# Patient Record
Sex: MALE | Race: WHITE | NOT HISPANIC OR LATINO | Employment: OTHER | URBAN - NONMETROPOLITAN AREA
[De-identification: names, ages, dates, MRNs, and addresses within clinical notes are randomized per-mention and may not be internally consistent; named-entity substitution may affect disease eponyms.]

---

## 2017-03-22 ENCOUNTER — OFFICE VISIT (OUTPATIENT)
Dept: FAMILY MEDICINE CLINIC | Facility: CLINIC | Age: 82
End: 2017-03-22

## 2017-03-22 ENCOUNTER — HOSPITAL ENCOUNTER (EMERGENCY)
Facility: HOSPITAL | Age: 82
Discharge: HOME | End: 2017-03-22
Attending: EMERGENCY MEDICINE | Admitting: EMERGENCY MEDICINE

## 2017-03-22 ENCOUNTER — APPOINTMENT (OUTPATIENT)
Dept: GENERAL RADIOLOGY | Facility: HOSPITAL | Age: 82
End: 2017-03-22

## 2017-03-22 VITALS
OXYGEN SATURATION: 98 % | SYSTOLIC BLOOD PRESSURE: 144 MMHG | WEIGHT: 187 LBS | HEART RATE: 135 BPM | HEIGHT: 73 IN | BODY MASS INDEX: 24.78 KG/M2 | DIASTOLIC BLOOD PRESSURE: 70 MMHG | TEMPERATURE: 97.5 F

## 2017-03-22 VITALS
HEART RATE: 81 BPM | HEIGHT: 73 IN | DIASTOLIC BLOOD PRESSURE: 74 MMHG | SYSTOLIC BLOOD PRESSURE: 145 MMHG | TEMPERATURE: 97 F | BODY MASS INDEX: 23.86 KG/M2 | OXYGEN SATURATION: 97 % | WEIGHT: 180 LBS | RESPIRATION RATE: 18 BRPM

## 2017-03-22 DIAGNOSIS — I47.1 ATRIAL TACHYCARDIA (HCC): Primary | ICD-10-CM

## 2017-03-22 DIAGNOSIS — R06.02 SHORTNESS OF BREATH: ICD-10-CM

## 2017-03-22 DIAGNOSIS — I47.1 PAROXYSMAL SVT (SUPRAVENTRICULAR TACHYCARDIA) (HCC): Primary | ICD-10-CM

## 2017-03-22 LAB
ALBUMIN SERPL-MCNC: 4.3 G/DL (ref 3.5–5.2)
ALBUMIN/GLOB SERPL: 1.6 G/DL
ALP SERPL-CCNC: 45 U/L (ref 39–117)
ALT SERPL W P-5'-P-CCNC: 21 U/L (ref 1–41)
ANION GAP SERPL CALCULATED.3IONS-SCNC: 12.3 MMOL/L
AST SERPL-CCNC: 18 U/L (ref 1–40)
BASOPHILS # BLD AUTO: 0.01 10*3/MM3 (ref 0–0.2)
BASOPHILS NFR BLD AUTO: 0.2 % (ref 0–1.5)
BILIRUB SERPL-MCNC: 1.4 MG/DL (ref 0.1–1.2)
BUN BLD-MCNC: 17 MG/DL (ref 8–23)
BUN/CREAT SERPL: 20 (ref 7–25)
CALCIUM SPEC-SCNC: 9.3 MG/DL (ref 8.6–10.5)
CHLORIDE SERPL-SCNC: 107 MMOL/L (ref 98–107)
CK MB SERPL-CCNC: 3.21 NG/ML
CK SERPL-CCNC: 63 U/L (ref 20–200)
CO2 SERPL-SCNC: 25.7 MMOL/L (ref 22–29)
CREAT BLD-MCNC: 0.85 MG/DL (ref 0.76–1.27)
DEPRECATED RDW RBC AUTO: 60.1 FL (ref 37–54)
EOSINOPHIL # BLD AUTO: 0.06 10*3/MM3 (ref 0–0.7)
EOSINOPHIL NFR BLD AUTO: 1.3 % (ref 0.3–6.2)
ERYTHROCYTE [DISTWIDTH] IN BLOOD BY AUTOMATED COUNT: 16.5 % (ref 11.5–14.5)
GFR SERPL CREATININE-BSD FRML MDRD: 85 ML/MIN/1.73
GLOBULIN UR ELPH-MCNC: 2.7 GM/DL
GLUCOSE BLD-MCNC: 120 MG/DL (ref 65–99)
HCT VFR BLD AUTO: 48.3 % (ref 40.4–52.2)
HGB BLD-MCNC: 15.4 G/DL (ref 13.7–17.6)
IMM GRANULOCYTES # BLD: 0 10*3/MM3 (ref 0–0.03)
IMM GRANULOCYTES NFR BLD: 0 % (ref 0–0.5)
INR PPP: 1.03 (ref 0.9–1.1)
LYMPHOCYTES # BLD AUTO: 1.17 10*3/MM3 (ref 0.9–4.8)
LYMPHOCYTES NFR BLD AUTO: 25.5 % (ref 19.6–45.3)
MAGNESIUM SERPL-MCNC: 2 MG/DL (ref 1.6–2.4)
MCH RBC QN AUTO: 31.8 PG (ref 27–32.7)
MCHC RBC AUTO-ENTMCNC: 31.9 G/DL (ref 32.6–36.4)
MCV RBC AUTO: 99.8 FL (ref 79.8–96.2)
MONOCYTES # BLD AUTO: 0.37 10*3/MM3 (ref 0.2–1.2)
MONOCYTES NFR BLD AUTO: 8.1 % (ref 5–12)
NEUTROPHILS # BLD AUTO: 2.98 10*3/MM3 (ref 1.9–8.1)
NEUTROPHILS NFR BLD AUTO: 64.9 % (ref 42.7–76)
NRBC BLD MANUAL-RTO: 0 /100 WBC (ref 0–0)
PLATELET # BLD AUTO: 128 10*3/MM3 (ref 140–500)
PMV BLD AUTO: 10.9 FL (ref 6–12)
POTASSIUM BLD-SCNC: 4.5 MMOL/L (ref 3.5–5.2)
PROT SERPL-MCNC: 7 G/DL (ref 6–8.5)
PROTHROMBIN TIME: 13.1 SECONDS (ref 11.7–14.2)
RBC # BLD AUTO: 4.84 10*6/MM3 (ref 4.6–6)
SODIUM BLD-SCNC: 145 MMOL/L (ref 136–145)
T4 FREE SERPL-MCNC: 1.31 NG/DL (ref 0.93–1.7)
TROPONIN T SERPL-MCNC: <0.01 NG/ML (ref 0–0.03)
TSH SERPL DL<=0.05 MIU/L-ACNC: 2.67 MIU/ML (ref 0.27–4.2)
WBC NRBC COR # BLD: 4.59 10*3/MM3 (ref 4.5–10.7)

## 2017-03-22 PROCEDURE — 96375 TX/PRO/DX INJ NEW DRUG ADDON: CPT

## 2017-03-22 PROCEDURE — 82550 ASSAY OF CK (CPK): CPT | Performed by: EMERGENCY MEDICINE

## 2017-03-22 PROCEDURE — 36415 COLL VENOUS BLD VENIPUNCTURE: CPT

## 2017-03-22 PROCEDURE — 93000 ELECTROCARDIOGRAM COMPLETE: CPT | Performed by: PHYSICIAN ASSISTANT

## 2017-03-22 PROCEDURE — 99284 EMERGENCY DEPT VISIT MOD MDM: CPT

## 2017-03-22 PROCEDURE — 84439 ASSAY OF FREE THYROXINE: CPT | Performed by: EMERGENCY MEDICINE

## 2017-03-22 PROCEDURE — 83735 ASSAY OF MAGNESIUM: CPT | Performed by: EMERGENCY MEDICINE

## 2017-03-22 PROCEDURE — 99214 OFFICE O/P EST MOD 30 MIN: CPT | Performed by: PHYSICIAN ASSISTANT

## 2017-03-22 PROCEDURE — 96374 THER/PROPH/DIAG INJ IV PUSH: CPT

## 2017-03-22 PROCEDURE — 82553 CREATINE MB FRACTION: CPT | Performed by: EMERGENCY MEDICINE

## 2017-03-22 PROCEDURE — 85025 COMPLETE CBC W/AUTO DIFF WBC: CPT | Performed by: EMERGENCY MEDICINE

## 2017-03-22 PROCEDURE — 84443 ASSAY THYROID STIM HORMONE: CPT | Performed by: EMERGENCY MEDICINE

## 2017-03-22 PROCEDURE — 93010 ELECTROCARDIOGRAM REPORT: CPT | Performed by: INTERNAL MEDICINE

## 2017-03-22 PROCEDURE — 84484 ASSAY OF TROPONIN QUANT: CPT | Performed by: EMERGENCY MEDICINE

## 2017-03-22 PROCEDURE — 93005 ELECTROCARDIOGRAM TRACING: CPT | Performed by: EMERGENCY MEDICINE

## 2017-03-22 PROCEDURE — 85610 PROTHROMBIN TIME: CPT | Performed by: EMERGENCY MEDICINE

## 2017-03-22 PROCEDURE — 80053 COMPREHEN METABOLIC PANEL: CPT | Performed by: EMERGENCY MEDICINE

## 2017-03-22 PROCEDURE — 96361 HYDRATE IV INFUSION ADD-ON: CPT

## 2017-03-22 PROCEDURE — 71010 HC CHEST PA OR AP: CPT

## 2017-03-22 RX ORDER — METOPROLOL SUCCINATE 25 MG/1
25 TABLET, EXTENDED RELEASE ORAL DAILY
Qty: 30 TABLET | Refills: 0 | Status: SHIPPED | OUTPATIENT
Start: 2017-03-22 | End: 2017-04-05 | Stop reason: SDUPTHER

## 2017-03-22 RX ADMIN — METOROPROLOL TARTRATE 5 MG: 5 INJECTION, SOLUTION INTRAVENOUS at 12:58

## 2017-03-22 RX ADMIN — SODIUM CHLORIDE 500 ML: 9 INJECTION, SOLUTION INTRAVENOUS at 13:28

## 2017-03-22 NOTE — ED PROVIDER NOTES
EMERGENCY DEPARTMENT ENCOUNTER    CHIEF COMPLAINT  Chief Complaint: Palpitations  History given by: Patient  History limited by:   Room Number: 31/31  PMD: RAFA Mcfarland      HPI:  Pt is a 88 y.o. male who presents complaining of palpitations. Pt was seen at his PCP office and noted to have tachycardia with rate in the 140s. He was sent to the ED for further evaluation. Pt denies having any CP, SOA, or feeling the palpitations. Pt denies any recent illness.    Duration:  PTA  Onset: sudden  Timing: constant  Quality: tachycardiac  Intensity/Severity: moderate  Progression: unchanged  Associated Symptoms: none  Aggravating Factors: none  Alleviating Factors: none  Previous Episodes: none  Treatment before arrival: pt seen at PCP and found to be tachycardiac on monitor. Referred to ED.     PAST MEDICAL HISTORY  Active Ambulatory Problems     Diagnosis Date Noted   • Abnormal magnetic resonance imaging study 03/18/2016   • Arthritis 03/18/2016   • Osteoarthritis of cervical spine 03/18/2016   • Diplopia 03/18/2016   • Hearing loss 03/18/2016   • Maxillary sinusitis 03/18/2016   • Neoplasm of uncertain behavior of skin 03/18/2016   • Prediabetes 03/18/2016   • Vitamin D deficiency 03/18/2016   • Chronic fatigue 10/27/2016   • Dizziness 10/27/2016   • Acute frontal sinusitis 10/27/2016     Resolved Ambulatory Problems     Diagnosis Date Noted   • No Resolved Ambulatory Problems     Past Medical History:   Diagnosis Date   • Abnormal MRI    • Acute frontal sinusitis    • Arthritis    • Chronic fatigue    • Dizziness    • Hearing loss    • Neoplasm of skin    • Osteoporosis    • Prediabetes    • Vitamin D deficiency        PAST SURGICAL HISTORY  Past Surgical History:   Procedure Laterality Date   • HERNIA REPAIR      x 2       FAMILY HISTORY  History reviewed. No pertinent family history.    SOCIAL HISTORY  Social History     Social History   • Marital status:      Spouse name: N/A   • Number of children:  N/A   • Years of education: N/A     Occupational History   • Not on file.     Social History Main Topics   • Smoking status: Never Smoker   • Smokeless tobacco: Never Used   • Alcohol use No   • Drug use: No   • Sexual activity: Not on file     Other Topics Concern   • Not on file     Social History Narrative   • No narrative on file       ALLERGIES  Review of patient's allergies indicates no known allergies.    REVIEW OF SYSTEMS  Review of Systems   Constitutional: Negative for activity change, appetite change and fever.   HENT: Negative for congestion and sore throat.    Eyes: Negative.    Respiratory: Negative for cough and shortness of breath.    Cardiovascular: Positive for palpitations. Negative for chest pain and leg swelling.   Gastrointestinal: Negative for abdominal pain, diarrhea and vomiting.   Endocrine: Negative.    Genitourinary: Negative for decreased urine volume and dysuria.   Musculoskeletal: Negative for neck pain.   Skin: Negative for rash and wound.   Allergic/Immunologic: Negative.    Neurological: Negative for weakness, numbness and headaches.   Hematological: Negative.    Psychiatric/Behavioral: Negative.    All other systems reviewed and are negative.      PHYSICAL EXAM  ED Triage Vitals   Temp Heart Rate Resp BP SpO2   03/22/17 1239 03/22/17 1239 03/22/17 1239 03/22/17 1239 03/22/17 1239   97.9 °F (36.6 °C) 140 18 144/90 97 %      Temp src Heart Rate Source Patient Position BP Location FiO2 (%)   03/22/17 1239 03/22/17 1239 03/22/17 1239 03/22/17 1239 --   Oral Monitor Lying Right arm        Physical Exam   Constitutional: He is oriented to person, place, and time and well-developed, well-nourished, and in no distress.   HENT:   Head: Normocephalic and atraumatic.   Mouth/Throat: Mucous membranes are dry.   Eyes: EOM are normal. Pupils are equal, round, and reactive to light.   Neck: Normal range of motion. Neck supple.   Cardiovascular: Regular rhythm and normal heart sounds.  Tachycardia  present.       Pulmonary/Chest: Effort normal and breath sounds normal. No respiratory distress.   Abdominal: Soft. There is no tenderness. There is no rebound and no guarding.   Musculoskeletal: Normal range of motion. He exhibits no edema.   Neurological: He is alert and oriented to person, place, and time. He has normal sensation and normal strength.   Skin: Skin is warm and dry.   Psychiatric: Mood and affect normal.   Nursing note and vitals reviewed.      LAB RESULTS  Lab Results (last 24 hours)     Procedure Component Value Units Date/Time    Comprehensive Metabolic Panel [[id number]]  (Abnormal) Collected:  03/22/17 1302    Specimen:  Blood Updated:  03/22/17 1343     Glucose 120 (H) mg/dL      BUN 17 mg/dL      Creatinine 0.85 mg/dL      Sodium 145 mmol/L      Potassium 4.5 mmol/L      Chloride 107 mmol/L      CO2 25.7 mmol/L      Calcium 9.3 mg/dL      Total Protein 7.0 g/dL      Albumin 4.30 g/dL      ALT (SGPT) 21 U/L      AST (SGOT) 18 U/L      Alkaline Phosphatase 45 U/L      Total Bilirubin 1.4 (H) mg/dL      eGFR Non African Amer 85 mL/min/1.73      Globulin 2.7 gm/dL      A/G Ratio 1.6 g/dL      BUN/Creatinine Ratio 20.0     Anion Gap 12.3 mmol/L     Narrative:       The MDRD GFR formula is only valid for adults with stable renal function between ages 18 and 70.    Protime-INR [[id number]]  (Normal) Collected:  03/22/17 1302    Specimen:  Blood Updated:  03/22/17 1335     Protime 13.1 Seconds      INR 1.03    CK [[id number]]  (Normal) Collected:  03/22/17 1302    Specimen:  Blood Updated:  03/22/17 1343     Creatine Kinase 63 U/L     CK-MB [[id number]]  (Normal) Collected:  03/22/17 1302    Specimen:  Blood Updated:  03/22/17 1347     CKMB 3.21 ng/mL     Troponin [[id number]]  (Normal) Collected:  03/22/17 1302    Specimen:  Blood Updated:  03/22/17 1347     Troponin T <0.010 ng/mL     Narrative:       Troponin T Reference Ranges:  Less than 0.03 ng/mL:    Negative for AMI  0.03 to 0.09 ng/mL:       Indeterminant for AMI  Greater than 0.09 ng/mL: Positive for AMI    Magnesium [[id number]]  (Normal) Collected:  03/22/17 1302    Specimen:  Blood Updated:  03/22/17 1343     Magnesium 2.0 mg/dL     T4, Free [[id number]]  (Normal) Collected:  03/22/17 1302    Specimen:  Blood Updated:  03/22/17 1347     Free T4 1.31 ng/dL     TSH [[id number]]  (Normal) Collected:  03/22/17 1302    Specimen:  Blood Updated:  03/22/17 1347     TSH 2.670 mIU/mL     CBC & Differential [[id number]] Collected:  03/22/17 1304    Specimen:  Blood Updated:  03/22/17 1318    Narrative:       The following orders were created for panel order CBC & Differential.  Procedure                               Abnormality         Status                     ---------                               -----------         ------                     CBC Auto Differential[[id number]]         Abnormal            Final result                 Please view results for these tests on the individual orders.    CBC Auto Differential [[id number]]  (Abnormal) Collected:  03/22/17 1304    Specimen:  Blood Updated:  03/22/17 1318     WBC 4.59 10*3/mm3      RBC 4.84 10*6/mm3      Hemoglobin 15.4 g/dL      Hematocrit 48.3 %      MCV 99.8 (H) fL      MCH 31.8 pg      MCHC 31.9 (L) g/dL      RDW 16.5 (H) %      RDW-SD 60.1 (H) fl      MPV 10.9 fL      Platelets 128 (L) 10*3/mm3      Neutrophil % 64.9 %      Lymphocyte % 25.5 %      Monocyte % 8.1 %      Eosinophil % 1.3 %      Basophil % 0.2 %      Immature Grans % 0.0 %      Neutrophils, Absolute 2.98 10*3/mm3      Lymphocytes, Absolute 1.17 10*3/mm3      Monocytes, Absolute 0.37 10*3/mm3      Eosinophils, Absolute 0.06 10*3/mm3      Basophils, Absolute 0.01 10*3/mm3      Immature Grans, Absolute 0.00 10*3/mm3      nRBC 0.0 /100 WBC           I ordered the above labs and reviewed the results    RADIOLOGY  XR Chest 1 View - scarring and atelectasis R base        I ordered the above noted radiological studies. Interpreted by radiologist.  Reviewed by me in PACS.        PROCEDURES  Procedures  EKG    EKG time: 1236  Rhythm/Rate: SVT, 136BPM  No Acute Ischemia  Non-Specific ST-T changes  LVH    changed compared to prior on - SVT is new    Interpreted Contemporaneously by me.  Independently viewed by me    EKG    EKG time: 1430  Rhythm/Rate: NSR, 76BPM  No Acute Ischemia  Non-Specific ST-T changes  1st degree AV block, LVH with early repolarization    changed compared to prior - SVT now resolved    Interpreted Contemporaneously by me.  Independently viewed by me        PROGRESS AND CONSULTS  ED Course   12:53 PM  After carotid massage pt converted from SVT to NSR. Lopressor ordered to prevent recurrent SVT. Labs, imaging ordered.   2:23 PM  Call placed to cardiology.  2:24 PM  Rechecked with pt. Pt remains in NSR with rate in 70s. Pt remains asymptomatic. Informed pt of plan to discuss with cardiologist.   2:36 PM  Discussed pt with Dr. Quezada (Cardiologist). Will start on 20mg Toporol XL and have follow up in office. Rechecked with pt. Informed pt of discussion with Dr. Quezada and plan to discharge. RTER warning given for any new or worsening sxs.     MEDICAL DECISION MAKING      MDM  Number of Diagnoses or Management Options  Paroxysmal SVT (supraventricular tachycardia):      Amount and/or Complexity of Data Reviewed  Clinical lab tests: ordered and reviewed  Tests in the radiology section of CPT®: ordered and reviewed (CXR: Scaring and atelectasis R base)  Tests in the medicine section of CPT®: reviewed and ordered (EKG - See EKG procedure note  )  Discuss the patient with other providers: yes (Dr. Quezada )  Independent visualization of images, tracings, or specimens: yes           DIAGNOSIS  Final diagnoses:   Paroxysmal SVT (supraventricular tachycardia)       DISPOSITION  DISCHARGE    Patient discharged in stable condition.    Reviewed implications of results, diagnosis, meds, responsibility to follow up, warning signs and  symptoms of possible worsening, potential complications and reasons to return to ER.    Patient/Family voiced understanding of above instructions.    Discussed plan for discharge, as there is no emergent indication for admission.  Pt/family is agreeable and understands need for follow up and repeat testing.  Pt is aware that discharge does not mean that nothing is wrong but it indicates no emergency is present that requires admission and they must continue care with follow-up as given below or physician of their choice.     FOLLOW-UP  Maninder Quezada MD  [address] [id number] [phone]    Call in 1 day  For cardiologist follow-up         Medication List      New Prescriptions          metoprolol succinate XL 25 MG 24 hr tablet   Commonly known as:  TOPROL-XL   Take 1 tablet by mouth Daily.               Latest Documented Vital Signs:  As of 2:43 PM  BP- 138/75 HR- 79 Temp- 97.9 °F (36.6 °C) (Oral) O2 sat- 95%    --  Documentation assistance provided by miladis Iraheta for Dr. Rodriguez.  Information recorded by the scribe was done at my direction and has been verified and validated by me.     Shakir Iraheta  03/22/17 1349       Gm Rodriguez MD  03/22/17 5947

## 2017-03-22 NOTE — PATIENT INSTRUCTIONS
88 YEAR OLD MALE WHO PRESENTS TODAY IN FOLLOW UP OF PREDIABETES, IRON, B12 AND VITAMIN D DEFICIENCY AND HISTORY OF COLONOSCOPY. PATIENT NOTES INTERMITTENT SOA X 1 WEEK AND SINUS CONGESTION. HE DENIES CP/PALP/DIZZINESS/WEAKNESS/SYNCOPE. UPON TRIAGE, PULSE WAS NOTED TO . HE THEN SAT FOR A FEW MINUTES AND PULSE CAME DOWN . EKG WAS PERFORMED WITH RATE 140 ATRIAL TACH/SUPRAVENTRICULAR TACH VS A FLUTTER. PATIENT DENIES ANY COUGH/COLD MEDICATIONS. HE IS EATING AND DRINKING NORMALLY. PATIENT ONLY TAKING ASA 81 MG ONCE DAILY. OXYGEN APPLIED AND 9-1-1 CALLED. PATIENT TRANSPORTED VIA AMBULANCE TO ER.     HE WILL NEED TO FOLLOW UP AFTER HOSPITAL FOR LABS AND ROUTINE FOLLOW UP.

## 2017-03-22 NOTE — PROGRESS NOTES
Subjective   Dinesh Churchill is a 88 y.o. male here today for follow up of prediabetes, iron, b12, vitamin D. Complaining of sinus congestion and soa x 1 week    History of Present Illness     SOA INTERMITTENT X 1 WEEK. NO CP/PALP/DIZZINESS/WEAKNESS. NO INCREASE IN SOA COMPARED TO THE PAST WEEK.   HAS HAD SOME CONGESTION BUT NOT TAKING ANY SINUS MEDICATION FOR THIS.   The following portions of the patient's history were reviewed and updated as appropriate: allergies, current medications, past family history, past medical history, past social history, past surgical history and problem list.    Review of Systems   HENT: Positive for congestion (sinus).        Objective   Physical Exam   Constitutional: He is oriented to person, place, and time. He appears well-developed.   HENT:   Head: Normocephalic and atraumatic.   Right Ear: External ear normal.   Left Ear: External ear normal.   Eyes: Conjunctivae are normal.   Neck: Carotid bruit is not present. No tracheal deviation present. No thyroid mass and no thyromegaly present.   Cardiovascular: Regular rhythm and intact distal pulses.  Tachycardia present.  Exam reveals no gallop and no friction rub.    Murmur heard.  Pulmonary/Chest: Effort normal and breath sounds normal.   Neurological: He is alert and oriented to person, place, and time. Gait normal.   Skin: Skin is warm and dry.   Psychiatric: He has a normal mood and affect. His behavior is normal. Judgment and thought content normal.   Nursing note and vitals reviewed.    ECG 12 Lead  Date/Time: 3/22/2017 5:37 PM  Performed by: MAYO CHÁVEZ  Authorized by: MAYO CHÁVEZ   Comparison: not compared with previous ECG   Previous ECG: no previous ECG available  Rhythm comments: SUPRAVENTRICULAR/ATRIAL TACHYCARDIA VS A FLUTTER.   Rate: tachycardic  ST segment elevation noted on lead: NS ST/T CHANGES-   QRS axis: left  Other findings: LVH  Clinical impression: abnormal ECG  Comments: INDICATION: SOA AND  TACHYCARDIA          Assessment/Plan   Dinesh was seen today for medication check and refills.    Diagnoses and all orders for this visit:    Atrial tachycardia    Shortness of breath      Patient Instructions   88 YEAR OLD MALE WHO PRESENTS TODAY IN FOLLOW UP OF PREDIABETES, IRON, B12 AND VITAMIN D DEFICIENCY AND HISTORY OF COLONOSCOPY. PATIENT NOTES INTERMITTENT SOA X 1 WEEK AND SINUS CONGESTION. HE DENIES CP/PALP/DIZZINESS/WEAKNESS/SYNCOPE. UPON TRIAGE, PULSE WAS NOTED TO . HE THEN SAT FOR A FEW MINUTES AND PULSE CAME DOWN . EKG WAS PERFORMED WITH RATE 140 ATRIAL TACH/SUPRAVENTRICULAR TACH VS A FLUTTER. PATIENT DENIES ANY COUGH/COLD MEDICATIONS. HE IS EATING AND DRINKING NORMALLY. PATIENT ONLY TAKING ASA 81 MG ONCE DAILY. OXYGEN APPLIED AND 9-1-1 CALLED. PATIENT TRANSPORTED VIA AMBULANCE TO ER.     HE WILL NEED TO FOLLOW UP AFTER HOSPITAL FOR LABS AND ROUTINE FOLLOW UP.

## 2017-03-27 ENCOUNTER — TELEPHONE (OUTPATIENT)
Dept: SOCIAL WORK | Facility: HOSPITAL | Age: 82
End: 2017-03-27

## 2017-04-05 ENCOUNTER — OFFICE VISIT (OUTPATIENT)
Dept: CARDIOLOGY | Facility: CLINIC | Age: 82
End: 2017-04-05

## 2017-04-05 VITALS
BODY MASS INDEX: 25.71 KG/M2 | DIASTOLIC BLOOD PRESSURE: 77 MMHG | WEIGHT: 194 LBS | SYSTOLIC BLOOD PRESSURE: 161 MMHG | HEART RATE: 81 BPM | HEIGHT: 73 IN

## 2017-04-05 DIAGNOSIS — I10 ESSENTIAL HYPERTENSION: ICD-10-CM

## 2017-04-05 DIAGNOSIS — I47.1 SVT (SUPRAVENTRICULAR TACHYCARDIA) (HCC): Primary | ICD-10-CM

## 2017-04-05 DIAGNOSIS — R06.02 SOB (SHORTNESS OF BREATH): ICD-10-CM

## 2017-04-05 DIAGNOSIS — R07.2 PRECORDIAL PAIN: ICD-10-CM

## 2017-04-05 PROCEDURE — 99203 OFFICE O/P NEW LOW 30 MIN: CPT | Performed by: INTERNAL MEDICINE

## 2017-04-05 PROCEDURE — 93000 ELECTROCARDIOGRAM COMPLETE: CPT | Performed by: INTERNAL MEDICINE

## 2017-04-05 RX ORDER — METOPROLOL SUCCINATE 50 MG/1
50 TABLET, EXTENDED RELEASE ORAL DAILY
Qty: 90 TABLET | Refills: 6 | Status: SHIPPED | OUTPATIENT
Start: 2017-04-05 | End: 2017-05-19

## 2017-04-05 NOTE — PROGRESS NOTES
Subjective:       Dinesh Churchill is a 88 y.o. male who here for follow up    CC  Svt in ER    CP  HPI  88 yr old w/m was recently seen ER with episode of rapid heart beat along with restrosternal cp  With sob, with neg w/p, also was diagnosed HTN and pt was started on Toprol XL     Problem List Items Addressed This Visit        Cardiovascular and Mediastinum    SVT (supraventricular tachycardia) - Primary    Relevant Medications    metoprolol succinate XL (TOPROL-XL) 50 MG 24 hr tablet    Other Relevant Orders    ECG 12 Lead    Stress Test With Myocardial Perfusion One Day    Adult Transthoracic Echo Complete    Essential hypertension    Relevant Medications    metoprolol succinate XL (TOPROL-XL) 50 MG 24 hr tablet       Respiratory    SOB (shortness of breath)    Relevant Orders    Stress Test With Myocardial Perfusion One Day    Adult Transthoracic Echo Complete       Nervous and Auditory    Precordial pain    Relevant Orders    Stress Test With Myocardial Perfusion One Day    Adult Transthoracic Echo Complete        Previous treatments/evaluations include: ASA. Cardiac risk factors: advanced age (older than 55 for men, 65 for women) and hypertension.    The following portions of the patient's history were reviewed and updated as appropriate: allergies, current medications, past family history, past medical history, past social history, past surgical history and problem list.    Past Medical History:   Diagnosis Date   • Abnormal MRI    • Acute frontal sinusitis    • Arthritis    • Chronic fatigue    • Dizziness    • Hearing loss    • Neoplasm of skin    • Osteoporosis    • Prediabetes    • Vitamin D deficiency     reports that he has never smoked. He has never used smokeless tobacco. He reports that he does not drink alcohol or use illicit drugs.History reviewed. No pertinent family history.    Review of Systems  Constitutional: No wt loss, fever, fatigue  Gastrointestinal: No nausea, abdominal  "pain  Behavioral/Psych: No insomnia or anxiety   Cardiovascular cp, palpitation and sob  Objective:       Physical Exam           Physical Exam  /77  Pulse 81  Ht 73\" (185.4 cm)  Wt 194 lb (88 kg)  BMI 25.6 kg/m2    General appearance: NAD, conversant   Eyes: anicteric sclerae, moist conjunctivae; no lid-lag; PERRLA   HENT: Atraumatic; oropharynx clear with moist mucous membranes and no mucosal ulcerations;  normal hard and soft palate   Neck: Trachea midline; FROM, supple, no thyromegaly or lymphadenopathy   Lungs: CTA, with normal respiratory effort and no intercostal retractions   CV: S1-S2 regular, sys murmurs, no rub, no gallop   Abdomen: Soft, non-tender; no masses or HSM   Extremities: No peripheral edema or extremity lymphadenopathy  Skin: Normal temperature, turgor and texture; no rash, ulcers or subcutaneous nodules   Psych: Appropriate affect, alert and oriented to person, place and time           Cardiographics  @  ECG 12 Lead  Date/Time: 4/5/2017 10:18 AM  Performed by: ASHISH COPELAND  Authorized by: ASHISH COPELAND   Comparison: compared with previous ECG   Similar to previous ECG  Rhythm: sinus rhythm  Other findings: LVH with strain  Clinical impression: abnormal ECG            Echocardiogram:        Current Outpatient Prescriptions:   •  aspirin 81 MG tablet, Take 81 mg by mouth daily., Disp: , Rfl:   •  Cholecalciferol (VITAMIN D-3) 1000 UNITS capsule, Take 1,000 Units by mouth daily., Disp: , Rfl:   •  metoprolol succinate XL (TOPROL-XL) 25 MG 24 hr tablet, Take 1 tablet by mouth Daily., Disp: 30 tablet, Rfl: 0    Current Facility-Administered Medications:   •  cyanocobalamin injection 1,000 mcg, 1,000 mcg, Intramuscular, Q28 Days, RAFA Mcfarland, 1,000 mcg at 08/11/16 0817  •  cyanocobalamin injection 1,000 mcg, 1,000 mcg, Intramuscular, Q28 Days, RAFA Mcfarland, 1,000 mcg at 08/16/16 1428  •  cyanocobalamin injection 1,000 mcg, 1,000 mcg, Intramuscular, Q28 Days, " RAFA Mcfarland, 1,000 mcg at 08/23/16 0815  •  cyanocobalamin injection 1,000 mcg, 1,000 mcg, Intramuscular, Q28 Days, RAFA Mcfarland, 1,000 mcg at 08/30/16 0736   Assessment:        Patient Active Problem List   Diagnosis   • Abnormal magnetic resonance imaging study   • Arthritis   • Osteoarthritis of cervical spine   • Diplopia   • Hearing loss   • Maxillary sinusitis   • Neoplasm of uncertain behavior of skin   • Prediabetes   • Vitamin D deficiency   • Chronic fatigue   • Dizziness   • Acute frontal sinusitis               Plan:            ICD-10-CM ICD-9-CM   1. SVT (supraventricular tachycardia) I47.1 427.89   2. Precordial pain R07.2 786.51   3. SOB (shortness of breath) R06.02 786.05   4. Essential hypertension I10 401.9       1. SVT (supraventricular tachycardia)    - ECG 12 Lead  - Stress Test With Myocardial Perfusion One Day; Future  - Adult Transthoracic Echo Complete; Future    2. Precordial pain    - Stress Test With Myocardial Perfusion One Day; Future  - Adult Transthoracic Echo Complete; Future    3. SOB (shortness of breath)    - Stress Test With Myocardial Perfusion One Day; Future  - Adult Transthoracic Echo Complete; Future    4. Essential hypertension  Increase Toprol XL      INCREASE TOPROL XL 50MG    STRESS CARDIOLYTE/ ECHO  SEE US 1 MONTH  COUNSELING:    Dinesh Forrest was given to patient for the following topics: diagnostic results, risk factor reductions, impressions, risks and benefits of treatment options and importance of treatment compliance .       SMOKING COUNSELING:    Counseling given: Not Answered      EMR Dragon/Transcription disclaimer:   Much of this encounter note is an electronic transcription/translation of spoken language to printed text. The electronic translation of spoken language may permit erroneous, or at times, nonsensical words or phrases to be inadvertently transcribed; Although I have reviewed the note for such errors, some may still exist.

## 2017-04-07 PROBLEM — R07.2 PRECORDIAL PAIN: Status: ACTIVE | Noted: 2017-04-07

## 2017-04-07 PROBLEM — I47.1 SVT (SUPRAVENTRICULAR TACHYCARDIA): Status: ACTIVE | Noted: 2017-04-07

## 2017-04-07 PROBLEM — I47.10 SVT (SUPRAVENTRICULAR TACHYCARDIA): Status: ACTIVE | Noted: 2017-04-07

## 2017-04-07 PROBLEM — R06.02 SOB (SHORTNESS OF BREATH): Status: ACTIVE | Noted: 2017-04-07

## 2017-04-07 PROBLEM — I10 ESSENTIAL HYPERTENSION: Status: ACTIVE | Noted: 2017-04-07

## 2017-04-10 ENCOUNTER — CLINICAL SUPPORT (OUTPATIENT)
Dept: FAMILY MEDICINE CLINIC | Facility: CLINIC | Age: 82
End: 2017-04-10

## 2017-04-10 VITALS — SYSTOLIC BLOOD PRESSURE: 122 MMHG | DIASTOLIC BLOOD PRESSURE: 64 MMHG | OXYGEN SATURATION: 97 % | HEART RATE: 78 BPM

## 2017-04-10 DIAGNOSIS — R53.82 CHRONIC FATIGUE: Primary | ICD-10-CM

## 2017-04-11 LAB — VIT B12 SERPL-MCNC: 408 PG/ML (ref 211–946)

## 2017-04-14 ENCOUNTER — OFFICE VISIT (OUTPATIENT)
Dept: FAMILY MEDICINE CLINIC | Facility: CLINIC | Age: 82
End: 2017-04-14

## 2017-04-14 VITALS
RESPIRATION RATE: 16 BRPM | OXYGEN SATURATION: 95 % | DIASTOLIC BLOOD PRESSURE: 54 MMHG | HEIGHT: 73 IN | SYSTOLIC BLOOD PRESSURE: 144 MMHG | HEART RATE: 77 BPM

## 2017-04-14 DIAGNOSIS — I49.3 PVC (PREMATURE VENTRICULAR CONTRACTION): ICD-10-CM

## 2017-04-14 DIAGNOSIS — I47.1 SVT (SUPRAVENTRICULAR TACHYCARDIA) (HCC): ICD-10-CM

## 2017-04-14 DIAGNOSIS — I10 ESSENTIAL HYPERTENSION: Primary | ICD-10-CM

## 2017-04-14 PROCEDURE — 93000 ELECTROCARDIOGRAM COMPLETE: CPT | Performed by: PHYSICIAN ASSISTANT

## 2017-04-14 PROCEDURE — 99214 OFFICE O/P EST MOD 30 MIN: CPT | Performed by: PHYSICIAN ASSISTANT

## 2017-04-14 NOTE — PROGRESS NOTES
Subjective   Dinesh Churchill is a 88 y.o. male HERE TODAY FOR FOLLOW UP OF BP.    History of Present Illness      FEELING SOA AND WEAK/TIRED BUT NO CP/PALP.   NO OTHER COMPLAINTS.     The following portions of the patient's history were reviewed and updated as appropriate: allergies, current medications, past family history, past medical history, past social history, past surgical history and problem list.    Review of Systems   Respiratory: Positive for shortness of breath.    Neurological: Positive for weakness.       Objective   Physical Exam   Constitutional: He is oriented to person, place, and time. He appears well-developed.   HENT:   Head: Normocephalic and atraumatic.   Right Ear: External ear normal.   Left Ear: External ear normal.   Eyes: Conjunctivae are normal.   Neck: Carotid bruit is not present. No tracheal deviation present. No thyroid mass and no thyromegaly present.   Cardiovascular: Normal rate, normal heart sounds and intact distal pulses.    IRREGULAR RHYTHM WITH 3 BEATS THEN PASUE FOR SEVERAL SECONDS THEN IRREGULARLY IRREGULAR THEN REGULAR RHYTHM.    Pulmonary/Chest: Effort normal and breath sounds normal.   Neurological: He is alert and oriented to person, place, and time. Gait normal.   Skin: Skin is warm and dry.   Psychiatric: He has a normal mood and affect. His behavior is normal. Judgment and thought content normal.   Nursing note and vitals reviewed.    ECG 12 Lead  Date/Time: 4/14/2017 5:39 PM  Performed by: MAYO CHÁVEZ  Authorized by: MAYO CHÁVEZ   Comparison: compared with previous ECG from 4/5/2017  Comparison to previous ECG: NEW 1ST DEGREE AV BLOCK AND NEW FREQUENT PVC  Rhythm: sinus rhythm  Ectopy: frequent PVCs  Rate: normal  Conduction: 1st degree  QRS axis: left  Other findings: LVH with strain  Clinical impression: abnormal ECG  Comments: INDICATION: IRREGULAR HEART RHYTHM ON EXAM. I WILL CALL DR COPELAND FOR RECOMMENDATIONS.             Assessment/Plan    Diagnoses and all orders for this visit:    Essential hypertension  -     ECG 12 Lead    SVT (supraventricular tachycardia)  -     ECG 12 Lead    PVC (premature ventricular contraction)  -     ECG 12 Lead      Patient Instructions   88 YEAR OLD MALE WHO PRESENTS TODAY IN FOLLOW UP OF HTN, TACHYCARDIA, AND INITIATION OF BETA BLOCKER. PATIENT REPORTS HE CONTINUES WITH SOA AND FATIGUE BUT THAT THIS IS STABLE- NO WORSENING AND NO NEW SYMPTOMS. NO CP/PALPITATIONS. BLOOD PRESSURE WAS ABNORMAL AND HEART RATE SEEMED TO BE IN TRIGEMINY (WITH PAUSE AFTER 3 BEATS) THEN IRREGULARLY IRREGULAR THEN BACK TO SINUS RHYTHM. EKG AND RHYTHM STRIP REVEALS NEW 1ST DEGREE AV BLOCK AND FREQUENT PVC EVERY 2-4 BEATS THEN NSR THEN RECURRENCE. I CONSULTED DR COPELAND REGARDING CHANGES SINCE ER VISIT. HE WOULD LIKE TO CONTINUE MEDICATION AT CURRENT DOSE. TO ER ASAP IF DEVELOPS SYMPTOMS AT ANY POINT. IF ANY CONCERN BUT NO SIGNIFICANT SYMPTOMS, TO CALL HIS OFFICE AND HE WILL SEE SOONER. KEEP FOLLOW UP WITH ME NEXT WEEK. PATIENT'S SON WAS INFORMED.

## 2017-04-19 ENCOUNTER — OFFICE VISIT (OUTPATIENT)
Dept: FAMILY MEDICINE CLINIC | Facility: CLINIC | Age: 82
End: 2017-04-19

## 2017-04-19 VITALS
BODY MASS INDEX: 24.8 KG/M2 | DIASTOLIC BLOOD PRESSURE: 72 MMHG | OXYGEN SATURATION: 96 % | SYSTOLIC BLOOD PRESSURE: 130 MMHG | TEMPERATURE: 98.2 F | HEART RATE: 64 BPM | WEIGHT: 188 LBS

## 2017-04-19 DIAGNOSIS — E55.9 VITAMIN D DEFICIENCY: ICD-10-CM

## 2017-04-19 DIAGNOSIS — R53.82 CHRONIC FATIGUE: ICD-10-CM

## 2017-04-19 DIAGNOSIS — R79.9 ABNORMAL FINDING OF BLOOD CHEMISTRY: ICD-10-CM

## 2017-04-19 DIAGNOSIS — Z86.79 HISTORY OF SUPRAVENTRICULAR TACHYCARDIA: Primary | ICD-10-CM

## 2017-04-19 DIAGNOSIS — R19.5 HEME POSITIVE STOOL: ICD-10-CM

## 2017-04-19 DIAGNOSIS — E53.8 B12 DEFICIENCY: ICD-10-CM

## 2017-04-19 DIAGNOSIS — I10 ESSENTIAL HYPERTENSION: ICD-10-CM

## 2017-04-19 DIAGNOSIS — R73.03 PREDIABETES: ICD-10-CM

## 2017-04-19 PROBLEM — R06.02 SOB (SHORTNESS OF BREATH): Status: RESOLVED | Noted: 2017-04-07 | Resolved: 2017-04-19

## 2017-04-19 PROBLEM — R07.2 PRECORDIAL PAIN: Status: RESOLVED | Noted: 2017-04-07 | Resolved: 2017-04-19

## 2017-04-19 LAB
25(OH)D3+25(OH)D2 SERPL-MCNC: 21.5 NG/ML (ref 30–100)
ALBUMIN SERPL-MCNC: 4.4 G/DL (ref 3.5–5.2)
ALBUMIN/GLOB SERPL: 2 G/DL
ALP SERPL-CCNC: 45 U/L (ref 39–117)
ALT SERPL-CCNC: 17 U/L (ref 1–41)
AST SERPL-CCNC: 19 U/L (ref 1–40)
BASOPHILS # BLD AUTO: 0.01 10*3/MM3 (ref 0–0.2)
BASOPHILS NFR BLD AUTO: 0.2 % (ref 0–1.5)
BILIRUB SERPL-MCNC: 1.7 MG/DL (ref 0.1–1.2)
BUN SERPL-MCNC: 17 MG/DL (ref 8–23)
BUN/CREAT SERPL: 18.9 (ref 7–25)
CALCIUM SERPL-MCNC: 9.3 MG/DL (ref 8.6–10.5)
CHLORIDE SERPL-SCNC: 105 MMOL/L (ref 98–107)
CHOLEST SERPL-MCNC: 195 MG/DL (ref 0–200)
CO2 SERPL-SCNC: 26.9 MMOL/L (ref 22–29)
CREAT SERPL-MCNC: 0.9 MG/DL (ref 0.76–1.27)
EOSINOPHIL # BLD AUTO: 0.09 10*3/MM3 (ref 0–0.7)
EOSINOPHIL NFR BLD AUTO: 1.7 % (ref 0.3–6.2)
ERYTHROCYTE [DISTWIDTH] IN BLOOD BY AUTOMATED COUNT: 16 % (ref 11.5–14.5)
FERRITIN SERPL-MCNC: 481.3 NG/ML (ref 30–400)
GLOBULIN SER CALC-MCNC: 2.2 GM/DL
GLUCOSE SERPL-MCNC: 105 MG/DL (ref 65–99)
HBA1C MFR BLD: 5.48 % (ref 4.8–5.6)
HCT VFR BLD AUTO: 44.8 % (ref 40.4–52.2)
HDLC SERPL-MCNC: 42 MG/DL (ref 40–60)
HGB BLD-MCNC: 14.4 G/DL (ref 13.7–17.6)
IMM GRANULOCYTES # BLD: 0.02 10*3/MM3 (ref 0–0.03)
IMM GRANULOCYTES NFR BLD: 0.4 % (ref 0–0.5)
IRON SATN MFR SERPL: 30 % (ref 20–50)
IRON SERPL-MCNC: 100 MCG/DL (ref 59–158)
LDLC SERPL CALC-MCNC: 132 MG/DL (ref 0–100)
LDLC/HDLC SERPL: 3.14 {RATIO}
LYMPHOCYTES # BLD AUTO: 1.36 10*3/MM3 (ref 0.9–4.8)
LYMPHOCYTES NFR BLD AUTO: 26.4 % (ref 19.6–45.3)
MCH RBC QN AUTO: 32.4 PG (ref 27–32.7)
MCHC RBC AUTO-ENTMCNC: 32.1 G/DL (ref 32.6–36.4)
MCV RBC AUTO: 100.7 FL (ref 79.8–96.2)
MONOCYTES # BLD AUTO: 0.35 10*3/MM3 (ref 0.2–1.2)
MONOCYTES NFR BLD AUTO: 6.8 % (ref 5–12)
NEUTROPHILS # BLD AUTO: 3.32 10*3/MM3 (ref 1.9–8.1)
NEUTROPHILS NFR BLD AUTO: 64.5 % (ref 42.7–76)
PLATELET # BLD AUTO: 143 10*3/MM3 (ref 140–500)
POTASSIUM SERPL-SCNC: 4.4 MMOL/L (ref 3.5–5.2)
PROT SERPL-MCNC: 6.6 G/DL (ref 6–8.5)
RBC # BLD AUTO: 4.45 10*6/MM3 (ref 4.6–6)
SODIUM SERPL-SCNC: 144 MMOL/L (ref 136–145)
TIBC SERPL-MCNC: 333 MCG/DL
TRIGL SERPL-MCNC: 106 MG/DL (ref 0–150)
UIBC SERPL-MCNC: 233 MCG/DL
VLDLC SERPL CALC-MCNC: 21.2 MG/DL (ref 5–40)
WBC # BLD AUTO: 5.15 10*3/MM3 (ref 4.5–10.7)

## 2017-04-19 PROCEDURE — 99214 OFFICE O/P EST MOD 30 MIN: CPT | Performed by: PHYSICIAN ASSISTANT

## 2017-04-19 NOTE — PROGRESS NOTES
Subjective   Dinesh Churchill is a 88 y.o. male here today for follow-up ER for heart racing     History of Present Illness     REPORTS NO SOA/PALP/WEAKNESS.   LAST COLONOSCOPY 4 1/2 YEARS AGO- NORMAL.     TAKING B12 SHOTS- PATIENT CANNOT REMEMBER.     WAS NOT ADMIT TO HOSPITAL - CONSULTED CARDIOLOGY FROM ER AND PUT ON METOPROLOL - A TACH VS A FLUTTER. HAS BEEN DOING WELL SINCE. NO COMPLAINTS. FOLLOW UP WITH CARDIOLOGY 5/1/17. HAD CONVERTED IN ER.     The following portions of the patient's history were reviewed and updated as appropriate: allergies, current medications, past family history, past medical history, past social history, past surgical history and problem list.    Review of Systems   All other systems reviewed and are negative.      Objective   Physical Exam   Constitutional: He is oriented to person, place, and time. He appears well-developed.   HENT:   Head: Normocephalic and atraumatic.   Right Ear: External ear normal.   Left Ear: External ear normal.   Eyes: Conjunctivae are normal.   Neck: Carotid bruit is not present. No tracheal deviation present. No thyroid mass and no thyromegaly present.   Cardiovascular: Normal rate, regular rhythm, normal heart sounds and intact distal pulses.    Pulmonary/Chest: Effort normal and breath sounds normal.   Neurological: He is alert and oriented to person, place, and time. Gait normal.   Skin: Skin is warm and dry.   Psychiatric: He has a normal mood and affect. His behavior is normal. Judgment and thought content normal.   Nursing note and vitals reviewed.      Assessment/Plan   Dinesh was seen today for follow-up er for heart racing.    Diagnoses and all orders for this visit:    History of supraventricular tachycardia  -     CBC & Differential  -     Comprehensive Metabolic Panel  -     Lipid Panel With LDL / HDL Ratio  -     Iron and TIBC  -     Ferritin  -     Vitamin D 25 Hydroxy  -     Hemoglobin A1c    Essential hypertension  -     CBC & Differential  -      Comprehensive Metabolic Panel  -     Lipid Panel With LDL / HDL Ratio  -     Iron and TIBC  -     Ferritin  -     Vitamin D 25 Hydroxy  -     Hemoglobin A1c    Vitamin D deficiency  -     CBC & Differential  -     Comprehensive Metabolic Panel  -     Lipid Panel With LDL / HDL Ratio  -     Iron and TIBC  -     Ferritin  -     Vitamin D 25 Hydroxy  -     Hemoglobin A1c    B12 deficiency  -     CBC & Differential  -     Comprehensive Metabolic Panel  -     Lipid Panel With LDL / HDL Ratio  -     Iron and TIBC  -     Ferritin  -     Vitamin D 25 Hydroxy  -     Hemoglobin A1c    Prediabetes  -     CBC & Differential  -     Comprehensive Metabolic Panel  -     Lipid Panel With LDL / HDL Ratio  -     Iron and TIBC  -     Ferritin  -     Vitamin D 25 Hydroxy  -     Hemoglobin A1c    Chronic fatigue  -     CBC & Differential  -     Comprehensive Metabolic Panel  -     Lipid Panel With LDL / HDL Ratio  -     Iron and TIBC  -     Ferritin  -     Vitamin D 25 Hydroxy  -     Hemoglobin A1c    Heme positive stool  -     CBC & Differential  -     Comprehensive Metabolic Panel  -     Lipid Panel With LDL / HDL Ratio  -     Iron and TIBC  -     Ferritin  -     Vitamin D 25 Hydroxy  -     Hemoglobin A1c    Abnormal finding of blood chemistry   -     Iron and TIBC  -     Ferritin  -     Hemoglobin A1c      Patient Instructions   88 YEAR OLD MALE WHO PRESENTS TODAY IN FOLLOW UP OF ER FOR SVT AND FROM LAST VISIT HERE WITH FREQUENT PVC. HE IS IN SINUS RHYTHM TODAY WITH CONTROLLED RATE. WE WILL CONTINUE MEDICATION TODAY AND HAVE HIM FOLLOW UP WITH CARDIOLOGY IN MAY. TO ER ASAP IF DEVELOPS SHORTNESS OF BREATH, WEAKNESS, PALPITATIONS, CHEST PAIN, DIZZINESS OR OTHER SYMPTOMS. PATIENT HAS NOT HAD FASTING LABS FOR SOME TIME. I WILL CHECK LABS TODAY- MG, THYROID AND B12 ALREADY CHECKED. CALL IF NO RESULTS IN 1 WEEK. PATIENT WILL NEED COLOGARD, AS HE HAD BLOOD IN STOOL LAST YEAR AND HAS NOT HAD COLONOSCOPY IN 4 1/2 YEARS. WE WILL ORDER  ISABEL AFTER HE COMPLETES CARDIOLOGY WORKUP AND FOLLOW UP.

## 2017-04-20 DIAGNOSIS — D75.89 MACROCYTOSIS: Primary | ICD-10-CM

## 2017-04-20 LAB
Lab: NORMAL
Lab: NORMAL

## 2017-04-21 ENCOUNTER — TELEPHONE (OUTPATIENT)
Dept: FAMILY MEDICINE CLINIC | Facility: CLINIC | Age: 82
End: 2017-04-21

## 2017-04-21 LAB
FOLATE SERPL-MCNC: >20 NG/ML (ref 4.78–24.2)
WRITTEN AUTHORIZATION: NORMAL

## 2017-05-01 ENCOUNTER — HOSPITAL ENCOUNTER (OUTPATIENT)
Dept: CARDIOLOGY | Facility: HOSPITAL | Age: 82
Discharge: HOME | End: 2017-05-01
Attending: INTERNAL MEDICINE

## 2017-05-01 ENCOUNTER — HOSPITAL ENCOUNTER (OUTPATIENT)
Dept: CARDIOLOGY | Facility: HOSPITAL | Age: 82
Discharge: HOME | End: 2017-05-01
Attending: INTERNAL MEDICINE | Admitting: INTERNAL MEDICINE

## 2017-05-01 VITALS — HEART RATE: 77 BPM | SYSTOLIC BLOOD PRESSURE: 140 MMHG | DIASTOLIC BLOOD PRESSURE: 65 MMHG

## 2017-05-01 VITALS
SYSTOLIC BLOOD PRESSURE: 130 MMHG | WEIGHT: 188 LBS | BODY MASS INDEX: 24.92 KG/M2 | HEIGHT: 73 IN | DIASTOLIC BLOOD PRESSURE: 72 MMHG

## 2017-05-01 DIAGNOSIS — I47.1 SVT (SUPRAVENTRICULAR TACHYCARDIA) (HCC): ICD-10-CM

## 2017-05-01 DIAGNOSIS — R07.2 PRECORDIAL PAIN: ICD-10-CM

## 2017-05-01 DIAGNOSIS — R06.02 SOB (SHORTNESS OF BREATH): ICD-10-CM

## 2017-05-01 LAB
BH CV ECHO MEAS - ACS: 2.2 CM
BH CV ECHO MEAS - AI DEC SLOPE: 259 CM/SEC^2
BH CV ECHO MEAS - AI MAX PG: 63 MMHG
BH CV ECHO MEAS - AI MAX VEL: 397 CM/SEC
BH CV ECHO MEAS - AI P1/2T: 449 MSEC
BH CV ECHO MEAS - AO MAX PG (FULL): 4.3 MMHG
BH CV ECHO MEAS - AO MAX PG: 8.3 MMHG
BH CV ECHO MEAS - AO MEAN PG (FULL): 3 MMHG
BH CV ECHO MEAS - AO MEAN PG: 5 MMHG
BH CV ECHO MEAS - AO ROOT AREA (BSA CORRECTED): 1.7
BH CV ECHO MEAS - AO ROOT AREA: 9.6 CM^2
BH CV ECHO MEAS - AO ROOT DIAM: 3.5 CM
BH CV ECHO MEAS - AO V2 MAX: 144 CM/SEC
BH CV ECHO MEAS - AO V2 MEAN: 104 CM/SEC
BH CV ECHO MEAS - AO V2 VTI: 31.7 CM
BH CV ECHO MEAS - AVA(I,A): 3.5 CM^2
BH CV ECHO MEAS - AVA(I,D): 3.5 CM^2
BH CV ECHO MEAS - AVA(V,A): 3.7 CM^2
BH CV ECHO MEAS - AVA(V,D): 3.7 CM^2
BH CV ECHO MEAS - BSA(HAYCOCK): 2.1 M^2
BH CV ECHO MEAS - BSA: 2.1 M^2
BH CV ECHO MEAS - BZI_BMI: 24.8 KILOGRAMS/M^2
BH CV ECHO MEAS - BZI_METRIC_HEIGHT: 185.4 CM
BH CV ECHO MEAS - BZI_METRIC_WEIGHT: 85.3 KG
BH CV ECHO MEAS - CONTRAST EF 4CH: 36.3 ML/M^2
BH CV ECHO MEAS - EDV(CUBED): 238.3 ML
BH CV ECHO MEAS - EDV(MOD-SP4): 190 ML
BH CV ECHO MEAS - EDV(TEICH): 194 ML
BH CV ECHO MEAS - EF(CUBED): 47.6 %
BH CV ECHO MEAS - EF(MOD-SP4): 36.3 %
BH CV ECHO MEAS - EF(TEICH): 39 %
BH CV ECHO MEAS - ESV(CUBED): 125 ML
BH CV ECHO MEAS - ESV(MOD-SP4): 121 ML
BH CV ECHO MEAS - ESV(TEICH): 118.2 ML
BH CV ECHO MEAS - FS: 19.4 %
BH CV ECHO MEAS - IVS/LVPW: 1
BH CV ECHO MEAS - IVSD: 1.2 CM
BH CV ECHO MEAS - LA DIMENSION: 5.1 CM
BH CV ECHO MEAS - LA/AO: 1.5
BH CV ECHO MEAS - LAT PEAK E' VEL: 4.5 CM/SEC
BH CV ECHO MEAS - LV DIASTOLIC VOL/BSA (35-75): 90.7 ML/M^2
BH CV ECHO MEAS - LV MASS(C)D: 331.5 GRAMS
BH CV ECHO MEAS - LV MASS(C)DI: 158.2 GRAMS/M^2
BH CV ECHO MEAS - LV MAX PG: 4 MMHG
BH CV ECHO MEAS - LV MEAN PG: 2 MMHG
BH CV ECHO MEAS - LV SYSTOLIC VOL/BSA (12-30): 57.7 ML/M^2
BH CV ECHO MEAS - LV V1 MAX: 99.8 CM/SEC
BH CV ECHO MEAS - LV V1 MEAN: 71 CM/SEC
BH CV ECHO MEAS - LV V1 VTI: 21 CM
BH CV ECHO MEAS - LVIDD: 6.2 CM
BH CV ECHO MEAS - LVIDS: 5 CM
BH CV ECHO MEAS - LVLD AP4: 8 CM
BH CV ECHO MEAS - LVLS AP4: 7.9 CM
BH CV ECHO MEAS - LVOT AREA (M): 5.3 CM^2
BH CV ECHO MEAS - LVOT AREA: 5.3 CM^2
BH CV ECHO MEAS - LVOT DIAM: 2.6 CM
BH CV ECHO MEAS - LVPWD: 1.2 CM
BH CV ECHO MEAS - MED PEAK E' VEL: 2.8 CM/SEC
BH CV ECHO MEAS - MR MAX PG: 95.6 MMHG
BH CV ECHO MEAS - MR MAX VEL: 489 CM/SEC
BH CV ECHO MEAS - MV A DUR: 0.15 SEC
BH CV ECHO MEAS - MV A MAX VEL: 63.5 CM/SEC
BH CV ECHO MEAS - MV DEC SLOPE: 98 CM/SEC^2
BH CV ECHO MEAS - MV DEC TIME: 0.18 SEC
BH CV ECHO MEAS - MV E MAX VEL: 40.3 CM/SEC
BH CV ECHO MEAS - MV E/A: 0.63
BH CV ECHO MEAS - MV MAX PG: 1.8 MMHG
BH CV ECHO MEAS - MV MEAN PG: 1 MMHG
BH CV ECHO MEAS - MV P1/2T MAX VEL: 55.1 CM/SEC
BH CV ECHO MEAS - MV P1/2T: 164.7 MSEC
BH CV ECHO MEAS - MV V2 MAX: 66.9 CM/SEC
BH CV ECHO MEAS - MV V2 MEAN: 51.1 CM/SEC
BH CV ECHO MEAS - MV V2 VTI: 20.5 CM
BH CV ECHO MEAS - MVA P1/2T LCG: 4 CM^2
BH CV ECHO MEAS - MVA(P1/2T): 1.3 CM^2
BH CV ECHO MEAS - MVA(VTI): 5.4 CM^2
BH CV ECHO MEAS - PA MAX PG (FULL): 1.4 MMHG
BH CV ECHO MEAS - PA MAX PG: 2.7 MMHG
BH CV ECHO MEAS - PA V2 MAX: 81.4 CM/SEC
BH CV ECHO MEAS - PI END-D VEL: 159 CM/SEC
BH CV ECHO MEAS - PULM A REVS DUR: 0.11 SEC
BH CV ECHO MEAS - PULM A REVS VEL: 19.1 CM/SEC
BH CV ECHO MEAS - PULM DIAS VEL: 38.1 CM/SEC
BH CV ECHO MEAS - PULM S/D: 1.5
BH CV ECHO MEAS - PULM SYS VEL: 55.4 CM/SEC
BH CV ECHO MEAS - PVA(V,A): 4 CM^2
BH CV ECHO MEAS - PVA(V,D): 4 CM^2
BH CV ECHO MEAS - QP/QS: 0.7
BH CV ECHO MEAS - RV MAX PG: 1.3 MMHG
BH CV ECHO MEAS - RV MEAN PG: 1 MMHG
BH CV ECHO MEAS - RV V1 MAX: 56.9 CM/SEC
BH CV ECHO MEAS - RV V1 MEAN: 42.6 CM/SEC
BH CV ECHO MEAS - RV V1 VTI: 13.7 CM
BH CV ECHO MEAS - RVDD: 2.1 CM
BH CV ECHO MEAS - RVOT AREA: 5.7 CM^2
BH CV ECHO MEAS - RVOT DIAM: 2.7 CM
BH CV ECHO MEAS - SI(AO): 145.5 ML/M^2
BH CV ECHO MEAS - SI(CUBED): 54.1 ML/M^2
BH CV ECHO MEAS - SI(LVOT): 53.2 ML/M^2
BH CV ECHO MEAS - SI(MOD-SP4): 32.9 ML/M^2
BH CV ECHO MEAS - SI(TEICH): 36.1 ML/M^2
BH CV ECHO MEAS - SV(AO): 305 ML
BH CV ECHO MEAS - SV(CUBED): 113.3 ML
BH CV ECHO MEAS - SV(LVOT): 111.5 ML
BH CV ECHO MEAS - SV(MOD-SP4): 69 ML
BH CV ECHO MEAS - SV(RVOT): 78.4 ML
BH CV ECHO MEAS - SV(TEICH): 75.7 ML
BH CV ECHO MEAS - TAPSE (>1.6): 1.9 CM2
BH CV XLRA - RV BASE: 3.5 CM
BH CV XLRA - RV LENGTH: 6.1 CM
BH CV XLRA - RV MID: 2.4 CM
BH CV XLRA - TDI S': 13.1 CM/SEC
LEFT ATRIUM VOLUME INDEX: 54.3 ML/M2

## 2017-05-01 PROCEDURE — 25010000002 REGADENOSON 0.4 MG/5ML SOLUTION: Performed by: INTERNAL MEDICINE

## 2017-05-01 PROCEDURE — 93016 CV STRESS TEST SUPVJ ONLY: CPT | Performed by: INTERNAL MEDICINE

## 2017-05-01 PROCEDURE — 0 TECHNETIUM SESTAMIBI: Performed by: INTERNAL MEDICINE

## 2017-05-01 PROCEDURE — 78452 HT MUSCLE IMAGE SPECT MULT: CPT

## 2017-05-01 PROCEDURE — 78452 HT MUSCLE IMAGE SPECT MULT: CPT | Performed by: INTERNAL MEDICINE

## 2017-05-01 PROCEDURE — 25010000002 PERFLUTREN (DEFINITY) 8.476 MG IN SODIUM CHLORIDE 10 ML INJECTION: Performed by: INTERNAL MEDICINE

## 2017-05-01 PROCEDURE — C8929 TTE W OR WO FOL WCON,DOPPLER: HCPCS

## 2017-05-01 PROCEDURE — 93017 CV STRESS TEST TRACING ONLY: CPT

## 2017-05-01 PROCEDURE — A9500 TC99M SESTAMIBI: HCPCS | Performed by: INTERNAL MEDICINE

## 2017-05-01 PROCEDURE — 93018 CV STRESS TEST I&R ONLY: CPT | Performed by: INTERNAL MEDICINE

## 2017-05-01 PROCEDURE — 93306 TTE W/DOPPLER COMPLETE: CPT | Performed by: INTERNAL MEDICINE

## 2017-05-01 RX ADMIN — Medication 1 DOSE: at 07:25

## 2017-05-01 RX ADMIN — REGADENOSON 0.4 MG: 0.08 INJECTION, SOLUTION INTRAVENOUS at 09:36

## 2017-05-01 RX ADMIN — SODIUM CHLORIDE 3 ML: 9 INJECTION INTRAMUSCULAR; INTRAVENOUS; SUBCUTANEOUS at 08:29

## 2017-05-01 RX ADMIN — Medication 1 DOSE: at 09:36

## 2017-05-03 LAB
BH CV STRESS BP STAGE 1: NORMAL
BH CV STRESS COMMENTS STAGE 1: NORMAL
BH CV STRESS DOSE REGADENOSON STAGE 1: 0.4
BH CV STRESS DURATION MIN STAGE 1: 0
BH CV STRESS DURATION SEC STAGE 1: 15
BH CV STRESS HR STAGE 1: 110
BH CV STRESS METS STAGE 1: 1
BH CV STRESS PROTOCOL 1: NORMAL
BH CV STRESS RECOVERY BP: NORMAL MMHG
BH CV STRESS RECOVERY HR: 97 BPM
BH CV STRESS STAGE 1: 1
LV EF NUC BP: 30 %
MAXIMAL PREDICTED HEART RATE: 132 BPM
PERCENT MAX PREDICTED HR: 83.33 %
STRESS BASELINE BP: NORMAL MMHG
STRESS BASELINE HR: 73 BPM
STRESS PERCENT HR: 98 %
STRESS POST ESTIMATED WORKLOAD: 1 METS
STRESS POST EXERCISE DUR MIN: 0 MIN
STRESS POST EXERCISE DUR SEC: 0 SEC
STRESS POST PEAK BP: NORMAL MMHG
STRESS POST PEAK HR: 110 BPM
STRESS TARGET HR: 112 BPM

## 2017-05-08 ENCOUNTER — OFFICE VISIT (OUTPATIENT)
Dept: CARDIOLOGY | Facility: CLINIC | Age: 82
End: 2017-05-08

## 2017-05-08 VITALS
HEIGHT: 73 IN | HEART RATE: 77 BPM | BODY MASS INDEX: 24.92 KG/M2 | DIASTOLIC BLOOD PRESSURE: 78 MMHG | SYSTOLIC BLOOD PRESSURE: 158 MMHG | WEIGHT: 188 LBS

## 2017-05-08 DIAGNOSIS — I10 ESSENTIAL HYPERTENSION: ICD-10-CM

## 2017-05-08 DIAGNOSIS — I42.9 CARDIOMYOPATHY (HCC): Primary | ICD-10-CM

## 2017-05-08 DIAGNOSIS — R07.89 OTHER CHEST PAIN: ICD-10-CM

## 2017-05-08 DIAGNOSIS — R94.30 ABNORMAL CARDIAC FUNCTION TEST: ICD-10-CM

## 2017-05-08 PROCEDURE — 99214 OFFICE O/P EST MOD 30 MIN: CPT | Performed by: INTERNAL MEDICINE

## 2017-05-08 RX ORDER — LOSARTAN POTASSIUM 50 MG/1
50 TABLET ORAL DAILY
Qty: 30 TABLET | Refills: 6 | Status: SHIPPED | OUTPATIENT
Start: 2017-05-08 | End: 2017-05-19

## 2017-05-15 ENCOUNTER — OFFICE VISIT (OUTPATIENT)
Dept: FAMILY MEDICINE CLINIC | Facility: CLINIC | Age: 82
End: 2017-05-15

## 2017-05-15 VITALS
BODY MASS INDEX: 24.57 KG/M2 | HEIGHT: 73 IN | HEART RATE: 80 BPM | SYSTOLIC BLOOD PRESSURE: 130 MMHG | OXYGEN SATURATION: 95 % | WEIGHT: 185.4 LBS | TEMPERATURE: 97.8 F | DIASTOLIC BLOOD PRESSURE: 60 MMHG

## 2017-05-15 DIAGNOSIS — R42 DIZZINESS: ICD-10-CM

## 2017-05-15 DIAGNOSIS — R53.1 WEAKNESS: ICD-10-CM

## 2017-05-15 DIAGNOSIS — R29.6 FREQUENT FALLS: ICD-10-CM

## 2017-05-15 DIAGNOSIS — M54.12 RADICULOPATHY, CERVICAL: ICD-10-CM

## 2017-05-15 DIAGNOSIS — M54.2 NECK PAIN: ICD-10-CM

## 2017-05-15 DIAGNOSIS — R22.0 OCCIPITAL MASS: Primary | ICD-10-CM

## 2017-05-15 PROCEDURE — 99214 OFFICE O/P EST MOD 30 MIN: CPT | Performed by: PHYSICIAN ASSISTANT

## 2017-05-17 ENCOUNTER — TELEPHONE (OUTPATIENT)
Dept: CARDIOLOGY | Facility: CLINIC | Age: 82
End: 2017-05-17

## 2017-05-19 RX ORDER — METOPROLOL SUCCINATE 50 MG/1
50 TABLET, EXTENDED RELEASE ORAL DAILY
Status: ON HOLD | COMMUNITY
End: 2017-11-30

## 2017-05-19 RX ORDER — LOSARTAN POTASSIUM 50 MG/1
50 TABLET ORAL DAILY
COMMUNITY
End: 2017-11-09 | Stop reason: SDUPTHER

## 2017-05-22 ENCOUNTER — HOSPITAL ENCOUNTER (OUTPATIENT)
Facility: HOSPITAL | Age: 82
Discharge: HOME | End: 2017-05-24
Attending: INTERNAL MEDICINE | Admitting: INTERNAL MEDICINE

## 2017-05-22 DIAGNOSIS — R94.30 ABNORMAL CARDIAC FUNCTION TEST: ICD-10-CM

## 2017-05-22 DIAGNOSIS — I10 ESSENTIAL HYPERTENSION: ICD-10-CM

## 2017-05-22 DIAGNOSIS — I42.9 CARDIOMYOPATHY (HCC): ICD-10-CM

## 2017-05-22 DIAGNOSIS — R53.82 CHRONIC FATIGUE: Primary | ICD-10-CM

## 2017-05-22 LAB
ANION GAP SERPL CALCULATED.3IONS-SCNC: 13.2 MMOL/L
APTT PPP: 26.6 SECONDS (ref 22.7–35.4)
BACTERIA UR QL AUTO: ABNORMAL /HPF
BASOPHILS # BLD AUTO: 0.01 10*3/MM3 (ref 0–0.2)
BASOPHILS NFR BLD AUTO: 0.2 % (ref 0–1.5)
BILIRUB UR QL STRIP: NEGATIVE
BUN BLD-MCNC: 21 MG/DL (ref 8–23)
BUN/CREAT SERPL: 24.4 (ref 7–25)
CALCIUM SPEC-SCNC: 9 MG/DL (ref 8.6–10.5)
CHLORIDE SERPL-SCNC: 104 MMOL/L (ref 98–107)
CLARITY UR: CLEAR
CO2 SERPL-SCNC: 25.8 MMOL/L (ref 22–29)
COLOR UR: YELLOW
CREAT BLD-MCNC: 0.86 MG/DL (ref 0.76–1.27)
DEPRECATED RDW RBC AUTO: 57.3 FL (ref 37–54)
EOSINOPHIL # BLD AUTO: 0.11 10*3/MM3 (ref 0–0.7)
EOSINOPHIL NFR BLD AUTO: 2.4 % (ref 0.3–6.2)
ERYTHROCYTE [DISTWIDTH] IN BLOOD BY AUTOMATED COUNT: 15.9 % (ref 11.5–14.5)
GFR SERPL CREATININE-BSD FRML MDRD: 84 ML/MIN/1.73
GLUCOSE BLD-MCNC: 121 MG/DL (ref 65–99)
GLUCOSE UR STRIP-MCNC: NEGATIVE MG/DL
HCT VFR BLD AUTO: 42.3 % (ref 40.4–52.2)
HGB BLD-MCNC: 14.1 G/DL (ref 13.7–17.6)
HGB UR QL STRIP.AUTO: ABNORMAL
HYALINE CASTS UR QL AUTO: ABNORMAL /LPF
IMM GRANULOCYTES # BLD: 0 10*3/MM3 (ref 0–0.03)
IMM GRANULOCYTES NFR BLD: 0 % (ref 0–0.5)
INR PPP: 1.02 (ref 0.9–1.1)
KETONES UR QL STRIP: NEGATIVE
LEUKOCYTE ESTERASE UR QL STRIP.AUTO: NEGATIVE
LYMPHOCYTES # BLD AUTO: 1.34 10*3/MM3 (ref 0.9–4.8)
LYMPHOCYTES NFR BLD AUTO: 29.8 % (ref 19.6–45.3)
MCH RBC QN AUTO: 32.5 PG (ref 27–32.7)
MCHC RBC AUTO-ENTMCNC: 33.3 G/DL (ref 32.6–36.4)
MCV RBC AUTO: 97.5 FL (ref 79.8–96.2)
MONOCYTES # BLD AUTO: 0.29 10*3/MM3 (ref 0.2–1.2)
MONOCYTES NFR BLD AUTO: 6.5 % (ref 5–12)
NEUTROPHILS # BLD AUTO: 2.74 10*3/MM3 (ref 1.9–8.1)
NEUTROPHILS NFR BLD AUTO: 61.1 % (ref 42.7–76)
NITRITE UR QL STRIP: NEGATIVE
PH UR STRIP.AUTO: 7 [PH] (ref 5–8)
PLATELET # BLD AUTO: 131 10*3/MM3 (ref 140–500)
PMV BLD AUTO: 10.4 FL (ref 6–12)
POTASSIUM BLD-SCNC: 4 MMOL/L (ref 3.5–5.2)
PROT UR QL STRIP: NEGATIVE
PROTHROMBIN TIME: 13 SECONDS (ref 11.7–14.2)
RBC # BLD AUTO: 4.34 10*6/MM3 (ref 4.6–6)
RBC # UR: ABNORMAL /HPF
REF LAB TEST METHOD: ABNORMAL
SODIUM BLD-SCNC: 143 MMOL/L (ref 136–145)
SP GR UR STRIP: >=1.03 (ref 1–1.03)
SQUAMOUS #/AREA URNS HPF: ABNORMAL /HPF
UROBILINOGEN UR QL STRIP: ABNORMAL
WBC NRBC COR # BLD: 4.49 10*3/MM3 (ref 4.5–10.7)
WBC UR QL AUTO: ABNORMAL /HPF

## 2017-05-22 PROCEDURE — 25010000002 MIDAZOLAM PER 1 MG: Performed by: INTERNAL MEDICINE

## 2017-05-22 PROCEDURE — 99153 MOD SED SAME PHYS/QHP EA: CPT | Performed by: INTERNAL MEDICINE

## 2017-05-22 PROCEDURE — 85610 PROTHROMBIN TIME: CPT | Performed by: INTERNAL MEDICINE

## 2017-05-22 PROCEDURE — 93458 L HRT ARTERY/VENTRICLE ANGIO: CPT | Performed by: INTERNAL MEDICINE

## 2017-05-22 PROCEDURE — 81001 URINALYSIS AUTO W/SCOPE: CPT | Performed by: INTERNAL MEDICINE

## 2017-05-22 PROCEDURE — 25010000002 FENTANYL CITRATE (PF) 100 MCG/2ML SOLUTION: Performed by: INTERNAL MEDICINE

## 2017-05-22 PROCEDURE — 85025 COMPLETE CBC W/AUTO DIFF WBC: CPT | Performed by: INTERNAL MEDICINE

## 2017-05-22 PROCEDURE — C1894 INTRO/SHEATH, NON-LASER: HCPCS | Performed by: INTERNAL MEDICINE

## 2017-05-22 PROCEDURE — G0378 HOSPITAL OBSERVATION PER HR: HCPCS

## 2017-05-22 PROCEDURE — 0 IOPAMIDOL PER 1 ML: Performed by: INTERNAL MEDICINE

## 2017-05-22 PROCEDURE — 99152 MOD SED SAME PHYS/QHP 5/>YRS: CPT | Performed by: INTERNAL MEDICINE

## 2017-05-22 PROCEDURE — 80048 BASIC METABOLIC PNL TOTAL CA: CPT | Performed by: INTERNAL MEDICINE

## 2017-05-22 PROCEDURE — 25010000002 HEPARIN (PORCINE) PER 1000 UNITS: Performed by: INTERNAL MEDICINE

## 2017-05-22 PROCEDURE — 85730 THROMBOPLASTIN TIME PARTIAL: CPT | Performed by: INTERNAL MEDICINE

## 2017-05-22 PROCEDURE — C1769 GUIDE WIRE: HCPCS | Performed by: INTERNAL MEDICINE

## 2017-05-22 RX ORDER — NITROGLYCERIN 5 MG/ML
INJECTION, SOLUTION INTRAVENOUS AS NEEDED
Status: DISCONTINUED | OUTPATIENT
Start: 2017-05-22 | End: 2017-05-22 | Stop reason: HOSPADM

## 2017-05-22 RX ORDER — VERAPAMIL HYDROCHLORIDE 2.5 MG/ML
INJECTION, SOLUTION INTRAVENOUS AS NEEDED
Status: DISCONTINUED | OUTPATIENT
Start: 2017-05-22 | End: 2017-05-22 | Stop reason: HOSPADM

## 2017-05-22 RX ORDER — SODIUM CHLORIDE 9 MG/ML
100 INJECTION, SOLUTION INTRAVENOUS CONTINUOUS
Status: DISCONTINUED | OUTPATIENT
Start: 2017-05-22 | End: 2017-05-23

## 2017-05-22 RX ORDER — FENTANYL CITRATE 50 UG/ML
INJECTION, SOLUTION INTRAMUSCULAR; INTRAVENOUS AS NEEDED
Status: DISCONTINUED | OUTPATIENT
Start: 2017-05-22 | End: 2017-05-22 | Stop reason: HOSPADM

## 2017-05-22 RX ORDER — CLOPIDOGREL BISULFATE 75 MG/1
75 TABLET ORAL DAILY
Status: DISCONTINUED | OUTPATIENT
Start: 2017-05-23 | End: 2017-05-24 | Stop reason: HOSPADM

## 2017-05-22 RX ORDER — LIDOCAINE HYDROCHLORIDE 10 MG/ML
0.1 INJECTION, SOLUTION EPIDURAL; INFILTRATION; INTRACAUDAL; PERINEURAL ONCE AS NEEDED
Status: DISCONTINUED | OUTPATIENT
Start: 2017-05-22 | End: 2017-05-22 | Stop reason: HOSPADM

## 2017-05-22 RX ORDER — METOPROLOL SUCCINATE 50 MG/1
50 TABLET, EXTENDED RELEASE ORAL DAILY
Status: DISCONTINUED | OUTPATIENT
Start: 2017-05-22 | End: 2017-05-24 | Stop reason: HOSPADM

## 2017-05-22 RX ORDER — ASPIRIN 325 MG
325 TABLET ORAL DAILY
Status: DISCONTINUED | OUTPATIENT
Start: 2017-05-22 | End: 2017-05-24 | Stop reason: HOSPADM

## 2017-05-22 RX ORDER — CLOPIDOGREL BISULFATE 75 MG/1
300 TABLET ORAL ONCE
Status: COMPLETED | OUTPATIENT
Start: 2017-05-22 | End: 2017-05-22

## 2017-05-22 RX ORDER — MIDAZOLAM HYDROCHLORIDE 1 MG/ML
INJECTION INTRAMUSCULAR; INTRAVENOUS AS NEEDED
Status: DISCONTINUED | OUTPATIENT
Start: 2017-05-22 | End: 2017-05-22 | Stop reason: HOSPADM

## 2017-05-22 RX ORDER — LIDOCAINE HYDROCHLORIDE 20 MG/ML
INJECTION, SOLUTION INFILTRATION; PERINEURAL AS NEEDED
Status: DISCONTINUED | OUTPATIENT
Start: 2017-05-22 | End: 2017-05-22 | Stop reason: HOSPADM

## 2017-05-22 RX ORDER — SODIUM CHLORIDE 0.9 % (FLUSH) 0.9 %
1-10 SYRINGE (ML) INJECTION AS NEEDED
Status: DISCONTINUED | OUTPATIENT
Start: 2017-05-22 | End: 2017-05-22 | Stop reason: HOSPADM

## 2017-05-22 RX ORDER — LOSARTAN POTASSIUM 50 MG/1
50 TABLET ORAL DAILY
Status: DISCONTINUED | OUTPATIENT
Start: 2017-05-22 | End: 2017-05-24 | Stop reason: HOSPADM

## 2017-05-22 RX ORDER — SODIUM CHLORIDE 9 MG/ML
75 INJECTION, SOLUTION INTRAVENOUS CONTINUOUS
Status: DISCONTINUED | OUTPATIENT
Start: 2017-05-22 | End: 2017-05-23

## 2017-05-22 RX ADMIN — CLOPIDOGREL 300 MG: 75 TABLET, FILM COATED ORAL at 13:00

## 2017-05-22 RX ADMIN — ASPIRIN 325 MG: 325 TABLET ORAL at 13:00

## 2017-05-22 RX ADMIN — SODIUM CHLORIDE 75 ML/HR: 9 INJECTION, SOLUTION INTRAVENOUS at 07:27

## 2017-05-22 RX ADMIN — SODIUM CHLORIDE 100 ML/HR: 9 INJECTION, SOLUTION INTRAVENOUS at 13:06

## 2017-05-22 RX ADMIN — SODIUM CHLORIDE 100 ML/HR: 9 INJECTION, SOLUTION INTRAVENOUS at 11:37

## 2017-05-23 LAB
ACT BLD: 162 SECONDS (ref 82–152)
ANION GAP SERPL CALCULATED.3IONS-SCNC: 14.2 MMOL/L
BUN BLD-MCNC: 17 MG/DL (ref 8–23)
BUN/CREAT SERPL: 25.8 (ref 7–25)
CALCIUM SPEC-SCNC: 8.3 MG/DL (ref 8.6–10.5)
CHLORIDE SERPL-SCNC: 103 MMOL/L (ref 98–107)
CHOLEST SERPL-MCNC: 175 MG/DL (ref 0–200)
CO2 SERPL-SCNC: 21.8 MMOL/L (ref 22–29)
CREAT BLD-MCNC: 0.66 MG/DL (ref 0.76–1.27)
GFR SERPL CREATININE-BSD FRML MDRD: 114 ML/MIN/1.73
GLUCOSE BLD-MCNC: 105 MG/DL (ref 65–99)
HDLC SERPL-MCNC: 37 MG/DL (ref 40–60)
LDLC SERPL CALC-MCNC: 113 MG/DL (ref 0–100)
LDLC/HDLC SERPL: 3.05 {RATIO}
MAGNESIUM SERPL-MCNC: 2.1 MG/DL (ref 1.6–2.4)
POTASSIUM BLD-SCNC: 3.6 MMOL/L (ref 3.5–5.2)
SODIUM BLD-SCNC: 139 MMOL/L (ref 136–145)
TRIGL SERPL-MCNC: 126 MG/DL (ref 0–150)
VLDLC SERPL-MCNC: 25.2 MG/DL (ref 5–40)

## 2017-05-23 PROCEDURE — 92928 PRQ TCAT PLMT NTRAC ST 1 LES: CPT | Performed by: INTERNAL MEDICINE

## 2017-05-23 PROCEDURE — 93005 ELECTROCARDIOGRAM TRACING: CPT | Performed by: INTERNAL MEDICINE

## 2017-05-23 PROCEDURE — 85347 COAGULATION TIME ACTIVATED: CPT

## 2017-05-23 PROCEDURE — 94799 UNLISTED PULMONARY SVC/PX: CPT

## 2017-05-23 PROCEDURE — 25010000002 HEPARIN (PORCINE) PER 1000 UNITS: Performed by: INTERNAL MEDICINE

## 2017-05-23 PROCEDURE — 0 IOPAMIDOL PER 1 ML: Performed by: INTERNAL MEDICINE

## 2017-05-23 PROCEDURE — C1887 CATHETER, GUIDING: HCPCS | Performed by: INTERNAL MEDICINE

## 2017-05-23 PROCEDURE — C1894 INTRO/SHEATH, NON-LASER: HCPCS | Performed by: INTERNAL MEDICINE

## 2017-05-23 PROCEDURE — 99152 MOD SED SAME PHYS/QHP 5/>YRS: CPT | Performed by: INTERNAL MEDICINE

## 2017-05-23 PROCEDURE — 83735 ASSAY OF MAGNESIUM: CPT | Performed by: INTERNAL MEDICINE

## 2017-05-23 PROCEDURE — C1769 GUIDE WIRE: HCPCS | Performed by: INTERNAL MEDICINE

## 2017-05-23 PROCEDURE — C1876 STENT, NON-COA/NON-COV W/DEL: HCPCS | Performed by: INTERNAL MEDICINE

## 2017-05-23 PROCEDURE — 80061 LIPID PANEL: CPT | Performed by: INTERNAL MEDICINE

## 2017-05-23 PROCEDURE — 25010000002 FENTANYL CITRATE (PF) 100 MCG/2ML SOLUTION: Performed by: INTERNAL MEDICINE

## 2017-05-23 PROCEDURE — 80048 BASIC METABOLIC PNL TOTAL CA: CPT | Performed by: INTERNAL MEDICINE

## 2017-05-23 PROCEDURE — 25010000002 MIDAZOLAM PER 1 MG: Performed by: INTERNAL MEDICINE

## 2017-05-23 PROCEDURE — 93010 ELECTROCARDIOGRAM REPORT: CPT | Performed by: INTERNAL MEDICINE

## 2017-05-23 PROCEDURE — G0378 HOSPITAL OBSERVATION PER HR: HCPCS

## 2017-05-23 PROCEDURE — C1725 CATH, TRANSLUMIN NON-LASER: HCPCS | Performed by: INTERNAL MEDICINE

## 2017-05-23 DEVICE — MULTI-LINK RX VISION CORONARY STENT SYSTEM 4.00 MM X 12 MM
Type: IMPLANTABLE DEVICE | Status: FUNCTIONAL
Brand: MULTI-LINK VISION

## 2017-05-23 RX ORDER — SODIUM CHLORIDE 9 MG/ML
INJECTION, SOLUTION INTRAVENOUS CONTINUOUS PRN
Status: DISCONTINUED | OUTPATIENT
Start: 2017-05-23 | End: 2017-05-23 | Stop reason: HOSPADM

## 2017-05-23 RX ORDER — SODIUM CHLORIDE 9 MG/ML
100 INJECTION, SOLUTION INTRAVENOUS CONTINUOUS
Status: DISCONTINUED | OUTPATIENT
Start: 2017-05-23 | End: 2017-05-24 | Stop reason: HOSPADM

## 2017-05-23 RX ORDER — LIDOCAINE HYDROCHLORIDE 20 MG/ML
INJECTION, SOLUTION INFILTRATION; PERINEURAL AS NEEDED
Status: DISCONTINUED | OUTPATIENT
Start: 2017-05-23 | End: 2017-05-23 | Stop reason: HOSPADM

## 2017-05-23 RX ORDER — FENTANYL CITRATE 50 UG/ML
INJECTION, SOLUTION INTRAMUSCULAR; INTRAVENOUS AS NEEDED
Status: DISCONTINUED | OUTPATIENT
Start: 2017-05-23 | End: 2017-05-23 | Stop reason: HOSPADM

## 2017-05-23 RX ORDER — HEPARIN SODIUM 1000 [USP'U]/ML
INJECTION, SOLUTION INTRAVENOUS; SUBCUTANEOUS AS NEEDED
Status: DISCONTINUED | OUTPATIENT
Start: 2017-05-23 | End: 2017-05-23 | Stop reason: HOSPADM

## 2017-05-23 RX ORDER — MIDAZOLAM HYDROCHLORIDE 1 MG/ML
INJECTION INTRAMUSCULAR; INTRAVENOUS AS NEEDED
Status: DISCONTINUED | OUTPATIENT
Start: 2017-05-23 | End: 2017-05-23 | Stop reason: HOSPADM

## 2017-05-23 RX ADMIN — ASPIRIN 325 MG: 325 TABLET ORAL at 08:13

## 2017-05-23 RX ADMIN — LOSARTAN POTASSIUM 50 MG: 50 TABLET, FILM COATED ORAL at 08:13

## 2017-05-23 RX ADMIN — SODIUM CHLORIDE 100 ML/HR: 9 INJECTION, SOLUTION INTRAVENOUS at 11:34

## 2017-05-23 RX ADMIN — CLOPIDOGREL 75 MG: 75 TABLET, FILM COATED ORAL at 08:13

## 2017-05-23 RX ADMIN — METOPROLOL SUCCINATE 50 MG: 50 TABLET, FILM COATED, EXTENDED RELEASE ORAL at 08:13

## 2017-05-24 VITALS
WEIGHT: 190.7 LBS | DIASTOLIC BLOOD PRESSURE: 71 MMHG | RESPIRATION RATE: 18 BRPM | TEMPERATURE: 97.5 F | SYSTOLIC BLOOD PRESSURE: 156 MMHG | OXYGEN SATURATION: 95 % | HEART RATE: 76 BPM | HEIGHT: 73 IN | BODY MASS INDEX: 25.27 KG/M2

## 2017-05-24 LAB
ACT BLD: 276 SECONDS (ref 82–152)
ANION GAP SERPL CALCULATED.3IONS-SCNC: 15.4 MMOL/L
BUN BLD-MCNC: 20 MG/DL (ref 8–23)
BUN/CREAT SERPL: 24.7 (ref 7–25)
CALCIUM SPEC-SCNC: 8.7 MG/DL (ref 8.6–10.5)
CHLORIDE SERPL-SCNC: 104 MMOL/L (ref 98–107)
CHOLEST SERPL-MCNC: 195 MG/DL (ref 0–200)
CO2 SERPL-SCNC: 19.6 MMOL/L (ref 22–29)
CREAT BLD-MCNC: 0.81 MG/DL (ref 0.76–1.27)
DEPRECATED RDW RBC AUTO: 58.4 FL (ref 37–54)
ERYTHROCYTE [DISTWIDTH] IN BLOOD BY AUTOMATED COUNT: 15.7 % (ref 11.5–14.5)
GFR SERPL CREATININE-BSD FRML MDRD: 90 ML/MIN/1.73
GLUCOSE BLD-MCNC: 99 MG/DL (ref 65–99)
HBA1C MFR BLD: 5.35 % (ref 4.8–5.6)
HCT VFR BLD AUTO: 43.4 % (ref 40.4–52.2)
HDLC SERPL-MCNC: 37 MG/DL (ref 40–60)
HGB BLD-MCNC: 14.4 G/DL (ref 13.7–17.6)
LDLC SERPL CALC-MCNC: 132 MG/DL (ref 0–100)
LDLC/HDLC SERPL: 3.56 {RATIO}
MCH RBC QN AUTO: 33.4 PG (ref 27–32.7)
MCHC RBC AUTO-ENTMCNC: 33.2 G/DL (ref 32.6–36.4)
MCV RBC AUTO: 100.7 FL (ref 79.8–96.2)
PLATELET # BLD AUTO: 123 10*3/MM3 (ref 140–500)
PMV BLD AUTO: 11 FL (ref 6–12)
POTASSIUM BLD-SCNC: 4 MMOL/L (ref 3.5–5.2)
RBC # BLD AUTO: 4.31 10*6/MM3 (ref 4.6–6)
SODIUM BLD-SCNC: 139 MMOL/L (ref 136–145)
TRIGL SERPL-MCNC: 131 MG/DL (ref 0–150)
VLDLC SERPL-MCNC: 26.2 MG/DL (ref 5–40)
WBC NRBC COR # BLD: 5.08 10*3/MM3 (ref 4.5–10.7)

## 2017-05-24 PROCEDURE — 93005 ELECTROCARDIOGRAM TRACING: CPT | Performed by: INTERNAL MEDICINE

## 2017-05-24 PROCEDURE — 85027 COMPLETE CBC AUTOMATED: CPT | Performed by: INTERNAL MEDICINE

## 2017-05-24 PROCEDURE — 93010 ELECTROCARDIOGRAM REPORT: CPT | Performed by: INTERNAL MEDICINE

## 2017-05-24 PROCEDURE — 80048 BASIC METABOLIC PNL TOTAL CA: CPT | Performed by: INTERNAL MEDICINE

## 2017-05-24 PROCEDURE — 80061 LIPID PANEL: CPT | Performed by: INTERNAL MEDICINE

## 2017-05-24 PROCEDURE — G0378 HOSPITAL OBSERVATION PER HR: HCPCS

## 2017-05-24 PROCEDURE — 99217 PR OBSERVATION CARE DISCHARGE MANAGEMENT: CPT | Performed by: INTERNAL MEDICINE

## 2017-05-24 PROCEDURE — 83036 HEMOGLOBIN GLYCOSYLATED A1C: CPT | Performed by: INTERNAL MEDICINE

## 2017-05-24 RX ORDER — ASPIRIN 325 MG
325 TABLET, DELAYED RELEASE (ENTERIC COATED) ORAL DAILY
Refills: 0
Start: 2017-05-24 | End: 2019-08-21 | Stop reason: HOSPADM

## 2017-05-24 RX ORDER — CLOPIDOGREL BISULFATE 75 MG/1
75 TABLET ORAL DAILY
Qty: 30 TABLET | Refills: 11 | Status: SHIPPED | OUTPATIENT
Start: 2017-05-24 | End: 2018-05-07 | Stop reason: SDUPTHER

## 2017-05-24 RX ORDER — FAMOTIDINE 20 MG/1
20 TABLET, FILM COATED ORAL DAILY
Qty: 30 TABLET | Refills: 0
Start: 2017-05-24 | End: 2017-06-23

## 2017-05-24 RX ORDER — ATORVASTATIN CALCIUM 10 MG/1
10 TABLET, FILM COATED ORAL NIGHTLY
Qty: 30 TABLET | Refills: 0 | Status: ON HOLD | OUTPATIENT
Start: 2017-05-24 | End: 2017-11-30

## 2017-05-24 RX ADMIN — CLOPIDOGREL 75 MG: 75 TABLET, FILM COATED ORAL at 08:17

## 2017-05-24 RX ADMIN — LOSARTAN POTASSIUM 50 MG: 50 TABLET, FILM COATED ORAL at 08:17

## 2017-05-24 RX ADMIN — METOPROLOL SUCCINATE 50 MG: 50 TABLET, FILM COATED, EXTENDED RELEASE ORAL at 08:17

## 2017-05-24 RX ADMIN — ASPIRIN 325 MG: 325 TABLET ORAL at 08:17

## 2017-05-31 ENCOUNTER — OFFICE VISIT (OUTPATIENT)
Dept: FAMILY MEDICINE CLINIC | Facility: CLINIC | Age: 82
End: 2017-05-31

## 2017-05-31 VITALS
WEIGHT: 182.6 LBS | OXYGEN SATURATION: 96 % | TEMPERATURE: 97.5 F | HEART RATE: 83 BPM | SYSTOLIC BLOOD PRESSURE: 128 MMHG | DIASTOLIC BLOOD PRESSURE: 58 MMHG | HEIGHT: 73 IN | BODY MASS INDEX: 24.2 KG/M2

## 2017-05-31 DIAGNOSIS — Z95.5 HISTORY OF CORONARY ARTERY STENT PLACEMENT: Primary | ICD-10-CM

## 2017-05-31 DIAGNOSIS — I42.9 CARDIOMYOPATHY (HCC): ICD-10-CM

## 2017-05-31 DIAGNOSIS — I25.10 CORONARY ARTERY DISEASE INVOLVING NATIVE CORONARY ARTERY OF NATIVE HEART WITHOUT ANGINA PECTORIS: ICD-10-CM

## 2017-05-31 DIAGNOSIS — R31.9 HEMATURIA: ICD-10-CM

## 2017-05-31 DIAGNOSIS — E78.5 DYSLIPIDEMIA: ICD-10-CM

## 2017-05-31 DIAGNOSIS — Z87.442 HISTORY OF RENAL CALCULI: ICD-10-CM

## 2017-05-31 PROCEDURE — 99213 OFFICE O/P EST LOW 20 MIN: CPT | Performed by: PHYSICIAN ASSISTANT

## 2017-06-07 ENCOUNTER — TELEPHONE (OUTPATIENT)
Dept: FAMILY MEDICINE CLINIC | Facility: CLINIC | Age: 82
End: 2017-06-07

## 2017-06-07 NOTE — TELEPHONE ENCOUNTER
----- Message from Phyllis Loco sent at 6/7/2017  2:27 PM EDT -----  We had put the patient MRI Cervical and Brain studies on hold - Patient is now out of the hospital.  Do you want to proceed?

## 2017-06-08 ENCOUNTER — TELEPHONE (OUTPATIENT)
Dept: CARDIOLOGY | Facility: CLINIC | Age: 82
End: 2017-06-08

## 2017-06-08 NOTE — TELEPHONE ENCOUNTER
Spoke with Alejandro, Mr Zhao needs to be seen by PCP for the knee pain and loss of balance.  He has an appt with us on 6/12 for follow up to cath. ar

## 2017-06-08 NOTE — TELEPHONE ENCOUNTER
MADHURI DURHAM ([phone]) CALLED STATING THAT MR MUELLER HAD HEART CATH ON MAY 22ND.  HIS KNEES HAVE STARTED HURTING, BALANCE IS OFF..HE FELL 3 TIMES YESTERDAY.  HE HAS AN APPT June 12TH.  DOES HE NEED TO BE SEEN SOONER? OR SEE PCP?

## 2017-06-09 ENCOUNTER — HOSPITAL ENCOUNTER (OUTPATIENT)
Dept: MRI IMAGING | Facility: HOSPITAL | Age: 82
Discharge: HOME | End: 2017-06-09
Admitting: PHYSICIAN ASSISTANT

## 2017-06-09 ENCOUNTER — OFFICE VISIT (OUTPATIENT)
Dept: FAMILY MEDICINE CLINIC | Facility: CLINIC | Age: 82
End: 2017-06-09

## 2017-06-09 ENCOUNTER — HOSPITAL ENCOUNTER (OUTPATIENT)
Dept: MRI IMAGING | Facility: HOSPITAL | Age: 82
Discharge: HOME | End: 2017-06-09

## 2017-06-09 VITALS
TEMPERATURE: 97.8 F | WEIGHT: 185.2 LBS | HEART RATE: 78 BPM | HEIGHT: 73 IN | DIASTOLIC BLOOD PRESSURE: 60 MMHG | OXYGEN SATURATION: 97 % | SYSTOLIC BLOOD PRESSURE: 132 MMHG | BODY MASS INDEX: 24.55 KG/M2

## 2017-06-09 DIAGNOSIS — R53.1 WEAKNESS: ICD-10-CM

## 2017-06-09 DIAGNOSIS — R29.6 MULTIPLE FALLS: ICD-10-CM

## 2017-06-09 DIAGNOSIS — R42 DIZZINESS: Primary | ICD-10-CM

## 2017-06-09 PROCEDURE — 72141 MRI NECK SPINE W/O DYE: CPT

## 2017-06-09 PROCEDURE — 93000 ELECTROCARDIOGRAM COMPLETE: CPT | Performed by: PHYSICIAN ASSISTANT

## 2017-06-09 PROCEDURE — 99214 OFFICE O/P EST MOD 30 MIN: CPT | Performed by: PHYSICIAN ASSISTANT

## 2017-06-09 PROCEDURE — 70551 MRI BRAIN STEM W/O DYE: CPT

## 2017-06-09 NOTE — PROGRESS NOTES
Subjective   Dinesh Churchill is a 89 y.o. male. PATIENT IS BEING SEEN TODAY FOR A FALL THAT HAPPENED ON 06/08/2017 AND FOR LIGHTHEADEDNESS. HE STATES THAT HE STUMBLED SEVERAL TIMES YESTERDAY. HE SAYS THAT HE KEEPS LOOSING HIS BALANCE.     History of Present Illness     X 2 DAYS, PATIENT HAS HAD MORE STUMBLING AND FALLING - HAVING TO CATCH SELF MORE OFTEN. FEELS DIZZINESS/UNSTEADINESS AND LEGS GET WEAK WHEN WALKING ANY DISTANCE AND 'GIVE OUT'. THIS IN THE PAST AND MRI / CAROTID WAS ORDERED BUT THEY WANTED TO WAIT WITH CARDIAC ISSUES. HE HAS HAD DENIES CP/SOA/PALP/OTHER NEUROLOGICAL SYMPTOMS.   The following portions of the patient's history were reviewed and updated as appropriate: allergies, current medications, past family history, past medical history, past social history, past surgical history and problem list.    Review of Systems   Neurological: Positive for light-headedness.   All other systems reviewed and are negative.      Objective   Physical Exam   Constitutional: He is oriented to person, place, and time. He appears well-developed.   HENT:   Head: Normocephalic and atraumatic.   Right Ear: External ear normal.   Left Ear: External ear normal.   Eyes: Conjunctivae and EOM are normal. Pupils are equal, round, and reactive to light.   Neck: Carotid bruit is not present. No tracheal deviation present. No thyroid mass and no thyromegaly present.   Cardiovascular: Normal rate, regular rhythm and intact distal pulses.  Exam reveals distant heart sounds.    Murmur heard.  Pulmonary/Chest: Effort normal and breath sounds normal.   Neurological: He is alert and oriented to person, place, and time. He has normal reflexes. No cranial nerve deficit or sensory deficit. Gait (ABNORMAL, UNSTEADY GAIT WITH WEAKNESS RISING FROM CHAIR AND WALKING) abnormal.   Skin: Skin is warm and dry.   Psychiatric: He has a normal mood and affect. His behavior is normal. Judgment and thought content normal.   Nursing note and vitals  reviewed.    ECG 12 Lead  Date/Time: 6/9/2017 1:34 PM  Performed by: MAYO CHÁVEZ  Authorized by: MAYO CHÁVEZ   Comparison: compared with previous ECG from 5/24/2017  Similar to previous ECG  Rhythm: sinus rhythm  Rate: normal  Conduction: 1st degree  ST Elevation: III, V1, V2, V3 and aVR  ST Depression: I, II, aVL, V4, V5 and V6  QRS axis: left  Other findings: LVH  Clinical impression: abnormal ECG  Comments: INDICATION: UNSTEADINESS, DIZZINESS, FALLS          Assessment/Plan   Dinesh was seen today for fall and lightheaded.    Diagnoses and all orders for this visit:    Dizziness  -     ECG 12 Lead  -     Duplex Carotid Ultrasound CAR  -     MRI Brain Without Contrast  -     MRI Cervical Spine Without Contrast    Weakness  -     Duplex Carotid Ultrasound CAR  -     MRI Brain Without Contrast  -     MRI Cervical Spine Without Contrast    Multiple falls  -     Duplex Carotid Ultrasound CAR  -     MRI Brain Without Contrast  -     MRI Cervical Spine Without Contrast      Patient Instructions   89 YEAR OLD MALE WHO PRESENTS TODAY WITH WORSENING DIZZINESS AND LEG WEAKNESS FOR 2 DAYS. HE HAS HAD A COUPLE STUMBLING EPISODES AND HAD TO CATCH HIMSELF. PATIENT CALLED CARDIOLOGY YESTERDAY AND WAS ADVISED TO FOLLOW UP HERE. I DISCUSSED TO ER ASAP TODAY- PATIENT DECLINES ER TODAY. HE DECLINES AMBULANCE TODAY. EKG TODAY IS ABNORMAL- I WILL HAVE CARDIOLOGY REVIEW IT TO DETERMINE RECOMMENDATIONS FROM CARDIAC PERSPECTIVE WITH CARDIOMYOPATHY, CHF, FREQUENT PVC, EPISODES OF SVT, AND RECENT CARDIAC STENT PLACEMENT. I WILL REORDER MRI BRAIN AND CERVICAL SPINE AS WELL AS CAROTID DUPLEX TO BE DONE ASAP. 9-1-1 TO ER ASAP IF WORSENING OR NEW SYMPTOMS OR PERSISTENT SYMPTOMS. PATIENT AND NEIGHBOR VERBALIZED UNDERSTANDING.

## 2017-06-10 NOTE — NURSING NOTE
Phone call rec'd back from RAFA Mcfarland. Results of MRI Brain & Cervical Spine relayed. Instructed to inform pt that he may go home and if S/S persist or worsen--go to nearest ER. Also, that he will be called once final report received. Pt informed and home with son.

## 2017-06-10 NOTE — NURSING NOTE
Phone call from Dr. Emery with results negative acute changes of MRI Brain. This RN to contact ordering provider (RAFA Mcfarland)  VMM msg left on # provided; awaiting phone call back.

## 2017-06-12 ENCOUNTER — OFFICE VISIT (OUTPATIENT)
Dept: CARDIOLOGY | Facility: CLINIC | Age: 82
End: 2017-06-12

## 2017-06-12 VITALS
HEART RATE: 69 BPM | HEIGHT: 73 IN | WEIGHT: 182 LBS | BODY MASS INDEX: 24.12 KG/M2 | DIASTOLIC BLOOD PRESSURE: 66 MMHG | SYSTOLIC BLOOD PRESSURE: 150 MMHG

## 2017-06-12 DIAGNOSIS — I42.9 CARDIOMYOPATHY (HCC): ICD-10-CM

## 2017-06-12 DIAGNOSIS — R42 DIZZINESS: ICD-10-CM

## 2017-06-12 DIAGNOSIS — Z79.02 ANTIPLATELET OR ANTITHROMBOTIC LONG-TERM USE: ICD-10-CM

## 2017-06-12 DIAGNOSIS — I25.10 CORONARY ARTERY DISEASE INVOLVING NATIVE CORONARY ARTERY OF NATIVE HEART WITHOUT ANGINA PECTORIS: Primary | ICD-10-CM

## 2017-06-12 DIAGNOSIS — I25.10 CORONARY ARTERY DISEASE INVOLVING NATIVE CORONARY ARTERY OF NATIVE HEART WITHOUT ANGINA PECTORIS: ICD-10-CM

## 2017-06-12 DIAGNOSIS — I63.9 CEREBROVASCULAR ACCIDENT (CVA), UNSPECIFIED MECHANISM (HCC): ICD-10-CM

## 2017-06-12 DIAGNOSIS — I10 ESSENTIAL HYPERTENSION: ICD-10-CM

## 2017-06-12 DIAGNOSIS — Z86.79 HISTORY OF SUPRAVENTRICULAR TACHYCARDIA: ICD-10-CM

## 2017-06-12 DIAGNOSIS — R06.02 SOB (SHORTNESS OF BREATH): ICD-10-CM

## 2017-06-12 DIAGNOSIS — R53.1 WEAKNESS: ICD-10-CM

## 2017-06-12 DIAGNOSIS — R29.6 MULTIPLE FALLS: ICD-10-CM

## 2017-06-12 DIAGNOSIS — R26.0 STAGGERING GAIT: Primary | ICD-10-CM

## 2017-06-12 PROCEDURE — 99213 OFFICE O/P EST LOW 20 MIN: CPT | Performed by: INTERNAL MEDICINE

## 2017-06-12 NOTE — PROGRESS NOTES
" Subjective:       Dinesh Churchill is a 89 y.o. male who here for follow up    CC  pci follow up  HPI  89-year-old white male recently was admitted to the hospital with the patient underwent angioplasty and stent without infarction complications     Problem List Items Addressed This Visit        Cardiovascular and Mediastinum    Cardiomyopathy    Relevant Orders    Adult Transthoracic Echo Complete    Coronary artery disease involving native coronary artery of native heart without angina pectoris - Primary    Relevant Orders    Adult Transthoracic Echo Complete      Other Visit Diagnoses     Antiplatelet or antithrombotic long-term use        Relevant Orders    Adult Transthoracic Echo Complete    SOB (shortness of breath)        Relevant Orders    Adult Transthoracic Echo Complete        .    The following portions of the patient's history were reviewed and updated as appropriate: allergies, current medications, past family history, past medical history, past social history, past surgical history and problem list.    Past Medical History:   Diagnosis Date   • Abnormal ECG    • Abnormal MRI    • Acute frontal sinusitis    • Arthritis    • Chronic fatigue    • Dizziness    • Hearing loss    • Hyperlipidemia    • Hypertension    • Kidney stones    • Neoplasm of skin    • Osteoporosis    • Prediabetes    • Vitamin D deficiency     reports that he has never smoked. He has never used smokeless tobacco. He reports that he does not drink alcohol or use illicit drugs.  Family History   Problem Relation Age of Onset   • Family history unknown: Yes       Review of Systems  Constitutional: No wt loss, fever, fatigue  Gastrointestinal: No nausea, abdominal pain  Behavioral/Psych: No insomnia or anxiety   Cardiovascular No chest pains or tightness in chest  Objective:       Physical Exam             Physical Exam  /66  Pulse 69  Ht 73\" (185.4 cm)  Wt 182 lb (82.6 kg)  BMI 24.01 kg/m2    General appearance: NAD, " conversant   Eyes: anicteric sclerae, moist conjunctivae; no lid-lag; PERRLA   HENT: Atraumatic; oropharynx clear with moist mucous membranes and no mucosal ulcerations;  normal hard and soft palate   Neck: Trachea midline; FROM, supple, no thyromegaly or lymphadenopathy   Lungs: CTA, with normal respiratory effort and no intercostal retractions   CV: S1-S2 regular, no murmurs, no rub, no gallop   Abdomen: Soft, non-tender; no masses or HSM   Extremities: No peripheral edema or extremity lymphadenopathy  Skin: Normal temperature, turgor and texture; no rash, ulcers or subcutaneous nodules   Psych: Appropriate affect, alert and oriented to person, place and time           Cardiographics  @Procedures    Echocardiogram:        Current Outpatient Prescriptions:   •  aspirin  MG tablet, Take 1 tablet by mouth Daily., Disp: , Rfl: 0  •  atorvastatin (LIPITOR) 10 MG tablet, Take 1 tablet by mouth Every Night., Disp: 30 tablet, Rfl: 0  •  Cholecalciferol (VITAMIN D-3) 1000 UNITS capsule, Take 1,000 Units by mouth daily., Disp: , Rfl:   •  clopidogrel (PLAVIX) 75 MG tablet, Take 1 tablet by mouth Daily., Disp: 30 tablet, Rfl: 11  •  famotidine (PEPCID) 20 MG tablet, Take 1 tablet by mouth Daily for 30 days., Disp: 30 tablet, Rfl: 0  •  losartan (COZAAR) 50 MG tablet, Take 50 mg by mouth Daily., Disp: , Rfl:   •  metoprolol succinate XL (TOPROL-XL) 50 MG 24 hr tablet, Take 50 mg by mouth Daily., Disp: , Rfl:    Assessment:        Patient Active Problem List   Diagnosis   • Abnormal magnetic resonance imaging study   • Arthritis   • Osteoarthritis of cervical spine   • Diplopia   • Hearing loss   • Neoplasm of uncertain behavior of skin   • Prediabetes   • Vitamin D deficiency   • Chronic fatigue   • History of supraventricular tachycardia   • Essential hypertension   • B12 deficiency   • Abnormal cardiac function test   • Cardiomyopathy   • Coronary artery disease involving native coronary artery of native heart without  angina pectoris   • History of coronary artery stent placement   • History of renal calculi   • Dyslipidemia     RESULTS:  1. Initial aortic pressure was approximately 1:30/80, and the patient remained hemodynamically stable through the case.  2. Initial coronary arteriography showed 80% proximal RCA reduced to 0% with 4.0/12 vision dilated to 4.2.     IVONNE FLOW PRE 3 POST 3     TYPE OF LESION B2          Plan:            ICD-10-CM ICD-9-CM   1. Coronary artery disease involving native coronary artery of native heart without angina pectoris I25.10 414.01   2. Cardiomyopathy I42.9 425.4   3. Antiplatelet or antithrombotic long-term use Z79.02 V58.63   4. SOB (shortness of breath) R06.02 786.05     1. Coronary artery disease involving native coronary artery of native heart without angina pectoris  Dinesh Churchill with coronary artery disease has no angina pectoris    Risk reduction for the coronary artery disease, controlling the blood pressure, blood sugar management, cholesterol management, exercise, stress management, and proper compliance with medications and follow-up has been discussed    - Adult Transthoracic Echo Complete; Future    2. Cardiomyopathy    - Adult Transthoracic Echo Complete; Future    3. Antiplatelet or antithrombotic long-term use  Pros and cons as well as indication of the antiplatelets  has been explained to the patient in detail    There are no obvious complications at this stage    Risk of  the bleedings has been explained    Need for proper follow-up on antiplatelets also has been explained    - Adult Transthoracic Echo Complete; Future    4. SOB (shortness of breath)    - Adult Transthoracic Echo Complete; Future     Post pci stable    See 6 months with echo  COUNSELING:    Dinesh Forrest was given to patient for the following topics: diagnostic results, risk factor reductions, impressions, risks and benefits of treatment options and importance of treatment compliance .        SMOKING COUNSELING:    Counseling given: Not Answered      EMR Dragon/Transcription disclaimer:   Much of this encounter note is an electronic transcription/translation of spoken language to printed text. The electronic translation of spoken language may permit erroneous, or at times, nonsensical words or phrases to be inadvertently transcribed; Although I have reviewed the note for such errors, some may still exist.

## 2017-06-13 ENCOUNTER — HOSPITAL ENCOUNTER (OUTPATIENT)
Dept: CARDIOLOGY | Facility: HOSPITAL | Age: 82
Discharge: HOME | End: 2017-06-13
Admitting: PHYSICIAN ASSISTANT

## 2017-06-13 LAB
BH CV XLRA MEAS LEFT CCA RATIO VEL: -60.5 CM/SEC
BH CV XLRA MEAS LEFT DIST CCA EDV: -7.1 CM/SEC
BH CV XLRA MEAS LEFT DIST CCA PSV: -60.5 CM/SEC
BH CV XLRA MEAS LEFT DIST ICA EDV: -8.6 CM/SEC
BH CV XLRA MEAS LEFT DIST ICA PSV: -53.8 CM/SEC
BH CV XLRA MEAS LEFT ICA RATIO VEL: -55.8 CM/SEC
BH CV XLRA MEAS LEFT ICA/CCA RATIO: 0.92
BH CV XLRA MEAS LEFT MID ICA EDV: -11 CM/SEC
BH CV XLRA MEAS LEFT MID ICA PSV: -55.8 CM/SEC
BH CV XLRA MEAS LEFT PROX CCA EDV: -7.1 CM/SEC
BH CV XLRA MEAS LEFT PROX CCA PSV: -56.6 CM/SEC
BH CV XLRA MEAS LEFT PROX ECA PSV: -79.2 CM/SEC
BH CV XLRA MEAS LEFT PROX ICA EDV: -9.4 CM/SEC
BH CV XLRA MEAS LEFT PROX ICA PSV: -48.7 CM/SEC
BH CV XLRA MEAS LEFT PROX SCLA PSV: 106 CM/SEC
BH CV XLRA MEAS LEFT VERTEBRAL A EDV: -6.3 CM/SEC
BH CV XLRA MEAS LEFT VERTEBRAL A PSV: -57.4 CM/SEC
BH CV XLRA MEAS RIGHT DIST CCA EDV: 5.9 CM/SEC
BH CV XLRA MEAS RIGHT DIST CCA PSV: 53.4 CM/SEC
BH CV XLRA MEAS RIGHT DIST ICA EDV: -9.8 CM/SEC
BH CV XLRA MEAS RIGHT DIST ICA PSV: -63.2 CM/SEC
BH CV XLRA MEAS RIGHT MID ICA EDV: -11.8 CM/SEC
BH CV XLRA MEAS RIGHT MID ICA PSV: -75.4 CM/SEC
BH CV XLRA MEAS RIGHT PROX CCA EDV: -8.2 CM/SEC
BH CV XLRA MEAS RIGHT PROX CCA PSV: -90.9 CM/SEC
BH CV XLRA MEAS RIGHT PROX ECA PSV: -58.9 CM/SEC
BH CV XLRA MEAS RIGHT PROX ICA EDV: -5.9 CM/SEC
BH CV XLRA MEAS RIGHT PROX ICA PSV: -51.5 CM/SEC
BH CV XLRA MEAS RIGHT PROX SCLA PSV: 101 CM/SEC
BH CV XLRA MEAS RIGHT VERTEBRAL A EDV: 6.3 CM/SEC
BH CV XLRA MEAS RIGHT VERTEBRAL A PSV: 70.7 CM/SEC
LEFT ARM BP: NORMAL MMHG
RIGHT ARM BP: NORMAL MMHG

## 2017-06-13 PROCEDURE — 93880 EXTRACRANIAL BILAT STUDY: CPT

## 2017-06-16 LAB
ALBUMIN SERPL-MCNC: 4.4 G/DL (ref 3.5–5.2)
ALBUMIN/GLOB SERPL: 1.6 G/DL
ALP SERPL-CCNC: 66 U/L (ref 39–117)
ALT SERPL-CCNC: 13 U/L (ref 1–41)
APPEARANCE UR: CLEAR
AST SERPL-CCNC: 13 U/L (ref 1–40)
BACTERIA #/AREA URNS HPF: NORMAL /HPF
BACTERIA UR CULT: NO GROWTH
BACTERIA UR CULT: NORMAL
BASOPHILS # BLD AUTO: 0.01 10*3/MM3 (ref 0–0.2)
BASOPHILS NFR BLD AUTO: 0.2 % (ref 0–1.5)
BILIRUB SERPL-MCNC: 1.5 MG/DL (ref 0.1–1.2)
BILIRUB UR QL STRIP: NEGATIVE
BUN SERPL-MCNC: 24 MG/DL (ref 8–23)
BUN/CREAT SERPL: 26.4 (ref 7–25)
CALCIUM SERPL-MCNC: 9.5 MG/DL (ref 8.6–10.5)
CASTS URNS MICRO: NORMAL
CHLORIDE SERPL-SCNC: 102 MMOL/L (ref 98–107)
CK SERPL-CCNC: 52 U/L (ref 20–200)
CO2 SERPL-SCNC: 26.7 MMOL/L (ref 22–29)
COLOR UR: YELLOW
CREAT SERPL-MCNC: 0.91 MG/DL (ref 0.76–1.27)
EOSINOPHIL # BLD AUTO: 0.13 10*3/MM3 (ref 0–0.7)
EOSINOPHIL NFR BLD AUTO: 2.6 % (ref 0.3–6.2)
EPI CELLS #/AREA URNS HPF: NORMAL /HPF
ERYTHROCYTE [DISTWIDTH] IN BLOOD BY AUTOMATED COUNT: 16.3 % (ref 11.5–14.5)
FOLATE SERPL-MCNC: 13.65 NG/ML (ref 4.78–24.2)
GLOBULIN SER CALC-MCNC: 2.8 GM/DL
GLUCOSE SERPL-MCNC: 103 MG/DL (ref 65–99)
GLUCOSE UR QL: NEGATIVE
HCT VFR BLD AUTO: 43.3 % (ref 40.4–52.2)
HGB BLD-MCNC: 13.6 G/DL (ref 13.7–17.6)
HGB UR QL STRIP: NEGATIVE
IMM GRANULOCYTES # BLD: 0 10*3/MM3 (ref 0–0.03)
IMM GRANULOCYTES NFR BLD: 0 % (ref 0–0.5)
KETONES UR QL STRIP: NEGATIVE
LEUKOCYTE ESTERASE UR QL STRIP: NEGATIVE
LYMPHOCYTES # BLD AUTO: 1.27 10*3/MM3 (ref 0.9–4.8)
LYMPHOCYTES NFR BLD AUTO: 25.2 % (ref 19.6–45.3)
MCH RBC QN AUTO: 32.7 PG (ref 27–32.7)
MCHC RBC AUTO-ENTMCNC: 31.4 G/DL (ref 32.6–36.4)
MCV RBC AUTO: 104.1 FL (ref 79.8–96.2)
MONOCYTES # BLD AUTO: 0.46 10*3/MM3 (ref 0.2–1.2)
MONOCYTES NFR BLD AUTO: 9.1 % (ref 5–12)
NEUTROPHILS # BLD AUTO: 3.16 10*3/MM3 (ref 1.9–8.1)
NEUTROPHILS NFR BLD AUTO: 62.9 % (ref 42.7–76)
NITRITE UR QL STRIP: NEGATIVE
PH UR STRIP: 6.5 [PH] (ref 5–8)
PLATELET # BLD AUTO: 145 10*3/MM3 (ref 140–500)
POTASSIUM SERPL-SCNC: 4.2 MMOL/L (ref 3.5–5.2)
PROT SERPL-MCNC: 7.2 G/DL (ref 6–8.5)
PROT UR QL STRIP: NEGATIVE
RBC # BLD AUTO: 4.16 10*6/MM3 (ref 4.6–6)
RBC #/AREA URNS HPF: NORMAL /HPF
SODIUM SERPL-SCNC: 142 MMOL/L (ref 136–145)
SP GR UR: 1.02 (ref 1–1.03)
UROBILINOGEN UR STRIP-MCNC: (no result) MG/DL
VIT B12 SERPL-MCNC: 330 PG/ML (ref 211–946)
WBC # BLD AUTO: 5.03 10*3/MM3 (ref 4.5–10.7)
WBC #/AREA URNS HPF: NORMAL /HPF

## 2017-06-21 NOTE — PATIENT INSTRUCTIONS
89 YEAR OLD MALE WHO PRESENTS TODAY WITH WORSENING DIZZINESS AND LEG WEAKNESS FOR 2 DAYS. HE HAS HAD A COUPLE STUMBLING EPISODES AND HAD TO CATCH HIMSELF. PATIENT CALLED CARDIOLOGY YESTERDAY AND WAS ADVISED TO FOLLOW UP HERE. I DISCUSSED TO ER ASAP TODAY- PATIENT DECLINES ER TODAY. HE DECLINES AMBULANCE TODAY. EKG TODAY IS ABNORMAL- I WILL HAVE CARDIOLOGY REVIEW IT TO DETERMINE RECOMMENDATIONS FROM CARDIAC PERSPECTIVE WITH CARDIOMYOPATHY, CHF, FREQUENT PVC, EPISODES OF SVT, AND RECENT CARDIAC STENT PLACEMENT. I WILL REORDER MRI BRAIN AND CERVICAL SPINE AS WELL AS CAROTID DUPLEX TO BE DONE ASAP. 9-1-1 TO ER ASAP IF WORSENING OR NEW SYMPTOMS OR PERSISTENT SYMPTOMS. PATIENT AND NEIGHBOR VERBALIZED UNDERSTANDING.

## 2017-06-26 ENCOUNTER — CLINICAL SUPPORT (OUTPATIENT)
Dept: FAMILY MEDICINE CLINIC | Facility: CLINIC | Age: 82
End: 2017-06-26

## 2017-06-26 DIAGNOSIS — E53.8 VITAMIN B 12 DEFICIENCY: Primary | ICD-10-CM

## 2017-06-26 PROCEDURE — 96372 THER/PROPH/DIAG INJ SC/IM: CPT | Performed by: PHYSICIAN ASSISTANT

## 2017-06-26 RX ORDER — CYANOCOBALAMIN 1000 UG/ML
1000 INJECTION, SOLUTION INTRAMUSCULAR; SUBCUTANEOUS
Status: DISCONTINUED | OUTPATIENT
Start: 2017-06-26 | End: 2017-11-22

## 2017-06-26 RX ADMIN — CYANOCOBALAMIN 1000 MCG: 1000 INJECTION, SOLUTION INTRAMUSCULAR; SUBCUTANEOUS at 09:37

## 2017-07-25 ENCOUNTER — OFFICE VISIT (OUTPATIENT)
Dept: FAMILY MEDICINE CLINIC | Facility: CLINIC | Age: 82
End: 2017-07-25

## 2017-07-25 VITALS
DIASTOLIC BLOOD PRESSURE: 58 MMHG | WEIGHT: 189 LBS | OXYGEN SATURATION: 97 % | HEIGHT: 73 IN | TEMPERATURE: 97.8 F | SYSTOLIC BLOOD PRESSURE: 126 MMHG | HEART RATE: 63 BPM | BODY MASS INDEX: 25.05 KG/M2

## 2017-07-25 DIAGNOSIS — E78.5 DYSLIPIDEMIA: ICD-10-CM

## 2017-07-25 DIAGNOSIS — E53.8 B12 DEFICIENCY: Primary | ICD-10-CM

## 2017-07-25 DIAGNOSIS — R73.03 PREDIABETES: ICD-10-CM

## 2017-07-25 DIAGNOSIS — R79.9 ABNORMAL FINDING OF BLOOD CHEMISTRY: ICD-10-CM

## 2017-07-25 DIAGNOSIS — M47.812 OSTEOARTHRITIS OF CERVICAL SPINE, UNSPECIFIED SPINAL OSTEOARTHRITIS COMPLICATION STATUS: ICD-10-CM

## 2017-07-25 DIAGNOSIS — I10 ESSENTIAL HYPERTENSION: ICD-10-CM

## 2017-07-25 DIAGNOSIS — M19.90 ARTHRITIS: ICD-10-CM

## 2017-07-25 PROCEDURE — 96372 THER/PROPH/DIAG INJ SC/IM: CPT | Performed by: PHYSICIAN ASSISTANT

## 2017-07-25 PROCEDURE — 99214 OFFICE O/P EST MOD 30 MIN: CPT | Performed by: PHYSICIAN ASSISTANT

## 2017-07-25 RX ORDER — ACETAMINOPHEN 500 MG
TABLET ORAL
Qty: 60 TABLET | Refills: 0 | Status: SHIPPED | OUTPATIENT
Start: 2017-07-25 | End: 2022-02-16

## 2017-07-25 RX ADMIN — CYANOCOBALAMIN 1000 MCG: 1000 INJECTION, SOLUTION INTRAMUSCULAR; SUBCUTANEOUS at 08:24

## 2017-07-25 NOTE — PROGRESS NOTES
I have reviewed the notes, assessments, and/or procedures performed by Phyllis Loving, I concur with her documentation of Dinesh Churchill.

## 2017-07-25 NOTE — PATIENT INSTRUCTIONS
89 YEAR OLD MALE WHO PRESENTS TODAY IN FOLLOW UP OF HTN, B12 DEFICIENCY, WEAKNESS, DIZZINESS, FALLS, AND SPECIALISTS. BP TODAY IS CONTROLLED. CONTINUE REGIMEN. I WILL RECHECK CBC AND B12 TODAY AND HE WILL GET B12 INJECTION TODAY. ENSURE FOLLOW UP IN 1 MONTH FOR B12 INJECTION UNLESS CHANGES MADE TO RECOMMENDATIONS PENDING LABS. PATIENT REPORTS FEELING BETTER FROM WEAKNESS, DIZZINESS, FALLS. 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 ASAP REFERRAL WAS MADE TO NEUROLOGY- HE HAS APPT 10/2017 WITH DR MESA. HE WAS ALSO SEEN BY CARDIOLOGY IN June AND HAS 6 MONTH FOLLOW UP. PATIENT TO BE SEEN ASAP IF HE IS HAVING ANY PROBLEMS OR IF SYMPTOMS RECUR. OTHERWISE, FOLLOW UP WITH ME IN 3 MONTHS, FASTING, AND I WILL RECHECK ALL LABS.     HE DOES CONTINUE WITH BILATERAL KNEE PAIN FROM ARTHRITIS. I WILL HAVE HIM TRY TYLENOL 500 MG 1-2 TABLETS 1-2 X DAILY AS NEEDED FOR PAIN. IF NO IMPROVEMENT OR WORSENING, CONSIDER FURTHER WORKUP.

## 2017-07-25 NOTE — PROGRESS NOTES
Subjective   Dinesh Churchill is a 89 y.o. male seen today for follow-up for hypertension, Vitamin D and B 12 deficiency     History of Present Illness     NO FURTHER DIZZINESS OR WEAKNESS. STATES BILATERAL KNEE PAIN WITH OA. STATES HAS NOT TRIED TYLENOL. REPORTS MOVING WELL AND HAS BEEN FEELING BETTER.     NO OTHER PROBLEMS.     NO CP/SOA/PALP.     NEUROLOGY- HAD MRI BRAIN WITH OLD AND SUBACUTE CVA. URGENT REFERRAL TO NEUROLOGY PLACED 6/12/17- REVIEWED BY NEUROLOGIST- APPT 10/12/17  LAST SEEN BY CARDIOLOGY 6/2017- FOLLOW UP IN 6 MONTHS.     The following portions of the patient's history were reviewed and updated as appropriate: allergies, current medications, past family history, past medical history, past social history, past surgical history and problem list.    Review of Systems   Constitutional: Negative for activity change, chills, fatigue, fever and unexpected weight change.   HENT: Negative for congestion, sinus pressure, sore throat, tinnitus and trouble swallowing.    Eyes: Negative for discharge and visual disturbance.   Respiratory: Negative for cough, chest tightness, shortness of breath and wheezing.    Cardiovascular: Negative for chest pain, palpitations and leg swelling.   Gastrointestinal: Negative for abdominal distention, abdominal pain, constipation, diarrhea, nausea and vomiting.   Endocrine: Negative for cold intolerance, heat intolerance, polydipsia and polyuria.   Genitourinary: Negative for difficulty urinating, dysuria, frequency and urgency.   Musculoskeletal: Negative for gait problem and myalgias.        BILATERAL KNEE PAIN   Skin: Negative for color change, rash and wound.   Neurological: Negative for dizziness, seizures, syncope, speech difficulty, weakness, light-headedness, numbness and headaches.   Hematological: Does not bruise/bleed easily.   Psychiatric/Behavioral: Negative for agitation, behavioral problems, confusion, hallucinations, self-injury, sleep disturbance and suicidal  ideas. The patient is not nervous/anxious.    All other systems reviewed and are negative.      Objective   Physical Exam   Constitutional: He is oriented to person, place, and time. He appears well-developed.   HENT:   Head: Normocephalic and atraumatic.   Right Ear: External ear normal.   Left Ear: External ear normal.   Eyes: Conjunctivae are normal.   Neck: Carotid bruit is not present. No tracheal deviation present. No thyroid mass and no thyromegaly present.   Cardiovascular: Normal rate, regular rhythm, normal heart sounds and intact distal pulses.    Pulmonary/Chest: Effort normal and breath sounds normal.   Neurological: He is alert and oriented to person, place, and time. Gait normal.   Skin: Skin is warm and dry.   Psychiatric: He has a normal mood and affect. His behavior is normal. Judgment and thought content normal.   Nursing note and vitals reviewed.      Assessment/Plan   Dinesh was seen today for follow-up on hypertension, vitamin d and b 12 deficiency.    Diagnoses and all orders for this visit:    B12 deficiency  -     CBC & Differential  -     Vitamin B12 & Folate  -     Iron and TIBC  -     Ferritin    Dyslipidemia  -     CBC & Differential  -     Vitamin B12 & Folate  -     Iron and TIBC  -     Ferritin    Prediabetes  -     CBC & Differential  -     Vitamin B12 & Folate  -     Iron and TIBC  -     Ferritin    Essential hypertension  -     CBC & Differential  -     Vitamin B12 & Folate  -     Iron and TIBC  -     Ferritin    Abnormal finding of blood chemistry   -     Iron and TIBC  -     Ferritin    Arthritis  -     acetaminophen (TYLENOL) 500 MG tablet; 1-2 TABLETS BY ONCE TO TWICE DAILY AS NEEDED FOR PAIN    Osteoarthritis of cervical spine, unspecified spinal osteoarthritis complication status  -     acetaminophen (TYLENOL) 500 MG tablet; 1-2 TABLETS BY ONCE TO TWICE DAILY AS NEEDED FOR PAIN      Patient Instructions   89 YEAR OLD MALE WHO PRESENTS TODAY IN FOLLOW UP OF HTN, B12  DEFICIENCY, WEAKNESS, DIZZINESS, FALLS, AND SPECIALISTS. BP TODAY IS CONTROLLED. CONTINUE REGIMEN. I WILL RECHECK CBC AND B12 TODAY AND HE WILL GET B12 INJECTION TODAY. ENSURE FOLLOW UP IN 1 MONTH FOR B12 INJECTION UNLESS CHANGES MADE TO RECOMMENDATIONS PENDING LABS. PATIENT REPORTS FEELING BETTER FROM WEAKNESS, DIZZINESS, FALLS. 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 ASAP REFERRAL WAS MADE TO NEUROLOGY- HE HAS APPT 10/2017 WITH DR MESA. HE WAS ALSO SEEN BY CARDIOLOGY IN June AND HAS 6 MONTH FOLLOW UP. PATIENT TO BE SEEN ASAP IF HE IS HAVING ANY PROBLEMS OR IF SYMPTOMS RECUR. OTHERWISE, FOLLOW UP WITH ME IN 3 MONTHS, FASTING, AND I WILL RECHECK ALL LABS.     HE DOES CONTINUE WITH BILATERAL KNEE PAIN FROM ARTHRITIS. I WILL HAVE HIM TRY TYLENOL 500 MG 1-2 TABLETS 1-2 X DAILY AS NEEDED FOR PAIN. IF NO IMPROVEMENT OR WORSENING, CONSIDER FURTHER WORKUP.

## 2017-07-26 DIAGNOSIS — D75.89 MACROCYTOSIS: Primary | ICD-10-CM

## 2017-07-26 LAB
BASOPHILS # BLD AUTO: 0.01 10*3/MM3 (ref 0–0.2)
BASOPHILS NFR BLD AUTO: 0.2 % (ref 0–1.5)
DIFFERENTIAL COMMENT: NORMAL
EOSINOPHIL # BLD AUTO: 0.16 10*3/MM3 (ref 0–0.7)
EOSINOPHIL NFR BLD AUTO: 3.6 % (ref 0.3–6.2)
ERYTHROCYTE [DISTWIDTH] IN BLOOD BY AUTOMATED COUNT: 15.7 % (ref 11.5–14.5)
FERRITIN SERPL-MCNC: 335.9 NG/ML (ref 30–400)
FOLATE SERPL-MCNC: 18.52 NG/ML (ref 4.78–24.2)
HCT VFR BLD AUTO: 44.4 % (ref 40.4–52.2)
HGB BLD-MCNC: 13.8 G/DL (ref 13.7–17.6)
IMM GRANULOCYTES # BLD: 0 10*3/MM3 (ref 0–0.03)
IMM GRANULOCYTES NFR BLD: 0 % (ref 0–0.5)
IRON SATN MFR SERPL: 26 % (ref 20–50)
IRON SERPL-MCNC: 90 MCG/DL (ref 59–158)
LYMPHOCYTES # BLD AUTO: 1.13 10*3/MM3 (ref 0.9–4.8)
LYMPHOCYTES NFR BLD AUTO: 25.3 % (ref 19.6–45.3)
Lab: NORMAL
Lab: NORMAL
MCH RBC QN AUTO: 33.1 PG (ref 27–32.7)
MCHC RBC AUTO-ENTMCNC: 31.1 G/DL (ref 32.6–36.4)
MCV RBC AUTO: 106.5 FL (ref 79.8–96.2)
MONOCYTES # BLD AUTO: 0.45 10*3/MM3 (ref 0.2–1.2)
MONOCYTES NFR BLD AUTO: 10.1 % (ref 5–12)
NEUTROPHILS # BLD AUTO: 2.71 10*3/MM3 (ref 1.9–8.1)
NEUTROPHILS NFR BLD AUTO: 60.8 % (ref 42.7–76)
PLATELET # BLD AUTO: 147 10*3/MM3 (ref 140–500)
PLATELET BLD QL SMEAR: NORMAL
RBC # BLD AUTO: 4.17 10*6/MM3 (ref 4.6–6)
RBC MORPH BLD: NORMAL
TIBC SERPL-MCNC: 349 MCG/DL
UIBC SERPL-MCNC: 259 MCG/DL
VIT B12 SERPL-MCNC: 350 PG/ML (ref 211–946)
WBC # BLD AUTO: 4.46 10*3/MM3 (ref 4.5–10.7)

## 2017-07-28 LAB
ALBUMIN SERPL ELPH-MCNC: 3.6 G/DL (ref 2.9–4.4)
ALBUMIN/GLOB SERPL: 1.3 {RATIO} (ref 0.7–1.7)
ALPHA1 GLOB SERPL ELPH-MCNC: 0.2 G/DL (ref 0–0.4)
ALPHA2 GLOB SERPL ELPH-MCNC: 0.8 G/DL (ref 0.4–1)
B-GLOBULIN SERPL ELPH-MCNC: 1.1 G/DL (ref 0.7–1.3)
GAMMA GLOB SERPL ELPH-MCNC: 0.8 G/DL (ref 0.4–1.8)
GLOBULIN SER-MCNC: 2.9 G/DL (ref 2.2–3.9)
IGA SERPL-MCNC: 226 MG/DL (ref 61–437)
IGG SERPL-MCNC: 742 MG/DL (ref 700–1600)
IGM SERPL-MCNC: 87 MG/DL (ref 15–143)
INTERPRETATION SERPL IEP-IMP: NORMAL
Lab: NORMAL
M PROTEIN SERPL ELPH-MCNC: NORMAL G/DL
PROT SERPL-MCNC: 6.5 G/DL (ref 6–8.5)
WRITTEN AUTHORIZATION: NORMAL

## 2017-08-09 ENCOUNTER — CONSULT (OUTPATIENT)
Dept: ONCOLOGY | Facility: CLINIC | Age: 82
End: 2017-08-09

## 2017-08-09 ENCOUNTER — LAB (OUTPATIENT)
Dept: OTHER | Facility: HOSPITAL | Age: 82
End: 2017-08-09

## 2017-08-09 VITALS
BODY MASS INDEX: 25.6 KG/M2 | WEIGHT: 189 LBS | SYSTOLIC BLOOD PRESSURE: 162 MMHG | HEIGHT: 72 IN | DIASTOLIC BLOOD PRESSURE: 72 MMHG | TEMPERATURE: 98 F | OXYGEN SATURATION: 97 % | HEART RATE: 70 BPM | RESPIRATION RATE: 18 BRPM

## 2017-08-09 DIAGNOSIS — D75.89 MACROCYTOSIS WITHOUT ANEMIA: Primary | ICD-10-CM

## 2017-08-09 DIAGNOSIS — D48.5 NEOPLASM OF UNCERTAIN BEHAVIOR OF SKIN: Primary | ICD-10-CM

## 2017-08-09 LAB
BASOPHILS # BLD AUTO: 0.02 10*3/MM3 (ref 0–0.2)
BASOPHILS NFR BLD AUTO: 0.3 % (ref 0–1.5)
DEPRECATED RDW RBC AUTO: 54.4 FL (ref 37–54)
EOSINOPHIL # BLD AUTO: 0.1 10*3/MM3 (ref 0–0.7)
EOSINOPHIL NFR BLD AUTO: 1.7 % (ref 0.3–6.2)
ERYTHROCYTE [DISTWIDTH] IN BLOOD BY AUTOMATED COUNT: 15.2 % (ref 11.5–14.5)
HCT VFR BLD AUTO: 40.7 % (ref 40.4–52.2)
HGB BLD-MCNC: 13.4 G/DL (ref 13.7–17.6)
IMM GRANULOCYTES # BLD: 0.04 10*3/MM3 (ref 0–0.03)
IMM GRANULOCYTES NFR BLD: 0.7 % (ref 0–0.5)
LYMPHOCYTES # BLD AUTO: 1.45 10*3/MM3 (ref 0.9–4.8)
LYMPHOCYTES NFR BLD AUTO: 24.9 % (ref 19.6–45.3)
MCH RBC QN AUTO: 32.4 PG (ref 27–32.7)
MCHC RBC AUTO-ENTMCNC: 32.9 G/DL (ref 32.6–36.4)
MCV RBC AUTO: 98.3 FL (ref 79.8–96.2)
MONOCYTES # BLD AUTO: 0.43 10*3/MM3 (ref 0.2–1.2)
MONOCYTES NFR BLD AUTO: 7.4 % (ref 5–12)
NEUTROPHILS # BLD AUTO: 3.78 10*3/MM3 (ref 1.9–8.1)
NEUTROPHILS NFR BLD AUTO: 65 % (ref 42.7–76)
NRBC BLD MANUAL-RTO: 0 /100 WBC (ref 0–0)
PLATELET # BLD AUTO: 130 10*3/MM3 (ref 140–500)
PMV BLD AUTO: 11 FL (ref 6–12)
RBC # BLD AUTO: 4.14 10*6/MM3 (ref 4.6–6)
WBC NRBC COR # BLD: 5.82 10*3/MM3 (ref 4.5–10.7)

## 2017-08-09 PROCEDURE — 36415 COLL VENOUS BLD VENIPUNCTURE: CPT

## 2017-08-09 PROCEDURE — 85025 COMPLETE CBC W/AUTO DIFF WBC: CPT | Performed by: INTERNAL MEDICINE

## 2017-08-09 PROCEDURE — 99204 OFFICE O/P NEW MOD 45 MIN: CPT | Performed by: INTERNAL MEDICINE

## 2017-08-09 NOTE — PROGRESS NOTES
REFERRING PROVIDER:    RAFA Mcfarland  [address] [place] [address]    REASON FOR CONSULTATION:    Macrocytosis    HISTORY OF PRESENT ILLNESS:  Dinesh Churchill is a 89 y.o. male who is referred today for further evaluation of macrocytosis.        On 7/25/2017 his hemoglobin is 13.8 with hematocrit 44.4%.  The MCV was quite elevated at 106.5.  His white blood cell count was 4.46 with platelets 147,000.  His vitamin B12 level was normal at 350 with normal folic acid 18.5.  Iron studies and ferritin were normal.  The serum protein electrophoresis with immunofixation showed no evidence for monoclonal protein.    He has not been anemic.  His MCV has been consistently elevated since March 2017.  His platelet count has been mildly depressed between 128,000 and 150,000.    He is on aspirin and Plavix for coronary artery disease.  He has easy bruising but denies any bleeding.  He was recently started on vitamin B12 injections and has received one thus far.  He denies any other problems.      Past Medical History:   Diagnosis Date   • Abnormal ECG    • Abnormal MRI    • Acute frontal sinusitis    • Arthritis    • Chronic fatigue    • Dizziness    • Hearing loss    • Hyperlipidemia    • Hypertension    • Kidney stones    • Macrocytosis    • Neoplasm of skin    • Osteoporosis    • Prediabetes    • Vitamin D deficiency        Past Surgical History:   Procedure Laterality Date   • CARDIAC CATHETERIZATION N/A 5/22/2017    Procedure: Left Heart Cath;  Surgeon: Maninder Quezada MD;  Location: [facility] CATH INVASIVE LOCATION;  Service:    • CARDIAC CATHETERIZATION N/A 5/23/2017    Procedure: Stent BMS coronary;  Surgeon: Maninder Quezada MD;  Location: [facility] CATH INVASIVE LOCATION;  Service:    • HERNIA REPAIR      x 2   • DC RT/LT HEART CATHETERS N/A 5/23/2017    Procedure: Percutaneous Coronary Intervention;  Surgeon: Maninder Quezada MD;  Location: [facility] CATH INVASIVE LOCATION;  Service:  Cardiovascular       SOCIAL HISTORY:   reports that he has never smoked. He has never used smokeless tobacco. He reports that he does not drink alcohol or use illicit drugs.    FAMILY HISTORY:  Family history is unknown by patient.  No known blood disorders    ALLERGIES:  No Known Allergies    MEDICATIONS:  The medication list has been reviewed with the patient by the medical assistant, and the list has been updated in the electronic medical record, which I reviewed.  Medication dosages and frequencies were confirmed to be accurate.    Review of Systems   Constitutional: Negative for appetite change, chills, fatigue, fever and unexpected weight change.   HENT: Negative for congestion, dental problem, ear pain, hearing loss, mouth sores, nosebleeds, postnasal drip, rhinorrhea, tinnitus and trouble swallowing.    Eyes: Negative.    Respiratory: Negative for cough, chest tightness, shortness of breath, wheezing and stridor.    Cardiovascular: Negative for chest pain, palpitations and leg swelling.   Gastrointestinal: Negative for abdominal distention, abdominal pain, blood in stool, constipation, diarrhea, nausea and vomiting.   Endocrine: Negative.    Genitourinary: Negative for decreased urine volume, difficulty urinating, dysuria, flank pain, frequency, hematuria, scrotal swelling, testicular pain and urgency.   Musculoskeletal: Negative for arthralgias, back pain and joint swelling.   Allergic/Immunologic: Negative.    Neurological: Negative for dizziness, seizures, syncope, weakness, light-headedness, numbness and headaches.   Hematological: Negative for adenopathy. Bruises/bleeds easily.   Psychiatric/Behavioral: Negative for confusion and sleep disturbance. The patient is not nervous/anxious.    All other systems reviewed and are negative.      There were no vitals filed for this visit.    Physical Exam   Constitutional: He is oriented to person, place, and time. He appears well-developed and well-nourished. No  distress.   HENT:   Head: Normocephalic and atraumatic.   Mouth/Throat: Oropharynx is clear and moist.   Eyes: Conjunctivae and EOM are normal. Pupils are equal, round, and reactive to light.   Neck: Normal range of motion. Neck supple. No thyromegaly present.   Cardiovascular: Normal rate, regular rhythm and normal heart sounds.  Exam reveals no gallop and no friction rub.    No murmur heard.  Pulmonary/Chest: Effort normal and breath sounds normal. No respiratory distress. He has no wheezes. He has no rales.   Abdominal: Soft. Bowel sounds are normal. He exhibits no distension and no mass. There is no tenderness. There is no rebound and no guarding.   Musculoskeletal: Normal range of motion. He exhibits no edema or tenderness.   Lymphadenopathy:     He has no cervical adenopathy.   Neurological: He is alert and oriented to person, place, and time. He has normal reflexes.   Skin: Skin is warm and dry. No rash noted. He is not diaphoretic.   Scattered bruises, largest on the left arm.     Psychiatric: He has a normal mood and affect. His behavior is normal.   Nursing note and vitals reviewed.      DIAGNOSTIC DATA:  Lab Results   Component Value Date    WBC 5.82 08/09/2017    HGB 13.4 (L) 08/09/2017    HCT 40.7 08/09/2017    MCV 98.3 (H) 08/09/2017     (L) 08/09/2017       IMAGING:    None reviewed    ASSESSMENT:  This is a 89 y.o. male with:  1.  1.  Macrocytosis: He is not anemic.  His vitamin B12 level was low normal and he is now receiving vitamin B12 injections.  I certainly agree with this.  Thyroid studies were normal.  Serum protein electrophoresis with immunofixation were unremarkable so there is no evidence for plasma cell dyscrasia.  No alcohol use or known liver disease.  I doubt that medication is contributing.  He could potentially have myelodysplasia but there would not be anything to do about this at this time and a bone marrow aspiration and biopsy are not indicated currently.  His MCV has  significantly improved.  I think observation is warranted.   2.  Thrombocytopenia: This is mild.  He is on aspirin and Plavix.  Again, I think observation is appropriate.  Should his platelet count drop significantly then the use of aspirin and Plavix will need to be reevaluated.    PLAN:   1.  I agree with monthly vitamin B12 injections.  2.  At this point I would observe his counts every few months.  I don't think any further evaluation is necessary at this point otherwise.  He states that his primary care provider is monitoring his blood counts closely.  We can certainly see him back as needed in the future for worsening cytopenias.    Thank you very much for allowing me to see this very nice gentleman.

## 2017-08-15 ENCOUNTER — APPOINTMENT (OUTPATIENT)
Dept: GENERAL RADIOLOGY | Facility: HOSPITAL | Age: 82
End: 2017-08-15

## 2017-08-15 LAB
ALBUMIN SERPL-MCNC: 4.1 G/DL (ref 3.5–5.2)
ALBUMIN/GLOB SERPL: 1.5 G/DL
ALP SERPL-CCNC: 51 U/L (ref 39–117)
ALT SERPL W P-5'-P-CCNC: 19 U/L (ref 1–41)
ANION GAP SERPL CALCULATED.3IONS-SCNC: 12.3 MMOL/L
AST SERPL-CCNC: 18 U/L (ref 1–40)
BASOPHILS # BLD AUTO: 0.01 10*3/MM3 (ref 0–0.2)
BASOPHILS NFR BLD AUTO: 0.2 % (ref 0–1.5)
BILIRUB SERPL-MCNC: 0.7 MG/DL (ref 0.1–1.2)
BUN BLD-MCNC: 31 MG/DL (ref 8–23)
BUN/CREAT SERPL: 40.3 (ref 7–25)
CALCIUM SPEC-SCNC: 9.5 MG/DL (ref 8.6–10.5)
CHLORIDE SERPL-SCNC: 108 MMOL/L (ref 98–107)
CO2 SERPL-SCNC: 23.7 MMOL/L (ref 22–29)
CREAT BLD-MCNC: 0.77 MG/DL (ref 0.76–1.27)
DEPRECATED RDW RBC AUTO: 58.1 FL (ref 37–54)
EOSINOPHIL # BLD AUTO: 0.17 10*3/MM3 (ref 0–0.7)
EOSINOPHIL NFR BLD AUTO: 3.8 % (ref 0.3–6.2)
ERYTHROCYTE [DISTWIDTH] IN BLOOD BY AUTOMATED COUNT: 15.5 % (ref 11.5–14.5)
GFR SERPL CREATININE-BSD FRML MDRD: 95 ML/MIN/1.73
GLOBULIN UR ELPH-MCNC: 2.8 GM/DL
GLUCOSE BLD-MCNC: 128 MG/DL (ref 65–99)
HCT VFR BLD AUTO: 43.5 % (ref 40.4–52.2)
HGB BLD-MCNC: 13.8 G/DL (ref 13.7–17.6)
IMM GRANULOCYTES # BLD: 0 10*3/MM3 (ref 0–0.03)
IMM GRANULOCYTES NFR BLD: 0 % (ref 0–0.5)
LYMPHOCYTES # BLD AUTO: 1.43 10*3/MM3 (ref 0.9–4.8)
LYMPHOCYTES NFR BLD AUTO: 32.1 % (ref 19.6–45.3)
MCH RBC QN AUTO: 32.7 PG (ref 27–32.7)
MCHC RBC AUTO-ENTMCNC: 31.7 G/DL (ref 32.6–36.4)
MCV RBC AUTO: 103.1 FL (ref 79.8–96.2)
MONOCYTES # BLD AUTO: 0.42 10*3/MM3 (ref 0.2–1.2)
MONOCYTES NFR BLD AUTO: 9.4 % (ref 5–12)
NEUTROPHILS # BLD AUTO: 2.43 10*3/MM3 (ref 1.9–8.1)
NEUTROPHILS NFR BLD AUTO: 54.5 % (ref 42.7–76)
PLATELET # BLD AUTO: 131 10*3/MM3 (ref 140–500)
PMV BLD AUTO: 11 FL (ref 6–12)
POTASSIUM BLD-SCNC: 4.4 MMOL/L (ref 3.5–5.2)
PROT SERPL-MCNC: 6.9 G/DL (ref 6–8.5)
RBC # BLD AUTO: 4.22 10*6/MM3 (ref 4.6–6)
SODIUM BLD-SCNC: 144 MMOL/L (ref 136–145)
TROPONIN T SERPL-MCNC: <0.01 NG/ML (ref 0–0.03)
WBC NRBC COR # BLD: 4.46 10*3/MM3 (ref 4.5–10.7)

## 2017-08-15 PROCEDURE — 85025 COMPLETE CBC W/AUTO DIFF WBC: CPT

## 2017-08-15 PROCEDURE — 99284 EMERGENCY DEPT VISIT MOD MDM: CPT

## 2017-08-15 PROCEDURE — 93005 ELECTROCARDIOGRAM TRACING: CPT

## 2017-08-15 PROCEDURE — 84484 ASSAY OF TROPONIN QUANT: CPT

## 2017-08-15 PROCEDURE — 71020 HC CHEST PA AND LATERAL: CPT

## 2017-08-15 PROCEDURE — 80053 COMPREHEN METABOLIC PANEL: CPT

## 2017-08-15 RX ORDER — SODIUM CHLORIDE 0.9 % (FLUSH) 0.9 %
10 SYRINGE (ML) INJECTION AS NEEDED
Status: DISCONTINUED | OUTPATIENT
Start: 2017-08-15 | End: 2017-08-16 | Stop reason: HOSPADM

## 2017-08-15 RX ORDER — ASPIRIN 325 MG
325 TABLET ORAL ONCE
Status: COMPLETED | OUTPATIENT
Start: 2017-08-15 | End: 2017-08-16

## 2017-08-16 ENCOUNTER — APPOINTMENT (OUTPATIENT)
Dept: NUCLEAR MEDICINE | Facility: HOSPITAL | Age: 82
End: 2017-08-16

## 2017-08-16 ENCOUNTER — HOSPITAL ENCOUNTER (OUTPATIENT)
Facility: HOSPITAL | Age: 82
Setting detail: OBSERVATION
Discharge: HOME | End: 2017-08-16
Attending: EMERGENCY MEDICINE | Admitting: INTERNAL MEDICINE

## 2017-08-16 VITALS
HEART RATE: 75 BPM | RESPIRATION RATE: 16 BRPM | OXYGEN SATURATION: 97 % | BODY MASS INDEX: 26.55 KG/M2 | DIASTOLIC BLOOD PRESSURE: 67 MMHG | SYSTOLIC BLOOD PRESSURE: 152 MMHG | WEIGHT: 196 LBS | TEMPERATURE: 97.5 F | HEIGHT: 72 IN

## 2017-08-16 DIAGNOSIS — R07.2 PRECORDIAL PAIN: Primary | ICD-10-CM

## 2017-08-16 LAB
ALBUMIN SERPL-MCNC: 4 G/DL (ref 3.5–5.2)
ALBUMIN/GLOB SERPL: 1.6 G/DL
ALP SERPL-CCNC: 45 U/L (ref 39–117)
ALT SERPL W P-5'-P-CCNC: 16 U/L (ref 1–41)
ANION GAP SERPL CALCULATED.3IONS-SCNC: 10.6 MMOL/L
AST SERPL-CCNC: 15 U/L (ref 1–40)
BASOPHILS # BLD AUTO: 0.01 10*3/MM3 (ref 0–0.2)
BASOPHILS NFR BLD AUTO: 0.2 % (ref 0–1.5)
BH CV STRESS COMMENTS STAGE 1: NORMAL
BH CV STRESS DOSE REGADENOSON STAGE 1: 0.4
BH CV STRESS DURATION MIN STAGE 1: 0
BH CV STRESS DURATION SEC STAGE 1: 15
BH CV STRESS PROTOCOL 1: NORMAL
BH CV STRESS RECOVERY BP: NORMAL MMHG
BH CV STRESS RECOVERY HR: 86 BPM
BH CV STRESS STAGE 1: 1
BILIRUB SERPL-MCNC: 0.9 MG/DL (ref 0.1–1.2)
BUN BLD-MCNC: 26 MG/DL (ref 8–23)
BUN/CREAT SERPL: 41.3 (ref 7–25)
CALCIUM SPEC-SCNC: 8.6 MG/DL (ref 8.6–10.5)
CHLORIDE SERPL-SCNC: 105 MMOL/L (ref 98–107)
CHOLEST SERPL-MCNC: 192 MG/DL (ref 0–200)
CO2 SERPL-SCNC: 27.4 MMOL/L (ref 22–29)
CREAT BLD-MCNC: 0.63 MG/DL (ref 0.76–1.27)
DEPRECATED RDW RBC AUTO: 56.9 FL (ref 37–54)
EOSINOPHIL # BLD AUTO: 0.12 10*3/MM3 (ref 0–0.7)
EOSINOPHIL NFR BLD AUTO: 2.8 % (ref 0.3–6.2)
ERYTHROCYTE [DISTWIDTH] IN BLOOD BY AUTOMATED COUNT: 15.5 % (ref 11.5–14.5)
GFR SERPL CREATININE-BSD FRML MDRD: 120 ML/MIN/1.73
GLOBULIN UR ELPH-MCNC: 2.5 GM/DL
GLUCOSE BLD-MCNC: 98 MG/DL (ref 65–99)
HCT VFR BLD AUTO: 43.1 % (ref 40.4–52.2)
HDLC SERPL-MCNC: 35 MG/DL (ref 40–60)
HGB BLD-MCNC: 13.7 G/DL (ref 13.7–17.6)
HOLD SPECIMEN: NORMAL
HOLD SPECIMEN: NORMAL
IMM GRANULOCYTES # BLD: 0 10*3/MM3 (ref 0–0.03)
IMM GRANULOCYTES NFR BLD: 0 % (ref 0–0.5)
INR PPP: 1.07 (ref 0.9–1.1)
LDLC SERPL CALC-MCNC: 130 MG/DL (ref 0–100)
LDLC/HDLC SERPL: 3.73 {RATIO}
LV EF NUC BP: 34 %
LYMPHOCYTES # BLD AUTO: 1.46 10*3/MM3 (ref 0.9–4.8)
LYMPHOCYTES NFR BLD AUTO: 34.3 % (ref 19.6–45.3)
MAGNESIUM SERPL-MCNC: 2.2 MG/DL (ref 1.6–2.4)
MAXIMAL PREDICTED HEART RATE: 131 BPM
MCH RBC QN AUTO: 32.5 PG (ref 27–32.7)
MCHC RBC AUTO-ENTMCNC: 31.8 G/DL (ref 32.6–36.4)
MCV RBC AUTO: 102.1 FL (ref 79.8–96.2)
MONOCYTES # BLD AUTO: 0.46 10*3/MM3 (ref 0.2–1.2)
MONOCYTES NFR BLD AUTO: 10.8 % (ref 5–12)
NEUTROPHILS # BLD AUTO: 2.21 10*3/MM3 (ref 1.9–8.1)
NEUTROPHILS NFR BLD AUTO: 51.9 % (ref 42.7–76)
NT-PROBNP SERPL-MCNC: 2502 PG/ML (ref 0–1800)
PERCENT MAX PREDICTED HR: 72.52 %
PLATELET # BLD AUTO: 118 10*3/MM3 (ref 140–500)
PMV BLD AUTO: 10.8 FL (ref 6–12)
POTASSIUM BLD-SCNC: 4.1 MMOL/L (ref 3.5–5.2)
PROT SERPL-MCNC: 6.5 G/DL (ref 6–8.5)
PROTHROMBIN TIME: 13.5 SECONDS (ref 11.7–14.2)
RBC # BLD AUTO: 4.22 10*6/MM3 (ref 4.6–6)
SODIUM BLD-SCNC: 143 MMOL/L (ref 136–145)
STRESS BASELINE BP: NORMAL MMHG
STRESS BASELINE HR: 64 BPM
STRESS PERCENT HR: 85 %
STRESS POST PEAK BP: NORMAL MMHG
STRESS POST PEAK HR: 95 BPM
STRESS TARGET HR: 111 BPM
TRIGL SERPL-MCNC: 133 MG/DL (ref 0–150)
TROPONIN T SERPL-MCNC: <0.01 NG/ML (ref 0–0.03)
VLDLC SERPL-MCNC: 26.6 MG/DL (ref 5–40)
WBC NRBC COR # BLD: 4.26 10*3/MM3 (ref 4.5–10.7)
WHOLE BLOOD HOLD SPECIMEN: NORMAL
WHOLE BLOOD HOLD SPECIMEN: NORMAL

## 2017-08-16 PROCEDURE — 83735 ASSAY OF MAGNESIUM: CPT | Performed by: NURSE PRACTITIONER

## 2017-08-16 PROCEDURE — G0378 HOSPITAL OBSERVATION PER HR: HCPCS

## 2017-08-16 PROCEDURE — 80053 COMPREHEN METABOLIC PANEL: CPT | Performed by: NURSE PRACTITIONER

## 2017-08-16 PROCEDURE — 85025 COMPLETE CBC W/AUTO DIFF WBC: CPT | Performed by: NURSE PRACTITIONER

## 2017-08-16 PROCEDURE — 25010000002 REGADENOSON 0.4 MG/5ML SOLUTION: Performed by: INTERNAL MEDICINE

## 2017-08-16 PROCEDURE — 93018 CV STRESS TEST I&R ONLY: CPT | Performed by: INTERNAL MEDICINE

## 2017-08-16 PROCEDURE — 78452 HT MUSCLE IMAGE SPECT MULT: CPT

## 2017-08-16 PROCEDURE — A9500 TC99M SESTAMIBI: HCPCS | Performed by: INTERNAL MEDICINE

## 2017-08-16 PROCEDURE — 99235 HOSP IP/OBS SAME DATE MOD 70: CPT | Performed by: INTERNAL MEDICINE

## 2017-08-16 PROCEDURE — 93017 CV STRESS TEST TRACING ONLY: CPT

## 2017-08-16 PROCEDURE — 85610 PROTHROMBIN TIME: CPT | Performed by: NURSE PRACTITIONER

## 2017-08-16 PROCEDURE — 93016 CV STRESS TEST SUPVJ ONLY: CPT | Performed by: INTERNAL MEDICINE

## 2017-08-16 PROCEDURE — 84484 ASSAY OF TROPONIN QUANT: CPT | Performed by: EMERGENCY MEDICINE

## 2017-08-16 PROCEDURE — 78452 HT MUSCLE IMAGE SPECT MULT: CPT | Performed by: INTERNAL MEDICINE

## 2017-08-16 PROCEDURE — 93010 ELECTROCARDIOGRAM REPORT: CPT | Performed by: INTERNAL MEDICINE

## 2017-08-16 PROCEDURE — 0 TECHNETIUM SESTAMIBI: Performed by: INTERNAL MEDICINE

## 2017-08-16 PROCEDURE — 83880 ASSAY OF NATRIURETIC PEPTIDE: CPT | Performed by: NURSE PRACTITIONER

## 2017-08-16 PROCEDURE — 93005 ELECTROCARDIOGRAM TRACING: CPT | Performed by: NURSE PRACTITIONER

## 2017-08-16 PROCEDURE — 80061 LIPID PANEL: CPT | Performed by: NURSE PRACTITIONER

## 2017-08-16 RX ORDER — ASPIRIN 325 MG
325 TABLET, DELAYED RELEASE (ENTERIC COATED) ORAL DAILY
Status: DISCONTINUED | OUTPATIENT
Start: 2017-08-16 | End: 2017-08-16 | Stop reason: HOSPADM

## 2017-08-16 RX ORDER — CLOPIDOGREL BISULFATE 75 MG/1
75 TABLET ORAL DAILY
Status: DISCONTINUED | OUTPATIENT
Start: 2017-08-16 | End: 2017-08-16 | Stop reason: HOSPADM

## 2017-08-16 RX ORDER — NITROGLYCERIN 0.4 MG/1
0.4 TABLET SUBLINGUAL
Status: DISCONTINUED | OUTPATIENT
Start: 2017-08-16 | End: 2017-08-16 | Stop reason: HOSPADM

## 2017-08-16 RX ORDER — NITROGLYCERIN 0.4 MG/1
0.4 TABLET SUBLINGUAL
Qty: 25 TABLET | Refills: 0 | Status: SHIPPED | OUTPATIENT
Start: 2017-08-16 | End: 2019-09-08 | Stop reason: HOSPADM

## 2017-08-16 RX ORDER — SODIUM CHLORIDE 0.9 % (FLUSH) 0.9 %
1-10 SYRINGE (ML) INJECTION AS NEEDED
Status: DISCONTINUED | OUTPATIENT
Start: 2017-08-16 | End: 2017-08-16 | Stop reason: HOSPADM

## 2017-08-16 RX ORDER — LOSARTAN POTASSIUM 50 MG/1
50 TABLET ORAL DAILY
Status: DISCONTINUED | OUTPATIENT
Start: 2017-08-16 | End: 2017-08-16 | Stop reason: HOSPADM

## 2017-08-16 RX ORDER — NITROGLYCERIN 0.4 MG/1
0.4 TABLET SUBLINGUAL
Status: DISCONTINUED | OUTPATIENT
Start: 2017-08-16 | End: 2017-08-16 | Stop reason: SDUPTHER

## 2017-08-16 RX ORDER — ACETAMINOPHEN 325 MG/1
650 TABLET ORAL EVERY 4 HOURS PRN
Status: DISCONTINUED | OUTPATIENT
Start: 2017-08-16 | End: 2017-08-16 | Stop reason: HOSPADM

## 2017-08-16 RX ORDER — ATORVASTATIN CALCIUM 10 MG/1
10 TABLET, FILM COATED ORAL NIGHTLY
Status: DISCONTINUED | OUTPATIENT
Start: 2017-08-16 | End: 2017-08-16 | Stop reason: HOSPADM

## 2017-08-16 RX ORDER — METOPROLOL SUCCINATE 50 MG/1
50 TABLET, EXTENDED RELEASE ORAL DAILY
Status: DISCONTINUED | OUTPATIENT
Start: 2017-08-16 | End: 2017-08-16 | Stop reason: HOSPADM

## 2017-08-16 RX ADMIN — LOSARTAN POTASSIUM 50 MG: 50 TABLET, FILM COATED ORAL at 13:39

## 2017-08-16 RX ADMIN — TECHNETIUM TC-99M SESTAMIBI 1 DOSE: 1 INJECTION INTRAVENOUS at 09:45

## 2017-08-16 RX ADMIN — ASPIRIN 325 MG: 325 TABLET ORAL at 01:12

## 2017-08-16 RX ADMIN — CLOPIDOGREL 75 MG: 75 TABLET, FILM COATED ORAL at 13:38

## 2017-08-16 RX ADMIN — TECHNETIUM TC-99M SESTAMIBI 1 DOSE: 1 INJECTION INTRAVENOUS at 11:00

## 2017-08-16 RX ADMIN — ASPIRIN 325 MG: 325 TABLET, DELAYED RELEASE ORAL at 13:39

## 2017-08-16 RX ADMIN — REGADENOSON 0.4 MG: 0.08 INJECTION, SOLUTION INTRAVENOUS at 11:00

## 2017-08-16 RX ADMIN — METOPROLOL SUCCINATE 50 MG: 50 TABLET, FILM COATED, EXTENDED RELEASE ORAL at 13:39

## 2017-08-16 NOTE — ED TRIAGE NOTES
Pt complains of left sided episodic chest pain. Denies pain radiation, N/V/D. Pain rated 2/10 at this time.
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
## 2017-08-16 NOTE — ED PROVIDER NOTES
" EMERGENCY DEPARTMENT ENCOUNTER    CHIEF COMPLAINT  Chief Complaint: chest pain  History given by: pt  History limited by: none  Room Number: 402/1  PMD: RAFA Mcfarland      HPI:  Pt is a 89 y.o. male who presents complaining of intermittent \"sharp\" L-sided chest pain since earlier today at 1630. The pain at that time lasted for a few minutes to an hour and was accompanied by SOA. It is unaffected by sitting, lying down or ambulation. He denies secondary sx of nausea, vomiting, diaphoresis, palpitations, calf pain or leg edema. He denies that the pain feels similar to the pain he had when he had heart complications.   Pt has a family h/o cardiac problems (grandfather).     Duration:  9 hours  Onset: gradual  Timing: intermittent  Location: L-sided  Radiation: none stated  Quality: \"sharp\"  Intensity/Severity: moderate  Progression: unchanged  Associated Symptoms: SOA  Aggravating Factors: none stated  Alleviating Factors: none stated  Previous Episodes: Pt has a h/o heart complications  Treatment before arrival: none stated    PAST MEDICAL HISTORY  Active Ambulatory Problems     Diagnosis Date Noted   • Abnormal magnetic resonance imaging study 03/18/2016   • Arthritis 03/18/2016   • Osteoarthritis of cervical spine 03/18/2016   • Diplopia 03/18/2016   • Hearing loss 03/18/2016   • Neoplasm of uncertain behavior of skin 03/18/2016   • Prediabetes 03/18/2016   • Vitamin D deficiency 03/18/2016   • Chronic fatigue 10/27/2016   • History of supraventricular tachycardia 04/07/2017   • Essential hypertension 04/07/2017   • B12 deficiency 04/19/2017   • Abnormal cardiac function test 05/08/2017   • Cardiomyopathy 05/08/2017   • Coronary artery disease involving native coronary artery of native heart without angina pectoris 05/31/2017   • History of coronary artery stent placement 05/31/2017   • History of renal calculi 05/31/2017   • Dyslipidemia 05/31/2017   • Macrocytosis without anemia 08/09/2017     Resolved " Ambulatory Problems     Diagnosis Date Noted   • Maxillary sinusitis 03/18/2016   • Dizziness 10/27/2016   • Acute frontal sinusitis 10/27/2016   • Precordial pain 04/07/2017   • SOB (shortness of breath) 04/07/2017     Past Medical History:   Diagnosis Date   • Abnormal ECG    • Abnormal MRI    • Acute frontal sinusitis    • Arthritis    • Chronic fatigue    • Dizziness    • Hearing aid worn    • Hearing loss    • Hyperlipidemia    • Hypertension    • Kidney stones    • Macrocytosis    • Neoplasm of skin    • Osteoporosis    • Prediabetes    • Vitamin D deficiency        PAST SURGICAL HISTORY  Past Surgical History:   Procedure Laterality Date   • CARDIAC CATHETERIZATION N/A 5/22/2017    Procedure: Left Heart Cath;  Surgeon: Maninder Quezada MD;  Location: [facility] CATH INVASIVE LOCATION;  Service:    • CARDIAC CATHETERIZATION N/A 5/23/2017    Procedure: Stent BMS coronary;  Surgeon: Maninder Quezada MD;  Location:  [facility] CATH INVASIVE LOCATION;  Service:    • HERNIA REPAIR      x 2   • CO RT/LT HEART CATHETERS N/A 5/23/2017    Procedure: Percutaneous Coronary Intervention;  Surgeon: Maninder Quezada MD;  Location: [facility] CATH INVASIVE LOCATION;  Service: Cardiovascular       FAMILY HISTORY  Family History   Problem Relation Age of Onset   • Family history unknown: Yes       SOCIAL HISTORY  Social History     Social History   • Marital status:      Spouse name: N/A   • Number of children: N/A   • Years of education: N/A     Occupational History   •  Retired     Social History Main Topics   • Smoking status: Never Smoker   • Smokeless tobacco: Never Used   • Alcohol use No   • Drug use: No   • Sexual activity: Defer     Other Topics Concern   • Not on file     Social History Narrative       ALLERGIES  Review of patient's allergies indicates no known allergies.    REVIEW OF SYSTEMS  Review of Systems   Constitutional: Negative for activity change, appetite change, diaphoresis and fever.   HENT:  "Negative for congestion and sore throat.    Eyes: Negative.    Respiratory: Positive for chest tightness (left sided sharp pain) and shortness of breath. Negative for cough.    Cardiovascular: Positive for chest pain (\"sharp\"). Negative for palpitations and leg swelling.   Gastrointestinal: Negative for abdominal pain, diarrhea, nausea and vomiting.   Endocrine: Negative.    Genitourinary: Negative for decreased urine volume and dysuria.   Musculoskeletal: Negative for neck pain.   Skin: Negative for rash and wound.   Allergic/Immunologic: Negative.    Neurological: Negative for weakness, numbness and headaches.   Hematological: Negative.    Psychiatric/Behavioral: Negative.    All other systems reviewed and are negative.      PHYSICAL EXAM  ED Triage Vitals   Temp Heart Rate Resp BP SpO2   08/15/17 2202 08/15/17 2202 08/15/17 2202 08/15/17 2202 08/15/17 2216   98.6 °F (37 °C) 102 16 162/73 93 %      Temp src Heart Rate Source Patient Position BP Location FiO2 (%)   08/15/17 2202 08/15/17 2202 08/15/17 2202 08/15/17 2202 --   Oral Monitor Sitting Left arm        Physical Exam   Constitutional: He is oriented to person, place, and time. He appears distressed (mild).   HENT:   Head: Normocephalic and atraumatic.   Eyes: EOM are normal. Pupils are equal, round, and reactive to light.   Neck: Normal range of motion. Neck supple.   Cardiovascular: Normal rate, regular rhythm and normal heart sounds.    No murmur heard.  Pulmonary/Chest: Effort normal and breath sounds normal. No respiratory distress. He exhibits no tenderness.   Lungs CTAB   Abdominal: Soft. There is no tenderness. There is no rebound and no guarding.   Musculoskeletal: Normal range of motion. He exhibits no edema.   Neurological: He is alert and oriented to person, place, and time. He has normal sensation and normal strength.   Skin: Skin is warm and dry.   Psychiatric: Mood and affect normal.   Nursing note and vitals reviewed.      LAB RESULTS  Lab " Results (last 24 hours)     Procedure Component Value Units Date/Time    CBC & Differential [[id number]] Collected:  08/15/17 2257    Specimen:  Blood Updated:  08/15/17 2308    Narrative:       The following orders were created for panel order CBC & Differential.  Procedure                               Abnormality         Status                     ---------                               -----------         ------                     CBC Auto Differential[[id number]]        Abnormal            Final result                 Please view results for these tests on the individual orders.    Comprehensive Metabolic Panel [[id number]]  (Abnormal) Collected:  08/15/17 2257    Specimen:  Blood Updated:  08/15/17 2327     Glucose 128 (H) mg/dL      BUN 31 (H) mg/dL      Creatinine 0.77 mg/dL      Sodium 144 mmol/L      Potassium 4.4 mmol/L      Chloride 108 (H) mmol/L      CO2 23.7 mmol/L      Calcium 9.5 mg/dL      Total Protein 6.9 g/dL      Albumin 4.10 g/dL      ALT (SGPT) 19 U/L      AST (SGOT) 18 U/L      Alkaline Phosphatase 51 U/L      Total Bilirubin 0.7 mg/dL      eGFR Non African Amer 95 mL/min/1.73      Globulin 2.8 gm/dL      A/G Ratio 1.5 g/dL      BUN/Creatinine Ratio 40.3 (H)     Anion Gap 12.3 mmol/L     Narrative:       The MDRD GFR formula is only valid for adults with stable renal function between ages 18 and 70.    Troponin [[id number]]  (Normal) Collected:  08/15/17 2257    Specimen:  Blood Updated:  08/15/17 2334     Troponin T <0.010 ng/mL     Narrative:       Troponin T Reference Ranges:  Less than 0.03 ng/mL:    Negative for AMI  0.03 to 0.09 ng/mL:      Indeterminant for AMI  Greater than 0.09 ng/mL: Positive for AMI    CBC Auto Differential [[id number]]  (Abnormal) Collected:  08/15/17 2257    Specimen:  Blood Updated:  08/15/17 2308     WBC 4.46 (L) 10*3/mm3      RBC 4.22 (L) 10*6/mm3      Hemoglobin 13.8 g/dL      Hematocrit 43.5 %      .1 (H) fL      MCH 32.7 pg      MCHC 31.7 (L) g/dL       RDW 15.5 (H) %      RDW-SD 58.1 (H) fl      MPV 11.0 fL      Platelets 131 (L) 10*3/mm3      Neutrophil % 54.5 %      Lymphocyte % 32.1 %      Monocyte % 9.4 %      Eosinophil % 3.8 %      Basophil % 0.2 %      Immature Grans % 0.0 %      Neutrophils, Absolute 2.43 10*3/mm3      Lymphocytes, Absolute 1.43 10*3/mm3      Monocytes, Absolute 0.42 10*3/mm3      Eosinophils, Absolute 0.17 10*3/mm3      Basophils, Absolute 0.01 10*3/mm3      Immature Grans, Absolute 0.00 10*3/mm3     Troponin [[id number]]  (Normal) Collected:  08/16/17 0125    Specimen:  Blood Updated:  08/16/17 0203     Troponin T <0.010 ng/mL     Narrative:       Troponin T Reference Ranges:  Less than 0.03 ng/mL:    Negative for AMI  0.03 to 0.09 ng/mL:      Indeterminant for AMI  Greater than 0.09 ng/mL: Positive for AMI          I ordered the above labs and reviewed the results    RADIOLOGY  XR Chest 2 View   Preliminary Result   No acute findings.                   I ordered the above noted radiological studies. Interpreted by radiologist. Reviewed by me in PACS.       PROCEDURES  Procedures  EKG           EKG time: 2155  Rhythm/Rate: NSR R74  P waves and DC: 1st degree AV block  QRS, axis: LVH   ST and T waves: strain pattern     Interpreted Contemporaneously by me, independently viewed  No old for comparison    PROGRESS AND CONSULTS  ED Course     0107: Discussed plan to consult with Dr. Quezada, cardiologist, to develop further tx plan.     0120: Discussed patient's case with Dr. Harris, cardiology, including concerns regarding pt's sx of chest pain, pertinent ECG/workup results and h/o stents. He wants pt to be admitted to observation under Dr. Quezada.     0153 Rechecked pt who is in NAD. Discussed plan to admit.  MEDICAL DECISION MAKING  Results were reviewed/discussed with the patient and they were also made aware of online access. Pt also made aware that some labs, such as cultures, will not be resulted during ER visit and follow up  with PMD is necessary.     MDM  Number of Diagnoses or Management Options     Amount and/or Complexity of Data Reviewed  Clinical lab tests: ordered and reviewed (Lab results are unremarkable)  Tests in the radiology section of CPT®: ordered and reviewed (XR Chest 2 View  Preliminary Result  No acute findings.    )  Tests in the medicine section of CPT®: ordered and reviewed (EKG - See EKG procedure note)  Decide to obtain previous medical records or to obtain history from someone other than the patient: yes  Review and summarize past medical records: yes (Pt had a stent of RCA placed on 05/22  Proximal LAD 50% occluded and nondominant circumflex with 50 % stenosis)  Independent visualization of images, tracings, or specimens: yes    Patient Progress  Patient progress: stable         DIAGNOSIS  Final diagnoses:   Precordial pain       DISPOSITION  ADMISSION    Discussed treatment plan and reason for admission with pt/family and admitting physician.  Pt/family voiced understanding of the plan for admission for further testing/treatment as needed.         Latest Documented Vital Signs:  As of 7:21 AM  BP- 154/79 HR- 76 Temp- 97.5 °F (36.4 °C) (Oral) O2 sat- 99%    --  Documentation assistance provided by miladis Willams for Dr. Adames.  Information recorded by the miladis was done at my direction and has been verified and validated by me.         Unique Willams  08/16/17 0258       Michael Adames MD  08/16/17 4922

## 2017-08-16 NOTE — PLAN OF CARE
Problem: Patient Care Overview (Adult)  Goal: Plan of Care Review  Outcome: Ongoing (interventions implemented as appropriate)    08/16/17 1449   Outcome Evaluation   Outcome Summary/Follow up Plan patient went to stress test this am, all cardiac test (-), plan to d/c home today       Goal: Adult Individualization and Mutuality  Outcome: Ongoing (interventions implemented as appropriate)  Goal: Discharge Needs Assessment  Outcome: Ongoing (interventions implemented as appropriate)    Problem: Pain, Acute (Adult)  Goal: Acceptable Pain Control/Comfort Level  Outcome: Ongoing (interventions implemented as appropriate)

## 2017-08-16 NOTE — H&P
"[facility]  History & Physical Note                                                                                    Patient Identification:  Dinesh Churchill:   89 y.o.  male  5/29/1928  [id number]            Chief Complaint   Patient presents with   • Chest Pain       Date of Admission: 8-16-17    Admitting Physician:Dr Quezada    Reason for Admission:Precordial pain [R07.2], Chief Complaint   Patient presents with   • Chest Pain       History of Present Illness:   This patient is an 89-year-old white male followed at our office for CAD, ischemic cardiomyopathy and hypertension.  He had a bare metal stent placed to an 80% proximal LAD in May of this year for which he is on dual antiplatelet therapy with aspirin and Plavix.  EF 35-40% with mild-moderate valvular heart disease (see below) He was evaluated by hematology 2 months ago for macrocytosis and thrombocytopenia (no iron deficiency, ferritin deficiency or anemia).  Serial B12 replacement and monitoring of platelets every few months were recommended. His PCP follows him for dyslipidemia    Patient presented to the emergency room reporting a one hour episode of nonexertional, nonradiating left-sided chest discomfort described as \"tingling\" with accompanying shortness of breath that occurred approximately 6 hours prior to presentation.  He states it was \"kind of similar\" to discomfort he experienced before his stent was placed.  He denies any accompanying diaphoresis, nausea, vomiting, palpitations dizziness or lightheadedness.  It resolved without treatment.    Admission EKG has not been scanned into epic, however was interrelated as normal sinus rhythm with first-degree AV block, LV H and ST-T wave strain pattern by ER physician.  Cardiac enzymes negative ×2. Chest x-ray showed no acute findings.  Platelet count 131 (147 7/25/2017)    Cardiac Risk Factors: Hypertension, dyslipidemia    Past Medical History:  Past Medical History: "   Diagnosis Date   • Abnormal ECG    • Abnormal MRI    • Acute frontal sinusitis    • Arthritis    • Chronic fatigue    • Dizziness    • Hearing aid worn     left   • Hearing loss    • Hyperlipidemia    • Hypertension    • Kidney stones    • Macrocytosis    • Neoplasm of skin    • Osteoporosis    • Prediabetes    • Vitamin D deficiency     at least 3 stable    Past Surgical History:  Past Surgical History:   Procedure Laterality Date   • CARDIAC CATHETERIZATION N/A 5/22/2017    Procedure: Left Heart Cath;  Surgeon: Maninder Quezada MD;  Location: [facility] CATH INVASIVE LOCATION;  Service:    • CARDIAC CATHETERIZATION N/A 5/23/2017    Procedure: Stent BMS coronary;  Surgeon: Maninder Quezada MD;  Location: [facility] CATH INVASIVE LOCATION;  Service:    • HERNIA REPAIR      x 2   • OK RT/LT HEART CATHETERS N/A 5/23/2017    Procedure: Percutaneous Coronary Intervention;  Surgeon: Maninder Quezada MD;  Location: [facility] CATH INVASIVE LOCATION;  Service: Cardiovascular        Social History:   Social History   Substance Use Topics   • Smoking status: Never Smoker   • Smokeless tobacco: Never Used   • Alcohol use No        Family History:  Family History   Problem Relation Age of Onset   • Family history unknown: Yes          Allergies:  No Known Allergies    Home Meds:  No current facility-administered medications on file prior to encounter.      Current Outpatient Prescriptions on File Prior to Encounter   Medication Sig Dispense Refill   • acetaminophen (TYLENOL) 500 MG tablet 1-2 TABLETS BY ONCE TO TWICE DAILY AS NEEDED FOR PAIN 60 tablet 0   • aspirin  MG tablet Take 1 tablet by mouth Daily.  0   • atorvastatin (LIPITOR) 10 MG tablet Take 1 tablet by mouth Every Night. 30 tablet 0   • Cholecalciferol (VITAMIN D-3) 1000 UNITS capsule Take 1,000 Units by mouth daily.     • clopidogrel (PLAVIX) 75 MG tablet Take 1 tablet by mouth Daily. 30 tablet 11   • losartan (COZAAR) 50 MG tablet Take 50 mg by mouth  Daily.     • metoprolol succinate XL (TOPROL-XL) 50 MG 24 hr tablet Take 50 mg by mouth Daily.           Scheduled Meds:           INTAKE AND OUTPUT:  No intake or output data in the 24 hours ending 08/16/17 0731        Review of Systems:  Constitutional: No weakness,fatigue, fever, chills   Eyes: No eye pain, vision changes   ENT/oropharynx: No difficulty swallowing, no epistaxis, no changes in hearing   Cardiovascular: No  palpitations, paroxysmal nocturnal dyspnea, orthopnea, diaphoresis, dizziness / syncopal episode   Respiratory: mild shortness of breath. No dyspnea on exertion, cough, wheezing or hemoptysis   Gastrointestinal: No abdominal pain, nausea, vomiting, diarrhea, bloody stools   Genitourinary: No hematuria, dysuria   Neurological: No headache, one-sided weakness    Musculoskeletal: No cramps, No neck pain    Integument: No rash, edema       Constitutional:  Temp:  [97.5 °F (36.4 °C)-98.6 °F (37 °C)] 97.5 °F (36.4 °C)  Heart Rate:  [] 76  Resp:  [16] 16  BP: (154-181)/() 154/79     Physical Exam   General:  Well-developed, well-nourished elderly white male resting quietly.  Appears in no acute distress  Eyes: PERTL,  HEENT:  No JVD lying supine Thyroid not visibly enlarged. No mucosal pallor or cyanosis  Respiratory: Respirations regular and unlabored at rest. BBS with good air entry in all fields. No crackles, rubs or wheezes auscultated  Cardiovascular: S1S2 Regular rate and rhythm. No murmur, rubs or gallop. No carotid bruits. PT pulses 1-2. No lower extremity pitting edema  Gastrointestinal: Abdomen soft, flat, non tender. Bowel sounds present. No ascites  Musculoskeletal: NEWSOME x4. No abnormal movements  Extremities: No digital clubbing or cyanosis  Skin: Color pink. Skin warm and dry to touch.   Neuro: AAO x3. [place] otherwise, CN II-XII grossly intact      Cardiographics    ECG: pending    Telemetry:        ECHO 5-2017:  · The left ventricular cavity is mild-to-moderately  dilated.  · Left atrial cavity size is borderline dilated.  · Mild aortic valve regurgitation is present.  · Mild-to-moderate mitral valve regurgitation is present  · Mild to moderate tricuspid valve regurgitation is present.  · Mild pulmonic valve regurgitation is present.  · Left Ventricle: Calculated EF = 36.3%.  · There is no evidence of pericardial effusion      Lab Review:    Results from last 7 days  Lab Units 08/16/17  0125 08/15/17  2257   TROPONIN T ng/mL <0.010 <0.010           Results from last 7 days  Lab Units 08/15/17  2257   SODIUM mmol/L 144   POTASSIUM mmol/L 4.4   BUN mg/dL 31*   CREATININE mg/dL 0.77   CALCIUM mg/dL 9.5       Results from last 7 days  Lab Units 08/15/17  2257 08/09/17  1312   WBC 10*3/mm3 4.46* 5.82   HEMOGLOBIN g/dL 13.8 13.4*   HEMATOCRIT % 43.5 40.7   PLATELETS 10*3/mm3 131* 130*         CXR FINDINGS:  Cardiomediastinal silhouette is within normal limits.      There is no consolidation or effusion. Chronic lung changes are stable.  MPRESSION: No acute findings.         Assessment / Plan:  - Chest pain-no MI  - CAD- h/o 4.0/12 vision BMS-> 80% proximal RCA  (Normal left main, 50% proximal LAD, subtotally occluded D1, 40-50% nondominant LCx)  - EF 35-40%, mild-moderate MR/TR, mild AR  - HTN  - Dyslipidemia        Recommendations:  In summary, this is an 89-year-old male with history of ischemic cardiomyopathy, CAD and bare metal stent to the proximal LAD 3 months ago who presented with an episode of chest discomfort similar to - but not as intense - as he experienced before PCI.  Repeat EKG is pending.  MI has been ruled out with serial cardiac enzymes.  He shows no clinical or radiographic evidence of fluid overload lash heart failure.  Will schedule him for stress test this morning.  Further recommendations to treatment plan pending      Labs/tests ordered: EKG, stress test    I reviewed the patient's new clinical results and treatment plan with Dr Quezada.  I personally  "viewed and interpreted the patient's EKG/Telemetry data    Maninder Quezada MD  8/16/2017  7:31 AM    EMR Dragon/Transcription:   \"Dictated utilizing Dragon dictation\".     "

## 2017-08-16 NOTE — DISCHARGE SUMMARY
"[facility]  Physician Discharge Summary    Patient Identification:  Name: Dinesh Churchill  Age: 89 y.o.  Sex: male  :  1928  MRN: [id number]    Admit date: 2017    Discharge date and time:  17    Admitting Physician: Maninder Quezada MD     Discharge Physician: Dr Quezada    Discharge Diagnoses:   - Chest pain-no MI. Stress test (-) for ischemia  - CAD- h/o 4.0/12 vision BMS-> 80% proximal RCA  (Normal left main, 50% proximal LAD, subtotally occluded D1, 40-50% nondominant LCx)  - EF 35-40%, mild-moderate MR/TR, mild AR  - HTN  - Dyslipidemia    Discharged Condition: Stable    Hospital Course:   This patient was admitted for further evaluation after presenting to the emergency room reporting a one hour episode of nonexertional, nonradiating left-sided chest discomfort described as \"tingling\" with accompanying shortness of breath that occurred approximately 6 hours prior to presentation.  He states it was \"kind of similar\" to discomfort he experienced before his stent was placed.  He denies any accompanying diaphoresis, nausea, vomiting, palpitations dizziness or lightheadedness.  It resolved without treatment.    He was placed on aspirin, topical nitrate, PPI, DVT prophylaxis with continuation of home dual antiplatelet therapies and beta blocker.  Serial cardiac enzymes and EKGs were negative for acute cardiac ischemic event.  Patient underwent stress testing which was negative for ischemia.  He had no recurrent symptoms.    Stress test results with I reviewed stress test results with it's sensitivity/specificity of approximately 85% with the patient/family/POA. Patient has been advised that no further ischemic workup is planned at this time, however may need future testing if symptoms recur. Patient was advised to return to ER  for recurrent symptoms at which time cardiac catheterization will be recommended.  He has been scheduled to see us in office on  and " with primary care provider as ordered. Rationale for/use of sublingual NTG reviewed  All questions were answered. Patient/family voiced understanding and agreement with treatment plan.      Consults:   IP CONSULT TO CARDIOLOGY    Discharge Exam:  General:  Well-developed, well-nourished elderly white male resting quietly.  Appears in no acute distress  Eyes: PERTL,  HEENT:  No JVD lying supine Thyroid not visibly enlarged. No mucosal pallor or cyanosis  Respiratory: Respirations regular and unlabored at rest. BBS with good air entry in all fields. No crackles, rubs or wheezes auscultated  Cardiovascular: S1S2 Regular rate and rhythm. No murmur, rubs or gallop. No carotid bruits. PT pulses 1-2. No lower extremity pitting edema  Gastrointestinal: Abdomen soft, flat, non tender. Bowel sounds present. No ascites  Musculoskeletal: NEWSOME x4. No abnormal movements  Extremities: No digital clubbing or cyanosis  Skin: Color pink. Skin warm and dry to touch.   Neuro: AAO x3. [place] otherwise, CN II-XII grossly intact           LABS:    Results from last 7 days  Lab Units 08/16/17  0125 08/15/17  2257   TROPONIN T ng/mL <0.010 <0.010       Results from last 7 days  Lab Units 08/16/17  0900   MAGNESIUM mg/dL 2.2       Results from last 7 days  Lab Units 08/16/17  0900   SODIUM mmol/L 143   POTASSIUM mmol/L 4.1   BUN mg/dL 26*   CREATININE mg/dL 0.63*   CALCIUM mg/dL 8.6         Results from last 7 days  Lab Units 08/16/17  0900 08/15/17  2257   WBC 10*3/mm3 4.26* 4.46*   HEMOGLOBIN g/dL 13.7 13.8   HEMATOCRIT % 43.1 43.5   PLATELETS 10*3/mm3 118* 131*       Results from last 7 days  Lab Units 08/16/17  0900   INR  1.07       Results from last 7 days  Lab Units 08/16/17  0900   CHOLESTEROL mg/dL 192   TRIGLYCERIDES mg/dL 133   HDL CHOL mg/dL 35*     Disposition:  Home    Discharge Medications:    Dinesh Churchill   Home Medication Instructions LIZ:[id number]    Printed on:08/16/17 0839   Medication Information                     "  acetaminophen (TYLENOL) 500 MG tablet  1-2 TABLETS BY ONCE TO TWICE DAILY AS NEEDED FOR PAIN             aspirin  MG tablet  Take 1 tablet by mouth Daily.             atorvastatin (LIPITOR) 10 MG tablet  Take 1 tablet by mouth Every Night.             Cholecalciferol (VITAMIN D-3) 1000 UNITS capsule  Take 1,000 Units by mouth daily.             clopidogrel (PLAVIX) 75 MG tablet  Take 1 tablet by mouth Daily.             losartan (COZAAR) 50 MG tablet  Take 50 mg by mouth Daily.             metoprolol succinate XL (TOPROL-XL) 50 MG 24 hr tablet  Take 50 mg by mouth Daily.             nitroglycerin (NITROSTAT) 0.4 MG SL tablet  Place 1 tablet under the tongue Every 5 (Five) Minutes As Needed for Chest Pain. Take no more than 3 doses in 15 minutes.                   Discharge Home Instructions:  1. Return to ER for any recurrent symptoms at which time cardiac catheterization will be recommended  2. Follow up with Dr Quezada at the [facility] office on September 20 at 9:30 AM  3.  Follow-up with PCP in one week      I reviewed the patient's new clinical results, discharge treatments, medications and follow up with Dr Quezada. I personally viewed and interpreted the patient's EKG/Telemetry data  Signed:  Maninder Quezada MD  8/16/2017  2:34 PM      EMR Dragon/Transcription:   \"Dictated utilizing Dragon dictation\".     "

## 2017-08-16 NOTE — PROGRESS NOTES
Continued Stay Note  [facility]     Patient Name: Dinesh Churchill  MRN: [id number]  Today's Date: 8/16/2017    Admit Date: 8/16/2017          Discharge Plan       08/16/17 0943    Case Management/Social Work Plan    Plan Attempted to screen but patient is off unit for testing.     8/16/17 1140 - attempted again to screen but patient is still off the unit for testing.     Patient/Family In Agreement With Plan other (see comments)              Discharge Codes     None            Drake Zarate RN

## 2017-08-16 NOTE — PLAN OF CARE
Problem: Patient Care Overview (Adult)  Goal: Plan of Care Review  Outcome: Ongoing (interventions implemented as appropriate)    08/16/17 0334   Coping/Psychosocial Response Interventions   Plan Of Care Reviewed With patient   Patient Care Overview   Progress improving   Outcome Evaluation   Outcome Summary/Follow up Plan Pt admitted to floor, admission completed. Awaiting orders. Denies CP at this time. Will continue to monitor.       Goal: Adult Individualization and Mutuality  Outcome: Ongoing (interventions implemented as appropriate)  Goal: Discharge Needs Assessment  Outcome: Ongoing (interventions implemented as appropriate)    Problem: Pain, Acute (Adult)  Goal: Identify Related Risk Factors and Signs and Symptoms  Outcome: Outcome(s) achieved Date Met:  08/16/17  Goal: Acceptable Pain Control/Comfort Level  Outcome: Ongoing (interventions implemented as appropriate)

## 2017-08-25 ENCOUNTER — CLINICAL SUPPORT (OUTPATIENT)
Dept: FAMILY MEDICINE CLINIC | Facility: CLINIC | Age: 82
End: 2017-08-25

## 2017-08-25 DIAGNOSIS — E53.8 B12 DEFICIENCY: ICD-10-CM

## 2017-08-25 PROCEDURE — 96372 THER/PROPH/DIAG INJ SC/IM: CPT | Performed by: PHYSICIAN ASSISTANT

## 2017-08-25 RX ADMIN — CYANOCOBALAMIN 1000 MCG: 1000 INJECTION, SOLUTION INTRAMUSCULAR; SUBCUTANEOUS at 11:06

## 2017-09-20 ENCOUNTER — OFFICE VISIT (OUTPATIENT)
Dept: CARDIOLOGY | Facility: CLINIC | Age: 82
End: 2017-09-20

## 2017-09-20 VITALS
BODY MASS INDEX: 25.73 KG/M2 | HEART RATE: 69 BPM | SYSTOLIC BLOOD PRESSURE: 161 MMHG | HEIGHT: 72 IN | WEIGHT: 190 LBS | DIASTOLIC BLOOD PRESSURE: 67 MMHG

## 2017-09-20 DIAGNOSIS — I10 ESSENTIAL HYPERTENSION: Primary | ICD-10-CM

## 2017-09-20 DIAGNOSIS — I42.9 CARDIOMYOPATHY (HCC): ICD-10-CM

## 2017-09-20 DIAGNOSIS — I25.10 CORONARY ARTERY DISEASE INVOLVING NATIVE CORONARY ARTERY OF NATIVE HEART WITHOUT ANGINA PECTORIS: ICD-10-CM

## 2017-09-20 PROCEDURE — 99213 OFFICE O/P EST LOW 20 MIN: CPT | Performed by: INTERNAL MEDICINE

## 2017-09-20 NOTE — PROGRESS NOTES
" Subjective:       Dinesh Churchill is a 89 y.o. male who here for follow up    CC  FOLLOW UP FROM ER FOR CP  HPI  89 yr old w/m here for follow up after seen in ER for CP    Pt kn benign essential htn, cad had an episode of , last month     Problem List Items Addressed This Visit        Cardiovascular and Mediastinum    Essential hypertension - Primary    Cardiomyopathy    Coronary artery disease involving native coronary artery of native heart without angina pectoris        .    The following portions of the patient's history were reviewed and updated as appropriate: allergies, current medications, past family history, past medical history, past social history, past surgical history and problem list.    Past Medical History:   Diagnosis Date   • Abnormal ECG    • Abnormal MRI    • Acute frontal sinusitis    • Arthritis    • Chronic fatigue    • Dizziness    • Hearing aid worn     left   • Hearing loss    • Hyperlipidemia    • Hypertension    • Kidney stones    • Macrocytosis    • Neoplasm of skin    • Osteoporosis    • Prediabetes    • Vitamin D deficiency     reports that he has never smoked. He has never used smokeless tobacco. He reports that he does not drink alcohol or use illicit drugs.  Family History   Problem Relation Age of Onset   • Family history unknown: Yes       Review of Systems  Constitutional: No wt loss, fever, fatigue  Gastrointestinal: No nausea, abdominal pain  Behavioral/Psych: No insomnia or anxiety   Cardiovascular cp  Objective:       Physical Exam             Physical Exam  /67  Pulse 69  Ht 72\" (182.9 cm)  Wt 190 lb (86.2 kg)  BMI 25.77 kg/m2    General appearance: NAD, conversant   Eyes: anicteric sclerae, moist conjunctivae; no lid-lag; PERRLA   HENT: Atraumatic; oropharynx clear with moist mucous membranes and no mucosal ulcerations;  normal hard and soft palate   Neck: Trachea midline; FROM, supple, no thyromegaly or lymphadenopathy   Lungs: CTA, with normal respiratory " effort and no intercostal retractions   CV: S1-S2 regular, no murmurs, no rub, no gallop   Abdomen: Soft, non-tender; no masses or HSM   Extremities: No peripheral edema or extremity lymphadenopathy  Skin: Normal temperature, turgor and texture; no rash, ulcers or subcutaneous nodules   Psych: Appropriate affect, alert and oriented to person, place and time           Cardiographics  @Procedures    Echocardiogram:        Current Outpatient Prescriptions:   •  acetaminophen (TYLENOL) 500 MG tablet, 1-2 TABLETS BY ONCE TO TWICE DAILY AS NEEDED FOR PAIN, Disp: 60 tablet, Rfl: 0  •  aspirin  MG tablet, Take 1 tablet by mouth Daily., Disp: , Rfl: 0  •  atorvastatin (LIPITOR) 10 MG tablet, Take 1 tablet by mouth Every Night., Disp: 30 tablet, Rfl: 0  •  Cholecalciferol (VITAMIN D-3) 1000 UNITS capsule, Take 1,000 Units by mouth daily., Disp: , Rfl:   •  clopidogrel (PLAVIX) 75 MG tablet, Take 1 tablet by mouth Daily., Disp: 30 tablet, Rfl: 11  •  losartan (COZAAR) 50 MG tablet, Take 50 mg by mouth Daily., Disp: , Rfl:   •  metoprolol succinate XL (TOPROL-XL) 50 MG 24 hr tablet, Take 50 mg by mouth Daily., Disp: , Rfl:   •  nitroglycerin (NITROSTAT) 0.4 MG SL tablet, Place 1 tablet under the tongue Every 5 (Five) Minutes As Needed for Chest Pain. Take no more than 3 doses in 15 minutes., Disp: 25 tablet, Rfl: 0    Current Facility-Administered Medications:   •  cyanocobalamin injection 1,000 mcg, 1,000 mcg, Intramuscular, Q28 Days, RAFA [place], 1,000 mcg at 08/25/17 1106   Assessment:        Patient Active Problem List   Diagnosis   • Abnormal magnetic resonance imaging study   • Arthritis   • Osteoarthritis of cervical spine   • Diplopia   • Hearing loss   • Neoplasm of uncertain behavior of skin   • Prediabetes   • Vitamin D deficiency   • Chronic fatigue   • History of supraventricular tachycardia   • Essential hypertension   • B12 deficiency   • Abnormal cardiac function test   • Cardiomyopathy   • Coronary  artery disease involving native coronary artery of native heart without angina pectoris   • History of coronary artery stent placement   • History of renal calculi   • Dyslipidemia   • Macrocytosis without anemia   • Precordial pain     Interpretation Summary   · EKG Negative clinical evidence of myocardial ischemia.  · Left ventricular ejection fraction is moderately reduced (Calculated EF = 34%).  · Myocardial perfusion imaging indicates a normal myocardial perfusion study with no evidence of ischemia.  · Impressions are consistent with an intermediate risk study.  · Probably non ischemic Cardiomyopathy               Plan:            ICD-10-CM ICD-9-CM   1. Essential hypertension I10 401.9   2. Cardiomyopathy I42.9 425.4   3. Coronary artery disease involving native coronary artery of native heart without angina pectoris I25.10 414.01     1. Essential hypertension  Importance of controlling hypertension and blood pressure  checkup on the regular basis has been explained  Hypertension as a silent killer has been discussed  Risk reduction of the weight and regular exercises to control the hypertension has been explained      2. Cardiomyopathy  compensated    3. Coronary artery disease involving native coronary artery of native heart without angina pectoris  Dinesh Churchill with coronary artery disease has no angina pectoris    Risk reduction for the coronary artery disease, controlling the blood pressure, blood sugar management, cholesterol management, exercise, stress management, and proper compliance with medications and follow-up has been discussed       SEE US 6 MONTH  COUNSELING:    Dinesh Forrest was given to patient for the following topics: diagnostic results, risk factor reductions, impressions, risks and benefits of treatment options and importance of treatment compliance .       SMOKING COUNSELING:    Counseling given: Not Answered      EMR Dragon/Transcription disclaimer:   Much of this  encounter note is an electronic transcription/translation of spoken language to printed text. The electronic translation of spoken language may permit erroneous, or at times, nonsensical words or phrases to be inadvertently transcribed; Although I have reviewed the note for such errors, some may still exist.

## 2017-09-25 ENCOUNTER — CLINICAL SUPPORT (OUTPATIENT)
Dept: FAMILY MEDICINE CLINIC | Facility: CLINIC | Age: 82
End: 2017-09-25

## 2017-09-25 DIAGNOSIS — E53.8 B12 DEFICIENCY: ICD-10-CM

## 2017-09-25 DIAGNOSIS — Z00.00 HEALTHCARE MAINTENANCE: Primary | ICD-10-CM

## 2017-09-25 DIAGNOSIS — Z23 ENCOUNTER FOR IMMUNIZATION: ICD-10-CM

## 2017-09-25 PROCEDURE — 96372 THER/PROPH/DIAG INJ SC/IM: CPT | Performed by: PHYSICIAN ASSISTANT

## 2017-09-25 PROCEDURE — G0008 ADMIN INFLUENZA VIRUS VAC: HCPCS | Performed by: PHYSICIAN ASSISTANT

## 2017-09-25 RX ADMIN — CYANOCOBALAMIN 1000 MCG: 1000 INJECTION, SOLUTION INTRAMUSCULAR; SUBCUTANEOUS at 11:06

## 2017-10-02 ENCOUNTER — TELEPHONE (OUTPATIENT)
Dept: CARDIOLOGY | Facility: CLINIC | Age: 82
End: 2017-10-02

## 2017-10-02 NOTE — TELEPHONE ENCOUNTER
----- Message from Idalia Willams sent at 9/27/2017  9:24 AM EDT -----  Regarding: RE ECHO APPT  PT IS SCHEDULED FOR AN ECHO IN December HE WAS HERE ON 9/20 AND TOLD TO COME BACK IN MARCH DOES HE NEED AN ECHO STILL?  PLEASE ADVISE  THANKS        Will have echo with ov in march

## 2017-10-04 ENCOUNTER — TELEPHONE (OUTPATIENT)
Dept: FAMILY MEDICINE CLINIC | Facility: CLINIC | Age: 82
End: 2017-10-04

## 2017-10-04 NOTE — TELEPHONE ENCOUNTER
Patient's son Dinesh called [phone], wants to know if it is necessary to take him to see the Neurologist because he wasn't had any dizziness or falling since he had the cardiac stent placed

## 2017-10-12 ENCOUNTER — OFFICE VISIT (OUTPATIENT)
Dept: NEUROLOGY | Facility: CLINIC | Age: 82
End: 2017-10-12

## 2017-10-12 VITALS
DIASTOLIC BLOOD PRESSURE: 66 MMHG | OXYGEN SATURATION: 97 % | SYSTOLIC BLOOD PRESSURE: 142 MMHG | WEIGHT: 185 LBS | HEIGHT: 72 IN | BODY MASS INDEX: 25.06 KG/M2 | HEART RATE: 68 BPM

## 2017-10-12 DIAGNOSIS — I95.1 ORTHOSTATIC HYPOTENSION: ICD-10-CM

## 2017-10-12 DIAGNOSIS — G20 PARKINSON'S DISEASE (HCC): Primary | ICD-10-CM

## 2017-10-12 DIAGNOSIS — I69.30 SEQUELAE, POST-STROKE: ICD-10-CM

## 2017-10-12 PROCEDURE — 99204 OFFICE O/P NEW MOD 45 MIN: CPT | Performed by: PSYCHIATRY & NEUROLOGY
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
## 2017-10-25 ENCOUNTER — OFFICE VISIT (OUTPATIENT)
Dept: FAMILY MEDICINE CLINIC | Facility: CLINIC | Age: 82
End: 2017-10-25

## 2017-10-25 VITALS
DIASTOLIC BLOOD PRESSURE: 64 MMHG | SYSTOLIC BLOOD PRESSURE: 130 MMHG | HEIGHT: 72 IN | OXYGEN SATURATION: 99 % | HEART RATE: 67 BPM | BODY MASS INDEX: 25.6 KG/M2 | WEIGHT: 189 LBS | TEMPERATURE: 97.8 F

## 2017-10-25 DIAGNOSIS — E55.9 VITAMIN D DEFICIENCY: ICD-10-CM

## 2017-10-25 DIAGNOSIS — E53.8 B12 DEFICIENCY: ICD-10-CM

## 2017-10-25 DIAGNOSIS — R73.03 PREDIABETES: ICD-10-CM

## 2017-10-25 DIAGNOSIS — Z86.73 HISTORY OF CVA (CEREBROVASCULAR ACCIDENT): ICD-10-CM

## 2017-10-25 DIAGNOSIS — G20 PARKINSON'S DISEASE (HCC): ICD-10-CM

## 2017-10-25 DIAGNOSIS — D75.89 MACROCYTOSIS WITHOUT ANEMIA: ICD-10-CM

## 2017-10-25 DIAGNOSIS — E78.5 DYSLIPIDEMIA: ICD-10-CM

## 2017-10-25 DIAGNOSIS — I10 ESSENTIAL HYPERTENSION: Primary | ICD-10-CM

## 2017-10-25 PROBLEM — G20.A1 PARKINSON'S DISEASE: Status: ACTIVE | Noted: 2017-10-25

## 2017-10-25 LAB
25(OH)D3+25(OH)D2 SERPL-MCNC: 32.9 NG/ML (ref 30–100)
ALBUMIN SERPL-MCNC: 4.4 G/DL (ref 3.5–5.2)
ALBUMIN/GLOB SERPL: 1.9 G/DL
ALP SERPL-CCNC: 57 U/L (ref 39–117)
ALT SERPL-CCNC: 14 U/L (ref 1–41)
AST SERPL-CCNC: 13 U/L (ref 1–40)
BASOPHILS # BLD AUTO: 0.01 10*3/MM3 (ref 0–0.2)
BASOPHILS NFR BLD AUTO: 0.2 % (ref 0–1.5)
BILIRUB SERPL-MCNC: 1.4 MG/DL (ref 0.1–1.2)
BUN SERPL-MCNC: 20 MG/DL (ref 8–23)
BUN/CREAT SERPL: 24.4 (ref 7–25)
CALCIUM SERPL-MCNC: 9.3 MG/DL (ref 8.6–10.5)
CHLORIDE SERPL-SCNC: 102 MMOL/L (ref 98–107)
CHOLEST SERPL-MCNC: 194 MG/DL (ref 0–200)
CK SERPL-CCNC: 63 U/L (ref 20–200)
CO2 SERPL-SCNC: 26.9 MMOL/L (ref 22–29)
CREAT SERPL-MCNC: 0.82 MG/DL (ref 0.76–1.27)
EOSINOPHIL # BLD AUTO: 0.13 10*3/MM3 (ref 0–0.7)
EOSINOPHIL NFR BLD AUTO: 2.3 % (ref 0.3–6.2)
ERYTHROCYTE [DISTWIDTH] IN BLOOD BY AUTOMATED COUNT: 16.4 % (ref 11.5–14.5)
EXPIRATION DATE: NORMAL
FOLATE SERPL-MCNC: 15.19 NG/ML (ref 4.78–24.2)
GFR SERPLBLD CREATININE-BSD FMLA CKD-EPI: 107 ML/MIN/1.73
GFR SERPLBLD CREATININE-BSD FMLA CKD-EPI: 88 ML/MIN/1.73
GLOBULIN SER CALC-MCNC: 2.3 GM/DL
GLUCOSE SERPL-MCNC: 103 MG/DL (ref 65–99)
HBA1C MFR BLD: 5.4 %
HCT VFR BLD AUTO: 45.2 % (ref 40.4–52.2)
HDLC SERPL-MCNC: 41 MG/DL (ref 40–60)
HGB BLD-MCNC: 14.2 G/DL (ref 13.7–17.6)
IMM GRANULOCYTES # BLD: 0.02 10*3/MM3 (ref 0–0.03)
IMM GRANULOCYTES NFR BLD: 0.4 % (ref 0–0.5)
LDLC SERPL CALC-MCNC: 123 MG/DL (ref 0–100)
LDLC/HDLC SERPL: 3 {RATIO}
LYMPHOCYTES # BLD AUTO: 1.36 10*3/MM3 (ref 0.9–4.8)
LYMPHOCYTES NFR BLD AUTO: 23.8 % (ref 19.6–45.3)
Lab: 769
MCH RBC QN AUTO: 32.3 PG (ref 27–32.7)
MCHC RBC AUTO-ENTMCNC: 31.4 G/DL (ref 32.6–36.4)
MCV RBC AUTO: 103 FL (ref 79.8–96.2)
MONOCYTES # BLD AUTO: 0.43 10*3/MM3 (ref 0.2–1.2)
MONOCYTES NFR BLD AUTO: 7.5 % (ref 5–12)
NEUTROPHILS # BLD AUTO: 3.76 10*3/MM3 (ref 1.9–8.1)
NEUTROPHILS NFR BLD AUTO: 65.8 % (ref 42.7–76)
PLATELET # BLD AUTO: 137 10*3/MM3 (ref 140–500)
POTASSIUM SERPL-SCNC: 4.5 MMOL/L (ref 3.5–5.2)
PROT SERPL-MCNC: 6.7 G/DL (ref 6–8.5)
RBC # BLD AUTO: 4.39 10*6/MM3 (ref 4.6–6)
SODIUM SERPL-SCNC: 142 MMOL/L (ref 136–145)
TRIGL SERPL-MCNC: 150 MG/DL (ref 0–150)
TSH SERPL DL<=0.005 MIU/L-ACNC: 3.54 MIU/ML (ref 0.27–4.2)
VIT B12 SERPL-MCNC: 433 PG/ML (ref 211–946)
VLDLC SERPL CALC-MCNC: 30 MG/DL (ref 5–40)
WBC # BLD AUTO: 5.71 10*3/MM3 (ref 4.5–10.7)

## 2017-10-25 PROCEDURE — 83036 HEMOGLOBIN GLYCOSYLATED A1C: CPT | Performed by: PHYSICIAN ASSISTANT

## 2017-10-25 PROCEDURE — 96372 THER/PROPH/DIAG INJ SC/IM: CPT | Performed by: PHYSICIAN ASSISTANT

## 2017-10-25 PROCEDURE — 99214 OFFICE O/P EST MOD 30 MIN: CPT | Performed by: PHYSICIAN ASSISTANT

## 2017-10-25 RX ADMIN — CYANOCOBALAMIN 1000 MCG: 1000 INJECTION, SOLUTION INTRAMUSCULAR; SUBCUTANEOUS at 09:13

## 2017-11-03 NOTE — PATIENT INSTRUCTIONS
89 YEAR OLD MALE WHO PRESENTS TODAY IN FOLLOW UP OF HTN, DYSLIPIDEMIA, PREDIABETES, B12 DEFICIENCY WITH MACROCYTOSIS AND VITAMIN D DEFICIENCY. BP TODAY IS OK. CONTINUE REGIMEN. I WILL CHECK LABS TODAY- CALL IF NO RESULTS IN 1 WEEK. TO BE SEEN ASAP IF ANY PROBLEMS OR CONCERNS. OTHERWISE, FOLLOW UP PENDING LAB RESULTS. HE IS UP TO DATE WITH CARDIOLOGY, LAST APPT 9/20/17 WITH FOLLOW UP IN 6 MONTHS. HE WAS SEEN BY NEUROLOGY WITH GAIT ABNORMALITY, HISTORY OF DIZZINESS AND MULTIPLE FALLS, AND CVA, WHO DIAGNOSED HIM WITH PARKINSON'S DIEASE, HOWEVER, NO TREATMENT AT THIS TIME. HE SHOULD FOLLOW UP IF DECLINE OR CHANGES IN GAIT OR FREQUENT FALLS AND THEY WILL CONSIDER MEDICATIONS.

## 2017-11-09 RX ORDER — LOSARTAN POTASSIUM 50 MG/1
TABLET ORAL
Qty: 90 TABLET | Refills: 1 | Status: SHIPPED | OUTPATIENT
Start: 2017-11-09 | End: 2018-12-04 | Stop reason: SDUPTHER

## 2017-11-22 ENCOUNTER — CLINICAL SUPPORT (OUTPATIENT)
Dept: FAMILY MEDICINE CLINIC | Facility: CLINIC | Age: 82
End: 2017-11-22

## 2017-11-22 DIAGNOSIS — E53.8 VITAMIN B 12 DEFICIENCY: ICD-10-CM

## 2017-11-22 PROCEDURE — 96372 THER/PROPH/DIAG INJ SC/IM: CPT | Performed by: PHYSICIAN ASSISTANT

## 2017-11-22 RX ORDER — CYANOCOBALAMIN 1000 UG/ML
1000 INJECTION, SOLUTION INTRAMUSCULAR; SUBCUTANEOUS
Status: DISCONTINUED | OUTPATIENT
Start: 2017-12-11 | End: 2018-09-27

## 2017-11-23 ENCOUNTER — APPOINTMENT (OUTPATIENT)
Dept: GENERAL RADIOLOGY | Facility: HOSPITAL | Age: 82
End: 2017-11-23

## 2017-11-23 ENCOUNTER — HOSPITAL ENCOUNTER (EMERGENCY)
Facility: HOSPITAL | Age: 82
Discharge: HOME | End: 2017-11-23
Attending: EMERGENCY MEDICINE | Admitting: EMERGENCY MEDICINE

## 2017-11-23 VITALS
HEIGHT: 72 IN | BODY MASS INDEX: 26.41 KG/M2 | SYSTOLIC BLOOD PRESSURE: 155 MMHG | HEART RATE: 64 BPM | OXYGEN SATURATION: 96 % | RESPIRATION RATE: 16 BRPM | WEIGHT: 195 LBS | TEMPERATURE: 98 F | DIASTOLIC BLOOD PRESSURE: 72 MMHG

## 2017-11-23 DIAGNOSIS — R07.9 LEFT SIDED CHEST PAIN: Primary | ICD-10-CM

## 2017-11-23 LAB
ALBUMIN SERPL-MCNC: 3.7 G/DL (ref 3.5–5.2)
ALBUMIN/GLOB SERPL: 1.3 G/DL
ALP SERPL-CCNC: 48 U/L (ref 39–117)
ALT SERPL W P-5'-P-CCNC: 15 U/L (ref 1–41)
ANION GAP SERPL CALCULATED.3IONS-SCNC: 10.7 MMOL/L
AST SERPL-CCNC: 23 U/L (ref 1–40)
BASOPHILS # BLD AUTO: 0 10*3/MM3 (ref 0–0.2)
BASOPHILS NFR BLD AUTO: 0 % (ref 0–1.5)
BILIRUB SERPL-MCNC: 0.7 MG/DL (ref 0.1–1.2)
BUN BLD-MCNC: 29 MG/DL (ref 8–23)
BUN/CREAT SERPL: 32.6 (ref 7–25)
CALCIUM SPEC-SCNC: 8.9 MG/DL (ref 8.6–10.5)
CHLORIDE SERPL-SCNC: 106 MMOL/L (ref 98–107)
CO2 SERPL-SCNC: 25.3 MMOL/L (ref 22–29)
CREAT BLD-MCNC: 0.89 MG/DL (ref 0.76–1.27)
DEPRECATED RDW RBC AUTO: 62.7 FL (ref 37–54)
EOSINOPHIL # BLD AUTO: 0.11 10*3/MM3 (ref 0–0.7)
EOSINOPHIL NFR BLD AUTO: 2.5 % (ref 0.3–6.2)
ERYTHROCYTE [DISTWIDTH] IN BLOOD BY AUTOMATED COUNT: 16.5 % (ref 11.5–14.5)
GFR SERPL CREATININE-BSD FRML MDRD: 80 ML/MIN/1.73
GLOBULIN UR ELPH-MCNC: 2.9 GM/DL
GLUCOSE BLD-MCNC: 103 MG/DL (ref 65–99)
HCT VFR BLD AUTO: 43.1 % (ref 40.4–52.2)
HGB BLD-MCNC: 13.4 G/DL (ref 13.7–17.6)
HOLD SPECIMEN: NORMAL
HOLD SPECIMEN: NORMAL
IMM GRANULOCYTES # BLD: 0 10*3/MM3 (ref 0–0.03)
IMM GRANULOCYTES NFR BLD: 0 % (ref 0–0.5)
LYMPHOCYTES # BLD AUTO: 1.35 10*3/MM3 (ref 0.9–4.8)
LYMPHOCYTES NFR BLD AUTO: 30.7 % (ref 19.6–45.3)
MCH RBC QN AUTO: 32.3 PG (ref 27–32.7)
MCHC RBC AUTO-ENTMCNC: 31.1 G/DL (ref 32.6–36.4)
MCV RBC AUTO: 103.9 FL (ref 79.8–96.2)
MONOCYTES # BLD AUTO: 0.42 10*3/MM3 (ref 0.2–1.2)
MONOCYTES NFR BLD AUTO: 9.5 % (ref 5–12)
NEUTROPHILS # BLD AUTO: 2.52 10*3/MM3 (ref 1.9–8.1)
NEUTROPHILS NFR BLD AUTO: 57.3 % (ref 42.7–76)
PLATELET # BLD AUTO: 118 10*3/MM3 (ref 140–500)
PMV BLD AUTO: 10.5 FL (ref 6–12)
POTASSIUM BLD-SCNC: 4.9 MMOL/L (ref 3.5–5.2)
PROT SERPL-MCNC: 6.6 G/DL (ref 6–8.5)
RBC # BLD AUTO: 4.15 10*6/MM3 (ref 4.6–6)
SODIUM BLD-SCNC: 142 MMOL/L (ref 136–145)
TROPONIN T SERPL-MCNC: 0.01 NG/ML (ref 0–0.03)
TROPONIN T SERPL-MCNC: <0.01 NG/ML (ref 0–0.03)
WBC NRBC COR # BLD: 4.4 10*3/MM3 (ref 4.5–10.7)
WHOLE BLOOD HOLD SPECIMEN: NORMAL
WHOLE BLOOD HOLD SPECIMEN: NORMAL

## 2017-11-23 PROCEDURE — 84484 ASSAY OF TROPONIN QUANT: CPT | Performed by: EMERGENCY MEDICINE

## 2017-11-23 PROCEDURE — 80053 COMPREHEN METABOLIC PANEL: CPT | Performed by: EMERGENCY MEDICINE

## 2017-11-23 PROCEDURE — 85025 COMPLETE CBC W/AUTO DIFF WBC: CPT | Performed by: EMERGENCY MEDICINE

## 2017-11-23 PROCEDURE — 36415 COLL VENOUS BLD VENIPUNCTURE: CPT

## 2017-11-23 PROCEDURE — 84484 ASSAY OF TROPONIN QUANT: CPT | Performed by: PHYSICIAN ASSISTANT

## 2017-11-23 PROCEDURE — 93005 ELECTROCARDIOGRAM TRACING: CPT

## 2017-11-23 PROCEDURE — 99285 EMERGENCY DEPT VISIT HI MDM: CPT

## 2017-11-23 PROCEDURE — 93010 ELECTROCARDIOGRAM REPORT: CPT | Performed by: INTERNAL MEDICINE

## 2017-11-23 PROCEDURE — 71020 HC CHEST PA AND LATERAL: CPT

## 2017-11-23 RX ORDER — SODIUM CHLORIDE 0.9 % (FLUSH) 0.9 %
10 SYRINGE (ML) INJECTION AS NEEDED
Status: DISCONTINUED | OUTPATIENT
Start: 2017-11-23 | End: 2017-11-24 | Stop reason: HOSPADM

## 2017-11-24 NOTE — DISCHARGE INSTRUCTIONS
Home, rest, home medicine as prescribed, follow up with PCP for recheck.  Return to care if pain worsens, or with further concerns.

## 2017-11-24 NOTE — ED PROVIDER NOTES
Pt presents complaining of chest pain onset around 1730 that lasted 2.5 hours. Pt received aspirin and nitroglycerin from EMS. Pt had a stent placed in May. His last stress test 3 months ago was negative. His cardiologist is Dr. Quezada. On exam, pt is alert and oriented x3. NAD. PERRL, most mucous membranes. Heart is at regular rate and rhythm. Lungs are clear to ascultation. Abdomen is soft and non-tender. No pedal edema nor calf tenderness. Normal neurological exam. Initial troponin was negative. Deepak Feliz ([facility]) has spoken with Dr. Quezada, who will see pt in f/u.    EKG  EKG time: 1927  Rhythm/Rate: NSR at 68  1st degree AV block  LVH  No Acute Ischemia  Non-Specific ST-T changes    Unchanged compared to prior on August 2017    Interpreted Contemporaneously by me.  Independently viewed by me    I supervised care provided by the midlevel provider.    We have discussed this patient's history, physical exam, and treatment plan.   I have reviewed the note and personally saw and examined the patient and agree with the plan of care.    Documentation assistance provided by miladis Curtis for Gm Rodriguez.  Information recorded by the scribe was done at my direction and has been verified and validated by me.            Ele Curtis  11/23/17 0846       Gm Rodriguez MD  11/23/17 4803

## 2017-11-24 NOTE — ED PROVIDER NOTES
"EMERGENCY DEPARTMENT ENCOUNTER    CHIEF COMPLAINT  Chief Complaint: Chest pain  History given by: pt/ family present at bedside   History limited by: N/A  Room Number: 36/36  PMD: RAFA Mcfarland      HPI:  Pt is a 89 y.o. male who presents via EMS complaining of constant L sided CP that began approximately 3 hours ago after pt used the restoom. Pt describes the pain as a dull ache and denies any radiation of pain. Pt denies diaphoresis, SO, dizziness, N/V, cough, or any other pertinent symptoms. Pt states that EMS gave pt 3 nitro tablets en route that provided significant relief. Pt denies any current CP on evaluation. Family who is present states that pt has had a previous similar episode; pt was reported to be evaluated in the ER a couple of months ago. Pt then had an extensive cardiac work-up [in August 2017] that showed no abnormal findings.     Duration:  3 hours   Onset: gradual   Timing: constant   Location: L side of chest   Radiation: None reported   Quality: \"dull ache\"  Intensity/Severity: moderate   Progression: resolved   Associated Symptoms: None reported   Aggravating Factors: None reported   Alleviating Factors: None reported   Previous Episodes: Family states that pt had a previous similar episode with an extensive cardiac work-up that showed no abnormal findings.   Treatment before arrival: EMS gave pt 3 tablets of nitroglycerin with significant relief .    PAST MEDICAL HISTORY  Active Ambulatory Problems     Diagnosis Date Noted   • Abnormal magnetic resonance imaging study 03/18/2016   • Arthritis 03/18/2016   • Osteoarthritis of cervical spine 03/18/2016   • Diplopia 03/18/2016   • Hearing loss 03/18/2016   • Neoplasm of uncertain behavior of skin 03/18/2016   • Prediabetes 03/18/2016   • Vitamin D deficiency 03/18/2016   • Chronic fatigue 10/27/2016   • History of supraventricular tachycardia 04/07/2017   • Essential hypertension 04/07/2017   • B12 deficiency 04/19/2017   • Abnormal cardiac " function test 05/08/2017   • Cardiomyopathy 05/08/2017   • Coronary artery disease involving native coronary artery of native heart without angina pectoris 05/31/2017   • History of coronary artery stent placement 05/31/2017   • History of renal calculi 05/31/2017   • Dyslipidemia 05/31/2017   • Macrocytosis without anemia 08/09/2017   • Precordial pain 08/16/2017   • Parkinson's disease 10/25/2017   • History of CVA (cerebrovascular accident) 10/25/2017     Resolved Ambulatory Problems     Diagnosis Date Noted   • Maxillary sinusitis 03/18/2016   • Dizziness 10/27/2016   • Acute frontal sinusitis 10/27/2016   • Precordial pain 04/07/2017   • SOB (shortness of breath) 04/07/2017     Past Medical History:   Diagnosis Date   • Abnormal ECG    • Abnormal MRI    • Acute frontal sinusitis    • Arthritis    • Chronic fatigue    • Coronary artery disease    • Dizziness    • Hearing aid worn    • Hearing loss    • Hyperlipidemia    • Hypertension    • Kidney stones    • Macrocytosis    • Neoplasm of skin    • Osteoporosis    • Prediabetes    • Vitamin D deficiency        PAST SURGICAL HISTORY  Past Surgical History:   Procedure Laterality Date   • CARDIAC CATHETERIZATION N/A 5/22/2017    Procedure: Left Heart Cath;  Surgeon: Maninder Quezada MD;  Location: [facility] CATH INVASIVE LOCATION;  Service:    • CARDIAC CATHETERIZATION N/A 5/23/2017    Procedure: Stent BMS coronary;  Surgeon: Maninder Quezada MD;  Location[facility];  Service:    • HERNIA REPAIR      x 2   • SD RT/LT HEART CATHETERS N/A 5/23/2017    Procedure: Percutaneous Coronary Intervention;  Surgeon: Maninder Quezada MD;  [facility];  Service: Cardiovascular       FAMILY HISTORY  Family History   Problem Relation Age of Onset   • Family history unknown: Yes       SOCIAL HISTORY  Social History     Social History   • Marital status:      Spouse name: N/A   • Number of children: N/A   • Years  of education: N/A     Occupational History   •  Retired     Social History Main Topics   • Smoking status: Never Smoker   • Smokeless tobacco: Never Used   • Alcohol use No   • Drug use: No   • Sexual activity: Defer     Other Topics Concern   • Not on file     Social History Narrative   • No narrative on file       ALLERGIES  Review of patient's allergies indicates no known allergies.    REVIEW OF SYSTEMS  Review of Systems   Constitutional: Negative.  Negative for chills, diaphoresis and fever.   HENT: Negative.  Negative for sore throat.    Eyes: Negative.    Respiratory: Negative.  Negative for cough and shortness of breath.    Cardiovascular: Positive for chest pain.   Gastrointestinal: Negative.  Negative for nausea and vomiting.   Genitourinary: Negative.  Negative for dysuria.   Musculoskeletal: Negative.  Negative for back pain.   Skin: Negative.  Negative for rash.   Neurological: Negative.  Negative for dizziness and headaches.       PHYSICAL EXAM  ED Triage Vitals   Temp Heart Rate Resp BP SpO2   11/23/17 1911 11/23/17 1911 11/23/17 1911 11/23/17 1911 11/23/17 1911   98 °F (36.7 °C) 89 16 158/78 96 %      Temp src Heart Rate Source Patient Position BP Location FiO2 (%)   11/23/17 1911 11/23/17 1958 -- -- --   Tympanic Monitor          Physical Exam   Constitutional: He is oriented to person, place, and time and well-developed, well-nourished, and in no distress.   HENT:   Head: Normocephalic and atraumatic.   Eyes: EOM are normal. Pupils are equal, round, and reactive to light.   Neck: Normal range of motion. Neck supple.   Cardiovascular: Normal rate, regular rhythm, normal heart sounds and intact distal pulses.    No murmur heard.  Pulmonary/Chest: Effort normal and breath sounds normal. No respiratory distress.   Abdominal: Soft. There is no tenderness. There is no rebound and no guarding.   Musculoskeletal: Normal range of motion. He exhibits no edema or tenderness (no calf tenderness ).    Neurological: He is alert and oriented to person, place, and time. He has normal sensation and normal strength.   Skin: Skin is warm and dry.   Psychiatric: Mood and affect normal.   Nursing note and vitals reviewed.      LAB RESULTS  Lab Results (last 24 hours)     Procedure Component Value Units Date/Time    CBC & Differential [[id number]] Collected:  11/23/17 1931    Specimen:  Blood Updated:  11/23/17 1944    Narrative:       The following orders were created for panel order CBC & Differential.  Procedure                               Abnormality         Status                     ---------                               -----------         ------                     CBC Auto Differential[[id number]]        Abnormal            Final result                 Please view results for these tests on the individual orders.    Comprehensive Metabolic Panel [[id number]]  (Abnormal) Collected:  11/23/17 1931    Specimen:  Blood Updated:  11/23/17 2006     Glucose 103 (H) mg/dL      BUN 29 (H) mg/dL      Creatinine 0.89 mg/dL      Sodium 142 mmol/L      Potassium 4.9 mmol/L      Chloride 106 mmol/L      CO2 25.3 mmol/L      Calcium 8.9 mg/dL      Total Protein 6.6 g/dL      Albumin 3.70 g/dL      ALT (SGPT) 15 U/L       Specimen hemolyzed.  Results may be affected.        AST (SGOT) 23 U/L       Specimen hemolyzed.  Results may be affected.        Alkaline Phosphatase 48 U/L      Total Bilirubin 0.7 mg/dL      eGFR Non African Amer 80 mL/min/1.73      Globulin 2.9 gm/dL      A/G Ratio 1.3 g/dL      BUN/Creatinine Ratio 32.6 (H)     Anion Gap 10.7 mmol/L     Narrative:       The MDRD GFR formula is only valid for adults with stable renal function between ages 18 and 70.    Troponin [[id number]]  (Normal) Collected:  11/23/17 1931    Specimen:  Blood Updated:  11/23/17 2006     Troponin T <0.010 ng/mL       Specimen hemolyzed.  Results may be affected.       Narrative:       Troponin T Reference Ranges:  Less than 0.03 ng/mL:     Negative for AMI  0.03 to 0.09 ng/mL:      Indeterminant for AMI  Greater than 0.09 ng/mL: Positive for AMI    CBC Auto Differential [[id number]]  (Abnormal) Collected:  11/23/17 1931    Specimen:  Blood Updated:  11/23/17 1944     WBC 4.40 (L) 10*3/mm3      RBC 4.15 (L) 10*6/mm3      Hemoglobin 13.4 (L) g/dL      Hematocrit 43.1 %      .9 (H) fL      MCH 32.3 pg      MCHC 31.1 (L) g/dL      RDW 16.5 (H) %      RDW-SD 62.7 (H) fl      MPV 10.5 fL      Platelets 118 (L) 10*3/mm3      Neutrophil % 57.3 %      Lymphocyte % 30.7 %      Monocyte % 9.5 %      Eosinophil % 2.5 %      Basophil % 0.0 %      Immature Grans % 0.0 %      Neutrophils, Absolute 2.52 10*3/mm3      Lymphocytes, Absolute 1.35 10*3/mm3      Monocytes, Absolute 0.42 10*3/mm3      Eosinophils, Absolute 0.11 10*3/mm3      Basophils, Absolute 0.00 10*3/mm3      Immature Grans, Absolute 0.00 10*3/mm3     Troponin [[id number]]  (Normal) Collected:  11/23/17 2228    Specimen:  Blood Updated:  11/23/17 2257     Troponin T 0.010 ng/mL     Narrative:       Troponin T Reference Ranges:  Less than 0.03 ng/mL:    Negative for AMI  0.03 to 0.09 ng/mL:      Indeterminant for AMI  Greater than 0.09 ng/mL: Positive for AMI          I ordered the above labs and reviewed the results    RADIOLOGY  XR Chest 2 View   Final Result   Cardiomegaly with pulmonary vascular congestion and patchy   bibasilar atelectasis       This report was finalized on 11/23/2017 8:29 PM by Flip Malcolm MD.               I ordered the above noted radiological studies. Interpreted by radiologist. Reviewed by me in PACS.       PROCEDURES  Procedures    PROGRESS AND CONSULTS  ED Course     1924  Triage ordered IVF, labs, and Chest XR for further evaluation.     2100  Discussed pt's case with Dr. Rodriguez who recommends we consult cardiology.     2108  Dr. Rodriguez evaluated pt and agrees with tx/ disposition plan.     2118  Discussed pt's case with Dr. Quezada [cardiology] who states  that if repeat troponin is negative, plan to discharge pt and have him f/u with cardiology next week in office.     2121  Ordered repeat troponin.     2125  Rechecked pt and discussed consult with cardiology. Plan to repeat troponin and if negative discharge pt. Recommend pt f/u with cardiology in office next week. Pt understands and agrees to plan. All questions addressed at this time.     2300  Repeat troponin is normal at <0.010. Plan to discharge pt.     2305  Rechecked pt and discussed repeat troponin was negative. PT states he feels good and is comfortable with being discharged. PT states he has not had any CP since in ER. Recommend pt f/u with cardiology next week. Pt understands and agrees to plan, all questions addressed at this time.     MEDICAL DECISION MAKING  Results were reviewed/discussed with the patient and they were also made aware of online access. Pt also made aware that some labs, such as cultures, will not be resulted during ER visit and follow up with PMD is necessary.     MDM  Number of Diagnoses or Management Options     Amount and/or Complexity of Data Reviewed  Clinical lab tests: reviewed and ordered (Troponin is normal )  Tests in the radiology section of CPT®: reviewed and ordered (Chest XR: Impression   Cardiomegaly with pulmonary vascular congestion and patchy bibasilar atelectasis    )  Discussion of test results with the performing providers: yes (Dr. Quezada (cardiology))  Decide to obtain previous medical records or to obtain history from someone other than the patient: yes  Review and summarize past medical records: yes (Extensive cardiac work-up on 8/16/17 showed no indication of a previous myocardial infarct and an EF of 34%. Pt had a cardiac stent placement in May 2017 as well. )  Independent visualization of images, tracings, or specimens: yes           DIAGNOSIS  Final diagnoses:   Left sided chest pain       DISPOSITION  DISCHARGE    Patient discharged in stable  condition.    Reviewed implications of results, diagnosis, meds, responsibility to follow up, warning signs and symptoms of possible worsening, potential complications and reasons to return to ER.    Patient/Family voiced understanding of above instructions.    Discussed plan for discharge, as there is no emergent indication for admission.  Pt/family is agreeable and understands need for follow up and repeat testing.  Pt is aware that discharge does not mean that nothing is wrong but it indicates no emergency is present that requires admission and they must continue care with follow-up as given below or physician of their choice.     FOLLOW-UP  Maninder Quezada MD  [address] [address]  [phone]    Schedule an appointment as soon as possible for a visit in 5 days           Medication List      Notice     No changes were made to your prescriptions during this visit.            Latest Documented Vital Signs:  As of 11:21 PM  BP- 155/72 HR- 64 Temp- 98 °F (36.7 °C) (Tympanic) O2 sat- 96%    --  Documentation assistance provided by miladis Woodall for Tray WELCH.  Information recorded by the scribe was done at my direction and has been verified and validated by me.           Mina Woodall  11/23/17 [id number]       RAFA Molina  11/23/17 [id number]

## 2017-11-29 ENCOUNTER — HOSPITAL ENCOUNTER (OUTPATIENT)
Facility: HOSPITAL | Age: 82
Setting detail: OBSERVATION
Discharge: HOME | End: 2017-11-30
Attending: INTERNAL MEDICINE | Admitting: INTERNAL MEDICINE

## 2017-11-29 ENCOUNTER — OFFICE VISIT (OUTPATIENT)
Dept: CARDIOLOGY | Facility: CLINIC | Age: 82
End: 2017-11-29

## 2017-11-29 ENCOUNTER — APPOINTMENT (OUTPATIENT)
Dept: CARDIOLOGY | Facility: HOSPITAL | Age: 82
End: 2017-11-29

## 2017-11-29 ENCOUNTER — APPOINTMENT (OUTPATIENT)
Dept: GENERAL RADIOLOGY | Facility: HOSPITAL | Age: 82
End: 2017-11-29

## 2017-11-29 VITALS — HEART RATE: 137 BPM | DIASTOLIC BLOOD PRESSURE: 80 MMHG | SYSTOLIC BLOOD PRESSURE: 126 MMHG

## 2017-11-29 DIAGNOSIS — I48.91 ATRIAL FIBRILLATION, UNSPECIFIED TYPE (HCC): ICD-10-CM

## 2017-11-29 DIAGNOSIS — I48.0 PAROXYSMAL ATRIAL FIBRILLATION (HCC): ICD-10-CM

## 2017-11-29 DIAGNOSIS — I48.91 ATRIAL FIBRILLATION, UNSPECIFIED TYPE (HCC): Primary | ICD-10-CM

## 2017-11-29 DIAGNOSIS — I10 ESSENTIAL HYPERTENSION: ICD-10-CM

## 2017-11-29 DIAGNOSIS — I48.3 TYPICAL ATRIAL FLUTTER (HCC): Primary | ICD-10-CM

## 2017-11-29 DIAGNOSIS — I42.0 DILATED CARDIOMYOPATHY (HCC): ICD-10-CM

## 2017-11-29 LAB
ALBUMIN SERPL-MCNC: 3.9 G/DL (ref 3.5–5.2)
ALBUMIN/GLOB SERPL: 1.4 G/DL
ALP SERPL-CCNC: 50 U/L (ref 39–117)
ALT SERPL W P-5'-P-CCNC: 18 U/L (ref 1–41)
ANION GAP SERPL CALCULATED.3IONS-SCNC: 11.6 MMOL/L
AST SERPL-CCNC: 19 U/L (ref 1–40)
BASOPHILS # BLD AUTO: 0.01 10*3/MM3 (ref 0–0.2)
BASOPHILS NFR BLD AUTO: 0.2 % (ref 0–1.5)
BH CV ECHO MEAS - AI DEC SLOPE: 327 CM/SEC^2
BH CV ECHO MEAS - AI MAX PG: 66.9 MMHG
BH CV ECHO MEAS - AI MAX VEL: 409 CM/SEC
BH CV ECHO MEAS - AI P1/2T: 366.3 MSEC
BH CV ECHO MEAS - AO MAX PG: 5 MMHG
BH CV ECHO MEAS - AO MEAN PG (FULL): 1 MMHG
BH CV ECHO MEAS - AO MEAN PG: 3 MMHG
BH CV ECHO MEAS - AO ROOT AREA (BSA CORRECTED): 1.9
BH CV ECHO MEAS - AO ROOT AREA: 11.9 CM^2
BH CV ECHO MEAS - AO ROOT DIAM: 3.9 CM
BH CV ECHO MEAS - AO V2 MAX: 113 CM/SEC
BH CV ECHO MEAS - AO V2 MEAN: 86.2 CM/SEC
BH CV ECHO MEAS - AO V2 VTI: 26.3 CM
BH CV ECHO MEAS - AVA(I,A): 2.9 CM^2
BH CV ECHO MEAS - AVA(I,D): 2.9 CM^2
BH CV ECHO MEAS - BSA(HAYCOCK): 2.1 M^2
BH CV ECHO MEAS - BSA: 2.1 M^2
BH CV ECHO MEAS - BZI_BMI: 25.2 KILOGRAMS/M^2
BH CV ECHO MEAS - BZI_METRIC_HEIGHT: 182.9 CM
BH CV ECHO MEAS - BZI_METRIC_WEIGHT: 84.4 KG
BH CV ECHO MEAS - CONTRAST EF (2CH): 34.1 ML/M^2
BH CV ECHO MEAS - CONTRAST EF 4CH: 31.8 ML/M^2
BH CV ECHO MEAS - EDV(CUBED): 300.8 ML
BH CV ECHO MEAS - EDV(MOD-SP2): 176 ML
BH CV ECHO MEAS - EDV(MOD-SP4): 192 ML
BH CV ECHO MEAS - EDV(TEICH): 231.4 ML
BH CV ECHO MEAS - EF(CUBED): 50.5 %
BH CV ECHO MEAS - EF(MOD-SP2): 34.1 %
BH CV ECHO MEAS - EF(MOD-SP4): 31.8 %
BH CV ECHO MEAS - EF(TEICH): 41.5 %
BH CV ECHO MEAS - ESV(CUBED): 148.9 ML
BH CV ECHO MEAS - ESV(MOD-SP2): 116 ML
BH CV ECHO MEAS - ESV(MOD-SP4): 131 ML
BH CV ECHO MEAS - ESV(TEICH): 135.3 ML
BH CV ECHO MEAS - FS: 20.9 %
BH CV ECHO MEAS - IVS/LVPW: 1
BH CV ECHO MEAS - IVSD: 1.2 CM
BH CV ECHO MEAS - LAT PEAK E' VEL: 5 CM/SEC
BH CV ECHO MEAS - LV DIASTOLIC VOL/BSA (35-75): 92.9 ML/M^2
BH CV ECHO MEAS - LV MASS(C)D: 377.3 GRAMS
BH CV ECHO MEAS - LV MASS(C)DI: 182.7 GRAMS/M^2
BH CV ECHO MEAS - LV MEAN PG: 2 MMHG
BH CV ECHO MEAS - LV SYSTOLIC VOL/BSA (12-30): 63.4 ML/M^2
BH CV ECHO MEAS - LV V1 MEAN: 65.2 CM/SEC
BH CV ECHO MEAS - LV V1 VTI: 19.9 CM
BH CV ECHO MEAS - LVIDD: 6.7 CM
BH CV ECHO MEAS - LVIDS: 5.3 CM
BH CV ECHO MEAS - LVLD AP2: 9.3 CM
BH CV ECHO MEAS - LVLD AP4: 9.7 CM
BH CV ECHO MEAS - LVLS AP2: 7.9 CM
BH CV ECHO MEAS - LVLS AP4: 8 CM
BH CV ECHO MEAS - LVOT AREA (M): 3.8 CM^2
BH CV ECHO MEAS - LVOT AREA: 3.8 CM^2
BH CV ECHO MEAS - LVOT DIAM: 2.2 CM
BH CV ECHO MEAS - LVPWD: 1.2 CM
BH CV ECHO MEAS - MED PEAK E' VEL: 4 CM/SEC
BH CV ECHO MEAS - MR MAX PG: 92.9 MMHG
BH CV ECHO MEAS - MR MAX VEL: 482 CM/SEC
BH CV ECHO MEAS - MV A DUR: 165 SEC
BH CV ECHO MEAS - MV A MAX VEL: 78 CM/SEC
BH CV ECHO MEAS - MV DEC SLOPE: 404 CM/SEC^2
BH CV ECHO MEAS - MV DEC TIME: 81 SEC
BH CV ECHO MEAS - MV E MAX VEL: 66.1 CM/SEC
BH CV ECHO MEAS - MV E/A: 0.85
BH CV ECHO MEAS - MV MEAN PG: 1 MMHG
BH CV ECHO MEAS - MV P1/2T MAX VEL: 70 CM/SEC
BH CV ECHO MEAS - MV P1/2T: 50.7 MSEC
BH CV ECHO MEAS - MV V2 MEAN: 57.1 CM/SEC
BH CV ECHO MEAS - MV V2 VTI: 20.1 CM
BH CV ECHO MEAS - MVA P1/2T LCG: 3.1 CM^2
BH CV ECHO MEAS - MVA(P1/2T): 4.3 CM^2
BH CV ECHO MEAS - MVA(VTI): 3.8 CM^2
BH CV ECHO MEAS - PA ACC SLOPE: 704 CM/SEC^2
BH CV ECHO MEAS - PA ACC TIME: 0.1 SEC
BH CV ECHO MEAS - PA MAX PG (FULL): -0.04 MMHG
BH CV ECHO MEAS - PA MAX PG: 1.8 MMHG
BH CV ECHO MEAS - PA PR(ACCEL): 36.3 MMHG
BH CV ECHO MEAS - PA V2 MAX: 66.9 CM/SEC
BH CV ECHO MEAS - PI END-D VEL: 132 CM/SEC
BH CV ECHO MEAS - PULM A REVS DUR: 143 SEC
BH CV ECHO MEAS - PULM A REVS VEL: 39.8 CM/SEC
BH CV ECHO MEAS - PULM DIAS VEL: 21.2 CM/SEC
BH CV ECHO MEAS - PULM S/D: 1.5
BH CV ECHO MEAS - PULM SYS VEL: 31.6 CM/SEC
BH CV ECHO MEAS - PVA(V,A): 3.5 CM^2
BH CV ECHO MEAS - PVA(V,D): 3.5 CM^2
BH CV ECHO MEAS - QP/QS: 0.57
BH CV ECHO MEAS - RAP SYSTOLE: 3 MMHG
BH CV ECHO MEAS - RV MAX PG: 1.8 MMHG
BH CV ECHO MEAS - RV MEAN PG: 1 MMHG
BH CV ECHO MEAS - RV V1 MAX: 67.7 CM/SEC
BH CV ECHO MEAS - RV V1 MEAN: 46.2 CM/SEC
BH CV ECHO MEAS - RV V1 VTI: 12.5 CM
BH CV ECHO MEAS - RVOT AREA: 3.5 CM^2
BH CV ECHO MEAS - RVOT DIAM: 2.1 CM
BH CV ECHO MEAS - RVSP: 6.3 MMHG
BH CV ECHO MEAS - SI(AO): 152.1 ML/M^2
BH CV ECHO MEAS - SI(CUBED): 73.5 ML/M^2
BH CV ECHO MEAS - SI(LVOT): 36.6 ML/M^2
BH CV ECHO MEAS - SI(MOD-SP2): 29 ML/M^2
BH CV ECHO MEAS - SI(MOD-SP4): 29.5 ML/M^2
BH CV ECHO MEAS - SI(TEICH): 46.5 ML/M^2
BH CV ECHO MEAS - SV(AO): 314.2 ML
BH CV ECHO MEAS - SV(CUBED): 151.9 ML
BH CV ECHO MEAS - SV(LVOT): 75.6 ML
BH CV ECHO MEAS - SV(MOD-SP2): 60 ML
BH CV ECHO MEAS - SV(MOD-SP4): 61 ML
BH CV ECHO MEAS - SV(RVOT): 43.3 ML
BH CV ECHO MEAS - SV(TEICH): 96 ML
BH CV ECHO MEAS - TAPSE (>1.6): 1.6 CM2
BH CV ECHO MEAS - TR MAX VEL: 91.1 CM/SEC
BH CV VAS BP RIGHT ARM: NORMAL MMHG
BH CV XLRA - RV BASE: 3.4 CM
BH CV XLRA - TDI S': 16 CM/SEC
BILIRUB SERPL-MCNC: 1.5 MG/DL (ref 0.1–1.2)
BUN BLD-MCNC: 25 MG/DL (ref 8–23)
BUN/CREAT SERPL: 28.1 (ref 7–25)
CALCIUM SPEC-SCNC: 8.8 MG/DL (ref 8.6–10.5)
CHLORIDE SERPL-SCNC: 106 MMOL/L (ref 98–107)
CO2 SERPL-SCNC: 24.4 MMOL/L (ref 22–29)
CREAT BLD-MCNC: 0.89 MG/DL (ref 0.76–1.27)
DEPRECATED RDW RBC AUTO: 60.5 FL (ref 37–54)
E/E' RATIO: 16
EOSINOPHIL # BLD AUTO: 0.09 10*3/MM3 (ref 0–0.7)
EOSINOPHIL NFR BLD AUTO: 2 % (ref 0.3–6.2)
ERYTHROCYTE [DISTWIDTH] IN BLOOD BY AUTOMATED COUNT: 16.3 % (ref 11.5–14.5)
GFR SERPL CREATININE-BSD FRML MDRD: 80 ML/MIN/1.73
GLOBULIN UR ELPH-MCNC: 2.8 GM/DL
GLUCOSE BLD-MCNC: 129 MG/DL (ref 65–99)
HCT VFR BLD AUTO: 43.6 % (ref 40.4–52.2)
HGB BLD-MCNC: 13.8 G/DL (ref 13.7–17.6)
IMM GRANULOCYTES # BLD: 0 10*3/MM3 (ref 0–0.03)
IMM GRANULOCYTES NFR BLD: 0 % (ref 0–0.5)
INR PPP: 1.09 (ref 0.9–1.1)
LEFT ATRIUM VOLUME INDEX: 67 ML/M2
LYMPHOCYTES # BLD AUTO: 1.01 10*3/MM3 (ref 0.9–4.8)
LYMPHOCYTES NFR BLD AUTO: 22.7 % (ref 19.6–45.3)
MAGNESIUM SERPL-MCNC: 2.2 MG/DL (ref 1.6–2.4)
MCH RBC QN AUTO: 32 PG (ref 27–32.7)
MCHC RBC AUTO-ENTMCNC: 31.7 G/DL (ref 32.6–36.4)
MCV RBC AUTO: 101.2 FL (ref 79.8–96.2)
MONOCYTES # BLD AUTO: 0.44 10*3/MM3 (ref 0.2–1.2)
MONOCYTES NFR BLD AUTO: 9.9 % (ref 5–12)
NEUTROPHILS # BLD AUTO: 2.9 10*3/MM3 (ref 1.9–8.1)
NEUTROPHILS NFR BLD AUTO: 65.2 % (ref 42.7–76)
NT-PROBNP SERPL-MCNC: 4781 PG/ML (ref 0–1800)
PLATELET # BLD AUTO: 131 10*3/MM3 (ref 140–500)
PMV BLD AUTO: 11 FL (ref 6–12)
POTASSIUM BLD-SCNC: 4.3 MMOL/L (ref 3.5–5.2)
PROT SERPL-MCNC: 6.7 G/DL (ref 6–8.5)
PROTHROMBIN TIME: 13.7 SECONDS (ref 11.7–14.2)
RBC # BLD AUTO: 4.31 10*6/MM3 (ref 4.6–6)
SODIUM BLD-SCNC: 142 MMOL/L (ref 136–145)
TROPONIN T SERPL-MCNC: <0.01 NG/ML (ref 0–0.03)
WBC NRBC COR # BLD: 4.45 10*3/MM3 (ref 4.5–10.7)

## 2017-11-29 PROCEDURE — 96374 THER/PROPH/DIAG INJ IV PUSH: CPT

## 2017-11-29 PROCEDURE — G0378 HOSPITAL OBSERVATION PER HR: HCPCS

## 2017-11-29 PROCEDURE — 93010 ELECTROCARDIOGRAM REPORT: CPT | Performed by: INTERNAL MEDICINE

## 2017-11-29 PROCEDURE — 85610 PROTHROMBIN TIME: CPT | Performed by: NURSE PRACTITIONER

## 2017-11-29 PROCEDURE — 96372 THER/PROPH/DIAG INJ SC/IM: CPT

## 2017-11-29 PROCEDURE — 93005 ELECTROCARDIOGRAM TRACING: CPT | Performed by: NURSE PRACTITIONER

## 2017-11-29 PROCEDURE — 96375 TX/PRO/DX INJ NEW DRUG ADDON: CPT

## 2017-11-29 PROCEDURE — 80053 COMPREHEN METABOLIC PANEL: CPT | Performed by: NURSE PRACTITIONER

## 2017-11-29 PROCEDURE — 83880 ASSAY OF NATRIURETIC PEPTIDE: CPT | Performed by: NURSE PRACTITIONER

## 2017-11-29 PROCEDURE — 71010 HC CHEST PA OR AP: CPT

## 2017-11-29 PROCEDURE — 99214 OFFICE O/P EST MOD 30 MIN: CPT | Performed by: INTERNAL MEDICINE

## 2017-11-29 PROCEDURE — 83735 ASSAY OF MAGNESIUM: CPT | Performed by: NURSE PRACTITIONER

## 2017-11-29 PROCEDURE — 93306 TTE W/DOPPLER COMPLETE: CPT | Performed by: INTERNAL MEDICINE

## 2017-11-29 PROCEDURE — 96376 TX/PRO/DX INJ SAME DRUG ADON: CPT

## 2017-11-29 PROCEDURE — 25010000002 ENOXAPARIN PER 10 MG: Performed by: NURSE PRACTITIONER

## 2017-11-29 PROCEDURE — 85025 COMPLETE CBC W/AUTO DIFF WBC: CPT | Performed by: NURSE PRACTITIONER

## 2017-11-29 PROCEDURE — 93005 ELECTROCARDIOGRAM TRACING: CPT | Performed by: INTERNAL MEDICINE

## 2017-11-29 PROCEDURE — G0379 DIRECT REFER HOSPITAL OBSERV: HCPCS

## 2017-11-29 PROCEDURE — 93000 ELECTROCARDIOGRAM COMPLETE: CPT | Performed by: INTERNAL MEDICINE

## 2017-11-29 PROCEDURE — 99213 OFFICE O/P EST LOW 20 MIN: CPT | Performed by: NURSE PRACTITIONER

## 2017-11-29 PROCEDURE — 84484 ASSAY OF TROPONIN QUANT: CPT | Performed by: NURSE PRACTITIONER

## 2017-11-29 PROCEDURE — 25010000002 FUROSEMIDE PER 20 MG: Performed by: HOSPITALIST

## 2017-11-29 PROCEDURE — 25010000002 PERFLUTREN (DEFINITY) 8.476 MG IN SODIUM CHLORIDE 0.9 % 10 ML INJECTION: Performed by: INTERNAL MEDICINE

## 2017-11-29 PROCEDURE — 93306 TTE W/DOPPLER COMPLETE: CPT

## 2017-11-29 RX ORDER — METOPROLOL SUCCINATE 50 MG/1
50 TABLET, EXTENDED RELEASE ORAL EVERY 12 HOURS SCHEDULED
Status: DISCONTINUED | OUTPATIENT
Start: 2017-11-29 | End: 2017-11-30 | Stop reason: HOSPADM

## 2017-11-29 RX ORDER — METOPROLOL SUCCINATE 50 MG/1
50 TABLET, EXTENDED RELEASE ORAL DAILY
Status: DISCONTINUED | OUTPATIENT
Start: 2017-11-29 | End: 2017-11-29

## 2017-11-29 RX ORDER — ATORVASTATIN CALCIUM 10 MG/1
10 TABLET, FILM COATED ORAL NIGHTLY
Status: DISCONTINUED | OUTPATIENT
Start: 2017-11-29 | End: 2017-11-30

## 2017-11-29 RX ORDER — CLOPIDOGREL BISULFATE 75 MG/1
75 TABLET ORAL DAILY
Status: DISCONTINUED | OUTPATIENT
Start: 2017-11-29 | End: 2017-11-30 | Stop reason: HOSPADM

## 2017-11-29 RX ORDER — ASPIRIN 325 MG
325 TABLET, DELAYED RELEASE (ENTERIC COATED) ORAL DAILY
Status: DISCONTINUED | OUTPATIENT
Start: 2017-11-29 | End: 2017-11-30 | Stop reason: HOSPADM

## 2017-11-29 RX ORDER — NITROGLYCERIN 0.4 MG/1
0.4 TABLET SUBLINGUAL
Status: DISCONTINUED | OUTPATIENT
Start: 2017-11-29 | End: 2017-11-30 | Stop reason: HOSPADM

## 2017-11-29 RX ORDER — MELATONIN
1000 DAILY
Status: DISCONTINUED | OUTPATIENT
Start: 2017-11-29 | End: 2017-11-30 | Stop reason: HOSPADM

## 2017-11-29 RX ORDER — ACETAMINOPHEN 325 MG/1
650 TABLET ORAL EVERY 4 HOURS PRN
Status: DISCONTINUED | OUTPATIENT
Start: 2017-11-29 | End: 2017-11-30 | Stop reason: HOSPADM

## 2017-11-29 RX ORDER — LOSARTAN POTASSIUM 50 MG/1
50 TABLET ORAL
Status: DISCONTINUED | OUTPATIENT
Start: 2017-11-29 | End: 2017-11-30 | Stop reason: HOSPADM

## 2017-11-29 RX ORDER — FUROSEMIDE 10 MG/ML
40 INJECTION INTRAMUSCULAR; INTRAVENOUS ONCE
Status: COMPLETED | OUTPATIENT
Start: 2017-11-29 | End: 2017-11-29

## 2017-11-29 RX ORDER — ACETAMINOPHEN 325 MG/1
650 TABLET ORAL EVERY 4 HOURS PRN
Status: CANCELLED | OUTPATIENT
Start: 2017-11-29

## 2017-11-29 RX ORDER — PANTOPRAZOLE SODIUM 40 MG/1
40 TABLET, DELAYED RELEASE ORAL
Status: DISCONTINUED | OUTPATIENT
Start: 2017-11-30 | End: 2017-11-30 | Stop reason: HOSPADM

## 2017-11-29 RX ADMIN — METOROPROLOL TARTRATE 5 MG: 5 INJECTION, SOLUTION INTRAVENOUS at 16:03

## 2017-11-29 RX ADMIN — METOROPROLOL TARTRATE 5 MG: 5 INJECTION, SOLUTION INTRAVENOUS at 15:56

## 2017-11-29 RX ADMIN — FUROSEMIDE 40 MG: 10 INJECTION, SOLUTION INTRAMUSCULAR; INTRAVENOUS at 17:14

## 2017-11-29 RX ADMIN — PERFLUTREN 4 ML: 6.52 INJECTION, SUSPENSION INTRAVENOUS at 15:02

## 2017-11-29 RX ADMIN — ENOXAPARIN SODIUM 40 MG: 40 INJECTION SUBCUTANEOUS at 13:10

## 2017-11-29 RX ADMIN — ATORVASTATIN CALCIUM 10 MG: 10 TABLET, FILM COATED ORAL at 21:42

## 2017-11-29 RX ADMIN — METOPROLOL SUCCINATE 50 MG: 50 TABLET, FILM COATED, EXTENDED RELEASE ORAL at 21:42

## 2017-11-29 RX ADMIN — METOROPROLOL TARTRATE 5 MG: 5 INJECTION, SOLUTION INTRAVENOUS at 15:51

## 2017-11-29 NOTE — PROGRESS NOTES
Subjective:       Dinesh Churchill is a 89 y.o. male who here for follow up    CC  Follow-up after the recent emergency room visit for the chest pains and shortness of breath  HPI  89-year-old male with known history of benign essential arterial hypertension, cardiomyopathy as well as atrial fibrillation, had an angioplasty and stent approximately a few months ago, was recently seen in the emergency room because of the chest pains and workup was negative here for the follow-up has been complaining of palpitations intermittent mild to moderate in intensity with shortness of breath     Problem List Items Addressed This Visit        Cardiovascular and Mediastinum    Essential hypertension    Cardiomyopathy    AF (atrial fibrillation)      Other Visit Diagnoses     Typical atrial flutter    -  Primary    Relevant Orders    ECG 12 Lead        .    The following portions of the patient's history were reviewed and updated as appropriate: allergies, current medications, past family history, past medical history, past social history, past surgical history and problem list.    Past Medical History:   Diagnosis Date   • Abnormal ECG    • Abnormal MRI    • Acute frontal sinusitis    • Arthritis    • Chronic fatigue    • Coronary artery disease    • Dizziness    • Hearing aid worn     left   • Hearing loss    • Hyperlipidemia    • Hypertension    • Kidney stones    • Macrocytosis    • Neoplasm of skin    • Osteoporosis    • Prediabetes    • Vitamin D deficiency     reports that he has never smoked. He has never used smokeless tobacco. He reports that he does not drink alcohol or use illicit drugs.  Family History   Problem Relation Age of Onset   • Family history unknown: Yes       Review of Systems  Constitutional: No wt loss, fever, fatigue  Gastrointestinal: No nausea, abdominal pain  Behavioral/Psych: No insomnia or anxiety   Cardiovascular Palpitations and shortness of breath along with chest pain  Objective:       Physical  Exam             Physical Exam  /80  Pulse (!) 137    General appearance: NAD, conversant   Eyes: anicteric sclerae, moist conjunctivae; no lid-lag; PERRLA   HENT: Atraumatic; oropharynx clear with moist mucous membranes and no mucosal ulcerations;  normal hard and soft palate   Neck: Trachea midline; FROM, supple, no thyromegaly or lymphadenopathy   Lungs: CTA, with normal respiratory effort and no intercostal retractions   CV: S1-S2 regular, no murmurs, no rub, no gallop   Abdomen: Soft, non-tender; no masses or HSM   Extremities: No peripheral edema or extremity lymphadenopathy  Skin: Normal temperature, turgor and texture; no rash, ulcers or subcutaneous nodules   Psych: Appropriate affect, alert and oriented to person, place and time           Cardiographics  @  ECG 12 Lead  Date/Time: 11/29/2017 10:54 AM  Performed by: ASHISH COPELAND  Authorized by: ASHISH COPELAND   Comparison: compared with previous ECG   Comparison to previous ECG: JN TACH NEW COMPARE TO OLD EKG  Clinical impression: abnormal ECG            Echocardiogram:        Current Outpatient Prescriptions:   •  acetaminophen (TYLENOL) 500 MG tablet, 1-2 TABLETS BY ONCE TO TWICE DAILY AS NEEDED FOR PAIN, Disp: 60 tablet, Rfl: 0  •  aspirin  MG tablet, Take 1 tablet by mouth Daily., Disp: , Rfl: 0  •  atorvastatin (LIPITOR) 10 MG tablet, Take 1 tablet by mouth Every Night., Disp: 30 tablet, Rfl: 0  •  Cholecalciferol (VITAMIN D-3) 1000 UNITS capsule, Take 1,000 Units by mouth daily., Disp: , Rfl:   •  clopidogrel (PLAVIX) 75 MG tablet, Take 1 tablet by mouth Daily., Disp: 30 tablet, Rfl: 11  •  losartan (COZAAR) 50 MG tablet, TAKE 1 TABLET BY MOUTH DAILY, Disp: 90 tablet, Rfl: 1  •  metoprolol succinate XL (TOPROL-XL) 50 MG 24 hr tablet, Take 50 mg by mouth Daily., Disp: , Rfl:   •  nitroglycerin (NITROSTAT) 0.4 MG SL tablet, Place 1 tablet under the tongue Every 5 (Five) Minutes As Needed for Chest Pain. Take no more than 3  doses in 15 minutes., Disp: 25 tablet, Rfl: 0    Current Facility-Administered Medications:   •  [START ON 12/11/2017] cyanocobalamin injection 1,000 mcg, 1,000 mcg, Intramuscular, Q28 Days, RAFA Mcfarland   Assessment:        Patient Active Problem List   Diagnosis   • Abnormal magnetic resonance imaging study   • Arthritis   • Osteoarthritis of cervical spine   • Diplopia   • Hearing loss   • Neoplasm of uncertain behavior of skin   • Prediabetes   • Vitamin D deficiency   • Chronic fatigue   • History of supraventricular tachycardia   • Essential hypertension   • B12 deficiency   • Abnormal cardiac function test   • Cardiomyopathy   • Coronary artery disease involving native coronary artery of native heart without angina pectoris   • History of coronary artery stent placement   • History of renal calculi   • Dyslipidemia   • Macrocytosis without anemia   • Precordial pain   • Parkinson's disease   • History of CVA (cerebrovascular accident)               Plan:            ICD-10-CM ICD-9-CM   1. Typical atrial flutter I48.3 427.32   2. Paroxysmal atrial fibrillation I48.0 427.31   3. Essential hypertension I10 401.9   4. Dilated cardiomyopathy I42.0 425.4     1. Typical atrial flutter  As the patient's rate is uncontrolled patient will be admitted to the hospital  - ECG 12 Lead    2. Paroxysmal atrial fibrillation      3. Essential hypertension  Blood pressure controlled    4. Dilated cardiomyopathy  Compensated    5.  Chest pain     Recently seen in emergency room with a negative workup    6.  Coronary artery disease    No angina pectoralis    Possible flutter with rate of 142    Admit to hosp    Will have the repeat EKGs as well as cardiac enzymes, check the echocardiogram of the EP consultation with further workup medical conditions as needed  COUNSELING:    Dinesh Forrest was given to patient for the following topics: diagnostic results, risk factor reductions, impressions, risks and benefits of  treatment options and importance of treatment compliance .       SMOKING COUNSELING:    Counseling given: Not Answered      EMR Dragon/Transcription disclaimer:   Much of this encounter note is an electronic transcription/translation of spoken language to printed text. The electronic translation of spoken language may permit erroneous, or at times, nonsensical words or phrases to be inadvertently transcribed; Although I have reviewed the note for such errors, some may still exist.

## 2017-11-30 ENCOUNTER — APPOINTMENT (OUTPATIENT)
Dept: NUCLEAR MEDICINE | Facility: HOSPITAL | Age: 82
End: 2017-11-30

## 2017-11-30 VITALS
HEIGHT: 72 IN | SYSTOLIC BLOOD PRESSURE: 175 MMHG | OXYGEN SATURATION: 97 % | TEMPERATURE: 98.1 F | RESPIRATION RATE: 18 BRPM | DIASTOLIC BLOOD PRESSURE: 90 MMHG | WEIGHT: 184.1 LBS | HEART RATE: 73 BPM | BODY MASS INDEX: 24.94 KG/M2

## 2017-11-30 LAB
ALBUMIN SERPL-MCNC: 3.7 G/DL (ref 3.5–5.2)
ALBUMIN/GLOB SERPL: 1.4 G/DL
ALP SERPL-CCNC: 45 U/L (ref 39–117)
ALT SERPL W P-5'-P-CCNC: 13 U/L (ref 1–41)
ANION GAP SERPL CALCULATED.3IONS-SCNC: 8.4 MMOL/L
AST SERPL-CCNC: 13 U/L (ref 1–40)
BASOPHILS # BLD AUTO: 0.01 10*3/MM3 (ref 0–0.2)
BASOPHILS NFR BLD AUTO: 0.3 % (ref 0–1.5)
BH CV STRESS COMMENTS STAGE 1: NORMAL
BH CV STRESS DOSE REGADENOSON STAGE 1: 0.4
BH CV STRESS DURATION MIN STAGE 1: 0
BH CV STRESS DURATION SEC STAGE 1: 15
BH CV STRESS PROTOCOL 1: NORMAL
BH CV STRESS RECOVERY BP: NORMAL MMHG
BH CV STRESS RECOVERY HR: 88 BPM
BH CV STRESS STAGE 1: 1
BILIRUB SERPL-MCNC: 1.6 MG/DL (ref 0.1–1.2)
BUN BLD-MCNC: 29 MG/DL (ref 8–23)
BUN/CREAT SERPL: 32.2 (ref 7–25)
CALCIUM SPEC-SCNC: 8.8 MG/DL (ref 8.6–10.5)
CHLORIDE SERPL-SCNC: 106 MMOL/L (ref 98–107)
CHOLEST SERPL-MCNC: 187 MG/DL (ref 0–200)
CO2 SERPL-SCNC: 29.6 MMOL/L (ref 22–29)
CREAT BLD-MCNC: 0.9 MG/DL (ref 0.76–1.27)
DEPRECATED RDW RBC AUTO: 60.5 FL (ref 37–54)
EOSINOPHIL # BLD AUTO: 0.15 10*3/MM3 (ref 0–0.7)
EOSINOPHIL NFR BLD AUTO: 4.1 % (ref 0.3–6.2)
ERYTHROCYTE [DISTWIDTH] IN BLOOD BY AUTOMATED COUNT: 16.1 % (ref 11.5–14.5)
GFR SERPL CREATININE-BSD FRML MDRD: 79 ML/MIN/1.73
GLOBULIN UR ELPH-MCNC: 2.6 GM/DL
GLUCOSE BLD-MCNC: 99 MG/DL (ref 65–99)
HBA1C MFR BLD: 5.13 % (ref 4.8–5.6)
HCT VFR BLD AUTO: 41.2 % (ref 40.4–52.2)
HDLC SERPL-MCNC: 32 MG/DL (ref 40–60)
HGB BLD-MCNC: 12.8 G/DL (ref 13.7–17.6)
IMM GRANULOCYTES # BLD: 0.02 10*3/MM3 (ref 0–0.03)
IMM GRANULOCYTES NFR BLD: 0.5 % (ref 0–0.5)
LDLC SERPL CALC-MCNC: 131 MG/DL (ref 0–100)
LDLC/HDLC SERPL: 4.09 {RATIO}
LV EF NUC BP: 46 %
LYMPHOCYTES # BLD AUTO: 1.08 10*3/MM3 (ref 0.9–4.8)
LYMPHOCYTES NFR BLD AUTO: 29.2 % (ref 19.6–45.3)
MAXIMAL PREDICTED HEART RATE: 131 BPM
MCH RBC QN AUTO: 32 PG (ref 27–32.7)
MCHC RBC AUTO-ENTMCNC: 31.1 G/DL (ref 32.6–36.4)
MCV RBC AUTO: 103 FL (ref 79.8–96.2)
MONOCYTES # BLD AUTO: 0.37 10*3/MM3 (ref 0.2–1.2)
MONOCYTES NFR BLD AUTO: 10 % (ref 5–12)
NEUTROPHILS # BLD AUTO: 2.07 10*3/MM3 (ref 1.9–8.1)
NEUTROPHILS NFR BLD AUTO: 55.9 % (ref 42.7–76)
NT-PROBNP SERPL-MCNC: 4114 PG/ML (ref 0–1800)
PERCENT MAX PREDICTED HR: 72.52 %
PLATELET # BLD AUTO: 115 10*3/MM3 (ref 140–500)
PMV BLD AUTO: 10.8 FL (ref 6–12)
POTASSIUM BLD-SCNC: 3.7 MMOL/L (ref 3.5–5.2)
PROT SERPL-MCNC: 6.3 G/DL (ref 6–8.5)
RBC # BLD AUTO: 4 10*6/MM3 (ref 4.6–6)
SODIUM BLD-SCNC: 144 MMOL/L (ref 136–145)
STRESS BASELINE BP: NORMAL MMHG
STRESS BASELINE HR: 65 BPM
STRESS PERCENT HR: 85 %
STRESS POST PEAK BP: NORMAL MMHG
STRESS POST PEAK HR: 95 BPM
STRESS TARGET HR: 111 BPM
TRIGL SERPL-MCNC: 121 MG/DL (ref 0–150)
TROPONIN T SERPL-MCNC: 0.01 NG/ML (ref 0–0.03)
TROPONIN T SERPL-MCNC: 0.01 NG/ML (ref 0–0.03)
TSH SERPL DL<=0.05 MIU/L-ACNC: 3.04 MIU/ML (ref 0.27–4.2)
VLDLC SERPL-MCNC: 24.2 MG/DL (ref 5–40)
WBC NRBC COR # BLD: 3.7 10*3/MM3 (ref 4.5–10.7)

## 2017-11-30 PROCEDURE — 93010 ELECTROCARDIOGRAM REPORT: CPT | Performed by: INTERNAL MEDICINE

## 2017-11-30 PROCEDURE — G0378 HOSPITAL OBSERVATION PER HR: HCPCS

## 2017-11-30 PROCEDURE — 80053 COMPREHEN METABOLIC PANEL: CPT | Performed by: HOSPITALIST

## 2017-11-30 PROCEDURE — A9500 TC99M SESTAMIBI: HCPCS | Performed by: INTERNAL MEDICINE

## 2017-11-30 PROCEDURE — 93017 CV STRESS TEST TRACING ONLY: CPT

## 2017-11-30 PROCEDURE — 80061 LIPID PANEL: CPT | Performed by: NURSE PRACTITIONER

## 2017-11-30 PROCEDURE — 84484 ASSAY OF TROPONIN QUANT: CPT | Performed by: NURSE PRACTITIONER

## 2017-11-30 PROCEDURE — 25010000002 ENOXAPARIN PER 10 MG: Performed by: NURSE PRACTITIONER

## 2017-11-30 PROCEDURE — 84443 ASSAY THYROID STIM HORMONE: CPT | Performed by: HOSPITALIST

## 2017-11-30 PROCEDURE — 93018 CV STRESS TEST I&R ONLY: CPT | Performed by: INTERNAL MEDICINE

## 2017-11-30 PROCEDURE — 99225 PR SBSQ OBSERVATION CARE/DAY 25 MINUTES: CPT | Performed by: NURSE PRACTITIONER

## 2017-11-30 PROCEDURE — 0 TECHNETIUM SESTAMIBI: Performed by: INTERNAL MEDICINE

## 2017-11-30 PROCEDURE — 96372 THER/PROPH/DIAG INJ SC/IM: CPT

## 2017-11-30 PROCEDURE — 85025 COMPLETE CBC W/AUTO DIFF WBC: CPT | Performed by: HOSPITALIST

## 2017-11-30 PROCEDURE — 99217 PR OBSERVATION CARE DISCHARGE MANAGEMENT: CPT | Performed by: INTERNAL MEDICINE

## 2017-11-30 PROCEDURE — 83036 HEMOGLOBIN GLYCOSYLATED A1C: CPT | Performed by: HOSPITALIST

## 2017-11-30 PROCEDURE — 25010000002 REGADENOSON 0.4 MG/5ML SOLUTION: Performed by: INTERNAL MEDICINE

## 2017-11-30 PROCEDURE — 78452 HT MUSCLE IMAGE SPECT MULT: CPT

## 2017-11-30 PROCEDURE — 78452 HT MUSCLE IMAGE SPECT MULT: CPT | Performed by: INTERNAL MEDICINE

## 2017-11-30 PROCEDURE — 93005 ELECTROCARDIOGRAM TRACING: CPT | Performed by: NURSE PRACTITIONER

## 2017-11-30 PROCEDURE — 83880 ASSAY OF NATRIURETIC PEPTIDE: CPT | Performed by: HOSPITALIST

## 2017-11-30 RX ORDER — ATORVASTATIN CALCIUM 20 MG/1
40 TABLET, FILM COATED ORAL NIGHTLY
Status: DISCONTINUED | OUTPATIENT
Start: 2017-11-30 | End: 2017-11-30 | Stop reason: HOSPADM

## 2017-11-30 RX ORDER — ATORVASTATIN CALCIUM 20 MG/1
20 TABLET, FILM COATED ORAL NIGHTLY
Qty: 30 TABLET | Refills: 1 | Status: SHIPPED | OUTPATIENT
Start: 2017-11-30 | End: 2018-01-30 | Stop reason: SDUPTHER

## 2017-11-30 RX ORDER — METOPROLOL SUCCINATE 50 MG/1
50 TABLET, EXTENDED RELEASE ORAL 2 TIMES DAILY
Qty: 60 TABLET | Refills: 2 | Status: SHIPPED | OUTPATIENT
Start: 2017-11-30 | End: 2018-04-04 | Stop reason: SDUPTHER

## 2017-11-30 RX ORDER — ATORVASTATIN CALCIUM 20 MG/1
20 TABLET, FILM COATED ORAL NIGHTLY
Status: DISCONTINUED | OUTPATIENT
Start: 2017-11-30 | End: 2017-11-30

## 2017-11-30 RX ORDER — ISOSORBIDE MONONITRATE 30 MG/1
30 TABLET, EXTENDED RELEASE ORAL DAILY
Qty: 30 TABLET | Refills: 1 | Status: SHIPPED | OUTPATIENT
Start: 2017-11-30 | End: 2018-01-24 | Stop reason: SDUPTHER

## 2017-11-30 RX ADMIN — ENOXAPARIN SODIUM 40 MG: 40 INJECTION SUBCUTANEOUS at 14:07

## 2017-11-30 RX ADMIN — TECHNETIUM TC-99M SESTAMIBI 1 DOSE: 1 INJECTION INTRAVENOUS at 09:45

## 2017-11-30 RX ADMIN — METOPROLOL SUCCINATE 50 MG: 50 TABLET, FILM COATED, EXTENDED RELEASE ORAL at 14:07

## 2017-11-30 RX ADMIN — TECHNETIUM TC-99M SESTAMIBI 1 DOSE: 1 INJECTION INTRAVENOUS at 11:25

## 2017-11-30 RX ADMIN — LOSARTAN POTASSIUM 50 MG: 50 TABLET, FILM COATED ORAL at 09:20

## 2017-11-30 RX ADMIN — PANTOPRAZOLE SODIUM 40 MG: 40 TABLET, DELAYED RELEASE ORAL at 06:05

## 2017-11-30 RX ADMIN — CLOPIDOGREL 75 MG: 75 TABLET, FILM COATED ORAL at 09:20

## 2017-11-30 RX ADMIN — VITAMIN D, TAB 1000IU (100/BT) 1000 UNITS: 25 TAB at 09:20

## 2017-11-30 RX ADMIN — ASPIRIN 325 MG: 325 TABLET, DELAYED RELEASE ORAL at 09:20

## 2017-11-30 RX ADMIN — REGADENOSON 0.4 MG: 0.08 INJECTION, SOLUTION INTRAVENOUS at 11:25

## 2017-12-01 NOTE — PATIENT INSTRUCTIONS
Patient: Tino Frias Date of Service: 2017     : 1974 Attending: Mary Ritter MD   43 year old male      HYPERBARIC OXYGEN THERAPY PROCEDURE NOTE    Hyperbaric Treatment Number: 6 - This is the 1st of 2 planned HBO treatments for today.   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 Omega Estrada   Consult/Update Date: 17        PROCEDURE:  Pre-Hyperbaric Oxygen Therapy Treatment timeout was completed.    Treatment Profile: 45 fsw X 90 min  Treatment Start Time: 806  Treatment End Time: 1018   Descent Time (min): 22 min  Air break total time: 10 min  Ascent time (min): 10    Hyperbaric oxygen therapy was monitored during entire course of treatment by the supervising physician.    HISTORY:  This is a 43 year old obese diabetic male s/p 5th ray amputation left foot by Dr. Lin on 2016 for osteomyelitis.  He completed 16 HBO2 treatments on 16 and 6 weeks of IV antibiotics per Dr. Wright. He was seen in the [facility] clinic on 17.  The plan was to optimize his offloading and glycemic control; no surgical intervention planned. He was fitted for new shoes and inserts on 17 at [facility].     Noted on 17 in the wound care clinic to have significant deterioration of his left medial 1st MTP wound, with exposed joint capsule and concern for osteomyelitis.    He underwent I&D and TMA of left foot 17 by Dr. Bowers.    Risk Assessment at Consult:    Risk assessment was performed at time of consult/comprehensive evaluation on 17. Specifically noted risks include:    Diabetic on glucose lowering medications     Interval History:     The patient reports no complaints.    Pertinent Reviewed:    Allergies, Medication, Surgical History,  88 YEAR OLD MALE WHO PRESENTS TODAY IN FOLLOW UP OF ER FOR SVT AND FROM LAST VISIT HERE WITH FREQUENT PVC. HE IS IN SINUS RHYTHM TODAY WITH CONTROLLED RATE. WE WILL CONTINUE MEDICATION TODAY AND HAVE HIM FOLLOW UP WITH CARDIOLOGY IN MAY. TO ER ASAP IF DEVELOPS SHORTNESS OF BREATH, WEAKNESS, PALPITATIONS, CHEST PAIN, DIZZINESS OR OTHER SYMPTOMS. PATIENT HAS NOT HAD FASTING LABS FOR SOME TIME. I WILL CHECK LABS TODAY- MG, THYROID AND B12 ALREADY CHECKED. CALL IF NO RESULTS IN 1 WEEK. PATIENT WILL NEED COLOGARD, AS HE HAD BLOOD IN STOOL LAST YEAR AND HAS NOT HAD COLONOSCOPY IN 4 1/2 YEARS. WE WILL ORDER COLOGARD AFTER HE COMPLETES CARDIOLOGY WORKUP AND FOLLOW UP.    Medical History and Social History    PHYSICAL EXAM:  Daily Risk Assessment:  Finger Stick Glucose:        Recent Labs  Lab 12/01/17  0730   GLUCOSE BEDSIDE 202*        Vital signs:    Temp: 97.8 °F (36.6 °C)  Temp src: Temporal Artery  Pulse: 104  Heart Rate Source: Monitor  BP: 122/80  MAP (mmHg): 101  Resp: 18    General appearance:  alert, cooperative, no distress  Head:   normocephalic without obvious abnormality  Eye:  conjunctivae/sclerae normal. No erythema, edema or exudate.  Ears:   left ear TEED  scale 0, right ear TEED  scale 0 and able to autoinflate ears without difficulty.  Mouth:   mucous membranes moist  Lungs:  clear to auscultation bilaterally  Heart:  regular rate and rhythm and no murmur    POST TREATMENT:    Patient denies complaints    Patient tolerated the hyperbaric treatment without problems or side effects     Treatment extras: None    Post-treatment Exam:  No change in appearance or examination from pre-treatment exam.      MEDICAL DECISION MAKING:  Based on recent clinical evaluation, the patient is stabilizing.    Indications of this are:  No evidence of advancing ischemia or tissue necrosis.     Therefore, we will continue treatment as the patient will likely benefit from further hyperbaric oxygen therapy.     PLAN:  This is hyperbaric Treatment Number: 6 of anticipated 20-30 treatments.     The patient underwent I&D and TMA of left foot 11/25 by Dr. Bowers. Noted to have rubor and poor capillary refill post-op. HBO for DFU and compromised surgical flaps.  Receiving HBO b.i.d. x two days, will assess for frequency of HBO on Friday 12/1.     Arterial duplex on 11/13/17 showed:  1. Minimal plaque throughout the infrainguinal vasculature bilaterally  2. No duplex evidence for stenosis, occlusion or collateralized flow    See related/concomitant wound care progress note for further information on wound appearance and wound treatment plan.       Plan discussed.     Patient stable at time  of discharge.  All questions were answered prior to discharge.     Omega Estrada MD [facility]:  [phone]   Pager [phone] [facility]:  [phone]

## 2017-12-08 ENCOUNTER — OFFICE VISIT (OUTPATIENT)
Dept: FAMILY MEDICINE CLINIC | Facility: CLINIC | Age: 82
End: 2017-12-08

## 2017-12-08 VITALS
TEMPERATURE: 97.7 F | BODY MASS INDEX: 25.73 KG/M2 | WEIGHT: 190 LBS | DIASTOLIC BLOOD PRESSURE: 70 MMHG | HEIGHT: 72 IN | SYSTOLIC BLOOD PRESSURE: 130 MMHG | HEART RATE: 77 BPM

## 2017-12-08 DIAGNOSIS — I42.0 DILATED CARDIOMYOPATHY (HCC): ICD-10-CM

## 2017-12-08 DIAGNOSIS — R06.02 SHORTNESS OF BREATH: ICD-10-CM

## 2017-12-08 DIAGNOSIS — R00.2 PALPITATIONS: ICD-10-CM

## 2017-12-08 DIAGNOSIS — R71.0 DECREASED HEMOGLOBIN: ICD-10-CM

## 2017-12-08 DIAGNOSIS — I47.1 NARROW COMPLEX TACHYCARDIA (HCC): Primary | ICD-10-CM

## 2017-12-08 LAB
BASOPHILS # BLD AUTO: 0.01 10*3/MM3 (ref 0–0.2)
BASOPHILS NFR BLD AUTO: 0.2 % (ref 0–1.5)
EOSINOPHIL # BLD AUTO: 0.09 10*3/MM3 (ref 0–0.7)
EOSINOPHIL NFR BLD AUTO: 1.5 % (ref 0.3–6.2)
ERYTHROCYTE [DISTWIDTH] IN BLOOD BY AUTOMATED COUNT: 16.5 % (ref 11.5–14.5)
FERRITIN SERPL-MCNC: 335 NG/ML (ref 30–400)
HCT VFR BLD AUTO: 42 % (ref 40.4–52.2)
HGB BLD-MCNC: 13.1 G/DL (ref 13.7–17.6)
IMM GRANULOCYTES # BLD: 0 10*3/MM3 (ref 0–0.03)
IMM GRANULOCYTES NFR BLD: 0 % (ref 0–0.5)
IRON SATN MFR SERPL: 24 % (ref 20–50)
IRON SERPL-MCNC: 85 MCG/DL (ref 59–158)
LYMPHOCYTES # BLD AUTO: 1.21 10*3/MM3 (ref 0.9–4.8)
LYMPHOCYTES NFR BLD AUTO: 20.8 % (ref 19.6–45.3)
MCH RBC QN AUTO: 32.7 PG (ref 27–32.7)
MCHC RBC AUTO-ENTMCNC: 31.2 G/DL (ref 32.6–36.4)
MCV RBC AUTO: 104.7 FL (ref 79.8–96.2)
MONOCYTES # BLD AUTO: 0.43 10*3/MM3 (ref 0.2–1.2)
MONOCYTES NFR BLD AUTO: 7.4 % (ref 5–12)
NEUTROPHILS # BLD AUTO: 4.08 10*3/MM3 (ref 1.9–8.1)
NEUTROPHILS NFR BLD AUTO: 70.1 % (ref 42.7–76)
PLATELET # BLD AUTO: 137 10*3/MM3 (ref 140–500)
RBC # BLD AUTO: 4.01 10*6/MM3 (ref 4.6–6)
RETICS/RBC NFR AUTO: 1.35 % (ref 0.5–1.5)
TIBC SERPL-MCNC: 350 MCG/DL
UIBC SERPL-MCNC: 265 MCG/DL
WBC # BLD AUTO: 5.82 10*3/MM3 (ref 4.5–10.7)

## 2017-12-08 PROCEDURE — 99495 TRANSJ CARE MGMT MOD F2F 14D: CPT | Performed by: PHYSICIAN ASSISTANT

## 2017-12-08 NOTE — PROGRESS NOTES
Transitional Care Follow Up Visit  Subjective     Dinesh Churchill is a 89 y.o. male who presents for a transitional care management visit.    Within 48 business hours after discharge our office contacted him via telephone to coordinate his care and needs.      I reviewed and discussed the details of that call along with the discharge summary, hospital problems, inpatient lab results, inpatient diagnostic studies, and consultation reports with Dinesh.     Current outpatient and discharge medications have been reconciled for the patient.    No flowsheet data found.  Risk for Readmission (LACE) Score: 5    History of Present Illness   Course During Hospital Stay:  ADMIT FROM 11/29-11/30- FROM D/C SUMMARY- Patient was admit from the cardiology office for further evaluation after he came in reporting episodes of intermittent palpitations with accompanying shortness of breath.  He was also noted to have a narrow complex tachycardia with heart rate in the 140-150 range.  Of note, he had also presented to the emergency room recently for complaints of chest pain.     Admission EKG showed atrial tachycardia.  He was seen in consultation by electrophysiology.  He was given IV Lopressor and his home beta blocker was increased to twice a day dosing.  He converted to sinus rhythm and had no recurrence.  No further workup with electrophysiology was recommended at this time.     Serial cardiac enzymes and EKGs were negative for acute cardiac ischemic event.  Patient underwent pharmacologic stress testing which was negative for ischemia.  He had no chest pain during hospitalization (at rest or with activity).     He was seen in consultation by internal medicine.  Lipid panel was not at goal and statin was increased.  Repeat LFTs and lipid panel recommended in 8-12 weeks.     PATIENT REPORTS HE HAS BEEN FEELING GOOD SINCE D/C FROM HOSPITAL. NO FURTHER CP/SOA/PALP/WEAKNESS/DIZZINESS/ FALLS. PATIENT REPORTS FEELING OK. NO CONCERNS.  PATIENT HAS APPT WITH CARDIOLOGY 1/3/18.     The following portions of the patient's history were reviewed and updated as appropriate: allergies, current medications, past family history, past medical history, past social history, past surgical history and problem list.    Review of Systems   All other systems reviewed and are negative.      Objective   Physical Exam   Constitutional: He is oriented to person, place, and time. He appears well-developed.   HENT:   Head: Normocephalic and atraumatic.   Right Ear: External ear normal.   Left Ear: External ear normal.   Eyes: Conjunctivae are normal.   Neck: Carotid bruit is not present. No tracheal deviation present. No thyroid mass and no thyromegaly present.   Cardiovascular: Normal rate, regular rhythm and intact distal pulses.    Murmur heard.  Pulmonary/Chest: Effort normal and breath sounds normal.   Neurological: He is alert and oriented to person, place, and time. Gait normal.   Skin: Skin is warm and dry.   Psychiatric: He has a normal mood and affect. His behavior is normal. Judgment and thought content normal.   Nursing note and vitals reviewed.      Assessment/Plan   Dinesh was seen today for [facility], [place], operated by [facility] follow-up.    Diagnoses and all orders for this visit:    Narrow complex tachycardia    Decreased hemoglobin  -     CBC & Differential  -     Reticulocytes  -     Iron and TIBC  -     Ferritin    Dilated cardiomyopathy    Palpitations    Shortness of breath      Patient Instructions   89 YEAR OLD MALE WHO PRESENTS TODAY IN FOLLOW UP OF HOSPITALIZATION FOR NARROW COMPLEX TACHYCARDIA, PALPITATIONS, AND SOA. HE WAS SEEN BY CARDIOLOGY WITH PALPITATIONS, SOA, AND WAS FOUND TO HAVE NARROW COMPLEX TACHYCARDIA. HE WAS DIRECTLY ADMIT TO HOSPITAL AND WAS SEEN BY CARDIOLOGY AND EP. GIVEN IV LOPRESSOR AND HOME BETA BLOCKER INCREASED TO BID. HE HAD CONVERSION TO SINUS RHYTHM WITHOUT OTHER INTERVENTION. SYMPTOMS RESOLVED WITH THIS. PER PATIENT, NO RECURRENCE OF  SYMPTOMS SINCE D/C. NO CP, SOA, PALP, DIZZINESS, WEAKNESS, NAUSEA, DIAPHORESIS. HE HAS FOLLOW UP BEGINNING OF January WITH CARDIOLOGY. TO KEEP APPT. TO BE SEEN HERE, CARDIOLOGY, OR ER IF SYMPTOMS RECUR OR NEW OR CHANGING SYMPTOMS. PATIENT WAS SEEN BY INTENEST IN HOSPITAL AND STATIN WAS INCREASED. ADVISED NEEDS RECHECK CMP AND LIPID IN 8-12 WEEKS. ALSO RECOMMEND CK. HE WILL NEED THIS WHEN DUE. PATIENT WAS NOTED TO HAVE DECREASED HGB FROM PREVIOUS READING AT ER. TO RULE OUT BLEEDING AS EXACERBATING FACTOR WITH ARRHYTHMIA, I WILL RECHECK CBC AND IRON STUDIES AND RETICULOCYTE COUNT. FURTHER RECOMMENDATIONS PENDING LABS.

## 2017-12-13 RX ORDER — LOSARTAN POTASSIUM 50 MG/1
TABLET ORAL
Qty: 30 TABLET | Refills: 11 | Status: SHIPPED | OUTPATIENT
Start: 2017-12-13 | End: 2018-01-03 | Stop reason: SDUPTHER

## 2017-12-19 NOTE — PATIENT INSTRUCTIONS
89 YEAR OLD MALE WHO PRESENTS TODAY IN FOLLOW UP OF HOSPITALIZATION FOR NARROW COMPLEX TACHYCARDIA, PALPITATIONS, AND SOA. HE WAS SEEN BY CARDIOLOGY WITH PALPITATIONS, SOA, AND WAS FOUND TO HAVE NARROW COMPLEX TACHYCARDIA. HE WAS DIRECTLY ADMIT TO HOSPITAL AND WAS SEEN BY CARDIOLOGY AND EP. GIVEN IV LOPRESSOR AND HOME BETA BLOCKER INCREASED TO BID. HE HAD CONVERSION TO SINUS RHYTHM WITHOUT OTHER INTERVENTION. SYMPTOMS RESOLVED WITH THIS. PER PATIENT, NO RECURRENCE OF SYMPTOMS SINCE D/C. NO CP, SOA, PALP, DIZZINESS, WEAKNESS, NAUSEA, DIAPHORESIS. HE HAS FOLLOW UP BEGINNING OF January WITH CARDIOLOGY. TO KEEP APPT. TO BE SEEN HERE, CARDIOLOGY, OR ER IF SYMPTOMS RECUR OR NEW OR CHANGING SYMPTOMS. PATIENT WAS SEEN BY INTENEST IN HOSPITAL AND STATIN WAS INCREASED. ADVISED NEEDS RECHECK CMP AND LIPID IN 8-12 WEEKS. ALSO RECOMMEND CK. HE WILL NEED THIS WHEN DUE. PATIENT WAS NOTED TO HAVE DECREASED HGB FROM PREVIOUS READING AT ER. TO RULE OUT BLEEDING AS EXACERBATING FACTOR WITH ARRHYTHMIA, I WILL RECHECK CBC AND IRON STUDIES AND RETICULOCYTE COUNT. FURTHER RECOMMENDATIONS PENDING LABS.

## 2017-12-20 ENCOUNTER — TELEPHONE (OUTPATIENT)
Dept: FAMILY MEDICINE CLINIC | Facility: CLINIC | Age: 82
End: 2017-12-20

## 2017-12-20 NOTE — TELEPHONE ENCOUNTER
PATIENT DOES NOT HAVE A NASAL SPRAY ON HIS MEDICATION LIST. PLEASE SEE WHAT HE IS NEEDING AND FOR WHAT SYMPTOMS

## 2017-12-22 ENCOUNTER — CLINICAL SUPPORT (OUTPATIENT)
Dept: FAMILY MEDICINE CLINIC | Facility: CLINIC | Age: 82
End: 2017-12-22

## 2017-12-22 DIAGNOSIS — E53.8 B12 DEFICIENCY: ICD-10-CM

## 2017-12-22 PROCEDURE — 96372 THER/PROPH/DIAG INJ SC/IM: CPT | Performed by: PHYSICIAN ASSISTANT

## 2017-12-22 RX ADMIN — CYANOCOBALAMIN 1000 MCG: 1000 INJECTION, SOLUTION INTRAMUSCULAR; SUBCUTANEOUS at 09:57

## 2017-12-22 NOTE — TELEPHONE ENCOUNTER
Pt said yesterday that he is having head congestion and hard of hearing. Can you send something in for him?

## 2017-12-22 NOTE — TELEPHONE ENCOUNTER
He really needs to come in and have an office visit so that we can tell if his ears are clogged from earwax or if he has an ear infection, or/and upper respiratory infection. Please ask and make an appointment after December 26 and he can go to the over-the-counter section of his pharmacy and buy head cold medicine that should help unclog his head.

## 2017-12-29 ENCOUNTER — OFFICE VISIT (OUTPATIENT)
Dept: FAMILY MEDICINE CLINIC | Facility: CLINIC | Age: 82
End: 2017-12-29

## 2017-12-29 VITALS
HEIGHT: 72 IN | SYSTOLIC BLOOD PRESSURE: 150 MMHG | OXYGEN SATURATION: 94 % | HEART RATE: 65 BPM | DIASTOLIC BLOOD PRESSURE: 60 MMHG | BODY MASS INDEX: 26.38 KG/M2 | TEMPERATURE: 97.6 F | WEIGHT: 194.8 LBS

## 2017-12-29 DIAGNOSIS — J00 ACUTE RHINITIS, UNSPECIFIED TYPE: ICD-10-CM

## 2017-12-29 DIAGNOSIS — H92.02 OTALGIA, LEFT: Primary | ICD-10-CM

## 2017-12-29 PROCEDURE — 69210 REMOVE IMPACTED EAR WAX UNI: CPT | Performed by: NURSE PRACTITIONER

## 2017-12-29 NOTE — PROGRESS NOTES
Subjective   Dinesh Churchill is a 89 y.o. male. Patient is being seen today for an earache and sinus drainage. This has been going on for a few days. He states that the earache is located in left ear.     History of Present Illness 89-year-old  male presents to the office with sudden onset of continuous left earache and sinus drainage that is clear for 3-4 days. Has treated it with nothing. Nothing makes it better or worse. On a 1-10 scale rates his discomfort as 7, he can't hear. Wears herring aide in lt ear.    The following portions of the patient's history were reviewed and updated as appropriate: allergies, current medications, past family history, past medical history, past social history, past surgical history and problem list.    Review of Systems   HENT: Positive for ear pain.         Sinus drainage.    All other systems reviewed and are negative.      Objective   Physical Exam   Constitutional: He is oriented to person, place, and time. He appears well-developed and well-nourished.   HENT:   Head: Normocephalic and atraumatic.   Mouth/Throat: Oropharynx is clear and moist.   Isiah ear canals clogged with honey colored cerumen. Otoscope unable to see tympanic membrane. Elephant system used with warm water alcohol and hydrogen peroxide to lavage ears used lighted curette to remove cerumen. Otoscope shows scarred tympanic membranes bilaterally. Patient replaced hearing aid in left ear. Well-tolerated.  Nasal membranes red and swollen from rhinitis patient using over-the-counter nasal spray that helps some   Eyes: Conjunctivae and EOM are normal. Pupils are equal, round, and reactive to light.   Neck: Neck supple.   Difficult to range neck due to arthritis   Cardiovascular:   Irregularly irregular heart rate. Patient does have a history of A. fib   Pulmonary/Chest: Effort normal and breath sounds normal.   Abdominal: Soft. Bowel sounds are normal.   Musculoskeletal:   Patient's gait is slow due to his  osteoarthritis advanced age of 89 years old.   Neurological: He is alert and oriented to person, place, and time.   Skin: Skin is warm and dry.   Psychiatric: He has a normal mood and affect. His behavior is normal. Judgment and thought content normal.   Nursing note and vitals reviewed.      Assessment/Plan   Dinesh was seen today for earache and sinus drainage.    Diagnoses and all orders for this visit:    Otalgia, left    Acute rhinitis, unspecified type

## 2018-01-03 ENCOUNTER — OFFICE VISIT (OUTPATIENT)
Dept: CARDIOLOGY | Facility: CLINIC | Age: 83
End: 2018-01-03

## 2018-01-03 VITALS
WEIGHT: 192 LBS | BODY MASS INDEX: 25.45 KG/M2 | DIASTOLIC BLOOD PRESSURE: 72 MMHG | SYSTOLIC BLOOD PRESSURE: 155 MMHG | HEART RATE: 67 BPM | HEIGHT: 73 IN

## 2018-01-03 DIAGNOSIS — I42.0 DILATED CARDIOMYOPATHY (HCC): ICD-10-CM

## 2018-01-03 DIAGNOSIS — I48.0 PAROXYSMAL ATRIAL FIBRILLATION (HCC): Primary | ICD-10-CM

## 2018-01-03 DIAGNOSIS — I25.10 CORONARY ARTERY DISEASE INVOLVING NATIVE CORONARY ARTERY OF NATIVE HEART WITHOUT ANGINA PECTORIS: ICD-10-CM

## 2018-01-03 PROCEDURE — 99213 OFFICE O/P EST LOW 20 MIN: CPT | Performed by: INTERNAL MEDICINE

## 2018-01-03 NOTE — PROGRESS NOTES
" Subjective:       Dinesh Churchill is a 89 y.o. male who here for follow up    CC  The follow-up after the recent hospitalization  HPI    Patient was recently admitted to the hospital because of the possible atrial flutter converted to the normal sinus rhythm here for the follow-up denies any chest pains or tightness in chest no heaviness with a pressure sensation  Discharge Diagnoses:   - symptomatic narrow complex tachycardia  - (old) 1st degree HB  - chest pain  - CAD - h/o BMS-> 80% proximal RCA 8-2017  (Normal left main, 50% proximal LAD, subtotally occluded D1, 40-50% nondominant LCx)  - EF 35-40%, gr 1 mcleod dysfx, mod MR, mild-mod AR/TR, mild ID  - HTN  - Dyslipidemia     Problem List Items Addressed This Visit     None        .    The following portions of the patient's history were reviewed and updated as appropriate: allergies, current medications, past family history, past medical history, past social history, past surgical history and problem list.    Past Medical History:   Diagnosis Date   • Abnormal ECG    • Abnormal MRI    • Acute frontal sinusitis    • Arthritis    • Chronic fatigue    • Coronary artery disease    • Dizziness    • Hearing aid worn     left   • Hearing loss    • Hyperlipidemia    • Hypertension    • Kidney stones    • Macrocytosis    • Neoplasm of skin    • Osteoporosis    • Prediabetes    • Vitamin D deficiency     reports that he has never smoked. He has never used smokeless tobacco. He reports that he does not drink alcohol or use illicit drugs.  Family History   Problem Relation Age of Onset   • Family history unknown: Yes       Review of Systems  Constitutional: No wt loss, fever, fatigue  Gastrointestinal: No nausea, abdominal pain  Behavioral/Psych: No insomnia or anxiety   Cardiovascular No chest pains or tightness in chest  Objective:       Physical Exam             Physical Exam  /72  Pulse 67  Ht 185.4 cm (73\")  Wt 87.1 kg (192 lb)  BMI 25.33 kg/m2    General " appearance: NAD, conversant   Eyes: anicteric sclerae, moist conjunctivae; no lid-lag; PERRLA   HENT: Atraumatic; oropharynx clear with moist mucous membranes and no mucosal ulcerations;  normal hard and soft palate   Neck: Trachea midline; FROM, supple, no thyromegaly or lymphadenopathy   Lungs: CTA, with normal respiratory effort and no intercostal retractions   CV: S1-S2 regular, no murmurs, no rub, no gallop   Abdomen: Soft, non-tender; no masses or HSM   Extremities: No peripheral edema or extremity lymphadenopathy  Skin: Normal temperature, turgor and texture; no rash, ulcers or subcutaneous nodules   Psych: Appropriate affect, alert and oriented to person, place and time           Cardiographics  @Procedures    Echocardiogram:        Current Outpatient Prescriptions:   •  acetaminophen (TYLENOL) 500 MG tablet, 1-2 TABLETS BY ONCE TO TWICE DAILY AS NEEDED FOR PAIN, Disp: 60 tablet, Rfl: 0  •  aspirin  MG tablet, Take 1 tablet by mouth Daily., Disp: , Rfl: 0  •  atorvastatin (LIPITOR) 20 MG tablet, Take 1 tablet by mouth Every Night., Disp: 30 tablet, Rfl: 1  •  Chlorcyclizine-Pseudoephed 25-60 MG tablet, Take 1 tablet by mouth 2 (Two) Times a Day As Needed (Rhinitis)., Disp: 42 tablet, Rfl: 0  •  Cholecalciferol (VITAMIN D-3) 1000 UNITS capsule, Take 1,000 Units by mouth daily., Disp: , Rfl:   •  clopidogrel (PLAVIX) 75 MG tablet, Take 1 tablet by mouth Daily., Disp: 30 tablet, Rfl: 11  •  isosorbide mononitrate (IMDUR) 30 MG 24 hr tablet, Take 1 tablet by mouth Daily., Disp: 30 tablet, Rfl: 1  •  losartan (COZAAR) 50 MG tablet, TAKE 1 TABLET BY MOUTH DAILY, Disp: 90 tablet, Rfl: 1  •  metoprolol succinate XL (TOPROL-XL) 50 MG 24 hr tablet, Take 1 tablet by mouth 2 (Two) Times a Day., Disp: 60 tablet, Rfl: 2  •  nitroglycerin (NITROSTAT) 0.4 MG SL tablet, Place 1 tablet under the tongue Every 5 (Five) Minutes As Needed for Chest Pain. Take no more than 3 doses in 15 minutes., Disp: 25 tablet, Rfl:  0    Current Facility-Administered Medications:   •  cyanocobalamin injection 1,000 mcg, 1,000 mcg, Intramuscular, Q28 Days, RAFA [place]far[place], 1,000 mcg at 12/22/17 0957   Assessment:        Patient Active Problem List   Diagnosis   • Abnormal magnetic resonance imaging study   • Arthritis   • Osteoarthritis of cervical spine   • Diplopia   • Hearing loss   • Neoplasm of uncertain behavior of skin   • Prediabetes   • Vitamin D deficiency   • Chronic fatigue   • History of supraventricular tachycardia   • Essential hypertension   • B12 deficiency   • Abnormal cardiac function test   • Cardiomyopathy   • Coronary artery disease involving native coronary artery of native heart without angina pectoris   • History of coronary artery stent placement   • History of renal calculi   • Dyslipidemia   • Macrocytosis without anemia   • Precordial pain   • Parkinson's disease   • History of CVA (cerebrovascular accident)   • AF (atrial fibrillation)   • Otalgia, left   • Acute rhinitis               Plan:            ICD-10-CM ICD-9-CM   1. Paroxysmal atrial fibrillation I48.0 427.31   2. Dilated cardiomyopathy I42.0 425.4   3. Coronary artery disease involving native coronary artery of native heart without angina pectoris I25.10 414.01     1. Paroxysmal atrial fibrillation  Now normal sinus rhythm    2. Dilated cardiomyopathy  Compensated    3. Coronary artery disease involving native coronary artery of native heart without angina pectoris  No angina pectoralis    Post hosp nsr    See us 6 months  COUNSELING:    Dinesh Forrest was given to patient for the following topics: diagnostic results, risk factor reductions, impressions, risks and benefits of treatment options and importance of treatment compliance .       SMOKING COUNSELING:    Counseling given: Not Answered      EMR Dragon/Transcription disclaimer:   Much of this encounter note is an electronic transcription/translation of spoken language to printed text.  The electronic translation of spoken language may permit erroneous, or at times, nonsensical words or phrases to be inadvertently transcribed; Although I have reviewed the note for such errors, some may still exist.

## 2018-01-24 RX ORDER — ISOSORBIDE MONONITRATE 30 MG/1
30 TABLET, EXTENDED RELEASE ORAL DAILY
Qty: 30 TABLET | Refills: 5 | Status: SHIPPED | OUTPATIENT
Start: 2018-01-24 | End: 2018-07-16 | Stop reason: SDUPTHER

## 2018-01-26 ENCOUNTER — CLINICAL SUPPORT (OUTPATIENT)
Dept: FAMILY MEDICINE CLINIC | Facility: CLINIC | Age: 83
End: 2018-01-26

## 2018-01-26 DIAGNOSIS — E53.8 B12 DEFICIENCY: Primary | ICD-10-CM

## 2018-01-26 PROCEDURE — 96372 THER/PROPH/DIAG INJ SC/IM: CPT | Performed by: PHYSICIAN ASSISTANT

## 2018-01-26 RX ORDER — CYANOCOBALAMIN 1000 UG/ML
1000 INJECTION, SOLUTION INTRAMUSCULAR; SUBCUTANEOUS
Status: DISCONTINUED | OUTPATIENT
Start: 2018-01-26 | End: 2018-09-27

## 2018-01-26 RX ADMIN — CYANOCOBALAMIN 1000 MCG: 1000 INJECTION, SOLUTION INTRAMUSCULAR; SUBCUTANEOUS at 09:56

## 2018-01-30 RX ORDER — ATORVASTATIN CALCIUM 20 MG/1
TABLET, FILM COATED ORAL
Qty: 30 TABLET | Refills: 6 | Status: SHIPPED | OUTPATIENT
Start: 2018-01-30 | End: 2018-09-04 | Stop reason: SDUPTHER

## 2018-02-23 ENCOUNTER — CLINICAL SUPPORT (OUTPATIENT)
Dept: FAMILY MEDICINE CLINIC | Facility: CLINIC | Age: 83
End: 2018-02-23

## 2018-02-23 DIAGNOSIS — E53.8 B12 DEFICIENCY: ICD-10-CM

## 2018-02-23 PROCEDURE — 96372 THER/PROPH/DIAG INJ SC/IM: CPT | Performed by: PHYSICIAN ASSISTANT

## 2018-02-23 RX ADMIN — CYANOCOBALAMIN 1000 MCG: 1000 INJECTION, SOLUTION INTRAMUSCULAR; SUBCUTANEOUS at 09:49

## 2018-03-23 ENCOUNTER — CLINICAL SUPPORT (OUTPATIENT)
Dept: FAMILY MEDICINE CLINIC | Facility: CLINIC | Age: 83
End: 2018-03-23

## 2018-03-23 ENCOUNTER — OFFICE VISIT (OUTPATIENT)
Dept: FAMILY MEDICINE CLINIC | Facility: CLINIC | Age: 83
End: 2018-03-23

## 2018-03-23 VITALS
WEIGHT: 190.6 LBS | HEART RATE: 66 BPM | DIASTOLIC BLOOD PRESSURE: 60 MMHG | HEIGHT: 73 IN | TEMPERATURE: 97.4 F | SYSTOLIC BLOOD PRESSURE: 120 MMHG | OXYGEN SATURATION: 96 % | BODY MASS INDEX: 25.26 KG/M2

## 2018-03-23 VITALS — HEIGHT: 73 IN

## 2018-03-23 DIAGNOSIS — Z86.73 HISTORY OF CVA (CEREBROVASCULAR ACCIDENT): ICD-10-CM

## 2018-03-23 DIAGNOSIS — H92.02 OTALGIA, LEFT: ICD-10-CM

## 2018-03-23 DIAGNOSIS — E55.9 VITAMIN D DEFICIENCY: ICD-10-CM

## 2018-03-23 DIAGNOSIS — I42.0 DILATED CARDIOMYOPATHY (HCC): ICD-10-CM

## 2018-03-23 DIAGNOSIS — E78.5 DYSLIPIDEMIA: ICD-10-CM

## 2018-03-23 DIAGNOSIS — G20 PARKINSON'S DISEASE (HCC): ICD-10-CM

## 2018-03-23 DIAGNOSIS — I10 ESSENTIAL HYPERTENSION: Primary | ICD-10-CM

## 2018-03-23 DIAGNOSIS — R73.03 PREDIABETES: ICD-10-CM

## 2018-03-23 DIAGNOSIS — D75.89 MACROCYTOSIS WITHOUT ANEMIA: ICD-10-CM

## 2018-03-23 DIAGNOSIS — E53.8 B12 DEFICIENCY: ICD-10-CM

## 2018-03-23 DIAGNOSIS — Z86.79 HISTORY OF SUPRAVENTRICULAR TACHYCARDIA: ICD-10-CM

## 2018-03-23 LAB
BILIRUB BLD-MCNC: NEGATIVE MG/DL
CLARITY, POC: CLEAR
COLOR UR: YELLOW
EXPIRATION DATE: NORMAL
GLUCOSE UR STRIP-MCNC: NEGATIVE MG/DL
HBA1C MFR BLD: 5.7 %
KETONES UR QL: NEGATIVE
LEUKOCYTE EST, POC: NEGATIVE
Lab: 812
NITRITE UR-MCNC: NEGATIVE MG/ML
PH UR: 6.5 [PH] (ref 5–8)
PROT UR STRIP-MCNC: NEGATIVE MG/DL
RBC # UR STRIP: NEGATIVE /UL
SP GR UR: 1.02 (ref 1–1.03)
UROBILINOGEN UR QL: NORMAL

## 2018-03-23 PROCEDURE — 81003 URINALYSIS AUTO W/O SCOPE: CPT | Performed by: PHYSICIAN ASSISTANT

## 2018-03-23 PROCEDURE — 96372 THER/PROPH/DIAG INJ SC/IM: CPT | Performed by: PHYSICIAN ASSISTANT

## 2018-03-23 PROCEDURE — 83036 HEMOGLOBIN GLYCOSYLATED A1C: CPT | Performed by: PHYSICIAN ASSISTANT

## 2018-03-23 PROCEDURE — 99214 OFFICE O/P EST MOD 30 MIN: CPT | Performed by: PHYSICIAN ASSISTANT

## 2018-03-23 RX ADMIN — CYANOCOBALAMIN 1000 MCG: 1000 INJECTION, SOLUTION INTRAMUSCULAR; SUBCUTANEOUS at 10:22

## 2018-03-23 NOTE — PROGRESS NOTES
Subjective   Dinesh Churchill is a 89 y.o. male. Patient is being seen today for medication check/refill for Vitamin B 12 Deficiency.     History of Present Illness     PATIENT IS DOING WELL WITHOUT COMPLAINTS.     NO CP/SOA/PALP/WEAKNESS/DIZZINESS/FALLS.   TOLERATING MEDICATIONS WITHOUT AE.     The following portions of the patient's history were reviewed and updated as appropriate: allergies, current medications, past family history, past medical history, past social history, past surgical history and problem list.    Review of Systems   Hematological:        Vitamin B 12 Deficiency.    All other systems reviewed and are negative.      Objective   Physical Exam   Constitutional: He is oriented to person, place, and time. He appears well-developed.   HENT:   Head: Normocephalic and atraumatic.   Right Ear: External ear normal.   Left Ear: External ear normal.   Eyes: Conjunctivae are normal.   Neck: Carotid bruit is not present. No tracheal deviation present. No thyroid mass and no thyromegaly present.   Cardiovascular: Normal rate, regular rhythm, normal heart sounds and intact distal pulses.    Pulmonary/Chest: Effort normal and breath sounds normal.   Neurological: He is alert and oriented to person, place, and time. Gait normal.   Skin: Skin is warm and dry.   Psychiatric: He has a normal mood and affect. His behavior is normal. Judgment and thought content normal.   Nursing note and vitals reviewed.      Assessment/Plan   Dinesh was seen today for med refill and labs only.    Diagnoses and all orders for this visit:    Essential hypertension  -     Comprehensive Metabolic Panel  -     Thyroid Panel With TSH  -     POC Urinalysis Dipstick, Automated    Dilated cardiomyopathy  -     Thyroid Panel With TSH    B12 deficiency  -     CBC & Differential  -     Vitamin B12 & Folate    Vitamin D deficiency  -     Comprehensive Metabolic Panel  -     Vitamin D 25 Hydroxy    Otalgia, left    Parkinson's  disease    Macrocytosis without anemia  -     CBC & Differential    History of CVA (cerebrovascular accident)    History of supraventricular tachycardia  -     Comprehensive Metabolic Panel    Prediabetes  -     Comprehensive Metabolic Panel  -     Thyroid Panel With TSH  -     POC Glycated Hemoglobin, Total  -     POC Urinalysis Dipstick, Automated    Dyslipidemia  -     Comprehensive Metabolic Panel  -     CK  -     Lipid Panel With LDL / HDL Ratio  -     Thyroid Panel With TSH      Patient Instructions   89 YEAR OLD MALE WHO PRESENTS TODAY IN FOLLOW UP OF HTN, DYSLIPIDEMIA, PREDIABETES, B12 DEFICIENCY WITH MACROCYTOSIS AND VITAMIN D DEFICIENCY. BP TODAY IS OK. CONTINUE LOSARTAN 50 MG ONCE DAILY, TOPROL XL 50 MG AS DIRECTED BY CARDIOLOGY. I WILL CHECK LABS TODAY- CALL IF NO RESULTS IN 1 WEEK. TO BE SEEN ASAP IF ANY PROBLEMS OR CONCERNS. OTHERWISE, FOLLOW UP PENDING LAB RESULTS. HE IS UP TO DATE WITH CARDIOLOGY, LAST APPT 1/2018 WITH FOLLOW UP IN 6 MONTHS. HE WAS SEEN BY NEUROLOGY WITH GAIT ABNORMALITY, HISTORY OF DIZZINESS AND MULTIPLE FALLS, AND CVA, WHO DIAGNOSED HIM WITH PARKINSON'S DISEASE, HOWEVER, NO TREATMENT AT THIS TIME. HE SHOULD FOLLOW UP IF DECLINE OR CHANGES IN GAIT OR FREQUENT FALLS AND THEY WILL CONSIDER MEDICATIONS.

## 2018-03-23 NOTE — PROGRESS NOTES
Subjective   Dinesh Churchill is a 89 y.o. male. Patient is being seen today for medication check/refill for Vitamin B 12 deficiency. He is fasting.   History of Present Illness     {Common H&P Review Areas:44388}    Review of Systems   Hematological:        Vitamin B 12 Deficiency.    All other systems reviewed and are negative.      Objective   Physical Exam    Assessment/Plan   {Assess/PlanSmartLinks:36604}

## 2018-03-23 NOTE — PATIENT INSTRUCTIONS
89 YEAR OLD MALE WHO PRESENTS TODAY IN FOLLOW UP OF HTN, DYSLIPIDEMIA, PREDIABETES, B12 DEFICIENCY WITH MACROCYTOSIS AND VITAMIN D DEFICIENCY. BP TODAY IS OK. CONTINUE LOSARTAN 50 MG ONCE DAILY, TOPROL XL 50 MG AS DIRECTED BY CARDIOLOGY. I WILL CHECK LABS TODAY- CALL IF NO RESULTS IN 1 WEEK. TO BE SEEN ASAP IF ANY PROBLEMS OR CONCERNS. OTHERWISE, FOLLOW UP PENDING LAB RESULTS. HE IS UP TO DATE WITH CARDIOLOGY, LAST APPT 1/2018 WITH FOLLOW UP IN 6 MONTHS. HE WAS SEEN BY NEUROLOGY WITH GAIT ABNORMALITY, HISTORY OF DIZZINESS AND MULTIPLE FALLS, AND CVA, WHO DIAGNOSED HIM WITH PARKINSON'S DISEASE, HOWEVER, NO TREATMENT AT THIS TIME. HE SHOULD FOLLOW UP IF DECLINE OR CHANGES IN GAIT OR FREQUENT FALLS AND THEY WILL CONSIDER MEDICATIONS.

## 2018-03-24 LAB
25(OH)D3+25(OH)D2 SERPL-MCNC: 39.5 NG/ML (ref 30–100)
ALBUMIN SERPL-MCNC: 4.5 G/DL (ref 3.5–5.2)
ALBUMIN/GLOB SERPL: 2 G/DL
ALP SERPL-CCNC: 55 U/L (ref 39–117)
ALT SERPL-CCNC: 24 U/L (ref 1–41)
AST SERPL-CCNC: 21 U/L (ref 1–40)
BASOPHILS # BLD AUTO: 0.01 10*3/MM3 (ref 0–0.2)
BASOPHILS NFR BLD AUTO: 0.2 % (ref 0–1.5)
BILIRUB SERPL-MCNC: 1.6 MG/DL (ref 0.1–1.2)
BUN SERPL-MCNC: 22 MG/DL (ref 8–23)
BUN/CREAT SERPL: 22.2 (ref 7–25)
CALCIUM SERPL-MCNC: 9.9 MG/DL (ref 8.6–10.5)
CHLORIDE SERPL-SCNC: 105 MMOL/L (ref 98–107)
CHOLEST SERPL-MCNC: 131 MG/DL (ref 0–200)
CK SERPL-CCNC: 101 U/L (ref 20–200)
CO2 SERPL-SCNC: 26.3 MMOL/L (ref 22–29)
CREAT SERPL-MCNC: 0.99 MG/DL (ref 0.76–1.27)
EOSINOPHIL # BLD AUTO: 0.11 10*3/MM3 (ref 0–0.7)
EOSINOPHIL NFR BLD AUTO: 1.9 % (ref 0.3–6.2)
ERYTHROCYTE [DISTWIDTH] IN BLOOD BY AUTOMATED COUNT: 16.5 % (ref 11.5–14.5)
FOLATE SERPL-MCNC: 14.65 NG/ML (ref 4.78–24.2)
FT4I SERPL CALC-MCNC: 2 (ref 1.2–4.9)
GFR SERPLBLD CREATININE-BSD FMLA CKD-EPI: 71 ML/MIN/1.73
GFR SERPLBLD CREATININE-BSD FMLA CKD-EPI: 86 ML/MIN/1.73
GLOBULIN SER CALC-MCNC: 2.3 GM/DL
GLUCOSE SERPL-MCNC: 114 MG/DL (ref 65–99)
HCT VFR BLD AUTO: 44.3 % (ref 40.4–52.2)
HDLC SERPL-MCNC: 49 MG/DL (ref 40–60)
HGB BLD-MCNC: 13.9 G/DL (ref 13.7–17.6)
IMM GRANULOCYTES # BLD: 0.02 10*3/MM3 (ref 0–0.03)
IMM GRANULOCYTES NFR BLD: 0.3 % (ref 0–0.5)
LDLC SERPL CALC-MCNC: 71 MG/DL (ref 0–100)
LDLC/HDLC SERPL: 1.44 {RATIO}
LYMPHOCYTES # BLD AUTO: 1.13 10*3/MM3 (ref 0.9–4.8)
LYMPHOCYTES NFR BLD AUTO: 19.2 % (ref 19.6–45.3)
MCH RBC QN AUTO: 32.9 PG (ref 27–32.7)
MCHC RBC AUTO-ENTMCNC: 31.4 G/DL (ref 32.6–36.4)
MCV RBC AUTO: 104.7 FL (ref 79.8–96.2)
MONOCYTES # BLD AUTO: 0.47 10*3/MM3 (ref 0.2–1.2)
MONOCYTES NFR BLD AUTO: 8 % (ref 5–12)
NEUTROPHILS # BLD AUTO: 4.15 10*3/MM3 (ref 1.9–8.1)
NEUTROPHILS NFR BLD AUTO: 70.4 % (ref 42.7–76)
PLATELET # BLD AUTO: 140 10*3/MM3 (ref 140–500)
POTASSIUM SERPL-SCNC: 4.5 MMOL/L (ref 3.5–5.2)
PROT SERPL-MCNC: 6.8 G/DL (ref 6–8.5)
RBC # BLD AUTO: 4.23 10*6/MM3 (ref 4.6–6)
SODIUM SERPL-SCNC: 145 MMOL/L (ref 136–145)
T3RU NFR SERPL: 24 % (ref 24–39)
T4 SERPL-MCNC: 8.3 UG/DL (ref 4.5–12)
TRIGL SERPL-MCNC: 57 MG/DL (ref 0–150)
TSH SERPL DL<=0.005 MIU/L-ACNC: 2.71 UIU/ML (ref 0.45–4.5)
VIT B12 SERPL-MCNC: 568 PG/ML (ref 211–946)
VLDLC SERPL CALC-MCNC: 11.4 MG/DL (ref 5–40)
WBC # BLD AUTO: 5.89 10*3/MM3 (ref 4.5–10.7)

## 2018-03-26 NOTE — PROGRESS NOTES
Might be an early sign of MDS.  Not anemic so agree with intermittent monitoring.  We can see him again if blood counts change significantly.  THOM

## 2018-03-31 ENCOUNTER — APPOINTMENT (OUTPATIENT)
Dept: GENERAL RADIOLOGY | Facility: HOSPITAL | Age: 83
End: 2018-03-31

## 2018-03-31 ENCOUNTER — HOSPITAL ENCOUNTER (EMERGENCY)
Facility: HOSPITAL | Age: 83
Discharge: HOME | End: 2018-03-31
Attending: EMERGENCY MEDICINE | Admitting: EMERGENCY MEDICINE

## 2018-03-31 ENCOUNTER — APPOINTMENT (OUTPATIENT)
Dept: CARDIOLOGY | Facility: HOSPITAL | Age: 83
End: 2018-03-31

## 2018-03-31 VITALS
WEIGHT: 190 LBS | DIASTOLIC BLOOD PRESSURE: 74 MMHG | HEART RATE: 68 BPM | OXYGEN SATURATION: 98 % | BODY MASS INDEX: 25.18 KG/M2 | TEMPERATURE: 98.1 F | HEIGHT: 73 IN | RESPIRATION RATE: 18 BRPM | SYSTOLIC BLOOD PRESSURE: 144 MMHG

## 2018-03-31 DIAGNOSIS — M79.604 RIGHT LEG PAIN: Primary | ICD-10-CM

## 2018-03-31 LAB
BH CV LOWER VASCULAR LEFT COMMON FEMORAL AUGMENT: NORMAL
BH CV LOWER VASCULAR LEFT COMMON FEMORAL COMPETENT: NORMAL
BH CV LOWER VASCULAR LEFT COMMON FEMORAL COMPRESS: NORMAL
BH CV LOWER VASCULAR LEFT COMMON FEMORAL PHASIC: NORMAL
BH CV LOWER VASCULAR LEFT COMMON FEMORAL SPONT: NORMAL
BH CV LOWER VASCULAR RIGHT COMMON FEMORAL AUGMENT: NORMAL
BH CV LOWER VASCULAR RIGHT COMMON FEMORAL COMPETENT: NORMAL
BH CV LOWER VASCULAR RIGHT COMMON FEMORAL COMPRESS: NORMAL
BH CV LOWER VASCULAR RIGHT COMMON FEMORAL PHASIC: NORMAL
BH CV LOWER VASCULAR RIGHT COMMON FEMORAL SPONT: NORMAL
BH CV LOWER VASCULAR RIGHT DISTAL FEMORAL COMPRESS: NORMAL
BH CV LOWER VASCULAR RIGHT GASTRONEMIUS COMPRESS: NORMAL
BH CV LOWER VASCULAR RIGHT GREATER SAPH AK COMPRESS: NORMAL
BH CV LOWER VASCULAR RIGHT GREATER SAPH BK COMPRESS: NORMAL
BH CV LOWER VASCULAR RIGHT MID FEMORAL AUGMENT: NORMAL
BH CV LOWER VASCULAR RIGHT MID FEMORAL COMPETENT: NORMAL
BH CV LOWER VASCULAR RIGHT MID FEMORAL COMPRESS: NORMAL
BH CV LOWER VASCULAR RIGHT MID FEMORAL PHASIC: NORMAL
BH CV LOWER VASCULAR RIGHT MID FEMORAL SPONT: NORMAL
BH CV LOWER VASCULAR RIGHT PERONEAL COMPRESS: NORMAL
BH CV LOWER VASCULAR RIGHT POPLITEAL AUGMENT: NORMAL
BH CV LOWER VASCULAR RIGHT POPLITEAL COMPETENT: NORMAL
BH CV LOWER VASCULAR RIGHT POPLITEAL COMPRESS: NORMAL
BH CV LOWER VASCULAR RIGHT POPLITEAL PHASIC: NORMAL
BH CV LOWER VASCULAR RIGHT POPLITEAL SPONT: NORMAL
BH CV LOWER VASCULAR RIGHT POSTERIOR TIBIAL COMPRESS: NORMAL
BH CV LOWER VASCULAR RIGHT PROXIMAL FEMORAL COMPRESS: NORMAL
BH CV LOWER VASCULAR RIGHT SAPHENOFEMORAL JUNCTION AUGMENT: NORMAL
BH CV LOWER VASCULAR RIGHT SAPHENOFEMORAL JUNCTION COMPETENT: NORMAL
BH CV LOWER VASCULAR RIGHT SAPHENOFEMORAL JUNCTION COMPRESS: NORMAL
BH CV LOWER VASCULAR RIGHT SAPHENOFEMORAL JUNCTION PHASIC: NORMAL
BH CV LOWER VASCULAR RIGHT SAPHENOFEMORAL JUNCTION SPONT: NORMAL

## 2018-03-31 PROCEDURE — 93971 EXTREMITY STUDY: CPT

## 2018-03-31 PROCEDURE — 99283 EMERGENCY DEPT VISIT LOW MDM: CPT

## 2018-03-31 PROCEDURE — 73560 X-RAY EXAM OF KNEE 1 OR 2: CPT

## 2018-04-02 ENCOUNTER — OFFICE VISIT (OUTPATIENT)
Dept: FAMILY MEDICINE CLINIC | Facility: CLINIC | Age: 83
End: 2018-04-02

## 2018-04-02 VITALS
RESPIRATION RATE: 20 BRPM | BODY MASS INDEX: 25.71 KG/M2 | WEIGHT: 194 LBS | OXYGEN SATURATION: 92 % | TEMPERATURE: 97.5 F | DIASTOLIC BLOOD PRESSURE: 70 MMHG | SYSTOLIC BLOOD PRESSURE: 122 MMHG | HEIGHT: 73 IN | HEART RATE: 72 BPM

## 2018-04-02 DIAGNOSIS — S86.111A STRAIN OF RIGHT GASTROCNEMIUS MUSCLE, INITIAL ENCOUNTER: ICD-10-CM

## 2018-04-02 DIAGNOSIS — M25.561 ACUTE PAIN OF RIGHT KNEE: Primary | ICD-10-CM

## 2018-04-02 PROCEDURE — 99214 OFFICE O/P EST MOD 30 MIN: CPT | Performed by: FAMILY MEDICINE

## 2018-04-02 NOTE — PATIENT INSTRUCTIONS
Knee Pain, Adult  Knee pain in adults is common. It can be caused by many things, including:  · Arthritis.  · A fluid-filled sac (cyst) or growth in your knee.  · An infection in your knee.  · An injury that will not heal.  · Damage, swelling, or irritation of the tissues that support your knee.  Knee pain is usually not a sign of a serious problem. The pain may go away on its own with time and rest. If it does not, a health care provider may order tests to find the cause of the pain. These may include:  · Imaging tests, such as an X-ray, MRI, or ultrasound.  · Joint aspiration. In this test, fluid is removed from the knee.  · Arthroscopy. In this test, a lighted tube is inserted into knee and an image is projected onto a TV screen.  · A biopsy. In this test, a sample of tissue is removed from the body and studied under a microscope.  Follow these instructions at home:  Pay attention to any changes in your symptoms. Take these actions to relieve your pain.  Activity   · Rest your knee.  · Do not do things that cause pain or make pain worse.  · Avoid high-impact activities or exercises, such as running, jumping rope, or doing jumping jacks.  General instructions   · Take over-the-counter and prescription medicines only as told by your health care provider.  · Raise (elevate) your knee above the level of your heart when you are sitting or lying down.  · Sleep with a pillow under your knee.  · If directed, apply ice to the knee:  ¨ Put ice in a plastic bag.  ¨ Place a towel between your skin and the bag.  ¨ Leave the ice on for 20 minutes, 2-3 times a day.  · Ask your health care provider if you should wear an elastic knee support.  · Lose weight if you are overweight. Extra weight can put pressure on your knee.  · Do not use any products that contain nicotine or tobacco, such as cigarettes and e-cigarettes. Smoking may slow the healing of any bone and joint problems that you may have. If you need help quitting, ask  your health care provider.  Contact a health care provider if:  · Your knee pain continues, changes, or gets worse.  · You have a fever along with knee pain.  · Your knee azalia or locks up.  · Your knee swells, and the swelling becomes worse.  Get help right away if:  · Your knee feels warm to the touch.  · You cannot move your knee.  · You have severe pain in your knee.  · You have chest pain.  · You have trouble breathing.  Summary  · Knee pain in adults is common. It can be caused by many things, including, arthritis, infection, cysts, or injury.  · Knee pain is usually not a sign of a serious problem, but if it does not go away, a health care provider may perform tests to know the cause of the pain.  · Pay attention to any changes in your symptoms. Relieve your pain with rest, medicines, light activity, and use of ice.  · Get help if your pain continues or becomes very severe, or if your knee azalia or locks up, or if you have chest pain or trouble breathing.  This information is not intended to replace advice given to you by your health care provider. Make sure you discuss any questions you have with your health care provider.  Document Released: 10/14/2008 Document Revised: 12/08/2017 Document Reviewed: 12/08/2017  Elsevier Interactive Patient Education © 2017 [employer]

## 2018-04-02 NOTE — PROGRESS NOTES
Subjective   Dinesh Churchill is a 89 y.o. male. Presents today with right knee/calf pain x 2 days.    History of Present Illness     New patient to me    Patient has been having some pain in his right knee as well as his right calf for approximately 2 days.  He was in the ER for a short period time and was evaluated.  They found was he does not have a DVT however he does have quite a bit of arthritis in his right knee.  They recommended him follow-up with his primary care provider.  Since that is ER visit he had a fall outside of his home as he was trying to get into his home.  Since then he's been using a 2 pronged walker for ambulation whereas before he was using just a cane.  They've been treating it yet.  Nothing makes it better or worse.  The pain is achy and stays in the knee right now with a little bit of pain if you squeeze his calf muscle.    The following portions of the patient's history were reviewed and updated as appropriate: allergies, current medications, past family history, past medical history, past social history, past surgical history and problem list.    Review of Systems   Constitutional: Negative for activity change, appetite change, fatigue and fever.   HENT: Negative for ear pain, facial swelling and sore throat.    Eyes: Negative for discharge and itching.   Respiratory: Negative for cough, chest tightness and shortness of breath.    Cardiovascular: Negative for chest pain and palpitations.   Gastrointestinal: Negative for abdominal distention and constipation.   Musculoskeletal: Positive for arthralgias, gait problem and myalgias. Negative for back pain, joint swelling, neck pain and neck stiffness.        Knee pain     Neurological: Negative for weakness and numbness.   Psychiatric/Behavioral: Negative for decreased concentration and dysphoric mood. The patient is not nervous/anxious.        Objective   Physical Exam   Constitutional: He is oriented to person, place, and time. He appears  well-developed and well-nourished. No distress.   HENT:   Mouth/Throat: Oropharynx is clear and moist.   Eyes: Conjunctivae are normal. Right eye exhibits no discharge. Left eye exhibits no discharge.   Neck: Normal range of motion. Neck supple.   Cardiovascular: Normal rate, regular rhythm, normal heart sounds and intact distal pulses.  Exam reveals no gallop and no friction rub.    No murmur heard.  Pulmonary/Chest: Effort normal and breath sounds normal. He has no wheezes. He has no rales.   Abdominal: Soft. Bowel sounds are normal. He exhibits no distension. There is no tenderness.   Musculoskeletal:        Right hip: Normal.        Left hip: Normal.        Right knee: He exhibits decreased range of motion, swelling and effusion. He exhibits no ecchymosis, no deformity, no laceration, no erythema, normal alignment, no LCL laxity, normal patellar mobility, no bony tenderness, normal meniscus and no MCL laxity. No tenderness found. No medial joint line, no lateral joint line, no MCL, no LCL and no patellar tendon tenderness noted.        Left knee: Normal.        Right ankle: Normal.        Left ankle: Normal.   Lymphadenopathy:     He has no cervical adenopathy.   Neurological: He is alert and oriented to person, place, and time.   Skin: Skin is warm and dry. No rash noted.   Psychiatric: He has a normal mood and affect. His behavior is normal.   Nursing note and vitals reviewed.      Assessment/Plan   Dinesh was seen today for knee pain.    Diagnoses and all orders for this visit:    Acute pain of right knee    Strain of right gastrocnemius muscle, initial encounter    Recommended RICE therapy for the right leg x 7-10 days.  If no better or worse, make an appointment with me to have the knee injected if still possible

## 2018-04-03 ENCOUNTER — TELEPHONE (OUTPATIENT)
Dept: SOCIAL WORK | Facility: HOSPITAL | Age: 83
End: 2018-04-03

## 2018-04-03 NOTE — TELEPHONE ENCOUNTER
"ER F/U phone call:   Pt states doing \"just fine\". Has seen his PCP on 4-2-18. No other questions or concerns voiced at this time. Roseanna Austin RN    "

## 2018-04-04 RX ORDER — METOPROLOL SUCCINATE 50 MG/1
50 TABLET, EXTENDED RELEASE ORAL DAILY
Qty: 60 TABLET | Refills: 5 | Status: SHIPPED | OUTPATIENT
Start: 2018-04-04 | End: 2018-08-06 | Stop reason: SDUPTHER

## 2018-04-23 ENCOUNTER — CLINICAL SUPPORT (OUTPATIENT)
Dept: FAMILY MEDICINE CLINIC | Facility: CLINIC | Age: 83
End: 2018-04-23

## 2018-04-23 DIAGNOSIS — E53.8 B12 DEFICIENCY: Primary | ICD-10-CM

## 2018-04-23 PROCEDURE — 96372 THER/PROPH/DIAG INJ SC/IM: CPT | Performed by: PHYSICIAN ASSISTANT

## 2018-04-23 RX ORDER — CYANOCOBALAMIN 1000 UG/ML
1000 INJECTION, SOLUTION INTRAMUSCULAR; SUBCUTANEOUS
Status: DISCONTINUED | OUTPATIENT
Start: 2018-04-23 | End: 2018-09-27

## 2018-04-23 RX ADMIN — CYANOCOBALAMIN 1000 MCG: 1000 INJECTION, SOLUTION INTRAMUSCULAR; SUBCUTANEOUS at 09:51

## 2018-05-10 RX ORDER — METOPROLOL SUCCINATE 50 MG/1
TABLET, EXTENDED RELEASE ORAL
Qty: 60 TABLET | Refills: 0 | Status: SHIPPED | OUTPATIENT
Start: 2018-05-10 | End: 2018-07-09 | Stop reason: SDUPTHER

## 2018-05-10 RX ORDER — CLOPIDOGREL BISULFATE 75 MG/1
75 TABLET ORAL DAILY
Qty: 30 TABLET | Refills: 0 | Status: SHIPPED | OUTPATIENT
Start: 2018-05-10 | End: 2018-06-04 | Stop reason: SDUPTHER

## 2018-05-29 ENCOUNTER — CLINICAL SUPPORT (OUTPATIENT)
Dept: FAMILY MEDICINE CLINIC | Facility: CLINIC | Age: 83
End: 2018-05-29

## 2018-05-29 DIAGNOSIS — E53.8 B12 DEFICIENCY: ICD-10-CM

## 2018-05-29 PROCEDURE — 96372 THER/PROPH/DIAG INJ SC/IM: CPT | Performed by: PHYSICIAN ASSISTANT

## 2018-05-29 RX ADMIN — CYANOCOBALAMIN 1000 MCG: 1000 INJECTION, SOLUTION INTRAMUSCULAR; SUBCUTANEOUS at 09:47

## 2018-06-04 RX ORDER — CLOPIDOGREL BISULFATE 75 MG/1
75 TABLET ORAL DAILY
Qty: 30 TABLET | Refills: 0 | Status: SHIPPED | OUTPATIENT
Start: 2018-06-04 | End: 2018-07-02 | Stop reason: SDUPTHER

## 2018-06-12 ENCOUNTER — TELEPHONE (OUTPATIENT)
Dept: FAMILY MEDICINE CLINIC | Facility: CLINIC | Age: 83
End: 2018-06-12

## 2018-06-12 DIAGNOSIS — G89.29 CHRONIC LOW BACK PAIN, UNSPECIFIED BACK PAIN LATERALITY, WITH SCIATICA PRESENCE UNSPECIFIED: Primary | ICD-10-CM

## 2018-06-12 DIAGNOSIS — M54.5 CHRONIC LOW BACK PAIN, UNSPECIFIED BACK PAIN LATERALITY, WITH SCIATICA PRESENCE UNSPECIFIED: Primary | ICD-10-CM

## 2018-06-12 NOTE — TELEPHONE ENCOUNTER
HE EITHER NEEDS TO TELL ME THE EXACT BRACE HE NEEDS ORDERED OR I WILL HAVE HIM SEE PHYSICAL THERAPY FOR EVALUATION FOR THE MOST APPROPRIATE BACK BRACE.

## 2018-06-12 NOTE — TELEPHONE ENCOUNTER
Patient son states he doesn't know exactly the back brace is called, he said it is approximately 8 inches wide, just a band  and is fastened with velcro, the same brace used for lifting

## 2018-06-12 NOTE — TELEPHONE ENCOUNTER
Pt's son called for him today. They were at a medical supply office in [place] yesterday, he is need of a new back brace. They told him that if he got a prescription for this, it would be covered. If no prescription, it would cost around $50.00. They want to know what they need to do in order to get this prescription for the back brace.  Please advise, thank you.

## 2018-06-25 ENCOUNTER — CLINICAL SUPPORT (OUTPATIENT)
Dept: FAMILY MEDICINE CLINIC | Facility: CLINIC | Age: 83
End: 2018-06-25

## 2018-06-25 DIAGNOSIS — E53.8 B12 DEFICIENCY: Primary | ICD-10-CM

## 2018-06-25 PROCEDURE — 96372 THER/PROPH/DIAG INJ SC/IM: CPT | Performed by: PHYSICIAN ASSISTANT

## 2018-06-25 RX ORDER — CYANOCOBALAMIN 1000 UG/ML
1000 INJECTION, SOLUTION INTRAMUSCULAR; SUBCUTANEOUS
Status: DISCONTINUED | OUTPATIENT
Start: 2018-06-25 | End: 2018-09-27

## 2018-06-25 RX ADMIN — CYANOCOBALAMIN 1000 MCG: 1000 INJECTION, SOLUTION INTRAMUSCULAR; SUBCUTANEOUS at 09:54

## 2018-07-02 RX ORDER — CLOPIDOGREL BISULFATE 75 MG/1
75 TABLET ORAL DAILY
Qty: 30 TABLET | Refills: 3 | Status: SHIPPED | OUTPATIENT
Start: 2018-07-02 | End: 2018-10-22 | Stop reason: SDUPTHER

## 2018-07-03 ENCOUNTER — OFFICE VISIT (OUTPATIENT)
Dept: CARDIOLOGY | Facility: CLINIC | Age: 83
End: 2018-07-03

## 2018-07-03 VITALS
WEIGHT: 195 LBS | HEART RATE: 54 BPM | BODY MASS INDEX: 25.84 KG/M2 | HEIGHT: 73 IN | SYSTOLIC BLOOD PRESSURE: 166 MMHG | DIASTOLIC BLOOD PRESSURE: 68 MMHG

## 2018-07-03 DIAGNOSIS — I42.0 DILATED CARDIOMYOPATHY (HCC): ICD-10-CM

## 2018-07-03 DIAGNOSIS — I25.10 CORONARY ARTERY DISEASE INVOLVING NATIVE CORONARY ARTERY OF NATIVE HEART WITHOUT ANGINA PECTORIS: Primary | ICD-10-CM

## 2018-07-03 DIAGNOSIS — I48.0 PAROXYSMAL ATRIAL FIBRILLATION (HCC): ICD-10-CM

## 2018-07-03 PROCEDURE — 99213 OFFICE O/P EST LOW 20 MIN: CPT | Performed by: INTERNAL MEDICINE

## 2018-07-03 PROCEDURE — 93000 ELECTROCARDIOGRAM COMPLETE: CPT | Performed by: INTERNAL MEDICINE

## 2018-07-03 NOTE — PROGRESS NOTES
" Subjective:       Dinesh Churchill is a 89 y.o. male who here for follow up    CC  The follow-up for the coronary artery disease cardiomyopathy atrial fibrillation  HPI  89 years old male with known history of coronary artery disease cardiomyopathy and atrial fibrillation here for the follow-up has been doing well with no complaints of chest pains or tightness in chest no heaviness with a pressure sensation     Problem List Items Addressed This Visit        Cardiovascular and Mediastinum    Cardiomyopathy (CMS/HCC)    Coronary artery disease involving native coronary artery of native heart without angina pectoris - Primary    Relevant Orders    ECG 12 Lead    AF (atrial fibrillation) (CMS/HCC)        .    The following portions of the patient's history were reviewed and updated as appropriate: allergies, current medications, past family history, past medical history, past social history, past surgical history and problem list.    Past Medical History:   Diagnosis Date   • Abnormal ECG    • Abnormal MRI    • Acute frontal sinusitis    • Arthritis    • Chronic fatigue    • Coronary artery disease    • Dizziness    • Hearing aid worn     left   • Hearing loss    • Hyperlipidemia    • Hypertension    • Kidney stones    • Macrocytosis    • Neoplasm of skin    • Osteoporosis    • Prediabetes    • Vitamin D deficiency     reports that he has never smoked. He has never used smokeless tobacco. He reports that he does not drink alcohol or use drugs.  Family History   Problem Relation Age of Onset   • Family history unknown: Yes       Review of Systems  Constitutional: No wt loss, fever, fatigue  Gastrointestinal: No nausea, abdominal pain  Behavioral/Psych: No insomnia or anxiety   Cardiovascular No chest pains or tightness in chest  Objective:       Physical Exam           Physical Exam  /68   Pulse 54   Ht 185.4 cm (73\")   Wt 88.5 kg (195 lb)   BMI 25.73 kg/m²     General appearance: NAD, conversant   Eyes: " anicteric sclerae, moist conjunctivae; no lid-lag; PERRLA   HENT: Atraumatic; oropharynx clear with moist mucous membranes and no mucosal ulcerations;  normal hard and soft palate   Neck: Trachea midline; FROM, supple, no thyromegaly or lymphadenopathy   Lungs: CTA, with normal respiratory effort and no intercostal retractions   CV: S1-S2 regular, no murmurs, no rub, no gallop   Abdomen: Soft, non-tender; no masses or HSM   Extremities: No peripheral edema or extremity lymphadenopathy  Skin: Normal temperature, turgor and texture; no rash, ulcers or subcutaneous nodules   Psych: Appropriate affect, alert and oriented to person, place and time           Cardiographics  @  ECG 12 Lead  Date/Time: 7/3/2018 11:00 AM  Performed by: ASHISH COPELAND  Authorized by: ASHISH COPELAND   Comparison: compared with previous ECG   Similar to previous ECG  Rhythm: sinus rhythm  Other findings: LVH and LVH with strain  Clinical impression: abnormal ECG            Echocardiogram:        Current Outpatient Prescriptions:   •  acetaminophen (TYLENOL) 500 MG tablet, 1-2 TABLETS BY ONCE TO TWICE DAILY AS NEEDED FOR PAIN, Disp: 60 tablet, Rfl: 0  •  aspirin  MG tablet, Take 1 tablet by mouth Daily., Disp: , Rfl: 0  •  atorvastatin (LIPITOR) 20 MG tablet, TAKE 1 TABLET BY MOUTH EVERY NIGHT, Disp: 30 tablet, Rfl: 6  •  Chlorcyclizine-Pseudoephed 25-60 MG tablet, Take 1 tablet by mouth 2 (Two) Times a Day As Needed (Rhinitis)., Disp: 42 tablet, Rfl: 0  •  Cholecalciferol (VITAMIN D-3) 1000 UNITS capsule, Take 1,000 Units by mouth daily., Disp: , Rfl:   •  clopidogrel (PLAVIX) 75 MG tablet, TAKE 1 TABLET BY MOUTH DAILY, Disp: 30 tablet, Rfl: 3  •  Elastic Bandages & Supports (LUMBAR BACK BRACE/SUPPORT PAD) misc, Use for lifting/ strenuous exercise., Disp: 1 each, Rfl: 0  •  isosorbide mononitrate (IMDUR) 30 MG 24 hr tablet, Take 1 tablet by mouth Daily., Disp: 30 tablet, Rfl: 5  •  losartan (COZAAR) 50 MG tablet, TAKE 1  TABLET BY MOUTH DAILY, Disp: 90 tablet, Rfl: 1  •  metoprolol succinate XL (TOPROL-XL) 50 MG 24 hr tablet, Take 1 tablet by mouth Daily., Disp: 60 tablet, Rfl: 5  •  metoprolol succinate XL (TOPROL-XL) 50 MG 24 hr tablet, TAKE 1 TABLET BY MOUTH TWICE DAILY, Disp: 60 tablet, Rfl: 0  •  nitroglycerin (NITROSTAT) 0.4 MG SL tablet, Place 1 tablet under the tongue Every 5 (Five) Minutes As Needed for Chest Pain. Take no more than 3 doses in 15 minutes., Disp: 25 tablet, Rfl: 0    Current Facility-Administered Medications:   •  cyanocobalamin injection 1,000 mcg, 1,000 mcg, Intramuscular, Q28 Days, RAFA Mcfarland, 1,000 mcg at 05/29/18 0947  •  cyanocobalamin injection 1,000 mcg, 1,000 mcg, Intramuscular, Q28 Days, RAFA Mcfarland, 1,000 mcg at 03/23/18 1022  •  cyanocobalamin injection 1,000 mcg, 1,000 mcg, Intramuscular, Q28 Days, RAFA Mcfarland, 1,000 mcg at 04/23/18 0951  •  cyanocobalamin injection 1,000 mcg, 1,000 mcg, Intramuscular, Q28 Days, RAFA Mcfarland, 1,000 mcg at 06/25/18 0954   Assessment:        Patient Active Problem List   Diagnosis   • Abnormal magnetic resonance imaging study   • Arthritis   • Osteoarthritis of cervical spine   • Diplopia   • Hearing loss   • Neoplasm of uncertain behavior of skin   • Prediabetes   • Vitamin D deficiency   • Chronic fatigue   • History of supraventricular tachycardia   • Essential hypertension   • B12 deficiency   • Abnormal cardiac function test   • Cardiomyopathy (CMS/HCC)   • Coronary artery disease involving native coronary artery of native heart without angina pectoris   • History of coronary artery stent placement   • History of renal calculi   • Dyslipidemia   • Macrocytosis without anemia   • Precordial pain   • Parkinson's disease (CMS/HCC)   • History of CVA (cerebrovascular accident)   • AF (atrial fibrillation) (CMS/HCC)   • Otalgia, left   • Acute rhinitis               Plan:            ICD-10-CM ICD-9-CM   1. Coronary artery disease  involving native coronary artery of native heart without angina pectoris I25.10 414.01   2. Dilated cardiomyopathy (CMS/HCC) I42.0 425.4   3. Paroxysmal atrial fibrillation (CMS/HCC) I48.0 427.31     1. Coronary artery disease involving native coronary artery of native heart without angina pectoris  Dinesh Churchill Sr. with coronary artery disease has no angina pectoris    Risk reduction for the coronary artery disease, controlling the blood pressure, blood sugar management, cholesterol management, exercise, stress management, and proper compliance with medications and follow-up has been discussed    - ECG 12 Lead    2. Dilated cardiomyopathy (CMS/HCC)  Compensated    3. Paroxysmal atrial fibrillation (CMS/HCC)  Under control     cv stable    See us 6 months  COUNSELING:    Dinesh Forrest was given to patient for the following topics: diagnostic results, risk factor reductions, impressions, risks and benefits of treatment options and importance of treatment compliance .       SMOKING COUNSELING:    Counseling given: Not Answered      EMR Dragon/Transcription disclaimer:   Much of this encounter note is an electronic transcription/translation of spoken language to printed text. The electronic translation of spoken language may permit erroneous, or at times, nonsensical words or phrases to be inadvertently transcribed; Although I have reviewed the note for such errors, some may still exist.

## 2018-07-10 RX ORDER — METOPROLOL SUCCINATE 50 MG/1
TABLET, EXTENDED RELEASE ORAL
Qty: 60 TABLET | Refills: 0 | Status: SHIPPED | OUTPATIENT
Start: 2018-07-10 | End: 2018-09-13 | Stop reason: SDUPTHER

## 2018-07-12 ENCOUNTER — APPOINTMENT (OUTPATIENT)
Dept: GENERAL RADIOLOGY | Facility: HOSPITAL | Age: 83
End: 2018-07-12

## 2018-07-12 ENCOUNTER — HOSPITAL ENCOUNTER (EMERGENCY)
Facility: HOSPITAL | Age: 83
Discharge: HOME | End: 2018-07-12
Attending: EMERGENCY MEDICINE | Admitting: EMERGENCY MEDICINE

## 2018-07-12 VITALS
BODY MASS INDEX: 25.98 KG/M2 | SYSTOLIC BLOOD PRESSURE: 167 MMHG | HEART RATE: 61 BPM | HEIGHT: 73 IN | DIASTOLIC BLOOD PRESSURE: 75 MMHG | WEIGHT: 196 LBS | TEMPERATURE: 97.4 F | RESPIRATION RATE: 16 BRPM | OXYGEN SATURATION: 97 %

## 2018-07-12 DIAGNOSIS — S39.012A LUMBAR STRAIN, INITIAL ENCOUNTER: Primary | ICD-10-CM

## 2018-07-12 DIAGNOSIS — S09.90XA MINOR HEAD INJURY, INITIAL ENCOUNTER: ICD-10-CM

## 2018-07-12 DIAGNOSIS — S76.012A HIP STRAIN, LEFT, INITIAL ENCOUNTER: ICD-10-CM

## 2018-07-12 PROCEDURE — 73502 X-RAY EXAM HIP UNI 2-3 VIEWS: CPT

## 2018-07-12 PROCEDURE — 72110 X-RAY EXAM L-2 SPINE 4/>VWS: CPT

## 2018-07-12 PROCEDURE — 99283 EMERGENCY DEPT VISIT LOW MDM: CPT

## 2018-07-12 NOTE — DISCHARGE INSTRUCTIONS
Ice to the areas of pain as needed and 15 minute increments.  Gentle activities as tolerated.  Take Tylenol as instructed on product packaging when needed for pain.  Return to the emergency department for changes in mental status, persistent vomiting, sudden vision changes, severe headache, any concerns.

## 2018-07-12 NOTE — ED PROVIDER NOTES
EMERGENCY DEPARTMENT ENCOUNTER    Room Number:  33/33  Date seen:  7/12/2018  Time seen: 12:51 PM  PCP: RAFA Mcfarland    HPI:  Chief complaint: Low back pain  Context:Dinesh Churchill Sr. is a 89 y.o. male who presents to the ED with low back pain and left hip/groin pain after falling around 8am today. He states he was bent over picking cucumbers in the garden when he lost his balance, fell backward landing on his buttocks, then striking his head. He denies LOC, headache, vision changes, vomiting. Son is present and denies alteration in mentation. He is not on anticoagulation. He reports some pain in the left groin area with weight-bearing, always uses a walking stick and has been ambulating with it since the fall per usual.       ALLERGIES  Patient has no known allergies.    PAST MEDICAL HISTORY  Active Ambulatory Problems     Diagnosis Date Noted   • Abnormal magnetic resonance imaging study 03/18/2016   • Arthritis 03/18/2016   • Osteoarthritis of cervical spine 03/18/2016   • Diplopia 03/18/2016   • Hearing loss 03/18/2016   • Neoplasm of uncertain behavior of skin 03/18/2016   • Prediabetes 03/18/2016   • Vitamin D deficiency 03/18/2016   • Chronic fatigue 10/27/2016   • History of supraventricular tachycardia 04/07/2017   • Essential hypertension 04/07/2017   • B12 deficiency 04/19/2017   • Abnormal cardiac function test 05/08/2017   • Cardiomyopathy (CMS/formerly [facility]) 05/08/2017   • Coronary artery disease involving native coronary artery of native heart without angina pectoris 05/31/2017   • History of coronary artery stent placement 05/31/2017   • History of renal calculi 05/31/2017   • Dyslipidemia 05/31/2017   • Macrocytosis without anemia 08/09/2017   • Precordial pain 08/16/2017   • Parkinson's disease (CMS/formerly [facility]) 10/25/2017   • History of CVA (cerebrovascular accident) 10/25/2017   • AF (atrial fibrillation) (CMS/formerly [facility]) 11/29/2017   • Otalgia, left 12/29/2017   • Acute rhinitis 12/29/2017     Resolved Ambulatory  Problems     Diagnosis Date Noted   • Maxillary sinusitis 03/18/2016   • Dizziness 10/27/2016   • Acute frontal sinusitis 10/27/2016   • Precordial pain 04/07/2017   • SOB (shortness of breath) 04/07/2017     Past Medical History:   Diagnosis Date   • Abnormal ECG    • Abnormal MRI    • Acute frontal sinusitis    • Arthritis    • Chronic fatigue    • Coronary artery disease    • Dizziness    • Hearing aid worn    • Hearing loss    • Hyperlipidemia    • Hypertension    • Kidney stones    • Macrocytosis    • Neoplasm of skin    • Osteoporosis    • Prediabetes    • Vitamin D deficiency        PAST SURGICAL HISTORY  Past Surgical History:   Procedure Laterality Date   • CARDIAC CATHETERIZATION N/A 5/22/2017    Procedure: Left Heart Cath;  Surgeon: Maninder Quezada MD;  [facility];  Service:    • CARDIAC CATHETERIZATION N/A 5/23/2017    Procedure: Stent BMS coronary;  Surgeon: Maninder Quezada MD;  Location:  [facility] CATH INVASIVE LOCATION;  Service:    • HERNIA REPAIR      x 2   • HI RT/LT HEART CATHETERS N/A 5/23/2017    Procedure: Percutaneous Coronary Intervention;  Surgeon: Maninder Quezada MD;  Location: [facility] CATH INVASIVE LOCATION;  Service: Cardiovascular       FAMILY HISTORY  Family History   Problem Relation Age of Onset   • Family history unknown: Yes       SOCIAL HISTORY  Social History     Social History   • Marital status:      Spouse name: N/A   • Number of children: N/A   • Years of education: N/A     Occupational History   •  Retired     Social History Main Topics   • Smoking status: Never Smoker   • Smokeless tobacco: Never Used   • Alcohol use No   • Drug use: No   • Sexual activity: Defer     Other Topics Concern   • Not on file     Social History Narrative   • No narrative on file       REVIEW OF SYSTEMS  Review of Systems   Constitutional: Negative.    HENT: Negative.    Eyes: Negative for visual disturbance.   Respiratory: Negative for shortness  of breath.    Cardiovascular: Negative for chest pain.   Gastrointestinal: Negative for nausea and vomiting.   Musculoskeletal: Positive for arthralgias (left hip/groin) and back pain.   Skin: Negative for wound.   Neurological: Negative for dizziness, syncope, weakness and headaches.   Hematological: Does not bruise/bleed easily.       PHYSICAL EXAM  ED Triage Vitals   Temp Heart Rate Resp BP SpO2   07/12/18 1205 07/12/18 1205 07/12/18 1205 07/12/18 1212 07/12/18 1205   97.4 °F (36.3 °C) 76 16 166/76 97 %      Temp src Heart Rate Source Patient Position BP Location FiO2 (%)   -- -- -- -- --            Physical Exam   Constitutional: He is oriented to person, place, and time and well-developed, well-nourished, and in no distress.   HENT:   Head: Normocephalic and atraumatic. Head is without raccoon's eyes and without Joaquin's sign.   Right Ear: External ear normal.   Left Ear: External ear normal.   Nose: Nose normal.   Right TM occluded by cerumen  Left TM is dull, no hemotympanum. Hearing aid removed for exam. No signs of head or face trauma   Eyes: Conjunctivae and EOM are normal. Pupils are equal, round, and reactive to light.   Neck: Normal range of motion.   Cardiovascular: Normal rate and regular rhythm.    Pulmonary/Chest: Effort normal and breath sounds normal.   Musculoskeletal: Normal range of motion.   No midline C, T, or L spine tenderness. No paraspinal tenderness on exam. Mild pain in low back with range of motion of area. No tenderness to bilateral hips. Left groin pain with extension and flexion of left leg, but exhibits FROM to BLE. Extremities are otherwise atraumatic, nontender with FROM   Neurological: He is alert and oriented to person, place, and time. No cranial nerve deficit. GCS score is 15.   Skin: Skin is warm and dry.   No wounds   Psychiatric: Affect normal.   Nursing note and vitals reviewed.        RADIOLOGY  XR Spine Lumbar 4+ View   Final Result      XR Hip With or Without Pelvis 2  "- 3 View Left   Final Result        Voice clips confirm no acute findings.    I ordered the above noted radiological studies and reviewed the images on the PACS system.        PROCEDURES  Procedures      PROGRESS AND CONSULTS    Progress Notes:           1240 Reviewed pt's history and workup with Dr. Kidd.  After a bedside evaluation; Dr Kidd agrees with the plan of care    1430 I rechecked the patient.  Discussed all results with he and his son.  Also discussed head injury precautions.  At this time the patient has no evidence of head trauma and no traumatic symptoms.  Activity as tolerated, he can take Tylenol as needed for pain and follow-up with his PCP.  He and family are agreeable with this plan.    1455 patient ambulated out of the ER using his walking stick without assistance with a steady gait.        Disposition vitals:  /75   Pulse 61   Temp 97.4 °F (36.3 °C)   Resp 16   Ht 185.4 cm (73\")   Wt 88.9 kg (196 lb)   SpO2 97%   BMI 25.86 kg/m²       DIAGNOSIS  Final diagnoses:   Lumbar strain, initial encounter   Hip strain, left, initial encounter   Minor head injury, initial encounter       FOLLOW UP   RAFA Mcfarland  [address] [facility] [address] [phone]    In 3 days  As needed      RX     Medication List      No changes were made to your prescriptions during this visit.              RAFA Kraus  07/12/18 1650    "

## 2018-07-12 NOTE — ED PROVIDER NOTES
MD ATTESTATION NOTE    The CHEYANNE and I have discussed this patient's history, physical exam, and treatment plan.  I have reviewed the documentation and personally had a face to face interaction with the patient. I affirm the documentation and agree with the treatment and plan.  The attached note describes my personal findings.      Pt presents to the ED c/o lower back pain onset earlier today s/p mechanical fall sustained this AM. Pt reports that while he was in the garden, pt lost his balance and fell backwards. Pt denies headache, LOC, abdominal pain, chest pain, dyspnea, nausea, vomiting, neck pain, visual difficulties, and dizziness/lightheadedness. Pt reports that he takes ASA, but is not on any other antiplatelets or anticoagulants. On physical exam, pt is alert and oriented x3. Chest is nontender. Abdomen is soft and nontender. C-Spine and T-Spine are nontender. There is mild tenderness of the lumbar region.   Pt's L-Spine X-Ray and left hip X-Ray show chronic degenerative changes, but nothing acute otherwise. Pt was advised to take tylenol for pain control and to f/u with PCP.          Documentation assistance provided by Sarahi Workman. Information recorded by the scribe was done at my direction and has been verified and validated by me.     Entered by Sarahi Workman, acting as scribe for Dr. Marti MD.           Sarahi Workman  07/12/18 2221       Ruben Kidd MD  07/12/18 6902

## 2018-07-13 ENCOUNTER — TELEPHONE (OUTPATIENT)
Dept: SOCIAL WORK | Facility: HOSPITAL | Age: 83
End: 2018-07-13

## 2018-07-16 RX ORDER — ISOSORBIDE MONONITRATE 30 MG/1
TABLET, EXTENDED RELEASE ORAL
Qty: 30 TABLET | Refills: 3 | Status: SHIPPED | OUTPATIENT
Start: 2018-07-16 | End: 2018-11-01 | Stop reason: SDUPTHER

## 2018-07-23 ENCOUNTER — CLINICAL SUPPORT (OUTPATIENT)
Dept: FAMILY MEDICINE CLINIC | Facility: CLINIC | Age: 83
End: 2018-07-23

## 2018-07-23 ENCOUNTER — TELEPHONE (OUTPATIENT)
Dept: CARDIOLOGY | Facility: CLINIC | Age: 83
End: 2018-07-23

## 2018-07-23 DIAGNOSIS — E53.8 B12 DEFICIENCY: ICD-10-CM

## 2018-07-23 PROCEDURE — 96372 THER/PROPH/DIAG INJ SC/IM: CPT | Performed by: PHYSICIAN ASSISTANT

## 2018-07-23 RX ADMIN — CYANOCOBALAMIN 1000 MCG: 1000 INJECTION, SOLUTION INTRAMUSCULAR; SUBCUTANEOUS at 10:14

## 2018-07-23 NOTE — TELEPHONE ENCOUNTER
Pt son called pt has some Allergies would like to know what is safe to take with stent?      Per dr avinash Cervantes       S/w pt son gave instructions

## 2018-08-07 RX ORDER — METOPROLOL SUCCINATE 50 MG/1
50 TABLET, EXTENDED RELEASE ORAL DAILY
Qty: 60 TABLET | Refills: 0 | Status: SHIPPED | OUTPATIENT
Start: 2018-08-07 | End: 2018-10-08 | Stop reason: SDUPTHER

## 2018-08-23 ENCOUNTER — CLINICAL SUPPORT (OUTPATIENT)
Dept: FAMILY MEDICINE CLINIC | Facility: CLINIC | Age: 83
End: 2018-08-23

## 2018-08-23 DIAGNOSIS — E53.8 B12 DEFICIENCY: ICD-10-CM

## 2018-08-23 PROCEDURE — 96372 THER/PROPH/DIAG INJ SC/IM: CPT | Performed by: PHYSICIAN ASSISTANT

## 2018-08-23 RX ADMIN — CYANOCOBALAMIN 1000 MCG: 1000 INJECTION, SOLUTION INTRAMUSCULAR; SUBCUTANEOUS at 09:46

## 2018-09-04 RX ORDER — ATORVASTATIN CALCIUM 20 MG/1
TABLET, FILM COATED ORAL
Qty: 30 TABLET | Refills: 0 | Status: SHIPPED | OUTPATIENT
Start: 2018-09-04 | End: 2018-10-01 | Stop reason: SDUPTHER

## 2018-09-13 RX ORDER — METOPROLOL SUCCINATE 50 MG/1
TABLET, EXTENDED RELEASE ORAL
Qty: 60 TABLET | Refills: 0 | Status: SHIPPED | OUTPATIENT
Start: 2018-09-13 | End: 2018-11-12 | Stop reason: SDUPTHER

## 2018-09-27 ENCOUNTER — CLINICAL SUPPORT (OUTPATIENT)
Dept: FAMILY MEDICINE CLINIC | Facility: CLINIC | Age: 83
End: 2018-09-27

## 2018-09-27 DIAGNOSIS — E53.8 VITAMIN B12 DEFICIENCY: ICD-10-CM

## 2018-09-27 DIAGNOSIS — Z23 ENCOUNTER FOR IMMUNIZATION: Primary | ICD-10-CM

## 2018-09-27 DIAGNOSIS — E53.8 B12 DEFICIENCY: ICD-10-CM

## 2018-09-27 PROCEDURE — 96372 THER/PROPH/DIAG INJ SC/IM: CPT | Performed by: FAMILY MEDICINE

## 2018-09-27 PROCEDURE — G0008 ADMIN INFLUENZA VIRUS VAC: HCPCS | Performed by: FAMILY MEDICINE

## 2018-09-27 PROCEDURE — 90662 IIV NO PRSV INCREASED AG IM: CPT | Performed by: FAMILY MEDICINE

## 2018-09-27 RX ORDER — CYANOCOBALAMIN 1000 UG/ML
1000 INJECTION, SOLUTION INTRAMUSCULAR; SUBCUTANEOUS
Status: DISCONTINUED | OUTPATIENT
Start: 2018-09-27 | End: 2019-08-28

## 2018-09-27 RX ADMIN — CYANOCOBALAMIN 1000 MCG: 1000 INJECTION, SOLUTION INTRAMUSCULAR; SUBCUTANEOUS at 10:01

## 2018-10-01 RX ORDER — ATORVASTATIN CALCIUM 20 MG/1
TABLET, FILM COATED ORAL
Qty: 30 TABLET | Refills: 1 | Status: SHIPPED | OUTPATIENT
Start: 2018-10-01 | End: 2018-12-04 | Stop reason: SDUPTHER

## 2018-10-09 RX ORDER — METOPROLOL SUCCINATE 50 MG/1
50 TABLET, EXTENDED RELEASE ORAL DAILY
Qty: 60 TABLET | Refills: 3 | Status: SHIPPED | OUTPATIENT
Start: 2018-10-09 | End: 2019-09-08 | Stop reason: HOSPADM

## 2018-10-22 RX ORDER — CLOPIDOGREL BISULFATE 75 MG/1
75 TABLET ORAL DAILY
Qty: 30 TABLET | Refills: 0 | Status: SHIPPED | OUTPATIENT
Start: 2018-10-22 | End: 2018-11-19 | Stop reason: SDUPTHER

## 2018-11-02 RX ORDER — ISOSORBIDE MONONITRATE 30 MG/1
TABLET, EXTENDED RELEASE ORAL
Qty: 30 TABLET | Refills: 5 | OUTPATIENT
Start: 2018-11-02 | End: 2018-12-06

## 2018-11-12 RX ORDER — METOPROLOL SUCCINATE 50 MG/1
TABLET, EXTENDED RELEASE ORAL
Qty: 60 TABLET | Refills: 0 | Status: SHIPPED | OUTPATIENT
Start: 2018-11-12 | End: 2019-01-14 | Stop reason: SDUPTHER

## 2018-11-19 RX ORDER — CLOPIDOGREL BISULFATE 75 MG/1
75 TABLET ORAL DAILY
Qty: 30 TABLET | Refills: 0 | Status: SHIPPED | OUTPATIENT
Start: 2018-11-19 | End: 2019-04-27 | Stop reason: SDUPTHER

## 2018-11-20 ENCOUNTER — CLINICAL SUPPORT (OUTPATIENT)
Dept: FAMILY MEDICINE CLINIC | Facility: CLINIC | Age: 83
End: 2018-11-20

## 2018-11-20 DIAGNOSIS — E53.8 B12 DEFICIENCY: Primary | ICD-10-CM

## 2018-11-20 PROCEDURE — 96372 THER/PROPH/DIAG INJ SC/IM: CPT | Performed by: FAMILY MEDICINE

## 2018-11-20 RX ORDER — CYANOCOBALAMIN 1000 UG/ML
1000 INJECTION, SOLUTION INTRAMUSCULAR; SUBCUTANEOUS
Status: DISCONTINUED | OUTPATIENT
Start: 2018-11-20 | End: 2019-02-28

## 2018-11-20 RX ADMIN — CYANOCOBALAMIN 1000 MCG: 1000 INJECTION, SOLUTION INTRAMUSCULAR; SUBCUTANEOUS at 10:03

## 2018-12-04 RX ORDER — LOSARTAN POTASSIUM 50 MG/1
TABLET ORAL
Qty: 30 TABLET | Refills: 0 | OUTPATIENT
Start: 2018-12-04

## 2018-12-04 RX ORDER — ATORVASTATIN CALCIUM 20 MG/1
TABLET, FILM COATED ORAL
Qty: 30 TABLET | Refills: 0 | OUTPATIENT
Start: 2018-12-04

## 2018-12-04 RX ORDER — LOSARTAN POTASSIUM 50 MG/1
50 TABLET ORAL DAILY
Qty: 30 TABLET | Refills: 1 | Status: SHIPPED | OUTPATIENT
Start: 2018-12-04 | End: 2019-01-27 | Stop reason: SDUPTHER

## 2018-12-04 RX ORDER — ATORVASTATIN CALCIUM 20 MG/1
20 TABLET, FILM COATED ORAL NIGHTLY
Qty: 30 TABLET | Refills: 1 | Status: SHIPPED | OUTPATIENT
Start: 2018-12-04 | End: 2019-01-27 | Stop reason: SDUPTHER

## 2018-12-06 ENCOUNTER — APPOINTMENT (OUTPATIENT)
Dept: GENERAL RADIOLOGY | Facility: HOSPITAL | Age: 83
End: 2018-12-06

## 2018-12-06 ENCOUNTER — HOSPITAL ENCOUNTER (EMERGENCY)
Facility: HOSPITAL | Age: 83
Discharge: HOME | End: 2018-12-06
Attending: EMERGENCY MEDICINE | Admitting: EMERGENCY MEDICINE

## 2018-12-06 VITALS
WEIGHT: 192.8 LBS | TEMPERATURE: 97.1 F | OXYGEN SATURATION: 97 % | RESPIRATION RATE: 18 BRPM | HEIGHT: 73 IN | HEART RATE: 58 BPM | DIASTOLIC BLOOD PRESSURE: 68 MMHG | SYSTOLIC BLOOD PRESSURE: 180 MMHG | BODY MASS INDEX: 25.55 KG/M2

## 2018-12-06 DIAGNOSIS — R07.9 CHEST PAIN, UNSPECIFIED TYPE: Primary | ICD-10-CM

## 2018-12-06 LAB
ANION GAP SERPL CALCULATED.3IONS-SCNC: 9.5 MMOL/L
BASOPHILS # BLD AUTO: 0.01 10*3/MM3 (ref 0–0.2)
BASOPHILS NFR BLD AUTO: 0.2 % (ref 0–1.5)
BUN BLD-MCNC: 18 MG/DL (ref 8–23)
BUN/CREAT SERPL: 20 (ref 7–25)
CALCIUM SPEC-SCNC: 8.8 MG/DL (ref 8.2–9.6)
CHLORIDE SERPL-SCNC: 105 MMOL/L (ref 98–107)
CO2 SERPL-SCNC: 25.5 MMOL/L (ref 22–29)
CREAT BLD-MCNC: 0.9 MG/DL (ref 0.76–1.27)
DEPRECATED RDW RBC AUTO: 62.1 FL (ref 37–54)
EOSINOPHIL # BLD AUTO: 0.09 10*3/MM3 (ref 0–0.7)
EOSINOPHIL NFR BLD AUTO: 1.8 % (ref 0.3–6.2)
ERYTHROCYTE [DISTWIDTH] IN BLOOD BY AUTOMATED COUNT: 16.2 % (ref 11.5–14.5)
GFR SERPL CREATININE-BSD FRML MDRD: 79 ML/MIN/1.73
GLUCOSE BLD-MCNC: 117 MG/DL (ref 65–99)
HCT VFR BLD AUTO: 42.9 % (ref 40.4–52.2)
HGB BLD-MCNC: 13.2 G/DL (ref 13.7–17.6)
IMM GRANULOCYTES # BLD: 0 10*3/MM3 (ref 0–0.03)
IMM GRANULOCYTES NFR BLD: 0 % (ref 0–0.5)
LYMPHOCYTES # BLD AUTO: 1.38 10*3/MM3 (ref 0.9–4.8)
LYMPHOCYTES NFR BLD AUTO: 26.9 % (ref 19.6–45.3)
MCH RBC QN AUTO: 32.3 PG (ref 27–32.7)
MCHC RBC AUTO-ENTMCNC: 30.8 G/DL (ref 32.6–36.4)
MCV RBC AUTO: 104.9 FL (ref 79.8–96.2)
MONOCYTES # BLD AUTO: 0.28 10*3/MM3 (ref 0.2–1.2)
MONOCYTES NFR BLD AUTO: 5.5 % (ref 5–12)
NEUTROPHILS # BLD AUTO: 3.37 10*3/MM3 (ref 1.9–8.1)
NEUTROPHILS NFR BLD AUTO: 65.6 % (ref 42.7–76)
PLATELET # BLD AUTO: 129 10*3/MM3 (ref 140–500)
PMV BLD AUTO: 11.3 FL (ref 6–12)
POTASSIUM BLD-SCNC: 5.1 MMOL/L (ref 3.5–5.2)
RBC # BLD AUTO: 4.09 10*6/MM3 (ref 4.6–6)
SODIUM BLD-SCNC: 140 MMOL/L (ref 136–145)
TROPONIN T SERPL-MCNC: <0.01 NG/ML (ref 0–0.03)
TROPONIN T SERPL-MCNC: <0.01 NG/ML (ref 0–0.03)
WBC NRBC COR # BLD: 5.13 10*3/MM3 (ref 4.5–10.7)

## 2018-12-06 PROCEDURE — 99284 EMERGENCY DEPT VISIT MOD MDM: CPT

## 2018-12-06 PROCEDURE — 93005 ELECTROCARDIOGRAM TRACING: CPT | Performed by: EMERGENCY MEDICINE

## 2018-12-06 PROCEDURE — 80048 BASIC METABOLIC PNL TOTAL CA: CPT | Performed by: EMERGENCY MEDICINE

## 2018-12-06 PROCEDURE — 93005 ELECTROCARDIOGRAM TRACING: CPT

## 2018-12-06 PROCEDURE — 71046 X-RAY EXAM CHEST 2 VIEWS: CPT

## 2018-12-06 PROCEDURE — 85025 COMPLETE CBC W/AUTO DIFF WBC: CPT | Performed by: EMERGENCY MEDICINE

## 2018-12-06 PROCEDURE — 93010 ELECTROCARDIOGRAM REPORT: CPT | Performed by: INTERNAL MEDICINE

## 2018-12-06 PROCEDURE — 84484 ASSAY OF TROPONIN QUANT: CPT | Performed by: EMERGENCY MEDICINE

## 2018-12-06 RX ORDER — SODIUM CHLORIDE 0.9 % (FLUSH) 0.9 %
10 SYRINGE (ML) INJECTION AS NEEDED
Status: DISCONTINUED | OUTPATIENT
Start: 2018-12-06 | End: 2018-12-06 | Stop reason: HOSPADM

## 2018-12-06 RX ORDER — ISOSORBIDE MONONITRATE 60 MG/1
60 TABLET, EXTENDED RELEASE ORAL DAILY
Qty: 30 TABLET | Refills: 0 | Status: SHIPPED | OUTPATIENT
Start: 2018-12-06 | End: 2018-12-26 | Stop reason: SDUPTHER

## 2018-12-06 RX ORDER — NITROGLYCERIN 0.4 MG/1
0.4 TABLET SUBLINGUAL
Status: DISCONTINUED | OUTPATIENT
Start: 2018-12-06 | End: 2018-12-06 | Stop reason: HOSPADM

## 2018-12-06 RX ADMIN — NITROGLYCERIN 0.4 MG: 0.4 TABLET, ORALLY DISINTEGRATING SUBLINGUAL at 11:45

## 2018-12-06 NOTE — DISCHARGE INSTRUCTIONS
Your imdur has been changed from 30 mg daily to 60mg daily.  The prescription has been called in to [facility]. Follow up with your cardiologist in 1-2 days.

## 2018-12-06 NOTE — ED PROVIDER NOTES
" EMERGENCY DEPARTMENT ENCOUNTER    Room Number:  23/23  Date seen:  12/6/2018  Time seen: 11:29 AM  PCP: Phyllis Loving PA  Historian: Pt, son      HPI:  Chief Complaint: Chest pain  Context: Dinesh Churchill Sr. is a 89 y.o. male with hx of CAD that is s/p cardiac catheterization with stent placement in 05/2017 with Dr. Quezada who presents to the ED c/o intermittent L upper chest pain describes as a \"dull ache\" starting yesterday afternoon. Pt denies radiation of his pain. He rates his pain as a 3-4/10. Pt reports he is chronically SOA after cardiac stent placement in 05/2017. Pt denies N/V. Pt states he is on ASA and plavix.     Pain Location: L upper chest  Radiation: none  Quality: \"dull ache\"  Intensity/Severity: 3-4/10  Duration: since yesterday  Onset quality: gradual  Timing: intermittetn  Progression: unchanged  Aggravating Factors: none stated  Alleviating Factors: none stated  Previous Episodes: Pt has a hx of CAD  Treatment before arrival: Pt is s/p cardiac catheterization in 05/2017 with Dr. Quezada  Associated Symptoms: chronic SOA    PAST MEDICAL HISTORY  Active Ambulatory Problems     Diagnosis Date Noted   • Abnormal magnetic resonance imaging study 03/18/2016   • Arthritis 03/18/2016   • Osteoarthritis of cervical spine 03/18/2016   • Diplopia 03/18/2016   • Hearing loss 03/18/2016   • Neoplasm of uncertain behavior of skin 03/18/2016   • Prediabetes 03/18/2016   • Vitamin D deficiency 03/18/2016   • Chronic fatigue 10/27/2016   • History of supraventricular tachycardia 04/07/2017   • Essential hypertension 04/07/2017   • B12 deficiency 04/19/2017   • Abnormal cardiac function test 05/08/2017   • Cardiomyopathy (CMS/HCC) 05/08/2017   • Coronary artery disease involving native coronary artery of native heart without angina pectoris 05/31/2017   • History of coronary artery stent placement 05/31/2017   • History of renal calculi 05/31/2017   • Dyslipidemia 05/31/2017   • Macrocytosis " without anemia 08/09/2017   • Precordial pain 08/16/2017   • Parkinson's disease (CMS/[facility]) 10/25/2017   • History of CVA (cerebrovascular accident) 10/25/2017   • AF (atrial fibrillation) (CMS/[facility]) 11/29/2017   • Otalgia, left 12/29/2017   • Acute rhinitis 12/29/2017     Resolved Ambulatory Problems     Diagnosis Date Noted   • Maxillary sinusitis 03/18/2016   • Dizziness 10/27/2016   • Acute frontal sinusitis 10/27/2016   • Precordial pain 04/07/2017   • SOB (shortness of breath) 04/07/2017     Past Medical History:   Diagnosis Date   • Abnormal ECG    • Abnormal MRI    • Acute frontal sinusitis    • Arthritis    • Chronic fatigue    • Coronary artery disease    • Dizziness    • Hearing aid worn    • Hearing loss    • Hyperlipidemia    • Hypertension    • Kidney stones    • Macrocytosis    • Neoplasm of skin    • Osteoporosis    • Prediabetes    • Vitamin D deficiency          PAST SURGICAL HISTORY  Past Surgical History:   Procedure Laterality Date   • HERNIA REPAIR      x 2         FAMILY HISTORY  Family History   Family history unknown: Yes         SOCIAL HISTORY  Social History     Socioeconomic History   • Marital status:      Spouse name: Not on file   • Number of children: Not on file   • Years of education: Not on file   • Highest education level: Not on file   Social Needs   • Financial resource strain: Not on file   • Food insecurity - worry: Not on file   • Food insecurity - inability: Not on file   • Transportation needs - medical: Not on file   • Transportation needs - non-medical: Not on file   Occupational History     Employer: RETIRED   Tobacco Use   • Smoking status: Never Smoker   • Smokeless tobacco: Never Used   Substance and Sexual Activity   • Alcohol use: No   • Drug use: No   • Sexual activity: Defer   Other Topics Concern   • Not on file   Social History Narrative   • Not on file         ALLERGIES  Patient has no known allergies.        REVIEW OF SYSTEMS  Review of Systems    Constitutional: Negative for diaphoresis and fever.   HENT: Negative for congestion.    Eyes: Negative for visual disturbance.   Respiratory: Positive for shortness of breath (chronic).    Cardiovascular: Positive for chest pain. Negative for palpitations.   Gastrointestinal: Negative for blood in stool and vomiting.   Endocrine: Negative for polyuria.   Genitourinary: Negative for flank pain.   Musculoskeletal: Negative for joint swelling.   Skin: Negative for wound.   Neurological: Negative for seizures.   Hematological: Negative for adenopathy.   Psychiatric/Behavioral: Negative for sleep disturbance.            PHYSICAL EXAM  ED Triage Vitals   Temp Heart Rate Resp BP SpO2   12/06/18 1051 12/06/18 1051 12/06/18 1051 12/06/18 1100 12/06/18 1051   97.1 °F (36.2 °C) 79 18 164/63 96 %      Temp src Heart Rate Source Patient Position BP Location FiO2 (%)   12/06/18 1051 -- -- -- --   Tympanic             GENERAL: no acute distress  HENT: nares patent  EYES: no scleral icterus  CV: regular rhythm, normal rate, heart sounds normal, no murmur  RESPIRATORY: normal effort, CTAB  ABDOMEN: soft, non-tender  MUSCULOSKELETAL: no deformity, no chest wall tenderness, no lower extremity edema, no calf tenderness  NEURO: alert, moves all extremities, follows commands  SKIN: warm, dry    Vital signs and nursing notes reviewed.          LAB RESULTS  Recent Results (from the past 24 hour(s))   Basic Metabolic Panel    Collection Time: 12/06/18 11:40 AM   Result Value Ref Range    Glucose 117 (H) 65 - 99 mg/dL    BUN 18 8 - 23 mg/dL    Creatinine 0.90 0.76 - 1.27 mg/dL    Sodium 140 136 - 145 mmol/L    Potassium 5.1 3.5 - 5.2 mmol/L    Chloride 105 98 - 107 mmol/L    CO2 25.5 22.0 - 29.0 mmol/L    Calcium 8.8 8.2 - 9.6 mg/dL    eGFR Non African Amer 79 >60 mL/min/1.73    BUN/Creatinine Ratio 20.0 7.0 - 25.0    Anion Gap 9.5 mmol/L   Troponin    Collection Time: 12/06/18 11:40 AM   Result Value Ref Range    Troponin T <0.010 0.000 -  0.030 ng/mL   CBC Auto Differential    Collection Time: 12/06/18 11:40 AM   Result Value Ref Range    WBC 5.13 4.50 - 10.70 10*3/mm3    RBC 4.09 (L) 4.60 - 6.00 10*6/mm3    Hemoglobin 13.2 (L) 13.7 - 17.6 g/dL    Hematocrit 42.9 40.4 - 52.2 %    .9 (H) 79.8 - 96.2 fL    MCH 32.3 27.0 - 32.7 pg    MCHC 30.8 (L) 32.6 - 36.4 g/dL    RDW 16.2 (H) 11.5 - 14.5 %    RDW-SD 62.1 (H) 37.0 - 54.0 fl    MPV 11.3 6.0 - 12.0 fL    Platelets 129 (L) 140 - 500 10*3/mm3    Neutrophil % 65.6 42.7 - 76.0 %    Lymphocyte % 26.9 19.6 - 45.3 %    Monocyte % 5.5 5.0 - 12.0 %    Eosinophil % 1.8 0.3 - 6.2 %    Basophil % 0.2 0.0 - 1.5 %    Immature Grans % 0.0 0.0 - 0.5 %    Neutrophils, Absolute 3.37 1.90 - 8.10 10*3/mm3    Lymphocytes, Absolute 1.38 0.90 - 4.80 10*3/mm3    Monocytes, Absolute 0.28 0.20 - 1.20 10*3/mm3    Eosinophils, Absolute 0.09 0.00 - 0.70 10*3/mm3    Basophils, Absolute 0.01 0.00 - 0.20 10*3/mm3    Immature Grans, Absolute 0.00 0.00 - 0.03 10*3/mm3   Troponin    Collection Time: 12/06/18  2:12 PM   Result Value Ref Range    Troponin T <0.010 0.000 - 0.030 ng/mL       Ordered the above labs and reviewed the results.        RADIOLOGY  Xr Chest 2 View    Result Date: 12/6/2018  Narrative: XR CHEST 2 VW-  HISTORY: Male who is 89 years-old,  chest pain  TECHNIQUE: Frontal and lateral views of the chest  COMPARISON: 11/29/2017  FINDINGS: Heart is enlarged. Aorta is tortuous. Pulmonary vasculature is unremarkable. Small left basilar atelectasis/infiltrate/effusion represents adverse interval change, follow-up recommended to characterize resolution and to exclude any possibility of underlying lesion. Minimal right pleural effusion is also suggested. No pneumothorax. No acute osseous process.      Impression: Small left basilar atelectasis/infiltrate/effusion, follow-up recommended. Cardiomegaly. Tortuous aorta.  This report was finalized on 12/6/2018 12:23 PM by Dr. Oscar Emery M.D.        Ordered the above  noted radiological studies. Reviewed by me in PACS.        PROCEDURES  Procedures        EKG:           EKG time: 1129  Rhythm/Rate: nsr, 95  P waves and [place]: 1st degree AV block  QRS, axis: LAD, IVCD   ST and T waves: slight ST depression in leads aVL and V6     Interpreted Contemporaneously by me, independently viewed  similar compared to prior 11/30/17      MEDICATIONS GIVEN IN ER  Medications   sodium chloride 0.9 % flush 10 mL (not administered)   nitroglycerin (NITROSTAT) SL tablet 0.4 mg (0.4 mg Sublingual Given 12/6/18 1145)     PROGRESS AND CONSULTS       1140 - NTG ordered. Lab work and CXR ordered for further evaluation.     1245 - Call placed with [facility].     1309 - Rechecked pt. Pt is resting comfortably in NAD, and states his CP has resolved after one NTG. Stated the results of his lab work including his negative troponin and CXR results. D/w pt the plan to discuss his case with cardiology.     1353 - Discussed pt care with Dr. Quezada (Cardiology) who recommended increasing imdur dosing from 30 to 60 daily and take NTG PRN at home, and that he can follow up in office in the next couple of days.     1407 - Repeat troponin and EKG ordered for further evaluation.     1410 - Rechecked pt. Pt is resting comfortably in NAD. Informed pt of the discussion with Dr. Quezada with plans to acquire serial troponin, and if that is negative then discharge home with an increase dosing in his imdur and a follow up with Dr. Quezada in the next few days. Pt understands and agrees with plan. All questions answered.     1454 - Rechecked pt. Pt is resting comfortably in NAD. Informed pt of the results of his second troponin. Stated the plan to discharge home on an increased dosing (60 mg) of imdur and to take NTG as needed for pain, and instructed them to stop the pt's previous 30 mg of imdur. Discussed the plan to follow up with Dr. Quezada. They understand and agree with plan. All questions answered.        MEDICAL DECISION MAKING    Chest pain alleviated by nitro here.  HEART score 5.  Initial and 3 hour EKG, Tn reassuring.  Discussed with his cardiologist who recommended increasing his Imdur from 30 to 60mg daily and having patient follow up with him in office urgently.  Return precautions discussed. Labs reassuring.  CXR with small left pleural effusion.      MDM  Number of Diagnoses or Management Options  Chest pain, unspecified type:      Amount and/or Complexity of Data Reviewed  Clinical lab tests: ordered and reviewed (First and second troponin - negative)  Tests in the radiology section of CPT®: ordered and reviewed (CXR - small left basilar atelectasis )  Tests in the medicine section of CPT®: reviewed and ordered (See EKG procedure note.)  Decide to obtain previous medical records or to obtain history from someone other than the patient: yes  Review and summarize past medical records: yes (Pt had a stent placed in the RCA in 05/2017 with Dr. Quezada. He had a stress test in 08/2017 that was negative for ischemia. )  Discuss the patient with other providers: yes (Dr. Quezada ([facility]               DIAGNOSIS  Final diagnoses:   Chest pain, unspecified type         DISPOSITION  DISCHARGE    Patient discharged in stable condition.    Reviewed implications of results, diagnosis, meds, responsibility to follow up, warning signs and symptoms of possible worsening, potential complications and reasons to return to ER.    Patient/Family voiced understanding of above instructions.    Discussed plan for discharge, as there is no emergent indication for admission. Patient referred to primary care provider for BP management due to today's BP. Pt/family is agreeable and understands need for follow up and repeat testing.  Pt is aware that discharge does not mean that nothing is wrong but it indicates no emergency is present that requires admission and they must continue care with follow-up as given below or  physician of their choice.     FOLLOW-UP  Maninder Quezada MD  [address] [address]  [phone]    Call in 1 day           Medication List      Changed    isosorbide mononitrate 60 MG 24 hr tablet  Commonly known as:  IMDUR  Take 1 tablet by mouth Daily.  What changed:    medication strength  how much to take                Latest Documented Vital Signs:  As of 3:02 PM  BP- 151/64 HR- 55 Temp- 97.1 °F (36.2 °C) (Tympanic) O2 sat- 99%        --  Documentation assistance provided by miladis Gee for Dr. WILD Herman MD.  Information recorded by the miladis was done at my direction and has been verified and validated by me.       Oli Gee  12/06/18 1220       Oli Gee  12/06/18 1333       Oli Gee  12/06/18 1414       Oli Gee  12/06/18 1415       Oli Gee  12/06/18 4943       Ishaan Herman MD  12/06/18 2219

## 2018-12-07 ENCOUNTER — PATIENT OUTREACH (OUTPATIENT)
Dept: CASE MANAGEMENT | Facility: OTHER | Age: 83
End: 2018-12-07

## 2018-12-07 ENCOUNTER — EPISODE CHANGES (OUTPATIENT)
Dept: CASE MANAGEMENT | Facility: OTHER | Age: 83
End: 2018-12-07

## 2018-12-07 NOTE — OUTREACH NOTE
"RN-CA spoke with patient's son, Dinesh Lopez.  He reports that he lives about 25 miles away from his father but had checked on him this morning and pt was doing well with no pain or concerns.  He indicates patient was \"set\" with all medications, food and personal needs covered.  He stated that neighbors check on his father daily and contact him as needed.  Dinesh Jones stated patient was too \"hard of hearing\" for phone calls and he was the point of contact.  He stated a follow up appointment had been scheduled with cardiology for 12/12/18 and denied questions or concerns related to this father's care.   He does not wish to have additional CA calls at this time.    "

## 2018-12-12 ENCOUNTER — OFFICE VISIT (OUTPATIENT)
Dept: CARDIOLOGY | Facility: CLINIC | Age: 83
End: 2018-12-12

## 2018-12-12 VITALS
BODY MASS INDEX: 25.31 KG/M2 | WEIGHT: 191 LBS | SYSTOLIC BLOOD PRESSURE: 144 MMHG | HEIGHT: 73 IN | HEART RATE: 70 BPM | DIASTOLIC BLOOD PRESSURE: 79 MMHG

## 2018-12-12 DIAGNOSIS — I48.91 ATRIAL FIBRILLATION, UNSPECIFIED TYPE (HCC): ICD-10-CM

## 2018-12-12 DIAGNOSIS — I42.0 DILATED CARDIOMYOPATHY (HCC): ICD-10-CM

## 2018-12-12 DIAGNOSIS — I25.10 CORONARY ARTERY DISEASE INVOLVING NATIVE CORONARY ARTERY OF NATIVE HEART WITHOUT ANGINA PECTORIS: Primary | ICD-10-CM

## 2018-12-12 PROCEDURE — 93000 ELECTROCARDIOGRAM COMPLETE: CPT | Performed by: NURSE PRACTITIONER

## 2018-12-12 PROCEDURE — 99213 OFFICE O/P EST LOW 20 MIN: CPT | Performed by: NURSE PRACTITIONER

## 2018-12-12 NOTE — PROGRESS NOTES
Subjective:        Dinesh Churchill Sr. is a 89 y.o. male who here for follow up    Chief Complaint   Patient presents with   • Follow-up     ER FOLLOW UP, CP   chest pain     HPI  Dinesh Churchill is a 89 years old male who is new to me.  He has a  known history of coronary artery disease,cardiomyopathy, and history of atrial fibrillation here for the follow-up for an ER visit on 12/06/18.  He went to the ER for chest pain.  The ER physician increased Imdur to 60 mg dailyfollow up. He has  doing well with no complaints of chest pains or tightness in chest no heaviness with a pressure sensation, or shortness of breath at this time.    The following portions of the patient's history were reviewed and updated as appropriate: allergies, current medications, past family history, past medical history, past social history, past surgical history and problem list.    Past Medical History:   Diagnosis Date   • Abnormal ECG    • Abnormal MRI    • Acute frontal sinusitis    • Arthritis    • Chronic fatigue    • Coronary artery disease    • Dizziness    • Hearing aid worn     left   • Hearing loss    • Hyperlipidemia    • Hypertension    • Kidney stones    • Macrocytosis    • Neoplasm of skin    • Osteoporosis    • Prediabetes    • Vitamin D deficiency          reports that  has never smoked. he has never used smokeless tobacco. He reports that he does not drink alcohol or use drugs.     Family History   Family history unknown: Yes       ROS     Review of Systems  Constitutional: No wt loss, fever, fatigue  Gastrointestinal: No nausea, abdominal pain  Behavioral/Psych: No insomnia or anxiety  Cardiovascular Denies chest pain/pressure      Objective:           Physical Exam   Constitutional: He is oriented to person, place, and time. He appears well-developed and well-nourished.   HENT:   Head: Normocephalic.   Right Ear: External ear normal.   Left Ear: External ear normal.   Eyes: EOM are normal.   Neck: Normal range of motion.  No JVD present.   Cardiovascular: Normal rate, regular rhythm, normal heart sounds and intact distal pulses. Exam reveals no gallop and no friction rub.   No murmur heard.  Pulmonary/Chest: Effort normal and breath sounds normal. No stridor. No respiratory distress. He has no rales.   Abdominal: Soft. Bowel sounds are normal. He exhibits no distension. There is no tenderness. There is no guarding.   Musculoskeletal: Normal range of motion. He exhibits no edema or tenderness.   Neurological: He is alert and oriented to person, place, and time. He has normal reflexes.   Skin: Skin is warm.   Psychiatric: He has a normal mood and affect. Judgment normal.   Nursing note and vitals reviewed.        ECG 12 Lead  Date/Time: 12/12/2018 10:47 AM  Performed by: Juliana Jesus APRN  Authorized by: Juliana Jesus APRN   Comparison: compared with previous ECG   Rhythm: sinus rhythm  Conduction: 1st degree  Clinical impression: abnormal ECG                Current Outpatient Medications:   •  acetaminophen (TYLENOL) 500 MG tablet, 1-2 TABLETS BY ONCE TO TWICE DAILY AS NEEDED FOR PAIN, Disp: 60 tablet, Rfl: 0  •  aspirin  MG tablet, Take 1 tablet by mouth Daily., Disp: , Rfl: 0  •  atorvastatin (LIPITOR) 20 MG tablet, Take 1 tablet by mouth Every Night., Disp: 30 tablet, Rfl: 1  •  Chlorcyclizine-Pseudoephed 25-60 MG tablet, Take 1 tablet by mouth 2 (Two) Times a Day As Needed (Rhinitis)., Disp: 42 tablet, Rfl: 0  •  Cholecalciferol (VITAMIN D-3) 1000 UNITS capsule, Take 1,000 Units by mouth daily., Disp: , Rfl:   •  clopidogrel (PLAVIX) 75 MG tablet, TAKE 1 TABLET BY MOUTH DAILY, Disp: 30 tablet, Rfl: 0  •  Elastic Bandages & Supports (LUMBAR BACK BRACE/SUPPORT PAD) misc, Use for lifting/ strenuous exercise., Disp: 1 each, Rfl: 0  •  isosorbide mononitrate (IMDUR) 60 MG 24 hr tablet, Take 1 tablet by mouth Daily., Disp: 30 tablet, Rfl: 0  •  losartan (COZAAR) 50 MG tablet, Take 1 tablet by mouth Daily., Disp: 30  tablet, Rfl: 1  •  metoprolol succinate XL (TOPROL-XL) 50 MG 24 hr tablet, TAKE 1 TABLET BY MOUTH DAILY, Disp: 60 tablet, Rfl: 3  •  metoprolol succinate XL (TOPROL-XL) 50 MG 24 hr tablet, TAKE 1 TABLET BY MOUTH TWICE DAILY, Disp: 60 tablet, Rfl: 0  •  nitroglycerin (NITROSTAT) 0.4 MG SL tablet, Place 1 tablet under the tongue Every 5 (Five) Minutes As Needed for Chest Pain. Take no more than 3 doses in 15 minutes., Disp: 25 tablet, Rfl: 0    Current Facility-Administered Medications:   •  cyanocobalamin injection 1,000 mcg, 1,000 mcg, Intramuscular, Q30 Days, Carter Falcon MD, 1,000 mcg at 11/20/18 1003  •  cyanocobalamin injection 1,000 mcg, 1,000 mcg, Intramuscular, Q28 Days, Phyllis Loving PA     Assessment:        Patient Active Problem List   Diagnosis   • Abnormal magnetic resonance imaging study   • Arthritis   • Osteoarthritis of cervical spine   • Diplopia   • Hearing loss   • Neoplasm of uncertain behavior of skin   • Prediabetes   • Vitamin D deficiency   • Chronic fatigue   • History of supraventricular tachycardia   • Essential hypertension   • B12 deficiency   • Abnormal cardiac function test   • Cardiomyopathy (CMS/HCC)   • Coronary artery disease involving native coronary artery of native heart without angina pectoris   • History of coronary artery stent placement   • History of renal calculi   • Dyslipidemia   • Macrocytosis without anemia   • Precordial pain   • Parkinson's disease (CMS/HCC)   • History of CVA (cerebrovascular accident)   • AF (atrial fibrillation) (CMS/HCC)   • Otalgia, left   • Acute rhinitis               Plan:   1. Coronary artery disease involving native coronary artery of native heart without angina pectoris  2. Dilated cardiomyopathy (CMS/HCC)  3. Paroxysmal atrial fibrillation (CMS/HCC)        1. Coronary artery disease involving native coronary artery of native heart without angina pectoris: on plavix and ASA for one year after stent placement on 05/23/17. Will stop  Plavix.    Dinesh Zhao Zacarias. with coronary artery disease has no angina pectoris     Risk reduction for the coronary artery disease, controlling the blood pressure, blood sugar management, cholesterol management, exercise, stress management, and proper compliance with medications and follow-up has been discussed     - ECG 12 Lead     2. Dilated cardiomyopathy (CMS/HCC)  Compensated     3. Paroxysmal atrial fibrillation (CMS/HCC)  Under control          See us 6 months  COUNSELING:     Dinesh Travisunseling was given to patient for the following topics: diagnostic results, risk factor reductions, impressions, risks and benefits of treatment options and importance of treatment compliance .         cv stable                  No diagnosis found.    There are no diagnoses linked to this encounter.    COUNSELING:    Dinesh Churchill Sr.Counseling was given to patient for the following topics: diagnostic results, risk factor reductions, impressions, risks and benefits of treatment options and importance of treatment compliance .       SMOKING COUNSELING:    Counseling given: Not Answered  Return to ER per EMS for any recurrent symptoms including, but not limited to: chest pain/pressure/tightness, weakness, Shortness of breath, dizziness, palpitations, near syncope or syncope    Has been on Plavix since 05/23/17 for stent. Will stop Plavix.  ER increased imdur to 60 mg daily.    Will see in 6 months.      Sincerely,   CANDELARIA Macias  [facility]  12/12/18  10:26 AM

## 2018-12-26 RX ORDER — ISOSORBIDE MONONITRATE 60 MG/1
TABLET, EXTENDED RELEASE ORAL
Qty: 30 TABLET | Refills: 4 | Status: SHIPPED | OUTPATIENT
Start: 2018-12-26 | End: 2019-05-23 | Stop reason: SDUPTHER

## 2018-12-27 ENCOUNTER — CLINICAL SUPPORT (OUTPATIENT)
Dept: FAMILY MEDICINE CLINIC | Facility: CLINIC | Age: 83
End: 2018-12-27

## 2018-12-27 PROCEDURE — 96372 THER/PROPH/DIAG INJ SC/IM: CPT | Performed by: NURSE PRACTITIONER

## 2018-12-27 RX ADMIN — CYANOCOBALAMIN 1000 MCG: 1000 INJECTION, SOLUTION INTRAMUSCULAR; SUBCUTANEOUS at 09:51

## 2019-01-14 RX ORDER — METOPROLOL SUCCINATE 50 MG/1
TABLET, EXTENDED RELEASE ORAL
Qty: 60 TABLET | Refills: 5 | Status: SHIPPED | OUTPATIENT
Start: 2019-01-14 | End: 2019-04-08 | Stop reason: SDUPTHER

## 2019-01-24 ENCOUNTER — CLINICAL SUPPORT (OUTPATIENT)
Dept: FAMILY MEDICINE CLINIC | Facility: CLINIC | Age: 84
End: 2019-01-24

## 2019-01-24 DIAGNOSIS — E53.8 VITAMIN B 12 DEFICIENCY: Primary | ICD-10-CM

## 2019-01-24 PROCEDURE — 96372 THER/PROPH/DIAG INJ SC/IM: CPT | Performed by: PHYSICIAN ASSISTANT

## 2019-01-24 RX ORDER — CYANOCOBALAMIN 1000 UG/ML
1000 INJECTION, SOLUTION INTRAMUSCULAR; SUBCUTANEOUS
Status: DISCONTINUED | OUTPATIENT
Start: 2019-01-24 | End: 2019-02-28

## 2019-01-24 RX ADMIN — CYANOCOBALAMIN 1000 MCG: 1000 INJECTION, SOLUTION INTRAMUSCULAR; SUBCUTANEOUS at 09:23

## 2019-01-28 RX ORDER — ATORVASTATIN CALCIUM 20 MG/1
20 TABLET, FILM COATED ORAL NIGHTLY
Qty: 30 TABLET | Refills: 0 | Status: SHIPPED | OUTPATIENT
Start: 2019-01-28 | End: 2019-03-01 | Stop reason: SDUPTHER

## 2019-01-28 RX ORDER — LOSARTAN POTASSIUM 50 MG/1
50 TABLET ORAL DAILY
Qty: 30 TABLET | Refills: 0 | Status: SHIPPED | OUTPATIENT
Start: 2019-01-28 | End: 2019-02-28 | Stop reason: SDUPTHER

## 2019-02-28 ENCOUNTER — CLINICAL SUPPORT (OUTPATIENT)
Dept: FAMILY MEDICINE CLINIC | Facility: CLINIC | Age: 84
End: 2019-02-28

## 2019-02-28 DIAGNOSIS — E53.8 B12 DEFICIENCY: ICD-10-CM

## 2019-02-28 PROCEDURE — 96372 THER/PROPH/DIAG INJ SC/IM: CPT | Performed by: FAMILY MEDICINE

## 2019-02-28 RX ADMIN — CYANOCOBALAMIN 1000 MCG: 1000 INJECTION, SOLUTION INTRAMUSCULAR; SUBCUTANEOUS at 09:24

## 2019-03-01 RX ORDER — ATORVASTATIN CALCIUM 20 MG/1
20 TABLET, FILM COATED ORAL NIGHTLY
Qty: 30 TABLET | Refills: 5 | Status: ON HOLD | OUTPATIENT
Start: 2019-03-01 | End: 2019-08-20 | Stop reason: SDUPTHER

## 2019-03-01 RX ORDER — LOSARTAN POTASSIUM 50 MG/1
50 TABLET ORAL DAILY
Qty: 30 TABLET | Refills: 0 | OUTPATIENT
Start: 2019-03-01

## 2019-03-01 RX ORDER — LOSARTAN POTASSIUM 50 MG/1
50 TABLET ORAL DAILY
Qty: 30 TABLET | Refills: 0 | Status: SHIPPED | OUTPATIENT
Start: 2019-03-01 | End: 2019-04-01 | Stop reason: SDUPTHER

## 2019-03-28 ENCOUNTER — CLINICAL SUPPORT (OUTPATIENT)
Dept: FAMILY MEDICINE CLINIC | Facility: CLINIC | Age: 84
End: 2019-03-28

## 2019-03-28 ENCOUNTER — OFFICE VISIT (OUTPATIENT)
Dept: FAMILY MEDICINE CLINIC | Facility: CLINIC | Age: 84
End: 2019-03-28

## 2019-03-28 VITALS
RESPIRATION RATE: 16 BRPM | DIASTOLIC BLOOD PRESSURE: 60 MMHG | TEMPERATURE: 97.5 F | SYSTOLIC BLOOD PRESSURE: 120 MMHG | OXYGEN SATURATION: 99 % | HEIGHT: 73 IN | BODY MASS INDEX: 24.63 KG/M2 | WEIGHT: 185.8 LBS | HEART RATE: 58 BPM

## 2019-03-28 DIAGNOSIS — E53.8 B12 DEFICIENCY: ICD-10-CM

## 2019-03-28 DIAGNOSIS — J01.90 ACUTE BACTERIAL SINUSITIS: Primary | ICD-10-CM

## 2019-03-28 DIAGNOSIS — B96.89 ACUTE BACTERIAL SINUSITIS: Primary | ICD-10-CM

## 2019-03-28 PROCEDURE — 96372 THER/PROPH/DIAG INJ SC/IM: CPT | Performed by: FAMILY MEDICINE

## 2019-03-28 PROCEDURE — 99213 OFFICE O/P EST LOW 20 MIN: CPT | Performed by: FAMILY MEDICINE

## 2019-03-28 RX ORDER — AMOXICILLIN 500 MG/1
1000 CAPSULE ORAL 3 TIMES DAILY
Qty: 60 CAPSULE | Refills: 0 | Status: SHIPPED | OUTPATIENT
Start: 2019-03-28 | End: 2019-04-07

## 2019-03-28 RX ADMIN — CYANOCOBALAMIN 1000 MCG: 1000 INJECTION, SOLUTION INTRAMUSCULAR; SUBCUTANEOUS at 09:43

## 2019-03-28 NOTE — PROGRESS NOTES
Subjective   Dinesh Churchill Sr. is a 89 y.o. male. Patient is being evaluated today for sinus issues. Symptoms= sinus pressure, hearing loss.   History of Present Illness     Sinus Pain  Patient complains of clear rhinorrhea, congestion, facial pain, foul breath, headaches, nasal congestion, post nasal drip, sinus pressure and tooth pain. Onset of symptoms was 7 days ago. Symptoms have been unchanged since that time. He is drinking plenty of fluids.  Past history is significant for nothing.  Patient is not currently taking anything.  He used to take Benadryl for sinus infections before and that seemed to help.    The following portions of the patient's history were reviewed and updated as appropriate: allergies, current medications, past family history, past medical history, past social history, past surgical history and problem list.    Review of Systems   HENT: Positive for hearing loss, postnasal drip, rhinorrhea, sinus pressure and sinus pain.        Objective   Physical Exam   Constitutional: No distress.   HENT:   Right Ear: Hearing, tympanic membrane, external ear and ear canal normal.   Left Ear: Hearing, tympanic membrane, external ear and ear canal normal.   Nose: Right sinus exhibits maxillary sinus tenderness. Right sinus exhibits no frontal sinus tenderness. Left sinus exhibits maxillary sinus tenderness. Left sinus exhibits no frontal sinus tenderness.   Mouth/Throat: Uvula is midline, oropharynx is clear and moist and mucous membranes are normal.   Eyes: Conjunctivae are normal. Right eye exhibits no discharge. Left eye exhibits no discharge.   Neck: Neck supple.   Cardiovascular: Normal rate, regular rhythm and normal heart sounds. Exam reveals no gallop and no friction rub.   No murmur heard.  Pulmonary/Chest: Effort normal and breath sounds normal. He has no wheezes. He has no rales.   Lymphadenopathy:     He has no cervical adenopathy.   Skin: Skin is warm. Capillary refill takes less than 2  seconds. He is not diaphoretic.   Nursing note and vitals reviewed.      Assessment/Plan   Dinesh was seen today for sinus problem.    Diagnoses and all orders for this visit:    Acute bacterial sinusitis  -     amoxicillin (AMOXIL) 500 MG capsule; Take 2 capsules by mouth 3 (Three) Times a Day for 10 days.      Place the patient on some amoxicillin.  Also gave him some samples of Zyrtec and Mucinex.

## 2019-03-28 NOTE — PATIENT INSTRUCTIONS

## 2019-04-02 RX ORDER — LOSARTAN POTASSIUM 50 MG/1
50 TABLET ORAL DAILY
Qty: 30 TABLET | Refills: 1 | Status: SHIPPED | OUTPATIENT
Start: 2019-04-02 | End: 2019-05-23 | Stop reason: SDUPTHER

## 2019-04-08 ENCOUNTER — OFFICE VISIT (OUTPATIENT)
Dept: FAMILY MEDICINE CLINIC | Facility: CLINIC | Age: 84
End: 2019-04-08

## 2019-04-08 VITALS
BODY MASS INDEX: 24.92 KG/M2 | OXYGEN SATURATION: 98 % | DIASTOLIC BLOOD PRESSURE: 64 MMHG | TEMPERATURE: 97.7 F | SYSTOLIC BLOOD PRESSURE: 126 MMHG | WEIGHT: 188 LBS | HEIGHT: 73 IN | HEART RATE: 76 BPM

## 2019-04-08 DIAGNOSIS — D75.89 MACROCYTOSIS WITHOUT ANEMIA: ICD-10-CM

## 2019-04-08 DIAGNOSIS — I10 ESSENTIAL HYPERTENSION: Primary | ICD-10-CM

## 2019-04-08 DIAGNOSIS — Z95.5 HISTORY OF CORONARY ARTERY STENT PLACEMENT: ICD-10-CM

## 2019-04-08 DIAGNOSIS — G20 PARKINSON'S DISEASE (HCC): ICD-10-CM

## 2019-04-08 DIAGNOSIS — E78.5 DYSLIPIDEMIA: ICD-10-CM

## 2019-04-08 DIAGNOSIS — E55.9 VITAMIN D DEFICIENCY: ICD-10-CM

## 2019-04-08 DIAGNOSIS — Z86.73 HISTORY OF CVA (CEREBROVASCULAR ACCIDENT): ICD-10-CM

## 2019-04-08 DIAGNOSIS — E53.8 B12 DEFICIENCY: ICD-10-CM

## 2019-04-08 DIAGNOSIS — R73.03 PREDIABETES: ICD-10-CM

## 2019-04-08 DIAGNOSIS — I42.0 DILATED CARDIOMYOPATHY (HCC): ICD-10-CM

## 2019-04-08 PROCEDURE — 99214 OFFICE O/P EST MOD 30 MIN: CPT | Performed by: PHYSICIAN ASSISTANT

## 2019-04-08 NOTE — PATIENT INSTRUCTIONS
89-year-old male who presents today in follow-up of hypertension, prediabetes, hyperlipidemia, B12 and vitamin D deficiency, macrocytosis, history of CVA and Parkinson's, atrial fibrillation, coronary artery disease, and cardiomyopathy.  He has been doing well without complaints.  He avoids any overhead movement, as he loses his balance.  He denies any other recent balance issues or falls.  He is getting around well.  He was seen by neurology and diagnosed with Parkinson's, however, they did not feel he needed treatment.  He continues follow-up with cardiology, and he has current with appointments there.  He denies any chest pain, shortness of breath, palpitations, dizziness, or weakness.  I will check fasting labs today.  Call if no results in 1 week.  Follow-up pending results.  Follow-up in no more than 6 months.  I have not seen him in about a year.

## 2019-04-08 NOTE — PROGRESS NOTES
Subjective   Dinesh Churchill Sr. is a 89 y.o. male. Patient here today for medication management for hypertension, Vitamin D  And B 12 deficiency,     History of Present Illness     Cannot stand up and reach over his head because falls backward. He avoids that movement. No other balance issues or flls.     No CP, SOA, palp. Has been doing ok.     Seen by cardiology 12/2018- stable. Follow up 6/2019.     The following portions of the patient's history were reviewed and updated as appropriate: allergies, current medications, past family history, past medical history, past social history, past surgical history and problem list.    Review of Systems   All other systems reviewed and are negative.      Objective   Physical Exam   Constitutional: He is oriented to person, place, and time. He appears well-developed.   HENT:   Head: Normocephalic and atraumatic.   Right Ear: External ear normal.   Left Ear: External ear normal.   Eyes: Conjunctivae are normal.   Neck: Carotid bruit is not present. No tracheal deviation present. No thyroid mass and no thyromegaly present.   Cardiovascular: Normal rate, regular rhythm, normal heart sounds and intact distal pulses.   Pulmonary/Chest: Effort normal and breath sounds normal.   Neurological: He is alert and oriented to person, place, and time. Gait normal.   Skin: Skin is warm and dry.   Psychiatric: He has a normal mood and affect. His behavior is normal. Judgment and thought content normal.   Nursing note and vitals reviewed.      Assessment/Plan   Dinesh was seen today for med management.    Diagnoses and all orders for this visit:    Essential hypertension  -     Comprehensive Metabolic Panel  -     Thyroid Panel With TSH  -     POC Urinalysis Dipstick, Automated    Dilated cardiomyopathy (CMS/HCC)  -     Thyroid Panel With TSH  -     POC Urinalysis Dipstick, Automated    B12 deficiency  -     CBC & Differential  -     Thyroid Panel With TSH  -     Vitamin B12 & Folate  -      POC Urinalysis Dipstick, Automated    Vitamin D deficiency  -     Comprehensive Metabolic Panel  -     Thyroid Panel With TSH  -     Vitamin D 25 Hydroxy  -     POC Urinalysis Dipstick, Automated    Parkinson's disease (CMS/HCC)  -     Thyroid Panel With TSH  -     POC Urinalysis Dipstick, Automated    History of CVA (cerebrovascular accident)  -     Thyroid Panel With TSH  -     POC Urinalysis Dipstick, Automated    History of coronary artery stent placement  -     Thyroid Panel With TSH  -     POC Urinalysis Dipstick, Automated    Macrocytosis without anemia  -     CBC & Differential  -     Thyroid Panel With TSH  -     POC Urinalysis Dipstick, Automated    Prediabetes  -     Comprehensive Metabolic Panel  -     Thyroid Panel With TSH  -     POC Urinalysis Dipstick, Automated    Dyslipidemia  -     Comprehensive Metabolic Panel  -     CK  -     Lipid Panel With LDL / HDL Ratio  -     Thyroid Panel With TSH  -     POC Urinalysis Dipstick, Automated      Patient Instructions   89-year-old male who presents today in follow-up of hypertension, prediabetes, hyperlipidemia, B12 and vitamin D deficiency, macrocytosis, history of CVA and Parkinson's, atrial fibrillation, coronary artery disease, and cardiomyopathy.  He has been doing well without complaints.  He avoids any overhead movement, as he loses his balance.  He denies any other recent balance issues or falls.  He is getting around well.  He was seen by neurology and diagnosed with Parkinson's, however, they did not feel he needed treatment.  He continues follow-up with cardiology, and he has current with appointments there.  He denies any chest pain, shortness of breath, palpitations, dizziness, or weakness.  I will check fasting labs today.  Call if no results in 1 week.  Follow-up pending results.  Follow-up in no more than 6 months.  I have not seen him in about a year.

## 2019-04-09 LAB
25(OH)D3+25(OH)D2 SERPL-MCNC: 44.1 NG/ML (ref 30–100)
ALBUMIN SERPL-MCNC: 4.2 G/DL (ref 3.5–5.2)
ALBUMIN/GLOB SERPL: 2 G/DL
ALP SERPL-CCNC: 53 U/L (ref 39–117)
ALT SERPL-CCNC: 15 U/L (ref 1–41)
AST SERPL-CCNC: 18 U/L (ref 1–40)
BASOPHILS # BLD AUTO: 0.01 10*3/MM3 (ref 0–0.2)
BASOPHILS NFR BLD AUTO: 0.2 % (ref 0–1.5)
BILIRUB SERPL-MCNC: 1.6 MG/DL (ref 0.2–1.2)
BUN SERPL-MCNC: 21 MG/DL (ref 8–23)
BUN/CREAT SERPL: 21.4 (ref 7–25)
CALCIUM SERPL-MCNC: 9.3 MG/DL (ref 8.2–9.6)
CHLORIDE SERPL-SCNC: 106 MMOL/L (ref 98–107)
CHOLEST SERPL-MCNC: 137 MG/DL (ref 0–200)
CK SERPL-CCNC: 92 U/L (ref 20–200)
CO2 SERPL-SCNC: 24.9 MMOL/L (ref 22–29)
CREAT SERPL-MCNC: 0.98 MG/DL (ref 0.76–1.27)
EOSINOPHIL # BLD AUTO: 0.1 10*3/MM3 (ref 0–0.4)
EOSINOPHIL NFR BLD AUTO: 2.4 % (ref 0.3–6.2)
ERYTHROCYTE [DISTWIDTH] IN BLOOD BY AUTOMATED COUNT: 15.9 % (ref 12.3–15.4)
FOLATE SERPL-MCNC: 12 NG/ML (ref 4.78–24.2)
FT4I SERPL CALC-MCNC: 1.8 (ref 1.2–4.9)
GLOBULIN SER CALC-MCNC: 2.1 GM/DL
GLUCOSE SERPL-MCNC: 105 MG/DL (ref 65–99)
HCT VFR BLD AUTO: 43.4 % (ref 37.5–51)
HDLC SERPL-MCNC: 45 MG/DL (ref 40–60)
HGB BLD-MCNC: 13.4 G/DL (ref 13–17.7)
IMM GRANULOCYTES # BLD AUTO: 0.01 10*3/MM3 (ref 0–0.05)
IMM GRANULOCYTES NFR BLD AUTO: 0.2 % (ref 0–0.5)
LDLC SERPL CALC-MCNC: 79 MG/DL (ref 0–100)
LDLC/HDLC SERPL: 1.76 {RATIO}
LYMPHOCYTES # BLD AUTO: 1.15 10*3/MM3 (ref 0.7–3.1)
LYMPHOCYTES NFR BLD AUTO: 27.6 % (ref 19.6–45.3)
MCH RBC QN AUTO: 32 PG (ref 26.6–33)
MCHC RBC AUTO-ENTMCNC: 30.9 G/DL (ref 31.5–35.7)
MCV RBC AUTO: 103.6 FL (ref 79–97)
MONOCYTES # BLD AUTO: 0.36 10*3/MM3 (ref 0.1–0.9)
MONOCYTES NFR BLD AUTO: 8.7 % (ref 5–12)
NEUTROPHILS # BLD AUTO: 2.53 10*3/MM3 (ref 1.4–7)
NEUTROPHILS NFR BLD AUTO: 60.9 % (ref 42.7–76)
NRBC BLD AUTO-RTO: 0.2 /100 WBC (ref 0–0)
PLATELET # BLD AUTO: 136 10*3/MM3 (ref 140–450)
POTASSIUM SERPL-SCNC: 4.4 MMOL/L (ref 3.5–5.2)
PROT SERPL-MCNC: 6.3 G/DL (ref 6–8.5)
RBC # BLD AUTO: 4.19 10*6/MM3 (ref 4.14–5.8)
SODIUM SERPL-SCNC: 143 MMOL/L (ref 136–145)
T3RU NFR SERPL: 26 % (ref 24–39)
T4 SERPL-MCNC: 6.8 UG/DL (ref 4.5–12)
TRIGL SERPL-MCNC: 63 MG/DL (ref 0–150)
TSH SERPL DL<=0.005 MIU/L-ACNC: 2.35 UIU/ML (ref 0.45–4.5)
VIT B12 SERPL-MCNC: 812 PG/ML (ref 211–946)
VLDLC SERPL CALC-MCNC: 12.6 MG/DL
WBC # BLD AUTO: 4.16 10*3/MM3 (ref 3.4–10.8)

## 2019-04-25 ENCOUNTER — CLINICAL SUPPORT (OUTPATIENT)
Dept: FAMILY MEDICINE CLINIC | Facility: CLINIC | Age: 84
End: 2019-04-25

## 2019-04-25 DIAGNOSIS — E53.8 B12 DEFICIENCY: ICD-10-CM

## 2019-04-25 PROCEDURE — 96372 THER/PROPH/DIAG INJ SC/IM: CPT | Performed by: FAMILY MEDICINE

## 2019-04-25 RX ADMIN — CYANOCOBALAMIN 1000 MCG: 1000 INJECTION, SOLUTION INTRAMUSCULAR; SUBCUTANEOUS at 09:26

## 2019-05-01 RX ORDER — CLOPIDOGREL BISULFATE 75 MG/1
75 TABLET ORAL DAILY
Qty: 30 TABLET | Refills: 0 | Status: SHIPPED | OUTPATIENT
Start: 2019-05-01 | End: 2019-07-25

## 2019-05-23 ENCOUNTER — CLINICAL SUPPORT (OUTPATIENT)
Dept: FAMILY MEDICINE CLINIC | Facility: CLINIC | Age: 84
End: 2019-05-23

## 2019-05-23 DIAGNOSIS — E53.8 B12 DEFICIENCY: ICD-10-CM

## 2019-05-23 PROCEDURE — 96372 THER/PROPH/DIAG INJ SC/IM: CPT | Performed by: PHYSICIAN ASSISTANT

## 2019-05-23 RX ORDER — LOSARTAN POTASSIUM 50 MG/1
50 TABLET ORAL DAILY
Qty: 30 TABLET | Refills: 0 | Status: SHIPPED | OUTPATIENT
Start: 2019-05-23 | End: 2019-06-21 | Stop reason: SDUPTHER

## 2019-05-23 RX ORDER — ISOSORBIDE MONONITRATE 60 MG/1
TABLET, EXTENDED RELEASE ORAL
Qty: 30 TABLET | Refills: 0 | Status: SHIPPED | OUTPATIENT
Start: 2019-05-23 | End: 2019-06-21 | Stop reason: SDUPTHER

## 2019-05-23 RX ADMIN — CYANOCOBALAMIN 1000 MCG: 1000 INJECTION, SOLUTION INTRAMUSCULAR; SUBCUTANEOUS at 09:41

## 2019-06-12 ENCOUNTER — OFFICE VISIT (OUTPATIENT)
Dept: CARDIOLOGY | Facility: CLINIC | Age: 84
End: 2019-06-12

## 2019-06-12 VITALS
WEIGHT: 188 LBS | BODY MASS INDEX: 24.92 KG/M2 | SYSTOLIC BLOOD PRESSURE: 158 MMHG | DIASTOLIC BLOOD PRESSURE: 63 MMHG | HEIGHT: 73 IN | HEART RATE: 61 BPM

## 2019-06-12 DIAGNOSIS — I10 ESSENTIAL HYPERTENSION: ICD-10-CM

## 2019-06-12 DIAGNOSIS — I42.9 CARDIOMYOPATHY, UNSPECIFIED TYPE (HCC): ICD-10-CM

## 2019-06-12 DIAGNOSIS — I25.10 CORONARY ARTERY DISEASE INVOLVING NATIVE CORONARY ARTERY OF NATIVE HEART WITHOUT ANGINA PECTORIS: ICD-10-CM

## 2019-06-12 DIAGNOSIS — R06.02 SOB (SHORTNESS OF BREATH): Primary | ICD-10-CM

## 2019-06-12 PROCEDURE — 99213 OFFICE O/P EST LOW 20 MIN: CPT | Performed by: NURSE PRACTITIONER

## 2019-06-12 PROCEDURE — 93000 ELECTROCARDIOGRAM COMPLETE: CPT | Performed by: NURSE PRACTITIONER

## 2019-06-12 NOTE — PROGRESS NOTES
Subjective:        Dinesh Churchill Sr. is a 89 y.o. male who here for follow up    Chief Complaint   Patient presents with   • Atrial Fibrillation     6 MO FOLLOW UP   • Coronary Artery Disease       HPI  Dinesh Churchill is a 89-year-old male, who is his current with this provider.  She has a history of arthritis, CAD, hyperlipidemia, hypertension, kidney stones, and vitamin D deficiency.    Stress test on 11/30/2017 showed EF 46% and a normal myocardial for study with no evidence of ischemia.    Echo on 11/29/2017 showed EF 31.8% with moderate MV regurgitation, LAC is moderately dilated, LV cavity is moderately to severely dilated, mild to moderate aortic valve regurgitation is present, mild pulmonic valve regurgitation is present, mild to moderate tricuspid valve regurgitation is present, LV diastolic dysfunction grade 1, and no evidence of pericardial effusion.      The following portions of the patient's history were reviewed and updated as appropriate: allergies, current medications, past family history, past medical history, past social history, past surgical history and problem list.    Past Medical History:   Diagnosis Date   • Abnormal ECG    • Abnormal MRI    • Acute frontal sinusitis    • Arthritis    • Chronic fatigue    • Coronary artery disease    • Dizziness    • Hearing aid worn     left   • Hearing loss    • Hyperlipidemia    • Hypertension    • Kidney stones    • Macrocytosis    • Neoplasm of skin    • Osteoporosis    • Prediabetes    • Vitamin D deficiency          reports that he has never smoked. He has never used smokeless tobacco. He reports that he does not drink alcohol or use drugs.     Family History   Family history unknown: Yes       ROS     Review of Systems  Constitutional: No weight loss, fever, and fatigue   gastrointestinal: No nausea, abdominal pain  Behavioral/Psych: No insomnia or anxiety  Cardiovascular:+  Shortness of breath but he states is his norm.  Denies chest pain and  palpitations.      Objective:           Physical Exam   Constitutional: He is oriented to person, place, and time. He appears well-developed and well-nourished.   HENT:   Head: Normocephalic.   Right Ear: External ear normal.   Left Ear: External ear normal.   Eyes: EOM are normal.   Neck: Normal range of motion. No JVD present.   Cardiovascular: Normal rate, regular rhythm, normal heart sounds and intact distal pulses. Exam reveals no gallop and no friction rub.   No murmur heard.  Pulmonary/Chest: Effort normal and breath sounds normal. No stridor. No respiratory distress. He has no rales.   Abdominal: Soft. Bowel sounds are normal. He exhibits no distension. There is no tenderness. There is no guarding.   Musculoskeletal: Normal range of motion. He exhibits no edema or tenderness.   Neurological: He is alert and oriented to person, place, and time. He has normal reflexes.   Skin: Skin is warm.   Psychiatric: He has a normal mood and affect. Judgment normal.   Nursing note and vitals reviewed.        ECG 12 Lead  Date/Time: 6/12/2019 10:10 AM  Performed by: Juliana Jesus APRN  Authorized by: Juliana Jesus APRN   Comparison: compared with previous ECG from 12/12/2018  Comparison to previous ECG: IVCD  Rhythm: sinus rhythm  Ectopy: multifocal PVCs  Conduction: 1st degree AV block               Current Outpatient Medications:   •  acetaminophen (TYLENOL) 500 MG tablet, 1-2 TABLETS BY ONCE TO TWICE DAILY AS NEEDED FOR PAIN, Disp: 60 tablet, Rfl: 0  •  aspirin  MG tablet, Take 1 tablet by mouth Daily., Disp: , Rfl: 0  •  atorvastatin (LIPITOR) 20 MG tablet, TAKE 1 TABLET BY MOUTH EVERY NIGHT, Disp: 30 tablet, Rfl: 5  •  Cholecalciferol (VITAMIN D-3) 1000 UNITS capsule, Take 1,000 Units by mouth daily., Disp: , Rfl:   •  clopidogrel (PLAVIX) 75 MG tablet, TAKE 1 TABLET BY MOUTH DAILY, Disp: 30 tablet, Rfl: 0  •  isosorbide mononitrate (IMDUR) 60 MG 24 hr tablet, TAKE 1 TABLET BY MOUTH EVERY DAY,  Disp: 30 tablet, Rfl: 0  •  losartan (COZAAR) 50 MG tablet, TAKE 1 TABLET BY MOUTH DAILY, Disp: 30 tablet, Rfl: 0  •  metoprolol succinate XL (TOPROL-XL) 50 MG 24 hr tablet, TAKE 1 TABLET BY MOUTH DAILY, Disp: 60 tablet, Rfl: 3  •  Elastic Bandages & Supports (LUMBAR BACK BRACE/SUPPORT PAD) misc, Use for lifting/ strenuous exercise., Disp: 1 each, Rfl: 0  •  nitroglycerin (NITROSTAT) 0.4 MG SL tablet, Place 1 tablet under the tongue Every 5 (Five) Minutes As Needed for Chest Pain. Take no more than 3 doses in 15 minutes., Disp: 25 tablet, Rfl: 0    Current Facility-Administered Medications:   •  cyanocobalamin injection 1,000 mcg, 1,000 mcg, Intramuscular, Q30 Days, Carter Falcon MD, 1,000 mcg at 05/23/19 0941     Assessment:        Patient Active Problem List   Diagnosis   • Abnormal magnetic resonance imaging study   • Arthritis   • Osteoarthritis of cervical spine   • Diplopia   • Hearing loss   • Neoplasm of uncertain behavior of skin   • Prediabetes   • Vitamin D deficiency   • Chronic fatigue   • History of supraventricular tachycardia   • Essential hypertension   • B12 deficiency   • Abnormal cardiac function test   • Cardiomyopathy (CMS/HCC)   • Coronary artery disease involving native coronary artery of native heart without angina pectoris   • History of coronary artery stent placement   • History of renal calculi   • Dyslipidemia   • Macrocytosis without anemia   • Precordial pain   • Parkinson's disease (CMS/HCC)   • History of CVA (cerebrovascular accident)   • AF (atrial fibrillation) (CMS/HCC)   • Otalgia, left   • Acute rhinitis               Plan:   1.  CAD: No angina pectoris.  On Plavix, BB, and aspirin. Risk reduction for the coronary artery disease, controlling the blood pressure, blood sugar management, cholesterol management, exercise, stress management, and proper compliance with medications and follow-up has been discussed    2.  Dilated cardiomyopathy compensated    3.  Paroxysmal atrial  fibrillation: Under control    4.  Shortness of breath: + Shortness of breath, he states he is at his normal.  We will do an echo and have him follow-up with Dr. Thiago Torres for results.    5.  Hypertension: Elevated a little in the office today he and his son both agreed that at home his systolic is usually 130s to 140s.Importance of controlling hypertension and blood pressure  checkup on the regular basis has been explained. Hypertension as a silent killer has been discussed. Risk reduction of the weight and regular exercises to control the hypertension has been explained.          No diagnosis found.    There are no diagnoses linked to this encounter.    COUNSELING:    Dinesh Churchill Sr.Counseling was given to patient for the following topics: diagnostic results, risk factor reductions, impressions, risks and benefits of treatment options and importance of treatment compliance .       SMOKING COUNSELING:    Counseling given: Not Answered    We will do an echo and follow-up with Dr. Quezada for results.    Sincerely,   CANDELARIA Macias  [facility]  06/12/19  9:54 AM

## 2019-06-21 RX ORDER — ISOSORBIDE MONONITRATE 60 MG/1
TABLET, EXTENDED RELEASE ORAL
Qty: 30 TABLET | Refills: 0 | Status: SHIPPED | OUTPATIENT
Start: 2019-06-21 | End: 2019-07-21 | Stop reason: SDUPTHER

## 2019-06-21 RX ORDER — LOSARTAN POTASSIUM 50 MG/1
50 TABLET ORAL DAILY
Qty: 30 TABLET | Refills: 0 | Status: SHIPPED | OUTPATIENT
Start: 2019-06-21 | End: 2019-07-21 | Stop reason: SDUPTHER

## 2019-06-26 ENCOUNTER — CLINICAL SUPPORT (OUTPATIENT)
Dept: FAMILY MEDICINE CLINIC | Facility: CLINIC | Age: 84
End: 2019-06-26

## 2019-06-26 DIAGNOSIS — E53.8 B12 DEFICIENCY: Primary | ICD-10-CM

## 2019-06-26 PROCEDURE — 96372 THER/PROPH/DIAG INJ SC/IM: CPT | Performed by: PHYSICIAN ASSISTANT

## 2019-06-26 RX ORDER — CYANOCOBALAMIN 1000 UG/ML
1000 INJECTION, SOLUTION INTRAMUSCULAR; SUBCUTANEOUS
Status: DISCONTINUED | OUTPATIENT
Start: 2019-06-26 | End: 2019-08-28

## 2019-06-26 RX ADMIN — CYANOCOBALAMIN 1000 MCG: 1000 INJECTION, SOLUTION INTRAMUSCULAR; SUBCUTANEOUS at 10:35

## 2019-07-04 ENCOUNTER — APPOINTMENT (OUTPATIENT)
Dept: CT IMAGING | Facility: HOSPITAL | Age: 84
End: 2019-07-04

## 2019-07-04 ENCOUNTER — HOSPITAL ENCOUNTER (EMERGENCY)
Facility: HOSPITAL | Age: 84
Discharge: HOME | End: 2019-07-04
Attending: EMERGENCY MEDICINE | Admitting: EMERGENCY MEDICINE

## 2019-07-04 VITALS
HEART RATE: 108 BPM | TEMPERATURE: 99 F | SYSTOLIC BLOOD PRESSURE: 149 MMHG | RESPIRATION RATE: 16 BRPM | BODY MASS INDEX: 24.85 KG/M2 | OXYGEN SATURATION: 94 % | DIASTOLIC BLOOD PRESSURE: 87 MMHG | WEIGHT: 187.5 LBS | HEIGHT: 73 IN

## 2019-07-04 DIAGNOSIS — R31.29 HEMATURIA, MICROSCOPIC: Primary | ICD-10-CM

## 2019-07-04 DIAGNOSIS — N21.0 URINARY BLADDER STONE: ICD-10-CM

## 2019-07-04 DIAGNOSIS — R00.0 SINUS TACHYCARDIA: ICD-10-CM

## 2019-07-04 LAB
ALBUMIN SERPL-MCNC: 3.7 G/DL (ref 3.5–5.2)
ALBUMIN/GLOB SERPL: 1.4 G/DL
ALP SERPL-CCNC: 48 U/L (ref 39–117)
ALT SERPL W P-5'-P-CCNC: 15 U/L (ref 1–41)
ANION GAP SERPL CALCULATED.3IONS-SCNC: 11.7 MMOL/L (ref 5–15)
APTT PPP: 26.8 SECONDS (ref 22.7–35.4)
AST SERPL-CCNC: 17 U/L (ref 1–40)
BACTERIA UR QL AUTO: ABNORMAL /HPF
BASOPHILS # BLD AUTO: 0.02 10*3/MM3 (ref 0–0.2)
BASOPHILS NFR BLD AUTO: 0.4 % (ref 0–1.5)
BILIRUB SERPL-MCNC: 1 MG/DL (ref 0.2–1.2)
BILIRUB UR QL STRIP: NEGATIVE
BUN BLD-MCNC: 26 MG/DL (ref 8–23)
BUN/CREAT SERPL: 26 (ref 7–25)
CALCIUM SPEC-SCNC: 9.3 MG/DL (ref 8.2–9.6)
CHLORIDE SERPL-SCNC: 111 MMOL/L (ref 98–107)
CLARITY UR: CLEAR
CO2 SERPL-SCNC: 23.3 MMOL/L (ref 22–29)
COLOR UR: YELLOW
CREAT BLD-MCNC: 1 MG/DL (ref 0.76–1.27)
DEPRECATED RDW RBC AUTO: 61.1 FL (ref 37–54)
EOSINOPHIL # BLD AUTO: 0.1 10*3/MM3 (ref 0–0.4)
EOSINOPHIL NFR BLD AUTO: 2 % (ref 0.3–6.2)
ERYTHROCYTE [DISTWIDTH] IN BLOOD BY AUTOMATED COUNT: 15.8 % (ref 12.3–15.4)
GFR SERPL CREATININE-BSD FRML MDRD: 70 ML/MIN/1.73
GLOBULIN UR ELPH-MCNC: 2.7 GM/DL
GLUCOSE BLD-MCNC: 165 MG/DL (ref 65–99)
GLUCOSE UR STRIP-MCNC: NEGATIVE MG/DL
HCT VFR BLD AUTO: 41.6 % (ref 37.5–51)
HGB BLD-MCNC: 13 G/DL (ref 13–17.7)
HGB UR QL STRIP.AUTO: ABNORMAL
HYALINE CASTS UR QL AUTO: ABNORMAL /LPF
IMM GRANULOCYTES # BLD AUTO: 0.02 10*3/MM3 (ref 0–0.05)
IMM GRANULOCYTES NFR BLD AUTO: 0.4 % (ref 0–0.5)
INR PPP: 1.07 (ref 0.9–1.1)
KETONES UR QL STRIP: NEGATIVE
LEUKOCYTE ESTERASE UR QL STRIP.AUTO: ABNORMAL
LYMPHOCYTES # BLD AUTO: 1.13 10*3/MM3 (ref 0.7–3.1)
LYMPHOCYTES NFR BLD AUTO: 22.8 % (ref 19.6–45.3)
MCH RBC QN AUTO: 32.5 PG (ref 26.6–33)
MCHC RBC AUTO-ENTMCNC: 31.3 G/DL (ref 31.5–35.7)
MCV RBC AUTO: 104 FL (ref 79–97)
MONOCYTES # BLD AUTO: 0.54 10*3/MM3 (ref 0.1–0.9)
MONOCYTES NFR BLD AUTO: 10.9 % (ref 5–12)
NEUTROPHILS # BLD AUTO: 3.14 10*3/MM3 (ref 1.7–7)
NEUTROPHILS NFR BLD AUTO: 63.5 % (ref 42.7–76)
NITRITE UR QL STRIP: NEGATIVE
NRBC BLD AUTO-RTO: 0.2 /100 WBC (ref 0–0.2)
PH UR STRIP.AUTO: 5.5 [PH] (ref 5–8)
PLATELET # BLD AUTO: 117 10*3/MM3 (ref 140–450)
PMV BLD AUTO: 11.1 FL (ref 6–12)
POTASSIUM BLD-SCNC: 4.3 MMOL/L (ref 3.5–5.2)
PROT SERPL-MCNC: 6.4 G/DL (ref 6–8.5)
PROT UR QL STRIP: NEGATIVE
PROTHROMBIN TIME: 13.6 SECONDS (ref 11.7–14.2)
RBC # BLD AUTO: 4 10*6/MM3 (ref 4.14–5.8)
RBC # UR: ABNORMAL /HPF
REF LAB TEST METHOD: ABNORMAL
SODIUM BLD-SCNC: 146 MMOL/L (ref 136–145)
SP GR UR STRIP: 1.03 (ref 1–1.03)
SQUAMOUS #/AREA URNS HPF: ABNORMAL /HPF
UROBILINOGEN UR QL STRIP: ABNORMAL
WBC NRBC COR # BLD: 4.95 10*3/MM3 (ref 3.4–10.8)
WBC UR QL AUTO: ABNORMAL /HPF

## 2019-07-04 PROCEDURE — 85025 COMPLETE CBC W/AUTO DIFF WBC: CPT | Performed by: EMERGENCY MEDICINE

## 2019-07-04 PROCEDURE — 80053 COMPREHEN METABOLIC PANEL: CPT | Performed by: EMERGENCY MEDICINE

## 2019-07-04 PROCEDURE — 85730 THROMBOPLASTIN TIME PARTIAL: CPT | Performed by: EMERGENCY MEDICINE

## 2019-07-04 PROCEDURE — 81001 URINALYSIS AUTO W/SCOPE: CPT | Performed by: EMERGENCY MEDICINE

## 2019-07-04 PROCEDURE — 25010000002 IOPAMIDOL 61 % SOLUTION: Performed by: EMERGENCY MEDICINE

## 2019-07-04 PROCEDURE — 74177 CT ABD & PELVIS W/CONTRAST: CPT

## 2019-07-04 PROCEDURE — 96360 HYDRATION IV INFUSION INIT: CPT

## 2019-07-04 PROCEDURE — 85610 PROTHROMBIN TIME: CPT | Performed by: EMERGENCY MEDICINE

## 2019-07-04 PROCEDURE — 99284 EMERGENCY DEPT VISIT MOD MDM: CPT

## 2019-07-04 RX ORDER — SODIUM CHLORIDE 0.9 % (FLUSH) 0.9 %
10 SYRINGE (ML) INJECTION AS NEEDED
Status: DISCONTINUED | OUTPATIENT
Start: 2019-07-04 | End: 2019-07-05 | Stop reason: HOSPADM

## 2019-07-04 RX ORDER — CEPHALEXIN 500 MG/1
500 CAPSULE ORAL ONCE
Status: COMPLETED | OUTPATIENT
Start: 2019-07-04 | End: 2019-07-04

## 2019-07-04 RX ORDER — CEPHALEXIN 500 MG/1
500 CAPSULE ORAL 3 TIMES DAILY
Qty: 21 CAPSULE | Refills: 0 | Status: SHIPPED | OUTPATIENT
Start: 2019-07-04 | End: 2019-07-25

## 2019-07-04 RX ADMIN — SODIUM CHLORIDE, POTASSIUM CHLORIDE, SODIUM LACTATE AND CALCIUM CHLORIDE 1000 ML: 600; 310; 30; 20 INJECTION, SOLUTION INTRAVENOUS at 20:29

## 2019-07-04 RX ADMIN — CEPHALEXIN 500 MG: 500 CAPSULE ORAL at 22:32

## 2019-07-04 RX ADMIN — IOPAMIDOL 85 ML: 612 INJECTION, SOLUTION INTRAVENOUS at 21:35

## 2019-07-05 ENCOUNTER — HOSPITAL ENCOUNTER (EMERGENCY)
Facility: HOSPITAL | Age: 84
Discharge: HOME | End: 2019-07-05
Attending: EMERGENCY MEDICINE | Admitting: EMERGENCY MEDICINE

## 2019-07-05 ENCOUNTER — EPISODE CHANGES (OUTPATIENT)
Dept: CASE MANAGEMENT | Facility: OTHER | Age: 84
End: 2019-07-05

## 2019-07-05 VITALS
DIASTOLIC BLOOD PRESSURE: 78 MMHG | SYSTOLIC BLOOD PRESSURE: 168 MMHG | HEIGHT: 70 IN | RESPIRATION RATE: 16 BRPM | OXYGEN SATURATION: 95 % | BODY MASS INDEX: 26.77 KG/M2 | WEIGHT: 187 LBS | HEART RATE: 61 BPM | TEMPERATURE: 98.5 F

## 2019-07-05 DIAGNOSIS — R31.0 GROSS HEMATURIA: Primary | ICD-10-CM

## 2019-07-05 LAB
ANION GAP SERPL CALCULATED.3IONS-SCNC: 10.4 MMOL/L (ref 5–15)
BASOPHILS # BLD AUTO: 0.02 10*3/MM3 (ref 0–0.2)
BASOPHILS NFR BLD AUTO: 0.5 % (ref 0–1.5)
BUN BLD-MCNC: 20 MG/DL (ref 8–23)
BUN/CREAT SERPL: 22.7 (ref 7–25)
CALCIUM SPEC-SCNC: 9 MG/DL (ref 8.2–9.6)
CHLORIDE SERPL-SCNC: 107 MMOL/L (ref 98–107)
CO2 SERPL-SCNC: 25.6 MMOL/L (ref 22–29)
CREAT BLD-MCNC: 0.88 MG/DL (ref 0.76–1.27)
DEPRECATED RDW RBC AUTO: 61.8 FL (ref 37–54)
EOSINOPHIL # BLD AUTO: 0.07 10*3/MM3 (ref 0–0.4)
EOSINOPHIL NFR BLD AUTO: 1.7 % (ref 0.3–6.2)
ERYTHROCYTE [DISTWIDTH] IN BLOOD BY AUTOMATED COUNT: 15.9 % (ref 12.3–15.4)
GFR SERPL CREATININE-BSD FRML MDRD: 81 ML/MIN/1.73
GLUCOSE BLD-MCNC: 112 MG/DL (ref 65–99)
HCT VFR BLD AUTO: 40 % (ref 37.5–51)
HGB BLD-MCNC: 12.4 G/DL (ref 13–17.7)
IMM GRANULOCYTES # BLD AUTO: 0.01 10*3/MM3 (ref 0–0.05)
IMM GRANULOCYTES NFR BLD AUTO: 0.2 % (ref 0–0.5)
LYMPHOCYTES # BLD AUTO: 1.13 10*3/MM3 (ref 0.7–3.1)
LYMPHOCYTES NFR BLD AUTO: 27.8 % (ref 19.6–45.3)
MCH RBC QN AUTO: 32.5 PG (ref 26.6–33)
MCHC RBC AUTO-ENTMCNC: 31 G/DL (ref 31.5–35.7)
MCV RBC AUTO: 104.7 FL (ref 79–97)
MONOCYTES # BLD AUTO: 0.4 10*3/MM3 (ref 0.1–0.9)
MONOCYTES NFR BLD AUTO: 9.9 % (ref 5–12)
NEUTROPHILS # BLD AUTO: 2.43 10*3/MM3 (ref 1.7–7)
NEUTROPHILS NFR BLD AUTO: 59.9 % (ref 42.7–76)
NRBC BLD AUTO-RTO: 0.2 /100 WBC (ref 0–0.2)
PLATELET # BLD AUTO: 113 10*3/MM3 (ref 140–450)
PMV BLD AUTO: 11 FL (ref 6–12)
POTASSIUM BLD-SCNC: 4.2 MMOL/L (ref 3.5–5.2)
RBC # BLD AUTO: 3.82 10*6/MM3 (ref 4.14–5.8)
SODIUM BLD-SCNC: 143 MMOL/L (ref 136–145)
WBC NRBC COR # BLD: 4.06 10*3/MM3 (ref 3.4–10.8)

## 2019-07-05 PROCEDURE — 51702 INSERT TEMP BLADDER CATH: CPT

## 2019-07-05 PROCEDURE — 80048 BASIC METABOLIC PNL TOTAL CA: CPT | Performed by: EMERGENCY MEDICINE

## 2019-07-05 PROCEDURE — 99283 EMERGENCY DEPT VISIT LOW MDM: CPT

## 2019-07-05 PROCEDURE — 85025 COMPLETE CBC W/AUTO DIFF WBC: CPT | Performed by: EMERGENCY MEDICINE

## 2019-07-05 RX ORDER — SODIUM CHLORIDE 0.9 % (FLUSH) 0.9 %
10 SYRINGE (ML) INJECTION AS NEEDED
Status: DISCONTINUED | OUTPATIENT
Start: 2019-07-05 | End: 2019-07-05 | Stop reason: HOSPADM

## 2019-07-05 NOTE — ED PROVIDER NOTES
" EMERGENCY DEPARTMENT ENCOUNTER    CHIEF COMPLAINT  Chief Complaint: Hematuria  History given by: self, pt's family  History limited by: nothing   Room Number: 07/07  PMD: Phyllis Loving PA      HPI:  Pt is a 89 y.o. male who presents complaining of hematuria that has not stopped since being d/c yesterday. Pt denies dysuria, nausea, vomiting and fever. Pt was seen in the ED yesterday for hematuria, where he had a CT scan which showed small stones in the bladder. After being d/c, he thought he had \"passed a stone\" in his urine. Since then, his bleeding has continued so pt's family decided to come to the ED for further evaluation. No other complaints were made at this time. Hx limited by dementia.    Duration:  Yesterday  Onset: gradual  Timing: constant  Location: bladder  Radiation: none stated  Quality: hematuria  Intensity/Severity: moderate  Progression: unchanged  Associated Symptoms: none stated  Aggravating Factors: none stated  Alleviating Factors: none stated  Previous Episodes: Pt was d/c from the ED yesterday.   Treatment before arrival: none stated    PAST MEDICAL HISTORY  Active Ambulatory Problems     Diagnosis Date Noted   • Abnormal magnetic resonance imaging study 03/18/2016   • Arthritis 03/18/2016   • Osteoarthritis of cervical spine 03/18/2016   • Diplopia 03/18/2016   • Hearing loss 03/18/2016   • Neoplasm of uncertain behavior of skin 03/18/2016   • Prediabetes 03/18/2016   • Vitamin D deficiency 03/18/2016   • Chronic fatigue 10/27/2016   • History of supraventricular tachycardia 04/07/2017   • Essential hypertension 04/07/2017   • B12 deficiency 04/19/2017   • Abnormal cardiac function test 05/08/2017   • Cardiomyopathy (CMS/HCC) 05/08/2017   • Coronary artery disease involving native coronary artery of native heart without angina pectoris 05/31/2017   • History of coronary artery stent placement 05/31/2017   • History of renal calculi 05/31/2017   • Dyslipidemia 05/31/2017   • Macrocytosis " without anemia 08/09/2017   • Precordial pain 08/16/2017   • Parkinson's disease (CMS/[facility]) 10/25/2017   • History of CVA (cerebrovascular accident) 10/25/2017   • AF (atrial fibrillation) (CMS/[facility]) 11/29/2017   • Otalgia, left 12/29/2017   • Acute rhinitis 12/29/2017     Resolved Ambulatory Problems     Diagnosis Date Noted   • Maxillary sinusitis 03/18/2016   • Dizziness 10/27/2016   • Acute frontal sinusitis 10/27/2016   • Precordial pain 04/07/2017   • SOB (shortness of breath) 04/07/2017     Past Medical History:   Diagnosis Date   • Abnormal ECG    • Abnormal MRI    • Acute frontal sinusitis    • Arthritis    • Chronic fatigue    • Coronary artery disease    • Dizziness    • Hearing aid worn    • Hearing loss    • Hyperlipidemia    • Hypertension    • Kidney stones    • Macrocytosis    • Neoplasm of skin    • Osteoporosis    • Prediabetes    • Vitamin D deficiency        PAST SURGICAL HISTORY  Past Surgical History:   Procedure Laterality Date   • CARDIAC CATHETERIZATION N/A 5/22/2017    Procedure: Left Heart Cath;  Surgeon: Maninder Quezada MD;  [facility];  Service:    • CARDIAC CATHETERIZATION N/A 5/23/2017    Procedure: Stent BMS coronary;  Surgeon: Maninder Quezada MD;  Location:  [facility] CATH INVASIVE LOCATION;  Service:    • HERNIA REPAIR      x 2   • WI RT/LT HEART CATHETERS N/A 5/23/2017    Procedure: Percutaneous Coronary Intervention;  Surgeon: Maninder Quezada MD;  Location:  [facility] CATH INVASIVE LOCATION;  Service: Cardiovascular       FAMILY HISTORY  Family History   Family history unknown: Yes       SOCIAL HISTORY  Social History     Socioeconomic History   • Marital status:      Spouse name: Not on file   • Number of children: Not on file   • Years of education: Not on file   • Highest education level: Not on file   Occupational History     Employer: RETIRED   Tobacco Use   • Smoking status: Never Smoker   • Smokeless tobacco: Never Used    Substance and Sexual Activity   • Alcohol use: No   • Drug use: No   • Sexual activity: Defer       ALLERGIES  Patient has no known allergies.    REVIEW OF SYSTEMS  Review of Systems   Unable to perform ROS: Dementia   Constitutional: Negative for fever.   Gastrointestinal: Negative for nausea and vomiting.   Genitourinary: Positive for hematuria. Negative for dysuria.       PHYSICAL EXAM  ED Triage Vitals [07/05/19 1236]   Temp Heart Rate Resp BP SpO2   98.5 °F (36.9 °C) 67 14 -- 98 %      Temp src Heart Rate Source Patient Position BP Location FiO2 (%)   Tympanic Monitor -- -- --       Physical Exam   Constitutional: No distress.   Elderly    HENT:   Head: Normocephalic and atraumatic.   Eyes: EOM are normal. Pupils are equal, round, and reactive to light.   Neck: Normal range of motion. Neck supple.   Cardiovascular: Normal rate, regular rhythm and normal heart sounds.   Pulmonary/Chest: Effort normal and breath sounds normal. No respiratory distress.   Abdominal: Soft. There is no tenderness. There is no rebound and no guarding.   Musculoskeletal: Normal range of motion. He exhibits no edema.   Neurological: He is alert. He has normal sensation and normal strength.   Pleasantly confused   Skin: Skin is warm and dry.   Psychiatric: Mood and affect normal.   Nursing note and vitals reviewed.      LAB RESULTS  Lab Results (last 24 hours)     Procedure Component Value Units Date/Time    CBC & Differential [[id number]] Collected:  07/04/19 2028    Specimen:  Blood Updated:  07/04/19 2037    Narrative:       The following orders were created for panel order CBC & Differential.  Procedure                               Abnormality         Status                     ---------                               -----------         ------                     CBC Auto Differential[[id number]]        Abnormal            Final result                 Please view results for these tests on the individual orders.    Comprehensive  Metabolic Panel [[id number]]  (Abnormal) Collected:  07/04/19 2028    Specimen:  Blood Updated:  07/04/19 2055     Glucose 165 mg/dL      BUN 26 mg/dL      Creatinine 1.00 mg/dL      Sodium 146 mmol/L      Potassium 4.3 mmol/L      Chloride 111 mmol/L      CO2 23.3 mmol/L      Calcium 9.3 mg/dL      Total Protein 6.4 g/dL      Albumin 3.70 g/dL      ALT (SGPT) 15 U/L      AST (SGOT) 17 U/L      Alkaline Phosphatase 48 U/L      Total Bilirubin 1.0 mg/dL      eGFR Non African Amer 70 mL/min/1.73      Globulin 2.7 gm/dL      A/G Ratio 1.4 g/dL      BUN/Creatinine Ratio 26.0     Anion Gap 11.7 mmol/L     Narrative:       GFR Normal >60  Chronic Kidney Disease <60  Kidney Failure <15    Protime-INR [[id number]]  (Normal) Collected:  07/04/19 2028    Specimen:  Blood Updated:  07/04/19 2052     Protime 13.6 Seconds      INR 1.07    aPTT [[id number]]  (Normal) Collected:  07/04/19 2028    Specimen:  Blood Updated:  07/04/19 2052     PTT 26.8 seconds     CBC Auto Differential [[id number]]  (Abnormal) Collected:  07/04/19 2028    Specimen:  Blood Updated:  07/04/19 2037     WBC 4.95 10*3/mm3      RBC 4.00 10*6/mm3      Hemoglobin 13.0 g/dL      Hematocrit 41.6 %      .0 fL      MCH 32.5 pg      MCHC 31.3 g/dL      RDW 15.8 %      RDW-SD 61.1 fl      MPV 11.1 fL      Platelets 117 10*3/mm3      Neutrophil % 63.5 %      Lymphocyte % 22.8 %      Monocyte % 10.9 %      Eosinophil % 2.0 %      Basophil % 0.4 %      Immature Grans % 0.4 %      Neutrophils, Absolute 3.14 10*3/mm3      Lymphocytes, Absolute 1.13 10*3/mm3      Monocytes, Absolute 0.54 10*3/mm3      Eosinophils, Absolute 0.10 10*3/mm3      Basophils, Absolute 0.02 10*3/mm3      Immature Grans, Absolute 0.02 10*3/mm3      nRBC 0.2 /100 WBC     Urinalysis With Microscopic If Indicated (No Culture) - Urine, Clean Catch [[id number]]  (Abnormal) Collected:  07/04/19 2034    Specimen:  Urine, Clean Catch Updated:  07/04/19 2054     Color, UA Yellow     Appearance, UA  Clear     pH, UA 5.5     Specific Gravity, UA 1.026     Glucose, UA Negative     Ketones, UA Negative     Bilirubin, UA Negative     Blood, UA Large (3+)     Protein, UA Negative     Leuk Esterase, UA Trace     Nitrite, UA Negative     Urobilinogen, UA 1.0 E.U./dL    Urinalysis, Microscopic Only - Urine, Clean Catch [[id number]]  (Abnormal) Collected:  07/04/19 2034    Specimen:  Urine, Clean Catch Updated:  07/04/19 2054     RBC, UA Too Numerous to Count /HPF      WBC, UA 0-2 /HPF      Bacteria, UA None Seen /HPF      Squamous Epithelial Cells, UA 0-2 /HPF      Hyaline Casts, UA None Seen /LPF      Methodology Automated Microscopy    CBC & Differential [[id number]] Collected:  07/05/19 1341    Specimen:  Blood Updated:  07/05/19 1357    Narrative:       The following orders were created for panel order CBC & Differential.  Procedure                               Abnormality         Status                     ---------                               -----------         ------                     CBC Auto Differential[[id number]]        Abnormal            Final result                 Please view results for these tests on the individual orders.    Basic Metabolic Panel [[id number]]  (Abnormal) Collected:  07/05/19 1341    Specimen:  Blood Updated:  07/05/19 1427     Glucose 112 mg/dL      BUN 20 mg/dL      Creatinine 0.88 mg/dL      Sodium 143 mmol/L      Potassium 4.2 mmol/L      Chloride 107 mmol/L      CO2 25.6 mmol/L      Calcium 9.0 mg/dL      eGFR Non African Amer 81 mL/min/1.73      BUN/Creatinine Ratio 22.7     Anion Gap 10.4 mmol/L     Narrative:       GFR Normal >60  Chronic Kidney Disease <60  Kidney Failure <15    CBC Auto Differential [[id number]]  (Abnormal) Collected:  07/05/19 1341    Specimen:  Blood Updated:  07/05/19 1357     WBC 4.06 10*3/mm3      RBC 3.82 10*6/mm3      Hemoglobin 12.4 g/dL      Hematocrit 40.0 %      .7 fL      MCH 32.5 pg      MCHC 31.0 g/dL      RDW 15.9 %      RDW-SD 61.8 fl       MPV 11.0 fL      Platelets 113 10*3/mm3      Neutrophil % 59.9 %      Lymphocyte % 27.8 %      Monocyte % 9.9 %      Eosinophil % 1.7 %      Basophil % 0.5 %      Immature Grans % 0.2 %      Neutrophils, Absolute 2.43 10*3/mm3      Lymphocytes, Absolute 1.13 10*3/mm3      Monocytes, Absolute 0.40 10*3/mm3      Eosinophils, Absolute 0.07 10*3/mm3      Basophils, Absolute 0.02 10*3/mm3      Immature Grans, Absolute 0.01 10*3/mm3      nRBC 0.2 /100 WBC           I ordered the above labs and reviewed the results    PROGRESS AND CONSULTS     1316 Catheter care ordered. Insert 3-way catheter ordered.      1317 Continuous bladder irrigation ordered. Labs ordered.     1515 Rechecked pt. Pt is resting comfortably with BP of 169/78. Plan is to d/c pt with f/u to urology. Upon examination of urine output, it is clear with out hematuria. Pt understands and agrees with the plan. All questions were answered.     MEDICAL DECISION MAKING  Results were reviewed/discussed with the patient and they were also made aware of online access. Pt also made aware that some labs, such as cultures, will not be resulted during ER visit and follow up with PMD is necessary.     MDM  Number of Diagnoses or Management Options  Gross hematuria:      Amount and/or Complexity of Data Reviewed  Clinical lab tests: reviewed and ordered  Decide to obtain previous medical records or to obtain history from someone other than the patient: yes  Review and summarize past medical records: yes (Pt was seen in the ED for hematuria where he was found to have no renal stones but 2 small stones in the bladder. Labs were unremarkable)  Independent visualization of images, tracings, or specimens: yes           DIAGNOSIS  Final diagnoses:   Gross hematuria       DISPOSITION  DISCHARGE    Patient discharged in stable condition.    Reviewed implications of results, diagnosis, meds, responsibility to follow up, warning signs and symptoms of possible worsening,  potential complications and reasons to return to ER.    Patient/Family voiced understanding of above instructions.    Discussed plan for discharge, as there is no emergent indication for admission. Patient referred to primary care provider for BP management due to today's BP. Pt/family is agreeable and understands need for follow up and repeat testing.  Pt is aware that discharge does not mean that nothing is wrong but it indicates no emergency is present that requires admission and they must continue care with follow-up as given below or physician of their choice.     FOLLOW-UP  No follow-up provider specified.       Medication List      No changes were made to your prescriptions during this visit.           Latest Documented Vital Signs:  As of 3:34 PM  BP- 154/78 HR- 67 Temp- 98.5 °F (36.9 °C) (Tympanic) O2 sat- 93%    --  Documentation assistance provided by miladis Manzano for Dr. Yemi Hayes.  Information recorded by the scribe was done at my direction and has been verified and validated by me.       Jose Juan Carvalho  07/05/19 1537       Yemi Hayes MD  07/05/19 1534

## 2019-07-05 NOTE — ED PROVIDER NOTES
EMERGENCY DEPARTMENT ENCOUNTER    CHIEF COMPLAINT  Chief Complaint: Penile bleeding  History given by: Pt  History limited by: Poor historian  Room Number: 11/11  PMD: Phyllis Loving PA      HPI:  Pt is a 89 y.o. male who presents complaining of bright red penile bleeding that began this morning. Pt also states he has had darkened urine and reports intermittent left flank pain that began a few days ago, currently resolved. Pt denies difficulty urinating or dysuria. Pt reports Hx of kidney stones.     Duration:  This am  Onset: gradual  Timing: episodic  Location: penis  Radiation: none  Quality: bleeding  Intensity/Severity: moderate  Progression: unchanged  Associated Symptoms: intermittent left flank pain for the past few days  Aggravating Factors: none  Alleviating Factors: none  Treatment before arrival: none    PAST MEDICAL HISTORY  Active Ambulatory Problems     Diagnosis Date Noted   • Abnormal magnetic resonance imaging study 03/18/2016   • Arthritis 03/18/2016   • Osteoarthritis of cervical spine 03/18/2016   • Diplopia 03/18/2016   • Hearing loss 03/18/2016   • Neoplasm of uncertain behavior of skin 03/18/2016   • Prediabetes 03/18/2016   • Vitamin D deficiency 03/18/2016   • Chronic fatigue 10/27/2016   • History of supraventricular tachycardia 04/07/2017   • Essential hypertension 04/07/2017   • B12 deficiency 04/19/2017   • Abnormal cardiac function test 05/08/2017   • Cardiomyopathy (CMS/[facility]) 05/08/2017   • Coronary artery disease involving native coronary artery of native heart without angina pectoris 05/31/2017   • History of coronary artery stent placement 05/31/2017   • History of renal calculi 05/31/2017   • Dyslipidemia 05/31/2017   • Macrocytosis without anemia 08/09/2017   • Precordial pain 08/16/2017   • Parkinson's disease (CMS/[facility]) 10/25/2017   • History of CVA (cerebrovascular accident) 10/25/2017   • AF (atrial fibrillation) (CMS/[facility]) 11/29/2017   • Otalgia, left 12/29/2017   • Acute  rhinitis 12/29/2017     Resolved Ambulatory Problems     Diagnosis Date Noted   • Maxillary sinusitis 03/18/2016   • Dizziness 10/27/2016   • Acute frontal sinusitis 10/27/2016   • Precordial pain 04/07/2017   • SOB (shortness of breath) 04/07/2017     Past Medical History:   Diagnosis Date   • Abnormal ECG    • Abnormal MRI    • Acute frontal sinusitis    • Arthritis    • Chronic fatigue    • Coronary artery disease    • Dizziness    • Hearing aid worn    • Hearing loss    • Hyperlipidemia    • Hypertension    • Kidney stones    • Macrocytosis    • Neoplasm of skin    • Osteoporosis    • Prediabetes    • Vitamin D deficiency        PAST SURGICAL HISTORY  Past Surgical History:   Procedure Laterality Date   • CARDIAC CATHETERIZATION N/A 5/22/2017    Procedure: Left Heart Cath;  Surgeon: Maninder Quezada MD;  [facility];  Service:    • CARDIAC CATHETERIZATION N/A 5/23/2017    Procedure: Stent BMS coronary;  Surgeon: Maninder Quezada MD;  [facility];  Service:    • HERNIA REPAIR      x 2   • MD RT/LT HEART CATHETERS N/A 5/23/2017    Procedure: Percutaneous Coronary Intervention;  Surgeon: Maninder Quezada MD;  [facility];  Service: Cardiovascular       FAMILY HISTORY  Family History   Family history unknown: Yes       SOCIAL HISTORY  Social History     Socioeconomic History   • Marital status:      Spouse name: Not on file   • Number of children: Not on file   • Years of education: Not on file   • Highest education level: Not on file   Occupational History     Employer: RETIRED   Tobacco Use   • Smoking status: Never Smoker   • Smokeless tobacco: Never Used   Substance and Sexual Activity   • Alcohol use: No   • Drug use: No   • Sexual activity: Defer       ALLERGIES  Patient has no known allergies.    REVIEW OF SYSTEMS  Review of Systems   Unable to perform ROS: Other (Poor historian)   Constitutional:  Negative for activity change, appetite change and fever.   HENT: Negative for congestion and sore throat.    Respiratory: Negative for cough and shortness of breath.    Cardiovascular: Negative for chest pain and leg swelling.   Gastrointestinal: Negative for abdominal pain, diarrhea and vomiting.   Endocrine: Negative.    Genitourinary: Positive for flank pain (intermittent left for a few days). Negative for decreased urine volume and dysuria.        (+) penile bleeding     Musculoskeletal: Negative for neck pain.   Skin: Negative for rash and wound.   Neurological: Negative for weakness, numbness and headaches.   All other systems reviewed and are negative.      PHYSICAL EXAM  ED Triage Vitals   Temp Heart Rate Resp BP SpO2   07/04/19 1952 07/04/19 1952 07/04/19 1952 07/04/19 2002 07/04/19 1952   99 °F (37.2 °C) (!) 129 16 163/95 95 %      Temp src Heart Rate Source Patient Position BP Location FiO2 (%)   -- -- 07/04/19 2002 -- --     Lying         Physical Exam   Constitutional: He is oriented to person, place, and time. No distress.   HENT:   Head: Normocephalic and atraumatic.   Mouth/Throat: Mucous membranes are not dry.   Eyes: EOM are normal. Pupils are equal, round, and reactive to light.   Neck: Normal range of motion. Neck supple.   Cardiovascular: Regular rhythm and normal heart sounds. Tachycardia present.   Pulmonary/Chest: Effort normal and breath sounds normal. No respiratory distress. He has no wheezes. He has no rales.   Abdominal: Soft. Bowel sounds are normal. He exhibits no distension. There is no tenderness. There is no rebound, no guarding and no CVA tenderness.   Genitourinary:   Genitourinary Comments: Dried blood around meatus. Testes non-tender   Musculoskeletal: Normal range of motion. He exhibits no edema.   Neurological: He is alert and oriented to person, place, and time. He has normal sensation and normal strength.   Skin: Skin is warm and dry.   Psychiatric: Mood and affect normal.    Nursing note and vitals reviewed.      LAB RESULTS  Lab Results (last 24 hours)     Procedure Component Value Units Date/Time    CBC & Differential [[id number]] Collected:  07/04/19 2028    Specimen:  Blood Updated:  07/04/19 2037    Narrative:       The following orders were created for panel order CBC & Differential.  Procedure                               Abnormality         Status                     ---------                               -----------         ------                     CBC Auto Differential[[id number]]        Abnormal            Final result                 Please view results for these tests on the individual orders.    Comprehensive Metabolic Panel [[id number]]  (Abnormal) Collected:  07/04/19 2028    Specimen:  Blood Updated:  07/04/19 2055     Glucose 165 mg/dL      BUN 26 mg/dL      Creatinine 1.00 mg/dL      Sodium 146 mmol/L      Potassium 4.3 mmol/L      Chloride 111 mmol/L      CO2 23.3 mmol/L      Calcium 9.3 mg/dL      Total Protein 6.4 g/dL      Albumin 3.70 g/dL      ALT (SGPT) 15 U/L      AST (SGOT) 17 U/L      Alkaline Phosphatase 48 U/L      Total Bilirubin 1.0 mg/dL      eGFR Non African Amer 70 mL/min/1.73      Globulin 2.7 gm/dL      A/G Ratio 1.4 g/dL      BUN/Creatinine Ratio 26.0     Anion Gap 11.7 mmol/L     Narrative:       GFR Normal >60  Chronic Kidney Disease <60  Kidney Failure <15    Protime-INR [[id number]]  (Normal) Collected:  07/04/19 2028    Specimen:  Blood Updated:  07/04/19 2052     Protime 13.6 Seconds      INR 1.07    aPTT [[id number]]  (Normal) Collected:  07/04/19 2028    Specimen:  Blood Updated:  07/04/19 2052     PTT 26.8 seconds     CBC Auto Differential [[id number]]  (Abnormal) Collected:  07/04/19 2028    Specimen:  Blood Updated:  07/04/19 2037     WBC 4.95 10*3/mm3      RBC 4.00 10*6/mm3      Hemoglobin 13.0 g/dL      Hematocrit 41.6 %      .0 fL      MCH 32.5 pg      MCHC 31.3 g/dL      RDW 15.8 %      RDW-SD 61.1 fl      MPV 11.1 fL       Platelets 117 10*3/mm3      Neutrophil % 63.5 %      Lymphocyte % 22.8 %      Monocyte % 10.9 %      Eosinophil % 2.0 %      Basophil % 0.4 %      Immature Grans % 0.4 %      Neutrophils, Absolute 3.14 10*3/mm3      Lymphocytes, Absolute 1.13 10*3/mm3      Monocytes, Absolute 0.54 10*3/mm3      Eosinophils, Absolute 0.10 10*3/mm3      Basophils, Absolute 0.02 10*3/mm3      Immature Grans, Absolute 0.02 10*3/mm3      nRBC 0.2 /100 WBC     Urinalysis With Microscopic If Indicated (No Culture) - Urine, Clean Catch [[id number]]  (Abnormal) Collected:  07/04/19 2034    Specimen:  Urine, Clean Catch Updated:  07/04/19 2054     Color, UA Yellow     Appearance, UA Clear     pH, UA 5.5     Specific Gravity, UA 1.026     Glucose, UA Negative     Ketones, UA Negative     Bilirubin, UA Negative     Blood, UA Large (3+)     Protein, UA Negative     Leuk Esterase, UA Trace     Nitrite, UA Negative     Urobilinogen, UA 1.0 E.U./dL    Urinalysis, Microscopic Only - Urine, Clean Catch [[id number]]  (Abnormal) Collected:  07/04/19 2034    Specimen:  Urine, Clean Catch Updated:  07/04/19 2054     RBC, UA Too Numerous to Count /HPF      WBC, UA 0-2 /HPF      Bacteria, UA None Seen /HPF      Squamous Epithelial Cells, UA 0-2 /HPF      Hyaline Casts, UA None Seen /LPF      Methodology Automated Microscopy          I ordered the above labs and reviewed the results    RADIOLOGY  CT Abdomen Pelvis With Contrast   Final Result       1. No hydronephrosis identified. I do not see any definite stones in the   kidneys themselves, although the patient does appear to have at least 2   tiny stones within the urinary bladder.   2. Cardiomegaly and interlobular septal thickening, favored to represent   vascular congestion. There are also small bilateral pleural effusions.   3. Left basilar consolidation. Some of this is favored to reflect   chronic scarring, but superimposed infiltrate is not completely   excluded.       Radiation dose reduction  techniques were utilized, including automated   exposure control and exposure modulation based on body size.       This report was finalized on 7/4/2019 10:08 PM by Dr. Bree Xavier M.D.               I ordered the above noted radiological studies. Interpreted by radiologist. Discussed with radiologist (). Reviewed by me in PACS.       PROCEDURES  Procedures      PROGRESS AND CONSULTS       2215  Rechecked pt. Pt is resting comfortably. Notified pt of lab work results, including UA that shows blood but no WBCs; and CT abd/pelvis that shows two kidney stones in his bladder. Informed pt concern that he has recently passed kidney stones. Discussed the plan to discharge the pt home with Rx for Keflex. Pt's HR is 114. Will give nightly dose of beta-blocker before discharging home. Pt understands and agrees with the plan, all questions answered.    2222  Ordered Lopressor and Keflex.     2227  HR has went down to 105. Will hold PO Lopressor.       MEDICAL DECISION MAKING  Results were reviewed/discussed with the patient and they were also made aware of online access. Pt also made aware that some labs, such as cultures, will not be resulted during ER visit and follow up with PMD is necessary.     MDM  Number of Diagnoses or Management Options  Hematuria, microscopic:   Sinus tachycardia:   Urinary bladder stone:   Diagnosis management comments: Patient reported having gross hematuria at home earlier today.  He also reported flank pain which is since resolved.  In the emergency department, the patient was able to urinate without difficulty.  Urine was yellow but did show microscopic hematuria.  CT scan showed 2 small stones within the urinary bladder but no evidence of hydro-nephrosis.  Patient was given IV fluids and 1 dose of oral Keflex.  He will be discharged with a prescription for Keflex and referred to urology for follow-up.       Amount and/or Complexity of Data Reviewed  Clinical lab tests: reviewed and  ordered (CBC: WBC = 4.95; UA: TNTC RBCs; CMP: BUN = 26, creatinine = 1.00)  Tests in the radiology section of CPT®: reviewed and ordered  Decide to obtain previous medical records or to obtain history from someone other than the patient: yes  Review and summarize past medical records: (Last seen ER in December 2018 for chest pain. )           DIAGNOSIS  Final diagnoses:   Hematuria, microscopic   Urinary bladder stone   Sinus tachycardia       DISPOSITION  DISCHARGE    Patient discharged in stable condition.    Reviewed implications of results, diagnosis, meds, responsibility to follow up, warning signs and symptoms of possible worsening, potential complications and reasons to return to ER.    Patient/Family voiced understanding of above instructions.    Discussed plan for discharge, as there is no emergent indication for admission. Patient referred to primary care provider for BP management due to today's BP. Pt/family is agreeable and understands need for follow up and repeat testing.  Pt is aware that discharge does not mean that nothing is wrong but it indicates no emergency is present that requires admission and they must continue care with follow-up as given below or physician of their choice.     FOLLOW-UP  Lalit Messina MD  [address] [address]  [phone]    Call in 4 days           Medication List      New Prescriptions    cephalexin 500 MG capsule  Commonly known as:  KEFLEX  Take 1 capsule by mouth 3 (Three) Times a Day.              Latest Documented Vital Signs:  As of 10:50 PM  BP- 149/87 HR- 108 Temp- 99 °F (37.2 °C) O2 sat- 94%    --  Documentation assistance provided by miladis Colby for Dr. Dixon.  Information recorded by the scribe was done at my direction and has been verified and validated by me.          John Colby  07/04/19 3317       Yemi Dixon MD  07/04/19 5430

## 2019-07-05 NOTE — ED TRIAGE NOTES
Patient was seen here yesterday and reports he passed a kidney stone. He is here today because the bleeding won't stop.

## 2019-07-05 NOTE — DISCHARGE INSTRUCTIONS
Take medication as prescribed.  Drink plenty of fluids.  Follow-up with Dr. Messina next week.  Return to the emergency department for fever, chills, difficulty urinating, abdominal/flank pain, or other concern.

## 2019-07-05 NOTE — DISCHARGE INSTRUCTIONS
Be sure to keep your appointment with the urologist on Monday.  Continue previously prescribed antibiotics.

## 2019-07-08 ENCOUNTER — PATIENT OUTREACH (OUTPATIENT)
Dept: CASE MANAGEMENT | Facility: OTHER | Age: 84
End: 2019-07-08

## 2019-07-08 NOTE — OUTREACH NOTE
Patient Outreach Note    Spoke with pt's son for HRCM call following ED visits 7/3 & 7/5/19 for hematuria.  Informed of care advisor role, contact number. Pt is feeling much better after bladder irrigation might have flushed out the stone and no longer having blood in the urine. Had urology visit today. He is back to baseline, lives alone, independent with ADL's, uses a cane, family and neighbors assist, son transports to appointments. He is compliant with medications. Addressed AWV and gaps in care, pt declines.  He doesn't have advance directives, but family has knowledge of his wishes and declines information or papers at this time. Declines MyChart.  Son voiced no further needs at present, appreciated the call.    Chino Hernandez RN    7/8/2019, 4:12 PM

## 2019-07-18 ENCOUNTER — EPISODE CHANGES (OUTPATIENT)
Dept: CASE MANAGEMENT | Facility: OTHER | Age: 84
End: 2019-07-18

## 2019-07-22 RX ORDER — LOSARTAN POTASSIUM 50 MG/1
50 TABLET ORAL DAILY
Qty: 30 TABLET | Refills: 0 | Status: ON HOLD | OUTPATIENT
Start: 2019-07-22 | End: 2019-08-20 | Stop reason: SDUPTHER

## 2019-07-22 RX ORDER — ISOSORBIDE MONONITRATE 60 MG/1
TABLET, EXTENDED RELEASE ORAL
Qty: 30 TABLET | Refills: 0 | Status: ON HOLD | OUTPATIENT
Start: 2019-07-22 | End: 2019-08-20 | Stop reason: SDUPTHER

## 2019-07-24 ENCOUNTER — CLINICAL SUPPORT (OUTPATIENT)
Dept: FAMILY MEDICINE CLINIC | Facility: CLINIC | Age: 84
End: 2019-07-24

## 2019-07-24 DIAGNOSIS — E53.8 B12 DEFICIENCY: Primary | ICD-10-CM

## 2019-07-24 PROCEDURE — 96372 THER/PROPH/DIAG INJ SC/IM: CPT | Performed by: PHYSICIAN ASSISTANT

## 2019-07-24 RX ORDER — CYANOCOBALAMIN 1000 UG/ML
1000 INJECTION, SOLUTION INTRAMUSCULAR; SUBCUTANEOUS
Status: DISCONTINUED | OUTPATIENT
Start: 2019-07-24 | End: 2019-08-28

## 2019-07-24 RX ADMIN — CYANOCOBALAMIN 1000 MCG: 1000 INJECTION, SOLUTION INTRAMUSCULAR; SUBCUTANEOUS at 09:50

## 2019-07-25 ENCOUNTER — HOSPITAL ENCOUNTER (OUTPATIENT)
Dept: CARDIOLOGY | Facility: HOSPITAL | Age: 84
Discharge: HOME | End: 2019-07-25
Admitting: INTERNAL MEDICINE

## 2019-07-25 ENCOUNTER — OFFICE VISIT (OUTPATIENT)
Dept: CARDIOLOGY | Facility: CLINIC | Age: 84
End: 2019-07-25

## 2019-07-25 VITALS
HEART RATE: 65 BPM | HEIGHT: 70 IN | WEIGHT: 183 LBS | DIASTOLIC BLOOD PRESSURE: 67 MMHG | BODY MASS INDEX: 26.2 KG/M2 | SYSTOLIC BLOOD PRESSURE: 161 MMHG

## 2019-07-25 DIAGNOSIS — I42.9 CARDIOMYOPATHY, UNSPECIFIED TYPE (HCC): ICD-10-CM

## 2019-07-25 DIAGNOSIS — I48.0 PAROXYSMAL ATRIAL FIBRILLATION (HCC): ICD-10-CM

## 2019-07-25 DIAGNOSIS — I10 ESSENTIAL HYPERTENSION: Primary | ICD-10-CM

## 2019-07-25 DIAGNOSIS — I25.10 CORONARY ARTERY DISEASE INVOLVING NATIVE CORONARY ARTERY OF NATIVE HEART WITHOUT ANGINA PECTORIS: ICD-10-CM

## 2019-07-25 LAB
AORTIC DIMENSIONLESS INDEX: 0.5 (DI)
BH CV ECHO MEAS - AI DEC SLOPE: 163 CM/SEC^2
BH CV ECHO MEAS - AI MAX PG: 29.4 MMHG
BH CV ECHO MEAS - AI MAX VEL: 271 CM/SEC
BH CV ECHO MEAS - AI P1/2T: 487 MSEC
BH CV ECHO MEAS - AO MAX PG (FULL): 3.9 MMHG
BH CV ECHO MEAS - AO MAX PG: 5.6 MMHG
BH CV ECHO MEAS - AO MEAN PG (FULL): 2 MMHG
BH CV ECHO MEAS - AO MEAN PG: 3 MMHG
BH CV ECHO MEAS - AO ROOT AREA (BSA CORRECTED): 1.8
BH CV ECHO MEAS - AO ROOT AREA: 10.8 CM^2
BH CV ECHO MEAS - AO ROOT DIAM: 3.7 CM
BH CV ECHO MEAS - AO V2 MAX: 118 CM/SEC
BH CV ECHO MEAS - AO V2 MEAN: 83.2 CM/SEC
BH CV ECHO MEAS - AO V2 VTI: 28.3 CM
BH CV ECHO MEAS - AVA(I,A): 2.1 CM^2
BH CV ECHO MEAS - AVA(I,D): 2.1 CM^2
BH CV ECHO MEAS - AVA(V,A): 2.3 CM^2
BH CV ECHO MEAS - AVA(V,D): 2.3 CM^2
BH CV ECHO MEAS - BSA(HAYCOCK): 2.1 M^2
BH CV ECHO MEAS - BSA: 2 M^2
BH CV ECHO MEAS - BZI_BMI: 26.8 KILOGRAMS/M^2
BH CV ECHO MEAS - BZI_METRIC_HEIGHT: 177.8 CM
BH CV ECHO MEAS - BZI_METRIC_WEIGHT: 84.8 KG
BH CV ECHO MEAS - EDV(CUBED): 227 ML
BH CV ECHO MEAS - EDV(MOD-SP2): 229 ML
BH CV ECHO MEAS - EDV(MOD-SP4): 266 ML
BH CV ECHO MEAS - EDV(TEICH): 186.9 ML
BH CV ECHO MEAS - EF(CUBED): 38.1 %
BH CV ECHO MEAS - EF(MOD-BP): 31 %
BH CV ECHO MEAS - EF(MOD-SP2): 28.4 %
BH CV ECHO MEAS - EF(MOD-SP4): 37.6 %
BH CV ECHO MEAS - EF(TEICH): 30.7 %
BH CV ECHO MEAS - ESV(CUBED): 140.6 ML
BH CV ECHO MEAS - ESV(MOD-SP2): 164 ML
BH CV ECHO MEAS - ESV(MOD-SP4): 166 ML
BH CV ECHO MEAS - ESV(TEICH): 129.5 ML
BH CV ECHO MEAS - FS: 14.8 %
BH CV ECHO MEAS - IVS/LVPW: 1
BH CV ECHO MEAS - IVSD: 1.3 CM
BH CV ECHO MEAS - LAT PEAK E' VEL: 4.2 CM/SEC
BH CV ECHO MEAS - LV DIASTOLIC VOL/BSA (35-75): 131.1 ML/M^2
BH CV ECHO MEAS - LV MASS(C)D: 359.6 GRAMS
BH CV ECHO MEAS - LV MASS(C)DI: 177.3 GRAMS/M^2
BH CV ECHO MEAS - LV MAX PG: 1.7 MMHG
BH CV ECHO MEAS - LV MEAN PG: 1 MMHG
BH CV ECHO MEAS - LV SYSTOLIC VOL/BSA (12-30): 81.8 ML/M^2
BH CV ECHO MEAS - LV V1 MAX: 64.3 CM/SEC
BH CV ECHO MEAS - LV V1 MEAN: 42.1 CM/SEC
BH CV ECHO MEAS - LV V1 VTI: 14.2 CM
BH CV ECHO MEAS - LVIDD: 6.1 CM
BH CV ECHO MEAS - LVIDS: 5.2 CM
BH CV ECHO MEAS - LVLD AP2: 9 CM
BH CV ECHO MEAS - LVLD AP4: 9.2 CM
BH CV ECHO MEAS - LVLS AP2: 8.7 CM
BH CV ECHO MEAS - LVLS AP4: 7.9 CM
BH CV ECHO MEAS - LVOT AREA (M): 4.2 CM^2
BH CV ECHO MEAS - LVOT AREA: 4.2 CM^2
BH CV ECHO MEAS - LVOT DIAM: 2.3 CM
BH CV ECHO MEAS - LVPWD: 1.3 CM
BH CV ECHO MEAS - MED PEAK E' VEL: 4 CM/SEC
BH CV ECHO MEAS - MR MAX PG: 93.3 MMHG
BH CV ECHO MEAS - MR MAX VEL: 483 CM/SEC
BH CV ECHO MEAS - MV A DUR: 0.14 SEC
BH CV ECHO MEAS - MV A MAX VEL: 54.3 CM/SEC
BH CV ECHO MEAS - MV DEC SLOPE: 231 CM/SEC^2
BH CV ECHO MEAS - MV DEC TIME: 227 SEC
BH CV ECHO MEAS - MV E MAX VEL: 54.3 CM/SEC
BH CV ECHO MEAS - MV E/A: 1
BH CV ECHO MEAS - MV MAX PG: 1.8 MMHG
BH CV ECHO MEAS - MV MEAN PG: 1 MMHG
BH CV ECHO MEAS - MV P1/2T MAX VEL: 66.4 CM/SEC
BH CV ECHO MEAS - MV P1/2T: 84.2 MSEC
BH CV ECHO MEAS - MV V2 MAX: 66.5 CM/SEC
BH CV ECHO MEAS - MV V2 MEAN: 42.1 CM/SEC
BH CV ECHO MEAS - MV V2 VTI: 19.6 CM
BH CV ECHO MEAS - MVA P1/2T LCG: 3.3 CM^2
BH CV ECHO MEAS - MVA(P1/2T): 2.6 CM^2
BH CV ECHO MEAS - MVA(VTI): 3 CM^2
BH CV ECHO MEAS - PA ACC TIME: 0.05 SEC
BH CV ECHO MEAS - PA MAX PG (FULL): 1.7 MMHG
BH CV ECHO MEAS - PA MAX PG: 2.7 MMHG
BH CV ECHO MEAS - PA PR(ACCEL): 57.4 MMHG
BH CV ECHO MEAS - PA V2 MAX: 81.4 CM/SEC
BH CV ECHO MEAS - RAP SYSTOLE: 3 MMHG
BH CV ECHO MEAS - RV MAX PG: 0.98 MMHG
BH CV ECHO MEAS - RV MEAN PG: 1 MMHG
BH CV ECHO MEAS - RV V1 MAX: 49.5 CM/SEC
BH CV ECHO MEAS - RV V1 MEAN: 35 CM/SEC
BH CV ECHO MEAS - RV V1 VTI: 12.3 CM
BH CV ECHO MEAS - SI(AO): 150 ML/M^2
BH CV ECHO MEAS - SI(CUBED): 42.6 ML/M^2
BH CV ECHO MEAS - SI(LVOT): 29.1 ML/M^2
BH CV ECHO MEAS - SI(MOD-SP2): 32 ML/M^2
BH CV ECHO MEAS - SI(MOD-SP4): 49.3 ML/M^2
BH CV ECHO MEAS - SI(TEICH): 28.3 ML/M^2
BH CV ECHO MEAS - SV(AO): 304.3 ML
BH CV ECHO MEAS - SV(CUBED): 86.4 ML
BH CV ECHO MEAS - SV(LVOT): 59 ML
BH CV ECHO MEAS - SV(MOD-SP2): 65 ML
BH CV ECHO MEAS - SV(MOD-SP4): 100 ML
BH CV ECHO MEAS - SV(TEICH): 57.4 ML
BH CV ECHO MEAS - TAPSE (>1.6): 2 CM2
BH CV ECHO MEASUREMENTS AVERAGE E/E' RATIO: 13.24
BH CV XLRA - RV BASE: 3.3 CM
BH CV XLRA - TDI S': 11 CM/SEC
LEFT ATRIUM VOLUME INDEX: 39 ML/M2

## 2019-07-25 PROCEDURE — 93306 TTE W/DOPPLER COMPLETE: CPT

## 2019-07-25 PROCEDURE — 93306 TTE W/DOPPLER COMPLETE: CPT | Performed by: INTERNAL MEDICINE

## 2019-07-25 PROCEDURE — 25010000002 PERFLUTREN (DEFINITY) 8.476 MG IN SODIUM CHLORIDE 0.9 % 10 ML INJECTION: Performed by: INTERNAL MEDICINE

## 2019-07-25 PROCEDURE — 99213 OFFICE O/P EST LOW 20 MIN: CPT | Performed by: INTERNAL MEDICINE

## 2019-07-25 RX ADMIN — SODIUM CHLORIDE 2 ML: 9 INJECTION INTRAMUSCULAR; INTRAVENOUS; SUBCUTANEOUS at 10:43

## 2019-08-06 ENCOUNTER — PATIENT OUTREACH (OUTPATIENT)
Dept: CASE MANAGEMENT | Facility: OTHER | Age: 84
End: 2019-08-06

## 2019-08-06 ENCOUNTER — HOSPITAL ENCOUNTER (OUTPATIENT)
Facility: HOSPITAL | Age: 84
Discharge: HOME | End: 2019-08-09
Attending: EMERGENCY MEDICINE | Admitting: INTERNAL MEDICINE

## 2019-08-06 ENCOUNTER — APPOINTMENT (OUTPATIENT)
Dept: GENERAL RADIOLOGY | Facility: HOSPITAL | Age: 84
End: 2019-08-06

## 2019-08-06 ENCOUNTER — EPISODE CHANGES (OUTPATIENT)
Dept: CASE MANAGEMENT | Facility: OTHER | Age: 84
End: 2019-08-06

## 2019-08-06 DIAGNOSIS — R07.9 CHEST PAIN, UNSPECIFIED TYPE: Primary | ICD-10-CM

## 2019-08-06 DIAGNOSIS — R94.39 ABNORMAL STRESS TEST: ICD-10-CM

## 2019-08-06 DIAGNOSIS — R07.2 PRECORDIAL PAIN: ICD-10-CM

## 2019-08-06 LAB
ALBUMIN SERPL-MCNC: 3.8 G/DL (ref 3.5–5.2)
ALBUMIN/GLOB SERPL: 1.7 G/DL
ALP SERPL-CCNC: 45 U/L (ref 39–117)
ALT SERPL W P-5'-P-CCNC: 11 U/L (ref 1–41)
ANION GAP SERPL CALCULATED.3IONS-SCNC: 11.5 MMOL/L (ref 5–15)
AST SERPL-CCNC: 12 U/L (ref 1–40)
BASOPHILS # BLD AUTO: 0.02 10*3/MM3 (ref 0–0.2)
BASOPHILS NFR BLD AUTO: 0.5 % (ref 0–1.5)
BILIRUB SERPL-MCNC: 1.2 MG/DL (ref 0.2–1.2)
BUN BLD-MCNC: 21 MG/DL (ref 8–23)
BUN/CREAT SERPL: 23.9 (ref 7–25)
CALCIUM SPEC-SCNC: 8.7 MG/DL (ref 8.2–9.6)
CHLORIDE SERPL-SCNC: 112 MMOL/L (ref 98–107)
CO2 SERPL-SCNC: 24.5 MMOL/L (ref 22–29)
CREAT BLD-MCNC: 0.88 MG/DL (ref 0.76–1.27)
DEPRECATED RDW RBC AUTO: 60.7 FL (ref 37–54)
EOSINOPHIL # BLD AUTO: 0.1 10*3/MM3 (ref 0–0.4)
EOSINOPHIL NFR BLD AUTO: 2.3 % (ref 0.3–6.2)
ERYTHROCYTE [DISTWIDTH] IN BLOOD BY AUTOMATED COUNT: 15.8 % (ref 12.3–15.4)
GFR SERPL CREATININE-BSD FRML MDRD: 81 ML/MIN/1.73
GLOBULIN UR ELPH-MCNC: 2.3 GM/DL
GLUCOSE BLD-MCNC: 116 MG/DL (ref 65–99)
HCT VFR BLD AUTO: 40.9 % (ref 37.5–51)
HGB BLD-MCNC: 12.7 G/DL (ref 13–17.7)
HOLD SPECIMEN: NORMAL
HOLD SPECIMEN: NORMAL
IMM GRANULOCYTES # BLD AUTO: 0.01 10*3/MM3 (ref 0–0.05)
IMM GRANULOCYTES NFR BLD AUTO: 0.2 % (ref 0–0.5)
LYMPHOCYTES # BLD AUTO: 1.02 10*3/MM3 (ref 0.7–3.1)
LYMPHOCYTES NFR BLD AUTO: 23.9 % (ref 19.6–45.3)
MAGNESIUM SERPL-MCNC: 2 MG/DL (ref 1.7–2.3)
MCH RBC QN AUTO: 32.7 PG (ref 26.6–33)
MCHC RBC AUTO-ENTMCNC: 31.1 G/DL (ref 31.5–35.7)
MCV RBC AUTO: 105.4 FL (ref 79–97)
MONOCYTES # BLD AUTO: 0.46 10*3/MM3 (ref 0.1–0.9)
MONOCYTES NFR BLD AUTO: 10.8 % (ref 5–12)
NEUTROPHILS # BLD AUTO: 2.66 10*3/MM3 (ref 1.7–7)
NEUTROPHILS NFR BLD AUTO: 62.3 % (ref 42.7–76)
NRBC BLD AUTO-RTO: 0.2 /100 WBC (ref 0–0.2)
NT-PROBNP SERPL-MCNC: 7336 PG/ML (ref 5–1800)
PLATELET # BLD AUTO: 125 10*3/MM3 (ref 140–450)
PMV BLD AUTO: 11.4 FL (ref 6–12)
POTASSIUM BLD-SCNC: 4.2 MMOL/L (ref 3.5–5.2)
PROT SERPL-MCNC: 6.1 G/DL (ref 6–8.5)
RBC # BLD AUTO: 3.88 10*6/MM3 (ref 4.14–5.8)
SODIUM BLD-SCNC: 148 MMOL/L (ref 136–145)
TROPONIN T SERPL-MCNC: <0.01 NG/ML (ref 0–0.03)
WBC NRBC COR # BLD: 4.27 10*3/MM3 (ref 3.4–10.8)
WHOLE BLOOD HOLD SPECIMEN: NORMAL
WHOLE BLOOD HOLD SPECIMEN: NORMAL

## 2019-08-06 PROCEDURE — 85025 COMPLETE CBC W/AUTO DIFF WBC: CPT | Performed by: NURSE PRACTITIONER

## 2019-08-06 PROCEDURE — 93005 ELECTROCARDIOGRAM TRACING: CPT

## 2019-08-06 PROCEDURE — 80053 COMPREHEN METABOLIC PANEL: CPT | Performed by: NURSE PRACTITIONER

## 2019-08-06 PROCEDURE — 93010 ELECTROCARDIOGRAM REPORT: CPT | Performed by: INTERNAL MEDICINE

## 2019-08-06 PROCEDURE — 83735 ASSAY OF MAGNESIUM: CPT | Performed by: NURSE PRACTITIONER

## 2019-08-06 PROCEDURE — 99219 PR INITIAL OBSERVATION CARE/DAY 50 MINUTES: CPT | Performed by: INTERNAL MEDICINE

## 2019-08-06 PROCEDURE — 96372 THER/PROPH/DIAG INJ SC/IM: CPT

## 2019-08-06 PROCEDURE — 93005 ELECTROCARDIOGRAM TRACING: CPT | Performed by: EMERGENCY MEDICINE

## 2019-08-06 PROCEDURE — 25010000002 ENOXAPARIN PER 10 MG: Performed by: NURSE PRACTITIONER

## 2019-08-06 PROCEDURE — G0378 HOSPITAL OBSERVATION PER HR: HCPCS

## 2019-08-06 PROCEDURE — 83880 ASSAY OF NATRIURETIC PEPTIDE: CPT | Performed by: NURSE PRACTITIONER

## 2019-08-06 PROCEDURE — 71045 X-RAY EXAM CHEST 1 VIEW: CPT

## 2019-08-06 PROCEDURE — 84484 ASSAY OF TROPONIN QUANT: CPT | Performed by: NURSE PRACTITIONER

## 2019-08-06 PROCEDURE — 99285 EMERGENCY DEPT VISIT HI MDM: CPT

## 2019-08-06 RX ORDER — LOSARTAN POTASSIUM 50 MG/1
50 TABLET ORAL DAILY
Status: DISCONTINUED | OUTPATIENT
Start: 2019-08-06 | End: 2019-08-09 | Stop reason: HOSPADM

## 2019-08-06 RX ORDER — ATORVASTATIN CALCIUM 20 MG/1
20 TABLET, FILM COATED ORAL NIGHTLY
Status: DISCONTINUED | OUTPATIENT
Start: 2019-08-06 | End: 2019-08-09 | Stop reason: HOSPADM

## 2019-08-06 RX ORDER — ASPIRIN 325 MG
325 TABLET, DELAYED RELEASE (ENTERIC COATED) ORAL DAILY
Status: DISCONTINUED | OUTPATIENT
Start: 2019-08-06 | End: 2019-08-09 | Stop reason: HOSPADM

## 2019-08-06 RX ORDER — SODIUM CHLORIDE 0.9 % (FLUSH) 0.9 %
3 SYRINGE (ML) INJECTION EVERY 12 HOURS SCHEDULED
Status: DISCONTINUED | OUTPATIENT
Start: 2019-08-06 | End: 2019-08-09 | Stop reason: HOSPADM

## 2019-08-06 RX ORDER — ISOSORBIDE MONONITRATE 60 MG/1
60 TABLET, EXTENDED RELEASE ORAL DAILY
Status: DISCONTINUED | OUTPATIENT
Start: 2019-08-06 | End: 2019-08-09 | Stop reason: HOSPADM

## 2019-08-06 RX ORDER — NITROGLYCERIN 0.4 MG/1
0.4 TABLET SUBLINGUAL
Status: DISCONTINUED | OUTPATIENT
Start: 2019-08-06 | End: 2019-08-09 | Stop reason: HOSPADM

## 2019-08-06 RX ORDER — METOPROLOL SUCCINATE 50 MG/1
50 TABLET, EXTENDED RELEASE ORAL 2 TIMES DAILY
Status: CANCELLED | OUTPATIENT
Start: 2019-08-06

## 2019-08-06 RX ORDER — METOPROLOL SUCCINATE 50 MG/1
50 TABLET, EXTENDED RELEASE ORAL
Status: DISCONTINUED | OUTPATIENT
Start: 2019-08-06 | End: 2019-08-08

## 2019-08-06 RX ORDER — SODIUM CHLORIDE 0.9 % (FLUSH) 0.9 %
3-10 SYRINGE (ML) INJECTION AS NEEDED
Status: DISCONTINUED | OUTPATIENT
Start: 2019-08-06 | End: 2019-08-09 | Stop reason: HOSPADM

## 2019-08-06 RX ADMIN — LOSARTAN POTASSIUM 50 MG: 50 TABLET, FILM COATED ORAL at 16:44

## 2019-08-06 RX ADMIN — ATORVASTATIN CALCIUM 20 MG: 20 TABLET, FILM COATED ORAL at 21:08

## 2019-08-06 RX ADMIN — ISOSORBIDE MONONITRATE 60 MG: 60 TABLET ORAL at 16:44

## 2019-08-06 RX ADMIN — SODIUM CHLORIDE, PRESERVATIVE FREE 3 ML: 5 INJECTION INTRAVENOUS at 21:08

## 2019-08-06 RX ADMIN — METOPROLOL SUCCINATE 50 MG: 50 TABLET, FILM COATED, EXTENDED RELEASE ORAL at 21:08

## 2019-08-06 RX ADMIN — ASPIRIN 325 MG: 325 TABLET, COATED ORAL at 16:44

## 2019-08-06 RX ADMIN — ENOXAPARIN SODIUM 40 MG: 40 INJECTION SUBCUTANEOUS at 16:44

## 2019-08-06 NOTE — ED TRIAGE NOTES
Patient to ED via EMS for sharp chest pain with radiating pain down left arm onset 0600. Patient took 324mg ASA and sublingual nitro x4, denies relief.  
no

## 2019-08-06 NOTE — ED PROVIDER NOTES
EMERGENCY DEPARTMENT ENCOUNTER    CHIEF COMPLAINT  Chief Complaint: Chest pain  History given by: Patient  History limited by: n/a  Time Seen: 0825  Room Number: 28/28  PMD: Phyllis Loving PA      HPI:  Pt is a 89 y.o. male who presents with mid-sternal chest pain that began around 0630 this morning. Pain radiates into left side of chest and into left arm. C/o associated SOA. Denies diaphoresis, n/v. Patient given nitro and aspirin by EMS en route. At time of my evaluation, patient is chest pain free.     Duration: since 0630 this morning  Timing: constant  Location: mid sternal   Radiation: into left side of chest and left arm  Quality: 'pain'  Intensity/Severity: pain free at time of my evaluation  Associated Symptoms: SOA  Aggravating Factors: none specified  Alleviating Factors: nitro and aspirin  Previous Episodes: none  Treatment before arrival: given nitro and aspirin by EMS     PAST MEDICAL HISTORY  Active Ambulatory Problems     Diagnosis Date Noted   • Abnormal magnetic resonance imaging study 03/18/2016   • Arthritis 03/18/2016   • Osteoarthritis of cervical spine 03/18/2016   • Diplopia 03/18/2016   • Hearing loss 03/18/2016   • Neoplasm of uncertain behavior of skin 03/18/2016   • Prediabetes 03/18/2016   • Vitamin D deficiency 03/18/2016   • Chronic fatigue 10/27/2016   • History of supraventricular tachycardia 04/07/2017   • Essential hypertension 04/07/2017   • B12 deficiency 04/19/2017   • Abnormal cardiac function test 05/08/2017   • Cardiomyopathy (CMS/HCC) 05/08/2017   • Coronary artery disease involving native coronary artery of native heart without angina pectoris 05/31/2017   • History of coronary artery stent placement 05/31/2017   • History of renal calculi 05/31/2017   • Dyslipidemia 05/31/2017   • Macrocytosis without anemia 08/09/2017   • Precordial pain 08/16/2017   • Parkinson's disease (CMS/HCC) 10/25/2017   • History of CVA (cerebrovascular accident) 10/25/2017   • AF (atrial  fibrillation) (CMS/[facility]) 11/29/2017   • Otalgia, left 12/29/2017   • Acute rhinitis 12/29/2017     Resolved Ambulatory Problems     Diagnosis Date Noted   • Maxillary sinusitis 03/18/2016   • Dizziness 10/27/2016   • Acute frontal sinusitis 10/27/2016   • Precordial pain 04/07/2017   • SOB (shortness of breath) 04/07/2017     Past Medical History:   Diagnosis Date   • Abnormal ECG    • Abnormal MRI    • Acute frontal sinusitis    • Arthritis    • Chronic fatigue    • Coronary artery disease    • Dizziness    • Hearing aid worn    • Hearing loss    • Hyperlipidemia    • Hypertension    • Kidney stones    • Macrocytosis    • Neoplasm of skin    • Osteoporosis    • Prediabetes    • Vitamin D deficiency        PAST SURGICAL HISTORY  Past Surgical History:   Procedure Laterality Date   • CARDIAC CATHETERIZATION N/A 5/22/2017    Procedure: Left Heart Cath;  Surgeon: Maninder Quezada MD;  Location:  [facility] CATH INVASIVE LOCATION;  Service:    • CARDIAC CATHETERIZATION N/A 5/23/2017    Procedure: Stent BMS coronary;  Surgeon: Maninder Quezada MD;  [facility];  Service:    • HERNIA REPAIR      x 2   • NC RT/LT HEART CATHETERS N/A 5/23/2017    Procedure: Percutaneous Coronary Intervention;  Surgeon: Maninder Quezada MD;  Location:  [facility] CATH INVASIVE LOCATION;  Service: Cardiovascular       FAMILY HISTORY  Family History   Family history unknown: Yes       SOCIAL HISTORY  Social History     Socioeconomic History   • Marital status:      Spouse name: Not on file   • Number of children: Not on file   • Years of education: Not on file   • Highest education level: Not on file   Occupational History     Employer: RETIRED   Tobacco Use   • Smoking status: Never Smoker   • Smokeless tobacco: Never Used   Substance and Sexual Activity   • Alcohol use: No   • Drug use: No   • Sexual activity: Defer         ALLERGIES  Patient has no known allergies.    REVIEW OF SYSTEMS  Review of  Systems   Constitutional: Negative.  Negative for activity change, appetite change ( decreased), chills and fever.   HENT: Negative for congestion, ear pain, rhinorrhea, sinus pressure and sore throat.    Eyes: Negative.    Respiratory: Positive for shortness of breath. Negative for cough.    Cardiovascular: Positive for chest pain. Negative for palpitations and leg swelling ( pedal).   Gastrointestinal: Negative for abdominal pain, diarrhea, nausea and vomiting.   Endocrine: Negative.    Genitourinary: Negative.  Negative for decreased urine volume, difficulty urinating, dysuria, frequency and urgency.   Musculoskeletal: Negative.  Negative for back pain.   Skin: Negative.  Negative for rash.   Allergic/Immunologic: Negative.    Neurological: Negative.  Negative for dizziness, weakness, light-headedness, numbness and headaches.   Hematological: Negative.    Psychiatric/Behavioral: Negative.  The patient is not nervous/anxious.    All other systems reviewed and are negative.      PHYSICAL EXAM  ED Triage Vitals   Temp Heart Rate Resp BP SpO2   08/06/19 0812 08/06/19 0811 08/06/19 0814 08/06/19 0811 08/06/19 0811   98.2 °F (36.8 °C) 120 20 142/80 98 %      Temp src Heart Rate Source Patient Position BP Location FiO2 (%)   08/06/19 0812 08/06/19 0811 -- -- --   Tympanic Monitor          Physical Exam   Constitutional: He is well-developed, well-nourished, and in no distress. No distress.   HENT:   Head: Normocephalic.   Mouth/Throat: Oropharynx is clear and moist and mucous membranes are normal.   Eyes: Pupils are equal, round, and reactive to light.   Neck: Normal range of motion.   Cardiovascular: Regular rhythm and normal heart sounds. Tachycardia present.   Pulmonary/Chest: Effort normal and breath sounds normal. He has no wheezes.   Abdominal: Soft. Bowel sounds are normal. There is no tenderness.   Musculoskeletal: Normal range of motion. He exhibits no edema.   Neurological: He is alert.   Skin: Skin is warm  and dry. No rash noted.   Psychiatric: Mood, memory, affect and judgment normal.   Nursing note and vitals reviewed.      LAB RESULTS  Recent Results (from the past 24 hour(s))   Light Blue Top    Collection Time: 08/06/19  8:25 AM   Result Value Ref Range    Extra Tube hold for add-on    Green Top (Gel)    Collection Time: 08/06/19  8:25 AM   Result Value Ref Range    Extra Tube Hold for add-ons.    Lavender Top    Collection Time: 08/06/19  8:25 AM   Result Value Ref Range    Extra Tube hold for add-on    Gold Top - SST    Collection Time: 08/06/19  8:25 AM   Result Value Ref Range    Extra Tube Hold for add-ons.    Comprehensive Metabolic Panel    Collection Time: 08/06/19  8:25 AM   Result Value Ref Range    Glucose 116 (H) 65 - 99 mg/dL    BUN 21 8 - 23 mg/dL    Creatinine 0.88 0.76 - 1.27 mg/dL    Sodium 148 (H) 136 - 145 mmol/L    Potassium 4.2 3.5 - 5.2 mmol/L    Chloride 112 (H) 98 - 107 mmol/L    CO2 24.5 22.0 - 29.0 mmol/L    Calcium 8.7 8.2 - 9.6 mg/dL    Total Protein 6.1 6.0 - 8.5 g/dL    Albumin 3.80 3.50 - 5.20 g/dL    ALT (SGPT) 11 1 - 41 U/L    AST (SGOT) 12 1 - 40 U/L    Alkaline Phosphatase 45 39 - 117 U/L    Total Bilirubin 1.2 0.2 - 1.2 mg/dL    eGFR Non African Amer 81 >60 mL/min/1.73    Globulin 2.3 gm/dL    A/G Ratio 1.7 g/dL    BUN/Creatinine Ratio 23.9 7.0 - 25.0    Anion Gap 11.5 5.0 - 15.0 mmol/L   Troponin    Collection Time: 08/06/19  8:25 AM   Result Value Ref Range    Troponin T <0.010 0.000 - 0.030 ng/mL   BNP    Collection Time: 08/06/19  8:25 AM   Result Value Ref Range    proBNP 7,336.0 (H) 5.0-1,800.0 pg/mL   Magnesium    Collection Time: 08/06/19  8:25 AM   Result Value Ref Range    Magnesium 2.0 1.7 - 2.3 mg/dL   CBC Auto Differential    Collection Time: 08/06/19  8:25 AM   Result Value Ref Range    WBC 4.27 3.40 - 10.80 10*3/mm3    RBC 3.88 (L) 4.14 - 5.80 10*6/mm3    Hemoglobin 12.7 (L) 13.0 - 17.7 g/dL    Hematocrit 40.9 37.5 - 51.0 %    .4 (H) 79.0 - 97.0 fL    MCH  32.7 26.6 - 33.0 pg    MCHC 31.1 (L) 31.5 - 35.7 g/dL    RDW 15.8 (H) 12.3 - 15.4 %    RDW-SD 60.7 (H) 37.0 - 54.0 fl    MPV 11.4 6.0 - 12.0 fL    Platelets 125 (L) 140 - 450 10*3/mm3    Neutrophil % 62.3 42.7 - 76.0 %    Lymphocyte % 23.9 19.6 - 45.3 %    Monocyte % 10.8 5.0 - 12.0 %    Eosinophil % 2.3 0.3 - 6.2 %    Basophil % 0.5 0.0 - 1.5 %    Immature Grans % 0.2 0.0 - 0.5 %    Neutrophils, Absolute 2.66 1.70 - 7.00 10*3/mm3    Lymphocytes, Absolute 1.02 0.70 - 3.10 10*3/mm3    Monocytes, Absolute 0.46 0.10 - 0.90 10*3/mm3    Eosinophils, Absolute 0.10 0.00 - 0.40 10*3/mm3    Basophils, Absolute 0.02 0.00 - 0.20 10*3/mm3    Immature Grans, Absolute 0.01 0.00 - 0.05 10*3/mm3    nRBC 0.2 0.0 - 0.2 /100 WBC       I ordered the above labs and reviewed the results    RADIOLOGY  XR Chest 1 View   FINDINGS: There is cardiomegaly and there is a blunted left costophrenic  angle which appear unchanged since a previous radiograph from  12/06/2018. There is prominence of the central pulmonary vascularity,  but there is no definite interstitial edema.             I ordered the above noted radiological studies and reviewed the images on the PACS system.         EKG    ekg was interpreted by Dr. Covington      MEDICAL RECORD REVIEW  2D echo 7/25/19  Stress test 11/30/17- normal      PROGRESS AND CONSULTS    0828: Blood work, CXR ordered.     0947: Labs/imaging reviewed. Call out to Dr. Quezada for consult.     1006: Spoke with Dr. Quezada. He will admit patient overnight for observation.     1015: Reviewed pt's history and workup with Dr. Covington.   After a bedside evaluation; Dr. Covington agrees with the plan of care    1025: Patient rechecked. Reviewed results of ED workup and plan for admission after consulting with Dr Smith. He voiced understanding and is agreeable to plan.     COURSE & MEDICAL DECISION MAKING  Pertinent Labs and Imaging studies that were ordered and reviewed are noted above.  Results were  "reviewed/discussed with the patient and they were also made aware of online assess.   Pt also made aware that some labs, such as cultures, will not be resulted during ER visit and follow up with PMD is necessary.       /81   Pulse 99   Temp 98.2 °F (36.8 °C) (Tympanic)   Resp 15   Ht 185.4 cm (73\")   Wt 87.3 kg (192 lb 8 oz)   SpO2 94%   BMI 25.40 kg/m²       Final diagnoses:   Chest pain, unspecified type       ADMISSION    Discussed treatment plan and reason for admission with pt/family and admitting physician.  Pt/family voiced understanding of the plan for admission for further testing/treatment as needed.       Documentation assistance provided by miladis Lawler for CANDELARIA Caceres.  Information recorded by the miladis was done at my direction and has been verified and validated by me.             Maya Lawler  08/06/19 1024       Claudette Caicedo APRN  08/06/19 1042       Claudette Caicedo APRN  08/06/19 1102    "

## 2019-08-06 NOTE — OUTREACH NOTE
Care Coordination Note    Communication with ED care manager update of the following:   Patient recently in ED on 7/4 and 7/5 with hematuria. Lives alone. Independent. Uses cane. Family and friends transport. Hard of hearing. Son lives 25 miles away. Neighbors check on patient daily.     Ashely Domínguez RN    8/6/2019, 10:51 AM

## 2019-08-06 NOTE — PLAN OF CARE
Problem: Patient Care Overview  Goal: Plan of Care Review  Outcome: Ongoing (interventions implemented as appropriate)   08/06/19 1739   Coping/Psychosocial   Plan of Care Reviewed With patient   Plan of Care Review   Progress improving   OTHER   Outcome Summary From ER with CP. No c/o CP today. VSS. NPO after MN for stress test in AM. Will continue to monitor

## 2019-08-06 NOTE — ED PROVIDER NOTES
Pt is a 89 y.o. male who presents to the ED complaining of cp that started at 0630 this AM which was relived by NTG and ASA by EMS. Pt states that the pain radiated to his left chest and LUE with SOA. Pt denies any other sx including nausea, vomiting, and diaphoresis.  Pt states that he lives alone and that his neighbor called EMS.      On exam,  General: Awake, alert, no acute distress  HEENT: EOMI  Pulm: Symmetric chest rise, nonlabored breathing, CTAB  CV: Regular rate and rhythm  GI: Soft, nontender  MSK: No deformity  Skin: Warm, dry  Neuro: Alert and oriented x 3, moving all extremities, no focal deficits  Psych: Calm, cooperative      EKG          EKG time: 0822  Rhythm/Rate: unspecified  tachycardia 115 BPM  P waves and AR: no obvious P waves  QRS, axis: borderline IVCD with QRS of 117, prolonged QTC of 512  ST and T waves: repolarization abnormalities in 1 and AVL, no STEMI     Interpreted Contemporaneously by me, independently viewed  New changes compared to prior 6/12/19      Labs (troponin <0.010, creatinine-0.88, WBC-WNL, BNP-7336) and imaging (CXR-negative acute) reviewed.     Plan: Admit pt through cardiology after discussing case with Dr. Quezada.      MD ATTESTATION NOTE    The CHEYANNE and I have discussed this patient's history, physical exam, and treatment plan.  I have reviewed the documentation and personally had a face to face interaction with the patient. I affirm the documentation and agree with the treatment and plan.  The attached note describes my personal findings.      Documentation assistance provided by miladis Hager for MD Danielle. Information recorded by the scribe was done at my direction and has been verified and validated by me.             Catherine Hager  08/06/19 1041       Eduardo Covington MD  08/06/19 1221

## 2019-08-06 NOTE — ED NOTES
Nursing report ED to floor  Dinesh Churchill Sr.  89 y.o.  male    HPI (triage note):   Chief Complaint   Patient presents with   • Chest Pain       Admitting doctor:   Maninder Quezada MD    Admitting diagnosis:   The encounter diagnosis was Chest pain, unspecified type.    Code status:   Current Code Status     Date Active Code Status Order ID Comments User Context       8/6/2019 11:17 CPR [id number]  Juliana Jesus APRN ED       Questions for Current Code Status     Question Answer Comment    Code Status CPR     Medical Interventions (Level of Support Prior to Arrest) Full           Allergies:   Patient has no known allergies.    Weight:       08/06/19  0814   Weight: 87.3 kg (192 lb 8 oz)       Most recent vitals:   Vitals:    08/06/19 0957 08/06/19 1027 08/06/19 1158 08/06/19 1227   BP: 153/80 145/81 142/81 141/76   Pulse: 93 99 93 93   Resp: 16 15  16   Temp:       TempSrc:       SpO2: 95% 94% 95% 96%   Weight:       Height:           Active LDAs/IV Access:   Lines, Drains & Airways    Active LDAs     Name:   Placement date:   Placement time:   Site:   Days:    Peripheral IV 08/06/19 0812 Left Antecubital   08/06/19 0812    Antecubital   less than 1                Labs (abnormal labs have a star):   Labs Reviewed   COMPREHENSIVE METABOLIC PANEL - Abnormal; Notable for the following components:       Result Value    Glucose 116 (*)     Sodium 148 (*)     Chloride 112 (*)     All other components within normal limits    Narrative:     GFR Normal >60  Chronic Kidney Disease <60  Kidney Failure <15   BNP (IN-HOUSE) - Abnormal; Notable for the following components:    proBNP 7,336.0 (*)     All other components within normal limits    Narrative:     Among patients with dyspnea, NT-proBNP is highly sensitive for the detection of acute congestive heart failure. In addition NT-proBNP of <300 pg/ml effectively rules out acute congestive heart failure with 99% negative predictive value.   CBC WITH AUTO  DIFFERENTIAL - Abnormal; Notable for the following components:    RBC 3.88 (*)     Hemoglobin 12.7 (*)     .4 (*)     MCHC 31.1 (*)     RDW 15.8 (*)     RDW-SD 60.7 (*)     Platelets 125 (*)     All other components within normal limits   TROPONIN (IN-HOUSE) - Normal    Narrative:     Troponin T Reference Range:  <= 0.03 ng/mL-   Negative for AMI  >0.03 ng/mL-     Abnormal for myocardial necrosis.  Clinicians would have to utilize clinical acumen, EKG, Troponin and serial changes to determine if it is an Acute Myocardial Infarction or myocardial injury due to an underlying chronic condition.    MAGNESIUM - Normal   RAINBOW DRAW    Narrative:     The following orders were created for panel order [facility] Draw.  Procedure                               Abnormality         Status                     ---------                               -----------         ------                     Light Blue Top[[id number]]                                   Final result               Green Top (Gel)[[id number]]                                  Final result               Lavender Top[[id number]]                                     Final result               Gold Top - SST[[id number]]                                   Final result                 Please view results for these tests on the individual orders.   LIGHT BLUE TOP   GREEN TOP   LAVENDER TOP   GOLD TOP - SST   CBC AND DIFFERENTIAL    Narrative:     The following orders were created for panel order CBC & Differential.  Procedure                               Abnormality         Status                     ---------                               -----------         ------                     CBC Auto Differential[[id number]]        Abnormal            Final result                 Please view results for these tests on the individual orders.       EKG:   ECG 12 Lead   Preliminary Result   HEART RATE= 115  bpm   RR Interval= 520  ms   DC Interval= 464  ms   P Horizontal Axis= 188   deg   P Front Axis= 0  deg   QRSD Interval= 117  ms   QT Interval= 369  ms   QRS Axis= -39  deg   T Wave Axis= 107  deg   - ABNORMAL ECG -   Sinus tachycardia   Prolonged WI interval   Prominent P waves, nondiagnostic   LVH with IVCD, LAD and secondary repol abnrm   Prolonged QT interval   Electronically Signed By:    Date and Time of Study: 2019-08-06 08:22:17          Meds given in ED:   Medications - No data to display    Imaging results:  No radiology results for the last day    Ambulatory status:   - up with assist    Social issues:   Social History     Socioeconomic History   • Marital status:      Spouse name: Not on file   • Number of children: Not on file   • Years of education: Not on file   • Highest education level: Not on file   Occupational History     Employer: RETIRED   Tobacco Use   • Smoking status: Never Smoker   • Smokeless tobacco: Never Used   Substance and Sexual Activity   • Alcohol use: No   • Drug use: No   • Sexual activity: Defer          Dilcia Lovelace, MOSES  08/06/19 8100

## 2019-08-06 NOTE — H&P
[facility]  History & Physical Note                                                                                    Patient Identification:  Dinesh Churchill Sr.:   89 y.o.  male  5/29/1928  [id number]            Chief Complaint   Patient presents with   • Chest Pain       Date of Admission:8/6/19 at 0814    Admitting Physician: Dr. Maninder Quezada    Reason for Admission:Chest pain, unspecified type [R07.9], Chief Complaint   Patient presents with   • Chest Pain       History of Present Illness:   Dinesh Churchill is a 89-year-old male who is current with our service.  He has a history of CAD, hyperlipidemia, hypertension,vit. D def., and prediabetes.  He had came to the office in June with shortness of breath and an echo was done at that point.  Echo showed EF 31%, mild to moderate mitral valve regurgitation, mild AV regurgitation, mild TV regurgitation, mild to moderate TV regurgitation, right ventricular cavity is mildly dilated, LV C is moderately dilated, L AC size is mildly dilated, and no evidence of pericardial effusion at that time.  He had told Dr. Quezada that he was feeling much better and he was going to not need to be seen for 1 year.    This a.m. he started having chest pain that radiated to his left upper extremity accompanied with shortness of breath and called a neighbor for help.  EMS was called and they gave him nitroglycerin and aspirin which relieved his symptoms.  On admit his EKG showed tachycardia heart rate 115, troponin negative x1, creatinine 0.88, WBC WNL, BNP 7336, and chest x-ray negative.  Currently he denies any chest pain, shortness of breath, and chest tightness.      Cardiac Risk Factors: HTN, HLD    Past Medical History:  Past Medical History:   Diagnosis Date   • Abnormal ECG    • Abnormal MRI    • Acute frontal sinusitis    • Arthritis    • Chronic fatigue    • Coronary artery disease    • Dizziness    • Hearing aid worn     left   • Hearing loss    •  Hyperlipidemia    • Hypertension    • Kidney stones    • Macrocytosis    • Neoplasm of skin    • Osteoporosis    • Prediabetes    • Vitamin D deficiency     at least 3 stable    Past Surgical History:  Past Surgical History:   Procedure Laterality Date   • CARDIAC CATHETERIZATION N/A 5/22/2017    Procedure: Left Heart Cath;  Surgeon: Maninder Quezada MD;  Location:  [facility];  Service:    • CARDIAC CATHETERIZATION N/A 5/23/2017    Procedure: Stent BMS coronary;  Surgeon: Maninder Quezada MD;  Location:  [facility];  Service:    • HERNIA REPAIR      x 2   • WI RT/LT HEART CATHETERS N/A 5/23/2017    Procedure: Percutaneous Coronary Intervention;  Surgeon: Maninder Quezada MD;  Location:  [facility];  Service: Cardiovascular        Social History:   Social History     Tobacco Use   • Smoking status: Never Smoker   • Smokeless tobacco: Never Used   Substance Use Topics   • Alcohol use: No        Family History:  Family History   Family history unknown: Yes          Allergies:  No Known Allergies    Home Meds:  Current Facility-Administered Medications on File Prior to Encounter   Medication Dose Route Frequency Provider Last Rate Last Dose   • cyanocobalamin injection 1,000 mcg  1,000 mcg Intramuscular Q30 Days Carter Falcon MD   1,000 mcg at 05/23/19 0941   • cyanocobalamin injection 1,000 mcg  1,000 mcg Intramuscular Q28 Days Phyllis Loving PA   1,000 mcg at 06/26/19 1035   • cyanocobalamin injection 1,000 mcg  1,000 mcg Intramuscular Q28 Days Phyllis Loving PA   1,000 mcg at 07/24/19 0950     Current Outpatient Medications on File Prior to Encounter   Medication Sig Dispense Refill   • acetaminophen (TYLENOL) 500 MG tablet 1-2 TABLETS BY ONCE TO TWICE DAILY AS NEEDED FOR PAIN 60 tablet 0   • aspirin  MG tablet Take 1 tablet by mouth Daily.  0   • atorvastatin (LIPITOR) 20 MG tablet TAKE 1 TABLET BY MOUTH EVERY NIGHT 30 tablet 5   •  "Cholecalciferol (VITAMIN D-3) 1000 UNITS capsule Take 1,000 Units by mouth daily.     • Elastic Bandages & Supports (LUMBAR BACK BRACE/SUPPORT PAD) misc Use for lifting/ strenuous exercise. 1 each 0   • isosorbide mononitrate (IMDUR) 60 MG 24 hr tablet TAKE 1 TABLET BY MOUTH EVERY DAY 30 tablet 0   • losartan (COZAAR) 50 MG tablet TAKE 1 TABLET BY MOUTH DAILY 30 tablet 0   • metoprolol succinate XL (TOPROL-XL) 50 MG 24 hr tablet TAKE 1 TABLET BY MOUTH DAILY (Patient taking differently: Take 50 mg by mouth 2 (Two) Times a Day.) 60 tablet 3   • nitroglycerin (NITROSTAT) 0.4 MG SL tablet Place 1 tablet under the tongue Every 5 (Five) Minutes As Needed for Chest Pain. Take no more than 3 doses in 15 minutes. 25 tablet 0         Scheduled Meds:    cyanocobalamin 1,000 mcg Q30 Days   cyanocobalamin 1,000 mcg Q28 Days   cyanocobalamin 1,000 mcg Q28 Days           INTAKE AND OUTPUT:  No intake or output data in the 24 hours ending 08/06/19 1117        Review of Systems  Constitutional: No wt loss, fever   Gastrointestinal: No nausea , abdominal pain  Behavioral/Psych: No insomnia or anxiety   Cardiovascular ----positive for SOB. All other systems reviewed and are negative                 Physical Exam  /92 (BP Location: Right arm, Patient Position: Lying)   Pulse 105   Temp 97.7 °F (36.5 °C) (Oral)   Resp 16   Ht 185.4 cm (73\")   Wt 81.5 kg (179 lb 9.6 oz)   SpO2 97%   BMI 23.70 kg/m²     General appearance: No acute changes   Eyes: Sclera conjunctiva normal, pupils reactive   HENT: Atraumatic; oropharynx clear with moist mucous membranes and no mucosal ulcerations;  Neck: Trachea midline; NECK, supple, no thyromegaly or lymphadenopathy   Lungs: Normal size and shape, normal breath sounds, equal distribution of air, no rales and rhonchi   CV: S1-S2 regular, no murmurs, no rub, no gallop   Abdomen: Soft, non-tender; no masses , no abnormal abdominal sounds   Extremities: No deformity , normal color , no " peripheral edema   Skin: Normal temperature, turgor and texture; no rash, ulcers  Psych: Appropriate affect, alert and oriented to person, place and time         Cardiographics    EC/6/19 SR with 1st degree block, LVH with IVCD        Telemetry:  Not available    ECHO:  2019    Interpretation Summary     · Mild-to-moderate mitral valve regurgitation is present  · Mild aortic valve regurgitation is present.  · Mild pulmonic valve regurgitation is present.  · Mild to moderate tricuspid valve regurgitation is present.  · Right ventricular cavity is mildly dilated.  · The left ventricular cavity is moderately dilated.  · Left atrial cavity size is mildly dilated.  · Calculated EF = 31.0%.  · There is no evidence of pericardial effusion.     Stress test 2017    Interpretation Summary     · Equivocal ECG evidence of myocardial ischemia.Negative clinical evidence of myocardial ischemia.  · Left ventricular ejection fraction is mildly reduced (Calculated EF = 46%).  · Myocardial perfusion imaging indicates a normal myocardial perfusion study with no evidence of ischemia.  · Impressions are consistent with an intermediate risk study.  · Cardiomyopathy with no ischemia     Cardiac catheterization on 2017    Conclusion        · successful angioplasty and stent to the proximal RCA reduced from 80% to 0% with 4.0/12 vision dilated to 4.2     INDICATION:   The patient is a 88-year-old white male with unstable angina and significant proximal 80% stenosis with positive stress test     PROCEDURE:  Coronary intervention of the proximal RCA.     DESCRIPTION OF PROCEDURE:  Following informed consent, the patient was taken to the Cath Lab and prepped and draped in the usual sterile fashion.  The right groin area was anesthetized using Xylocaine.  The right femoral artery was entered using a single wall puncture technique.  A 6 Irish sheath was placed in the vessel.  Intravenous AngioMax was given. FR 4 guide with BMW wire,  balloon used 3.0/8 trek, followed by the stent 4.0/12 vision dilated to 4.2.      The balloon and wire were removed and final angiograms were performed.  The catheter was removed through the femoral sheath.  .  Hemostasis was obtained.  There were no immediate complications.     RESULTS:  1. Initial aortic pressure was approximately 1:30/80, and the patient remained hemodynamically stable through the case.  2. Initial coronary arteriography showed 80% proximal RCA reduced to 0% with 4.0/12 vision dilated to 4.2.     IVONNE  FLOW   PRE     3     POST  3     TYPE OF LESION    B2     RECOMMENDATIONS:  The patient has had an excellent result from intervention.  The patient will be maintained on antiplatelet therapy and follow up with me and his primary care physician, bare metal stent as the patient has kidney stones and hematuria     Importance of taking dual antiplatelets for one year  has been explained, risk of stent thrombosis leading to the acute MI, which carries high morbidity and mortality has been explained     Discontinuation or interruptions of these medications should be under the strict guidance of appropriate health professional.          Lab Review:  Results from last 7 days   Lab Units 08/06/19  0825   TROPONIN T ng/mL <0.010     Results from last 7 days   Lab Units 08/06/19  0825   MAGNESIUM mg/dL 2.0     Results from last 7 days   Lab Units 08/06/19  0825   SODIUM mmol/L 148*   POTASSIUM mmol/L 4.2   BUN mg/dL 21   CREATININE mg/dL 0.88   CALCIUM mg/dL 8.7      pro bnp 7336  Results from last 7 days   Lab Units 08/06/19  0825   WBC 10*3/mm3 4.27   HEMOGLOBIN g/dL 12.7*   HEMATOCRIT % 40.9   PLATELETS 10*3/mm3 125*             CXR:    8/6/19  Study Result     XR CHEST 1 VIEW-     HISTORY: 89-year-old male with chest pain.     FINDINGS: There is cardiomegaly and there is a blunted left costophrenic  angle which appear unchanged since a previous radiograph from  12/06/2018. There is prominence of the  "central pulmonary vascularity,  but there is no definite interstitial edema.               The following medical decision was discussed in detail with Dr. Quezada    Assessment / Plan:  1.  Chest pain  2.  CAD  3.  HTN  4. HLD  5.  Systolic CHF   6. vitamin D deficiency  7.   Prediabetes  8. Cardiomyopath         Recommendations:  Zhao is a 89-year-old male, who is current with our service, he presented with  complaints of chest pain associated with shortness of breath.  Currently he denies any chest pain, tightness, and shortness of breath.  On admit his troponin was negative, chest x-ray was negative, echo in July showed EF 31%, see full report as above. In 2017 he had a stent placed to the RCA.  Troponins were negative x1. At this time will  Do stress test in am, morning labs, ekg, continue home medications.         I have reviewed the risks of stress testing with the patient, to include shortness of breath, dizziness, induction of myocardial infarction.    Juliana Jesus, CANDELARIA  8/6/2019  11:17 AM      Patient personally interviewed and above subjective findings personally confirmed during a face to face contact with patient today  All findings of physical examination confirmed  All pertinent and performed labs, cardiac procedures ,  radiographs of the last 24 hours personally reviewed  Impression and plans discussed/elaborated and implemented jointly as described above     Maninder Quezada MD          EMR Dragon/Transcription:   \"Dictated utilizing Dragon dictation\".     "

## 2019-08-07 ENCOUNTER — APPOINTMENT (OUTPATIENT)
Dept: NUCLEAR MEDICINE | Facility: HOSPITAL | Age: 84
End: 2019-08-07

## 2019-08-07 LAB
ANION GAP SERPL CALCULATED.3IONS-SCNC: 9.6 MMOL/L (ref 5–15)
BASOPHILS # BLD AUTO: 0.01 10*3/MM3 (ref 0–0.2)
BASOPHILS NFR BLD AUTO: 0.3 % (ref 0–1.5)
BH CV STRESS COMMENTS STAGE 1: NORMAL
BH CV STRESS DOSE REGADENOSON STAGE 1: 0.4
BH CV STRESS DURATION MIN STAGE 1: 0
BH CV STRESS DURATION SEC STAGE 1: 10
BH CV STRESS PROTOCOL 1: NORMAL
BH CV STRESS RECOVERY BP: NORMAL MMHG
BH CV STRESS RECOVERY HR: 87 BPM
BH CV STRESS STAGE 1: 1
BUN BLD-MCNC: 26 MG/DL (ref 8–23)
BUN/CREAT SERPL: 35.6 (ref 7–25)
CALCIUM SPEC-SCNC: 8.6 MG/DL (ref 8.2–9.6)
CHLORIDE SERPL-SCNC: 110 MMOL/L (ref 98–107)
CO2 SERPL-SCNC: 24.4 MMOL/L (ref 22–29)
CREAT BLD-MCNC: 0.73 MG/DL (ref 0.76–1.27)
DEPRECATED RDW RBC AUTO: 59 FL (ref 37–54)
DIFFERENTIAL METHOD BLD: ABNORMAL
EOSINOPHIL # BLD AUTO: 0.15 10*3/MM3 (ref 0–0.4)
EOSINOPHIL NFR BLD AUTO: 4.2 % (ref 0.3–6.2)
ERYTHROCYTE [DISTWIDTH] IN BLOOD BY AUTOMATED COUNT: 15.6 % (ref 12.3–15.4)
GFR SERPL CREATININE-BSD FRML MDRD: 101 ML/MIN/1.73
GLUCOSE BLD-MCNC: 89 MG/DL (ref 65–99)
HCT VFR BLD AUTO: 37.4 % (ref 37.5–51)
HGB BLD-MCNC: 12 G/DL (ref 13–17.7)
IMM GRANULOCYTES # BLD AUTO: 0.01 10*3/MM3 (ref 0–0.05)
IMM GRANULOCYTES NFR BLD AUTO: 0.3 % (ref 0–0.5)
LV EF NUC BP: 46 %
LYMPHOCYTES # BLD AUTO: 1.23 10*3/MM3 (ref 0.7–3.1)
LYMPHOCYTES NFR BLD AUTO: 34.6 % (ref 19.6–45.3)
MAGNESIUM SERPL-MCNC: 2.3 MG/DL (ref 1.7–2.3)
MAXIMAL PREDICTED HEART RATE: 129 BPM
MCH RBC QN AUTO: 32.8 PG (ref 26.6–33)
MCHC RBC AUTO-ENTMCNC: 32.1 G/DL (ref 31.5–35.7)
MCV RBC AUTO: 102.2 FL (ref 79–97)
MONOCYTES # BLD AUTO: 0.37 10*3/MM3 (ref 0.1–0.9)
MONOCYTES NFR BLD AUTO: 10.4 % (ref 5–12)
NEUTROPHILS # BLD AUTO: 1.78 10*3/MM3 (ref 1.7–7)
NEUTROPHILS NFR BLD AUTO: 50.2 % (ref 42.7–76)
NRBC BLD AUTO-RTO: 0.3 /100 WBC (ref 0–0.2)
PERCENT MAX PREDICTED HR: 68.99 %
PLATELET # BLD AUTO: 101 10*3/MM3 (ref 140–450)
PMV BLD AUTO: 11.8 FL (ref 6–12)
POTASSIUM BLD-SCNC: 4.2 MMOL/L (ref 3.5–5.2)
RBC # BLD AUTO: 3.66 10*6/MM3 (ref 4.14–5.8)
SODIUM BLD-SCNC: 144 MMOL/L (ref 136–145)
STRESS BASELINE BP: NORMAL MMHG
STRESS BASELINE HR: 64 BPM
STRESS PERCENT HR: 81 %
STRESS POST PEAK BP: NORMAL MMHG
STRESS POST PEAK HR: 89 BPM
STRESS TARGET HR: 110 BPM
TROPONIN T SERPL-MCNC: <0.01 NG/ML (ref 0–0.03)
WBC # BLD AUTO: 3.55 10*3/MM3 (ref 3.4–10.8)
WBC NRBC COR # BLD: 3.55 10*3/MM3 (ref 3.4–10.8)

## 2019-08-07 PROCEDURE — 85025 COMPLETE CBC W/AUTO DIFF WBC: CPT | Performed by: NURSE PRACTITIONER

## 2019-08-07 PROCEDURE — 83735 ASSAY OF MAGNESIUM: CPT | Performed by: NURSE PRACTITIONER

## 2019-08-07 PROCEDURE — 93010 ELECTROCARDIOGRAM REPORT: CPT | Performed by: INTERNAL MEDICINE

## 2019-08-07 PROCEDURE — 84484 ASSAY OF TROPONIN QUANT: CPT | Performed by: NURSE PRACTITIONER

## 2019-08-07 PROCEDURE — 0 TECHNETIUM SESTAMIBI: Performed by: INTERNAL MEDICINE

## 2019-08-07 PROCEDURE — G0378 HOSPITAL OBSERVATION PER HR: HCPCS

## 2019-08-07 PROCEDURE — 93017 CV STRESS TEST TRACING ONLY: CPT

## 2019-08-07 PROCEDURE — 93005 ELECTROCARDIOGRAM TRACING: CPT | Performed by: NURSE PRACTITIONER

## 2019-08-07 PROCEDURE — 78452 HT MUSCLE IMAGE SPECT MULT: CPT

## 2019-08-07 PROCEDURE — 80048 BASIC METABOLIC PNL TOTAL CA: CPT | Performed by: NURSE PRACTITIONER

## 2019-08-07 PROCEDURE — 25010000002 REGADENOSON 0.4 MG/5ML SOLUTION: Performed by: INTERNAL MEDICINE

## 2019-08-07 PROCEDURE — 93018 CV STRESS TEST I&R ONLY: CPT | Performed by: INTERNAL MEDICINE

## 2019-08-07 PROCEDURE — 96372 THER/PROPH/DIAG INJ SC/IM: CPT

## 2019-08-07 PROCEDURE — 78452 HT MUSCLE IMAGE SPECT MULT: CPT | Performed by: INTERNAL MEDICINE

## 2019-08-07 PROCEDURE — 25010000002 ENOXAPARIN PER 10 MG: Performed by: NURSE PRACTITIONER

## 2019-08-07 PROCEDURE — 93016 CV STRESS TEST SUPVJ ONLY: CPT | Performed by: INTERNAL MEDICINE

## 2019-08-07 PROCEDURE — 99225 PR SBSQ OBSERVATION CARE/DAY 25 MINUTES: CPT | Performed by: INTERNAL MEDICINE

## 2019-08-07 PROCEDURE — A9500 TC99M SESTAMIBI: HCPCS | Performed by: INTERNAL MEDICINE

## 2019-08-07 RX ADMIN — ATORVASTATIN CALCIUM 20 MG: 20 TABLET, FILM COATED ORAL at 20:41

## 2019-08-07 RX ADMIN — LOSARTAN POTASSIUM 50 MG: 50 TABLET, FILM COATED ORAL at 09:57

## 2019-08-07 RX ADMIN — METOPROLOL SUCCINATE 50 MG: 50 TABLET, FILM COATED, EXTENDED RELEASE ORAL at 09:57

## 2019-08-07 RX ADMIN — ASPIRIN 325 MG: 325 TABLET, COATED ORAL at 09:57

## 2019-08-07 RX ADMIN — REGADENOSON 0.4 MG: 0.08 INJECTION, SOLUTION INTRAVENOUS at 08:16

## 2019-08-07 RX ADMIN — ISOSORBIDE MONONITRATE 60 MG: 60 TABLET ORAL at 09:57

## 2019-08-07 RX ADMIN — ENOXAPARIN SODIUM 40 MG: 40 INJECTION SUBCUTANEOUS at 14:49

## 2019-08-07 RX ADMIN — SODIUM CHLORIDE, PRESERVATIVE FREE 3 ML: 5 INJECTION INTRAVENOUS at 10:01

## 2019-08-07 RX ADMIN — SODIUM CHLORIDE, PRESERVATIVE FREE 3 ML: 5 INJECTION INTRAVENOUS at 20:41

## 2019-08-07 RX ADMIN — TECHNETIUM TC 99M SESTAMIBI 1 DOSE: 1 INJECTION INTRAVENOUS at 08:15

## 2019-08-07 RX ADMIN — TECHNETIUM TC 99M SESTAMIBI 1 DOSE: 1 INJECTION INTRAVENOUS at 06:15

## 2019-08-07 NOTE — PLAN OF CARE
Problem: Patient Care Overview  Goal: Plan of Care Review  Outcome: Ongoing (interventions implemented as appropriate)   08/07/19 0452   Coping/Psychosocial   Plan of Care Reviewed With patient   Plan of Care Review   Progress improving   OTHER   Outcome Summary VSS, NO ACUTE DISTRESS NOTED, SAFETY MAINTAINED, STRESS TEST SCHEDULED FOR TODAY, CONTINUE PLAN OF CARE     Goal: Individualization and Mutuality  Outcome: Ongoing (interventions implemented as appropriate)    Goal: Discharge Needs Assessment  Outcome: Ongoing (interventions implemented as appropriate)      Problem: Cardiac: ACS (Acute Coronary Syndrome) (Adult)  Goal: Signs and Symptoms of Listed Potential Problems Will be Absent, Minimized or Managed (Cardiac: ACS)  Outcome: Ongoing (interventions implemented as appropriate)

## 2019-08-07 NOTE — PLAN OF CARE
Problem: Patient Care Overview  Goal: Plan of Care Review  Outcome: Ongoing (interventions implemented as appropriate)   08/07/19 0396   Coping/Psychosocial   Plan of Care Reviewed With patient   Plan of Care Review   Progress no change   OTHER   Outcome Summary Stress test this AM. NPO at midnight for heart cath tomorrow. No complaints. VSS, will continue to monitor.

## 2019-08-07 NOTE — PROGRESS NOTES
[facility]  Cardiology Progress Note    Patient Identification:  Name: Dinesh Churchill Sr.  Age: 89 y.o.  Sex: male  :  1928  MRN: [id number]                 Follow Up / Chief Complaint: chest pain    Interval History: Stress test abnormal.  Cath in am       Subjective: Patient now without chest pain or shortness of breath.       Objective:    Past Medical History:  Past Medical History:   Diagnosis Date   • Abnormal ECG    • Abnormal MRI    • Acute frontal sinusitis    • Arthritis    • Chronic fatigue    • Coronary artery disease    • Dizziness    • Hearing aid worn     left   • Hearing loss    • Hyperlipidemia    • Hypertension    • Kidney stones    • Macrocytosis    • Neoplasm of skin    • Osteoporosis    • Prediabetes    • Vitamin D deficiency      Past Surgical History:  Past Surgical History:   Procedure Laterality Date   • CARDIAC CATHETERIZATION N/A 2017    Procedure: Left Heart Cath;  Surgeon: Maninder Quezada MD;  Location:  [facility];  Service:    • CARDIAC CATHETERIZATION N/A 2017    Procedure: Stent BMS coronary;  Surgeon: Maninder Quezada MD;  Location: [facility] CATH INVASIVE LOCATION;  Service:    • HERNIA REPAIR      x 2   • SD RT/LT HEART CATHETERS N/A 2017    Procedure: Percutaneous Coronary Intervention;  Surgeon: Maninder Quezada MD;  Location:  [facility];  Service: Cardiovascular        Social History:   Social History     Tobacco Use   • Smoking status: Never Smoker   • Smokeless tobacco: Never Used   Substance Use Topics   • Alcohol use: No     Comment: quit 30 years ago       Family History:  Family History   Family history unknown: Yes          Allergies:  No Known Allergies  Scheduled Meds:    aspirin  mg Daily   atorvastatin 20 mg Nightly   enoxaparin 40 mg Q24H   isosorbide mononitrate 60 mg Daily   losartan 50 mg Daily   metoprolol succinate XL 50 mg Q24H   sodium chloride 3 mL Q12H       I  "have reviewed the patient's recent medical history and current medications, as well as personally reviewed/interpreted the ECG/telemetry data      INTAKE AND OUTPUT:    Intake/Output Summary (Last 24 hours) at 2019 1513  Last data filed at 2019 1309  Gross per 24 hour   Intake 540 ml   Output 200 ml   Net 340 ml     ROS  Constitutional: Awake and alert, no fever. No nosebleeds  Abdomen           no abdominal pain   Cardiac              no chest pain  Pulmonary          no shortness of breath      /68 (BP Location: Left arm, Patient Position: Sitting)   Pulse 120   Temp 98.2 °F (36.8 °C) (Oral)   Resp 18   Ht 185.4 cm (73\")   Wt 81.5 kg (179 lb 9.6 oz)   SpO2 (!) 88%   BMI 23.70 kg/m²   General appearance: No acute changes   Neck: Trachea midline; NECK, supple, no thyromegaly or lymphadenopathy   Lungs: Normal size and shape, normal breath sounds, equal distribution of air, no rales and rhonchi   CV: S1-S2 regular, no murmurs, no rub, no gallop   Abdomen: Soft, non-tender; no masses , no abnormal abdominal sounds   Extremities: No deformity , normal color , no peripheral edema   Skin: Normal temperature, turgor and texture; no rash, ulcers          Cardiographics  Telemetry:    SR rate 60s      EC/7/19 SR with first degree AV block rate 60s-similar to tracing 18 SR with 1st degree AV block rate 50s mary        Echocardiogram:   19  Interpretation Summary     · Mild-to-moderate mitral valve regurgitation is present  · Mild aortic valve regurgitation is present.  · Mild pulmonic valve regurgitation is present.  · Mild to moderate tricuspid valve regurgitation is present.  · Right ventricular cavity is mildly dilated.  · The left ventricular cavity is moderately dilated.  · Left atrial cavity size is mildly dilated.  · Calculated EF = 31.0%.  · There is no evidence of pericardial effusion.       Stress test  19  Interpretation Summary     · Findings consistent " with an abnormal ECG stress test.  · Left ventricular ejection fraction is mildly reduced (Calculated EF = 46%).  · Myocardial perfusion imaging indicates a medium-sized, moderately severe area of ischemia located in the inferior wall and lateral wall.  · Impressions are consistent with a high risk study.       Cardiac catheterization   5/23/17  Conclusion        · successful angioplasty and stent to the proximal RCA reduced from 80% to 0% with 4.0/12 vision dilated to 4.2     INDICATION:   The patient is a 88-year-old white male with unstable angina and significant proximal 80% stenosis with positive stress test           Lab Review   Results from last 7 days   Lab Units 08/07/19  0421 08/06/19  0825   TROPONIN T ng/mL <0.010 <0.010     Results from last 7 days   Lab Units 08/07/19  0421   MAGNESIUM mg/dL 2.3     Results from last 7 days   Lab Units 08/07/19  0421   SODIUM mmol/L 144   POTASSIUM mmol/L 4.2   BUN mg/dL 26*   CREATININE mg/dL 0.73*   CALCIUM mg/dL 8.6     Results from last 7 days   Lab Units 08/07/19  0421 08/06/19  0825   WBC 10*3/mm3 3.55  3.55 4.27   HEMOGLOBIN g/dL 12.0* 12.7*   HEMATOCRIT % 37.4* 40.9   PLATELETS 10*3/mm3 101* 125*         Estimated Creatinine Clearance: 69.3 mL/min (A) (by C-G formula based on SCr of 0.73 mg/dL (L)).     I have reviewed the medical decision making with Dr. Quezada in detail.       Assessment:    1.  Chest pain  2.  Abnormal stress test  3.  Anemia  4.  CAD  5.  Htn  6.  HLD  7.  CHF  8.  Cardiomyopathy  9.  Vitamin D Deficiency  10.  Prediabetes    Plan:    Discussed abnormal stress testing with patient.  Recommended cardiac catheterization after discussing with MD.  Patient is amenable to proceeding.  Anemia currently stable.  BP has been elevated this admission, however, has now normalized.  Mild cardiomyopathy, presumably ischemic given history, noted on echo as above.  On statin therapy, good control per last lipid panel in April.  Will repeat in am.   "No ST elevation or depression noted on ECG or telemetry at this time.  No s/s of fluid overload on exam.  Chest pain currently resolved. Will hold lovenox tomorrow.  NPO after midnight.  Will have internal medicine see patient regarding comorbidities.     Diagnostic heart catheterization/PCI/stent procedure risks (including but not limited to: allergy,arrhythmia,bleeding,CVA,MI,renal dysfunction,emergent CABG and death) and options have been explained to patient/spouse/family/POA. All questions were answered. Patient/He/She/They voices understanding and agree to proceed with treatment plan.      Labs/tests ordered: Cardiac cath in am, npo after midnight, hold lovenox tomorrow, consult internal medicine, cbc, bmp, lipid      )8/7/2019  CANDELARIA Fong      Patient personally interviewed and above subjective findings personally confirmed during a face to face contact with patient today  All findings of physical examination confirmed  All pertinent and performed labs, cardiac procedures ,  radiographs of the last 24 hours personally reviewed  Impression and plans discussed/elaborated and implemented jointly as described above     Maninder Quezada MD            EMR Dioron/Transcription:   \"Dictated utilizing Dragon dictation\".   "

## 2019-08-08 PROBLEM — R94.39 ABNORMAL STRESS TEST: Status: ACTIVE | Noted: 2019-08-06

## 2019-08-08 LAB
ANION GAP SERPL CALCULATED.3IONS-SCNC: 11.5 MMOL/L (ref 5–15)
BUN BLD-MCNC: 25 MG/DL (ref 8–23)
BUN/CREAT SERPL: 32.9 (ref 7–25)
CALCIUM SPEC-SCNC: 8.5 MG/DL (ref 8.2–9.6)
CHLORIDE SERPL-SCNC: 109 MMOL/L (ref 98–107)
CHOLEST SERPL-MCNC: 92 MG/DL (ref 0–200)
CO2 SERPL-SCNC: 22.5 MMOL/L (ref 22–29)
CREAT BLD-MCNC: 0.76 MG/DL (ref 0.76–1.27)
GFR SERPL CREATININE-BSD FRML MDRD: 96 ML/MIN/1.73
GLUCOSE BLD-MCNC: 102 MG/DL (ref 65–99)
HDLC SERPL-MCNC: 33 MG/DL (ref 40–60)
LDLC SERPL CALC-MCNC: 42 MG/DL (ref 0–100)
LDLC/HDLC SERPL: 1.27 {RATIO}
MAGNESIUM SERPL-MCNC: 2.1 MG/DL (ref 1.7–2.3)
POTASSIUM BLD-SCNC: 3.6 MMOL/L (ref 3.5–5.2)
SODIUM BLD-SCNC: 143 MMOL/L (ref 136–145)
TRIGL SERPL-MCNC: 85 MG/DL (ref 0–150)
VLDLC SERPL-MCNC: 17 MG/DL (ref 5–40)

## 2019-08-08 PROCEDURE — G0378 HOSPITAL OBSERVATION PER HR: HCPCS

## 2019-08-08 PROCEDURE — 83735 ASSAY OF MAGNESIUM: CPT | Performed by: NURSE PRACTITIONER

## 2019-08-08 PROCEDURE — 80048 BASIC METABOLIC PNL TOTAL CA: CPT | Performed by: NURSE PRACTITIONER

## 2019-08-08 PROCEDURE — 63710000001 METOPROLOL SUCCINATE XL 50 MG TABLET SUSTAINED-RELEASE 24 HOUR: Performed by: NURSE PRACTITIONER

## 2019-08-08 PROCEDURE — 63710000001 ATORVASTATIN 20 MG TABLET: Performed by: NURSE PRACTITIONER

## 2019-08-08 PROCEDURE — A9270 NON-COVERED ITEM OR SERVICE: HCPCS | Performed by: NURSE PRACTITIONER

## 2019-08-08 PROCEDURE — 63710000001 LOSARTAN 50 MG TABLET: Performed by: NURSE PRACTITIONER

## 2019-08-08 PROCEDURE — 25010000002 FENTANYL CITRATE (PF) 100 MCG/2ML SOLUTION: Performed by: INTERNAL MEDICINE

## 2019-08-08 PROCEDURE — 93005 ELECTROCARDIOGRAM TRACING: CPT | Performed by: INTERNAL MEDICINE

## 2019-08-08 PROCEDURE — 25010000002 MIDAZOLAM PER 1 MG: Performed by: INTERNAL MEDICINE

## 2019-08-08 PROCEDURE — 93010 ELECTROCARDIOGRAM REPORT: CPT | Performed by: INTERNAL MEDICINE

## 2019-08-08 PROCEDURE — 25010000002 HEPARIN (PORCINE) PER 1000 UNITS: Performed by: INTERNAL MEDICINE

## 2019-08-08 PROCEDURE — 80061 LIPID PANEL: CPT | Performed by: NURSE PRACTITIONER

## 2019-08-08 PROCEDURE — 0 IOPAMIDOL PER 1 ML: Performed by: INTERNAL MEDICINE

## 2019-08-08 PROCEDURE — C1894 INTRO/SHEATH, NON-LASER: HCPCS | Performed by: INTERNAL MEDICINE

## 2019-08-08 PROCEDURE — 93458 L HRT ARTERY/VENTRICLE ANGIO: CPT | Performed by: INTERNAL MEDICINE

## 2019-08-08 PROCEDURE — C1769 GUIDE WIRE: HCPCS | Performed by: INTERNAL MEDICINE

## 2019-08-08 RX ORDER — SODIUM CHLORIDE 9 MG/ML
100 INJECTION, SOLUTION INTRAVENOUS CONTINUOUS
Status: ACTIVE | OUTPATIENT
Start: 2019-08-08 | End: 2019-08-08

## 2019-08-08 RX ORDER — MIDAZOLAM HYDROCHLORIDE 1 MG/ML
INJECTION INTRAMUSCULAR; INTRAVENOUS AS NEEDED
Status: DISCONTINUED | OUTPATIENT
Start: 2019-08-08 | End: 2019-08-08 | Stop reason: HOSPADM

## 2019-08-08 RX ORDER — FENTANYL CITRATE 50 UG/ML
INJECTION, SOLUTION INTRAMUSCULAR; INTRAVENOUS AS NEEDED
Status: DISCONTINUED | OUTPATIENT
Start: 2019-08-08 | End: 2019-08-08 | Stop reason: HOSPADM

## 2019-08-08 RX ORDER — METOPROLOL TARTRATE 5 MG/5ML
2.5 INJECTION INTRAVENOUS ONCE
Status: COMPLETED | OUTPATIENT
Start: 2019-08-08 | End: 2019-08-08

## 2019-08-08 RX ORDER — LIDOCAINE HYDROCHLORIDE 20 MG/ML
INJECTION, SOLUTION INFILTRATION; PERINEURAL AS NEEDED
Status: DISCONTINUED | OUTPATIENT
Start: 2019-08-08 | End: 2019-08-08 | Stop reason: HOSPADM

## 2019-08-08 RX ORDER — SODIUM CHLORIDE 9 MG/ML
INJECTION, SOLUTION INTRAVENOUS CONTINUOUS PRN
Status: COMPLETED | OUTPATIENT
Start: 2019-08-08 | End: 2019-08-08

## 2019-08-08 RX ADMIN — LOSARTAN POTASSIUM 50 MG: 50 TABLET, FILM COATED ORAL at 10:38

## 2019-08-08 RX ADMIN — METOPROLOL TARTRATE 2.5 MG: 5 INJECTION, SOLUTION INTRAVENOUS at 09:20

## 2019-08-08 RX ADMIN — SODIUM CHLORIDE, PRESERVATIVE FREE 3 ML: 5 INJECTION INTRAVENOUS at 20:59

## 2019-08-08 RX ADMIN — SODIUM CHLORIDE 100 ML/HR: 9 INJECTION, SOLUTION INTRAVENOUS at 10:38

## 2019-08-08 RX ADMIN — SODIUM CHLORIDE 100 ML/HR: 9 INJECTION, SOLUTION INTRAVENOUS at 14:59

## 2019-08-08 RX ADMIN — METOPROLOL SUCCINATE 50 MG: 50 TABLET, FILM COATED, EXTENDED RELEASE ORAL at 09:21

## 2019-08-08 RX ADMIN — ATORVASTATIN CALCIUM 20 MG: 20 TABLET, FILM COATED ORAL at 20:59

## 2019-08-08 NOTE — PLAN OF CARE
Problem: Patient Care Overview  Goal: Plan of Care Review  Outcome: Ongoing (interventions implemented as appropriate)   08/08/19 0431   Coping/Psychosocial   Plan of Care Reviewed With patient   Plan of Care Review   Progress no change   OTHER   Outcome Summary vss. no c/o chest pain, no pther distress noted, safety maintained, cardiac cath scheduled for today, permits signed, continue plan of care     Goal: Individualization and Mutuality  Outcome: Ongoing (interventions implemented as appropriate)    Goal: Discharge Needs Assessment  Outcome: Ongoing (interventions implemented as appropriate)      Problem: Cardiac: ACS (Acute Coronary Syndrome) (Adult)  Goal: Signs and Symptoms of Listed Potential Problems Will be Absent, Minimized or Managed (Cardiac: ACS)  Outcome: Ongoing (interventions implemented as appropriate)

## 2019-08-08 NOTE — PERIOPERATIVE NURSING NOTE
Phone call to Dr. Quezada with 'EKG preiliminary result of Junctional Tach.  Ordered to still give Lopressor 2.5mg IV.

## 2019-08-08 NOTE — DISCHARGE INSTRUCTIONS
[facility]  [address] [place] [address]    Coronary Angiogram (Radial/Ulnar Approach) After Care    Refer to this sheet in the next few weeks. These instructions provide you with information on caring for yourself after your procedure. Your caregiver may also give you more specific instructions. Your treatment has been planned according to current medical practices, but problems sometimes occur. Call your caregiver if you have any problems or questions after your procedure.    Home Care Instructions:  · You may shower the day after the procedure. Remove the bandage (dressing) and gently wash the site with plain soap and water. Gently pat the site dry. You may apply a band aid daily for 2 days if desired.    · Do not apply powder or lotion to the site.  · Do not submerge the affected site in water for 3 to 5 days or until the site is completely healed.   · Do not lift, push or pull anything over 10 pounds for 2 days after your procedure.  · Inspect the site at least twice daily. You may notice some bruising at the site and it may be tender for 1 to 2 weeks.     · Increase your fluid intake for the next 2 days.    · Keep arm elevated for 24 hours. For the remainder of the day, keep your arm in “Pledge of Allegiance” position when up and about.     · You may drive 24 hours after the procedure unless otherwise instructed by your caregiver.  · Do not operate machinery or power tools for 24 hours.  · A responsible adult should be with you for the first 24 hours after you arrive home. Do not make any important legal decisions or sign legal papers for 24 hours.  Do not drink alcohol for 24 hours.    · Metformin or any medications containing Metformin should not be taken for 48 hours after your procedure.      Call Your Doctor if:   · You have unusual pain at the radial/ulnar (wrist) site.  · You have redness, warmth, swelling, or pain at the radial/ulnar (wrist) site.  · You have drainage (other  than a small amount of blood on the dressing).  · You have chills or a fever > 101.  · Your arm becomes pale or dark, cool, tingly, or numb.  · You have heavy bleeding from the site, hold pressure on the site for 20 minutes.  If the bleeding stops, apply a fresh bandage and call your cardiologist.  However, if you continue to have bleeding, call 911.

## 2019-08-08 NOTE — PLAN OF CARE
Problem: Patient Care Overview  Goal: Plan of Care Review   08/08/19 0431 08/08/19 1420 08/08/19 1859   Coping/Psychosocial   Plan of Care Reviewed With --  patient;son --    Plan of Care Review   Progress no change --  --    OTHER   Outcome Summary --  --  VSS; cath lab this morning (R radial), no stent placed; EKG shows junctional tach w/ BBB; regular diet started, IV NS d/c; will continue to monitor      08/08/19 0431 08/08/19 1420 08/08/19 1859   Coping/Psychosocial   Plan of Care Reviewed With --  patient;son --    Plan of Care Review   Progress no change --  --    OTHER   Outcome Summary --  --  VSS; cath lab this morning (R radial), no stent placed; EKG shows junctional tach (~115bpm) w/ BBB; regular diet started, IV NS d/c; will continue to monitor

## 2019-08-09 VITALS
OXYGEN SATURATION: 94 % | HEART RATE: 65 BPM | WEIGHT: 179.6 LBS | DIASTOLIC BLOOD PRESSURE: 81 MMHG | TEMPERATURE: 97.3 F | HEIGHT: 73 IN | RESPIRATION RATE: 20 BRPM | SYSTOLIC BLOOD PRESSURE: 164 MMHG | BODY MASS INDEX: 23.8 KG/M2

## 2019-08-09 PROCEDURE — 63710000001 LOSARTAN 50 MG TABLET: Performed by: NURSE PRACTITIONER

## 2019-08-09 PROCEDURE — 99217 PR OBSERVATION CARE DISCHARGE MANAGEMENT: CPT | Performed by: INTERNAL MEDICINE

## 2019-08-09 PROCEDURE — 63710000001 METOPROLOL SUCCINATE XL 50 MG TABLET SUSTAINED-RELEASE 24 HOUR: Performed by: NURSE PRACTITIONER

## 2019-08-09 PROCEDURE — 63710000001 ASPIRIN 325 MG TABLET DELAYED-RELEASE: Performed by: NURSE PRACTITIONER

## 2019-08-09 PROCEDURE — A9270 NON-COVERED ITEM OR SERVICE: HCPCS | Performed by: NURSE PRACTITIONER

## 2019-08-09 PROCEDURE — 63710000001 METOPROLOL SUCCINATE XL 25 MG TABLET SUSTAINED-RELEASE 24 HOUR: Performed by: NURSE PRACTITIONER

## 2019-08-09 PROCEDURE — G0378 HOSPITAL OBSERVATION PER HR: HCPCS

## 2019-08-09 PROCEDURE — 63710000001 ISOSORBIDE MONONITRATE 60 MG TABLET SUSTAINED-RELEASE 24 HOUR: Performed by: NURSE PRACTITIONER

## 2019-08-09 RX ADMIN — METOPROLOL SUCCINATE 75 MG: 50 TABLET, FILM COATED, EXTENDED RELEASE ORAL at 08:50

## 2019-08-09 RX ADMIN — SODIUM CHLORIDE, PRESERVATIVE FREE 3 ML: 5 INJECTION INTRAVENOUS at 08:47

## 2019-08-09 RX ADMIN — ASPIRIN 325 MG: 325 TABLET, COATED ORAL at 08:50

## 2019-08-09 RX ADMIN — LOSARTAN POTASSIUM 50 MG: 50 TABLET, FILM COATED ORAL at 08:50

## 2019-08-09 RX ADMIN — ISOSORBIDE MONONITRATE 60 MG: 60 TABLET ORAL at 08:50

## 2019-08-09 NOTE — PROGRESS NOTES
Discharge Planning Assessment  [facility]     Patient Name: Dinesh Churchill Sr.  MRN: [id number]  Today's Date: 8/9/2019    Admit Date: 8/6/2019    Discharge Needs Assessment     Row Name 08/09/19 1112       Living Environment    Lives With  alone    Current Living Arrangements  home/apartment/condo    Primary Care Provided by  self    Provides Primary Care For  no one    Family Caregiver if Needed  child(michael), adult    Family Caregiver Names  son, Dinesh Churchill Jr., [phone]    Quality of Family Relationships  helpful;involved;supportive    Able to Return to Prior Arrangements  yes       Resource/Environmental Concerns    Resource/Environmental Concerns  none       Transition Planning    Patient/Family Anticipates Transition to  home    Patient/Family Anticipated Services at Transition  none    Transportation Anticipated  family or friend will provide       Discharge Needs Assessment    Concerns to be Addressed  no discharge needs identified;denies needs/concerns at this time    Equipment Currently Used at Home  cane, quad    Discharge Coordination/Progress  Home        Discharge Plan     Row Name 08/09/19 1118       Plan    Plan  Home    Patient/Family in Agreement with Plan  yes    Plan Comments  [facility] notice checked. CCP met with pt and son (Dinesh Churchill Jr., [phone]) to discuss d/c planning. Facesheet verified and CCP role explained. Pt resides alone in a mobile home, uses quad cane for mobility, and has not had past home health or past sub-acute rehab. Per son, pt is independent, does his own laundry and cooking, and only recently stopped mowing his own grass. Pt uses [facility], and anticipates no d/c needs. CCP to follow to assist should d/c needs arise. Maddy Galvin LCSW        Destination      No service coordination in this encounter.      Durable Medical Equipment      No service coordination in this encounter.      Dialysis/Infusion      No service coordination in this  encounter.      Home Medical Care      No service coordination in this encounter.      Therapy      No service coordination in this encounter.      Community Resources      No service coordination in this encounter.        Expected Discharge Date and Time     Expected Discharge Date Expected Discharge Time    Aug 9, 2019         Demographic Summary     Row Name 08/09/19 1112       General Information    Admission Type  observation    Arrived From  home    Required Notices Provided  Observation Status Notice    Referral Source  admission list    Reason for Consult  discharge planning    Preferred Language  English        Functional Status     Row Name 08/09/19 1112       Functional Status    Usual Activity Tolerance  good    Current Activity Tolerance  good       Functional Status, IADL    Medications  independent    Meal Preparation  independent    Housekeeping  independent    Laundry  independent    Shopping  independent       Mental Status Summary    Recent Changes in Mental Status/Cognitive Functioning  no changes        Psychosocial    No documentation.       Abuse/Neglect    No documentation.       Legal    No documentation.       Substance Abuse    No documentation.       Patient Forms    No documentation.           Rita Galvin LCSW

## 2019-08-09 NOTE — DISCHARGE SUMMARY
[facility]  Physician Discharge Summary    Patient Identification:  Name: Dinesh Churchill Sr.  Age: 89 y.o.  Sex: male  :  1928  MRN: [id number]    Admit date: 2019    Discharge date and time: 19 at 1049      Admitting Physician: Maninder Quezada MD     Discharge Physician: Dr. Quezada    Discharge Diagnoses:   Patient Active Problem List   Diagnosis   • Abnormal magnetic resonance imaging study   • Arthritis   • Osteoarthritis of cervical spine   • Diplopia   • Hearing loss   • Neoplasm of uncertain behavior of skin   • Prediabetes   • Vitamin D deficiency   • Chronic fatigue   • History of supraventricular tachycardia   • Essential hypertension   • B12 deficiency   • Abnormal cardiac function test   • Cardiomyopathy (CMS/HCC)   • Coronary artery disease involving native coronary artery of native heart without angina pectoris   • History of coronary artery stent placement   • History of renal calculi   • Dyslipidemia   • Macrocytosis without anemia   • Precordial pain   • Parkinson's disease (CMS/HCC)   • History of CVA (cerebrovascular accident)   • AF (atrial fibrillation) (CMS/HCC)   • Otalgia, left   • Acute rhinitis   • Chest pain   • Abnormal stress test       Discharged Condition: stable    Hospital Course:     Mr. Churchill is a 89 year old male, who is current with our service. He has a history of arthritis, chest pain, diplopia, hearing loss, prediabetes, vitamin D deficiency, chronic fatigue, history of supraventricular tachycardia, essential hypertension, B12 deficiency, cardiomyopathy, CAD, dyslipidemia, macroytosis without anemia, parkinson's disease, history of CVA, afib, otalgia, left, acute rhinitis.   He had recently came into Dr. Larkin that he was feeling better and at that time it was decided he would not need to be seen for a year. On admit he presented with chest pain in the am that radiated to his L upper extremity, accompanied with shortness of  breath. EMS gave him nitroglycerin and an asa which relieved the symptoms. His EKG showed tachycardia heart rate 115, troponin was negative, and cxr was negative. On 8/7 he had a stress test which showed EF 46%, and medium-sized, moderately sever area of ischemia in the inferior wall and lateral wall. A high risk study. A cardiac catheterization was then planned for 8/8, which showed normal left main, LAD midportion 50% stenosis, first larg diagonal branch was approximately diffuse 90% stenosis, Circ artery was a nondominant midporion wa 0% stenosis, RCA was dominant with the proximal had a stent widely patent, Normal LV gram.  At that time recommendation stated the most likely cause a positive stress test with a high diagonal will be will be treated medically PCI would be considered if continues to have chest pain. He was found to have afib during admission, he is not an a/c candidate due to age and frality.  CV stable at discharge, currently in sinus rhythm.        Cardiac cath 8/8/2019  Conclusion        · Successful right and left coronary angiogram and LV gram  · Normal left main  · Left anterior descending midportion 50% stenosis, first large diagonal branch was approximately diffuse 90% stenosis  · Circumflex artery was a nondominant midportion was 50% stenosis  · Right coronary artery was dominant with the proximal had a stent widely patent  · Normal LV gram     August 8, 2019     Dinesh Churchill .  5/29/1928  89 y.o.  male  [id number]     Procedures Performed       1. Left heart catheterization  2. Selective coronary angiography  3. Left ventriculography           :                                       Maninder Quezada MD     Vascular Access Site:       Right radial     Indication for procedure:             Positive stress            Echo 7/25/2019  Interpretation Summary     · Mild-to-moderate mitral valve regurgitation is present  · Mild aortic valve regurgitation is present.  · Mild  "pulmonic valve regurgitation is present.  · Mild to moderate tricuspid valve regurgitation is present.  · Right ventricular cavity is mildly dilated.  · The left ventricular cavity is moderately dilated.  · Left atrial cavity size is mildly dilated.  · Calculated EF = 31.0%.  · There is no evidence of pericardial effusion.         Consults:   IP CONSULT TO CARDIOLOGY  CARDIAC REHAB EVALUATION AND ENROLLMENT             Physical Exam  /81 (BP Location: Left arm, Patient Position: Lying)   Pulse 65   Temp 97.3 °F (36.3 °C) (Oral)   Resp 20   Ht 185.4 cm (73\")   Wt 81.5 kg (179 lb 9.6 oz)   SpO2 94%   BMI 23.70 kg/m²     General appearance: No acute changes   Eyes: Sclera conjunctiva normal, pupils reactive   HENT: Atraumatic; oropharynx clear with moist mucous membranes and no mucosal ulcerations;  Neck: Trachea midline; NECK, supple, no thyromegaly or lymphadenopathy   Lungs: Normal size and shape, normal breath sounds, equal distribution of air, no rales and rhonchi   CV: S1-S2 regular, no murmurs, no rub, no gallop   Abdomen: Soft, non-tender; no masses , no abnormal abdominal sounds   Extremities: No deformity , normal color , no peripheral edema   Skin: Normal temperature, turgor and texture; no rash, ulcers  Psych: Appropriate affect, alert and oriented to person, place and time       LABS:  Results from last 7 days   Lab Units 08/07/19  0421 08/06/19  0825   TROPONIN T ng/mL <0.010 <0.010     Results from last 7 days   Lab Units 08/08/19  0458   MAGNESIUM mg/dL 2.1     Results from last 7 days   Lab Units 08/08/19  0458   SODIUM mmol/L 143   POTASSIUM mmol/L 3.6   BUN mg/dL 25*   CREATININE mg/dL 0.76   CALCIUM mg/dL 8.5       Results from last 7 days   Lab Units 08/07/19  0421 08/06/19  0825   WBC 10*3/mm3 3.55  3.55 4.27   HEMOGLOBIN g/dL 12.0* 12.7*   HEMATOCRIT % 37.4* 40.9   PLATELETS 10*3/mm3 101* 125*         Results from last 7 days   Lab Units 08/08/19  0458   CHOLESTEROL mg/dL 92 "   TRIGLYCERIDES mg/dL 85   HDL CHOL mg/dL 33*   LDL CHOL mg/dL 42     Disposition:  Home    Discharge Medications:      Discharge Medications      Changes to Medications      Instructions Start Date   metoprolol succinate XL 50 MG 24 hr tablet  Commonly known as:  TOPROL-XL  What changed:  when to take this   50 mg, Oral, Daily         Continue These Medications      Instructions Start Date   acetaminophen 500 MG tablet  Commonly known as:  TYLENOL   1-2 TABLETS BY ONCE TO TWICE DAILY AS NEEDED FOR PAIN      aspirin  MG tablet   325 mg, Oral, Daily      atorvastatin 20 MG tablet  Commonly known as:  LIPITOR   20 mg, Oral, Nightly      isosorbide mononitrate 60 MG 24 hr tablet  Commonly known as:  IMDUR   TAKE 1 TABLET BY MOUTH EVERY DAY      losartan 50 MG tablet  Commonly known as:  COZAAR   50 mg, Oral, Daily      Lumbar Back Brace/Support Pad misc   Use for lifting/ strenuous exercise.      nitroglycerin 0.4 MG SL tablet  Commonly known as:  NITROSTAT   0.4 mg, Sublingual, Every 5 Minutes PRN, Take no more than 3 doses in 15 minutes.      Vitamin D-3 1000 units capsule   1,000 Units, Oral, Daily               Discharge Home Instructions:  1.  Follow-up with primary care physician in 1 week.  Call for an appointment  2. Follow up with CANDELARIA Macias on September 4 at 11:30 AM.  3. Return to ER per EMS for any recurrent symptoms including, but not limited to: chest pain/pressure/tightness, weakness, Shortness of breath, dizziness, palpitations, near syncope or syncope  4. Please take all medications as prescribed.       Routine post cardiac catheterization/PCI discharge home care instructions:    1. No submerging procedure site below water for 7-10 days.  2. No lifting objects greater than 1 lbs for 3 days.  3. If groin site used, avoid climbing several flights of stairs or sitting for longer than 2 hours at a time for the next 24 hours.   4. Monitor puncture site for bleeding and/or knots;. If  "bleeding should occur at the groin site: lie flat, apply pressure and return to the ER. If bleeding should occur at the wrist site, apply pressure and return to the ER.  5.  You may apply a DRY Band-Aid over the puncture site if needed. Do not apply any lotions, salves or ointments to site.  6. No driving for 3 days.  7. Return to ER for recurrent symptoms.  8. No smoking.  9. Take all medications as prescribed.        Signed:  CANDELARIA Macias  8/9/2019  10:49 AM    Patient personally interviewed and above subjective findings personally confirmed during a face to face contact with patient today  All findings of physical examination confirmed  All pertinent and performed labs, cardiac procedures ,  radiographs of the last 24 hours personally reviewed  Impression and plans discussed/elaborated and implemented jointly as described above     Maninder Quezada MD            [facility] [place]/Transcription:   \"Dictated utilizing Dragon dictation\".     "

## 2019-08-10 ENCOUNTER — READMISSION MANAGEMENT (OUTPATIENT)
Dept: CALL CENTER | Facility: HOSPITAL | Age: 84
End: 2019-08-10

## 2019-08-10 NOTE — OUTREACH NOTE
Prep Survey      Responses   Facility patient discharged from?  [facility]   Is patient eligible?  Yes   Discharge diagnosis  Chest pain,  heart cath   Does the patient have one of the following disease processes/diagnoses(primary or secondary)?  Other   Does the patient have Home health ordered?  No   Is there a DME ordered?  No   Prep survey completed?  Yes          Erika Shannon RN

## 2019-08-12 ENCOUNTER — READMISSION MANAGEMENT (OUTPATIENT)
Dept: CALL CENTER | Facility: HOSPITAL | Age: 84
End: 2019-08-12

## 2019-08-12 NOTE — OUTREACH NOTE
Medical Week 1 Survey      Responses   Facility patient discharged from?  [facility]   Does the patient have one of the following disease processes/diagnoses(primary or secondary)?  Other   Is there a successful TCM telephone encounter documented?  No   Week 1 attempt successful?  Yes   Call start time  0934   Call end time  0939   Discharge diagnosis  Chest pain,  heart cath   Is patient permission given to speak with other caregiver?  Yes   List who call center can speak with  Dinesh - son    Person spoke with today (if not patient) and relationship  Dinesh - son    Meds reviewed with patient/caregiver?  Yes   Is the patient having any side effects they believe may be caused by any medication additions or changes?  No   Does the patient have all medications ordered at discharge?  Yes   Is the patient taking all medications as directed (includes completed medication regime)?  Yes   Comments regarding appointments  Ann Marie BREWER - 09/04/19- at 1130pm ,Inder CRAIG on 08/16/19 ( PCP)    Does the patient have a primary care provider?   Yes   Does the patient have an appointment with their PCP within 7 days of discharge?  Greater than 7 days   What is preventing the patient from scheduling follow up appointments within 7 days of discharge?  Earlier appointment not available   Nursing Interventions  Verified appointment date/time/provider   Has home health visited the patient within 72 hours of discharge?  N/A   Psychosocial issues?  No   Did the patient receive a copy of their discharge instructions?  Yes   Nursing interventions  Reviewed instructions with patient   What is the patient's perception of their health status since discharge?  Improving   Is the patient/caregiver able to teach back signs and symptoms related to disease process for when to call PCP?  Yes   Is the patient/caregiver able to teach back signs and symptoms related to disease process for when to call 911?  Yes   Is the patient/caregiver able to teach  back the hierarchy of who to call/visit for symptoms/problems? PCP, Specialist, Home health nurse, Urgent Care, ED, 911  Yes   Week 1 call completed?  Yes   Graduated  Yes [His son is checking on him daily. ]   Did the patient feel the follow up calls were helpful during their recovery period?  Yes   Was the number of calls appropriate?  Yes   Does the patient have an Advance Directive or Living Will?  Yes          Maddie Woo RN

## 2019-08-12 NOTE — PROGRESS NOTES
Case Management Discharge Note    Final Note: Home    Destination      No service has been selected for the patient.      Durable Medical Equipment      No service has been selected for the patient.      Dialysis/Infusion      No service has been selected for the patient.      Home Medical Care      No service has been selected for the patient.      Therapy      No service has been selected for the patient.      Community Resources      No service has been selected for the patient.        Transportation Services  Other: Other    Final Discharge Disposition Code: 01 - home or self-care

## 2019-08-16 ENCOUNTER — OFFICE VISIT (OUTPATIENT)
Dept: FAMILY MEDICINE CLINIC | Facility: CLINIC | Age: 84
End: 2019-08-16

## 2019-08-16 VITALS
DIASTOLIC BLOOD PRESSURE: 60 MMHG | TEMPERATURE: 98.5 F | SYSTOLIC BLOOD PRESSURE: 134 MMHG | WEIGHT: 185.4 LBS | BODY MASS INDEX: 24.57 KG/M2 | OXYGEN SATURATION: 98 % | HEIGHT: 73 IN | HEART RATE: 77 BPM

## 2019-08-16 DIAGNOSIS — G20 PARKINSON'S DISEASE (HCC): ICD-10-CM

## 2019-08-16 DIAGNOSIS — D53.9 MACROCYTIC ANEMIA: ICD-10-CM

## 2019-08-16 DIAGNOSIS — I10 ESSENTIAL HYPERTENSION: ICD-10-CM

## 2019-08-16 DIAGNOSIS — I42.9 CARDIOMYOPATHY, UNSPECIFIED TYPE (HCC): ICD-10-CM

## 2019-08-16 DIAGNOSIS — E55.9 VITAMIN D DEFICIENCY: ICD-10-CM

## 2019-08-16 DIAGNOSIS — E53.8 B12 DEFICIENCY: ICD-10-CM

## 2019-08-16 DIAGNOSIS — R73.03 PREDIABETES: ICD-10-CM

## 2019-08-16 DIAGNOSIS — R53.82 CHRONIC FATIGUE: ICD-10-CM

## 2019-08-16 DIAGNOSIS — D48.5 NEOPLASM OF UNCERTAIN BEHAVIOR OF SKIN: ICD-10-CM

## 2019-08-16 DIAGNOSIS — R31.9 HEMATURIA, UNSPECIFIED TYPE: ICD-10-CM

## 2019-08-16 DIAGNOSIS — E78.5 DYSLIPIDEMIA: ICD-10-CM

## 2019-08-16 DIAGNOSIS — Z86.73 HISTORY OF CVA (CEREBROVASCULAR ACCIDENT): ICD-10-CM

## 2019-08-16 DIAGNOSIS — I25.10 CORONARY ARTERY DISEASE INVOLVING NATIVE CORONARY ARTERY OF NATIVE HEART WITHOUT ANGINA PECTORIS: Primary | ICD-10-CM

## 2019-08-16 PROBLEM — J00 ACUTE RHINITIS: Status: RESOLVED | Noted: 2017-12-29 | Resolved: 2019-08-16

## 2019-08-16 LAB
BILIRUB BLD-MCNC: NEGATIVE MG/DL
CLARITY, POC: CLEAR
COLOR UR: YELLOW
EXPIRATION DATE: NORMAL
GLUCOSE UR STRIP-MCNC: NEGATIVE MG/DL
HBA1C MFR BLD: 5.4 %
KETONES UR QL: NEGATIVE
LEUKOCYTE EST, POC: ABNORMAL
Lab: NORMAL
NITRITE UR-MCNC: NEGATIVE MG/ML
PH UR: 6 [PH] (ref 5–8)
PROT UR STRIP-MCNC: NEGATIVE MG/DL
RBC # UR STRIP: ABNORMAL /UL
SP GR UR: 1.02 (ref 1–1.03)
UROBILINOGEN UR QL: NORMAL

## 2019-08-16 PROCEDURE — 81003 URINALYSIS AUTO W/O SCOPE: CPT | Performed by: PHYSICIAN ASSISTANT

## 2019-08-16 PROCEDURE — 99215 OFFICE O/P EST HI 40 MIN: CPT | Performed by: PHYSICIAN ASSISTANT

## 2019-08-16 PROCEDURE — 83036 HEMOGLOBIN GLYCOSYLATED A1C: CPT | Performed by: PHYSICIAN ASSISTANT

## 2019-08-16 NOTE — PROGRESS NOTES
"Subjective   Dinesh Churchill Sr. is a 89 y.o. male here today for follow-up [facility] for chest pain     History of Present Illness     Patient was admit 8/6-8/9/19. Per discharge summary:    \"Discharge Diagnoses:       Patient Active Problem List   Diagnosis   • Abnormal magnetic resonance imaging study   • Arthritis   • Osteoarthritis of cervical spine   • Diplopia   • Hearing loss   • Neoplasm of uncertain behavior of skin   • Prediabetes   • Vitamin D deficiency   • Chronic fatigue   • History of supraventricular tachycardia   • Essential hypertension   • B12 deficiency   • Abnormal cardiac function test   • Cardiomyopathy (CMS/HCC)   • Coronary artery disease involving native coronary artery of native heart without angina pectoris   • History of coronary artery stent placement   • History of renal calculi   • Dyslipidemia   • Macrocytosis without anemia   • Precordial pain   • Parkinson's disease (CMS/HCC)   • History of CVA (cerebrovascular accident)   • AF (atrial fibrillation) (CMS/HCC)   • Otalgia, left   • Acute rhinitis   • Chest pain   • Abnormal stress test   Hospital Course:     Mr. Churchill is a 89 year old male, who is current with our service. He has a history of arthritis, chest pain, diplopia, hearing loss, prediabetes, vitamin D deficiency, chronic fatigue, history of supraventricular tachycardia, essential hypertension, B12 deficiency, cardiomyopathy, CAD, dyslipidemia, macroytosis without anemia, parkinson's disease, history of CVA, afib, otalgia, left, acute rhinitis. He had recently came into Dr. Larkin that he was feeling better and at that time it was decided he would not need to be seen for a year. On admit he presented with chest pain in the am that radiated to his L upper extremity, accompanied with shortness of breath. EMS gave him nitroglycerin and an asa which relieved the symptoms. His EKG showed tachycardia heart rate 115, troponin was negative, and cxr was negative. On " "8/7 he had a stress test which showed EF 46%, and medium-sized, moderately severe area of ischemia in the inferior wall and lateral wall. A high risk study. A cardiac catheterization was then planned for 8/8, which showed normal left main, LAD midportion 50% stenosis, first larg diagonal branch was approximately diffuse 90% stenosis, Circ artery was a nondominant midporion wa 0% stenosis, RCA was dominant with the proximal had a stent widely patent, Normal LV gram.  At that time recommendation stated the most likely cause a positive stress test with a high diagonal will be will be treated medically PCI would be considered if continues to have chest pain. He was found to have afib during admission, he is not an a/c candidate due to age and frality.  CV stable at discharge, currently in sinus rhythm.  Cardiac cath 8/8/19  Successful right and left coronary angiogram and LV gram, Normal left main, Left anterior descending midportion 50% stenosis, first large diagonal branch was approximately diffuse 90% stenosis, Circumflex artery was a nondominant midportion was 50% stenosis, Right coronary artery was dominant with the proximal had a stent widely patent, Normal LV gram    Discharge Home Instructions:  1.  Follow-up with primary care physician in 1 week.  Call for an appointment  2. Follow up with CANDELARIA Macias on September 4 at 11:30 AM.  3. Return to ER per EMS for any recurrent symptoms including, but not limited to: chest pain/pressure/tightness, weakness, Shortness of breath, dizziness, palpitations, near syncope or syncope  4. Please take all medications as prescribed\"    Patient reports all pain resolved and he is feeling ok. No increased SOA, CP, palpitations, dizziness, near syncope, neurological symptoms, or other concerns. He continues with joint pain but reports this does not prohibit him from daily activities. He has appt for follow up with cardiology 9/4/19.     The following portions of the " patient's history were reviewed and updated as appropriate: allergies, current medications, past family history, past medical history, past social history, past surgical history and problem list.    Review of Systems   All other systems reviewed and are negative.      Objective   Physical Exam   Constitutional: He is oriented to person, place, and time. He appears well-developed.   HENT:   Head: Normocephalic and atraumatic.   Right Ear: External ear normal.   Left Ear: External ear normal.   Eyes: Conjunctivae are normal.   Neck: Carotid bruit is not present. No tracheal deviation present. No thyroid mass and no thyromegaly present.   Cardiovascular: Normal rate, regular rhythm, normal heart sounds and intact distal pulses.   Pulmonary/Chest: Effort normal and breath sounds normal.   Neurological: He is alert and oriented to person, place, and time. Gait normal.   Skin: Skin is warm and dry.   Multiple raised, flaking, and crusting lesions over face, UE.    Psychiatric: He has a normal mood and affect. His behavior is normal. Judgment and thought content normal.   Nursing note and vitals reviewed.      Assessment/Plan   Dinesh was seen today for follow-up.    Diagnoses and all orders for this visit:    Coronary artery disease involving native coronary artery of native heart without angina pectoris  -     Comprehensive Metabolic Panel  -     CK  -     POC Glycated Hemoglobin, Total    Cardiomyopathy, unspecified type (CMS/HCC)  -     POC Glycated Hemoglobin, Total    Essential hypertension  -     Comprehensive Metabolic Panel  -     POC Glycated Hemoglobin, Total  -     POC Urinalysis Dipstick, Automated  -     Thyroid Panel With TSH    Dyslipidemia  -     Comprehensive Metabolic Panel  -     POC Glycated Hemoglobin, Total    Prediabetes  -     POC Glycated Hemoglobin, Total  -     POC Urinalysis Dipstick, Automated  -     Thyroid Panel With TSH    Vitamin D deficiency  -     Comprehensive Metabolic Panel  -      Vitamin D 25 Hydroxy  -     POC Glycated Hemoglobin, Total    Macrocytic anemia  -     CBC & Differential  -     Vitamin B12 & Folate  -     Iron and TIBC  -     Ferritin  -     Thyroid Panel With TSH    B12 deficiency  -     CBC & Differential  -     Vitamin B12 & Folate    History of CVA (cerebrovascular accident)    Parkinson's disease (CMS/HCC)    Chronic fatigue    Hematuria, unspecified type  -     Urine Culture - Urine, Urine, Clean Catch  -     Urinalysis With Microscopic - Urine, Clean Catch    Neoplasm of uncertain behavior of skin  -     Ambulatory Referral to Dermatology    Other orders  -     Manual Differential  -     Microscopic Examination -      Patient Instructions   89 year old male who presents today in follow up of hospitalization 8/6/19-8/9/19. He presented to the ER with chest pain in the am that radiated to his L upper extremity, accompanied with shortness of breath. EMS gave him nitroglycerin and an asa which relieved the symptoms. His EKG showed tachycardia heart rate 115, troponin was negative, and cxr was negative. On 8/7 he underwent a stress test which showed EF 46%, and medium-sized, moderately severe area of ischemia in the inferior wall and lateral wall. A high risk study. A cardiac catheterization 8/8 showed normal left main, LAD midportion 50% stenosis, first larg diagonal branch was approximately diffuse 90% stenosis, Circ artery was a nondominant midporion wa 0% stenosis, RCA was dominant with the proximal had a stent widely patent, Normal LV gram. It was determined that the 90% stenosis was the likely etiology of symptoms and he would undergo PCI if recurrence of symptoms and failure of conservative therapy. He was advised to follow up here and with cardiology 9/4/19. Patient reports all pain resolved and he is feeling ok. No increased SOA, CP, palpitations, dizziness, near syncope, neurological symptoms, or other concerns. He continues with joint pain but reports this does not  prohibit him from daily activities.     He is also due for follow up of hypertension, hyperlipidemia, prediabetes, macrocytic anemia with B12 deficiency, vitamin D deficiency, and parkinson's disorder and history of CVA. Blood pressure today has improved from hospitalization. He will continue regimen and follow up with cardiology. We will defer medication management to cardiology, as they will also have to optimally manage CAD and pulse. He will have fasting labs today- call if no results in 1 week. Stability of conditions, plan, follow up, and further recommendations pending labs. He has had no weakness, recent falls, gait change, or worsening mental status or moods.     Of note, patient has significant skin changes that are likely malignant. I will refer to dermatology for evaluation and treatment.

## 2019-08-17 LAB
25(OH)D3+25(OH)D2 SERPL-MCNC: 39.1 NG/ML (ref 30–100)
ALBUMIN SERPL-MCNC: 3.9 G/DL (ref 3.5–5.2)
ALBUMIN/GLOB SERPL: 1.6 G/DL
ALP SERPL-CCNC: 50 U/L (ref 39–117)
ALT SERPL-CCNC: 14 U/L (ref 1–41)
AST SERPL-CCNC: 15 U/L (ref 1–40)
BASOPHILS # BLD AUTO: (no result) 10*3/UL
BILIRUB SERPL-MCNC: 1.1 MG/DL (ref 0.2–1.2)
BUN SERPL-MCNC: 20 MG/DL (ref 8–23)
BUN/CREAT SERPL: 22.5 (ref 7–25)
CALCIUM SERPL-MCNC: 9 MG/DL (ref 8.2–9.6)
CHLORIDE SERPL-SCNC: 104 MMOL/L (ref 98–107)
CK SERPL-CCNC: 49 U/L (ref 20–200)
CO2 SERPL-SCNC: 28.2 MMOL/L (ref 22–29)
CREAT SERPL-MCNC: 0.89 MG/DL (ref 0.76–1.27)
DIFFERENTIAL COMMENT: NORMAL
EOSINOPHIL # BLD AUTO: (no result) 10*3/UL
EOSINOPHIL # BLD MANUAL: 0.05 10*3/MM3 (ref 0–0.4)
EOSINOPHIL NFR BLD AUTO: (no result) %
EOSINOPHIL NFR BLD MANUAL: 1 % (ref 0.3–6.2)
ERYTHROCYTE [DISTWIDTH] IN BLOOD BY AUTOMATED COUNT: 16.1 % (ref 12.3–15.4)
FERRITIN SERPL-MCNC: 406 NG/ML (ref 30–400)
FOLATE SERPL-MCNC: 9.23 NG/ML (ref 4.78–24.2)
FT4I SERPL CALC-MCNC: 2.1 (ref 1.2–4.9)
GLOBULIN SER CALC-MCNC: 2.4 GM/DL
GLUCOSE SERPL-MCNC: 104 MG/DL (ref 65–99)
HCT VFR BLD AUTO: 41.8 % (ref 37.5–51)
HGB BLD-MCNC: 12.7 G/DL (ref 13–17.7)
IRON SATN MFR SERPL: 20 % (ref 20–50)
IRON SERPL-MCNC: 68 MCG/DL (ref 59–158)
LYMPHOCYTES # BLD AUTO: (no result) 10*3/UL
LYMPHOCYTES # BLD MANUAL: 1.2 10*3/MM3 (ref 0.7–3.1)
LYMPHOCYTES NFR BLD AUTO: (no result) %
LYMPHOCYTES NFR BLD MANUAL: 24 % (ref 19.6–45.3)
MCH RBC QN AUTO: 32.5 PG (ref 26.6–33)
MCHC RBC AUTO-ENTMCNC: 30.4 G/DL (ref 31.5–35.7)
MCV RBC AUTO: 106.9 FL (ref 79–97)
MONOCYTES # BLD MANUAL: 0.45 10*3/MM3 (ref 0.1–0.9)
MONOCYTES NFR BLD AUTO: (no result) %
MONOCYTES NFR BLD MANUAL: 9 % (ref 5–12)
NEUTROPHILS # BLD MANUAL: 3.3 10*3/MM3 (ref 1.7–7)
NEUTROPHILS NFR BLD AUTO: (no result) %
NEUTROPHILS NFR BLD MANUAL: 66 % (ref 42.7–76)
PLATELET # BLD AUTO: 140 10*3/MM3 (ref 140–450)
PLATELET BLD QL SMEAR: NORMAL
POTASSIUM SERPL-SCNC: 4.4 MMOL/L (ref 3.5–5.2)
PROT SERPL-MCNC: 6.3 G/DL (ref 6–8.5)
RBC # BLD AUTO: 3.91 10*6/MM3 (ref 4.14–5.8)
RBC MORPH BLD: NORMAL
SODIUM SERPL-SCNC: 143 MMOL/L (ref 136–145)
T3RU NFR SERPL: 27 % (ref 24–39)
T4 SERPL-MCNC: 7.6 UG/DL (ref 4.5–12)
TIBC SERPL-MCNC: 336 MCG/DL
TSH SERPL DL<=0.005 MIU/L-ACNC: 3.45 UIU/ML (ref 0.45–4.5)
UIBC SERPL-MCNC: 268 MCG/DL (ref 112–346)
VIT B12 SERPL-MCNC: 742 PG/ML (ref 211–946)
WBC # BLD AUTO: 5 10*3/MM3 (ref 3.4–10.8)

## 2019-08-18 ENCOUNTER — HOSPITAL ENCOUNTER (INPATIENT)
Facility: HOSPITAL | Age: 84
LOS: 2 days | Discharge: HOME | End: 2019-08-21
Attending: EMERGENCY MEDICINE | Admitting: INTERNAL MEDICINE

## 2019-08-18 ENCOUNTER — APPOINTMENT (OUTPATIENT)
Dept: CT IMAGING | Facility: HOSPITAL | Age: 84
End: 2019-08-18

## 2019-08-18 ENCOUNTER — APPOINTMENT (OUTPATIENT)
Dept: GENERAL RADIOLOGY | Facility: HOSPITAL | Age: 84
End: 2019-08-18

## 2019-08-18 DIAGNOSIS — I25.10 CORONARY ARTERY DISEASE INVOLVING NATIVE CORONARY ARTERY OF NATIVE HEART WITHOUT ANGINA PECTORIS: ICD-10-CM

## 2019-08-18 DIAGNOSIS — R07.9 CHEST PAIN, UNSPECIFIED TYPE: ICD-10-CM

## 2019-08-18 DIAGNOSIS — J93.83 SPONTANEOUS PNEUMOTHORAX: Primary | ICD-10-CM

## 2019-08-18 LAB
ALBUMIN SERPL-MCNC: 3.9 G/DL (ref 3.5–5.2)
ALBUMIN/GLOB SERPL: 1.8 G/DL
ALP SERPL-CCNC: 51 U/L (ref 39–117)
ALT SERPL W P-5'-P-CCNC: 13 U/L (ref 1–41)
ANION GAP SERPL CALCULATED.3IONS-SCNC: 12.9 MMOL/L (ref 5–15)
AST SERPL-CCNC: 17 U/L (ref 1–40)
BASOPHILS # BLD AUTO: 0.02 10*3/MM3 (ref 0–0.2)
BASOPHILS NFR BLD AUTO: 0.3 % (ref 0–1.5)
BILIRUB SERPL-MCNC: 1 MG/DL (ref 0.2–1.2)
BUN BLD-MCNC: 21 MG/DL (ref 8–23)
BUN/CREAT SERPL: 22.6 (ref 7–25)
CALCIUM SPEC-SCNC: 8.4 MG/DL (ref 8.2–9.6)
CHLORIDE SERPL-SCNC: 109 MMOL/L (ref 98–107)
CO2 SERPL-SCNC: 22.1 MMOL/L (ref 22–29)
CREAT BLD-MCNC: 0.93 MG/DL (ref 0.76–1.27)
DEPRECATED RDW RBC AUTO: 62.4 FL (ref 37–54)
EOSINOPHIL # BLD AUTO: 0.08 10*3/MM3 (ref 0–0.4)
EOSINOPHIL NFR BLD AUTO: 1.1 % (ref 0.3–6.2)
ERYTHROCYTE [DISTWIDTH] IN BLOOD BY AUTOMATED COUNT: 15.9 % (ref 12.3–15.4)
GFR SERPL CREATININE-BSD FRML MDRD: 76 ML/MIN/1.73
GLOBULIN UR ELPH-MCNC: 2.2 GM/DL
GLUCOSE BLD-MCNC: 131 MG/DL (ref 65–99)
GLUCOSE BLDC GLUCOMTR-MCNC: 100 MG/DL (ref 70–130)
HCT VFR BLD AUTO: 42.1 % (ref 37.5–51)
HGB BLD-MCNC: 13 G/DL (ref 13–17.7)
HOLD SPECIMEN: NORMAL
HOLD SPECIMEN: NORMAL
IMM GRANULOCYTES # BLD AUTO: 0.03 10*3/MM3 (ref 0–0.05)
IMM GRANULOCYTES NFR BLD AUTO: 0.4 % (ref 0–0.5)
LYMPHOCYTES # BLD AUTO: 1.15 10*3/MM3 (ref 0.7–3.1)
LYMPHOCYTES NFR BLD AUTO: 16.4 % (ref 19.6–45.3)
MCH RBC QN AUTO: 32.3 PG (ref 26.6–33)
MCHC RBC AUTO-ENTMCNC: 30.9 G/DL (ref 31.5–35.7)
MCV RBC AUTO: 104.7 FL (ref 79–97)
MONOCYTES # BLD AUTO: 0.55 10*3/MM3 (ref 0.1–0.9)
MONOCYTES NFR BLD AUTO: 7.8 % (ref 5–12)
NEUTROPHILS # BLD AUTO: 5.2 10*3/MM3 (ref 1.7–7)
NEUTROPHILS NFR BLD AUTO: 74 % (ref 42.7–76)
NRBC BLD AUTO-RTO: 0.1 /100 WBC (ref 0–0.2)
PLATELET # BLD AUTO: 145 10*3/MM3 (ref 140–450)
PMV BLD AUTO: 11.5 FL (ref 6–12)
POTASSIUM BLD-SCNC: 4.5 MMOL/L (ref 3.5–5.2)
PROT SERPL-MCNC: 6.1 G/DL (ref 6–8.5)
RBC # BLD AUTO: 4.02 10*6/MM3 (ref 4.14–5.8)
SODIUM BLD-SCNC: 144 MMOL/L (ref 136–145)
TROPONIN T SERPL-MCNC: <0.01 NG/ML (ref 0–0.03)
TROPONIN T SERPL-MCNC: <0.01 NG/ML (ref 0–0.03)
WBC NRBC COR # BLD: 7.03 10*3/MM3 (ref 3.4–10.8)
WHOLE BLOOD HOLD SPECIMEN: NORMAL
WHOLE BLOOD HOLD SPECIMEN: NORMAL

## 2019-08-18 PROCEDURE — 93005 ELECTROCARDIOGRAM TRACING: CPT

## 2019-08-18 PROCEDURE — 71250 CT THORAX DX C-: CPT

## 2019-08-18 PROCEDURE — 84484 ASSAY OF TROPONIN QUANT: CPT | Performed by: EMERGENCY MEDICINE

## 2019-08-18 PROCEDURE — 93005 ELECTROCARDIOGRAM TRACING: CPT | Performed by: EMERGENCY MEDICINE

## 2019-08-18 PROCEDURE — G0378 HOSPITAL OBSERVATION PER HR: HCPCS

## 2019-08-18 PROCEDURE — 82962 GLUCOSE BLOOD TEST: CPT

## 2019-08-18 PROCEDURE — 71046 X-RAY EXAM CHEST 2 VIEWS: CPT

## 2019-08-18 PROCEDURE — 25010000002 ONDANSETRON PER 1 MG: Performed by: EMERGENCY MEDICINE

## 2019-08-18 PROCEDURE — 25010000002 MORPHINE PER 10 MG: Performed by: EMERGENCY MEDICINE

## 2019-08-18 PROCEDURE — 99285 EMERGENCY DEPT VISIT HI MDM: CPT

## 2019-08-18 PROCEDURE — 93010 ELECTROCARDIOGRAM REPORT: CPT | Performed by: INTERNAL MEDICINE

## 2019-08-18 PROCEDURE — 80053 COMPREHEN METABOLIC PANEL: CPT | Performed by: EMERGENCY MEDICINE

## 2019-08-18 PROCEDURE — 85025 COMPLETE CBC W/AUTO DIFF WBC: CPT | Performed by: EMERGENCY MEDICINE

## 2019-08-18 RX ORDER — ACETAMINOPHEN 160 MG/5ML
650 SOLUTION ORAL EVERY 4 HOURS PRN
Status: DISCONTINUED | OUTPATIENT
Start: 2019-08-18 | End: 2019-08-21 | Stop reason: HOSPADM

## 2019-08-18 RX ORDER — ASPIRIN 81 MG/1
324 TABLET, CHEWABLE ORAL ONCE
Status: COMPLETED | OUTPATIENT
Start: 2019-08-18 | End: 2019-08-18

## 2019-08-18 RX ORDER — ONDANSETRON 2 MG/ML
4 INJECTION INTRAMUSCULAR; INTRAVENOUS ONCE
Status: COMPLETED | OUTPATIENT
Start: 2019-08-18 | End: 2019-08-18

## 2019-08-18 RX ORDER — SODIUM CHLORIDE 0.9 % (FLUSH) 0.9 %
1-10 SYRINGE (ML) INJECTION AS NEEDED
Status: DISCONTINUED | OUTPATIENT
Start: 2019-08-18 | End: 2019-08-21 | Stop reason: HOSPADM

## 2019-08-18 RX ORDER — ACETAMINOPHEN 650 MG/1
650 SUPPOSITORY RECTAL EVERY 4 HOURS PRN
Status: DISCONTINUED | OUTPATIENT
Start: 2019-08-18 | End: 2019-08-21 | Stop reason: HOSPADM

## 2019-08-18 RX ORDER — NITROGLYCERIN 0.4 MG/1
0.4 TABLET SUBLINGUAL
Status: DISCONTINUED | OUTPATIENT
Start: 2019-08-18 | End: 2019-08-21 | Stop reason: HOSPADM

## 2019-08-18 RX ORDER — MORPHINE SULFATE 2 MG/ML
2 INJECTION, SOLUTION INTRAMUSCULAR; INTRAVENOUS ONCE
Status: COMPLETED | OUTPATIENT
Start: 2019-08-18 | End: 2019-08-18

## 2019-08-18 RX ORDER — SODIUM CHLORIDE 0.9 % (FLUSH) 0.9 %
10 SYRINGE (ML) INJECTION AS NEEDED
Status: DISCONTINUED | OUTPATIENT
Start: 2019-08-18 | End: 2019-08-21 | Stop reason: HOSPADM

## 2019-08-18 RX ORDER — ACETAMINOPHEN 325 MG/1
650 TABLET ORAL EVERY 4 HOURS PRN
Status: DISCONTINUED | OUTPATIENT
Start: 2019-08-18 | End: 2019-08-21 | Stop reason: HOSPADM

## 2019-08-18 RX ORDER — SODIUM CHLORIDE 0.9 % (FLUSH) 0.9 %
3 SYRINGE (ML) INJECTION EVERY 12 HOURS SCHEDULED
Status: DISCONTINUED | OUTPATIENT
Start: 2019-08-18 | End: 2019-08-21 | Stop reason: HOSPADM

## 2019-08-18 RX ORDER — ASPIRIN 325 MG
325 TABLET ORAL ONCE
Status: DISCONTINUED | OUTPATIENT
Start: 2019-08-18 | End: 2019-08-19

## 2019-08-18 RX ADMIN — METOPROLOL TARTRATE 25 MG: 25 TABLET ORAL at 21:07

## 2019-08-18 RX ADMIN — MORPHINE SULFATE 2 MG: 2 INJECTION, SOLUTION INTRAMUSCULAR; INTRAVENOUS at 19:36

## 2019-08-18 RX ADMIN — ASPIRIN 324 MG: 81 TABLET, CHEWABLE ORAL at 19:40

## 2019-08-18 RX ADMIN — METOPROLOL TARTRATE 5 MG: 5 INJECTION, SOLUTION INTRAVENOUS at 19:30

## 2019-08-18 RX ADMIN — ONDANSETRON 4 MG: 2 INJECTION INTRAMUSCULAR; INTRAVENOUS at 19:34

## 2019-08-18 RX ADMIN — SODIUM CHLORIDE, PRESERVATIVE FREE 3 ML: 5 INJECTION INTRAVENOUS at 23:15

## 2019-08-18 NOTE — ED PROVIDER NOTES
" EMERGENCY DEPARTMENT ENCOUNTER    Room Number:  N332/1  Date seen:  8/18/2019  Time seen: 6:32 PM  PCP: Phyllis Loving PA  Historian: pt  Cardiology: Dr Quezada    HPI:  Chief Complaint: Chest Pain  A complete HPI/ROS/PMH/PSH/SH/FH are unobtainable due to: N/A  Context: Dinesh Churchill Sr. is a 89 y.o. male who presents to the ED c/o \"dull\" 5/10 left sided chest pain that radiates into the left shoulder, arm, and left side of abdomen that started PTA when the pt was sitting watching TV.  Pt admits to SOA but states he isn't feeling as badly as when he was admitted for similar symptoms two weeks ago. Pt denies nausea or diaphoresis    Pain Location: left chest  Radiation: left shoulder, arm, and left side of abdomen  Quality: \"dull\"  Intensity/Severity: moderate  Duration: PTA  Timing: constant  Progression: unchanged  Aggravating Factors: none  Alleviating Factors: none  Previous Episodes: Pt admitted for similar symptoms two weeks ago.  Treatment before arrival: none  Associated Symptoms: SOA          PAST MEDICAL HISTORY  Active Ambulatory Problems     Diagnosis Date Noted   • Abnormal magnetic resonance imaging study 03/18/2016   • Arthritis 03/18/2016   • Osteoarthritis of cervical spine 03/18/2016   • Diplopia 03/18/2016   • Hearing loss 03/18/2016   • Neoplasm of uncertain behavior of skin 03/18/2016   • Prediabetes 03/18/2016   • Vitamin D deficiency 03/18/2016   • Chronic fatigue 10/27/2016   • History of supraventricular tachycardia 04/07/2017   • Essential hypertension 04/07/2017   • B12 deficiency 04/19/2017   • Abnormal cardiac function test 05/08/2017   • Cardiomyopathy (CMS/HCC) 05/08/2017   • Coronary artery disease involving native coronary artery of native heart without angina pectoris 05/31/2017   • History of coronary artery stent placement 05/31/2017   • History of renal calculi 05/31/2017   • Dyslipidemia 05/31/2017   • Macrocytic anemia 08/09/2017   • Precordial pain 08/16/2017   • " Parkinson's disease (CMS/[facility]) 10/25/2017   • History of CVA (cerebrovascular accident) 10/25/2017   • AF (atrial fibrillation) (CMS/[facility]) 11/29/2017   • Otalgia, left 12/29/2017   • Chest pain 08/06/2019   • Abnormal stress test 08/06/2019     Resolved Ambulatory Problems     Diagnosis Date Noted   • Maxillary sinusitis 03/18/2016   • Dizziness 10/27/2016   • Acute frontal sinusitis 10/27/2016   • Precordial pain 04/07/2017   • SOB (shortness of breath) 04/07/2017   • Acute rhinitis 12/29/2017     Past Medical History:   Diagnosis Date   • Abnormal ECG    • Abnormal MRI    • Acute frontal sinusitis    • Arthritis    • Chronic fatigue    • Coronary artery disease    • Dizziness    • Hearing aid worn    • Hearing loss    • Hyperlipidemia    • Hypertension    • Kidney stones    • Macrocytosis    • Neoplasm of skin    • Osteoporosis    • Prediabetes    • Vitamin D deficiency          PAST SURGICAL HISTORY  Past Surgical History:   Procedure Laterality Date   • CARDIAC CATHETERIZATION N/A 5/22/2017    Procedure: Left Heart Cath;  Surgeon: Maninder Quezada MD;  Location: [facility] CATH INVASIVE LOCATION;  Service:    • CARDIAC CATHETERIZATION N/A 5/23/2017    Procedure: Stent BMS coronary;  Surgeon: Maninder Quezada MD;  Location[facility]AS[facility] LOC[facility];  Service:    • CARDIAC CATHETERIZATION N/A 8/8/2019    Procedure: Left Heart Cath;  Surgeon: Maninder Quezada MD;  [facility];  Service: Cardiology   • CARDIAC CATHETERIZATION N/A 8/8/2019    Procedure: Left ventriculography;  Surgeon: Maninder Quezada MD;  Location: [facility] CATH INVASIVE LOCATION;  Service: Cardiology   • CARDIAC CATHETERIZATION N/A 8/8/2019    Procedure: Coronary angiography;  Surgeon: Maninder Quezada MD;  Location: [facility] CATH INVASIVE LOCATION;  Service: Cardiology   • HERNIA REPAIR      x 2   • OK RT/LT HEART CATHETERS N/A 5/23/2017    Procedure: Percutaneous Coronary Intervention;  Surgeon:  "Maninder Quezada MD;  Location: [facility] LOCATION;  Service: Cardiovascular         FAMILY HISTORY  Family History   Family history unknown: Yes         SOCIAL HISTORY  Social History     Socioeconomic History   • Marital status:      Spouse name: Not on file   • Number of children: Not on file   • Years of education: Not on file   • Highest education level: Not on file   Occupational History     Employer: RETIRED   Tobacco Use   • Smoking status: Never Smoker   • Smokeless tobacco: Never Used   Substance and Sexual Activity   • Alcohol use: No     Comment: quit 30 years ago    • Drug use: No   • Sexual activity: Defer         ALLERGIES  Patient has no known allergies.        REVIEW OF SYSTEMS  Review of Systems   Constitutional: Negative for diaphoresis and fever.   HENT: Negative for congestion.    Eyes: Negative for visual disturbance.   Respiratory: Positive for shortness of breath.    Cardiovascular: Positive for chest pain (\"dull\"; radiates into the left shoulder, arm, and left side of abdomen). Negative for palpitations.   Gastrointestinal: Negative for blood in stool, nausea and vomiting.   Endocrine: Negative for polyuria.   Genitourinary: Negative for flank pain.   Musculoskeletal: Negative for joint swelling.   Skin: Negative for wound.   Neurological: Negative for seizures.   Hematological: Negative for adenopathy.   Psychiatric/Behavioral: Negative for sleep disturbance.            PHYSICAL EXAM  ED Triage Vitals   Temp Heart Rate Resp BP SpO2   08/18/19 1734 08/18/19 1732 08/18/19 1732 08/18/19 1732 08/18/19 1732   99.5 °F (37.5 °C) (!) 125 18 140/80 93 %      Temp src Heart Rate Source Patient Position BP Location FiO2 (%)   08/18/19 1734 -- -- -- --   Tympanic             GENERAL: awake and alert, no acute distress  HENT: nares patent  EYES: no scleral icterus  CV: regular rhythm, tachycardic, no murmur  RESPIRATORY: normal effort, CTAB, normal work of breathing  ABDOMEN: " soft and nontender throughout  MUSCULOSKELETAL: no deformity, No LE edema bilaterally  NEURO: alert, moves all extremities, follows commands  SKIN: warm, dry    Vital signs and nursing notes reviewed.          LAB RESULTS  Recent Results (from the past 24 hour(s))   Comprehensive Metabolic Panel    Collection Time: 08/18/19  5:46 PM   Result Value Ref Range    Glucose 131 (H) 65 - 99 mg/dL    BUN 21 8 - 23 mg/dL    Creatinine 0.93 0.76 - 1.27 mg/dL    Sodium 144 136 - 145 mmol/L    Potassium 4.5 3.5 - 5.2 mmol/L    Chloride 109 (H) 98 - 107 mmol/L    CO2 22.1 22.0 - 29.0 mmol/L    Calcium 8.4 8.2 - 9.6 mg/dL    Total Protein 6.1 6.0 - 8.5 g/dL    Albumin 3.90 3.50 - 5.20 g/dL    ALT (SGPT) 13 1 - 41 U/L    AST (SGOT) 17 1 - 40 U/L    Alkaline Phosphatase 51 39 - 117 U/L    Total Bilirubin 1.0 0.2 - 1.2 mg/dL    eGFR Non African Amer 76 >60 mL/min/1.73    Globulin 2.2 gm/dL    A/G Ratio 1.8 g/dL    BUN/Creatinine Ratio 22.6 7.0 - 25.0    Anion Gap 12.9 5.0 - 15.0 mmol/L   Troponin    Collection Time: 08/18/19  5:46 PM   Result Value Ref Range    Troponin T <0.010 0.000 - 0.030 ng/mL   Light Blue Top    Collection Time: 08/18/19  5:46 PM   Result Value Ref Range    Extra Tube hold for add-on    Green Top (Gel)    Collection Time: 08/18/19  5:46 PM   Result Value Ref Range    Extra Tube Hold for add-ons.    Lavender Top    Collection Time: 08/18/19  5:46 PM   Result Value Ref Range    Extra Tube hold for add-on    Gold Top - SST    Collection Time: 08/18/19  5:46 PM   Result Value Ref Range    Extra Tube Hold for add-ons.    CBC Auto Differential    Collection Time: 08/18/19  5:46 PM   Result Value Ref Range    WBC 7.03 3.40 - 10.80 10*3/mm3    RBC 4.02 (L) 4.14 - 5.80 10*6/mm3    Hemoglobin 13.0 13.0 - 17.7 g/dL    Hematocrit 42.1 37.5 - 51.0 %    .7 (H) 79.0 - 97.0 fL    MCH 32.3 26.6 - 33.0 pg    MCHC 30.9 (L) 31.5 - 35.7 g/dL    RDW 15.9 (H) 12.3 - 15.4 %    RDW-SD 62.4 (H) 37.0 - 54.0 fl    MPV 11.5 6.0 -  12.0 fL    Platelets 145 140 - 450 10*3/mm3    Neutrophil % 74.0 42.7 - 76.0 %    Lymphocyte % 16.4 (L) 19.6 - 45.3 %    Monocyte % 7.8 5.0 - 12.0 %    Eosinophil % 1.1 0.3 - 6.2 %    Basophil % 0.3 0.0 - 1.5 %    Immature Grans % 0.4 0.0 - 0.5 %    Neutrophils, Absolute 5.20 1.70 - 7.00 10*3/mm3    Lymphocytes, Absolute 1.15 0.70 - 3.10 10*3/mm3    Monocytes, Absolute 0.55 0.10 - 0.90 10*3/mm3    Eosinophils, Absolute 0.08 0.00 - 0.40 10*3/mm3    Basophils, Absolute 0.02 0.00 - 0.20 10*3/mm3    Immature Grans, Absolute 0.03 0.00 - 0.05 10*3/mm3    nRBC 0.1 0.0 - 0.2 /100 WBC   Troponin    Collection Time: 08/18/19  7:52 PM   Result Value Ref Range    Troponin T <0.010 0.000 - 0.030 ng/mL   POC Glucose Once    Collection Time: 08/18/19 11:05 PM   Result Value Ref Range    Glucose 100 70 - 130 mg/dL       Ordered the above labs and reviewed the results.        RADIOLOGY  Xr Chest 2 View    Result Date: 8/18/2019  AP AND LATERAL CHEST  HISTORY: Chest pain, coronary artery disease, hypertension.  COMPARISON: AP chest 08/06/2019.  FINDINGS: Heart size is enlarged. Left greater than right pleural effusions are present and there is associated basilar opacity that appears similar to the exam 12 days ago. No new focal airspace disease has developed. Within the left lung apex there is potential small left pneumothorax that was not demonstrated on the prior exam 12 days ago.      Potential small left apical pneumothorax. There is a curvilinear band at the left apex not demonstrated on any previous studies. This could be due to differences in projection though a left apical pneumothorax could have this appearance and this could be further evaluated with CT.  There is otherwise cardiomegaly, left greater than right pleural effusions and unchanged left basilar opacity.  Discussed with Dr. Herman in the emergency department on 08/18/2019 at 6:28 PM.  This report was finalized on 8/18/2019 7:52 PM by Dr. Gm Brown M.D.       Ct Chest Without Contrast    Result Date: 8/18/2019  THORACIC CT SCAN WITHOUT CONTRAST  HISTORY:  left apical ptx  COMPARISON: None.  TECHNIQUE:  Radiation dose reduction techniques were utilized, including automated exposure control and exposure modulation based on body size. Axial images of the thorax obtained without IV contrast, per request.  FINDINGS: There is a miniscule left pneumothorax which largely collects anteriorly. There are small pleural effusions, left greater than right with some adjacent secondary atelectasis. Neither effusion appears loculated. There are calcified residua of granulomatous disease present.  Mild fibrotic changes present in the apical segments posteriorly. Some chronic appearing fibrosis and bronchiectasis is present in the right middle lobe.  There is marked cardiomegaly with small pericardial effusion. No obvious adenopathy by noncontrast technique. 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 The descending thoracic aorta is also mildly aneurysmal at 3.1 x 2.9 cm. The lumen of the aorta cannot be assessed without contrast.  Unenhanced images of the included upper abdomen partially includes the adrenal glands which appear enlarged, likely hyperplasia  Bone window images demonstrate multiple old appearing left rib deformities. No acute osseous finding       1. Miniscule left pneumothorax with mild fibrotic changes. 2. Small pericardial and bilateral pleural effusions, left greater than right with some adjacent atelectasis. 3. There is suggestion of adrenal hyperplasia       .  This report was finalized on 8/18/2019 8:48 PM by Flip Malcolm M.D.        Ordered the above noted radiological studies. Reviewed by me in PACS.  Spoke with Dr. Du (radiologist) regarding Chest XR results.          PROCEDURES  Procedures        EKG:           EKG time: 1734  Rhythm/Rate: sinus tach 122  P waves and CT: normal  QRS, axis: LVH with IVCD, LAD   ST and T waves: slight lateral ST  depression with TWIs     Interpreted Contemporaneously by me, independently viewed  Similar compared to prior 8/8/19        REPEAT EKG          EKG time: 2048  Rhythm/Rate: junctional tachycardia rate 112  P waves and SC: absent p waves  QRS, axis: LVH, IVCD, LAD   ST and T waves: slight lateral ST depression with TWIs     Interpreted Contemporaneously by me, independently viewed  Similar compared to prior today        MEDICATIONS GIVEN IN ER  Medications   sodium chloride 0.9 % flush 10 mL (not administered)   aspirin tablet 325 mg (325 mg Oral Not Given 8/18/19 1829)   sodium chloride 0.9 % flush 3 mL (not administered)   sodium chloride 0.9 % flush 1-10 mL (not administered)   nitroglycerin (NITROSTAT) SL tablet 0.4 mg (not administered)   acetaminophen (TYLENOL) tablet 650 mg (not administered)     Or   acetaminophen (TYLENOL) 160 MG/5ML solution 650 mg (not administered)     Or   acetaminophen (TYLENOL) suppository 650 mg (not administered)   metoprolol tartrate (LOPRESSOR) injection 5 mg (5 mg Intravenous Given 8/18/19 1930)   aspirin chewable tablet 324 mg (324 mg Oral Given 8/18/19 1940)   morphine injection 2 mg (2 mg Intravenous Given 8/18/19 1936)   ondansetron (ZOFRAN) injection 4 mg (4 mg Intravenous Given 8/1934)   metoprolol tartrate (LOPRESSOR) tablet 25 mg (25 mg Oral Given 8/18/19 2107)                   PROGRESS AND CONSULTS       1848  Discussed chest XR results and plan to do a chest CT. Pt understands and agrees with the plan. All questions have been answered.  Ordered lopressor for for tachycardia.      2114  Discussed case with Dr Cee, Thoracic Surgery  Reviewed history, exam, results and treatments.  Discussed concerns and plan of care. Dr Cee will consult    2131  Rechecked patient who is resting comfortably. BP - 137/81, HR - 106, O2 - 94%.  Discussed all lab and test results. Discussed plan to admit the pt. Pt understands and agrees with the plan. All questions have been  answered.  When asked about code status, pt states he is a full code.      2137  Discussed case with Dr Mcqueen, [facility]  Reviewed history, exam, results and treatments.  Discussed concerns and plan of care. Dr Mcqueen accepts pt to be admitted to telemetry.  Dr Mcqueen requests that I consult cardiology as well.      MEDICAL DECISION MAKING    89-year-old male presents with left-sided chest pain that occurred while sitting at rest today.  His EKG shows IVCD with tachycardia.  There is slight lateral ST depressions that are similar to previous tracing.  His initial and repeat cardiac troponin are negative.  His chest x-ray was concerning for left apical pneumothorax which was confirmed with CT chest without contrast.  I discussed these findings with thoracic surgery who recommended oxygen therapy and admission for observation, repeat chest x-ray tomorrow.  The admitting hospitalist also requested cardiology consultation.  The patient is followed closely by Dr. Quezada, therefore consult was placed for him however I did not get to speak with him this evening.  Patient had a recent cath on 8/8/2019 that showed a patent stent with moderate to severe coronary disease that was not intervened upon at that time.  I suspect his current symptoms may be more secondary to a pneumothorax.    MDM  Number of Diagnoses or Management Options  Chest pain, unspecified type:   Spontaneous pneumothorax:      Amount and/or Complexity of Data Reviewed  Clinical lab tests: ordered and reviewed (Negative troponin)  Tests in the radiology section of CPT®: ordered and reviewed (Chest XR - Possible Pneumothorax  Chest CT - Miniscule left pneumothorax with mild fibrotic changes. 2. Small pericardial and bilateral pleural effusions, left greater than right with some adjacent atelectasis. 3. There is suggestion of adrenal hyperplasia)  Tests in the medicine section of CPT®: ordered and reviewed (Refer to the procedure section of the note for  EKG results)  Discussion of test results with the performing providers: yes (Dr Du)  Decide to obtain previous medical records or to obtain history from someone other than the patient: yes (Epic)  Discuss the patient with other providers: yes (Dr Jaye Mcqueen)               DIAGNOSIS  Final diagnoses:   Spontaneous pneumothorax   Chest pain, unspecified type         DISPOSITION  ADMISSION    Discussed treatment plan and reason for admission with pt/family and admitting physician.  Pt/family voiced understanding of the plan for admission for further testing/treatment as needed.                 Latest Documented Vital Signs:  As of 11:54 PM  BP- 122/80 HR- 96 Temp- 98 °F (36.7 °C) (Oral) O2 sat- 98%        --  Documentation assistance provided by miladis Gutierrez for Dr. WILD Herman MD.  Information recorded by the scribe was done at my direction and has been verified and validated by me.      Please note that portions of this were completed with a voice recognition program.                   Luana Gutierrez  08/18/19 2397       Ishaan Herman MD  08/18/19 [id number]

## 2019-08-18 NOTE — ED NOTES
EMS reports the pt complains of CP since 4pm today. Pt received 324mg ASA and 1 SL nitro, pt's pain went from 8/10 to 3/10. Pt seen here last week for same.          Rita Rios, RN  08/18/19 [id number]

## 2019-08-19 ENCOUNTER — APPOINTMENT (OUTPATIENT)
Dept: GENERAL RADIOLOGY | Facility: HOSPITAL | Age: 84
End: 2019-08-19

## 2019-08-19 PROBLEM — I48.0 PAROXYSMAL ATRIAL FIBRILLATION (HCC): Status: ACTIVE | Noted: 2017-11-29

## 2019-08-19 PROBLEM — I50.21 ACUTE SYSTOLIC (CONGESTIVE) HEART FAILURE (HCC): Status: ACTIVE | Noted: 2019-08-19

## 2019-08-19 LAB
ANION GAP SERPL CALCULATED.3IONS-SCNC: 10.8 MMOL/L (ref 5–15)
BUN BLD-MCNC: 22 MG/DL (ref 8–23)
BUN/CREAT SERPL: 25.6 (ref 7–25)
CALCIUM SPEC-SCNC: 8.3 MG/DL (ref 8.2–9.6)
CHLORIDE SERPL-SCNC: 111 MMOL/L (ref 98–107)
CO2 SERPL-SCNC: 24.2 MMOL/L (ref 22–29)
CREAT BLD-MCNC: 0.86 MG/DL (ref 0.76–1.27)
DEPRECATED RDW RBC AUTO: 62.6 FL (ref 37–54)
ERYTHROCYTE [DISTWIDTH] IN BLOOD BY AUTOMATED COUNT: 16 % (ref 12.3–15.4)
GFR SERPL CREATININE-BSD FRML MDRD: 83 ML/MIN/1.73
GLUCOSE BLD-MCNC: 96 MG/DL (ref 65–99)
HCT VFR BLD AUTO: 39.3 % (ref 37.5–51)
HGB BLD-MCNC: 12.2 G/DL (ref 13–17.7)
MCH RBC QN AUTO: 32.7 PG (ref 26.6–33)
MCHC RBC AUTO-ENTMCNC: 31 G/DL (ref 31.5–35.7)
MCV RBC AUTO: 105.4 FL (ref 79–97)
NT-PROBNP SERPL-MCNC: 7396 PG/ML (ref 5–1800)
PLATELET # BLD AUTO: 116 10*3/MM3 (ref 140–450)
PMV BLD AUTO: 11.5 FL (ref 6–12)
POTASSIUM BLD-SCNC: 4.3 MMOL/L (ref 3.5–5.2)
RBC # BLD AUTO: 3.73 10*6/MM3 (ref 4.14–5.8)
SODIUM BLD-SCNC: 146 MMOL/L (ref 136–145)
TROPONIN T SERPL-MCNC: <0.01 NG/ML (ref 0–0.03)
WBC NRBC COR # BLD: 4.15 10*3/MM3 (ref 3.4–10.8)

## 2019-08-19 PROCEDURE — 80048 BASIC METABOLIC PNL TOTAL CA: CPT | Performed by: NURSE PRACTITIONER

## 2019-08-19 PROCEDURE — 71046 X-RAY EXAM CHEST 2 VIEWS: CPT

## 2019-08-19 PROCEDURE — 4A023N7 MEASUREMENT OF CARDIAC SAMPLING AND PRESSURE, LEFT HEART, PERCUTANEOUS APPROACH: ICD-10-PCS | Performed by: INTERNAL MEDICINE

## 2019-08-19 PROCEDURE — 99223 1ST HOSP IP/OBS HIGH 75: CPT | Performed by: NURSE PRACTITIONER

## 2019-08-19 PROCEDURE — B2111ZZ FLUOROSCOPY OF MULTIPLE CORONARY ARTERIES USING LOW OSMOLAR CONTRAST: ICD-10-PCS | Performed by: INTERNAL MEDICINE

## 2019-08-19 PROCEDURE — 85027 COMPLETE CBC AUTOMATED: CPT | Performed by: NURSE PRACTITIONER

## 2019-08-19 PROCEDURE — 25010000002 FUROSEMIDE PER 20 MG: Performed by: INTERNAL MEDICINE

## 2019-08-19 PROCEDURE — 93005 ELECTROCARDIOGRAM TRACING: CPT | Performed by: NURSE PRACTITIONER

## 2019-08-19 PROCEDURE — 83880 ASSAY OF NATRIURETIC PEPTIDE: CPT | Performed by: INTERNAL MEDICINE

## 2019-08-19 PROCEDURE — 99221 1ST HOSP IP/OBS SF/LOW 40: CPT | Performed by: INTERNAL MEDICINE

## 2019-08-19 PROCEDURE — 027034Z DILATION OF CORONARY ARTERY, ONE ARTERY WITH DRUG-ELUTING INTRALUMINAL DEVICE, PERCUTANEOUS APPROACH: ICD-10-PCS | Performed by: INTERNAL MEDICINE

## 2019-08-19 PROCEDURE — 84484 ASSAY OF TROPONIN QUANT: CPT | Performed by: NURSE PRACTITIONER

## 2019-08-19 PROCEDURE — 93010 ELECTROCARDIOGRAM REPORT: CPT | Performed by: INTERNAL MEDICINE

## 2019-08-19 RX ORDER — FUROSEMIDE 10 MG/ML
20 INJECTION INTRAMUSCULAR; INTRAVENOUS ONCE
Status: COMPLETED | OUTPATIENT
Start: 2019-08-19 | End: 2019-08-19

## 2019-08-19 RX ORDER — ASPIRIN 325 MG
325 TABLET, DELAYED RELEASE (ENTERIC COATED) ORAL DAILY
Status: DISCONTINUED | OUTPATIENT
Start: 2019-08-19 | End: 2019-08-20

## 2019-08-19 RX ORDER — LOSARTAN POTASSIUM 50 MG/1
50 TABLET ORAL DAILY
Status: DISCONTINUED | OUTPATIENT
Start: 2019-08-19 | End: 2019-08-21 | Stop reason: HOSPADM

## 2019-08-19 RX ORDER — ISOSORBIDE MONONITRATE 60 MG/1
60 TABLET, EXTENDED RELEASE ORAL DAILY
Status: DISCONTINUED | OUTPATIENT
Start: 2019-08-19 | End: 2019-08-21 | Stop reason: HOSPADM

## 2019-08-19 RX ORDER — ATORVASTATIN CALCIUM 20 MG/1
20 TABLET, FILM COATED ORAL NIGHTLY
Status: DISCONTINUED | OUTPATIENT
Start: 2019-08-19 | End: 2019-08-21 | Stop reason: HOSPADM

## 2019-08-19 RX ORDER — METOPROLOL SUCCINATE 50 MG/1
50 TABLET, EXTENDED RELEASE ORAL DAILY
Status: DISCONTINUED | OUTPATIENT
Start: 2019-08-19 | End: 2019-08-21 | Stop reason: HOSPADM

## 2019-08-19 RX ADMIN — NITROGLYCERIN 0.4 MG: 0.4 TABLET SUBLINGUAL at 05:18

## 2019-08-19 RX ADMIN — NITROGLYCERIN 0.4 MG: 0.4 TABLET SUBLINGUAL at 05:24

## 2019-08-19 RX ADMIN — LOSARTAN POTASSIUM 50 MG: 50 TABLET, FILM COATED ORAL at 09:14

## 2019-08-19 RX ADMIN — ATORVASTATIN CALCIUM 20 MG: 20 TABLET, FILM COATED ORAL at 20:20

## 2019-08-19 RX ADMIN — ACETAMINOPHEN 650 MG: 325 TABLET, FILM COATED ORAL at 07:35

## 2019-08-19 RX ADMIN — ACETAMINOPHEN 650 MG: 325 TABLET, FILM COATED ORAL at 23:43

## 2019-08-19 RX ADMIN — ASPIRIN 325 MG: 325 TABLET, COATED ORAL at 09:16

## 2019-08-19 RX ADMIN — SODIUM CHLORIDE, PRESERVATIVE FREE 10 ML: 5 INJECTION INTRAVENOUS at 10:00

## 2019-08-19 RX ADMIN — METOPROLOL SUCCINATE 50 MG: 50 TABLET, FILM COATED, EXTENDED RELEASE ORAL at 09:14

## 2019-08-19 RX ADMIN — SODIUM CHLORIDE, PRESERVATIVE FREE 3 ML: 5 INJECTION INTRAVENOUS at 20:20

## 2019-08-19 RX ADMIN — FUROSEMIDE 20 MG: 10 INJECTION, SOLUTION INTRAMUSCULAR; INTRAVENOUS at 13:56

## 2019-08-19 RX ADMIN — ISOSORBIDE MONONITRATE 60 MG: 60 TABLET ORAL at 09:14

## 2019-08-19 NOTE — CONSULTS
[facility]  Cardiology Consult Note    Patient Identification:  Name: Dinesh Churchill Sr.  Age: 89 y.o.  Sex: male  :  1928  MRN: [id number]             Requesting Physician: CANDELARIA Shaver    Reason for Consultation / Chief Complaint: chest pain    History of Present Illness: This 89-year-old male, known to our service, presented yesterday evening through the emergency department complaining of dull left-sided chest pain that was radiating down his left shoulder, arm, and abdomen.  Past medical history to include skin cancer, prediabetes, SVT, hypertension, vitamin B12 deficiency, cardiomyopathy, CAD, macrocytic anemia, Parkinson's disease, CVA, atrial fibrillation.  Blood pressure is currently elevated, but has overall been fairly well controlled throughout admission.  He is currently sinus rhythm on telemetry.  He is not anticoagulated for his atrial fibrillation given his age and frailty.  Troponin has been negative x3.  Hemoglobin stable per baseline at 12.2.  Electrolytes stable and within normal limits.  Cholesterol is currently controlled on current statin therapy.  AST/ALT within normal limits this admission.  Chest x-ray shows a potential small left apical pneumothorax.  Noted, cardiomegaly with left greater than right pleural effusions, as well as a curvilinear band of the left apex not demonstrated on any previous studies-recommendation for further investigation with CT scan noted.  ET scan was performed and showed minuscule left pneumothorax with mild fibrotic changes, small pericardial and bilateral pleural effusions with left greater than right as well as atelectasis, and suggestion of adrenal hyperplasia.    Echo performed on  shows mild to moderate MR, mild aortic valve regurgitation, mild pulmonic valve regurgitation, mild to moderate TR, mild dilation of RV C, moderate dilation of LV C, mild dilation of LAC with EF of 31% and no evidence of pericardial effusion.   Stress testing performed on August 7 showed a medium sized moderately severe area of ischemia in the inferior and lateral walls.  Cardiac cath was recommended, the patient underwent this on August 8 and this revealed normal left main, LAD with 50% stenosis midportion, first large diagonal branch with approximately 90% diffuse stenosis, circumflex nondominant with midportion 50% stenosis, RCA dominant with widely patent previously placed stent.      Comorbid cardiac risk factors: Age, CAD, hypertension, anemia, atrial fibrillation, history of CVA, prediabetes, hyperlipidemia    Past Medical History:  Past Medical History:   Diagnosis Date   • Abnormal ECG    • Abnormal MRI    • Acute frontal sinusitis    • Arthritis    • Chronic fatigue    • Coronary artery disease    • Dizziness    • Hearing aid worn     left   • Hearing loss    • Hyperlipidemia    • Hypertension    • Kidney stones    • Macrocytosis    • Neoplasm of skin    • Osteoporosis    • Prediabetes    • Vitamin D deficiency      Past Surgical History:  Past Surgical History:   Procedure Laterality Date   • CARDIAC CATHETERIZATION N/A 5/22/2017    Procedure: Left Heart Cath;  Surgeon: Maninder Quezada MD;  Location: [facility] INVASIVE LOCATION;  Service:    • CARDIAC CATHETERIZATION N/A 5/23/2017    Procedure: Stent BMS coronary;  Surgeon: Maninder Quezada MD;  Location: [facility] CATH INVASIVE LOCATION;  Service:    • CARDIAC CATHETERIZATION N/A 8/8/2019    Procedure: Left Heart Cath;  Surgeon: Maninder Quezada MD;  Location: [facility] INVASIVE LOCATION;  Service: Cardiology   • CARDIAC CATHETERIZATION N/A 8/8/2019    Procedure: Left ventriculography;  Surgeon: Maninder Quezada MD;  Location: [facility] INVASIVE LOCATION;  Service: Cardiology   • CARDIAC CATHETERIZATION N/A 8/8/2019    Procedure: Coronary angiography;  Surgeon: Maninder Quezada MD;  Location: [facility] INVASIVE LOCATION;  Service: Cardiology   • HERNIA REPAIR       x 2   • NC RT/LT HEART CATHETERS N/A 5/23/2017    Procedure: Percutaneous Coronary Intervention;  Surgeon: Maninder Quezada MD;  Location: [facility] CATH INVASIVE LOCATION;  Service: Cardiovascular      Allergies:  No Known Allergies  Home Meds:  Facility-Administered Medications Prior to Admission   Medication Dose Route Frequency Provider Last Rate Last Dose   • cyanocobalamin injection 1,000 mcg  1,000 mcg Intramuscular Q30 Days Carter Falcon MD   1,000 mcg at 05/23/19 0941   • cyanocobalamin injection 1,000 mcg  1,000 mcg Intramuscular Q28 Days Phyllis Loving PA   1,000 mcg at 06/26/19 1035   • cyanocobalamin injection 1,000 mcg  1,000 mcg Intramuscular Q28 Days Phyllis Loving PA   1,000 mcg at 07/24/19 0950     Medications Prior to Admission   Medication Sig Dispense Refill Last Dose   • acetaminophen (TYLENOL) 500 MG tablet 1-2 TABLETS BY ONCE TO TWICE DAILY AS NEEDED FOR PAIN 60 tablet 0 Taking   • atorvastatin (LIPITOR) 20 MG tablet TAKE 1 TABLET BY MOUTH EVERY NIGHT 30 tablet 5 Taking   • Cholecalciferol (VITAMIN D-3) 1000 UNITS capsule Take 1,000 Units by mouth daily.   Taking   • Elastic Bandages & Supports (LUMBAR BACK BRACE/SUPPORT PAD) misc Use for lifting/ strenuous exercise. 1 each 0 Taking   • isosorbide mononitrate (IMDUR) 60 MG 24 hr tablet TAKE 1 TABLET BY MOUTH EVERY DAY 30 tablet 0 Taking   • losartan (COZAAR) 50 MG tablet TAKE 1 TABLET BY MOUTH DAILY 30 tablet 0 Taking   • metoprolol succinate XL (TOPROL-XL) 50 MG 24 hr tablet TAKE 1 TABLET BY MOUTH DAILY 60 tablet 3    • nitroglycerin (NITROSTAT) 0.4 MG SL tablet Place 1 tablet under the tongue Every 5 (Five) Minutes As Needed for Chest Pain. Take no more than 3 doses in 15 minutes. 25 tablet 0 Taking   • aspirin  MG tablet Take 1 tablet by mouth Daily.  0 Taking     Current Meds:     Current Facility-Administered Medications:   •  acetaminophen (TYLENOL) tablet 650 mg, 650 mg, Oral, Q4H PRN, 650 mg at 08/19/19 0735 **OR**  "acetaminophen (TYLENOL) 160 MG/5ML solution 650 mg, 650 mg, Oral, Q4H PRN **OR** acetaminophen (TYLENOL) suppository 650 mg, 650 mg, Rectal, Q4H PRN, Ludivina Garcia APRN  •  aspirin EC tablet 325 mg, 325 mg, Oral, Daily, Ludivina Garcia APRN  •  aspirin tablet 325 mg, 325 mg, Oral, Once, Ishaan Herman MD  •  atorvastatin (LIPITOR) tablet 20 mg, 20 mg, Oral, Nightly, Ludivina Garcia APRN  •  isosorbide mononitrate (IMDUR) 24 hr tablet 60 mg, 60 mg, Oral, Daily, Ludivina Garcia APRN  •  losartan (COZAAR) tablet 50 mg, 50 mg, Oral, Daily, Ludivina Garcia APRN  •  metoprolol succinate XL (TOPROL-XL) 24 hr tablet 50 mg, 50 mg, Oral, Daily, Ludivina Garcia APRN  •  nitroglycerin (NITROSTAT) SL tablet 0.4 mg, 0.4 mg, Sublingual, Q5 Min PRN, Ludivina Garcia APRN, 0.4 mg at 08/19/19 0524  •  sodium chloride 0.9 % flush 1-10 mL, 1-10 mL, Intravenous, PRN, Ludivina Garcia APRN  •  sodium chloride 0.9 % flush 10 mL, 10 mL, Intravenous, PRN, Ishaan Herman MD  •  sodium chloride 0.9 % flush 3 mL, 3 mL, Intravenous, Q12H, Ludivina Garcia APRN, 3 mL at 08/18/19 2315    Social History:   Social History     Tobacco Use   • Smoking status: Never Smoker   • Smokeless tobacco: Never Used   Substance Use Topics   • Alcohol use: No     Comment: quit 30 years ago       Family History:  Family History   Family history unknown: Yes        Review of Systems  Constitutional: No wt loss, fever   Gastrointestinal: No nausea , abdominal pain  Behavioral/Psych: No insomnia or anxiety   Cardiovascular ----positive for cp. All other systems reviewed and are negative            Constitutional:           Physical Exam  /58   Pulse 63   Temp 97.9 °F (36.6 °C) (Oral)   Resp 18   Ht 185.4 cm (73\")   Wt 81.5 kg (179 lb 10.8 oz)   SpO2 93%   BMI 23.71 kg/m²     General appearance: No acute changes   Eyes: Sclera conjunctiva normal, pupils reactive   HENT: Atraumatic; oropharynx clear with moist mucous " membranes and no mucosal ulcerations;  Neck: Trachea midline; NECK, supple, no thyromegaly or lymphadenopathy   Lungs: Normal size and shape, normal breath sounds, equal distribution of air, no rales and rhonchi   CV: S1-S2 regular, no murmurs, no rub, no gallop   Abdomen: Soft, non-tender; no masses , no abnormal abdominal sounds   Extremities: No deformity , normal color , no peripheral edema   Skin: Normal temperature, turgor and texture; no rash, ulcers  Psych: Appropriate affect, alert and oriented to person, place and time                   Cardiographics  EC/19 SR with PVCs rate 60s first degree AV block        19 S tach with 1st degree AV block rate 110s        Telemetry:   SR rate 60s      Echocardiogram:   19  Interpretation Summary     · Mild-to-moderate mitral valve regurgitation is present  · Mild aortic valve regurgitation is present.  · Mild pulmonic valve regurgitation is present.  · Mild to moderate tricuspid valve regurgitation is present.  · Right ventricular cavity is mildly dilated.  · The left ventricular cavity is moderately dilated.  · Left atrial cavity size is mildly dilated.  · Calculated EF = 31.0%.  · There is no evidence of pericardial effusion.       Stress test   19  Interpretation Summary     · Findings consistent with an abnormal ECG stress test.  · Left ventricular ejection fraction is mildly reduced (Calculated EF = 46%).  · Myocardial perfusion imaging indicates a medium-sized, moderately severe area of ischemia located in the inferior wall and lateral wall.  · Impressions are consistent with a high risk study.         Cardiac catheterization  19  Coronary Angiography      Left Main:  The left main originates in the left coronary cusp.  It bifurcates and gives rise to the LAD and circumflex system.  Normal left main     Left Anterior Descending:  Left anterior descending midportion 50% stenosis, first large diagonal branch was approximately diffuse 90%  stenosis     Left Circumflex:  Circumflex artery was a nondominant midportion was 50% stenosis     Right Coronary Artery:  The right coronary artery originates in the right coronary cusp.  Right coronary artery was dominant with the proximal had a stent widely patent     Left Ventriculography:       Estimated Ejection Fraction:         60 %   Wall motion abnormalities:  None   Mitral Regurgitation:  None      Impression:      1. Normal left main  2. Left anterior descending midportion 50% stenosis, first large diagonal branch was approximately diffuse 90% stenosis  3. Circumflex artery was a nondominant midportion was 50% stenosis  4. Right coronary artery was dominant with the proximal had a stent widely patent  5. Normal LV     Recommendations:      1. Most likely cause a positive stress test with a high diagonal will be treated medically PCI would be considered if continues to her chest pain        Imaging  Chest X-ray:   8/18/19  FINDINGS: Heart size is enlarged. Left greater than right pleural  effusions are present and there is associated basilar opacity that  appears similar to the exam 12 days ago. No new focal airspace disease  has developed. Within the left lung apex there is potential small left  pneumothorax that was not demonstrated on the prior exam 12 days ago.     IMPRESSION:  Potential small left apical pneumothorax. There is a  curvilinear band at the left apex not demonstrated on any previous  studies. This could be due to differences in projection though a left  apical pneumothorax could have this appearance and this could be further  evaluated with CT.  There is otherwise cardiomegaly, left greater than  right pleural effusions and unchanged left basilar opacity.     Discussed with Dr. Herman in the emergency department on 08/18/2019 at  6:28 PM.      This report was finalized on 8/18/2019 7:52 PM by Dr. Gm Brown M.D.        Lab Review   Results from last 7 days   Lab Units  08/19/19  0520 08/18/19 1952 08/18/19 1746 08/16/19  1207   CK TOTAL U/L  --   --   --  49   TROPONIN T ng/mL <0.010 <0.010 <0.010  --          Results from last 7 days   Lab Units 08/19/19  0520   SODIUM mmol/L 146*   POTASSIUM mmol/L 4.3   BUN mg/dL 22   CREATININE mg/dL 0.86   CALCIUM mg/dL 8.3       Results from last 7 days   Lab Units 08/19/19 0520 08/18/19 1746 08/16/19  1207   WBC 10*3/mm3 4.15 7.03 5.00   HEMOGLOBIN g/dL 12.2* 13.0 12.7*   HEMATOCRIT % 39.3 42.1 41.8   PLATELETS 10*3/mm3 116* 145 140     Estimated Creatinine Clearance: 64.5 mL/min (by C-G formula based on SCr of 0.86 mg/dL).    I have reviewed the medical decision making with Dr. Quezada in detail.       Assessment:  1.  Chest pain  2.  CAD  3.  Hypertension  4.  Hyperlipidemia  5.  Anemia  6.  Atrial fibrillation  7.  Prediabetes    Recommendations / Plan:   Recent echo, stress test, and cardiac catheterization as above.  Patient currently on maximum medical therapy to include aspirin, statin, isosorbide, and beta-blockade.  On ARB given his cardiomyopathy.  Chest x-ray noted to be abnormal as above, follow-up CT scan recommended, CT scan as above.  Mild pericardial as well as pleural effusions noted.  Patient is currently asymptomatic-no chest pain or shortness of breath.  Lipid panel shows good control, continue current statin therapy.  Blood pressure is elevated currently, prior to administration of morning medications.  Anemia appears stable per patient baseline with hemoglobin of 12.2.  Currently patient is sinus rhythm, not anticoagulated given his age and frailty.  Continue beta-blockade.  Defer prediabetes to attending/PCP outpatient.  Patient to remain n.p.o. for now, MD will review previous cardiac catheterization films.  Further plan of care pending this review.    Labs/tests ordered: BNP, ECG, BMP, CBC, mag      Addendum: Discussed with MD, angioplasty recommended.  Will plan for angioplasty and possible stent in am.       Diagnostic heart catheterization/PCI/stent procedure risks (including but not limited to: allergy,arrhythmia,bleeding,CVA,MI,renal dysfunction,emergent CABG and death) and options have been explained to patient/spouse/family/POA. All questions were answered. Patient/He/She/They voices understanding and agree to proceed with treatment plan.    OK to transfer to telemetry    CANDELARIA Fong  8/19/2019, 8:58 AM      Patient personally interviewed and above subjective findings personally confirmed during a face to face contact with patient today  All findings of physical examination confirmed  All pertinent and performed labs, cardiac procedures ,  radiographs of the last 24 hours personally reviewed  Impression and plans discussed/elaborated and implemented jointly as described above     Maninder Quezada MD          EMR Dragon/Transcription:   Dictated utilizing Dragon dictation

## 2019-08-19 NOTE — CONSULTS
Consults    Patient Care Team:  Phyllis Loving PA as PCP - General (Family Medicine)  Phyllis Loving PA as PCP - Claims Attributed  Brandon Ricci MD Thomas, Melissa, PA as Referring Physician (Family Medicine)  John Callejas II, MD as Consulting Physician (Hematology and Oncology)  Maninder Quezada MD as Consulting Physician (Cardiology)    Chief Complaint   Patient presents with   • Chest Pain       Subjective     History of Present Illness     Dinesh Churchill is a pleasant 89-year-old male who presented to the hospital yesterday complaining of left-sided chest pain which is dull in nature and radiating down his left shoulder, arm and abdomen.  Patient has a complex medical history which includes coronary artery disease, cardiomyopathy, atrial fibrillation, Parkinson's disease, SVT, hypertension, macrocytic anemia secondary to vitamin B12 deficiency, prediabetes, and skin cancer.  Patient recently underwent a cardiac catheterization on 8/7/2019 due to a positive stress test.  This revealed a normal left main, LAD with 50% stenosis midportion, first large diagonal branch with approximately 90% diffuse stenosis, circumflex nondominant with midportion 50% stenosis, RCA dominant with widely patent previously placed stent.  The patient was treated medically and discharged home.  The patient was evaluated in the emergency department he underwent chest imaging which identified a very small left apical pneumothorax.  There were left greater than right sided pleural effusions, and a CT of the chest without contrast was then ordered for further evaluation.  We have been asked to see this gentleman in consult for further evaluation of his pneumothorax.    Review of Systems   Constitutional: Positive for fatigue.   HENT: Negative.    Eyes: Negative.    Respiratory: Positive for chest tightness. Negative for cough and shortness of breath.    Cardiovascular: Positive for chest pain.   Gastrointestinal:  Negative.    Endocrine: Negative.    Genitourinary: Negative.    Musculoskeletal: Positive for arthralgias and myalgias.   Skin: Negative.    Allergic/Immunologic: Negative.    Neurological: Negative.    Hematological: Negative.    Psychiatric/Behavioral: Negative.         Patient Active Problem List   Diagnosis   • Abnormal magnetic resonance imaging study   • Arthritis   • Osteoarthritis of cervical spine   • Diplopia   • Hearing loss   • Neoplasm of uncertain behavior of skin   • Prediabetes   • Vitamin D deficiency   • Chronic fatigue   • History of supraventricular tachycardia   • Essential hypertension   • B12 deficiency   • Abnormal cardiac function test   • Cardiomyopathy (CMS/HCC)   • Coronary artery disease involving native coronary artery of native heart without angina pectoris   • History of coronary artery stent placement   • History of renal calculi   • Dyslipidemia   • Macrocytic anemia   • Precordial pain   • Parkinson's disease (CMS/HCC)   • History of CVA (cerebrovascular accident)   • Paroxysmal atrial fibrillation (CMS/HCC)   • Otalgia, left   • Chest pain   • Abnormal stress test   • Spontaneous pneumothorax   • Chest pain   • Acute systolic (congestive) heart failure (CMS/HCC)     Past Medical History:   Diagnosis Date   • Abnormal ECG    • Abnormal MRI    • Acute frontal sinusitis    • Arthritis    • Chronic fatigue    • Coronary artery disease    • Dizziness    • Hearing aid worn     left   • Hearing loss    • Hyperlipidemia    • Hypertension    • Kidney stones    • Macrocytosis    • Neoplasm of skin    • Osteoporosis    • Prediabetes    • Vitamin D deficiency      Past Surgical History:   Procedure Laterality Date   • CARDIAC CATHETERIZATION N/A 5/22/2017    Procedure: Left Heart Cath;  Surgeon: Maninder Quezada MD;  Location: [facility] INVASIVE LOCATION;  Service:    • CARDIAC CATHETERIZATION N/A 5/23/2017    Procedure: Stent BMS coronary;  Surgeon: Maninder Quezada MD;   Location:  [facility] CATH INVASIVE LOCATION;  Service:    • CARDIAC CATHETERIZATION N/A 8/8/2019    Procedure: Left Heart Cath;  Surgeon: Maninder Quezada MD;  Location: [facility] CATH INVASIVE LOCATION;  Service: Cardiology   • CARDIAC CATHETERIZATION N/A 8/8/2019    Procedure: Left ventriculography;  Surgeon: Maninder Quezada MD;  Location: [facility] CATH INVASIVE LOCATION;  Service: Cardiology   • CARDIAC CATHETERIZATION N/A 8/8/2019    Procedure: Coronary angiography;  Surgeon: Maninder Quezada MD;  Location: [facility] CATH INVASIVE LOCATION;  Service: Cardiology   • HERNIA REPAIR      x 2   • OK RT/LT HEART CATHETERS N/A 5/23/2017    Procedure: Percutaneous Coronary Intervention;  Surgeon: Maninder Quezada MD;  Location: [facility] CATH INVASIVE LOCATION;  Service: Cardiovascular     Family History   Family history unknown: Yes     Social History     Socioeconomic History   • Marital status:      Spouse name: Not on file   • Number of children: Not on file   • Years of education: Not on file   • Highest education level: Not on file   Occupational History     Employer: RETIRED   Tobacco Use   • Smoking status: Never Smoker   • Smokeless tobacco: Never Used   Substance and Sexual Activity   • Alcohol use: No     Comment: quit 30 years ago    • Drug use: No   • Sexual activity: Defer     Facility-Administered Medications Prior to Admission   Medication Dose Route Frequency Provider Last Rate Last Dose   • cyanocobalamin injection 1,000 mcg  1,000 mcg Intramuscular Q30 Days Carter Falcon MD   1,000 mcg at 05/23/19 0941   • cyanocobalamin injection 1,000 mcg  1,000 mcg Intramuscular Q28 Days Phyllis Loving PA   1,000 mcg at 06/26/19 1035   • cyanocobalamin injection 1,000 mcg  1,000 mcg Intramuscular Q28 Days Phyllis Loving PA   1,000 mcg at 07/24/19 0950     Medications Prior to Admission   Medication Sig Dispense Refill Last Dose   • acetaminophen (TYLENOL) 500 MG tablet 1-2 TABLETS BY ONCE  TO TWICE DAILY AS NEEDED FOR PAIN 60 tablet 0 Taking   • atorvastatin (LIPITOR) 20 MG tablet TAKE 1 TABLET BY MOUTH EVERY NIGHT 30 tablet 5 Taking   • Cholecalciferol (VITAMIN D-3) 1000 UNITS capsule Take 1,000 Units by mouth daily.   Taking   • Elastic Bandages & Supports (LUMBAR BACK BRACE/SUPPORT PAD) misc Use for lifting/ strenuous exercise. 1 each 0 Taking   • isosorbide mononitrate (IMDUR) 60 MG 24 hr tablet TAKE 1 TABLET BY MOUTH EVERY DAY 30 tablet 0 Taking   • losartan (COZAAR) 50 MG tablet TAKE 1 TABLET BY MOUTH DAILY 30 tablet 0 Taking   • metoprolol succinate XL (TOPROL-XL) 50 MG 24 hr tablet TAKE 1 TABLET BY MOUTH DAILY 60 tablet 3    • nitroglycerin (NITROSTAT) 0.4 MG SL tablet Place 1 tablet under the tongue Every 5 (Five) Minutes As Needed for Chest Pain. Take no more than 3 doses in 15 minutes. 25 tablet 0 Taking   • aspirin  MG tablet Take 1 tablet by mouth Daily.  0 Taking     No Known Allergies    Objective      Vital Signs  Temp:  [97.5 °F (36.4 °C)-99.5 °F (37.5 °C)] 97.9 °F (36.6 °C)  Heart Rate:  [] 57  Resp:  [16-18] 18  BP: (122-165)/(63-98) 159/63    Intake & Output (last day)       08/18 0701 - 08/19 0700 08/19 0701 - 08/20 0700    P.O.  120    Total Intake(mL/kg)  120 (1.5)    Urine (mL/kg/hr) 200     Total Output 200     Net -200 +120          Urine Unmeasured Occurrence  1 x          Physical Exam   Constitutional: He is oriented to person, place, and time. He appears well-developed and well-nourished. No distress.   HENT:   Head: Normocephalic and atraumatic.   Eyes: Conjunctivae and EOM are normal. Pupils are equal, round, and reactive to light. No scleral icterus.   Neck: Normal range of motion. Neck supple. No JVD present. No tracheal deviation present.   Cardiovascular: Normal rate, regular rhythm, normal heart sounds and intact distal pulses.   Pulmonary/Chest: Effort normal and breath sounds normal. No stridor. No respiratory distress. He has no wheezes. He has no  rales. He exhibits tenderness.   Abdominal: Soft. Bowel sounds are normal. He exhibits no mass.   Musculoskeletal: Normal range of motion.   Lymphadenopathy:     He has no cervical adenopathy.   Neurological: He is alert and oriented to person, place, and time.   Skin: Skin is warm and dry. Capillary refill takes less than 2 seconds. He is not diaphoretic.   Psychiatric: He has a normal mood and affect. His behavior is normal. Judgment and thought content normal.   Nursing note and vitals reviewed.      Results Review:    I reviewed the patient's new clinical results.  I reviewed the patient's new imaging results and agree with the interpretation.  I reviewed the patient's other test results and agree with the interpretation  Discussed with patient, his son at bedside, RN and Dr. Ruelas.    Imaging Results (last 24 hours)     Procedure Component Value Units Date/Time    XR Chest 2 View [[id number]] Collected:  08/19/19 1037     Updated:  08/19/19 1043    Narrative:       Portable chest radiograph     HISTORY:Pneumothorax     TECHNIQUE: AP and lateral portable radiographs of the chest     COMPARISON:Chest radiographs and back 12/06/2018 an chest CT 08/18/2019       Impression:       FINDINGS AND IMPRESSION:  There is interval development of new pulmonary opacification throughout  the right lung, most notably within the right lung base. Additionally,  the degree of pulmonary opacification seen in the left base has  increased. Small-to-moderate bilateral pleural effusions present, left  greater than right. Findings represent worsening pulmonary edema versus  multifocal pneumonia and correlation with patient history is  recommended.     A small left pneumothorax is present approximately 1 cm pleural  parenchymal separation, as before. Heart size can't be determined due to  adjacent opacification.     This report was finalized on 8/19/2019 10:40 AM by Dr. Christopher Fernández M.D.       CT Chest Without Contrast [[id number]]  Collected:  08/18/19 2042     Updated:  08/18/19 2051    Narrative:       THORACIC CT SCAN WITHOUT CONTRAST     HISTORY:  left apical ptx     COMPARISON: None.     TECHNIQUE:  Radiation dose reduction techniques were utilized, including  automated exposure control and exposure modulation based on body size.  Axial images of the thorax obtained without IV contrast, per request.     FINDINGS: There is a miniscule left pneumothorax which largely collects  anteriorly. There are small pleural effusions, left greater than right  with some adjacent secondary atelectasis. Neither effusion appears  loculated. There are calcified residua of granulomatous disease present.   Mild fibrotic changes present in the apical segments posteriorly. Some  chronic appearing fibrosis and bronchiectasis is present in the right  middle lobe.      There is marked cardiomegaly with small pericardial effusion. No obvious  adenopathy by noncontrast technique. 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 The descending thoracic aorta is also mildly aneurysmal  at 3.1 x 2.9 cm. The lumen of the aorta cannot be assessed without  contrast.     Unenhanced images of the included upper abdomen partially includes the  adrenal glands which appear enlarged, likely hyperplasia     Bone window images demonstrate multiple old appearing left rib  deformities. No acute osseous finding          Impression:       1. Miniscule left pneumothorax with mild fibrotic changes.  2. Small pericardial and bilateral pleural effusions, left greater than  right with some adjacent atelectasis.  3. There is suggestion of adrenal hyperplasia                    .      This report was finalized on 8/18/2019 8:48 PM by Flip Malcolm M.D.       XR Chest 2 View [[id number]] Collected:  08/18/19 1855     Updated:  08/18/19 1955    Narrative:       AP AND LATERAL CHEST     HISTORY: Chest pain, coronary artery disease, hypertension.     COMPARISON: AP chest 08/06/2019.      FINDINGS: Heart size is enlarged. Left greater than right pleural  effusions are present and there is associated basilar opacity that  appears similar to the exam 12 days ago. No new focal airspace disease  has developed. Within the left lung apex there is potential small left  pneumothorax that was not demonstrated on the prior exam 12 days ago.       Impression:       Potential small left apical pneumothorax. There is a  curvilinear band at the left apex not demonstrated on any previous  studies. This could be due to differences in projection though a left  apical pneumothorax could have this appearance and this could be further  evaluated with CT.  There is otherwise cardiomegaly, left greater than  right pleural effusions and unchanged left basilar opacity.     Discussed with Dr. Herman in the emergency department on 08/18/2019 at  6:28 PM.      This report was finalized on 8/18/2019 7:52 PM by Dr. Gm Brown M.D.             Lab Results:  Lab Results (last 24 hours)     Procedure Component Value Units Date/Time    BNP [[id number]]  (Abnormal) Collected:  08/19/19 0520    Specimen:  Blood Updated:  08/19/19 1340     proBNP 7,396.0 pg/mL     Narrative:       Among patients with dyspnea, NT-proBNP is highly sensitive for the detection of acute congestive heart failure. In addition NT-proBNP of <300 pg/ml effectively rules out acute congestive heart failure with 99% negative predictive value.    Troponin [[id number]]  (Normal) Collected:  08/19/19 0520    Specimen:  Blood Updated:  08/19/19 0613     Troponin T <0.010 ng/mL     Narrative:       Troponin T Reference Range:  <= 0.03 ng/mL-   Negative for AMI  >0.03 ng/mL-     Abnormal for myocardial necrosis.  Clinicians would have to utilize clinical acumen, EKG, Troponin and serial changes to determine if it is an Acute Myocardial Infarction or myocardial injury due to an underlying chronic condition.     Basic Metabolic Panel [[id number]]  (Abnormal)  Collected:  08/19/19 0520    Specimen:  Blood Updated:  08/19/19 0612     Glucose 96 mg/dL      BUN 22 mg/dL      Creatinine 0.86 mg/dL      Sodium 146 mmol/L      Potassium 4.3 mmol/L      Chloride 111 mmol/L      CO2 24.2 mmol/L      Calcium 8.3 mg/dL      eGFR Non African Amer 83 mL/min/1.73      BUN/Creatinine Ratio 25.6     Anion Gap 10.8 mmol/L     Narrative:       GFR Normal >60  Chronic Kidney Disease <60  Kidney Failure <15    CBC (No Diff) [[id number]]  (Abnormal) Collected:  08/19/19 0520    Specimen:  Blood Updated:  08/19/19 0558     WBC 4.15 10*3/mm3      RBC 3.73 10*6/mm3      Hemoglobin 12.2 g/dL      Hematocrit 39.3 %      .4 fL      MCH 32.7 pg      MCHC 31.0 g/dL      RDW 16.0 %      RDW-SD 62.6 fl      MPV 11.5 fL      Platelets 116 10*3/mm3     POC Glucose Once [[id number]]  (Normal) Collected:  08/18/19 2305    Specimen:  Blood Updated:  08/18/19 2306     Glucose 100 mg/dL     Troponin [[id number]]  (Normal) Collected:  08/18/19 1952    Specimen:  Blood Updated:  08/18/19 2025     Troponin T <0.010 ng/mL     Narrative:       Troponin T Reference Range:  <= 0.03 ng/mL-   Negative for AMI  >0.03 ng/mL-     Abnormal for myocardial necrosis.  Clinicians would have to utilize clinical acumen, EKG, Troponin and serial changes to determine if it is an Acute Myocardial Infarction or myocardial injury due to an underlying chronic condition.     [place] Draw [[id number]] Collected:  08/18/19 1746    Specimen:  Blood Updated:  08/18/19 1900    Narrative:       The following orders were created for panel order [place] Draw.  Procedure                               Abnormality         Status                     ---------                               -----------         ------                     Light Blue Top[[id number]]                                   Final result               Green Top (Gel)[[id number]]                                  Final result               Lavender Top[[id number]]                                      Final result               Gold Top - SST[[id number]]                                   Final result                 Please view results for these tests on the individual orders.    Light Blue Top [[id number]] Collected:  08/18/19 1746    Specimen:  Blood Updated:  08/18/19 1900     Extra Tube hold for add-on     Comment: Auto resulted       Green Top (Gel) [[id number]] Collected:  08/18/19 1746    Specimen:  Blood Updated:  08/18/19 1900     Extra Tube Hold for add-ons.     Comment: Auto resulted.       Lavender Top [[id number]] Collected:  08/18/19 1746    Specimen:  Blood Updated:  08/18/19 1900     Extra Tube hold for add-on     Comment: Auto resulted       Gold Top - SST [[id number]] Collected:  08/18/19 1746    Specimen:  Blood Updated:  08/18/19 1900     Extra Tube Hold for add-ons.     Comment: Auto resulted.       Comprehensive Metabolic Panel [[id number]]  (Abnormal) Collected:  08/18/19 1746    Specimen:  Blood Updated:  08/18/19 1823     Glucose 131 mg/dL      BUN 21 mg/dL      Creatinine 0.93 mg/dL      Sodium 144 mmol/L      Potassium 4.5 mmol/L      Chloride 109 mmol/L      CO2 22.1 mmol/L      Calcium 8.4 mg/dL      Total Protein 6.1 g/dL      Albumin 3.90 g/dL      ALT (SGPT) 13 U/L      AST (SGOT) 17 U/L      Comment: Specimen hemolyzed.  Results may be affected.        Alkaline Phosphatase 51 U/L      Total Bilirubin 1.0 mg/dL      eGFR Non African Amer 76 mL/min/1.73      Globulin 2.2 gm/dL      A/G Ratio 1.8 g/dL      BUN/Creatinine Ratio 22.6     Anion Gap 12.9 mmol/L     Narrative:       GFR Normal >60  Chronic Kidney Disease <60  Kidney Failure <15    Troponin [[id number]]  (Normal) Collected:  08/18/19 1746    Specimen:  Blood Updated:  08/18/19 1820     Troponin T <0.010 ng/mL     Narrative:       Troponin T Reference Range:  <= 0.03 ng/mL-   Negative for AMI  >0.03 ng/mL-     Abnormal for myocardial necrosis.  Clinicians would have to utilize clinical acumen, EKG, Troponin  and serial changes to determine if it is an Acute Myocardial Infarction or myocardial injury due to an underlying chronic condition.     CBC & Differential [[id number]] Collected:  08/18/19 1746    Specimen:  Blood Updated:  08/18/19 1806    Narrative:       The following orders were created for panel order CBC & Differential.  Procedure                               Abnormality         Status                     ---------                               -----------         ------                     CBC Auto Differential[[id number]]        Abnormal            Final result                 Please view results for these tests on the individual orders.    CBC Auto Differential [[id number]]  (Abnormal) Collected:  08/18/19 1746    Specimen:  Blood Updated:  08/18/19 1806     WBC 7.03 10*3/mm3      RBC 4.02 10*6/mm3      Hemoglobin 13.0 g/dL      Hematocrit 42.1 %      .7 fL      MCH 32.3 pg      MCHC 30.9 g/dL      RDW 15.9 %      RDW-SD 62.4 fl      MPV 11.5 fL      Platelets 145 10*3/mm3      Neutrophil % 74.0 %      Lymphocyte % 16.4 %      Monocyte % 7.8 %      Eosinophil % 1.1 %      Basophil % 0.3 %      Immature Grans % 0.4 %      Neutrophils, Absolute 5.20 10*3/mm3      Lymphocytes, Absolute 1.15 10*3/mm3      Monocytes, Absolute 0.55 10*3/mm3      Eosinophils, Absolute 0.08 10*3/mm3      Basophils, Absolute 0.02 10*3/mm3      Immature Grans, Absolute 0.03 10*3/mm3      nRBC 0.1 /100 WBC               Assessment/Plan       Spontaneous pneumothorax    Essential hypertension    Cardiomyopathy (CMS/HCC)    Coronary artery disease involving native coronary artery of native heart without angina pectoris    Dyslipidemia    Paroxysmal atrial fibrillation (CMS/HCC)    Chest pain    Acute systolic (congestive) heart failure (CMS/HCC)      Assessment:    Condition: In stable condition.       Plan:   Start/continue incentive spirometry.  Chest x-ray.      I personally reviewed the images from both yesterday and today's  chest x-rays as well as the CT of the chest was conducted without contrast yesterday evening.  The left-sided pneumothorax is minuscule and in fact may be a bleb.  Very small pleural effusions are demonstrated with left greater than right.    There is no thoracic surgical intervention needed at this time.  We recommend serial chest x-rays to reassess.    I discussed the patients findings and our recommendations with patient, family, nursing staff and Dr. Ruelas.    Thank you for this consult and allowing us to participate in the care of your patient.  We will follow along with you during this hospitalization.       CANDELARIA Grover  [facility]  08/19/19  2:41 PM

## 2019-08-19 NOTE — PROGRESS NOTES
Name: Dinesh Churchill Sr. ADMIT: 2019   : 1928  PCP: Phyllis Loving PA    MRN: [id number] LOS: 0 days   AGE/SEX: 89 y.o. male  ROOM: [facility]     Subjective   Subjective   CC: chest pain  No acute events. Patient denies chest pain currently.  He has no new complaints. No cough or f/c.    Objective   Objective   Vital Signs  Temp:  [97.5 °F (36.4 °C)-99.5 °F (37.5 °C)] 97.9 °F (36.6 °C)  Heart Rate:  [] 57  Resp:  [16-18] 18  BP: (122-165)/(63-98) 159/63  SpO2:  [88 %-100 %] 92 %  on  Flow (L/min):  [3-3.5] 3;   Device (Oxygen Therapy): room air  Body mass index is 23.71 kg/m².  Physical Exam   Constitutional: He is oriented to person, place, and time. No distress.   HENT:   Head: Normocephalic and atraumatic.   Mouth/Throat: Oropharynx is clear and moist.   Eyes: Conjunctivae and EOM are normal. Pupils are equal, round, and reactive to light.   Neck: Normal range of motion. Neck supple.   Cardiovascular: Normal rate, regular rhythm and intact distal pulses.   Pulmonary/Chest: Effort normal. He has rales (bibasilar).   Abdominal: Soft. Bowel sounds are normal. There is no tenderness.   Musculoskeletal: He exhibits edema (trace BLE). He exhibits no tenderness.   Neurological: He is alert and oriented to person, place, and time.   Hard of hearing   Skin: Skin is warm and dry. He is not diaphoretic.   Psychiatric: He has a normal mood and affect. His behavior is normal.   Nursing note and vitals reviewed.      Results Review:       I reviewed the patient's new clinical results.  I have personally reviewed and interpreted his chest xrays and chest CT scan.  Results from last 7 days   Lab Units 19  0520 19  1746 19  1207   WBC 10*3/mm3 4.15 7.03 5.00   HEMOGLOBIN g/dL 12.2* 13.0 12.7*   PLATELETS 10*3/mm3 116* 145 140     Results from last 7 days   Lab Units 19  0520 19  1746 19  1207   SODIUM mmol/L 146* 144 143   POTASSIUM mmol/L 4.3 4.5 4.4   CHLORIDE mmol/L  111* 109* 104   TOTAL CO2 mmol/L  --   --  28.2   CO2 mmol/L 24.2 22.1  --    BUN mg/dL 22 21 20   CREATININE mg/dL 0.86 0.93 0.89   GLUCOSE mg/dL 96 131*  --    Estimated Creatinine Clearance: 64.5 mL/min (by C-G formula based on SCr of 0.86 mg/dL).  Results from last 7 days   Lab Units 08/18/19  1746 08/16/19  1207   ALBUMIN g/dL 3.90 3.90   BILIRUBIN mg/dL 1.0 1.1   ALK PHOS U/L 51 50   AST (SGOT) U/L 17 15   ALT (SGPT) U/L 13 14     Results from last 7 days   Lab Units 08/19/19  0520 08/18/19  1746 08/16/19  1207   CALCIUM mg/dL 8.3 8.4 9.0   ALBUMIN g/dL  --  3.90 3.90       Glucose   Date/Time Value Ref Range Status   08/18/2019 2305 100 70 - 130 mg/dL Final         aspirin  mg Oral Daily   atorvastatin 20 mg Oral Nightly   furosemide 20 mg Intravenous Once   isosorbide mononitrate 60 mg Oral Daily   losartan 50 mg Oral Daily   metoprolol succinate XL 50 mg Oral Daily   sodium chloride 3 mL Intravenous Q12H      Diet Regular; Cardiac  NPO Diet       Assessment/Plan     Active Hospital Problems    Diagnosis  POA   • **Spontaneous pneumothorax [J93.83]  Yes   • Acute systolic (congestive) heart failure (CMS/HCC) [I50.21]  No   • Chest pain [R07.9]  Yes   • Paroxysmal atrial fibrillation (CMS/HCC) [I48.0]  Yes   • Dyslipidemia [E78.5]  Yes   • Coronary artery disease involving native coronary artery of native heart without angina pectoris [I25.10]  Yes   • Cardiomyopathy (CMS/HCC) [I42.9]  Yes   • Essential hypertension [I10]  Yes      Resolved Hospital Problems   No resolved problems to display.   Spontaneous pneumothorax  - appears stable, will monitor-xray from this AM reviewed  - thoracic surgery consulted    Chest Pain  - concern that the source is cardiac-he has known coronary artery disease  - troponins are negative  - cardiology consulted, planning LHC in AM with angioplasty/possible stent  - appreciate cardiology recs    Cardiomyopathy  - LVEF 31% with moderate mitral valve regurgitation  - on CXR  and exam appears to be getting some volume overload-will check BNP and give a dose of IV lasix  - monitor I&Os and labs    Paroxysmal Atrial Fibrillation  - he is in sinus rhythm currently  - on metoprolol    VTE Prophylaxis - SCDs  Code Status - Full code  Disposition - TBD      Flip Lynn MD  [facility]  08/19/19  1:15 PM

## 2019-08-19 NOTE — H&P
Patient Name:  Dinesh Churchill Sr.  YOB: 1928  MRN:  [id number]  Admit Date:  8/18/2019  Patient Care Team:  Phyllis Loving PA as PCP - General (Family Medicine)  Phyllis Loving PA as PCP - Claims Attributed  Brandon Ricci MD Thomas, Melissa, PA as Referring Physician (Family Medicine)  John Callejas II, MD as Consulting Physician (Hematology and Oncology)  Maninder Quezada MD as Consulting Physician (Cardiology)      Chief Complaint   Patient presents with   • Chest Pain       Subjective     Mr. Churchill is a 89 y.o. male with a history of CAD, HLD, HTN, CVA, a fib on no AC that presents to [facility] complaining of chest pain. Patient reports sharp, left sided chest pain that began at about 4 pm today while he was sitting in a chair. He states the pain radiated up into his left shoulder and down his left arm, no aggravating factors though reports relief with nitro and states he's been pain free since that time. He denies fever, chills, shortness of breath, abdominal pain, changes in bowel or bladder habits, worsening edema. Patient states he was admitted here a couple weeks ago and had cardiac cath done at that time. On arrival to ED, he was found to have small left apical pneumothorax on CXR, troponin negative and EKG showed junctional tachycardia with prolonged QT interval and PVCs. He reports no pain since taking the nitro earlier tonight and denies further complaints at this time. Thoracic surgery was consulted while in ED and will see in consult and cardiology consult was placed as well.     History of Present Illness    Past Medical History:   Diagnosis Date   • Abnormal ECG    • Abnormal MRI    • Acute frontal sinusitis    • Arthritis    • Chronic fatigue    • Coronary artery disease    • Dizziness    • Hearing aid worn     left   • Hearing loss    • Hyperlipidemia    • Hypertension    • Kidney stones    • Macrocytosis    • Neoplasm of skin    • Osteoporosis     • Prediabetes    • Vitamin D deficiency      Past Surgical History:   Procedure Laterality Date   • CARDIAC CATHETERIZATION N/A 5/22/2017    Procedure: Left Heart Cath;  Surgeon: Maninder Quezada MD;  [facility];  Service:    • CARDIAC CATHETERIZATION N/A 5/23/2017    Procedure: Stent BMS coronary;  Surgeon: Maninder Quezada MD;  Location:  [facility] CATH INVASIVE LOCATION;  Service:    • CARDIAC CATHETERIZATION N/A 8/8/2019    Procedure: Left Heart Cath;  Surgeon: Maninder Quezada MD;  [facility];  Service: Cardiology   • CARDIAC CATHETERIZATION N/A 8/8/2019    Procedure: Left ventriculography;  Surgeon: Maninder Quezada MD;  [facility];  Service: Cardiology   • CARDIAC CATHETERIZATION N/A 8/8/2019    Procedure: Coronary angiography;  Surgeon: Maninder Quezada MD;  [facility];  Service: Cardiology   • HERNIA REPAIR      x 2   • PA RT/LT HEART CATHETERS N/A 5/23/2017    Procedure: Percutaneous Coronary Intervention;  Surgeon: Maninder Quezada MD;  Location: [facility] CATH INVASIVE LOCATION;  Service: Cardiovascular     Family History   Family history unknown: Yes     Social History     Tobacco Use   • Smoking status: Never Smoker   • Smokeless tobacco: Never Used   Substance Use Topics   • Alcohol use: No     Comment: quit 30 years ago    • Drug use: No       (Not in a hospital admission)  Allergies:  No Known Allergies    Review of Systems   Constitutional: Negative.  Negative for chills and fever.   HENT: Negative.  Negative for congestion and sore throat.    Eyes: Negative.  Negative for visual disturbance.   Respiratory: Negative.  Negative for cough and shortness of breath.    Cardiovascular: Negative.  Negative for chest pain, palpitations and leg swelling.   Gastrointestinal: Negative.  Negative for abdominal pain, constipation, nausea and vomiting.   Endocrine:  Negative.    Genitourinary: Negative.  Negative for dysuria, frequency and urgency.   Musculoskeletal: Negative.  Negative for arthralgias and myalgias.   Skin: Negative.  Negative for color change, pallor, rash and wound.   Allergic/Immunologic: Negative.    Neurological: Negative.  Negative for dizziness, weakness and light-headedness.   Hematological: Negative.    Psychiatric/Behavioral: Negative.  Negative for agitation, behavioral problems, confusion and decreased concentration.        Objective    Vital Signs  Temp:  [99.5 °F (37.5 °C)] 99.5 °F (37.5 °C)  Heart Rate:  [106-125] 106  Resp:  [18] 18  BP: (128-151)/(77-98) 134/90  SpO2:  [88 %-98 %] 98 %  on  Flow (L/min):  [3.5] 3.5;   Device (Oxygen Therapy): nasal cannula  Body mass index is 24.41 kg/m².    Physical Exam   Constitutional: He is oriented to person, place, and time. He appears well-developed and well-nourished. No distress.   HENT:   Head: Normocephalic and atraumatic.   Eyes: EOM are normal. Pupils are equal, round, and reactive to light.   Neck: Normal range of motion. Neck supple.   Cardiovascular: Regular rhythm and intact distal pulses.  Extrasystoles are present. Tachycardia present.   Murmur heard.  Pulmonary/Chest: Effort normal. No respiratory distress. He has rales in the left lower field.   Abdominal: Soft. Bowel sounds are normal.   Musculoskeletal: Normal range of motion. He exhibits edema (trace edema BLE). He exhibits no tenderness.   Neurological: He is alert and oriented to person, place, and time.   Skin: Skin is warm and dry. He is not diaphoretic.   Psychiatric: He has a normal mood and affect. His behavior is normal. Judgment and thought content normal.   Nursing note and vitals reviewed.      Results Review:   I reviewed the patient's new clinical results including all labs and xrays.    Lab Results (last 24 hours)     Procedure Component Value Units Date/Time    CBC & Differential [[id number]] Collected:  08/18/19 5617     Specimen:  Blood Updated:  08/18/19 1806    Narrative:       The following orders were created for panel order CBC & Differential.  Procedure                               Abnormality         Status                     ---------                               -----------         ------                     CBC Auto Differential[[id number]]        Abnormal            Final result                 Please view results for these tests on the individual orders.    Comprehensive Metabolic Panel [[id number]]  (Abnormal) Collected:  08/18/19 1746    Specimen:  Blood Updated:  08/18/19 1823     Glucose 131 mg/dL      BUN 21 mg/dL      Creatinine 0.93 mg/dL      Sodium 144 mmol/L      Potassium 4.5 mmol/L      Chloride 109 mmol/L      CO2 22.1 mmol/L      Calcium 8.4 mg/dL      Total Protein 6.1 g/dL      Albumin 3.90 g/dL      ALT (SGPT) 13 U/L      AST (SGOT) 17 U/L      Comment: Specimen hemolyzed.  Results may be affected.        Alkaline Phosphatase 51 U/L      Total Bilirubin 1.0 mg/dL      eGFR Non African Amer 76 mL/min/1.73      Globulin 2.2 gm/dL      A/G Ratio 1.8 g/dL      BUN/Creatinine Ratio 22.6     Anion Gap 12.9 mmol/L     Narrative:       GFR Normal >60  Chronic Kidney Disease <60  Kidney Failure <15    Troponin [[id number]]  (Normal) Collected:  08/18/19 1746    Specimen:  Blood Updated:  08/18/19 1820     Troponin T <0.010 ng/mL     Narrative:       Troponin T Reference Range:  <= 0.03 ng/mL-   Negative for AMI  >0.03 ng/mL-     Abnormal for myocardial necrosis.  Clinicians would have to utilize clinical acumen, EKG, Troponin and serial changes to determine if it is an Acute Myocardial Infarction or myocardial injury due to an underlying chronic condition.     CBC Auto Differential [[id number]]  (Abnormal) Collected:  08/18/19 1746    Specimen:  Blood Updated:  08/18/19 1806     WBC 7.03 10*3/mm3      RBC 4.02 10*6/mm3      Hemoglobin 13.0 g/dL      Hematocrit 42.1 %      .7 fL      MCH 32.3 pg      MCHC  30.9 g/dL      RDW 15.9 %      RDW-SD 62.4 fl      MPV 11.5 fL      Platelets 145 10*3/mm3      Neutrophil % 74.0 %      Lymphocyte % 16.4 %      Monocyte % 7.8 %      Eosinophil % 1.1 %      Basophil % 0.3 %      Immature Grans % 0.4 %      Neutrophils, Absolute 5.20 10*3/mm3      Lymphocytes, Absolute 1.15 10*3/mm3      Monocytes, Absolute 0.55 10*3/mm3      Eosinophils, Absolute 0.08 10*3/mm3      Basophils, Absolute 0.02 10*3/mm3      Immature Grans, Absolute 0.03 10*3/mm3      nRBC 0.1 /100 WBC     Troponin [[id number]]  (Normal) Collected:  08/18/19 1952    Specimen:  Blood Updated:  08/18/19 2025     Troponin T <0.010 ng/mL     Narrative:       Troponin T Reference Range:  <= 0.03 ng/mL-   Negative for AMI  >0.03 ng/mL-     Abnormal for myocardial necrosis.  Clinicians would have to utilize clinical acumen, EKG, Troponin and serial changes to determine if it is an Acute Myocardial Infarction or myocardial injury due to an underlying chronic condition.           CT Chest Without Contrast   Final Result   1. Miniscule left pneumothorax with mild fibrotic changes.   2. Small pericardial and bilateral pleural effusions, left greater than   right with some adjacent atelectasis.   3. There is suggestion of adrenal hyperplasia                           .        This report was finalized on 8/18/2019 8:48 PM by Flip Malcolm M.D.          XR Chest 2 View   Final Result   Potential small left apical pneumothorax. There is a   curvilinear band at the left apex not demonstrated on any previous   studies. This could be due to differences in projection though a left   apical pneumothorax could have this appearance and this could be further   evaluated with CT.  There is otherwise cardiomegaly, left greater than   right pleural effusions and unchanged left basilar opacity.       Discussed with Dr. Herman in the emergency department on 08/18/2019 at   6:28 PM.        This report was finalized on 8/18/2019 7:52 PM by   Gm Brown M.D.            Assessment/Plan      Active Hospital Problems    Diagnosis  POA   • **Spontaneous pneumothorax [J93.83]  Yes   • Chest pain [R07.9]  Yes   • AF (atrial fibrillation) (CMS/HCC) [I48.91]  Yes   • Dyslipidemia [E78.5]  Yes   • Cardiomyopathy (CMS/HCC) [I42.9]  Yes   • Essential hypertension [I10]  Yes      Resolved Hospital Problems   No resolved problems to display.     Spontaneous pneumothorax/chest pain  -small left apical pneumothorax noted on CXR tonight  -no shortness of breath but reported chest pain radiating to left shoulder and arm, relieved with nitro  -consult thoracic surgery  -cardiology consult given recent cardiac cath and chest pain  -continue o2 therapy  -troponin negative but EKG slightly changed from previous, will repeat in AM  -recheck labs in AM    HLD/HTN  -stable, continue current home medications    VTE Ppx  -SCDs    CODE status  -full    I discussed the patients findings and my recommendations with patient and family.    CANDELARIA Kent  [facility]  08/18/19  10:14 PM

## 2019-08-19 NOTE — PLAN OF CARE
Problem: Patient Care Overview  Goal: Plan of Care Review  Outcome: Ongoing (interventions implemented as appropriate)   08/18/19 6336 08/19/19 2658   Plan of Care Review   Progress --  improving   OTHER   Outcome Summary --  Pt admitted to CCU as telemetry overflow for small pneumothorax.  O2 sats upper 90s-100% on 3L O2 N/C. No c/o SOA. Cardiology consulted as pt presented c/o chest pain, resolved after one NTG tablet. Recent cardiac cath with CAD and positive stress test, medical management chosen at the time. Trop neg x2. Pt did c/o left sided anterior chest pain once overnight, rated pain 5/10, described as a dull ache, relieved with NTG tabs x2. Repeat CXR to be done this morning. Thoracic surgery consulted to see today. VSS.     Coping/Psychosocial   Plan of Care Reviewed With patient --        Problem: Pain, Chronic (Adult)  Goal: Identify Related Risk Factors and Signs and Symptoms  Outcome: Outcome(s) achieved Date Met: 08/19/19    Goal: Acceptable Pain/Comfort Level and Functional Ability  Outcome: Ongoing (interventions implemented as appropriate)      Problem: Fall Risk (Adult)  Goal: Identify Related Risk Factors and Signs and Symptoms  Outcome: Outcome(s) achieved Date Met: 08/19/19    Goal: Absence of Fall  Outcome: Ongoing (interventions implemented as appropriate)      Problem: Pain, Acute (Adult)  Goal: Identify Related Risk Factors and Signs and Symptoms  Outcome: Outcome(s) achieved Date Met: 08/19/19    Goal: Acceptable Pain Control/Comfort Level  Outcome: Ongoing (interventions implemented as appropriate)

## 2019-08-19 NOTE — PROGRESS NOTES
Discharge Planning Assessment  [facility]     Patient Name: Dinesh Churchill Sr.  MRN: [id number]  Today's Date: 8/19/2019    Admit Date: 8/18/2019    Discharge Needs Assessment     Row Name 08/19/19 1330       Living Environment    Lives With  alone    Current Living Arrangements  home/apartment/condo    Primary Care Provided by  self;other (see comments);child(michael)    Provides Primary Care For  no one    Family Caregiver if Needed  child(michael), adult    Quality of Family Relationships  unable to assess       Resource/Environmental Concerns    Resource/Environmental Concerns  none    Transportation Concerns  car, none       Transition Planning    Patient/Family Anticipated Services at Transition  none    Transportation Anticipated  family or friend will provide       Discharge Needs Assessment    Readmission Within the Last 30 Days  previous discharge plan unsuccessful    Concerns to be Addressed  no discharge needs identified    Equipment Currently Used at Home  cane, quad    Anticipated Changes Related to Illness  none    Equipment Needed After Discharge  none        Discharge Plan     Row Name 08/19/19 1336       Plan    Plan  Home    Plan Comments  IMM noted CCP spoke to patient and son Dinesh [phone], at bedside to discuss discharge planning.  CCP role explained.  Face sheet verified.  His son is his emergency contact.  His PCP is RAFA Mcfarland.  Pt lives in [place].  He gets assistance from his neighbors or his son.  He uses a cane to ambulate.  He has no history of Home Health.  He has no physical rehab history.  He is independent with ADL's.  He obtains his medications from [facility] pharmacy in [place]. Son voiced frustration that the pt has has two hospitalizations in two weeks.  Declines HH or rehab referrals.  .  Plan is home with no needs   CCP following        Destination      No service coordination in this encounter.      Durable Medical Equipment      No service  coordination in this encounter.      Dialysis/Infusion      No service coordination in this encounter.      Home Medical Care      No service coordination in this encounter.      Therapy      No service coordination in this encounter.      Community Resources      No service coordination in this encounter.          Demographic Summary    No documentation.       Functional Status    No documentation.       Psychosocial    No documentation.       Abuse/Neglect    No documentation.       Legal    No documentation.       Substance Abuse    No documentation.       Patient Forms    No documentation.           Manju Zamora RN

## 2019-08-19 NOTE — NURSING NOTE
Attempted to call [facility] to confirm pt's home medications for admission med rec as pt is unsure of all the medicines he takes. I reached the pharmacy staff but was waiting on hold for >10min to speak with a pharmacist. Will attempt to call them again in the morning to confirm med list.

## 2019-08-20 ENCOUNTER — APPOINTMENT (OUTPATIENT)
Dept: GENERAL RADIOLOGY | Facility: HOSPITAL | Age: 84
End: 2019-08-20

## 2019-08-20 PROBLEM — D69.6 THROMBOCYTOPENIA (HCC): Status: ACTIVE | Noted: 2019-08-20

## 2019-08-20 LAB
ANION GAP SERPL CALCULATED.3IONS-SCNC: 9.2 MMOL/L (ref 5–15)
APPEARANCE UR: CLEAR
BACTERIA #/AREA URNS HPF: NORMAL /[HPF]
BACTERIA UR CULT: ABNORMAL
BACTERIA UR CULT: ABNORMAL
BASOPHILS # BLD AUTO: 0.02 10*3/MM3 (ref 0–0.2)
BASOPHILS NFR BLD AUTO: 0.4 % (ref 0–1.5)
BILIRUB UR QL STRIP: NEGATIVE
BUN BLD-MCNC: 25 MG/DL (ref 8–23)
BUN/CREAT SERPL: 31.6 (ref 7–25)
CALCIUM SPEC-SCNC: 9 MG/DL (ref 8.2–9.6)
CHLORIDE SERPL-SCNC: 102 MMOL/L (ref 98–107)
CO2 SERPL-SCNC: 28.8 MMOL/L (ref 22–29)
COLOR UR: YELLOW
CREAT BLD-MCNC: 0.79 MG/DL (ref 0.76–1.27)
DEPRECATED RDW RBC AUTO: 61.3 FL (ref 37–54)
EOSINOPHIL # BLD AUTO: 0.2 10*3/MM3 (ref 0–0.4)
EOSINOPHIL NFR BLD AUTO: 4.2 % (ref 0.3–6.2)
EPI CELLS #/AREA URNS HPF: NORMAL /HPF
ERYTHROCYTE [DISTWIDTH] IN BLOOD BY AUTOMATED COUNT: 15.7 % (ref 12.3–15.4)
GFR SERPL CREATININE-BSD FRML MDRD: 92 ML/MIN/1.73
GLUCOSE BLD-MCNC: 87 MG/DL (ref 65–99)
GLUCOSE UR QL: NEGATIVE
HCT VFR BLD AUTO: 42.8 % (ref 37.5–51)
HGB BLD-MCNC: 13.3 G/DL (ref 13–17.7)
HGB UR QL STRIP: NEGATIVE
KETONES UR QL STRIP: NEGATIVE
LEUKOCYTE ESTERASE UR QL STRIP: NEGATIVE
LYMPHOCYTES # BLD AUTO: 1.29 10*3/MM3 (ref 0.7–3.1)
LYMPHOCYTES NFR BLD AUTO: 27.2 % (ref 19.6–45.3)
MAGNESIUM SERPL-MCNC: 2.3 MG/DL (ref 1.7–2.3)
MCH RBC QN AUTO: 32.8 PG (ref 26.6–33)
MCHC RBC AUTO-ENTMCNC: 31.1 G/DL (ref 31.5–35.7)
MCV RBC AUTO: 105.7 FL (ref 79–97)
MICRO URNS: (no result)
MICRO URNS: (no result)
MONOCYTES # BLD AUTO: 0.45 10*3/MM3 (ref 0.1–0.9)
MONOCYTES NFR BLD AUTO: 9.5 % (ref 5–12)
MUCOUS THREADS URNS QL MICRO: PRESENT
NEUTROPHILS # BLD AUTO: 2.77 10*3/MM3 (ref 1.7–7)
NEUTROPHILS NFR BLD AUTO: 58.3 % (ref 42.7–76)
NITRITE UR QL STRIP: NEGATIVE
NT-PROBNP SERPL-MCNC: 4825 PG/ML (ref 5–1800)
OTHER ANTIBIOTIC SUSC ISLT: ABNORMAL
PH UR STRIP: 6 [PH] (ref 5–7.5)
PLATELET # BLD AUTO: 123 10*3/MM3 (ref 140–450)
PMV BLD AUTO: 11.4 FL (ref 6–12)
POTASSIUM BLD-SCNC: 4.1 MMOL/L (ref 3.5–5.2)
PROT UR QL STRIP: NEGATIVE
RBC # BLD AUTO: 4.05 10*6/MM3 (ref 4.14–5.8)
RBC #/AREA URNS HPF: NORMAL /HPF
SODIUM BLD-SCNC: 140 MMOL/L (ref 136–145)
SP GR UR: 1.02 (ref 1–1.03)
UROBILINOGEN UR STRIP-MCNC: 1 MG/DL (ref 0.2–1)
WBC #/AREA URNS HPF: NORMAL /HPF
WBC NRBC COR # BLD: 4.75 10*3/MM3 (ref 3.4–10.8)

## 2019-08-20 PROCEDURE — C1887 CATHETER, GUIDING: HCPCS | Performed by: INTERNAL MEDICINE

## 2019-08-20 PROCEDURE — C1769 GUIDE WIRE: HCPCS | Performed by: INTERNAL MEDICINE

## 2019-08-20 PROCEDURE — 0 IOPAMIDOL PER 1 ML: Performed by: INTERNAL MEDICINE

## 2019-08-20 PROCEDURE — 85025 COMPLETE CBC W/AUTO DIFF WBC: CPT | Performed by: NURSE PRACTITIONER

## 2019-08-20 PROCEDURE — 93010 ELECTROCARDIOGRAM REPORT: CPT | Performed by: INTERNAL MEDICINE

## 2019-08-20 PROCEDURE — C1876 STENT, NON-COA/NON-COV W/DEL: HCPCS | Performed by: INTERNAL MEDICINE

## 2019-08-20 PROCEDURE — 25010000002 FENTANYL CITRATE (PF) 100 MCG/2ML SOLUTION: Performed by: INTERNAL MEDICINE

## 2019-08-20 PROCEDURE — 71045 X-RAY EXAM CHEST 1 VIEW: CPT

## 2019-08-20 PROCEDURE — 83880 ASSAY OF NATRIURETIC PEPTIDE: CPT | Performed by: NURSE PRACTITIONER

## 2019-08-20 PROCEDURE — 25010000002 HEPARIN (PORCINE) PER 1000 UNITS: Performed by: INTERNAL MEDICINE

## 2019-08-20 PROCEDURE — 83735 ASSAY OF MAGNESIUM: CPT | Performed by: NURSE PRACTITIONER

## 2019-08-20 PROCEDURE — 92928 PRQ TCAT PLMT NTRAC ST 1 LES: CPT | Performed by: INTERNAL MEDICINE

## 2019-08-20 PROCEDURE — 93454 CORONARY ARTERY ANGIO S&I: CPT | Performed by: INTERNAL MEDICINE

## 2019-08-20 PROCEDURE — 85347 COAGULATION TIME ACTIVATED: CPT

## 2019-08-20 PROCEDURE — 99232 SBSQ HOSP IP/OBS MODERATE 35: CPT | Performed by: NURSE PRACTITIONER

## 2019-08-20 PROCEDURE — C1894 INTRO/SHEATH, NON-LASER: HCPCS | Performed by: INTERNAL MEDICINE

## 2019-08-20 PROCEDURE — 93005 ELECTROCARDIOGRAM TRACING: CPT | Performed by: NURSE PRACTITIONER

## 2019-08-20 PROCEDURE — C1725 CATH, TRANSLUMIN NON-LASER: HCPCS | Performed by: INTERNAL MEDICINE

## 2019-08-20 PROCEDURE — 80048 BASIC METABOLIC PNL TOTAL CA: CPT | Performed by: INTERNAL MEDICINE

## 2019-08-20 DEVICE — MULTI-LINK MINI VISIONCORONARY STENT AND STENT DELIVERY SYSTEM 2.00 MM X 18 MM / RAPID-EXCHANGE
Type: IMPLANTABLE DEVICE | Status: FUNCTIONAL
Brand: MULTI-LINK MINI VISION

## 2019-08-20 RX ORDER — ASPIRIN 325 MG
TABLET ORAL AS NEEDED
Status: DISCONTINUED | OUTPATIENT
Start: 2019-08-20 | End: 2019-08-20 | Stop reason: HOSPADM

## 2019-08-20 RX ORDER — HEPARIN SODIUM 1000 [USP'U]/ML
INJECTION, SOLUTION INTRAVENOUS; SUBCUTANEOUS AS NEEDED
Status: DISCONTINUED | OUTPATIENT
Start: 2019-08-20 | End: 2019-08-20 | Stop reason: HOSPADM

## 2019-08-20 RX ORDER — SODIUM CHLORIDE 9 MG/ML
INJECTION, SOLUTION INTRAVENOUS CONTINUOUS PRN
Status: COMPLETED | OUTPATIENT
Start: 2019-08-20 | End: 2019-08-20

## 2019-08-20 RX ORDER — LIDOCAINE HYDROCHLORIDE 20 MG/ML
INJECTION, SOLUTION INFILTRATION; PERINEURAL AS NEEDED
Status: DISCONTINUED | OUTPATIENT
Start: 2019-08-20 | End: 2019-08-20 | Stop reason: HOSPADM

## 2019-08-20 RX ORDER — SODIUM CHLORIDE 9 MG/ML
100 INJECTION, SOLUTION INTRAVENOUS CONTINUOUS
Status: DISCONTINUED | OUTPATIENT
Start: 2019-08-20 | End: 2019-08-20

## 2019-08-20 RX ORDER — ASPIRIN 81 MG/1
81 TABLET, CHEWABLE ORAL DAILY
Status: DISCONTINUED | OUTPATIENT
Start: 2019-08-20 | End: 2019-08-21 | Stop reason: HOSPADM

## 2019-08-20 RX ORDER — FENTANYL CITRATE 50 UG/ML
INJECTION, SOLUTION INTRAMUSCULAR; INTRAVENOUS AS NEEDED
Status: DISCONTINUED | OUTPATIENT
Start: 2019-08-20 | End: 2019-08-20 | Stop reason: HOSPADM

## 2019-08-20 RX ADMIN — LOSARTAN POTASSIUM 50 MG: 50 TABLET, FILM COATED ORAL at 10:19

## 2019-08-20 RX ADMIN — METOPROLOL SUCCINATE 50 MG: 50 TABLET, FILM COATED, EXTENDED RELEASE ORAL at 10:19

## 2019-08-20 RX ADMIN — SODIUM CHLORIDE, PRESERVATIVE FREE 3 ML: 5 INJECTION INTRAVENOUS at 20:00

## 2019-08-20 RX ADMIN — TICAGRELOR 90 MG: 90 TABLET ORAL at 19:56

## 2019-08-20 RX ADMIN — ATORVASTATIN CALCIUM 20 MG: 20 TABLET, FILM COATED ORAL at 19:56

## 2019-08-20 RX ADMIN — ACETAMINOPHEN 650 MG: 325 TABLET, FILM COATED ORAL at 10:18

## 2019-08-20 NOTE — CONSULTS
"Met with patient and his son,discussed benefits of cardiac rehab. Provided phase II information packet, which includes; general information about cardiac rehab, hospitals Cardiac Rehab Programs handout and [facility] letter article entitled “Cardiac Rehab is often the Best Medicine for Recovery\", stresses the importance of cardiac rehab after a heart event.   I provided the contact information for cardiac rehab here at [facility] and encouraged him to call when discharged.     "

## 2019-08-20 NOTE — PROGRESS NOTES
"    Chief Complaint: spontaneous pneumothorax follow-up  S/P: cardiac catheterization  POD # 0    Subjective:  Symptoms:  Stable.  He reports shortness of breath.  No chest pain.    Diet:  Poor intake.  No nausea or vomiting.    Activity level: Returning to normal.    Pain:  He complains of pain that is mild.  Pain is well controlled.        Vital Signs:  Temp:  [97.4 °F (36.3 °C)-98.5 °F (36.9 °C)] 97.8 °F (36.6 °C)  Heart Rate:  [49-74] 71  Resp:  [15-18] 16  BP: (139-184)/() 174/87    Intake & Output (last day)       08/19 0701 - 08/20 0700 08/20 0701 - 08/21 0700    P.O. 320     I.V. (mL/kg)  100 (1.2)    Total Intake(mL/kg) 320 (4) 100 (1.2)    Urine (mL/kg/hr) 1600 (0.8)     Stool 0     Total Output 1600     Net -1280 +100          Urine Unmeasured Occurrence 2 x     Stool Unmeasured Occurrence 1 x           Objective:  General Appearance:  Comfortable and in no acute distress.    Vital signs: (most recent): Blood pressure 174/87, pulse 71, temperature 97.8 °F (36.6 °C), temperature source Oral, resp. rate 16, height 185.4 cm (73\"), weight 80.4 kg (177 lb 4 oz), SpO2 94 %.  Vital signs are normal.    Output: Producing urine.    HEENT: Normal HEENT exam.    Lungs:  Normal effort and normal respiratory rate.  Breath sounds clear to auscultation.  He is not in respiratory distress.  No rales, wheezes or rhonchi.    Heart: Normal rate.  Regular rhythm.  S1 normal and S2 normal.  No murmur.   Chest: No chest wall tenderness.    Abdomen: Abdomen is soft.  Bowel sounds are normal.   There is no abdominal tenderness.   There is no mass.   Extremities: Normal range of motion.  There is no dependent edema.    Pulses: Distal pulses are intact.    Neurological: Patient is alert and oriented to person, place and time.    Skin:  Warm and dry.              Results Review:     I reviewed the patient's new clinical results.  I reviewed the patient's new imaging results and agree with the interpretation.  I reviewed the " patient's other test results and agree with the interpretation  Discussed with patient, son at bedside, and Dr. Ruelas.    Imaging Results (last 24 hours)     Procedure Component Value Units Date/Time    XR Chest 1 View [[id number]] Collected:  08/20/19 0500     Updated:  08/20/19 0505    Narrative:       CHEST RADIOGRAPH     HISTORY: Pneumothorax     COMPARISON: 08/19/2019     FINDINGS:  Left-sided pneumothorax is again seen. I'm not convinced it is  significantly changed in size compared to prior exam. Cardiomegaly is  identified. There is vascular congestion, although it appears slightly  improved. There is bibasilar atelectasis. There are bilateral pleural  effusions.       Impression:       Stable left-sided pneumothorax.     This report was finalized on 8/20/2019 5:02 AM by Dr. Bree Xavier M.D.       XR Chest 2 View [[id number]] Collected:  08/19/19 1037     Updated:  08/19/19 1043    Narrative:       Portable chest radiograph     HISTORY:Pneumothorax     TECHNIQUE: AP and lateral portable radiographs of the chest     COMPARISON:Chest radiographs and back 12/06/2018 an chest CT 08/18/2019       Impression:       FINDINGS AND IMPRESSION:  There is interval development of new pulmonary opacification throughout  the right lung, most notably within the right lung base. Additionally,  the degree of pulmonary opacification seen in the left base has  increased. Small-to-moderate bilateral pleural effusions present, left  greater than right. Findings represent worsening pulmonary edema versus  multifocal pneumonia and correlation with patient history is  recommended.     A small left pneumothorax is present approximately 1 cm pleural  parenchymal separation, as before. Heart size can't be determined due to  adjacent opacification.     This report was finalized on 8/19/2019 10:40 AM by Dr. Christopher Fernández M.D.             Lab Results:     Lab Results (last 24 hours)     Procedure Component Value Units Date/Time     CBC & Differential [[id number]] Collected:  08/20/19 0547    Specimen:  Blood Updated:  08/20/19 0719    Narrative:       The following orders were created for panel order CBC & Differential.  Procedure                               Abnormality         Status                     ---------                               -----------         ------                     CBC Auto Differential[[id number]]        Abnormal            Final result                 Please view results for these tests on the individual orders.    CBC Auto Differential [[id number]]  (Abnormal) Collected:  08/20/19 0547    Specimen:  Blood Updated:  08/20/19 0719     WBC 4.75 10*3/mm3      RBC 4.05 10*6/mm3      Hemoglobin 13.3 g/dL      Hematocrit 42.8 %      .7 fL      MCH 32.8 pg      MCHC 31.1 g/dL      RDW 15.7 %      RDW-SD 61.3 fl      MPV 11.4 fL      Platelets 123 10*3/mm3      Neutrophil % 58.3 %      Lymphocyte % 27.2 %      Monocyte % 9.5 %      Eosinophil % 4.2 %      Basophil % 0.4 %      Neutrophils, Absolute 2.77 10*3/mm3      Lymphocytes, Absolute 1.29 10*3/mm3      Monocytes, Absolute 0.45 10*3/mm3      Eosinophils, Absolute 0.20 10*3/mm3      Basophils, Absolute 0.02 10*3/mm3     Magnesium [[id number]]  (Normal) Collected:  08/20/19 0547    Specimen:  Blood Updated:  08/20/19 0652     Magnesium 2.3 mg/dL     Basic Metabolic Panel [[id number]]  (Abnormal) Collected:  08/20/19 0547    Specimen:  Blood Updated:  08/20/19 0652     Glucose 87 mg/dL      BUN 25 mg/dL      Creatinine 0.79 mg/dL      Sodium 140 mmol/L      Potassium 4.1 mmol/L      Chloride 102 mmol/L      CO2 28.8 mmol/L      Calcium 9.0 mg/dL      eGFR Non African Amer 92 mL/min/1.73      BUN/Creatinine Ratio 31.6     Anion Gap 9.2 mmol/L     Narrative:       GFR Normal >60  Chronic Kidney Disease <60  Kidney Failure <15    BNP [[id number]]  (Abnormal) Collected:  08/20/19 0547    Specimen:  Blood Updated:  08/20/19 0649     proBNP 4,825.0 pg/mL     Narrative:        Among patients with dyspnea, NT-proBNP is highly sensitive for the detection of acute congestive heart failure. In addition NT-proBNP of <300 pg/ml effectively rules out acute congestive heart failure with 99% negative predictive value.    BNP [[id number]]  (Abnormal) Collected:  08/19/19 0520    Specimen:  Blood Updated:  08/19/19 1340     proBNP 7,396.0 pg/mL     Narrative:       Among patients with dyspnea, NT-proBNP is highly sensitive for the detection of acute congestive heart failure. In addition NT-proBNP of <300 pg/ml effectively rules out acute congestive heart failure with 99% negative predictive value.           Assessment/Plan       Spontaneous pneumothorax    Coronary artery disease involving native coronary artery of native heart without angina pectoris     Acute systolic (congestive) heart failure (CMS/HCC)       Assessment & Plan     Mr. Churchill is stable and doing well s/p cardiac cath with Dr. Quezada earlier this morning.   I personally reviewed this morning's chest x-ray which demonstrates a stable, minuscule pleural separation of the left apex.  No intervention needed at this time. We will continue surveillance and follow with another chest x-ray in the morning.  Encourage good pulmonary hygiene with IS hourly while awake.    CANDELARIA Grover  [facility]  08/20/19  9:58 AM

## 2019-08-20 NOTE — PROGRESS NOTES
Name: Dinesh Churchill Sr. ADMIT: 2019   : 1928  PCP: Phyllis Loving PA    MRN: [id number] LOS: 1 days   AGE/SEX: 89 y.o. male  ROOM: 2200/1     Subjective   Subjective   CC: chest pain  Patient went for left heart cath today and had JOHN, tolerated well.  Patient denies chest pain currently.  He has no new complaints. No cough or f/c.    Objective   Objective   Vital Signs  Temp:  [97.4 °F (36.3 °C)-98.5 °F (36.9 °C)] 98 °F (36.7 °C)  Heart Rate:  [49-74] 65  Resp:  [15-18] 16  BP: (135-184)/() 163/78  SpO2:  [91 %-98 %] 94 %  on  Flow (L/min):  [3] 3;   Device (Oxygen Therapy): room air  Body mass index is 23.39 kg/m².  Physical Exam   Constitutional: He is oriented to person, place, and time. No distress.   HENT:   Head: Normocephalic and atraumatic.   Mouth/Throat: Oropharynx is clear and moist.   Eyes: Conjunctivae and EOM are normal. Pupils are equal, round, and reactive to light.   Neck: Normal range of motion. Neck supple.   Cardiovascular: Normal rate, regular rhythm and intact distal pulses.   Pulmonary/Chest: Effort normal. He has rales (bibasilar).   Abdominal: Soft. Bowel sounds are normal. There is no tenderness.   Musculoskeletal: He exhibits edema (trace BLE). He exhibits no tenderness.   Neurological: He is alert and oriented to person, place, and time.   Hard of hearing   Skin: Skin is warm and dry. He is not diaphoretic.   Psychiatric: He has a normal mood and affect. His behavior is normal.   Nursing note and vitals reviewed.      Results Review:       I reviewed the patient's new clinical results.  I have personally reviewed and interpreted his follow up chest xray.  I have personally reviewed his telemetry.  Results from last 7 days   Lab Units 19  0547 19  0520 19  1746 19  1207   WBC 10*3/mm3 4.75 4.15 7.03 5.00   HEMOGLOBIN g/dL 13.3 12.2* 13.0 12.7*   PLATELETS 10*3/mm3 123* 116* 145 140     Results from last 7 days   Lab Units 19  0547  08/19/19  0520 08/18/19  1746 08/16/19  1207   SODIUM mmol/L 140 146* 144 143   POTASSIUM mmol/L 4.1 4.3 4.5 4.4   CHLORIDE mmol/L 102 111* 109* 104   TOTAL CO2 mmol/L  --   --   --  28.2   CO2 mmol/L 28.8 24.2 22.1  --    BUN mg/dL 25* 22 21 20   CREATININE mg/dL 0.79 0.86 0.93 0.89   GLUCOSE mg/dL 87 96 131*  --    Estimated Creatinine Clearance: 68.4 mL/min (by C-G formula based on SCr of 0.79 mg/dL).  Results from last 7 days   Lab Units 08/18/19 1746 08/16/19  1207   ALBUMIN g/dL 3.90 3.90   BILIRUBIN mg/dL 1.0 1.1   ALK PHOS U/L 51 50   AST (SGOT) U/L 17 15   ALT (SGPT) U/L 13 14     Results from last 7 days   Lab Units 08/20/19  0547 08/19/19 0520 08/18/19 1746 08/16/19  1207   CALCIUM mg/dL 9.0 8.3 8.4 9.0   ALBUMIN g/dL  --   --  3.90 3.90   MAGNESIUM mg/dL 2.3  --   --   --        Glucose   Date/Time Value Ref Range Status   08/18/2019 2305 100 70 - 130 mg/dL Final         aspirin 81 mg Oral Daily   atorvastatin 20 mg Oral Nightly   isosorbide mononitrate 60 mg Oral Daily   losartan 50 mg Oral Daily   metoprolol succinate XL 50 mg Oral Daily   sodium chloride 3 mL Intravenous Q12H   ticagrelor 90 mg Oral BID      Diet Regular; Consistent Carbohydrate, Cardiac       Assessment/Plan     Active Hospital Problems    Diagnosis  POA   • **Spontaneous pneumothorax [J93.83]  Yes   • Thrombocytopenia (CMS/HCC) [D69.6]  No   • Acute systolic (congestive) heart failure (CMS/HCC) [I50.21]  No   • Chest pain [R07.9]  Yes   • Paroxysmal atrial fibrillation (CMS/HCC) [I48.0]  Yes   • Dyslipidemia [E78.5]  Yes   • Coronary artery disease involving native coronary artery of native heart without angina pectoris [I25.10]  Yes   • Cardiomyopathy (CMS/HCC) [I42.9]  Yes   • Essential hypertension [I10]  Yes      Resolved Hospital Problems   No resolved problems to display.   Spontaneous pneumothorax  - appears stable, will monitor-I personally reviewed the chest Xray from this AM-PTX appears stable  - check CXR in AM  -  appreciate thoracic surgery recs    Chest Pain  - concern that the source is cardiac-he has known coronary artery disease  - troponins are negative  - s/p [facility] 8/20/19 with JOHN to diagonal branch of left anterior descending artery  - on DAPT with ASA and Brilinta   - appreciate cardiology recs    Cardiomyopathy/Acute on Chronic systolic CHF  - LVEF 31% with moderate mitral valve regurgitation  - BNP and CXR improved after lasix-he does not look particularly volume overloaded on exam  - follow up CXR in AM  - he may need oral lasix at discharge but will defer to cardiology    Paroxysmal Atrial Fibrillation  - he is in sinus rhythm currently  - on metoprolol    Thrombocytopenia  - this is mild  - will check CBC in the AM    VTE Prophylaxis - SCDs  Code Status - Full code  Disposition - Home in the next 1-2d if okay with all      Flip Lynn MD  [facility]  08/20/19  7:11 PM

## 2019-08-20 NOTE — PLAN OF CARE
Problem: Patient Care Overview  Goal: Plan of Care Review  Outcome: Ongoing (interventions implemented as appropriate)   08/19/19 2015 08/20/19 0556   Plan of Care Review   Progress --  improving   OTHER   Outcome Summary --  Pt remains in CCU as tele overflow, going for a cardiac cath this AM. No c/o chest pain or SOA overnight. O2 sats stable on RA. Encourage I.S. use. CTM   Coping/Psychosocial   Plan of Care Reviewed With patient --        Problem: Pain, Chronic (Adult)  Goal: Acceptable Pain/Comfort Level and Functional Ability  Outcome: Ongoing (interventions implemented as appropriate)      Problem: Fall Risk (Adult)  Goal: Absence of Fall  Outcome: Ongoing (interventions implemented as appropriate)      Problem: Pain, Acute (Adult)  Goal: Acceptable Pain Control/Comfort Level  Outcome: Ongoing (interventions implemented as appropriate)

## 2019-08-21 ENCOUNTER — APPOINTMENT (OUTPATIENT)
Dept: GENERAL RADIOLOGY | Facility: HOSPITAL | Age: 84
End: 2019-08-21

## 2019-08-21 VITALS
BODY MASS INDEX: 36.82 KG/M2 | HEART RATE: 80 BPM | WEIGHT: 277.8 LBS | HEIGHT: 73 IN | SYSTOLIC BLOOD PRESSURE: 140 MMHG | DIASTOLIC BLOOD PRESSURE: 70 MMHG | OXYGEN SATURATION: 96 % | TEMPERATURE: 97.8 F | RESPIRATION RATE: 16 BRPM

## 2019-08-21 LAB
ACT BLD: 362 SECONDS (ref 82–152)
ANION GAP SERPL CALCULATED.3IONS-SCNC: 12.1 MMOL/L (ref 5–15)
BUN BLD-MCNC: 24 MG/DL (ref 8–23)
BUN/CREAT SERPL: 29.6 (ref 7–25)
CALCIUM SPEC-SCNC: 8.7 MG/DL (ref 8.2–9.6)
CHLORIDE SERPL-SCNC: 104 MMOL/L (ref 98–107)
CO2 SERPL-SCNC: 24.9 MMOL/L (ref 22–29)
CREAT BLD-MCNC: 0.81 MG/DL (ref 0.76–1.27)
DEPRECATED RDW RBC AUTO: 61.4 FL (ref 37–54)
ERYTHROCYTE [DISTWIDTH] IN BLOOD BY AUTOMATED COUNT: 15.5 % (ref 12.3–15.4)
GFR SERPL CREATININE-BSD FRML MDRD: 89 ML/MIN/1.73
GLUCOSE BLD-MCNC: 81 MG/DL (ref 65–99)
HCT VFR BLD AUTO: 42.4 % (ref 37.5–51)
HGB BLD-MCNC: 13.1 G/DL (ref 13–17.7)
MCH RBC QN AUTO: 33 PG (ref 26.6–33)
MCHC RBC AUTO-ENTMCNC: 30.9 G/DL (ref 31.5–35.7)
MCV RBC AUTO: 106.8 FL (ref 79–97)
PLATELET # BLD AUTO: 109 10*3/MM3 (ref 140–450)
PMV BLD AUTO: 11.4 FL (ref 6–12)
POTASSIUM BLD-SCNC: 3.8 MMOL/L (ref 3.5–5.2)
RBC # BLD AUTO: 3.97 10*6/MM3 (ref 4.14–5.8)
SODIUM BLD-SCNC: 141 MMOL/L (ref 136–145)
WBC NRBC COR # BLD: 3.92 10*3/MM3 (ref 3.4–10.8)

## 2019-08-21 PROCEDURE — 85027 COMPLETE CBC AUTOMATED: CPT | Performed by: INTERNAL MEDICINE

## 2019-08-21 PROCEDURE — 71045 X-RAY EXAM CHEST 1 VIEW: CPT

## 2019-08-21 PROCEDURE — 99232 SBSQ HOSP IP/OBS MODERATE 35: CPT | Performed by: INTERNAL MEDICINE

## 2019-08-21 PROCEDURE — 99231 SBSQ HOSP IP/OBS SF/LOW 25: CPT | Performed by: NURSE PRACTITIONER

## 2019-08-21 PROCEDURE — 80048 BASIC METABOLIC PNL TOTAL CA: CPT | Performed by: INTERNAL MEDICINE

## 2019-08-21 RX ORDER — ASPIRIN 81 MG/1
81 TABLET, CHEWABLE ORAL DAILY
Start: 2019-08-22

## 2019-08-21 RX ORDER — ISOSORBIDE MONONITRATE 60 MG/1
TABLET, EXTENDED RELEASE ORAL
Qty: 30 TABLET | Refills: 1 | Status: SHIPPED | OUTPATIENT
Start: 2019-08-21 | End: 2019-10-29 | Stop reason: SDUPTHER

## 2019-08-21 RX ORDER — ATORVASTATIN CALCIUM 20 MG/1
20 TABLET, FILM COATED ORAL NIGHTLY
Qty: 30 TABLET | Refills: 1 | Status: SHIPPED | OUTPATIENT
Start: 2019-08-21 | End: 2019-10-29 | Stop reason: SDUPTHER

## 2019-08-21 RX ORDER — LOSARTAN POTASSIUM 50 MG/1
50 TABLET ORAL DAILY
Qty: 30 TABLET | Refills: 1 | Status: SHIPPED | OUTPATIENT
Start: 2019-08-21 | End: 2019-10-29 | Stop reason: SDUPTHER

## 2019-08-21 RX ADMIN — ISOSORBIDE MONONITRATE 60 MG: 60 TABLET ORAL at 08:50

## 2019-08-21 RX ADMIN — SODIUM CHLORIDE, PRESERVATIVE FREE 3 ML: 5 INJECTION INTRAVENOUS at 08:51

## 2019-08-21 RX ADMIN — ASPIRIN 81 MG: 81 TABLET, CHEWABLE ORAL at 08:50

## 2019-08-21 RX ADMIN — TICAGRELOR 90 MG: 90 TABLET ORAL at 08:50

## 2019-08-21 RX ADMIN — METOPROLOL SUCCINATE 50 MG: 50 TABLET, FILM COATED, EXTENDED RELEASE ORAL at 08:51

## 2019-08-21 RX ADMIN — LOSARTAN POTASSIUM 50 MG: 50 TABLET, FILM COATED ORAL at 08:50

## 2019-08-21 NOTE — PROGRESS NOTES
[facility]  Cardiology Progress Note    Patient Identification:  Name: Dinesh Churchill Sr.  Age: 89 y.o.  Sex: male  :  1928  MRN: [id number]                 Follow Up / Chief Complaint: chest pain    Interval History: Successful angioplasty and stent .        Subjective: Patient states he feels better than he has in his life      Objective:    Past Medical History:  Past Medical History:   Diagnosis Date   • Abnormal ECG    • Abnormal MRI    • Acute frontal sinusitis    • Arthritis    • Chronic fatigue    • Coronary artery disease    • Dizziness    • Hearing aid worn     left   • Hearing loss    • Hyperlipidemia    • Hypertension    • Kidney stones    • Macrocytosis    • Neoplasm of skin    • Osteoporosis    • Prediabetes    • Vitamin D deficiency      Past Surgical History:  Past Surgical History:   Procedure Laterality Date   • CARDIAC CATHETERIZATION N/A 2017    Procedure: Left Heart Cath;  Surgeon: Maninder Quezada MD;  Location:  [facility] CATH INVASIVE LOCATION;  Service:    • CARDIAC CATHETERIZATION N/A 2017    Procedure: Stent BMS coronary;  Surgeon: Maninder Quezada MD;  Location: [facility] CATH INVASIVE LOCATION;  Service:    • CARDIAC CATHETERIZATION N/A 2019    Procedure: Left Heart Cath;  Surgeon: Maninder Quezada MD;  Location:  [facility] CATH INVASIVE LOCATION;  Service: Cardiology   • CARDIAC CATHETERIZATION N/A 2019    Procedure: Left ventriculography;  Surgeon: Maninder Quezada MD;  Location:  [facility] CATH INVASIVE LOCATION;  Service: Cardiology   • CARDIAC CATHETERIZATION N/A 2019    Procedure: Coronary angiography;  Surgeon: Maninder Quezada MD;  Location: [facility] CATH INVASIVE LOCATION;  Service: Cardiology   • CARDIAC CATHETERIZATION N/A 2019    Procedure: CORONARY ANGIOGRAPHY;  Surgeon: Maninder Quezada MD;  Location: [facility] CATH INVASIVE LOCATION;  Service: Cardiovascular   • CARDIAC CATHETERIZATION N/A 2019     "Procedure: Stent BMS coronary;  Surgeon: Maninder Quezada MD;  Location:  [facility] CATH INVASIVE LOCATION;  Service: Cardiovascular   • HERNIA REPAIR      x 2   • AZ RT/LT HEART CATHETERS N/A 5/23/2017    Procedure: Percutaneous Coronary Intervention;  Surgeon: Maninder Quezada MD;  Location:  [facility] CATH INVASIVE LOCATION;  Service: Cardiovascular        Social History:   Social History     Tobacco Use   • Smoking status: Never Smoker   • Smokeless tobacco: Never Used   Substance Use Topics   • Alcohol use: No     Comment: quit 30 years ago       Family History:  Family History   Family history unknown: Yes          Allergies:  No Known Allergies  Scheduled Meds:    aspirin 81 mg Daily   atorvastatin 20 mg Nightly   isosorbide mononitrate 60 mg Daily   losartan 50 mg Daily   metoprolol succinate XL 50 mg Daily   sodium chloride 3 mL Q12H   ticagrelor 90 mg BID     I have reviewed the patient's recent medical history and current medications, as well as personally reviewed/interpreted the ECG/telemetry data    INTAKE AND OUTPUT:    Intake/Output Summary (Last 24 hours) at 8/21/2019 1440  Last data filed at 8/21/2019 0855  Gross per 24 hour   Intake 480 ml   Output --   Net 480 ml       ROS  Constitutional: Awake and alert, no fever. No nosebleeds  Abdomen           no abdominal pain   Cardiac              no chest pain  Pulmonary          no shortness of breath      /70 (BP Location: Left arm, Patient Position: Lying)   Pulse 80   Temp 97.8 °F (36.6 °C) (Oral)   Resp 16   Ht 185.4 cm (73\")   Wt 126 kg (277 lb 12.8 oz)   SpO2 96%   BMI 36.65 kg/m²   General appearance: No acute changes   Neck: Trachea midline; NECK, supple, no thyromegaly or lymphadenopathy   Lungs: Normal size and shape, normal breath sounds, equal distribution of air, no rales and rhonchi   CV: S1-S2 regular, no murmurs, no rub, no gallop   Abdomen: Soft, non-tender; no masses , no abnormal abdominal sounds   Extremities: No " deformity , normal color , no peripheral edema   Skin: Normal temperature, turgor and texture; no rash, ulcers            Cardiographics  Telemetry:    SR rate 70s      EC/20 SR with PVCs and prolonged QT rate 60s      18 sinus tach rate 110s with prolonged QT interval           Echocardiogram:     Interpretation Summary     · Mild-to-moderate mitral valve regurgitation is present  · Mild aortic valve regurgitation is present.  · Mild pulmonic valve regurgitation is present.  · Mild to moderate tricuspid valve regurgitation is present.  · Right ventricular cavity is mildly dilated.  · The left ventricular cavity is moderately dilated.  · Left atrial cavity size is mildly dilated.  · Calculated EF = 31.0%.  · There is no evidence of pericardial effusion.             Lab Review   Results from last 7 days   Lab Units 19  0520 19  1952 19  1746 19  1207   CK TOTAL U/L  --   --   --  49   TROPONIN T ng/mL <0.010 <0.010 <0.010  --      Results from last 7 days   Lab Units 19  0547   MAGNESIUM mg/dL 2.3     Results from last 7 days   Lab Units 19  0425   SODIUM mmol/L 141   POTASSIUM mmol/L 3.8   BUN mg/dL 24*   CREATININE mg/dL 0.81   CALCIUM mg/dL 8.7       Results from last 7 days   Lab Units 19  0425 19  0547 19  0520   WBC 10*3/mm3 3.92 4.75 4.15   HEMOGLOBIN g/dL 13.1 13.3 12.2*   HEMATOCRIT % 42.4 42.8 39.3   PLATELETS 10*3/mm3 109* 123* 116*     Estimated Creatinine Clearance: 82.6 mL/min (by C-G formula based on SCr of 0.81 mg/dL).    I have reviewed the medical decision making with Dr. Quezada in detail.       Assessment:  1.  CAD  2.  Pneumothorax (spontaneous)  3.  Thrombocytopenia  4.  CHF (systolic)  5.  Paroxysmal atrial fibrillation  6.  Cardiomyopathy  7.  Hypertension     Plan:  Platelet count remains low at 109.  Will monitor outpatient given need for DAPT for one year with JOHN placement.  Currently on maximal medical therapy  "to include BB, ticagrelor, asa, and statin.  Continue these medications at discharge.  No s/s of CHF exacerbation noted at this time, no adventitious lung sounds or BLE edema.  BP controlled and WNL.  Continue ARB and BB.  SR on telemetry, not on anticoagulation given age and frailty.  EF noted to be low at this time, on maximum medical therapy to include ARB.  ICD not recommended at this time given patient age.  OK to discharge, stable from CV standpoint.       Keep previously scheduled appt with Dr. Quezada.  Continue current cardiac medications.     )8/21/2019  CANDELARIA Fong    Patient personally interviewed and above subjective findings personally confirmed during a face to face contact with patient today  All findings of physical examination confirmed  All pertinent and performed labs, cardiac procedures ,  radiographs of the last 24 hours personally reviewed  Impression and plans discussed/elaborated and implemented jointly as described above     Maninder Quezada MD            EMR Beata/Transcription:   \"Dictated utilizing Dragon dictation\".   "

## 2019-08-21 NOTE — PLAN OF CARE
Problem: Patient Care Overview  Goal: Plan of Care Review  Outcome: Ongoing (interventions implemented as appropriate)   08/21/19 4864   Plan of Care Review   Progress improving   OTHER   Outcome Summary Rradial site c/d/i and soft. VSS. RA. No complaints of pain. CXR this am to revaluate pneumo.No falls Poss d/c today. Will continue to monitor    Coping/Psychosocial   Plan of Care Reviewed With patient

## 2019-08-21 NOTE — DISCHARGE SUMMARY
PHYSICIAN DISCHARGE SUMMARY                                                                        [facility]    Patient Identification:  Name: Dinesh hCurchill Sr.  Age: 89 y.o.  Sex: male  :  1928  MRN: [id number]  Primary Care Physician: Phyllis Loving PA    Admit date: 2019  Discharge date and time:2019  Discharged Condition: good    Discharge Diagnoses:  Active Hospital Problems    Diagnosis  POA   • **Spontaneous pneumothorax [J93.83]  Yes   • Thrombocytopenia (CMS/HCC) [D69.6]  No   • Acute systolic (congestive) heart failure (CMS/HCC) [I50.21]  No   • Chest pain [R07.9]  Yes   • Paroxysmal atrial fibrillation (CMS/HCC) [I48.0]  Yes   • Dyslipidemia [E78.5]  Yes   • Coronary artery disease involving native coronary artery of native heart without angina pectoris [I25.10]  Yes   • Cardiomyopathy (CMS/HCC) [I42.9]  Yes   • Essential hypertension [I10]  Yes      Resolved Hospital Problems   No resolved problems to display.          PMHX:   Past Medical History:   Diagnosis Date   • Abnormal ECG    • Abnormal MRI    • Acute frontal sinusitis    • Arthritis    • Chronic fatigue    • Coronary artery disease    • Dizziness    • Hearing aid worn     left   • Hearing loss    • Hyperlipidemia    • Hypertension    • Kidney stones    • Macrocytosis    • Neoplasm of skin    • Osteoporosis    • Prediabetes    • Vitamin D deficiency      PSHX:   Past Surgical History:   Procedure Laterality Date   • CARDIAC CATHETERIZATION N/A 2017    Procedure: Left Heart Cath;  Surgeon: Maninder Quezada MD;  Location: [facility] INVASIVE LOCATION;  Service:    • CARDIAC CATHETERIZATION N/A 2017    Procedure: Stent BMS coronary;  Surgeon: Maninder Quezada MD;  Location: [facility] CATH INVASIVE LOCATION;  Service:    • CARDIAC CATHETERIZATION N/A 2019    Procedure: Left Heart Cath;  Surgeon: Maninder Quezada MD;   Location:  [facility] CATH INVASIVE LOCATION;  Service: Cardiology   • CARDIAC CATHETERIZATION N/A 8/8/2019    Procedure: Left ventriculography;  Surgeon: Maninder Quezada MD;  Location:  [facility] CATH INVASIVE LOCATION;  Service: Cardiology   • CARDIAC CATHETERIZATION N/A 8/8/2019    Procedure: Coronary angiography;  Surgeon: Maninder Quezada MD;  Location:  [facility] CATH INVASIVE LOCATION;  Service: Cardiology   • CARDIAC CATHETERIZATION N/A 8/20/2019    Procedure: CORONARY ANGIOGRAPHY;  Surgeon: Maninder Quezada MD;  Location:  [facility] CATH INVASIVE LOCATION;  Service: Cardiovascular   • CARDIAC CATHETERIZATION N/A 8/20/2019    Procedure: Stent BMS coronary;  Surgeon: Maninder Quezada MD;  Location:  [facility] CATH INVASIVE LOCATION;  Service: Cardiovascular   • HERNIA REPAIR      x 2   • TX RT/LT HEART CATHETERS N/A 5/23/2017    Procedure: Percutaneous Coronary Intervention;  Surgeon: Maninder Quezada MD;  [facility];  Service: Cardiovascular       Hospital Course: Dinesh Churchill Sr.  a 89-year-old gentleman with a history of coronary artery disease with recent cath and noted obstructive disease. The hope is been that he would improve with medical management. He presents again with very similar left-sided chest pain however his chest x-ray on this evaluation shows a small apical pneumothorax which was redemonstrated on CAT scan. The plan at this point is to admitted to the hospital repeat his chest x-ray in the morning and from a pneumothorax standpoint if it stable would likely be okay to go home. However given his symptoms are very similar to his prior to presentations with his symptomatic coronary artery disease I have asked cardiology to evaluate as well. In their discharge summary they said that if he continued to have pain they would consider stent placement.          The patient was admitted to hospital and seen by thoracic surgery and cardiology.  He was felt to  be having some symptomatic chest pain and underwent angioplasty with stent placement.  Thoracic surgery felt that his pneumothorax was resolving and did not require any intervention.  He felt much better after being in the hospital for couple days and having stent placement and looked well enough to go home.  He will follow-up with cardiology and also follow-up with his primary care doctor.    Consults:     Consults     Date and Time Order Name Status Description    8/18/2019 2232 Inpatient Thoracic Surgery Consult Completed     8/18/2019 2232 Inpatient Cardiology Consult Completed     8/18/2019 2139 Cardiology (on-call MD unless specified) Completed     8/18/2019 2127 [facility] (on-call MD unless specified) Details Completed     8/18/2019 2058 Inpatient Thoracic Surgery Consult Completed     8/6/2019 0947 Cardiology (on-call MD unless specified) Completed         Results from last 7 days   Lab Units 08/21/19  0425   WBC 10*3/mm3 3.92   HEMOGLOBIN g/dL 13.1   HEMATOCRIT % 42.4   PLATELETS 10*3/mm3 109*     Results from last 7 days   Lab Units 08/21/19  0425   SODIUM mmol/L 141   POTASSIUM mmol/L 3.8   CHLORIDE mmol/L 104   CO2 mmol/L 24.9   BUN mg/dL 24*   CREATININE mg/dL 0.81   GLUCOSE mg/dL 81   CALCIUM mg/dL 8.7     Significant Diagnostic Studies:   Lab Results   Component Value Date    WBC 3.92 08/21/2019    HGB 13.1 08/21/2019    HCT 42.4 08/21/2019     (L) 08/21/2019     Lab Results   Component Value Date     08/21/2019    K 3.8 08/21/2019     08/21/2019    CO2 24.9 08/21/2019    BUN 24 (H) 08/21/2019    CREATININE 0.81 08/21/2019    GLUCOSE 81 08/21/2019     Lab Results   Component Value Date    CALCIUM 8.7 08/21/2019    MG 2.3 08/20/2019     Lab Results   Component Value Date    AST 17 08/18/2019    ALT 13 08/18/2019    ALKPHOS 51 08/18/2019     No results found for: APTT, INR  No results found for: COLORU, CLARITYU, SPECGRAV, PHUR, PROTEINUR, GLUCOSEU, KETONESU, BLOODU, NITRITE, LEUKOCYTESUR,  BILIRUBINUR, UROBILINOGEN, RBCUA, WBCUA, BACTERIA, UACOMMENT  Lab Results   Component Value Date    TROPONINT <0.010 08/19/2019     No components found for: HGBA1C;2  No components found for: TSH;2  Imaging Results (all)     Procedure Component Value Units Date/Time    XR Chest 1 View [[id number]] Collected:  08/21/19 0800     Updated:  08/21/19 0809    Narrative:       Portable chest radiograph     HISTORY:Follow-up pneumothorax     TECHNIQUE: Single AP portable radiograph of the chest     COMPARISON:Multiple chest radiographs back to 8/6/2019       Impression:       FINDINGS AND IMPRESSION:  Increased opacification over the right lung worsening of primarily  linear configuration and bordered by lucent lines are favored to to be  related to overlying skin folds. Overall the appearance of the right  lung has significantly improved since 8/19/2019. Mild left basilar  pulmonary opacification with a smaller pleural effusion is present, as  before, favored represents mild pulmonary edema. Atypical pneumonia is  also possible that it is likely. Correlation with patient history is  recommended.     Small left apical pneumothorax persists and is grossly unchanged since  8/20/2019 but improved since 8/19/2019. The heart is moderately  enlarged.     This report was finalized on 8/21/2019 8:06 AM by Dr. Christopher Fernández M.D.       XR Chest 1 View [[id number]] Collected:  08/20/19 0500     Updated:  08/20/19 0505    Narrative:       CHEST RADIOGRAPH     HISTORY: Pneumothorax     COMPARISON: 08/19/2019     FINDINGS:  Left-sided pneumothorax is again seen. I'm not convinced it is  significantly changed in size compared to prior exam. Cardiomegaly is  identified. There is vascular congestion, although it appears slightly  improved. There is bibasilar atelectasis. There are bilateral pleural  effusions.       Impression:       Stable left-sided pneumothorax.     This report was finalized on 8/20/2019 5:02 AM by Dr. Giron  CB Xavier       XR Chest 2 View [[id number]] Collected:  08/19/19 1037     Updated:  08/19/19 1043    Narrative:       Portable chest radiograph     HISTORY:Pneumothorax     TECHNIQUE: AP and lateral portable radiographs of the chest     COMPARISON:Chest radiographs and back 12/06/2018 an chest CT 08/18/2019       Impression:       FINDINGS AND IMPRESSION:  There is interval development of new pulmonary opacification throughout  the right lung, most notably within the right lung base. Additionally,  the degree of pulmonary opacification seen in the left base has  increased. Small-to-moderate bilateral pleural effusions present, left  greater than right. Findings represent worsening pulmonary edema versus  multifocal pneumonia and correlation with patient history is  recommended.     A small left pneumothorax is present approximately 1 cm pleural  parenchymal separation, as before. Heart size can't be determined due to  adjacent opacification.     This report was finalized on 8/19/2019 10:40 AM by Dr. Christopher Fernández M.D.       CT Chest Without Contrast [[id number]] Collected:  08/18/19 2042     Updated:  08/18/19 2051    Narrative:       THORACIC CT SCAN WITHOUT CONTRAST     HISTORY:  left apical ptx     COMPARISON: None.     TECHNIQUE:  Radiation dose reduction techniques were utilized, including  automated exposure control and exposure modulation based on body size.  Axial images of the thorax obtained without IV contrast, per request.     FINDINGS: There is a miniscule left pneumothorax which largely collects  anteriorly. There are small pleural effusions, left greater than right  with some adjacent secondary atelectasis. Neither effusion appears  loculated. There are calcified residua of granulomatous disease present.   Mild fibrotic changes present in the apical segments posteriorly. Some  chronic appearing fibrosis and bronchiectasis is present in the right  middle lobe.      There is marked  cardiomegaly with small pericardial effusion. No obvious  adenopathy by noncontrast technique. 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 The descending thoracic aorta is also mildly aneurysmal  at 3.1 x 2.9 cm. The lumen of the aorta cannot be assessed without  contrast.     Unenhanced images of the included upper abdomen partially includes the  adrenal glands which appear enlarged, likely hyperplasia     Bone window images demonstrate multiple old appearing left rib  deformities. No acute osseous finding          Impression:       1. Miniscule left pneumothorax with mild fibrotic changes.  2. Small pericardial and bilateral pleural effusions, left greater than  right with some adjacent atelectasis.  3. There is suggestion of adrenal hyperplasia                    .      This report was finalized on 8/18/2019 8:48 PM by Flip Malcolm M.D.       XR Chest 2 View [[id number]] Collected:  08/18/19 1855     Updated:  08/18/19 1955    Narrative:       AP AND LATERAL CHEST     HISTORY: Chest pain, coronary artery disease, hypertension.     COMPARISON: AP chest 08/06/2019.     FINDINGS: Heart size is enlarged. Left greater than right pleural  effusions are present and there is associated basilar opacity that  appears similar to the exam 12 days ago. No new focal airspace disease  has developed. Within the left lung apex there is potential small left  pneumothorax that was not demonstrated on the prior exam 12 days ago.       Impression:       Potential small left apical pneumothorax. There is a  curvilinear band at the left apex not demonstrated on any previous  studies. This could be due to differences in projection though a left  apical pneumothorax could have this appearance and this could be further  evaluated with CT.  There is otherwise cardiomegaly, left greater than  right pleural effusions and unchanged left basilar opacity.     Discussed with Dr. Herman in the emergency department on 08/18/2019 at  6:28  PM.      This report was finalized on 8/18/2019 7:52 PM by Dr. Gm Brown M.D.           Lab Results (last 7 days)     Procedure Component Value Units Date/Time    Basic Metabolic Panel [[id number]]  (Abnormal) Collected:  08/21/19 0425    Specimen:  Blood Updated:  08/21/19 0642     Glucose 81 mg/dL      BUN 24 mg/dL      Creatinine 0.81 mg/dL      Sodium 141 mmol/L      Potassium 3.8 mmol/L      Chloride 104 mmol/L      CO2 24.9 mmol/L      Calcium 8.7 mg/dL      eGFR Non African Amer 89 mL/min/1.73      BUN/Creatinine Ratio 29.6     Anion Gap 12.1 mmol/L     Narrative:       GFR Normal >60  Chronic Kidney Disease <60  Kidney Failure <15    CBC (No Diff) [[id number]]  (Abnormal) Collected:  08/21/19 0425    Specimen:  Blood Updated:  08/21/19 0616     WBC 3.92 10*3/mm3      RBC 3.97 10*6/mm3      Hemoglobin 13.1 g/dL      Hematocrit 42.4 %      .8 fL      MCH 33.0 pg      MCHC 30.9 g/dL      RDW 15.5 %      RDW-SD 61.4 fl      MPV 11.4 fL      Platelets 109 10*3/mm3     POC Activated Clotting Time [[id number]]  (Abnormal) Collected:  08/20/19 0836    Specimen:  Blood Updated:  08/21/19 0609     Activated Clotting Time  362 Seconds      Comment: Serial Number: [id number]:  [id number]       CBC & Differential [[id number]] Collected:  08/20/19 0547    Specimen:  Blood Updated:  08/20/19 0719    Narrative:       The following orders were created for panel order CBC & Differential.  Procedure                               Abnormality         Status                     ---------                               -----------         ------                     CBC Auto Differential[[id number]]        Abnormal            Final result                 Please view results for these tests on the individual orders.    CBC Auto Differential [[id number]]  (Abnormal) Collected:  08/20/19 0547    Specimen:  Blood Updated:  08/20/19 0719     WBC 4.75 10*3/mm3      RBC 4.05 10*6/mm3      Hemoglobin 13.3 g/dL      Hematocrit 42.8  %      .7 fL      MCH 32.8 pg      MCHC 31.1 g/dL      RDW 15.7 %      RDW-SD 61.3 fl      MPV 11.4 fL      Platelets 123 10*3/mm3      Neutrophil % 58.3 %      Lymphocyte % 27.2 %      Monocyte % 9.5 %      Eosinophil % 4.2 %      Basophil % 0.4 %      Neutrophils, Absolute 2.77 10*3/mm3      Lymphocytes, Absolute 1.29 10*3/mm3      Monocytes, Absolute 0.45 10*3/mm3      Eosinophils, Absolute 0.20 10*3/mm3      Basophils, Absolute 0.02 10*3/mm3     Magnesium [[id number]]  (Normal) Collected:  08/20/19 0547    Specimen:  Blood Updated:  08/20/19 0652     Magnesium 2.3 mg/dL     Basic Metabolic Panel [[id number]]  (Abnormal) Collected:  08/20/19 0547    Specimen:  Blood Updated:  08/20/19 0652     Glucose 87 mg/dL      BUN 25 mg/dL      Creatinine 0.79 mg/dL      Sodium 140 mmol/L      Potassium 4.1 mmol/L      Chloride 102 mmol/L      CO2 28.8 mmol/L      Calcium 9.0 mg/dL      eGFR Non African Amer 92 mL/min/1.73      BUN/Creatinine Ratio 31.6     Anion Gap 9.2 mmol/L     Narrative:       GFR Normal >60  Chronic Kidney Disease <60  Kidney Failure <15    BNP [[id number]]  (Abnormal) Collected:  08/20/19 0547    Specimen:  Blood Updated:  08/20/19 0649     proBNP 4,825.0 pg/mL     Narrative:       Among patients with dyspnea, NT-proBNP is highly sensitive for the detection of acute congestive heart failure. In addition NT-proBNP of <300 pg/ml effectively rules out acute congestive heart failure with 99% negative predictive value.    BNP [[id number]]  (Abnormal) Collected:  08/19/19 0520    Specimen:  Blood Updated:  08/19/19 1340     proBNP 7,396.0 pg/mL     Narrative:       Among patients with dyspnea, NT-proBNP is highly sensitive for the detection of acute congestive heart failure. In addition NT-proBNP of <300 pg/ml effectively rules out acute congestive heart failure with 99% negative predictive value.    Troponin [[id number]]  (Normal) Collected:  08/19/19 0520    Specimen:  Blood Updated:  08/19/19 0613      Troponin T <0.010 ng/mL     Narrative:       Troponin T Reference Range:  <= 0.03 ng/mL-   Negative for AMI  >0.03 ng/mL-     Abnormal for myocardial necrosis.  Clinicians would have to utilize clinical acumen, EKG, Troponin and serial changes to determine if it is an Acute Myocardial Infarction or myocardial injury due to an underlying chronic condition.     Basic Metabolic Panel [[id number]]  (Abnormal) Collected:  08/19/19 0520    Specimen:  Blood Updated:  08/19/19 0612     Glucose 96 mg/dL      BUN 22 mg/dL      Creatinine 0.86 mg/dL      Sodium 146 mmol/L      Potassium 4.3 mmol/L      Chloride 111 mmol/L      CO2 24.2 mmol/L      Calcium 8.3 mg/dL      eGFR Non African Amer 83 mL/min/1.73      BUN/Creatinine Ratio 25.6     Anion Gap 10.8 mmol/L     Narrative:       GFR Normal >60  Chronic Kidney Disease <60  Kidney Failure <15    CBC (No Diff) [[id number]]  (Abnormal) Collected:  08/19/19 0520    Specimen:  Blood Updated:  08/19/19 0558     WBC 4.15 10*3/mm3      RBC 3.73 10*6/mm3      Hemoglobin 12.2 g/dL      Hematocrit 39.3 %      .4 fL      MCH 32.7 pg      MCHC 31.0 g/dL      RDW 16.0 %      RDW-SD 62.6 fl      MPV 11.5 fL      Platelets 116 10*3/mm3     POC Glucose Once [[id number]]  (Normal) Collected:  08/18/19 2305    Specimen:  Blood Updated:  08/18/19 2306     Glucose 100 mg/dL     Troponin [[id number]]  (Normal) Collected:  08/18/19 1952    Specimen:  Blood Updated:  08/18/19 2025     Troponin T <0.010 ng/mL     Narrative:       Troponin T Reference Range:  <= 0.03 ng/mL-   Negative for AMI  >0.03 ng/mL-     Abnormal for myocardial necrosis.  Clinicians would have to utilize clinical acumen, EKG, Troponin and serial changes to determine if it is an Acute Myocardial Infarction or myocardial injury due to an underlying chronic condition.     [place] Draw [[id number]] Collected:  08/18/19 1746    Specimen:  Blood Updated:  08/18/19 1900    Narrative:       The following orders were created for  panel order [place] Draw.  Procedure                               Abnormality         Status                     ---------                               -----------         ------                     Light Blue Top[[id number]]                                   Final result               Green Top (Gel)[[id number]]                                  Final result               Lavender Top[[id number]]                                     Final result               Gold Top - SST[[id number]]                                   Final result                 Please view results for these tests on the individual orders.    Light Blue Top [[id number]] Collected:  08/18/19 1746    Specimen:  Blood Updated:  08/18/19 1900     Extra Tube hold for add-on     Comment: Auto resulted       Green Top (Gel) [[id number]] Collected:  08/18/19 1746    Specimen:  Blood Updated:  08/18/19 1900     Extra Tube Hold for add-ons.     Comment: Auto resulted.       Lavender Top [[id number]] Collected:  08/18/19 1746    Specimen:  Blood Updated:  08/18/19 1900     Extra Tube hold for add-on     Comment: Auto resulted       Gold Top - SST [[id number]] Collected:  08/18/19 1746    Specimen:  Blood Updated:  08/18/19 1900     Extra Tube Hold for add-ons.     Comment: Auto resulted.       Comprehensive Metabolic Panel [[id number]]  (Abnormal) Collected:  08/18/19 1746    Specimen:  Blood Updated:  08/18/19 1823     Glucose 131 mg/dL      BUN 21 mg/dL      Creatinine 0.93 mg/dL      Sodium 144 mmol/L      Potassium 4.5 mmol/L      Chloride 109 mmol/L      CO2 22.1 mmol/L      Calcium 8.4 mg/dL      Total Protein 6.1 g/dL      Albumin 3.90 g/dL      ALT (SGPT) 13 U/L      AST (SGOT) 17 U/L      Comment: Specimen hemolyzed.  Results may be affected.        Alkaline Phosphatase 51 U/L      Total Bilirubin 1.0 mg/dL      eGFR Non African Amer 76 mL/min/1.73      Globulin 2.2 gm/dL      A/G Ratio 1.8 g/dL      BUN/Creatinine Ratio 22.6     Anion Gap 12.9 mmol/L      Narrative:       GFR Normal >60  Chronic Kidney Disease <60  Kidney Failure <15    Troponin [[id number]]  (Normal) Collected:  08/18/19 1746    Specimen:  Blood Updated:  08/18/19 1820     Troponin T <0.010 ng/mL     Narrative:       Troponin T Reference Range:  <= 0.03 ng/mL-   Negative for AMI  >0.03 ng/mL-     Abnormal for myocardial necrosis.  Clinicians would have to utilize clinical acumen, EKG, Troponin and serial changes to determine if it is an Acute Myocardial Infarction or myocardial injury due to an underlying chronic condition.     CBC & Differential [[id number]] Collected:  08/18/19 1746    Specimen:  Blood Updated:  08/18/19 1806    Narrative:       The following orders were created for panel order CBC & Differential.  Procedure                               Abnormality         Status                     ---------                               -----------         ------                     CBC Auto Differential[[id number]]        Abnormal            Final result                 Please view results for these tests on the individual orders.    CBC Auto Differential [[id number]]  (Abnormal) Collected:  08/18/19 1746    Specimen:  Blood Updated:  08/18/19 1806     WBC 7.03 10*3/mm3      RBC 4.02 10*6/mm3      Hemoglobin 13.0 g/dL      Hematocrit 42.1 %      .7 fL      MCH 32.3 pg      MCHC 30.9 g/dL      RDW 15.9 %      RDW-SD 62.4 fl      MPV 11.5 fL      Platelets 145 10*3/mm3      Neutrophil % 74.0 %      Lymphocyte % 16.4 %      Monocyte % 7.8 %      Eosinophil % 1.1 %      Basophil % 0.3 %      Immature Grans % 0.4 %      Neutrophils, Absolute 5.20 10*3/mm3      Lymphocytes, Absolute 1.15 10*3/mm3      Monocytes, Absolute 0.55 10*3/mm3      Eosinophils, Absolute 0.08 10*3/mm3      Basophils, Absolute 0.02 10*3/mm3      Immature Grans, Absolute 0.03 10*3/mm3      nRBC 0.1 /100 WBC         /70 (BP Location: Left arm, Patient Position: Lying)   Pulse 80   Temp 97.8 °F (36.6 °C) (Oral)   Resp  "16   Ht 185.4 cm (73\")   Wt 126 kg (277 lb 12.8 oz)   SpO2 96%   BMI 36.65 kg/m²     Discharge Exam:  General Appearance:    Alert, cooperative, no distress                          Head:    Normocephalic, without obvious abnormality, atraumatic                          Eyes:                            Throat:   Lips, tongue, gums normal                          Neck:   Supple, symmetrical, trachea midline, no JVD                        Lungs:     Clear to auscultation bilaterally, respirations unlabored                Chest Wall:    No tenderness or deformity                        Heart:    Regular rate and rhythm, S1 and S2 normal, no murmur,no  Rub or gallop                  Abdomen:     Soft, non-tender, bowel sounds active, no masses, no organomegaly                  Extremities:   Extremities normal, atraumatic, no cyanosis or edema                             Skin:   Skin is warm and dry,  no rashes or palpable lesions                  Neurologic:   no focal deficits noted     Disposition:  Home    Patient Instructions:      Discharge Medications      New Medications      Instructions Start Date   aspirin 81 MG chewable tablet  Replaces:  aspirin  MG tablet   81 mg, Oral, Daily   Start Date:  8/22/2019     ticagrelor 90 MG tablet tablet  Commonly known as:  BRILINTA   90 mg, Oral, 2 Times Daily         Continue These Medications      Instructions Start Date   acetaminophen 500 MG tablet  Commonly known as:  TYLENOL   1-2 TABLETS BY ONCE TO TWICE DAILY AS NEEDED FOR PAIN      atorvastatin 20 MG tablet  Commonly known as:  LIPITOR   20 mg, Oral, Nightly      isosorbide mononitrate 60 MG 24 hr tablet  Commonly known as:  IMDUR   TAKE 1 TABLET BY MOUTH EVERY DAY      losartan 50 MG tablet  Commonly known as:  COZAAR   50 mg, Oral, Daily      Lumbar Back Brace/Support Pad misc   Use for lifting/ strenuous exercise.      metoprolol succinate XL 50 MG 24 hr tablet  Commonly known as:  TOPROL-XL   50 mg, " Oral, Daily      nitroglycerin 0.4 MG SL tablet  Commonly known as:  NITROSTAT   0.4 mg, Sublingual, Every 5 Minutes PRN, Take no more than 3 doses in 15 minutes.      Vitamin D-3 1000 units capsule   1,000 Units, Oral, Daily         Stop These Medications    aspirin  MG tablet  Replaced by:  aspirin 81 MG chewable tablet          Future Appointments   Date Time Provider Department Center   8/28/2019 10:00 AM NURSE/MA PC [facility] None   9/4/2019 11:30 AM Juliana Jesus APRN [facility] None   9/25/2019 10:00 AM NURSE/MA [facility] [place] [facility] None   10/23/2019 10:00 AM NURSE/[facility] [place] [facility] None   11/27/2019 10:00 AM NURSE[facility] [place] [facility] [facility] None   12/27/2019 10:00 AM NURSE/MA [facility] None     Follow-up Information     Maninder Quezada MD Follow up.    Specialty:  Cardiology  Contact information:  [address] [facility] [address] [phone]             Phyllis Loving PA Follow up in 1 week(s).    Specialty:  Family Medicine  Contact information:  [address] [facility] [address] [phone]                 Discharge Order (From admission, onward)    Start     Ordered    08/21/19 1459  Discharge patient  Once     Expected Discharge Date:  08/21/19    Discharge Disposition:  Home or Self Care    Physician of Record for Attribution - Please select from Treatment Team:  ANGELICA SAMANIEGO [[id number]]    Review needed by CMO to determine Physician of Record:  No       Question Answer Comment   Physician of Record for Attribution - Please select from Treatment Team ANGELICA SAMANIEGO    Review needed by CMO to determine Physician of Record No        08/21/19 1501          Total time spent discharging patient including evaluation,post hospitalization follow up,  medication and post hospitalization instructions and education total time exceeds 30 minutes.    Signed:  Angelica Samaniego MD  8/21/2019  3:02 PM

## 2019-08-21 NOTE — PROGRESS NOTES
"    Chief Complaint: spontaneous pneumothorax follow-up  S/P: cardiac catheterization  POD # 1    Subjective:  Symptoms:  Improved.  No shortness of breath or chest pain.    Diet:  Adequate intake.  No nausea or vomiting.    Activity level: Returning to normal.    Pain:  He reports no pain.        Vital Signs:  Temp:  [97.5 °F (36.4 °C)-98.6 °F (37 °C)] 98.3 °F (36.8 °C)  Heart Rate:  [65-80] 67  Resp:  [16-18] 16  BP: (135-169)/(68-81) 141/81    Intake & Output (last day)       08/20 0701 - 08/21 0700 08/21 0701 - 08/22 0700    P.O. 360 120    I.V. (mL/kg) 100 (0.8)     Total Intake(mL/kg) 460 (3.7) 120 (1)    Urine (mL/kg/hr) 200 (0.1)     Stool      Total Output 200     Net +260 +120          Urine Unmeasured Occurrence 5 x 1 x    Stool Unmeasured Occurrence 1 x           Objective:  General Appearance:  Comfortable, in no acute distress, well-appearing and not in pain.    Vital signs: (most recent): Blood pressure 141/81, pulse 67, temperature 98.3 °F (36.8 °C), temperature source Oral, resp. rate 16, height 185.4 cm (73\"), weight 126 kg (277 lb 12.8 oz), SpO2 96 %.  Vital signs are normal.    Output: Producing urine.    HEENT: Normal HEENT exam.    Lungs:  Normal effort and normal respiratory rate.  Breath sounds clear to auscultation.  He is not in respiratory distress.  No rales, wheezes or rhonchi.    Heart: Normal rate.  Regular rhythm.  S1 normal and S2 normal.  No murmur.   Chest: No chest wall tenderness.    Abdomen: Abdomen is soft.  Bowel sounds are normal.   There is no abdominal tenderness.   There is no mass.   Extremities: Normal range of motion.  There is no dependent edema.    Pulses: Distal pulses are intact.    Neurological: Patient is alert and oriented to person, place and time.    Skin:  Warm and dry.              Results Review:     I reviewed the patient's new clinical results.  I reviewed the patient's new imaging results and agree with the interpretation.  I reviewed the patient's other " test results and agree with the interpretation  Discussed with patient, son at bedside, and Dr. Ruelas.    Imaging Results (last 24 hours)     Procedure Component Value Units Date/Time    XR Chest 1 View [[id number]] Collected:  08/21/19 0800     Updated:  08/21/19 0809    Narrative:       Portable chest radiograph     HISTORY:Follow-up pneumothorax     TECHNIQUE: Single AP portable radiograph of the chest     COMPARISON:Multiple chest radiographs back to 8/6/2019       Impression:       FINDINGS AND IMPRESSION:  Increased opacification over the right lung worsening of primarily  linear configuration and bordered by lucent lines are favored to to be  related to overlying skin folds. Overall the appearance of the right  lung has significantly improved since 8/19/2019. Mild left basilar  pulmonary opacification with a smaller pleural effusion is present, as  before, favored represents mild pulmonary edema. Atypical pneumonia is  also possible that it is likely. Correlation with patient history is  recommended.     Small left apical pneumothorax persists and is grossly unchanged since  8/20/2019 but improved since 8/19/2019. The heart is moderately  enlarged.     This report was finalized on 8/21/2019 8:06 AM by Dr. Christopher Fernández M.D.             Lab Results:     Lab Results (last 24 hours)     Procedure Component Value Units Date/Time    Basic Metabolic Panel [[id number]]  (Abnormal) Collected:  08/21/19 0425    Specimen:  Blood Updated:  08/21/19 0642     Glucose 81 mg/dL      BUN 24 mg/dL      Creatinine 0.81 mg/dL      Sodium 141 mmol/L      Potassium 3.8 mmol/L      Chloride 104 mmol/L      CO2 24.9 mmol/L      Calcium 8.7 mg/dL      eGFR Non African Amer 89 mL/min/1.73      BUN/Creatinine Ratio 29.6     Anion Gap 12.1 mmol/L     Narrative:       GFR Normal >60  Chronic Kidney Disease <60  Kidney Failure <15    CBC (No Diff) [[id number]]  (Abnormal) Collected:  08/21/19 0425    Specimen:  Blood Updated:  08/21/19  0616     WBC 3.92 10*3/mm3      RBC 3.97 10*6/mm3      Hemoglobin 13.1 g/dL      Hematocrit 42.4 %      .8 fL      MCH 33.0 pg      MCHC 30.9 g/dL      RDW 15.5 %      RDW-SD 61.4 fl      MPV 11.4 fL      Platelets 109 10*3/mm3     POC Activated Clotting Time [[id number]]  (Abnormal) Collected:  08/20/19 0836    Specimen:  Blood Updated:  08/21/19 0609     Activated Clotting Time  362 Seconds      Comment: Serial Number: [id number]:  [id number]              Assessment/Plan       Spontaneous pneumothorax    Coronary artery disease involving native coronary artery of native heart without angina pectoris     Acute systolic (congestive) heart failure (CMS/[facility])       Assessment:    Condition: In stable condition.  Improving.        Mr. Churchill is doing well today.  He claims his chest pain has resolved with his intervention.  I reviewed this morning's chest x-ray.  The very small left apical pneumothorax persist, but is stable in comparison to yesterday's imaging.  This is overall improved since his earlier chest x-ray on admission.     Patient is stable for discharge from our standpoint.  No thoracic surgical intervention or follow-up needed.      Encourage good pulmonary hygiene with IS hourly while awake. Recommend patient continues to perform incentive spirometry at discharge.    CANDELARIA Grover  [facility]  08/21/19  10:47 AM

## 2019-08-22 ENCOUNTER — READMISSION MANAGEMENT (OUTPATIENT)
Dept: CALL CENTER | Facility: HOSPITAL | Age: 84
End: 2019-08-22

## 2019-08-22 NOTE — OUTREACH NOTE
Prep Survey      Responses   Facility patient discharged from?  [facility]   Is patient eligible?  Yes   Discharge diagnosis  Spontaneous pneumothorax  angioplasty with stent placement. Thrombocytopenia Acute systolic (congestive) heart failure    Does the patient have one of the following disease processes/diagnoses(primary or secondary)?  CHF   Does the patient have Home health ordered?  No   Is there a DME ordered?  No   Prep survey completed?  Yes          Idalia Callaway RN

## 2019-08-23 ENCOUNTER — READMISSION MANAGEMENT (OUTPATIENT)
Dept: CALL CENTER | Facility: HOSPITAL | Age: 84
End: 2019-08-23

## 2019-08-23 NOTE — OUTREACH NOTE
CHF Week 1 Survey      Responses   Facility patient discharged from?  [facility]   Does the patient have one of the following disease processes/diagnoses(primary or secondary)?  CHF   Is there a successful TCM telephone encounter documented?  No   CHF Week 1 attempt successful?  Yes   Call start time  1209   Call end time  1212   Discharge diagnosis  Spontaneous pneumothorax  angioplasty with stent placement. Thrombocytopenia Acute systolic (congestive) heart failure    Is patient permission given to speak with other caregiver?  Yes   List who call center can speak with  Dinesh - son    Person spoke with today (if not patient) and relationship  Dinesh - abimbola    Meds reviewed with patient/caregiver?  Yes   Is the patient having any side effects they believe may be caused by any medication additions or changes?  No   Does the patient have all medications ordered at discharge?  Yes   Is the patient taking all medications as directed (includes completed medication regime)?  Yes   Does the patient have a primary care provider?   Yes   Does the patient have an appointment with their PCP within 7 days of discharge?  Yes   Has the patient kept scheduled appointments due by today?  N/A   Psychosocial issues?  No   Did the patient receive a copy of their discharge instructions?  Yes   Nursing interventions  Reviewed instructions with patient   What is the patient's perception of their health status since discharge?  Improving   Nursing interventions  Nurse provided patient education   Is the patient able to teach back Heart Failure diet management?  Yes   Is the patient able to teach back Heart Failure Zones?  Yes   Is the patient able to teach back signs and symptoms of worsening condition? (i.e. weight gain, shortness of air, etc.)  Yes   Is the patient/caregiver able to teach back the hierarchy of who to call/visit for symptoms/problems? PCP, Specialist, Home health nurse, Urgent Care, ED, 911  Yes    CHF Week 1 call  completed?  Yes   Wrap up additional comments  Son does not live with patient. He does talk to him regularly. He has not spoke to him today. Only able to get minimal information.           Larisa Shannon RN

## 2019-08-25 NOTE — PATIENT INSTRUCTIONS
89 year old male who presents today in follow up of hospitalization 8/6/19-8/9/19. He presented to the ER with chest pain in the am that radiated to his L upper extremity, accompanied with shortness of breath. EMS gave him nitroglycerin and an asa which relieved the symptoms. His EKG showed tachycardia heart rate 115, troponin was negative, and cxr was negative. On 8/7 he underwent a stress test which showed EF 46%, and medium-sized, moderately severe area of ischemia in the inferior wall and lateral wall. A high risk study. A cardiac catheterization 8/8 showed normal left main, LAD midportion 50% stenosis, first larg diagonal branch was approximately diffuse 90% stenosis, Circ artery was a nondominant midporion wa 0% stenosis, RCA was dominant with the proximal had a stent widely patent, Normal LV gram. It was determined that the 90% stenosis was the likely etiology of symptoms and he would undergo PCI if recurrence of symptoms and failure of conservative therapy. He was advised to follow up here and with cardiology 9/4/19. Patient reports all pain resolved and he is feeling ok. No increased SOA, CP, palpitations, dizziness, near syncope, neurological symptoms, or other concerns. He continues with joint pain but reports this does not prohibit him from daily activities.     He is also due for follow up of hypertension, hyperlipidemia, prediabetes, macrocytic anemia with B12 deficiency, vitamin D deficiency, and parkinson's disorder and history of CVA. Blood pressure today has improved from hospitalization. He will continue regimen and follow up with cardiology. We will defer medication management to cardiology, as they will also have to optimally manage CAD and pulse. He will have fasting labs today- call if no results in 1 week. Stability of conditions, plan, follow up, and further recommendations pending labs. He has had no weakness, recent falls, gait change, or worsening mental status or moods.     Of note,  patient has significant skin changes that are likely malignant. I will refer to dermatology for evaluation and treatment.

## 2019-08-28 ENCOUNTER — OFFICE VISIT (OUTPATIENT)
Dept: FAMILY MEDICINE CLINIC | Facility: CLINIC | Age: 84
End: 2019-08-28

## 2019-08-28 VITALS
SYSTOLIC BLOOD PRESSURE: 136 MMHG | OXYGEN SATURATION: 94 % | HEIGHT: 73 IN | BODY MASS INDEX: 24.39 KG/M2 | RESPIRATION RATE: 16 BRPM | TEMPERATURE: 97.8 F | HEART RATE: 72 BPM | WEIGHT: 184 LBS | DIASTOLIC BLOOD PRESSURE: 64 MMHG

## 2019-08-28 DIAGNOSIS — D53.9 MACROCYTIC ANEMIA: ICD-10-CM

## 2019-08-28 DIAGNOSIS — E53.8 B12 DEFICIENCY: ICD-10-CM

## 2019-08-28 DIAGNOSIS — I25.10 CORONARY ARTERY DISEASE INVOLVING NATIVE CORONARY ARTERY OF NATIVE HEART WITHOUT ANGINA PECTORIS: Primary | ICD-10-CM

## 2019-08-28 DIAGNOSIS — G20 PARKINSON'S DISEASE (HCC): ICD-10-CM

## 2019-08-28 DIAGNOSIS — D69.6 THROMBOCYTOPENIA (HCC): ICD-10-CM

## 2019-08-28 DIAGNOSIS — J93.83 OTHER PNEUMOTHORAX: ICD-10-CM

## 2019-08-28 PROCEDURE — 96372 THER/PROPH/DIAG INJ SC/IM: CPT | Performed by: PHYSICIAN ASSISTANT

## 2019-08-28 PROCEDURE — 99214 OFFICE O/P EST MOD 30 MIN: CPT | Performed by: PHYSICIAN ASSISTANT

## 2019-08-28 RX ORDER — CYANOCOBALAMIN 1000 UG/ML
1000 INJECTION, SOLUTION INTRAMUSCULAR; SUBCUTANEOUS
Status: DISCONTINUED | OUTPATIENT
Start: 2019-08-28 | End: 2019-09-23

## 2019-08-28 RX ADMIN — CYANOCOBALAMIN 1000 MCG: 1000 INJECTION, SOLUTION INTRAMUSCULAR; SUBCUTANEOUS at 11:57

## 2019-08-28 NOTE — PROGRESS NOTES
Transitional Care Follow Up Visit  Subjective     Dinesh Churchill Sr. is a 89 y.o. male who presents for a transitional care management visit.    Within 48 business hours after discharge our office contacted him via telephone to coordinate his care and needs.      I reviewed and discussed the details of that call along with the discharge summary, hospital problems, inpatient lab results, inpatient diagnostic studies, and consultation reports with Dinesh.     Current outpatient and discharge medications have been reconciled for the patient.  Reviewed by: Adrienne Edwards MA      No flowsheet data found.  Risk for Readmission (LACE) Score: 11 (8/21/2019  6:00 AM)      History of Present Illness   Course During Hospital Stay:  ***     {Common H&P Review Areas:34558}    Review of Systems   All other systems reviewed and are negative.      Objective   Physical Exam    Assessment/Plan   {Assess/PlanSmartLinks:43496}

## 2019-08-28 NOTE — PROGRESS NOTES
"Jose Churchill Sr. is a 89 y.o. male.     History of Present Illness     Patient was admitted to the hospital 8/18/2019 to 8/21/2019.  Per discharge summary: \"89-year-old gentleman with a history of coronary artery disease with recent cath and noted obstructive disease. The hope is been that he would improve with medical management. He presents again with very similar left-sided chest pain however his chest x-ray on this evaluation shows a small apical pneumothorax which was redemonstrated on CAT scan. The plan at this point is to admitted to the hospital repeat his chest x-ray in the morning and from a pneumothorax standpoint if it stable would likely be okay to go home. However given his symptoms are very similar to his prior to presentations with his symptomatic coronary artery disease I have asked cardiology to evaluate as well. In their discharge summary they said that if he continued to have pain they would consider stent placement.          The patient was admitted to hospital and seen by thoracic surgery and cardiology.  He was felt to be having some symptomatic chest pain and underwent angioplasty with stent placement.  Thoracic surgery felt that his pneumothorax was resolving and did not require any intervention.  He felt much better after being in the hospital for couple days and having stent placement and looked well enough to go home.  He will follow-up with cardiology and also follow-up with his primary care doctor.\"    Patient with pneumothorax and was seen by thoracic surgery with no intervention needed.  It was advised that she continue pulmonary exercises/ respiratory therapy/incentive spirometry and respiratory therapy determined he was doing okay.  He did bring a equipment home and continues with incentive spirometry.  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  No change in symptoms.  He has been feeling good " since his discharge from the hospital    The following portions of the patient's history were reviewed and updated as appropriate: allergies, current medications, past family history, past medical history, past social history, past surgical history and problem list.    Review of Systems   Musculoskeletal: Positive for arthralgias.   All other systems reviewed and are negative.      Objective   Physical Exam   Constitutional: He is oriented to person, place, and time. He appears well-developed.   HENT:   Head: Normocephalic and atraumatic.   Right Ear: External ear normal.   Left Ear: External ear normal.   Eyes: Conjunctivae are normal.   Neck: Carotid bruit is not present. No tracheal deviation present. No thyroid mass and no thyromegaly present.   Cardiovascular: Normal rate, regular rhythm and intact distal pulses.   Murmur heard.  Pulmonary/Chest: Effort normal. He has decreased breath sounds in the left lower field. He has no wheezes. He has no rhonchi. He has no rales.   Neurological: He is alert and oriented to person, place, and time. Gait normal.   Skin: Skin is warm and dry.   Psychiatric: He has a normal mood and affect. His behavior is normal. Judgment and thought content normal.   Nursing note and vitals reviewed.      Assessment/Plan   Dinesh was seen today for follow-up.    Diagnoses and all orders for this visit:    Coronary artery disease involving native coronary artery of native heart without angina pectoris  -     CBC & Differential    Other pneumothorax  -     XR Chest PA & Lateral    Macrocytic anemia  -     CBC & Differential    Parkinson's disease (CMS/HCC)    Thrombocytopenia (CMS/HCC)  -     CBC & Differential    B12 deficiency  -     cyanocobalamin injection 1,000 mcg      Patient Instructions   89-year-old male who presents today in follow-up of hospitalization 8/18/2019 through 8/21/2019 for chest pain.  He was previously admit 8/6 through 8/9/2019 with chest pain, elevated proBNP,  underwent heart cath with 90% blockage in one vessel.  They considered stenting but due to his age and other comorbid conditions, they wanted to try maximal medication therapy.  He had another episode of chest pain and was taken by ambulance back to the hospital 8/18/2019.  Patient then underwent angioplasty and stent at first large diagonal branch from LAD with approximately 90% diffuse stenosis.  proBNP did come down from 7,300-4,800.  He was also noted to have a small pneumothorax and was seen by thoracic surgery.  No intervention was necessary and he continues with pulmonary exercises/incentive spirometry.  He denies chest pain, shortness of breath, orthopnea, weakness, dizziness or any recurrence of symptoms.  He has follow-up appointment with cardiology 9/4/2019.  With review of procedures, labs, imaging, and hospitalization records, it is noted that his platelets were normal on admission but gradually decreased to 109,000 by discharge.  He was started on Brilinta after his stent.  Patient was seen by Dr. Richardson, hematology, for macrocytosis with mild anemia as well as thrombocytopenia.  His platelets have been doing okay but were decreased before discharge.  I will recheck CBC today.  If continued significant macrocytosis or thrombocytopenia, I will consult Dr. Richardson to see if he will need follow-up with them.  She to be seen ASAP if recurrence of symptoms, worsening, new or changing symptoms.  Otherwise keep follow-up with cardiology as directed by them.

## 2019-08-28 NOTE — PATIENT INSTRUCTIONS
89-year-old male who presents today in follow-up of hospitalization 8/18/2019 through 8/21/2019 for chest pain.  He was previously admit 8/6 through 8/9/2019 with chest pain, elevated proBNP, underwent heart cath with 90% blockage in one vessel.  They considered stenting but due to his age and other comorbid conditions, they wanted to try maximal medication therapy.  He had another episode of chest pain and was taken by ambulance back to the hospital 8/18/2019.  Patient then underwent angioplasty and stent at first large diagonal branch from LAD with approximately 90% diffuse stenosis.  proBNP did come down from 7,300-4,800.  He was also noted to have a small pneumothorax and was seen by thoracic surgery.  No intervention was necessary and he continues with pulmonary exercises/incentive spirometry.  He denies chest pain, shortness of breath, orthopnea, weakness, dizziness or any recurrence of symptoms.  He has follow-up appointment with cardiology 9/4/2019.  With review of procedures, labs, imaging, and hospitalization records, it is noted that his platelets were normal on admission but gradually decreased to 109,000 by discharge.  He was started on Brilinta after his stent.  Patient was seen by Dr. Richardson, hematology, for macrocytosis with mild anemia as well as thrombocytopenia.  His platelets have been doing okay but were decreased before discharge.  I will recheck CBC today.  If continued significant macrocytosis or thrombocytopenia, I will consult Dr. Richardson to see if he will need follow-up with them.  She to be seen ASAP if recurrence of symptoms, worsening, new or changing symptoms.  Otherwise keep follow-up with cardiology as directed by them.

## 2019-08-29 LAB
BASOPHILS # BLD AUTO: (no result) 10*3/UL
DIFFERENTIAL COMMENT: ABNORMAL
EOSINOPHIL # BLD AUTO: (no result) 10*3/UL
EOSINOPHIL NFR BLD AUTO: (no result) %
ERYTHROCYTE [DISTWIDTH] IN BLOOD BY AUTOMATED COUNT: 15.9 % (ref 12.3–15.4)
HCT VFR BLD AUTO: 40.9 % (ref 37.5–51)
HGB BLD-MCNC: 12.5 G/DL (ref 13–17.7)
LYMPHOCYTES # BLD AUTO: (no result) 10*3/UL
LYMPHOCYTES # BLD MANUAL: 0.79 10*3/MM3 (ref 0.7–3.1)
LYMPHOCYTES NFR BLD AUTO: (no result) %
LYMPHOCYTES NFR BLD MANUAL: 16 % (ref 19.6–45.3)
MCH RBC QN AUTO: 32.2 PG (ref 26.6–33)
MCHC RBC AUTO-ENTMCNC: 30.6 G/DL (ref 31.5–35.7)
MCV RBC AUTO: 105.4 FL (ref 79–97)
MONOCYTES # BLD MANUAL: 0.45 10*3/MM3 (ref 0.1–0.9)
MONOCYTES NFR BLD AUTO: (no result) %
MONOCYTES NFR BLD MANUAL: 9 % (ref 5–12)
NEUTROPHILS # BLD MANUAL: 3.72 10*3/MM3 (ref 1.7–7)
NEUTROPHILS NFR BLD AUTO: (no result) %
NEUTROPHILS NFR BLD MANUAL: 75 % (ref 42.7–76)
PLATELET # BLD AUTO: 144 10*3/MM3 (ref 140–450)
PLATELET BLD QL SMEAR: ABNORMAL
RBC # BLD AUTO: 3.88 10*6/MM3 (ref 4.14–5.8)
RBC MORPH BLD: ABNORMAL
WBC # BLD AUTO: 4.96 10*3/MM3 (ref 3.4–10.8)

## 2019-08-30 ENCOUNTER — READMISSION MANAGEMENT (OUTPATIENT)
Dept: CALL CENTER | Facility: HOSPITAL | Age: 84
End: 2019-08-30

## 2019-08-30 DIAGNOSIS — J90 PLEURAL EFFUSION: ICD-10-CM

## 2019-08-30 DIAGNOSIS — J93.83 OTHER PNEUMOTHORAX: Primary | ICD-10-CM

## 2019-08-30 RX ORDER — NITROFURANTOIN 25; 75 MG/1; MG/1
100 CAPSULE ORAL 2 TIMES DAILY
Qty: 14 CAPSULE | Refills: 0 | Status: SHIPPED | OUTPATIENT
Start: 2019-08-30 | End: 2019-09-08 | Stop reason: HOSPADM

## 2019-08-30 NOTE — OUTREACH NOTE
CHF Week 2 Survey      Responses   Facility patient discharged from?  [facility]   Does the patient have one of the following disease processes/diagnoses(primary or secondary)?  CHF   Week 2 attempt successful?  Yes   Call start time  1406   Call end time  1411   Discharge diagnosis  Spontaneous pneumothorax  angioplasty with stent placement. Thrombocytopenia Acute systolic (congestive) heart failure    Is patient permission given to speak with other caregiver?  Yes   List who call center can speak with  Dinesh - son    Person spoke with today (if not patient) and relationship  Dinesh - son    Meds reviewed with patient/caregiver?  Yes   Is the patient taking all medications as directed (includes completed medication regime)?  Yes   Medication comments  Son states that he is on the way to  antibiotic for patient to treat a UTI.    Does the patient have a primary care provider?   Yes   Has the patient kept scheduled appointments due by today?  Yes   Comments  Hospital Follow Up with CANDELARIA Macias , Wednesday Sep 4, 2019 11:30 AM    Has home health visited the patient within 72 hours of discharge?  N/A   Psychosocial issues?  No   Did the patient receive a copy of their discharge instructions?  Yes   Nursing interventions  Reviewed instructions with patient   What is the patient's perception of their health status since discharge?  New symptoms unrelated to diagnosis [Son states that PCP called that patient has a UTI. Starting antibiotic treatment today. ]   Nursing interventions  Nurse provided patient education   Is the patient weighing daily?  No   Daily weight interventions  Education provided on importance of daily weight   Is the patient able to teach back Heart Failure diet management?  Yes   Is the patient able to teach back Heart Failure Zones?  Yes   Is the patient able to teach back signs and symptoms of worsening condition? (i.e. weight gain, shortness of air, etc.)  Yes   Is the  patient/caregiver able to teach back the hierarchy of who to call/visit for symptoms/problems? PCP, Specialist, Home health nurse, Urgent Care, ED, 911  Yes   CHF Week 2 call completed?  Yes          Idalia Friedman RN

## 2019-09-04 ENCOUNTER — OFFICE VISIT (OUTPATIENT)
Dept: CARDIOLOGY | Facility: CLINIC | Age: 84
End: 2019-09-04

## 2019-09-04 VITALS
DIASTOLIC BLOOD PRESSURE: 57 MMHG | HEIGHT: 73 IN | BODY MASS INDEX: 23.72 KG/M2 | WEIGHT: 179 LBS | HEART RATE: 77 BPM | SYSTOLIC BLOOD PRESSURE: 149 MMHG

## 2019-09-04 DIAGNOSIS — E78.5 HYPERLIPIDEMIA, UNSPECIFIED HYPERLIPIDEMIA TYPE: ICD-10-CM

## 2019-09-04 DIAGNOSIS — I42.9 CARDIOMYOPATHY, UNSPECIFIED TYPE (HCC): Primary | ICD-10-CM

## 2019-09-04 DIAGNOSIS — I25.10 CORONARY ARTERY DISEASE INVOLVING NATIVE CORONARY ARTERY OF NATIVE HEART WITHOUT ANGINA PECTORIS: ICD-10-CM

## 2019-09-04 DIAGNOSIS — I10 ESSENTIAL HYPERTENSION: ICD-10-CM

## 2019-09-04 DIAGNOSIS — Z98.890 STATUS POST CARDIAC CATHETERIZATION: ICD-10-CM

## 2019-09-04 PROCEDURE — 99213 OFFICE O/P EST LOW 20 MIN: CPT | Performed by: NURSE PRACTITIONER

## 2019-09-04 NOTE — PROGRESS NOTES
Subjective:        Dinesh Churchill Sr. is a 89 y.o. male who here for follow up    Chief Complaint   Patient presents with   • Follow-up     CATH FOLLOW UP       HPI  Dinesh Churchill is a 89-year-old male, who is current with this provider.  Has a history of CAD, cardiomyopathy hyperlipidemia, and hypertension.  He is here for a recent cardiac cath visit.  He had a successful angioplasty and stent to subtotal diagonal reduced to 0% with 2.0/15 vision dilated to 2.2.  He will need to stay on antiplatelets for 1 year.  Echo on 7/25/2019 showed EF 31%, mild to moderate MV regurgitation is present, mild AV regurgitation present, mild TV regurgitation is present, mild to moderate TV regurgitation is present, RBC is mildly dilated, LV C is moderately dilated, L AC size is mildly dilated, and no evidence of pericardial effusion.     The following portions of the patient's history were reviewed and updated as appropriate: allergies, current medications, past family history, past medical history, past social history, past surgical history and problem list.    Past Medical History:   Diagnosis Date   • Abnormal ECG    • Abnormal MRI    • Acute frontal sinusitis    • Arthritis    • Chronic fatigue    • Coronary artery disease    • Dizziness    • Hearing aid worn     left   • Hearing loss    • Hyperlipidemia    • Hypertension    • Kidney stones    • Macrocytosis    • Neoplasm of skin    • Osteoporosis    • Prediabetes    • Vitamin D deficiency          reports that he has never smoked. He has never used smokeless tobacco. He reports that he does not drink alcohol or use drugs.     Family History   Family history unknown: Yes       ROS     Review of Systems  Constitutional: No wt loss, fever, fatigue  Gastrointestinal: No nausea, abdominal pain  Behavioral/Psych: No insomnia or anxiety  Cardiovascular: Denies chest pain and palpitations      Objective:           Physical Exam   Constitutional: He is oriented to person, place, and  time. He appears well-developed and well-nourished.   HENT:   Head: Normocephalic.   Right Ear: External ear normal.   Left Ear: External ear normal.   Eyes: EOM are normal.   Neck: Normal range of motion. No JVD present.   Cardiovascular: Normal rate, regular rhythm, normal heart sounds and intact distal pulses. Exam reveals no gallop and no friction rub.   No murmur heard.  Pulmonary/Chest: Effort normal and breath sounds normal. No stridor. No respiratory distress. He has no rales.   Abdominal: Soft. Bowel sounds are normal. He exhibits no distension. There is no tenderness. There is no guarding.   Musculoskeletal: Normal range of motion. He exhibits no edema or tenderness.   Neurological: He is alert and oriented to person, place, and time. He has normal reflexes.   Skin: Skin is warm.   Right radial site no signs or symptoms of infection and no hematoma noted   Psychiatric: He has a normal mood and affect. Judgment normal.   Nursing note and vitals reviewed.    Cardiac cath 8/2019   Conclusion        · Successful angioplasty and stent to subtotal diagonal reduced to 0% with 2.0/15 vision dilated to 2.2     INDICATION:   The patient is 89-year-old with unstable angina     PROCEDURE:  Coronary intervention of the diagonal branch of the left anterior descending artery.     DESCRIPTION OF PROCEDURE:  Following informed consent, the patient was taken to the Cath Lab and prepped and draped in the usual sterile fashion.  The right radial area was anesthetized using Xylocaine.  The right radial artery was entered using a single wall puncture technique.  A 6 Kosovan sheath was placed in the vessel.  Intravenous AngioMax was given.  VL 3.5, guide with BMW wire, 2.0/15 tract balloon followed by 2.0/15 vision stent dilated to 2.2.      The balloon and wire were removed and final angiograms were performed.  The catheter was removed through the femoral sheath.  The sheath was removed and TR band device was deployed.   Hemostasis was obtained.  There were no immediate complications.     RESULTS:  1. Initial aortic pressure was approximately 130/80, and the patient remained hemodynamically stable through the case.  2. Initial coronary arteriography showed 99% subtotally occluded diagonal branch reduced to 0% with 2.0/15 vision stent dilated to 2.2.     IVONNE  FLOW   PRE      3    POST  3     TYPE OF LESION    C     RECOMMENDATIONS:  The patient has had an excellent result from intervention.  The patient will be maintained on antiplatelet therapy and follow up with me and his primary care physician,   Importance of taking dual antiplatelets for one year  has been explained, risk of stent thrombosis leading to the acute MI, which carries high morbidity and mortality has been explained     Discontinuation or interruptions of these medications should be under the strict guidance of appropriate health professional  .                   Current Outpatient Medications:   •  acetaminophen (TYLENOL) 500 MG tablet, 1-2 TABLETS BY ONCE TO TWICE DAILY AS NEEDED FOR PAIN, Disp: 60 tablet, Rfl: 0  •  aspirin 81 MG chewable tablet, Chew 1 tablet Daily., Disp: , Rfl:   •  atorvastatin (LIPITOR) 20 MG tablet, TAKE 1 TABLET BY MOUTH EVERY NIGHT, Disp: 30 tablet, Rfl: 1  •  Cholecalciferol (VITAMIN D-3) 1000 UNITS capsule, Take 1,000 Units by mouth daily., Disp: , Rfl:   •  Elastic Bandages & Supports (LUMBAR BACK BRACE/SUPPORT PAD) misc, Use for lifting/ strenuous exercise., Disp: 1 each, Rfl: 0  •  isosorbide mononitrate (IMDUR) 60 MG 24 hr tablet, TAKE 1 TABLET BY MOUTH EVERY DAY, Disp: 30 tablet, Rfl: 1  •  losartan (COZAAR) 50 MG tablet, TAKE 1 TABLET BY MOUTH DAILY, Disp: 30 tablet, Rfl: 1  •  metoprolol succinate XL (TOPROL-XL) 50 MG 24 hr tablet, TAKE 1 TABLET BY MOUTH DAILY, Disp: 60 tablet, Rfl: 3  •  nitrofurantoin, macrocrystal-monohydrate, (MACROBID) 100 MG capsule, Take 1 capsule by mouth 2 (Two) Times a Day., Disp: 14 capsule, Rfl: 0  •   nitroglycerin (NITROSTAT) 0.4 MG SL tablet, Place 1 tablet under the tongue Every 5 (Five) Minutes As Needed for Chest Pain. Take no more than 3 doses in 15 minutes., Disp: 25 tablet, Rfl: 0  •  ticagrelor (BRILINTA) 90 MG tablet tablet, Take 1 tablet by mouth 2 (Two) Times a Day., Disp: 60 tablet, Rfl: 5    Current Facility-Administered Medications:   •  cyanocobalamin injection 1,000 mcg, 1,000 mcg, Intramuscular, Q30 Days, Phyllis Loving PA, 1,000 mcg at 08/28/19 1157     Assessment:        Patient Active Problem List   Diagnosis   • Abnormal magnetic resonance imaging study   • Arthritis   • Osteoarthritis of cervical spine   • Diplopia   • Hearing loss   • Neoplasm of uncertain behavior of skin   • Prediabetes   • Vitamin D deficiency   • Chronic fatigue   • History of supraventricular tachycardia   • Essential hypertension   • B12 deficiency   • Abnormal cardiac function test   • Cardiomyopathy (CMS/HCC)   • Coronary artery disease involving native coronary artery of native heart without angina pectoris   • History of coronary artery stent placement   • History of renal calculi   • Dyslipidemia   • Macrocytic anemia   • Precordial pain   • Parkinson's disease (CMS/HCC)   • History of CVA (cerebrovascular accident)   • Paroxysmal atrial fibrillation (CMS/HCC)   • Otalgia, left   • Chest pain   • Abnormal stress test   • Spontaneous pneumothorax   • Chest pain   • Acute systolic (congestive) heart failure (CMS/HCC)   • Thrombocytopenia (CMS/HCC)               Plan:   1.  CAD s/p cardiac cath: Right radial cardiac cath site shows no signs and symptoms of infection or hematoma noted.  See full cath report  report as above.  On Brilinta, statin, BB, aspirin, ARB.    2 cardiomyopathy: Last echo EF 31%, see full report as above, on BB    3.  Hypertension: Today in the office blood pressure 149/57, heart rate 77.  He states he does not have any issues taking his medications    Educated patient on exercising for at  least 30 minutes a day for 2 to 3 days a week. Importance of controlling hypertension and blood pressure checkup on the regular basis has been explained. Hypertension as a silent killer has been discussed. Risk reduction of the weight and regular exercises to control the hypertension has been explained.    4.  HLD: He states he does his lab work with his primary care physician.  Continue current statin.    Risk of the hyperlipidemia, importance of the treatment has been explained. Pros and cons of the statins has been explained. Regular blood workup as well as side effects including the liver failure, myelopathy death has been explained.    There are no diagnoses linked to this encounter.    COUNSELING:    Dinesh Churchill Sr.Counseling was given to patient for the following topics: diagnostic results, risk factor reductions, impressions, risks and benefits of treatment options and importance of treatment compliance .       SMOKING COUNSELING:    Is going to follow-up in 6 months with Dr. Pratt.    Sincerely,   CANDELARIA Macias  [facility]  09/04/19  11:28 AM

## 2019-09-06 ENCOUNTER — APPOINTMENT (OUTPATIENT)
Dept: GENERAL RADIOLOGY | Facility: HOSPITAL | Age: 84
End: 2019-09-06

## 2019-09-06 ENCOUNTER — PATIENT OUTREACH (OUTPATIENT)
Dept: CASE MANAGEMENT | Facility: OTHER | Age: 84
End: 2019-09-06

## 2019-09-06 ENCOUNTER — HOSPITAL ENCOUNTER (INPATIENT)
Facility: HOSPITAL | Age: 84
LOS: 2 days | Discharge: HOME | End: 2019-09-08
Attending: EMERGENCY MEDICINE | Admitting: HOSPITALIST

## 2019-09-06 ENCOUNTER — READMISSION MANAGEMENT (OUTPATIENT)
Dept: CALL CENTER | Facility: HOSPITAL | Age: 84
End: 2019-09-06

## 2019-09-06 ENCOUNTER — APPOINTMENT (OUTPATIENT)
Dept: CT IMAGING | Facility: HOSPITAL | Age: 84
End: 2019-09-06

## 2019-09-06 DIAGNOSIS — J18.9 PNEUMONIA OF LEFT LOWER LOBE DUE TO INFECTIOUS ORGANISM: Primary | ICD-10-CM

## 2019-09-06 LAB
ALBUMIN SERPL-MCNC: 4.3 G/DL (ref 3.5–5.2)
ALBUMIN/GLOB SERPL: 1.7 G/DL
ALP SERPL-CCNC: 61 U/L (ref 39–117)
ALT SERPL W P-5'-P-CCNC: 15 U/L (ref 1–41)
ANION GAP SERPL CALCULATED.3IONS-SCNC: 11.5 MMOL/L (ref 5–15)
AST SERPL-CCNC: 16 U/L (ref 1–40)
B PARAPERT DNA SPEC QL NAA+PROBE: NOT DETECTED
B PERT DNA SPEC QL NAA+PROBE: NOT DETECTED
BASOPHILS # BLD AUTO: 0.02 10*3/MM3 (ref 0–0.2)
BASOPHILS NFR BLD AUTO: 0.2 % (ref 0–1.5)
BILIRUB SERPL-MCNC: 1.6 MG/DL (ref 0.2–1.2)
BUN BLD-MCNC: 17 MG/DL (ref 8–23)
BUN/CREAT SERPL: 21 (ref 7–25)
C PNEUM DNA NPH QL NAA+NON-PROBE: NOT DETECTED
CALCIUM SPEC-SCNC: 9.3 MG/DL (ref 8.2–9.6)
CHLORIDE SERPL-SCNC: 103 MMOL/L (ref 98–107)
CO2 SERPL-SCNC: 25.5 MMOL/L (ref 22–29)
CREAT BLD-MCNC: 0.81 MG/DL (ref 0.76–1.27)
D DIMER PPP FEU-MCNC: 0.78 MCGFEU/ML (ref 0–0.49)
D-LACTATE SERPL-SCNC: 1 MMOL/L (ref 0.5–2)
DEPRECATED RDW RBC AUTO: 61.6 FL (ref 37–54)
EOSINOPHIL # BLD AUTO: 0.16 10*3/MM3 (ref 0–0.4)
EOSINOPHIL NFR BLD AUTO: 1.6 % (ref 0.3–6.2)
ERYTHROCYTE [DISTWIDTH] IN BLOOD BY AUTOMATED COUNT: 16 % (ref 12.3–15.4)
FLUAV H1 2009 PAND RNA NPH QL NAA+PROBE: NOT DETECTED
FLUAV H1 HA GENE NPH QL NAA+PROBE: NOT DETECTED
FLUAV H3 RNA NPH QL NAA+PROBE: NOT DETECTED
FLUAV SUBTYP SPEC NAA+PROBE: NOT DETECTED
FLUBV RNA ISLT QL NAA+PROBE: NOT DETECTED
GFR SERPL CREATININE-BSD FRML MDRD: 89 ML/MIN/1.73
GLOBULIN UR ELPH-MCNC: 2.6 GM/DL
GLUCOSE BLD-MCNC: 128 MG/DL (ref 65–99)
HADV DNA SPEC NAA+PROBE: NOT DETECTED
HCOV 229E RNA SPEC QL NAA+PROBE: NOT DETECTED
HCOV HKU1 RNA SPEC QL NAA+PROBE: NOT DETECTED
HCOV NL63 RNA SPEC QL NAA+PROBE: NOT DETECTED
HCOV OC43 RNA SPEC QL NAA+PROBE: NOT DETECTED
HCT VFR BLD AUTO: 43 % (ref 37.5–51)
HGB BLD-MCNC: 13.3 G/DL (ref 13–17.7)
HMPV RNA NPH QL NAA+NON-PROBE: NOT DETECTED
HOLD SPECIMEN: NORMAL
HOLD SPECIMEN: NORMAL
HPIV1 RNA SPEC QL NAA+PROBE: NOT DETECTED
HPIV2 RNA SPEC QL NAA+PROBE: NOT DETECTED
HPIV3 RNA NPH QL NAA+PROBE: NOT DETECTED
HPIV4 P GENE NPH QL NAA+PROBE: NOT DETECTED
IMM GRANULOCYTES # BLD AUTO: 0.04 10*3/MM3 (ref 0–0.05)
IMM GRANULOCYTES NFR BLD AUTO: 0.4 % (ref 0–0.5)
LYMPHOCYTES # BLD AUTO: 0.53 10*3/MM3 (ref 0.7–3.1)
LYMPHOCYTES NFR BLD AUTO: 5.2 % (ref 19.6–45.3)
M PNEUMO IGG SER IA-ACNC: NOT DETECTED
MAGNESIUM SERPL-MCNC: 1.9 MG/DL (ref 1.7–2.3)
MCH RBC QN AUTO: 32.4 PG (ref 26.6–33)
MCHC RBC AUTO-ENTMCNC: 30.9 G/DL (ref 31.5–35.7)
MCV RBC AUTO: 104.6 FL (ref 79–97)
MONOCYTES # BLD AUTO: 0.58 10*3/MM3 (ref 0.1–0.9)
MONOCYTES NFR BLD AUTO: 5.7 % (ref 5–12)
MRSA DNA SPEC QL NAA+PROBE: NORMAL
NEUTROPHILS # BLD AUTO: 8.91 10*3/MM3 (ref 1.7–7)
NEUTROPHILS NFR BLD AUTO: 86.9 % (ref 42.7–76)
NRBC BLD AUTO-RTO: 0 /100 WBC (ref 0–0.2)
NT-PROBNP SERPL-MCNC: 8792 PG/ML (ref 5–1800)
PLATELET # BLD AUTO: 134 10*3/MM3 (ref 140–450)
PMV BLD AUTO: 11.8 FL (ref 6–12)
POTASSIUM BLD-SCNC: 4.2 MMOL/L (ref 3.5–5.2)
PROCALCITONIN SERPL-MCNC: 0.06 NG/ML (ref 0.1–0.25)
PROT SERPL-MCNC: 6.9 G/DL (ref 6–8.5)
RBC # BLD AUTO: 4.11 10*6/MM3 (ref 4.14–5.8)
RHINOVIRUS RNA SPEC NAA+PROBE: NOT DETECTED
RSV RNA NPH QL NAA+NON-PROBE: NOT DETECTED
SODIUM BLD-SCNC: 140 MMOL/L (ref 136–145)
TROPONIN T SERPL-MCNC: <0.01 NG/ML (ref 0–0.03)
TROPONIN T SERPL-MCNC: <0.01 NG/ML (ref 0–0.03)
WBC NRBC COR # BLD: 10.24 10*3/MM3 (ref 3.4–10.8)
WHOLE BLOOD HOLD SPECIMEN: NORMAL
WHOLE BLOOD HOLD SPECIMEN: NORMAL

## 2019-09-06 PROCEDURE — 93010 ELECTROCARDIOGRAM REPORT: CPT | Performed by: INTERNAL MEDICINE

## 2019-09-06 PROCEDURE — 83605 ASSAY OF LACTIC ACID: CPT | Performed by: EMERGENCY MEDICINE

## 2019-09-06 PROCEDURE — 25010000002 CEFEPIME PER 500 MG: Performed by: NURSE PRACTITIONER

## 2019-09-06 PROCEDURE — 71275 CT ANGIOGRAPHY CHEST: CPT

## 2019-09-06 PROCEDURE — 93005 ELECTROCARDIOGRAM TRACING: CPT | Performed by: EMERGENCY MEDICINE

## 2019-09-06 PROCEDURE — 0 IOPAMIDOL PER 1 ML: Performed by: EMERGENCY MEDICINE

## 2019-09-06 PROCEDURE — 87641 MR-STAPH DNA AMP PROBE: CPT | Performed by: NURSE PRACTITIONER

## 2019-09-06 PROCEDURE — 25010000002 ONDANSETRON PER 1 MG: Performed by: EMERGENCY MEDICINE

## 2019-09-06 PROCEDURE — 93005 ELECTROCARDIOGRAM TRACING: CPT | Performed by: HOSPITALIST

## 2019-09-06 PROCEDURE — 83880 ASSAY OF NATRIURETIC PEPTIDE: CPT | Performed by: EMERGENCY MEDICINE

## 2019-09-06 PROCEDURE — 87040 BLOOD CULTURE FOR BACTERIA: CPT | Performed by: EMERGENCY MEDICINE

## 2019-09-06 PROCEDURE — 25010000002 VANCOMYCIN PER 500 MG: Performed by: NURSE PRACTITIONER

## 2019-09-06 PROCEDURE — 25010000002 CEFEPIME PER 500 MG: Performed by: EMERGENCY MEDICINE

## 2019-09-06 PROCEDURE — 83735 ASSAY OF MAGNESIUM: CPT | Performed by: NURSE PRACTITIONER

## 2019-09-06 PROCEDURE — 84484 ASSAY OF TROPONIN QUANT: CPT | Performed by: EMERGENCY MEDICINE

## 2019-09-06 PROCEDURE — 84145 PROCALCITONIN (PCT): CPT | Performed by: EMERGENCY MEDICINE

## 2019-09-06 PROCEDURE — 80053 COMPREHEN METABOLIC PANEL: CPT | Performed by: EMERGENCY MEDICINE

## 2019-09-06 PROCEDURE — 71046 X-RAY EXAM CHEST 2 VIEWS: CPT

## 2019-09-06 PROCEDURE — 85379 FIBRIN DEGRADATION QUANT: CPT | Performed by: EMERGENCY MEDICINE

## 2019-09-06 PROCEDURE — 25010000002 MORPHINE PER 10 MG: Performed by: EMERGENCY MEDICINE

## 2019-09-06 PROCEDURE — 99285 EMERGENCY DEPT VISIT HI MDM: CPT

## 2019-09-06 PROCEDURE — 0100U HC BIOFIRE FILMARRAY RESP PANEL 2: CPT | Performed by: NURSE PRACTITIONER

## 2019-09-06 PROCEDURE — 25010000002 VANCOMYCIN 10 G RECONSTITUTED SOLUTION: Performed by: EMERGENCY MEDICINE

## 2019-09-06 PROCEDURE — 25010000002 MAGNESIUM SULFATE IN D5W 1G/100ML (PREMIX) 1-5 GM/100ML-% SOLUTION: Performed by: NURSE PRACTITIONER

## 2019-09-06 PROCEDURE — 25010000002 FUROSEMIDE PER 20 MG: Performed by: NURSE PRACTITIONER

## 2019-09-06 PROCEDURE — 93005 ELECTROCARDIOGRAM TRACING: CPT

## 2019-09-06 PROCEDURE — 25010000002 ENOXAPARIN PER 10 MG: Performed by: NURSE PRACTITIONER

## 2019-09-06 PROCEDURE — 85025 COMPLETE CBC W/AUTO DIFF WBC: CPT | Performed by: EMERGENCY MEDICINE

## 2019-09-06 RX ORDER — FUROSEMIDE 10 MG/ML
40 INJECTION INTRAMUSCULAR; INTRAVENOUS EVERY 12 HOURS
Status: COMPLETED | OUTPATIENT
Start: 2019-09-06 | End: 2019-09-07

## 2019-09-06 RX ORDER — VANCOMYCIN HYDROCHLORIDE 1 G/200ML
1000 INJECTION, SOLUTION INTRAVENOUS EVERY 12 HOURS
Status: DISCONTINUED | OUTPATIENT
Start: 2019-09-06 | End: 2019-09-08

## 2019-09-06 RX ORDER — MAGNESIUM SULFATE 1 G/100ML
1 INJECTION INTRAVENOUS ONCE
Status: COMPLETED | OUTPATIENT
Start: 2019-09-06 | End: 2019-09-06

## 2019-09-06 RX ORDER — ATORVASTATIN CALCIUM 20 MG/1
20 TABLET, FILM COATED ORAL NIGHTLY
Status: DISCONTINUED | OUTPATIENT
Start: 2019-09-06 | End: 2019-09-08 | Stop reason: HOSPADM

## 2019-09-06 RX ORDER — ACETAMINOPHEN 650 MG/1
650 SUPPOSITORY RECTAL EVERY 4 HOURS PRN
Status: DISCONTINUED | OUTPATIENT
Start: 2019-09-06 | End: 2019-09-08 | Stop reason: HOSPADM

## 2019-09-06 RX ORDER — ONDANSETRON 2 MG/ML
4 INJECTION INTRAMUSCULAR; INTRAVENOUS EVERY 6 HOURS PRN
Status: DISCONTINUED | OUTPATIENT
Start: 2019-09-06 | End: 2019-09-08 | Stop reason: HOSPADM

## 2019-09-06 RX ORDER — ACETAMINOPHEN 325 MG/1
650 TABLET ORAL EVERY 4 HOURS PRN
Status: DISCONTINUED | OUTPATIENT
Start: 2019-09-06 | End: 2019-09-08 | Stop reason: HOSPADM

## 2019-09-06 RX ORDER — MORPHINE SULFATE 2 MG/ML
2 INJECTION, SOLUTION INTRAMUSCULAR; INTRAVENOUS ONCE
Status: COMPLETED | OUTPATIENT
Start: 2019-09-06 | End: 2019-09-06

## 2019-09-06 RX ORDER — LOSARTAN POTASSIUM 50 MG/1
50 TABLET ORAL DAILY
Status: DISCONTINUED | OUTPATIENT
Start: 2019-09-07 | End: 2019-09-08 | Stop reason: HOSPADM

## 2019-09-06 RX ORDER — ISOSORBIDE MONONITRATE 60 MG/1
60 TABLET, EXTENDED RELEASE ORAL DAILY
Status: DISCONTINUED | OUTPATIENT
Start: 2019-09-06 | End: 2019-09-08 | Stop reason: HOSPADM

## 2019-09-06 RX ORDER — IPRATROPIUM BROMIDE AND ALBUTEROL SULFATE 2.5; .5 MG/3ML; MG/3ML
3 SOLUTION RESPIRATORY (INHALATION)
Status: DISCONTINUED | OUTPATIENT
Start: 2019-09-06 | End: 2019-09-06

## 2019-09-06 RX ORDER — ONDANSETRON 2 MG/ML
4 INJECTION INTRAMUSCULAR; INTRAVENOUS ONCE
Status: COMPLETED | OUTPATIENT
Start: 2019-09-06 | End: 2019-09-06

## 2019-09-06 RX ORDER — MELATONIN
1000 DAILY
Status: DISCONTINUED | OUTPATIENT
Start: 2019-09-06 | End: 2019-09-08 | Stop reason: HOSPADM

## 2019-09-06 RX ORDER — IPRATROPIUM BROMIDE AND ALBUTEROL SULFATE 2.5; .5 MG/3ML; MG/3ML
3 SOLUTION RESPIRATORY (INHALATION)
Status: DISCONTINUED | OUTPATIENT
Start: 2019-09-06 | End: 2019-09-08 | Stop reason: HOSPADM

## 2019-09-06 RX ORDER — METOPROLOL SUCCINATE 50 MG/1
50 TABLET, EXTENDED RELEASE ORAL DAILY
Status: DISCONTINUED | OUTPATIENT
Start: 2019-09-06 | End: 2019-09-07

## 2019-09-06 RX ORDER — SODIUM CHLORIDE 0.9 % (FLUSH) 0.9 %
10 SYRINGE (ML) INJECTION AS NEEDED
Status: DISCONTINUED | OUTPATIENT
Start: 2019-09-06 | End: 2019-09-08 | Stop reason: HOSPADM

## 2019-09-06 RX ORDER — IPRATROPIUM BROMIDE AND ALBUTEROL SULFATE 2.5; .5 MG/3ML; MG/3ML
3 SOLUTION RESPIRATORY (INHALATION) EVERY 6 HOURS PRN
Status: DISCONTINUED | OUTPATIENT
Start: 2019-09-06 | End: 2019-09-08 | Stop reason: HOSPADM

## 2019-09-06 RX ORDER — ASPIRIN 81 MG/1
81 TABLET, CHEWABLE ORAL DAILY
Status: DISCONTINUED | OUTPATIENT
Start: 2019-09-06 | End: 2019-09-08 | Stop reason: HOSPADM

## 2019-09-06 RX ORDER — ASPIRIN 325 MG
325 TABLET ORAL ONCE
Status: DISCONTINUED | OUTPATIENT
Start: 2019-09-06 | End: 2019-09-06

## 2019-09-06 RX ORDER — SODIUM CHLORIDE 0.9 % (FLUSH) 0.9 %
10 SYRINGE (ML) INJECTION EVERY 12 HOURS SCHEDULED
Status: DISCONTINUED | OUTPATIENT
Start: 2019-09-06 | End: 2019-09-08 | Stop reason: HOSPADM

## 2019-09-06 RX ORDER — ACETAMINOPHEN 160 MG/5ML
650 SOLUTION ORAL EVERY 4 HOURS PRN
Status: DISCONTINUED | OUTPATIENT
Start: 2019-09-06 | End: 2019-09-08 | Stop reason: HOSPADM

## 2019-09-06 RX ADMIN — CEFEPIME HYDROCHLORIDE 2 G: 2 INJECTION, POWDER, FOR SOLUTION INTRAVENOUS at 20:16

## 2019-09-06 RX ADMIN — CEFEPIME HYDROCHLORIDE 2 G: 2 INJECTION, POWDER, FOR SOLUTION INTRAVENOUS at 10:38

## 2019-09-06 RX ADMIN — VANCOMYCIN HYDROCHLORIDE 1500 MG: 10 INJECTION, POWDER, LYOPHILIZED, FOR SOLUTION INTRAVENOUS at 11:23

## 2019-09-06 RX ADMIN — FUROSEMIDE 40 MG: 10 INJECTION, SOLUTION INTRAMUSCULAR; INTRAVENOUS at 18:44

## 2019-09-06 RX ADMIN — TICAGRELOR 90 MG: 90 TABLET ORAL at 20:56

## 2019-09-06 RX ADMIN — ATORVASTATIN CALCIUM 20 MG: 20 TABLET, FILM COATED ORAL at 20:57

## 2019-09-06 RX ADMIN — METOPROLOL TARTRATE 5 MG: 5 INJECTION, SOLUTION INTRAVENOUS at 17:08

## 2019-09-06 RX ADMIN — IOPAMIDOL 95 ML: 755 INJECTION, SOLUTION INTRAVENOUS at 09:07

## 2019-09-06 RX ADMIN — ASPIRIN 81 MG: 81 TABLET, CHEWABLE ORAL at 16:30

## 2019-09-06 RX ADMIN — ISOSORBIDE MONONITRATE 60 MG: 60 TABLET ORAL at 16:30

## 2019-09-06 RX ADMIN — VANCOMYCIN HYDROCHLORIDE 1000 MG: 1 INJECTION, SOLUTION INTRAVENOUS at 22:42

## 2019-09-06 RX ADMIN — MAGNESIUM SULFATE 1 G: 1 INJECTION INTRAVENOUS at 18:41

## 2019-09-06 RX ADMIN — ENOXAPARIN SODIUM 80 MG: 80 INJECTION SUBCUTANEOUS at 18:44

## 2019-09-06 RX ADMIN — METOPROLOL TARTRATE 5 MG: 5 INJECTION, SOLUTION INTRAVENOUS at 23:32

## 2019-09-06 RX ADMIN — VITAMIN D, TAB 1000IU (100/BT) 1000 UNITS: 25 TAB at 16:30

## 2019-09-06 RX ADMIN — SODIUM CHLORIDE, PRESERVATIVE FREE 10 ML: 5 INJECTION INTRAVENOUS at 16:31

## 2019-09-06 RX ADMIN — ONDANSETRON 4 MG: 2 INJECTION INTRAMUSCULAR; INTRAVENOUS at 08:06

## 2019-09-06 RX ADMIN — SODIUM CHLORIDE, PRESERVATIVE FREE 10 ML: 5 INJECTION INTRAVENOUS at 20:56

## 2019-09-06 RX ADMIN — MORPHINE SULFATE 2 MG: 2 INJECTION, SOLUTION INTRAMUSCULAR; INTRAVENOUS at 08:06

## 2019-09-06 NOTE — CONSULTS
Patient Identification:  Dinesh Churchill Sr.  89 y.o.  male  5/29/1928  [id number]          LOS 0    Requesting physician: Dr Truong    Reason for Consultation:  PNA    History of Present Illness:   89-year-old white male, non-smoker presents with complaints of shortness of breath and chest discomfort.  Evidence of infiltrate on left lung on imaging.  Has had moderate cough.  Shortness of breath has been getting worse in the last week.  Started on vancomycin and cefepime for pneumonia.  Cough is described as mild in nature.  Shortness of breath moderate with exertion.    Past Medical History:  Past Medical History:   Diagnosis Date   • Abnormal ECG    • Abnormal MRI    • Acute frontal sinusitis    • Arthritis    • Chronic fatigue    • Coronary artery disease    • Dizziness    • Hearing aid worn     left   • Hearing loss    • Hyperlipidemia    • Hypertension    • Kidney stones    • Macrocytosis    • Neoplasm of skin    • Osteoporosis    • Prediabetes    • Vitamin D deficiency        Past Surgical History:  Past Surgical History:   Procedure Laterality Date   • CARDIAC CATHETERIZATION N/A 5/22/2017    Procedure: Left Heart Cath;  Surgeon: Maninder Quezada MD;  Location: [facility] CATH INVASIVE LOCATION;  Service:    • CARDIAC CATHETERIZATION N/A 5/23/2017    Procedure: Stent BMS coronary;  Surgeon: Maninder Quezada MD;  [facility];  Service:    • CARDIAC CATHETERIZATION N/A 8/8/2019    Procedure: Left Heart Cath;  Surgeon: Maninder Quezada MD;  [facility];  Service: Cardiology   • CARDIAC CATHETERIZATION N/A 8/8/2019    Procedure: Left ventriculography;  Surgeon: Maninder Quezada MD;  [facility];  Service: Cardiology   • CARDIAC CATHETERIZATION N/A 8/8/2019    Procedure: Coronary angiography;  Surgeon: Maninder Quezada MD;  Location: [facility] CATH INVASIVE LOCATION;  Service: Cardiology   • CARDIAC  CATHETERIZATION N/A 8/20/2019    Procedure: CORONARY ANGIOGRAPHY;  Surgeon: Maninder Quezada MD;  Location: [facility] CATH INVASIVE LOCATION;  Service: Cardiovascular   • CARDIAC CATHETERIZATION N/A 8/20/2019    Procedure: Stent BMS coronary;  Surgeon: Maninder Quezada MD;  [facility];  Service: Cardiovascular   • HERNIA REPAIR      x 2   • SC RT/LT HEART CATHETERS N/A 5/23/2017    Procedure: Percutaneous Coronary Intervention;  Surgeon: Maninder Quezada MD;  Location: [facility] CATH INVASIVE LOCATION;  Service: Cardiovascular        Home Meds:  Facility-Administered Medications Prior to Admission   Medication Dose Route Frequency Provider Last Rate Last Dose   • cyanocobalamin injection 1,000 mcg  1,000 mcg Intramuscular Q30 Days Phyllis Loving PA   1,000 mcg at 08/28/19 1157     Medications Prior to Admission   Medication Sig Dispense Refill Last Dose   • acetaminophen (TYLENOL) 500 MG tablet 1-2 TABLETS BY ONCE TO TWICE DAILY AS NEEDED FOR PAIN 60 tablet 0 Past Week at Unknown time   • aspirin 81 MG chewable tablet Chew 1 tablet Daily.   9/5/2019 at Unknown time   • atorvastatin (LIPITOR) 20 MG tablet TAKE 1 TABLET BY MOUTH EVERY NIGHT 30 tablet 1 9/5/2019 at Unknown time   • Cholecalciferol (VITAMIN D-3) 1000 UNITS capsule Take 1,000 Units by mouth daily.   9/5/2019 at Unknown time   • Elastic Bandages & Supports (LUMBAR BACK BRACE/SUPPORT PAD) misc Use for lifting/ strenuous exercise. 1 each 0 Taking   • isosorbide mononitrate (IMDUR) 60 MG 24 hr tablet TAKE 1 TABLET BY MOUTH EVERY DAY 30 tablet 1 9/5/2019 at Unknown time   • losartan (COZAAR) 50 MG tablet TAKE 1 TABLET BY MOUTH DAILY 30 tablet 1 9/5/2019 at Unknown time   • metoprolol succinate XL (TOPROL-XL) 50 MG 24 hr tablet TAKE 1 TABLET BY MOUTH DAILY 60 tablet 3 9/5/2019 at Unknown time   • nitrofurantoin, macrocrystal-monohydrate, (MACROBID) 100 MG capsule Take 1 capsule by mouth 2 (Two) Times a Day. 14 capsule  "0 9/5/2019 at Unknown time   • nitroglycerin (NITROSTAT) 0.4 MG SL tablet Place 1 tablet under the tongue Every 5 (Five) Minutes As Needed for Chest Pain. Take no more than 3 doses in 15 minutes. 25 tablet 0 Taking   • ticagrelor (BRILINTA) 90 MG tablet tablet Take 1 tablet by mouth 2 (Two) Times a Day. 60 tablet 5 9/5/2019 at Unknown time         Allergies:  No Known Allergies    Social History:   Social History     Socioeconomic History   • Marital status:      Spouse name: Not on file   • Number of children: Not on file   • Years of education: Not on file   • Highest education level: Not on file   Occupational History     Employer: RETIRED   Tobacco Use   • Smoking status: Never Smoker   • Smokeless tobacco: Never Used   Substance and Sexual Activity   • Alcohol use: No     Comment: quit 30 years ago    • Drug use: No   • Sexual activity: Defer       Family History:  Family History   Family history unknown: Yes       Review of Systems:  Denies fevers or chills  Denies nausea or vomiting  No new vision or hearing changes  No chest pain  Positive cough and shortness of breath  No diarrhea, hematemesis or hematochezia, no dysuria or frequency  No musculoskeletal complaints  No heat or cold intolerance  No skin rashes  No dizziness or confusion.  No seizure activity  No new anxiety or depression  12 system review of systems performed and all else negative    Objective:    PHYSICAL EXAM:    /67 (BP Location: Right arm, Patient Position: Lying)   Pulse 78   Temp 97.7 °F (36.5 °C) (Oral)   Resp 18   Ht 185.4 cm (73\")   Wt 81.2 kg (179 lb 0.2 oz)   SpO2 91%   BMI 23.62 kg/m²  Body mass index is 23.62 kg/m². 91% 81.2 kg (179 lb 0.2 oz)    GENERAL APPEARANCE:   · Well developed  · Well nourished  · No acute distress   EYES:    · PERRL                                                                           · Conjunctiva normal  · Sclera non-icteric.  HENT:   · Atraumatic, normocephalic  · External " ears and nose normal  · Moist mucous membranes and no ulcers  NECK:  · Thyroid not enlarged  · Trachea midline   RESPIRATORY:    · Nonlabored breathing   · Scattered coarse basilar breath sounds left greater than right posteriorly  · No rales. No wheezing  · No dullness  CARDIOVASCULAR:    · RRR  · Normal S1, S2  · No murmur  · Lower extremity edema: none    GI:   · Bowel sounds normal  · Abdomen soft , non-distended, non-tender  · No abdominal masses.    MUSCULOSKELETAL:  · Normal movement of extremities  · No tenderness, no deformities  · No clubbing or cyanosis   Skin:    · No visible rashes  · No palpable nodules  PSYCHIATRIC:  · Speech and behavior appropriate  · Normal mood and affect  · Oriented to person, place and time  NEUROLOGIC:      Lab Review:      Lab Results   Component Value Date    WBC 10.24 09/06/2019    HGB 13.3 09/06/2019    HCT 43.0 09/06/2019    .6 (H) 09/06/2019     (L) 09/06/2019            Lab Results   Component Value Date    CREATININE 0.81 09/06/2019    BUN 17 09/06/2019     09/06/2019    K 4.2 09/06/2019     09/06/2019    CO2 25.5 09/06/2019        Lab Results   Component Value Date    CKTOTAL 49 08/16/2019    CKMB 3.21 03/22/2017    TROPONINT <0.010 09/06/2019                Imaging reviewed  chest X-ray and CT chest viewed: Left basilar infiltrate consistent with pneumonia.       Assessment:  Left lower lobe pneumonia  Paroxysmal atrial fibrillation  Recent pneumothorax: Resolved  Coronary artery disease  Cardiomyopathy  Hypertension    Recommendations:  Agree with broad-spectrum antibiotics.  Send sputum for culture if able to produce.  Narrow antibiotics based on culture results.  Add bronchodilators to help with clearance.  I think that this likely could be a shorter course of antibiotics.      Thank you for allowing me to participate in the care of this patient.  I will continue to follow along with you.      Jose Manuel De Jesus MD  [facility],  [facility]  Pulmonary and Critical Care Medicine    9/6/2019  7:24 PM

## 2019-09-06 NOTE — PLAN OF CARE
Problem: Patient Care Overview  Goal: Plan of Care Review  Outcome: Ongoing (interventions implemented as appropriate)   09/06/19 4436   Coping/Psychosocial   Plan of Care Reviewed With patient   Plan of Care Review   Progress no change   OTHER   Outcome Summary Vitals as charted, admitted for pneumonia, on RA, no c/o pain, aflutter on monitor, cardiology aware and orders placed, pt remains asymptomatic, up w/ assist. X1, IV abx as ordered, will continue to monitor.

## 2019-09-06 NOTE — ED PROVIDER NOTES
EMERGENCY DEPARTMENT ENCOUNTER    CHIEF COMPLAINT  Chief Complaint: chest pain  History given by: Son  History limited by: SHIRLEY wright  Room Number: S413/1  PMD: Phyllis Loving PA      HPI:  Pt is a 89 y.o. male who presents to the ED via EMS c/o pain that started from L posterior jaw to left shoulder and L chest that began at 0330. Sx are exacerbated with deep breathing and movement. Son denies known fever, chills, cough, leg pain/swelling, and other complaints. H/o cardiac stent placement.    PAST MEDICAL HISTORY  Active Ambulatory Problems     Diagnosis Date Noted   • Abnormal magnetic resonance imaging study 03/18/2016   • Arthritis 03/18/2016   • Osteoarthritis of cervical spine 03/18/2016   • Diplopia 03/18/2016   • Hearing loss 03/18/2016   • Neoplasm of uncertain behavior of skin 03/18/2016   • Prediabetes 03/18/2016   • Vitamin D deficiency 03/18/2016   • Chronic fatigue 10/27/2016   • History of supraventricular tachycardia 04/07/2017   • Essential hypertension 04/07/2017   • B12 deficiency 04/19/2017   • Abnormal cardiac function test 05/08/2017   • Cardiomyopathy (CMS/HCC) 05/08/2017   • Coronary artery disease involving native coronary artery of native heart without angina pectoris 05/31/2017   • History of coronary artery stent placement 05/31/2017   • History of renal calculi 05/31/2017   • Dyslipidemia 05/31/2017   • Macrocytic anemia 08/09/2017   • Precordial pain 08/16/2017   • Parkinson's disease (CMS/HCC) 10/25/2017   • History of CVA (cerebrovascular accident) 10/25/2017   • Paroxysmal atrial fibrillation (CMS/HCC) 11/29/2017   • Otalgia, left 12/29/2017   • Chest pain 08/06/2019   • Abnormal stress test 08/06/2019   • Spontaneous pneumothorax 08/18/2019   • Chest pain 08/18/2019   • Acute systolic (congestive) heart failure (CMS/HCC) 08/19/2019   • Thrombocytopenia (CMS/HCC) 08/20/2019     Resolved Ambulatory Problems     Diagnosis Date Noted   • Maxillary sinusitis 03/18/2016   •  Dizziness 10/27/2016   • Acute frontal sinusitis 10/27/2016   • Precordial pain 04/07/2017   • SOB (shortness of breath) 04/07/2017   • Acute rhinitis 12/29/2017     Past Medical History:   Diagnosis Date   • Abnormal ECG    • Abnormal MRI    • Acute frontal sinusitis    • Arthritis    • Chronic fatigue    • Coronary artery disease    • Dizziness    • Hearing aid worn    • Hearing loss    • Hyperlipidemia    • Hypertension    • Kidney stones    • Macrocytosis    • Neoplasm of skin    • Osteoporosis    • Prediabetes    • Vitamin D deficiency        PAST SURGICAL HISTORY  Past Surgical History:   Procedure Laterality Date   • CARDIAC CATHETERIZATION N/A 5/22/2017    Procedure: Left Heart Cath;  Surgeon: Maninder Quezada MD;  Location: [facility] CATH INVASIVE LOCATION;  Service:    • CARDIAC CATHETERIZATION N/A 5/23/2017    Procedure: Stent BMS coronary;  Surgeon: Maninder Quezada MD;  Location: [facility] CATH INVASIVE LOCATION;  Service:    • CARDIAC CATHETERIZATION N/A 8/8/2019    Procedure: Left Heart Cath;  Surgeon: Maninder Quezada MD;  Location: [facility] CATH INVASIVE LOCATION;  Service: Cardiology   • CARDIAC CATHETERIZATION N/A 8/8/2019    Procedure: Left ventriculography;  Surgeon: Maninder Quezada MD;  Location: [facility] CATH INVASIVE LOCATION;  Service: Cardiology   • CARDIAC CATHETERIZATION N/A 8/8/2019    Procedure: Coronary angiography;  Surgeon: Maninder Quezada MD;  Location: [facility] CATH INVASIVE LOCATION;  Service: Cardiology   • CARDIAC CATHETERIZATION N/A 8/20/2019    Procedure: CORONARY ANGIOGRAPHY;  Surgeon: Maninder Quezada MD;  Location: [facility] CATH INVASIVE LOCATION;  Service: Cardiovascular   • CARDIAC CATHETERIZATION N/A 8/20/2019    Procedure: Stent BMS coronary;  Surgeon: Maninder Quezada MD;  Location: [facility] CATH INVASIVE LOCATION;  Service: Cardiovascular   • HERNIA REPAIR      x 2   • [place] RT/LT HEART CATHETERS N/A 5/23/2017    Procedure: Percutaneous Coronary  Intervention;  Surgeon: Maninder Quezada MD;  Location: [facility] INVASIVE LOCATION;  Service: Cardiovascular       FAMILY HISTORY  Family History   Family history unknown: Yes       SOCIAL HISTORY  Social History     Socioeconomic History   • Marital status:      Spouse name: Not on file   • Number of children: Not on file   • Years of education: Not on file   • Highest education level: Not on file   Occupational History     Employer: RETIRED   Tobacco Use   • Smoking status: Never Smoker   • Smokeless tobacco: Never Used   Substance and Sexual Activity   • Alcohol use: No     Comment: quit 30 years ago    • Drug use: No   • Sexual activity: Defer       ALLERGIES  Patient has no known allergies.    REVIEW OF SYSTEMS  Review of Systems   Unable to perform ROS: Other (poor historian)       PHYSICAL EXAM  ED Triage Vitals [09/06/19 0553]   Temp Heart Rate Resp BP SpO2   100.1 °F (37.8 °C) 89 18 166/63 97 %      Temp src Heart Rate Source Patient Position BP Location FiO2 (%)   Tympanic Monitor -- -- --       Physical Exam   Constitutional: No distress.   HENT:   Head: Normocephalic and atraumatic.   Eyes: EOM are normal. Pupils are equal, round, and reactive to light.   Neck: Normal range of motion. Neck supple.   L paracervical tenderness.   Cardiovascular: Normal rate, regular rhythm and normal heart sounds.   Pulmonary/Chest: Effort normal and breath sounds normal. No respiratory distress.   Abdominal: Soft. There is no tenderness. There is no rebound and no guarding.   Musculoskeletal: Normal range of motion. He exhibits no edema.   L trapezius is tender   Neurological: He is alert. He has normal sensation and normal strength.   Skin: Skin is warm and dry.   Psychiatric: Mood and affect normal.   Nursing note and vitals reviewed.      LAB RESULTS  Lab Results (last 24 hours)     Procedure Component Value Units Date/Time    CBC & Differential [[id number]] Collected:  09/06/19 0713    Specimen:  Blood  Updated:  09/06/19 0825    Narrative:       The following orders were created for panel order CBC & Differential.  Procedure                               Abnormality         Status                     ---------                               -----------         ------                     CBC Auto Differential[[id number]]        Abnormal            Final result                 Please view results for these tests on the individual orders.    Comprehensive Metabolic Panel [[id number]]  (Abnormal) Collected:  09/06/19 0713    Specimen:  Blood from Arm, Right Updated:  09/06/19 0802     Glucose 128 mg/dL      BUN 17 mg/dL      Creatinine 0.81 mg/dL      Sodium 140 mmol/L      Potassium 4.2 mmol/L      Chloride 103 mmol/L      CO2 25.5 mmol/L      Calcium 9.3 mg/dL      Total Protein 6.9 g/dL      Albumin 4.30 g/dL      ALT (SGPT) 15 U/L      AST (SGOT) 16 U/L      Alkaline Phosphatase 61 U/L      Total Bilirubin 1.6 mg/dL      eGFR Non African Amer 89 mL/min/1.73      Globulin 2.6 gm/dL      A/G Ratio 1.7 g/dL      BUN/Creatinine Ratio 21.0     Anion Gap 11.5 mmol/L     Narrative:       GFR Normal >60  Chronic Kidney Disease <60  Kidney Failure <15    Troponin [[id number]]  (Normal) Collected:  09/06/19 0713    Specimen:  Blood from Arm, Right Updated:  09/06/19 0800     Troponin T <0.010 ng/mL     Narrative:       Troponin T Reference Range:  <= 0.03 ng/mL-   Negative for AMI  >0.03 ng/mL-     Abnormal for myocardial necrosis.  Clinicians would have to utilize clinical acumen, EKG, Troponin and serial changes to determine if it is an Acute Myocardial Infarction or myocardial injury due to an underlying chronic condition.     CBC Auto Differential [[id number]]  (Abnormal) Collected:  09/06/19 0713    Specimen:  Blood from Arm, Right Updated:  09/06/19 0825     WBC 10.24 10*3/mm3      RBC 4.11 10*6/mm3      Hemoglobin 13.3 g/dL      Hematocrit 43.0 %      .6 fL      MCH 32.4 pg      MCHC 30.9 g/dL      RDW 16.0 %       "RDW-SD 61.6 fl      MPV 11.8 fL      Platelets 134 10*3/mm3      Neutrophil % 86.9 %      Lymphocyte % 5.2 %      Monocyte % 5.7 %      Eosinophil % 1.6 %      Basophil % 0.2 %      Immature Grans % 0.4 %      Neutrophils, Absolute 8.91 10*3/mm3      Lymphocytes, Absolute 0.53 10*3/mm3      Monocytes, Absolute 0.58 10*3/mm3      Eosinophils, Absolute 0.16 10*3/mm3      Basophils, Absolute 0.02 10*3/mm3      Immature Grans, Absolute 0.04 10*3/mm3      nRBC 0.0 /100 WBC     D-dimer, Quantitative [[id number]]  (Abnormal) Collected:  09/06/19 0713    Specimen:  Blood from Arm, Right Updated:  09/06/19 0834     D-Dimer, Quantitative 0.78 MCGFEU/mL     Narrative:       The Stago D-Dimer test used in conjunction with a clinical pretest probability (PTP) assessment model, has been approved by the FDA to rule out the presence of venous thromboembolism (VTE) in outpatients suspected of deep venous thrombosis (DVT) or pulmonary embolism (PE). The cut-off for negative predictive value is <0.50 MCGFEU/mL.    Procalcitonin [[id number]]  (Abnormal) Collected:  09/06/19 0713    Specimen:  Blood from Arm, Right Updated:  09/06/19 0823     Procalcitonin 0.06 ng/mL     Narrative:       As a Marker for Sepsis (Non-Neonates):   1. <0.5 ng/mL represents a low risk of severe sepsis and/or septic shock.  1. >2 ng/mL represents a high risk of severe sepsis and/or septic shock.    As a Marker for Lower Respiratory Tract Infections that require antibiotic therapy:  PCT on Admission     Antibiotic Therapy             6-12 Hrs later  > 0.5                Strongly Recommended            >0.25 - <0.5         Recommended  0.1 - 0.25           Discouraged                   Remeasure/reassess PCT  <0.1                 Strongly Discouraged          Remeasure/reassess PCT      As 28 day mortality risk marker: \"Change in Procalcitonin Result\" (> 80 % or <=80 %) if Day 0 (or Day 1) and Day 4 values are available. Refer to " http://www.[facility]-pct-calculator.com/   Change in PCT <=80 %   A decrease of PCT levels below or equal to 80 % defines a positive change in PCT test result representing a higher risk for 28-day all-cause mortality of patients diagnosed with severe sepsis or septic shock.  Change in PCT > 80 %   A decrease of PCT levels of more than 80 % defines a negative change in PCT result representing a lower risk for 28-day all-cause mortality of patients diagnosed with severe sepsis or septic shock.                BNP [[id number]]  (Abnormal) Collected:  09/06/19 0713    Specimen:  Blood from Arm, Right Updated:  09/06/19 0821     proBNP 8,792.0 pg/mL     Narrative:       Among patients with dyspnea, NT-proBNP is highly sensitive for the detection of acute congestive heart failure. In addition NT-proBNP of <300 pg/ml effectively rules out acute congestive heart failure with 99% negative predictive value.    Troponin [[id number]]  (Normal) Collected:  09/06/19 0831    Specimen:  Blood Updated:  09/06/19 0859     Troponin T <0.010 ng/mL     Narrative:       Troponin T Reference Range:  <= 0.03 ng/mL-   Negative for AMI  >0.03 ng/mL-     Abnormal for myocardial necrosis.  Clinicians would have to utilize clinical acumen, EKG, Troponin and serial changes to determine if it is an Acute Myocardial Infarction or myocardial injury due to an underlying chronic condition.     Magnesium [[id number]] Collected:  09/06/19 0831    Specimen:  Blood Updated:  09/06/19 1255    Blood Culture - Blood, Wrist, Right [[id number]] Collected:  09/06/19 1031    Specimen:  Blood from Wrist, Right Updated:  09/06/19 1037    Blood Culture - Blood, Arm, Right [[id number]] Collected:  09/06/19 1034    Specimen:  Blood from Arm, Right Updated:  09/06/19 1037    Lactic Acid, Plasma [[id number]]  (Normal) Collected:  09/06/19 1120    Specimen:  Blood Updated:  09/06/19 1143     Lactate 1.0 mmol/L           I ordered the above labs and reviewed the  results    RADIOLOGY  XR Chest 2 View   Final Result      CT Angiogram Chest With Contrast   Final Result   There is no CT evidence of pulmonary embolism. There is   focal dense consolidation at the left lung base within the lower lobe   consistent with pneumonia. Small bilateral pleural effusions that have   increased in size since the preceding CT dated 08/18/2019. There is mild   cardiomegaly and moderate coronary artery calcification.       This report was finalized on 9/6/2019 10:00 AM by Dr. Sam Chavez M.D.               I ordered the above noted radiological studies. Interpreted by radiologist. Reviewed by me in PACS.       PROCEDURES  Procedures    EKG          EKG time: 0615  Rhythm/Rate: NSR   P waves and MN: nml  QRS, axis: LBBB, frequent PVCs   ST and T waves: nml     Interpreted Contemporaneously by me, independently viewed  changed compared to prior 08/20/19; PVCs are new.    PROGRESS AND CONSULTS  ED Course as of Sep 06 1257   Fri Sep 06, 2019   1058 10:58 AM  Patient here for chest pain.  Appears to have new pneumonia on CT that was not present on last CT.  Will treat for healthcare acquired.  Given cefepime and vancomycin.  Discussed with Dr. Truong and will admit.  [SL]      ED Course User Index  [SL] Ruben Kidd MD     1010  BP- 127/78 HR- 118 Temp- 100.1 °F (37.8 °C) (Tympanic) O2 sat- 94%  Rechecked the patient who is in NAD and is resting comfortably. Discussed labs showing elevated in his BNP and that CTA chest shows new PNA. Pt's family told the plan to admit. Pt and family understands and agrees with the plan, all questions answered.    1053  Discussed the pt w/ Dr. Truong TERRENCE. He agreed to admit.    MEDICAL DECISION MAKING  Results were reviewed/discussed with the patient and they were also made aware of online access. Pt also made aware that some labs, such as cultures, will not be resulted during ER visit and follow up with PMD is necessary.     MDM  Number of Diagnoses or  Management Options     Amount and/or Complexity of Data Reviewed  Clinical lab tests: reviewed (BNP 8792)  Tests in the radiology section of CPT®: reviewed (CTA chest-There is no CT evidence of pulmonary embolism. There is  focal dense consolidation at the left lung base within the lower lobe  consistent with pneumonia. Small bilateral pleural effusions that have  increased in size since the preceding CT dated 08/18/2019. There is mild  cardiomegaly and moderate coronary artery calcification.)  Tests in the medicine section of CPT®: reviewed (See EKG procedure note.)  Decide to obtain previous medical records or to obtain history from someone other than the patient: yes (Sees Dr. Quezada. Pt admitted 08/18/19-08/21/19 for spontaneous pneumothorax. August 20, 2019 had cardiac cath. Stent placed in the diagonal branch.)  Discuss the patient with other providers: yes (Dr. Truong, [facility])  Independent visualization of images, tracings, or specimens: yes    Patient Progress  Patient progress: stable         DIAGNOSIS  Final diagnoses:   Pneumonia of left lower lobe due to infectious organism (CMS/HCC)       DISPOSITION  ADMISSION    Discussed treatment plan and reason for admission with pt/family and admitting physician.  Pt/family voiced understanding of the plan for admission for further testing/treatment as needed.     Latest Documented Vital Signs:  As of 12:57 PM  BP- 130/67 HR- 78 Temp- 97.7 °F (36.5 °C) (Oral) O2 sat- 91%    --  Documentation assistance provided by miladis Nunez for Dr. Kidd.  Information recorded by the scribe was done at my direction and has been verified and validated by me.     Summer Nunez  09/06/19 0337       Ruben Kidd MD  09/06/19 3697

## 2019-09-06 NOTE — CONSULTS
[facility]  Cardiology Consult Note    Patient Identification:  Name: Dinesh Churchill Sr.  Age: 89 y.o.  Sex: male  :  1928  MRN: [id number]             Requesting Physician: Eric Truong MD    Reason for Consultation / Chief Complaint: tachycardia    History of Present Illness: This 89-year-old male, known to the service, presented through the emergency department today early this morning complaining of pain that started from his left posterior jaw that radiated to his left shoulder and chest he was recently seen in the office 2 days ago and at that time was doing relatively well.  History to include recent cardiac catheterization, CAD, cardiomyopathy, hyperlipidemia, systolic congestive heart failure, spontaneous pneumothorax, paroxysmal atrial fibrillation, CVA, hearing loss.  Blood pressure was slightly elevated on admission, has now normalized and is 130/67.  Heart rate within normal limits.  Patient is currently on dual antiplatelet therapy given his recent stent placement on  2 subtotal diagonal.  Troponin negative x2, BNP elevated 8792.  Electrolytes currently stable and within normal limits.  D-dimer elevated at 0.78.  CT of the chest shows no evidence of pulmonary embolism, but did show left lower lobe pneumonia and small bilateral pleural effusions that appear larger in comparison to CT on .  Chest x-ray shows vascular congestion and left pleural effusion.  ECG shows atrial flutter with 2 1 AV block, heart rate in the 120s.    Echo on  revealed mild to moderate MR, mild aortic valve regurgitation, mild pulmonic valve regurgitation, mild to moderate TR, mild dilation of RV C, moderate dilation of LV C, EF 31% with no evidence of pericardial effusion.  Historically the patient has not been anticoagulated for his paroxysmal atrial fibrillation due to his age and frailty.      Comorbid cardiac risk factors: CAD, hyperlipidemia, hypertension, CVA, paroxysmal  atrial fibrillation    Past Medical History:  Past Medical History:   Diagnosis Date   • Abnormal ECG    • Abnormal MRI    • Acute frontal sinusitis    • Arthritis    • Chronic fatigue    • Coronary artery disease    • Dizziness    • Hearing aid worn     left   • Hearing loss    • Hyperlipidemia    • Hypertension    • Kidney stones    • Macrocytosis    • Neoplasm of skin    • Osteoporosis    • Prediabetes    • Vitamin D deficiency      Past Surgical History:  Past Surgical History:   Procedure Laterality Date   • CARDIAC CATHETERIZATION N/A 5/22/2017    Procedure: Left Heart Cath;  Surgeon: Maninder Quezada MD;  Location: [facility] CATH INVASIVE LOCATION;  Service:    • CARDIAC CATHETERIZATION N/A 5/23/2017    Procedure: Stent BMS coronary;  Surgeon: Maninder Quezada MD;  Location:  [facility] CATH INVASIVE LOCATION;  Service:    • CARDIAC CATHETERIZATION N/A 8/8/2019    Procedure: Left Heart Cath;  Surgeon: Maninder Quezada MD;  Location: [facility] CATH INVASIVE LOCATION;  Service: Cardiology   • CARDIAC CATHETERIZATION N/A 8/8/2019    Procedure: Left ventriculography;  Surgeon: Maninder Quezada MD;  Location: [facility] CATH INVASIVE LOCATION;  Service: Cardiology   • CARDIAC CATHETERIZATION N/A 8/8/2019    Procedure: Coronary angiography;  Surgeon: Maninder Quezada MD;  Location: [facility] CATH INVASIVE LOCATION;  Service: Cardiology   • CARDIAC CATHETERIZATION N/A 8/20/2019    Procedure: CORONARY ANGIOGRAPHY;  Surgeon: Maninder Quezada MD;  Location: [facility] CATH INVASIVE LOCATION;  Service: Cardiovascular   • CARDIAC CATHETERIZATION N/A 8/20/2019    Procedure: Stent BMS coronary;  Surgeon: Maninder Quezada MD;  Location: [facility] CATH INVASIVE LOCATION;  Service: Cardiovascular   • HERNIA REPAIR      x 2   • ID RT/LT HEART CATHETERS N/A 5/23/2017    Procedure: Percutaneous Coronary Intervention;  Surgeon: Maninder Quezada MD;  Location: [facility] CATH INVASIVE LOCATION;  Service:  Cardiovascular      Allergies:  No Known Allergies  Home Meds:  Facility-Administered Medications Prior to Admission   Medication Dose Route Frequency Provider Last Rate Last Dose   • cyanocobalamin injection 1,000 mcg  1,000 mcg Intramuscular Q30 Days Phyllis Loving PA   1,000 mcg at 08/28/19 1157     Medications Prior to Admission   Medication Sig Dispense Refill Last Dose   • acetaminophen (TYLENOL) 500 MG tablet 1-2 TABLETS BY ONCE TO TWICE DAILY AS NEEDED FOR PAIN 60 tablet 0 Past Week at Unknown time   • aspirin 81 MG chewable tablet Chew 1 tablet Daily.   9/5/2019 at Unknown time   • atorvastatin (LIPITOR) 20 MG tablet TAKE 1 TABLET BY MOUTH EVERY NIGHT 30 tablet 1 9/5/2019 at Unknown time   • Cholecalciferol (VITAMIN D-3) 1000 UNITS capsule Take 1,000 Units by mouth daily.   9/5/2019 at Unknown time   • Elastic Bandages & Supports (LUMBAR BACK BRACE/SUPPORT PAD) misc Use for lifting/ strenuous exercise. 1 each 0 Taking   • isosorbide mononitrate (IMDUR) 60 MG 24 hr tablet TAKE 1 TABLET BY MOUTH EVERY DAY 30 tablet 1 9/5/2019 at Unknown time   • losartan (COZAAR) 50 MG tablet TAKE 1 TABLET BY MOUTH DAILY 30 tablet 1 9/5/2019 at Unknown time   • metoprolol succinate XL (TOPROL-XL) 50 MG 24 hr tablet TAKE 1 TABLET BY MOUTH DAILY 60 tablet 3 9/5/2019 at Unknown time   • nitrofurantoin, macrocrystal-monohydrate, (MACROBID) 100 MG capsule Take 1 capsule by mouth 2 (Two) Times a Day. 14 capsule 0 9/5/2019 at Unknown time   • nitroglycerin (NITROSTAT) 0.4 MG SL tablet Place 1 tablet under the tongue Every 5 (Five) Minutes As Needed for Chest Pain. Take no more than 3 doses in 15 minutes. 25 tablet 0 Taking   • ticagrelor (BRILINTA) 90 MG tablet tablet Take 1 tablet by mouth 2 (Two) Times a Day. 60 tablet 5 9/5/2019 at Unknown time     Current Meds:     Current Facility-Administered Medications:   •  acetaminophen (TYLENOL) tablet 650 mg, 650 mg, Oral, Q4H PRN **OR** acetaminophen (TYLENOL) 160 MG/5ML solution 650  mg, 650 mg, Oral, Q4H PRN **OR** acetaminophen (TYLENOL) suppository 650 mg, 650 mg, Rectal, Q4H PRN, Pilar Garcia APRN  •  aspirin chewable tablet 81 mg, 81 mg, Oral, Daily, Pilar Garcia APRN, 81 mg at 09/06/19 1630  •  atorvastatin (LIPITOR) tablet 20 mg, 20 mg, Oral, Nightly, Pilar Garcia APRN  •  cefepime (MAXIPIME) 2 g/100 mL 0.9% NS (mbp), 2 g, Intravenous, Q8H, Pilar Garcia APRN  •  cholecalciferol (VITAMIN D3) tablet 1,000 Units, 1,000 Units, Oral, Daily, Pilar Garcia APRN, 1,000 Units at 09/06/19 1630  •  ipratropium-albuterol (DUO-NEB) nebulizer solution 3 mL, 3 mL, Nebulization, Q6H PRN, Pilar Garcia APRN  •  isosorbide mononitrate (IMDUR) 24 hr tablet 60 mg, 60 mg, Oral, Daily, Pilar Garcia APRN, 60 mg at 09/06/19 1630  •  [START ON 9/7/2019] losartan (COZAAR) tablet 50 mg, 50 mg, Oral, Daily, Pilar Garcia APRN  •  metoprolol succinate XL (TOPROL-XL) 24 hr tablet 50 mg, 50 mg, Oral, Daily, Pilar Garcia APRN, 50 mg at 09/06/19 1630  •  ondansetron (ZOFRAN) injection 4 mg, 4 mg, Intravenous, Q6H PRN, Pilar Garcia APRN  •  Pharmacy to dose vancomycin, , Does not apply, Continuous PRN, Pilar Garcia APRN  •  sodium chloride 0.9 % flush 10 mL, 10 mL, Intravenous, PRN, Ruben Kidd MD  •  sodium chloride 0.9 % flush 10 mL, 10 mL, Intravenous, Q12H, Pilar Garcia APRN, 10 mL at 09/06/19 1631  •  sodium chloride 0.9 % flush 10 mL, 10 mL, Intravenous, PRN, Garcia, Pilar M, APRN  •  ticagrelor (BRILINTA) tablet 90 mg, 90 mg, Oral, BID, Pilar Garcia, APRN  •  vancomycin (VANCOCIN) in iso-osmotic dextrose IVPB 1 g (premix) 200 mL, 1,000 mg, Intravenous, Q12H, Pilar Garcia, APRN    Social History:   Social History     Tobacco Use   • Smoking status: Never Smoker   • Smokeless tobacco: Never Used   Substance Use Topics   • Alcohol use: No     Comment: quit 30 years ago       Family History:  Family History   Family history unknown: Yes        Review of  "Systems  Constitutional: No wt loss, fever   Gastrointestinal: No nausea , abdominal pain  Behavioral/Psych: No insomnia or anxiety   Cardiovascular ----positive for sob. All other systems reviewed and are negative                     Physical Exam  /56   Pulse 97   Temp 97.3 °F (36.3 °C) (Oral)   Resp 22   Ht 185.4 cm (73\")   Wt 81.1 kg (178 lb 12.8 oz)   SpO2 90%   BMI 23.59 kg/m²     General appearance: No acute changes   Eyes: Sclera conjunctiva normal, pupils reactive   HENT: Atraumatic; oropharynx clear with moist mucous membranes and no mucosal ulcerations;  Neck: Trachea midline; NECK, supple, no thyromegaly or lymphadenopathy   Lungs: Normal size and shape, normal breath sounds, equal distribution of air, no rales and rhonchi   CV: S1-S2 irregular, no murmurs, no rub, no gallop   Abdomen: Soft, non-tender; no masses , no abnormal abdominal sounds   Extremities: No deformity , normal color , no peripheral edema   Skin: Normal temperature, turgor and texture; no rash, ulcers  Psych: Appropriate affect, alert and oriented to person, place and time                   Cardiographics  EC/6/19 atrial flutter with 2:1 AV block      19 SR with PVCs and first degree AV block rate 60s        Telemetry:   atrial fib/flutter with RVR rate 120s      Echocardiogram:   19  Interpretation Summary     · Mild-to-moderate mitral valve regurgitation is present  · Mild aortic valve regurgitation is present.  · Mild pulmonic valve regurgitation is present.  · Mild to moderate tricuspid valve regurgitation is present.  · Right ventricular cavity is mildly dilated.  · The left ventricular cavity is moderately dilated.  · Left atrial cavity size is mildly dilated.  · Calculated EF = 31.0%.  · There is no evidence of pericardial effusion.       Stress test  19  Interpretation Summary     · Findings consistent with an abnormal ECG stress test.  · Left ventricular ejection fraction is mildly reduced " (Calculated EF = 46%).  · Myocardial perfusion imaging indicates a medium-sized, moderately severe area of ischemia located in the inferior wall and lateral wall.  · Impressions are consistent with a high risk study.       Cardiac catheterization  8/20  RECOMMENDATIONS:  The patient has had an excellent result from intervention.  The patient will be maintained on antiplatelet therapy and follow up with me and his primary care physician,   Importance of taking dual antiplatelets for one year  has been explained, risk of stent thrombosis leading to the acute MI, which carries high morbidity and mortality has been explained     Discontinuation or interruptions of these medications should be under the strict guidance of appropriate health professional      Imaging  Chest X-ray:   9/6  2 VIEWS CHEST     HISTORY: Chest pain.     FINDINGS: 2 views of the chest demonstrate moderate cardiomegaly. There  is prominence of pulmonary interstitium and vasculature suggesting mild  congestive changes. There is mild bibasilar atelectasis/infiltrate. The  diaphragm is flattened and AP diameter increased consistent with COPD.  Pleural fluid is noted at the left lung base.     This report was finalized on 9/6/2019 10:06 AM by Dr. Eric Guevara,    CTA chest  9/6  FINDINGS: The main pulmonary arteries and their lobar and segmental  branches are well opacified, and demonstrate no filling defects. There  is no CT evidence of pulmonary embolism. There is a mildly enlarged  subcarinal lymph nodes that appear unchanged. There is no axillary or  hilar lymphadenopathy. The heart is mildly enlarged. There is moderate  coronary artery calcification. The ascending thoracic aorta is mildly  dilated and unchanged. It measures 4.4 cm in diameter. There is a small  posteriorly layering left pleural effusion that has increased in size in  the interval. A tiny posteriorly layering right pleural effusion also  appears larger. There is fairly dense  consolidation of the left lung  base containing air bronchograms consistent with pneumonia that is more  prominent on the previous CTA. Ill-defined increased density at the  right lung base posteriorly is most likely due to atelectasis. Images  through the upper abdomen show no significant abnormality.     IMPRESSION:  There is no CT evidence of pulmonary embolism. There is  focal dense consolidation at the left lung base within the lower lobe  consistent with pneumonia. Small bilateral pleural effusions that have  increased in size since the preceding CT dated 08/18/2019. There is mild  cardiomegaly and moderate coronary artery calcification.      Lab Review   Results from last 7 days   Lab Units 09/06/19  0831 09/06/19  0713   TROPONIN T ng/mL <0.010 <0.010     Results from last 7 days   Lab Units 09/06/19  0831   MAGNESIUM mg/dL 1.9     Results from last 7 days   Lab Units 09/06/19  0713   SODIUM mmol/L 140   POTASSIUM mmol/L 4.2   BUN mg/dL 17   CREATININE mg/dL 0.81   CALCIUM mg/dL 9.3     ProBNP 9/6 8,792  Results from last 7 days   Lab Units 09/06/19  0713   WBC 10*3/mm3 10.24   HEMOGLOBIN g/dL 13.3   HEMATOCRIT % 43.0   PLATELETS 10*3/mm3 134*     Estimated Creatinine Clearance: 68.2 mL/min (by C-G formula based on SCr of 0.81 mg/dL).    I have reviewed the medical decision making with Dr. Quezada in detail.     Assessment:  1.  Atrial flutter with RVR  2.  Pneumonia  3.  CAD  4.  CHF  5.  Shortness of breath  6.  Chest pain  7.  Thrombocytopenia  8.  Hypomagnesemia  9.  Bilateral pleural effusions    Recommendations / Plan:   Currently in atrial flutter with RVR, heart rate in the 120s.  Blood pressure stable at this time.  Pneumonia noted on CT of chest, defer to attending.  Recent cardiac catheterization as dictated above.  Patient currently on dual anti-therapy, to continue until July of next year.  Platelet count stable as compared to prior studies.  Continue dual antiplatelet therapy.  BNP  elevated at this time, vascular congestion noted on chest x-ray.  Mag level slightly low, will replace.  Will give start lasix IV, give one dose of IV metoprolol now, and resume home BB.  Will start lovenox 1mg/kg.  Repeat trop in am.  ECG in am.      [facility]    Labs/tests ordered: metoprolol 5mg IV now, lovenox 1mg/kg, mag sulf, MAG, BMP, CBC      Tabatha Watson, APRN  9/6/2019, 4:24 PM    Patient personally interviewed and above subjective findings personally confirmed during a face to face contact with patient today  All findings of physical examination confirmed  All pertinent and performed labs, cardiac procedures ,  radiographs of the last 24 hours personally reviewed  Impression and plans discussed/elaborated and implemented jointly as described above     MD LÓPEZ Cisneros/Transcription:   Dictated utilizing Dragon dictation

## 2019-09-06 NOTE — H&P
"    Patient Name:  Dinesh Churchill Sr.  YOB: 1928  MRN:  [id number]  Admit Date:  9/6/2019  Patient Care Team:  Phyllis Loving PA as PCP - General (Family Medicine)  Phyllis Loving PA as PCP - Claims Attributed  Brandon Ricci MD Thomas, Melissa, PA as Referring Physician (Family Medicine)  John Callejas II, MD as Consulting Physician (Hematology and Oncology)  Maninder Quezada MD as Consulting Physician (Cardiology)      Subjective   History Present Illness     Chief Complaint   Patient presents with   • Chest Pain     History of Present Illness   Mr. Churchill is a 89 y.o. non-smoker with a history of CAD s/p recent stent placement, CM, HTN, CVA, Parkinson's disease, PAF and recent spontaneous pneumothorax that presents to [facility] complaining of chest pain. The patient was recently admitted here from 8/18/19 to 8/21/19 for spontaneous pneumothorax as well as chest pain. He underwent angioplasty with stent placement for his chest pain and known obstructive disease. His pneumothorax was managed by Thoracic surgery who followed him and felt no intervention was necessary as it was self-resolving. He presented back to the ED today for complaints of sudden onset of left sided head/ear pain that radiated into the back of his neck and down into his left chest and left arm. He states the pain \"partially\" went into his back as well. He describes the pain as sharp and worse with breathing. He is not currently having this pain as it improved after being given morphine in the ED.    At the time of this visit, the patient is resting comfortably and is in no apparent distress. He denies any dyspnea, chest pain, nausea, vomiting or diarrhea at this time. He denies any increased cough or productive sputum. He denies any known fever or chills although he has been having a runny nose and having to blow it frequently. He denies any sore throat. He states he does feel more weak than normal " today. He denies any decreased appetite or increased swelling.  His troponin was negative in the ED. Due to an elevated d-dimer, he had a CTA that revealed no PE, but a LLL infiltrate consistent with pneumonia as well as small bilateral pleural effusions. His chest x-ray revealed mild bibasilar atelectasis/infiltrate. There was no mention of pneumothorax in either of these tests. He was given Cefepime and Vancomycin in the ED. Blood cultures are pending and his lactic and procalcitonin were normal. He is being admitted for further work-up and treatment of his pneumonia.    Review of Systems   Constitutional: Negative for activity change, appetite change, chills, fatigue and fever.   HENT: Positive for congestion, ear pain, hearing loss (baseline [place]) and rhinorrhea. Negative for sinus pressure, sinus pain and sore throat.    Eyes: Positive for visual disturbance (baseline double vision without glasses). Negative for pain and discharge.   Respiratory: Negative for cough, choking, chest tightness and shortness of breath.    Cardiovascular: Positive for chest pain and leg swelling (baseline).   Gastrointestinal: Negative for abdominal distention, abdominal pain, blood in stool, constipation, diarrhea, nausea and vomiting.   Endocrine: Negative for cold intolerance and heat intolerance.   Genitourinary: Negative for difficulty urinating, dysuria and hematuria.   Musculoskeletal: Negative for arthralgias, back pain, gait problem and joint swelling.   Skin: Negative for color change and pallor.   Neurological: Positive for weakness and headaches. Negative for dizziness and light-headedness.   Psychiatric/Behavioral: Negative for confusion. The patient is not nervous/anxious.       Personal History     Past Medical History:   Diagnosis Date   • Abnormal ECG    • Abnormal MRI    • Acute frontal sinusitis    • Arthritis    • Chronic fatigue    • Coronary artery disease    • Dizziness    • Hearing aid worn     left   •  Hearing loss    • Hyperlipidemia    • Hypertension    • Kidney stones    • Macrocytosis    • Neoplasm of skin    • Osteoporosis    • Prediabetes    • Vitamin D deficiency      Past Surgical History:   Procedure Laterality Date   • CARDIAC CATHETERIZATION N/A 5/22/2017    Procedure: Left Heart Cath;  Surgeon: Maninder Quezada MD;  Location:  [facility] CATH INVASIVE LOCATION;  Service:    • CARDIAC CATHETERIZATION N/A 5/23/2017    Procedure: Stent BMS coronary;  Surgeon: Maninder Quezada MD;  [facility];  Service:    • CARDIAC CATHETERIZATION N/A 8/8/2019    Procedure: Left Heart Cath;  Surgeon: Maninder Quezada MD;  Location:  [facility] CATH INVASIVE LOCATION;  Service: Cardiology   • CARDIAC CATHETERIZATION N/A 8/8/2019    Procedure: Left ventriculography;  Surgeon: Maninder Quezada MD;  [facility];  Service: Cardiology   • CARDIAC CATHETERIZATION N/A 8/8/2019    Procedure: Coronary angiography;  Surgeon: Maninder Quezada MD;  [facility];  Service: Cardiology   • CARDIAC CATHETERIZATION N/A 8/20/2019    Procedure: CORONARY ANGIOGRAPHY;  Surgeon: Maninder Quezada MD;  [facility];  Service: Cardiovascular   • CARDIAC CATHETERIZATION N/A 8/20/2019    Procedure: Stent BMS coronary;  Surgeon: Maninder Quezada MD;  [facility];  Service: Cardiovascular   • HERNIA REPAIR      x 2   • ID RT/LT HEART CATHETERS N/A 5/23/2017    Procedure: Percutaneous Coronary Intervention;  Surgeon: Maninder Quezada MD;  Location: [facility] CATH INVASIVE LOCATION;  Service: Cardiovascular     Family History   Family history unknown: Yes     Social History     Tobacco Use   • Smoking status: Never Smoker   • Smokeless tobacco: Never Used   Substance Use Topics   • Alcohol use: No     Comment: quit 30 years ago    • Drug use: No     Facility-Administered Medications  Prior to Admission   Medication Dose Route Frequency Provider Last Rate Last Dose   • cyanocobalamin injection 1,000 mcg  1,000 mcg Intramuscular Q30 Days Phyllis Loving PA   1,000 mcg at 08/28/19 1157     Medications Prior to Admission   Medication Sig Dispense Refill Last Dose   • acetaminophen (TYLENOL) 500 MG tablet 1-2 TABLETS BY ONCE TO TWICE DAILY AS NEEDED FOR PAIN 60 tablet 0 Past Week at Unknown time   • aspirin 81 MG chewable tablet Chew 1 tablet Daily.   9/5/2019 at Unknown time   • atorvastatin (LIPITOR) 20 MG tablet TAKE 1 TABLET BY MOUTH EVERY NIGHT 30 tablet 1 9/5/2019 at Unknown time   • Cholecalciferol (VITAMIN D-3) 1000 UNITS capsule Take 1,000 Units by mouth daily.   9/5/2019 at Unknown time   • Elastic Bandages & Supports (LUMBAR BACK BRACE/SUPPORT PAD) misc Use for lifting/ strenuous exercise. 1 each 0 Taking   • isosorbide mononitrate (IMDUR) 60 MG 24 hr tablet TAKE 1 TABLET BY MOUTH EVERY DAY 30 tablet 1 9/5/2019 at Unknown time   • losartan (COZAAR) 50 MG tablet TAKE 1 TABLET BY MOUTH DAILY 30 tablet 1 9/5/2019 at Unknown time   • metoprolol succinate XL (TOPROL-XL) 50 MG 24 hr tablet TAKE 1 TABLET BY MOUTH DAILY 60 tablet 3 9/5/2019 at Unknown time   • nitrofurantoin, macrocrystal-monohydrate, (MACROBID) 100 MG capsule Take 1 capsule by mouth 2 (Two) Times a Day. 14 capsule 0 9/5/2019 at Unknown time   • nitroglycerin (NITROSTAT) 0.4 MG SL tablet Place 1 tablet under the tongue Every 5 (Five) Minutes As Needed for Chest Pain. Take no more than 3 doses in 15 minutes. 25 tablet 0 Taking   • ticagrelor (BRILINTA) 90 MG tablet tablet Take 1 tablet by mouth 2 (Two) Times a Day. 60 tablet 5 9/5/2019 at Unknown time     Allergies:  No Known Allergies    Objective    Objective     Vital Signs  Temp:  [97.7 °F (36.5 °C)-100.1 °F (37.8 °C)] 97.7 °F (36.5 °C)  Heart Rate:  [] 78  Resp:  [18] 18  BP: (127-166)/(63-78) 130/67  SpO2:  [91 %-97 %] 91 %  on  Flow (L/min):  [3] 3;   Device (Oxygen  Therapy): room air  Body mass index is 23.62 kg/m².    Physical Exam   Constitutional: He is oriented to person, place, and time. He appears well-developed and well-nourished.   HENT:   Head: Normocephalic and atraumatic.   Nose: Nose normal.   Missing teeth.   Eyes: Conjunctivae are normal. Right eye exhibits no discharge. Left eye exhibits no discharge.   Neck: Normal range of motion. Neck supple.   Cardiovascular: Normal rate, regular rhythm and intact distal pulses.   No murmur heard.  Occasional irregular beats. PVCs intermittent on monitor.   Pulmonary/Chest: Effort normal. No respiratory distress. He has rales (LLL posteriorly).   Abdominal: Soft. Bowel sounds are normal. He exhibits no distension. There is no tenderness.   Musculoskeletal: Normal range of motion. He exhibits edema (mild BLE). He exhibits no tenderness.   Neurological: He is alert and oriented to person, place, and time. He exhibits normal muscle tone. Coordination normal.   Skin: Skin is warm and dry. No erythema.   Psychiatric: He has a normal mood and affect. His behavior is normal.   Nursing note and vitals reviewed.     Results Review:  I reviewed the patient's new clinical results.  I reviewed the patient's new imaging results and agree with the interpretation.  I reviewed the patient's other test results and agree with the interpretation  I personally viewed and interpreted the patient's EKG/Telemetry data  Discussed with ED provider.    Lab Results (last 24 hours)     Procedure Component Value Units Date/Time    CBC & Differential [[id number]] Collected:  09/06/19 0713    Specimen:  Blood Updated:  09/06/19 0825    Narrative:       The following orders were created for panel order CBC & Differential.  Procedure                               Abnormality         Status                     ---------                               -----------         ------                     CBC Auto Differential[[id number]]        Abnormal             Final result                 Please view results for these tests on the individual orders.    Comprehensive Metabolic Panel [[id number]]  (Abnormal) Collected:  09/06/19 0713    Specimen:  Blood from Arm, Right Updated:  09/06/19 0802     Glucose 128 mg/dL      BUN 17 mg/dL      Creatinine 0.81 mg/dL      Sodium 140 mmol/L      Potassium 4.2 mmol/L      Chloride 103 mmol/L      CO2 25.5 mmol/L      Calcium 9.3 mg/dL      Total Protein 6.9 g/dL      Albumin 4.30 g/dL      ALT (SGPT) 15 U/L      AST (SGOT) 16 U/L      Alkaline Phosphatase 61 U/L      Total Bilirubin 1.6 mg/dL      eGFR Non African Amer 89 mL/min/1.73      Globulin 2.6 gm/dL      A/G Ratio 1.7 g/dL      BUN/Creatinine Ratio 21.0     Anion Gap 11.5 mmol/L     Narrative:       GFR Normal >60  Chronic Kidney Disease <60  Kidney Failure <15    Troponin [[id number]]  (Normal) Collected:  09/06/19 0713    Specimen:  Blood from Arm, Right Updated:  09/06/19 0800     Troponin T <0.010 ng/mL     Narrative:       Troponin T Reference Range:  <= 0.03 ng/mL-   Negative for AMI  >0.03 ng/mL-     Abnormal for myocardial necrosis.  Clinicians would have to utilize clinical acumen, EKG, Troponin and serial changes to determine if it is an Acute Myocardial Infarction or myocardial injury due to an underlying chronic condition.     CBC Auto Differential [[id number]]  (Abnormal) Collected:  09/06/19 0713    Specimen:  Blood from Arm, Right Updated:  09/06/19 0825     WBC 10.24 10*3/mm3      RBC 4.11 10*6/mm3      Hemoglobin 13.3 g/dL      Hematocrit 43.0 %      .6 fL      MCH 32.4 pg      MCHC 30.9 g/dL      RDW 16.0 %      RDW-SD 61.6 fl      MPV 11.8 fL      Platelets 134 10*3/mm3      Neutrophil % 86.9 %      Lymphocyte % 5.2 %      Monocyte % 5.7 %      Eosinophil % 1.6 %      Basophil % 0.2 %      Immature Grans % 0.4 %      Neutrophils, Absolute 8.91 10*3/mm3      Lymphocytes, Absolute 0.53 10*3/mm3      Monocytes, Absolute 0.58 10*3/mm3      Eosinophils,  "Absolute 0.16 10*3/mm3      Basophils, Absolute 0.02 10*3/mm3      Immature Grans, Absolute 0.04 10*3/mm3      nRBC 0.0 /100 WBC     D-dimer, Quantitative [[id number]]  (Abnormal) Collected:  09/06/19 0713    Specimen:  Blood from Arm, Right Updated:  09/06/19 0834     D-Dimer, Quantitative 0.78 MCGFEU/mL     Narrative:       The [place] D-Dimer test used in conjunction with a clinical pretest probability (PTP) assessment model, has been approved by the FDA to rule out the presence of venous thromboembolism (VTE) in outpatients suspected of deep venous thrombosis (DVT) or pulmonary embolism (PE). The cut-off for negative predictive value is <0.50 MCGFEU/mL.    Procalcitonin [[id number]]  (Abnormal) Collected:  09/06/19 0713    Specimen:  Blood from Arm, Right Updated:  09/06/19 0823     Procalcitonin 0.06 ng/mL     Narrative:       As a Marker for Sepsis (Non-Neonates):   1. <0.5 ng/mL represents a low risk of severe sepsis and/or septic shock.  1. >2 ng/mL represents a high risk of severe sepsis and/or septic shock.    As a Marker for Lower Respiratory Tract Infections that require antibiotic therapy:  PCT on Admission     Antibiotic Therapy             6-12 Hrs later  > 0.5                Strongly Recommended            >0.25 - <0.5         Recommended  0.1 - 0.25           Discouraged                   Remeasure/reassess PCT  <0.1                 Strongly Discouraged          Remeasure/reassess PCT      As 28 day mortality risk marker: \"Change in Procalcitonin Result\" (> 80 % or <=80 %) if Day 0 (or Day 1) and Day 4 values are available. Refer to http://www.[employer]-pct-calculator.com/   Change in PCT <=80 %   A decrease of PCT levels below or equal to 80 % defines a positive change in PCT test result representing a higher risk for 28-day all-cause mortality of patients diagnosed with severe sepsis or septic shock.  Change in PCT > 80 %   A decrease of PCT levels of more than 80 % defines a negative change in PCT " result representing a lower risk for 28-day all-cause mortality of patients diagnosed with severe sepsis or septic shock.                BNP [[id number]]  (Abnormal) Collected:  09/06/19 0713    Specimen:  Blood from Arm, Right Updated:  09/06/19 0821     proBNP 8,792.0 pg/mL     Narrative:       Among patients with dyspnea, NT-proBNP is highly sensitive for the detection of acute congestive heart failure. In addition NT-proBNP of <300 pg/ml effectively rules out acute congestive heart failure with 99% negative predictive value.    Troponin [[id number]]  (Normal) Collected:  09/06/19 0831    Specimen:  Blood Updated:  09/06/19 0859     Troponin T <0.010 ng/mL     Narrative:       Troponin T Reference Range:  <= 0.03 ng/mL-   Negative for AMI  >0.03 ng/mL-     Abnormal for myocardial necrosis.  Clinicians would have to utilize clinical acumen, EKG, Troponin and serial changes to determine if it is an Acute Myocardial Infarction or myocardial injury due to an underlying chronic condition.     Blood Culture - Blood, Wrist, Right [[id number]] Collected:  09/06/19 1031    Specimen:  Blood from Wrist, Right Updated:  09/06/19 1037    Blood Culture - Blood, Arm, Right [[id number]] Collected:  09/06/19 1034    Specimen:  Blood from Arm, Right Updated:  09/06/19 1037    Lactic Acid, Plasma [[id number]]  (Normal) Collected:  09/06/19 1120    Specimen:  Blood Updated:  09/06/19 1143     Lactate 1.0 mmol/L           Imaging Results (last 24 hours)     Procedure Component Value Units Date/Time    XR Chest 2 View [[id number]] Collected:  09/06/19 0806     Updated:  09/06/19 1009    Narrative:       2 VIEWS CHEST     HISTORY: Chest pain.     FINDINGS: 2 views of the chest demonstrate moderate cardiomegaly. There  is prominence of pulmonary interstitium and vasculature suggesting mild  congestive changes. There is mild bibasilar atelectasis/infiltrate. The  diaphragm is flattened and AP diameter increased consistent with COPD.  Pleural  fluid is noted at the left lung base.     This report was finalized on 9/6/2019 10:06 AM by Dr. Eric Guevara M.D.       CT Angiogram Chest With Contrast [[id number]] Collected:  09/06/19 0934     Updated:  09/06/19 1003    Narrative:       CT ANGIOGRAM CHEST W CONTRAST-     CLINICAL HISTORY: Left-sided pleuritic chest pain. Rule out pulmonary  embolism.     TECHNIQUE: Spiral CT images were obtained through the chest during rapid  IV injection of contrast and were reconstructed in 2 mm thick axial  slices. Multiple coronal and sagittal and 3-D reconstructions were  obtained.     Radiation dose reduction techniques were utilized, including automated  exposure control and exposure modulation based on body size.     COMPARISON: CT scan of the chest without contrast dated 08/18/2019.     FINDINGS: The main pulmonary arteries and their lobar and segmental  branches are well opacified, and demonstrate no filling defects. There  is no CT evidence of pulmonary embolism. There is a mildly enlarged  subcarinal lymph nodes that appear unchanged. There is no axillary or  hilar lymphadenopathy. The heart is mildly enlarged. There is moderate  coronary artery calcification. The ascending thoracic aorta is mildly  dilated and unchanged. It measures 4.4 cm in diameter. There is a small  posteriorly layering left pleural effusion that has increased in size in  the interval. A tiny posteriorly layering right pleural effusion also  appears larger. There is fairly dense consolidation of the left lung  base containing air bronchograms consistent with pneumonia that is more  prominent on the previous CTA. Ill-defined increased density at the  right lung base posteriorly is most likely due to atelectasis. Images  through the upper abdomen show no significant abnormality.       Impression:       There is no CT evidence of pulmonary embolism. There is  focal dense consolidation at the left lung base within the lower lobe  consistent with  pneumonia. Small bilateral pleural effusions that have  increased in size since the preceding CT dated 08/18/2019. There is mild  cardiomegaly and moderate coronary artery calcification.     This report was finalized on 9/6/2019 10:00 AM by Dr. Sam Chavez M.D.             Results for orders placed in visit on 06/12/19   Adult Transthoracic Echo Complete W/ Cont if Necessary Per Protocol    Narrative · Mild-to-moderate mitral valve regurgitation is present  · Mild aortic valve regurgitation is present.  · Mild pulmonic valve regurgitation is present.  · Mild to moderate tricuspid valve regurgitation is present.  · Right ventricular cavity is mildly dilated.  · The left ventricular cavity is moderately dilated.  · Left atrial cavity size is mildly dilated.  · Calculated EF = 31.0%.  · There is no evidence of pericardial effusion.          ECG 12 Lead   Preliminary Result   HEART RATE= 91  bpm   RR Interval= 658  ms   NM Interval=   ms   P Horizontal Axis=   deg   P Front Axis=   deg   QRSD Interval= 123  ms   QT Interval= 409  ms   QRS Axis= -31  deg   T Wave Axis= 120  deg   - ABNORMAL ECG -   Atrial fibrillation   Paired ventricular premature complexes   Left bundle branch block   Electronically Signed By:    Date and Time of Study: 2019-09-06 06:15:01           Assessment/Plan     Active Hospital Problems    Diagnosis POA   • Pneumonia of left lower lobe due to infectious organism (CMS/HCC) [J18.1] Yes   • Paroxysmal atrial fibrillation (CMS/HCC) [I48.0] Yes   • History of CVA (cerebrovascular accident) [Z86.73] Not Applicable   • Parkinson's disease (CMS/HCC) [G20] Yes   • Coronary artery disease involving native coronary artery of native heart without angina pectoris [I25.10] Yes   • History of coronary artery stent placement [Z95.5] Not Applicable   • Cardiomyopathy (CMS/HCC) [I42.9] Yes   • Essential hypertension [I10] Yes     Pneumonia left lower lobe  -Continue Cefepime and Vancomycin as started in  ED.  -Check RVP. MRSA screen.  -PRN breathing treatments. IS.  -On room air.  -Blood cultures pending.    PAF/CAD/stent placement  -EKG ok. Troponin ok.   -Chest pain resolved. Likely related to pneumonia, but monitor for recurrence.  -Hold on Cardiology consult at this time.  -Continue home medications.    Recent pneumothorax  -Appears resolved.    HTN  -Blood pressures good.      I discussed the patients findings and my recommendations with patient and nursing staff.    VTE Prophylaxis - SCDs.  Code Status - Full code. Confirmed with patient.       CANDELARIA Pelaez  [facility]  09/06/19  12:55 PM    Addendum: I have reviewed the history and plan as obtained by Pilar Garcia. I have personally examined the patient. My exam confirms her physical findings and I agree with the plan as listed above, with the addition/exception of the following:    Chest pain radiated to the left jaw and left arm. Could be from pneumonia but would repeat the troponin levels    Having atrial flutter    Heart is regular and tachycardic on the monitor, lungs clear    Iv antibiotics  cultures  Cardiology and pulmonary consult      Eric Truong MD  5:48 PM  09/06/19

## 2019-09-06 NOTE — OUTREACH NOTE
CHF Week 3 Survey      Responses   Facility patient discharged from?  [facility]   Does the patient have one of the following disease processes/diagnoses(primary or secondary)?  CHF   Week 3 attempt successful?  No   Revoke  Readmitted          Sayra Franklin RN

## 2019-09-06 NOTE — OUTREACH NOTE
Care Coordination Note    Communication with ED care manager update of the following:   Spoke with Shivani MOLINA patient. Patient has had two recent hospitalizations in August (8/6 with CP and 8/18 with pneumo and stent placement). Son lives 25 miles away. Patient resides in trailer alone. Utilizes cane and [place]. Neighbors check patient daily. Patient offered HH or rehab during last hospital stay (8/18) and declined both. Assess for transition visit/home health services.     Ashely Domínguez RN    9/6/2019, 9:13 AM

## 2019-09-06 NOTE — ED TRIAGE NOTES
To ER via EMS.  C/o pain left neck into left shoulder, left side.  Pain increases with inspiration.

## 2019-09-06 NOTE — PROGRESS NOTES
"Vancomycin Pharmacy to Dose - Initial Consult    Per Pilar Garcia APRN   Indication: PNA  Goal trough: 15-20 mcg/ml  Current consult stop date: 19    Relevant clinical data and objective history reviewed:  89 y.o. male 185.4 cm (73\") 81.2 kg (179 lb 0.2 oz)    Antimicrobials:  Day 1 - Vancomycin: pharmacy to dose  Day 1 - Cefepime 2gm iv q8h    Vancomycin Dose History:   vanc 1500 mg iv x 1 at 1000 in ED    Renal:  Estimated Creatinine Clearance: 68.2 mL/min (by C-G formula based on SCr of 0.81 mg/dL).   Lab Results   Component Value Date    CREATININE 0.81 2019      Lab Results   Component Value Date    WBC 10.24 2019    WBC 4.96 2019    WBC 3.92 2019        Vitals:  Temp (24hrs), Av.9 °F (37.2 °C), Min:97.7 °F (36.5 °C), Max:100.1 °F (37.8 °C)    Assessment:  - 89 yo male on vancomycin iv and cefepime iv for PNA    Plan:  1. Will continue with vancomycin 1gm iv q12h to start this evening and vancomycin trough to be drawn prior to am dose on .     2. Will monitor serum creatinine every 24 hours for the first 3 days then at least every 48 hours per dosing recommendations.     3. Encourage hydration as able per MD order to prevent toxic accumulation, and monitor for sign of toxicity such as increased SCr, decreased UOP and ringing in the ears.     4. Pharmacy will continue to follow daily while on vancomycin and adjust as needed.     Megan Emery, Pharm.D., [facility]  2019 3:12 PM        "

## 2019-09-07 LAB
ANION GAP SERPL CALCULATED.3IONS-SCNC: 10.5 MMOL/L (ref 5–15)
BASOPHILS # BLD AUTO: 0.02 10*3/MM3 (ref 0–0.2)
BASOPHILS NFR BLD AUTO: 0.2 % (ref 0–1.5)
BUN BLD-MCNC: 17 MG/DL (ref 8–23)
BUN/CREAT SERPL: 19.3 (ref 7–25)
CALCIUM SPEC-SCNC: 8.1 MG/DL (ref 8.2–9.6)
CHLORIDE SERPL-SCNC: 104 MMOL/L (ref 98–107)
CO2 SERPL-SCNC: 24.5 MMOL/L (ref 22–29)
CREAT BLD-MCNC: 0.88 MG/DL (ref 0.76–1.27)
DEPRECATED RDW RBC AUTO: 61.3 FL (ref 37–54)
EOSINOPHIL # BLD AUTO: 0.28 10*3/MM3 (ref 0–0.4)
EOSINOPHIL NFR BLD AUTO: 2.9 % (ref 0.3–6.2)
ERYTHROCYTE [DISTWIDTH] IN BLOOD BY AUTOMATED COUNT: 16.1 % (ref 12.3–15.4)
GFR SERPL CREATININE-BSD FRML MDRD: 81 ML/MIN/1.73
GLUCOSE BLD-MCNC: 131 MG/DL (ref 65–99)
HCT VFR BLD AUTO: 38.7 % (ref 37.5–51)
HGB BLD-MCNC: 12 G/DL (ref 13–17.7)
IMM GRANULOCYTES # BLD AUTO: 0.04 10*3/MM3 (ref 0–0.05)
IMM GRANULOCYTES NFR BLD AUTO: 0.4 % (ref 0–0.5)
LYMPHOCYTES # BLD AUTO: 0.6 10*3/MM3 (ref 0.7–3.1)
LYMPHOCYTES NFR BLD AUTO: 6.2 % (ref 19.6–45.3)
MAGNESIUM SERPL-MCNC: 2.1 MG/DL (ref 1.7–2.3)
MCH RBC QN AUTO: 31.9 PG (ref 26.6–33)
MCHC RBC AUTO-ENTMCNC: 31 G/DL (ref 31.5–35.7)
MCV RBC AUTO: 102.9 FL (ref 79–97)
MONOCYTES # BLD AUTO: 0.61 10*3/MM3 (ref 0.1–0.9)
MONOCYTES NFR BLD AUTO: 6.3 % (ref 5–12)
NEUTROPHILS # BLD AUTO: 8.09 10*3/MM3 (ref 1.7–7)
NEUTROPHILS NFR BLD AUTO: 84 % (ref 42.7–76)
NRBC BLD AUTO-RTO: 0.1 /100 WBC (ref 0–0.2)
PLATELET # BLD AUTO: 119 10*3/MM3 (ref 140–450)
PMV BLD AUTO: 11.8 FL (ref 6–12)
POTASSIUM BLD-SCNC: 3.5 MMOL/L (ref 3.5–5.2)
RBC # BLD AUTO: 3.76 10*6/MM3 (ref 4.14–5.8)
SODIUM BLD-SCNC: 139 MMOL/L (ref 136–145)
TROPONIN T SERPL-MCNC: 0.01 NG/ML (ref 0–0.03)
WBC NRBC COR # BLD: 9.64 10*3/MM3 (ref 3.4–10.8)

## 2019-09-07 PROCEDURE — 80048 BASIC METABOLIC PNL TOTAL CA: CPT | Performed by: NURSE PRACTITIONER

## 2019-09-07 PROCEDURE — 94799 UNLISTED PULMONARY SVC/PX: CPT

## 2019-09-07 PROCEDURE — 93010 ELECTROCARDIOGRAM REPORT: CPT | Performed by: INTERNAL MEDICINE

## 2019-09-07 PROCEDURE — 99221 1ST HOSP IP/OBS SF/LOW 40: CPT | Performed by: INTERNAL MEDICINE

## 2019-09-07 PROCEDURE — 25010000002 VANCOMYCIN PER 500 MG: Performed by: NURSE PRACTITIONER

## 2019-09-07 PROCEDURE — 25010000002 FUROSEMIDE PER 20 MG: Performed by: NURSE PRACTITIONER

## 2019-09-07 PROCEDURE — 93005 ELECTROCARDIOGRAM TRACING: CPT | Performed by: NURSE PRACTITIONER

## 2019-09-07 PROCEDURE — 93005 ELECTROCARDIOGRAM TRACING: CPT | Performed by: INTERNAL MEDICINE

## 2019-09-07 PROCEDURE — 85025 COMPLETE CBC W/AUTO DIFF WBC: CPT | Performed by: NURSE PRACTITIONER

## 2019-09-07 PROCEDURE — 25010000002 CEFEPIME PER 500 MG: Performed by: NURSE PRACTITIONER

## 2019-09-07 PROCEDURE — 83735 ASSAY OF MAGNESIUM: CPT | Performed by: NURSE PRACTITIONER

## 2019-09-07 PROCEDURE — 94640 AIRWAY INHALATION TREATMENT: CPT

## 2019-09-07 PROCEDURE — 25010000002 ENOXAPARIN PER 10 MG: Performed by: NURSE PRACTITIONER

## 2019-09-07 PROCEDURE — 84484 ASSAY OF TROPONIN QUANT: CPT | Performed by: NURSE PRACTITIONER

## 2019-09-07 RX ORDER — POTASSIUM CHLORIDE 750 MG/1
20 CAPSULE, EXTENDED RELEASE ORAL DAILY
Status: DISCONTINUED | OUTPATIENT
Start: 2019-09-07 | End: 2019-09-07

## 2019-09-07 RX ORDER — POTASSIUM CHLORIDE 750 MG/1
20 CAPSULE, EXTENDED RELEASE ORAL ONCE
Status: COMPLETED | OUTPATIENT
Start: 2019-09-07 | End: 2019-09-07

## 2019-09-07 RX ADMIN — ENOXAPARIN SODIUM 80 MG: 80 INJECTION SUBCUTANEOUS at 17:33

## 2019-09-07 RX ADMIN — IPRATROPIUM BROMIDE AND ALBUTEROL SULFATE 3 ML: 2.5; .5 SOLUTION RESPIRATORY (INHALATION) at 07:35

## 2019-09-07 RX ADMIN — LOSARTAN POTASSIUM 50 MG: 50 TABLET, FILM COATED ORAL at 09:03

## 2019-09-07 RX ADMIN — ENOXAPARIN SODIUM 80 MG: 80 INJECTION SUBCUTANEOUS at 05:02

## 2019-09-07 RX ADMIN — ATORVASTATIN CALCIUM 20 MG: 20 TABLET, FILM COATED ORAL at 20:27

## 2019-09-07 RX ADMIN — IPRATROPIUM BROMIDE AND ALBUTEROL SULFATE 3 ML: 2.5; .5 SOLUTION RESPIRATORY (INHALATION) at 22:07

## 2019-09-07 RX ADMIN — SODIUM CHLORIDE, PRESERVATIVE FREE 10 ML: 5 INJECTION INTRAVENOUS at 09:06

## 2019-09-07 RX ADMIN — CEFEPIME HYDROCHLORIDE 2 G: 2 INJECTION, POWDER, FOR SOLUTION INTRAVENOUS at 11:36

## 2019-09-07 RX ADMIN — CEFEPIME HYDROCHLORIDE 2 G: 2 INJECTION, POWDER, FOR SOLUTION INTRAVENOUS at 04:36

## 2019-09-07 RX ADMIN — ASPIRIN 81 MG: 81 TABLET, CHEWABLE ORAL at 09:05

## 2019-09-07 RX ADMIN — VANCOMYCIN HYDROCHLORIDE 1000 MG: 1 INJECTION, SOLUTION INTRAVENOUS at 22:57

## 2019-09-07 RX ADMIN — SODIUM CHLORIDE, PRESERVATIVE FREE 10 ML: 5 INJECTION INTRAVENOUS at 20:29

## 2019-09-07 RX ADMIN — TICAGRELOR 90 MG: 90 TABLET ORAL at 20:27

## 2019-09-07 RX ADMIN — POTASSIUM CHLORIDE 20 MEQ: 750 CAPSULE, EXTENDED RELEASE ORAL at 12:33

## 2019-09-07 RX ADMIN — POLYETHYLENE GLYCOL 3350 17 G: 17 POWDER, FOR SOLUTION ORAL at 12:33

## 2019-09-07 RX ADMIN — METOPROLOL SUCCINATE 75 MG: 50 TABLET, FILM COATED, EXTENDED RELEASE ORAL at 20:27

## 2019-09-07 RX ADMIN — POLYETHYLENE GLYCOL 3350 17 G: 17 POWDER, FOR SOLUTION ORAL at 20:27

## 2019-09-07 RX ADMIN — CEFEPIME HYDROCHLORIDE 2 G: 2 INJECTION, POWDER, FOR SOLUTION INTRAVENOUS at 20:26

## 2019-09-07 RX ADMIN — ISOSORBIDE MONONITRATE 60 MG: 60 TABLET ORAL at 09:05

## 2019-09-07 RX ADMIN — FUROSEMIDE 40 MG: 10 INJECTION, SOLUTION INTRAMUSCULAR; INTRAVENOUS at 17:33

## 2019-09-07 RX ADMIN — VITAMIN D, TAB 1000IU (100/BT) 1000 UNITS: 25 TAB at 09:05

## 2019-09-07 RX ADMIN — FUROSEMIDE 40 MG: 10 INJECTION, SOLUTION INTRAMUSCULAR; INTRAVENOUS at 05:02

## 2019-09-07 RX ADMIN — VANCOMYCIN HYDROCHLORIDE 1000 MG: 1 INJECTION, SOLUTION INTRAVENOUS at 09:03

## 2019-09-07 RX ADMIN — TICAGRELOR 90 MG: 90 TABLET ORAL at 09:03

## 2019-09-07 RX ADMIN — ACETAMINOPHEN 650 MG: 325 TABLET, FILM COATED ORAL at 20:27

## 2019-09-07 RX ADMIN — METOPROLOL TARTRATE 5 MG: 5 INJECTION, SOLUTION INTRAVENOUS at 12:29

## 2019-09-07 RX ADMIN — IPRATROPIUM BROMIDE AND ALBUTEROL SULFATE 3 ML: 2.5; .5 SOLUTION RESPIRATORY (INHALATION) at 00:04

## 2019-09-07 NOTE — PROGRESS NOTES
"Vancomycin Pharmacy to Dose - Initial Consult    Per Pilar Garcia APRN   Indication: PNA  Goal trough: 15-20 mcg/ml  Current consult stop date: 19    Relevant clinical data and objective history reviewed:  89 y.o. male 185.4 cm (73\") 81.1 kg (178 lb 12.8 oz)    Antimicrobials:  Day 2 - Vancomycin: pharmacy to dose  Day 2 - Cefepime 2gm iv q8h    Vancomycin Dose History:   vanc 1500 mg iv x 1 at 1000 in ED then 1000 mg every 12 hours    Renal:  Estimated Creatinine Clearance: 62.7 mL/min (by C-G formula based on SCr of 0.88 mg/dL).   Lab Results   Component Value Date    CREATININE 0.88 2019    CREATININE 0.81 2019      Lab Results   Component Value Date    WBC 9.64 2019    WBC 10.24 2019    WBC 4.96 2019        Vitals:  Temp (24hrs), Av.1 °F (36.7 °C), Min:97.3 °F (36.3 °C), Max:98.8 °F (37.1 °C)    Assessment:  89 year old male on vancomycin and cefepime for left lower love pneumonia.  Recommend to discontinue vancomycin as MRSA screen is negative and clinical course seems to fit the picture more for CAP than an HCAP.  Currently afebrile, vss, no leukocytosis, PCT 0.06 upon admission.         Plan:  1. Will continue with vancomycin 1gm iv q12h to start this evening and vancomycin trough to be drawn prior to am dose on .     2. Will monitor serum creatinine every 24 hours for the first 3 days then at least every 48 hours per dosing recommendations.     2. Encourage hydration as able per MD order to prevent toxic accumulation, and monitor for sign of toxicity such as increased SCr, decreased UOP and ringing in the ears.     4. Pharmacy will continue to follow daily while on vancomycin and adjust as needed.     Rio Cohn, PharmD, BCIDP, BCPS  2019 1:13 PM        "

## 2019-09-07 NOTE — PROGRESS NOTES
"[facility] PULMONARY CARE         Dr Bender Sayied   LOS: 1 day   Patient Care Team:  Phyllis Loving PA as PCP - General (Family Medicine)  Phyllis Loving PA as PCP - Claims Attributed  Brandon Ricci MD Thomas, Melissa, PA as Referring Physician (Family Medicine)  John Callejas II, MD as Consulting Physician (Hematology and Oncology)  Maninder Quezada MD as Consulting Physician (Cardiology)    Chief Complaint: Left lower lobe pneumonia with recent pneumothorax underlying history of coronary artery disease and cardiomyopathy    Interval History: Resting comfortably feels better.  Ongoing cough and congestion.    REVIEW OF SYSTEMS:   CARDIOVASCULAR: No chest pain, chest pressure or chest discomfort. No palpitations or edema.   RESPIRATORY: shortness of breath with exertion ongoing cough and congestion  GASTROINTESTINAL: No anorexia, nausea, vomiting or diarrhea. No abdominal pain or blood.   HEMATOLOGIC: No bleeding or bruising.     Ventilator/Non-Invasive Ventilation Settings (From admission, onward)    None            Vital Signs  Temp:  [97.3 °F (36.3 °C)-98.8 °F (37.1 °C)] 97.6 °F (36.4 °C)  Heart Rate:  [] 106  Resp:  [18-22] 20  BP: (102-137)/(56-71) 109/67    Intake/Output Summary (Last 24 hours) at 9/7/2019 1547  Last data filed at 9/7/2019 1136  Gross per 24 hour   Intake 820 ml   Output 1835 ml   Net -1015 ml     Flowsheet Rows      First Filed Value   Admission Height  185.4 cm (73\") Documented at 09/06/2019 0553   Admission Weight  81.2 kg (179 lb 0.2 oz) Documented at 09/06/2019 0721          Physical Exam:   General Appearance:    Alert, cooperative, in no acute distress   Lungs:    Diminished breath sounds rhonchi on the bases    Heart:    Regular rhythm and normal rate, normal S1 and S2, no            murmur, no gallop, no rub, no click   Chest Wall:    No abnormalities observed   Abdomen:     Normal bowel sounds, no masses, no organomegaly, soft        non-tender, " non-distended, no guarding, no rebound                tenderness   Extremities:   Moves all extremities well, no edema, no cyanosis, no             redness     Results Review:        Results from last 7 days   Lab Units 09/07/19  0256 09/06/19 0713   SODIUM mmol/L 139 140   POTASSIUM mmol/L 3.5 4.2   CHLORIDE mmol/L 104 103   CO2 mmol/L 24.5 25.5   BUN mg/dL 17 17   CREATININE mg/dL 0.88 0.81   GLUCOSE mg/dL 131* 128*   CALCIUM mg/dL 8.1* 9.3     Results from last 7 days   Lab Units 09/07/19  0256 09/06/19  0831 09/06/19  0713   TROPONIN T ng/mL 0.012 <0.010 <0.010     Results from last 7 days   Lab Units 09/07/19  0256 09/06/19 0713   WBC 10*3/mm3 9.64 10.24   HEMOGLOBIN g/dL 12.0* 13.3   HEMATOCRIT % 38.7 43.0   PLATELETS 10*3/mm3 119* 134*             Results from last 7 days   Lab Units 09/07/19  0256   MAGNESIUM mg/dL 2.1               I reviewed the patient's new clinical results.  I personally viewed and interpreted the patient's CXR        Medication Review:     aspirin 81 mg Oral Daily   atorvastatin 20 mg Oral Nightly   cefepime 2 g Intravenous Q8H   cholecalciferol 1,000 Units Oral Daily   enoxaparin 1 mg/kg Subcutaneous Q12H   furosemide 40 mg Intravenous Q12H   ipratropium-albuterol 3 mL Nebulization 4x Daily - RT   isosorbide mononitrate 60 mg Oral Daily   losartan 50 mg Oral Daily   metoprolol succinate XL 75 mg Oral Daily   polyethylene glycol 17 g Oral BID   sodium chloride 10 mL Intravenous Q12H   ticagrelor 90 mg Oral BID   vancomycin 1,000 mg Intravenous Q12H         Pharmacy to dose vancomycin        ASSESSMENT:   Left lower lobe pneumonia  Paroxysmal atrial fibrillation  Recent pneumothorax: Resolved  Coronary artery disease  Cardiomyopathy  Hypertension    PLAN:  Continue antibiotics.  De-escalate based on cultures  Bronchodilators to be continued  Clinically patient improved  Ambulate  Physical therapy    Napoleon Turk MD  09/07/19  3:47 PM

## 2019-09-07 NOTE — PLAN OF CARE
Problem: Patient Care Overview  Goal: Plan of Care Review  Outcome: Ongoing (interventions implemented as appropriate)   09/06/19 1738 09/07/19 0911 09/07/19 1319   Coping/Psychosocial   Plan of Care Reviewed With --  patient --    Plan of Care Review   Progress no change --  --    OTHER   Outcome Summary --  --  Pt  this afternoon, prn IV metolprolo give per MD order for Hr >125. Pt continues to recieve IV abx per MD order. Pt had BM today. Pt is in bed resting, will continue to monitor.      Goal: Individualization and Mutuality  Outcome: Ongoing (interventions implemented as appropriate)    Goal: Discharge Needs Assessment  Outcome: Ongoing (interventions implemented as appropriate)    Goal: Interprofessional Rounds/Family Conf  Outcome: Ongoing (interventions implemented as appropriate)      Problem: Pneumonia (Adult)  Goal: Signs and Symptoms of Listed Potential Problems Will be Absent, Minimized or Managed (Pneumonia)  Outcome: Ongoing (interventions implemented as appropriate)

## 2019-09-08 VITALS
HEART RATE: 89 BPM | BODY MASS INDEX: 22.81 KG/M2 | WEIGHT: 172.11 LBS | SYSTOLIC BLOOD PRESSURE: 119 MMHG | DIASTOLIC BLOOD PRESSURE: 68 MMHG | TEMPERATURE: 97.1 F | RESPIRATION RATE: 20 BRPM | HEIGHT: 73 IN | OXYGEN SATURATION: 94 %

## 2019-09-08 LAB — VANCOMYCIN TROUGH SERPL-MCNC: 21.8 MCG/ML (ref 5–20)

## 2019-09-08 PROCEDURE — 93005 ELECTROCARDIOGRAM TRACING: CPT | Performed by: INTERNAL MEDICINE

## 2019-09-08 PROCEDURE — 94799 UNLISTED PULMONARY SVC/PX: CPT

## 2019-09-08 PROCEDURE — 25010000002 VANCOMYCIN PER 500 MG: Performed by: NURSE PRACTITIONER

## 2019-09-08 PROCEDURE — 93010 ELECTROCARDIOGRAM REPORT: CPT | Performed by: INTERNAL MEDICINE

## 2019-09-08 PROCEDURE — 93005 ELECTROCARDIOGRAM TRACING: CPT | Performed by: HOSPITALIST

## 2019-09-08 PROCEDURE — 25010000002 ENOXAPARIN PER 10 MG: Performed by: NURSE PRACTITIONER

## 2019-09-08 PROCEDURE — 25010000002 CEFEPIME PER 500 MG: Performed by: NURSE PRACTITIONER

## 2019-09-08 PROCEDURE — 80202 ASSAY OF VANCOMYCIN: CPT | Performed by: NURSE PRACTITIONER

## 2019-09-08 RX ORDER — LEVOFLOXACIN 500 MG/1
500 TABLET, FILM COATED ORAL EVERY 24 HOURS
Qty: 3 TABLET | Refills: 0 | Status: SHIPPED | OUTPATIENT
Start: 2019-09-09 | End: 2019-09-12

## 2019-09-08 RX ORDER — METOPROLOL SUCCINATE 25 MG/1
75 TABLET, EXTENDED RELEASE ORAL DAILY
Qty: 90 TABLET | Refills: 0 | Status: SHIPPED | OUTPATIENT
Start: 2019-09-08 | End: 2019-10-03

## 2019-09-08 RX ORDER — LEVOFLOXACIN 500 MG/1
500 TABLET, FILM COATED ORAL EVERY 24 HOURS
Status: DISCONTINUED | OUTPATIENT
Start: 2019-09-08 | End: 2019-09-08 | Stop reason: HOSPADM

## 2019-09-08 RX ADMIN — ISOSORBIDE MONONITRATE 60 MG: 60 TABLET ORAL at 08:53

## 2019-09-08 RX ADMIN — TICAGRELOR 90 MG: 90 TABLET ORAL at 08:52

## 2019-09-08 RX ADMIN — VITAMIN D, TAB 1000IU (100/BT) 1000 UNITS: 25 TAB at 08:53

## 2019-09-08 RX ADMIN — CEFEPIME HYDROCHLORIDE 2 G: 2 INJECTION, POWDER, FOR SOLUTION INTRAVENOUS at 05:06

## 2019-09-08 RX ADMIN — IPRATROPIUM BROMIDE AND ALBUTEROL SULFATE 3 ML: 2.5; .5 SOLUTION RESPIRATORY (INHALATION) at 15:59

## 2019-09-08 RX ADMIN — POLYETHYLENE GLYCOL 3350 17 G: 17 POWDER, FOR SOLUTION ORAL at 08:56

## 2019-09-08 RX ADMIN — LEVOFLOXACIN 500 MG: 500 TABLET, FILM COATED ORAL at 14:50

## 2019-09-08 RX ADMIN — IPRATROPIUM BROMIDE AND ALBUTEROL SULFATE 3 ML: 2.5; .5 SOLUTION RESPIRATORY (INHALATION) at 11:40

## 2019-09-08 RX ADMIN — ASPIRIN 81 MG: 81 TABLET, CHEWABLE ORAL at 08:53

## 2019-09-08 RX ADMIN — LOSARTAN POTASSIUM 50 MG: 50 TABLET, FILM COATED ORAL at 08:53

## 2019-09-08 RX ADMIN — ENOXAPARIN SODIUM 80 MG: 80 INJECTION SUBCUTANEOUS at 05:06

## 2019-09-08 RX ADMIN — SODIUM CHLORIDE, PRESERVATIVE FREE 10 ML: 5 INJECTION INTRAVENOUS at 08:56

## 2019-09-08 RX ADMIN — VANCOMYCIN HYDROCHLORIDE 1000 MG: 1 INJECTION, SOLUTION INTRAVENOUS at 08:57

## 2019-09-08 RX ADMIN — IPRATROPIUM BROMIDE AND ALBUTEROL SULFATE 3 ML: 2.5; .5 SOLUTION RESPIRATORY (INHALATION) at 07:53

## 2019-09-08 NOTE — DISCHARGE SUMMARY
"[facility]    ASSOCIATES  [phone]    DISCHARGE SUMMARY  [facility]    Patient Identification:  Name: Dinesh Churchill Sr.  Age: 89 y.o.  Sex: male  :  1928  MRN: [id number]  Primary Care Physician: Phyllis Loving PA    Admit date: 2019  Discharge date and time:      Discharge Diagnoses:  Essential hypertension    Cardiomyopathy (CMS/HCC)    Coronary artery disease involving native coronary artery of native heart without angina pectoris    History of coronary artery stent placement    Parkinson's disease (CMS/HCC)    History of CVA (cerebrovascular accident)    Paroxysmal atrial fibrillation (CMS/HCC)    Pneumonia of left lower lobe due to infectious organism (CMS/HCC)       History of present illness from H&P:    Mr. Churchill is a 89 y.o. non-smoker with a history of CAD s/p recent stent placement, CM, HTN, CVA, Parkinson's disease, PAF and recent spontaneous pneumothorax that presents to [facility] complaining of chest pain. The patient was recently admitted here from 19 to 19 for spontaneous pneumothorax as well as chest pain. He underwent angioplasty with stent placement for his chest pain and known obstructive disease. His pneumothorax was managed by Thoracic surgery who followed him and felt no intervention was necessary as it was self-resolving. He presented back to the ED today for complaints of sudden onset of left sided head/ear pain that radiated into the back of his neck and down into his left chest and left arm. He states the pain \"partially\" went into his back as well. He describes the pain as sharp and worse with breathing. He is not currently having this pain as it improved after being given morphine in the ED.               At the time of this visit, the patient is resting comfortably and is in no apparent distress. He denies any dyspnea, chest pain, nausea, vomiting or diarrhea at this time. He denies any increased cough " or productive sputum. He denies any known fever or chills although he has been having a runny nose and having to blow it frequently. He denies any sore throat. He states he does feel more weak than normal today. He denies any decreased appetite or increased swelling.  His troponin was negative in the ED. Due to an elevated d-dimer, he had a CTA that revealed no PE, but a LLL infiltrate consistent with pneumonia as well as small bilateral pleural effusions. His chest x-ray revealed mild bibasilar atelectasis/infiltrate. There was no mention of pneumothorax in either of these tests. He was given Cefepime and Vancomycin in the ED. Blood cultures are pending and his lactic and procalcitonin were normal. He is being admitted for further work-up and treatment of his pneumonia.    Hospital Course:     Patient was admitted to the hospital because of low-grade fevers and chest discomfort, along with infiltrate seen on CT scan.  The patient was found to be going in and out of atrial fibrillation and the patient's metoprolol xl was increased from 50 to 75.  Patient was seen in consultation by cardiology.  Patient's chest discomfort has not returned during hospitalization.  Treated with antibiotics for pneumonia.  Patient has a improved during the hospitalization.  Oxygen saturations are doing well.  Seen in consultation by pulmonary who has transition the patient to oral antibiotics.    Patient's heart rate today is much improved.  Because of the atrial fibrillation the patient was placed on Lovenox during the hospitalization.  However the patient on Lovenox has been leaking out of his IV sites because of the combination with aspirin and Brilinta.  I discussed this with Dr. Palomino and because of his bleeding on aspirin, Brilinta and anticoagulation the risks is felt to outweigh the benefit.  Patient will be discharged on aspirin and Brilinta.      Further hospital course by problem list:    Pneumonia left lower  lobe  -Continue Cefepime, transition to oral antibiotics  -RVP negative. MRSA screen.  -PRN breathing treatments. IS  -On room air.  -Blood cultures pending.  -pulmonary evaluation appreciated     PAF/CAD/stent placement  -EKG ok. Troponin now slightly higher.   -Chest pain resolved. Likely related to pneumonia, but monitor for recurrence.  -cardiology evaluation appreciated  -aspirin and brilinta   -full dose lovenox but now with bleeding from old IV site so we will switch to every 24 hours     Recent pneumothorax  -Appears resolved.     HTN  -Blood pressures good.       The patient was seen and examined on the day of discharge.    Consults:   Consults     Date and Time Order Name Status Description    9/6/2019 1747 Inpatient Cardiology Consult      9/6/2019 1745 Inpatient Pulmonology Consult Completed     9/6/2019 1614 Inpatient Cardiology Consult Completed     9/6/2019 1013 [facility] (on-call MD unless specified) Details Completed     8/18/2019 2232 Inpatient Thoracic Surgery Consult Completed     8/18/2019 2232 Inpatient Cardiology Consult Completed     8/18/2019 2139 Cardiology (on-call MD unless specified) Completed     8/18/2019 2127 [facility] (on-call MD unless specified) Details Completed     8/18/2019 2058 Inpatient Thoracic Surgery Consult Completed     8/6/2019 0947 Cardiology (on-call MD unless specified) Completed           Results from last 7 days   Lab Units 09/07/19  0256   WBC 10*3/mm3 9.64   HEMOGLOBIN g/dL 12.0*   HEMATOCRIT % 38.7   PLATELETS 10*3/mm3 119*       Results from last 7 days   Lab Units 09/07/19  0256   SODIUM mmol/L 139   POTASSIUM mmol/L 3.5   CHLORIDE mmol/L 104   CO2 mmol/L 24.5   BUN mg/dL 17   CREATININE mg/dL 0.88   GLUCOSE mg/dL 131*   CALCIUM mg/dL 8.1*       Significant Diagnostic Studies:   Lab Results   Component Value Date    WBC 9.64 09/07/2019    HGB 12.0 (L) 09/07/2019    HCT 38.7 09/07/2019     (L) 09/07/2019     Lab Results   Component Value Date     09/07/2019     K 3.5 09/07/2019     09/07/2019    CO2 24.5 09/07/2019    BUN 17 09/07/2019    CREATININE 0.88 09/07/2019    GLUCOSE 131 (H) 09/07/2019     Lab Results   Component Value Date    CALCIUM 8.1 (L) 09/07/2019    MG 2.1 09/07/2019     Lab Results   Component Value Date    AST 16 09/06/2019    ALT 15 09/06/2019    ALKPHOS 61 09/06/2019     No results found for: APTT, INR  No results found for: COLORU, CLARITYU, SPECGRAV, PHUR, PROTEINUR, GLUCOSEU, KETONESU, BLOODU, NITRITE, LEUKOCYTESUR, BILIRUBINUR, UROBILINOGEN, RBCUA, WBCUA, BACTERIA, UACOMMENT  Lab Results   Component Value Date    TROPONINT 0.012 09/07/2019     No components found for: HGBA1C;2  No components found for: TSH;2    Imaging Results (all)     Procedure Component Value Units Date/Time    XR Chest 2 View [[id number]] Collected:  09/06/19 0806     Updated:  09/06/19 1009    Narrative:       2 VIEWS CHEST     HISTORY: Chest pain.     FINDINGS: 2 views of the chest demonstrate moderate cardiomegaly. There  is prominence of pulmonary interstitium and vasculature suggesting mild  congestive changes. There is mild bibasilar atelectasis/infiltrate. The  diaphragm is flattened and AP diameter increased consistent with COPD.  Pleural fluid is noted at the left lung base.     This report was finalized on 9/6/2019 10:06 AM by Dr. Eric Guevara M.D.       CT Angiogram Chest With Contrast [[id number]] Collected:  09/06/19 0934     Updated:  09/06/19 1003    Narrative:       CT ANGIOGRAM CHEST W CONTRAST-     CLINICAL HISTORY: Left-sided pleuritic chest pain. Rule out pulmonary  embolism.     TECHNIQUE: Spiral CT images were obtained through the chest during rapid  IV injection of contrast and were reconstructed in 2 mm thick axial  slices. Multiple coronal and sagittal and 3-D reconstructions were  obtained.     Radiation dose reduction techniques were utilized, including automated  exposure control and exposure modulation based on body size.     COMPARISON: CT  scan of the chest without contrast dated 08/18/2019.     FINDINGS: The main pulmonary arteries and their lobar and segmental  branches are well opacified, and demonstrate no filling defects. There  is no CT evidence of pulmonary embolism. There is a mildly enlarged  subcarinal lymph nodes that appear unchanged. There is no axillary or  hilar lymphadenopathy. The heart is mildly enlarged. There is moderate  coronary artery calcification. The ascending thoracic aorta is mildly  dilated and unchanged. It measures 4.4 cm in diameter. There is a small  posteriorly layering left pleural effusion that has increased in size in  the interval. A tiny posteriorly layering right pleural effusion also  appears larger. There is fairly dense consolidation of the left lung  base containing air bronchograms consistent with pneumonia that is more  prominent on the previous CTA. Ill-defined increased density at the  right lung base posteriorly is most likely due to atelectasis. Images  through the upper abdomen show no significant abnormality.       Impression:       There is no CT evidence of pulmonary embolism. There is  focal dense consolidation at the left lung base within the lower lobe  consistent with pneumonia. Small bilateral pleural effusions that have  increased in size since the preceding CT dated 08/18/2019. There is mild  cardiomegaly and moderate coronary artery calcification.     This report was finalized on 9/6/2019 10:00 AM by Dr. Sam Chavez M.D.         No results found for: SITE, ALLENTEST, PHART, GRC5IWV, PO2ART, UIJ9HSA, BASEEXCESS, W4KKNTCP, HGBBG, HCTABG, OXYHEMOGLOBI, METHHGBN, CARBOXYHGB, CO2CT, BAROMETRIC, MODALITY, FIO2       Discharge Medications      New Medications      Instructions Start Date   levoFLOXacin 500 MG tablet  Commonly known as:  LEVAQUIN   500 mg, Oral, Every 24 Hours   Start Date:  9/9/2019        Changes to Medications      Instructions Start Date   metoprolol succinate XL 25 MG 24  hr tablet  Commonly known as:  TOPROL-XL  What changed:    · medication strength  · how much to take   75 mg, Oral, Daily         Continue These Medications      Instructions Start Date   acetaminophen 500 MG tablet  Commonly known as:  TYLENOL   1-2 TABLETS BY ONCE TO TWICE DAILY AS NEEDED FOR PAIN      aspirin 81 MG chewable tablet   81 mg, Oral, Daily      atorvastatin 20 MG tablet  Commonly known as:  LIPITOR   20 mg, Oral, Nightly      isosorbide mononitrate 60 MG 24 hr tablet  Commonly known as:  IMDUR   TAKE 1 TABLET BY MOUTH EVERY DAY      losartan 50 MG tablet  Commonly known as:  COZAAR   50 mg, Oral, Daily      Lumbar Back Brace/Support Pad misc   Use for lifting/ strenuous exercise.      ticagrelor 90 MG tablet tablet  Commonly known as:  BRILINTA   90 mg, Oral, 2 Times Daily      Vitamin D-3 1000 units capsule   1,000 Units, Oral, Daily         Stop These Medications    nitrofurantoin (macrocrystal-monohydrate) 100 MG capsule  Commonly known as:  MACROBID     nitroglycerin 0.4 MG SL tablet  Commonly known as:  NITROSTAT              Patient Instructions:   Activity Instructions     Activity as tolerated                  Future Appointments   Date Time Provider Department Center   9/25/2019 10:00 AM NURSE/MA [facility] [place] [facility] None   10/23/2019 10:00 AM NURSE/MA [facility] [place] [facility] None   11[facility]27[facility]2019 [facility]   12/27/2019 10:00 [facility] None   3/9/2020 11:15 AM Maninder Quezada MD [facility] None        Follow-up Information     Phyllis Loving PA Follow up in 2 week(s).    Specialty:  Family Medicine  Why:  and will need a chest ct in 6-8 week to ensure resolution of symptoms  Contact information:  [address] [facility] [address] [phone]             Maninder Quezada MD Follow up in 3 week(s).    Specialty:  Cardiology  Contact information:  [address] [facility]  [address]  [phone]                   Discharge Order (From admission, onward)    Start     Ordered    09/08/19 1725  Discharge patient  Once     Expected Discharge Date:  09/08/19    Discharge Disposition:  Home or Self Care    Physician of Record for Attribution - Please select from Treatment Team:  ERIC TRUONG [[id number]]    Review needed by CMO to determine Physician of Record:  No       Question Answer Comment   Physician of Record for Attribution - Please select from Treatment Team ERIC TRUONG    Review needed by CMO to determine Physician of Record No        09/08/19 1726          Diet Order   Procedures   • Diet Regular; Cardiac       TEST  RESULTS PENDING AT DISCHARGE   Order Current Status    Blood Culture - Blood, Arm, Right Preliminary result    Blood Culture - Blood, Wrist, Right Preliminary result          CT of the chest 6 to 8 weeks to ensure resolution of infiltrate      Total time spent discharging patient including evaluation, post hospitalization follow up,  medication and post hospitalization instructions and education, total time exceeds 30 minutes.    Signed:  Eric Truong MD  9/8/2019  5:34 PM

## 2019-09-08 NOTE — PROGRESS NOTES
[facility]     LOS: 2 days     Subjective:    CC:Chest Pain    DIET:  Diet Order   Procedures   • Diet Regular; Cardiac     Chest pain is resolved  No soa  Feeling better  Patient tachycardia is improving    Objective:    Vital Signs:  Temp:  [97.2 °F (36.2 °C)-98.8 °F (37.1 °C)] 97.2 °F (36.2 °C)  Heart Rate:  [] 99  Resp:  [16-22] 20  BP: (109-117)/(57-67) 117/59    SpO2:  [90 %-94 %] 90 %  on   ;   Device (Oxygen Therapy): room air  Body mass index is 22.71 kg/m².    Physical Exam   Constitutional: He appears well-developed and well-nourished.   HENT:   Head: Normocephalic and atraumatic.   Cardiovascular: Normal rate and regular rhythm.   No murmur heard.  Pulmonary/Chest: Effort normal and breath sounds normal. He has no rales.   Abdominal: Soft. Bowel sounds are normal.   Musculoskeletal: He exhibits no edema.   Neurological: He is alert.   Skin: Skin is warm and dry.       Results Review:    Glucose   Date Value Ref Range Status   09/07/2019 131 (H) 65 - 99 mg/dL Final   09/06/2019 128 (H) 65 - 99 mg/dL Final     Results from last 7 days   Lab Units 09/07/19  0256   WBC 10*3/mm3 9.64   HEMOGLOBIN g/dL 12.0*   HEMATOCRIT % 38.7   PLATELETS 10*3/mm3 119*     Results from last 7 days   Lab Units 09/07/19  0256 09/06/19  0713   SODIUM mmol/L 139 140   POTASSIUM mmol/L 3.5 4.2   CHLORIDE mmol/L 104 103   CO2 mmol/L 24.5 25.5   BUN mg/dL 17 17   CREATININE mg/dL 0.88 0.81   CALCIUM mg/dL 8.1* 9.3   BILIRUBIN mg/dL  --  1.6*   ALK PHOS U/L  --  61   ALT (SGPT) U/L  --  15   AST (SGOT) U/L  --  16   GLUCOSE mg/dL 131* 128*         Results from last 7 days   Lab Units 09/07/19  0256   MAGNESIUM mg/dL 2.1     Results from last 7 days   Lab Units 09/07/19  0256 09/06/19  0831 09/06/19  0713   TROPONIN T ng/mL 0.012 <0.010 <0.010     Cultures:  Blood Culture   Date Value Ref Range Status   09/06/2019 No growth at 24 hours  Preliminary   09/06/2019 No growth at 24 hours  Preliminary        I have reviewed daily medications and changes in CPOE    Scheduled meds    aspirin 81 mg Oral Daily   atorvastatin 20 mg Oral Nightly   cefepime 2 g Intravenous Q8H   cholecalciferol 1,000 Units Oral Daily   [START ON 9/9/2019] enoxaparin 1 mg/kg Subcutaneous Q24H   ipratropium-albuterol 3 mL Nebulization 4x Daily - RT   isosorbide mononitrate 60 mg Oral Daily   losartan 50 mg Oral Daily   metoprolol succinate XL 75 mg Oral Daily   polyethylene glycol 17 g Oral BID   sodium chloride 10 mL Intravenous Q12H   ticagrelor 90 mg Oral BID          PRN meds  •  acetaminophen **OR** acetaminophen **OR** acetaminophen  •  ipratropium-albuterol  •  metoprolol tartrate  •  ondansetron  •  sodium chloride  •  sodium chloride        Essential hypertension    Cardiomyopathy (CMS/HCC)    Coronary artery disease involving native coronary artery of native heart without angina pectoris    History of coronary artery stent placement    Parkinson's disease (CMS/HCC)    History of CVA (cerebrovascular accident)    Paroxysmal atrial fibrillation (CMS/HCC)    Pneumonia of left lower lobe due to infectious organism (CMS/HCC)        Assessment/Plan:  Pneumonia left lower lobe  -Continue Cefepime, transition to oral antibiotics  -RVP negative. MRSA screen.  -PRN breathing treatments. IS  -On room air.  -Blood cultures pending.  -pulmonary evaluation appreciated    PAF/CAD/stent placement  -EKG ok. Troponin now slightly higher.   -Chest pain resolved. Likely related to pneumonia, but monitor for recurrence.  -cardiology evaluation appreciated  -aspirin and brilinta   -full dose lovenox but now with bleeding from old IV site so we will switch to every 24 hours    Recent pneumothorax  -Appears resolved.     HTN  -Blood pressures good.       DVT PPX: lovenox      Eric Truong MD  09/08/19  10:35 AM

## 2019-09-08 NOTE — PLAN OF CARE
Problem: Patient Care Overview  Goal: Plan of Care Review  Outcome: Ongoing (interventions implemented as appropriate)   09/08/19 1245   Coping/Psychosocial   Plan of Care Reviewed With patient   Plan of Care Review   Progress improving   OTHER   Outcome Summary Pt HR 89 and VSS. Pt has nonproductive cough. Pt o2 sat 93% on RA. Pt receiving vancomycin iv per MD order. Cefepime changes to po levofloaxin per MD. Pt in bed resting, wiolll continue to monitor.     Goal: Individualization and Mutuality  Outcome: Ongoing (interventions implemented as appropriate)    Goal: Discharge Needs Assessment  Outcome: Ongoing (interventions implemented as appropriate)    Goal: Interprofessional Rounds/Family Conf  Outcome: Ongoing (interventions implemented as appropriate)      Problem: Pneumonia (Adult)  Goal: Signs and Symptoms of Listed Potential Problems Will be Absent, Minimized or Managed (Pneumonia)  Outcome: Ongoing (interventions implemented as appropriate)

## 2019-09-08 NOTE — PROGRESS NOTES
"[facility]         Dr Bender Sayied   LOS: 2 days   Patient Care Team:  Phyllis Loving PA as PCP - General (Family Medicine)  Phyllis Loving PA as PCP - Claims Attributed  Brandon Ricci MD Thomas, Melissa, PA as Referring Physician (Family Medicine)  John Callejas II, MD as Consulting Physician (Hematology and Oncology)  Maninder Quezada MD as Consulting Physician (Cardiology)    Chief Complaint: Left lower lobe pneumonia with recent pneumothorax underlying history of coronary artery disease and cardiomyopathy    Interval History: Resting comfortably feels better.  Currently oxygen being weaned down.    REVIEW OF SYSTEMS:   CARDIOVASCULAR: No chest pain, chest pressure or chest discomfort. No palpitations or edema.   RESPIRATORY: shortness of breath with exertion ongoing cough and congestion  GASTROINTESTINAL: No anorexia, nausea, vomiting or diarrhea. No abdominal pain or blood.   HEMATOLOGIC: No bleeding or bruising.     Ventilator/Non-Invasive Ventilation Settings (From admission, onward)    None            Vital Signs  Temp:  [97.2 °F (36.2 °C)-98.8 °F (37.1 °C)] 97.2 °F (36.2 °C)  Heart Rate:  [] 85  Resp:  [16-20] 20  BP: (109-117)/(57-67) 117/59    Intake/Output Summary (Last 24 hours) at 9/8/2019 1218  Last data filed at 9/8/2019 0857  Gross per 24 hour   Intake 520 ml   Output 1335 ml   Net -815 ml     Flowsheet Rows      First Filed Value   Admission Height  185.4 cm (73\") Documented at 09/06/2019 0553   Admission Weight  81.2 kg (179 lb 0.2 oz) Documented at 09/06/2019 0721          Physical Exam:   General Appearance:    Alert, cooperative, in no acute distress   Lungs:    Diminished breath sounds rhonchi on the bases    Heart:    Regular rhythm and normal rate, normal S1 and S2, no            murmur, no gallop, no rub, no click   Chest Wall:    No abnormalities observed   Abdomen:     Normal bowel sounds, no masses, no organomegaly, soft        non-tender, " non-distended, no guarding, no rebound                tenderness   Extremities:   Moves all extremities well, no edema, no cyanosis, no             redness     Results Review:        Results from last 7 days   Lab Units 09/07/19  0256 09/06/19  0713   SODIUM mmol/L 139 140   POTASSIUM mmol/L 3.5 4.2   CHLORIDE mmol/L 104 103   CO2 mmol/L 24.5 25.5   BUN mg/dL 17 17   CREATININE mg/dL 0.88 0.81   GLUCOSE mg/dL 131* 128*   CALCIUM mg/dL 8.1* 9.3     Results from last 7 days   Lab Units 09/07/19  0256 09/06/19  0831 09/06/19  0713   TROPONIN T ng/mL 0.012 <0.010 <0.010     Results from last 7 days   Lab Units 09/07/19  0256 09/06/19  0713   WBC 10*3/mm3 9.64 10.24   HEMOGLOBIN g/dL 12.0* 13.3   HEMATOCRIT % 38.7 43.0   PLATELETS 10*3/mm3 119* 134*             Results from last 7 days   Lab Units 09/07/19  0256   MAGNESIUM mg/dL 2.1               I reviewed the patient's new clinical results.  I personally viewed and interpreted the patient's CXR        Medication Review:     aspirin 81 mg Oral Daily   atorvastatin 20 mg Oral Nightly   cholecalciferol 1,000 Units Oral Daily   [START ON 9/9/2019] enoxaparin 1 mg/kg Subcutaneous Q24H   ipratropium-albuterol 3 mL Nebulization 4x Daily - RT   isosorbide mononitrate 60 mg Oral Daily   levoFLOXacin 500 mg Oral Q24H   losartan 50 mg Oral Daily   metoprolol succinate XL 75 mg Oral Daily   polyethylene glycol 17 g Oral BID   sodium chloride 10 mL Intravenous Q12H   ticagrelor 90 mg Oral BID            ASSESSMENT:   Left lower lobe pneumonia  Paroxysmal atrial fibrillation  Recent pneumothorax: Resolved  Coronary artery disease  Cardiomyopathy  Hypertension    PLAN:  Antibiotics de-escalated.  Vancomycin has been discontinued.  We will switch to oral Levaquin.  QTC noted.  Bronchodilators to be continued  Clinically patient improved  Ambulate  Physical therapy  Discussed with Dr. Alexi Turk MD  09/08/19  12:18 PM

## 2019-09-09 ENCOUNTER — TELEPHONE (OUTPATIENT)
Dept: FAMILY MEDICINE CLINIC | Facility: CLINIC | Age: 84
End: 2019-09-09

## 2019-09-09 ENCOUNTER — READMISSION MANAGEMENT (OUTPATIENT)
Dept: CALL CENTER | Facility: HOSPITAL | Age: 84
End: 2019-09-09

## 2019-09-09 NOTE — OUTREACH NOTE
Prep Survey      Responses   Facility patient discharged from?  [facility]   Is patient eligible?  Yes   Discharge diagnosis  Pneumonia left lower lobe   Does the patient have one of the following disease processes/diagnoses(primary or secondary)?  COPD/Pneumonia   Does the patient have Home health ordered?  No   Is there a DME ordered?  No   Prep survey completed?  Yes          Glenna Stein RN

## 2019-09-10 ENCOUNTER — READMISSION MANAGEMENT (OUTPATIENT)
Dept: CALL CENTER | Facility: HOSPITAL | Age: 84
End: 2019-09-10

## 2019-09-10 NOTE — OUTREACH NOTE
COPD/PN Week 1 Survey      Responses   Facility patient discharged from?  [facility]   Does the patient have one of the following disease processes/diagnoses(primary or secondary)?  COPD/Pneumonia   Is there a successful TCM telephone encounter documented?  No   Was the primary reason for admission:  Pneumonia   Week 1 attempt successful?  Yes   Call start time  1023   Call end time  1027   Discharge diagnosis  Pneumonia left lower lobe   Is patient permission given to speak with other caregiver?  Yes   List who call center can speak with  Dinesh - son    Person spoke with today (if not patient) and relationship  Dinesh - abimbola    Meds reviewed with patient/caregiver?  Yes   Is the patient having any side effects they believe may be caused by any medication additions or changes?  No   Does the patient have all medications ordered at discharge?  Yes   Is the patient taking all medications as directed (includes completed medication regime)?  Yes   Does the patient have a primary care provider?   Yes   Does the patient have an appointment with their PCP or pulmonologist within 7 days of discharge?  Yes [Monday Sep 23, 2019 11:00 AM ]   Has the patient kept scheduled appointments due by today?  N/A   Comments  Follow Up with Maninder Quezada MD, Thursday Oct 10, 2019 1:45 PM   Has home health visited the patient within 72 hours of discharge?  N/A   Psychosocial issues?  No   Did the patient receive a copy of their discharge instructions?  Yes   Nursing interventions  Reviewed instructions with patient   What is the patient's perception of their health status since discharge?  Improving   Is the patient/caregiver able to teach back the hierarchy of who to call/visit for symptoms/problems? PCP, Specialist, Home health nurse, Urgent Care, ED, 911  Yes   Is the patient/caregiver able to teach back signs and symptoms of worsening condition:  Fever/chills, Shortness of breath, Chest pain   Is the patient/caregiver able  to teach back importance of completing antibiotic course of treatment?  Yes   Week 1 call completed?  Yes          Idalia Friedman RN

## 2019-09-11 LAB
BACTERIA SPEC AEROBE CULT: NORMAL
BACTERIA SPEC AEROBE CULT: NORMAL

## 2019-09-17 ENCOUNTER — READMISSION MANAGEMENT (OUTPATIENT)
Dept: CALL CENTER | Facility: HOSPITAL | Age: 84
End: 2019-09-17

## 2019-09-17 NOTE — OUTREACH NOTE
COPD/PN Week 2 Survey      Responses   Facility patient discharged from?  [facility]   Does the patient have one of the following disease processes/diagnoses(primary or secondary)?  COPD/Pneumonia   Was the primary reason for admission:  Pneumonia   Week 2 attempt successful?  Yes   Call start time  0852   Call end time  0857   Meds reviewed with patient/caregiver?  Yes   Is the patient taking all medications as directed (includes completed medication regime)?  Yes   Comments regarding appointments  Pt to see pcp on Sept. 23, 2019.   Has the patient kept scheduled appointments due by today?  Yes   What is the patient's perception of their health status since discharge?  Improving   Is the patient/caregiver able to teach back signs and symptoms of worsening condition:  Fever/chills, Shortness of breath, Chest pain   Week 2 call completed?  Yes   Wrap up additional comments  Son reports that he is doing well. Pt has had no fevers and no complaints. The son drives 40 min 3 x week to check on him and his neighbors check on him daily.          Gena Saleh RN

## 2019-09-23 ENCOUNTER — OFFICE VISIT (OUTPATIENT)
Dept: FAMILY MEDICINE CLINIC | Facility: CLINIC | Age: 84
End: 2019-09-23

## 2019-09-23 VITALS
HEIGHT: 73 IN | RESPIRATION RATE: 16 BRPM | OXYGEN SATURATION: 95 % | BODY MASS INDEX: 23.43 KG/M2 | TEMPERATURE: 97.2 F | WEIGHT: 176.8 LBS | DIASTOLIC BLOOD PRESSURE: 70 MMHG | HEART RATE: 82 BPM | SYSTOLIC BLOOD PRESSURE: 140 MMHG

## 2019-09-23 DIAGNOSIS — J18.9 PNEUMONIA OF LEFT LOWER LOBE DUE TO INFECTIOUS ORGANISM: Primary | ICD-10-CM

## 2019-09-23 DIAGNOSIS — Z23 ENCOUNTER FOR IMMUNIZATION: ICD-10-CM

## 2019-09-23 DIAGNOSIS — J93.83 SPONTANEOUS PNEUMOTHORAX: ICD-10-CM

## 2019-09-23 DIAGNOSIS — I38 MURMUR, DIASTOLIC: ICD-10-CM

## 2019-09-23 DIAGNOSIS — E53.8 B12 DEFICIENCY: ICD-10-CM

## 2019-09-23 DIAGNOSIS — J90 PLEURAL EFFUSION: ICD-10-CM

## 2019-09-23 DIAGNOSIS — R53.83 FATIGUE, UNSPECIFIED TYPE: ICD-10-CM

## 2019-09-23 DIAGNOSIS — R53.1 WEAKNESS: ICD-10-CM

## 2019-09-23 PROCEDURE — 99215 OFFICE O/P EST HI 40 MIN: CPT | Performed by: PHYSICIAN ASSISTANT

## 2019-09-23 PROCEDURE — 96372 THER/PROPH/DIAG INJ SC/IM: CPT | Performed by: PHYSICIAN ASSISTANT

## 2019-09-23 PROCEDURE — 90653 IIV ADJUVANT VACCINE IM: CPT | Performed by: PHYSICIAN ASSISTANT

## 2019-09-23 PROCEDURE — G0008 ADMIN INFLUENZA VIRUS VAC: HCPCS | Performed by: PHYSICIAN ASSISTANT

## 2019-09-23 RX ORDER — CYANOCOBALAMIN 1000 UG/ML
1000 INJECTION, SOLUTION INTRAMUSCULAR; SUBCUTANEOUS
Status: DISCONTINUED | OUTPATIENT
Start: 2019-09-23 | End: 2019-11-05 | Stop reason: HOSPADM

## 2019-09-23 RX ADMIN — CYANOCOBALAMIN 1000 MCG: 1000 INJECTION, SOLUTION INTRAMUSCULAR; SUBCUTANEOUS at 11:21

## 2019-09-23 NOTE — PROGRESS NOTES
"Jose Churchill Sr. is a 89 y.o. male here today for follow-up of hospitalization 9/6/2019 through 9/8/2019 for pneumonia.     History of Present Illness     Admit 9/6 through 9/8/2019-Per discharge note \"The patient was recently admitted here from 8/18/19 to 8/21/19 for spontaneous pneumothorax as well as chest pain. He underwent angioplasty with stent placement for his chest pain and known obstructive disease. His pneumothorax was managed by Thoracic surgery who followed him and felt no intervention was necessary as it was self-resolving. He presented back to the ED today for complaints of sudden onset of left sided head/ear pain that radiated into the back of his neck and down into his left chest and left arm. He states the pain \"partially\" went into his back as well. He describes the pain as sharp and worse with breathing. He is not currently having this pain as it improved after being given morphine in the ED.    At the time of this visit, the patient is resting comfortably and is in no apparent distress. He denies any dyspnea, chest pain, nausea, vomiting or diarrhea at this time. He denies any increased cough or productive sputum. He denies any known fever or chills although he has been having a runny nose and having to blow it frequently. He denies any sore throat. He states he does feel more weak than normal today. He denies any decreased appetite or increased swelling.  His troponin was negative in the ED. Due to an elevated d-dimer, he had a CTA that revealed no PE, but a LLL infiltrate consistent with pneumonia as well as small bilateral pleural effusions. His chest x-ray revealed mild bibasilar atelectasis/infiltrate. There was no mention of pneumothorax in either of these tests. He was given Cefepime and Vancomycin in the ED. Blood cultures are pending and his lactic and procalcitonin were normal. He is being admitted for further work-up and treatment of his pneumonia.  Hospital Course: " "  Patient was admitted to the hospital because of low-grade fevers and chest discomfort, along with infiltrate seen on CT scan.  The patient was found to be going in and out of atrial fibrillation and the patient's metoprolol xl was increased from 50 to 75.  Patient was seen in consultation by cardiology.  Patient's chest discomfort has not returned during hospitalization.  Treated with antibiotics for pneumonia.  Patient has a improved during the hospitalization.  Oxygen saturations are doing well.  Seen in consultation by pulmonary who has transition the patient to oral antibiotics.   Patient's heart rate today is much improved.  Because of the atrial fibrillation the patient was placed on Lovenox during the hospitalization.  However the patient on Lovenox has been leaking out of his IV sites because of the combination with aspirin and Brilinta.  I discussed this with Dr. Palomino and because of his bleeding on aspirin, Brilinta and anticoagulation the risks is felt to outweigh the benefit.  Patient will be discharged on aspirin and Brilinta.\"    Since discharge 9/8/2019-patient reports he has felt more tired and weak than normal.  He does have a productive cough in the morning which is abnormal for him.  No other cough throughout the day.  No fevers.  He denies chest pain, increased shortness of breath, palpitations, or dizziness.  However, he just reports being more tired than usual.  He is scheduled to see cardiology October 10.  They have not scheduled pulmonology visit from referral 8/30/2019.    Patient was seen after hospitalization 8/18 through 8/21/2019 and per follow-up note with me \"he was previously admit 8/6 through 8/9/2019 with chest pain, elevated proBNP, underwent heart cath with 90% blockage in one vessel.  They considered stenting but due to his age and other comorbid conditions, they wanted to try maximal medication therapy.  He had another episode of chest pain and was taken by ambulance " "back to the hospital 8/18/2019.  Patient then underwent angioplasty and stent at first large diagonal branch from LAD with approximately 90% diffuse stenosis.  proBNP did come down from 7,300-4,800.  He was also noted to have a small pneumothorax and was seen by thoracic surgery.  No intervention was necessary and he continues with pulmonary exercises/incentive spirometry.  He denies chest pain, shortness of breath, orthopnea, weakness, dizziness or any recurrence of symptoms.\" He had follow-up appointment with cardiology 9/4/2019.    Prior, patient was admit 8/6 through 8/9/2019 and per follow-up note with me \"He presented to the ER with chest pain in the am that radiated to his L upper extremity, accompanied with shortness of breath. EMS gave him nitroglycerin and an asa which relieved the symptoms. His EKG showed tachycardia heart rate 115, troponin was negative, and cxr was negative. On 8/7 he underwent a stress test which showed EF 46%, and medium-sized, moderately severe area of ischemia in the inferior wall and lateral wall. A high risk study. A cardiac catheterization 8/8 showed normal left main, LAD midportion 50% stenosis, first larg diagonal branch was approximately diffuse 90% stenosis, Circ artery was a nondominant midporion wa 0% stenosis, RCA was dominant with the proximal had a stent widely patent, Normal LV gram. It was determined that the 90% stenosis was the likely etiology of symptoms and he would undergo PCI if recurrence of symptoms and failure of conservative therapy.\"      The following portions of the patient's history were reviewed and updated as appropriate: allergies, current medications, past family history, past medical history, past social history, past surgical history and problem list.    Review of Systems   Constitutional: Positive for fatigue.   Respiratory: Positive for cough.    Neurological: Positive for weakness.       Objective   Physical Exam   Constitutional: He is oriented " to person, place, and time. He appears well-developed.   HENT:   Head: Normocephalic and atraumatic.   Right Ear: External ear normal.   Left Ear: External ear normal.   Eyes: Conjunctivae are normal.   Neck: Carotid bruit is not present. No tracheal deviation present. No thyroid mass and no thyromegaly present.   Cardiovascular: Normal rate, regular rhythm and intact distal pulses.   Murmur heard.   Systolic (RUSB) murmur is present with a grade of 2/6.   Diastolic (RUSB) murmur is present with a grade of 3/6.  Pulmonary/Chest: Effort normal and breath sounds normal.   Neurological: He is alert and oriented to person, place, and time. Gait normal.   Skin: Skin is warm and dry.   Psychiatric: He has a normal mood and affect. His behavior is normal. Judgment and thought content normal.   Nursing note and vitals reviewed.      Assessment/Plan   Dinesh was seen today for follow-up.    Diagnoses and all orders for this visit:    Pneumonia of left lower lobe due to infectious organism (CMS/[facility])  -     Ambulatory Referral to Home Health    Spontaneous pneumothorax  -     Ambulatory Referral to Home Health    Pleural effusion  -     Adult Transthoracic Echo Complete W/ Cont if Necessary Per Protocol  -     Ambulatory Referral to Home Health    Murmur, diastolic  -     Adult Transthoracic Echo Complete W/ Cont if Necessary Per Protocol  -     Ambulatory Referral to Home Health    Encounter for immunization  -     Fluad Quad >65 years ([id number])    B12 deficiency  -     cyanocobalamin injection 1,000 mcg    Weakness  -     Adult Transthoracic Echo Complete W/ Cont if Necessary Per Protocol  -     Ambulatory Referral to Home Health    Fatigue, unspecified type  -     Adult Transthoracic Echo Complete W/ Cont if Necessary Per Protocol  -     Ambulatory Referral to Home Health      Patient Instructions   89-year-old male who presents today in follow-up of hospitalization 9/6/2019 through 9/8/2019.  This was his third  hospitalization in 1 month.  Patient was admit 8/6/2019 through 8/9/2019 for chest pain and found to have coronary artery disease with possible need for PCI.  He was feeling okay after discharge, however, he presented back to the ER with chest pain 8/18/2019 and was hospitalized until 8/21/2019.  He was noted to have pneumothorax and bilateral pleural effusions as well as underwent PCI with angioplasty and stent placement.  He was feeling okay after his last hospitalization, however, he then developed pain in his back and went back to the ER 9/6/2019.  He had slight enlargement of bilateral pleural effusions and had a left dense consolidation.  They also increased his metoprolol from 50 to 75 mg due to intermittent atrial fibrillation.  He was treated for pneumonia and finished his last 3 days of antibiotics outpatient.  Patient reports increased weakness and fatigue since this hospitalization.  He does have a productive cough in the morning but no cough throughout the day.  He denies increased shortness of breath from baseline, chest pain, palpitations, dizziness.  He just reports feeling a little weaker than normal.  On exam, he has new diastolic murmur right upper sternal border.  This is different than the murmur he has had in the past.  I contacted patient's cardiology office.  His cardiologist is out of town until next week.  I spoke with nurse practitioner from the office who recommends we repeat echocardiogram.  I will order ASAP echocardiogram today.  He should go to ER ASAP if worsening, new or changing symptoms.  I will also order home health nursing.  He will need PT and OT, however, will need to ensure stable echocardiogram before we start this.  Patient was referred to pulmonology 8/30/2019.  He did not have appointment scheduled when he came in today.  We have been able to get him scheduled October 4 to see the pulmonologist in follow-up.  Per discharge note, he will need repeat CT in 6 to 8 weeks,  however, the CT report notes the pleural effusions were enlarging his last hospitalization.  I will let pulmonology determine his next imaging and follow-up.

## 2019-09-23 NOTE — PATIENT INSTRUCTIONS
89-year-old male who presents today in follow-up of hospitalization 9/6/2019 through 9/8/2019.  This was his third hospitalization in 1 month.  Patient was admit 8/6/2019 through 8/9/2019 for chest pain and found to have coronary artery disease with possible need for PCI.  He was feeling okay after discharge, however, he presented back to the ER with chest pain 8/18/2019 and was hospitalized until 8/21/2019.  He was noted to have pneumothorax and bilateral pleural effusions as well as underwent PCI with angioplasty and stent placement.  He was feeling okay after his last hospitalization, however, he then developed pain in his back and went back to the ER 9/6/2019.  He had slight enlargement of bilateral pleural effusions and had a left dense consolidation.  They also increased his metoprolol from 50 to 75 mg due to intermittent atrial fibrillation.  He was treated for pneumonia and finished his last 3 days of antibiotics outpatient.  Patient reports increased weakness and fatigue since this hospitalization.  He does have a productive cough in the morning but no cough throughout the day.  He denies increased shortness of breath from baseline, chest pain, palpitations, dizziness.  He just reports feeling a little weaker than normal.  On exam, he has new diastolic murmur right upper sternal border.  This is different than the murmur he has had in the past.  I contacted patient's cardiology office.  His cardiologist is out of town until next week.  I spoke with nurse practitioner from the office who recommends we repeat echocardiogram.  I will order ASAP echocardiogram today.  He should go to ER ASAP if worsening, new or changing symptoms.  I will also order home health nursing.  He will need PT and OT, however, will need to ensure stable echocardiogram before we start this.  Patient was referred to pulmonology 8/30/2019.  He did not have appointment scheduled when he came in today.  We have been able to get him  scheduled October 4 to see the pulmonologist in follow-up.  Per discharge note, he will need repeat CT in 6 to 8 weeks, however, the CT report notes the pleural effusions were enlarging his last hospitalization.  I will let pulmonology determine his next imaging and follow-up.

## 2019-09-24 ENCOUNTER — HOSPITAL ENCOUNTER (OUTPATIENT)
Dept: CARDIOLOGY | Facility: HOSPITAL | Age: 84
Discharge: HOME | End: 2019-09-24
Admitting: PHYSICIAN ASSISTANT

## 2019-09-24 VITALS
HEART RATE: 69 BPM | WEIGHT: 176 LBS | SYSTOLIC BLOOD PRESSURE: 159 MMHG | BODY MASS INDEX: 23.33 KG/M2 | DIASTOLIC BLOOD PRESSURE: 70 MMHG | HEIGHT: 73 IN

## 2019-09-24 LAB
BH CV ECHO MEAS - ACS: 2.5 CM
BH CV ECHO MEAS - AI DEC SLOPE: 223.5 CM/SEC^2
BH CV ECHO MEAS - AI MAX PG: 73.3 MMHG
BH CV ECHO MEAS - AI MAX VEL: 428 CM/SEC
BH CV ECHO MEAS - AI P1/2T: 560.9 MSEC
BH CV ECHO MEAS - AO MAX PG (FULL): 9.2 MMHG
BH CV ECHO MEAS - AO MAX PG: 12.8 MMHG
BH CV ECHO MEAS - AO MEAN PG (FULL): 4 MMHG
BH CV ECHO MEAS - AO MEAN PG: 6 MMHG
BH CV ECHO MEAS - AO ROOT AREA (BSA CORRECTED): 1.9
BH CV ECHO MEAS - AO ROOT AREA: 11.3 CM^2
BH CV ECHO MEAS - AO ROOT DIAM: 3.8 CM
BH CV ECHO MEAS - AO V2 MAX: 179 CM/SEC
BH CV ECHO MEAS - AO V2 MEAN: 117 CM/SEC
BH CV ECHO MEAS - AO V2 VTI: 41.7 CM
BH CV ECHO MEAS - AVA(I,A): 2.3 CM^2
BH CV ECHO MEAS - AVA(I,D): 2.3 CM^2
BH CV ECHO MEAS - AVA(V,A): 2.2 CM^2
BH CV ECHO MEAS - AVA(V,D): 2.2 CM^2
BH CV ECHO MEAS - BSA(HAYCOCK): 2 M^2
BH CV ECHO MEAS - BSA: 2 M^2
BH CV ECHO MEAS - BZI_BMI: 23.2 KILOGRAMS/M^2
BH CV ECHO MEAS - BZI_METRIC_HEIGHT: 185.4 CM
BH CV ECHO MEAS - BZI_METRIC_WEIGHT: 79.8 KG
BH CV ECHO MEAS - EDV(CUBED): 195.1 ML
BH CV ECHO MEAS - EDV(MOD-SP2): 257 ML
BH CV ECHO MEAS - EDV(MOD-SP4): 269 ML
BH CV ECHO MEAS - EDV(TEICH): 166.6 ML
BH CV ECHO MEAS - EF(CUBED): 46.8 %
BH CV ECHO MEAS - EF(MOD-BP): 47 %
BH CV ECHO MEAS - EF(MOD-SP2): 44 %
BH CV ECHO MEAS - EF(MOD-SP4): 51.7 %
BH CV ECHO MEAS - EF(TEICH): 38.5 %
BH CV ECHO MEAS - ESV(CUBED): 103.8 ML
BH CV ECHO MEAS - ESV(MOD-SP2): 144 ML
BH CV ECHO MEAS - ESV(MOD-SP4): 130 ML
BH CV ECHO MEAS - ESV(TEICH): 102.4 ML
BH CV ECHO MEAS - FS: 19 %
BH CV ECHO MEAS - IVS/LVPW: 1.1
BH CV ECHO MEAS - IVSD: 1.5 CM
BH CV ECHO MEAS - LA DIMENSION: 5.4 CM
BH CV ECHO MEAS - LA/AO: 1.4
BH CV ECHO MEAS - LAT PEAK E' VEL: 6.5 CM/SEC
BH CV ECHO MEAS - LV DIASTOLIC VOL/BSA (35-75): 132 ML/M^2
BH CV ECHO MEAS - LV MASS(C)D: 386.1 GRAMS
BH CV ECHO MEAS - LV MASS(C)DI: 189.5 GRAMS/M^2
BH CV ECHO MEAS - LV MAX PG: 3.6 MMHG
BH CV ECHO MEAS - LV MEAN PG: 2 MMHG
BH CV ECHO MEAS - LV SYSTOLIC VOL/BSA (12-30): 63.8 ML/M^2
BH CV ECHO MEAS - LV V1 MAX: 95.3 CM/SEC
BH CV ECHO MEAS - LV V1 MEAN: 73.7 CM/SEC
BH CV ECHO MEAS - LV V1 VTI: 23.2 CM
BH CV ECHO MEAS - LVIDD: 5.8 CM
BH CV ECHO MEAS - LVIDS: 4.7 CM
BH CV ECHO MEAS - LVLD AP2: 8.8 CM
BH CV ECHO MEAS - LVLD AP4: 8.3 CM
BH CV ECHO MEAS - LVLS AP2: 8.1 CM
BH CV ECHO MEAS - LVLS AP4: 7.4 CM
BH CV ECHO MEAS - LVOT AREA (M): 4.2 CM^2
BH CV ECHO MEAS - LVOT AREA: 4.2 CM^2
BH CV ECHO MEAS - LVOT DIAM: 2.3 CM
BH CV ECHO MEAS - LVPWD: 1.4 CM
BH CV ECHO MEAS - MED PEAK E' VEL: 4.2 CM/SEC
BH CV ECHO MEAS - MR MAX PG: 108.6 MMHG
BH CV ECHO MEAS - MR MAX VEL: 521 CM/SEC
BH CV ECHO MEAS - MV A DUR: 0.18 SEC
BH CV ECHO MEAS - MV A MAX VEL: 73.2 CM/SEC
BH CV ECHO MEAS - MV DEC SLOPE: 244 CM/SEC^2
BH CV ECHO MEAS - MV DEC TIME: 0.21 SEC
BH CV ECHO MEAS - MV E MAX VEL: 55.8 CM/SEC
BH CV ECHO MEAS - MV E/A: 0.76
BH CV ECHO MEAS - MV MAX PG: 2.1 MMHG
BH CV ECHO MEAS - MV MEAN PG: 1 MMHG
BH CV ECHO MEAS - MV P1/2T MAX VEL: 65.2 CM/SEC
BH CV ECHO MEAS - MV P1/2T: 78.3 MSEC
BH CV ECHO MEAS - MV V2 MAX: 73.3 CM/SEC
BH CV ECHO MEAS - MV V2 MEAN: 48 CM/SEC
BH CV ECHO MEAS - MV V2 VTI: 21.7 CM
BH CV ECHO MEAS - MVA P1/2T LCG: 3.4 CM^2
BH CV ECHO MEAS - MVA(P1/2T): 2.8 CM^2
BH CV ECHO MEAS - MVA(VTI): 4.4 CM^2
BH CV ECHO MEAS - PA ACC TIME: 0.11 SEC
BH CV ECHO MEAS - PA MAX PG (FULL): 2.6 MMHG
BH CV ECHO MEAS - PA MAX PG: 3.6 MMHG
BH CV ECHO MEAS - PA PR(ACCEL): 29.1 MMHG
BH CV ECHO MEAS - PA V2 MAX: 95 CM/SEC
BH CV ECHO MEAS - PI END-D VEL: 119.5 CM/SEC
BH CV ECHO MEAS - PULM A REVS DUR: 0.14 SEC
BH CV ECHO MEAS - PULM A REVS VEL: 26.6 CM/SEC
BH CV ECHO MEAS - PULM DIAS VEL: 47.6 CM/SEC
BH CV ECHO MEAS - PULM S/D: 0.87
BH CV ECHO MEAS - PULM SYS VEL: 41.6 CM/SEC
BH CV ECHO MEAS - PVA(V,A): 1.8 CM^2
BH CV ECHO MEAS - PVA(V,D): 1.8 CM^2
BH CV ECHO MEAS - QP/QS: 0.49
BH CV ECHO MEAS - RV MAX PG: 0.98 MMHG
BH CV ECHO MEAS - RV MEAN PG: 1 MMHG
BH CV ECHO MEAS - RV V1 MAX: 49.5 CM/SEC
BH CV ECHO MEAS - RV V1 MEAN: 33.1 CM/SEC
BH CV ECHO MEAS - RV V1 VTI: 13.6 CM
BH CV ECHO MEAS - RVOT AREA: 3.5 CM^2
BH CV ECHO MEAS - RVOT DIAM: 2.1 CM
BH CV ECHO MEAS - SI(AO): 232.1 ML/M^2
BH CV ECHO MEAS - SI(CUBED): 44.8 ML/M^2
BH CV ECHO MEAS - SI(LVOT): 47.3 ML/M^2
BH CV ECHO MEAS - SI(MOD-SP2): 55.4 ML/M^2
BH CV ECHO MEAS - SI(MOD-SP4): 68.2 ML/M^2
BH CV ECHO MEAS - SI(TEICH): 31.5 ML/M^2
BH CV ECHO MEAS - SV(AO): 472.9 ML
BH CV ECHO MEAS - SV(CUBED): 91.3 ML
BH CV ECHO MEAS - SV(LVOT): 96.4 ML
BH CV ECHO MEAS - SV(MOD-SP2): 113 ML
BH CV ECHO MEAS - SV(MOD-SP4): 139 ML
BH CV ECHO MEAS - SV(RVOT): 47.1 ML
BH CV ECHO MEAS - SV(TEICH): 64.2 ML
BH CV ECHO MEAS - TAPSE (>1.6): 2 CM
BH CV ECHO MEASUREMENTS AVERAGE E/E' RATIO: 10.43
BH CV XLRA - RV BASE: 2.9 CM
BH CV XLRA - RV LENGTH: 5.7 CM
BH CV XLRA - RV MID: 2.3 CM
BH CV XLRA - TDI S': 13.1 CM/SEC
LEFT ATRIUM VOLUME INDEX: 52 ML/M2
LV EF 2D ECHO EST: 50 %

## 2019-09-24 PROCEDURE — 25010000002 PERFLUTREN (DEFINITY) 8.476 MG IN SODIUM CHLORIDE 0.9 % 10 ML INJECTION: Performed by: PHYSICIAN ASSISTANT

## 2019-09-24 PROCEDURE — 93306 TTE W/DOPPLER COMPLETE: CPT | Performed by: INTERNAL MEDICINE

## 2019-09-24 PROCEDURE — 93306 TTE W/DOPPLER COMPLETE: CPT

## 2019-09-24 RX ADMIN — SODIUM CHLORIDE 2 ML: 9 INJECTION INTRAMUSCULAR; INTRAVENOUS; SUBCUTANEOUS at 07:46

## 2019-09-25 ENCOUNTER — TELEPHONE (OUTPATIENT)
Dept: FAMILY MEDICINE CLINIC | Facility: CLINIC | Age: 84
End: 2019-09-25

## 2019-09-25 DIAGNOSIS — I42.9 CARDIOMYOPATHY, UNSPECIFIED TYPE (HCC): ICD-10-CM

## 2019-09-25 DIAGNOSIS — Z86.73 HISTORY OF CVA (CEREBROVASCULAR ACCIDENT): ICD-10-CM

## 2019-09-25 DIAGNOSIS — R53.1 WEAKNESS: Primary | ICD-10-CM

## 2019-09-25 DIAGNOSIS — G89.29 CHRONIC LOW BACK PAIN, UNSPECIFIED BACK PAIN LATERALITY, WITH SCIATICA PRESENCE UNSPECIFIED: ICD-10-CM

## 2019-09-25 DIAGNOSIS — I25.10 CORONARY ARTERY DISEASE INVOLVING NATIVE CORONARY ARTERY OF NATIVE HEART WITHOUT ANGINA PECTORIS: ICD-10-CM

## 2019-09-25 DIAGNOSIS — G20 PARKINSON'S DISEASE (HCC): ICD-10-CM

## 2019-09-25 DIAGNOSIS — M54.5 CHRONIC LOW BACK PAIN, UNSPECIFIED BACK PAIN LATERALITY, WITH SCIATICA PRESENCE UNSPECIFIED: ICD-10-CM

## 2019-09-25 NOTE — TELEPHONE ENCOUNTER
Eugenia from [facility]   [phone] states they can not provide services to the patient because he isn't home bound that he is out running around town 2 to 3 times a week with his neighbor so they can't admit him for services, she thinks he needs a 3 and 1 commode seat that fits over the toilet, needs the script written and mailed to his son.

## 2019-09-26 ENCOUNTER — READMISSION MANAGEMENT (OUTPATIENT)
Dept: CALL CENTER | Facility: HOSPITAL | Age: 84
End: 2019-09-26

## 2019-09-26 ENCOUNTER — TELEPHONE (OUTPATIENT)
Dept: CARDIOLOGY | Facility: CLINIC | Age: 84
End: 2019-09-26

## 2019-09-26 DIAGNOSIS — G20 PARKINSON'S DISEASE (HCC): ICD-10-CM

## 2019-09-26 DIAGNOSIS — G89.29 CHRONIC LOW BACK PAIN, UNSPECIFIED BACK PAIN LATERALITY, WITH SCIATICA PRESENCE UNSPECIFIED: ICD-10-CM

## 2019-09-26 DIAGNOSIS — Z86.73 HISTORY OF CVA (CEREBROVASCULAR ACCIDENT): ICD-10-CM

## 2019-09-26 DIAGNOSIS — I42.9 CARDIOMYOPATHY, UNSPECIFIED TYPE (HCC): ICD-10-CM

## 2019-09-26 DIAGNOSIS — I25.10 CORONARY ARTERY DISEASE INVOLVING NATIVE CORONARY ARTERY OF NATIVE HEART WITHOUT ANGINA PECTORIS: ICD-10-CM

## 2019-09-26 DIAGNOSIS — M54.5 CHRONIC LOW BACK PAIN, UNSPECIFIED BACK PAIN LATERALITY, WITH SCIATICA PRESENCE UNSPECIFIED: ICD-10-CM

## 2019-09-26 DIAGNOSIS — R53.1 WEAKNESS: ICD-10-CM

## 2019-09-26 NOTE — TELEPHONE ENCOUNTER
Order is in. Also, there is a message about his echocardiogram that has not been resolved. I need to talk with provider at his cardiologist's office- I spoke with Ludivina when I saw patient for recommendations. We need to ensure they call my cell phone for recommendations.

## 2019-09-26 NOTE — TELEPHONE ENCOUNTER
I spoke with cardiology office today. She will discuss the significant changes in the echo following new murmur in office, and the patient reporting feeling weak and fatigued. They will make further recommendations for patient and when they will see him in follow up. He was advised at the appt to go to ER ASAP if any worsening, new or changing symptoms. Extensive counseling about symptoms to be concerned about were discussed.

## 2019-09-26 NOTE — OUTREACH NOTE
COPD/PN Week 3 Survey      Responses   Facility patient discharged from?  [facility]   Does the patient have one of the following disease processes/diagnoses(primary or secondary)?  COPD/Pneumonia   Was the primary reason for admission:  Pneumonia   Week 3 attempt successful?  Yes   Call start time  1605   Call end time  1612   Discharge diagnosis  Pneumonia left lower lobe   Is patient permission given to speak with other caregiver?  Yes   List who call center can speak with  Dinesh - abimbola    Person spoke with today (if not patient) and relationship  Dinesh - abimbola    Meds reviewed with patient/caregiver?  Yes   Is the patient taking all medications as directed (includes completed medication regime)?  Yes   Does the patient have a primary care provider?   Yes   Comments regarding PCP  Son reports patient has seen PCP since discharge.    Has the patient kept scheduled appointments due by today?  Yes   Comments  Appt moved up to October 3, 12pm   What is the Home health agency?   Son states that patient did get HH but now not homebound and not eligible for home health.    Has home health visited the patient within 72 hours of discharge?  N/A   Psychosocial issues?  No   Did the patient receive a copy of their discharge instructions?  Yes   Nursing interventions  Reviewed instructions with patient   What is the patient's perception of their health status since discharge?  Improving   Is the patient/caregiver able to teach back the hierarchy of who to call/visit for symptoms/problems? PCP, Specialist, Home health nurse, Urgent Care, ED, 911  Yes   Is the patient/caregiver able to teach back signs and symptoms of worsening condition:  Fever/chills, Shortness of breath, Chest pain   Is the patient/caregiver able to teach back importance of completing antibiotic course of treatment?  Yes   Week 3 call completed?  Yes   Wrap up additional comments  Son states that Dr found that patient has a heart murmur. Patient will see  Dr Quezada next week.           Idalia Friedman RN

## 2019-10-03 ENCOUNTER — OFFICE VISIT (OUTPATIENT)
Dept: CARDIOLOGY | Facility: CLINIC | Age: 84
End: 2019-10-03

## 2019-10-03 ENCOUNTER — TELEPHONE (OUTPATIENT)
Dept: FAMILY MEDICINE CLINIC | Facility: CLINIC | Age: 84
End: 2019-10-03

## 2019-10-03 VITALS
HEIGHT: 73 IN | SYSTOLIC BLOOD PRESSURE: 163 MMHG | HEART RATE: 67 BPM | BODY MASS INDEX: 23.46 KG/M2 | WEIGHT: 177 LBS | DIASTOLIC BLOOD PRESSURE: 67 MMHG

## 2019-10-03 DIAGNOSIS — I10 ESSENTIAL HYPERTENSION: ICD-10-CM

## 2019-10-03 DIAGNOSIS — I48.0 PAROXYSMAL ATRIAL FIBRILLATION (HCC): ICD-10-CM

## 2019-10-03 DIAGNOSIS — I25.10 CORONARY ARTERY DISEASE INVOLVING NATIVE CORONARY ARTERY OF NATIVE HEART WITHOUT ANGINA PECTORIS: Primary | ICD-10-CM

## 2019-10-03 DIAGNOSIS — I42.9 CARDIOMYOPATHY, UNSPECIFIED TYPE (HCC): ICD-10-CM

## 2019-10-03 PROCEDURE — 99213 OFFICE O/P EST LOW 20 MIN: CPT | Performed by: INTERNAL MEDICINE

## 2019-10-03 RX ORDER — METOPROLOL SUCCINATE 25 MG/1
50 TABLET, EXTENDED RELEASE ORAL DAILY
Qty: 60 TABLET | Refills: 0 | Status: SHIPPED | OUTPATIENT
Start: 2019-10-03 | End: 2019-11-05 | Stop reason: HOSPADM

## 2019-10-03 NOTE — PROGRESS NOTES
Subjective:        Dinesh Churchill Sr. is a 89 y.o. male who here for follow up    CC  Follow-up of the coronary artery disease hypertension cardiomyopathy  HPI  89-year-old male with known history of coronary artery disease, cardiomyopathy with a benign essential arterial hypertension here for the follow-up    Shortness of breath on exertion but no chest pain     Problem List Items Addressed This Visit        Cardiovascular and Mediastinum    Essential hypertension    Relevant Medications    metoprolol succinate XL (TOPROL-XL) 25 MG 24 hr tablet    Cardiomyopathy (CMS/HCC)    Relevant Medications    metoprolol succinate XL (TOPROL-XL) 25 MG 24 hr tablet    Coronary artery disease involving native coronary artery of native heart without angina pectoris - Primary    Relevant Medications    metoprolol succinate XL (TOPROL-XL) 25 MG 24 hr tablet    Paroxysmal atrial fibrillation (CMS/HCC)    Relevant Medications    metoprolol succinate XL (TOPROL-XL) 25 MG 24 hr tablet        .     Interpretation Summary     · Left ventricular systolic function is low normal. Calculated EF = 47%. Estimated EF was in agreement with the calculated EF. Estimated EF = 50%. Normal left ventricular cavity size noted. All left ventricular wall segments contract normally. Left ventricular wall thickness is consistent with moderate concentric hypertrophy. Left ventricular diastolic dysfunction is noted (grade I) consistent with impaired relaxation.  · Left atrial volume is severely increased. Right atrial cavity size is moderately dilated.  · Moderate aortic valve regurgitation is present  · Mild MAC is present. Mild mitral valve regurgitation is present.         The following portions of the patient's history were reviewed and updated as appropriate: allergies, current medications, past family history, past medical history, past social history, past surgical history and problem list.    Past Medical History:   Diagnosis Date   • Abnormal ECG   "  • Abnormal MRI    • Acute frontal sinusitis    • Arthritis    • Chronic fatigue    • Coronary artery disease    • Dizziness    • Hearing aid worn     left   • Hearing loss    • Hyperlipidemia    • Hypertension    • Kidney stones    • Macrocytosis    • Neoplasm of skin    • Osteoporosis    • Prediabetes    • Vitamin D deficiency      reports that he has never smoked. He has never used smokeless tobacco. He reports that he does not drink alcohol or use drugs.   Family History   Family history unknown: Yes       Review of Systems  Constitutional: No wt loss, fever, fatigue  Gastrointestinal: No nausea, abdominal pain  Behavioral/Psych: No insomnia or anxiety   Cardiovascular no chest pain  Objective:       Physical Exam  /67   Pulse 67   Ht 185.4 cm (73\")   Wt 80.3 kg (177 lb)   BMI 23.35 kg/m²   General appearance: No acute changes   Neck: Trachea midline; NECK, supple, no thyromegaly or lymphadenopathy   Lungs: Normal size and shape, normal breath sounds, equal distribution of air, no rales and rhonchi   CV: S1-S2 regular, no murmurs, no rub, no gallop   Abdomen: Soft, non-tender; no masses , no abnormal abdominal sounds   Extremities: No deformity , normal color , no peripheral edema   Skin: Normal temperature, turgor and texture; no rash, ulcers          Procedures      Echocardiogram:        Current Outpatient Medications:   •  acetaminophen (TYLENOL) 500 MG tablet, 1-2 TABLETS BY ONCE TO TWICE DAILY AS NEEDED FOR PAIN, Disp: 60 tablet, Rfl: 0  •  aspirin 81 MG chewable tablet, Chew 1 tablet Daily., Disp: , Rfl:   •  atorvastatin (LIPITOR) 20 MG tablet, TAKE 1 TABLET BY MOUTH EVERY NIGHT, Disp: 30 tablet, Rfl: 1  •  Cholecalciferol (VITAMIN D-3) 1000 UNITS capsule, Take 1,000 Units by mouth daily., Disp: , Rfl:   •  Elastic Bandages & Supports (LUMBAR BACK BRACE/SUPPORT PAD) misc, Use for lifting/ strenuous exercise., Disp: 1 each, Rfl: 0  •  isosorbide mononitrate (IMDUR) 60 MG 24 hr tablet, TAKE 1 " TABLET BY MOUTH EVERY DAY, Disp: 30 tablet, Rfl: 1  •  losartan (COZAAR) 50 MG tablet, TAKE 1 TABLET BY MOUTH DAILY, Disp: 30 tablet, Rfl: 1  •  metoprolol succinate XL (TOPROL-XL) 25 MG 24 hr tablet, Take 3 tablets by mouth Daily for 30 days., Disp: 90 tablet, Rfl: 0  •  Misc. Devices (COMMODE 3-IN-1) misc, 1 each Daily., Disp: 1 each, Rfl: 0  •  ticagrelor (BRILINTA) 90 MG tablet tablet, Take 1 tablet by mouth 2 (Two) Times a Day., Disp: 60 tablet, Rfl: 5    Current Facility-Administered Medications:   •  cyanocobalamin injection 1,000 mcg, 1,000 mcg, Intramuscular, Q30 Days, Phyllis Loving PA, 1,000 mcg at 09/23/19 1121   Assessment:        Patient Active Problem List   Diagnosis   • Abnormal magnetic resonance imaging study   • Arthritis   • Osteoarthritis of cervical spine   • Diplopia   • Hearing loss   • Neoplasm of uncertain behavior of skin   • Prediabetes   • Vitamin D deficiency   • Chronic fatigue   • History of supraventricular tachycardia   • Essential hypertension   • B12 deficiency   • Abnormal cardiac function test   • Cardiomyopathy (CMS/HCC)   • Coronary artery disease involving native coronary artery of native heart without angina pectoris   • History of coronary artery stent placement   • History of renal calculi   • Dyslipidemia   • Macrocytic anemia   • Precordial pain   • Parkinson's disease (CMS/HCC)   • History of CVA (cerebrovascular accident)   • Paroxysmal atrial fibrillation (CMS/HCC)   • Otalgia, left   • Chest pain   • Abnormal stress test   • Spontaneous pneumothorax   • Chest pain   • Acute systolic (congestive) heart failure (CMS/HCC)   • Thrombocytopenia (CMS/HCC)   • Pneumonia of left lower lobe due to infectious organism (CMS/HCC)               Plan:            ICD-10-CM ICD-9-CM   1. Coronary artery disease involving native coronary artery of native heart without angina pectoris I25.10 414.01   2. Essential hypertension I10 401.9   3. Paroxysmal atrial fibrillation (CMS/HCC)  I48.0 427.31   4. Cardiomyopathy, unspecified type (CMS/HCC) I42.9 425.4     1. Coronary artery disease involving native coronary artery of native heart without angina pectoris  No angina pectoris    2. Essential hypertension  Blood pressure stable    3. Paroxysmal atrial fibrillation (CMS/HCC)  Controlled    4. Cardiomyopathy, unspecified type (CMS/HCC)  Compensated       cv stable    See in 6 months  COUNSELING:    Dinesh Churchill Sr.Counseling was given to patient for the following topics: diagnostic results, risk factor reductions, impressions, risks and benefits of treatment options and importance of treatment compliance .       SMOKING COUNSELING:    [unfilled]    Dictated using Dragon dictation

## 2019-10-03 NOTE — TELEPHONE ENCOUNTER
Patients son called stating patient saw cardiology today and was told by him that his appointment with Dr. De Jesus, Pulmonology, isn't necessary tomorrow. His appointment with pulmonary is scheduled for tomorrow at 2:45PM. He wanted to be sure you were okay with canceling the appointment. Please advise, thanks.

## 2019-10-07 ENCOUNTER — READMISSION MANAGEMENT (OUTPATIENT)
Dept: CALL CENTER | Facility: HOSPITAL | Age: 84
End: 2019-10-07

## 2019-10-07 NOTE — OUTREACH NOTE
COPD/PN Week 4 Survey      Responses   Facility patient discharged from?  [facility]   Does the patient have one of the following disease processes/diagnoses(primary or secondary)?  COPD/Pneumonia   Was the primary reason for admission:  Pneumonia   Week 4 attempt successful?  Yes   Call start time  0942   Call end time  0951   Discharge diagnosis  Pneumonia left lower lobe   Is patient permission given to speak with other caregiver?  Yes   List who call center can speak with  Dinesh - son    Person spoke with today (if not patient) and relationship  Dinesh - son    Meds reviewed with patient/caregiver?  Yes   Is the patient taking all medications as directed (includes completed medication regime)?  Yes   Has the patient kept scheduled appointments due by today?  Yes   Comments  Son states that patient has appt with Dr De Jesus today.    Is the patient still receiving Home Health Services?  N/A   Psychosocial issues?  No   What is the patient's perception of their health status since discharge?  Improving   Is the patient/caregiver able to teach back the hierarchy of who to call/visit for symptoms/problems? PCP, Specialist, Home health nurse, Urgent Care, ED, 911  Yes   Is the patient/caregiver able to teach back signs and symptoms of worsening condition:  Fever/chills, Shortness of breath, Chest pain   Is the patient/caregiver able to teach back importance of completing antibiotic course of treatment?  Yes   Week 4 call completed?  Yes   Would the patient like one additional call?  No   Graduated  Yes   Did the patient feel the follow up calls were helpful during their recovery period?  Yes   Was the number of calls appropriate?  Yes          Idalia Friedman RN

## 2019-10-23 ENCOUNTER — CLINICAL SUPPORT (OUTPATIENT)
Dept: FAMILY MEDICINE CLINIC | Facility: CLINIC | Age: 84
End: 2019-10-23

## 2019-10-23 DIAGNOSIS — E53.8 B12 DEFICIENCY: Primary | ICD-10-CM

## 2019-10-23 PROCEDURE — 96372 THER/PROPH/DIAG INJ SC/IM: CPT | Performed by: PHYSICIAN ASSISTANT

## 2019-10-23 RX ORDER — CYANOCOBALAMIN 1000 UG/ML
1000 INJECTION, SOLUTION INTRAMUSCULAR; SUBCUTANEOUS
Status: DISCONTINUED | OUTPATIENT
Start: 2019-10-23 | End: 2019-11-05 | Stop reason: HOSPADM

## 2019-10-23 RX ADMIN — CYANOCOBALAMIN 1000 MCG: 1000 INJECTION, SOLUTION INTRAMUSCULAR; SUBCUTANEOUS at 09:39

## 2019-10-30 RX ORDER — LOSARTAN POTASSIUM 50 MG/1
50 TABLET ORAL DAILY
Qty: 30 TABLET | Refills: 1 | Status: SHIPPED | OUTPATIENT
Start: 2019-10-30 | End: 2019-12-26

## 2019-10-30 RX ORDER — ISOSORBIDE MONONITRATE 60 MG/1
TABLET, EXTENDED RELEASE ORAL
Qty: 30 TABLET | Refills: 1 | Status: SHIPPED | OUTPATIENT
Start: 2019-10-30 | End: 2019-12-26

## 2019-10-30 RX ORDER — ATORVASTATIN CALCIUM 20 MG/1
20 TABLET, FILM COATED ORAL NIGHTLY
Qty: 30 TABLET | Refills: 1 | Status: SHIPPED | OUTPATIENT
Start: 2019-10-30 | End: 2019-12-26

## 2019-11-01 ENCOUNTER — HOSPITAL ENCOUNTER (OUTPATIENT)
Facility: HOSPITAL | Age: 84
Setting detail: OBSERVATION
Discharge: HOME | End: 2019-11-05
Attending: EMERGENCY MEDICINE | Admitting: INTERNAL MEDICINE

## 2019-11-01 ENCOUNTER — APPOINTMENT (OUTPATIENT)
Dept: GENERAL RADIOLOGY | Facility: HOSPITAL | Age: 84
End: 2019-11-01

## 2019-11-01 DIAGNOSIS — R07.9 CHEST PAIN, UNSPECIFIED TYPE: Primary | ICD-10-CM

## 2019-11-01 LAB
ALBUMIN SERPL-MCNC: 4.1 G/DL (ref 3.5–5.2)
ALBUMIN/GLOB SERPL: 1.2 G/DL
ALP SERPL-CCNC: 51 U/L (ref 39–117)
ALT SERPL W P-5'-P-CCNC: 14 U/L (ref 1–41)
ANION GAP SERPL CALCULATED.3IONS-SCNC: 11.9 MMOL/L (ref 5–15)
ANISOCYTOSIS BLD QL: ABNORMAL
AST SERPL-CCNC: 16 U/L (ref 1–40)
BILIRUB SERPL-MCNC: 2.2 MG/DL (ref 0.2–1.2)
BUN BLD-MCNC: 20 MG/DL (ref 8–23)
BUN/CREAT SERPL: 21.5 (ref 7–25)
BURR CELLS BLD QL SMEAR: ABNORMAL
CALCIUM SPEC-SCNC: 9.1 MG/DL (ref 8.2–9.6)
CHLORIDE SERPL-SCNC: 102 MMOL/L (ref 98–107)
CO2 SERPL-SCNC: 23.1 MMOL/L (ref 22–29)
CREAT BLD-MCNC: 0.93 MG/DL (ref 0.76–1.27)
DEPRECATED RDW RBC AUTO: 56.9 FL (ref 37–54)
EOSINOPHIL # BLD MANUAL: 0.08 10*3/MM3 (ref 0–0.4)
EOSINOPHIL NFR BLD MANUAL: 2.1 % (ref 0.3–6.2)
ERYTHROCYTE [DISTWIDTH] IN BLOOD BY AUTOMATED COUNT: 15.4 % (ref 12.3–15.4)
GFR SERPL CREATININE-BSD FRML MDRD: 76 ML/MIN/1.73
GLOBULIN UR ELPH-MCNC: 3.4 GM/DL
GLUCOSE BLD-MCNC: 102 MG/DL (ref 65–99)
HCT VFR BLD AUTO: 43.3 % (ref 37.5–51)
HGB BLD-MCNC: 14.3 G/DL (ref 13–17.7)
LYMPHOCYTES # BLD MANUAL: 0.61 10*3/MM3 (ref 0.7–3.1)
LYMPHOCYTES NFR BLD MANUAL: 15.5 % (ref 19.6–45.3)
LYMPHOCYTES NFR BLD MANUAL: 7.2 % (ref 5–12)
MCH RBC QN AUTO: 33.1 PG (ref 26.6–33)
MCHC RBC AUTO-ENTMCNC: 33 G/DL (ref 31.5–35.7)
MCV RBC AUTO: 100.2 FL (ref 79–97)
MONOCYTES # BLD AUTO: 0.28 10*3/MM3 (ref 0.1–0.9)
NEUTROPHILS # BLD AUTO: 2.97 10*3/MM3 (ref 1.7–7)
NEUTROPHILS NFR BLD MANUAL: 75.3 % (ref 42.7–76)
PLAT MORPH BLD: NORMAL
PLATELET # BLD AUTO: 129 10*3/MM3 (ref 140–450)
PMV BLD AUTO: 11.2 FL (ref 6–12)
POIKILOCYTOSIS BLD QL SMEAR: ABNORMAL
POTASSIUM BLD-SCNC: 4.2 MMOL/L (ref 3.5–5.2)
PROT SERPL-MCNC: 7.5 G/DL (ref 6–8.5)
RBC # BLD AUTO: 4.32 10*6/MM3 (ref 4.14–5.8)
SODIUM BLD-SCNC: 137 MMOL/L (ref 136–145)
TROPONIN T SERPL-MCNC: 0.01 NG/ML (ref 0–0.03)
TROPONIN T SERPL-MCNC: 0.02 NG/ML (ref 0–0.03)
TROPONIN T SERPL-MCNC: 0.02 NG/ML (ref 0–0.03)
WBC MORPH BLD: NORMAL
WBC NRBC COR # BLD: 3.95 10*3/MM3 (ref 3.4–10.8)

## 2019-11-01 PROCEDURE — 96375 TX/PRO/DX INJ NEW DRUG ADDON: CPT

## 2019-11-01 PROCEDURE — G0378 HOSPITAL OBSERVATION PER HR: HCPCS

## 2019-11-01 PROCEDURE — 96374 THER/PROPH/DIAG INJ IV PUSH: CPT

## 2019-11-01 PROCEDURE — 71045 X-RAY EXAM CHEST 1 VIEW: CPT

## 2019-11-01 PROCEDURE — 84484 ASSAY OF TROPONIN QUANT: CPT | Performed by: INTERNAL MEDICINE

## 2019-11-01 PROCEDURE — 93005 ELECTROCARDIOGRAM TRACING: CPT | Performed by: EMERGENCY MEDICINE

## 2019-11-01 PROCEDURE — 84484 ASSAY OF TROPONIN QUANT: CPT | Performed by: EMERGENCY MEDICINE

## 2019-11-01 PROCEDURE — 93005 ELECTROCARDIOGRAM TRACING: CPT

## 2019-11-01 PROCEDURE — 93010 ELECTROCARDIOGRAM REPORT: CPT | Performed by: INTERNAL MEDICINE

## 2019-11-01 PROCEDURE — 85025 COMPLETE CBC W/AUTO DIFF WBC: CPT | Performed by: EMERGENCY MEDICINE

## 2019-11-01 PROCEDURE — 99285 EMERGENCY DEPT VISIT HI MDM: CPT

## 2019-11-01 PROCEDURE — 80053 COMPREHEN METABOLIC PANEL: CPT | Performed by: EMERGENCY MEDICINE

## 2019-11-01 PROCEDURE — 85007 BL SMEAR W/DIFF WBC COUNT: CPT | Performed by: EMERGENCY MEDICINE

## 2019-11-01 PROCEDURE — 93005 ELECTROCARDIOGRAM TRACING: CPT | Performed by: INTERNAL MEDICINE

## 2019-11-01 RX ORDER — HYDROCODONE BITARTRATE AND ACETAMINOPHEN 5; 325 MG/1; MG/1
1 TABLET ORAL EVERY 4 HOURS PRN
Status: DISCONTINUED | OUTPATIENT
Start: 2019-11-01 | End: 2019-11-05 | Stop reason: HOSPADM

## 2019-11-01 RX ORDER — ASPIRIN 81 MG/1
81 TABLET, CHEWABLE ORAL DAILY
Status: DISCONTINUED | OUTPATIENT
Start: 2019-11-02 | End: 2019-11-05 | Stop reason: HOSPADM

## 2019-11-01 RX ORDER — SODIUM CHLORIDE 0.9 % (FLUSH) 0.9 %
10 SYRINGE (ML) INJECTION EVERY 12 HOURS SCHEDULED
Status: DISCONTINUED | OUTPATIENT
Start: 2019-11-01 | End: 2019-11-05 | Stop reason: HOSPADM

## 2019-11-01 RX ORDER — ACETAMINOPHEN 160 MG/5ML
650 SOLUTION ORAL EVERY 4 HOURS PRN
Status: DISCONTINUED | OUTPATIENT
Start: 2019-11-01 | End: 2019-11-05 | Stop reason: HOSPADM

## 2019-11-01 RX ORDER — ACETAMINOPHEN 325 MG/1
650 TABLET ORAL EVERY 4 HOURS PRN
Status: DISCONTINUED | OUTPATIENT
Start: 2019-11-01 | End: 2019-11-05 | Stop reason: HOSPADM

## 2019-11-01 RX ORDER — LOSARTAN POTASSIUM 50 MG/1
50 TABLET ORAL DAILY
Status: DISCONTINUED | OUTPATIENT
Start: 2019-11-02 | End: 2019-11-05 | Stop reason: HOSPADM

## 2019-11-01 RX ORDER — ATORVASTATIN CALCIUM 20 MG/1
20 TABLET, FILM COATED ORAL NIGHTLY
Status: DISCONTINUED | OUTPATIENT
Start: 2019-11-01 | End: 2019-11-05 | Stop reason: HOSPADM

## 2019-11-01 RX ORDER — SODIUM CHLORIDE 0.9 % (FLUSH) 0.9 %
10 SYRINGE (ML) INJECTION AS NEEDED
Status: DISCONTINUED | OUTPATIENT
Start: 2019-11-01 | End: 2019-11-05 | Stop reason: HOSPADM

## 2019-11-01 RX ORDER — OMEGA-3S/DHA/EPA/FISH OIL/D3 300MG-1000
1000 CAPSULE ORAL DAILY
Status: DISCONTINUED | OUTPATIENT
Start: 2019-11-02 | End: 2019-11-05 | Stop reason: HOSPADM

## 2019-11-01 RX ORDER — MORPHINE SULFATE 2 MG/ML
1 INJECTION, SOLUTION INTRAMUSCULAR; INTRAVENOUS EVERY 4 HOURS PRN
Status: DISCONTINUED | OUTPATIENT
Start: 2019-11-01 | End: 2019-11-05 | Stop reason: HOSPADM

## 2019-11-01 RX ORDER — ACETAMINOPHEN 650 MG/1
650 SUPPOSITORY RECTAL EVERY 4 HOURS PRN
Status: DISCONTINUED | OUTPATIENT
Start: 2019-11-01 | End: 2019-11-05 | Stop reason: HOSPADM

## 2019-11-01 RX ORDER — NALOXONE HCL 0.4 MG/ML
0.4 VIAL (ML) INJECTION
Status: DISCONTINUED | OUTPATIENT
Start: 2019-11-01 | End: 2019-11-05 | Stop reason: HOSPADM

## 2019-11-01 RX ORDER — HYDRALAZINE HYDROCHLORIDE 20 MG/ML
10 INJECTION INTRAMUSCULAR; INTRAVENOUS EVERY 6 HOURS PRN
Status: DISCONTINUED | OUTPATIENT
Start: 2019-11-01 | End: 2019-11-05 | Stop reason: HOSPADM

## 2019-11-01 RX ADMIN — SODIUM CHLORIDE, PRESERVATIVE FREE 10 ML: 5 INJECTION INTRAVENOUS at 22:51

## 2019-11-01 RX ADMIN — HYDROCODONE BITARTRATE AND ACETAMINOPHEN 1 TABLET: 5; 325 TABLET ORAL at 22:50

## 2019-11-01 RX ADMIN — ATORVASTATIN CALCIUM 20 MG: 20 TABLET, FILM COATED ORAL at 22:50

## 2019-11-01 RX ADMIN — METOPROLOL TARTRATE 25 MG: 25 TABLET ORAL at 22:50

## 2019-11-01 RX ADMIN — TICAGRELOR 90 MG: 90 TABLET ORAL at 22:50

## 2019-11-01 RX ADMIN — METOPROLOL TARTRATE 5 MG: 5 INJECTION, SOLUTION INTRAVENOUS at 17:58

## 2019-11-01 RX ADMIN — NITROGLYCERIN 1 INCH: 20 OINTMENT TOPICAL at 15:46

## 2019-11-01 NOTE — ED NOTES
Patient presents with chest pain that radiates to the left shoulder that started 2 hours ago while resting. Patient was given 0.5 of nitro and 324 of aspirin. Patient states that he is having pain still. Patient denies having any nausea. Patient states that he is feeling short of breath, upon assessment patient's breathing is even and unlabored. Family is at bedside.        Jenny Marmolejo RN  11/01/19 9228

## 2019-11-02 ENCOUNTER — APPOINTMENT (OUTPATIENT)
Dept: GENERAL RADIOLOGY | Facility: HOSPITAL | Age: 84
End: 2019-11-02

## 2019-11-02 LAB
ANION GAP SERPL CALCULATED.3IONS-SCNC: 8.9 MMOL/L (ref 5–15)
BUN BLD-MCNC: 19 MG/DL (ref 8–23)
BUN/CREAT SERPL: 26 (ref 7–25)
CALCIUM SPEC-SCNC: 8.6 MG/DL (ref 8.2–9.6)
CHLORIDE SERPL-SCNC: 106 MMOL/L (ref 98–107)
CHOLEST SERPL-MCNC: 131 MG/DL (ref 0–200)
CO2 SERPL-SCNC: 25.1 MMOL/L (ref 22–29)
CREAT BLD-MCNC: 0.73 MG/DL (ref 0.76–1.27)
DEPRECATED RDW RBC AUTO: 57.3 FL (ref 37–54)
ERYTHROCYTE [DISTWIDTH] IN BLOOD BY AUTOMATED COUNT: 15.3 % (ref 12.3–15.4)
GFR SERPL CREATININE-BSD FRML MDRD: 101 ML/MIN/1.73
GLUCOSE BLD-MCNC: 70 MG/DL (ref 65–99)
HCT VFR BLD AUTO: 40.3 % (ref 37.5–51)
HDLC SERPL-MCNC: 40 MG/DL (ref 40–60)
HGB BLD-MCNC: 12.7 G/DL (ref 13–17.7)
INR PPP: 1.09 (ref 0.9–1.1)
LDLC SERPL CALC-MCNC: 74 MG/DL (ref 0–100)
LDLC/HDLC SERPL: 1.86 {RATIO}
MCH RBC QN AUTO: 31.5 PG (ref 26.6–33)
MCHC RBC AUTO-ENTMCNC: 31.5 G/DL (ref 31.5–35.7)
MCV RBC AUTO: 100 FL (ref 79–97)
PLATELET # BLD AUTO: 118 10*3/MM3 (ref 140–450)
PMV BLD AUTO: 11 FL (ref 6–12)
POTASSIUM BLD-SCNC: 3.5 MMOL/L (ref 3.5–5.2)
PROTHROMBIN TIME: 13.8 SECONDS (ref 11.7–14.2)
RBC # BLD AUTO: 4.03 10*6/MM3 (ref 4.14–5.8)
SODIUM BLD-SCNC: 140 MMOL/L (ref 136–145)
TRIGL SERPL-MCNC: 83 MG/DL (ref 0–150)
TROPONIN T SERPL-MCNC: 0.02 NG/ML (ref 0–0.03)
VLDLC SERPL-MCNC: 16.6 MG/DL (ref 5–40)
WBC NRBC COR # BLD: 6.23 10*3/MM3 (ref 3.4–10.8)

## 2019-11-02 PROCEDURE — 84484 ASSAY OF TROPONIN QUANT: CPT | Performed by: INTERNAL MEDICINE

## 2019-11-02 PROCEDURE — G0378 HOSPITAL OBSERVATION PER HR: HCPCS

## 2019-11-02 PROCEDURE — 80048 BASIC METABOLIC PNL TOTAL CA: CPT | Performed by: INTERNAL MEDICINE

## 2019-11-02 PROCEDURE — 85610 PROTHROMBIN TIME: CPT | Performed by: INTERNAL MEDICINE

## 2019-11-02 PROCEDURE — 93010 ELECTROCARDIOGRAM REPORT: CPT | Performed by: INTERNAL MEDICINE

## 2019-11-02 PROCEDURE — 96375 TX/PRO/DX INJ NEW DRUG ADDON: CPT

## 2019-11-02 PROCEDURE — 96365 THER/PROPH/DIAG IV INF INIT: CPT

## 2019-11-02 PROCEDURE — 71045 X-RAY EXAM CHEST 1 VIEW: CPT

## 2019-11-02 PROCEDURE — 25010000002 HYDRALAZINE PER 20 MG: Performed by: INTERNAL MEDICINE

## 2019-11-02 PROCEDURE — 99220 PR INITIAL OBSERVATION CARE/DAY 70 MINUTES: CPT | Performed by: INTERNAL MEDICINE

## 2019-11-02 PROCEDURE — 93005 ELECTROCARDIOGRAM TRACING: CPT | Performed by: INTERNAL MEDICINE

## 2019-11-02 PROCEDURE — 25010000002 MORPHINE PER 10 MG: Performed by: INTERNAL MEDICINE

## 2019-11-02 PROCEDURE — 85027 COMPLETE CBC AUTOMATED: CPT | Performed by: INTERNAL MEDICINE

## 2019-11-02 PROCEDURE — 80061 LIPID PANEL: CPT | Performed by: INTERNAL MEDICINE

## 2019-11-02 PROCEDURE — 96366 THER/PROPH/DIAG IV INF ADDON: CPT

## 2019-11-02 RX ORDER — NITROGLYCERIN 20 MG/100ML
5-200 INJECTION INTRAVENOUS
Status: DISCONTINUED | OUTPATIENT
Start: 2019-11-02 | End: 2019-11-02

## 2019-11-02 RX ORDER — METOPROLOL TARTRATE 50 MG/1
50 TABLET, FILM COATED ORAL EVERY 12 HOURS SCHEDULED
Status: DISCONTINUED | OUTPATIENT
Start: 2019-11-02 | End: 2019-11-05 | Stop reason: HOSPADM

## 2019-11-02 RX ADMIN — SODIUM CHLORIDE, PRESERVATIVE FREE 10 ML: 5 INJECTION INTRAVENOUS at 20:20

## 2019-11-02 RX ADMIN — TICAGRELOR 90 MG: 90 TABLET ORAL at 09:36

## 2019-11-02 RX ADMIN — NITROGLYCERIN 5 MCG/MIN: 20 INJECTION INTRAVENOUS at 06:19

## 2019-11-02 RX ADMIN — HYDRALAZINE HYDROCHLORIDE 10 MG: 20 INJECTION INTRAMUSCULAR; INTRAVENOUS at 06:23

## 2019-11-02 RX ADMIN — NITROGLYCERIN 10 MCG/MIN: 20 INJECTION INTRAVENOUS at 08:00

## 2019-11-02 RX ADMIN — CHOLECALCIFEROL TAB 10 MCG (400 UNIT) 1000 UNITS: 10 TAB at 09:36

## 2019-11-02 RX ADMIN — HYDROCODONE BITARTRATE AND ACETAMINOPHEN 1 TABLET: 5; 325 TABLET ORAL at 02:34

## 2019-11-02 RX ADMIN — NITROGLYCERIN 15 MCG/MIN: 20 INJECTION INTRAVENOUS at 08:39

## 2019-11-02 RX ADMIN — ATORVASTATIN CALCIUM 20 MG: 20 TABLET, FILM COATED ORAL at 20:20

## 2019-11-02 RX ADMIN — METOPROLOL TARTRATE 50 MG: 50 TABLET, FILM COATED ORAL at 20:20

## 2019-11-02 RX ADMIN — NITROGLYCERIN 1 INCH: 20 OINTMENT TOPICAL at 02:34

## 2019-11-02 RX ADMIN — HYDROCODONE BITARTRATE AND ACETAMINOPHEN 1 TABLET: 5; 325 TABLET ORAL at 10:08

## 2019-11-02 RX ADMIN — HYDROCODONE BITARTRATE AND ACETAMINOPHEN 1 TABLET: 5; 325 TABLET ORAL at 14:44

## 2019-11-02 RX ADMIN — METOPROLOL TARTRATE 25 MG: 25 TABLET ORAL at 09:36

## 2019-11-02 RX ADMIN — TICAGRELOR 90 MG: 90 TABLET ORAL at 20:20

## 2019-11-02 RX ADMIN — ACETAMINOPHEN 650 MG: 325 TABLET, FILM COATED ORAL at 18:13

## 2019-11-02 RX ADMIN — LOSARTAN POTASSIUM 50 MG: 50 TABLET, FILM COATED ORAL at 09:36

## 2019-11-02 RX ADMIN — ASPIRIN 81 MG: 81 TABLET, CHEWABLE ORAL at 09:36

## 2019-11-02 RX ADMIN — MORPHINE SULFATE 1 MG: 2 INJECTION, SOLUTION INTRAMUSCULAR; INTRAVENOUS at 04:46

## 2019-11-02 NOTE — PLAN OF CARE
Problem: Patient Care Overview  Goal: Plan of Care Review  Outcome: Ongoing (interventions implemented as appropriate)   11/02/19 1002   Coping/Psychosocial   Plan of Care Reviewed With patient   Plan of Care Review   Progress improving   OTHER   Outcome Summary pt complains of pain. getting pain meds. helps relieve CP.

## 2019-11-02 NOTE — PROGRESS NOTES
"  Daily Progress Note.   [address]  11/2/2019    Patient:  Name:  Dinesh Churchill Sr.  MRN:  [id number]  5/29/1928  89 y.o.  male         CC: chest hurt    Interval History:  Patient admitted with left-sided chest pain.  Of note patient does have a pneumothorax is very small in nature.  Is been present back in August.  Certainly no larger on his chest x-ray this morning.  He is on room air.  Still having some left-sided chest pain.  However just took Norco he thinks this will help  ROS: No fever, no diarrhea, no chest pain  PMFSSH: no change    Physical Exam:  /95 (BP Location: Left arm, Patient Position: Lying)   Pulse 86   Temp 97.4 °F (36.3 °C) (Oral)   Resp 18   Ht 185.4 cm (73\")   Wt 75.3 kg (166 lb 0.1 oz)   SpO2 95%   BMI 21.90 kg/m²   Body mass index is 21.9 kg/m².    Intake/Output Summary (Last 24 hours) at 11/2/2019 1006  Last data filed at 11/2/2019 0910  Gross per 24 hour   Intake 0 ml   Output 700 ml   Net -700 ml     General appearance: Appears stated age, conversant   Eyes: anicteric sclerae, moist conjunctivae; no lid-lag; PERRLA  HENT: Atraumatic; oropharynx clear with moist mucous membranes and no mucosal ulcerations; normal hard and soft palate  Neck: Trachea midline; FROM, supple, no thyromegaly or lymphadenopathy  Lungs: Bilateral breath sounds shallow respiratory effort, no wheeze no rhonchi and no intercostal retractions  CV: RRR, no rub  Abdomen: Thin soft, non-tender; no masses or HSM  Extremities: No peripheral edema or extremity lymphadenopathy  Skin: Normal temperature, turgor and texture; no rash, ulcers or subcutaneous nodules  Psych: Appropriate affect, alert and oriented to person, place and time  Neuro cranial nerves II through XII grossly intact speech intact  sensation intact    Data Review:  Notable Labs:  Results from last 7 days   Lab Units 11/02/19  0536 11/01/19  1549   WBC 10*3/mm3 6.23 3.95   HEMOGLOBIN g/dL 12.7* 14.3   PLATELETS 10*3/mm3 " 118* 129*     Results from last 7 days   Lab Units 11/02/19  0536 11/01/19  1549   SODIUM mmol/L 140 137   POTASSIUM mmol/L 3.5 4.2   CHLORIDE mmol/L 106 102   CO2 mmol/L 25.1 23.1   BUN mg/dL 19 20   CREATININE mg/dL 0.73* 0.93   GLUCOSE mg/dL 70 102*   CALCIUM mg/dL 8.6 9.1   Estimated Creatinine Clearance: 64.1 mL/min (A) (by C-G formula based on SCr of 0.73 mg/dL (L)).    Imaging:  Reviewed chest images personally from past 3 days    Scheduled meds:      aspirin 81 mg Oral Daily   atorvastatin 20 mg Oral Nightly   cholecalciferol 1,000 Units Oral Daily   losartan 50 mg Oral Daily   metoprolol tartrate 25 mg Oral Q12H   sodium chloride 10 mL Intravenous Q12H   ticagrelor 90 mg Oral BID       ASSESSMENT  /  PLAN:  Recurrent left chest pain  Recent left pneumothorax; spontaneous  Recent CAD s/p PCI - JOHN 8/19   Recent LLL pna   Ischemic cardiomyopathy  Moderate aortic regurgitation    dont think there is much to do here for pneumothorax.  Continue to monitor with serial chest x-rays  Had excruciating chest pain reported by him and subsequently got a repeat x-ray which really shows its improved if anything.  We will go ahead and repeat a CT scan of the chest compared to August.  Is very rare for this to happen spontaneously an elderly individual without emphysema.  Continued investigations per cardiology.  D/w Dr Regalado this am.    All issues new to me, reviewed imaging, ct and cxr from past 3 years.      Hardeep Shea MD  [facility]  11/02/19  10:06 AM

## 2019-11-02 NOTE — CONSULTS
Patient Name: Dinesh Churchill Sr.  :1928  89 y.o.    Date of Admission: 2019  Date of Consultation:  19  Encounter Provider: Chanda Kebede RN  Place of Service: [facility] CARDIOLOGY  Referring Provider: Deepak Stack Jr., MD  Patient Care Team:  Phyllis Loving PA as PCP - General (Family Medicine)  Juliana Jesus APRN as PCP - Claims Attributed  Brandon Ricci MD Thomas, Melissa, PA as Referring Physician (Family Medicine)  Júnior, John ELLIS II, MD as Consulting Physician (Hematology and Oncology)  Maninder Quezada MD as Consulting Physician (Cardiology)      Chief complaint: chest pain   History of Present Illness:  Dinesh Churchill is a 89 year old pt of Dr. Quezada with a history of PAF,  CAD( diagonal stent x 1 2019), HTN, cardiomyopathy,  HLD, and CVA.     Pt presented to ER on 19 with complaints of antrior left sided pleuritic chest pain since exactly 2 PM yesterday. He felt an acute pop sensation in his chest, and since then has had constant pain. He has trouble remembering the pain he had with angina, but thinks this is both similar and different.   He has had mophine and IV nitro drip, with no improvement in symptoms. He has no associated symptoms, including no nasuea, diaphoresis, palpitations, dizziness.   No heavy lifting or odd movements. No falls.   He lives at home alone.   He was noted to have a small left apical PTX between 11mm and 9mm on CXR.   When he was      ECHO 19    · Left ventricular systolic function is low normal. Calculated EF = 47%. Estimated EF was in agreement with the calculated EF. Estimated EF = 50%. Normal left ventricular cavity size noted. All left ventricular wall segments contract normally. Left ventricular wall thickness is consistent with moderate concentric hypertrophy. Left ventricular diastolic dysfunction is noted (grade I) consistent with impaired relaxation.  · Left atrial volume is  severely increased. Right atrial cavity size is moderately dilated.  · Moderate aortic valve regurgitation is present  · Mild MAC is present. Mild mitral valve regurgitation is present.  · Physiologic tricuspid valve regurgitation is present. Insufficient TR velocity profile to estimate the right ventricular systolic pressure.     Cath 8/20/19    · Successful angioplasty and stent to subtotal diagonal reduced to 0% with 2.0/15 vision dilated to 2.2    Stress test 8/7/19    · Findings consistent with an abnormal ECG stress test.  · Left ventricular ejection fraction is mildly reduced (Calculated EF = 46%).  · Myocardial perfusion imaging indicates a medium-sized, moderately severe area of ischemia located in the inferior wall and lateral wall.  · Impressions are consistent with a high risk study.    Past Medical History:   Diagnosis Date   • Abnormal ECG    • Abnormal MRI    • Acute frontal sinusitis    • Arthritis    • Chronic fatigue    • Coronary artery disease    • Dizziness    • Hearing aid worn     left   • Hearing loss    • Hyperlipidemia    • Hypertension    • Kidney stones    • Macrocytosis    • Neoplasm of skin    • Osteoporosis    • Prediabetes    • Vitamin D deficiency        Past Surgical History:   Procedure Laterality Date   • CARDIAC CATHETERIZATION N/A 5/22/2017    Procedure: Left Heart Cath;  Surgeon: Maninder Quezada MD;  Location: [facility] CATH INVASIVE LOCATION;  Service:    • CARDIAC CATHETERIZATION N/A 5/23/2017    Procedure: Stent BMS coronary;  Surgeon: Maninder Quezada MD;  Location: [facility] CATH INVASIVE LOCATION;  Service:    • CARDIAC CATHETERIZATION N/A 8/8/2019    Procedure: Left Heart Cath;  Surgeon: Maninder Quezada MD;  Location: [facility] CATH INVASIVE LOCATION;  Service: Cardiology   • CARDIAC CATHETERIZATION N/A 8/8/2019    Procedure: Left ventriculography;  Surgeon: Maninder Quezada MD;  Location: [facility] CATH INVASIVE LOCATION;  Service: Cardiology   • CARDIAC  CATHETERIZATION N/A 8/8/2019    Procedure: Coronary angiography;  Surgeon: Maninder Quezada MD;  Location:  [facility] CATH INVASIVE LOCATION;  Service: Cardiology   • CARDIAC CATHETERIZATION N/A 8/20/2019    Procedure: CORONARY ANGIOGRAPHY;  Surgeon: Maninder Quezada MD;  Location:  [facility] CATH INVASIVE LOCATION;  Service: Cardiovascular   • CARDIAC CATHETERIZATION N/A 8/20/2019    Procedure: Stent BMS coronary;  Surgeon: Maninder Quezada MD;  Location:  [facility] CATH INVASIVE LOCATION;  Service: Cardiovascular   • HERNIA REPAIR      x 2   • NC RT/LT HEART CATHETERS N/A 5/23/2017    Procedure: Percutaneous Coronary Intervention;  Surgeon: Maninder Quezada MD;  Location:  [facility] CATH INVASIVE LOCATION;  Service: Cardiovascular         Prior to Admission medications    Medication Sig Start Date End Date Taking? Authorizing Provider   acetaminophen (TYLENOL) 500 MG tablet 1-2 TABLETS BY ONCE TO TWICE DAILY AS NEEDED FOR PAIN 7/25/17  Yes Phyllis Loving PA   aspirin 81 MG chewable tablet Chew 1 tablet Daily. 8/22/19  Yes Drew Samaniego MD   atorvastatin (LIPITOR) 20 MG tablet TAKE 1 TABLET BY MOUTH EVERY NIGHT 10/30/19  Yes Maninder Quezada MD   Cholecalciferol (VITAMIN D-3) 1000 UNITS capsule Take 1,000 Units by mouth daily. 10/6/15  Yes Dez Gregory MD   isosorbide mononitrate (IMDUR) 60 MG 24 hr tablet TAKE 1 TABLET BY MOUTH EVERY DAY 10/30/19  Yes Maninder Quezada MD   losartan (COZAAR) 50 MG tablet TAKE 1 TABLET BY MOUTH DAILY 10/30/19  Yes Maninder Quezada MD   metoprolol succinate XL (TOPROL-XL) 25 MG 24 hr tablet Take 2 tablets by mouth Daily for 30 days. 10/3/19 11/2/19 Yes Maninder Quezada MD   Misc. Devices (COMMODE 3-IN-1) misc 1 each Daily. 9/26/19  Yes Phyllis Loving PA   ticagrelor (BRILINTA) 90 MG tablet tablet Take 1 tablet by mouth 2 (Two) Times a Day. 8/21/19  Yes Drew Samaniego MD   Elastic Bandages & Supports (LUMBAR BACK BRACE/SUPPORT PAD) misc Use  for lifting/ strenuous exercise. 6/12/18   Phyllis Loving PA       No Known Allergies    Social History     Socioeconomic History   • Marital status:      Spouse name: Not on file   • Number of children: Not on file   • Years of education: Not on file   • Highest education level: Not on file   Occupational History     Employer: RETIRED   Tobacco Use   • Smoking status: Never Smoker   • Smokeless tobacco: Never Used   Substance and Sexual Activity   • Alcohol use: No     Comment: quit 30 years ago    • Drug use: No   • Sexual activity: Defer       Family History   Family history unknown: Yes       REVIEW OF SYSTEMS:   All systems reviewed.  Pertinent positives identified in HPI.  All other systems are negative.      Objective:     Vitals:    11/02/19 0546 11/02/19 0604 11/02/19 0628 11/02/19 0740   BP:  170/73 162/69 164/95   BP Location:  Left arm Left arm Left arm   Patient Position:  Lying Lying Lying   Pulse:    86   Resp:    18   Temp:    97.4 °F (36.3 °C)   TempSrc:    Oral   SpO2:   94% 95%   Weight: 75.3 kg (166 lb 0.1 oz)      Height:         Body mass index is 21.9 kg/m².    General Appearance:    Alert, cooperative, in no acute distress   Head:    Normocephalic, without obvious abnormality, atraumatic   Eyes:            Lids and lashes normal, conjunctivae and sclerae normal, no icterus, no pallor, corneas clear, PERRLA   Ears:    Ears appear intact with no abnormalities noted   Throat:   No oral lesions, no thrush, oral mucosa moist   Neck:   No adenopathy, supple, trachea midline, no thyromegaly, no carotid bruit, no JVD   Back:     No kyphosis present, no scoliosis present, no skin lesions, erythema or scars, no tenderness to percussion or palpation, range of motion normal   Lungs:     Clear to auscultation, respirations regular, even and unlabored    Heart:    Regular rhythm and normal rate, normal S1 and S2, no murmur, no gallop, no rub, no click   Chest Wall:    No abnormalities observed      Abdomen:     Normal bowel sounds, no masses, no organomegaly, soft, nontender, nondistended, no guarding, no rebound  tenderness   Extremities:   Moves all extremities well, no edema, no cyanosis, no redness   Pulses:   Pulses palpable and equal bilaterally. Normal radial, carotid, femoral, dorsalis pedis and posterior tibial pulses bilaterally. Normal abdominal aorta   Skin:  Psychiatric:   No bleeding, bruising or rash    Alert and oriented x 3, normal mood and affect   Lab Review:     Results from last 7 days   Lab Units 11/02/19  0536 11/01/19  1549   SODIUM mmol/L 140 137   POTASSIUM mmol/L 3.5 4.2   CHLORIDE mmol/L 106 102   CO2 mmol/L 25.1 23.1   BUN mg/dL 19 20   CREATININE mg/dL 0.73* 0.93   CALCIUM mg/dL 8.6 9.1   BILIRUBIN mg/dL  --  2.2*   ALK PHOS U/L  --  51   ALT (SGPT) U/L  --  14   AST (SGOT) U/L  --  16   GLUCOSE mg/dL 70 102*     Results from last 7 days   Lab Units 11/02/19  0536 11/01/19  2119 11/01/19  1757   TROPONIN T ng/mL 0.018 0.016 0.011     Results from last 7 days   Lab Units 11/02/19  0536   WBC 10*3/mm3 6.23   HEMOGLOBIN g/dL 12.7*   HEMATOCRIT % 40.3   PLATELETS 10*3/mm3 118*     Results from last 7 days   Lab Units 11/02/19  0536   INR  1.09         Results from last 7 days   Lab Units 11/02/19  0536   CHOLESTEROL mg/dL 131   TRIGLYCERIDES mg/dL 83   HDL CHOL mg/dL 40   LDL CHOL mg/dL 74                       I personally viewed and interpreted the patient's EKG/Telemetry data.NSR, LBBB        Assessment and Plan:       1. Chest pain: Nonanginal chest pain which is pleuritic and reproducitble to palpation around the left pectoral muscle. EKG shows NSR with LBBB, serial troponins have been normal.  I would not pursue further cardiac workup at this time.   I think this is either musculoskeletal or related to his small PTX. I will stop SLNTG and give tylenol for pain control.   Appreciate pulm recs    2. CAD: recent diagonal vessel stenting, continue ASA, Brlinta. Increase Metop to  50 BID given uncontrolled HTN.   3. pAF: while in the ER, known prior history. Continue to monitor. Given age and frailty, would not anticoagulate.   4. Mildly reduced LV EF: euvolemic    Marita Regalado MD  [facility]  11/02/19

## 2019-11-02 NOTE — PROGRESS NOTES
Discharge Planning Assessment  [facility]     Patient Name: Dinesh Churchill Sr.  MRN: [id number]  Today's Date: 11/2/2019    Admit Date: 11/1/2019    Discharge Needs Assessment     Row Name 11/02/19 1857       Living Environment    Lives With  alone    Current Living Arrangements  home/apartment/condo    Primary Care Provided by  child(michael)    Provides Primary Care For  no one, unable/limited ability to care for self    Family Caregiver if Needed  child(michael), adult    Family Caregiver Names  son Dinesh Churchill Jr    Quality of Family Relationships  helpful;involved;supportive    Able to Return to Prior Arrangements  yes       Resource/Environmental Concerns    Resource/Environmental Concerns  none    Transportation Concerns  car, none       Transition Planning    Patient/Family Anticipates Transition to  home    Patient/Family Anticipated Services at Transition  none    Transportation Anticipated  family or friend will provide       Discharge Needs Assessment    Readmission Within the Last 30 Days  no previous admission in last 30 days    Concerns to be Addressed  discharge planning    Equipment Currently Used at Home  cane, straight;walker, rolling;grab bar;shower chair;commode    Anticipated Changes Related to Illness  none    Equipment Needed After Discharge  none    Outpatient/Agency/Support Group Needs  homecare agency son states pt had HH in the past for a short time but stopped services b/c pt became no longer home bound. Son does not remember the HH agency name    Discharge Facility/Level of Care Needs  -- n/a    Offered/Gave Vendor List  no    Current Discharge Risk  chronically ill;dependent with mobility/activities of daily living;lives alone        Discharge Plan     Row Name 11/02/19 4199       Plan    Plan  son plans to drive pt home at DC    Patient/Family in Agreement with Plan  yes spoke with pt's son Dinesh Churchill Jr on the phone    Plan Comments  Pt currently disoriented. Called and spoke with  pt's son Dinesh Churchill Jr on the phone. Introduced self/explained role of CCP. Face sheet data reviewed. [employer] letter discussed. Son requested letter be left at pt's bedside and he and pt would review it on [place] 11/3/19. Son states pt lives alone. He is generally IADLs. Pt no longer drives. Son picks up his meds for him. Pt does get out of the house with friends--son states just yesterday pt was out at [employer], pushing the grocery cart and doing some shopping. Pt has a cane and a roll walker at home. There are grab bars in the shower and pt has a shower chair. Son states he recently got pt a raised commode seat for the toilet. Son states pt has used HH in the past but he does not remember the agency name. Denies previous SNF stay. Son states he plans to drive pt home at time of DC. CCP to follow................JW        Destination      No service coordination in this encounter.      Durable Medical Equipment      No service coordination in this encounter.      Dialysis/Infusion      No service coordination in this encounter.      Home Medical Care      No service coordination in this encounter.      Therapy      No service coordination in this encounter.      Community Resources      No service coordination in this encounter.          Demographic Summary     Row Name 11/02/19 3329       General Information    Admission Type  observation    Arrived From  home    Required Notices Provided  Observation Status Notice    Referral Source  admission list    Reason for Consult  discharge planning    Preferred Language  English     Used During This Interaction  no       Contact Information    Permission Granted to Share Info With  ;family/designee        Functional Status     Row Name 11/02/19 7499       Functional Status    Usual Activity Tolerance  good    Current Activity Tolerance  fair       Functional Status, IADL    Medications  assistive person per son, pt no longer drives    Meal  Preparation  independent    Housekeeping  independent    Laundry  independent    Shopping  assistive person       Mental Status    General Appearance WDL  WDL        Psychosocial    No documentation.       Abuse/Neglect    No documentation.       Legal    No documentation.       Substance Abuse    No documentation.       Patient Forms     Row Name 11/02/19 1853       Patient Forms    Patient Observation Letter  Delivered    Delivered to  Support person    Method of delivery  Telephone copy left at bedside for pt's son to  on [place] 11/3/19            Leeanna Rios RN

## 2019-11-02 NOTE — CONSULTS
"Group: [facility]         CONSULT NOTE    Patient Identification:    Dinesh Churchill Sr.  89 y.o.  male  5/29/1928  [id number]            Patient Care Team:  Phyllis Lovnig PA as PCP - General (Family Medicine)  Juliana Jesus APRN as PCP - Claims Attributed  Brandon Ricci MD Thomas, Melissa, PA as Referring Physician (Family Medicine)  John Callejas II, MD as Consulting Physician (Hematology and Oncology)  Maninder Quezada MD as Consulting Physician (Cardiology)    Requesting physician:      Reason for Consultation:  Abnormal CXR, Lt chest pain     CC: CP     History of Present Illness: Patient is a pleasant 89-year-old elderly lean white male with a past medical history significant for severe hardness of hearing, recent left lower lobe pneumonia status post antibiotics, recent left spontaneous tiny pneumothorax with spontaneous resolution who was at his baseline health and woke up completely normal and went to his grocery store along with his son and came back and sat in his recliner and noted sudden \" pop sensation in the left chest\".  He started having persistent chest pain ever since and then he decided to come to the ER.  He also had significant shortness of breath during that evening.  He states that he has not lifted heavy weights.  He does not carry a diagnosis of COPD and has never smoked.  No trauma to the chest noted.    I have reviewed the H&P as well as the notes from the other consultants taking care of the patient this admission as well as previous hospital notes from our group physicians and summarized above      Objective     Review of Systems:  Constitutional: No fever, chills, weight loss or loss of appetite.   ENMT: No sinus congestion, post nasal drip, epistaxis, sore throat  Cardiovascular: No chest pain, palpitation or legs swelling.    Respiratory: No hemoptysis, orthopnea, PND.  Gastrointestinal: No constipation, diarrhea or abdominal pain "   Neurology: No headache, focal weakness, numbness or dizziness.   Musculoskeletal: No joint stiffness or swelling.   Psychiatry: No agitation or behavioral changes  Lymphatic: No swollen neck glands.  Integumentary: No rash.    Past Medical History:  Past Medical History:   Diagnosis Date   • Abnormal ECG    • Abnormal MRI    • Acute frontal sinusitis    • Arthritis    • Chronic fatigue    • Coronary artery disease    • Dizziness    • Hearing aid worn     left   • Hearing loss    • Hyperlipidemia    • Hypertension    • Kidney stones    • Macrocytosis    • Neoplasm of skin    • Osteoporosis    • Prediabetes    • Vitamin D deficiency        Past Surgical History:  Past Surgical History:   Procedure Laterality Date   • CARDIAC CATHETERIZATION N/A 5/22/2017    Procedure: Left Heart Cath;  Surgeon: Maninder Quezada MD;  Location: [facility] CATH INVASIVE LOCATION;  Service:    • CARDIAC CATHETERIZATION N/A 5/23/2017    Procedure: Stent BMS coronary;  Surgeon: Maninder Quezada MD;  Location: [facility] CATH [facility]ASIVE LOCATION;  Service:    • CARDIAC CATHETERIZATION N/A 8/8/2019    Procedure: Left Heart Cath;  Surgeon: Maninder Quezada MD;  Location: [facility] CATH INVASIVE LOCATION;  Service: Cardiology   • CARDIAC CATHETERIZATION N/A 8/8/2019    Procedure: Left ventriculography;  Surgeon: Maninder Quezada MD;  Location: [facility] CATH [facility] LOC[facility];  Service: Cardiology   • CARDIAC CATHETERIZATION N/A 8/8/2019    Procedure: Coronary angiography;  Surgeon: Maninder Quezada MD;  Location: [facility] CATH INVASIVE LOCATION;  Service: Cardiology   • CARDIAC CATHETERIZATION N/A 8/20/2019    Procedure: CORONARY ANGIOGRAPHY;  Surgeon: Maninder Quezada MD;  Location: [facility] CATH INVASIVE LOCATION;  Service: Cardiovascular   • CARDIAC CATHETERIZATION N/A 8/20/2019    Procedure: Stent BMS coronary;  Surgeon: Maninder Quezada MD;  Location: [facility] CATH INVASIVE LOCATION;  Service: Cardiovascular   • HERNIA  REPAIR      x 2   • WI RT/LT HEART CATHETERS N/A 5/23/2017    Procedure: Percutaneous Coronary Intervention;  Surgeon: Maninder Quezada MD;  Location: [facility] INVASIVE LOC[facility];  Service: Cardiovascular        Home Meds:  Facility-Administered Medications Prior to Admission   Medication Dose Route Frequency Provider Last Rate Last Dose   • cyanocobalamin injection 1,000 mcg  1,000 mcg Intramuscular Q30 Days Phyllis Loving PA   1,000 mcg at 09/23/19 1121   • cyanocobalamin injection 1,000 mcg  1,000 mcg Intramuscular Q28 Days Phyllis Loving PA   1,000 mcg at 10/23/19 0939     Medications Prior to Admission   Medication Sig Dispense Refill Last Dose   • acetaminophen (TYLENOL) 500 MG tablet 1-2 TABLETS BY ONCE TO TWICE DAILY AS NEEDED FOR PAIN 60 tablet 0 Taking   • aspirin 81 MG chewable tablet Chew 1 tablet Daily.   Taking   • atorvastatin (LIPITOR) 20 MG tablet TAKE 1 TABLET BY MOUTH EVERY NIGHT 30 tablet 1    • Cholecalciferol (VITAMIN D-3) 1000 UNITS capsule Take 1,000 Units by mouth daily.   Taking   • isosorbide mononitrate (IMDUR) 60 MG 24 hr tablet TAKE 1 TABLET BY MOUTH EVERY DAY 30 tablet 1    • losartan (COZAAR) 50 MG tablet TAKE 1 TABLET BY MOUTH DAILY 30 tablet 1    • metoprolol succinate XL (TOPROL-XL) 25 MG 24 hr tablet Take 2 tablets by mouth Daily for 30 days. 60 tablet 0    • Misc. Devices (COMMODE 3-IN-1) misc 1 each Daily. 1 each 0 Taking   • ticagrelor (BRILINTA) 90 MG tablet tablet Take 1 tablet by mouth 2 (Two) Times a Day. 60 tablet 5 Taking   • Elastic Bandages & Supports (LUMBAR BACK BRACE/SUPPORT PAD) misc Use for lifting/ strenuous exercise. 1 each 0 Taking       Allergies:  No Known Allergies    Social History:   Social History     Socioeconomic History   • Marital status:      Spouse name: Not on file   • Number of children: Not on file   • Years of education: Not on file   • Highest education level: Not on file   Occupational History     Employer: RETIRED   Tobacco  "Use   • Smoking status: Never Smoker   • Smokeless tobacco: Never Used   Substance and Sexual Activity   • Alcohol use: No     Comment: quit 30 years ago    • Drug use: No   • Sexual activity: Defer       Family History:  Family History   Family history unknown: Yes       Physical Exam:  /86 (BP Location: Left arm, Patient Position: Lying)   Pulse 71   Temp 95.1 °F (35.1 °C) (Oral)   Resp 18   Ht 185.4 cm (73\")   Wt 77.6 kg (171 lb 1.6 oz)   SpO2 97%   BMI 22.57 kg/m²  Body mass index is 22.57 kg/m². 97% 77.6 kg (171 lb 1.6 oz)    Physical Exam     CONSTITUTIONAL:   · Well developed   · Elderly lean white male lying in the bed in no acute respiratory distress, appears uncomfortable due to pain, no conversational dyspnea  EYES:    · EZEKIEL                                                                        · Conjunctivae normal  · Sclerae anicteric.    HENT:   · Atraumatic, normocephalic  · External ears and nose normal, Severe [place] +   · Mallampati 3, Moist mucous membranes     NECK:  · Thyroid not enlarged  · Trachea midline    RESPIRATORY:    · No Accessory Muscle Use  · Normal breath sounds heard bilaterally - mildly dec on left   · No rales. No wheezing  · No dullness  · No crepitus     CARDIOVASCULAR:    · RRR  · No murmur  · Lower extremity edema: None    GI:   · Abdomen soft , non-distended, non-tender  · No palpable hepatosplenomegaly    MUSCULOSKELETAL:  · Grossly normal muscle strength in all extremities  · No tenderness, no obvious deformities  · No clubbing or cyanosis     SKIN:    · No visible rashes  · No palpable nodules    PSYCHIATRIC:  · Memory intact   · Mood is appropriate    Lab Review:   LABS:  Lab Results   Component Value Date    CALCIUM 9.1 11/01/2019     Results from last 7 days   Lab Units 11/01/19  1549   SODIUM mmol/L 137   POTASSIUM mmol/L 4.2   CHLORIDE mmol/L 102   CO2 mmol/L 23.1   BUN mg/dL 20   CREATININE mg/dL 0.93   GLUCOSE mg/dL 102*   CALCIUM mg/dL 9.1   WBC " 10*3/mm3 3.95   HEMOGLOBIN g/dL 14.3   PLATELETS 10*3/mm3 129*   ALT (SGPT) U/L 14   AST (SGOT) U/L 16     Lab Results   Component Value Date    CKTOTAL 49 08/16/2019    CKMB 3.21 03/22/2017    TROPONINT 0.011 11/01/2019                             Imaging: I personally visualized the images of chest scans/ x-rays performed within last 3 days and summarized below.    Assessment   Recurrent left chest pain  Recent left pneumothorax; spontaneous  Recent CAD s/p PCI - JOHN 8/19   Recent LLL pna   Ischemic cardiomyopathy  Moderate aortic regurgitation    RECOMMENDATIONS:  Very interesting spontaneous left-sided pneumothorax noted on the chest x-ray which actually was present on a prior x-ray in August as rightly pointed out by the radiologist.  Also noted on the CT of the chest as well.    In a patient without emphysema, it is quite rare to have spontaneous pneumothorax without clear elevated chest pressures or trauma. I do not have a good explanation for this.  This likely would explain the chest pain that the patient presented with.  We will get a CT of the chest.     Also noted to have recent left lower lobe pneumonia and patient has received appropriate antibiotics.  He states that he has not had any fevers or chills, increasing cough or sputum production.  Would hold off on any further antibiotics for now    Thank you for letting me participate in the care of this pleasant patient.  I have discussed my findings and recommendations with patient and nursing staff.     Jonas Gordillo MD  11/1/2019  9:53 PM

## 2019-11-02 NOTE — H&P
Patient Name: Dinesh Churchill Sr.  :1928  89 y.o.    Date of Admission: 2019  Date of Consultation:  19  Encounter Provider: Chanda Kebede RN  Place of Service: [facility] CARDIOLOGY  Referring Provider: Deepak Stack Jr., MD  Patient Care Team:  Phyllis Loving PA as PCP - General (Family Medicine)  Juliana Jesus APRN as PCP - Claims Attributed  Brandon Ricci MD Thomas, Melissa, PA as Referring Physician (Family Medicine)  Júnior, John ELLIS II, MD as Consulting Physician (Hematology and Oncology)  Maninder Quezada MD as Consulting Physician (Cardiology)      Chief complaint: chest pain   History of Present Illness:  Dinesh Churchill is a 89 year old pt of Dr. Quezada with a history of PAF,  CAD( diagonal stent x 1 2019), HTN, cardiomyopathy,  HLD, and CVA.     Pt presented to ER on 19 with complaints of antrior left sided pleuritic chest pain since exactly 2 PM yesterday. He felt an acute pop sensation in his chest, and since then has had constant pain. He has trouble remembering the pain he had with angina, but thinks this is both similar and different.   He has had mophine and IV nitro drip, with no improvement in symptoms. He has no associated symptoms, including no nasuea, diaphoresis, palpitations, dizziness.   No heavy lifting or odd movements. No falls.   He lives at home alone.   He was noted to have a small left apical PTX between 11mm and 9mm on CXR.   When he was      ECHO 19    · Left ventricular systolic function is low normal. Calculated EF = 47%. Estimated EF was in agreement with the calculated EF. Estimated EF = 50%. Normal left ventricular cavity size noted. All left ventricular wall segments contract normally. Left ventricular wall thickness is consistent with moderate concentric hypertrophy. Left ventricular diastolic dysfunction is noted (grade I) consistent with impaired relaxation.  · Left atrial volume is  severely increased. Right atrial cavity size is moderately dilated.  · Moderate aortic valve regurgitation is present  · Mild MAC is present. Mild mitral valve regurgitation is present.  · Physiologic tricuspid valve regurgitation is present. Insufficient TR velocity profile to estimate the right ventricular systolic pressure.     Cath 8/20/19    · Successful angioplasty and stent to subtotal diagonal reduced to 0% with 2.0/15 vision dilated to 2.2    Stress test 8/7/19    · Findings consistent with an abnormal ECG stress test.  · Left ventricular ejection fraction is mildly reduced (Calculated EF = 46%).  · Myocardial perfusion imaging indicates a medium-sized, moderately severe area of ischemia located in the inferior wall and lateral wall.  · Impressions are consistent with a high risk study.    Past Medical History:   Diagnosis Date   • Abnormal ECG    • Abnormal MRI    • Acute frontal sinusitis    • Arthritis    • Chronic fatigue    • Coronary artery disease    • Dizziness    • Hearing aid worn     left   • Hearing loss    • Hyperlipidemia    • Hypertension    • Kidney stones    • Macrocytosis    • Neoplasm of skin    • Osteoporosis    • Prediabetes    • Vitamin D deficiency        Past Surgical History:   Procedure Laterality Date   • CARDIAC CATHETERIZATION N/A 5/22/2017    Procedure: Left Heart Cath;  Surgeon: Maninder Quezada MD;  Location: [facility] CATH INVASIVE LOCATION;  Service:    • CARDIAC CATHETERIZATION N/A 5/23/2017    Procedure: Stent BMS coronary;  Surgeon: Maninder Quezada MD;  Location: [facility] CATH INVASIVE LOCATION;  Service:    • CARDIAC CATHETERIZATION N/A 8/8/2019    Procedure: Left Heart Cath;  Surgeon: Maninder Quezada MD;  Location: [facility] CATH INVASIVE LOCATION;  Service: Cardiology   • CARDIAC CATHETERIZATION N/A 8/8/2019    Procedure: Left ventriculography;  Surgeon: Maninder Quezada MD;  Location: [facility] CATH INVASIVE LOCATION;  Service: Cardiology   • CARDIAC  CATHETERIZATION N/A 8/8/2019    Procedure: Coronary angiography;  Surgeon: Maninder Quezada MD;  Location:  [facility] CATH INVASIVE LOCATION;  Service: Cardiology   • CARDIAC CATHETERIZATION N/A 8/20/2019    Procedure: CORONARY ANGIOGRAPHY;  Surgeon: Maninder Quezada MD;  Location:  [facility] CATH INVASIVE LOCATION;  Service: Cardiovascular   • CARDIAC CATHETERIZATION N/A 8/20/2019    Procedure: Stent BMS coronary;  Surgeon: Maninder Quezada MD;  Location:  [facility] CATH INVASIVE LOCATION;  Service: Cardiovascular   • HERNIA REPAIR      x 2   • [place] RT/LT HEART CATHETERS N/A 5/23/2017    Procedure: Percutaneous Coronary Intervention;  Surgeon: Maninder Quezada MD;  Location:  [facility] CATH INVASIVE LOCATION;  Service: Cardiovascular         Prior to Admission medications    Medication Sig Start Date End Date Taking? Authorizing Provider   acetaminophen (TYLENOL) 500 MG tablet 1-2 TABLETS BY ONCE TO TWICE DAILY AS NEEDED FOR PAIN 7/25/17  Yes Phyllis Loving PA   aspirin 81 MG chewable tablet Chew 1 tablet Daily. 8/22/19  Yes Drew Samaniego MD   atorvastatin (LIPITOR) 20 MG tablet TAKE 1 TABLET BY MOUTH EVERY NIGHT 10/30/19  Yes Maninder Quezada MD   Cholecalciferol (VITAMIN D-3) 1000 UNITS capsule Take 1,000 Units by mouth daily. 10/6/15  Yes Dez Gregory MD   isosorbide mononitrate (IMDUR) 60 MG 24 hr tablet TAKE 1 TABLET BY MOUTH EVERY DAY 10/30/19  Yes Maninder Quezada MD   losartan (COZAAR) 50 MG tablet TAKE 1 TABLET BY MOUTH DAILY 10/30/19  Yes Maninder Quezada MD   metoprolol succinate XL (TOPROL-XL) 25 MG 24 hr tablet Take 2 tablets by mouth Daily for 30 days. 10/3/19 11/2/19 Yes Maninder Quezada MD   Misc. Devices (COMMODE 3-IN-1) misc 1 each Daily. 9/26/19  Yes Phyllis Loving PA   ticagrelor (BRILINTA) 90 MG tablet tablet Take 1 tablet by mouth 2 (Two) Times a Day. 8/21/19  Yes Drew Samaniego MD   Elastic Bandages & Supports (LUMBAR BACK BRACE/SUPPORT PAD) misc Use  for lifting/ strenuous exercise. 6/12/18   Phyllis Loving PA       No Known Allergies    Social History     Socioeconomic History   • Marital status:      Spouse name: Not on file   • Number of children: Not on file   • Years of education: Not on file   • Highest education level: Not on file   Occupational History     Employer: RETIRED   Tobacco Use   • Smoking status: Never Smoker   • Smokeless tobacco: Never Used   Substance and Sexual Activity   • Alcohol use: No     Comment: quit 30 years ago    • Drug use: No   • Sexual activity: Defer       Family History   Family history unknown: Yes       REVIEW OF SYSTEMS:   All systems reviewed.  Pertinent positives identified in HPI.  All other systems are negative.      Objective:     Vitals:    11/02/19 0546 11/02/19 0604 11/02/19 0628 11/02/19 0740   BP:  170/73 162/69 164/95   BP Location:  Left arm Left arm Left arm   Patient Position:  Lying Lying Lying   Pulse:    86   Resp:    18   Temp:    97.4 °F (36.3 °C)   TempSrc:    Oral   SpO2:   94% 95%   Weight: 75.3 kg (166 lb 0.1 oz)      Height:         Body mass index is 21.9 kg/m².    General Appearance:    Alert, cooperative, in no acute distress   Head:    Normocephalic, without obvious abnormality, atraumatic   Eyes:            Lids and lashes normal, conjunctivae and sclerae normal, no icterus, no pallor, corneas clear, PERRLA   Ears:    Ears appear intact with no abnormalities noted   Throat:   No oral lesions, no thrush, oral mucosa moist   Neck:   No adenopathy, supple, trachea midline, no thyromegaly, no carotid bruit, no JVD   Back:     No kyphosis present, no scoliosis present, no skin lesions, erythema or scars, no tenderness to percussion or palpation, range of motion normal   Lungs:     Clear to auscultation, respirations regular, even and unlabored    Heart:    Regular rhythm and normal rate, normal S1 and S2, no murmur, no gallop, no rub, no click   Chest Wall:    No abnormalities observed    Abdomen:     Normal bowel sounds, no masses, no organomegaly, soft, nontender, nondistended, no guarding, no rebound  tenderness   Extremities:   Moves all extremities well, no edema, no cyanosis, no redness   Pulses:   Pulses palpable and equal bilaterally. Normal radial, carotid, femoral, dorsalis pedis and posterior tibial pulses bilaterally. Normal abdominal aorta   Skin:  Psychiatric:   No bleeding, bruising or rash    Alert and oriented x 3, normal mood and affect   Lab Review:     Results from last 7 days   Lab Units 11/02/19  0536 11/01/19  1549   SODIUM mmol/L 140 137   POTASSIUM mmol/L 3.5 4.2   CHLORIDE mmol/L 106 102   CO2 mmol/L 25.1 23.1   BUN mg/dL 19 20   CREATININE mg/dL 0.73* 0.93   CALCIUM mg/dL 8.6 9.1   BILIRUBIN mg/dL  --  2.2*   ALK PHOS U/L  --  51   ALT (SGPT) U/L  --  14   AST (SGOT) U/L  --  16   GLUCOSE mg/dL 70 102*     Results from last 7 days   Lab Units 11/02/19  0536 11/01/19  2119 11/01/19  1757   TROPONIN T ng/mL 0.018 0.016 0.011     Results from last 7 days   Lab Units 11/02/19  0536   WBC 10*3/mm3 6.23   HEMOGLOBIN g/dL 12.7*   HEMATOCRIT % 40.3   PLATELETS 10*3/mm3 118*     Results from last 7 days   Lab Units 11/02/19  0536   INR  1.09         Results from last 7 days   Lab Units 11/02/19  0536   CHOLESTEROL mg/dL 131   TRIGLYCERIDES mg/dL 83   HDL CHOL mg/dL 40   LDL CHOL mg/dL 74                       I personally viewed and interpreted the patient's EKG/Telemetry data.NSR, LBBB        Assessment and Plan:       1. Chest pain: Nonanginal chest pain which is pleuritic and reproducitble to palpation around the left pectoral muscle. EKG shows NSR with LBBB, serial troponins have been normal.  I would not pursue further cardiac workup at this time.   I think this is either musculoskeletal or related to his small PTX. I will stop SLNTG and give tylenol for pain control.   Appreciate pulm recs    2. CAD: recent diagonal vessel stenting, continue ASA, Brlinta. Increase Metop to  50 BID given uncontrolled HTN.   3. pAF: while in the ER, known prior history. Continue to monitor. Given age and frailty, would not anticoagulate.   4. Mildly reduced LV EF: euvolemic    Marita Regalado MD  [facility]  11/02/19

## 2019-11-03 ENCOUNTER — APPOINTMENT (OUTPATIENT)
Dept: GENERAL RADIOLOGY | Facility: HOSPITAL | Age: 84
End: 2019-11-03

## 2019-11-03 ENCOUNTER — APPOINTMENT (OUTPATIENT)
Dept: CT IMAGING | Facility: HOSPITAL | Age: 84
End: 2019-11-03

## 2019-11-03 LAB — GLUCOSE BLDC GLUCOMTR-MCNC: 116 MG/DL (ref 70–130)

## 2019-11-03 PROCEDURE — 71045 X-RAY EXAM CHEST 1 VIEW: CPT

## 2019-11-03 PROCEDURE — G0378 HOSPITAL OBSERVATION PER HR: HCPCS

## 2019-11-03 PROCEDURE — 99204 OFFICE O/P NEW MOD 45 MIN: CPT | Performed by: THORACIC SURGERY (CARDIOTHORACIC VASCULAR SURGERY)

## 2019-11-03 PROCEDURE — 71250 CT THORAX DX C-: CPT

## 2019-11-03 PROCEDURE — 99224 PR SBSQ OBSERVATION CARE/DAY 15 MINUTES: CPT | Performed by: INTERNAL MEDICINE

## 2019-11-03 PROCEDURE — 82962 GLUCOSE BLOOD TEST: CPT

## 2019-11-03 PROCEDURE — 94799 UNLISTED PULMONARY SVC/PX: CPT

## 2019-11-03 RX ORDER — METOPROLOL TARTRATE 50 MG/1
50 TABLET, FILM COATED ORAL ONCE
Status: DISCONTINUED | OUTPATIENT
Start: 2019-11-03 | End: 2019-11-05 | Stop reason: HOSPADM

## 2019-11-03 RX ORDER — METOPROLOL SUCCINATE 50 MG/1
100 TABLET, EXTENDED RELEASE ORAL DAILY
Qty: 90 TABLET | Refills: 3 | Status: SHIPPED | OUTPATIENT
Start: 2019-11-03 | End: 2019-11-03 | Stop reason: SDUPTHER

## 2019-11-03 RX ORDER — METOPROLOL SUCCINATE 100 MG/1
100 TABLET, EXTENDED RELEASE ORAL DAILY
Qty: 30 TABLET | Refills: 3 | Status: SHIPPED | OUTPATIENT
Start: 2019-11-03 | End: 2019-11-05 | Stop reason: SDUPTHER

## 2019-11-03 RX ADMIN — LOSARTAN POTASSIUM 50 MG: 50 TABLET, FILM COATED ORAL at 06:00

## 2019-11-03 RX ADMIN — SODIUM CHLORIDE, PRESERVATIVE FREE 10 ML: 5 INJECTION INTRAVENOUS at 20:14

## 2019-11-03 RX ADMIN — CHOLECALCIFEROL TAB 10 MCG (400 UNIT) 1000 UNITS: 10 TAB at 08:17

## 2019-11-03 RX ADMIN — ASPIRIN 81 MG: 81 TABLET, CHEWABLE ORAL at 08:17

## 2019-11-03 RX ADMIN — ATORVASTATIN CALCIUM 20 MG: 20 TABLET, FILM COATED ORAL at 20:12

## 2019-11-03 RX ADMIN — TICAGRELOR 90 MG: 90 TABLET ORAL at 20:12

## 2019-11-03 RX ADMIN — METOPROLOL TARTRATE 50 MG: 50 TABLET, FILM COATED ORAL at 05:59

## 2019-11-03 RX ADMIN — TICAGRELOR 90 MG: 90 TABLET ORAL at 08:17

## 2019-11-03 RX ADMIN — METOPROLOL TARTRATE 50 MG: 50 TABLET, FILM COATED ORAL at 20:12

## 2019-11-03 RX ADMIN — ACETAMINOPHEN 650 MG: 325 TABLET, FILM COATED ORAL at 06:03

## 2019-11-03 NOTE — PLAN OF CARE
Problem: Cardiac: ACS (Acute Coronary Syndrome) (Adult)  Goal: Signs and Symptoms of Listed Potential Problems Will be Absent, Minimized or Managed (Cardiac: ACS)  Outcome: Ongoing (interventions implemented as appropriate)      Problem: Patient Care Overview  Goal: Plan of Care Review  Outcome: Ongoing (interventions implemented as appropriate)   11/03/19 0044   Coping/Psychosocial   Plan of Care Reviewed With patient   Plan of Care Review   Progress improving   OTHER   Outcome Summary Pt has no complaints overnight. CT scan still pending. VSS. Continue to monitor. Safety maintained.

## 2019-11-03 NOTE — CONSULTS
Consults    Patient Care Team:  Phyllis Loving PA as PCP - General (Family Medicine)  Juliana Jesus APRN as PCP - Claims Attributed  Brandon Ricci MD Thomas, Melissa, PA as Referring Physician (Family Medicine)  John Callejas II, MD as Consulting Physician (Hematology and Oncology)  Maninder Quezada MD as Consulting Physician (Cardiology)    Chief Complaint   Patient presents with   • Chest Pain       Subjective     History of Present Illness  Mr. Churchill is a pleasant 89-year-old gentleman who presented to the emergency department on 11/1/2019 with complaints of anterior left-sided chest pain.  On work-up he was noted to have a small persistent apical pneumothorax.  His chest pain was thought to be cardiac in nature and he was admitted for evaluation.  He had a worsening of his pain yesterday which prompted a repeat chest x-ray which demonstrated improvement in his pneumothorax.  A chest CT was obtained which showed a 10% pneumothorax with some collapse of the lower lobe.  Mr. Churchill has not had any further episodes of pain in the past 12 hours.  Review of Systems   Constitutional: Negative.    HENT: Positive for hearing loss.    Eyes: Negative.    Respiratory: Positive for chest tightness and shortness of breath.    Cardiovascular: Positive for chest pain.   Gastrointestinal: Negative.    Endocrine: Negative.    Genitourinary: Negative.    Musculoskeletal: Negative.    Skin: Negative.    Allergic/Immunologic: Negative.    Neurological: Negative.    Hematological: Negative.    Psychiatric/Behavioral: Negative.         Patient Active Problem List   Diagnosis   • Abnormal magnetic resonance imaging study   • Arthritis   • Osteoarthritis of cervical spine   • Diplopia   • Hearing loss   • Neoplasm of uncertain behavior of skin   • Prediabetes   • Vitamin D deficiency   • Chronic fatigue   • History of supraventricular tachycardia   • Essential hypertension   • B12 deficiency   • Abnormal cardiac  function test   • Cardiomyopathy (CMS/HCC)   • Coronary artery disease involving native coronary artery of native heart without angina pectoris   • History of coronary artery stent placement   • History of renal calculi   • Dyslipidemia   • Macrocytic anemia   • Precordial pain   • Parkinson's disease (CMS/HCC)   • History of CVA (cerebrovascular accident)   • Paroxysmal atrial fibrillation (CMS/HCC)   • Otalgia, left   • Chest pain   • Abnormal stress test   • Spontaneous pneumothorax   • Chest pain   • Acute systolic (congestive) heart failure (CMS/HCC)   • Thrombocytopenia (CMS/HCC)   • Pneumonia of left lower lobe due to infectious organism (CMS/HCC)     Past Medical History:   Diagnosis Date   • Abnormal ECG    • Abnormal MRI    • Acute frontal sinusitis    • Arthritis    • Chronic fatigue    • Coronary artery disease    • Dizziness    • Hearing aid worn     left   • Hearing loss    • Hyperlipidemia    • Hypertension    • Kidney stones    • Macrocytosis    • Neoplasm of skin    • Osteoporosis    • Prediabetes    • Vitamin D deficiency      Past Surgical History:   Procedure Laterality Date   • CARDIAC CATHETERIZATION N/A 5/22/2017    Procedure: Left Heart Cath;  Surgeon: Maninder Quezada MD;  Location: [facility] INVASIVE LOCATION;  Service:    • CARDIAC CATHETERIZATION N/A 5/23/2017    Procedure: Stent BMS coronary;  Surgeon: Maninder Quezada MD;  Location: [facility] CATH INVASIVE LOCATION;  Service:    • CARDIAC CATHETERIZATION N/A 8/8/2019    Procedure: Left Heart Cath;  Surgeon: Maninder Quezada MD;  Location: [facility] CATH INVASIVE LOCATION;  Service: Cardiology   • CARDIAC CATHETERIZATION N/A 8/8/2019    Procedure: Left ventriculography;  Surgeon: Maninder Quezada MD;  Location: [facility] CATH INVASIVE LOCATION;  Service: Cardiology   • CARDIAC CATHETERIZATION N/A 8/8/2019    Procedure: Coronary angiography;  Surgeon: Maninder Quezada MD;  Location: [facility] INVASIVE LOCATION;   Service: Cardiology   • CARDIAC CATHETERIZATION N/A 8/20/2019    Procedure: CORONARY ANGIOGRAPHY;  Surgeon: Maninder Quezada MD;  Location:  [facility] CATH INVASIVE LOCATION;  Service: Cardiovascular   • CARDIAC CATHETERIZATION N/A 8/20/2019    Procedure: Stent BMS coronary;  Surgeon: Maninder Quezada MD;  Location:  [facility] CATH INVASIVE LOCATION;  Service: Cardiovascular   • HERNIA REPAIR      x 2   • [facility] RT/LT HEART CATHETERS N/A 5/23/2017    Procedure: Percutaneous Coronary Intervention;  Surgeon: Maninder Quezada MD;  [facility] CATH [facility]VASIVE LOC[facility];  Service: Cardiovascular     Family History   Family history unknown: Yes     Social History     Socioeconomic History   • Marital status:      Spouse name: Not on file   • Number of children: Not on file   • Years of education: Not on file   • Highest education level: Not on file   Occupational History     Employer: RETIRED   Tobacco Use   • Smoking status: Never Smoker   • Smokeless tobacco: Never Used   Substance and Sexual Activity   • Alcohol use: No     Comment: quit 30 years ago    • Drug use: No   • Sexual activity: Defer     Facility-Administered Medications Prior to Admission   Medication Dose Route Frequency Provider Last Rate Last Dose   • cyanocobalamin injection 1,000 mcg  1,000 mcg Intramuscular Q30 Days Phyllis Loving PA   1,000 mcg at 09/23/19 1121   • cyanocobalamin injection 1,000 mcg  1,000 mcg Intramuscular Q28 Days Phyllis Loving PA   1,000 mcg at 10/23/19 0939     Medications Prior to Admission   Medication Sig Dispense Refill Last Dose   • acetaminophen (TYLENOL) 500 MG tablet 1-2 TABLETS BY ONCE TO TWICE DAILY AS NEEDED FOR PAIN 60 tablet 0 Taking   • aspirin 81 MG chewable tablet Chew 1 tablet Daily.   Taking   • atorvastatin (LIPITOR) 20 MG tablet TAKE 1 TABLET BY MOUTH EVERY NIGHT 30 tablet 1    • Cholecalciferol (VITAMIN D-3) 1000 UNITS capsule Take 1,000 Units by mouth daily.   Taking   • isosorbide  mononitrate (IMDUR) 60 MG 24 hr tablet TAKE 1 TABLET BY MOUTH EVERY DAY 30 tablet 1    • losartan (COZAAR) 50 MG tablet TAKE 1 TABLET BY MOUTH DAILY 30 tablet 1    • [] metoprolol succinate XL (TOPROL-XL) 25 MG 24 hr tablet Take 2 tablets by mouth Daily for 30 days. 60 tablet 0    • Misc. Devices (COMMODE 3-IN-1) misc 1 each Daily. 1 each 0 Taking   • ticagrelor (BRILINTA) 90 MG tablet tablet Take 1 tablet by mouth 2 (Two) Times a Day. 60 tablet 5 Taking   • Elastic Bandages & Supports (LUMBAR BACK BRACE/SUPPORT PAD) misc Use for lifting/ strenuous exercise. 1 each 0 Taking     No Known Allergies    Objective      Vital Signs  Temp:  [97.5 °F (36.4 °C)-98.3 °F (36.8 °C)] 97.5 °F (36.4 °C)  Heart Rate:  [] 75  Resp:  [16-18] 16  BP: (105-161)/(60-86) 140/60    Intake & Output (last day)        0701 -  0700  0701 -  0700    P.O. 60 570    Total Intake(mL/kg) 60 (0.8) 570 (7.7)    Urine (mL/kg/hr) 1250 (0.7)     Total Output 1250     Net -1190 +570          Urine Unmeasured Occurrence 1 x           Physical Exam   Constitutional: He is oriented to person, place, and time. He appears well-developed and well-nourished.   HENT:   Head: Normocephalic and atraumatic.   Nose: Nose normal.   Mouth/Throat: Oropharynx is clear and moist.   Eyes: Conjunctivae and EOM are normal. Pupils are equal, round, and reactive to light.   Neck: Normal range of motion. Neck supple.   Cardiovascular: Normal rate, normal heart sounds and intact distal pulses.   Pulmonary/Chest: Effort normal and breath sounds normal.   Abdominal: Soft. Bowel sounds are normal.   Musculoskeletal: Normal range of motion.   Neurological: He is alert and oriented to person, place, and time.   Skin: Skin is warm and dry. Capillary refill takes less than 2 seconds.   Psychiatric: He has a normal mood and affect. His behavior is normal. Judgment and thought content normal.   Nursing note and vitals reviewed.      Results Review:    I  reviewed the patient's new clinical results.  I reviewed the patient's new imaging results and agree with the interpretation.  I reviewed the patient's other test results and agree with the interpretation  I personally viewed and interpreted the patient's EKG/Telemetry data    Imaging Results (Last 24 Hours)     Procedure Component Value Units Date/Time    XR Chest 1 View [[id number]] Collected:  11/03/19 0739     Updated:  11/03/19 0835    Narrative:       ONE VIEW PORTABLE CHEST AT 6:09 AM     HISTORY: Shortness of breath.     FINDINGS:  There is a persistent small left apical lateral pneumothorax  measuring 12 mm in thickness and appearing minimally larger since  yesterday's exam. There remains some localized dense atelectasis and  pleural effusion at the left base. The heart remains mildly enlarged.     This report was finalized on 11/3/2019 8:32 AM by Dr. Byron Romeo M.D.       CT Chest Without Contrast [[id number]] Collected:  11/03/19 0742     Updated:  11/03/19 0835    Narrative:       CT SCAN OF THE CHEST WITHOUT CONTRAST     HISTORY: Probable left pneumothorax. Shortness of breath.     The CT scan was performed through the chest without contrast and  compared to several recent chest x-rays as well as previous CT  angiography of the chest dated 09/06/2019. The following findings are  present:     1. The CT scan confirms the presence of a small left-sided pneumothorax  with the air layering anteriorly in the supine position. The size of the  pneumothorax measures approximately 10%. The pneumothorax is new since  the previous chest CT scan. There is a small left pleural effusion  located posteriorly with adjacent dense atelectasis and some associated  pneumonia at the left base and much of the pleural fluid and pneumonia  was also present on the earlier exam dated 09/06/2019. There is also a  very small right pleural effusion and some minimal atelectasis at the  right base that is unchanged.  2. There is no  mediastinal or hilar or axillary adenopathy. There is a  very small pericardial effusion located anteriorly. The CT images  through the upper liver and spleen are unremarkable. There is slight  lobular prominence of both adrenal glands unchanged from the previous  abdominal CT scan dated 07/04/2019.     Radiation dose reduction techniques were utilized, including automated  exposure control and exposure modulation based on body size.     This report was finalized on 11/3/2019 8:32 AM by Dr. Byron Romeo M.D.             Lab Results:  Lab Results (last 24 hours)     Procedure Component Value Units Date/Time    POC Glucose Once [[id number]]  (Normal) Collected:  11/03/19 1117    Specimen:  Blood Updated:  11/03/19 1120     Glucose 116 mg/dL               Assessment/Plan       Chest pain      Assessment & Plan  Mr. Churchill is a pleasant 89-year-old gentleman with a primary spontaneous pneumothorax and likely pleuritic chest pain secondary to this.  He has an approximately 10% pneumothorax and I would not recommend a chest tube or any operative intervention for this small pneumothorax.  We will plan a chest x-ray in the morning and if this is stable he can likely be discharged.  On CT of his chest, he does have some collapse of his lower lobe likely secondary to aspiration which could be attributing to the small pneumothorax given his age and likely poor lung compliance.  I discussed the patients findings and our recommendations with patient, family, nursing staff and consulting provider    Thank you for this consult and allowing us to participate in the care of your patient.  We will follow along with you during this hospitalization.       Zoila Gonzales MD  [facility]  11/03/19  2:27 PM

## 2019-11-03 NOTE — DISCHARGE SUMMARY
Dinesh Churchill Sr.  [id number]    Date of Admit: 11/1/2019  Date of Discharge:  11/3/2019    Discharge Diagnosis:  Active Hospital Problems    Diagnosis  POA   • Chest pain [R07.9]  Yes      Resolved Hospital Problems   No resolved problems to display.       Hospital Course:    Dinesh Churchill is a 89 year old pt of Dr. Quezada with a history of PAF,  CAD( diagonal stent x 1 8/2019), HTN, cardiomyopathy,  HLD, and CVA.      Pt presented to ER on 11/1/19 with complaints of antrior left sided pleuritic chest pain.  His pain was atypical and reproducible on palpation.  It resolved with Tylenol.  His troponins and EKG not worrisome for ischemia.  He did have intermittent atrial fibrillation with elevated rates into the 120s while he was here.  He was asymptomatic from this.  His blocker dose was therefore increased.  A small pneumothorax was noted on chest x-ray.  This was unchanged from x-rays from August 2019.  He underwent CT scan to evaluate for any masses which may contribute to continuous pneumothorax formation, and none were noted.      Procedures Performed         Consults     Date and Time Order Name Status Description    11/2/2019 0811 Inpatient Thoracic Surgery Consult      11/1/2019 1751 Inpatient Pulmonology Consult      11/1/2019 1649 LCG (on-call MD unless specified) Completed           Discharge Medications     Your medication list      CHANGE how you take these medications      Instructions Last Dose Given Next Dose Due   metoprolol succinate  MG 24 hr tablet  Commonly known as:  TOPROL-XL  What changed:    · medication strength  · how much to take      Take 1 tablet by mouth Daily.          CONTINUE taking these medications      Instructions Last Dose Given Next Dose Due   acetaminophen 500 MG tablet  Commonly known as:  TYLENOL      1-2 TABLETS BY ONCE TO TWICE DAILY AS NEEDED FOR PAIN       aspirin 81 MG chewable tablet      Chew 1 tablet Daily.       atorvastatin 20 MG tablet  Commonly  known as:  LIPITOR      TAKE 1 TABLET BY MOUTH EVERY NIGHT       Commode 3-In-1 misc      1 each Daily.       isosorbide mononitrate 60 MG 24 hr tablet  Commonly known as:  IMDUR      TAKE 1 TABLET BY MOUTH EVERY DAY       losartan 50 MG tablet  Commonly known as:  COZAAR      TAKE 1 TABLET BY MOUTH DAILY       Lumbar Back Brace/Support Pad misc      Use for lifting/ strenuous exercise.       ticagrelor 90 MG tablet tablet  Commonly known as:  BRILINTA      Take 1 tablet by mouth 2 (Two) Times a Day.       Vitamin D-3 25 MCG (1000 UT) capsule      Take 1,000 Units by mouth daily.             Where to Get Your Medications      These medications were sent to [id number] - [place], [place] - [address] [address] [place] [address] [address] - [phone]  - [phone] [address], [facility] [address]    Phone:  [phone]   · metoprolol succinate  MG 24 hr tablet         Discharge Diet: Heart healthy    Activity at Discharge: As tolerated    Discharge disposition: home    Condition on Discharge: stable    Follow-up Appointments    He should f/u with Dr. Quezada in 1 month.     Future Appointments   Date Time Provider Department Center   11/27/2019 10:00 AM NURSE/[facility] None   12/27/2019 10:00 AM [facility] None   3/9/2020 11:15 AM Maninder Quezada MD [facility] None         Test Results Pending at Discharge  None     Marita Regalado MD  11/03/19  12:54 PM

## 2019-11-03 NOTE — PROGRESS NOTES
"  Daily Progress Note.   [address]  11/3/2019    Patient:  Name:  Dinesh Churchill Sr.  MRN:  [id number]  5/29/1928  89 y.o.  male         CC: chest hurt    Interval History:  Patient admitted with left-sided chest pain.   Chest x-ray showed a small pneumothorax.  Fairly stable on repeat imaging.  CT scan done that shows chronic aspiration and area of consolidation in his posterior left base.  Also shows about 10% pneumothorax on that side.  Repeat CT scan in September showed no pneumothorax there was a very very small one in August.  Patient's pain is better no significant shortness of breath no hemoptysis no sputum production.  ROS: No fever, no diarrhea, no chest pain  PMFSSH: no change    Physical Exam:  /83 (BP Location: Left arm, Patient Position: Lying)   Pulse 112   Temp 97.6 °F (36.4 °C) (Oral)   Resp 18   Ht 185.4 cm (73\")   Wt 73.9 kg (162 lb 14.7 oz)   SpO2 96%   BMI 21.49 kg/m²   Body mass index is 21.49 kg/m².    Intake/Output Summary (Last 24 hours) at 11/3/2019 1236  Last data filed at 11/3/2019 0915  Gross per 24 hour   Intake 270 ml   Output 950 ml   Net -680 ml     General appearance: Appears stated age, conversant   Eyes: anicteric sclerae, moist conjunctivae; no lid-lag; PERRLA  HENT: Atraumatic; oropharynx clear with moist mucous membranes and no mucosal ulcerations; normal hard and soft palate  Neck: Trachea midline; FROM, supple, no thyromegaly or lymphadenopathy  Lungs: Bilateral breath sounds shallow respiratory effort, no wheeze no rhonchi and no intercostal retractions  CV: RRR, no rub  Abdomen: Thin soft, non-tender; no masses or HSM  Extremities: No peripheral edema or extremity lymphadenopathy  Skin: Normal temperature, turgor and texture; no rash, ulcers or subcutaneous nodules  Psych: Appropriate affect, alert and oriented to person, place and time  Neuro cranial nerves II through XII grossly intact speech intact  sensation intact    Data " Review:  Notable Labs:  Results from last 7 days   Lab Units 11/02/19  0536 11/01/19  1549   WBC 10*3/mm3 6.23 3.95   HEMOGLOBIN g/dL 12.7* 14.3   PLATELETS 10*3/mm3 118* 129*     Results from last 7 days   Lab Units 11/02/19  0536 11/01/19  1549   SODIUM mmol/L 140 137   POTASSIUM mmol/L 3.5 4.2   CHLORIDE mmol/L 106 102   CO2 mmol/L 25.1 23.1   BUN mg/dL 19 20   CREATININE mg/dL 0.73* 0.93   GLUCOSE mg/dL 70 102*   CALCIUM mg/dL 8.6 9.1   Estimated Creatinine Clearance: 62.9 mL/min (A) (by C-G formula based on SCr of 0.73 mg/dL (L)).    Imaging:  Reviewed chest images personally from past 3 days    Scheduled meds:      aspirin 81 mg Oral Daily   atorvastatin 20 mg Oral Nightly   cholecalciferol 1,000 Units Oral Daily   losartan 50 mg Oral Daily   metoprolol tartrate 50 mg Oral Q12H   sodium chloride 10 mL Intravenous Q12H   ticagrelor 90 mg Oral BID       ASSESSMENT  /  PLAN:  Recurrent left chest pain  Recurent left pneumothorax; spontaneous  Recent CAD s/p PCI - JOHN 8/19   Recent LLL pna   Ischemic cardiomyopathy  Moderate aortic regurgitation    dont think there is much to do here for pneumothorax however CT scan shows little bit larger than I expected.  I think repeat a chest x-ray tomorrow morning.  Do not think that surgical intervention at this time would be of benefit I think to be much more risk given his advanced age.  Given incentive spirometer encourage use  Chest x-ray in the morning  Pending this home with subsequent chest x-rays versus surgical consultation    Recurrent spontaneous pneumothorax and 89-year-old without prior pulmonary disease little unusual.    D/w Dr Regalado this am.    Reviewed imaging      Hardeep Shea MD  [facility]  11/03/19  1413

## 2019-11-04 ENCOUNTER — APPOINTMENT (OUTPATIENT)
Dept: GENERAL RADIOLOGY | Facility: HOSPITAL | Age: 84
End: 2019-11-04

## 2019-11-04 PROCEDURE — 96365 THER/PROPH/DIAG IV INF INIT: CPT

## 2019-11-04 PROCEDURE — 71046 X-RAY EXAM CHEST 2 VIEWS: CPT

## 2019-11-04 PROCEDURE — 99225 PR SBSQ OBSERVATION CARE/DAY 25 MINUTES: CPT | Performed by: NURSE PRACTITIONER

## 2019-11-04 PROCEDURE — 96366 THER/PROPH/DIAG IV INF ADDON: CPT

## 2019-11-04 PROCEDURE — 94799 UNLISTED PULMONARY SVC/PX: CPT

## 2019-11-04 PROCEDURE — G0378 HOSPITAL OBSERVATION PER HR: HCPCS

## 2019-11-04 RX ORDER — AMLODIPINE BESYLATE 2.5 MG/1
2.5 TABLET ORAL
Status: DISCONTINUED | OUTPATIENT
Start: 2019-11-04 | End: 2019-11-05

## 2019-11-04 RX ADMIN — METOPROLOL TARTRATE 50 MG: 50 TABLET, FILM COATED ORAL at 20:04

## 2019-11-04 RX ADMIN — ASPIRIN 81 MG: 81 TABLET, CHEWABLE ORAL at 09:35

## 2019-11-04 RX ADMIN — SODIUM CHLORIDE, PRESERVATIVE FREE 10 ML: 5 INJECTION INTRAVENOUS at 20:04

## 2019-11-04 RX ADMIN — AMLODIPINE BESYLATE 2.5 MG: 2.5 TABLET ORAL at 12:40

## 2019-11-04 RX ADMIN — ATORVASTATIN CALCIUM 20 MG: 20 TABLET, FILM COATED ORAL at 20:04

## 2019-11-04 RX ADMIN — METOPROLOL TARTRATE 50 MG: 50 TABLET, FILM COATED ORAL at 09:35

## 2019-11-04 RX ADMIN — TICAGRELOR 90 MG: 90 TABLET ORAL at 09:35

## 2019-11-04 RX ADMIN — CHOLECALCIFEROL TAB 10 MCG (400 UNIT) 1000 UNITS: 10 TAB at 09:35

## 2019-11-04 RX ADMIN — TICAGRELOR 90 MG: 90 TABLET ORAL at 20:04

## 2019-11-04 RX ADMIN — SODIUM CHLORIDE, PRESERVATIVE FREE 10 ML: 5 INJECTION INTRAVENOUS at 09:39

## 2019-11-04 RX ADMIN — LOSARTAN POTASSIUM 50 MG: 50 TABLET, FILM COATED ORAL at 09:37

## 2019-11-04 NOTE — PROGRESS NOTES
Continued Stay Note  [facility]     Patient Name: Dinesh Churchill Sr.  MRN: [id number]  Today's Date: 11/4/2019    Admit Date: 11/1/2019    Discharge Plan     Row Name 11/04/19 1644       Plan    Plan  home with son to drive    Patient/Family in Agreement with Plan  yes    Plan Comments  Plan is home with son to help.  Patient anxious to go home.  NP wants to keep one more day for a change of blood pressure medication.  Hopefully discharge tomorrow.        Discharge Codes    No documentation.       Expected Discharge Date and Time     Expected Discharge Date Expected Discharge Time    Nov 3, 2019             Shannon Epley, RN

## 2019-11-04 NOTE — CONSULTS
Met with patient and his son following stent placement in August. Information given at that time. Provided information again, due to transportation issue patient unlikely to attend.

## 2019-11-04 NOTE — PLAN OF CARE
Problem: Patient Care Overview  Goal: Plan of Care Review  Outcome: Outcome(s) achieved Date Met: 11/03/19 11/03/19 1919   Coping/Psychosocial   Plan of Care Reviewed With patient   Plan of Care Review   Progress improving   OTHER   Outcome Summary no c/o of pain. watch O2 overnihgt and repeat cxray in am. pulm to decide on discharge.

## 2019-11-04 NOTE — PROGRESS NOTES
[facility] INPATIENT PROGRESS NOTE         [address]    2019      PATIENT IDENTIFICATION:  Name: Dinesh Churchill Sr. ADMIT: 2019   : 1928  PCP: Phyllis Loving PA    MRN: [id number] LOS: 0 days   AGE/SEX: 89 y.o. male  ROOM: [facility]                     LOS 0    Reason for visit: Follow-up chest pain with recent spontaneous pneumothorax and pneumonia      SUBJECTIVE:   I am seeing the patient for the first time today.  All patient problems are new to me.     Denies chest pain or productive cough.  Says that he feels that he is breathing pretty well this morning and hoping to be able to discharge home.  Diminished bilaterally but no wheezing or rhonchi on auscultation.    Objective   OBJECTIVE:    Vital Sign Min/Max for last 24 hours  Temp  Min: 97.4 °F (36.3 °C)  Max: 97.6 °F (36.4 °C)   BP  Min: 136/59  Max: 154/84   Pulse  Min: 68  Max: 75   Resp  Min: 16  Max: 20   SpO2  Min: 93 %  Max: 97 %   No Data Recorded   Weight  Min: 71.4 kg (157 lb 4.8 oz)  Max: 71.4 kg (157 lb 4.8 oz)                         Body mass index is 20.75 kg/m².    Intake/Output Summary (Last 24 hours) at 2019 1408  Last data filed at 2019 0920  Gross per 24 hour   Intake 600 ml   Output 425 ml   Net 175 ml         Exam:  GEN:  No distress, appears stated age  EYES:   PERRL, anicteric sclera  ENT:    External ears/nose normal, OP clear  NECK:  No adenopathy, midline trachea  LUNGS: Normal chest on inspection, palpation and diminished bilaterally on auscultation  CV:  Normal S1S2, without murmur  ABD:  Non tender, non distended, no hepatosplenomegaly, +BS  EXT:  No edema, cyanosis or clubbing    Scheduled meds:    amLODIPine 2.5 mg Oral Q24H   aspirin 81 mg Oral Daily   atorvastatin 20 mg Oral Nightly   cholecalciferol 1,000 Units Oral Daily   losartan 50 mg Oral Daily   metoprolol tartrate 50 mg Oral Q12H   metoprolol tartrate 50 mg Oral Once   sodium chloride 10 mL Intravenous Q12H   ticagrelor  90 mg Oral BID     IV meds:                         Data Review:  Results from last 7 days   Lab Units 11/02/19  0536 11/01/19  1549   SODIUM mmol/L 140 137   POTASSIUM mmol/L 3.5 4.2   CHLORIDE mmol/L 106 102   CO2 mmol/L 25.1 23.1   BUN mg/dL 19 20   CREATININE mg/dL 0.73* 0.93   GLUCOSE mg/dL 70 102*   CALCIUM mg/dL 8.6 9.1         Estimated Creatinine Clearance: 60.7 mL/min (A) (by C-G formula based on SCr of 0.73 mg/dL (L)).  Results from last 7 days   Lab Units 11/02/19  0536 11/01/19  1549   WBC 10*3/mm3 6.23 3.95   HEMOGLOBIN g/dL 12.7* 14.3   PLATELETS 10*3/mm3 118* 129*     Results from last 7 days   Lab Units 11/02/19  0536   INR  1.09     Results from last 7 days   Lab Units 11/01/19  1549   ALT (SGPT) U/L 14   AST (SGOT) U/L 16                 Chest x-ray 11/4/2019 shows small left apical pneumothorax showing no significant change from yesterday.  Localized consolidation and pleural fluid left base.    )Assessment   ASSESSMENT:      Active Hospital Problems    Diagnosis  POA   • Chest pain [R07.9]  Yes      Resolved Hospital Problems   No resolved problems to display.     Recurrent left chest pain  Recurent left pneumothorax; spontaneous  Recent CAD s/p PCI - JOHN 8/19   Recent LLL pna   Ischemic cardiomyopathy  Moderate aortic regurgitation      PLAN:  Encourage pulmonary toilet.  No surgical intervention for spontaneous pneumothorax required at this time it appears.      Jose Manuel De Jesus MD  Pulmonary and Critical Care Medicine  [facility], [facility]  11/4/2019    2:08 PM

## 2019-11-04 NOTE — PROGRESS NOTES
[facility]  Cardiology Progress Note    Patient Identification:  Name: Dinesh Churchill Sr.  Age: 89 y.o.  Sex: male  :  1928  MRN: [id number]                 Follow Up / Chief Complaint: chest pain    Interval History: Chest x-ray unchanged today.  Blood pressure high.       Subjective: Denies chest pain and shortness of breath at this time      Objective:    Past Medical History:  Past Medical History:   Diagnosis Date   • Abnormal ECG    • Abnormal MRI    • Acute frontal sinusitis    • Arthritis    • Chronic fatigue    • Coronary artery disease    • Dizziness    • Hearing aid worn     left   • Hearing loss    • Hyperlipidemia    • Hypertension    • Kidney stones    • Macrocytosis    • Neoplasm of skin    • Osteoporosis    • Prediabetes    • Vitamin D deficiency      Past Surgical History:  Past Surgical History:   Procedure Laterality Date   • CARDIAC CATHETERIZATION N/A 2017    Procedure: Left Heart Cath;  Surgeon: Maninder Quezada MD;  Location: [facility] CATH INVASIVE LOCATION;  Service:    • CARDIAC CATHETERIZATION N/A 2017    Procedure: Stent BMS coronary;  Surgeon: Maninder Quezada MD;  Location: [facility] CATH [facility]VASIVE LOCATION;  Service:    • CARDIAC CATHETERIZATION N/A 2019    Procedure: Left Heart Cath;  Surgeon: Maninder Quezada MD;  Location: [facility] CATH [facility]VASIVE LOC[facility];  Service: Cardiology   • CARDIAC CATHETERIZATION N/A 2019    Procedure: Left ventriculography;  Surgeon: Maninder Quezada MD;  Location: [facility] CATH [facility]VASIVE LOC[facility];  Service: Cardiology   • CARDIAC CATHETERIZATION N/A 2019    Procedure: Coronary angiography;  Surgeon: Maninder Quezada MD;  Location: [facility] CATH [facility]VASIVE LOCATION;  Service: Cardiology   • CARDIAC CATHETERIZATION N/A 2019    Procedure: CORONARY ANGIOGRAPHY;  Surgeon: Maninder Quezada MD;  Location: [facility] CATH [facility]ASIVE LOC[facility];  Service: Cardiovascular   • CARDIAC CATHETERIZATION N/A  "8/20/2019    Procedure: Stent BMS coronary;  Surgeon: Maninder Quezada MD;  Location:  [facility] CATH INVASIVE LOCATION;  Service: Cardiovascular   • HERNIA REPAIR      x 2   • CA RT/LT HEART CATHETERS N/A 5/23/2017    Procedure: Percutaneous Coronary Intervention;  Surgeon: Maninder Quezada MD;  Location:  [facility] CATH INVASIVE LOCATION;  Service: Cardiovascular        Social History:   Social History     Tobacco Use   • Smoking status: Never Smoker   • Smokeless tobacco: Never Used   Substance Use Topics   • Alcohol use: No     Comment: quit 30 years ago       Family History:  Family History   Family history unknown: Yes          Allergies:  No Known Allergies  Scheduled Meds:    amLODIPine 2.5 mg Q24H   aspirin 81 mg Daily   atorvastatin 20 mg Nightly   cholecalciferol 1,000 Units Daily   losartan 50 mg Daily   metoprolol tartrate 50 mg Q12H   metoprolol tartrate 50 mg Once   sodium chloride 10 mL Q12H   ticagrelor 90 mg BID     I have reviewed the patient's recent medical history and current medications, as well as personally reviewed/interpreted the ECG/telemetry data    INTAKE AND OUTPUT:    Intake/Output Summary (Last 24 hours) at 11/4/2019 1452  Last data filed at 11/4/2019 0920  Gross per 24 hour   Intake 600 ml   Output 425 ml   Net 175 ml         ROS  Constitutional: Awake and alert, no fever. No nosebleeds  Abdomen           no abdominal pain   Cardiac              no chest pain  Pulmonary          no shortness of breath      /59 (BP Location: Left arm, Patient Position: Lying)   Pulse 68   Temp 97.4 °F (36.3 °C) (Oral)   Resp 20   Ht 185.4 cm (73\")   Wt 71.4 kg (157 lb 4.8 oz)   SpO2 95%   BMI 20.75 kg/m²   General appearance: No acute changes   Neck: Trachea midline; NECK, supple, no thyromegaly or lymphadenopathy   Lungs: Normal size and shape, normal breath sounds, equal distribution of air, no rales and rhonchi   CV: S1-S2 regular, no murmurs, no rub, no gallop   Abdomen: Soft, " non-tender; no masses , no abnormal abdominal sounds   Extremities: No deformity , normal color , no peripheral edema   Skin: Normal temperature, turgor and texture; no rash, ulcers            Cardiographics  Telemetry:    atrial fibrillation rate 80s      EC/2 SR with LBBB and 1st degree AVB rate 80s      19 atrial flutter with RVR rate 120s      Echocardiogram:   19  Interpretation Summary     · Left ventricular systolic function is low normal. Calculated EF = 47%. Estimated EF was in agreement with the calculated EF. Estimated EF = 50%. Normal left ventricular cavity size noted. All left ventricular wall segments contract normally. Left ventricular wall thickness is consistent with moderate concentric hypertrophy. Left ventricular diastolic dysfunction is noted (grade I) consistent with impaired relaxation.  · Left atrial volume is severely increased. Right atrial cavity size is moderately dilated.  · Moderate aortic valve regurgitation is present  · Mild MAC is present. Mild mitral valve regurgitation is present.  · Physiologic tricuspid valve regurgitation is present. Insufficient TR velocity profile to estimate the right ventricular systolic pressure.     Stress test  19  Interpretation Summary     · Findings consistent with an abnormal ECG stress test.  · Left ventricular ejection fraction is mildly reduced (Calculated EF = 46%).  · Myocardial perfusion imaging indicates a medium-sized, moderately severe area of ischemia located in the inferior wall and lateral wall.  · Impressions are consistent with a high risk study       Cardiac catheterization  19  RESULTS:  1. Initial aortic pressure was approximately 130/80, and the patient remained hemodynamically stable through the case.  2. Initial coronary arteriography showed 99% subtotally occluded diagonal branch reduced to 0% with 2.0/15 vision stent dilated to 2.2.     IVONNE  FLOW   PRE      3    POST  3     TYPE OF LESION     C     RECOMMENDATIONS:  The patient has had an excellent result from intervention.  The patient will be maintained on antiplatelet therapy and follow up with me and his primary care physician,   Importance of taking dual antiplatelets for one year  has been explained, risk of stent thrombosis leading to the acute MI, which carries high morbidity and mortality has been explained     Discontinuation or interruptions of these medications should be under the strict guidance of appropriate health professional      Lab Review   Results from last 7 days   Lab Units 11/02/19  0536 11/01/19  2119 11/01/19  1757   TROPONIN T ng/mL 0.018 0.016 0.011         Results from last 7 days   Lab Units 11/02/19  0536   SODIUM mmol/L 140   POTASSIUM mmol/L 3.5   BUN mg/dL 19   CREATININE mg/dL 0.73*   CALCIUM mg/dL 8.6       Results from last 7 days   Lab Units 11/02/19  0536 11/01/19  1549   WBC 10*3/mm3 6.23 3.95   HEMOGLOBIN g/dL 12.7* 14.3   HEMATOCRIT % 40.3 43.3   PLATELETS 10*3/mm3 118* 129*     Results from last 7 days   Lab Units 11/02/19  0536   INR  1.09     Estimated Creatinine Clearance: 60.7 mL/min (A) (by C-G formula based on SCr of 0.73 mg/dL (L)).    I have reviewed the medical decision making with Dr. Quezada in detail.     Assessment:  1.  Atrial fibrillation  2.  Chest pain  3.  Pneumothorax  4.  Anemia  5.  CAD  6.  Hypertension  7.  Ischemic cardiomyopathy    Plan:  Patient currently in atrial fibrillation, paroxysmal in nature.  Not anticoagulated historically given age and frailty, as well as the current need for DAPT.  Currently on aspirin and ticagrelor as well as metoprolol and atorvastatin.  Continue.  Blood pressure elevated, added amlodipine as this will also help help with his angina that he is continued to have historically.  We will continue to monitor blood pressure.  Hopefully home tomorrow if okay with all consulting providers.  Appreciate all services care.  Hemoglobin 12.7, will continue to  "monitor.  Chest x-ray appears unchanged today, per thoracic surgery no surgical intervention required at this time.  EF noted to be 46% on echo and September, continue losartan.  Will repeat surveillance labs in a.m.      Labs/tests ordered: bmp, cbc, mag      )11/4/2019  CANDELARIA Fong    Patient personally interviewed and above subjective findings personally confirmed during a face to face contact with patient today  All findings of physical examination confirmed  All pertinent and performed labs, cardiac procedures ,  radiographs of the last 24 hours personally reviewed  Impression and plans discussed/elaborated and implemented jointly as described above     Maninder Quezada MD            EMR Dragon/Transcription:   \"Dictated utilizing Dragon dictation\".   "

## 2019-11-04 NOTE — PLAN OF CARE
Problem: Cardiac: ACS (Acute Coronary Syndrome) (Adult)  Goal: Signs and Symptoms of Listed Potential Problems Will be Absent, Minimized or Managed (Cardiac: ACS)  Outcome: Ongoing (interventions implemented as appropriate)      Problem: Patient Care Overview  Goal: Plan of Care Review  Outcome: Ongoing (interventions implemented as appropriate)   11/04/19 0038   Coping/Psychosocial   Plan of Care Reviewed With patient   Plan of Care Review   Progress improving   OTHER   Outcome Summary Pt has had no complaints of pain overnight. Pt to have repeat CXR in AM and possible dc after that. Pt encouraged to use IS. VSS. Continue to monitor. Safety maintained.    Coping/Psychosocial   Patient Agreement with Plan of Care agrees

## 2019-11-05 VITALS
BODY MASS INDEX: 21.44 KG/M2 | HEART RATE: 78 BPM | TEMPERATURE: 97.9 F | RESPIRATION RATE: 20 BRPM | SYSTOLIC BLOOD PRESSURE: 158 MMHG | OXYGEN SATURATION: 93 % | HEIGHT: 73 IN | WEIGHT: 161.8 LBS | DIASTOLIC BLOOD PRESSURE: 78 MMHG

## 2019-11-05 LAB
ANION GAP SERPL CALCULATED.3IONS-SCNC: 12 MMOL/L (ref 5–15)
BASOPHILS # BLD AUTO: 0.01 10*3/MM3 (ref 0–0.2)
BASOPHILS NFR BLD AUTO: 0.2 % (ref 0–1.5)
BUN BLD-MCNC: 23 MG/DL (ref 8–23)
BUN/CREAT SERPL: 34.3 (ref 7–25)
CALCIUM SPEC-SCNC: 8.5 MG/DL (ref 8.2–9.6)
CHLORIDE SERPL-SCNC: 110 MMOL/L (ref 98–107)
CO2 SERPL-SCNC: 22 MMOL/L (ref 22–29)
CREAT BLD-MCNC: 0.67 MG/DL (ref 0.76–1.27)
DEPRECATED RDW RBC AUTO: 53.6 FL (ref 37–54)
EOSINOPHIL # BLD AUTO: 0.21 10*3/MM3 (ref 0–0.4)
EOSINOPHIL NFR BLD AUTO: 4.5 % (ref 0.3–6.2)
ERYTHROCYTE [DISTWIDTH] IN BLOOD BY AUTOMATED COUNT: 15.1 % (ref 12.3–15.4)
GFR SERPL CREATININE-BSD FRML MDRD: 111 ML/MIN/1.73
GLUCOSE BLD-MCNC: 94 MG/DL (ref 65–99)
HCT VFR BLD AUTO: 35.9 % (ref 37.5–51)
HGB BLD-MCNC: 12.3 G/DL (ref 13–17.7)
IMM GRANULOCYTES # BLD AUTO: 0.02 10*3/MM3 (ref 0–0.05)
IMM GRANULOCYTES NFR BLD AUTO: 0.4 % (ref 0–0.5)
LYMPHOCYTES # BLD AUTO: 0.72 10*3/MM3 (ref 0.7–3.1)
LYMPHOCYTES NFR BLD AUTO: 15.4 % (ref 19.6–45.3)
MAGNESIUM SERPL-MCNC: 2.5 MG/DL (ref 1.7–2.3)
MCH RBC QN AUTO: 33.4 PG (ref 26.6–33)
MCHC RBC AUTO-ENTMCNC: 34.3 G/DL (ref 31.5–35.7)
MCV RBC AUTO: 97.6 FL (ref 79–97)
MONOCYTES # BLD AUTO: 0.48 10*3/MM3 (ref 0.1–0.9)
MONOCYTES NFR BLD AUTO: 10.2 % (ref 5–12)
NEUTROPHILS # BLD AUTO: 3.25 10*3/MM3 (ref 1.7–7)
NEUTROPHILS NFR BLD AUTO: 69.3 % (ref 42.7–76)
NRBC BLD AUTO-RTO: 0 /100 WBC (ref 0–0.2)
PLATELET # BLD AUTO: 101 10*3/MM3 (ref 140–450)
PMV BLD AUTO: 11.8 FL (ref 6–12)
POTASSIUM BLD-SCNC: 3.5 MMOL/L (ref 3.5–5.2)
RBC # BLD AUTO: 3.68 10*6/MM3 (ref 4.14–5.8)
SODIUM BLD-SCNC: 144 MMOL/L (ref 136–145)
WBC NRBC COR # BLD: 4.69 10*3/MM3 (ref 3.4–10.8)

## 2019-11-05 PROCEDURE — 85025 COMPLETE CBC W/AUTO DIFF WBC: CPT | Performed by: NURSE PRACTITIONER

## 2019-11-05 PROCEDURE — 83735 ASSAY OF MAGNESIUM: CPT | Performed by: NURSE PRACTITIONER

## 2019-11-05 PROCEDURE — 80048 BASIC METABOLIC PNL TOTAL CA: CPT | Performed by: NURSE PRACTITIONER

## 2019-11-05 PROCEDURE — 99217 PR OBSERVATION CARE DISCHARGE MANAGEMENT: CPT | Performed by: NURSE PRACTITIONER

## 2019-11-05 PROCEDURE — G0378 HOSPITAL OBSERVATION PER HR: HCPCS

## 2019-11-05 RX ORDER — METOPROLOL SUCCINATE 100 MG/1
100 TABLET, EXTENDED RELEASE ORAL DAILY
Qty: 30 TABLET | Refills: 3 | Status: SHIPPED | OUTPATIENT
Start: 2019-11-05 | End: 2020-01-02

## 2019-11-05 RX ORDER — AMLODIPINE BESYLATE 5 MG/1
5 TABLET ORAL
Status: DISCONTINUED | OUTPATIENT
Start: 2019-11-06 | End: 2019-11-05 | Stop reason: HOSPADM

## 2019-11-05 RX ORDER — AMLODIPINE BESYLATE 2.5 MG/1
2.5 TABLET ORAL ONCE
Status: COMPLETED | OUTPATIENT
Start: 2019-11-05 | End: 2019-11-05

## 2019-11-05 RX ORDER — AMLODIPINE BESYLATE 5 MG/1
5 TABLET ORAL
Qty: 30 TABLET | Refills: 12 | Status: SHIPPED | OUTPATIENT
Start: 2019-11-06 | End: 2020-11-09

## 2019-11-05 RX ADMIN — LOSARTAN POTASSIUM 50 MG: 50 TABLET, FILM COATED ORAL at 08:01

## 2019-11-05 RX ADMIN — CHOLECALCIFEROL TAB 10 MCG (400 UNIT) 1000 UNITS: 10 TAB at 08:01

## 2019-11-05 RX ADMIN — AMLODIPINE BESYLATE 2.5 MG: 2.5 TABLET ORAL at 08:01

## 2019-11-05 RX ADMIN — SODIUM CHLORIDE, PRESERVATIVE FREE 10 ML: 5 INJECTION INTRAVENOUS at 08:08

## 2019-11-05 RX ADMIN — ASPIRIN 81 MG: 81 TABLET, CHEWABLE ORAL at 08:01

## 2019-11-05 RX ADMIN — METOPROLOL TARTRATE 50 MG: 50 TABLET, FILM COATED ORAL at 08:01

## 2019-11-05 RX ADMIN — TICAGRELOR 90 MG: 90 TABLET ORAL at 08:01

## 2019-11-05 RX ADMIN — AMLODIPINE BESYLATE 2.5 MG: 2.5 TABLET ORAL at 11:42

## 2019-11-05 NOTE — DISCHARGE INSTRUCTIONS
The nurse practitioner for your Cardiology group discussed taking your blood pressure at home after starting on increased dose of Norvasc/Amlodipine.

## 2019-11-05 NOTE — PLAN OF CARE
Problem: Cardiac: ACS (Acute Coronary Syndrome) (Adult)  Goal: Signs and Symptoms of Listed Potential Problems Will be Absent, Minimized or Managed (Cardiac: ACS)  Outcome: Outcome(s) achieved Date Met: 11/05/19      Problem: Patient Care Overview  Goal: Plan of Care Review  Outcome: Outcome(s) achieved Date Met: 11/05/19 11/05/19 1240   OTHER   Outcome Summary Goals met. Discharge home.     Goal: Interprofessional Rounds/Family Conf  Outcome: Outcome(s) achieved Date Met: 11/05/19      Problem: Fall Risk (Adult)  Goal: Identify Related Risk Factors and Signs and Symptoms  Outcome: Outcome(s) achieved Date Met: 11/05/19    Goal: Absence of Fall  Outcome: Outcome(s) achieved Date Met: 11/05/19

## 2019-11-05 NOTE — PLAN OF CARE
Problem: Patient Care Overview  Goal: Plan of Care Review  Outcome: Ongoing (interventions implemented as appropriate)   11/05/19 5598   Coping/Psychosocial   Plan of Care Reviewed With patient   Plan of Care Review   Progress improving   OTHER   Outcome Summary Pt has denied chest pain throughout the night. He is alert and oriented with VSS. Hopefully home today. Will continue to monitor

## 2019-11-05 NOTE — DISCHARGE SUMMARY
[facility]  Physician Discharge Summary    Patient Identification:  Name: Dinesh Churchill Sr.  Age: 89 y.o.  Sex: male  :  1928  MRN: [id number]    Admit date: 2019    Discharge date and time:  19 at 1103  Admitting Physician: Maninder Quezada MD     Discharge Physician: Dr. Maninder Quezada  Discharge Diagnoses:   Patient Active Problem List   Diagnosis   • Abnormal magnetic resonance imaging study   • Arthritis   • Osteoarthritis of cervical spine   • Diplopia   • Hearing loss   • Neoplasm of uncertain behavior of skin   • Prediabetes   • Vitamin D deficiency   • Chronic fatigue   • History of supraventricular tachycardia   • Essential hypertension   • B12 deficiency   • Abnormal cardiac function test   • Cardiomyopathy (CMS/HCC)   • Coronary artery disease involving native coronary artery of native heart without angina pectoris   • History of coronary artery stent placement   • History of renal calculi   • Dyslipidemia   • Macrocytic anemia   • Precordial pain   • Parkinson's disease (CMS/HCC)   • History of CVA (cerebrovascular accident)   • Paroxysmal atrial fibrillation (CMS/HCC)   • Otalgia, left   • Chest pain   • Abnormal stress test   • Spontaneous pneumothorax   • Chest pain   • Acute systolic (congestive) heart failure (CMS/HCC)   • Thrombocytopenia (CMS/HCC)   • Pneumonia of left lower lobe due to infectious organism (CMS/HCC)       Discharged Condition: stable    Hospital Course:   Dinesh Churchill is a 89-year-old male, who is current with our service.  He was admitted on 2019 for chest pain.  That time he had presented anterior left-sided pleuritic chest pain since the previous day. He stated he felt a pop sensation in his chest and had constant chest pain since the event.  Last stress test on 2019 showed EF 46%, myocardial lesion study indicated a medium sized, severe area of ischemia located in the inferior wall and lateral wall.  Last cardiac cath  8/20/2019 was a successful angioplasty and stent to subtotal diagonal reduced to 0% with 2.0/15 vision dilated to 2.2.  Last echo showed EF 50%, mild MAC is present, mild LV regurgitation is present, diastolic dysfunction grade 1, LA volume is severely increased.  R AC size is moderately dilated, moderate AV regurgitation, physiologic TV regurgitation is present.  Insufficient TR velocity profile to estimate the right ventricle systolic pressure.  On admit his troponin was 0.017, glucose 102, potassium 4.2, ALT/AST WNL, hemoglobin 14.3, and platelets 129. Chest x-ray showed a small pneumothorax.  CT showed chronic aspiration and area of constellation of the posterior left base with a approximately 10% pneumothorax on that side.  Pulmonary and CTS was consulted. Per CTS was noted  that he did have some collapse lung of his lower lobe likely secondary to aspiration.  Chest x-ray on 11/4/2019 showed small left apical pneumothorax showing no significant change from prior chest x-ray.  Localized consolidation and pleural fluid left base.  Per pulmonary no surgical intervention at this time for spontaneous pneumothorax.  At discharge his blood pressure systolic 150s.  We will increase his Norvasc to 5 mg.  He is to do blood pressure checks 1 hr and 30 minutes after taking his blood pressure medicine.  Continue his Lipitor, losartan, Lopressor, and Brilinta.  He is to call our office with systolics above 140s.  He is stable at discharge.  He will need to follow-up with his primary care physician in 1 week.  He will follow-up with CANDELARIA Macias on November 22 at 1015.  He will follow-up with pulmonary in 2 to 3 weeks, or if he needs to be seen sooner. Return to ER per EMS for any recurrent symptoms including, but not limited to: chest pain/pressure/tightness, weakness, Shortness of breath, dizziness, palpitations, near syncope or syncope       Consults:   IP CONSULT TO CARDIOLOGY  IP CONSULT TO  "PULMONOLOGY  CARDIAC REHAB EVALUATION AND ENROLLMENT  IP CONSULT TO THORACIC SURGERY    ROS  Constitutional: Awake and alert, no fever. No nosebleeds  Abdomen           no abdominal pain   Cardiac              no chest pain  Pulmonary          no shortness of breath      /78 (BP Location: Left arm, Patient Position: Lying)   Pulse 78   Temp 97.9 °F (36.6 °C) (Oral)   Resp 20   Ht 185.4 cm (73\")   Wt 73.4 kg (161 lb 12.8 oz)   SpO2 93%   BMI 21.35 kg/m²   General appearance: No acute changes   Neck: Trachea midline; NECK, supple, no thyromegaly or lymphadenopathy   Lungs: Normal size and shape, normal breath sounds, equal distribution of air, no rales and rhonchi   CV: S1-S2 regular, no murmurs, no rub, no gallop   Abdomen: Soft, non-tender; no masses , no abnormal abdominal sounds   Extremities: No deformity , normal color , no peripheral edema   Skin: Normal temperature, turgor and texture; no rash, ulcers          LABS:  Results from last 7 days   Lab Units 11/02/19  0536 11/01/19 2119 11/01/19  1757   TROPONIN T ng/mL 0.018 0.016 0.011     Results from last 7 days   Lab Units 11/05/19  0443   MAGNESIUM mg/dL 2.5*     Results from last 7 days   Lab Units 11/05/19  0443   SODIUM mmol/L 144   POTASSIUM mmol/L 3.5   BUN mg/dL 23   CREATININE mg/dL 0.67*   CALCIUM mg/dL 8.5        Results from last 7 days   Lab Units 11/05/19  0443 11/02/19  0536 11/01/19  1549   WBC 10*3/mm3 4.69 6.23 3.95   HEMOGLOBIN g/dL 12.3* 12.7* 14.3   HEMATOCRIT % 35.9* 40.3 43.3   PLATELETS 10*3/mm3 101* 118* 129*     Results from last 7 days   Lab Units 11/02/19  0536   INR  1.09     Results from last 7 days   Lab Units 11/02/19  0536   CHOLESTEROL mg/dL 131   TRIGLYCERIDES mg/dL 83   HDL CHOL mg/dL 40   LDL CHOL mg/dL 74     Disposition:  Home    Discharge Medications:      Discharge Medications      New Medications      Instructions Start Date   amLODIPine 5 MG tablet  Commonly known as:  NORVASC   5 mg, Oral, Every 24 Hours " Scheduled   Start Date:  11/6/2019        Changes to Medications      Instructions Start Date   metoprolol succinate  MG 24 hr tablet  Commonly known as:  TOPROL-XL  What changed:    · medication strength  · how much to take   100 mg, Oral, Daily         Continue These Medications      Instructions Start Date   acetaminophen 500 MG tablet  Commonly known as:  TYLENOL   1-2 TABLETS BY ONCE TO TWICE DAILY AS NEEDED FOR PAIN      aspirin 81 MG chewable tablet   81 mg, Oral, Daily      atorvastatin 20 MG tablet  Commonly known as:  LIPITOR   20 mg, Oral, Nightly      Commode 3-In-1 misc   1 each, Does not apply, Daily      isosorbide mononitrate 60 MG 24 hr tablet  Commonly known as:  IMDUR   TAKE 1 TABLET BY MOUTH EVERY DAY      losartan 50 MG tablet  Commonly known as:  COZAAR   50 mg, Oral, Daily      Lumbar Back Brace/Support Pad misc   Use for lifting/ strenuous exercise.      ticagrelor 90 MG tablet tablet  Commonly known as:  BRILINTA   90 mg, Oral, 2 Times Daily      Vitamin D-3 25 MCG (1000 UT) capsule   1,000 Units, Oral, Daily           The following medical decision was discussed in detail with Dr. Quezada    Discharge Home Instructions:  1.  Follow-up with your primary care physician in 1 week.  Please call for an appointment  2.  Follow-up with CANDELARIA Macias on November 22 at 10:15 AM.  3.  Follow-up with pulmonary in 2 to 3 weeks.  Please call for appointment.  4. Return to ER per EMS for any recurrent symptoms including, but not limited to: chest pain/pressure/tightness, weakness, Shortness of breath, dizziness, palpitations, near syncope or syncope  5.  Take all medications as prescribed.  Any questions please call the office.  6.  Please take your blood pressure daily.  Take it 1 hour and 30 minutes after taking her medications.       Signed:  CANDELARIA Macias  11/5/2019  10:48 AM    Patient personally interviewed and above subjective findings personally confirmed during a  "face to face contact with patient today  All findings of physical examination confirmed  All pertinent and performed labs, cardiac procedures ,  radiographs of the last 24 hours personally reviewed  Impression and plans discussed/elaborated and implemented jointly as described above     Maninder Quezada MD            EMR Dragon/Transcription:   \"Dictated utilizing Dragon dictation\".     "

## 2019-11-05 NOTE — PLAN OF CARE
Problem: Patient Care Overview  Goal: Plan of Care Review  Outcome: Ongoing (interventions implemented as appropriate)   11/04/19 1900   Coping/Psychosocial   Plan of Care Reviewed With patient;son   Plan of Care Review   Progress no change   OTHER   Outcome Summary no c/o pain today, bp still elevated today so Norvasc added to regimen. pt to most likely dc tomorrow. Son at bedside all day and very active in plan of care. All other VS remain stable, no change in size of apical pneumo. will continue to monitor. cl    Coping/Psychosocial   Patient Agreement with Plan of Care agrees

## 2019-11-05 NOTE — PROGRESS NOTES
[facility] INPATIENT PROGRESS NOTE         [address]    2019      PATIENT IDENTIFICATION:  Name: Dinesh Churchill Sr. ADMIT: 2019   : 1928  PCP: Phyllis Loving PA    MRN: [id number] LOS: 0 days   AGE/SEX: 89 y.o. male  ROOM: [facility]                     LOS 0    Reason for visit: Follow-up chest pain with recent spontaneous pneumothorax and pneumonia      SUBJECTIVE:     Resting comfortably.  Denies chest pain.  Not requiring supplemental oxygen.  Diminished breath sounds on auscultation.  No wheezing or rhonchi.    Objective   OBJECTIVE:    Vital Sign Min/Max for last 24 hours  Temp  Min: 97.4 °F (36.3 °C)  Max: 98.1 °F (36.7 °C)   BP  Min: 136/59  Max: 158/78   Pulse  Min: 67  Max: 78   Resp  Min: 20  Max: 20   SpO2  Min: 93 %  Max: 95 %   No Data Recorded   Weight  Min: 73.4 kg (161 lb 12.8 oz)  Max: 73.4 kg (161 lb 12.8 oz)                         Body mass index is 21.35 kg/m².    Intake/Output Summary (Last 24 hours) at 2019 0934  Last data filed at 2019 0900  Gross per 24 hour   Intake 620 ml   Output 550 ml   Net 70 ml         Exam:  GEN:  No distress, appears stated age  EYES:   PERRL, anicteric sclera  ENT:    External ears/nose normal, OP clear  NECK:  No adenopathy, midline trachea  LUNGS: Normal chest on inspection, palpation and diminished bilaterally on auscultation  CV:  Normal S1S2, without murmur  ABD:  Non tender, non distended, no hepatosplenomegaly, +BS  EXT:  No edema, cyanosis or clubbing    Scheduled meds:      amLODIPine 2.5 mg Oral Q24H   aspirin 81 mg Oral Daily   atorvastatin 20 mg Oral Nightly   cholecalciferol 1,000 Units Oral Daily   losartan 50 mg Oral Daily   metoprolol tartrate 50 mg Oral Q12H   metoprolol tartrate 50 mg Oral Once   sodium chloride 10 mL Intravenous Q12H   ticagrelor 90 mg Oral BID     IV meds:                         Data Review:  Results from last 7 days   Lab Units 19  0443 19  0536 19  1549    SODIUM mmol/L 144 140 137   POTASSIUM mmol/L 3.5 3.5 4.2   CHLORIDE mmol/L 110* 106 102   CO2 mmol/L 22.0 25.1 23.1   BUN mg/dL 23 19 20   CREATININE mg/dL 0.67* 0.73* 0.93   GLUCOSE mg/dL 94 70 102*   CALCIUM mg/dL 8.5 8.6 9.1         Estimated Creatinine Clearance: 62.4 mL/min (A) (by C-G formula based on SCr of 0.67 mg/dL (L)).  Results from last 7 days   Lab Units 11/05/19  0443 11/02/19  0536 11/01/19  1549   WBC 10*3/mm3 4.69 6.23 3.95   HEMOGLOBIN g/dL 12.3* 12.7* 14.3   PLATELETS 10*3/mm3 101* 118* 129*     Results from last 7 days   Lab Units 11/02/19  0536   INR  1.09     Results from last 7 days   Lab Units 11/01/19  1549   ALT (SGPT) U/L 14   AST (SGOT) U/L 16                 Chest x-ray 11/4/2019 shows small left apical pneumothorax showing no significant change from yesterday.  Localized consolidation and pleural fluid left base.    )Assessment   ASSESSMENT:      Active Hospital Problems    Diagnosis  POA   • Chest pain [R07.9]  Yes      Resolved Hospital Problems   No resolved problems to display.     Recurrent left chest pain  Recurent left pneumothorax; spontaneous  Recent CAD s/p PCI - JOHN 8/19   Recent LLL pna   Ischemic cardiomyopathy  Moderate aortic regurgitation      PLAN:  Encourage pulmonary toilet.  No surgical intervention for spontaneous pneumothorax required at this time it appears.  Possibly home today.    Jose Manuel De Jesus MD  Pulmonary and Critical Care Medicine  [facility], [facility]  11/5/2019    9:34 AM

## 2019-11-06 ENCOUNTER — READMISSION MANAGEMENT (OUTPATIENT)
Dept: CALL CENTER | Facility: HOSPITAL | Age: 84
End: 2019-11-06

## 2019-11-06 ENCOUNTER — APPOINTMENT (OUTPATIENT)
Dept: CT IMAGING | Facility: HOSPITAL | Age: 84
End: 2019-11-06

## 2019-11-06 ENCOUNTER — NURSE TRIAGE (OUTPATIENT)
Dept: CALL CENTER | Facility: HOSPITAL | Age: 84
End: 2019-11-06

## 2019-11-06 ENCOUNTER — TELEPHONE (OUTPATIENT)
Dept: FAMILY MEDICINE CLINIC | Facility: CLINIC | Age: 84
End: 2019-11-06

## 2019-11-06 ENCOUNTER — HOSPITAL ENCOUNTER (INPATIENT)
Facility: HOSPITAL | Age: 84
LOS: 2 days | Discharge: HOME | End: 2019-11-08
Attending: EMERGENCY MEDICINE | Admitting: INTERNAL MEDICINE

## 2019-11-06 ENCOUNTER — APPOINTMENT (OUTPATIENT)
Dept: GENERAL RADIOLOGY | Facility: HOSPITAL | Age: 84
End: 2019-11-06

## 2019-11-06 DIAGNOSIS — I47.1 SVT (SUPRAVENTRICULAR TACHYCARDIA) (HCC): ICD-10-CM

## 2019-11-06 DIAGNOSIS — R93.89 ABNORMAL CXR: ICD-10-CM

## 2019-11-06 DIAGNOSIS — R55 SYNCOPE AND COLLAPSE: Primary | ICD-10-CM

## 2019-11-06 DIAGNOSIS — J93.81 CHRONIC PNEUMOTHORAX: ICD-10-CM

## 2019-11-06 LAB
ALBUMIN SERPL-MCNC: 3.5 G/DL (ref 3.5–5.2)
ALBUMIN/GLOB SERPL: 1.3 G/DL
ALP SERPL-CCNC: 48 U/L (ref 39–117)
ALT SERPL W P-5'-P-CCNC: 10 U/L (ref 1–41)
ANION GAP SERPL CALCULATED.3IONS-SCNC: 12.3 MMOL/L (ref 5–15)
ANION GAP SERPL CALCULATED.3IONS-SCNC: 14.1 MMOL/L (ref 5–15)
AST SERPL-CCNC: 13 U/L (ref 1–40)
BASOPHILS # BLD AUTO: 0.02 10*3/MM3 (ref 0–0.2)
BASOPHILS # BLD AUTO: 0.03 10*3/MM3 (ref 0–0.2)
BASOPHILS NFR BLD AUTO: 0.4 % (ref 0–1.5)
BASOPHILS NFR BLD AUTO: 0.5 % (ref 0–1.5)
BILIRUB SERPL-MCNC: 1.6 MG/DL (ref 0.2–1.2)
BUN BLD-MCNC: 25 MG/DL (ref 8–23)
BUN BLD-MCNC: 25 MG/DL (ref 8–23)
BUN/CREAT SERPL: 27.2 (ref 7–25)
BUN/CREAT SERPL: 30.5 (ref 7–25)
CALCIUM SPEC-SCNC: 8.8 MG/DL (ref 8.2–9.6)
CALCIUM SPEC-SCNC: 9 MG/DL (ref 8.2–9.6)
CHLORIDE SERPL-SCNC: 107 MMOL/L (ref 98–107)
CHLORIDE SERPL-SCNC: 108 MMOL/L (ref 98–107)
CO2 SERPL-SCNC: 21.9 MMOL/L (ref 22–29)
CO2 SERPL-SCNC: 23.7 MMOL/L (ref 22–29)
CREAT BLD-MCNC: 0.82 MG/DL (ref 0.76–1.27)
CREAT BLD-MCNC: 0.92 MG/DL (ref 0.76–1.27)
DEPRECATED RDW RBC AUTO: 54.2 FL (ref 37–54)
DEPRECATED RDW RBC AUTO: 57.2 FL (ref 37–54)
EOSINOPHIL # BLD AUTO: 0.13 10*3/MM3 (ref 0–0.4)
EOSINOPHIL # BLD AUTO: 0.21 10*3/MM3 (ref 0–0.4)
EOSINOPHIL NFR BLD AUTO: 2.1 % (ref 0.3–6.2)
EOSINOPHIL NFR BLD AUTO: 4.2 % (ref 0.3–6.2)
ERYTHROCYTE [DISTWIDTH] IN BLOOD BY AUTOMATED COUNT: 15.4 % (ref 12.3–15.4)
ERYTHROCYTE [DISTWIDTH] IN BLOOD BY AUTOMATED COUNT: 15.4 % (ref 12.3–15.4)
GFR SERPL CREATININE-BSD FRML MDRD: 77 ML/MIN/1.73
GFR SERPL CREATININE-BSD FRML MDRD: 88 ML/MIN/1.73
GLOBULIN UR ELPH-MCNC: 2.8 GM/DL
GLUCOSE BLD-MCNC: 108 MG/DL (ref 65–99)
GLUCOSE BLD-MCNC: 123 MG/DL (ref 65–99)
HCT VFR BLD AUTO: 37.4 % (ref 37.5–51)
HCT VFR BLD AUTO: 38.6 % (ref 37.5–51)
HGB BLD-MCNC: 12.5 G/DL (ref 13–17.7)
HGB BLD-MCNC: 12.8 G/DL (ref 13–17.7)
HOLD SPECIMEN: NORMAL
HOLD SPECIMEN: NORMAL
IMM GRANULOCYTES # BLD AUTO: 0.03 10*3/MM3 (ref 0–0.05)
IMM GRANULOCYTES # BLD AUTO: 0.04 10*3/MM3 (ref 0–0.05)
IMM GRANULOCYTES NFR BLD AUTO: 0.6 % (ref 0–0.5)
IMM GRANULOCYTES NFR BLD AUTO: 0.6 % (ref 0–0.5)
LYMPHOCYTES # BLD AUTO: 0.64 10*3/MM3 (ref 0.7–3.1)
LYMPHOCYTES # BLD AUTO: 0.79 10*3/MM3 (ref 0.7–3.1)
LYMPHOCYTES NFR BLD AUTO: 10.3 % (ref 19.6–45.3)
LYMPHOCYTES NFR BLD AUTO: 15.9 % (ref 19.6–45.3)
MAGNESIUM SERPL-MCNC: 1.9 MG/DL (ref 1.7–2.3)
MAGNESIUM SERPL-MCNC: 2.1 MG/DL (ref 1.7–2.3)
MCH RBC QN AUTO: 32.6 PG (ref 26.6–33)
MCH RBC QN AUTO: 33.4 PG (ref 26.6–33)
MCHC RBC AUTO-ENTMCNC: 32.4 G/DL (ref 31.5–35.7)
MCHC RBC AUTO-ENTMCNC: 34.2 G/DL (ref 31.5–35.7)
MCV RBC AUTO: 100.8 FL (ref 79–97)
MCV RBC AUTO: 97.7 FL (ref 79–97)
MONOCYTES # BLD AUTO: 0.45 10*3/MM3 (ref 0.1–0.9)
MONOCYTES # BLD AUTO: 0.55 10*3/MM3 (ref 0.1–0.9)
MONOCYTES NFR BLD AUTO: 8.9 % (ref 5–12)
MONOCYTES NFR BLD AUTO: 9.1 % (ref 5–12)
NEUTROPHILS # BLD AUTO: 3.46 10*3/MM3 (ref 1.7–7)
NEUTROPHILS # BLD AUTO: 4.8 10*3/MM3 (ref 1.7–7)
NEUTROPHILS NFR BLD AUTO: 69.8 % (ref 42.7–76)
NEUTROPHILS NFR BLD AUTO: 77.6 % (ref 42.7–76)
NRBC BLD AUTO-RTO: 0 /100 WBC (ref 0–0.2)
NRBC BLD AUTO-RTO: 0.2 /100 WBC (ref 0–0.2)
PLATELET # BLD AUTO: 146 10*3/MM3 (ref 140–450)
PLATELET # BLD AUTO: 164 10*3/MM3 (ref 140–450)
PMV BLD AUTO: 11.4 FL (ref 6–12)
PMV BLD AUTO: 11.6 FL (ref 6–12)
POTASSIUM BLD-SCNC: 3.7 MMOL/L (ref 3.5–5.2)
POTASSIUM BLD-SCNC: 4.1 MMOL/L (ref 3.5–5.2)
PROT SERPL-MCNC: 6.3 G/DL (ref 6–8.5)
RBC # BLD AUTO: 3.83 10*6/MM3 (ref 4.14–5.8)
RBC # BLD AUTO: 3.83 10*6/MM3 (ref 4.14–5.8)
SODIUM BLD-SCNC: 143 MMOL/L (ref 136–145)
SODIUM BLD-SCNC: 144 MMOL/L (ref 136–145)
TROPONIN T SERPL-MCNC: 0.02 NG/ML (ref 0–0.03)
TSH SERPL DL<=0.05 MIU/L-ACNC: 1.65 UIU/ML (ref 0.27–4.2)
WBC NRBC COR # BLD: 4.96 10*3/MM3 (ref 3.4–10.8)
WBC NRBC COR # BLD: 6.19 10*3/MM3 (ref 3.4–10.8)
WHOLE BLOOD HOLD SPECIMEN: NORMAL
WHOLE BLOOD HOLD SPECIMEN: NORMAL

## 2019-11-06 PROCEDURE — 84484 ASSAY OF TROPONIN QUANT: CPT | Performed by: EMERGENCY MEDICINE

## 2019-11-06 PROCEDURE — 93010 ELECTROCARDIOGRAM REPORT: CPT | Performed by: INTERNAL MEDICINE

## 2019-11-06 PROCEDURE — 99222 1ST HOSP IP/OBS MODERATE 55: CPT | Performed by: NURSE PRACTITIONER

## 2019-11-06 PROCEDURE — 85025 COMPLETE CBC W/AUTO DIFF WBC: CPT | Performed by: EMERGENCY MEDICINE

## 2019-11-06 PROCEDURE — 85025 COMPLETE CBC W/AUTO DIFF WBC: CPT | Performed by: NURSE PRACTITIONER

## 2019-11-06 PROCEDURE — 99285 EMERGENCY DEPT VISIT HI MDM: CPT

## 2019-11-06 PROCEDURE — 70450 CT HEAD/BRAIN W/O DYE: CPT

## 2019-11-06 PROCEDURE — 84443 ASSAY THYROID STIM HORMONE: CPT | Performed by: EMERGENCY MEDICINE

## 2019-11-06 PROCEDURE — 71045 X-RAY EXAM CHEST 1 VIEW: CPT

## 2019-11-06 PROCEDURE — 83735 ASSAY OF MAGNESIUM: CPT | Performed by: NURSE PRACTITIONER

## 2019-11-06 PROCEDURE — 83735 ASSAY OF MAGNESIUM: CPT | Performed by: EMERGENCY MEDICINE

## 2019-11-06 PROCEDURE — 93005 ELECTROCARDIOGRAM TRACING: CPT | Performed by: EMERGENCY MEDICINE

## 2019-11-06 PROCEDURE — 80053 COMPREHEN METABOLIC PANEL: CPT | Performed by: EMERGENCY MEDICINE

## 2019-11-06 PROCEDURE — 25010000002 MAGNESIUM SULFATE IN D5W 1G/100ML (PREMIX) 1-5 GM/100ML-% SOLUTION: Performed by: EMERGENCY MEDICINE

## 2019-11-06 RX ORDER — SODIUM CHLORIDE 0.9 % (FLUSH) 0.9 %
10 SYRINGE (ML) INJECTION AS NEEDED
Status: DISCONTINUED | OUTPATIENT
Start: 2019-11-06 | End: 2019-11-08 | Stop reason: HOSPADM

## 2019-11-06 RX ORDER — ASPIRIN 81 MG/1
81 TABLET, CHEWABLE ORAL DAILY
Status: DISCONTINUED | OUTPATIENT
Start: 2019-11-06 | End: 2019-11-08 | Stop reason: HOSPADM

## 2019-11-06 RX ORDER — ISOSORBIDE MONONITRATE 60 MG/1
60 TABLET, EXTENDED RELEASE ORAL DAILY
Status: DISCONTINUED | OUTPATIENT
Start: 2019-11-06 | End: 2019-11-08 | Stop reason: HOSPADM

## 2019-11-06 RX ORDER — AMLODIPINE BESYLATE 5 MG/1
2.5 TABLET ORAL
Status: DISCONTINUED | OUTPATIENT
Start: 2019-11-06 | End: 2019-11-07

## 2019-11-06 RX ORDER — LOSARTAN POTASSIUM 50 MG/1
50 TABLET ORAL DAILY
Status: DISCONTINUED | OUTPATIENT
Start: 2019-11-06 | End: 2019-11-08 | Stop reason: HOSPADM

## 2019-11-06 RX ORDER — MAGNESIUM SULFATE 1 G/100ML
1 INJECTION INTRAVENOUS ONCE
Status: COMPLETED | OUTPATIENT
Start: 2019-11-06 | End: 2019-11-06

## 2019-11-06 RX ORDER — METOPROLOL SUCCINATE 100 MG/1
100 TABLET, EXTENDED RELEASE ORAL DAILY
Status: DISCONTINUED | OUTPATIENT
Start: 2019-11-06 | End: 2019-11-08 | Stop reason: HOSPADM

## 2019-11-06 RX ORDER — NITROGLYCERIN 0.4 MG/1
0.4 TABLET SUBLINGUAL
Status: DISCONTINUED | OUTPATIENT
Start: 2019-11-06 | End: 2019-11-08 | Stop reason: HOSPADM

## 2019-11-06 RX ORDER — SODIUM CHLORIDE 0.9 % (FLUSH) 0.9 %
10 SYRINGE (ML) INJECTION EVERY 12 HOURS SCHEDULED
Status: DISCONTINUED | OUTPATIENT
Start: 2019-11-06 | End: 2019-11-08 | Stop reason: HOSPADM

## 2019-11-06 RX ORDER — POTASSIUM CHLORIDE 750 MG/1
40 CAPSULE, EXTENDED RELEASE ORAL ONCE
Status: COMPLETED | OUTPATIENT
Start: 2019-11-06 | End: 2019-11-06

## 2019-11-06 RX ORDER — ATORVASTATIN CALCIUM 20 MG/1
20 TABLET, FILM COATED ORAL NIGHTLY
Status: DISCONTINUED | OUTPATIENT
Start: 2019-11-06 | End: 2019-11-08 | Stop reason: HOSPADM

## 2019-11-06 RX ADMIN — METOPROLOL SUCCINATE 100 MG: 100 TABLET, FILM COATED, EXTENDED RELEASE ORAL at 20:17

## 2019-11-06 RX ADMIN — TICAGRELOR 90 MG: 90 TABLET ORAL at 20:18

## 2019-11-06 RX ADMIN — AMLODIPINE BESYLATE 2.5 MG: 5 TABLET ORAL at 20:17

## 2019-11-06 RX ADMIN — ATORVASTATIN CALCIUM 20 MG: 20 TABLET, FILM COATED ORAL at 20:18

## 2019-11-06 RX ADMIN — METOPROLOL TARTRATE 2.5 MG: 5 INJECTION, SOLUTION INTRAVENOUS at 16:16

## 2019-11-06 RX ADMIN — MAGNESIUM SULFATE 1 G: 1 INJECTION INTRAVENOUS at 16:15

## 2019-11-06 RX ADMIN — POTASSIUM CHLORIDE 40 MEQ: 750 CAPSULE, EXTENDED RELEASE ORAL at 16:16

## 2019-11-06 RX ADMIN — ASPIRIN 81 MG: 81 TABLET, CHEWABLE ORAL at 20:17

## 2019-11-06 RX ADMIN — SODIUM CHLORIDE, PRESERVATIVE FREE 10 ML: 5 INJECTION INTRAVENOUS at 20:18

## 2019-11-06 NOTE — ED PROVIDER NOTES
" EMERGENCY DEPARTMENT ENCOUNTER    CHIEF COMPLAINT  Chief Complaint: Seizure like activity   History given by: Patient and son  History limited by: Nothing   Room Number: N425/1  PMD: Phyllis Loving PA      HPI:  Pt is a 89 y.o. male who presents via EMS from home complaining of seizure like episode that began PTA and lasted 15-20 seconds. Per son, who witnessed episode, pt was sitting in his recliner when he began shaking, his eyes rolled back in his head, his extremities became contracted with \"jerky\" motions, and he was not responding for 15-20 seconds. Son reports pt seemed to be back at baseline immediately after episode. Pt reports not remembering any of the episode. Patient denies chest pain, SOA, and headache. Pt reports being admitted multiple times since July as well as having a stent placed in August and being admitted from 11/1/19 to 11/5/19 for chest pain. Pt reports being told a chest XR taken yesterday showed a pneumothorax that was unchanged from chest XR in August. Pt reports the dosage of his beta blocker being increased by cardiology after admission.        Duration:  Began PTA, lasted 15-20 seconds   Onset: Sudden  Timing: One episode   Location: N/a  Radiation: N/a  Quality: Seizure like activity   Intensity/Severity: Moderate  Progression: Improved   Associated Symptoms: None  Aggravating Factors: None  Alleviating Factors: None  Previous Episodes: None  Treatment before arrival: None    PAST MEDICAL HISTORY  Active Ambulatory Problems     Diagnosis Date Noted   • Abnormal magnetic resonance imaging study 03/18/2016   • Arthritis 03/18/2016   • Osteoarthritis of cervical spine 03/18/2016   • Diplopia 03/18/2016   • Hearing loss 03/18/2016   • Neoplasm of uncertain behavior of skin 03/18/2016   • Prediabetes 03/18/2016   • Vitamin D deficiency 03/18/2016   • Chronic fatigue 10/27/2016   • History of supraventricular tachycardia 04/07/2017   • Essential hypertension 04/07/2017   • B12 " deficiency 04/19/2017   • Abnormal cardiac function test 05/08/2017   • Cardiomyopathy (CMS/HCC) 05/08/2017   • Coronary artery disease involving native coronary artery of native heart without angina pectoris 05/31/2017   • History of coronary artery stent placement 05/31/2017   • History of renal calculi 05/31/2017   • Dyslipidemia 05/31/2017   • Macrocytic anemia 08/09/2017   • Precordial pain 08/16/2017   • Parkinson's disease (CMS/HCC) 10/25/2017   • History of CVA (cerebrovascular accident) 10/25/2017   • Paroxysmal atrial fibrillation (CMS/HCC) 11/29/2017   • Otalgia, left 12/29/2017   • Chest pain 08/06/2019   • Abnormal stress test 08/06/2019   • Spontaneous pneumothorax 08/18/2019   • Chest pain 08/18/2019   • Acute systolic (congestive) heart failure (CMS/HCC) 08/19/2019   • Thrombocytopenia (CMS/HCC) 08/20/2019   • Pneumonia of left lower lobe due to infectious organism (CMS/HCC) 09/06/2019     Resolved Ambulatory Problems     Diagnosis Date Noted   • Maxillary sinusitis 03/18/2016   • Dizziness 10/27/2016   • Acute frontal sinusitis 10/27/2016   • Precordial pain 04/07/2017   • SOB (shortness of breath) 04/07/2017   • Acute rhinitis 12/29/2017     Past Medical History:   Diagnosis Date   • Abnormal ECG    • Abnormal MRI    • Acute frontal sinusitis    • Arthritis    • Chronic fatigue    • Coronary artery disease    • Dizziness    • Hearing aid worn    • Hearing loss    • Hyperlipidemia    • Hypertension    • Kidney stones    • Macrocytosis    • Neoplasm of skin    • Osteoporosis    • Prediabetes    • Vitamin D deficiency        PAST SURGICAL HISTORY  Past Surgical History:   Procedure Laterality Date   • CARDIAC CATHETERIZATION N/A 5/22/2017    Procedure: Left Heart Cath;  Surgeon: Maninder Quezada MD;  Location: [facility] CATH INVASIVE LOCATION;  Service:    • CARDIAC CATHETERIZATION N/A 5/23/2017    Procedure: Stent BMS coronary;  Surgeon: Maninder Quezada MD;  Location: [facility] CATH INVASIVE  LOCATION;  Service:    • CARDIAC CATHETERIZATION N/A 8/8/2019    Procedure: Left Heart Cath;  Surgeon: Maninder Quezada MD;  Location:  [facility] CATH INVASIVE LOCATION;  Service: Cardiology   • CARDIAC CATHETERIZATION N/A 8/8/2019    Procedure: Left ventriculography;  Surgeon: Maninder Quezada MD;  Location:  [facility] CATH INVASIVE LOCATION;  Service: Cardiology   • CARDIAC CATHETERIZATION N/A 8/8/2019    Procedure: Coronary angiography;  Surgeon: Maninder Quezada MD;  Location:  [facility] CATH INVASIVE LOCATION;  Service: Cardiology   • CARDIAC CATHETERIZATION N/A 8/20/2019    Procedure: CORONARY ANGIOGRAPHY;  Surgeon: Maninder Quezada MD;  Location:  [facility] CATH INVASIVE LOCATION;  Service: Cardiovascular   • CARDIAC CATHETERIZATION N/A 8/20/2019    Procedure: Stent BMS coronary;  Surgeon: Maninder Quezada MD;  Location:  [facility] CATH INVASIVE LOCATION;  Service: Cardiovascular   • HERNIA REPAIR      x 2   • OH RT/LT HEART CATHETERS N/A 5/23/2017    Procedure: Percutaneous Coronary Intervention;  Surgeon: Maninder Quezada MD;  Location:  [facility] CATH INVASIVE LOCATION;  Service: Cardiovascular       FAMILY HISTORY  Family History   Family history unknown: Yes       SOCIAL HISTORY  Social History     Socioeconomic History   • Marital status:      Spouse name: Not on file   • Number of children: Not on file   • Years of education: Not on file   • Highest education level: Not on file   Occupational History     Employer: RETIRED   Tobacco Use   • Smoking status: Never Smoker   • Smokeless tobacco: Never Used   Substance and Sexual Activity   • Alcohol use: No     Comment: quit 30 years ago    • Drug use: No   • Sexual activity: Defer       ALLERGIES  Patient has no known allergies.    REVIEW OF SYSTEMS  Review of Systems   Constitutional: Negative for activity change, appetite change and fever.   HENT: Negative for congestion and sore throat.    Eyes: Negative.    Respiratory: Negative for  cough and shortness of breath.    Cardiovascular: Negative for chest pain and leg swelling.   Gastrointestinal: Negative for abdominal pain, diarrhea and vomiting.   Endocrine: Negative.    Genitourinary: Negative for decreased urine volume and dysuria.   Musculoskeletal: Negative for neck pain.   Skin: Negative for rash and wound.   Allergic/Immunologic: Negative.    Neurological: Positive for seizures (seizure like episode). Negative for weakness, numbness and headaches.   Hematological: Negative.    Psychiatric/Behavioral: Negative.    All other systems reviewed and are negative.      PHYSICAL EXAM  ED Triage Vitals [11/06/19 1332]   Temp Heart Rate Resp BP SpO2   98.2 °F (36.8 °C) (!) 128 16 108/74 95 %      Temp src Heart Rate Source Patient Position BP Location FiO2 (%)   Tympanic -- -- -- --       Physical Exam   Constitutional: He is oriented to person, place, and time. No distress.   HENT:   Head: Normocephalic and atraumatic.   Mouth/Throat: Mucous membranes are normal.   Lesion on R forehead has been removed, area of irritation surrounding    Eyes: EOM are normal. Pupils are equal, round, and reactive to light.   Neck: Normal range of motion. Neck supple.   Cardiovascular: Normal rate, regular rhythm and normal heart sounds.   HR originally in 120's, after carotid massage 90's, /69 after carotid massage   Pulmonary/Chest: Effort normal. No respiratory distress. He has decreased breath sounds (on left side).   Abdominal: Soft. He exhibits no distension. There is no tenderness. There is no rebound and no guarding.   Musculoskeletal: Normal range of motion. He exhibits no edema.   No pedal edema, no calf tenderness   Neurological: He is alert and oriented to person, place, and time. He has normal sensation and normal strength.   Normal neuro exam   Skin: Skin is warm and dry.   Psychiatric: Mood and affect normal.   Nursing note and vitals reviewed.      LAB RESULTS  Lab Results (last 24 hours)      Procedure Component Value Units Date/Time    CBC & Differential [[id number]] Collected:  11/06/19 1400    Specimen:  Blood Updated:  11/06/19 1436    Narrative:       The following orders were created for panel order CBC & Differential.  Procedure                               Abnormality         Status                     ---------                               -----------         ------                     CBC Auto Differential[[id number]]        Abnormal            Final result                 Please view results for these tests on the individual orders.    Comprehensive Metabolic Panel [[id number]]  (Abnormal) Collected:  11/06/19 1400    Specimen:  Blood Updated:  11/06/19 1442     Glucose 123 mg/dL      BUN 25 mg/dL      Creatinine 0.92 mg/dL      Sodium 143 mmol/L      Potassium 3.7 mmol/L      Chloride 107 mmol/L      CO2 23.7 mmol/L      Calcium 9.0 mg/dL      Total Protein 6.3 g/dL      Albumin 3.50 g/dL      ALT (SGPT) 10 U/L      AST (SGOT) 13 U/L      Alkaline Phosphatase 48 U/L      Total Bilirubin 1.6 mg/dL      eGFR Non African Amer 77 mL/min/1.73      Globulin 2.8 gm/dL      A/G Ratio 1.3 g/dL      BUN/Creatinine Ratio 27.2     Anion Gap 12.3 mmol/L     Narrative:       GFR Normal >60  Chronic Kidney Disease <60  Kidney Failure <15    Troponin [[id number]]  (Normal) Collected:  11/06/19 1400    Specimen:  Blood Updated:  11/06/19 1445     Troponin T 0.017 ng/mL     Narrative:       Troponin T Reference Range:  <= 0.03 ng/mL-   Negative for AMI  >0.03 ng/mL-     Abnormal for myocardial necrosis.  Clinicians would have to utilize clinical acumen, EKG, Troponin and serial changes to determine if it is an Acute Myocardial Infarction or myocardial injury due to an underlying chronic condition.     CBC Auto Differential [[id number]]  (Abnormal) Collected:  11/06/19 1400    Specimen:  Blood Updated:  11/06/19 1436     WBC 6.19 10*3/mm3      RBC 3.83 10*6/mm3      Hemoglobin 12.5 g/dL      Hematocrit 38.6 %       .8 fL      MCH 32.6 pg      MCHC 32.4 g/dL      RDW 15.4 %      RDW-SD 57.2 fl      MPV 11.6 fL      Platelets 164 10*3/mm3      Neutrophil % 77.6 %      Lymphocyte % 10.3 %      Monocyte % 8.9 %      Eosinophil % 2.1 %      Basophil % 0.5 %      Immature Grans % 0.6 %      Neutrophils, Absolute 4.80 10*3/mm3      Lymphocytes, Absolute 0.64 10*3/mm3      Monocytes, Absolute 0.55 10*3/mm3      Eosinophils, Absolute 0.13 10*3/mm3      Basophils, Absolute 0.03 10*3/mm3      Immature Grans, Absolute 0.04 10*3/mm3      nRBC 0.2 /100 WBC     Magnesium [[id number]]  (Normal) Collected:  11/06/19 1400    Specimen:  Blood Updated:  11/06/19 1532     Magnesium 2.1 mg/dL     TSH [[id number]]  (Normal) Collected:  11/06/19 1400    Specimen:  Blood Updated:  11/06/19 1546     TSH 1.650 uIU/mL     Magnesium [[id number]]  (Normal) Collected:  11/06/19 1400    Specimen:  Blood Updated:  11/06/19 1754     Magnesium 1.9 mg/dL     CBC & Differential [[id number]] Collected:  11/06/19 1809    Specimen:  Blood Updated:  11/06/19 1840    Narrative:       The following orders were created for panel order CBC & Differential.  Procedure                               Abnormality         Status                     ---------                               -----------         ------                     CBC Auto Differential[[id number]]        Abnormal            Final result                 Please view results for these tests on the individual orders.    CBC Auto Differential [[id number]]  (Abnormal) Collected:  11/06/19 1809    Specimen:  Blood Updated:  11/06/19 1840     WBC 4.96 10*3/mm3      RBC 3.83 10*6/mm3      Hemoglobin 12.8 g/dL      Hematocrit 37.4 %      MCV 97.7 fL      MCH 33.4 pg      MCHC 34.2 g/dL      RDW 15.4 %      RDW-SD 54.2 fl      MPV 11.4 fL      Platelets 146 10*3/mm3      Neutrophil % 69.8 %      Lymphocyte % 15.9 %      Monocyte % 9.1 %      Eosinophil % 4.2 %      Basophil % 0.4 %      Immature Grans % 0.6 %       Neutrophils, Absolute 3.46 10*3/mm3      Lymphocytes, Absolute 0.79 10*3/mm3      Monocytes, Absolute 0.45 10*3/mm3      Eosinophils, Absolute 0.21 10*3/mm3      Basophils, Absolute 0.02 10*3/mm3      Immature Grans, Absolute 0.03 10*3/mm3      nRBC 0.0 /100 WBC           I ordered the above labs and reviewed the results    RADIOLOGY  XR Chest 1 View   Final Result   PORTABLE CHEST     HISTORY: Pneumonia.     FINDINGS: A single view of the chest demonstrates moderate cardiomegaly.  There is moderate infiltrate and effusion of the left lung base  increased versus prior examination. There is increased interstitial  prominence involving the right lung centrally and to a lesser extent  right lung base increased versus 11/04/2019. There is mild cephalization  of pulmonary vasculature.     Pneumothorax is noted on the left which has increased in size as  compared to prior examination now extending more inferiorly along the  lateral aspect of the hemithorax.     The above information was called to and discussed with Dr. Kidd.     This report was finalized on 11/6/2019 3:41 PM by Dr. Eric Guevara M.D.   CT Head Without Contrast    (Results Pending)        I ordered the above noted radiological studies. Interpreted by radiologist. Discussed with radiologist (). Reviewed by me in PACS.       PROCEDURES  Critical Care  Performed by: Maninder Quezada MD  Authorized by: Maninder Quezada MD     Critical care provider statement:     Critical care time (minutes):  30    Critical care start time:  11/6/2019 3:03 PM    Critical care end time:  11/6/2019 4:09 PM    Critical care time was exclusive of:  Separately billable procedures and treating other patients    Critical care was necessary to treat or prevent imminent or life-threatening deterioration of the following conditions:  Circulatory failure and CNS failure or compromise    Critical care was time spent personally by me on the following activities:   Development of treatment plan with patient or surrogate, discussions with consultants, evaluation of patient's response to treatment, examination of patient, ordering and performing treatments and interventions, ordering and review of laboratory studies, ordering and review of radiographic studies, interpretation of cardiac output measurements, obtaining history from patient or surrogate, re-evaluation of patient's condition and review of old charts (Gave beta blockers, cardiac massage performed)           EKG    EKG time: 1357  Rhythm/Rate: SVT, rate 131  No Acute Ischemia  Non-Specific ST-T changes  Poor R wave progression   Prolonged QT     Changed compared to prior on 11/2/19    Interpreted Contemporaneously by me.  Independently viewed by me    EKG    EKG time: 1543  Rhythm/Rate: Sinus rhythm, rate 87 with PVC's  No Acute Ischemia  Non-Specific ST-T changes  Prolonged QT interval     See previous EKG done at 1357    Interpreted Contemporaneously by me.  Independently viewed by me      PROGRESS AND CONSULTS    1515: Checked pt who is resting comfortably and in NAD. Pt HR in 120's after carotid massage HR 90's and /68. Pt has been given 5L fluids. Will order magnesium and Lopressor for HR control.     1549: Spoke with  (radiology) who reports pt CT head is negative     1600: Spoke with  (cardiology) who agreed to admit pt to tele bed and consult pulmonology     1605: Rechecked pt who is resting comfortably and in NAD. Informed pt of chest XR which showed pneumothorax, which he was already aware of. Discussed starting on oxygen due to chronic pneumothorax. Discussed plan to admit. Pt understands and agrees with the plan, all questions answered.    1635:  (cardiology) in ED to evaluate pt and assume care      MEDICAL DECISION MAKING  Results were reviewed/discussed with the patient and they were also made aware of online access. Pt also made aware that some labs, such as  cultures, will not be resulted during ER visit and follow up with PMD is necessary.     MDM  Number of Diagnoses or Management Options  Abnormal CXR:   Chronic pneumothorax:   SVT (supraventricular tachycardia) (CMS/HCC):   Syncope and collapse:      Amount and/or Complexity of Data Reviewed  Clinical lab tests: ordered and reviewed (Troponin negative (0.017))  Tests in the radiology section of CPT®: ordered and reviewed (CT head negative, XR chest showed pre-existing pneumothorax )  Tests in the medicine section of CPT®: ordered and reviewed (See procedure notes)  Discussion of test results with the performing providers: yes ( (radiology))  Decide to obtain previous medical records or to obtain history from someone other than the patient: yes (Epic)  Obtain history from someone other than the patient: yes (Son)  Review and summarize past medical records: yes (Pt was admitted 11/1/19 to 11/5/19 with  (cardiology) for chest pain. Pt had Afib during admission. Pt had a stent placed in August 2019. Chest XR performed yesterday showed small apical pneumothorax, which is unchanged from August 2019. )  Discuss the patient with other providers: yes ( (cardiology))  Independent visualization of images, tracings, or specimens: yes    Patient Progress  Patient progress: stable         DIAGNOSIS  Final diagnoses:   Syncope and collapse   Chronic pneumothorax   SVT (supraventricular tachycardia) (CMS/HCC)   Abnormal CXR       DISPOSITION  ADMISSION TO [facility]    Discussed treatment plan and reason for admission with pt/family and admitting physician.  Pt/family voiced understanding of the plan for admission for further testing/treatment as needed.           Latest Documented Vital Signs:  As of 6:40 PM  BP- 149/61 HR- 85 Temp- 98 °F (36.7 °C) (Oral) O2 sat- 97%    --  Documentation assistance provided by miladis Paulino for . Information recorded by the miladis was done at my  direction and has been verified and validated by me.                         Suzi Paulino  11/06/19 [id number]       Gm Rodriguez MD  11/06/19 1617

## 2019-11-06 NOTE — PROGRESS NOTES
Case Management Discharge Note    Final Note: home with son            Transportation Services  Private: Car    Final Discharge Disposition Code: 01 - home or self-care

## 2019-11-06 NOTE — ED TRIAGE NOTES
Son called EMS due to pt having convulsion at home. Pt has no history of seizures.  Pt discharged from this hospital recently.

## 2019-11-06 NOTE — ED NOTES
"Pt presented to ER CC possible  Seizure pta. Per son pt was sitting at kitchen table and \"started to shake with hands in the air\". Denies LOC. denies hx seizure. Pt denies CP denies SOB.     aaox3 WDP. Non labored breathing ; RA. EKG. Tachycardia noted 126 bpm. Wheezing / rhronci noted to right lobes. NEWSOME +/=. CT ordered. RD ordered.      Rain Allen, RN  11/06/19 [id number]    "

## 2019-11-06 NOTE — TELEPHONE ENCOUNTER
Patient's son called states the patient was discharged from the hospital yesterday and he thinks he had a seizure a few minutes ago, started shaking and arms got stiff and head went back, states he is acting fine now and talking, instructed to call the ambulance per Phyllis, patient's son aware

## 2019-11-06 NOTE — TELEPHONE ENCOUNTER
"He was just setting chair and all at once started jerking eyes rolled back arms stiff, lasted 15 seconds, now is fine, bp is 139/78, told guidelines say he needs to be seen now.     Reason for Disposition  • First seizure ever    Additional Information  • Negative: [1] Epileptic seizure (in adult with known epilepsy) AND [2] continues > 5 minutes  • Negative: [1] Two or more seizures AND [2] stays confused between seizures  • Negative: Bluish (or gray) lips or face now  • Negative: Head injury caused the seizure  • Negative: Pregnant or postpartum (<1 month)  • Negative: Known poisoning or overdose  • Negative: Seizure in a swimming pool  • Negative: Has diabetes (diabetes mellitus)  • Negative: [1] Unresponsive (can't be awakened) after the seizure stops AND [2] persists > 5 minutes  • Negative: [1] Acting confused (e.g., disoriented, slurred speech) after the seizure stops AND [2] persists > 30 minutes  • Negative: Sounds like a life-threatening emergency to the triager  • Negative: Second seizure occurs on the same day  • Negative: Fever > 99.5 F (37.5 C)  • Negative: Severe headache    (Note: most seizure victims will have a headache after a seizure)  • Negative: High risk adult (e.g., alcohol or drug abuse)  • Negative: [1] Wants to sleep after the seizure AND [2] persists much longer than usual  • Negative: Epileptic seizures occur frequently (several per week)    Answer Assessment - Initial Assessment Questions  1. ONSET: \"How long did the seizure last?\" (Minutes)       15 seconds  2. CONTENT: \"Describe what happened during the seizure. Did the body become stiff? Was there any jerking?\"       Jerking, eyes rolled back, arm stiff  3. CIRCUMSTANCE: \"What was the individual doing when the seizure began?\"      setting chair  4. MENTAL STATUS: \"Does he know who he is, who you are, and where he is?\"       Now is alert and oriented, feeling fine   5. PRIOR SEIZURES: \"Has the individual had a seizure (convulsion) " "before?\" If so, ask: \"When was the last time?\" and \"What happened last time?\"      no  6. EPILEPSY: \"Does the individual have epilepsy?\" (note: check for medical ID bambi)      no  7. MEDICATIONS: \"Does the individual take anticonvulsant medications?\" (e.g., yes/no, compliance, any recent changes)      no  8. INJURY: \"Did the individual hurt himself during the seizure?\" (e.g., head, tongue)      no  9. OTHER SYMPTOMS: \"Are there any other symptoms?\" (e.g., fever, headache)      no  10. PREGNANCY: \"Is there any chance you are pregnant?\" \"When was your last menstrual period?\"        no    Protocols used: SEIZURE-ADULT-AH      "

## 2019-11-06 NOTE — H&P
[facility]  History & Physical Note                                                                                    Patient Identification:  Dinesh Churchill Sr.:   89 y.o.  male  5/29/1928  [id number]            Chief Complaint   Patient presents with   • Seizures       Date of Admission: 11/6/19    Admitting Physician: Maninder Quezada MD    Reason for Admission:Syncope and collapse [R55],   Chief Complaint   Patient presents with   • Seizures       History of Present Illness: This 89-year-old male, known to this service, presented through the emergency department this afternoon with complaints of seizure-like behavior that began prior to arrival and lasted less than a minute.  Episode was witnessed by the son who stated on arrival that the patient was sitting in his recliner and began shaking, eyes rolled back in his head, extremities became contracted with jerking motions, and he was unresponsive for 15 to 20 seconds.  The son also stated on arrival that he appeared to be back to his baseline as soon as the episode passed.  Patient does not have memory of this.  Past medical history to include CAD, dizziness, hyperlipidemia, hypertension, prediabetes, pneumothorax, and paroxysmal atrial fibrillation-not anticoagulated given age and frailty.  He was recently admitted from November 1 to November 5 for chest pain and pneumothorax.  Thoracic surgery saw the patient during that admission and determined that his pneumothorax did not merit surgical intervention.  Heart rate is elevated on admission, 90s to 140s.  Blood pressure is low normal.  Troponin indeterminate at 0.017.  Electrolytes stable and within normal limits.  Hemoglobin stable per baseline at 12.5.  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  ECG reveals sinus rhythm with first-degree AVB.  Chest x-ray on admission shows an increase in size of pneumothorax compared to prior study on November 4.    Echo done on September 24, 2019 revealed EF 47%, severe dilation of LAC, moderate aortic valve regurgitation, mild MAC with mild MR, physiologic TR present.  Cardiac catheterization on August 20, 2019 revealed 99% subtotal occlusion of diagonal branch then reduced to 0% status post JOHN placement.        Cardiac Risk Factors: Age, CAD, hypertension, hyperlipidemia, pneumothorax, tachycardia, atrial fibrillation-paroxysmal    Past Medical History:  Past Medical History:   Diagnosis Date   • Abnormal ECG    • Abnormal MRI    • Acute frontal sinusitis    • Arthritis    • Chronic fatigue    • Coronary artery disease    • Dizziness    • Hearing aid worn     left   • Hearing loss    • Hyperlipidemia    • Hypertension    • Kidney stones    • Macrocytosis    • Neoplasm of skin    • Osteoporosis    • Prediabetes    • Vitamin D deficiency     at least 3 stable    Past Surgical History:  Past Surgical History:   Procedure Laterality Date   • CARDIAC CATHETERIZATION N/A 5/22/2017    Procedure: Left Heart Cath;  Surgeon: Maninder Quezada MD;  Location: [facility] CATH INVASIVE LOCATION;  Service:    • CARDIAC CATHETERIZATION N/A 5/23/2017    Procedure: Stent BMS coronary;  Surgeon: Maninder Quezada MD;  Location: [facility] CATH INVASIVE LOC[facility];  Service:    • CARDIAC CATHETERIZATION N/A 8/8/2019    Procedure: Left Heart Cath;  Surgeon: Maninder Quezada MD;  Location: [facility] CATH INVASIVE LOCATION;  Service: Cardiology   • CARDIAC CATHETERIZATION N/A 8/8/2019    Procedure: Left ventriculography;  Surgeon: Maninder Quezada MD;  Location: [facility] CATH INVASIVE LOCATION;  Service: Cardiology   • CARDIAC CATHETERIZATION N/A 8/8/2019    Procedure: Coronary angiography;  Surgeon: Maninder Quezada MD;  Location: [facility] CATH INVASIVE  LOCATION;  Service: Cardiology   • CARDIAC CATHETERIZATION N/A 8/20/2019    Procedure: CORONARY ANGIOGRAPHY;  Surgeon: Maninder Quezada MD;  Location:  [facility] CATH INVASIVE LOCATION;  Service: Cardiovascular   • CARDIAC CATHETERIZATION N/A 8/20/2019    Procedure: Stent BMS coronary;  Surgeon: Maninder Quezada MD;  Location:  [facility] CATH INVASIVE LOCATION;  Service: Cardiovascular   • HERNIA REPAIR      x 2   • ME RT/LT HEART CATHETERS N/A 5/23/2017    Procedure: Percutaneous Coronary Intervention;  Surgeon: Maninder Quezada MD;  Location:  [facility] CATH INVASIVE LOCATION;  Service: Cardiovascular        Social History:   Social History     Tobacco Use   • Smoking status: Never Smoker   • Smokeless tobacco: Never Used   Substance Use Topics   • Alcohol use: No     Comment: quit 30 years ago         Family History:  Family History   Family history unknown: Yes          Allergies:  No Known Allergies    Home Meds:  No current facility-administered medications on file prior to encounter.      Current Outpatient Medications on File Prior to Encounter   Medication Sig Dispense Refill   • acetaminophen (TYLENOL) 500 MG tablet 1-2 TABLETS BY ONCE TO TWICE DAILY AS NEEDED FOR PAIN 60 tablet 0   • amLODIPine (NORVASC) 5 MG tablet Take 1 tablet by mouth Daily. 30 tablet 12   • aspirin 81 MG chewable tablet Chew 1 tablet Daily.     • atorvastatin (LIPITOR) 20 MG tablet TAKE 1 TABLET BY MOUTH EVERY NIGHT 30 tablet 1   • Cholecalciferol (VITAMIN D-3) 1000 UNITS capsule Take 1,000 Units by mouth daily.     • Elastic Bandages & Supports (LUMBAR BACK BRACE/SUPPORT PAD) misc Use for lifting/ strenuous exercise. 1 each 0   • isosorbide mononitrate (IMDUR) 60 MG 24 hr tablet TAKE 1 TABLET BY MOUTH EVERY DAY 30 tablet 1   • losartan (COZAAR) 50 MG tablet TAKE 1 TABLET BY MOUTH DAILY 30 tablet 1   • metoprolol succinate XL (TOPROL-XL) 100 MG 24 hr tablet Take 1 tablet by mouth Daily. 30 tablet 3   • Misc. Devices (COMMODE  "3-IN-1) misc 1 each Daily. 1 each 0   • ticagrelor (BRILINTA) 90 MG tablet tablet Take 1 tablet by mouth 2 (Two) Times a Day. 60 tablet 5         Scheduled Meds:           INTAKE AND OUTPUT:  No intake or output data in the 24 hours ending 19 1618        Review of Systems  Constitutional: No wt loss, fever   Gastrointestinal: No nausea , abdominal pain  Behavioral/Psych: No insomnia or anxiety   Cardiovascular ----positive for syncope. All other systems reviewed and are negative             Physical Exam  /61 (BP Location: Left arm, Patient Position: Lying)   Pulse 85   Temp 98 °F (36.7 °C) (Oral)   Resp 20   Ht 185.4 cm (73\")   Wt 91.2 kg (201 lb 1 oz)   SpO2 97%   BMI 26.53 kg/m²     General appearance: No acute changes   Eyes: Sclera conjunctiva normal, pupils reactive   HENT: Atraumatic; oropharynx clear with moist mucous membranes and no mucosal ulcerations;  Neck: Trachea midline; NECK, supple, no thyromegaly or lymphadenopathy   Lungs: Normal size and shape, normal breath sounds, equal distribution of air, no rales and rhonchi   CV: S1-S2 regular, sys murmurs, no rub, no gallop   Abdomen: Soft, non-tender; no masses , no abnormal abdominal sounds   Extremities: No deformity , normal color , no peripheral edema   Skin: Normal temperature, turgor and texture; no rash, ulcers  Psych: Appropriate affect, alert and oriented to person, place and time                                Cardiographics  EC/6/19 SR with 1st degree AVB rate 80s      19 SR with LBBB and 1st degree AVB rate 80s        Telemetry:     SR with 1st degree AVB rate 70s      ECHO:  19  Interpretation Summary     · Left ventricular systolic function is low normal. Calculated EF = 47%. Estimated EF was in agreement with the calculated EF. Estimated EF = 50%. Normal left ventricular cavity size noted. All left ventricular wall segments contract normally. Left ventricular wall thickness is consistent with " moderate concentric hypertrophy. Left ventricular diastolic dysfunction is noted (grade I) consistent with impaired relaxation.  · Left atrial volume is severely increased. Right atrial cavity size is moderately dilated.  · Moderate aortic valve regurgitation is present  · Mild MAC is present. Mild mitral valve regurgitation is present.  · Physiologic tricuspid valve regurgitation is present. Insufficient TR velocity profile to estimate the right ventricular systolic pressure.       Stress test  8/7/19  Interpretation Summary     · Findings consistent with an abnormal ECG stress test.  · Left ventricular ejection fraction is mildly reduced (Calculated EF = 46%).  · Myocardial perfusion imaging indicates a medium-sized, moderately severe area of ischemia located in the inferior wall and lateral wall.  · Impressions are consistent with a high risk study.       Cardiac catheterization  8/20/19  RESULTS:  1. Initial aortic pressure was approximately 130/80, and the patient remained hemodynamically stable through the case.  2. Initial coronary arteriography showed 99% subtotally occluded diagonal branch reduced to 0% with 2.0/15 vision stent dilated to 2.2.     IVONNE  FLOW   PRE      3    POST  3     TYPE OF LESION    C     RECOMMENDATIONS:  The patient has had an excellent result from intervention.  The patient will be maintained on antiplatelet therapy and follow up with me and his primary care physician,   Importance of taking dual antiplatelets for one year  has been explained, risk of stent thrombosis leading to the acute MI, which carries high morbidity and mortality has been explained     Discontinuation or interruptions of these medications should be under the strict guidance of appropriate health professional        Lab Review:  Results from last 7 days   Lab Units 11/06/19  1400 11/02/19  0536 11/01/19  2119   TROPONIN T ng/mL 0.017 0.018 0.016     Results from last 7 days   Lab Units 11/06/19  1400   MAGNESIUM  mg/dL 2.1     Results from last 7 days   Lab Units 11/06/19  1400   SODIUM mmol/L 143   POTASSIUM mmol/L 3.7   BUN mg/dL 25*   CREATININE mg/dL 0.92   CALCIUM mg/dL 9.0       Results from last 7 days   Lab Units 11/06/19  1400 11/05/19  0443 11/02/19  0536   WBC 10*3/mm3 6.19 4.69 6.23   HEMOGLOBIN g/dL 12.5* 12.3* 12.7*   HEMATOCRIT % 38.6 35.9* 40.3   PLATELETS 10*3/mm3 164 101* 118*     Results from last 7 days   Lab Units 11/02/19  0536   INR  1.09     CT head  11/6/19  IMPRESSION:     Moderate-to-severe changes of chronic small vessel ischemic phenomenon  are identified and there are findings compatible with a chronic infarct  within the right radiata and within the right cerebellar hemisphere. Old  lacunar disease is identified within the left caudate head. Otherwise,  there is no evidence to suggest acute intracranial pathology. If further  assessment for a potential seizure focus is indicated, further  evaluation could be performed with MR imaging for more sensitive and  specific assessment.     These findings and recommendations were discussed with Dr. Kidd on  11/06/2019 at approximately 3:47 PM.      Radiation dose reduction techniques were utilized, including automated  exposure control and exposure modulation based on body size      CXR:  11/6/19  PORTABLE CHEST     HISTORY: Pneumonia.     FINDINGS: A single view of the chest demonstrates moderate cardiomegaly.  There is moderate infiltrate and effusion of the left lung base  increased versus prior examination. There is increased interstitial  prominence involving the right lung centrally and to a lesser extent  right lung base increased versus 11/04/2019. There is mild cephalization  of pulmonary vasculature.     Pneumothorax is noted on the left which has increased in size as  compared to prior examination now extending more inferiorly along the  lateral aspect of the hemithorax.     The above information was called to and discussed with   Marti.     This report was finalized on 11/6/2019 3:41 PM by Dr. Eric Guevara M.D.       Estimated Creatinine Clearance: 57.4 mL/min (by C-G formula based on SCr of 0.92 mg/dL).    I have reviewed the medical decision making with Dr. Quezada in detail.     Assessment / Plan:  1.  Seizure-like activity  2.  CAD  3.  Pneumothorax  4.  Hypertension  5.  Hyperlipidemia  6.  Paroxysmal atrial fibrillation  7.  Prediabetes  8.  Anemia    Recommendations:  Patient presented with seizure-like activity the emergency department today.  Blood pressure was low normal on arrival, now has stabilized and is 144/69.  Electrolytes stable and within normal limits.  CT scan of head shows moderate to severe changes of chronic small vessel ischemic phenomenon follow-up recommended.  Troponin indeterminate at 0.017, normal per baseline.  TSH normal.  Lipid panel performed November 2 shows good control on statin therapy.  Home medications to include amlodipine, aspirin, statin, isosorbide, ARB, and metoprolol as well as Brilinta.  Due to recent stenting patient is to be on dual antiplatelet therapy for 1 year, until August 2020.  Currently sinus rhythm on telemetry with first-degree AV block, previously noted.  History of paroxysmal atrial fibrillation, not anticoagulated historically due to age and frailty.  Discussed with MD, ECG appears to be flutter on arrival.  Will ask EP to see patient regarding possible ablation.        Labs/tests ordered: Consult EP, consult internal medicine will resume home medications at lower dose of amlodipine.  BMP, mag, CBC in a.m.    Tabatha Watson, CANDELARIA  11/6/2019  4:18 PM    Patient personally interviewed and above subjective findings personally confirmed during a face to face contact with patient today  All findings of physical examination confirmed  All pertinent and performed labs, cardiac procedures ,  radiographs of the last 24 hours personally reviewed  Impression and plans  "discussed/elaborated and implemented jointly as described above     MD LÓPEZ Cisneros/Transcription:   \"Dictated utilizing Dragon dictation\".   "

## 2019-11-06 NOTE — OUTREACH NOTE
Prep Survey      Responses   Facility patient discharged from?  [facility]   Is patient eligible?  No   What are the reasons patient is not eligible?  Readmitted   Does the patient have one of the following disease processes/diagnoses(primary or secondary)?  Other   Prep survey completed?  Yes          Leeanna Kruse RN

## 2019-11-07 LAB
ANION GAP SERPL CALCULATED.3IONS-SCNC: 12 MMOL/L (ref 5–15)
BASOPHILS # BLD AUTO: 0.04 10*3/MM3 (ref 0–0.2)
BASOPHILS NFR BLD AUTO: 1 % (ref 0–1.5)
BUN BLD-MCNC: 23 MG/DL (ref 8–23)
BUN/CREAT SERPL: 35.4 (ref 7–25)
CALCIUM SPEC-SCNC: 8.5 MG/DL (ref 8.2–9.6)
CHLORIDE SERPL-SCNC: 107 MMOL/L (ref 98–107)
CO2 SERPL-SCNC: 22 MMOL/L (ref 22–29)
CREAT BLD-MCNC: 0.65 MG/DL (ref 0.76–1.27)
DEPRECATED RDW RBC AUTO: 57 FL (ref 37–54)
EOSINOPHIL # BLD AUTO: 0.25 10*3/MM3 (ref 0–0.4)
EOSINOPHIL NFR BLD AUTO: 6.3 % (ref 0.3–6.2)
ERYTHROCYTE [DISTWIDTH] IN BLOOD BY AUTOMATED COUNT: 15.3 % (ref 12.3–15.4)
GFR SERPL CREATININE-BSD FRML MDRD: 115 ML/MIN/1.73
GLUCOSE BLD-MCNC: 90 MG/DL (ref 65–99)
HBA1C MFR BLD: 5.76 % (ref 4.8–5.6)
HCT VFR BLD AUTO: 36.2 % (ref 37.5–51)
HGB BLD-MCNC: 11.8 G/DL (ref 13–17.7)
IMM GRANULOCYTES # BLD AUTO: 0.03 10*3/MM3 (ref 0–0.05)
IMM GRANULOCYTES NFR BLD AUTO: 0.8 % (ref 0–0.5)
LYMPHOCYTES # BLD AUTO: 0.88 10*3/MM3 (ref 0.7–3.1)
LYMPHOCYTES NFR BLD AUTO: 22.2 % (ref 19.6–45.3)
MAGNESIUM SERPL-MCNC: 2.3 MG/DL (ref 1.7–2.3)
MCH RBC QN AUTO: 32.8 PG (ref 26.6–33)
MCHC RBC AUTO-ENTMCNC: 32.6 G/DL (ref 31.5–35.7)
MCV RBC AUTO: 100.6 FL (ref 79–97)
MONOCYTES # BLD AUTO: 0.45 10*3/MM3 (ref 0.1–0.9)
MONOCYTES NFR BLD AUTO: 11.3 % (ref 5–12)
NEUTROPHILS # BLD AUTO: 2.32 10*3/MM3 (ref 1.7–7)
NEUTROPHILS NFR BLD AUTO: 58.4 % (ref 42.7–76)
NRBC BLD AUTO-RTO: 0.3 /100 WBC (ref 0–0.2)
PLATELET # BLD AUTO: 145 10*3/MM3 (ref 140–450)
PMV BLD AUTO: 11.6 FL (ref 6–12)
POTASSIUM BLD-SCNC: 4 MMOL/L (ref 3.5–5.2)
RBC # BLD AUTO: 3.6 10*6/MM3 (ref 4.14–5.8)
SODIUM BLD-SCNC: 141 MMOL/L (ref 136–145)
WBC NRBC COR # BLD: 3.97 10*3/MM3 (ref 3.4–10.8)

## 2019-11-07 PROCEDURE — 83036 HEMOGLOBIN GLYCOSYLATED A1C: CPT | Performed by: NURSE PRACTITIONER

## 2019-11-07 PROCEDURE — 85025 COMPLETE CBC W/AUTO DIFF WBC: CPT | Performed by: NURSE PRACTITIONER

## 2019-11-07 PROCEDURE — 99232 SBSQ HOSP IP/OBS MODERATE 35: CPT | Performed by: NURSE PRACTITIONER

## 2019-11-07 PROCEDURE — 83735 ASSAY OF MAGNESIUM: CPT | Performed by: NURSE PRACTITIONER

## 2019-11-07 PROCEDURE — 80048 BASIC METABOLIC PNL TOTAL CA: CPT | Performed by: NURSE PRACTITIONER

## 2019-11-07 PROCEDURE — 99222 1ST HOSP IP/OBS MODERATE 55: CPT | Performed by: NURSE PRACTITIONER

## 2019-11-07 RX ORDER — AMLODIPINE BESYLATE 5 MG/1
5 TABLET ORAL
Status: DISCONTINUED | OUTPATIENT
Start: 2019-11-08 | End: 2019-11-08 | Stop reason: HOSPADM

## 2019-11-07 RX ADMIN — ISOSORBIDE MONONITRATE 60 MG: 60 TABLET ORAL at 09:37

## 2019-11-07 RX ADMIN — SODIUM CHLORIDE, PRESERVATIVE FREE 10 ML: 5 INJECTION INTRAVENOUS at 21:28

## 2019-11-07 RX ADMIN — AMLODIPINE BESYLATE 2.5 MG: 5 TABLET ORAL at 09:37

## 2019-11-07 RX ADMIN — ASPIRIN 81 MG: 81 TABLET, CHEWABLE ORAL at 09:38

## 2019-11-07 RX ADMIN — METOPROLOL SUCCINATE 100 MG: 100 TABLET, FILM COATED, EXTENDED RELEASE ORAL at 09:47

## 2019-11-07 RX ADMIN — TICAGRELOR 90 MG: 90 TABLET ORAL at 09:38

## 2019-11-07 RX ADMIN — SODIUM CHLORIDE, PRESERVATIVE FREE 10 ML: 5 INJECTION INTRAVENOUS at 09:47

## 2019-11-07 RX ADMIN — LOSARTAN POTASSIUM 50 MG: 50 TABLET, FILM COATED ORAL at 09:38

## 2019-11-07 RX ADMIN — ATORVASTATIN CALCIUM 20 MG: 20 TABLET, FILM COATED ORAL at 20:40

## 2019-11-07 RX ADMIN — TICAGRELOR 90 MG: 90 TABLET ORAL at 20:40

## 2019-11-07 NOTE — PROGRESS NOTES
Adult Nutrition  Assessment/PES    Patient Name:  Dinesh Churchill Sr.  YOB: 1928  MRN: [id number]  Admit Date:  11/6/2019    Assessment Date:  11/7/2019  Nutrition assessment triggered by MST score-3. Patient reported good appetite-75% of meals. Patient's son stated he has lost some weight gradual over the past several years.  Provided nutrition therapy. He agreed to nutritional supplement daily. RD to follow/monitor as needed.  Reason for Assessment     Row Name 11/07/19 1259          Reason for Assessment    Reason For Assessment  identified at risk by screening criteria     Diagnosis   Primary Problem:  Syncope and collapse; HTN, Parkinson's, Afib     Identified At Risk by Screening Criteria  MST SCORE 2+         Nutrition/Diet History     Row Name 11/07/19 1259          Nutrition/Diet History    Typical Food/Fluid Intake  fair po intake, gradual weight loss over the past several years          Anthropometrics     Row Name 11/07/19 1259          Body Mass Index (BMI)    BMI Assessment  BMI 25-29.9: overweight         Labs/Tests/Procedures/Meds     Row Name 11/07/19 1259          Labs/Procedures/Meds    Lab Results Reviewed  reviewed, pertinent        Diagnostic Tests/Procedures    Diagnostic Test/Procedure Reviewed  reviewed, pertinent        Medications    Pertinent Medications Reviewed  reviewed, pertinent         Physical Findings     Row Name 11/07/19 1300          Physical Findings    Overall Physical Appearance  -- B=20         Estimated/Assessed Needs     Row Name 11/07/19 1300          Calculation Measurements    Weight Used For Calculations  91.2 kg (201 lb 1 oz)        Estimated/Assessed Needs    Additional Documentation  KCAL/KG (Group);Protein Requirements (Group);Fluid Requirements (Group)        KCAL/KG    KCAL/KG  25 Kcal/Kg (kcal);20 Kcal/Kg (kcal)     20 Kcal/Kg (kcal)  1824     25 Kcal/Kg (kcal)  2280        Protein Requirements    Weight Used For Protein Calculations  91.2 kg  (201 lb 1 oz)     Est Protein Requirement Amount (gms/kg)  1.0 gm protein     Estimated Protein Requirements (gms/day)  91.2        Fluid Requirements    Estimated Fluid Requirements (mL/day)  1800     RDA Method (mL)  1800     [place]wick-Margo Method (over 20 kg)  3324         Nutrition Prescription Ordered     Row Name 11/07/19 1300          Nutrition Prescription PO    Common Modifiers  Cardiac         Evaluation of Received Nutrient/Fluid Intake     Row Name 11/07/19 1300          PO Evaluation    Number of Meals  1     % PO Intake  75               Problem/Interventions:  Problem 1     Row Name 11/07/19 1301          Nutrition Diagnoses Problem 1    Problem 1  Nutrition Appropriate for Condition at this Time               Intervention Goal     Row Name 11/07/19 1301          Intervention Goal    General  Maintain nutrition;Meet nutritional needs for age/condition     PO  Tolerate PO;Maintain intake     Weight  Maintain weight         Nutrition Intervention     Row Name 11/07/19 1301          Nutrition Intervention    RD/Tech Action  Interview for preference;Follow Tx progress;Care plan reviewd;Encourage intake;Recommend/ordered;Supplement provided     Recommended/Ordered  Supplement         Nutrition Prescription     Row Name 11/07/19 1301          Nutrition Prescription PO    PO Prescription  Begin/change supplement     Supplement  Boost Glucose Control     Supplement Frequency  Daily     New PO Prescription Ordered?  Yes         Education/Evaluation     Row Name 11/07/19 1301          Education    Education  Will Instruct as appropriate        Monitor/Evaluation    Monitor  Per protocol           Electronically signed by:  Iris Ness RD  11/07/19 1:01 PM

## 2019-11-07 NOTE — PLAN OF CARE
Problem: Patient Care Overview  Goal: Plan of Care Review  Outcome: Ongoing (interventions implemented as appropriate)   11/07/19 1836   Coping/Psychosocial   Plan of Care Reviewed With patient;son   Plan of Care Review   Progress no change   OTHER   Outcome Summary pt vital signs stable & afebrile during shift. pt NSR w/a 45 sec run of SVT. Dressing changed on left elbow/posterior forearm. pt up in chair for lunch and dinner. pt had no other acute issues. to have loop recorder placed in am. continue to monitor.        Problem: Fall Risk (Adult)  Goal: Absence of Fall  Outcome: Ongoing (interventions implemented as appropriate)   11/07/19 1836   Fall Risk (Adult)   Absence of Fall achieves outcome       Problem: Arrhythmia/Dysrhythmia (Symptomatic) (Adult)  Goal: Signs and Symptoms of Listed Potential Problems Will be Absent, Minimized or Managed (Arrhythmia/Dysrhythmia)  Outcome: Ongoing (interventions implemented as appropriate)   11/07/19 1836   Goal/Outcome Evaluation   Problems Assessed (Arrhythmia/Dysrhythmia) all   Problems Present (Dysrhythmia) situational response

## 2019-11-07 NOTE — PLAN OF CARE
Problem: Patient Care Overview  Goal: Plan of Care Review  Outcome: Ongoing (interventions implemented as appropriate)   11/07/19 0039   Coping/Psychosocial   Plan of Care Reviewed With patient   Plan of Care Review   Progress no change   OTHER   Outcome Summary Pt has no complaints of pain. Pt now in NSR and on RA. Pt is a&o x4. Dressing changed on L elbow skin tear. VSS. Continue to monitor. Safety maintained.        Problem: Fall Risk (Adult)  Goal: Absence of Fall  Outcome: Ongoing (interventions implemented as appropriate)      Problem: Arrhythmia/Dysrhythmia (Symptomatic) (Adult)  Goal: Signs and Symptoms of Listed Potential Problems Will be Absent, Minimized or Managed (Arrhythmia/Dysrhythmia)  Outcome: Ongoing (interventions implemented as appropriate)

## 2019-11-07 NOTE — PROGRESS NOTES
Discharge Planning Assessment  [facility]     Patient Name: Dinesh Churchill Sr.  MRN: [id number]  Today's Date: 11/7/2019    Admit Date: 11/6/2019    Discharge Needs Assessment     Row Name 11/07/19 1321       Living Environment    Lives With  alone    Current Living Arrangements  home/apartment/condo    Primary Care Provided by  self    Provides Primary Care For  no one, unable/limited ability to care for self    Family Caregiver if Needed  child(michael), adult    Family Caregiver Names  Son, Dinesh Churchill jr.    Quality of Family Relationships  helpful;involved;supportive    Able to Return to Prior Arrangements  yes       Resource/Environmental Concerns    Transportation Concerns  car, none       Transition Planning    Patient/Family Anticipates Transition to  home with family    Transportation Anticipated  family or friend will provide       Discharge Needs Assessment    Readmission Within the Last 30 Days  current reason for admission unrelated to previous admission    Concerns to be Addressed  denies needs/concerns at this time    Equipment Currently Used at Home  cane, quad;walker, standard;bath bench;grab bar    Anticipated Changes Related to Illness  none    Offered/Gave Vendor List  other (see comments) denies any discharge needs.    Current Discharge Risk  chronically ill;dependent with mobility/activities of daily living        Discharge Plan     Row Name 11/07/19 1323       Plan    Plan  home with family    Patient/Family in Agreement with Plan  yes    Plan Comments  Facesheet verified.  IMM 11/6.  Plan is home with family.  Has walker and cane at home.  Has son to help.  He denies any other discharge needs.  Education at discharge to make sure of understanding of medications would be good.  Son can transport at discharge.  CCP will continue to monitor.                  Demographic Summary     Row Name 11/07/19 1318       General Information    Admission Type  inpatient    Arrived From  home    Reason  for Consult  discharge planning    Preferred Language  English     Used During This Interaction  no       Contact Information    Permission Granted to Share Info With  family/designee    Contact Information Comments  son        Functional Status     Row Name 11/07/19 1319       Functional Status    Usual Activity Tolerance  good    Current Activity Tolerance  good       Functional Status, IADL    Medications  independent    Meal Preparation  independent    Housekeeping  independent    Shopping  independent       Mental Status    General Appearance WDL  WDL       Mental Status Summary    Recent Changes in Mental Status/Cognitive Functioning  no changes        Psychosocial     Row Name 11/07/19 1319       Values/Beliefs    Spiritual, Cultural Beliefs, Jainism Practices, Values that Affect Care  no       Behavior WDL    Behavior WDL  WDL       Emotion Mood WDL    Emotion/Mood/Affect WDL  WDL       Speech WDL    Speech WDL  WDL       Perceptual State WDL    Perceptual State WDL  WDL       Thought Process WDL    Thought Process WDL  WDL       Intellectual Performance WDL    Intellectual Performance WDL  WDL       Coping/Stress    Major Change/Loss/Stressor  none    Patient Personal Strengths  able to adapt;strong support system    Reaction to Health Status  accepting    Understanding of Condition and Treatment  adequate understanding of medical condition       Developmental Stage (Eriksson's)    Developmental Stage  Stage 8 (65 years-death/Late Adulthood) Integrity vs. Despair        Abuse/Neglect     Row Name 11/07/19 1320       Personal Safety    Feels Unsafe at Home or Work/School  no    Feels Threatened by Someone  no    Does Anyone Try to Keep You From Having Contact with Others or Doing Things Outside Your Home?  no    Physical Signs of Abuse Present  no                Shannon Epley, RN

## 2019-11-07 NOTE — CONSULTS
Patient Name:  Dinesh Churchill Sr.  YOB: 1928  MRN:  [id number]  Date of Admission:  11/6/2019  Date of Consult:  11/7/2019  Patient Care Team:  Phyllis Loivng PA as PCP - General (Family Medicine)  Juliana Jesus APRN as PCP - Claims Attributed  Brandon Ricci MD Thomas, Melissa, PA as Referring Physician (Family Medicine)  John Callejas II, MD as Consulting Physician (Hematology and Oncology)  Maninder Quezada MD as Consulting Physician (Cardiology)    Consults  Evaluate status and make recommendations regarding treatment for pre-diabetes and anemia.  Subjective   History of Present Illness  Mr. Churchill is a 89 y.o. male with a history of macrocytic anemia, pre-diabetes, CAD, PAF and b12 deficiency that has been admitted to [facility] after having seizure like activity vs syncope .  He has been admitted to a telemetry floor following and we were asked to see and assist with his medical problems, specifically relating to his prediabetes and anemia.  At the time of my visit he denies any over signs of bleeding, chest pain, SOA, nausea, vomiting or diarrhea.       Past Medical History:   Diagnosis Date   • Abnormal ECG    • Abnormal MRI    • Acute frontal sinusitis    • Arthritis    • Chronic fatigue    • Coronary artery disease    • Dizziness    • Hearing aid worn     left   • Hearing loss    • Hyperlipidemia    • Hypertension    • Kidney stones    • Macrocytosis    • Neoplasm of skin    • Osteoporosis    • Prediabetes    • Vitamin D deficiency      Past Surgical History:   Procedure Laterality Date   • CARDIAC CATHETERIZATION N/A 5/22/2017    Procedure: Left Heart Cath;  Surgeon: Maninder Quezada MD;  Location: [facility] CATH INVASIVE LOCATION;  Service:    • CARDIAC CATHETERIZATION N/A 5/23/2017    Procedure: Stent BMS coronary;  Surgeon: Maninder Quezada MD;  Location: [facility] CATH INVASIVE LOCATION;  Service:    • CARDIAC CATHETERIZATION N/A 8/8/2019     Procedure: Left Heart Cath;  Surgeon: Maninder Quezada MD;  Location:  [facility] CATH INVASIVE LOCATION;  Service: Cardiology   • CARDIAC CATHETERIZATION N/A 8/8/2019    Procedure: Left ventriculography;  Surgeon: Maninder Quezada MD;  Location:  [facility] CATH INVASIVE LOCATION;  Service: Cardiology   • CARDIAC CATHETERIZATION N/A 8/8/2019    Procedure: Coronary angiography;  Surgeon: Maninder Quezada MD;  Location: [facility] CATH INVASIVE LOCATION;  Service: Cardiology   • CARDIAC CATHETERIZATION N/A 8/20/2019    Procedure: CORONARY ANGIOGRAPHY;  Surgeon: Maninder Quezada MD;  Location:  [facility] CATH INVASIVE LOCATION;  Service: Cardiovascular   • CARDIAC CATHETERIZATION N/A 8/20/2019    Procedure: Stent BMS coronary;  Surgeon: Maninder Quezada MD;  [facility];  Service: Cardiovascular   • HERNIA REPAIR      x 2   • [place] RT/LT HEART CATHETERS N/A 5/23/2017    Procedure: Percutaneous Coronary Intervention;  Surgeon: Maninder Quezada MD;  Location: [facility] CATH INVASIVE LOCATION;  Service: Cardiovascular     Family History   Family history unknown: Yes     Social History     Tobacco Use   • Smoking status: Never Smoker   • Smokeless tobacco: Never Used   Substance Use Topics   • Alcohol use: No     Comment: quit 30 years ago    • Drug use: No     Medications Prior to Admission   Medication Sig Dispense Refill Last Dose   • acetaminophen (TYLENOL) 500 MG tablet 1-2 TABLETS BY ONCE TO TWICE DAILY AS NEEDED FOR PAIN 60 tablet 0 Taking   • amLODIPine (NORVASC) 5 MG tablet Take 1 tablet by mouth Daily. 30 tablet 12    • aspirin 81 MG chewable tablet Chew 1 tablet Daily.   Taking   • atorvastatin (LIPITOR) 20 MG tablet TAKE 1 TABLET BY MOUTH EVERY NIGHT 30 tablet 1    • Cholecalciferol (VITAMIN D-3) 1000 UNITS capsule Take 1,000 Units by mouth daily.   Taking   • Elastic Bandages & Supports (LUMBAR BACK BRACE/SUPPORT PAD) misc Use for lifting/ strenuous exercise. 1 each 0  Taking   • isosorbide mononitrate (IMDUR) 60 MG 24 hr tablet TAKE 1 TABLET BY MOUTH EVERY DAY 30 tablet 1    • losartan (COZAAR) 50 MG tablet TAKE 1 TABLET BY MOUTH DAILY 30 tablet 1    • metoprolol succinate XL (TOPROL-XL) 100 MG 24 hr tablet Take 1 tablet by mouth Daily. 30 tablet 3    • Misc. Devices (COMMODE 3-IN-1) misc 1 each Daily. 1 each 0 Taking   • ticagrelor (BRILINTA) 90 MG tablet tablet Take 1 tablet by mouth 2 (Two) Times a Day. 60 tablet 5 Taking     Allergies:  Patient has no known allergies.    Review of Systems   Constitutional: Negative for activity change and appetite change.   HENT: Negative for congestion and sinus pain.    Eyes: Negative.    Respiratory: Negative for chest tightness and shortness of breath.    Cardiovascular: Negative.    Gastrointestinal: Negative for abdominal distention and abdominal pain.   Endocrine: Negative for cold intolerance and heat intolerance.   Genitourinary: Negative for difficulty urinating and dysuria.   Musculoskeletal: Negative for back pain and gait problem.   Allergic/Immunologic: Negative for environmental allergies and food allergies.   Neurological: Positive for dizziness, syncope and headaches.   Hematological: Negative.    Psychiatric/Behavioral: Negative for agitation and behavioral problems.       Objective      Vital Signs  Temp:  [97.3 °F (36.3 °C)-98.2 °F (36.8 °C)] 97.6 °F (36.4 °C)  Heart Rate:  [] 64  Resp:  [16-20] 18  BP: ()/(61-81) 150/74  Body mass index is 26.53 kg/m².    Physical Exam   Constitutional: He is oriented to person, place, and time. He appears well-developed and well-nourished. No distress.   elderly, frail   HENT:   Head: Normocephalic and atraumatic.   Eyes: Conjunctivae and EOM are normal.   Neck: Normal range of motion. Neck supple.   Cardiovascular: Normal rate. A regularly irregular rhythm present.   Pulmonary/Chest: Effort normal and breath sounds normal. No respiratory distress.   Abdominal: Soft. Bowel  sounds are normal. He exhibits no distension. There is no tenderness.   Musculoskeletal: Normal range of motion. He exhibits no edema.   Neurological: He is alert and oriented to person, place, and time.   Skin: Skin is warm and dry.   Psychiatric: He has a normal mood and affect. His behavior is normal.   Nursing note and vitals reviewed.      Results Review:   I reviewed the patient's new clinical results.  I reviewed the patient's new imaging results and agree with the interpretation.  I reviewed the patient's other test results and agree with the interpretation  I personally viewed and interpreted the patient's EKG/Telemetry data         Assessment/Plan     Active Hospital Problems    Diagnosis POA   • Syncope and collapse [R55] Yes   • Paroxysmal atrial fibrillation (CMS/HCC) [I48.0] Yes   • Parkinson's disease (CMS/HCC) [G20] Yes   • Macrocytic anemia [D53.9] Yes   • Dyslipidemia [E78.5] Yes   • B12 deficiency [E53.8] Yes   • Essential hypertension [I10] Yes   • Prediabetes [R73.03] Yes     Anemia, very mild  - known to have macrocytic anemia and b12 deficiency. hgb stable at this time and he shows no sings of overt bleeding. follows with his PCP. No further workup at this time.    Pre-diabetes  - last a1c 5.3 in August, will repeat  - likely would offer lifestyle modifications given age      Thank you very much for asking A to be involved in this patient's care. We will follow along.      CANDELARIA Silver  [facility]  11/07/19  9:24 AM

## 2019-11-07 NOTE — PLAN OF CARE
Problem: Patient Care Overview  Goal: Plan of Care Review  Outcome: Ongoing (interventions implemented as appropriate)   11/06/19 2014   Coping/Psychosocial   Plan of Care Reviewed With patient   Plan of Care Review   Progress no change   OTHER   Outcome Summary Pt admitted today following syncopal episode at home. Monitor in ER showed SVT. Treated with IV Metoprolol. Pt converted back to NSR.      Goal: Individualization and Mutuality  Outcome: Ongoing (interventions implemented as appropriate)    Goal: Discharge Needs Assessment  Outcome: Ongoing (interventions implemented as appropriate)    Goal: Interprofessional Rounds/Family Conf  Outcome: Ongoing (interventions implemented as appropriate)      Problem: Fall Risk (Adult)  Goal: Identify Related Risk Factors and Signs and Symptoms  Outcome: Outcome(s) achieved Date Met: 11/06/19    Goal: Absence of Fall  Outcome: Ongoing (interventions implemented as appropriate)      Problem: Pain, Chronic (Adult)  Goal: Identify Related Risk Factors and Signs and Symptoms  Outcome: Outcome(s) achieved Date Met: 11/06/19    Goal: Acceptable Pain/Comfort Level and Functional Ability  Outcome: Ongoing (interventions implemented as appropriate)      Problem: Arrhythmia/Dysrhythmia (Symptomatic) (Adult)  Goal: Signs and Symptoms of Listed Potential Problems Will be Absent, Minimized or Managed (Arrhythmia/Dysrhythmia)  Outcome: Ongoing (interventions implemented as appropriate)

## 2019-11-07 NOTE — CONSULTS
Electrophysiology Hospital Consult            Patient Name: Dinesh Churchill Sr.  Age/Sex: 89 y.o. male  : 1928  MRN: [id number]    Date of Admission: 2019  Date of Encounter Visit: 19  Encounter Provider: CANDELARIA Patel  Referring Provider: Maninder Quezada MD  Place of Service: [facility] CARDIOLOGY  Patient Care Team:  Phyllis Loving PA as PCP - General (Family Medicine)  Juliana Jesus APRN as PCP - Claims Attributed  Brandon Ricci MD Thomas, Melissa, PA as Referring Physician (Family Medicine)  Júnior, John ELLIS II, MD as Consulting Physician (Hematology and Oncology)  Maninder Quezada MD as Consulting Physician (Cardiology)      Subjective:   EP Consultation for: Tachycardia, possible atrial flutter    Chief Complaint: Brief seizure like activity per family    History of Present Illness:  Dinesh Churchill Sr. is a 89 y.o. male patient of Dr. Quezada's with history of CAD, s/p stents, HTN PAF and atrial tachycardia (). He presented to ED yesterday with son who witnessed him sitting in recliner, he began shaking, eyes rolled back in his head, jerking motion and was unresponsive for 15-20 seconds. He was back to baseline by the time he arrived and has no memory of events.     He was recently admitted for chest pain from - and was found to have a pneumothorax that did not require any intervention.     Initial EKG showed a tachycardia, 's, thought to be a possible atrial flutter so EP was consulted for possible ablation. It looks like he spontaneously converted to SR with one dose of IV metoprolol. Per the chart he has a history of PAF but is not anticoagulated due to frailty.     He was seen by Dr. Saldana in the hospital in 2017 for what was deemed AT and just treated with metoprolol.    He denies chest pain, shortness of breath, PND, orthopnea or edema.     He says he does have some occasional palpitations and  "\"heart flutters\" but did not remember having any of that prior to his episode that brought him to hospital.     Past Medical History:  Past Medical History:   Diagnosis Date   • Abnormal ECG    • Abnormal MRI    • Acute frontal sinusitis    • Arthritis    • Chronic fatigue    • Coronary artery disease    • Dizziness    • Hearing aid worn     left   • Hearing loss    • Hyperlipidemia    • Hypertension    • Kidney stones    • Macrocytosis    • Neoplasm of skin    • Osteoporosis    • Prediabetes    • Vitamin D deficiency        Past Surgical History:   Procedure Laterality Date   • CARDIAC CATHETERIZATION N/A 5/22/2017    Procedure: Left Heart Cath;  Surgeon: Maninder Quezada MD;  Location: [facility] CATH INVASIVE LOCATION;  Service:    • CARDIAC CATHETERIZATION N/A 5/23/2017    Procedure: Stent BMS coronary;  Surgeon: Maninder Quezada MD;  Location: [facility] CATH INVASIVE LOCATION;  Service:    • CARDIAC CATHETERIZATION N/A 8/8/2019    Procedure: Left Heart Cath;  Surgeon: Maninder Quezada MD;  Location: [facility] CATH INVASIVE LOCATION;  Service: Cardiology   • CARDIAC CATHETERIZATION N/A 8/8/2019    Procedure: Left ventriculography;  Surgeon: Maninder Quezada MD;  Location: [facility] CATH INVASIVE LOCATION;  Service: Cardiology   • CARDIAC CATHETERIZATION N/A 8/8/2019    Procedure: Coronary angiography;  Surgeon: Maninder Quezada MD;  Location: [facility] CATH INVASIVE LOCATION;  Service: Cardiology   • CARDIAC CATHETERIZATION N/A 8/20/2019    Procedure: CORONARY ANGIOGRAPHY;  Surgeon: Maninder Quezada MD;  Location: [facility] CATH INVASIVE LOCATION;  Service: Cardiovascular   • CARDIAC CATHETERIZATION N/A 8/20/2019    Procedure: Stent BMS coronary;  Surgeon: Maninder Quezada MD;  Location: [facility] CATH INVASIVE LOCATION;  Service: Cardiovascular   • HERNIA REPAIR      x 2   • [place] RT/LT HEART CATHETERS N/A 5/23/2017    Procedure: Percutaneous Coronary Intervention;  Surgeon: Maninder Quezada" MD;  Location: [facility] INVASIVE LOCATION;  Service: Cardiovascular       Home Medications:   Medications Prior to Admission   Medication Sig Dispense Refill Last Dose   • acetaminophen (TYLENOL) 500 MG tablet 1-2 TABLETS BY ONCE TO TWICE DAILY AS NEEDED FOR PAIN 60 tablet 0 Taking   • amLODIPine (NORVASC) 5 MG tablet Take 1 tablet by mouth Daily. 30 tablet 12    • aspirin 81 MG chewable tablet Chew 1 tablet Daily.   Taking   • atorvastatin (LIPITOR) 20 MG tablet TAKE 1 TABLET BY MOUTH EVERY NIGHT 30 tablet 1    • Cholecalciferol (VITAMIN D-3) 1000 UNITS capsule Take 1,000 Units by mouth daily.   Taking   • Elastic Bandages & Supports (LUMBAR BACK BRACE/SUPPORT PAD) misc Use for lifting/ strenuous exercise. 1 each 0 Taking   • isosorbide mononitrate (IMDUR) 60 MG 24 hr tablet TAKE 1 TABLET BY MOUTH EVERY DAY 30 tablet 1    • losartan (COZAAR) 50 MG tablet TAKE 1 TABLET BY MOUTH DAILY 30 tablet 1    • metoprolol succinate XL (TOPROL-XL) 100 MG 24 hr tablet Take 1 tablet by mouth Daily. 30 tablet 3    • Misc. Devices (COMMODE 3-IN-1) misc 1 each Daily. 1 each 0 Taking   • ticagrelor (BRILINTA) 90 MG tablet tablet Take 1 tablet by mouth 2 (Two) Times a Day. 60 tablet 5 Taking       Allergies:  No Known Allergies    Past Social History:  Social History     Socioeconomic History   • Marital status:      Spouse name: Not on file   • Number of children: Not on file   • Years of education: Not on file   • Highest education level: Not on file   Occupational History     Employer: RETIRED   Tobacco Use   • Smoking status: Never Smoker   • Smokeless tobacco: Never Used   Substance and Sexual Activity   • Alcohol use: No     Comment: quit 30 years ago    • Drug use: No   • Sexual activity: Defer       Past Family History:  Family History   Family history unknown: Yes       Review of Systems: All systems reviewed. Pertinent positives identified in HPI. All other systems are negative.     14 point ROS was performed and  is negative except as outlined in HPI.     Objective:     Objective:  Vital Signs (last 24 hours)       11/06 0700  -  11/07 0659 11/07 0700  -  11/07 1504   Most Recent    Temp (°F) 97.3 -  98.2      97.6     97.6 (36.4)    Heart Rate 64 -  (!)148      64     64    Resp 16 -  20      18     18    BP 93/66 -  149/61      150/74     150/74    SpO2 (%) 93 -  97      96     96        Temp:  [97.3 °F (36.3 °C)-98 °F (36.7 °C)] 97.6 °F (36.4 °C)  Heart Rate:  [] 64  Resp:  [18-20] 18  BP: (116-150)/(61-78) 150/74  Body mass index is 26.53 kg/m².        Physical Exam:     General Appearance: No acute distress, elderly gentleman.   Eyes: Conjunctiva and lids: No erythema, swelling, or discharge. Sclera non-icteric.   HENT: Atraumatic, normocephalic. External eyes, ears, and nose normal.   Respiratory: No signs of respiratory distress. Respiration rhythm and depth normal.   Clear to auscultation. No rales, crackles, rhonchi, or wheezing auscultated.   Cardiovascular:  Heart Rate and Rhythm: Normal, Heart Sounds: Normal S1 and S2. No S3 or S4 noted.  Murmurs: No murmurs noted. No rubs, thrills, or gallops.   Arterial Pulses: Posterior tibialis and dorsalis pedis pulses normal.   Lower Extremities: No edema noted.  Gastrointestinal:  Abdomen soft, non-distended, non-tender.  Musculoskeletal: Normal movement of extremities  Skin: Warm and dry.   Psychiatric: Patient alert and oriented to person, place, and time. Speech and behavior appropriate. Normal mood and affect.    Labs:   Lab Review:     Results from last 7 days   Lab Units 11/07/19  0340 11/06/19  1809 11/06/19  1400 11/05/19  0443 11/02/19  0536 11/01/19  1549   SODIUM mmol/L 141 144 143 144 140 137   POTASSIUM mmol/L 4.0 4.1 3.7 3.5 3.5 4.2   CHLORIDE mmol/L 107 108* 107 110* 106 102   CO2 mmol/L 22.0 21.9* 23.7 22.0 25.1 23.1   BUN mg/dL 23 25* 25* 23 19 20   CREATININE mg/dL 0.65* 0.82 0.92 0.67* 0.73* 0.93   GLUCOSE mg/dL 90 108* 123* 94 70 102*   CALCIUM  mg/dL 8.5 8.8 9.0 8.5 8.6 9.1   AST (SGOT) U/L  --   --  13  --   --  16   ALT (SGPT) U/L  --   --  10  --   --  14     Results from last 7 days   Lab Units 19  1400 19  0536 19  2119   TROPONIN T ng/mL 0.017 0.018 0.016     Results from last 7 days   Lab Units 19  0340   WBC 10*3/mm3 3.97   HEMOGLOBIN g/dL 11.8*   HEMATOCRIT % 36.2*   PLATELETS 10*3/mm3 145     Results from last 7 days   Lab Units 19  0536   INR  1.09     Results from last 7 days   Lab Units 19  0536   CHOLESTEROL mg/dL 131     Results from last 7 days   Lab Units 19  0340   MAGNESIUM mg/dL 2.3     Results from last 7 days   Lab Units 19  0536   CHOLESTEROL mg/dL 131   TRIGLYCERIDES mg/dL 83   HDL CHOL mg/dL 40   LDL CHOL mg/dL 74             Results from last 7 days   Lab Units 19  1400   TSH uIU/mL 1.650           Imagin/2019 Echo:    Interpretation Summary     · Left ventricular systolic function is low normal. Calculated EF = 47%. Estimated EF was in agreement with the calculated EF. Estimated EF = 50%. Normal left ventricular cavity size noted. All left ventricular wall segments contract normally. Left ventricular wall thickness is consistent with moderate concentric hypertrophy. Left ventricular diastolic dysfunction is noted (grade I) consistent with impaired relaxation.  · Left atrial volume is severely increased. Right atrial cavity size is moderately dilated.  · Moderate aortic valve regurgitation is present  · Mild MAC is present. Mild mitral valve regurgitation is present.  · Physiologic tricuspid valve regurgitation is present. Insufficient TR velocity profile to estimate the right ventricular systolic pressure.     2019 Cath    RESULTS:  1. Initial aortic pressure was approximately 130/80, and the patient remained hemodynamically stable through the case.  2. Initial coronary arteriography showed 99% subtotally occluded diagonal branch reduced to 0% with 2.0/15 vision  "stent dilated to 2.2.     IVONNE  FLOW   PRE      3    POST  3     TYPE OF LESION    C     RECOMMENDATIONS:  The patient has had an excellent result from intervention.  The patient will be maintained on antiplatelet therapy and follow up with me and his primary care physician,   Importance of taking dual antiplatelets for one year  has been explained, risk of stent thrombosis leading to the acute MI, which carries high morbidity and mortality has been explained     Discontinuation or interruptions of these medications should be under the strict guidance of appropriate health professional    EKG:               I personally viewed and interpreted the patient's EKG/Telemetry tracings.    Assessment:       Prediabetes    Essential hypertension    B12 deficiency    Dyslipidemia    Macrocytic anemia    Parkinson's disease (CMS/HCC)    Paroxysmal atrial fibrillation (CMS/HCC)    Syncope and collapse        Plan:     Dr. Farmer and I saw patient this morning. He is back in SR and has not had recurrence. Likely a recurrence of his AT that responded well to beta blocker. Would not recommend ablation at this time. Would just continue treatment with BB. Do not think the tachycardia is related to the brief episode of \"seizure like activity\" that he came in with.     Thank you for allowing me to participate in the care of Dinesh Churchill Sr.. Feel free to contact me directly with any further questions or concerns.    CANDELARIA Patel  [facility]  11/07/19  3:04 PM    "

## 2019-11-07 NOTE — NURSING NOTE
Pt stated he passed kidney stone.    Upon assessment pt had bloody discharge from penis.  This nurse strained 250 ml of urine and there was sediment in the strainer that appeared hard and unable to push through the strainer.  Will continue to monitor patient.

## 2019-11-07 NOTE — PROGRESS NOTES
[facility]  Cardiology Progress Note    Patient Identification:  Name: Dinesh Churchill Sr.  Age: 89 y.o.  Sex: male  :  1928  MRN: [id number]                 Follow Up / Chief Complaint: syncope and collapse. Seizure like symptoms     Interval History: EP to see for aflutter with possible ablation       Subjective: He says he feels better. He denies chest pain and palps, syncope and near syncope.            Objective:    Past Medical History:  Past Medical History:   Diagnosis Date   • Abnormal ECG    • Abnormal MRI    • Acute frontal sinusitis    • Arthritis    • Chronic fatigue    • Coronary artery disease    • Dizziness    • Hearing aid worn     left   • Hearing loss    • Hyperlipidemia    • Hypertension    • Kidney stones    • Macrocytosis    • Neoplasm of skin    • Osteoporosis    • Prediabetes    • Vitamin D deficiency      Past Surgical History:  Past Surgical History:   Procedure Laterality Date   • CARDIAC CATHETERIZATION N/A 2017    Procedure: Left Heart Cath;  Surgeon: Maninder Quezada MD;  Location: [facility] CATH [facility]VASIVE LOCATION;  Service:    • CARDIAC CATHETERIZATION N/A 2017    Procedure: Stent BMS coronary;  Surgeon: Maninder Quezada MD;  [facility];  Service:    • CARDIAC CATHETERIZATION N/A 2019    Procedure: Left Heart Cath;  Surgeon: Maninder Quezada MD;  [facility];  Service: Cardiology   • CARDIAC CATHETERIZATION N/A 2019    Procedure: Left ventriculography;  Surgeon: Maninder Quezada MD;  [facility];  Service: Cardiology   • CARDIAC CATHETERIZATION N/A 2019    Procedure: Coronary angiography;  Surgeon: Maninder Quezada MD;  Location[facility];  Service: Cardiology   • CARDIAC CATHETERIZATION N/A 2019    Procedure: CORONARY ANGIOGRAPHY;  Surgeon: Maninder Quezada MD;  Location: [facility] C[facility]  "LOCATION;  Service: Cardiovascular   • CARDIAC CATHETERIZATION N/A 8/20/2019    Procedure: Stent BMS coronary;  Surgeon: Maninder Quezada MD;  Location:  [facility] CATH INVASIVE LOCATION;  Service: Cardiovascular   • HERNIA REPAIR      x 2   • [place] RT/LT HEART CATHETERS N/A 5/23/2017    Procedure: Percutaneous Coronary Intervention;  Surgeon: Maninder Quezada MD;  Location:  [facility] CATH INVASIVE LOCATION;  Service: Cardiovascular        Social History:   Social History     Tobacco Use   • Smoking status: Never Smoker   • Smokeless tobacco: Never Used   Substance Use Topics   • Alcohol use: No     Comment: quit 30 years ago       Family History:  Family History   Family history unknown: Yes          Allergies:  No Known Allergies  Scheduled Meds:    amLODIPine 2.5 mg Q24H   aspirin 81 mg Daily   atorvastatin 20 mg Nightly   isosorbide mononitrate 60 mg Daily   losartan 50 mg Daily   metoprolol succinate  mg Daily   sodium chloride 10 mL Q12H   ticagrelor 90 mg BID           INTAKE AND OUTPUT:    Intake/Output Summary (Last 24 hours) at 11/7/2019 1215  Last data filed at 11/7/2019 0445  Gross per 24 hour   Intake 240 ml   Output 100 ml   Net 140 ml       ROS  Constitutional: Awake and alert, no fever. No nosebleeds  Abdomen           no abdominal pain   Cardiac              no chest pain  Pulmonary          no shortness of breath      /63 (Patient Position: Sitting)   Pulse 67   Temp 97.6 °F (36.4 °C) (Oral)   Resp 18   Ht 185.4 cm (73\")   Wt 91.2 kg (201 lb 1 oz)   SpO2 96%   BMI 26.53 kg/m²   General appearance: No acute changes   Neck: Trachea midline; NECK, supple, no thyromegaly or lymphadenopathy   Lungs: Normal size and shape, normal breath sounds, equal distribution of air, no rales and rhonchi   CV: S1-S2 regular, no murmurs, no rub, no gallop   Abdomen: Soft, non-tender; no masses , no abnormal abdominal sounds   Extremities: No deformity , normal color , no peripheral edema   Skin: " Normal temperature, turgor and texture; no rash, ulcers            Cardiographics  Telemetry:   19 SR with 1st degree AVB       SR with 1st degree AVB rate 70s             EC/6/19 SR with 1st degree AVB rate 80s       19 SR with LBBB and 1st degree AVB rate 80s          ECHO:  19  Interpretation Summary      · Left ventricular systolic function is low normal. Calculated EF = 47%. Estimated EF was in agreement with the calculated EF. Estimated EF = 50%. Normal left ventricular cavity size noted. All left ventricular wall segments contract normally. Left ventricular wall thickness is consistent with moderate concentric hypertrophy. Left ventricular diastolic dysfunction is noted (grade I) consistent with impaired relaxation.  · Left atrial volume is severely increased. Right atrial cavity size is moderately dilated.  · Moderate aortic valve regurgitation is present  · Mild MAC is present. Mild mitral valve regurgitation is present.  · Physiologic tricuspid valve regurgitation is present. Insufficient TR velocity profile to estimate the right ventricular systolic pressure.         Stress test  19  Interpretation Summary      · Findings consistent with an abnormal ECG stress test.  · Left ventricular ejection fraction is mildly reduced (Calculated EF = 46%).  · Myocardial perfusion imaging indicates a medium-sized, moderately severe area of ischemia located in the inferior wall and lateral wall.  · Impressions are consistent with a high risk study.         Cardiac catheterization  19  RESULTS:  1. Initial aortic pressure was approximately 130/80, and the patient remained hemodynamically stable through the case.  2. Initial coronary arteriography showed 99% subtotally occluded diagonal branch reduced to 0% with 2.0/15 vision stent dilated to 2.2.     IVONNE  FLOW   PRE      3    POST  3     TYPE OF LESION    C     RECOMMENDATIONS:  The patient has had an excellent result from  intervention.  The patient will be maintained on antiplatelet therapy and follow up with me and his primary care physician,   Importance of taking dual antiplatelets for one year  has been explained, risk of stent thrombosis leading to the acute MI, which carries high morbidity and mortality has been explained     Discontinuation or interruptions of these medications should be under the strict guidance of appropriate health professional       Lab Review   Results from last 7 days   Lab Units 11/06/19  1400 11/02/19  0536 11/01/19  2119   TROPONIN T ng/mL 0.017 0.018 0.016     Results from last 7 days   Lab Units 11/07/19  0340   MAGNESIUM mg/dL 2.3     Results from last 7 days   Lab Units 11/07/19  0340   SODIUM mmol/L 141   POTASSIUM mmol/L 4.0   BUN mg/dL 23   CREATININE mg/dL 0.65*   CALCIUM mg/dL 8.5        Results from last 7 days   Lab Units 11/07/19  0340 11/06/19  1809 11/06/19  1400   WBC 10*3/mm3 3.97 4.96 6.19   HEMOGLOBIN g/dL 11.8* 12.8* 12.5*   HEMATOCRIT % 36.2* 37.4* 38.6   PLATELETS 10*3/mm3 145 146 164     Results from last 7 days   Lab Units 11/02/19  0536   INR  1.09     The following medical decision was discussed in detail with Dr. Quezada    Assessment:  1.  Seizure-like activity  2.  CAD  3.  Pneumothorax  4.  Hypertension  5.  Hyperlipidemia  6.  Paroxysmal atrial fibrillation  7.  Prediabetes  8.  Anemia      Plan:  Blood pressure slightly elevated systolically 150.  Will increase norvasc to 5 mg, and monitor. Heart rate 60s.  Currently sinus rhythm on telemetry.  Last echo showed EF 50%, see full report as above. Today's EKG showed SR with PVCs and LVH.  History of PAF, not anticoagulated historically due to age and frality.  Electrolytes within normal limits.  Per MD ECG appeared to be flutter on arrival. EP consulted for atrial flutter with possible ablation.  IM consulted for medical management.     Labs/tests ordered for am: Increase norvasc to 5 mg. bmp, and mag in am.  "      )11/7/2019  CANDELARIA Macias    Patient personally interviewed and above subjective findings personally confirmed during a face to face contact with patient today  All findings of physical examination confirmed  All pertinent and performed labs, cardiac procedures ,  radiographs of the last 24 hours personally reviewed  Impression and plans discussed/elaborated and implemented jointly as described above     Maninder Quezada MD            EMR Dragon/Transcription:   \"Dictated utilizing Dragon dictation\".     "

## 2019-11-08 VITALS
DIASTOLIC BLOOD PRESSURE: 67 MMHG | WEIGHT: 201.06 LBS | RESPIRATION RATE: 20 BRPM | HEART RATE: 72 BPM | HEIGHT: 73 IN | SYSTOLIC BLOOD PRESSURE: 139 MMHG | BODY MASS INDEX: 26.65 KG/M2 | OXYGEN SATURATION: 93 % | TEMPERATURE: 98.7 F

## 2019-11-08 LAB
ANION GAP SERPL CALCULATED.3IONS-SCNC: 7.4 MMOL/L (ref 5–15)
BASOPHILS # BLD AUTO: 0.03 10*3/MM3 (ref 0–0.2)
BASOPHILS NFR BLD AUTO: 0.6 % (ref 0–1.5)
BUN BLD-MCNC: 22 MG/DL (ref 8–23)
BUN/CREAT SERPL: 27.8 (ref 7–25)
CALCIUM SPEC-SCNC: 8.7 MG/DL (ref 8.2–9.6)
CHLORIDE SERPL-SCNC: 110 MMOL/L (ref 98–107)
CO2 SERPL-SCNC: 27.6 MMOL/L (ref 22–29)
CREAT BLD-MCNC: 0.79 MG/DL (ref 0.76–1.27)
DEPRECATED RDW RBC AUTO: 56 FL (ref 37–54)
EOSINOPHIL # BLD AUTO: 0.27 10*3/MM3 (ref 0–0.4)
EOSINOPHIL NFR BLD AUTO: 5.5 % (ref 0.3–6.2)
ERYTHROCYTE [DISTWIDTH] IN BLOOD BY AUTOMATED COUNT: 14.9 % (ref 12.3–15.4)
FOLATE SERPL-MCNC: 10.7 NG/ML (ref 4.78–24.2)
GFR SERPL CREATININE-BSD FRML MDRD: 92 ML/MIN/1.73
GLUCOSE BLD-MCNC: 91 MG/DL (ref 65–99)
HCT VFR BLD AUTO: 37.1 % (ref 37.5–51)
HGB BLD-MCNC: 12 G/DL (ref 13–17.7)
IMM GRANULOCYTES # BLD AUTO: 0.02 10*3/MM3 (ref 0–0.05)
IMM GRANULOCYTES NFR BLD AUTO: 0.4 % (ref 0–0.5)
LYMPHOCYTES # BLD AUTO: 0.92 10*3/MM3 (ref 0.7–3.1)
LYMPHOCYTES NFR BLD AUTO: 18.7 % (ref 19.6–45.3)
MAGNESIUM SERPL-MCNC: 2.3 MG/DL (ref 1.7–2.3)
MCH RBC QN AUTO: 32.8 PG (ref 26.6–33)
MCHC RBC AUTO-ENTMCNC: 32.3 G/DL (ref 31.5–35.7)
MCV RBC AUTO: 101.4 FL (ref 79–97)
MONOCYTES # BLD AUTO: 0.48 10*3/MM3 (ref 0.1–0.9)
MONOCYTES NFR BLD AUTO: 9.8 % (ref 5–12)
NEUTROPHILS # BLD AUTO: 3.2 10*3/MM3 (ref 1.7–7)
NEUTROPHILS NFR BLD AUTO: 65 % (ref 42.7–76)
NRBC BLD AUTO-RTO: 0 /100 WBC (ref 0–0.2)
PLATELET # BLD AUTO: 130 10*3/MM3 (ref 140–450)
PMV BLD AUTO: 11.6 FL (ref 6–12)
POTASSIUM BLD-SCNC: 4.1 MMOL/L (ref 3.5–5.2)
RBC # BLD AUTO: 3.66 10*6/MM3 (ref 4.14–5.8)
SODIUM BLD-SCNC: 145 MMOL/L (ref 136–145)
TSH SERPL DL<=0.05 MIU/L-ACNC: 2.63 UIU/ML (ref 0.27–4.2)
VIT B12 BLD-MCNC: 1079 PG/ML (ref 211–946)
WBC NRBC COR # BLD: 4.92 10*3/MM3 (ref 3.4–10.8)

## 2019-11-08 PROCEDURE — 25010000003 CEFAZOLIN 1-4 GM/50ML-% SOLUTION: Performed by: INTERNAL MEDICINE

## 2019-11-08 PROCEDURE — 85025 COMPLETE CBC W/AUTO DIFF WBC: CPT | Performed by: NURSE PRACTITIONER

## 2019-11-08 PROCEDURE — 83735 ASSAY OF MAGNESIUM: CPT | Performed by: NURSE PRACTITIONER

## 2019-11-08 PROCEDURE — 33285 INSJ SUBQ CAR RHYTHM MNTR: CPT | Performed by: INTERNAL MEDICINE

## 2019-11-08 PROCEDURE — 25010000002 FENTANYL CITRATE (PF) 100 MCG/2ML SOLUTION: Performed by: INTERNAL MEDICINE

## 2019-11-08 PROCEDURE — 82607 VITAMIN B-12: CPT | Performed by: INTERNAL MEDICINE

## 2019-11-08 PROCEDURE — 80048 BASIC METABOLIC PNL TOTAL CA: CPT | Performed by: NURSE PRACTITIONER

## 2019-11-08 PROCEDURE — 0JH632Z INSERTION OF MONITORING DEVICE INTO CHEST SUBCUTANEOUS TISSUE AND FASCIA, PERCUTANEOUS APPROACH: ICD-10-PCS | Performed by: INTERNAL MEDICINE

## 2019-11-08 PROCEDURE — 82746 ASSAY OF FOLIC ACID SERUM: CPT | Performed by: INTERNAL MEDICINE

## 2019-11-08 PROCEDURE — 25010000002 MIDAZOLAM PER 1 MG: Performed by: INTERNAL MEDICINE

## 2019-11-08 PROCEDURE — 84443 ASSAY THYROID STIM HORMONE: CPT | Performed by: INTERNAL MEDICINE

## 2019-11-08 PROCEDURE — 99238 HOSP IP/OBS DSCHRG MGMT 30/<: CPT | Performed by: NURSE PRACTITIONER

## 2019-11-08 PROCEDURE — C1764 EVENT RECORDER, CARDIAC: HCPCS | Performed by: INTERNAL MEDICINE

## 2019-11-08 DEVICE — ICM LP/RECRD REVEAL LINQ MEDTRONIC: Type: IMPLANTABLE DEVICE | Status: FUNCTIONAL

## 2019-11-08 RX ORDER — FENTANYL CITRATE 50 UG/ML
INJECTION, SOLUTION INTRAMUSCULAR; INTRAVENOUS AS NEEDED
Status: DISCONTINUED | OUTPATIENT
Start: 2019-11-08 | End: 2019-11-08 | Stop reason: HOSPADM

## 2019-11-08 RX ORDER — SODIUM CHLORIDE 9 MG/ML
INJECTION, SOLUTION INTRAVENOUS CONTINUOUS PRN
Status: COMPLETED | OUTPATIENT
Start: 2019-11-08 | End: 2019-11-08

## 2019-11-08 RX ORDER — MIDAZOLAM HYDROCHLORIDE 1 MG/ML
INJECTION INTRAMUSCULAR; INTRAVENOUS AS NEEDED
Status: DISCONTINUED | OUTPATIENT
Start: 2019-11-08 | End: 2019-11-08 | Stop reason: HOSPADM

## 2019-11-08 RX ORDER — CEFAZOLIN SODIUM 1 G/50ML
INJECTION, SOLUTION INTRAVENOUS CONTINUOUS PRN
Status: COMPLETED | OUTPATIENT
Start: 2019-11-08 | End: 2019-11-08

## 2019-11-08 RX ORDER — LIDOCAINE HYDROCHLORIDE AND EPINEPHRINE 10; 10 MG/ML; UG/ML
INJECTION, SOLUTION INFILTRATION; PERINEURAL AS NEEDED
Status: DISCONTINUED | OUTPATIENT
Start: 2019-11-08 | End: 2019-11-08 | Stop reason: HOSPADM

## 2019-11-08 RX ADMIN — TICAGRELOR 90 MG: 90 TABLET ORAL at 10:55

## 2019-11-08 RX ADMIN — METOPROLOL SUCCINATE 100 MG: 100 TABLET, FILM COATED, EXTENDED RELEASE ORAL at 10:52

## 2019-11-08 RX ADMIN — AMLODIPINE BESYLATE 5 MG: 5 TABLET ORAL at 10:52

## 2019-11-08 RX ADMIN — ISOSORBIDE MONONITRATE 60 MG: 60 TABLET ORAL at 10:54

## 2019-11-08 RX ADMIN — ASPIRIN 81 MG: 81 TABLET, CHEWABLE ORAL at 10:55

## 2019-11-08 RX ADMIN — LOSARTAN POTASSIUM 50 MG: 50 TABLET, FILM COATED ORAL at 10:52

## 2019-11-08 NOTE — PLAN OF CARE
Problem: Patient Care Overview  Goal: Plan of Care Review  Outcome: Ongoing (interventions implemented as appropriate)   11/08/19 0016   Coping/Psychosocial   Plan of Care Reviewed With patient   Plan of Care Review   Progress no change   OTHER   Outcome Summary Pt has no complaints of pain. Pt passed a kidney stone on day shift. Pt NSR overnight. Loop recorder in AM. VSS. Continue to monitor. Safety maintained.       11/08/19 0016   Coping/Psychosocial   Plan of Care Reviewed With patient   Plan of Care Review   Progress no change   OTHER   Outcome Summary Pt has no complaints of pain. Pt passed a kidney stone on day shift. Pt NSR overnight. Loop recorder in AM. VSS. Continue to monitor. Safety maintained.        Problem: Fall Risk (Adult)  Goal: Absence of Fall  Outcome: Ongoing (interventions implemented as appropriate)      Problem: Pain, Chronic (Adult)  Goal: Acceptable Pain/Comfort Level and Functional Ability  Outcome: Ongoing (interventions implemented as appropriate)      Problem: Arrhythmia/Dysrhythmia (Symptomatic) (Adult)  Goal: Signs and Symptoms of Listed Potential Problems Will be Absent, Minimized or Managed (Arrhythmia/Dysrhythmia)  Outcome: Ongoing (interventions implemented as appropriate)

## 2019-11-08 NOTE — PROGRESS NOTES
[facility]    I have reviewed  loop recorder procedure/risks/benefits (allergy,arrythmia,bleeding,pnuemothorax,CVA,death) with the patient/family/POA. All questions were answered. Pt and family voice understanding and agreement with treatment plan.    CANDELARIA Macias

## 2019-11-08 NOTE — DISCHARGE SUMMARY
[facility]  Physician Discharge Summary    Patient Identification:  Name: Dinesh Churchill Sr.  Age: 89 y.o.  Sex: male  :  1928  MRN: [id number]    Admit date: 2019    Discharge date and time:  19 15:13      Admitting Physician: Maninder Quezada MD     Discharge Provider: CANDELARIA Todd    Discharge Diagnoses: near syncope; seizure-like activity    Patient Active Problem List   Diagnosis   • Abnormal magnetic resonance imaging study   • Arthritis   • Osteoarthritis of cervical spine   • Diplopia   • Hearing loss   • Neoplasm of uncertain behavior of skin   • Prediabetes   • Vitamin D deficiency   • Chronic fatigue   • History of supraventricular tachycardia   • Essential hypertension   • B12 deficiency   • Abnormal cardiac function test   • Cardiomyopathy (CMS/HCC)   • Coronary artery disease involving native coronary artery of native heart without angina pectoris   • History of coronary artery stent placement   • History of renal calculi   • Dyslipidemia   • Macrocytic anemia   • Precordial pain   • Parkinson's disease (CMS/HCC)   • History of CVA (cerebrovascular accident)   • Paroxysmal atrial fibrillation (CMS/HCC)   • Otalgia, left   • Chest pain   • Abnormal stress test   • Spontaneous pneumothorax   • Chest pain   • Acute systolic (congestive) heart failure (CMS/HCC)   • Thrombocytopenia (CMS/HCC)   • Pneumonia of left lower lobe due to infectious organism (CMS/HCC)   • Syncope and collapse       Discharged Condition: good    Hospital Course: This 89-year-old male was admitted on  through the emergency department with complaints of seizure-like activity.  This is a patient with CAD, dizziness, hyperlipidemia, hypertension, prediabetes, prior pneumothorax, paroxysmal atrial fibrillation-not anticoagulated given age and frailty.  Recent admission earlier this month for pneumothorax resulted in no surgical intervention needed.  Internal medicine was  "consulted regarding management of comorbidities, electrophysiology was consulted.  History of atrial fibrillation and atrial flutter noted on arrival on ECG.  EP recommendation is to continue beta-blockade, no ablation recommended at this time.  Loop recorder was placed left subclavian to monitor patient more closely in the outpatient setting regarding dysrhythmia as it is suspected that this was likely the culprit of his \"episode.\"  Referral has been sent outpatient to neurology regarding his recent seizure-like activity, but suspect this likely was cardiac in nature.  Blood pressures been stable and within normal limits throughout admission.  He is discharged home on all outpatient medications.  He is to continue dual antiplatelet therapy for 1 full year, until August 2024 JOHN placement in August 2019.  Electrolytes of been stable and within normal limits throughout admission, TSH normal.  Hemoglobin a bit low at 12.0, this is normal per patient baseline.  CT of head performed on admission revealed chronic infarct and right radiata within the right cerebellar hemisphere otherwise no acute intracranial pathology.  Patient to follow with neurology regarding this.  Chest x-ray revealed pneumothorax which is marginally increased in size, patient asymptomatic throughout admission.  He is discharged home in good condition with no complaints of chest pain, shortness of breath, dizziness, weakness, syncope, or near syncope.  Patient son states that he will be staying with him for the next couple of weeks at least to keep a close eye on him.  He is to follow-up as below.    Consults:   IP CONSULT TO CARDIOLOGY  IP CONSULT TO INTERNAL MEDICINE  IP CONSULT TO CARDIAC ELECTROPHYSIOLOGIST             Physical Exam  /67 (BP Location: Left arm, Patient Position: Lying)   Pulse 72   Temp 98.7 °F (37.1 °C) (Oral)   Resp 20   Ht 185.4 cm (73\")   Wt 91.2 kg (201 lb 1 oz)   SpO2 93%   BMI 26.53 kg/m²     General " appearance: No acute changes   Eyes: Sclera conjunctiva normal, pupils reactive   HENT: Atraumatic; oropharynx clear with moist mucous membranes and no mucosal ulcerations;  Neck: Trachea midline; NECK, supple, no thyromegaly or lymphadenopathy   Lungs: Normal size and shape, normal breath sounds, equal distribution of air, no rales and rhonchi   CV: S1-S2 regular, no murmurs, no rub, no gallop   Abdomen: Soft, non-tender; no masses , no abnormal abdominal sounds   Extremities: No deformity , normal color , no peripheral edema   Skin: Normal temperature, turgor and texture; no rash, ulcers  Psych: Appropriate affect, alert and oriented to person, place and time             LABS:  Results from last 7 days   Lab Units 11/06/19  1400 11/02/19  0536 11/01/19  2119   TROPONIN T ng/mL 0.017 0.018 0.016     Results from last 7 days   Lab Units 11/08/19  0428   MAGNESIUM mg/dL 2.3     Results from last 7 days   Lab Units 11/08/19  0428   SODIUM mmol/L 145   POTASSIUM mmol/L 4.1   BUN mg/dL 22   CREATININE mg/dL 0.79   CALCIUM mg/dL 8.7       Results from last 7 days   Lab Units 11/08/19  0428 11/07/19  0340 11/06/19  1809   WBC 10*3/mm3 4.92 3.97 4.96   HEMOGLOBIN g/dL 12.0* 11.8* 12.8*   HEMATOCRIT % 37.1* 36.2* 37.4*   PLATELETS 10*3/mm3 130* 145 146     Results from last 7 days   Lab Units 11/02/19  0536   INR  1.09     Results from last 7 days   Lab Units 11/02/19  0536   CHOLESTEROL mg/dL 131   TRIGLYCERIDES mg/dL 83   HDL CHOL mg/dL 40   LDL CHOL mg/dL 74     Disposition:  Home    Discharge Medications:      Discharge Medications      Continue These Medications      Instructions Start Date   acetaminophen 500 MG tablet  Commonly known as:  TYLENOL   1-2 TABLETS BY ONCE TO TWICE DAILY AS NEEDED FOR PAIN      amLODIPine 5 MG tablet  Commonly known as:  NORVASC   5 mg, Oral, Every 24 Hours Scheduled      aspirin 81 MG chewable tablet   81 mg, Oral, Daily      atorvastatin 20 MG tablet  Commonly known as:  LIPITOR   20  mg, Oral, Nightly      Commode 3-In-1 misc   1 each, Does not apply, Daily      isosorbide mononitrate 60 MG 24 hr tablet  Commonly known as:  IMDUR   TAKE 1 TABLET BY MOUTH EVERY DAY      losartan 50 MG tablet  Commonly known as:  COZAAR   50 mg, Oral, Daily      Lumbar Back Brace/Support Pad misc   Use for lifting/ strenuous exercise.      metoprolol succinate  MG 24 hr tablet  Commonly known as:  TOPROL-XL   100 mg, Oral, Daily      ticagrelor 90 MG tablet tablet  Commonly known as:  BRILINTA   90 mg, Oral, 2 Times Daily      Vitamin D-3 25 MCG (1000 UT) capsule   1,000 Units, Oral, Daily           Estimated Creatinine Clearance: 77.6 mL/min (by C-G formula based on SCr of 0.79 mg/dL).    I have reviewed the medical decision making with Dr. Quezada in detail.     Discharge Home Instructions:     Discharge Follow-up with PCP    Currently Documented PCP:   Phyllis Loving PA   PCP Phone Number:   [phone]   Discharge Follow-up with Specified Provider: Follow-up with Dr. Quezada on November 25th at 11:30    Discharge Instructions    Routine post pacemaker/AICD/loop recorder implant discharge home care instructions:  1. Do not get site wet for 72 hours.  2. Do not submerge device site below water for 7-10 days.  3. No lifting left/right upper extremity above head.  4. No lifting objects greater than 1 pound with affected extremity.  5. No driving until cleared by cardiologist at follow up appointment.   6.  Referral has been sent to neurology, he should be contacted within the next week or 2 regarding setting up appointment.  If this is not happened please call our office for follow-up.  7. Call office at [phone] or [phone] for any fevers, chills, redness,bleeding, drainage, increased pain, etc.   8. Follow up in the pacemaker clinic as scheduled.  9.  Follow-up with PCP within 1 week of discharge.  10.  Follow-up with Dr. Quezada on November 25 at 11:30 AM.       Signed:  Tabatha  "TERRENCE Watson, APRN  11/8/2019  3:19 PM      Patient personally interviewed and above subjective findings personally confirmed during a face to face contact with patient today  All findings of physical examination confirmed  All pertinent and performed labs, cardiac procedures ,  radiographs of the last 24 hours personally reviewed  Impression and plans discussed/elaborated and implemented jointly as described above     Maninder Quezada MD          EMR Dragon/Transcription:   \"Dictated utilizing Dragon dictation\".   "

## 2019-11-08 NOTE — PLAN OF CARE
Problem: Patient Care Overview  Goal: Plan of Care Review  Outcome: Ongoing (interventions implemented as appropriate)   11/08/19 1536   Coping/Psychosocial   Plan of Care Reviewed With patient;son   Plan of Care Review   Progress improving   OTHER   Outcome Summary pt vital signs stable and afebrile during shift. pt had loop recorder placed and tolerated tx. pt had no acute issues. pt safety maintained.        Problem: Fall Risk (Adult)  Goal: Absence of Fall  Outcome: Ongoing (interventions implemented as appropriate)   11/08/19 1536   Fall Risk (Adult)   Absence of Fall achieves outcome

## 2019-11-09 ENCOUNTER — READMISSION MANAGEMENT (OUTPATIENT)
Dept: CALL CENTER | Facility: HOSPITAL | Age: 84
End: 2019-11-09

## 2019-11-10 NOTE — OUTREACH NOTE
Prep Survey      Responses   Facility patient discharged from?  [facility] [place]   Is patient eligible?  Yes   Discharge diagnosis  near syncope,  seizure-like activity   Does the patient have one of the following disease processes/diagnoses(primary or secondary)?  Other   Does the patient have Home health ordered?  No   Is there a DME ordered?  No   Prep survey completed?  Yes          Nargis Patino RN

## 2019-11-11 ENCOUNTER — READMISSION MANAGEMENT (OUTPATIENT)
Dept: CALL CENTER | Facility: HOSPITAL | Age: 84
End: 2019-11-11

## 2019-11-11 NOTE — OUTREACH NOTE
Medical Week 1 Survey      Responses   Facility patient discharged from?  [facility]   Does the patient have one of the following disease processes/diagnoses(primary or secondary)?  Other   Is there a successful TCM telephone encounter documented?  No   Week 1 attempt successful?  Yes   Call start time  1539   Call end time  1545   Discharge diagnosis  near syncope,  seizure-like activity   Is patient permission given to speak with other caregiver?  Yes   List who call center can speak with  Dinesh - son    Person spoke with today (if not patient) and relationship  Dinesh - son    Meds reviewed with patient/caregiver?  Yes   Is the patient having any side effects they believe may be caused by any medication additions or changes?  No   Does the patient have all medications ordered at discharge?  Yes   Is the patient taking all medications as directed (includes completed medication regime)?  Yes   Does the patient have a primary care provider?   Yes   Does the patient have an appointment with their PCP within 7 days of discharge?  Yes   Has the patient kept scheduled appointments due by today?  Yes   Psychosocial issues?  No   Did the patient receive a copy of their discharge instructions?  Yes   Nursing interventions  Reviewed instructions with patient   What is the patient's perception of their health status since discharge?  Improving   Is the patient/caregiver able to teach back signs and symptoms related to disease process for when to call PCP?  Yes   Is the patient/caregiver able to teach back signs and symptoms related to disease process for when to call 911?  Yes   Is the patient/caregiver able to teach back the hierarchy of who to call/visit for symptoms/problems? PCP, Specialist, Home health nurse, Urgent Care, ED, 911  Yes   Week 1 call completed?  Yes          Darinel Ely RN

## 2019-11-15 ENCOUNTER — TELEPHONE (OUTPATIENT)
Dept: CARDIOLOGY | Facility: CLINIC | Age: 84
End: 2019-11-15

## 2019-11-15 NOTE — TELEPHONE ENCOUNTER
----- Message from CANDELARIA Fong sent at 11/4/2019 10:35 AM EST -----  Is there any way we could try to get this patient's ticagrelor cheaper? Request a tier exception maybe or something?    Thanks!  Ludivina      TIER EXCEPTION IN PROCESS

## 2019-11-18 ENCOUNTER — CLINICAL SUPPORT NO REQUIREMENTS (OUTPATIENT)
Dept: CARDIOLOGY | Facility: CLINIC | Age: 84
End: 2019-11-18

## 2019-11-18 ENCOUNTER — OFFICE VISIT (OUTPATIENT)
Dept: FAMILY MEDICINE CLINIC | Facility: CLINIC | Age: 84
End: 2019-11-18

## 2019-11-18 VITALS
WEIGHT: 170.2 LBS | DIASTOLIC BLOOD PRESSURE: 60 MMHG | RESPIRATION RATE: 16 BRPM | OXYGEN SATURATION: 94 % | TEMPERATURE: 97.4 F | SYSTOLIC BLOOD PRESSURE: 110 MMHG | HEIGHT: 73 IN | HEART RATE: 74 BPM | BODY MASS INDEX: 22.56 KG/M2

## 2019-11-18 DIAGNOSIS — I48.0 PAROXYSMAL ATRIAL FIBRILLATION (HCC): ICD-10-CM

## 2019-11-18 DIAGNOSIS — E53.8 VITAMIN B 12 DEFICIENCY: ICD-10-CM

## 2019-11-18 DIAGNOSIS — G20 PARKINSON'S DISEASE (HCC): ICD-10-CM

## 2019-11-18 DIAGNOSIS — J93.83 SPONTANEOUS PNEUMOTHORAX: ICD-10-CM

## 2019-11-18 DIAGNOSIS — R73.03 PREDIABETES: ICD-10-CM

## 2019-11-18 DIAGNOSIS — J90 PLEURAL EFFUSION: ICD-10-CM

## 2019-11-18 DIAGNOSIS — R55 SYNCOPE AND COLLAPSE: Primary | ICD-10-CM

## 2019-11-18 DIAGNOSIS — I38 MURMUR, DIASTOLIC: ICD-10-CM

## 2019-11-18 DIAGNOSIS — Z86.73 HISTORY OF CVA (CEREBROVASCULAR ACCIDENT): ICD-10-CM

## 2019-11-18 DIAGNOSIS — I25.10 CORONARY ARTERY DISEASE INVOLVING NATIVE CORONARY ARTERY OF NATIVE HEART WITHOUT ANGINA PECTORIS: ICD-10-CM

## 2019-11-18 DIAGNOSIS — D69.6 THROMBOCYTOPENIA (HCC): ICD-10-CM

## 2019-11-18 DIAGNOSIS — H53.9 VISION DISTURBANCE: ICD-10-CM

## 2019-11-18 DIAGNOSIS — R53.83 FATIGUE, UNSPECIFIED TYPE: ICD-10-CM

## 2019-11-18 DIAGNOSIS — D53.9 MACROCYTIC ANEMIA: ICD-10-CM

## 2019-11-18 DIAGNOSIS — Z95.818 STATUS POST PLACEMENT OF IMPLANTABLE LOOP RECORDER: Primary | ICD-10-CM

## 2019-11-18 DIAGNOSIS — I42.9 CARDIOMYOPATHY, UNSPECIFIED TYPE (HCC): ICD-10-CM

## 2019-11-18 DIAGNOSIS — Z95.818 STATUS POST PLACEMENT OF IMPLANTABLE LOOP RECORDER: ICD-10-CM

## 2019-11-18 DIAGNOSIS — J18.9 PNEUMONIA OF LEFT LOWER LOBE DUE TO INFECTIOUS ORGANISM: ICD-10-CM

## 2019-11-18 PROCEDURE — 93298 REM INTERROG DEV EVAL SCRMS: CPT | Performed by: INTERNAL MEDICINE

## 2019-11-18 PROCEDURE — 99214 OFFICE O/P EST MOD 30 MIN: CPT | Performed by: PHYSICIAN ASSISTANT

## 2019-11-18 PROCEDURE — 93299 PR REM INTERROG ICPMS/SCRMS <30 D TECH REVIEW: CPT | Performed by: INTERNAL MEDICINE

## 2019-11-19 ENCOUNTER — TELEPHONE (OUTPATIENT)
Dept: CARDIOLOGY | Facility: CLINIC | Age: 84
End: 2019-11-19

## 2019-11-19 ENCOUNTER — READMISSION MANAGEMENT (OUTPATIENT)
Dept: CALL CENTER | Facility: HOSPITAL | Age: 84
End: 2019-11-19

## 2019-11-19 NOTE — OUTREACH NOTE
Medical Week 2 Survey      Responses   Facility patient discharged from?  [place]   Does the patient have one of the following disease processes/diagnoses(primary or secondary)?  Other   Week 2 attempt successful?  Yes   Call start time  1450   Discharge diagnosis  near syncope,  seizure-like activity   Call end time  1452   Is patient permission given to speak with other caregiver?  Yes   Person spoke with today (if not patient) and relationship  Dinesh - son    Meds reviewed with patient/caregiver?  Yes   Is the patient having any side effects they believe may be caused by any medication additions or changes?  No   Does the patient have all medications ordered at discharge?  Yes   Is the patient taking all medications as directed (includes completed medication regime)?  Yes   Does the patient have a primary care provider?   Yes   Does the patient have an appointment with their PCP within 7 days of discharge?  Yes   Has the patient kept scheduled appointments due by today?  Yes   Has home health visited the patient within 72 hours of discharge?  N/A   Psychosocial issues?  No   Did the patient receive a copy of their discharge instructions?  Yes   Nursing interventions  Reviewed instructions with patient   What is the patient's perception of their health status since discharge?  Improving   Is the patient/caregiver able to teach back signs and symptoms related to disease process for when to call PCP?  Yes   Is the patient/caregiver able to teach back signs and symptoms related to disease process for when to call 911?  Yes   Is the patient/caregiver able to teach back the hierarchy of who to call/visit for symptoms/problems? PCP, Specialist, Home health nurse, Urgent Care, ED, 911  Yes   Week 2 Call Completed?  Yes          Sam Kelly RN

## 2019-11-19 NOTE — TELEPHONE ENCOUNTER
Notified that loop recorder transmission was received. Instructions provided on routine checking of loop and when to reset transmitter. Patient son verbalized understanding  ALP

## 2019-11-22 PROBLEM — Z95.818 STATUS POST PLACEMENT OF IMPLANTABLE LOOP RECORDER: Status: ACTIVE | Noted: 2019-11-22

## 2019-11-25 ENCOUNTER — OFFICE VISIT (OUTPATIENT)
Dept: CARDIOLOGY | Facility: CLINIC | Age: 84
End: 2019-11-25

## 2019-11-25 VITALS
BODY MASS INDEX: 22.46 KG/M2 | HEART RATE: 83 BPM | SYSTOLIC BLOOD PRESSURE: 113 MMHG | HEIGHT: 73 IN | DIASTOLIC BLOOD PRESSURE: 55 MMHG

## 2019-11-25 DIAGNOSIS — Z95.818 STATUS POST PLACEMENT OF IMPLANTABLE LOOP RECORDER: ICD-10-CM

## 2019-11-25 DIAGNOSIS — I10 ESSENTIAL HYPERTENSION: ICD-10-CM

## 2019-11-25 DIAGNOSIS — I48.0 PAROXYSMAL ATRIAL FIBRILLATION (HCC): ICD-10-CM

## 2019-11-25 DIAGNOSIS — E78.5 DYSLIPIDEMIA: ICD-10-CM

## 2019-11-25 DIAGNOSIS — I25.10 CORONARY ARTERY DISEASE INVOLVING NATIVE CORONARY ARTERY OF NATIVE HEART WITHOUT ANGINA PECTORIS: Primary | ICD-10-CM

## 2019-11-25 PROCEDURE — 99213 OFFICE O/P EST LOW 20 MIN: CPT | Performed by: INTERNAL MEDICINE

## 2019-11-27 ENCOUNTER — READMISSION MANAGEMENT (OUTPATIENT)
Dept: CALL CENTER | Facility: HOSPITAL | Age: 84
End: 2019-11-27

## 2019-11-27 NOTE — OUTREACH NOTE
Medical Week 3 Survey      Responses   Facility patient discharged from?  [facility]   Does the patient have one of the following disease processes/diagnoses(primary or secondary)?  Other   Week 3 attempt successful?  Yes   Call start time  0821   Call end time  0823   Discharge diagnosis  near syncope,  seizure-like activity   Person spoke with today (if not patient) and relationship  Dinesh - son    Meds reviewed with patient/caregiver?  Yes   Is the patient taking all medications as directed (includes completed medication regime)?  Yes   Has the patient kept scheduled appointments due by today?  Yes   Has home health visited the patient within 72 hours of discharge?  N/A   What is the patient's perception of their health status since discharge?  Improving   Week 3 Call Completed?  Yes          Carol Yanez RN

## 2019-11-30 NOTE — PATIENT INSTRUCTIONS
89 year old male who presents today in follow up of hospitalizations 11/1/19-11/5/19 and 11/6/19 - 11/8/19. 11/1/19 he was admit for chest pain after hearing a pop. He had significant pneumonia that they thought may be aspiration and enlarging pneumothorax that has been present on previous imaging and admissions. They consulted CT surgery who did not think intervention was necessary and pulmonology. He was discharged home then had an episode 11/6/19 at home that his son reports his eyes rolled back and he lost consciousness. The son called the nurse and was advised he needed to return to the hospital. He was transported to the ER via ambulance. He was admit and had CT head with chronic infarct and lacunar disease wihtout acute abnormality. Loop recorder placed and the increased Metoprolol from  mg an amlodipine from 2.5 mg to 5 mg. He is also on Brilinta. Patient has felt ok since getting out of the hospital per patient and son's report. Unfortunately he reports feeling normal outside of his episodes that result in him going to the ER but he has had recurrence of concerns and had to be admit frequently over the last several months. He will follow up with cardiology 11/25/19 and pulmonology 12/9. Patient to continue all medications as directed by them and follow up with me in 4-6 weeks for re-evaluation. To be seen sooner if any concerns. He needs to continue follow up with specialists as directed by them.

## 2019-12-05 ENCOUNTER — READMISSION MANAGEMENT (OUTPATIENT)
Dept: CALL CENTER | Facility: HOSPITAL | Age: 84
End: 2019-12-05

## 2019-12-05 NOTE — OUTREACH NOTE
Medical Week 4 Survey      Responses   Facility patient discharged from?  [facility]   Does the patient have one of the following disease processes/diagnoses(primary or secondary)?  Other   Week 4 attempt successful?  Yes   Call start time  1541   Call end time  1541   Is patient permission given to speak with other caregiver?  Yes   List who call center can speak with  Dinesh - son    Person spoke with today (if not patient) and relationship  Dinesh - son    Meds reviewed with patient/caregiver?  Yes   Is the patient having any side effects they believe may be caused by any medication additions or changes?  No   Is the patient taking all medications as directed (includes completed medication regime)?  Yes   Has the patient kept scheduled appointments due by today?  Yes   Is the patient still receiving Home Health Services?  N/A   Psychosocial issues?  No   What is the patient's perception of their health status since discharge?  Improving   Is the patient/caregiver able to teach back signs and symptoms related to disease process for when to call PCP?  Yes   Is the patient/caregiver able to teach back signs and symptoms related to disease process for when to call 911?  Yes   Is the patient/caregiver able to teach back the hierarchy of who to call/visit for symptoms/problems? PCP, Specialist, Home health nurse, Urgent Care, ED, 911  Yes   Week 4 Call Completed?  Yes   Would the patient like one additional call?  No   Graduated  Yes          Darinel Ely RN

## 2019-12-20 ENCOUNTER — CLINICAL SUPPORT NO REQUIREMENTS (OUTPATIENT)
Dept: CARDIOLOGY | Facility: CLINIC | Age: 84
End: 2019-12-20

## 2019-12-20 DIAGNOSIS — Z95.818 STATUS POST PLACEMENT OF IMPLANTABLE LOOP RECORDER: Primary | ICD-10-CM

## 2019-12-20 PROCEDURE — 93298 REM INTERROG DEV EVAL SCRMS: CPT | Performed by: INTERNAL MEDICINE

## 2019-12-20 PROCEDURE — 93299 PR REM INTERROG ICPMS/SCRMS <30 D TECH REVIEW: CPT | Performed by: INTERNAL MEDICINE

## 2019-12-26 RX ORDER — ISOSORBIDE MONONITRATE 60 MG/1
TABLET, EXTENDED RELEASE ORAL
Qty: 30 TABLET | Refills: 1 | Status: SHIPPED | OUTPATIENT
Start: 2019-12-26 | End: 2020-02-20

## 2019-12-26 RX ORDER — LOSARTAN POTASSIUM 50 MG/1
50 TABLET ORAL DAILY
Qty: 30 TABLET | Refills: 1 | Status: SHIPPED | OUTPATIENT
Start: 2019-12-26 | End: 2020-02-20

## 2019-12-26 RX ORDER — ATORVASTATIN CALCIUM 20 MG/1
20 TABLET, FILM COATED ORAL NIGHTLY
Qty: 30 TABLET | Refills: 1 | Status: SHIPPED | OUTPATIENT
Start: 2019-12-26 | End: 2020-02-20

## 2019-12-27 ENCOUNTER — CLINICAL SUPPORT (OUTPATIENT)
Dept: FAMILY MEDICINE CLINIC | Facility: CLINIC | Age: 84
End: 2019-12-27

## 2019-12-27 DIAGNOSIS — E53.8 B12 DEFICIENCY: Primary | ICD-10-CM

## 2019-12-27 PROCEDURE — 96372 THER/PROPH/DIAG INJ SC/IM: CPT | Performed by: PHYSICIAN ASSISTANT

## 2019-12-27 RX ORDER — CYANOCOBALAMIN 1000 UG/ML
1000 INJECTION, SOLUTION INTRAMUSCULAR; SUBCUTANEOUS
Status: DISCONTINUED | OUTPATIENT
Start: 2019-12-27 | End: 2020-08-21

## 2019-12-27 RX ADMIN — CYANOCOBALAMIN 1000 MCG: 1000 INJECTION, SOLUTION INTRAMUSCULAR; SUBCUTANEOUS at 09:47

## 2020-01-02 RX ORDER — METOPROLOL SUCCINATE 100 MG/1
100 TABLET, EXTENDED RELEASE ORAL DAILY
Qty: 30 TABLET | Refills: 3 | Status: SHIPPED | OUTPATIENT
Start: 2020-01-02 | End: 2020-05-18

## 2020-01-15 ENCOUNTER — OFFICE VISIT (OUTPATIENT)
Dept: FAMILY MEDICINE CLINIC | Facility: CLINIC | Age: 85
End: 2020-01-15

## 2020-01-15 VITALS
BODY MASS INDEX: 22.93 KG/M2 | WEIGHT: 173 LBS | TEMPERATURE: 98.9 F | SYSTOLIC BLOOD PRESSURE: 120 MMHG | OXYGEN SATURATION: 98 % | HEART RATE: 70 BPM | DIASTOLIC BLOOD PRESSURE: 54 MMHG | HEIGHT: 73 IN

## 2020-01-15 DIAGNOSIS — E78.5 DYSLIPIDEMIA: ICD-10-CM

## 2020-01-15 DIAGNOSIS — R73.03 PREDIABETES: ICD-10-CM

## 2020-01-15 DIAGNOSIS — I42.9 CARDIOMYOPATHY, UNSPECIFIED TYPE (HCC): ICD-10-CM

## 2020-01-15 DIAGNOSIS — I25.10 CORONARY ARTERY DISEASE INVOLVING NATIVE CORONARY ARTERY OF NATIVE HEART WITHOUT ANGINA PECTORIS: ICD-10-CM

## 2020-01-15 DIAGNOSIS — G20 PARKINSON'S DISEASE (HCC): ICD-10-CM

## 2020-01-15 DIAGNOSIS — Z86.73 HISTORY OF CVA (CEREBROVASCULAR ACCIDENT): ICD-10-CM

## 2020-01-15 DIAGNOSIS — I10 ESSENTIAL HYPERTENSION: Primary | ICD-10-CM

## 2020-01-15 DIAGNOSIS — D53.9 MACROCYTIC ANEMIA: ICD-10-CM

## 2020-01-15 DIAGNOSIS — D69.6 THROMBOCYTOPENIA (HCC): ICD-10-CM

## 2020-01-15 DIAGNOSIS — J93.83 SPONTANEOUS PNEUMOTHORAX: ICD-10-CM

## 2020-01-15 DIAGNOSIS — H26.9 CATARACT OF BOTH EYES, UNSPECIFIED CATARACT TYPE: ICD-10-CM

## 2020-01-15 DIAGNOSIS — R55 SYNCOPE AND COLLAPSE: ICD-10-CM

## 2020-01-15 DIAGNOSIS — E53.8 B12 DEFICIENCY: ICD-10-CM

## 2020-01-15 DIAGNOSIS — J90 PLEURAL EFFUSION: ICD-10-CM

## 2020-01-15 DIAGNOSIS — E55.9 VITAMIN D DEFICIENCY: ICD-10-CM

## 2020-01-15 DIAGNOSIS — I48.0 PAROXYSMAL ATRIAL FIBRILLATION (HCC): ICD-10-CM

## 2020-01-15 PROCEDURE — 96372 THER/PROPH/DIAG INJ SC/IM: CPT | Performed by: PHYSICIAN ASSISTANT

## 2020-01-15 PROCEDURE — 99214 OFFICE O/P EST MOD 30 MIN: CPT | Performed by: PHYSICIAN ASSISTANT

## 2020-01-15 RX ORDER — CYANOCOBALAMIN 1000 UG/ML
1000 INJECTION, SOLUTION INTRAMUSCULAR; SUBCUTANEOUS
Status: DISCONTINUED | OUTPATIENT
Start: 2020-01-15 | End: 2020-08-21

## 2020-01-15 RX ADMIN — CYANOCOBALAMIN 1000 MCG: 1000 INJECTION, SOLUTION INTRAMUSCULAR; SUBCUTANEOUS at 11:00

## 2020-01-15 NOTE — PROGRESS NOTES
Subjective   Dinesh Churchill Sr. is a 89 y.o. male who presents today in follow-up of hypertension, prediabetes, hyperlipidemia, B12 and vitamin D deficiency, macrocytosis, history of CVA and Parkinson's, atrial fibrillation, coronary artery disease, and cardiomyopathy, cataracts, pulmonary fibrosis, pneumothorax with pleural effusion.     History of Present Illness     Hypertension- has been stable on Norvasc 5 mg once daily, metoprolol  mg daily, and losartan 50 mg once daily.  Prediabetes and hyperlipidemia- has been stable on Lipitor 20 mg once daily without AE.  B12- last injection 12/27/2019  Vitamin D- taking vitamin D 1000IU daily. Tolerating without AE.     Weakness and falls- 1 fall only after going to the eye doctor with dilation of eyes. No other falls.   Chest pain- taking Imdur 60 mg as directed by cardiology. No CP- controlling CP.   Shortness of breath- no change from baseline. No increase in SOA     Cardiology-Dr. Quezada-last seen 11/2019-stable.  Follow-up in 3 months.  Neurology-Dr. Naik- last seen 10/2017- for Parkinson's, orthostatic hypotension, and ataxia- he did not want to pursue treatment at that time and was advised to follow-up PRN. Has appt 1/24/2020 with Dr Saleh  Ophthalmology- [facility] in [place]- will have left cataract surgery 1/27/2020 and right cataract surgery 2/3/2020.   Pulmonology- Dr De Jesus- last appt 12/2019 for pulmonary fibrosis, pulmonary edema, pneumothorax- follow up in 6 months.     The following portions of the patient's history were reviewed and updated as appropriate: allergies, current medications, past family history, past medical history, past social history, past surgical history and problem list.    Review of Systems   Constitutional: Positive for fatigue.   HENT: Negative.    Eyes: Positive for visual disturbance.        Having cataract surgery x 2 upcoming   Respiratory: Positive for shortness of breath.    Cardiovascular:  Negative.    Gastrointestinal: Negative.    Genitourinary: Negative.    Musculoskeletal: Positive for gait problem.   Neurological:        Fall   Hematological: Negative.    Psychiatric/Behavioral: Negative.        Objective    Vitals:    01/15/20 1003   BP: 120/54   Pulse: 70   Temp: 98.9 °F (37.2 °C)   SpO2: 98%     Body mass index is 22.83 kg/m².    Physical Exam   Constitutional: He is oriented to person, place, and time. He appears well-developed.   HENT:   Head: Normocephalic and atraumatic.   Right Ear: External ear normal.   Left Ear: External ear normal.   Eyes: Conjunctivae are normal.   Neck: Carotid bruit is not present. No tracheal deviation present. No thyroid mass and no thyromegaly present.   Cardiovascular: Normal rate, regular rhythm, normal heart sounds and intact distal pulses.   Pulmonary/Chest: Effort normal and breath sounds normal.   Neurological: He is alert and oriented to person, place, and time. Gait normal.   Skin: Skin is warm and dry.   Psychiatric: He has a normal mood and affect. His behavior is normal. Judgment and thought content normal.   Nursing note and vitals reviewed.      Assessment/Plan   Dinesh was seen today for follow-up.    Diagnoses and all orders for this visit:    Essential hypertension  -     Comprehensive Metabolic Panel    Dyslipidemia  -     Comprehensive Metabolic Panel  -     CK    Prediabetes  -     Comprehensive Metabolic Panel    Vitamin D deficiency  -     Comprehensive Metabolic Panel  -     Vitamin D 25 Hydroxy    Macrocytic anemia  -     CBC & Differential  -     Vitamin B12 & Folate    B12 deficiency  -     CBC & Differential  -     Vitamin B12 & Folate  -     cyanocobalamin injection 1,000 mcg    Thrombocytopenia (CMS/HCC)  -     CBC & Differential    Syncope and collapse    History of CVA (cerebrovascular accident)    Parkinson's disease (CMS/HCC)    Spontaneous pneumothorax    Pleural effusion    Coronary artery disease involving native coronary  artery of native heart without angina pectoris    Cardiomyopathy, unspecified type (CMS/HCC)    Paroxysmal atrial fibrillation (CMS/HCC)    Cataract of both eyes, unspecified cataract type      Patient Instructions   Assessment and Plan  89-year-old male who presents today in follow-up of hypertension, prediabetes, hyperlipidemia, B12 and vitamin D deficiency, macrocytosis, history of CVA and Parkinson's, atrial fibrillation, coronary artery disease, and cardiomyopathy, cataracts, pulmonary fibrosis, pneumothorax with pleural effusion. Blood pressure today is stable. Continue Norvasc 5 mg once daily, Metoprolol  mg daily, and Losartan 50 mg once daily. He has also been stable on Lipitor 20 mg at bedtime and tolerates without AE. He continues with Vitamin D 1000IU daily and B12 injections here. Patient will have fasting labs. Call if no results in 1 week. Stability of conditions, plan, follow up, and further recommendations pending labs. Follow up in 3-4 months pending results and symptoms.     He avoids any overhead movement, as he loses his balance.  He has had only 1 fall after going to the eye doctor with sedation. He denies any other recent balance issues or falls.  He is getting around well.  He was seen by neurology and diagnosed with Parkinson's, however, they did not feel he needed treatment.  He continues follow-up with cardiology, and he has current with appointments there.  He continues on Imdur 60 mg once daily, and he denies any changes or increase in chest pain, shortness of breath, palpitations, dizziness, or weakness.  He has upcoming cataract surgery bilaterally. Patient has been found to have pneumothorax and pleural effusion with hospitalization. Pulmonology saw him and advised he was stable and to follow up in 6/2020.    Cardiology-Dr. Quezada-last seen 11/2019-stable.  Follow-up in 3 months.  Neurology-Dr. Naik- last seen 10/2017- for Parkinson's, orthostatic hypotension, and ataxia-  he did not want to pursue treatment at that time and was advised to follow-up PRN. Has appt 1/24/2020 with Dr Saleh  Ophthalmology- [facility] in [place]- will have left cataract surgery 1/27/2020 and right cataract surgery 2/3/2020.   Pulmonology- Dr De Jesus- last appt 12/2019 for pulmonary fibrosis, pulmonary edema, pneumothorax- follow up in 6 months.

## 2020-01-16 LAB
25(OH)D3+25(OH)D2 SERPL-MCNC: 32.4 NG/ML (ref 30–100)
ALBUMIN SERPL-MCNC: 4 G/DL (ref 3.5–5.2)
ALBUMIN/GLOB SERPL: 1.8 G/DL
ALP SERPL-CCNC: 50 U/L (ref 39–117)
ALT SERPL-CCNC: 14 U/L (ref 1–41)
AST SERPL-CCNC: 15 U/L (ref 1–40)
BASOPHILS # BLD AUTO: 0.02 10*3/MM3 (ref 0–0.2)
BASOPHILS NFR BLD AUTO: 0.4 % (ref 0–1.5)
BILIRUB SERPL-MCNC: 1.6 MG/DL (ref 0.2–1.2)
BUN SERPL-MCNC: 19 MG/DL (ref 8–23)
BUN/CREAT SERPL: 21.8 (ref 7–25)
CALCIUM SERPL-MCNC: 8.7 MG/DL (ref 8.2–9.6)
CHLORIDE SERPL-SCNC: 106 MMOL/L (ref 98–107)
CK SERPL-CCNC: 57 U/L (ref 20–200)
CO2 SERPL-SCNC: 25.4 MMOL/L (ref 22–29)
CREAT SERPL-MCNC: 0.87 MG/DL (ref 0.76–1.27)
EOSINOPHIL # BLD AUTO: 0.1 10*3/MM3 (ref 0–0.4)
EOSINOPHIL NFR BLD AUTO: 1.9 % (ref 0.3–6.2)
ERYTHROCYTE [DISTWIDTH] IN BLOOD BY AUTOMATED COUNT: 14.5 % (ref 12.3–15.4)
FOLATE SERPL-MCNC: 7.71 NG/ML (ref 4.78–24.2)
GLOBULIN SER CALC-MCNC: 2.2 GM/DL
GLUCOSE SERPL-MCNC: 110 MG/DL (ref 65–99)
HCT VFR BLD AUTO: 36.3 % (ref 37.5–51)
HGB BLD-MCNC: 12 G/DL (ref 13–17.7)
IMM GRANULOCYTES # BLD AUTO: 0.03 10*3/MM3 (ref 0–0.05)
IMM GRANULOCYTES NFR BLD AUTO: 0.6 % (ref 0–0.5)
LYMPHOCYTES # BLD AUTO: 1.01 10*3/MM3 (ref 0.7–3.1)
LYMPHOCYTES NFR BLD AUTO: 19.4 % (ref 19.6–45.3)
MCH RBC QN AUTO: 33.2 PG (ref 26.6–33)
MCHC RBC AUTO-ENTMCNC: 33.1 G/DL (ref 31.5–35.7)
MCV RBC AUTO: 100.6 FL (ref 79–97)
MONOCYTES # BLD AUTO: 0.46 10*3/MM3 (ref 0.1–0.9)
MONOCYTES NFR BLD AUTO: 8.8 % (ref 5–12)
NEUTROPHILS # BLD AUTO: 3.59 10*3/MM3 (ref 1.7–7)
NEUTROPHILS NFR BLD AUTO: 68.9 % (ref 42.7–76)
NRBC BLD AUTO-RTO: 0.4 /100 WBC (ref 0–0.2)
PLATELET # BLD AUTO: 132 10*3/MM3 (ref 140–450)
POTASSIUM SERPL-SCNC: 4.2 MMOL/L (ref 3.5–5.2)
PROT SERPL-MCNC: 6.2 G/DL (ref 6–8.5)
RBC # BLD AUTO: 3.61 10*6/MM3 (ref 4.14–5.8)
SODIUM SERPL-SCNC: 143 MMOL/L (ref 136–145)
VIT B12 SERPL-MCNC: 555 PG/ML (ref 211–946)
WBC # BLD AUTO: 5.21 10*3/MM3 (ref 3.4–10.8)

## 2020-01-20 ENCOUNTER — CLINICAL SUPPORT NO REQUIREMENTS (OUTPATIENT)
Dept: CARDIOLOGY | Facility: CLINIC | Age: 85
End: 2020-01-20

## 2020-01-20 DIAGNOSIS — Z95.818 STATUS POST PLACEMENT OF IMPLANTABLE LOOP RECORDER: Primary | ICD-10-CM

## 2020-01-20 PROCEDURE — G2066 INTER DEVC REMOTE 30D: HCPCS | Performed by: INTERNAL MEDICINE

## 2020-01-20 PROCEDURE — 93298 REM INTERROG DEV EVAL SCRMS: CPT | Performed by: INTERNAL MEDICINE

## 2020-01-24 ENCOUNTER — OFFICE VISIT (OUTPATIENT)
Dept: NEUROLOGY | Facility: CLINIC | Age: 85
End: 2020-01-24

## 2020-01-24 VITALS
HEART RATE: 66 BPM | HEIGHT: 73 IN | BODY MASS INDEX: 24.25 KG/M2 | WEIGHT: 183 LBS | DIASTOLIC BLOOD PRESSURE: 86 MMHG | SYSTOLIC BLOOD PRESSURE: 140 MMHG | OXYGEN SATURATION: 94 %

## 2020-01-24 DIAGNOSIS — R56.9 WITNESSED SEIZURE-LIKE ACTIVITY (HCC): Primary | ICD-10-CM

## 2020-01-24 PROCEDURE — 99214 OFFICE O/P EST MOD 30 MIN: CPT | Performed by: PSYCHIATRY & NEUROLOGY
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
## 2020-01-31 NOTE — PATIENT INSTRUCTIONS
Assessment and Plan  89-year-old male who presents today in follow-up of hypertension, prediabetes, hyperlipidemia, B12 and vitamin D deficiency, macrocytosis, history of CVA and Parkinson's, atrial fibrillation, coronary artery disease, and cardiomyopathy, cataracts, pulmonary fibrosis, pneumothorax with pleural effusion. Blood pressure today is stable. Continue Norvasc 5 mg once daily, Metoprolol  mg daily, and Losartan 50 mg once daily. He has also been stable on Lipitor 20 mg at bedtime and tolerates without AE. He continues with Vitamin D 1000IU daily and B12 injections here. Patient will have fasting labs. Call if no results in 1 week. Stability of conditions, plan, follow up, and further recommendations pending labs. Follow up in 3-4 months pending results and symptoms.     He avoids any overhead movement, as he loses his balance.  He has had only 1 fall after going to the eye doctor with sedation. He denies any other recent balance issues or falls.  He is getting around well.  He was seen by neurology and diagnosed with Parkinson's, however, they did not feel he needed treatment.  He continues follow-up with cardiology, and he has current with appointments there.  He continues on Imdur 60 mg once daily, and he denies any changes or increase in chest pain, shortness of breath, palpitations, dizziness, or weakness.  He has upcoming cataract surgery bilaterally. Patient has been found to have pneumothorax and pleural effusion with hospitalization. Pulmonology saw him and advised he was stable and to follow up in 6/2020.    Cardiology-Dr. Quezada-last seen 11/2019-stable.  Follow-up in 3 months.  Neurology-Dr. Naik- last seen 10/2017- for Parkinson's, orthostatic hypotension, and ataxia- he did not want to pursue treatment at that time and was advised to follow-up PRN. Has appt 1/24/2020 with Dr Saleh  Ophthalmology- [facility] in [place]- will have left cataract surgery 1/27/2020  and right cataract surgery 2/3/2020.   Pulmonology- Dr De Jesus- last appt 12/2019 for pulmonary fibrosis, pulmonary edema, pneumothorax- follow up in 6 months.

## 2020-02-13 ENCOUNTER — TELEPHONE (OUTPATIENT)
Dept: FAMILY MEDICINE CLINIC | Facility: CLINIC | Age: 85
End: 2020-02-13

## 2020-02-13 NOTE — TELEPHONE ENCOUNTER
Pt's son called regarding his allergies. Pt is experiencing runny nose and ears are stopped up.. He states you had recommended something for him before but could not remember what it was called.

## 2020-02-13 NOTE — TELEPHONE ENCOUNTER
I have not seen this patient in a year and he is Phyllis's patient.  I would send this to her to look at when she comes back.  Unless he wants to see if he can come in tomorrow.

## 2020-02-20 ENCOUNTER — CLINICAL SUPPORT NO REQUIREMENTS (OUTPATIENT)
Dept: CARDIOLOGY | Facility: CLINIC | Age: 85
End: 2020-02-20

## 2020-02-20 DIAGNOSIS — Z95.818 STATUS POST PLACEMENT OF IMPLANTABLE LOOP RECORDER: Primary | ICD-10-CM

## 2020-02-20 PROCEDURE — G2066 INTER DEVC REMOTE 30D: HCPCS | Performed by: INTERNAL MEDICINE

## 2020-02-20 PROCEDURE — 93298 REM INTERROG DEV EVAL SCRMS: CPT | Performed by: INTERNAL MEDICINE

## 2020-02-20 RX ORDER — ATORVASTATIN CALCIUM 20 MG/1
20 TABLET, FILM COATED ORAL NIGHTLY
Qty: 30 TABLET | Refills: 0 | Status: SHIPPED | OUTPATIENT
Start: 2020-02-20 | End: 2020-03-18

## 2020-02-20 RX ORDER — ISOSORBIDE MONONITRATE 60 MG/1
TABLET, EXTENDED RELEASE ORAL
Qty: 30 TABLET | Refills: 0 | Status: SHIPPED | OUTPATIENT
Start: 2020-02-20 | End: 2020-03-18

## 2020-02-20 RX ORDER — LOSARTAN POTASSIUM 50 MG/1
50 TABLET ORAL DAILY
Qty: 30 TABLET | Refills: 0 | Status: SHIPPED | OUTPATIENT
Start: 2020-02-20 | End: 2020-03-18

## 2020-02-28 ENCOUNTER — OFFICE VISIT (OUTPATIENT)
Dept: FAMILY MEDICINE CLINIC | Facility: CLINIC | Age: 85
End: 2020-02-28

## 2020-02-28 ENCOUNTER — CLINICAL SUPPORT (OUTPATIENT)
Dept: FAMILY MEDICINE CLINIC | Facility: CLINIC | Age: 85
End: 2020-02-28

## 2020-02-28 VITALS
SYSTOLIC BLOOD PRESSURE: 130 MMHG | HEIGHT: 73 IN | OXYGEN SATURATION: 95 % | TEMPERATURE: 97.9 F | DIASTOLIC BLOOD PRESSURE: 62 MMHG | RESPIRATION RATE: 18 BRPM | HEART RATE: 73 BPM | BODY MASS INDEX: 24.65 KG/M2 | WEIGHT: 186 LBS

## 2020-02-28 DIAGNOSIS — E53.8 B12 DEFICIENCY: ICD-10-CM

## 2020-02-28 DIAGNOSIS — H61.22 HEARING LOSS DUE TO CERUMEN IMPACTION, LEFT: Primary | ICD-10-CM

## 2020-02-28 PROCEDURE — 96372 THER/PROPH/DIAG INJ SC/IM: CPT | Performed by: PHYSICIAN ASSISTANT

## 2020-02-28 PROCEDURE — 99213 OFFICE O/P EST LOW 20 MIN: CPT | Performed by: FAMILY MEDICINE

## 2020-02-28 RX ADMIN — CYANOCOBALAMIN 1000 MCG: 1000 INJECTION, SOLUTION INTRAMUSCULAR; SUBCUTANEOUS at 10:05

## 2020-02-28 NOTE — PROGRESS NOTES
Subjective   Dinesh Churchill Sr. is a 89 y.o. male. Presents today to be evaluated for left ear fullness.    History of Present Illness     Left ear fullness-the patient has been having a really difficult time hearing out of his left ear.  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  But his left ear he could not hear out of with the use of a hearing aid.  The son was concerned he may not be able to hear because of earwax and wanted to have it checked.  Denies any drainage or signs of wax.  He has not been cleaning his ears that he knows of.    The following portions of the patient's history were reviewed and updated as appropriate: allergies, current medications, past family history, past medical history, past social history, past surgical history and problem list.    Review of Systems   HENT:        Ear fullness (left)   Eyes: Negative for pain, discharge, redness and itching.   All other systems reviewed and are negative.      Objective   Physical Exam   Constitutional: He appears well-developed and well-nourished. No distress.   HENT:   Right Ear: Hearing, tympanic membrane, external ear and ear canal normal.   Left Ear: Hearing, external ear and ear canal normal.   Cerumen obstructing the tympanic membrane.   Cardiovascular: Normal rate, regular rhythm and normal heart sounds. Exam reveals no gallop and no friction rub.   No murmur heard.  Pulmonary/Chest: Effort normal and breath sounds normal. He has no wheezes. He has no rales.   Skin: Skin is warm. Capillary refill takes less than 2 seconds. He is not diaphoretic.   Nursing note and vitals reviewed.      Assessment/Plan   Dinesh was seen today for ear fullness.    Diagnoses and all orders for this visit:    Hearing loss due to cerumen impaction, left    I recommended that the  some Debrox drops and start working on his ear.  If he is not better in a few weeks, they should give me a call and I will put in a referral to ENT so they  can extract the cerumen.

## 2020-02-28 NOTE — PATIENT INSTRUCTIONS
Earwax Buildup, Adult  The ears produce a substance called earwax that helps keep bacteria out of the ear and protects the skin in the ear canal. Occasionally, earwax can build up in the ear and cause discomfort or hearing loss.  What increases the risk?  This condition is more likely to develop in people who:  · Are male.  · Are elderly.  · Naturally produce more earwax.  · Clean their ears often with cotton swabs.  · Use earplugs often.  · Use in-ear headphones often.  · Wear hearing aids.  · Have narrow ear canals.  · Have earwax that is overly thick or sticky.  · Have eczema.  · Are dehydrated.  · Have excess hair in the ear canal.  What are the signs or symptoms?  Symptoms of this condition include:  · Reduced or muffled hearing.  · A feeling of fullness in the ear or feeling that the ear is plugged.  · Fluid coming from the ear.  · Ear pain.  · Ear itch.  · Ringing in the ear.  · Coughing.  · An obvious piece of earwax that can be seen inside the ear canal.  How is this diagnosed?  This condition may be diagnosed based on:  · Your symptoms.  · Your medical history.  · An ear exam. During the exam, your health care provider will look into your ear with an instrument called an otoscope.  You may have tests, including a hearing test.  How is this treated?  This condition may be treated by:  · Using ear drops to soften the earwax.  · Having the earwax removed by a health care provider. The health care provider may:  ? Flush the ear with water.  ? Use an instrument that has a loop on the end (curette).  ? Use a suction device.  · Surgery to remove the wax buildup. This may be done in severe cases.  Follow these instructions at home:    · Take over-the-counter and prescription medicines only as told by your health care provider.  · Do not put any objects, including cotton swabs, into your ear. You can clean the opening of your ear canal with a washcloth or facial tissue.  · Follow instructions from your health care  provider about cleaning your ears. Do not over-clean your ears.  · Drink enough fluid to keep your urine clear or pale yellow. This will help to thin the earwax.  · Keep all follow-up visits as told by your health care provider. If earwax builds up in your ears often or if you use hearing aids, consider seeing your health care provider for routine, preventive ear cleanings. Ask your health care provider how often you should schedule your cleanings.  · If you have hearing aids, clean them according to instructions from the  and your health care provider.  Contact a health care provider if:  · You have ear pain.  · You develop a fever.  · You have blood, pus, or other fluid coming from your ear.  · You have hearing loss.  · You have ringing in your ears that does not go away.  · Your symptoms do not improve with treatment.  · You feel like the room is spinning (vertigo).  Summary  · Earwax can build up in the ear and cause discomfort or hearing loss.  · The most common symptoms of this condition include reduced or muffled hearing and a feeling of fullness in the ear or feeling that the ear is plugged.  · This condition may be diagnosed based on your symptoms, your medical history, and an ear exam.  · This condition may be treated by using ear drops to soften the earwax or by having the earwax removed by a health care provider.  · Do not put any objects, including cotton swabs, into your ear. You can clean the opening of your ear canal with a washcloth or facial tissue.  This information is not intended to replace advice given to you by your health care provider. Make sure you discuss any questions you have with your health care provider.  Document Released: 01/25/2006 Document Revised: 11/29/2018 Document Reviewed: 02/28/2018  [employer] Interactive Patient Education © 2020 [employer]

## 2020-03-09 ENCOUNTER — OFFICE VISIT (OUTPATIENT)
Dept: CARDIOLOGY | Facility: CLINIC | Age: 85
End: 2020-03-09

## 2020-03-09 VITALS
WEIGHT: 184 LBS | SYSTOLIC BLOOD PRESSURE: 125 MMHG | HEIGHT: 73 IN | HEART RATE: 75 BPM | BODY MASS INDEX: 24.39 KG/M2 | DIASTOLIC BLOOD PRESSURE: 67 MMHG

## 2020-03-09 DIAGNOSIS — I25.10 CORONARY ARTERY DISEASE INVOLVING NATIVE CORONARY ARTERY OF NATIVE HEART WITHOUT ANGINA PECTORIS: Primary | ICD-10-CM

## 2020-03-09 DIAGNOSIS — I48.0 PAROXYSMAL ATRIAL FIBRILLATION (HCC): ICD-10-CM

## 2020-03-09 DIAGNOSIS — I10 ESSENTIAL HYPERTENSION: ICD-10-CM

## 2020-03-09 DIAGNOSIS — I42.9 CARDIOMYOPATHY, UNSPECIFIED TYPE (HCC): ICD-10-CM

## 2020-03-09 DIAGNOSIS — Z79.02 LONG TERM (CURRENT) USE OF ANTITHROMBOTICS/ANTIPLATELETS: ICD-10-CM

## 2020-03-09 PROCEDURE — 99213 OFFICE O/P EST LOW 20 MIN: CPT | Performed by: INTERNAL MEDICINE

## 2020-03-09 NOTE — PROGRESS NOTES
" Subjective:        Dinesh Churchill Sr. is a 89 y.o. male who here for follow up    CC  Follow-up coronary artery disease  HPI  89-year-old male with known history of coronary artery disease, benign essential arterial hypertension atrial fibrillation cardiomyopathy has been doing well shortness of breath on exertion no different than before, patient continues to take antiplatelet agent     Problem List Items Addressed This Visit        Cardiovascular and Mediastinum    Essential hypertension    Cardiomyopathy (CMS/HCC)    Coronary artery disease involving native coronary artery of native heart without angina pectoris - Primary    Paroxysmal atrial fibrillation (CMS/HCC)       Other    Long term (current) use of antithrombotics/antiplatelets        .    The following portions of the patient's history were reviewed and updated as appropriate: allergies, current medications, past family history, past medical history, past social history, past surgical history and problem list.    Past Medical History:   Diagnosis Date   • Abnormal ECG    • Abnormal MRI    • Acute frontal sinusitis    • Arthritis    • Chronic fatigue    • Coronary artery disease    • Dizziness    • Head injury    • Hearing aid worn     left   • Hearing loss    • Hyperlipidemia    • Hypertension    • Kidney stones    • Macrocytosis    • Neoplasm of skin    • Osteoporosis    • Prediabetes    • Vitamin D deficiency      reports that he has never smoked. He has never used smokeless tobacco. He reports that he does not drink alcohol or use drugs.   Family History   Family history unknown: Yes       Review of Systems  Constitutional: No wt loss, fever, fatigue  Gastrointestinal: No nausea, abdominal pain  Behavioral/Psych: No insomnia or anxiety   Cardiovascular no chest pains or tightness in the chest  Objective:       Physical Exam    /67 (BP Location: Left arm, Patient Position: Sitting)   Pulse 75   Ht 185.4 cm (72.99\")   Wt 83.5 kg (184 lb)   " BMI 24.28 kg/m²   General appearance: No acute changes   Neck: Trachea midline; NECK, supple, no thyromegaly or lymphadenopathy   Lungs: Normal size and shape, normal breath sounds, equal distribution of air, no rales and rhonchi   CV: S1-S2 regular, no murmurs, no rub, no gallop   Abdomen: Soft, non-tender; no masses , no abnormal abdominal sounds   Extremities: No deformity , normal color , no peripheral edema   Skin: Normal temperature, turgor and texture; no rash, ulcers          Procedures      Echocardiogram:        Current Outpatient Medications:   •  acetaminophen (TYLENOL) 500 MG tablet, 1-2 TABLETS BY ONCE TO TWICE DAILY AS NEEDED FOR PAIN, Disp: 60 tablet, Rfl: 0  •  amLODIPine (NORVASC) 5 MG tablet, Take 1 tablet by mouth Daily., Disp: 30 tablet, Rfl: 12  •  aspirin 81 MG chewable tablet, Chew 1 tablet Daily., Disp: , Rfl:   •  atorvastatin (LIPITOR) 20 MG tablet, TAKE 1 TABLET BY MOUTH EVERY NIGHT, Disp: 30 tablet, Rfl: 0  •  Cholecalciferol (VITAMIN D-3) 1000 UNITS capsule, Take 1,000 Units by mouth daily., Disp: , Rfl:   •  Elastic Bandages & Supports (LUMBAR BACK BRACE/SUPPORT PAD) misc, Use for lifting/ strenuous exercise., Disp: 1 each, Rfl: 0  •  isosorbide mononitrate (IMDUR) 60 MG 24 hr tablet, TAKE 1 TABLET BY MOUTH EVERY DAY, Disp: 30 tablet, Rfl: 0  •  losartan (COZAAR) 50 MG tablet, TAKE 1 TABLET BY MOUTH DAILY, Disp: 30 tablet, Rfl: 0  •  metoprolol succinate XL (TOPROL-XL) 100 MG 24 hr tablet, TAKE 1 TABLET BY MOUTH DAILY, Disp: 30 tablet, Rfl: 3  •  ticagrelor (BRILINTA) 90 MG tablet tablet, Take 1 tablet by mouth 2 (Two) Times a Day., Disp: 60 tablet, Rfl: 5    Current Facility-Administered Medications:   •  cyanocobalamin injection 1,000 mcg, 1,000 mcg, Intramuscular, Q30 Days, Phyllis Loving PA, 1,000 mcg at 12/27/19 0947  •  cyanocobalamin injection 1,000 mcg, 1,000 mcg, Intramuscular, Q28 Days, Phyllis Loving PA, 1,000 mcg at 02/28/20 1005   Assessment:        Patient Active  Problem List   Diagnosis   • Abnormal magnetic resonance imaging study   • Arthritis   • Osteoarthritis of cervical spine   • Diplopia   • Hearing loss   • Neoplasm of uncertain behavior of skin   • Prediabetes   • Vitamin D deficiency   • Chronic fatigue   • History of supraventricular tachycardia   • Essential hypertension   • B12 deficiency   • Abnormal cardiac function test   • Cardiomyopathy (CMS/HCC)   • Coronary artery disease involving native coronary artery of native heart without angina pectoris   • History of coronary artery stent placement   • History of renal calculi   • Dyslipidemia   • Macrocytic anemia   • Precordial pain   • Parkinson's disease (CMS/HCC)   • History of CVA (cerebrovascular accident)   • Paroxysmal atrial fibrillation (CMS/HCC)   • Otalgia, left   • Chest pain   • Abnormal stress test   • Spontaneous pneumothorax   • Chest pain   • Acute systolic (congestive) heart failure (CMS/HCC)   • Thrombocytopenia (CMS/HCC)   • Pneumonia of left lower lobe due to infectious organism (CMS/HCC)   • Syncope and collapse   • Status post placement of implantable loop recorder               Plan:            ICD-10-CM ICD-9-CM   1. Coronary artery disease involving native coronary artery of native heart without angina pectoris I25.10 414.01   2. Essential hypertension I10 401.9   3. Paroxysmal atrial fibrillation (CMS/HCC) I48.0 427.31   4. Cardiomyopathy, unspecified type (CMS/HCC) I42.9 425.4     1. Coronary artery disease involving native coronary artery of native heart without angina pectoris  No angina pectoris    2. Essential hypertension  Blood pressure stable    3. Paroxysmal atrial fibrillation (CMS/HCC)  Controlled    4. Cardiomyopathy, unspecified type (CMS/HCC)  Compensated    SEE IN 6 MONTHS  COUNSELING:    Dinesh Churchill Sr.Counseling was given to patient for the following topics: diagnostic results, risk factor reductions, impressions, risks and benefits of treatment options and  importance of treatment compliance .       SMOKING COUNSELING:    [unfilled]    Dictated using Dragon dictation

## 2020-03-13 PROBLEM — Z79.02 LONG TERM (CURRENT) USE OF ANTITHROMBOTICS/ANTIPLATELETS: Status: ACTIVE | Noted: 2020-03-13

## 2020-03-18 RX ORDER — LOSARTAN POTASSIUM 50 MG/1
50 TABLET ORAL DAILY
Qty: 30 TABLET | Refills: 5 | Status: SHIPPED | OUTPATIENT
Start: 2020-03-18 | End: 2020-09-09

## 2020-03-18 RX ORDER — ISOSORBIDE MONONITRATE 60 MG/1
TABLET, EXTENDED RELEASE ORAL
Qty: 30 TABLET | Refills: 5 | Status: SHIPPED | OUTPATIENT
Start: 2020-03-18 | End: 2020-09-09

## 2020-03-18 RX ORDER — ATORVASTATIN CALCIUM 20 MG/1
20 TABLET, FILM COATED ORAL NIGHTLY
Qty: 30 TABLET | Refills: 5 | Status: SHIPPED | OUTPATIENT
Start: 2020-03-18 | End: 2020-09-09

## 2020-03-22 ENCOUNTER — CLINICAL SUPPORT NO REQUIREMENTS (OUTPATIENT)
Dept: CARDIOLOGY | Facility: CLINIC | Age: 85
End: 2020-03-22

## 2020-03-22 DIAGNOSIS — Z95.818 STATUS POST PLACEMENT OF IMPLANTABLE LOOP RECORDER: Primary | ICD-10-CM

## 2020-03-22 PROCEDURE — G2066 INTER DEVC REMOTE 30D: HCPCS | Performed by: INTERNAL MEDICINE

## 2020-03-22 PROCEDURE — 93298 REM INTERROG DEV EVAL SCRMS: CPT | Performed by: INTERNAL MEDICINE

## 2020-03-30 ENCOUNTER — TELEPHONE (OUTPATIENT)
Dept: FAMILY MEDICINE CLINIC | Facility: CLINIC | Age: 85
End: 2020-03-30

## 2020-03-30 RX ORDER — GREEN TEA/HOODIA GORDONII 315-12.5MG
500 CAPSULE ORAL DAILY
Qty: 30 TABLET | Refills: 4 | Status: SHIPPED | OUTPATIENT
Start: 2020-03-30 | End: 2020-08-21

## 2020-03-30 NOTE — TELEPHONE ENCOUNTER
----- Message from Nargis Rai sent at 3/30/2020 12:00 PM EDT -----  Contact: [phone]  Patient needs his B 12 shot changed to an oral B 12 he uses [facility] [place]

## 2020-04-22 ENCOUNTER — CLINICAL SUPPORT NO REQUIREMENTS (OUTPATIENT)
Dept: CARDIOLOGY | Facility: CLINIC | Age: 85
End: 2020-04-22

## 2020-04-22 DIAGNOSIS — Z95.818 STATUS POST PLACEMENT OF IMPLANTABLE LOOP RECORDER: Primary | ICD-10-CM

## 2020-04-22 PROCEDURE — 93298 REM INTERROG DEV EVAL SCRMS: CPT | Performed by: INTERNAL MEDICINE

## 2020-04-22 PROCEDURE — G2066 INTER DEVC REMOTE 30D: HCPCS | Performed by: INTERNAL MEDICINE

## 2020-05-18 RX ORDER — METOPROLOL SUCCINATE 100 MG/1
100 TABLET, EXTENDED RELEASE ORAL DAILY
Qty: 30 TABLET | Refills: 3 | Status: SHIPPED | OUTPATIENT
Start: 2020-05-18 | End: 2020-10-20 | Stop reason: SDUPTHER

## 2020-05-23 ENCOUNTER — CLINICAL SUPPORT NO REQUIREMENTS (OUTPATIENT)
Dept: CARDIOLOGY | Facility: CLINIC | Age: 85
End: 2020-05-23

## 2020-05-23 DIAGNOSIS — Z95.818 STATUS POST PLACEMENT OF IMPLANTABLE LOOP RECORDER: Primary | ICD-10-CM

## 2020-05-23 PROCEDURE — G2066 INTER DEVC REMOTE 30D: HCPCS | Performed by: INTERNAL MEDICINE

## 2020-05-23 PROCEDURE — 93298 REM INTERROG DEV EVAL SCRMS: CPT | Performed by: INTERNAL MEDICINE

## 2020-06-23 ENCOUNTER — CLINICAL SUPPORT NO REQUIREMENTS (OUTPATIENT)
Dept: CARDIOLOGY | Facility: CLINIC | Age: 85
End: 2020-06-23

## 2020-06-23 DIAGNOSIS — Z95.818 STATUS POST PLACEMENT OF IMPLANTABLE LOOP RECORDER: Primary | ICD-10-CM

## 2020-06-23 PROCEDURE — G2066 INTER DEVC REMOTE 30D: HCPCS | Performed by: INTERNAL MEDICINE

## 2020-06-23 PROCEDURE — 93298 REM INTERROG DEV EVAL SCRMS: CPT | Performed by: INTERNAL MEDICINE

## 2020-07-16 RX ORDER — TICAGRELOR 90 MG/1
TABLET ORAL
Qty: 60 TABLET | Refills: 1 | Status: SHIPPED | OUTPATIENT
Start: 2020-07-16 | End: 2020-09-21

## 2020-08-21 RX ORDER — CHOLECALCIFEROL (VITAMIN D3) 25 MCG
TABLET,CHEWABLE ORAL
Qty: 15 LOZENGE | Refills: 0 | Status: SHIPPED | OUTPATIENT
Start: 2020-08-21 | End: 2020-10-09

## 2020-08-21 NOTE — TELEPHONE ENCOUNTER
Was seen in office 2/28/2020 for ear fullness.    Seen 1/15/2020 for hypertension, prediabetes, and hyperlipidemia    Per note and lab results on 1/15 to recheck labs in March and saying pulmonary said he was stable and to follow up 6/2020.

## 2020-09-02 ENCOUNTER — OFFICE VISIT (OUTPATIENT)
Dept: FAMILY MEDICINE CLINIC | Facility: CLINIC | Age: 85
End: 2020-09-02

## 2020-09-02 VITALS
TEMPERATURE: 97.3 F | WEIGHT: 181 LBS | HEIGHT: 73 IN | HEART RATE: 60 BPM | DIASTOLIC BLOOD PRESSURE: 70 MMHG | SYSTOLIC BLOOD PRESSURE: 122 MMHG | BODY MASS INDEX: 23.99 KG/M2 | OXYGEN SATURATION: 95 %

## 2020-09-02 DIAGNOSIS — E78.5 DYSLIPIDEMIA: ICD-10-CM

## 2020-09-02 DIAGNOSIS — G20 PARKINSON'S DISEASE (HCC): ICD-10-CM

## 2020-09-02 DIAGNOSIS — R55 SYNCOPE AND COLLAPSE: ICD-10-CM

## 2020-09-02 DIAGNOSIS — J90 PLEURAL EFFUSION: ICD-10-CM

## 2020-09-02 DIAGNOSIS — I42.9 CARDIOMYOPATHY, UNSPECIFIED TYPE (HCC): ICD-10-CM

## 2020-09-02 DIAGNOSIS — Z86.73 HISTORY OF CVA (CEREBROVASCULAR ACCIDENT): ICD-10-CM

## 2020-09-02 DIAGNOSIS — I48.0 PAROXYSMAL ATRIAL FIBRILLATION (HCC): ICD-10-CM

## 2020-09-02 DIAGNOSIS — E53.8 VITAMIN B 12 DEFICIENCY: ICD-10-CM

## 2020-09-02 DIAGNOSIS — R53.1 WEAKNESS: ICD-10-CM

## 2020-09-02 DIAGNOSIS — I25.10 CORONARY ARTERY DISEASE INVOLVING NATIVE CORONARY ARTERY OF NATIVE HEART WITHOUT ANGINA PECTORIS: ICD-10-CM

## 2020-09-02 DIAGNOSIS — J93.83 SPONTANEOUS PNEUMOTHORAX: ICD-10-CM

## 2020-09-02 DIAGNOSIS — E55.9 VITAMIN D DEFICIENCY: ICD-10-CM

## 2020-09-02 DIAGNOSIS — D53.9 MACROCYTIC ANEMIA: ICD-10-CM

## 2020-09-02 DIAGNOSIS — D69.6 THROMBOCYTOPENIA (HCC): ICD-10-CM

## 2020-09-02 DIAGNOSIS — R53.83 FATIGUE, UNSPECIFIED TYPE: ICD-10-CM

## 2020-09-02 DIAGNOSIS — I10 ESSENTIAL HYPERTENSION: Primary | ICD-10-CM

## 2020-09-02 DIAGNOSIS — R73.03 PREDIABETES: ICD-10-CM

## 2020-09-02 PROCEDURE — 99214 OFFICE O/P EST MOD 30 MIN: CPT | Performed by: PHYSICIAN ASSISTANT

## 2020-09-02 NOTE — PROGRESS NOTES
Subjective   Dinesh Churchill Sr. is a 89 y.o. male who presents today in follow-up of hypertension, prediabetes, hyperlipidemia, B12 and vitamin D deficiency, macrocytosis, history of CVA and Parkinson's, atrial fibrillation, coronary artery disease, and cardiomyopathy, cataracts, pulmonary fibrosis, pneumothorax with pleural effusion.     History of Present Illness     Hypertension- has been stable on Norvasc 5 mg once daily, metoprolol  mg daily, and losartan 50 mg once daily.  Prediabetes and hyperlipidemia- continues Lipitor 20 mg once daily & tolerating without AE.  B12- last injection 12/27/2019  Vitamin D- taking vitamin D 1000IU daily. Tolerating without AE.     Weakness and falls- 1 fall only after going to the eye doctor with dilation of eyes. No other falls.   Chest pain- taking Imdur 60 mg as directed by cardiology. No CP- controlling CP.   Shortness of breath- no change from baseline. No increase in SOA     Cardiology-Dr. Quezada-last seen 3/2020 for CAD, cardiomyopathy, atrial fibrillation, hypertension -stable.  Follow-up in 6 months.  Neurology- Dr. Saleh- last appointment 1/2020  For seizure-like activity.  No further work-up at that time.  They will consider EEG if he has any further symptoms.  Dr. Naik- last seen 10/2017- for Parkinson's, orthostatic hypotension, and ataxia- he did not want to pursue treatment at that time and was advised to follow-up PRN.   Ophthalmology- Beck Castro in [place]- will have left cataract surgery 1/27/2020 and right cataract surgery 2/3/2020.   Pulmonology- Dr De Jesus- last appt 12/2019 for pulmonary fibrosis, pulmonary edema, pneumothorax- follow up in 6 months.     The following portions of the patient's history were reviewed and updated as appropriate: allergies, current medications, past family history, past medical history, past social history, past surgical history and problem list.    Review of Systems   Constitutional: Positive  for fatigue.   HENT: Negative.    Eyes: Positive for visual disturbance.        Having cataract surgery x 2 upcoming   Respiratory: Positive for shortness of breath.    Cardiovascular: Negative.    Gastrointestinal: Negative.    Genitourinary: Negative.    Musculoskeletal: Positive for gait problem.   Neurological:        Fall   Hematological: Negative.    Psychiatric/Behavioral: Negative.        Objective    Vitals:    09/02/20 1018   BP: 122/70   Pulse: 60   Temp: 97.3 °F (36.3 °C)   SpO2: 95%     Body mass index is 23.99 kg/m².    Physical Exam   Constitutional: He is oriented to person, place, and time. He appears well-developed.   HENT:   Head: Normocephalic and atraumatic.   Right Ear: External ear normal.   Left Ear: External ear normal.   Eyes: Conjunctivae are normal.   Neck: Carotid bruit is not present. No tracheal deviation present. No thyroid mass and no thyromegaly present.   Cardiovascular: Normal rate, regular rhythm, normal heart sounds and intact distal pulses.   Pulmonary/Chest: Effort normal and breath sounds normal.   Neurological: He is alert and oriented to person, place, and time. Gait normal.   Skin: Skin is warm and dry.   Psychiatric: He has a normal mood and affect. His behavior is normal. Judgment and thought content normal.   Nursing note and vitals reviewed.      Assessment/Plan   Dinesh was seen today for check b-12 levels.    Diagnoses and all orders for this visit:    Essential hypertension  -     Comprehensive Metabolic Panel    Dyslipidemia  -     Comprehensive Metabolic Panel  -     CK  -     Lipid Panel With LDL / HDL Ratio    Prediabetes  -     Comprehensive Metabolic Panel  -     Hemoglobin A1c  -     CK  -     Lipid Panel With LDL / HDL Ratio    Vitamin D deficiency  -     Comprehensive Metabolic Panel  -     Vitamin D 25 Hydroxy    Macrocytic anemia  -     CBC & Differential  -     Vitamin B12 & Folate    Thrombocytopenia (CMS/HCC)  -     CBC & Differential  -     Iron and  TIBC  -     Ferritin  -     Vitamin B12 & Folate    Syncope and collapse  -     CBC & Differential  -     Iron and TIBC  -     Ferritin  -     Vitamin B12 & Folate  -     TSH  -     T4, free  -     T3, Free    History of CVA (cerebrovascular accident)  -     Comprehensive Metabolic Panel  -     Hemoglobin A1c  -     CK  -     Lipid Panel With LDL / HDL Ratio    Parkinson's disease (CMS/HCC)    Spontaneous pneumothorax    Pleural effusion    Coronary artery disease involving native coronary artery of native heart without angina pectoris  -     Comprehensive Metabolic Panel  -     Hemoglobin A1c  -     CK  -     Lipid Panel With LDL / HDL Ratio    Cardiomyopathy, unspecified type (CMS/HCC)  -     Comprehensive Metabolic Panel  -     Hemoglobin A1c  -     CK  -     Lipid Panel With LDL / HDL Ratio    Paroxysmal atrial fibrillation (CMS/HCC)  -     CBC & Differential  -     Iron and TIBC  -     Ferritin  -     TSH  -     T4, free  -     T3, Free    Vitamin B 12 deficiency  -     CBC & Differential  -     Vitamin B12 & Folate    Fatigue, unspecified type  -     CBC & Differential  -     Hemoglobin A1c  -     Iron and TIBC  -     Ferritin  -     Vitamin B12 & Folate  -     Vitamin D 25 Hydroxy  -     TSH  -     T4, free  -     T3, Free    Weakness  -     CBC & Differential  -     Hemoglobin A1c  -     Iron and TIBC  -     Ferritin  -     Vitamin B12 & Folate  -     Vitamin D 25 Hydroxy  -     TSH  -     T4, free  -     T3, Free      Assessment and Plan  89 y.o. male presents today in follow-up of hypertension, prediabetes, hyperlipidemia, B12 and vitamin D deficiency, macrocytosis, history of CVA and Parkinson's, atrial fibrillation, coronary artery disease, and cardiomyopathy, cataracts, pulmonary fibrosis, pneumothorax with pleural effusion. Blood pressure today is stable. Continue Norvasc 5 mg once daily, Metoprolol  mg daily, and Losartan 50 mg once daily. He continues Lipitor 20 mg at bedtime and tolerates  without AE and Vitamin D 1000IU daily. He has not had B12 injection in months. Patient will have fasting labs. Call if no results in 1 week. Stability of conditions, plan, follow up, and further recommendations pending labs. Follow up in 3-4 months pending results and symptoms.     He avoids any overhead movement, as he loses his balance.  He has had only 1 fall after going to the eye doctor with sedation. He denies any other recent balance issues or falls.  He is getting around well.  He was seen by neurology and diagnosed with Parkinson's, however, they did not feel he needed treatment.  He continues follow-up with cardiology, and he has current with appointments there.  He continues on Imdur 60 mg once daily, and he denies any changes or increase in chest pain, shortness of breath, palpitations, dizziness, or weakness.  He has upcoming cataract surgery bilaterally. Patient has been found to have pneumothorax and pleural effusion with hospitalization. Pulmonology saw him and advised he was stable and to follow up in 6/2020. He will need to reschedule with pulmonology.   Patient to ensure follow up with specialists as directed by them.   Cardiology-Dr. Quezada-last seen 3/2020 for CAD, cardiomyopathy, atrial fibrillation, hypertension -stable.  Follow-up in 6 months.  Neurology- Dr. Saleh- last appointment 1/2020  For seizure-like activity.  No further work-up at that time.  They will consider EEG if he has any further symptoms.  Dr. Naik- last seen 10/2017- for Parkinson's, orthostatic hypotension, and ataxia- he did not want to pursue treatment at that time and was advised to follow-up PRN.   Ophthalmology- Beck Castro in [place]- will have left cataract surgery 1/27/2020 and right cataract surgery 2/3/2020.   Pulmonology- Dr De Jesus- last appt 12/2019 for pulmonary fibrosis, pulmonary edema, pneumothorax- follow up in 6 months.

## 2020-09-03 LAB
25(OH)D3+25(OH)D2 SERPL-MCNC: 30.9 NG/ML (ref 30–100)
ALBUMIN SERPL-MCNC: 4.1 G/DL (ref 3.5–5.2)
ALBUMIN/GLOB SERPL: 2.3 G/DL
ALP SERPL-CCNC: 52 U/L (ref 39–117)
ALT SERPL-CCNC: 8 U/L (ref 1–41)
AST SERPL-CCNC: 12 U/L (ref 1–40)
BASOPHILS # BLD AUTO: 0.01 10*3/MM3 (ref 0–0.2)
BASOPHILS NFR BLD AUTO: 0.2 % (ref 0–1.5)
BILIRUB SERPL-MCNC: 1.6 MG/DL (ref 0–1.2)
BUN SERPL-MCNC: 23 MG/DL (ref 8–23)
BUN/CREAT SERPL: 24.5 (ref 7–25)
CALCIUM SERPL-MCNC: 8.8 MG/DL (ref 8.2–9.6)
CHLORIDE SERPL-SCNC: 106 MMOL/L (ref 98–107)
CHOLEST SERPL-MCNC: 134 MG/DL (ref 0–200)
CK SERPL-CCNC: 64 U/L (ref 20–200)
CO2 SERPL-SCNC: 23.7 MMOL/L (ref 22–29)
CREAT SERPL-MCNC: 0.94 MG/DL (ref 0.76–1.27)
EOSINOPHIL # BLD AUTO: 0.09 10*3/MM3 (ref 0–0.4)
EOSINOPHIL NFR BLD AUTO: 1.9 % (ref 0.3–6.2)
ERYTHROCYTE [DISTWIDTH] IN BLOOD BY AUTOMATED COUNT: 14.7 % (ref 12.3–15.4)
FERRITIN SERPL-MCNC: 291 NG/ML (ref 30–400)
FOLATE SERPL-MCNC: 5.55 NG/ML (ref 4.78–24.2)
GLOBULIN SER CALC-MCNC: 1.8 GM/DL
GLUCOSE SERPL-MCNC: 109 MG/DL (ref 65–99)
HBA1C MFR BLD: 5.6 % (ref 4.8–5.6)
HCT VFR BLD AUTO: 37.9 % (ref 37.5–51)
HDLC SERPL-MCNC: 51 MG/DL (ref 40–60)
HGB BLD-MCNC: 12.5 G/DL (ref 13–17.7)
IMM GRANULOCYTES # BLD AUTO: 0.02 10*3/MM3 (ref 0–0.05)
IMM GRANULOCYTES NFR BLD AUTO: 0.4 % (ref 0–0.5)
IRON SATN MFR SERPL: 26 % (ref 20–50)
IRON SERPL-MCNC: 94 MCG/DL (ref 59–158)
LDLC SERPL CALC-MCNC: 69 MG/DL (ref 0–100)
LDLC/HDLC SERPL: 1.36 {RATIO}
LYMPHOCYTES # BLD AUTO: 1 10*3/MM3 (ref 0.7–3.1)
LYMPHOCYTES NFR BLD AUTO: 20.7 % (ref 19.6–45.3)
MCH RBC QN AUTO: 33.1 PG (ref 26.6–33)
MCHC RBC AUTO-ENTMCNC: 33 G/DL (ref 31.5–35.7)
MCV RBC AUTO: 100.3 FL (ref 79–97)
MONOCYTES # BLD AUTO: 0.39 10*3/MM3 (ref 0.1–0.9)
MONOCYTES NFR BLD AUTO: 8.1 % (ref 5–12)
NEUTROPHILS # BLD AUTO: 3.33 10*3/MM3 (ref 1.7–7)
NEUTROPHILS NFR BLD AUTO: 68.7 % (ref 42.7–76)
NRBC BLD AUTO-RTO: 0.2 /100 WBC (ref 0–0.2)
PLATELET # BLD AUTO: 134 10*3/MM3 (ref 140–450)
POTASSIUM SERPL-SCNC: 4.3 MMOL/L (ref 3.5–5.2)
PROT SERPL-MCNC: 5.9 G/DL (ref 6–8.5)
RBC # BLD AUTO: 3.78 10*6/MM3 (ref 4.14–5.8)
SODIUM SERPL-SCNC: 139 MMOL/L (ref 136–145)
T3FREE SERPL-MCNC: 2.6 PG/ML (ref 2–4.4)
T4 FREE SERPL-MCNC: 1.39 NG/DL (ref 0.93–1.7)
TIBC SERPL-MCNC: 363 MCG/DL
TRIGL SERPL-MCNC: 69 MG/DL (ref 0–150)
TSH SERPL DL<=0.005 MIU/L-ACNC: 2.27 UIU/ML (ref 0.27–4.2)
UIBC SERPL-MCNC: 269 MCG/DL (ref 112–346)
VIT B12 SERPL-MCNC: 583 PG/ML (ref 211–946)
VLDLC SERPL CALC-MCNC: 13.8 MG/DL
WBC # BLD AUTO: 4.84 10*3/MM3 (ref 3.4–10.8)

## 2020-09-09 ENCOUNTER — OFFICE VISIT (OUTPATIENT)
Dept: CARDIOLOGY | Facility: CLINIC | Age: 85
End: 2020-09-09

## 2020-09-09 VITALS
BODY MASS INDEX: 25.33 KG/M2 | DIASTOLIC BLOOD PRESSURE: 62 MMHG | HEIGHT: 72 IN | SYSTOLIC BLOOD PRESSURE: 144 MMHG | WEIGHT: 187 LBS | HEART RATE: 69 BPM

## 2020-09-09 DIAGNOSIS — I48.0 PAROXYSMAL ATRIAL FIBRILLATION (HCC): ICD-10-CM

## 2020-09-09 DIAGNOSIS — I25.10 CORONARY ARTERY DISEASE INVOLVING NATIVE CORONARY ARTERY OF NATIVE HEART WITHOUT ANGINA PECTORIS: ICD-10-CM

## 2020-09-09 DIAGNOSIS — I42.9 CARDIOMYOPATHY, UNSPECIFIED TYPE (HCC): Primary | ICD-10-CM

## 2020-09-09 PROCEDURE — 99213 OFFICE O/P EST LOW 20 MIN: CPT | Performed by: NURSE PRACTITIONER

## 2020-09-09 RX ORDER — LOSARTAN POTASSIUM 50 MG/1
50 TABLET ORAL DAILY
Qty: 30 TABLET | Refills: 5 | Status: SHIPPED | OUTPATIENT
Start: 2020-09-09 | End: 2021-03-08

## 2020-09-09 RX ORDER — ISOSORBIDE MONONITRATE 60 MG/1
TABLET, EXTENDED RELEASE ORAL
Qty: 30 TABLET | Refills: 5 | Status: SHIPPED | OUTPATIENT
Start: 2020-09-09 | End: 2021-03-08

## 2020-09-09 RX ORDER — ATORVASTATIN CALCIUM 20 MG/1
20 TABLET, FILM COATED ORAL NIGHTLY
Qty: 30 TABLET | Refills: 5 | Status: SHIPPED | OUTPATIENT
Start: 2020-09-09 | End: 2021-03-08

## 2020-09-09 NOTE — PROGRESS NOTES
Subjective:        Dinesh Churchill Sr. is a 89 y.o. male who here for follow up    Chief Complaint   Patient presents with   • Coronary Artery Disease     6 MO FOLLOW UP       HPI  Dinesh Churchill is a 89-year-old male, who is current with me. He has a a history of CAD, cardiomyopathy, hyperlipidemia, and hypertension. His past cardiac cath was a successful angioplasty and stent to subtotal diagonal reduced to 0%. His echo on 7/2019 revealed EF 31%, mild to moderate MV regurgitation, mild AV and TV regurgitation, RVC is mildly dilated, LVC is moderately dilated, LAC size is mildly dilated, and no evidence of pericardial effusion.  He denies chest pain, shortness of breath, and syncope.     The following portions of the patient's history were reviewed and updated as appropriate: allergies, current medications, past family history, past medical history, past social history, past surgical history and problem list.    Past Medical History:   Diagnosis Date   • Abnormal ECG    • Abnormal MRI    • Acute frontal sinusitis    • Arthritis    • Chronic fatigue    • Coronary artery disease    • Dizziness    • Head injury    • Hearing aid worn     left   • Hearing loss    • Hyperlipidemia    • Hypertension    • Kidney stones    • Macrocytosis    • Neoplasm of skin    • Osteoporosis    • Prediabetes    • Vitamin D deficiency          reports that he has never smoked. He has never used smokeless tobacco. He reports that he does not drink alcohol or use drugs.     Family History   Family history unknown: Yes       ROS     Review of Systems  Constitutional: No wt loss, fever, fatigue  Gastrointestinal: No nausea, abdominal pain  Behavioral/Psych: No insomnia or anxiety  Cardiovascular: Denies chest pain and palpitations      Objective:           Physical Exam   Constitutional: He is oriented to person, place, and time. He appears well-developed and well-nourished.   HENT:   Head: Normocephalic.   Right Ear: External ear normal.    Left Ear: External ear normal.   Eyes: EOM are normal.   Neck: Normal range of motion. No JVD present.   Cardiovascular: Normal rate, regular rhythm, normal heart sounds and intact distal pulses. Exam reveals no gallop and no friction rub.   No murmur heard.  Pulmonary/Chest: Effort normal and breath sounds normal. No stridor. No respiratory distress. He has no rales.   Abdominal: Soft. Bowel sounds are normal. He exhibits no distension. There is no tenderness. There is no guarding.   Musculoskeletal: Normal range of motion. He exhibits no edema or tenderness.   Neurological: He is alert and oriented to person, place, and time. He has normal reflexes.   Skin: Skin is warm.   Psychiatric: He has a normal mood and affect. Judgment normal.   Nursing note and vitals reviewed.         Cardiac cath 8/2019   Conclusion        · Successful angioplasty and stent to subtotal diagonal reduced to 0% with 2.0/15 vision dilated to 2.2     INDICATION:   The patient is 89-year-old with unstable angina     PROCEDURE:  Coronary intervention of the diagonal branch of the left anterior descending artery.     DESCRIPTION OF PROCEDURE:  Following informed consent, the patient was taken to the Cath Lab and prepped and draped in the usual sterile fashion.  The right radial area was anesthetized using Xylocaine.  The right radial artery was entered using a single wall puncture technique.  A 6 Croat[place] sheath was placed in the vessel.  Intravenous AngioMax was given.  VL 3.5, guide with BMW wire, 2.0/15 tract balloon followed by 2.0/15 vision stent dilated to 2.2.      The balloon and wire were removed and final angiograms were performed.  The catheter was removed through the femoral sheath.  The sheath was removed and TR band device was deployed.  Hemostasis was obtained.  There were no immediate complications.     RESULTS:  1. Initial aortic pressure was approximately 130/80, and the patient remained hemodynamically stable through the  case.  2. Initial coronary arteriography showed 99% subtotally occluded diagonal branch reduced to 0% with 2.0/15 vision stent dilated to 2.2.     IVONNE  FLOW   PRE      3    POST  3     TYPE OF LESION    C     RECOMMENDATIONS:  The patient has had an excellent result from intervention.  The patient will be maintained on antiplatelet therapy and follow up with me and his primary care physician,   Importance of taking dual antiplatelets for one year  has been explained, risk of stent thrombosis leading to the acute MI, which carries high morbidity and mortality has been explained     Discontinuation or interruptions of these medications should be under the strict guidance of appropriate health professional  .               Conclusion        · Successful Linq implantation         Current Outpatient Medications:   •  acetaminophen (TYLENOL) 500 MG tablet, 1-2 TABLETS BY ONCE TO TWICE DAILY AS NEEDED FOR PAIN, Disp: 60 tablet, Rfl: 0  •  amLODIPine (NORVASC) 5 MG tablet, Take 1 tablet by mouth Daily., Disp: 30 tablet, Rfl: 12  •  aspirin 81 MG chewable tablet, Chew 1 tablet Daily., Disp: , Rfl:   •  atorvastatin (LIPITOR) 20 MG tablet, TAKE 1 TABLET BY MOUTH EVERY NIGHT, Disp: 30 tablet, Rfl: 5  •  BRILINTA 90 MG tablet tablet, TAKE 1 TABLET BY MOUTH TWICE DAILY, Disp: 60 tablet, Rfl: 1  •  Cholecalciferol (VITAMIN D-3) 1000 UNITS capsule, Take 1,000 Units by mouth daily., Disp: , Rfl:   •  Cyanocobalamin (B-12) 1000 MCG lozenge, USE 1 TABLET UNDER THE TONGUE EVERY OTHER DAY, Disp: 15 lozenge, Rfl: 0  •  isosorbide mononitrate (IMDUR) 60 MG 24 hr tablet, TAKE 1 TABLET BY MOUTH EVERY DAY, Disp: 30 tablet, Rfl: 5  •  losartan (COZAAR) 50 MG tablet, TAKE 1 TABLET BY MOUTH DAILY, Disp: 30 tablet, Rfl: 5  •  metoprolol succinate XL (TOPROL-XL) 100 MG 24 hr tablet, TAKE 1 TABLET BY MOUTH DAILY, Disp: 30 tablet, Rfl: 3  •  Elastic Bandages & Supports (LUMBAR BACK BRACE/SUPPORT PAD) misc, Use for lifting/ strenuous exercise.,  Disp: 1 each, Rfl: 0     Assessment:        Patient Active Problem List   Diagnosis   • Abnormal magnetic resonance imaging study   • Arthritis   • Osteoarthritis of cervical spine   • Diplopia   • Hearing loss   • Neoplasm of uncertain behavior of skin   • Prediabetes   • Vitamin D deficiency   • Chronic fatigue   • History of supraventricular tachycardia   • Essential hypertension   • B12 deficiency   • Abnormal cardiac function test   • Cardiomyopathy (CMS/HCC)   • Coronary artery disease involving native coronary artery of native heart without angina pectoris   • History of coronary artery stent placement   • History of renal calculi   • Dyslipidemia   • Macrocytic anemia   • Precordial pain   • Parkinson's disease (CMS/HCC)   • History of CVA (cerebrovascular accident)   • Paroxysmal atrial fibrillation (CMS/HCC)   • Otalgia, left   • Chest pain   • Abnormal stress test   • Spontaneous pneumothorax   • Chest pain   • Acute systolic (congestive) heart failure (CMS/HCC)   • Thrombocytopenia (CMS/HCC)   • Pneumonia of left lower lobe due to infectious organism   • Syncope and collapse   • Status post placement of implantable loop recorder   • Long term (current) use of antithrombotics/antiplatelets               Plan:   1. CAD: Previous ischemic work up as above.  He denies chest pain.   Continue statin, BB, Brilinta, and asa.    Risk reduction for the coronary artery disease, controlling the blood pressure, blood sugar management, cholesterol management, exercise, stress management, and proper compliance with medications and follow-up has been discussed.    2. Cardiomyopathy: Last echo revealed EF 31%, see full report as above. Continue ARB.     3. Hypertension:  Today in the office his blood pressure is controlled.     Educated patient on exercising for at least 30 minutes a day for 2 to 3 days a week. Importance of controlling hypertension and blood pressure checkup on the regular basis has been explained.  Hypertension as a silent killer has been discussed. Risk reduction of the weight and regular exercises to control the hypertension has been explained.      4. HLD His last lipid panel on9/2/20 showed , HDL 51, LDL  69, triglycerides 69. Good control noted. Continue statin.     Risk of the hyperlipidemia, importance of the treatment has been explained. Pros and cons of the statins has been explained. Regular blood workup as well as side effects including the liver failure, myelopathy death has been explained.     5. Linq: last chest showed 41 tachycardia's.     There are no diagnoses linked to this encounter.    COUNSELING:    Dinesh Churchill Sr.Counseling was given to patient for the following topics: diagnostic results, risk factor reductions, impressions, risks and benefits of treatment options and importance of treatment compliance .       SMOKING COUNSELING:    He will follow up in three months with an echo. He will see Dr. Quezada.     Sincerely,   CANDELARIA Macias  [facility]  09/09/20  11:06     EMR Dragon/Transcription disclaimer:   Much of this encounter note is an electronic transcription/translation of spoken language to printed text. The electronic translation of spoken language may permit erroneous, or at times, nonsensical words or phrases to be inadvertently transcribed; Although I have reviewed the note for such errors, some may still exist.

## 2020-09-18 ENCOUNTER — HOSPITAL ENCOUNTER (EMERGENCY)
Facility: HOSPITAL | Age: 85
Discharge: HOME | End: 2020-09-19
Attending: EMERGENCY MEDICINE | Admitting: EMERGENCY MEDICINE

## 2020-09-18 ENCOUNTER — APPOINTMENT (OUTPATIENT)
Dept: CT IMAGING | Facility: HOSPITAL | Age: 85
End: 2020-09-18

## 2020-09-18 VITALS
SYSTOLIC BLOOD PRESSURE: 145 MMHG | TEMPERATURE: 99.2 F | RESPIRATION RATE: 18 BRPM | HEART RATE: 69 BPM | DIASTOLIC BLOOD PRESSURE: 65 MMHG | OXYGEN SATURATION: 94 %

## 2020-09-18 DIAGNOSIS — S22.42XA CLOSED FRACTURE OF MULTIPLE RIBS OF LEFT SIDE, INITIAL ENCOUNTER: Primary | ICD-10-CM

## 2020-09-18 DIAGNOSIS — S41.112A SKIN TEAR OF LEFT UPPER EXTREMITY: ICD-10-CM

## 2020-09-18 PROCEDURE — 74176 CT ABD & PELVIS W/O CONTRAST: CPT

## 2020-09-18 PROCEDURE — 99283 EMERGENCY DEPT VISIT LOW MDM: CPT

## 2020-09-18 PROCEDURE — 70450 CT HEAD/BRAIN W/O DYE: CPT

## 2020-09-18 PROCEDURE — 71250 CT THORAX DX C-: CPT

## 2020-09-18 RX ORDER — OXYCODONE HYDROCHLORIDE AND ACETAMINOPHEN 5; 325 MG/1; MG/1
1-2 TABLET ORAL EVERY 6 HOURS PRN
Qty: 12 TABLET | Refills: 0 | Status: SHIPPED | OUTPATIENT
Start: 2020-09-18 | End: 2020-12-14

## 2020-09-18 RX ORDER — OXYCODONE HYDROCHLORIDE AND ACETAMINOPHEN 5; 325 MG/1; MG/1
1 TABLET ORAL ONCE
Status: COMPLETED | OUTPATIENT
Start: 2020-09-18 | End: 2020-09-18

## 2020-09-18 RX ADMIN — OXYCODONE HYDROCHLORIDE AND ACETAMINOPHEN 1 TABLET: 5; 325 TABLET ORAL at 23:39

## 2020-09-18 NOTE — ED TRIAGE NOTES
Pt tripped a fell down 6 steps outside the house and hit concrete. Witnessed by family. Pt has multiple lacerations on both arms. Pt denies hitting his head. No LOC. On Brilinta and ASA. Pt is alert and oriented x 3. NAD. Pt masked at triage.

## 2020-09-19 NOTE — ED PROVIDER NOTES
EMERGENCY DEPARTMENT ENCOUNTER    Room Number:  20/20  Date of encounter:  9/18/2020  PCP: Phyllis Loving PA  Historian: Patient, son at bedside    I used full protective equipment while examining this patient.  This includes face mask, gloves and protective eyewear.  I washed my hands before entering the room and immediately upon leaving the room      HPI:  Chief Complaint: Fall  A complete HPI/ROS/PMH/PSH/SH/FH are unobtainable due to: Patient very hard of hearing    Context: Dinesh Churchill Sr. is a 89 y.o. male who presents to the ED c/o fall at home.  Patient lives at home alone and fell down a number of steps.  He complains of pain along the lower ribs bilaterally as well as complaining of skin tears of both arms.  He denies loss of consciousness or neck or back pain.  He denies pain to the lower extremities.      PAST MEDICAL HISTORY  Active Ambulatory Problems     Diagnosis Date Noted   • Abnormal magnetic resonance imaging study 03/18/2016   • Arthritis 03/18/2016   • Osteoarthritis of cervical spine 03/18/2016   • Diplopia 03/18/2016   • Hearing loss 03/18/2016   • Neoplasm of uncertain behavior of skin 03/18/2016   • Prediabetes 03/18/2016   • Vitamin D deficiency 03/18/2016   • Chronic fatigue 10/27/2016   • History of supraventricular tachycardia 04/07/2017   • Essential hypertension 04/07/2017   • B12 deficiency 04/19/2017   • Abnormal cardiac function test 05/08/2017   • Cardiomyopathy (CMS/HCC) 05/08/2017   • Coronary artery disease involving native coronary artery of native heart without angina pectoris 05/31/2017   • History of coronary artery stent placement 05/31/2017   • History of renal calculi 05/31/2017   • Dyslipidemia 05/31/2017   • Macrocytic anemia 08/09/2017   • Precordial pain 08/16/2017   • Parkinson's disease (CMS/HCC) 10/25/2017   • History of CVA (cerebrovascular accident) 10/25/2017   • Paroxysmal atrial fibrillation (CMS/[facility]) 11/29/2017   • Otalgia, left 12/29/2017   • Chest  pain 08/06/2019   • Abnormal stress test 08/06/2019   • Spontaneous pneumothorax 08/18/2019   • Chest pain 08/18/2019   • Acute systolic (congestive) heart failure (CMS/[facility]) 08/19/2019   • Thrombocytopenia (CMS/[facility]) 08/20/2019   • Pneumonia of left lower lobe due to infectious organism 09/06/2019   • Syncope and collapse 11/06/2019   • Status post placement of implantable loop recorder 11/22/2019   • Long term (current) use of antithrombotics/antiplatelets 03/13/2020     Resolved Ambulatory Problems     Diagnosis Date Noted   • Maxillary sinusitis 03/18/2016   • Dizziness 10/27/2016   • Acute frontal sinusitis 10/27/2016   • Precordial pain 04/07/2017   • SOB (shortness of breath) 04/07/2017   • Acute rhinitis 12/29/2017     Past Medical History:   Diagnosis Date   • Abnormal ECG    • Abnormal MRI    • Coronary artery disease    • Head injury    • Hearing aid worn    • Hyperlipidemia    • Hypertension    • Kidney stones    • Macrocytosis    • Neoplasm of skin    • Osteoporosis          PAST SURGICAL HISTORY  Past Surgical History:   Procedure Laterality Date   • CARDIAC CATHETERIZATION N/A 5/22/2017    Procedure: Left Heart Cath;  Surgeon: Maninder Quezada MD;  Location: [facility] CATH INVASIVE LOCATION;  Service:    • CARDIAC CATHETERIZATION N/A 5/23/2017    Procedure: Stent BMS coronary;  Surgeon: Maninder Quezada MD;  [facility];  Service:    • CARDIAC CATHETERIZATION N/A 8/8/2019    Procedure: Left Heart Cath;  Surgeon: Maninder Quezada MD;  Location: [facility] CATH INVASIVE LOCATION;  Service: Cardiology   • CARDIAC CATHETERIZATION N/A 8/8/2019    Procedure: Left ventriculography;  Surgeon: Maninder Quezada MD;  Location: [facility] CATH INVASIVE LOCATION;  Service: Cardiology   • CARDIAC CATHETERIZATION N/A 8/8/2019    Procedure: Coronary angiography;  Surgeon: Maninder Quezada MD;  [facility];  Service: Cardiology   • CARDIAC  CATHETERIZATION N/A 8/20/2019    Procedure: CORONARY ANGIOGRAPHY;  Surgeon: Maninder Quezada MD;  Location:  [facility] CATH INVASIVE LOCATION;  Service: Cardiovascular   • CARDIAC CATHETERIZATION N/A 8/20/2019    Procedure: Stent BMS coronary;  Surgeon: Maninder Quezada MD;  Location:  [facility] CATH INVASIVE LOCATION;  Service: Cardiovascular   • CARDIAC ELECTROPHYSIOLOGY PROCEDURE N/A 11/8/2019    Procedure: Loop insertion;  Surgeon: Maninder Quezada MD;  Location:  [facility] CATH INVASIVE LOCATION;  Service: Cardiovascular   • HERNIA REPAIR      x 2   • DC RT/LT HEART CATHETERS N/A 5/23/2017    Procedure: Percutaneous Coronary Intervention;  Surgeon: Maninder Quezada MD;  Location:  [facility] CATH INVASIVE LOCATION;  Service: Cardiovascular         FAMILY HISTORY  Family History   Family history unknown: Yes         SOCIAL HISTORY  Social History     Socioeconomic History   • Marital status:      Spouse name: Not on file   • Number of children: Not on file   • Years of education: Not on file   • Highest education level: Not on file   Occupational History     Employer: RETIRED   Tobacco Use   • Smoking status: Never Smoker   • Smokeless tobacco: Never Used   Substance and Sexual Activity   • Alcohol use: No     Comment: quit 30 years ago    • Drug use: No   • Sexual activity: Defer         ALLERGIES  Patient has no known allergies.       REVIEW OF SYSTEMS  Review of Systems   Constitutional: Negative for fever.   Respiratory: Negative for shortness of breath.    Cardiovascular: Negative for chest pain.           PHYSICAL EXAM    I have reviewed the triage vital signs and nursing notes.    ED Triage Vitals [09/18/20 1956]   Temp Heart Rate Resp BP SpO2   99.2 °F (37.3 °C) 70 18 -- 96 %      Temp src Heart Rate Source Patient Position BP Location FiO2 (%)   Tympanic -- -- -- --       Physical Exam  GENERAL: Alert male who appears younger than stated age  HENT: nares patent, atraumatic  EYES: no  scleral icterus  CV: regular rhythm, regular rate-no murmur  RESPIRATORY: normal effort, clear to auscultation bilaterally-saturations 97% on room air  ABDOMEN: soft, mild bilateral upper abdominal tenderness  MUSCULOSKELETAL: There is no significant tenderness to palpation or limited range of motion along the cervical, thoracic and lumbar spine.  Examination of the extremities x4 reveals no evidence of acute bony abnormality.  NEURO: Strength, sensation, and coordination are grossly intact.  Speech and mentation are unremarkable  SKIN: There large skin tears to the left greater than right forearms.  There is ecchymosis in the left upper quadrant of the abdomen.      LAB RESULTS  No results found for this or any previous visit (from the past 24 hour(s)).    Ordered the above labs and independently reviewed the results.      RADIOLOGY  Ct Abdomen Pelvis Without Contrast    Result Date: 9/18/2020  CT OF THE CHEST WITHOUT CONTRAST; CT OF THE ABDOMEN AND PELVIS WITHOUT CONTRAST  HISTORY: Fall with rib pain. Left upper quadrant bruising  COMPARISON: 11/03/2019 and other prior imaging  TECHNIQUE: Axial CT imaging was obtained from the thoracic inlet through the symphysis pubis. No IV contrast was administered.  FINDINGS: CT OF THE CHEST: There is a potential nondisplaced right 11th rib fracture. There are posterior and lateral fractures of the right eighth rib. Old left-sided rib fractures are seen. There is subtle deformity of the left sixth and seventh ribs anteriorly, and nondisplaced fractures are not excluded. No pneumothorax is identified. Dependent atelectasis is noted. There are small bilateral pleural effusions. Left pleural effusion is smaller than on prior study. Right pleural effusion is larger. The heart is enlarged. Areas of chronic scarring are noted at the left lung base, and within the right middle lobe. There are coronary artery calcifications. The thyroid gland, trachea, and esophagus appear  unremarkable. There is trace pericardial fluid/thickening, decreased when compared to prior study.. There is dilatation of the aortic root, measuring 4.4 cm. Ascending thoracic aorta is also dilated, measuring about 4.3 cm. Proximal descending thoracic aorta measures about 3.2 cm. Distal descending thoracic aorta tapers to normal caliber. Patient does have some enlarged mediastinal lymph nodes. For example, a subcarinal lymph node measures up to 2.9 x 1.5 cm. Similar findings were present on the prior study. Main pulmonary artery is mildly enlarged, which can be seen in the setting of pulmonary arterial hypertension.  CT OF THE ABDOMEN AND PELVIS: No focal hepatic lesions are seen. There is some calcification of the spleen. Gallbladder is normal. The pancreas is unremarkable, as are the stomach and duodenum. Adrenal glands are bulky in appearance, suggesting hyperplasia. Patient appears to have cysts on both kidneys.  Two 2 mm stones are seen within the right kidney. The patient has an additional 2 mm stone within the left kidney. No distal ureteral stones are seen, although there is a 2 mm stone which is located within the bladder. There are dystrophic calcifications seen within the prostate gland. There is a bladder diverticulum noted on the right. There are changes of prior left inguinal hernia repair. There is colonic diverticulosis. There is no evidence of diverticulitis. The appendix is normal. There is mild dilatation of the infrarenal abdominal aorta, measuring up to 2.6 x 2.6 cm. No intra-abdominal or intrapelvic hemorrhage is seen. There is stranding which is seen within the left upper quadrant, likely reflecting contusion, although a large organized hematoma is not seen.       1. Posterior and lateral fractures of the right eighth rib, with possible nondisplaced right 11th rib fracture. Patient may also have nondisplaced fractures of the left and seventh ribs anteriorly. 2. Soft tissue stranding seen  within the anterior abdominal wall the left upper quadrant, without large organized hematoma. No intra-abdominal or intrapelvic injury seen.  Radiation dose reduction techniques were utilized, including automated exposure control and exposure modulation based on body size.  This report was finalized on 9/18/2020 11:08 PM by Dr. Bree Xavier M.D.      Ct Head Without Contrast    Result Date: 9/18/2020  CT OF THE HEAD WITHOUT CONTRAST  HISTORY: Fall. Patient is on blood thinners.  COMPARISON: 11/06/2019  TECHNIQUE: Axial CT imaging was obtained through the brain. No IV contrast was administered.  FINDINGS: There is diffuse atrophy. No acute intracranial hemorrhage is seen. There is periventricular and deep white matter microangiopathic change. There is no midline shift or mass effect. Old infarct is noted within the right cerebellar hemisphere. No calvarial fracture is seen. Air-fluid levels are noted within the maxillary sinuses bilaterally, and correlation with any evidence of sinusitis is recommended. Mastoid air cells appear clear. Old infarct is noted within the right frontal coronal radiata. There is an additional old infarct noted within the anterior limb of the right internal capsule.       1. No acute intracranial hemorrhage. 2. Air-fluid levels within the maxillary sinuses bilaterally. Correlation with any evidence of sinusitis is recommended.  Radiation dose reduction techniques were utilized, including automated exposure control and exposure modulation based on body size.  This report was finalized on 9/18/2020 10:45 PM by Dr. Bree Xavier M.D.      Ct Chest Without Contrast    Result Date: 9/18/2020  CT OF THE CHEST WITHOUT CONTRAST; CT OF THE ABDOMEN AND PELVIS WITHOUT CONTRAST  HISTORY: Fall with rib pain. Left upper quadrant bruising  COMPARISON: 11/03/2019 and other prior imaging  TECHNIQUE: Axial CT imaging was obtained from the thoracic inlet through the symphysis pubis. No IV contrast was  administered.  FINDINGS: CT OF THE CHEST: There is a potential nondisplaced right 11th rib fracture. There are posterior and lateral fractures of the right eighth rib. Old left-sided rib fractures are seen. There is subtle deformity of the left sixth and seventh ribs anteriorly, and nondisplaced fractures are not excluded. No pneumothorax is identified. Dependent atelectasis is noted. There are small bilateral pleural effusions. Left pleural effusion is smaller than on prior study. Right pleural effusion is larger. The heart is enlarged. Areas of chronic scarring are noted at the left lung base, and within the right middle lobe. There are coronary artery calcifications. The thyroid gland, trachea, and esophagus appear unremarkable. There is trace pericardial fluid/thickening, decreased when compared to prior study.. There is dilatation of the aortic root, measuring 4.4 cm. Ascending thoracic aorta is also dilated, measuring about 4.3 cm. Proximal descending thoracic aorta measures about 3.2 cm. Distal descending thoracic aorta tapers to normal caliber. Patient does have some enlarged mediastinal lymph nodes. For example, a subcarinal lymph node measures up to 2.9 x 1.5 cm. Similar findings were present on the prior study. Main pulmonary artery is mildly enlarged, which can be seen in the setting of pulmonary arterial hypertension.  CT OF THE ABDOMEN AND PELVIS: No focal hepatic lesions are seen. There is some calcification of the spleen. Gallbladder is normal. The pancreas is unremarkable, as are the stomach and duodenum. Adrenal glands are bulky in appearance, suggesting hyperplasia. Patient appears to have cysts on both kidneys.  Two 2 mm stones are seen within the right kidney. The patient has an additional 2 mm stone within the left kidney. No distal ureteral stones are seen, although there is a 2 mm stone which is located within the bladder. There are dystrophic calcifications seen within the prostate gland.  There is a bladder diverticulum noted on the right. There are changes of prior left inguinal hernia repair. There is colonic diverticulosis. There is no evidence of diverticulitis. The appendix is normal. There is mild dilatation of the infrarenal abdominal aorta, measuring up to 2.6 x 2.6 cm. No intra-abdominal or intrapelvic hemorrhage is seen. There is stranding which is seen within the left upper quadrant, likely reflecting contusion, although a large organized hematoma is not seen.       1. Posterior and lateral fractures of the right eighth rib, with possible nondisplaced right 11th rib fracture. Patient may also have nondisplaced fractures of the left and seventh ribs anteriorly. 2. Soft tissue stranding seen within the anterior abdominal wall the left upper quadrant, without large organized hematoma. No intra-abdominal or intrapelvic injury seen.  Radiation dose reduction techniques were utilized, including automated exposure control and exposure modulation based on body size.  This report was finalized on 9/18/2020 11:08 PM by Dr. Bree Xavier M.D.        I ordered the above noted radiological studies. Reviewed by me and discussed with radiologist.  See dictation for official radiology interpretation.      PROCEDURES  Procedures      MEDICATIONS GIVEN IN ER    Medications   oxyCODONE-acetaminophen (PERCOCET) 5-325 MG per tablet 1 tablet (has no administration in time range)         PROGRESS, DATA ANALYSIS, CONSULTS, AND MEDICAL DECISION MAKING    All labs have been independently reviewed by me.  All radiology studies have been reviewed by me and discussed with radiologist dictating the report.   EKG's independently viewed and interpreted by me.  Discussion below represents my analysis of pertinent findings related to patient's condition, differential diagnosis, treatment plan and final disposition.      ED Course as of Sep 18 2323   Fri Sep 18, 2020   2114 MDM-patient with fall at home prior to  arrival.  His greatest injury seem to be at the lower chest and upper abdomen.  He also has significant skin tears to bilateral upper extremities.  I see no acute evidence of head injury and patient seems to be neuro intact.  He is anticoagulated on Brilinta and aspirin.  Will obtain CT scan of his head given his age and anticoagulated status.  We will also CT chest and abdomen looking for signs of acute traumatic injury.    [DB]   1611 I reviewed CT scans of the head chest and abdomen and pelvis with Dr. Kalpesh Xavier.  Patient does have apparent 11th rib fracture and possible fracture of the seventh rib.  There may be other nondisplaced rib fractures.  There is no acute intra-abdominal pathology although there may be an abdominal wall hematoma.    [DB]   0603 I did discuss possible admission as patient is 89 years old but patient really does not want to stay.  Son states that he will stay with his father for 1 or 2 nights until he is sure he is doing better.    [DB]      ED Course User Index  [DB] Sam Ibrahim MD       AS OF 23:23 EDT VITALS:    BP - 145/65  HR - 69  TEMP - 99.2 °F (37.3 °C) (Tympanic)  O2 SATS - 94%      DIAGNOSIS  Final diagnoses:   Closed fracture of multiple ribs of left side, initial encounter   Skin tear of left upper extremity         DISPOSITION  DISCHARGE    Patient discharged in stable condition.    Reviewed implications of results, diagnosis, meds, responsibility to follow up, warning signs and symptoms of possible worsening, potential complications and reasons to return to ER, including increased shortness of breath or as needed.    Patient/Family voiced understanding of above instructions.    Discussed plan for discharge, as there is no emergent indication for admission. Patient referred to primary care provider for BP management due to today's BP. Pt/family is agreeable and understands need for follow up and repeat testing.  Pt is aware that discharge does not mean that  nothing is wrong but it indicates no emergency is present that requires admission and they must continue care with follow-up as given below or physician of their choice.     FOLLOW-UP  Phyllis Loving PA  [address] [facility] [address] [phone]    In 1 week  If Not Better         Medication List      New Prescriptions    oxyCODONE-acetaminophen 5-325 MG per tablet  Commonly known as: PERCOCET  Take 1-2 tablets by mouth Every 6 (Six) Hours As Needed (pain).           Where to Get Your Medications      You can get these medications from any pharmacy    Bring a paper prescription for each of these medications  · oxyCODONE-acetaminophen 5-325 MG per tablet                Sam Ibrahim MD  09/18/20 6401

## 2020-09-19 NOTE — ED NOTES
Pt presents with bruising and skin abrasions to bilateral arms. Pt has skin abrasion to R upper arm. Cleansed, with wet gauze and kerlix around at this time.  Also large abrasion to L forearm, cleaned, with wet gauze and wrapped with kerlix.  Pt has bruising to LUQ abdomen.     Sonia Barclay, MOSES  09/18/20 0836

## 2020-09-19 NOTE — DISCHARGE INSTRUCTIONS
Keep wounds on left arm clean and wrapped with gauze dressing until wound is healed.  Use incentive spirometer twice daily to maintain proper inflation of the lung air sacs.  You may take Tylenol over-the-counter as needed for pain, you may use Percocet for more severe pain but do not take it with Tylenol at the same time.  Please return to the ED for increased shortness of breath or as needed.

## 2020-09-21 ENCOUNTER — PATIENT OUTREACH (OUTPATIENT)
Dept: CASE MANAGEMENT | Facility: OTHER | Age: 85
End: 2020-09-21

## 2020-09-21 ENCOUNTER — TELEPHONE (OUTPATIENT)
Dept: FAMILY MEDICINE CLINIC | Facility: CLINIC | Age: 85
End: 2020-09-21

## 2020-09-21 RX ORDER — TICAGRELOR 90 MG/1
TABLET ORAL
Qty: 60 TABLET | Refills: 1 | Status: SHIPPED | OUTPATIENT
Start: 2020-09-21 | End: 2020-11-23

## 2020-09-21 NOTE — TELEPHONE ENCOUNTER
----- Message from Nargis Rai sent at 9/21/2020  1:02 PM EDT -----  Patient's son called and said that the pain pills that the ER gave him really weren't working as he thought they should and that last night he stumbled and fell and landed on his bottom  I advised son to take him to the ER but he said he has given him another pain pill and as long as he sits in a chair he is okay  I have made him an appointment for Wednesday 9- at 10:30, the son said he would watch him and if he thought he needed to go back to the ER he would take him

## 2020-09-21 NOTE — OUTREACH NOTE
Care Plan Note      Responses   Lifestyle Goals  Fewer ER/urgent care visits, Less pain, Medication management, Routine follow-up with doctor(s)   Barriers  Pain   Self Management  Medication Adherence, Get flu/pneumonia shot, Home BP Monitoring, Dietary Changes - Eat More Fruits/Vegetables   Annual Wellness Visit:   Patient Will Schedule   AWV Materials  Send Materials   Care Gaps Addressed  Flu Shot, Pneumonia Vaccine, Other (See Comment)   Flu Shot Status  Patient will Complete   Pneumonia Vaccine Status  Patient will Complete   Pneumonia Vaccine Comments  Zoster and TDAP vaccine needed   Specific Disease Process Teaching  -- [Falls prevention - use cane, step carefully, clear paths Pain management: ice to back, skin protection, pain medication, stool softener, ambulate carefully due to narcotics]   Does patient have depression diagnosis?  No   Advanced Directives:  Not Interested At This Time   Ed Visits past 12 months:  1   Hospitalizations past 12 months  2 or 3   Discharge destination:  Home   Medication Adherence  Medication side effects [Son to add stool softener to daily medications to prevent constipation]   Goal Progress  Making Progress Toward Goal(s)   Readiness Scale  4   Confidence Scale  4   Health Literacy  Moderate        The main concerns and/or symptoms the patient would like to address are: Spoke with patient's son regarding patient's ED visit and fall. Patient is being cared for by son at this time. Son has provided patient with the pain medication and ice to patient's back to assist with pain management. Patient is sleeping upright in a chair. Patient had a second fall yesterday but seems to be okay. Son believes the cause to be the pain medication.    Education/instruction provided by Care Coordinator: Introduced self, explained ACM RN role and provided contact information. Reviewed patient's follow up. Patient has an appointment on Wednesday with Phyllis CRAIG scheduled for 9/23.  Patient experiences pain with breathing and movement but is utilizing ice and pain medications for relive. ACM encouraged safe ambulation practices while on narcotic therapy. ACM also recommended stool softener to prevent constipation while patient taking the prescribed pain medication. Son verbalized understanding. Vaccinations reviewed. Patient plans to update flu vaccine. ACM recommended also updating the ZOSTER, TDAP and pneumonia vaccines. ACM recommended scheduling a AWV appointment with Inder CRAIG as well.     Follow Up Outreach Due: 2 weeks    Ashely Domínguez RN  Ambulatory     9/21/2020, 14:26 EDT

## 2020-09-21 NOTE — TELEPHONE ENCOUNTER
As advised, son should have taken patient to the ER with another fall since his last injury.  I am uncertain we will be able to complete work-up from the office depending on his injuries from his second fall.  Apparently he called back and was concerned about 1 of the wounds on his arm and again declined taking patient to the ER.  They will see patient tomorrow.  Son has been advised multiple times to take him to ER if he has worsening, having new or changing symptoms, or worsening or uncontrolled pain.

## 2020-09-21 NOTE — TELEPHONE ENCOUNTER
PT IS CALLING IN TO SCHEDULE A HOSPITAL FOLLOW UP APPOINTMENT PT FELL AND BROKE TWO RIBS  (8AND 11)AND HAS A LACERATION ON THE FACE     PT CALL BACK  [phone]  OK TO LEAVE A VOICE MAIL

## 2020-09-22 ENCOUNTER — OFFICE VISIT (OUTPATIENT)
Dept: FAMILY MEDICINE CLINIC | Facility: CLINIC | Age: 85
End: 2020-09-22

## 2020-09-22 VITALS
DIASTOLIC BLOOD PRESSURE: 70 MMHG | TEMPERATURE: 98.4 F | BODY MASS INDEX: 24.79 KG/M2 | WEIGHT: 183 LBS | OXYGEN SATURATION: 95 % | SYSTOLIC BLOOD PRESSURE: 110 MMHG | HEIGHT: 72 IN | HEART RATE: 84 BPM
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
## 2020-09-22 DIAGNOSIS — R06.02 SHORTNESS OF BREATH: ICD-10-CM

## 2020-09-22 DIAGNOSIS — S50.812D ABRASION OF LEFT FOREARM, SUBSEQUENT ENCOUNTER: ICD-10-CM

## 2020-09-22 DIAGNOSIS — S70.01XD CONTUSION OF RIGHT HIP, SUBSEQUENT ENCOUNTER: ICD-10-CM

## 2020-09-22 DIAGNOSIS — M54.50 ACUTE BILATERAL LOW BACK PAIN WITHOUT SCIATICA: ICD-10-CM

## 2020-09-22 DIAGNOSIS — S40.811D ABRASION OF RIGHT UPPER ARM, SUBSEQUENT ENCOUNTER: ICD-10-CM

## 2020-09-22 DIAGNOSIS — S22.41XK CLOSED FRACTURE OF MULTIPLE RIBS OF RIGHT SIDE WITH NONUNION, SUBSEQUENT ENCOUNTER: ICD-10-CM

## 2020-09-22 PROCEDURE — 99214 OFFICE O/P EST MOD 30 MIN: CPT | Performed by: PHYSICIAN ASSISTANT

## 2020-09-22 PROCEDURE — 71046 X-RAY EXAM CHEST 2 VIEWS: CPT | Performed by: PHYSICIAN ASSISTANT

## 2020-09-22 PROCEDURE — 72110 X-RAY EXAM L-2 SPINE 4/>VWS: CPT | Performed by: PHYSICIAN ASSISTANT

## 2020-09-22 PROCEDURE — 73502 X-RAY EXAM HIP UNI 2-3 VIEWS: CPT | Performed by: PHYSICIAN ASSISTANT

## 2020-09-22 RX ORDER — DOXYCYCLINE HYCLATE 100 MG/1
100 CAPSULE ORAL 2 TIMES DAILY
Qty: 20 CAPSULE | Refills: 0 | Status: SHIPPED | OUTPATIENT
Start: 2020-09-22 | End: 2020-12-14

## 2020-09-22 RX ORDER — GINSENG 100 MG
CAPSULE ORAL 2 TIMES DAILY
Qty: 28 G | Refills: 0 | Status: SHIPPED | OUTPATIENT
Start: 2020-09-22 | End: 2020-12-14

## 2020-09-22 NOTE — PROGRESS NOTES
"Subjective   Dinesh Churchill Sr. is a 89 y.o. male who presents today in follow up of ER 9/18/2020 for fall with rib fractures. Patient fell again 9/20/2020.      History of Present Illness     Patient fell at home with chest wall trauma. Per ER note 9/18/2020, \"Patient lives at home alone and fell down a number of steps.  He complains of pain along the lower ribs bilaterally as well as complaining of skin tears of both arms.  He denies loss of consciousness or neck or back pain.  He denies pain to the lower extremities.\"    Per CT chest, abd, pelvis- \"Posterior and lateral fractures of the right eighth rib, with possible nondisplaced right 11th rib fracture. Patient may also have nondisplaced fractures of the left and seventh ribs anteriorly. 2. Soft tissue stranding seen within the anterior abdominal wall the left upper quadrant, without large organized hematoma. No intra-abdominal or intrapelvic injury seen.\"    They wanted to consider admission due to age and rib fractures and he wanted to go home. Since going home, he fell again 9/20/2020 and landed on buttock. His son called here today, reporting subsequent fall, change in mental status, and uncontrolled pain, and was advised to take him back to the hospital.     9/20/2020- after taking pain medication, he fell on his buttock. That night, when someone was cleaning the area and re-bandaging. Last night, had improved some.     Right hip is still bruised and sore. Right ribs are still sore.     Is having some more trouble breathing- slightly increased SOA. When he breathes deep, pain in his ribs. He is using incentive spirometer- using since got home.     The following portions of the patient's history were reviewed and updated as appropriate: allergies, current medications, past family history, past medical history, past social history, past surgical history and problem list.    Review of Systems   Constitutional: Negative.    HENT: Negative.    Respiratory: " Positive for shortness of breath.    Cardiovascular: Negative.    Gastrointestinal: Negative.    Musculoskeletal: Positive for arthralgias and gait problem.        Chest wall pain   Skin: Positive for wound.   Neurological: Negative for dizziness, tremors, syncope, speech difficulty, weakness, light-headedness and numbness.   Psychiatric/Behavioral: Negative.        Objective   Vitals:    09/22/20 1029   BP: 110/70   Pulse: 84   Temp: 98.4 °F (36.9 °C)   SpO2: 95%     Body mass index is 25.06 kg/m².    Physical Exam  Vitals signs and nursing note reviewed.   Constitutional:       Appearance: He is well-developed.   HENT:      Head: Normocephalic and atraumatic.      Right Ear: External ear normal.      Left Ear: External ear normal.   Eyes:      Conjunctiva/sclera: Conjunctivae normal.   Neck:      Thyroid: No thyroid mass or thyromegaly.      Vascular: No carotid bruit.      Trachea: No tracheal deviation.   Cardiovascular:      Rate and Rhythm: Normal rate and regular rhythm.      Heart sounds: Normal heart sounds.   Pulmonary:      Effort: Pulmonary effort is normal.      Breath sounds: Normal breath sounds.   Skin:     General: Skin is warm and dry.             Comments: Significant, dark, large contusions as noted in purple and abrasions and skin tears without surrounding erythema, edema, induration, or D/c.    Neurological:      Mental Status: He is alert and oriented to person, place, and time.      Gait: Gait normal.   Psychiatric:         Behavior: Behavior normal.         Thought Content: Thought content normal.         Judgment: Judgment normal.         Assessment/Plan   Dinesh was seen today for hospital follow up visit.    Diagnoses and all orders for this visit:    Fall, subsequent encounter  -     XR Chest PA & Lateral  -     XR Hip With or Without Pelvis 2 - 3 View Right  -     XR Spine Lumbar 4+ View (In Office)  -     doxycycline (VIBRAMYCIN) 100 MG capsule; Take 1 capsule by mouth 2 (Two) Times a  "Day.  -     bacitracin 500 UNIT/GM ointment; Apply  topically to the appropriate area as directed 2 (Two) Times a Day.  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  -     bacitracin 500 UNIT/GM ointment; Apply  topically to the appropriate area as directed 2 (Two) Times a Day.  -     Tdap Vaccine Greater Than or Equal To 6yo IM    Abrasion of right upper arm, subsequent encounter  -     doxycycline (VIBRAMYCIN) 100 MG capsule; Take 1 capsule by mouth 2 (Two) Times a Day.  -     bacitracin 500 UNIT/GM ointment; Apply  topically to the appropriate area as directed 2 (Two) Times a Day.  -     Tdap Vaccine Greater Than or Equal To 6yo IM    Acute bilateral low back pain without sciatica  -     XR Spine Lumbar 4+ View (In Office)    Shortness of breath  -     XR Chest PA & Lateral    Other orders  -     Fluad Quad 65+ yrs ([id number])    Assessment and Plan  89 y.o. male who presents today in follow up of ER 9/18/2020 for fall with rib fractures. Patient fell again 9/20/2020. Patient fell at home with chest wall trauma. Patient was walking outside down some steps to see his neighbors and reports he lost his balance and fell down the steps onto a concrete pad. He was taken to the ER, and per ER note 9/18/2020, \"Patient lives at home alone and fell down a number of steps.  He complains of pain along the lower ribs bilaterally as well as complaining of skin tears of both arms.  He denies loss of consciousness or neck or back pain.  He denies pain to the lower extremities.\"    He had CT chest, abd, pelvis with posterior and lateral fractures of the right eighth rib, " "with possible nondisplaced right 11th rib fracture and possible nondisplaced fractures of the left seventh ribs anteriorly, soft tissue stranding seen within the anterior abdominal wall the left upper quadrant, without large organized hematoma without intra-abdominal or intrapelvic injury. They wanted to consider admission due to age and rib fractures and he wanted to go home. However, patient and son report they were never offered admission and were told he could stay or could go home. Since going home, he fell again 9/20/2020 and landed on his buttock without any additional injuries or pain. His son called here today, reporting subsequent fall, change in mental status, and uncontrolled pain, and was advised to take him back to the hospital. He did not take him back to the ER as directed. Today, he reports after taking his pain medication 9/20/2020, he fell on his buttock and gets out of it. He is not taking the pain medication as directed due to poor effects with medication. He has had someone cleaning and re-bandaging wounds on UE. She reported something \"slimy\" on left arm with a dressing change but thinks it was either the dead skin or scabbing that was coming. Off has had none since and wounds look clean without signs of infection. However, they are fairly large, open wounds sustained outside on the ground. He is on no antibiotics and had no updated Tdap. I will update Tdap today and will have him take Doxycycline 100 mg twice daily for 10 days. He continues with severe bruising over abdomen, right flank/ side, RUE, LUQ, and left hip. He continues with right soreness and right ribs are still significantly painful, especially with moving, getting up and down, and deep breathing/ coughing. He is having some more trouble breathing- slightly increased SOA. He is using incentive spirometer since he got home. I have counseled them at length about numerous rib fractures, especially with advances age and his risks for " pneumonia, PE, DVT with decreased movement, and risks of narcotic pain medication. He has not had worsening and may have had some improvement since 9/18/2020. Continue dressing changes 1-2 x daily and use Bacitracin with dressing. I asked that he return for CXR to rule out pneumonia, small pneumothorax, and effusion as well as xray pelvis and right hip to rule out fracture and xray lumbar spine to rule out compression fracture with back and hip pain (may need to complete thoracic xray but he is not complaining of midback pain currently). They should monitor closely and go to ER if any worsening- uncontrolled or worsening pain, shortness of breath, chest pain, palpitations, dizziness, weakness, fever, productive cough, worsening appearance of wounds, or inability to ambulate. Otherwise, I will await imaging.

## 2020-09-22 NOTE — PATIENT INSTRUCTIONS
Tdap updated today.     Take Doxycycline twice daily for 10 days and use Bacitracin with wound dressing.     Patient to use incentive spirometer 3-4 x daily, ensure deep breaths x 5-10 every hour.     He should return for xrays tomorrow.   To ER ASAP if he has worsening or uncontrolled pain, increased shortness of breath, coughing, fever, weakness, dizziness, chest pain, or any neurological symptoms.

## 2020-09-28 ENCOUNTER — TELEPHONE (OUTPATIENT)
Dept: FAMILY MEDICINE CLINIC | Facility: CLINIC | Age: 85
End: 2020-09-28

## 2020-09-28 NOTE — TELEPHONE ENCOUNTER
PATIENT SON INQUIRED IF HE CAN KEEP THE WRAPPING OFF OF THE ABRASION.  HE SAYS THAT IT IS HEALING AND WANTS TO KNOW IF JUST A BANDAGE WILL BE OK.     PLEASE CALL: [phone]

## 2020-09-28 NOTE — TELEPHONE ENCOUNTER
He will need to put antibiotic ointment and bandage all the time until completely healed, as it will get stuck to his arm if there is not an ointment barrier.

## 2020-10-08 ENCOUNTER — PATIENT OUTREACH (OUTPATIENT)
Dept: CASE MANAGEMENT | Facility: OTHER | Age: 85
End: 2020-10-08

## 2020-10-08 NOTE — OUTREACH NOTE
Patient Outreach Note    Spoke with patient's son regarding patient's health and wellness. Patient is reported to be doing well. Patient stays alone and is visited by his two sons. Patient has his medications filled by his son on Tuesdays and Fridays. Patient's son also goes grocery shopping for patient on Fridays as well. Patient received a new recliner that has heat, massage, and lifts to a standing position. MyChart declined by patient's son. Patient's son unaware if patient has Advanced Directives. AC recommended patient's son review with patient and request documentation from ACM if desired. Patient's son verbalized understanding. Patient is scheduled to visit the cardiologist soon. No other appointments scheduled at this time. AWV reviewed with patient's son previously. No further outreach scheduled at this time.     Ashely Domínguez RN  Ambulatory     10/8/2020, 13:12 EDT

## 2020-10-09 RX ORDER — CHOLECALCIFEROL (VITAMIN D3) 25 MCG
TABLET,CHEWABLE ORAL
Qty: 30 LOZENGE | Refills: 4 | Status: SHIPPED | OUTPATIENT
Start: 2020-10-09 | End: 2021-07-23

## 2020-10-20 NOTE — TELEPHONE ENCOUNTER
PLEASE SIGN OFF ON REFILL ASAP PT ONLY HAS 7 PILLS LEFT.N REFILL WAS REQUESTED ON 10/8/2020  THANKS  metoprolol succinate XL (TOPROL-XL) 100 MG 24 hr tablet

## 2020-10-22 RX ORDER — METOPROLOL SUCCINATE 100 MG/1
100 TABLET, EXTENDED RELEASE ORAL DAILY
Qty: 30 TABLET | Refills: 3 | Status: SHIPPED | OUTPATIENT
Start: 2020-10-22 | End: 2020-12-14 | Stop reason: SDUPTHER

## 2020-10-22 RX ORDER — METOPROLOL SUCCINATE 100 MG/1
100 TABLET, EXTENDED RELEASE ORAL DAILY
Qty: 30 TABLET | Refills: 3 | Status: SHIPPED | OUTPATIENT
Start: 2020-10-22 | End: 2021-02-08 | Stop reason: SDUPTHER

## 2020-11-09 RX ORDER — AMLODIPINE BESYLATE 5 MG/1
5 TABLET ORAL
Qty: 30 TABLET | Refills: 3 | Status: SHIPPED | OUTPATIENT
Start: 2020-11-09 | End: 2021-02-08 | Stop reason: SDUPTHER

## 2020-11-23 RX ORDER — TICAGRELOR 90 MG/1
TABLET ORAL
Qty: 60 TABLET | Refills: 1 | Status: SHIPPED | OUTPATIENT
Start: 2020-11-23 | End: 2020-12-14 | Stop reason: ALTCHOICE

## 2020-12-14 ENCOUNTER — OFFICE VISIT (OUTPATIENT)
Dept: CARDIOLOGY | Facility: CLINIC | Age: 85
End: 2020-12-14

## 2020-12-14 ENCOUNTER — HOSPITAL ENCOUNTER (OUTPATIENT)
Dept: CARDIOLOGY | Facility: HOSPITAL | Age: 85
Discharge: HOME | End: 2020-12-14
Admitting: NURSE PRACTITIONER

## 2020-12-14 VITALS
SYSTOLIC BLOOD PRESSURE: 137 MMHG | DIASTOLIC BLOOD PRESSURE: 76 MMHG | BODY MASS INDEX: 26.34 KG/M2 | WEIGHT: 184 LBS | HEIGHT: 70 IN | HEART RATE: 105 BPM

## 2020-12-14 VITALS
HEIGHT: 70 IN | BODY MASS INDEX: 26.48 KG/M2 | SYSTOLIC BLOOD PRESSURE: 144 MMHG | HEART RATE: 72 BPM | DIASTOLIC BLOOD PRESSURE: 66 MMHG | WEIGHT: 185 LBS

## 2020-12-14 DIAGNOSIS — I48.0 PAROXYSMAL ATRIAL FIBRILLATION (HCC): ICD-10-CM

## 2020-12-14 DIAGNOSIS — I10 ESSENTIAL HYPERTENSION: ICD-10-CM

## 2020-12-14 DIAGNOSIS — I25.10 CORONARY ARTERY DISEASE INVOLVING NATIVE CORONARY ARTERY OF NATIVE HEART WITHOUT ANGINA PECTORIS: Primary | ICD-10-CM

## 2020-12-14 LAB
AORTIC DIMENSIONLESS INDEX: 0.5 (DI)
BH CV ECHO MEAS - ACS: 2.4 CM
BH CV ECHO MEAS - AI DEC SLOPE: 357.3 CM/SEC^2
BH CV ECHO MEAS - AI MAX PG: 82.2 MMHG
BH CV ECHO MEAS - AI MAX VEL: 453.4 CM/SEC
BH CV ECHO MEAS - AI P1/2T: 371.7 MSEC
BH CV ECHO MEAS - AO MAX PG (FULL): 8.1 MMHG
BH CV ECHO MEAS - AO MAX PG: 10.3 MMHG
BH CV ECHO MEAS - AO MEAN PG (FULL): 4.1 MMHG
BH CV ECHO MEAS - AO MEAN PG: 5.3 MMHG
BH CV ECHO MEAS - AO ROOT AREA (BSA CORRECTED): 2.1
BH CV ECHO MEAS - AO ROOT AREA: 14.7 CM^2
BH CV ECHO MEAS - AO ROOT DIAM: 4.3 CM
BH CV ECHO MEAS - AO V2 MAX: 160.7 CM/SEC
BH CV ECHO MEAS - AO V2 MEAN: 106.3 CM/SEC
BH CV ECHO MEAS - AO V2 VTI: 33.7 CM
BH CV ECHO MEAS - ASC AORTA: 3.5 CM
BH CV ECHO MEAS - AVA(I,A): 1.8 CM^2
BH CV ECHO MEAS - AVA(I,D): 1.8 CM^2
BH CV ECHO MEAS - AVA(V,A): 1.5 CM^2
BH CV ECHO MEAS - AVA(V,D): 1.5 CM^2
BH CV ECHO MEAS - BSA(HAYCOCK): 2.1 M^2
BH CV ECHO MEAS - BSA: 2 M^2
BH CV ECHO MEAS - BZI_BMI: 25.8 KILOGRAMS/M^2
BH CV ECHO MEAS - BZI_METRIC_HEIGHT: 180.3 CM
BH CV ECHO MEAS - BZI_METRIC_WEIGHT: 83.9 KG
BH CV ECHO MEAS - EDV(CUBED): 290.2 ML
BH CV ECHO MEAS - EDV(MOD-SP2): 169 ML
BH CV ECHO MEAS - EDV(MOD-SP4): 170 ML
BH CV ECHO MEAS - EDV(TEICH): 225.2 ML
BH CV ECHO MEAS - EF(CUBED): 63.9 %
BH CV ECHO MEAS - EF(MOD-BP): 54.9 %
BH CV ECHO MEAS - EF(MOD-SP2): 57.4 %
BH CV ECHO MEAS - EF(MOD-SP4): 51.8 %
BH CV ECHO MEAS - EF(TEICH): 54.2 %
BH CV ECHO MEAS - ESV(CUBED): 104.8 ML
BH CV ECHO MEAS - ESV(MOD-SP2): 72 ML
BH CV ECHO MEAS - ESV(MOD-SP4): 82 ML
BH CV ECHO MEAS - ESV(TEICH): 103.1 ML
BH CV ECHO MEAS - FS: 28.8 %
BH CV ECHO MEAS - IVS/LVPW: 1
BH CV ECHO MEAS - IVSD: 1.1 CM
BH CV ECHO MEAS - LAT PEAK E' VEL: 9.2 CM/SEC
BH CV ECHO MEAS - LV DIASTOLIC VOL/BSA (35-75): 83.3 ML/M^2
BH CV ECHO MEAS - LV MASS(C)D: 339.2 GRAMS
BH CV ECHO MEAS - LV MASS(C)DI: 166.3 GRAMS/M^2
BH CV ECHO MEAS - LV MAX PG: 2.2 MMHG
BH CV ECHO MEAS - LV MEAN PG: 1.2 MMHG
BH CV ECHO MEAS - LV SYSTOLIC VOL/BSA (12-30): 40.2 ML/M^2
BH CV ECHO MEAS - LV V1 MAX: 74.7 CM/SEC
BH CV ECHO MEAS - LV V1 MEAN: 50.9 CM/SEC
BH CV ECHO MEAS - LV V1 VTI: 18.6 CM
BH CV ECHO MEAS - LVIDD: 6.6 CM
BH CV ECHO MEAS - LVIDS: 4.7 CM
BH CV ECHO MEAS - LVLD AP2: 8.9 CM
BH CV ECHO MEAS - LVLD AP4: 8.5 CM
BH CV ECHO MEAS - LVLS AP2: 7.3 CM
BH CV ECHO MEAS - LVLS AP4: 7.2 CM
BH CV ECHO MEAS - LVOT AREA (M): 3.1 CM^2
BH CV ECHO MEAS - LVOT AREA: 3.2 CM^2
BH CV ECHO MEAS - LVOT DIAM: 2 CM
BH CV ECHO MEAS - LVPWD: 1.1 CM
BH CV ECHO MEAS - MED PEAK E' VEL: 6 CM/SEC
BH CV ECHO MEAS - MR MAX PG: 89.8 MMHG
BH CV ECHO MEAS - MR MAX VEL: 473.8 CM/SEC
BH CV ECHO MEAS - MV A DUR: 0.15 SEC
BH CV ECHO MEAS - MV A MAX VEL: 77.6 CM/SEC
BH CV ECHO MEAS - MV DEC SLOPE: 373.2 CM/SEC^2
BH CV ECHO MEAS - MV DEC TIME: 203 SEC
BH CV ECHO MEAS - MV E MAX VEL: 57.2 CM/SEC
BH CV ECHO MEAS - MV E/A: 0.74
BH CV ECHO MEAS - MV MAX PG: 2.9 MMHG
BH CV ECHO MEAS - MV MEAN PG: 1.5 MMHG
BH CV ECHO MEAS - MV P1/2T MAX VEL: 88.2 CM/SEC
BH CV ECHO MEAS - MV P1/2T: 69.2 MSEC
BH CV ECHO MEAS - MV V2 MAX: 85.5 CM/SEC
BH CV ECHO MEAS - MV V2 MEAN: 57.8 CM/SEC
BH CV ECHO MEAS - MV V2 VTI: 22.4 CM
BH CV ECHO MEAS - MVA P1/2T LCG: 2.5 CM^2
BH CV ECHO MEAS - MVA(P1/2T): 3.2 CM^2
BH CV ECHO MEAS - MVA(VTI): 2.7 CM^2
BH CV ECHO MEAS - PA ACC TIME: 0.06 SEC
BH CV ECHO MEAS - PA MAX PG (FULL): 1.9 MMHG
BH CV ECHO MEAS - PA MAX PG: 4 MMHG
BH CV ECHO MEAS - PA PR(ACCEL): 53.3 MMHG
BH CV ECHO MEAS - PA V2 MAX: 100.4 CM/SEC
BH CV ECHO MEAS - PI END-D VEL: 141.2 CM/SEC
BH CV ECHO MEAS - PULM DIAS VEL: 39.5 CM/SEC
BH CV ECHO MEAS - PULM S/D: 0.97
BH CV ECHO MEAS - PULM SYS VEL: 38.1 CM/SEC
BH CV ECHO MEAS - PVA(V,A): 4.9 CM^2
BH CV ECHO MEAS - PVA(V,D): 4.9 CM^2
BH CV ECHO MEAS - QP/QS: 2
BH CV ECHO MEAS - RV MAX PG: 2.1 MMHG
BH CV ECHO MEAS - RV MEAN PG: 1.1 MMHG
BH CV ECHO MEAS - RV V1 MAX: 72.3 CM/SEC
BH CV ECHO MEAS - RV V1 MEAN: 48.2 CM/SEC
BH CV ECHO MEAS - RV V1 VTI: 17.3 CM
BH CV ECHO MEAS - RVOT AREA: 6.8 CM^2
BH CV ECHO MEAS - RVOT DIAM: 2.9 CM
BH CV ECHO MEAS - SI(AO): 243.5 ML/M^2
BH CV ECHO MEAS - SI(CUBED): 90.9 ML/M^2
BH CV ECHO MEAS - SI(LVOT): 29.4 ML/M^2
BH CV ECHO MEAS - SI(MOD-SP2): 47.5 ML/M^2
BH CV ECHO MEAS - SI(MOD-SP4): 43.1 ML/M^2
BH CV ECHO MEAS - SI(TEICH): 59.8 ML/M^2
BH CV ECHO MEAS - SV(AO): 496.7 ML
BH CV ECHO MEAS - SV(CUBED): 185.4 ML
BH CV ECHO MEAS - SV(LVOT): 60 ML
BH CV ECHO MEAS - SV(MOD-SP2): 97 ML
BH CV ECHO MEAS - SV(MOD-SP4): 88 ML
BH CV ECHO MEAS - SV(RVOT): 117.8 ML
BH CV ECHO MEAS - SV(TEICH): 122.1 ML
BH CV ECHO MEAS - TAPSE (>1.6): 2.1 CM
BH CV ECHO MEASUREMENTS AVERAGE E/E' RATIO: 7.53
BH CV XLRA - RV BASE: 3.9 CM
BH CV XLRA - RV LENGTH: 5.5 CM
BH CV XLRA - RV MID: 3.8 CM
BH CV XLRA - TDI S': 11.6 CM/SEC
LEFT ATRIUM VOLUME INDEX: 55.4 ML/M2

## 2020-12-14 PROCEDURE — 93306 TTE W/DOPPLER COMPLETE: CPT

## 2020-12-14 PROCEDURE — 99213 OFFICE O/P EST LOW 20 MIN: CPT | Performed by: INTERNAL MEDICINE

## 2020-12-14 PROCEDURE — 93306 TTE W/DOPPLER COMPLETE: CPT | Performed by: INTERNAL MEDICINE

## 2020-12-14 PROCEDURE — 93000 ELECTROCARDIOGRAM COMPLETE: CPT | Performed by: INTERNAL MEDICINE

## 2020-12-14 NOTE — PROGRESS NOTES
Subjective:        Dinesh Churchill Sr. is a 89 y.o. male who here for follow up    CC  Cad follow-up  HPI  89-year-old male with known history of the coronary artery disease, benign essential arterial hypertension as well as paroxysmal atrial fibrillation here for the follow-up has been doing much better denies any chest pains or tightness in chest with no heaviness or the pressure sensation     Problems Addressed this Visit        Cardiovascular and Mediastinum    Essential hypertension    Coronary artery disease involving native coronary artery of native heart without angina pectoris - Primary    Paroxysmal atrial fibrillation (CMS/HCC)      Diagnoses       Codes Comments    Coronary artery disease involving native coronary artery of native heart without angina pectoris    -  Primary ICD-10-CM: I25.10  ICD-9-CM: 414.01     Essential hypertension     ICD-10-CM: I10  ICD-9-CM: 401.9     Paroxysmal atrial fibrillation (CMS/HCC)     ICD-10-CM: I48.0  ICD-9-CM: 427.31         .    The following portions of the patient's history were reviewed and updated as appropriate: allergies, current medications, past family history, past medical history, past social history, past surgical history and problem list.    Past Medical History:   Diagnosis Date   • Abnormal ECG    • Abnormal MRI    • Acute frontal sinusitis    • Arthritis    • Chronic fatigue    • Coronary artery disease    • Dizziness    • Head injury    • Hearing aid worn     left   • Hearing loss    • Hyperlipidemia    • Hypertension    • Kidney stones    • Macrocytosis    • Neoplasm of skin    • Osteoporosis    • Prediabetes    • Vitamin D deficiency      reports that he has never smoked. He has never used smokeless tobacco. He reports that he does not drink alcohol or use drugs.   Family History   Family history unknown: Yes       Review of Systems  Constitutional: No wt loss, fever, fatigue  Gastrointestinal: No nausea, abdominal pain  Behavioral/Psych: No  "insomnia or anxiety   Cardiovascular no chest pains or tightness in the chest  Objective:       Physical Exam  /76 (BP Location: Right arm)   Pulse 105   Ht 177.8 cm (70\")   Wt 83.5 kg (184 lb)   BMI 26.40 kg/m²   General appearance: No acute changes   Neck: Trachea midline; NECK, supple, no thyromegaly or lymphadenopathy   Lungs: Normal size and shape, normal breath sounds, equal distribution of air, no rales and rhonchi   CV: S1-S2 regular, no murmurs, no rub, no gallop   Abdomen: Soft, non-tender; no masses , no abnormal abdominal sounds   Extremities: No deformity , normal color , no peripheral edema   Skin: Normal temperature, turgor and texture; no rash, ulcers            ECG 12 Lead    Date/Time: 12/14/2020 1:43 PM  Performed by: Maninder Quezada MD  Authorized by: Maninder Quezada MD   Comparison: compared with previous ECG   Similar to previous ECG  Rhythm: sinus rhythm  Ectopy: unifocal PVCs    Clinical impression: abnormal EKG              Echocardiogram:        Current Outpatient Medications:   •  acetaminophen (TYLENOL) 500 MG tablet, 1-2 TABLETS BY ONCE TO TWICE DAILY AS NEEDED FOR PAIN, Disp: 60 tablet, Rfl: 0  •  amLODIPine (NORVASC) 5 MG tablet, TAKE 1 TABLET BY MOUTH DAILY, Disp: 30 tablet, Rfl: 3  •  aspirin 81 MG chewable tablet, Chew 1 tablet Daily., Disp: , Rfl:   •  atorvastatin (LIPITOR) 20 MG tablet, TAKE 1 TABLET BY MOUTH EVERY NIGHT, Disp: 30 tablet, Rfl: 5  •  Brilinta 90 MG tablet tablet, TAKE 1 TABLET BY MOUTH TWICE DAILY, Disp: 60 tablet, Rfl: 1  •  Cholecalciferol (VITAMIN D-3) 1000 UNITS capsule, Take 1,000 Units by mouth daily., Disp: , Rfl:   •  Cyanocobalamin (B-12) 1000 MCG lozenge, DISSOLVE 1 LOZENGE BY MOUTH UNDER THE TONGUE EVERY OTHER DAY, Disp: 30 lozenge, Rfl: 4  •  Elastic Bandages & Supports (LUMBAR BACK BRACE/SUPPORT PAD) misc, Use for lifting/ strenuous exercise., Disp: 1 each, Rfl: 0  •  isosorbide mononitrate (IMDUR) 60 MG 24 hr tablet, TAKE 1 " TABLET BY MOUTH EVERY DAY, Disp: 30 tablet, Rfl: 5  •  losartan (COZAAR) 50 MG tablet, TAKE 1 TABLET BY MOUTH DAILY, Disp: 30 tablet, Rfl: 5  •  metoprolol succinate XL (TOPROL-XL) 100 MG 24 hr tablet, TAKE 1 TABLET BY MOUTH DAILY, Disp: 30 tablet, Rfl: 3   Assessment:        Patient Active Problem List   Diagnosis   • Abnormal magnetic resonance imaging study   • Arthritis   • Osteoarthritis of cervical spine   • Diplopia   • Hearing loss   • Neoplasm of uncertain behavior of skin   • Prediabetes   • Vitamin D deficiency   • Chronic fatigue   • History of supraventricular tachycardia   • Essential hypertension   • B12 deficiency   • Abnormal cardiac function test   • Cardiomyopathy (CMS/HCC)   • Coronary artery disease involving native coronary artery of native heart without angina pectoris   • History of coronary artery stent placement   • History of renal calculi   • Dyslipidemia   • Macrocytic anemia   • Precordial pain   • Parkinson's disease (CMS/HCC)   • History of CVA (cerebrovascular accident)   • Paroxysmal atrial fibrillation (CMS/HCC)   • Otalgia, left   • Chest pain   • Abnormal stress test   • Spontaneous pneumothorax   • Chest pain   • Acute systolic (congestive) heart failure (CMS/HCC)   • Thrombocytopenia (CMS/HCC)   • Pneumonia of left lower lobe due to infectious organism   • Syncope and collapse   • Status post placement of implantable loop recorder   • Long term (current) use of antithrombotics/antiplatelets               Plan:            ICD-10-CM ICD-9-CM   1. Coronary artery disease involving native coronary artery of native heart without angina pectoris  I25.10 414.01   2. Essential hypertension  I10 401.9   3. Paroxysmal atrial fibrillation (CMS/HCC)  I48.0 427.31     1. Coronary artery disease involving native coronary artery of native heart without angina pectoris  No angina pectoris    2. Essential hypertension  Blood pressure stable    3. Paroxysmal atrial fibrillation (CMS/HCC)  Now  normal sinus rhythm    See in 6 months  Porter montes de oca  COUNSELING:    Dinesh Churchill Sr.Counseling was given to patient for the following topics: diagnostic results, risk factor reductions, impressions, risks and benefits of treatment options and importance of treatment compliance .       SMOKING COUNSELING:    [unfilled]    Dictated using Dragon dictation

## 2020-12-16 NOTE — PROGRESS NOTES
[facility]     LOS: 1 day     Subjective:    CC:Chest Pain    DIET:  Diet Order   Procedures   • Diet Regular; Cardiac     Chest pain is resolved  No soa  Feeling better  Tachycardia while I am in the room 130-140 with sitting up    Objective:    Vital Signs:  Temp:  [97.3 °F (36.3 °C)-98.8 °F (37.1 °C)] 97.3 °F (36.3 °C)  Heart Rate:  [] 97  Resp:  [18-20] 20  BP: (102-137)/(56-71) 123/56    SpO2:  [90 %-94 %] 90 %  on   ;   Device (Oxygen Therapy): room air  Body mass index is 23.59 kg/m².    Physical Exam   Constitutional: He appears well-developed and well-nourished.   HENT:   Head: Normocephalic and atraumatic.   Cardiovascular:   Regular and tachycardic   Pulmonary/Chest: Effort normal. He has rales ( in the left lung).   Abdominal: Soft. Bowel sounds are normal.   Musculoskeletal: He exhibits no edema.   Neurological: He is alert.   Skin: Skin is warm and dry.       Results Review:    Glucose   Date Value Ref Range Status   09/07/2019 131 (H) 65 - 99 mg/dL Final   09/06/2019 128 (H) 65 - 99 mg/dL Final     Results from last 7 days   Lab Units 09/07/19  0256   WBC 10*3/mm3 9.64   HEMOGLOBIN g/dL 12.0*   HEMATOCRIT % 38.7   PLATELETS 10*3/mm3 119*     Results from last 7 days   Lab Units 09/07/19  0256 09/06/19  0713   SODIUM mmol/L 139 140   POTASSIUM mmol/L 3.5 4.2   CHLORIDE mmol/L 104 103   CO2 mmol/L 24.5 25.5   BUN mg/dL 17 17   CREATININE mg/dL 0.88 0.81   CALCIUM mg/dL 8.1* 9.3   BILIRUBIN mg/dL  --  1.6*   ALK PHOS U/L  --  61   ALT (SGPT) U/L  --  15   AST (SGOT) U/L  --  16   GLUCOSE mg/dL 131* 128*         Results from last 7 days   Lab Units 09/07/19  0256   MAGNESIUM mg/dL 2.1     Results from last 7 days   Lab Units 09/07/19  0256 09/06/19  0831 09/06/19  0713   TROPONIN T ng/mL 0.012 <0.010 <0.010     Cultures:  Blood Culture   Date Value Ref Range Status   09/06/2019 No growth at 24 hours  Preliminary   09/06/2019 No growth at 24 hours  Preliminary       I  have reviewed daily medications and changes in CPOE    Scheduled meds    aspirin 81 mg Oral Daily   atorvastatin 20 mg Oral Nightly   cefepime 2 g Intravenous Q8H   cholecalciferol 1,000 Units Oral Daily   enoxaparin 1 mg/kg Subcutaneous Q12H   furosemide 40 mg Intravenous Q12H   ipratropium-albuterol 3 mL Nebulization 4x Daily - RT   isosorbide mononitrate 60 mg Oral Daily   losartan 50 mg Oral Daily   metoprolol succinate XL 50 mg Oral Daily   sodium chloride 10 mL Intravenous Q12H   ticagrelor 90 mg Oral BID   vancomycin 1,000 mg Intravenous Q12H         Pharmacy to dose vancomycin      PRN meds  •  acetaminophen **OR** acetaminophen **OR** acetaminophen  •  ipratropium-albuterol  •  metoprolol tartrate  •  ondansetron  •  Pharmacy to dose vancomycin  •  sodium chloride  •  sodium chloride        Essential hypertension    Cardiomyopathy (CMS/HCC)    Coronary artery disease involving native coronary artery of native heart without angina pectoris    History of coronary artery stent placement    Parkinson's disease (CMS/HCC)    History of CVA (cerebrovascular accident)    Paroxysmal atrial fibrillation (CMS/HCC)    Pneumonia of left lower lobe due to infectious organism (CMS/HCC)        Assessment/Plan:  Pneumonia left lower lobe  -Continue Cefepime and Vancomycin as started in ED.  -RVP negative. MRSA screen.  -PRN breathing treatments. IS.  -On room air.  -Blood cultures pending.  -pulmonary evaluation appreciated    PAF/CAD/stent placement  -EKG ok. Troponin now slightly higher.   -Chest pain resolved. Likely related to pneumonia, but monitor for recurrence.  -cardiology evaluation appreciated  -aspirin and brilinta   -full dose lovenox    Recent pneumothorax  -Appears resolved.     HTN  -Blood pressures good.       DVT PPX: lovenox      Eric Truong MD  09/07/19  11:14 AM       alert and oriented x 3 alert and oriented x 3/responds to verbal commands/sensation intact/cranial nerves intact/normal strength

## 2020-12-23 ENCOUNTER — TELEPHONE (OUTPATIENT)
Dept: CARDIOLOGY | Facility: CLINIC | Age: 85
End: 2020-12-23

## 2020-12-23 NOTE — TELEPHONE ENCOUNTER
LVM   Transmitter not transmitting loop recorder  Will need a manual transmission sent to reconnect  ALP

## 2021-02-08 RX ORDER — METOPROLOL SUCCINATE 100 MG/1
100 TABLET, EXTENDED RELEASE ORAL DAILY
Qty: 30 TABLET | Refills: 5 | Status: SHIPPED | OUTPATIENT
Start: 2021-02-08 | End: 2021-07-30

## 2021-02-08 RX ORDER — AMLODIPINE BESYLATE 5 MG/1
5 TABLET ORAL
Qty: 30 TABLET | Refills: 3 | OUTPATIENT
Start: 2021-02-08

## 2021-02-08 RX ORDER — AMLODIPINE BESYLATE 5 MG/1
5 TABLET ORAL
Qty: 30 TABLET | Refills: 5 | Status: SHIPPED | OUTPATIENT
Start: 2021-02-08 | End: 2021-07-30

## 2021-03-08 RX ORDER — ISOSORBIDE MONONITRATE 60 MG/1
TABLET, EXTENDED RELEASE ORAL
Qty: 30 TABLET | Refills: 3 | Status: SHIPPED | OUTPATIENT
Start: 2021-03-08 | End: 2021-03-24 | Stop reason: SDUPTHER

## 2021-03-08 RX ORDER — LOSARTAN POTASSIUM 50 MG/1
50 TABLET ORAL DAILY
Qty: 30 TABLET | Refills: 3 | Status: SHIPPED | OUTPATIENT
Start: 2021-03-08 | End: 2021-03-24 | Stop reason: SDUPTHER

## 2021-03-08 RX ORDER — ATORVASTATIN CALCIUM 20 MG/1
20 TABLET, FILM COATED ORAL NIGHTLY
Qty: 30 TABLET | Refills: 3 | Status: SHIPPED | OUTPATIENT
Start: 2021-03-08 | End: 2021-03-30 | Stop reason: SDUPTHER

## 2021-03-09 ENCOUNTER — OFFICE VISIT (OUTPATIENT)
Dept: FAMILY MEDICINE CLINIC | Facility: CLINIC | Age: 86
End: 2021-03-09

## 2021-03-09 VITALS
WEIGHT: 170.4 LBS | SYSTOLIC BLOOD PRESSURE: 140 MMHG | RESPIRATION RATE: 20 BRPM | HEIGHT: 73 IN | BODY MASS INDEX: 22.58 KG/M2 | HEART RATE: 78 BPM | DIASTOLIC BLOOD PRESSURE: 60 MMHG | OXYGEN SATURATION: 95 % | TEMPERATURE: 97 F

## 2021-03-09 DIAGNOSIS — Z86.73 HISTORY OF CVA (CEREBROVASCULAR ACCIDENT): ICD-10-CM

## 2021-03-09 DIAGNOSIS — R73.03 PREDIABETES: ICD-10-CM

## 2021-03-09 DIAGNOSIS — E55.9 VITAMIN D DEFICIENCY: ICD-10-CM

## 2021-03-09 DIAGNOSIS — G20 PARKINSON'S DISEASE (HCC): ICD-10-CM

## 2021-03-09 DIAGNOSIS — R47.9 SPEECH DISTURBANCE, UNSPECIFIED TYPE: ICD-10-CM

## 2021-03-09 DIAGNOSIS — Z86.79 HISTORY OF SUPRAVENTRICULAR TACHYCARDIA: ICD-10-CM

## 2021-03-09 DIAGNOSIS — Z12.5 PROSTATE CANCER SCREENING: ICD-10-CM

## 2021-03-09 DIAGNOSIS — I25.10 CORONARY ARTERY DISEASE INVOLVING NATIVE CORONARY ARTERY OF NATIVE HEART WITHOUT ANGINA PECTORIS: ICD-10-CM

## 2021-03-09 DIAGNOSIS — I48.0 PAROXYSMAL ATRIAL FIBRILLATION (HCC): ICD-10-CM

## 2021-03-09 DIAGNOSIS — F80.81 STUTTERING: ICD-10-CM

## 2021-03-09 DIAGNOSIS — I42.9 CARDIOMYOPATHY, UNSPECIFIED TYPE (HCC): Primary | ICD-10-CM

## 2021-03-09 DIAGNOSIS — E78.5 DYSLIPIDEMIA: ICD-10-CM

## 2021-03-09 DIAGNOSIS — D53.9 MACROCYTIC ANEMIA: ICD-10-CM

## 2021-03-09 DIAGNOSIS — I10 ESSENTIAL HYPERTENSION: ICD-10-CM

## 2021-03-09 DIAGNOSIS — E53.8 VITAMIN B 12 DEFICIENCY: ICD-10-CM

## 2021-03-09 DIAGNOSIS — R51.9 NONINTRACTABLE EPISODIC HEADACHE, UNSPECIFIED HEADACHE TYPE: ICD-10-CM

## 2021-03-09 DIAGNOSIS — D69.6 THROMBOCYTOPENIA (HCC): ICD-10-CM

## 2021-03-09 PROCEDURE — 99214 OFFICE O/P EST MOD 30 MIN: CPT | Performed by: PHYSICIAN ASSISTANT

## 2021-03-09 NOTE — PROGRESS NOTES
Subjective   Dinesh Churchill Sr. is a 89 y.o. male who presents today for evaluation of stuttering and speech issues following an episode of severe headache. He also needs follow-up of hypertension, prediabetes, hyperlipidemia, B12 and vitamin D deficiency, macrocytosis, history of CVA and Parkinson's, atrial fibrillation, coronary artery disease, and cardiomyopathy, cataracts, pulmonary fibrosis, pneumothorax with pleural effusion.     History of Present Illness       The following portions of the patient's history were reviewed and updated as appropriate: allergies, current medications, past family history, past medical history, past social history, past surgical history and problem list.    Review of Systems   Constitutional: Negative.    Respiratory: Positive for shortness of breath (chronic). Negative for cough.    Cardiovascular: Negative.    Gastrointestinal: Negative.    Musculoskeletal: Positive for arthralgias.   Neurological: Positive for headaches.        Speech difficulty   Psychiatric/Behavioral: Negative.        Objective   Vitals:    03/09/21 1459   BP: 140/60   Pulse: 78   Resp: 20   Temp: 97 °F (36.1 °C)   SpO2: 95%     Body mass index is 22.48 kg/m².    Physical Exam  Vitals and nursing note reviewed.   Constitutional:       General: He is not in acute distress.     Appearance: Normal appearance. He is well-developed.   HENT:      Head: Normocephalic and atraumatic.      Right Ear: External ear normal.      Left Ear: External ear normal.   Eyes:      Conjunctiva/sclera: Conjunctivae normal.   Neck:      Thyroid: No thyroid mass or thyromegaly.      Vascular: No carotid bruit.      Trachea: No tracheal deviation.   Cardiovascular:      Rate and Rhythm: Normal rate and regular rhythm.      Pulses: Normal pulses.      Heart sounds: Normal heart sounds.   Pulmonary:      Effort: Pulmonary effort is normal.      Breath sounds: Normal breath sounds.   Skin:     General: Skin is warm and dry.    Neurological:      Mental Status: He is alert and oriented to person, place, and time.      Gait: Gait normal.      Comments: Stuttering with speaking   Psychiatric:         Mood and Affect: Mood normal.         Behavior: Behavior normal.         Thought Content: Thought content normal.         Judgment: Judgment normal.         Assessment/Plan   Diagnoses and all orders for this visit:    1. Cardiomyopathy, unspecified type (CMS/HCC) (Primary)  -     CT Head Without Contrast  -     Ambulatory Referral to Neurology    2. History of supraventricular tachycardia  -     CT Head Without Contrast  -     Ambulatory Referral to Neurology    3. Paroxysmal atrial fibrillation (CMS/[facility])    4. Essential hypertension    5. Dyslipidemia  -     Comprehensive Metabolic Panel  -     CK  -     Lipid Panel With LDL / HDL Ratio  -     TSH  -     T4, free  -     T3, Free  -     Uric Acid    6. Prediabetes  -     Comprehensive Metabolic Panel  -     Hemoglobin A1c  -     TSH  -     T4, free  -     T3, Free  -     Uric Acid    7. Vitamin D deficiency  -     Comprehensive Metabolic Panel  -     Vitamin D 25 Hydroxy  -     TSH  -     T4, free  -     T3, Free  -     Uric Acid    8. Macrocytic anemia  -     CBC & Differential  -     Vitamin B12 & Folate    9. Thrombocytopenia (CMS/HCC)  -     CBC & Differential  -     Iron and TIBC  -     Ferritin    10. History of CVA (cerebrovascular accident)    11. Parkinson's disease (CMS/[facility])  -     CT Head Without Contrast  -     Ambulatory Referral to Neurology    12. Vitamin B 12 deficiency    13. Coronary artery disease involving native coronary artery of native heart without angina pectoris    14. Stuttering  -     CT Head Without Contrast  -     Ambulatory Referral to Neurology    15. Speech disturbance, unspecified type  -     CT Head Without Contrast  -     Ambulatory Referral to Neurology    16. Nonintractable episodic headache, unspecified headache type  -     CT Head Without Contrast  -      Ambulatory Referral to Neurology    17. Prostate cancer screening  -     PSA Screen        Assessment and Plan  Patient had an episode about 4 weeks ago with severe headache then developed stuttering speech. Patient and son deny any other symptoms and reports no recurrence of headache, however, patient lives alone and has not had much interaction with people other than once a week when his son comes to take care of groceries and medications. This is concerning for CVA, however, without any other deficits at this time, I am not sure what we would change. Due to cardiac device, he is unable to have MRI. I will refer for ASAP CT head and follow up with neurology. 9-1-1 to ER if he has worsening speech, recurrence of headache, or any other cardiac or neurological symptoms. Patient and son verbalized understanding and agreement with plan and recommendations.     He is also due for routine labs/ follow up.

## 2021-03-09 NOTE — PROGRESS NOTES
"Chief Complaint  Hypertension (c/o slurred speech ? cva x4 weeks ago ), Fatigue, and Headache    Subjective     {Problem List  Visit Diagnosis   Encounters  Notes  Medications  Labs  Result Review Imaging  Media :23}     Dinesh Churchill SrTerrence presents to [facility]  History of Present Illness    Objective   Vital Signs:   /60 (BP Location: Left arm, Patient Position: Sitting)   Pulse 78   Temp 97 °F (36.1 °C) (Temporal)   Resp 20   Ht 185.4 cm (73\")   Wt 77.3 kg (170 lb 6.4 oz)   SpO2 95%   BMI 22.48 kg/m²     Physical Exam   Result Review :{Labs  Result Review  Imaging  Med Tab  Media :23}   {The following data was reviewed by (Optional):81731}  {Ambulatory Labs (Optional):79911}  {Data reviewed (Optional):77947:::1}          Assessment and Plan {CC Problem List  Visit Diagnosis  ROS  Review (Popup)  Health Maintenance  Quality  BestPractice  Medications  SmartSets  SnapShot Encounters  Media :23}   There are no diagnoses linked to this encounter.  {Time Spent (Optional):74917}  Follow Up {Instructions Charge Capture  Follow-up Communications :23}  No follow-ups on file.  Patient was given instructions and counseling regarding his condition or for health maintenance advice. Please see specific information pulled into the AVS if appropriate.       "

## 2021-03-10 LAB
25(OH)D3+25(OH)D2 SERPL-MCNC: 34.1 NG/ML (ref 30–100)
ALBUMIN SERPL-MCNC: 3.9 G/DL (ref 3.5–4.6)
ALBUMIN/GLOB SERPL: 1.5 {RATIO} (ref 1.2–2.2)
ALP SERPL-CCNC: 67 IU/L (ref 39–117)
ALT SERPL-CCNC: 14 IU/L (ref 0–44)
AST SERPL-CCNC: 17 IU/L (ref 0–40)
BASOPHILS # BLD AUTO: 0 X10E3/UL (ref 0–0.2)
BASOPHILS NFR BLD AUTO: 0 %
BILIRUB SERPL-MCNC: 1.4 MG/DL (ref 0–1.2)
BUN SERPL-MCNC: 30 MG/DL (ref 10–36)
BUN/CREAT SERPL: 32 (ref 10–24)
CALCIUM SERPL-MCNC: 8.9 MG/DL (ref 8.6–10.2)
CHLORIDE SERPL-SCNC: 109 MMOL/L (ref 96–106)
CHOLEST SERPL-MCNC: 126 MG/DL (ref 100–199)
CK SERPL-CCNC: 62 U/L (ref 30–208)
CO2 SERPL-SCNC: 22 MMOL/L (ref 20–29)
CREAT SERPL-MCNC: 0.95 MG/DL (ref 0.76–1.27)
EOSINOPHIL # BLD AUTO: 0.1 X10E3/UL (ref 0–0.4)
EOSINOPHIL NFR BLD AUTO: 3 %
ERYTHROCYTE [DISTWIDTH] IN BLOOD BY AUTOMATED COUNT: 15.1 % (ref 11.6–15.4)
FERRITIN SERPL-MCNC: 247 NG/ML (ref 30–400)
FOLATE SERPL-MCNC: 10.4 NG/ML
GLOBULIN SER CALC-MCNC: 2.6 G/DL (ref 1.5–4.5)
GLUCOSE SERPL-MCNC: 104 MG/DL (ref 65–99)
HBA1C MFR BLD: 6 % (ref 4.8–5.6)
HCT VFR BLD AUTO: 40.7 % (ref 37.5–51)
HDLC SERPL-MCNC: 48 MG/DL
HGB BLD-MCNC: 13.4 G/DL (ref 13–17.7)
IMM GRANULOCYTES # BLD AUTO: 0 X10E3/UL (ref 0–0.1)
IMM GRANULOCYTES NFR BLD AUTO: 0 %
IRON SATN MFR SERPL: 31 % (ref 15–55)
IRON SERPL-MCNC: 89 UG/DL (ref 38–169)
LDLC SERPL CALC-MCNC: 63 MG/DL (ref 0–99)
LDLC/HDLC SERPL: 1.3 RATIO (ref 0–3.6)
LYMPHOCYTES # BLD AUTO: 1.2 X10E3/UL (ref 0.7–3.1)
LYMPHOCYTES NFR BLD AUTO: 27 %
MCH RBC QN AUTO: 32.4 PG (ref 26.6–33)
MCHC RBC AUTO-ENTMCNC: 32.9 G/DL (ref 31.5–35.7)
MCV RBC AUTO: 99 FL (ref 79–97)
MONOCYTES # BLD AUTO: 0.5 X10E3/UL (ref 0.1–0.9)
MONOCYTES NFR BLD AUTO: 11 %
NEUTROPHILS # BLD AUTO: 2.5 X10E3/UL (ref 1.4–7)
NEUTROPHILS NFR BLD AUTO: 59 %
PLATELET # BLD AUTO: 145 X10E3/UL (ref 150–450)
POTASSIUM SERPL-SCNC: 4.7 MMOL/L (ref 3.5–5.2)
PROT SERPL-MCNC: 6.5 G/DL (ref 6–8.5)
PSA SERPL-MCNC: 1.3 NG/ML (ref 0–4)
RBC # BLD AUTO: 4.13 X10E6/UL (ref 4.14–5.8)
SODIUM SERPL-SCNC: 145 MMOL/L (ref 134–144)
T3FREE SERPL-MCNC: 2.5 PG/ML (ref 2–4.4)
T4 FREE SERPL-MCNC: 1.27 NG/DL (ref 0.82–1.77)
TIBC SERPL-MCNC: 290 UG/DL (ref 250–450)
TRIGL SERPL-MCNC: 77 MG/DL (ref 0–149)
TSH SERPL DL<=0.005 MIU/L-ACNC: 2.27 UIU/ML (ref 0.45–4.5)
UIBC SERPL-MCNC: 201 UG/DL (ref 111–343)
URATE SERPL-MCNC: 5.3 MG/DL (ref 3.8–8.4)
VIT B12 SERPL-MCNC: 1354 PG/ML (ref 232–1245)
VLDLC SERPL CALC-MCNC: 15 MG/DL (ref 5–40)
WBC # BLD AUTO: 4.3 X10E3/UL (ref 3.4–10.8)

## 2021-03-17 ENCOUNTER — TRANSCRIBE ORDERS (OUTPATIENT)
Dept: ADMINISTRATIVE | Facility: HOSPITAL | Age: 86
End: 2021-03-17

## 2021-03-22 NOTE — PROGRESS NOTES
Subjective   Dinesh Churchill Sr. is a 89 y.o. male who presents today for evaluation of headache and stuttering for 4 weeks. He is also due for follow-up of hypertension, prediabetes, hyperlipidemia, B12 and vitamin D deficiency, macrocytosis, history of CVA and Parkinson's, atrial fibrillation, coronary artery disease, and cardiomyopathy, cataracts, pulmonary fibrosis, pneumothorax with pleural effusion.     Hypertension  Associated symptoms include headaches and shortness of breath.   Fatigue  Associated symptoms include fatigue and headaches.   Headache       About 4 weeks ago, he was asleep and woke up at 2 am with a headache- severe but was a generalized  Headache and lasted 30-45 minutes. No headache since. He noticed that he started stuttering the next day. Has now stuttered since. No slurring of words. No other neurological symptoms. He is getting up and around, has no in creased weakness, numbness, tingling, swallowing issues, recent falls, recurrence of headache, or other symptoms.     Hypertension- has been stable on Norvasc 5 mg once daily, metoprolol  mg daily, and losartan 50 mg once daily.  Prediabetes and hyperlipidemia- continues Lipitor 20 mg once daily & tolerating without AE.  B12- last injection 12/27/2019  Vitamin D- taking vitamin D 1000IU daily. Tolerating without AE.     Weakness and falls- No recent falls.   Chest pain- taking Imdur as directed by cardiology and tolerating without AE. No CP- controlling CP.   Shortness of breath- no change from baseline. No increase in SOA   Pleural effusion and pneumothorax- no symptoms at this time.   History of pneumothorax and pleural effusion-  with hospitalization. Pulmonology saw him and advised he was stable and to follow up in 6/2020.     Patient's Specialists:  Cardiology-Dr. Quezada/ [facility]-last seen 12/2020 for CAD, cardiomyopathy, atrial fibrillation, hypertension -stable. Stop Brillinta. Follow-up in 6  months.  Neurology- Dr. Saleh- last appointment 1/2020  For seizure-like activity.  No further work-up at that time.  They will consider EEG if he has any further symptoms.  Dr. Naik- last seen 10/2017- for Parkinson's, orthostatic hypotension, and ataxia- he did not want to pursue treatment at that time and was advised to follow-up PRN.   Ophthalmology- [facility] in [place]- will have left cataract surgery 1/27/2020 and right cataract surgery 2/3/2020.   Pulmonology- Dr De Jesus- last appt 12/2019 for pulmonary fibrosis, pulmonary edema, pneumothorax- follow up in 6 months.     The following portions of the patient's history were reviewed and updated as appropriate: allergies, current medications, past family history, past medical history, past social history, past surgical history and problem list.    Review of Systems   Constitutional: Positive for fatigue.   Eyes: Positive for visual disturbance.        Having cataract surgery x 2 upcoming   Respiratory: Positive for shortness of breath.    Cardiovascular: Negative.    Gastrointestinal: Negative.    Genitourinary: Negative.    Musculoskeletal: Positive for gait problem.   Neurological: Positive for headaches.        Fall   Hematological: Negative.    Psychiatric/Behavioral: Negative.        Objective    Vitals:    03/09/21 1459   BP: 140/60   Pulse: 78   Resp: 20   Temp: 97 °F (36.1 °C)   SpO2: 95%     Body mass index is 22.48 kg/m².    Physical Exam   Constitutional: He is oriented to person, place, and time. He appears well-developed.   HENT:   Head: Normocephalic and atraumatic.   Right Ear: External ear normal.   Left Ear: External ear normal.   Eyes: Conjunctivae are normal.   Neck: Carotid bruit is not present. No tracheal deviation present. No thyroid mass and no thyromegaly present.   Cardiovascular: Normal rate, regular rhythm and normal heart sounds.   Pulmonary/Chest: Effort normal and breath sounds normal.   Neurological: He is  alert and oriented to person, place, and time. Gait normal.   Skin: Skin is warm and dry.   Psychiatric: His behavior is normal. Judgment and thought content normal.   Nursing note and vitals reviewed.      Assessment/Plan   Diagnoses and all orders for this visit:    1. Cardiomyopathy, unspecified type (CMS/HCC) (Primary)  -     CT Head Without Contrast  -     Ambulatory Referral to Neurology    2. History of supraventricular tachycardia  -     CT Head Without Contrast  -     Ambulatory Referral to Neurology    3. Paroxysmal atrial fibrillation (CMS/HCC)    4. Essential hypertension    5. Dyslipidemia  -     Comprehensive Metabolic Panel  -     CK  -     Lipid Panel With LDL / HDL Ratio  -     TSH  -     T4, free  -     T3, Free  -     Uric Acid    6. Prediabetes  -     Comprehensive Metabolic Panel  -     Hemoglobin A1c  -     TSH  -     T4, free  -     T3, Free  -     Uric Acid    7. Vitamin D deficiency  -     Comprehensive Metabolic Panel  -     Vitamin D 25 Hydroxy  -     TSH  -     T4, free  -     T3, Free  -     Uric Acid    8. Macrocytic anemia  -     CBC & Differential  -     Vitamin B12 & Folate    9. Thrombocytopenia (CMS/HCC)  -     CBC & Differential  -     Iron and TIBC  -     Ferritin    10. History of CVA (cerebrovascular accident)    11. Parkinson's disease (CMS/HCC)  -     CT Head Without Contrast  -     Ambulatory Referral to Neurology    12. Vitamin B 12 deficiency    13. Coronary artery disease involving native coronary artery of native heart without angina pectoris    14. Stuttering  -     CT Head Without Contrast  -     Ambulatory Referral to Neurology    15. Speech disturbance, unspecified type  -     CT Head Without Contrast  -     Ambulatory Referral to Neurology    16. Nonintractable episodic headache, unspecified headache type  -     CT Head Without Contrast  -     Ambulatory Referral to Neurology    17. Prostate cancer screening  -     PSA Screen      Assessment and Plan  He is also due  for routine labs/ follow up. Patient will have fasting labs. Call if no results in 1 week. Stability of conditions, plan, follow up, and further recommendations pending labs. If stable, follow up in 3-4 months.     · Headache and speech disturbance- Patient had an episode about 4 weeks ago with severe headache then developed stuttering speech. Patient and son deny any other symptoms and reports no recurrence of headache, however, patient lives alone and has not had much interaction with people other than once a week when his son comes to take care of groceries and medications. This is concerning for CVA, however, without any other deficits at this time, I am not sure what we would change. Due to cardiac device, he is unable to have MRI. I will refer for ASAP CT head and follow up with neurology. 9-1-1 to ER if he has worsening speech, recurrence of headache, or any other cardiac or neurological symptoms. Patient and son verbalized understanding and agreement with plan and recommendations.   · Hypertension- Blood pressure today is elevated today but is stable from previous visits. Continue Norvasc 5 mg once daily, Metoprolol  mg daily, and Losartan 50 mg once daily. Continue monitoring BP. Call or return if elevated or low.    · Hyperlipidemia- Last lipids were stable. Continue Lipitor 20 mg at bedtime.  · Prediabetes- Last A1C was upper normal without medication at 5.6% 9/2020.   · Vitamin D deficiency- Continue Vitamin D 1000IU daily. Dosing recommendations pending labs.   · B12 deficiency- He has not had B12 injection in months.  Await labs for further recommendations.   · Balance and gait instability- He avoids any overhead movement, as he loses his balance. He denies any recent falls or worsening.  He is getting around well.  He was seen by neurology and diagnosed with Parkinson's, however, they did not feel he needed treatment.    · CAD- He continues follow-up with cardiology.  He continues on Imdur 60 mg  once daily and denies any changes or increase in chest pain, shortness of breath, palpitations, dizziness, or weakness.   · History of Pleural effusion and Pneumovax- Patient has no increase in SOA. He needs to reschedule with pulmonology.     Patient continues to see specialists as noted above.  I have reviewed available records, including consult notes, labs, and imaging/testing.  Patient to continue follow-up with specialists as directed by them. He needs to schedule follow up with pulmonology and I will refer back to neurology.     I spent 35 minutes cariting for Dinesh Churchill Sr. on this date of service. This time includes time spent by me in the following activities as necessary: preparing for the visit, reviewing tests, specialists records and previous visits, obtaining and/or reviewing a separately obtained history, performing a medically appropriate exam and/or evaluation, counseling and educating the patient, family, garegiver, referring and/or communicating with other healthcare professionals, documenting information in the medical record, independently interpreting results and communicating that information with the patient, family, caregiver, and developing a medically appropriate treatment plan with consideration of other conditions, medications, and treatments.

## 2021-03-23 ENCOUNTER — HOSPITAL ENCOUNTER (OUTPATIENT)
Dept: CT IMAGING | Facility: HOSPITAL | Age: 86
Discharge: HOME | End: 2021-03-23
Admitting: PHYSICIAN ASSISTANT

## 2021-03-23 PROCEDURE — 70450 CT HEAD/BRAIN W/O DYE: CPT

## 2021-03-24 RX ORDER — ISOSORBIDE MONONITRATE 60 MG/1
60 TABLET, EXTENDED RELEASE ORAL DAILY
Qty: 30 TABLET | Refills: 0 | Status: SHIPPED | OUTPATIENT
Start: 2021-03-24 | End: 2021-03-25

## 2021-03-24 RX ORDER — LOSARTAN POTASSIUM 50 MG/1
50 TABLET ORAL DAILY
Qty: 30 TABLET | Refills: 0 | Status: SHIPPED | OUTPATIENT
Start: 2021-03-24 | End: 2021-03-25

## 2021-03-25 RX ORDER — ISOSORBIDE MONONITRATE 60 MG/1
60 TABLET, EXTENDED RELEASE ORAL DAILY
Qty: 90 TABLET | Refills: 0 | Status: SHIPPED | OUTPATIENT
Start: 2021-03-25 | End: 2021-06-21

## 2021-03-25 RX ORDER — LOSARTAN POTASSIUM 50 MG/1
50 TABLET ORAL DAILY
Qty: 90 TABLET | Refills: 0 | Status: SHIPPED | OUTPATIENT
Start: 2021-03-25 | End: 2021-06-21

## 2021-03-31 RX ORDER — ATORVASTATIN CALCIUM 20 MG/1
20 TABLET, FILM COATED ORAL NIGHTLY
Qty: 30 TABLET | Refills: 6 | Status: SHIPPED | OUTPATIENT
Start: 2021-03-31 | End: 2021-10-14

## 2021-04-09 ENCOUNTER — OFFICE VISIT (OUTPATIENT)
Dept: NEUROLOGY | Facility: CLINIC | Age: 86
End: 2021-04-09

## 2021-04-09 VITALS
OXYGEN SATURATION: 97 % | BODY MASS INDEX: 24.92 KG/M2 | HEIGHT: 73 IN | WEIGHT: 188 LBS | HEART RATE: 39 BPM | DIASTOLIC BLOOD PRESSURE: 64 MMHG | SYSTOLIC BLOOD PRESSURE: 124 MMHG

## 2021-04-09 DIAGNOSIS — Z86.73 HISTORY OF CVA (CEREBROVASCULAR ACCIDENT): Primary | ICD-10-CM

## 2021-04-09 DIAGNOSIS — F80.81 STUTTERING: ICD-10-CM

## 2021-04-09 PROCEDURE — 99215 OFFICE O/P EST HI 40 MIN: CPT | Performed by: PSYCHIATRY & NEUROLOGY

## 2021-04-09 NOTE — ASSESSMENT & PLAN NOTE
89 year old man with history of CVAs who has had stuttering starting about 2 months ago which has improved but hasn't fully resolved but much improved.  On examination he has very mild stuttering at times but overall he is fluent in his conversation and no other focal findings.  He does have history of paroxysmal Afib which is not currently anticoagulated along with coronary artery disease.  I have asked his son to check with cardiology with regards to reason why he is not anticoagulated.  He is currently in sinus rhythm on examination.  He was previously on Brilinta and aspirin combination and the Brilinta was discontinued most recently.  He was previously been evaluated for syncope spell in the past which was not thought to be seizures but rather orthostatic.  He is currently living by himself and doing everything for himself without any problems.  He does have a implanted loop recorder for which he has had monitoring done everyday.  The stuttering started 2 months ago.  He had a recent head CT scan done which demonstrated old findings of prior strokes which I independently reviewed the images from the most recent head CT scan done on 3/23/21 on his visit today.  He had an echocardiogram done in 12/2020 with EF of 54%.  I am assuming he is not in Afib based on continuous loop recording and this is why he is not on anticoagulation.  He is currently on aspirin and statin combination.  They deny any new weakness or numbness or trouble with vision and not other issues other than stuttering.  He cannot have a brain MRI scan done due to cardiac port.  He had a carotid ultrasound done in 2017 which looked good but he has not had this study repeated.  I will recheck carotid ultrasound and will also refer him to speech therapy for the stuttering.  He is currently on aspirin and statin which I advised them to continue.  If he is found to have Afib on loop recorder then I would advise starting anticoagulation unless  contraindicated otherwise.  Will follow up with him in 3 months.  Provided patient education information on stroke, discussed symptoms and advised him to be taken to the ED with any new symptoms.

## 2021-04-09 NOTE — ASSESSMENT & PLAN NOTE
As mentioned, currently on aspirin and statin.    If he is found to have Afib on loop recorder then I would advise starting anticoagulation unless contraindicated otherwise.

## 2021-04-09 NOTE — PROGRESS NOTES
Chief Complaint  Neurologic Problem (stroke like symptoms,stuttering, began 2 months ago, h/o parkinsons, HA, Recent CT 3/23 unable to get MRI d/t Cardiac port) Patient presents today with their Son , Dinesh Lopez and has given consent to give health information to them.        Subjective          Dinesh Churchill Sr. presents to [facility] NEUROLOGY for   HISTORY OF PRESENT ILLNESS:    Dinesh Churchill Sr. Is a 89 year old right handed man who presents to neurology clinic for initial evaluation and treatment of stuttering.  He presents with his son on visit today who also provides history.  He has history of stroke, headache and possible parkinson's disease and paroxysmal Afib which is not currently anticoagulated along with coronary artery disease.  He was previously on Brilinta and aspirin combination and the Brilinta was discontinued.  He was previously been evaluated for syncope spell in the past which was not thought to be seizures.  He is currently living by himself and doing everything for himself without any problems.  He does have a implanted loop recorder for which he has had monitoring done everyday.  The stuttering started 2 months ago.  He had a recent head CT scan done which demonstrated old findings of prior strokes which I reviewed the images from the most recent head CT scan done on 3/23/21 on his visit today.  He had an echocardiogram done in 12/2020 with EF of 54%.  I am assuming he is not in Afib based on continuous loop recording and this is why he is not on anticoagulation.  He is currently on aspirin and statin combination.  They deny any new weakness or numbness or trouble with vision and not other issues other than stuttering.  He cannot have a brain MRI scan done due to cardiac port.  He had a carotid ultrasound done in 2017 which looked good but he has not had this study repeated.  He has not had any falls since 9/2020.  He is not having any trouble with his ADLs.  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     Past Medical History:   Diagnosis Date   • Abnormal ECG    • Abnormal MRI    • Acute frontal sinusitis    • Arthritis    • Chronic fatigue    • Coronary artery disease    • Dizziness    • Head injury    • Hearing aid worn     left   • Hearing loss    • Hyperlipidemia    • Hypertension    • Kidney stones    • Macrocytosis    • Neoplasm of skin    • Osteoporosis    • Prediabetes    • Vitamin D deficiency         Family History   Family history unknown: Yes        Social History     Socioeconomic History   • Marital status:      Spouse name: Not on file   • Number of children: Not on file   • Years of education: Not on file   • Highest education level: Not on file   Tobacco Use   • Smoking status: Never Smoker   • Smokeless tobacco: Never Used   Substance and Sexual Activity   • Alcohol use: No     Comment: quit 30 years ago    • Drug use: No   • Sexual activity: Defer        I have personally reviewed the ROS as stated below.     Review of Systems   Constitutional: Negative for activity change, appetite change and fatigue.   HENT: Positive for hearing loss. Negative for trouble swallowing and voice change.    Eyes: Negative for blurred vision, double vision and pain.   Respiratory: Negative for cough, choking and shortness of breath.    Gastrointestinal: Negative for abdominal pain, nausea and vomiting.   Endocrine: Negative for cold intolerance and heat intolerance.   Genitourinary: Negative for decreased urine volume, urgency and urinary incontinence.   Musculoskeletal: Positive for back pain and gait problem (walker). Negative for neck pain.   Skin: Negative for dry skin, rash and bruise.   Allergic/Immunologic: Negative for environmental allergies and food allergies.   Neurological: Positive for speech difficulty (stuttering). Negative for dizziness, tremors, seizures, syncope, facial asymmetry, weakness, light-headedness, numbness,  "headache, memory problem and confusion.   Hematological: Does not bruise/bleed easily.   Psychiatric/Behavioral: Negative for agitation, behavioral problems, decreased concentration, dysphoric mood, hallucinations, self-injury, sleep disturbance, suicidal ideas, negative for hyperactivity, depressed mood and stress. The patient is not nervous/anxious.         Objective   Vital Signs:   /64   Pulse (!) 39   Ht 185 cm (72.84\")   Wt 85.3 kg (188 lb)   SpO2 97%   BMI 24.92 kg/m²       PHYSICAL EXAM:    General   Mental Status - Alert. General Appearance - Well developed, Well groomed, Oriented and Cooperative. Orientation - Oriented X3.       Head and Neck  Head - normocephalic, atraumatic with no lesions or palpable masses.  Neck    Global Assessment - supple.       Eye   Sclera/Conjunctiva - Bilateral - Normal.    ENMT  Mouth and Throat   Oral Cavity/Oropharynx: Oropharynx - the soft palate,uvula and tongue are normal in appearance.    Chest and Lung Exam   Chest - lung clear to auscultation bilaterally.    Cardiovascular   Cardiovascular examination reveals  - normal heart sounds, regular rate and rhythm.    Neurologic   Mental Status: Speech - Normal. Cognitive function - appropriate fund of knowledge. No impairment of attention, Impairment of concentration, impairment of long term memory or impairment of short term memory.  Cranial Nerves:   II Optic: Visual acuity - Left - Normal. Right - Normal. Visual fields - Normal (to confrontation).  III Oculomotor: Pupillary constriction - Left - Normal. Right - Normal.  VII Facial: - Normal Bilaterally.  VIII Acoustic - Bilateral - Hearing normal and (Hearing tested by finger rub).   IX Glossopharyngeal / X Vagus - Normal.  XI Accessory: Trapezius - Bilateral - Normal. Sternocleidomastoid - Bilateral - Normal.  XII Hypoglossal - Bilateral - Normal.  Eye Movements: - Normal Bilaterally.  Sensory:   Light Touch: Intact - Globally.  Motor:   Bulk and Contour: - " Normal.  Tone: - Normal.  Tremor: Not present.  Strength: 5/5 normal muscle strength - All Muscles.  Bradykinetic.     General Assessment of Reflexes: - deep tendon reflexes are normal. Coordination - No Impairment of finger-to-nose or Impairment of rapid alternating movements. Gait -  Broad based, stooped posture, uses walker, bradykinetic.        Result Review :                 Assessment and Plan    Problem List Items Addressed This Visit        Neuro    History of CVA (cerebrovascular accident) - Primary    Current Assessment & Plan     As mentioned, currently on aspirin and statin.    If he is found to have Afib on loop recorder then I would advise starting anticoagulation unless contraindicated otherwise.          Relevant Orders    Ambulatory Referral to Speech Therapy    Duplex Carotid Ultrasound CAR    Stuttering    Current Assessment & Plan     89 year old man with history of CVAs who has had stuttering starting about 2 months ago which has improved but hasn't fully resolved but much improved.  On examination he has very mild stuttering at times but overall he is fluent in his conversation and no other focal findings.  He does have history of paroxysmal Afib which is not currently anticoagulated along with coronary artery disease.  I have asked his son to check with cardiology with regards to reason why he is not anticoagulated.  He is currently in sinus rhythm on examination.  He was previously on Brilinta and aspirin combination and the Brilinta was discontinued most recently.  He was previously been evaluated for syncope spell in the past which was not thought to be seizures but rather orthostatic.  He is currently living by himself and doing everything for himself without any problems.  He does have a implanted loop recorder for which he has had monitoring done everyday.  The stuttering started 2 months ago.  He had a recent head CT scan done which demonstrated old findings of prior strokes which I  independently reviewed the images from the most recent head CT scan done on 3/23/21 on his visit today.  He had an echocardiogram done in 12/2020 with EF of 54%.  I am assuming he is not in Afib based on continuous loop recording and this is why he is not on anticoagulation.  He is currently on aspirin and statin combination.  They deny any new weakness or numbness or trouble with vision and not other issues other than stuttering.  He cannot have a brain MRI scan done due to cardiac port.  He had a carotid ultrasound done in 2017 which looked good but he has not had this study repeated.  I will recheck carotid ultrasound and will also refer him to speech therapy for the stuttering.  He is currently on aspirin and statin which I advised them to continue.  If he is found to have Afib on loop recorder then I would advise starting anticoagulation unless contraindicated otherwise.  Will follow up with him in 3 months.  Provided patient education information on stroke, discussed symptoms and advised him to be taken to the ED with any new symptoms.             Relevant Orders    Ambulatory Referral to Speech Therapy    Duplex Carotid Ultrasound CAR          I spent 50 minutes caring for Dinesh on this date of service. This time includes time spent by me in the following activities:preparing for the visit, reviewing tests, obtaining and/or reviewing a separately obtained history, performing a medically appropriate examination and/or evaluation , counseling and educating the patient/family/caregiver, ordering medications, tests, or procedures, documenting information in the medical record, independently interpreting results and communicating that information with the patient/family/caregiver and care coordination    Follow Up   No follow-ups on file.  Patient was given instructions and counseling regarding his condition or for health maintenance advice. Please see specific information pulled into the AVS if appropriate.

## 2021-04-09 NOTE — PATIENT INSTRUCTIONS
Lower blood pressure by restricting salt in diet (less than 2400 mg/day), weight loss, increase fruits, vegetables, whole grains, and low-fat dairy products.    Consider the DASH diet or Mediterranean diet.  Increase fish and poultry intake.    Avoid sodas, sweets, and red meat.    Limit alcohol consumption.    Monitor and keep blood pressure, cholesterol and blood sugar at goal.    Avoid the use of tobacco and avoid secondhand smoke.    Engage in moderate to vigorous intensity aerobic exercise for about 40 minutes 3-4 times per week.  Consider lower intensity clarence chi or yoga if necessary.    Take antiplatelet medication regularly.    If you snore, wake up unrefreshed or feel tired during the day, talk to your doctor as these may be signs of sleep apnea and a sleep study may be indicated.

## 2021-04-23 ENCOUNTER — HOSPITAL ENCOUNTER (OUTPATIENT)
Dept: CARDIOLOGY | Facility: HOSPITAL | Age: 86
Discharge: HOME | End: 2021-04-23
Admitting: PSYCHIATRY & NEUROLOGY

## 2021-04-23 DIAGNOSIS — F80.81 STUTTERING: ICD-10-CM

## 2021-04-23 DIAGNOSIS — Z86.73 HISTORY OF CVA (CEREBROVASCULAR ACCIDENT): ICD-10-CM

## 2021-04-23 LAB
BH CV VAS BP LEFT ARM: NORMAL MMHG
BH CV VAS BP RIGHT ARM: NORMAL MMHG
BH CV XLRA MEAS LEFT DIST CCA EDV: 0 CM/SEC
BH CV XLRA MEAS LEFT DIST CCA PSV: 52 CM/SEC
BH CV XLRA MEAS LEFT DIST ICA EDV: 9 CM/SEC
BH CV XLRA MEAS LEFT DIST ICA PSV: 47 CM/SEC
BH CV XLRA MEAS LEFT ICA/CCA RATIO: 0.84
BH CV XLRA MEAS LEFT MID ICA EDV: 8 CM/SEC
BH CV XLRA MEAS LEFT MID ICA PSV: 39 CM/SEC
BH CV XLRA MEAS LEFT PROX CCA EDV: 0 CM/SEC
BH CV XLRA MEAS LEFT PROX CCA PSV: 53 CM/SEC
BH CV XLRA MEAS LEFT PROX ECA EDV: 0 CM/SEC
BH CV XLRA MEAS LEFT PROX ECA PSV: 71 CM/SEC
BH CV XLRA MEAS LEFT PROX ICA EDV: 0 CM/SEC
BH CV XLRA MEAS LEFT PROX ICA PSV: 44 CM/SEC
BH CV XLRA MEAS LEFT PROX SCLA EDV: 0 CM/SEC
BH CV XLRA MEAS LEFT PROX SCLA PSV: 76 CM/SEC
BH CV XLRA MEAS LEFT VERTEBRAL A EDV: 0 CM/SEC
BH CV XLRA MEAS LEFT VERTEBRAL A PSV: 55 CM/SEC
BH CV XLRA MEAS RIGHT DIST CCA EDV: 0 CM/SEC
BH CV XLRA MEAS RIGHT DIST CCA PSV: 46 CM/SEC
BH CV XLRA MEAS RIGHT DIST ICA EDV: 7 CM/SEC
BH CV XLRA MEAS RIGHT DIST ICA PSV: 43 CM/SEC
BH CV XLRA MEAS RIGHT ICA/CCA RATIO: 0.82
BH CV XLRA MEAS RIGHT MID ICA EDV: 9 CM/SEC
BH CV XLRA MEAS RIGHT MID ICA PSV: 42 CM/SEC
BH CV XLRA MEAS RIGHT PROX CCA EDV: 0 CM/SEC
BH CV XLRA MEAS RIGHT PROX CCA PSV: 57 CM/SEC
BH CV XLRA MEAS RIGHT PROX ECA EDV: 0 CM/SEC
BH CV XLRA MEAS RIGHT PROX ECA PSV: 67 CM/SEC
BH CV XLRA MEAS RIGHT PROX ICA EDV: 9 CM/SEC
BH CV XLRA MEAS RIGHT PROX ICA PSV: 38 CM/SEC
BH CV XLRA MEAS RIGHT PROX SCLA EDV: 0 CM/SEC
BH CV XLRA MEAS RIGHT PROX SCLA PSV: 71 CM/SEC
BH CV XLRA MEAS RIGHT VERTEBRAL A EDV: 0 CM/SEC
BH CV XLRA MEAS RIGHT VERTEBRAL A PSV: 25 CM/SEC
LEFT ARM BP: NORMAL MMHG
RIGHT ARM BP: NORMAL MMHG

## 2021-04-23 PROCEDURE — 93880 EXTRACRANIAL BILAT STUDY: CPT

## 2021-05-12 ENCOUNTER — HOSPITAL ENCOUNTER (OUTPATIENT)
Dept: SPEECH THERAPY | Facility: HOSPITAL | Age: 86
Setting detail: THERAPIES SERIES
Discharge: HOME | End: 2021-05-12

## 2021-05-12 DIAGNOSIS — R47.1 DYSARTHRIA: Primary | ICD-10-CM

## 2021-05-12 DIAGNOSIS — R47.82 FLUENCY DISORDER ASSOCIATED WITH UNDERLYING DISEASE: ICD-10-CM

## 2021-05-12 PROCEDURE — 92522 EVALUATE SPEECH PRODUCTION: CPT

## 2021-05-13 NOTE — THERAPY DISCHARGE NOTE
Outpatient Speech Language Pathology   Adult Speech Language Eval/Discharge  [facility]     Patient Name: Dinesh Churchill Sr.  : 1928  MRN: [id number]  Today's Date: 2021         Visit Date: 2021    Patient Active Problem List   Diagnosis   • Abnormal magnetic resonance imaging study   • Arthritis   • Osteoarthritis of cervical spine   • Diplopia   • Hearing loss   • Neoplasm of uncertain behavior of skin   • Prediabetes   • Vitamin D deficiency   • Chronic fatigue   • History of supraventricular tachycardia   • Essential hypertension   • B12 deficiency   • Abnormal cardiac function test   • Cardiomyopathy (CMS/HCC)   • Coronary artery disease involving native coronary artery of native heart without angina pectoris   • History of coronary artery stent placement   • History of renal calculi   • Dyslipidemia   • Macrocytic anemia   • Precordial pain   • Parkinson's disease (CMS/HCC)   • History of CVA (cerebrovascular accident)   • Paroxysmal atrial fibrillation (CMS/HCC)   • Otalgia, left   • Chest pain   • Abnormal stress test   • Spontaneous pneumothorax   • Chest pain   • Acute systolic (congestive) heart failure (CMS/HCC)   • Thrombocytopenia (CMS/HCC)   • Pneumonia of left lower lobe due to infectious organism   • Syncope and collapse   • Status post placement of implantable loop recorder   • Long term (current) use of antithrombotics/antiplatelets   • Stuttering        Past Medical History:   Diagnosis Date   • Abnormal ECG    • Abnormal MRI    • Acute frontal sinusitis    • Arthritis    • Chronic fatigue    • Coronary artery disease    • Dizziness    • Head injury    • Hearing aid worn     left   • Hearing loss    • Hyperlipidemia    • Hypertension    • Kidney stones    • Macrocytosis    • Neoplasm of skin    • Osteoporosis    • Prediabetes    • Vitamin D deficiency         Past Surgical History:   Procedure Laterality Date   • CARDIAC CATHETERIZATION N/A 2017    Procedure: Left Heart  Cath;  Surgeon: Maninder Quezada MD;  Location:  [facility] CATH INVASIVE [facility];  Service:    • CARDIAC CATHETERIZATION N/A 5/23/2017    Procedure: Stent BMS coronary;  Surgeon: Maninder Quezada MD;  Location:  [facility];  Service:    • CARDIAC CATHETERIZATION N/A 8/8/2019    Procedure: Left Heart Cath;  Surgeon: Maninder Quezada MD;  Location:  [facility];  Service: Cardiology   • CARDIAC CATHETERIZATION N/A 8/8/2019    Procedure: Left ventriculography;  Surgeon: Maninder Quezada MD;  Location:  [facility];  Service: Cardiology   • CARDIAC CATHETERIZATION N/A 8/8/2019    Procedure: Coronary angiography;  Surgeon: Maninder Quezada MD;  Location:  [facility];  Service: Cardiology   • CARDIAC CATHETERIZATION N/A 8/20/2019    Procedure: CORONARY ANGIOGRAPHY;  Surgeon: Maninder Quezada MD;  [facility];  Service: Cardiovascular   • CARDIAC CATHETERIZATION N/A 8/20/2019    Procedure: Stent BMS coronary;  Surgeon: Maninder Quezada MD;  [facility];  Service: Cardiovascular   • CARDIAC ELECTROPHYSIOLOGY PROCEDURE N/A 11/8/2019    Procedure: Loop insertion;  Surgeon: Maninder Quezada MD;  [facility];  Service: Cardiovascular   • HERNIA REPAIR      x 2   • ND RT/LT HEART CATHETERS N/A 5/23/2017    Procedure: Percutaneous Coronary Intervention;  Surgeon: Maninder Quezada MD;  Location: [facility] CATH INVASIVE LOCATION;  Service: Cardiovascular       Visit Dx:    ICD-10-CM ICD-9-CM   1. Dysarthria  R47.1 784.51   2. Fluency disorder associated with underlying disease  R47.82 784.52     Patient and his son were wearing face masks during this therapy encounter. Therapist used appropriate personal protective equipment including mask, eye protection and gloves.  Mask used was standard procedure mask. Appropriate PPE was worn  "during the entire therapy session. Hand hygiene was completed before and after therapy session. Patient is not in enhanced droplet precautions.     Patient History     Row Name 05/12/21 1100          Chief Complaint  Other 1 (comment) Stuttering  -HS    Hx Chief Complaint Comment 1   Patient's son reports onset of stuttering about 3 months ago. He states stuttering has been progressively improving. Of note, medical history reports Parkinson's, however the patient and son deny this.   -HS    Date Current Problem(s) Began  02/12/21  -HS    Patient/Caregiver Goals  Other (comment) Improved speech  -HS    Occupation/sports/leisure activities  Retired, lives alone with support from neighbors and family  -HS    Patient seeing anyone else for problem(s)?  Neurology  -HS    What clinical tests have you had for this problem?  CT scan;Other 1 (comment) No MRI, cardiac port  -HS    Results of Clinical Tests  3/23/21: \"old findings of prior strokes\" per MD note.   -HS    Related/Recent Hospitalizations  No  -HS          Pain at Present  0  -HS          Any falls in the past year:  Yes  -HS    Number of falls reported in the last 12 months  1 September 2020  -HS    Factors that contributed to the fall:  Lost balance Fractured ribs  -HS          Prior Rehab/Home Health Experiences  No  -HS    Are you currently receiving Home Health services  No  -HS    Do you plan to receive Home Health services in the near future  No  -HS          Primary Language  English  -HS    How does patient learn best?  Reading  -HS    Teaching needs identified  Home Exercise Program;Management of Condition  -HS    Does patient have problems with the following?  None  -HS    Barriers to learning  Hearing  -HS    Action taken for identified issues  Very [place]. Typically wears hearing aids, however did not wear them today due to inability to fit with his glasses and facemask. SLP provided a SuperEar sound amplifying device for the patient to use during the " "initial evaluation session, which did help the patient to better hear. SLP also provided information on obtaining this type of device to the patient's son as he requested.    -HS    Pt Participated in POC and Goals  Yes  -HS          Are you being hurt, hit, or frightened by anyone at home or in your life?  No  -HS    Are you being neglected by a caregiver  No  -HS      User Key  (r) = Recorded By, (t) = Taken By, (c) = Cosigned By    Initials Name Provider Type    HS Arti Pollack MS CCC-SLP Speech and Language Pathologist          SLP [facility] Evaluation - 05/12/21 7116        Communication Assessment/Intervention    Document Type  evaluation   -HS    Subjective Information  no complaints   -HS    Patient Observations  alert;cooperative;agree to therapy   -HS    Patient/Family/Caregiver Comments/Observations  Son present during initial evaluation session. Session completed in nursing conference room to allow for social distancing.    -HS    Care Plan Review  evaluation/treatment results reviewed;care plan/treatment goals reviewed;risks/benefits reviewed;current/potential barriers reviewed;patient/other agree to care plan   -HS    Care Plan Review, Other Participant(s)  son   -HS    Patient Effort  adequate   -HS    Comment  Hearing impairment impacted the patient's ability to follow directions at times.   -HS    Symptoms Noted During/After Treatment  none   -HS       General Information    Patient Profile Reviewed  yes   -HS    Pertinent History Of Current Problem  3 month history of \"stuttering\". Onset was sudden. Son reports the patient's speech has progressively been improving. Hx: Parkinson's disease (patient and son deny), and barney.    -HS    Precautions/Limitations, Vision  WFL with corrective lenses   -HS    Precautions/Limitations, Hearing  hearing impairment, bilaterally;other (see comments)    Hearing aids not present, SuperEar used   -HS    Prior Level of Function-Communication  WFL   -HS    " Plans/Goals Discussed with  patient and family   -    Barriers to Rehab  hearing deficit    Advanced age   -HS    Patient's Goals for Discharge  functional communication   -HS    Family Goals for Discharge  functional communication   -HS       Oral Motor Structure and Function    Dentition Assessment  missing teeth;other (see comments)    Has single tooth  -HS    Mucosal Quality  moist, healthy   -HS       Oral Musculature and Cranial Nerve Assessment    Oral Motor General Assessment  generalized oral motor weakness   -HS       Motor Speech Assessment/Intervention    Motor Speech Function  mild impairment   -HS    Characteristics Consistent with Dysarthria  decreased breath support;slow rate;other (see comments)    repeated speech sounds, words (palilalia)  -HS    Initiation of Phonation (Communication)  mild impairment   -HS    Automatic Speech (Communication)  response to greeting;WFL   -HS    Verbal Repetition (Communication)  polysyllabic words;phrases;sentences;mild impairment   -HS    Conversational Speech (Communication)  simple;WFL   -HS    Motor Speech, Comment  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 Vocal intensity is adequate. Patient had difficulty completing speech AMRs and SMRs due to difficulty hearing the directions and following SLP model. AMRs and SMRs that were completed appropriately, were precise but slow. He demonstrated reduced breath support with 3.5 seconds of sustained phonation (suspect not true performance ability based on difficulty understanding the full directions). Noted frequent inhalations during conversational speech. Two episodes of repeated phonemes during oral reading of the Grandfather passage. One instance of whole word repetition during conversational speech. Patient also noted to display some pauses for word finding/thought organization, however was able to convey his messages without  difficulty. Discussed possible motor speech changes related to history of strokes as well as possible Parkinson's disease. Provided patient with home exercises to assist with respiratory support, maintaining loudness, and hopefully continuing to improve his palilalia. Patient and son verbally expressed understanding of the exercises.    -HS       SLP Clinical Impressions    SLP Diagnosis  Minimal to mild dysarthria    -HS    Rehab Potential/Prognosis  fair;good   -[facility] Criteria for Skilled Therapy Interventions Met  yes;other (see comments)    Provided a home exercise program  -HS    Functional Impact  restrictions in personal and social life   -       Recommendations    Therapy Frequency ([place] [facility]  evaluation only   -HS    Anticipated Discharge Disposition (SLP)  home with assist   -HS      User Key  (r) = Recorded By, (t) = Taken By, (c) = Cosigned By    Initials Name Provider Type    Arti Merchant MS CCC-SLP Speech and Language Pathologist          OP SLP Education     Row Name 05/12/21 1145       Education    Barriers to Learning  Hearing deficit  -HS    Education Provided  Described results of evaluation;Patient expressed understanding of evaluation;Family/caregivers expressed understanding of evaluation;Family/caregivers demonstrated recommended strategies;Patient requires further education on strategies, risks  -    Assessed  Learning needs;Learning motivation;Learning preferences;Learning readiness  -    Learning Motivation  Strong  -    Learning Method  Explanation;Demonstration;Written materials  -    Teaching Response  Verbalized understanding;Demonstrated understanding  -      User Key  (r) = Recorded By, (t) = Taken By, (c) = Cosigned By    Initials Name Effective Dates    Arti Merchant MS CCC-SLP 06/08/18 -           OP SLP Assessment/Plan - 05/12/21 1145        SLP Assessment    Functional Problems  Speech Language- Adult/Cognition   -HS    Impact on Function: Adult  Speech Language/Cognition  Restrictions in personal and social life   -    Clinical Impression: Speech Language-Adult/Congnition  Minimal:;Mild:;Dysarthria- Hypokinetic   -HS       SLP Plan    Plan Comments  Discharged same date as evaluation with a home exercise program. Would recommend trial speech therapy if condition worsens.   -HS      User Key  (r) = Recorded By, (t) = Taken By, (c) = Cosigned By    Initials Name Provider Type     Arti Pollack MS CCC-SLP Speech and Language Pathologist             SLP Outcome Measures (last 72 hours)      SLP Outcome Measures     Row Name 05/12/21 1145             SLP Outcome Measures    Outcome Measure Used?  Adult NOMS  -HS         Adult FCM Scores    FCM Chosen  Motor Speech  -HS      Motor Speech Score  6  -HS        User Key  (r) = Recorded By, (t) = Taken By, (c) = Cosigned By    Initials Name Effective Dates    Arti Merchant MS CCC-SLP 06/08/18 -              Time Calculation:   SLP Start Time: 1100  SLP Stop Time: 1145  SLP Time Calculation (min): 45 min  Untimed Charges  03916-RX Eval Speech Sound Production Minutes: 45  Total Minutes  Untimed Charges Total Minutes: 45   Total Minutes: 45    Therapy Charges for Today     Code Description Service Date Service Provider Modifiers Qty    [id number] HC ST EVAL SPEECH SOUND PRODUCTION 3 5/12/2021 Arti Pollack MS CCC-SLP GN 1           OP SLP Discharge Summary  Date of Discharge: 05/12/21  Reason for Discharge: other (see comments) (Provided home exercise program for manangement of dysarthria at this time)  Progress Toward Achieving Short/long Term Goals: discharge on same date as initial evaluation  Discharge Destination: home w/ assist      Arti Pollack MS CCC-SLP  5/12/2021

## 2021-05-14 ENCOUNTER — OFFICE VISIT (OUTPATIENT)
Dept: FAMILY MEDICINE CLINIC | Facility: CLINIC | Age: 86
End: 2021-05-14

## 2021-05-14 VITALS
WEIGHT: 185 LBS | SYSTOLIC BLOOD PRESSURE: 128 MMHG | HEART RATE: 67 BPM | RESPIRATION RATE: 14 BRPM | TEMPERATURE: 97.1 F | OXYGEN SATURATION: 92 % | HEIGHT: 73 IN | DIASTOLIC BLOOD PRESSURE: 60 MMHG | BODY MASS INDEX: 24.52 KG/M2

## 2021-05-14 DIAGNOSIS — R51.9 NONINTRACTABLE EPISODIC HEADACHE, UNSPECIFIED HEADACHE TYPE: ICD-10-CM

## 2021-05-14 DIAGNOSIS — E55.9 VITAMIN D DEFICIENCY: ICD-10-CM

## 2021-05-14 DIAGNOSIS — R73.03 PREDIABETES: ICD-10-CM

## 2021-05-14 DIAGNOSIS — G20 PARKINSON'S DISEASE (HCC): ICD-10-CM

## 2021-05-14 DIAGNOSIS — F80.81 STUTTERING: Primary | ICD-10-CM

## 2021-05-14 DIAGNOSIS — I10 ESSENTIAL HYPERTENSION: ICD-10-CM

## 2021-05-14 DIAGNOSIS — I25.10 CORONARY ARTERY DISEASE INVOLVING NATIVE CORONARY ARTERY OF NATIVE HEART WITHOUT ANGINA PECTORIS: ICD-10-CM

## 2021-05-14 DIAGNOSIS — R47.9 SPEECH DISTURBANCE, UNSPECIFIED TYPE: ICD-10-CM

## 2021-05-14 DIAGNOSIS — D53.9 MACROCYTIC ANEMIA: ICD-10-CM

## 2021-05-14 DIAGNOSIS — E78.5 DYSLIPIDEMIA: ICD-10-CM

## 2021-05-14 DIAGNOSIS — Z86.79 HISTORY OF SUPRAVENTRICULAR TACHYCARDIA: ICD-10-CM

## 2021-05-14 DIAGNOSIS — I42.9 CARDIOMYOPATHY, UNSPECIFIED TYPE (HCC): ICD-10-CM

## 2021-05-14 DIAGNOSIS — Z86.73 HISTORY OF CVA (CEREBROVASCULAR ACCIDENT): ICD-10-CM
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
## 2021-05-14 DIAGNOSIS — E53.8 VITAMIN B 12 DEFICIENCY: ICD-10-CM

## 2021-05-14 DIAGNOSIS — I48.0 PAROXYSMAL ATRIAL FIBRILLATION (HCC): ICD-10-CM

## 2021-05-14 DIAGNOSIS — D69.6 THROMBOCYTOPENIA (HCC): ICD-10-CM

## 2021-05-14 PROCEDURE — 99214 OFFICE O/P EST MOD 30 MIN: CPT | Performed by: PHYSICIAN ASSISTANT

## 2021-05-19 ENCOUNTER — APPOINTMENT (OUTPATIENT)
Dept: SPEECH THERAPY | Facility: HOSPITAL | Age: 86
End: 2021-05-19

## 2021-05-21 ENCOUNTER — TELEPHONE (OUTPATIENT)
Dept: FAMILY MEDICINE CLINIC | Facility: CLINIC | Age: 86
End: 2021-05-21

## 2021-05-21 NOTE — TELEPHONE ENCOUNTER
Caller: Dinesh Churchill Jr    Relationship: Emergency Contact    Best call back number: [phone]    What medication are you requesting: LAXATIVE    What are your current symptoms: CONSTIPATED     How long have you been experiencing symptoms: TODAY    If a prescription is needed, what is your preferred pharmacy and phone number:    [id number] - [facility] [address] [facility] [address] [facility] [address] [facility] [address] [place] [address] - [phone] [facility] [phone] FX      Additional notes: PATIENTS SON CALLED STATING PATIENT IS CONSTIPATED, NOTHING OVER THE COUNTER IS HELPING. PATIENT WOULD LIKE SOMETHING STRONGER SENT IN FOR HIM. PLEASE CALL TO ADVISE.

## 2021-05-21 NOTE — TELEPHONE ENCOUNTER
PATIENT'S SON ASKED TO DISREGARD PREVIOUS MESSAGE. HE SAID THAT THE PATIENT IS NO LONGER CONSTIPATED.

## 2021-05-26 ENCOUNTER — APPOINTMENT (OUTPATIENT)
Dept: SPEECH THERAPY | Facility: HOSPITAL | Age: 86
End: 2021-05-26

## 2021-06-02 ENCOUNTER — APPOINTMENT (OUTPATIENT)
Dept: SPEECH THERAPY | Facility: HOSPITAL | Age: 86
End: 2021-06-02

## 2021-06-02 PROCEDURE — G2066 INTER DEVC REMOTE 30D: HCPCS | Performed by: INTERNAL MEDICINE

## 2021-06-02 PROCEDURE — 93298 REM INTERROG DEV EVAL SCRMS: CPT | Performed by: INTERNAL MEDICINE

## 2021-06-09 ENCOUNTER — APPOINTMENT (OUTPATIENT)
Dept: SPEECH THERAPY | Facility: HOSPITAL | Age: 86
End: 2021-06-09

## 2021-06-16 ENCOUNTER — APPOINTMENT (OUTPATIENT)
Dept: SPEECH THERAPY | Facility: HOSPITAL | Age: 86
End: 2021-06-16

## 2021-06-21 RX ORDER — ISOSORBIDE MONONITRATE 60 MG/1
60 TABLET, EXTENDED RELEASE ORAL DAILY
Qty: 90 TABLET | Refills: 1 | Status: SHIPPED | OUTPATIENT
Start: 2021-06-21 | End: 2021-12-13

## 2021-06-21 RX ORDER — LOSARTAN POTASSIUM 50 MG/1
50 TABLET ORAL DAILY
Qty: 90 TABLET | Refills: 1 | Status: SHIPPED | OUTPATIENT
Start: 2021-06-21 | End: 2021-12-13

## 2021-06-23 ENCOUNTER — APPOINTMENT (OUTPATIENT)
Dept: SPEECH THERAPY | Facility: HOSPITAL | Age: 86
End: 2021-06-23

## 2021-06-24 ENCOUNTER — OFFICE VISIT (OUTPATIENT)
Dept: CARDIOLOGY | Facility: CLINIC | Age: 86
End: 2021-06-24

## 2021-06-24 VITALS
HEIGHT: 73 IN | DIASTOLIC BLOOD PRESSURE: 58 MMHG | SYSTOLIC BLOOD PRESSURE: 157 MMHG | WEIGHT: 186 LBS | HEART RATE: 65 BPM | BODY MASS INDEX: 24.65 KG/M2

## 2021-06-24 DIAGNOSIS — I10 ESSENTIAL HYPERTENSION: ICD-10-CM

## 2021-06-24 DIAGNOSIS — I48.0 PAROXYSMAL ATRIAL FIBRILLATION (HCC): Primary | ICD-10-CM

## 2021-06-24 DIAGNOSIS — I25.10 CORONARY ARTERY DISEASE INVOLVING NATIVE CORONARY ARTERY OF NATIVE HEART WITHOUT ANGINA PECTORIS: ICD-10-CM

## 2021-06-24 DIAGNOSIS — E78.5 DYSLIPIDEMIA: ICD-10-CM

## 2021-06-24 DIAGNOSIS — R07.2 PRECORDIAL PAIN: ICD-10-CM

## 2021-06-24 PROCEDURE — 99213 OFFICE O/P EST LOW 20 MIN: CPT | Performed by: INTERNAL MEDICINE

## 2021-06-24 NOTE — PROGRESS NOTES
6 MO FOLLOW UP   Subjective:        Dinesh Churchill Sr. is a 89 y.o. male who here for follow up    CC  Follow-up coronary artery disease paroxysmal atrial fibrillation dyslipidemia  HPI  89-year-old male with known history of coronary artery disease paroxysmal atrial fibrillation as well as dyslipidemia here for the follow-up    Denies any chest pains or tightness in the chest     Problems Addressed this Visit        Cardiac and Vasculature    Essential hypertension    Coronary artery disease involving native coronary artery of native heart without angina pectoris    Dyslipidemia    Paroxysmal atrial fibrillation (CMS/HCC) - Primary    Chest pain      Diagnoses       Codes Comments    Paroxysmal atrial fibrillation (CMS/HCC)    -  Primary ICD-10-CM: I48.0  ICD-9-CM: 427.31     Precordial pain     ICD-10-CM: R07.2  ICD-9-CM: 786.51     Coronary artery disease involving native coronary artery of native heart without angina pectoris     ICD-10-CM: I25.10  ICD-9-CM: 414.01     Dyslipidemia     ICD-10-CM: E78.5  ICD-9-CM: 272.4     Essential hypertension     ICD-10-CM: I10  ICD-9-CM: 401.9         .    The following portions of the patient's history were reviewed and updated as appropriate: allergies, current medications, past family history, past medical history, past social history, past surgical history and problem list.    Past Medical History:   Diagnosis Date   • Abnormal ECG    • Abnormal MRI    • Acute frontal sinusitis    • Arthritis    • Chronic fatigue    • Coronary artery disease    • Dizziness    • Head injury    • Hearing aid worn     left   • Hearing loss    • Hyperlipidemia    • Hypertension    • Kidney stones    • Macrocytosis    • Neoplasm of skin    • Osteoporosis    • Prediabetes    • Vitamin D deficiency      reports that he has never smoked. He has never used smokeless tobacco. He reports that he does not drink alcohol and does not use drugs.   Family History   Family history unknown: Yes  "      Review of Systems  Constitutional: No wt loss, fever, fatigue  Gastrointestinal: No nausea, abdominal pain  Behavioral/Psych: No insomnia or anxiety   Cardiovascular no chest pains or tightness in the chest  Objective:       Physical Exam  /58   Pulse 65   Ht 185.4 cm (73\")   Wt 84.4 kg (186 lb)   BMI 24.54 kg/m²   General appearance: No acute changes   Neck: Trachea midline; NECK, supple, no thyromegaly or lymphadenopathy   Lungs: Normal size and shape, normal breath sounds, equal distribution of air, no rales and rhonchi   CV: S1-S2 regular, no murmurs, no rub, no gallop   Abdomen: Soft, non-tender; no masses , no abnormal abdominal sounds   Extremities: No deformity , normal color , no peripheral edema   Skin: Normal temperature, turgor and texture; no rash, ulcers          Procedures      Echocardiogram:        Current Outpatient Medications:   •  acetaminophen (TYLENOL) 500 MG tablet, 1-2 TABLETS BY ONCE TO TWICE DAILY AS NEEDED FOR PAIN, Disp: 60 tablet, Rfl: 0  •  amLODIPine (NORVASC) 5 MG tablet, Take 1 tablet by mouth Daily., Disp: 30 tablet, Rfl: 5  •  aspirin 81 MG chewable tablet, Chew 1 tablet Daily., Disp: , Rfl:   •  atorvastatin (LIPITOR) 20 MG tablet, Take 1 tablet by mouth Every Night., Disp: 30 tablet, Rfl: 6  •  Cholecalciferol (VITAMIN D-3) 1000 UNITS capsule, Take 1,000 Units by mouth daily., Disp: , Rfl:   •  Cyanocobalamin (B-12) 1000 MCG lozenge, DISSOLVE 1 LOZENGE BY MOUTH UNDER THE TONGUE EVERY OTHER DAY, Disp: 30 lozenge, Rfl: 4  •  Elastic Bandages & Supports (LUMBAR BACK BRACE/SUPPORT PAD) misc, Use for lifting/ strenuous exercise., Disp: 1 each, Rfl: 0  •  isosorbide mononitrate (IMDUR) 60 MG 24 hr tablet, TAKE 1 TABLET BY MOUTH DAILY, Disp: 90 tablet, Rfl: 1  •  losartan (COZAAR) 50 MG tablet, TAKE 1 TABLET BY MOUTH DAILY, Disp: 90 tablet, Rfl: 1  •  metoprolol succinate XL (TOPROL-XL) 100 MG 24 hr tablet, Take 1 tablet by mouth Daily., Disp: 30 tablet, Rfl: 5   " Assessment:        Patient Active Problem List   Diagnosis   • Abnormal magnetic resonance imaging study   • Arthritis   • Osteoarthritis of cervical spine   • Diplopia   • Hearing loss   • Neoplasm of uncertain behavior of skin   • Prediabetes   • Vitamin D deficiency   • Chronic fatigue   • History of supraventricular tachycardia   • Essential hypertension   • B12 deficiency   • Abnormal cardiac function test   • Cardiomyopathy (CMS/HCC)   • Coronary artery disease involving native coronary artery of native heart without angina pectoris   • History of coronary artery stent placement   • History of renal calculi   • Dyslipidemia   • Macrocytic anemia   • Precordial pain   • Parkinson's disease (CMS/HCC)   • History of CVA (cerebrovascular accident)   • Paroxysmal atrial fibrillation (CMS/HCC)   • Otalgia, left   • Chest pain   • Abnormal stress test   • Spontaneous pneumothorax   • Chest pain   • Acute systolic (congestive) heart failure (CMS/HCC)   • Thrombocytopenia (CMS/HCC)   • Pneumonia of left lower lobe due to infectious organism   • Syncope and collapse   • Status post placement of implantable loop recorder   • Long term (current) use of antithrombotics/antiplatelets   • Stuttering               Plan:            ICD-10-CM ICD-9-CM   1. Paroxysmal atrial fibrillation (CMS/HCC)  I48.0 427.31   2. Precordial pain  R07.2 786.51   3. Coronary artery disease involving native coronary artery of native heart without angina pectoris  I25.10 414.01   4. Dyslipidemia  E78.5 272.4   5. Essential hypertension  I10 401.9     1. Paroxysmal atrial fibrillation (CMS/HCC)  Under control    2. Precordial pain  Atypical    3. Coronary artery disease involving native coronary artery of native heart without angina pectoris  No angina pectoris    4. Dyslipidemia  Continue current treatment    5. Essential hypertension  Continue current treatment       SEE IN 6 MONTHS  COUNSELING:    Dinesh Churchill Sr.Counseling was given to  patient for the following topics: diagnostic results, risk factor reductions, impressions, risks and benefits of treatment options and importance of treatment compliance .       SMOKING COUNSELING:    [unfilled]    Dictated using Dragon dictation

## 2021-06-30 ENCOUNTER — APPOINTMENT (OUTPATIENT)
Dept: SPEECH THERAPY | Facility: HOSPITAL | Age: 86
End: 2021-06-30

## 2021-07-07 ENCOUNTER — APPOINTMENT (OUTPATIENT)
Dept: SPEECH THERAPY | Facility: HOSPITAL | Age: 86
End: 2021-07-07

## 2021-07-12 ENCOUNTER — OFFICE VISIT (OUTPATIENT)
Dept: NEUROLOGY | Facility: CLINIC | Age: 86
End: 2021-07-12

## 2021-07-12 VITALS
WEIGHT: 186 LBS | OXYGEN SATURATION: 95 % | BODY MASS INDEX: 24.65 KG/M2 | HEART RATE: 74 BPM | HEIGHT: 73 IN | DIASTOLIC BLOOD PRESSURE: 76 MMHG | SYSTOLIC BLOOD PRESSURE: 134 MMHG

## 2021-07-12 DIAGNOSIS — Z86.73 HISTORY OF CVA (CEREBROVASCULAR ACCIDENT): Primary | ICD-10-CM

## 2021-07-12 DIAGNOSIS — G20 PARKINSON'S DISEASE (HCC): ICD-10-CM

## 2021-07-12 PROCEDURE — 99213 OFFICE O/P EST LOW 20 MIN: CPT | Performed by: PSYCHIATRY & NEUROLOGY

## 2021-07-12 NOTE — ASSESSMENT & PLAN NOTE
They do not want a new medicine started unless absolutely necessary.  Advised him to exercise as much as possible.

## 2021-07-12 NOTE — ASSESSMENT & PLAN NOTE
89 year old right handed man who returns to neurology clinic for follow up evaluation and treatment of stuttering in the setting of prior CVA.  He presents with his son on visit today who also provides history.  He has history of stroke, headache and possible parkinson's disease and paroxysmal Afib which is not currently anticoagulated along with coronary artery disease.  They do not want anything started unless necessary.  He was previously on Brilinta and aspirin combination and the Brilinta was discontinued.  He was previously been evaluated for syncope spell in the past which was not thought to be seizures.  He is currently living by himself and doing everything for himself without any problems.  He does have a implanted loop recorder for which he has had monitoring done everyday.  The stuttering started 2 months ago.  He had a recent head CT scan done which demonstrated old findings of prior strokes which I reviewed the images from the most recent head CT scan done on 3/23/21 on his visit today.  He had an echocardiogram done in 12/2020 with EF of 54%.  I am assuming he is not in Afib based on continuous loop recording and this is why he is not on anticoagulation.  He is currently on aspirin and statin combination.  They deny any new weakness or numbness or trouble with vision and not other issues other than stuttering.  He cannot have a brain MRI scan done due to cardiac port.  He had a carotid ultrasound done in 2017 which looked good and I had this study repeated following his initial evaluation which did not demonstrate any significant stenosis.  He has not had any falls since 9/2020.  He is not having any trouble with his ADLs.  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  I referred him to speech therapy for the stuttering and they provided him with exercises which they think is helping.  No new stroke symptoms.  He is currently on aspirin and statin which I advised them  to continue.  If he is found to have Afib on loop recorder then I would advise starting anticoagulation unless contraindicated otherwise.  Will follow up with him in 3 months.  Provided patient education information on stroke, discussed symptoms and advised him to be taken to the ED with any new symptoms.

## 2021-07-12 NOTE — PATIENT INSTRUCTIONS
"**Avoid taking your carbidopa/levodopa with food (particularly protein).  Take one hour before or 2 hours after meals.   **May not particularly help with falls, changes in posture, speech, or  sometimes tremor.   **Side effects include nausea and dizziness upon standing.     **At higher doses, may cause excessive movements called dyskinesias,  confusion, or hallucinations.    **Check out the Parkinson's Foundation at parkinson.org or the [employer] at [email or web address].      **Check out local events at the [facility] at [facility].  Just go to [email or web address] and click on \"Events\".    **Consider increasing exercise with yoga, dance, clarence chi, boxing, or music therapy.    **Consider cognitive exercise as well such as puzzles, word search, Sudoku, etc.    **For constipation, increase water intake, eat fruits and vegetables, whole grains, exercise, consider polythylene glycol (Miralax) or bisacodyl suppository, or abdominal massage.            Lower blood pressure by restricting salt in diet (less than 2400 mg/day), weight loss, increase fruits, vegetables, whole grains, and low-fat dairy products.    Consider the DASH diet or Mediterranean diet.  Increase fish and poultry intake.    Avoid sodas, sweets, and red meat.    Limit alcohol consumption.    Monitor and keep blood pressure, cholesterol and blood sugar at goal.    Avoid the use of tobacco and avoid secondhand smoke.    Engage in moderate to vigorous intensity aerobic exercise for about 40 minutes 3-4 times per week.  Consider lower intensity clarence chi or yoga if necessary.    Take antiplatelet medication regularly.    If you snore, wake up unrefreshed or feel tired during the day, talk to your doctor as these may be signs of sleep apnea and a sleep study may be indicated.    "

## 2021-07-12 NOTE — PROGRESS NOTES
Chief Complaint  Cerebrovascular Accident (LV: 4-9-21 had duplex carotid 4/23/21--speech therapy- here with son)    Subjective          Dinesh Churchill Sr. presents to [facility]UR[facility] for   HISTORY OF PRESENT ILLNESS:    Dinesh Churchill Sr. Is a 89 year old right handed man who returns to neurology clinic for follow up evaluation and treatment of stuttering in the setting of prior CVA.  He presents with his son on visit today who also provides history.  He has history of stroke, headache and possible parkinson's disease and paroxysmal Afib which is not currently anticoagulated along with coronary artery disease.  He was previously on Brilinta and aspirin combination and the Brilinta was discontinued.  He was previously been evaluated for syncope spell in the past which was not thought to be seizures.  He is currently living by himself and doing everything for himself without any problems.  He does have a implanted loop recorder for which he has had monitoring done everyday.  The stuttering started 2 months ago.  He had a recent head CT scan done which demonstrated old findings of prior strokes which I reviewed the images from the most recent head CT scan done on 3/23/21 on his visit today.  He had an echocardiogram done in 12/2020 with EF of 54%.  I am assuming he is not in Afib based on continuous loop recording and this is why he is not on anticoagulation.  He is currently on aspirin and statin combination.  They deny any new weakness or numbness or trouble with vision and not other issues other than stuttering.  He cannot have a brain MRI scan done due to cardiac port.  He had a carotid ultrasound done in 2017 which looked good and I had this study repeated following his initial evaluation which did not demonstrate any significant stenosis.  He has not had any falls since 9/2020.  He is not having any trouble with his ADLs and taking care of himself.  He does not smoke, he was a previous  "alcoholic but does not drink currently.  I referred him to speech therapy for the stuttering and they provided him with exercises which they think is helping.  No new stroke symptoms.      Past Medical History:   Diagnosis Date   • Abnormal ECG    • Abnormal MRI    • Acute frontal sinusitis    • Arthritis    • Chronic fatigue    • Coronary artery disease    • Dizziness    • Head injury    • Hearing aid worn     left   • Hearing loss    • Hyperlipidemia    • Hypertension    • Kidney stones    • Macrocytosis    • Neoplasm of skin    • Osteoporosis    • Prediabetes    • Vitamin D deficiency         Family History   Family history unknown: Yes        Social History     Socioeconomic History   • Marital status:      Spouse name: Not on file   • Number of children: Not on file   • Years of education: Not on file   • Highest education level: Not on file   Tobacco Use   • Smoking status: Never Smoker   • Smokeless tobacco: Never Used   Vaping Use   • Vaping Use: Never used   Substance and Sexual Activity   • Alcohol use: No     Comment: quit 30 years ago    • Drug use: No   • Sexual activity: Defer        I have personally reviewed the ROS as stated below.     Review of Systems   Neurological: Positive for speech difficulty. Negative for dizziness, tremors, seizures, syncope, facial asymmetry, weakness, light-headedness, numbness, headache, memory problem and confusion.   Psychiatric/Behavioral: Negative for agitation, self-injury and sleep disturbance. The patient is nervous/anxious.         Objective   Vital Signs:   /76 (BP Location: Right arm, Patient Position: Sitting)   Pulse 74   Ht 185.4 cm (72.99\")   Wt 84.4 kg (186 lb)   SpO2 95%   BMI 24.55 kg/m²       PHYSICAL EXAM:    General   Mental Status - Alert. General Appearance - Well developed, Well groomed, Oriented and Cooperative. Orientation - Oriented X3.                            Head and Neck  Head - normocephalic, atraumatic with no lesions " or palpable masses.  Neck                 Global Assessment - supple.                                         Eye   Sclera/Conjunctiva - Bilateral - Normal.     ENMT  Mouth and Throat   Oral Cavity/Oropharynx: Oropharynx - the soft palate,uvula and tongue are normal in appearance.     Chest and Lung Exam   Chest - lung clear to auscultation bilaterally.     Cardiovascular   Cardiovascular examination reveals  - normal heart sounds, regular rate and rhythm.     Neurologic   Mental Status: Speech - Normal. Cognitive function - appropriate fund of knowledge. No impairment of attention, Impairment of concentration, impairment of long term memory or impairment of short term memory.  Cranial Nerves:   II Optic: Visual acuity - Left - Normal. Right - Normal. Visual fields - Normal (to confrontation).  III Oculomotor: Pupillary constriction - Left - Normal. Right - Normal.  VII Facial: - Normal Bilaterally.  VIII Acoustic - Bilateral - Hearing normal and (Hearing tested by finger rub).   IX Glossopharyngeal / X Vagus - Normal.  XI Accessory: Trapezius - Bilateral - Normal. Sternocleidomastoid - Bilateral - Normal.  XII Hypoglossal - Bilateral - Normal.  Eye Movements: - Normal Bilaterally.  Sensory:   Light Touch: Intact - Globally.  Motor:   Bulk and Contour: - Normal.  Tone: - Normal.  Tremor: Not present.  Strength: 5/5 normal muscle strength - All Muscles.  Bradykinetic.                                  General Assessment of Reflexes: - deep tendon reflexes are normal. Coordination - No Impairment of finger-to-nose or Impairment of rapid alternating movements. Gait -  Broad based, stooped posture, uses walker, bradykinetic.        Result Review :                 Assessment and Plan    Problem List Items Addressed This Visit        Neuro    Parkinson's disease (CMS/[facility])    Current Assessment & Plan     They do not want a new medicine started unless absolutely necessary.  Advised him to exercise as much as possible.            History of CVA (cerebrovascular accident) - Primary    Current Assessment & Plan     89 year old right handed man who returns to neurology clinic for follow up evaluation and treatment of stuttering in the setting of prior CVA.  He presents with his son on visit today who also provides history.  He has history of stroke, headache and possible parkinson's disease and paroxysmal Afib which is not currently anticoagulated along with coronary artery disease.  They do not want anything started unless necessary.  He was previously on Brilinta and aspirin combination and the Brilinta was discontinued.  He was previously been evaluated for syncope spell in the past which was not thought to be seizures.  He is currently living by himself and doing everything for himself without any problems.  He does have a implanted loop recorder for which he has had monitoring done everyday.  The stuttering started 2 months ago.  He had a recent head CT scan done which demonstrated old findings of prior strokes which I reviewed the images from the most recent head CT scan done on 3/23/21 on his visit today.  He had an echocardiogram done in 12/2020 with EF of 54%.  I am assuming he is not in Afib based on continuous loop recording and this is why he is not on anticoagulation.  He is currently on aspirin and statin combination.  They deny any new weakness or numbness or trouble with vision and not other issues other than stuttering.  He cannot have a brain MRI scan done due to cardiac port.  He had a carotid ultrasound done in 2017 which looked good and I had this study repeated following his initial evaluation which did not demonstrate any significant stenosis.  He has not had any falls since 9/2020.  He is not having any trouble with his ADLs.  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  I referred him to speech therapy for the stuttering and they provided him with exercises which they think is  helping.  No new stroke symptoms.  He is currently on aspirin and statin which I advised them to continue.  If he is found to have Afib on loop recorder then I would advise starting anticoagulation unless contraindicated otherwise.  Will follow up with him in 3 months.  Provided patient education information on stroke, discussed symptoms and advised him to be taken to the ED with any new symptoms.                 I spent 20 minutes caring for Dinesh on this date of service. This time includes time spent by me in the following activities:preparing for the visit, reviewing tests, obtaining and/or reviewing a separately obtained history, performing a medically appropriate examination and/or evaluation , counseling and educating the patient/family/caregiver, documenting information in the medical record and care coordination    Follow Up   No follow-ups on file.  Patient was given instructions and counseling regarding his condition or for health maintenance advice. Please see specific information pulled into the AVS if appropriate.

## 2021-07-14 ENCOUNTER — APPOINTMENT (OUTPATIENT)
Dept: SPEECH THERAPY | Facility: HOSPITAL | Age: 86
End: 2021-07-14

## 2021-07-23 RX ORDER — CHOLECALCIFEROL (VITAMIN D3) 25 MCG
TABLET,CHEWABLE ORAL
Qty: 30 LOZENGE | Refills: 4 | Status: SHIPPED | OUTPATIENT
Start: 2021-07-23 | End: 2022-08-24

## 2021-07-28 RX ORDER — CHOLECALCIFEROL (VITAMIN D3) 25 MCG
TABLET,CHEWABLE ORAL
Qty: 30 LOZENGE | Refills: 4 | OUTPATIENT
Start: 2021-07-28

## 2021-07-30 RX ORDER — AMLODIPINE BESYLATE 5 MG/1
5 TABLET ORAL
Qty: 30 TABLET | Refills: 3 | Status: SHIPPED | OUTPATIENT
Start: 2021-07-30 | End: 2021-12-16

## 2021-07-30 RX ORDER — METOPROLOL SUCCINATE 100 MG/1
100 TABLET, EXTENDED RELEASE ORAL DAILY
Qty: 30 TABLET | Refills: 3 | Status: SHIPPED | OUTPATIENT
Start: 2021-07-30 | End: 2021-12-17

## 2021-09-14 ENCOUNTER — OFFICE VISIT (OUTPATIENT)
Dept: FAMILY MEDICINE CLINIC | Facility: CLINIC | Age: 86
End: 2021-09-14

## 2021-09-14 VITALS
OXYGEN SATURATION: 94 % | SYSTOLIC BLOOD PRESSURE: 128 MMHG | BODY MASS INDEX: 24.92 KG/M2 | HEART RATE: 61 BPM | DIASTOLIC BLOOD PRESSURE: 66 MMHG | WEIGHT: 188 LBS | RESPIRATION RATE: 16 BRPM | TEMPERATURE: 97.4 F | HEIGHT: 73 IN

## 2021-09-14 DIAGNOSIS — E78.5 DYSLIPIDEMIA: ICD-10-CM

## 2021-09-14 DIAGNOSIS — R73.03 PREDIABETES: ICD-10-CM

## 2021-09-14 DIAGNOSIS — I48.0 PAROXYSMAL ATRIAL FIBRILLATION (HCC): ICD-10-CM

## 2021-09-14 DIAGNOSIS — I42.9 CARDIOMYOPATHY, UNSPECIFIED TYPE (HCC): ICD-10-CM

## 2021-09-14 DIAGNOSIS — Z86.73 HISTORY OF CVA (CEREBROVASCULAR ACCIDENT): ICD-10-CM

## 2021-09-14 DIAGNOSIS — E53.8 VITAMIN B 12 DEFICIENCY: ICD-10-CM

## 2021-09-14 DIAGNOSIS — D69.6 THROMBOCYTOPENIA (HCC): ICD-10-CM

## 2021-09-14 DIAGNOSIS — Z86.79 HISTORY OF SUPRAVENTRICULAR TACHYCARDIA: ICD-10-CM

## 2021-09-14 DIAGNOSIS — D53.9 MACROCYTIC ANEMIA: ICD-10-CM

## 2021-09-14 DIAGNOSIS — I25.10 CORONARY ARTERY DISEASE INVOLVING NATIVE CORONARY ARTERY OF NATIVE HEART WITHOUT ANGINA PECTORIS: ICD-10-CM

## 2021-09-14 DIAGNOSIS — G20 PARKINSON'S DISEASE (HCC): ICD-10-CM

## 2021-09-14 DIAGNOSIS — I10 ESSENTIAL HYPERTENSION: Primary | ICD-10-CM
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
## 2021-09-14 DIAGNOSIS — E55.9 VITAMIN D DEFICIENCY: ICD-10-CM

## 2021-09-14 PROCEDURE — 99214 OFFICE O/P EST MOD 30 MIN: CPT | Performed by: PHYSICIAN ASSISTANT

## 2021-09-15 LAB
25(OH)D3+25(OH)D2 SERPL-MCNC: 30.7 NG/ML (ref 30–100)
ALBUMIN SERPL-MCNC: 4.6 G/DL (ref 3.5–4.6)
ALBUMIN/GLOB SERPL: 2 {RATIO} (ref 1.2–2.2)
ALP SERPL-CCNC: 61 IU/L (ref 44–121)
ALT SERPL-CCNC: 17 IU/L (ref 0–44)
AST SERPL-CCNC: 18 IU/L (ref 0–40)
BASOPHILS # BLD AUTO: 0 X10E3/UL (ref 0–0.2)
BASOPHILS NFR BLD AUTO: 1 %
BILIRUB SERPL-MCNC: 1.9 MG/DL (ref 0–1.2)
BUN SERPL-MCNC: 24 MG/DL (ref 10–36)
BUN/CREAT SERPL: 29 (ref 10–24)
CALCIUM SERPL-MCNC: 9.2 MG/DL (ref 8.6–10.2)
CHLORIDE SERPL-SCNC: 105 MMOL/L (ref 96–106)
CHOLEST SERPL-MCNC: 149 MG/DL (ref 100–199)
CK SERPL-CCNC: 54 U/L (ref 30–208)
CO2 SERPL-SCNC: 21 MMOL/L (ref 20–29)
CREAT SERPL-MCNC: 0.82 MG/DL (ref 0.76–1.27)
EOSINOPHIL # BLD AUTO: 0.1 X10E3/UL (ref 0–0.4)
EOSINOPHIL NFR BLD AUTO: 3 %
ERYTHROCYTE [DISTWIDTH] IN BLOOD BY AUTOMATED COUNT: 14 % (ref 11.6–15.4)
FERRITIN SERPL-MCNC: 431 NG/ML (ref 30–400)
FOLATE SERPL-MCNC: 12 NG/ML
GLOBULIN SER CALC-MCNC: 2.3 G/DL (ref 1.5–4.5)
GLUCOSE SERPL-MCNC: 103 MG/DL (ref 65–99)
HBA1C MFR BLD: 6 % (ref 4.8–5.6)
HCT VFR BLD AUTO: 40.8 % (ref 37.5–51)
HDLC SERPL-MCNC: 50 MG/DL
HGB BLD-MCNC: 13.9 G/DL (ref 13–17.7)
IMM GRANULOCYTES # BLD AUTO: 0 X10E3/UL (ref 0–0.1)
IMM GRANULOCYTES NFR BLD AUTO: 0 %
IRON SATN MFR SERPL: 37 % (ref 15–55)
IRON SERPL-MCNC: 111 UG/DL (ref 38–169)
LDLC SERPL CALC-MCNC: 84 MG/DL (ref 0–99)
LDLC/HDLC SERPL: 1.7 RATIO (ref 0–3.6)
LYMPHOCYTES # BLD AUTO: 1.2 X10E3/UL (ref 0.7–3.1)
LYMPHOCYTES NFR BLD AUTO: 26 %
MCH RBC QN AUTO: 33.8 PG (ref 26.6–33)
MCHC RBC AUTO-ENTMCNC: 34.1 G/DL (ref 31.5–35.7)
MCV RBC AUTO: 99 FL (ref 79–97)
MONOCYTES # BLD AUTO: 0.4 X10E3/UL (ref 0.1–0.9)
MONOCYTES NFR BLD AUTO: 8 %
NEUTROPHILS # BLD AUTO: 3 X10E3/UL (ref 1.4–7)
NEUTROPHILS NFR BLD AUTO: 62 %
PLATELET # BLD AUTO: 125 X10E3/UL (ref 150–450)
POTASSIUM SERPL-SCNC: 4.6 MMOL/L (ref 3.5–5.2)
PROT SERPL-MCNC: 6.9 G/DL (ref 6–8.5)
RBC # BLD AUTO: 4.11 X10E6/UL (ref 4.14–5.8)
SODIUM SERPL-SCNC: 141 MMOL/L (ref 134–144)
TIBC SERPL-MCNC: 303 UG/DL (ref 250–450)
TRIGL SERPL-MCNC: 76 MG/DL (ref 0–149)
TSH SERPL DL<=0.005 MIU/L-ACNC: 1.64 UIU/ML (ref 0.45–4.5)
UIBC SERPL-MCNC: 192 UG/DL (ref 111–343)
VIT B12 SERPL-MCNC: 729 PG/ML (ref 232–1245)
VLDLC SERPL CALC-MCNC: 15 MG/DL (ref 5–40)
WBC # BLD AUTO: 4.7 X10E3/UL (ref 3.4–10.8)

## 2021-10-05 ENCOUNTER — FLU SHOT (OUTPATIENT)
Dept: FAMILY MEDICINE CLINIC | Facility: CLINIC | Age: 86
End: 2021-10-05

## 2021-10-05 DIAGNOSIS — Z23 NEED FOR INFLUENZA VACCINATION: Primary | ICD-10-CM

## 2021-10-05 PROCEDURE — 90662 IIV NO PRSV INCREASED AG IM: CPT | Performed by: PHYSICIAN ASSISTANT

## 2021-10-05 PROCEDURE — G0008 ADMIN INFLUENZA VIRUS VAC: HCPCS | Performed by: PHYSICIAN ASSISTANT

## 2021-10-14 RX ORDER — ATORVASTATIN CALCIUM 20 MG/1
20 TABLET, FILM COATED ORAL NIGHTLY
Qty: 30 TABLET | Refills: 6 | Status: SHIPPED | OUTPATIENT
Start: 2021-10-14 | End: 2022-03-16

## 2021-10-20 ENCOUNTER — TELEPHONE (OUTPATIENT)
Dept: FAMILY MEDICINE CLINIC | Facility: CLINIC | Age: 86
End: 2021-10-20

## 2021-10-20 NOTE — TELEPHONE ENCOUNTER
Please call this patient at [phone] and tell him we would need to see him - then schedule him for an appointment    Thanks  Мария

## 2021-10-20 NOTE — TELEPHONE ENCOUNTER
PATIENT STATES: SON WOULD LIKE A CALL BACK ABOUT HIS DAD. HE SAID THAT HIS DAD IS FORGETTING THINGS AGAIN AND HIS MEMORY IS GETTING WORST WOULD LIKE A CALL BACK TO SEE WHAT HE CAN DO PLEASE ADVISE      PATIENT CAN BE REACHED ON: [phone]

## 2021-10-21 NOTE — TELEPHONE ENCOUNTER
He should be seen for evaluation for UTI, other infection or reason for his memory loss. They also need to contact his neurologist to follow up with them, as they will be the ones treating this.     If he has had sudden change, he needs to go to ER, as this could be severe infection or CVA.

## 2021-10-22 NOTE — TELEPHONE ENCOUNTER
Spoke with patient son, he stated he is fine, he feels like her over reacted. No other issues. He will follow up with Neuro or UC if anything changes.

## 2021-10-28 DIAGNOSIS — D69.6 THROMBOCYTOPENIA (HCC): ICD-10-CM

## 2021-10-28 DIAGNOSIS — D53.9 MACROCYTIC ANEMIA: Primary | ICD-10-CM

## 2021-11-08 ENCOUNTER — HOSPITAL ENCOUNTER (EMERGENCY)
Facility: HOSPITAL | Age: 86
Discharge: HOME | End: 2021-11-08
Attending: EMERGENCY MEDICINE | Admitting: EMERGENCY MEDICINE

## 2021-11-08 ENCOUNTER — APPOINTMENT (OUTPATIENT)
Dept: GENERAL RADIOLOGY | Facility: HOSPITAL | Age: 86
End: 2021-11-08

## 2021-11-08 VITALS
OXYGEN SATURATION: 95 % | TEMPERATURE: 98.4 F | DIASTOLIC BLOOD PRESSURE: 81 MMHG | RESPIRATION RATE: 18 BRPM | HEART RATE: 82 BPM | BODY MASS INDEX: 24.8 KG/M2 | HEIGHT: 73 IN | SYSTOLIC BLOOD PRESSURE: 158 MMHG

## 2021-11-08 DIAGNOSIS — S22.41XA MULTIPLE FRACTURES OF RIBS, RIGHT SIDE, INITIAL ENCOUNTER FOR CLOSED FRACTURE: Primary | ICD-10-CM
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
## 2021-11-08 PROCEDURE — 71101 X-RAY EXAM UNILAT RIBS/CHEST: CPT

## 2021-11-08 PROCEDURE — 99283 EMERGENCY DEPT VISIT LOW MDM: CPT

## 2021-11-08 RX ORDER — LIDOCAINE 50 MG/G
1 PATCH TOPICAL EVERY 24 HOURS
Qty: 15 EACH | Refills: 0 | Status: SHIPPED | OUTPATIENT
Start: 2021-11-08 | End: 2021-12-23

## 2021-11-08 RX ORDER — ACETAMINOPHEN 500 MG
1000 TABLET ORAL ONCE
Status: COMPLETED | OUTPATIENT
Start: 2021-11-08 | End: 2021-11-08

## 2021-11-08 RX ADMIN — ACETAMINOPHEN 1000 MG: 500 TABLET ORAL at 21:37

## 2021-11-08 NOTE — ED TRIAGE NOTES
Pt to ER via PV. Pt states he was was turning and lost his balance. Pt fell on right side and concerned about broken ribs. Denies hitting head. Denies blood thinners. Pt is ambulatory at triage.    Patient in mask. This RN in appropriate PPE throughout the patient's entire encounter.

## 2021-11-09 ENCOUNTER — PATIENT OUTREACH (OUTPATIENT)
Dept: CASE MANAGEMENT | Facility: OTHER | Age: 86
End: 2021-11-09

## 2021-11-09 NOTE — DISCHARGE INSTRUCTIONS
Home, rest, Tylenol as needed for pain, use the incentive spirometer as instructed every 2 hours while awake, home medicine as prescribed, follow up with PCP for recheck. Return to care if develop fever, shortness of breath, worsening pain, or with further concerns.

## 2021-11-09 NOTE — OUTREACH NOTE
Ambulatory Case Management Note    General & Health Literacy Assessment    Questions/Answers      Most Recent Value   Assessment Completed With Children   Living Arrangement Alone   Type of Residence Private Residence   Home Care Services No   Equiptment Used at Home Walker   Bed or Wheelchair Confined No   Difficulty Keeping Appointments No   Health Literacy Moderate        Care Evaluation    Questions/Answers      Most Recent Value   Suggested Appointments Other (See Comment)  [Patient has appointment with PCP on 11/15/21. ]   Other Patient Education/Resources  --  [Reviewed post-ED instructions with son. Patient is using walker at home and is using IS. Patient currently at 1500 with IS. Patient was reminded today to use IS at least 3-4 times daily. ]   Medication Adherence Medications understood  [Son waiting on priorauthorization to  Lidocaine prescription and plans to deliver to patient tomorrow. ]   Healthy Lifestyle (Self-Efficacy) --  [Patient is check on by neighbors and family frequently. Son plans to encourage a life alert system for patient. However, patient was not interested when it was previously discussed with son. ]          There are no recently modified care plans to display for this patient.      Ashely Domínguez RN  Ambulatory Case Management    11/9/2021, 15:06 EST

## 2021-11-09 NOTE — ED PROVIDER NOTES
EMERGENCY DEPARTMENT ENCOUNTER    Room Number:  B02/02  Date of encounter:  11/8/2021  PCP: Phyllis Loving PA  Historian: Patient      HPI:  Chief Complaint: right sided rib pain       Context: Dinesh Churchill Sr. is a 89 y.o. male with past medical history of HTN, HLD, and CAD who presents to the ED c/o right-sided rib pain s/p fall at home, where he lives alone.  Patient states that he was standing, pivoted, lost his balance and fell hitting his right ribs.  Denies any head injury, loss of consciousness, neck pain, back pain, chest pain, or shortness of breath.  Patient states the pain is worse with a deep breath, nothing makes it better.      PAST MEDICAL HISTORY  Active Ambulatory Problems     Diagnosis Date Noted   • Abnormal magnetic resonance imaging study 03/18/2016   • Arthritis 03/18/2016   • Osteoarthritis of cervical spine 03/18/2016   • Diplopia 03/18/2016   • Hearing loss 03/18/2016   • Neoplasm of uncertain behavior of skin 03/18/2016   • Prediabetes 03/18/2016   • Vitamin D deficiency 03/18/2016   • Chronic fatigue 10/27/2016   • History of supraventricular tachycardia 04/07/2017   • Essential hypertension 04/07/2017   • B12 deficiency 04/19/2017   • Abnormal cardiac function test 05/08/2017   • Cardiomyopathy (HCC) 05/08/2017   • Coronary artery disease involving native coronary artery of native heart without angina pectoris 05/31/2017   • History of coronary artery stent placement 05/31/2017   • History of renal calculi 05/31/2017   • Dyslipidemia 05/31/2017   • Macrocytic anemia 08/09/2017   • Precordial pain 08/16/2017   • Parkinson's disease (HCC) 10/25/2017   • History of CVA (cerebrovascular accident) 10/25/2017   • Paroxysmal atrial fibrillation (HCC) 11/29/2017   • Otalgia, left 12/29/2017   • Chest pain 08/06/2019   • Abnormal stress test 08/06/2019   • Spontaneous pneumothorax 08/18/2019   • Chest pain 08/18/2019   • Acute systolic (congestive) heart failure (HCC) 08/19/2019   •  Thrombocytopenia (HCC) 08/20/2019   • Pneumonia of left lower lobe due to infectious organism 09/06/2019   • Syncope and collapse 11/06/2019   • Status post placement of implantable loop recorder 11/22/2019   • Long term (current) use of antithrombotics/antiplatelets 03/13/2020   • Stuttering 04/09/2021     Resolved Ambulatory Problems     Diagnosis Date Noted   • Maxillary sinusitis 03/18/2016   • Dizziness 10/27/2016   • Acute frontal sinusitis 10/27/2016   • Precordial pain 04/07/2017   • SOB (shortness of breath) 04/07/2017   • Acute rhinitis 12/29/2017     Past Medical History:   Diagnosis Date   • Abnormal ECG    • Abnormal MRI    • Coronary artery disease    • Head injury    • Hearing aid worn    • Hyperlipidemia    • Hypertension    • Kidney stones    • Macrocytosis    • Neoplasm of skin    • Osteoporosis          PAST SURGICAL HISTORY  Past Surgical History:   Procedure Laterality Date   • CARDIAC CATHETERIZATION N/A 5/22/2017    Procedure: Left Heart Cath;  Surgeon: Maninder Quezada MD;  Location: [facility] CATH INVASIVE LOCATION;  Service:    • CARDIAC CATHETERIZATION N/A 5/23/2017    Procedure: Stent BMS coronary;  Surgeon: Maninder Quezada MD;  Location: [facility] CATH INVASIVE LOCATION;  Service:    • CARDIAC CATHETERIZATION N/A 8/8/2019    Procedure: Left Heart Cath;  Surgeon: Maninder Quezada MD;  [facility];  Service: Cardiology   • CARDIAC CATHETERIZATION N/A 8/8/2019    Procedure: Left ventriculography;  Surgeon: Maninder Quezada MD;  [facility];  Service: Cardiology   • CARDIAC CATHETERIZATION N/A 8/8/2019    Procedure: Coronary angiography;  Surgeon: Maninder Quezada MD;  [facility];  Service: Cardiology   • CARDIAC CATHETERIZATION N/A 8/20/2019    Procedure: CORONARY ANGIOGRAPHY;  Surgeon: Maninder Quezada MD;  Location: [facility] CATH INVASIVE LOCATION;  Service: Cardiovascular   •  CARDIAC CATHETERIZATION N/A 8/20/2019    Procedure: Stent BMS coronary;  Surgeon: Maninder Quezada MD;  Location:  [facility] CATH INVASIVE LOCATION;  Service: Cardiovascular   • CARDIAC ELECTROPHYSIOLOGY PROCEDURE N/A 11/8/2019    Procedure: Loop insertion;  Surgeon: Maninder Quezada MD;  Location:  [facility] CATH INVASIVE LOCATION;  Service: Cardiovascular   • HERNIA REPAIR      x 2   • [place] RT/LT HEART CATHETERS N/A 5/23/2017    Procedure: Percutaneous Coronary Intervention;  Surgeon: Maninder Quezada MD;  [facility];  Service: Cardiovascular         FAMILY HISTORY  Family History   Family history unknown: Yes         SOCIAL HISTORY  Social History     Socioeconomic History   • Marital status:    Tobacco Use   • Smoking status: Never Smoker   • Smokeless tobacco: Never Used   Vaping Use   • Vaping Use: Never used   Substance and Sexual Activity   • Alcohol use: No     Comment: quit 30 years ago    • Drug use: No   • Sexual activity: Defer         ALLERGIES  Patient has no known allergies.        REVIEW OF SYSTEMS  Review of Systems     All systems reviewed and negative except for those discussed in HPI.       PHYSICAL EXAM    I have reviewed the triage vital signs and nursing notes.    ED Triage Vitals   Temp Heart Rate Resp BP SpO2   11/08/21 1750 11/08/21 1750 11/08/21 1750 11/08/21 1752 11/08/21 1750   98.4 °F (36.9 °C) 82 18 158/81 95 %      Temp src Heart Rate Source Patient Position BP Location FiO2 (%)   -- -- -- -- --              Physical Exam  GENERAL: Alert and oriented x3, not distressed  HENT: nares patent, no hemotympanum, [place], moist mucous membranes  EYES: no scleral icterus, PERRL, EOMI  CV: regular rhythm, regular rate  RESPIRATORY: normal effort, CTA bilaterally, right anterior chest wall tender to palpate, no subcu emphysema or crepitus  ABDOMEN: soft/nontender, no rebound or guarding, no hepatosplenomegaly  MUSCULOSKELETAL: no deformity  NEURO: alert,  moves all extremities, follows commands  SKIN: warm, dry        LAB RESULTS  No results found for this or any previous visit (from the past 24 hour(s)).    Ordered the above labs and independently reviewed the results.        RADIOLOGY  XR Ribs Right With PA Chest    Result Date: 11/8/2021  4 VIEW RIGHT RIBS AND ONE VIEW AP CHEST  HISTORY: Fell. Right rib pain.  FINDINGS: There is cardiomegaly with mild vascular congestion appearing slightly more prominent since an exam dating back to 11/06/2019. There is some persistent increased density at the left base that may relate to chronic left pleural effusion and atelectasis and this is actually slightly improved from the earlier exam.  Multiple views of the right ribs demonstrate several old healed rib fractures. Suspect there is also an acute fracture involving the right sixth and possibly seventh ribs.  This report was finalized on 11/8/2021 6:30 PM by Dr. Byron Romeo M.D.        I ordered the above noted radiological studies. Reviewed by me and discussed with radiologist.  See dictation for official radiology interpretation.      PROCEDURES    Procedures      MEDICATIONS GIVEN IN ER    Medications   acetaminophen (TYLENOL) tablet 1,000 mg (has no administration in time range)         PROGRESS, DATA ANALYSIS, CONSULTS, AND MEDICAL DECISION MAKING    All labs have been independently reviewed by me.  All radiology studies have been reviewed by me and discussed with radiologist dictating the report.   EKG's independently viewed and interpreted by me.  Discussion below represents my analysis of pertinent findings related to patient's condition, differential diagnosis, treatment plan and final disposition.    DDx: Includes but not limited to, rib contusion, rib fracture, pneumothorax, chest wall contusion, flail chest    ED Course as of 11/08/21 2132 Mon Nov 08, 2021 2103 Patient rechecked, sitting comfortably in bed.  Discussed radiology results with patient and his  son.  Discussed strict return to ER precautions including shortness of breath, fever, productive cough, or worsening chest pain.  They expressed understanding and agree with plan. [KOTA]   2105 Patient offered admission, he declined.  He has good social support at home with his son checks on him daily.  Discussed strict return to ER precautions with patient and his son, they expressed understanding and agree with plan. [KOTA]      ED Course User Index  [KOTA] Deepak Feliz PA       MDM: X-ray shows 2 right-sided rib fractures with, there is no evidence for pneumothorax.  Pain is controlled with Tylenol.  Patient will be discharged with incentive spirometry and Lidoderm patches.  Patient was given strict return to ER precautions, he has good social support at home, and is requesting discharge.  Patient's vital signs are stable, he is safe for discharge.    PPE: The patient wore a surgical mask throughout the entire patient encounter. I wore an N95.    AS OF 21:32 EST VITALS:    BP - 158/81  HR - 82  TEMP - 98.4 °F (36.9 °C)  O2 SATS - 95%        DIAGNOSIS  Final diagnoses:   Multiple fractures of ribs, right side, initial encounter for closed fracture   Fall in home, initial encounter         DISPOSITION  DISCHARGE    Patient discharged in stable condition.    Reviewed implications of results, diagnosis, meds, responsibility to follow up, warning signs and symptoms of possible worsening, potential complications and reasons to return to ER.    Patient/Family voiced understanding of above instructions.    Discussed plan for discharge, as there is no emergent indication for admission. Patient referred to primary care provider for BP management due to today's BP. Pt/family is agreeable and understands need for follow up and repeat testing.  Pt is aware that discharge does not mean that nothing is wrong but it indicates no emergency is present that requires admission and they must continue care with follow-up as given below  or physician of their choice.     FOLLOW-UP  Phyllis Loving PA  [address] [facility] [id number] [phone]    Schedule an appointment as soon as possible for a visit in 1 week           Medication List      New Prescriptions    lidocaine 5 %  Commonly known as: LIDODERM  Place 1 patch on the skin as directed by provider Daily. Remove & Discard patch within 12 hours or as directed by MD           Where to Get Your Medications      These medications were sent to [facility] #[id number] - [place] [id number] [place] [address] [address] AT [place] [address] & [facility] [address] - [phone] [facility] [phone]   [id number] [place] [address] E, [facility] [address]    Phone: [phone]   · lidocaine 5 %                    Deepak Feliz PA  11/08/21 4044

## 2021-11-09 NOTE — ED PROVIDER NOTES
MD ATTESTATION NOTE    The CHEYANNE and I have discussed this patient's history, physical exam, and treatment plan.  I have reviewed the documentation and personally had a face to face interaction with the patient. I affirm the documentation and agree with the treatment and plan.  The attached note describes my personal findings.      Dinesh Churchill Sr. is a 89 y.o. male who presents to the ED c/o right rib pain after fall.  He tripped while using his walker and pivoting.  He denies hitting his head.      On exam:  Right lateral rib tenderness around 6 and 7.  Clear to auscultation bilaterally    Labs  No results found for this or any previous visit (from the past 24 hour(s)).    Radiology  XR Ribs Right With PA Chest    Result Date: 11/8/2021  4 VIEW RIGHT RIBS AND ONE VIEW AP CHEST  HISTORY: Fell. Right rib pain.  FINDINGS: There is cardiomegaly with mild vascular congestion appearing slightly more prominent since an exam dating back to 11/06/2019. There is some persistent increased density at the left base that may relate to chronic left pleural effusion and atelectasis and this is actually slightly improved from the earlier exam.  Multiple views of the right ribs demonstrate several old healed rib fractures. Suspect there is also an acute fracture involving the right sixth and possibly seventh ribs.  This report was finalized on 11/8/2021 6:30 PM by Dr. Byron Romeo M.D.        Medical Decision Making:  ED Course as of 11/08/21 2121 Mon Nov 08, 2021 2103 Patient rechecked, sitting comfortably in bed.  Discussed radiology results with patient and his son.  Discussed strict return to ER precautions including shortness of breath, fever, productive cough, or worsening chest pain.  They expressed understanding and agree with plan. [KOTA]   2105 Patient offered admission, he declined.  He has good social support at home with his son checks on him daily.  Discussed strict return to ER precautions with patient and his  son, they expressed understanding and agree with plan. [KOTA]      ED Course User Index  [KOTA] Deepak Feliz PA       Patient adamantly wants to go home.  He declines any narcotic pain medicine prefers to use Tylenol.  I discussed the importance of good pain control for pneumonia prevention.  I discussed the risks.  Patient's son verbalized an understanding.  Discharge home.    PPE: Both the patient and I wore a surgical mask throughout the entire patient encounter. I wore protective goggles.     Diagnosis  Final diagnoses:   Multiple fractures of ribs, right side, initial encounter for closed fracture   Fall in home, initial encounter        Inder Thomas II, MD  11/09/21 0131

## 2021-11-15 ENCOUNTER — OFFICE VISIT (OUTPATIENT)
Dept: FAMILY MEDICINE CLINIC | Facility: CLINIC | Age: 86
End: 2021-11-15

## 2021-11-15 VITALS
DIASTOLIC BLOOD PRESSURE: 62 MMHG | SYSTOLIC BLOOD PRESSURE: 110 MMHG | HEIGHT: 73 IN | RESPIRATION RATE: 24 BRPM | TEMPERATURE: 97.7 F | HEART RATE: 68 BPM | BODY MASS INDEX: 23.06 KG/M2 | OXYGEN SATURATION: 99 % | WEIGHT: 174 LBS

## 2021-11-15 DIAGNOSIS — S22.41XD CLOSED FRACTURE OF MULTIPLE RIBS OF RIGHT SIDE WITH ROUTINE HEALING, SUBSEQUENT ENCOUNTER: ICD-10-CM
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
## 2021-11-15 PROCEDURE — 99213 OFFICE O/P EST LOW 20 MIN: CPT | Performed by: PHYSICIAN ASSISTANT

## 2021-11-16 ENCOUNTER — PATIENT OUTREACH (OUTPATIENT)
Dept: CASE MANAGEMENT | Facility: OTHER | Age: 86
End: 2021-11-16

## 2021-11-16 NOTE — OUTREACH NOTE
Ambulatory Case Management Note    Care Evaluation    Questions/Answers      Most Recent Value   Care Gaps Addressed Flu Shot   Flu Shot Status Up to Date   Other Patient Education/Resources  --  [Patient continuing to use IS. Freezer stocked and family checking on patient frequently. ]   Advanced Directives: Send Materials      SDOH updated and reviewed with the patient during this program:     Financial Resource Strain: Low Risk    • Difficulty of Paying Living Expenses: Not very hard       Food Insecurity: No Food Insecurity   • Worried About Running Out of Food in the Last Year: Never true   • Ran Out of Food in the Last Year: Never true       Transportation Needs: No Transportation Needs   • Lack of Transportation (Medical): No   • Lack of Transportation (Non-Medical): No   Patient Outreach    Spoke with patient's son. Patient is doing well. Still using IS daily. Freezer stocked full of food. Patient ambulating well with walker. Completed follow up with PCP yesterday and reportedly doing well. Patient has lost weight and has corrected some prior elevated blood sugar with diet. No temperature. AC to send Advanced Directive information to patient for son and patient to review. No further calls scheduled at this time. Son to reach out for any new needs.     There are no recently modified care plans to display for this patient.      Ashely Domínguez RN  Ambulatory Case Management    11/16/2021, 17:53 EST

## 2021-11-23 ENCOUNTER — LAB (OUTPATIENT)
Dept: LAB | Facility: HOSPITAL | Age: 86
End: 2021-11-23

## 2021-11-23 ENCOUNTER — CONSULT (OUTPATIENT)
Dept: ONCOLOGY | Facility: CLINIC | Age: 86
End: 2021-11-23

## 2021-11-23 VITALS
WEIGHT: 187 LBS | HEART RATE: 68 BPM | HEIGHT: 73 IN | OXYGEN SATURATION: 94 % | DIASTOLIC BLOOD PRESSURE: 62 MMHG | TEMPERATURE: 97.7 F | SYSTOLIC BLOOD PRESSURE: 156 MMHG | BODY MASS INDEX: 24.78 KG/M2 | RESPIRATION RATE: 18 BRPM

## 2021-11-23 DIAGNOSIS — D69.6 THROMBOCYTOPENIA (HCC): Primary | ICD-10-CM

## 2021-11-23 DIAGNOSIS — D64.9 ANEMIA, UNSPECIFIED TYPE: Primary | ICD-10-CM

## 2021-11-23 LAB
BASOPHILS # BLD AUTO: 0.02 10*3/MM3 (ref 0–0.2)
BASOPHILS NFR BLD AUTO: 0.3 % (ref 0–1.5)
DEPRECATED RDW RBC AUTO: 61.8 FL (ref 37–54)
EOSINOPHIL # BLD AUTO: 0.09 10*3/MM3 (ref 0–0.4)
EOSINOPHIL NFR BLD AUTO: 1.4 % (ref 0.3–6.2)
ERYTHROCYTE [DISTWIDTH] IN BLOOD BY AUTOMATED COUNT: 16.2 % (ref 12.3–15.4)
HCT VFR BLD AUTO: 40.9 % (ref 37.5–51)
HGB BLD-MCNC: 13.2 G/DL (ref 13–17.7)
IMM GRANULOCYTES # BLD AUTO: 0.02 10*3/MM3 (ref 0–0.05)
IMM GRANULOCYTES NFR BLD AUTO: 0.3 % (ref 0–0.5)
LYMPHOCYTES # BLD AUTO: 1.57 10*3/MM3 (ref 0.7–3.1)
LYMPHOCYTES NFR BLD AUTO: 23.8 % (ref 19.6–45.3)
MCH RBC QN AUTO: 33.2 PG (ref 26.6–33)
MCHC RBC AUTO-ENTMCNC: 32.3 G/DL (ref 31.5–35.7)
MCV RBC AUTO: 103 FL (ref 79–97)
MONOCYTES # BLD AUTO: 0.49 10*3/MM3 (ref 0.1–0.9)
MONOCYTES NFR BLD AUTO: 7.4 % (ref 5–12)
NEUTROPHILS NFR BLD AUTO: 4.42 10*3/MM3 (ref 1.7–7)
NEUTROPHILS NFR BLD AUTO: 66.8 % (ref 42.7–76)
NRBC BLD AUTO-RTO: 0 /100 WBC (ref 0–0.2)
PLATELET # BLD AUTO: 133 10*3/MM3 (ref 140–450)
PMV BLD AUTO: 11.2 FL (ref 6–12)
RBC # BLD AUTO: 3.97 10*6/MM3 (ref 4.14–5.8)
WBC NRBC COR # BLD: 6.61 10*3/MM3 (ref 3.4–10.8)

## 2021-11-23 PROCEDURE — 85025 COMPLETE CBC W/AUTO DIFF WBC: CPT

## 2021-11-23 PROCEDURE — 36415 COLL VENOUS BLD VENIPUNCTURE: CPT

## 2021-11-23 PROCEDURE — 99205 OFFICE O/P NEW HI 60 MIN: CPT | Performed by: INTERNAL MEDICINE

## 2021-12-13 RX ORDER — ISOSORBIDE MONONITRATE 60 MG/1
60 TABLET, EXTENDED RELEASE ORAL DAILY
Qty: 90 TABLET | Refills: 1 | Status: SHIPPED | OUTPATIENT
Start: 2021-12-13 | End: 2022-06-14

## 2021-12-13 RX ORDER — LOSARTAN POTASSIUM 50 MG/1
50 TABLET ORAL DAILY
Qty: 90 TABLET | Refills: 1 | Status: SHIPPED | OUTPATIENT
Start: 2021-12-13 | End: 2022-06-14

## 2021-12-16 RX ORDER — AMLODIPINE BESYLATE 5 MG/1
5 TABLET ORAL
Qty: 30 TABLET | Refills: 3 | Status: SHIPPED | OUTPATIENT
Start: 2021-12-16 | End: 2022-03-16

## 2021-12-17 RX ORDER — METOPROLOL SUCCINATE 100 MG/1
100 TABLET, EXTENDED RELEASE ORAL DAILY
Qty: 30 TABLET | Refills: 3 | Status: SHIPPED | OUTPATIENT
Start: 2021-12-17 | End: 2022-03-16

## 2021-12-23 ENCOUNTER — OFFICE VISIT (OUTPATIENT)
Dept: CARDIOLOGY | Facility: CLINIC | Age: 86
End: 2021-12-23

## 2021-12-23 VITALS
BODY MASS INDEX: 24.65 KG/M2 | HEART RATE: 70 BPM | SYSTOLIC BLOOD PRESSURE: 150 MMHG | WEIGHT: 186 LBS | DIASTOLIC BLOOD PRESSURE: 73 MMHG | HEIGHT: 73 IN

## 2021-12-23 DIAGNOSIS — I10 ESSENTIAL HYPERTENSION: ICD-10-CM

## 2021-12-23 DIAGNOSIS — I48.0 PAROXYSMAL ATRIAL FIBRILLATION: Primary | ICD-10-CM

## 2021-12-23 DIAGNOSIS — R07.2 PRECORDIAL PAIN: ICD-10-CM

## 2021-12-23 DIAGNOSIS — I25.10 CORONARY ARTERY DISEASE INVOLVING NATIVE CORONARY ARTERY OF NATIVE HEART WITHOUT ANGINA PECTORIS: ICD-10-CM

## 2021-12-23 DIAGNOSIS — I42.9 CARDIOMYOPATHY, UNSPECIFIED TYPE: ICD-10-CM

## 2021-12-23 PROCEDURE — 99213 OFFICE O/P EST LOW 20 MIN: CPT | Performed by: INTERNAL MEDICINE

## 2022-01-05 PROCEDURE — G2066 INTER DEVC REMOTE 30D: HCPCS | Performed by: INTERNAL MEDICINE

## 2022-01-05 PROCEDURE — 93298 REM INTERROG DEV EVAL SCRMS: CPT | Performed by: INTERNAL MEDICINE

## 2022-01-19 ENCOUNTER — APPOINTMENT (OUTPATIENT)
Dept: CT IMAGING | Facility: HOSPITAL | Age: 87
End: 2022-01-19

## 2022-01-19 ENCOUNTER — APPOINTMENT (OUTPATIENT)
Dept: GENERAL RADIOLOGY | Facility: HOSPITAL | Age: 87
End: 2022-01-19

## 2022-01-19 ENCOUNTER — HOSPITAL ENCOUNTER (OUTPATIENT)
Facility: HOSPITAL | Age: 87
Setting detail: OBSERVATION
Discharge: NURSING, REHAB OR LONG-TERM CARE | End: 2022-01-21
Attending: EMERGENCY MEDICINE | Admitting: HOSPITALIST

## 2022-01-19 DIAGNOSIS — S92.515B: ICD-10-CM

## 2022-01-19 DIAGNOSIS — S22.41XA CLOSED FRACTURE OF MULTIPLE RIBS OF RIGHT SIDE, INITIAL ENCOUNTER: ICD-10-CM
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
## 2022-01-19 DIAGNOSIS — S91.115A LACERATION OF LESSER TOE OF LEFT FOOT WITHOUT FOREIGN BODY PRESENT OR DAMAGE TO NAIL, INITIAL ENCOUNTER: ICD-10-CM

## 2022-01-19 PROBLEM — S92.912A: Status: ACTIVE | Noted: 2022-01-19

## 2022-01-19 PROBLEM — R07.89 CHEST WALL PAIN: Status: ACTIVE | Noted: 2022-01-19

## 2022-01-19 LAB
ALBUMIN SERPL-MCNC: 3.9 G/DL (ref 3.5–5.2)
ALBUMIN/GLOB SERPL: 1.6 G/DL
ALP SERPL-CCNC: 57 U/L (ref 39–117)
ALT SERPL W P-5'-P-CCNC: 12 U/L (ref 1–41)
ANION GAP SERPL CALCULATED.3IONS-SCNC: 9 MMOL/L (ref 5–15)
AST SERPL-CCNC: 15 U/L (ref 1–40)
BASOPHILS # BLD AUTO: 0.01 10*3/MM3 (ref 0–0.2)
BASOPHILS NFR BLD AUTO: 0.2 % (ref 0–1.5)
BILIRUB SERPL-MCNC: 1.7 MG/DL (ref 0–1.2)
BILIRUB UR QL STRIP: NEGATIVE
BUN SERPL-MCNC: 20 MG/DL (ref 8–23)
BUN/CREAT SERPL: 26.7 (ref 7–25)
CALCIUM SPEC-SCNC: 8.8 MG/DL (ref 8.2–9.6)
CHLORIDE SERPL-SCNC: 106 MMOL/L (ref 98–107)
CK SERPL-CCNC: 62 U/L (ref 20–200)
CLARITY UR: CLEAR
CO2 SERPL-SCNC: 25 MMOL/L (ref 22–29)
COLOR UR: YELLOW
CREAT SERPL-MCNC: 0.75 MG/DL (ref 0.76–1.27)
DEPRECATED RDW RBC AUTO: 54 FL (ref 37–54)
EOSINOPHIL # BLD AUTO: 0.08 10*3/MM3 (ref 0–0.4)
EOSINOPHIL NFR BLD AUTO: 1.3 % (ref 0.3–6.2)
ERYTHROCYTE [DISTWIDTH] IN BLOOD BY AUTOMATED COUNT: 14.3 % (ref 12.3–15.4)
GFR SERPL CREATININE-BSD FRML MDRD: 97 ML/MIN/1.73
GLOBULIN UR ELPH-MCNC: 2.5 GM/DL
GLUCOSE SERPL-MCNC: 115 MG/DL (ref 65–99)
GLUCOSE UR STRIP-MCNC: NEGATIVE MG/DL
HCT VFR BLD AUTO: 41.3 % (ref 37.5–51)
HGB BLD-MCNC: 13.6 G/DL (ref 13–17.7)
HGB UR QL STRIP.AUTO: NEGATIVE
HOLD SPECIMEN: NORMAL
HOLD SPECIMEN: NORMAL
IMM GRANULOCYTES # BLD AUTO: 0.02 10*3/MM3 (ref 0–0.05)
IMM GRANULOCYTES NFR BLD AUTO: 0.3 % (ref 0–0.5)
KETONES UR QL STRIP: NEGATIVE
LEUKOCYTE ESTERASE UR QL STRIP.AUTO: NEGATIVE
LYMPHOCYTES # BLD AUTO: 0.97 10*3/MM3 (ref 0.7–3.1)
LYMPHOCYTES NFR BLD AUTO: 15.7 % (ref 19.6–45.3)
MCH RBC QN AUTO: 34.3 PG (ref 26.6–33)
MCHC RBC AUTO-ENTMCNC: 32.9 G/DL (ref 31.5–35.7)
MCV RBC AUTO: 104.3 FL (ref 79–97)
MONOCYTES # BLD AUTO: 0.46 10*3/MM3 (ref 0.1–0.9)
MONOCYTES NFR BLD AUTO: 7.4 % (ref 5–12)
NEUTROPHILS NFR BLD AUTO: 4.64 10*3/MM3 (ref 1.7–7)
NEUTROPHILS NFR BLD AUTO: 75.1 % (ref 42.7–76)
NITRITE UR QL STRIP: NEGATIVE
NRBC BLD AUTO-RTO: 0.3 /100 WBC (ref 0–0.2)
PH UR STRIP.AUTO: 7 [PH] (ref 5–8)
PLATELET # BLD AUTO: 134 10*3/MM3 (ref 140–450)
PMV BLD AUTO: 10.8 FL (ref 6–12)
POTASSIUM SERPL-SCNC: 4.1 MMOL/L (ref 3.5–5.2)
PROT SERPL-MCNC: 6.4 G/DL (ref 6–8.5)
PROT UR QL STRIP: NEGATIVE
QT INTERVAL: 368 MS
RBC # BLD AUTO: 3.96 10*6/MM3 (ref 4.14–5.8)
SARS-COV-2 RNA RESP QL NAA+PROBE: NOT DETECTED
SODIUM SERPL-SCNC: 140 MMOL/L (ref 136–145)
SP GR UR STRIP: 1.03 (ref 1–1.03)
TROPONIN T SERPL-MCNC: 0.02 NG/ML (ref 0–0.03)
UROBILINOGEN UR QL STRIP: NORMAL
WBC NRBC COR # BLD: 6.18 10*3/MM3 (ref 3.4–10.8)
WHOLE BLOOD HOLD SPECIMEN: NORMAL
WHOLE BLOOD HOLD SPECIMEN: NORMAL

## 2022-01-19 PROCEDURE — 80053 COMPREHEN METABOLIC PANEL: CPT | Performed by: PHYSICIAN ASSISTANT

## 2022-01-19 PROCEDURE — 85025 COMPLETE CBC W/AUTO DIFF WBC: CPT | Performed by: PHYSICIAN ASSISTANT

## 2022-01-19 PROCEDURE — C9803 HOPD COVID-19 SPEC COLLECT: HCPCS

## 2022-01-19 PROCEDURE — 71045 X-RAY EXAM CHEST 1 VIEW: CPT

## 2022-01-19 PROCEDURE — 99284 EMERGENCY DEPT VISIT MOD MDM: CPT

## 2022-01-19 PROCEDURE — 73630 X-RAY EXAM OF FOOT: CPT

## 2022-01-19 PROCEDURE — G0378 HOSPITAL OBSERVATION PER HR: HCPCS

## 2022-01-19 PROCEDURE — 93005 ELECTROCARDIOGRAM TRACING: CPT | Performed by: PHYSICIAN ASSISTANT

## 2022-01-19 PROCEDURE — 84484 ASSAY OF TROPONIN QUANT: CPT | Performed by: PHYSICIAN ASSISTANT

## 2022-01-19 PROCEDURE — 0 LIDOCAINE 1 % SOLUTION: Performed by: PHYSICIAN ASSISTANT

## 2022-01-19 PROCEDURE — 25010000002 IOPAMIDOL 61 % SOLUTION: Performed by: EMERGENCY MEDICINE

## 2022-01-19 PROCEDURE — 93010 ELECTROCARDIOGRAM REPORT: CPT | Performed by: INTERNAL MEDICINE

## 2022-01-19 PROCEDURE — 74177 CT ABD & PELVIS W/CONTRAST: CPT

## 2022-01-19 PROCEDURE — 71046 X-RAY EXAM CHEST 2 VIEWS: CPT

## 2022-01-19 PROCEDURE — 82550 ASSAY OF CK (CPK): CPT | Performed by: PHYSICIAN ASSISTANT

## 2022-01-19 PROCEDURE — 81003 URINALYSIS AUTO W/O SCOPE: CPT | Performed by: PHYSICIAN ASSISTANT

## 2022-01-19 PROCEDURE — 71260 CT THORAX DX C+: CPT

## 2022-01-19 PROCEDURE — U0005 INFEC AGEN DETEC AMPLI PROBE: HCPCS | Performed by: PHYSICIAN ASSISTANT

## 2022-01-19 PROCEDURE — U0003 INFECTIOUS AGENT DETECTION BY NUCLEIC ACID (DNA OR RNA); SEVERE ACUTE RESPIRATORY SYNDROME CORONAVIRUS 2 (SARS-COV-2) (CORONAVIRUS DISEASE [COVID-19]), AMPLIFIED PROBE TECHNIQUE, MAKING USE OF HIGH THROUGHPUT TECHNOLOGIES AS DESCRIBED BY CMS-2020-01-R: HCPCS | Performed by: PHYSICIAN ASSISTANT

## 2022-01-19 RX ORDER — ACETAMINOPHEN 325 MG/1
650 TABLET ORAL EVERY 6 HOURS PRN
Status: DISCONTINUED | OUTPATIENT
Start: 2022-01-19 | End: 2022-01-21 | Stop reason: HOSPADM

## 2022-01-19 RX ORDER — LIDOCAINE HYDROCHLORIDE 10 MG/ML
10 INJECTION, SOLUTION INFILTRATION; PERINEURAL ONCE
Status: COMPLETED | OUTPATIENT
Start: 2022-01-19 | End: 2022-01-19

## 2022-01-19 RX ORDER — SODIUM CHLORIDE 0.9 % (FLUSH) 0.9 %
10 SYRINGE (ML) INJECTION AS NEEDED
Status: DISCONTINUED | OUTPATIENT
Start: 2022-01-19 | End: 2022-01-21 | Stop reason: HOSPADM

## 2022-01-19 RX ORDER — LIDOCAINE 50 MG/G
1 PATCH TOPICAL
Status: DISCONTINUED | OUTPATIENT
Start: 2022-01-19 | End: 2022-01-21 | Stop reason: HOSPADM

## 2022-01-19 RX ORDER — ONDANSETRON 2 MG/ML
4 INJECTION INTRAMUSCULAR; INTRAVENOUS EVERY 6 HOURS PRN
Status: DISCONTINUED | OUTPATIENT
Start: 2022-01-19 | End: 2022-01-21 | Stop reason: HOSPADM

## 2022-01-19 RX ORDER — ONDANSETRON 4 MG/1
4 TABLET, FILM COATED ORAL EVERY 6 HOURS PRN
Status: DISCONTINUED | OUTPATIENT
Start: 2022-01-19 | End: 2022-01-21 | Stop reason: HOSPADM

## 2022-01-19 RX ORDER — UREA 10 %
3 LOTION (ML) TOPICAL NIGHTLY PRN
Status: DISCONTINUED | OUTPATIENT
Start: 2022-01-19 | End: 2022-01-21 | Stop reason: HOSPADM

## 2022-01-19 RX ORDER — ACETAMINOPHEN 500 MG
500 TABLET ORAL 4 TIMES DAILY
Status: DISCONTINUED | OUTPATIENT
Start: 2022-01-19 | End: 2022-01-21 | Stop reason: HOSPADM

## 2022-01-19 RX ORDER — HYDROCODONE BITARTRATE AND ACETAMINOPHEN 5; 325 MG/1; MG/1
1 TABLET ORAL EVERY 6 HOURS PRN
Status: DISCONTINUED | OUTPATIENT
Start: 2022-01-19 | End: 2022-01-21 | Stop reason: HOSPADM

## 2022-01-19 RX ADMIN — Medication 10 ML: at 11:11

## 2022-01-19 RX ADMIN — HYDROCODONE BITARTRATE AND ACETAMINOPHEN 1 TABLET: 5; 325 TABLET ORAL at 16:05

## 2022-01-19 RX ADMIN — ACETAMINOPHEN 500 MG: 500 TABLET, FILM COATED ORAL at 21:17

## 2022-01-19 RX ADMIN — LIDOCAINE 1 PATCH: 50 PATCH TOPICAL at 21:17

## 2022-01-19 RX ADMIN — IOPAMIDOL 85 ML: 612 INJECTION, SOLUTION INTRAVENOUS at 11:51

## 2022-01-19 RX ADMIN — LIDOCAINE HYDROCHLORIDE 10 ML: 10 INJECTION, SOLUTION INFILTRATION; PERINEURAL at 11:04

## 2022-01-19 RX ADMIN — ACETAMINOPHEN 500 MG: 500 TABLET, FILM COATED ORAL at 18:24

## 2022-01-19 NOTE — ED NOTES
Patient from home via EMS; patient had a mechanical fall yesterday and hit the right side of his ribs on a kitchen counter. Pain is worse with a deep breath. No obvious deformity and lung sounds clear per EMS. Patient also has left small toe injury. Denies head injury/LOC. Patient on blood thinners.   Hx of CVA with some speech difficulty at baseline. Patient A&Ox4            Job, MOSES Cavanaugh  01/19/22 [id number]

## 2022-01-19 NOTE — H&P
HISTORY AND PHYSICAL   [facility]        Date of Admission: 2022  Patient Identification:  Name: Dinesh Churchill Sr.  Age: 89 y.o.  Sex: male  :  1928  MRN: [id number]                     Primary Care Physician: Phyllis Loving PA    Chief Complaint: Chest wall pain status post fall    History of Present Illness:   Mr. Zhao Sr. is a pleasant 89-year-old male who is a bit hard of hearing given his age.  He also has a stutter at times which can make conversation a bit more trying but ultimately you were able to conversate with this gentleman if you are patient.  Son is at bedside to help with some of the past medical history.  This patient lives at home on a property of 5 acres prior to admission.  He lives independently as he is  from spouse.  He has had numerous issues of recurrent falls and has been in the hospital due to complicated rib fractures in the past.  This time he took another fall and was found via CT to have right-sided rib fractures eight through 10 which are noncomplicated and show no aspects of pneumothorax on CT.  Oxygenation seems stable at 95% on room air.  Patient notes right-sided chest wall pain worse with movement or deep breathing.  He also suffered a fracture to his left fifth toe.  Son at bedside and I discussed the case as well and there is concerns that this patient may be falling more frequently and not sharing those falls with son.  Patient denies any confusion above baseline.  Denies any recent fever chills or night sweats.  He denies any headache neck pain or any additional joint pains not addressed above.    Past Medical History:  Past Medical History:   Diagnosis Date   • Abnormal ECG    • Abnormal MRI    • Acute frontal sinusitis    • Arthritis    • Chronic fatigue    • Coronary artery disease    • Dizziness    • Head injury    • Hearing aid worn     left   • Hearing loss    • Hyperlipidemia    • Hypertension    • Kidney stones    •  Macrocytosis    • Neoplasm of skin    • Osteoporosis    • Prediabetes    • Vitamin D deficiency      Past Surgical History:  Past Surgical History:   Procedure Laterality Date   • CARDIAC CATHETERIZATION N/A 5/22/2017    Procedure: Left Heart Cath;  Surgeon: Maninder Quezada MD;  Location:  [facility];  Service:    • CARDIAC CATHETERIZATION N/A 5/23/2017    Procedure: Stent BMS coronary;  Surgeon: Maninder Quezada MD;  Location:  [facility];  Service:    • CARDIAC CATHETERIZATION N/A 8/8/2019    Procedure: Left Heart Cath;  Surgeon: Maninder Quezada MD;  Location:  [facility];  Service: Cardiology   • CARDIAC CATHETERIZATION N/A 8/8/2019    Procedure: Left ventriculography;  Surgeon: Maninder Quezada MD;  Location:  [facility];  Service: Cardiology   • CARDIAC CATHETERIZATION N/A 8/8/2019    Procedure: Coronary angiography;  Surgeon: Maninder Quezada MD;  [facility];  Service: Cardiology   • CARDIAC CATHETERIZATION N/A 8/20/2019    Procedure: CORONARY ANGIOGRAPHY;  Surgeon: Maninder Quezada MD;  Location:  [facility];  Service: Cardiovascular   • CARDIAC CATHETERIZATION N/A 8/20/2019    Procedure: Stent BMS coronary;  Surgeon: Maninder Quezada MD;  [facility];  Service: Cardiovascular   • CARDIAC ELECTROPHYSIOLOGY PROCEDURE N/A 11/8/2019    Procedure: Loop insertion;  Surgeon: Maninder Quezada MD;  [facility];  Service: Cardiovascular   • HERNIA REPAIR      x 2   • NC RT/LT HEART CATHETERS N/A 5/23/2017    Procedure: Percutaneous Coronary Intervention;  Surgeon: Maninder Quezada MD;  Location:  [facility];  Service: Cardiovascular      Home Meds:  (Not in a hospital admission)      Allergies:  No Known Allergies  Immunizations:  Immunization History   Administered Date(s)  "Administered   • COVID-19 (JAN) 03/15/2021, 2021   • FLUAD TRI 65YR+ 2019   • Fluad Quad 65+ 2019   • Fluzone High Dose =>65 Years ([employer] ONLY) 10/21/2016, 2017, 2018, 2019, 2020   • Fluzone High-Dose 65+yrs 10/05/2021   • Tdap 2020     Social History:   Social History     Social History Narrative   • Not on file     Social History     Socioeconomic History   • Marital status:    Tobacco Use   • Smoking status: Never Smoker   • Smokeless tobacco: Never Used   Vaping Use   • Vaping Use: Never used   Substance and Sexual Activity   • Alcohol use: No     Comment: quit 30 years ago    • Drug use: No   • Sexual activity: Defer       Family History:  Family History   Family history unknown: Yes        Review of Systems  See history of present illness and past medical history.  Patient has fever chills night sweats nausea vomiting abdominal pain dysuria.  Denies any headache or head trauma.  Denies any neck pain above baseline OA changes.  Admits to chest wall pain worse with deep breathing.  He is very accustomed to use of I-S and states that he still uses that at home routinely.  Denies any productive cough palpitations abdominal pain.  Admits to left foot/toe pain.  Denies any focal loss of function and/or consciousness and had no syncope with the above fall.  Remainder of ROS is negative.    Objective:  T Max 24 hrs: Temp (24hrs), Av °F (36.1 °C), Min:97 °F (36.1 °C), Max:97 °F (36.1 °C)    Vitals Ranges:   Temp:  [97 °F (36.1 °C)] 97 °F (36.1 °C)  Heart Rate:  [67] 67  Resp:  [18] 18  BP: (135)/(67) 135/67      Exam:  /67   Pulse 67   Temp 97 °F (36.1 °C)   Resp 18   Ht 190.5 cm (75\")   Wt 83 kg (183 lb)   SpO2 95%   BMI 22.87 kg/m²     General Appearance:    Alert, cooperative, hard of hearing, pleasant, conversational with stutter at times, nontoxic, elderly and frail, impressive beard   Head:    Normocephalic, without obvious " abnormality, atraumatic   Eyes:    PERRL, conjunctivae/corneas clear, EOM's intact, both eyes   Ears:    Normal external ear canals, both ears   Nose:   Nares normal, septum midline, mucosa normal, no drainage    or sinus tenderness   Throat:   Lips, mucosa, and tongue normal   Neck:   Supple, no point tenderness or JVD       Lungs:     Clear to auscultation bilaterally, respirations unlabored but he does wince in pain when trying to take a deep breath   Chest Wall:   Chest wall pain greatest over right side ribs eight through 10    Heart:    Regular rate and rhythm, S1 and S2 normal   Abdomen:     Soft, nontender, bowel sounds active all four quadrants,     no masses   Extremities:  Moving all, left fifth digit is bandaged/pamela taped, no cyanosis or edema   Pulses:   2+ and symmetric all extremities           Neurologic:   CNII-XII intact, moving all      .    Data Review:  Labs in chart were reviewed.             Imaging Results (All)     Procedure Component Value Units Date/Time    CT Chest With Contrast Diagnostic [[id number]] Collected: 01/19/22 1245     Updated: 01/19/22 1245    Narrative:      CT CHEST, ABDOMEN, AND PELVIS WITH IV CONTRAST     HISTORY: 89-year-old male status post fall. Right-sided abdominal chest  pain and abdominal pain.     TECHNIQUE: Radiation dose reduction techniques were utilized, including  automated exposure control and exposure modulation based on body size.   3 mm images were obtained through the chest, abdomen, and pelvis after  the administration of IV contrast. Compared with previous CTs from  09/18/2020.     FINDINGS:  CHEST: There are multiple old right rib fractures and there are likely a  few healing subacute right rib fractures. There are acute  nonsignificantly displaced fractures at the lateral aspects of the right  8th, 9th, and 10th ribs. There is possibly a subacute fracture at the  anterior aspect of the right 5th rib. There are multiple old left-sided  rib fractures  as well. There is no pneumothorax. There are small  bilateral pleural effusions and there is pulmonary vascular congestion.  There is crowding of lung markings and some groundglass density at the  lower lobes. There is also compressive atelectasis at the left lower  lobe by the cardiomegaly, stable. Small volume of mucus/secretions are  noted within the dependent aspect of the trachea. There is no  significant change in the enlarged mediastinal nodes with a right  subcarinal node measuring approximately 3.0 x 1.5 cm. There is stable  diffuse ectasia of the thoracic aorta.     ABDOMEN/PELVIS: The liver and gallbladder appear unremarkable and there  is no perihepatic fluid. There is no acute abnormality involving the  spleen, pancreas, or adrenals. There is significant hyperplasia of both  adrenal glands which is stable. An ectatic side branch at the pancreatic  tail is suspected and likely stable. No acute bowel abnormality is seen.  There is moderately extensive sigmoid diverticulosis without evidence  for diverticulitis. Appendix appears normal. Urinary bladder is  trabeculated and there are at least 2 small diverticula. Prostatomegaly  is stable.       Impression:      1. There are acute nondisplaced fractures at the lateral aspect of the  right 8th, 9th, and 10th ribs. There is possibly a subacute fracture at  the anterior aspect of the right 5th rib. Multiple old bilateral rib  fractures.  2. Small bilateral pleural effusions and pulmonary vascular congestion.  Atelectatic changes at the lower lobes. These findings were present  previously as well. There is no evidence for a pulmonary contusion and  there is no pneumothorax.  3. There is no convincing evidence for traumatic visceral injury within  the abdomen or pelvis.     Discussed with RAFA Kahn.       CT Abdomen Pelvis With Contrast [[id number]] Collected: 01/19/22 1245     Updated: 01/19/22 1245    Narrative:      CT CHEST, ABDOMEN, AND PELVIS WITH IV  CONTRAST     HISTORY: 89-year-old male status post fall. Right-sided abdominal chest  pain and abdominal pain.     TECHNIQUE: Radiation dose reduction techniques were utilized, including  automated exposure control and exposure modulation based on body size.   3 mm images were obtained through the chest, abdomen, and pelvis after  the administration of IV contrast. Compared with previous CTs from  09/18/2020.     FINDINGS:  CHEST: There are multiple old right rib fractures and there are likely a  few healing subacute right rib fractures. There are acute  nonsignificantly displaced fractures at the lateral aspects of the right  8th, 9th, and 10th ribs. There is possibly a subacute fracture at the  anterior aspect of the right 5th rib. There are multiple old left-sided  rib fractures as well. There is no pneumothorax. There are small  bilateral pleural effusions and there is pulmonary vascular congestion.  There is crowding of lung markings and some groundglass density at the  lower lobes. There is also compressive atelectasis at the left lower  lobe by the cardiomegaly, stable. Small volume of mucus/secretions are  noted within the dependent aspect of the trachea. There is no  significant change in the enlarged mediastinal nodes with a right  subcarinal node measuring approximately 3.0 x 1.5 cm. There is stable  diffuse ectasia of the thoracic aorta.     ABDOMEN/PELVIS: The liver and gallbladder appear unremarkable and there  is no perihepatic fluid. There is no acute abnormality involving the  spleen, pancreas, or adrenals. There is significant hyperplasia of both  adrenal glands which is stable. An ectatic side branch at the pancreatic  tail is suspected and likely stable. No acute bowel abnormality is seen.  There is moderately extensive sigmoid diverticulosis without evidence  for diverticulitis. Appendix appears normal. Urinary bladder is  trabeculated and there are at least 2 small diverticula.  Prostatomegaly  is stable.       Impression:      1. There are acute nondisplaced fractures at the lateral aspect of the  right 8th, 9th, and 10th ribs. There is possibly a subacute fracture at  the anterior aspect of the right 5th rib. Multiple old bilateral rib  fractures.  2. Small bilateral pleural effusions and pulmonary vascular congestion.  Atelectatic changes at the lower lobes. These findings were present  previously as well. There is no evidence for a pulmonary contusion and  there is no pneumothorax.  3. There is no convincing evidence for traumatic visceral injury within  the abdomen or pelvis.     Discussed with RAFA Kahn.       XR Chest 1 View [[id number]] Collected: 01/19/22 1127     Updated: 01/19/22 1132    Narrative:      PORTABLE CHEST 01/19/2022 AT 1107 HOURS     CLINICAL HISTORY: Patient fell. Right-sided rib pain.     Compared to the previous chest dated 11/08/2021.     The lungs are fairly well-expanded and appear free of focal infiltrates.  There are no pleural effusions. There is no pneumothorax. The heart is  moderately enlarged and unchanged. The pulmonary vasculature is within  normal limits.     This report was finalized on 1/19/2022 11:28 AM by Dr. Sam Chavez M.D.       XR Foot 3+ View Left [[id number]] Collected: 01/19/22 1122     Updated: 01/19/22 1126    Narrative:      LEFT FOOT X-RAYS     CLINICAL HISTORY: Foot trauma. Foot pain.     A total 4 views of the left lower pain. There is moderate diffuse  osteopenia. There is an acute mildly angulated fracture of the proximal  shaft of the proximal phalanx of the fifth toe. The fracture does not  extend into the adjacent metatarsophalangeal joint space which appears  intact. No other fractures are identified. The joint spaces all appear  fairly well-preserved.     This report was finalized on 1/19/2022 11:23 AM by Dr. Sam Chavez M.D.               Assessment:    Closed fracture of multiple ribs of right side    Hearing  loss    Essential hypertension    Cardiomyopathy (HCC)    History of coronary artery stent placement    Dyslipidemia    History of CVA (cerebrovascular accident)    Paroxysmal atrial fibrillation (HCC)    Stuttering    Fall    Chest wall pain    Closed fracture of phalanx of toe of left foot      Plan:    Patient was brought in for observation and pain control for multiple rib fractures on right side   -Currently on room air   -Past history of pneumothorax status post rib fractures so we will recheck chest x-ray in a.m.   -Scheduled Tylenol to provide anti-inflammatory response as well as Lidoderm with use of Norco for any breakthrough   -Consult I-S -he is familiar with this   -Fall precautions    HTN-CAD stable continue medications   -On ASA but no AC prior to admission    HLD on statin    CODE STATUS DNR      PT/OT to evaluate -recurrent falls and patient may be approaching the point of concerns for safety in the home setting as he lives independently in a home on [place] acres.  Son was at bedside in the ER and he is very aware and realistic in regards to the falls and I but would not doubt if this patient has numerous falls that he does not even tell the son about but I am also not sure he would be amenable to anything other than going home    Geovanni Flores MD  1/19/2022  13:31 EST

## 2022-01-19 NOTE — ED PROVIDER NOTES
I supervised care provided by the midlevel provider.   We have discussed this patient's history, physical exam, and treatment plan.  I have reviewed the note and personally saw and examined the patient and agree with the plan of care.   I have seen and examined this gentleman and also spoke with his son who is here in the emergency department.  This gentleman lives alone.  He ambulates with a walker at baseline and his mobility is impaired at baseline.  He fell sometimes yesterday afternoon.  His son last saw him at about 11 AM or noon and he was doing fine.  He complete planes of some pain and injury to his right lower lateral chest and injured his left little toe with some bleeding.  He denies any head injury, denies any focal motor or sensory changes.    GENERAL: Elderly male that is frail and chronically ill-appearing not distressed  HENT: nares patent  Head/neck/ face are symmetric without gross deformity or swelling  EYES: no scleral icterus  CV: regular rhythm, regular rate with intact distal pulses.  To his right side of his chest he has some bruising and ecchymosis that is mild.  He has reproducible tenderness to palpation to his right lateral lower chest.  I do not feel any subcutaneous air and there is no crepitance.  RESPIRATORY: normal effort and no respiratory distress.  Patient is splinting when he takes a deep breath I do hear lungs that are clear in the shallow breaths bilaterally.  ABDOMEN: soft and tenderness right at the costophrenic angle a little above and just a little below.  Patient's rest of abdominal exam is benign and is unremarkable.  MUSCULOSKELETAL: no deformity.  Patient has a tear of the webspace and on the plantar aspect of his fourth and fifth toe on the left.  Bleeding currently is well controlled.  Capillary refill is intact.  NEURO: alert and appropriate, moves all extremities, follows commands.  No focal motor or sensory changes.  SKIN: warm, dry    Vital signs and nursing  notes reviewed.    Plan informed the patient of the test that we will order.  He is currently hemodynamically stable in no respiratory distress.  I clinically suspect this gentleman has rib fractures.  This is an elderly male that lives alone I informed the patient and his son that he will need to be admitted to the hospital.  We have CT scans and blood test pending at this time.  All questions answered.    EKG reading  EKG was done at 1119  Is a rate of 74 it is sinus rhythm he has ventricular trigeminy.  He has left axis deviation and LVH  He has intraventricular conduction delay with appearance of a left bundle branch block there is a lot of artifact present as well I do not believe he is got first-degree AV block  I do not appreciate any obvious injury pattern on this EKG  I compared to his previous EKG from November 2019.  He has a little more prolongation of his intraventricular conduction delay here (QRS complex).  Otherwise the QRS morphology is fairly similar.    ED Course as of 01/19/22 1846   Wed Jan 19, 2022   1032 Patient presents with injury sustained in fall last night.  Patient unsure of exact mechanism.  Patient complains of right abdominal and chest pain.  Patient also had a separate incident today where he hit his left pinky toe on an unknown object.  Unclear if syncope versus mechanical fall.  Plan to check basic labs, EKG, reevaluate. [EE]   1152 Creatinine(!): 0.75 [EE]   1152 Hemoglobin: 13.6 [EE]   1152 Creatine Kinase: 62 [EE]   1152 WBC: 6.18 [EE]   1152 COVID19: Not Detected [EE]   1158 I reviewed the x-ray myself as well as the radiologist report.  No obvious pneumothorax.  Patient does have the appearance of some cardiomegaly.  Please see complete dictated report from the radiologist. [MM]   1159 I reviewed the foot x-ray patient has diffuse ostomy valdez with angulated fracture with mild displacement of the proximal shaft of the proximal phalanx of the fifth toe.  Please see complete  dictated report from the radiologist. [MM]   1232 I discussed CT findings with Dr. Castrejon.  Patient has acute fractures of the right eighth, ninth, 10th ribs.  No pneumothorax or pulmonary contusion.  No intra-abdominal abnormalities. [EE]   1245 I discussed case with SARA Romano.  He agrees to admit the patient.  He requests a MedSurg, observation bed. [EE]      ED Course User Index  [EE] Nik Bob, PA  [MM] Jose Manuel Gomez MD     We are currently under a pandemic from the COVID19 infection.  The patient presented to the emergency department by ambulance or personal vehicle. I followed the current protocols required by Infection Control at [facility] in my evaluation and treatment of the patient. The patient was wearing a face mask during my evaluation and throughout my encounter. During my whole encounter with this patient I used appropriate personal protective equipment.  This equipment consisted of eye protection, facemask, gown, and gloves.  I applied this equipment before entering the room.    DICTATED UTILIZING DRAGON DICTATION  Much for all of this encounter note is an electronic transcription/translation of spoken language to printed text using Dragon dictation technology.  The electronic translation of spoken language may permit an inaccurate, erroneous, or at times, nonsensical words or phrases to be inadvertently transcribed.  Although, I have reviewed the note for such errors, some may still exist.     Jose Manuel Gomez MD  01/19/22 [id number]

## 2022-01-19 NOTE — ED NOTES
Pt to ED s/p fall last night at home, pt lives alone. States was getting up from his dining room table and fell, thinks he just tripped, denies head injury or LOC. Pt has pain to L foot small toe, lac noted between last two toes. Pt has bruising noted to RUQ. Pt had another fall where he stubbed his toe on the floor/carpet this am     Pt wearing face mask during their stay in ER. This RN wore capr and gloves while providing patient care.        Erika Umaña, RN  01/19/22 1052       Erika Umaña, RN  01/19/22 1054       Erika Umaña, RN  01/19/22 1106       Erika Umaña, RN  01/19/22 1101

## 2022-01-19 NOTE — ED PROVIDER NOTES
EMERGENCY DEPARTMENT ENCOUNTER    Room Number:  P795/1  Date of encounter:  1/19/2022  PCP: Phyllis Loving PA  Historian: Patient      I used full protective equipment while examining this patient.  This includes face mask, gloves and protective eyewear.  I washed my hands before entering the room and immediately upon leaving the room      HPI:  Chief Complaint: Fall  A complete HPI/ROS/PMH/PSH/SH/FH are unobtainable due to: Patient is a poor historian, aphasia    Context: Dinesh Churchill Sr. is a 89 y.o. male who presents to the ED c/o injuries sustained in a fall last night.  Patient states he had stood up from the kitchen table to go to refrigerator when he suddenly fell.  Patient is unsure if he passed out or had a mechanical fall.  Patient does have chronic moderate expressive aphasia which limits his history.  Patient denies any chest pain or dizziness prior to the fall.  Patient complains of right rib and right abdominal pain.  He denies any head or neck trauma.  Patient is unsure how long he was on the ground.  Patient lives by himself.  Patient called EMS this morning.  In addition patient states he hit his left 5th toe on an unknown object at this morning.  Patient has a laceration to the base of his left 5th toe.    Review of Medical Records  Reviewed patient's last ER visit from 11/8/2021.  Patient seen for mechanical fall with right rib fractures.    PAST MEDICAL HISTORY  Active Ambulatory Problems     Diagnosis Date Noted   • Abnormal magnetic resonance imaging study 03/18/2016   • Arthritis 03/18/2016   • Osteoarthritis of cervical spine 03/18/2016   • Diplopia 03/18/2016   • Hearing loss 03/18/2016   • Neoplasm of uncertain behavior of skin 03/18/2016   • Prediabetes 03/18/2016   • Vitamin D deficiency 03/18/2016   • Chronic fatigue 10/27/2016   • History of supraventricular tachycardia 04/07/2017   • Essential hypertension 04/07/2017   • B12 deficiency 04/19/2017   • Abnormal cardiac function test  05/08/2017   • Cardiomyopathy ([facility]) 05/08/2017   • Coronary artery disease involving native coronary artery of native heart without angina pectoris 05/31/2017   • History of coronary artery stent placement 05/31/2017   • History of renal calculi 05/31/2017   • Dyslipidemia 05/31/2017   • Macrocytic anemia 08/09/2017   • Precordial pain 08/16/2017   • Parkinson's disease ([facility]) 10/25/2017   • History of CVA (cerebrovascular accident) 10/25/2017   • Paroxysmal atrial fibrillation ([facility]) 11/29/2017   • Otalgia, left 12/29/2017   • Chest pain 08/06/2019   • Abnormal stress test 08/06/2019   • Spontaneous pneumothorax 08/18/2019   • Chest pain 08/18/2019   • Acute systolic (congestive) heart failure ([facility]) 08/19/2019   • Thrombocytopenia ([facility]) 08/20/2019   • Pneumonia of left lower lobe due to infectious organism 09/06/2019   • Syncope and collapse 11/06/2019   • Status post placement of implantable loop recorder 11/22/2019   • Long term (current) use of antithrombotics/antiplatelets 03/13/2020   • Stuttering 04/09/2021     Resolved Ambulatory Problems     Diagnosis Date Noted   • Maxillary sinusitis 03/18/2016   • Dizziness 10/27/2016   • Acute frontal sinusitis 10/27/2016   • Precordial pain 04/07/2017   • SOB (shortness of breath) 04/07/2017   • Acute rhinitis 12/29/2017     Past Medical History:   Diagnosis Date   • Abnormal ECG    • Abnormal MRI    • Coronary artery disease    • Head injury    • Hearing aid worn    • Hyperlipidemia    • Hypertension    • Kidney stones    • Macrocytosis    • Neoplasm of skin    • Osteoporosis          PAST SURGICAL HISTORY  Past Surgical History:   Procedure Laterality Date   • CARDIAC CATHETERIZATION N/A 5/22/2017    Procedure: Left Heart Cath;  Surgeon: Maninder Quezada MD;  Location: [facility] CATH INVASIVE LOCATION;  Service:    • CARDIAC CATHETERIZATION N/A 5/23/2017    Procedure: Stent BMS coronary;  Surgeon: Maninder Quezada MD;  Location: [facility] INVASIVE LOCATION;   Service:    • CARDIAC CATHETERIZATION N/A 8/8/2019    Procedure: Left Heart Cath;  Surgeon: Maninder Quezada MD;  Location:  [facility] CATH INVASIVE LOCATION;  Service: Cardiology   • CARDIAC CATHETERIZATION N/A 8/8/2019    Procedure: Left ventriculography;  Surgeon: Maninder Quezada MD;  Location:  [facility] CATH INVASIVE LOCATION;  Service: Cardiology   • CARDIAC CATHETERIZATION N/A 8/8/2019    Procedure: Coronary angiography;  Surgeon: Maninder Quezada MD;  Location:  [facility] CATH INVASIVE LOCATION;  Service: Cardiology   • CARDIAC CATHETERIZATION N/A 8/20/2019    Procedure: CORONARY ANGIOGRAPHY;  Surgeon: Maninder Quezada MD;  Location:  [facility] CATH INVASIVE LOCATION;  Service: Cardiovascular   • CARDIAC CATHETERIZATION N/A 8/20/2019    Procedure: Stent BMS coronary;  Surgeon: Maninder Quezada MD;  Location:  [facility] CATH INVASIVE LOCATION;  Service: Cardiovascular   • CARDIAC ELECTROPHYSIOLOGY PROCEDURE N/A 11/8/2019    Procedure: Loop insertion;  Surgeon: Maninder Quezada MD;  [facility];  Service: Cardiovascular   • HERNIA REPAIR      x 2   • AK RT/LT HEART CATHETERS N/A 5/23/2017    Procedure: Percutaneous Coronary Intervention;  Surgeon: Maninder Quezada MD;  [facility];  Service: Cardiovascular         FAMILY HISTORY  Family History   Family history unknown: Yes         SOCIAL HISTORY  Social History     Socioeconomic History   • Marital status:    Tobacco Use   • Smoking status: Never Smoker   • Smokeless tobacco: Never Used   Vaping Use   • Vaping Use: Never used   Substance and Sexual Activity   • Alcohol use: No     Comment: quit 30 years ago    • Drug use: No   • Sexual activity: Defer         ALLERGIES  Patient has no known allergies.        REVIEW OF SYSTEMS  All systems reviewed and negative except for those discussed in HPI.       PHYSICAL EXAM    I have reviewed the triage vital signs and nursing  notes.    ED Triage Vitals [01/19/22 1008]   Temp Heart Rate Resp BP SpO2   97 °F (36.1 °C) 67 18 135/67 95 %      Temp src Heart Rate Source Patient Position BP Location FiO2 (%)   -- -- -- -- --       Physical Exam    GENERAL: Alert, oriented, elderly, not distressed  HENT: head atraumatic, no nuchal rigidity  EYES: no scleral icterus, EOMI  CV: regular rhythm, regular rate, no murmur  RESPIRATORY: normal effort, CTA.  Ecchymosis and moderate tenderness to right anterior lateral ribs and upper right abdomen.  No flail segment or deformity.  ABDOMEN: soft, no distention, normal bowel sounds  MUSCULOSKELETAL: Laceration to the plantar surface of proximal fifth toe at junction of the foot.  Neurovascular intact distally.  Remainder of extremities are normal.  NEURO: alert, moves all extremities, follows commands, mild expressive aphasia  SKIN: warm, dry        LAB RESULTS  Recent Results (from the past 24 hour(s))   Comprehensive Metabolic Panel    Collection Time: 01/19/22 10:52 AM    Specimen: Blood   Result Value Ref Range    Glucose 115 (H) 65 - 99 mg/dL    BUN 20 8 - 23 mg/dL    Creatinine 0.75 (L) 0.76 - 1.27 mg/dL    Sodium 140 136 - 145 mmol/L    Potassium 4.1 3.5 - 5.2 mmol/L    Chloride 106 98 - 107 mmol/L    CO2 25.0 22.0 - 29.0 mmol/L    Calcium 8.8 8.2 - 9.6 mg/dL    Total Protein 6.4 6.0 - 8.5 g/dL    Albumin 3.90 3.50 - 5.20 g/dL    ALT (SGPT) 12 1 - 41 U/L    AST (SGOT) 15 1 - 40 U/L    Alkaline Phosphatase 57 39 - 117 U/L    Total Bilirubin 1.7 (H) 0.0 - 1.2 mg/dL    eGFR Non African Amer 97 >60 mL/min/1.73    Globulin 2.5 gm/dL    A/G Ratio 1.6 g/dL    BUN/Creatinine Ratio 26.7 (H) 7.0 - 25.0    Anion Gap 9.0 5.0 - 15.0 mmol/L   Troponin    Collection Time: 01/19/22 10:52 AM    Specimen: Blood   Result Value Ref Range    Troponin T 0.016 0.000 - 0.030 ng/mL   CK    Collection Time: 01/19/22 10:52 AM    Specimen: Blood   Result Value Ref Range    Creatine Kinase 62 20 - 200 U/L   Green Top (Gel)     Collection Time: 01/19/22 10:52 AM   Result Value Ref Range    Extra Tube Hold for add-ons.    Gold Top - SST    Collection Time: 01/19/22 10:52 AM   Result Value Ref Range    Extra Tube Hold for add-ons.    Light Blue Top    Collection Time: 01/19/22 10:52 AM   Result Value Ref Range    Extra Tube hold for add-on    CBC Auto Differential    Collection Time: 01/19/22 10:53 AM    Specimen: Blood   Result Value Ref Range    WBC 6.18 3.40 - 10.80 10*3/mm3    RBC 3.96 (L) 4.14 - 5.80 10*6/mm3    Hemoglobin 13.6 13.0 - 17.7 g/dL    Hematocrit 41.3 37.5 - 51.0 %    .3 (H) 79.0 - 97.0 fL    MCH 34.3 (H) 26.6 - 33.0 pg    MCHC 32.9 31.5 - 35.7 g/dL    RDW 14.3 12.3 - 15.4 %    RDW-SD 54.0 37.0 - 54.0 fl    MPV 10.8 6.0 - 12.0 fL    Platelets 134 (L) 140 - 450 10*3/mm3    Neutrophil % 75.1 42.7 - 76.0 %    Lymphocyte % 15.7 (L) 19.6 - 45.3 %    Monocyte % 7.4 5.0 - 12.0 %    Eosinophil % 1.3 0.3 - 6.2 %    Basophil % 0.2 0.0 - 1.5 %    Immature Grans % 0.3 0.0 - 0.5 %    Neutrophils, Absolute 4.64 1.70 - 7.00 10*3/mm3    Lymphocytes, Absolute 0.97 0.70 - 3.10 10*3/mm3    Monocytes, Absolute 0.46 0.10 - 0.90 10*3/mm3    Eosinophils, Absolute 0.08 0.00 - 0.40 10*3/mm3    Basophils, Absolute 0.01 0.00 - 0.20 10*3/mm3    Immature Grans, Absolute 0.02 0.00 - 0.05 10*3/mm3    nRBC 0.3 (H) 0.0 - 0.2 /100 WBC   COVID-19,[facility] IN-HOUSE CEPHEID/KIM NP SWAB IN TRANSPORT MEDIA 8-12 HR TAT - Swab, Nasopharynx    Collection Time: 01/19/22 10:53 AM    Specimen: Nasopharynx; Swab   Result Value Ref Range    COVID19 Not Detected Not Detected - Ref. Range   Lavender Top    Collection Time: 01/19/22 10:53 AM   Result Value Ref Range    Extra Tube hold for add-on    ECG 12 Lead    Collection Time: 01/19/22 11:19 AM   Result Value Ref Range    QT Interval 368 ms   Urinalysis With Microscopic If Indicated (No Culture) - Urine, Clean Catch    Collection Time: 01/19/22 12:09 PM    Specimen: Urine, Clean Catch   Result Value Ref Range     Color, UA Yellow Yellow, Straw    Appearance, UA Clear Clear    pH, UA 7.0 5.0 - 8.0    Specific Gravity, UA 1.026 1.005 - 1.030    Glucose, UA Negative Negative    Ketones, UA Negative Negative    Bilirubin, UA Negative Negative    Blood, UA Negative Negative    Protein, UA Negative Negative    Leuk Esterase, UA Negative Negative    Nitrite, UA Negative Negative    Urobilinogen, UA 1.0 E.U./dL 0.2 - 1.0 E.U./dL       Ordered the above labs and independently reviewed the results.        RADIOLOGY  XR Foot 3+ View Left    Result Date: 1/19/2022  LEFT FOOT X-RAYS  CLINICAL HISTORY: Foot trauma. Foot pain.  A total 4 views of the left lower pain. There is moderate diffuse osteopenia. There is an acute mildly angulated fracture of the proximal shaft of the proximal phalanx of the fifth toe. The fracture does not extend into the adjacent metatarsophalangeal joint space which appears intact. No other fractures are identified. The joint spaces all appear fairly well-preserved.  This report was finalized on 1/19/2022 11:23 AM by Dr. Sam Chavez M.D.      CT Chest With Contrast Diagnostic, CT Abdomen Pelvis With Contrast    Result Date: 1/19/2022  CT CHEST, ABDOMEN, AND PELVIS WITH IV CONTRAST  HISTORY: 89-year-old male status post fall. Right-sided abdominal chest pain and abdominal pain.  TECHNIQUE: Radiation dose reduction techniques were utilized, including automated exposure control and exposure modulation based on body size. 3 mm images were obtained through the chest, abdomen, and pelvis after the administration of IV contrast. Compared with previous CTs from 09/18/2020.  FINDINGS: CHEST: There are multiple old right rib fractures and there are likely a few healing subacute right rib fractures. There are acute nonsignificantly displaced fractures at the lateral aspects of the right 8th, 9th, and 10th ribs. There is possibly a subacute fracture at the anterior aspect of the right 5th rib. There are multiple old  left-sided rib fractures as well. There is no pneumothorax. There are small bilateral pleural effusions and there is pulmonary vascular congestion. There is crowding of lung markings and some groundglass density at the lower lobes. There is also compressive atelectasis at the left lower lobe by the cardiomegaly, stable. Small volume of mucus/secretions are noted within the dependent aspect of the trachea. There is no significant change in the enlarged mediastinal nodes with a right subcarinal node measuring approximately 3.0 x 1.5 cm. There is stable diffuse ectasia of the thoracic aorta.  ABDOMEN/PELVIS: The liver and gallbladder appear unremarkable and there is no perihepatic fluid. There is no acute abnormality involving the spleen, pancreas, or adrenals. There is significant hyperplasia of both adrenal glands which is stable. An ectatic side branch at the pancreatic tail is suspected and likely stable. No acute bowel abnormality is seen. There is moderately extensive sigmoid diverticulosis without evidence for diverticulitis. Appendix appears normal. Urinary bladder is trabeculated and there are at least 2 small diverticula. Prostatomegaly is stable.      1. There are acute nondisplaced fractures at the lateral aspect of the right 8th, 9th, and 10th ribs. There is possibly a subacute fracture at the anterior aspect of the right 5th rib. Multiple old bilateral rib fractures. 2. Small bilateral pleural effusions and pulmonary vascular congestion. Atelectatic changes at the lower lobes. These findings were present previously as well. There is no evidence for a pulmonary contusion and there is no pneumothorax. 3. There is no convincing evidence for traumatic visceral injury within the abdomen or pelvis.  Discussed with RAFA Kahn.      XR Chest 1 View    Result Date: 1/19/2022  PORTABLE CHEST 01/19/2022 AT 1107 HOURS  CLINICAL HISTORY: Patient fell. Right-sided rib pain.  Compared to the previous chest dated  11/08/2021.  The lungs are fairly well-expanded and appear free of focal infiltrates. There are no pleural effusions. There is no pneumothorax. The heart is moderately enlarged and unchanged. The pulmonary vasculature is within normal limits.  This report was finalized on 1/19/2022 11:28 AM by Dr. Sam Chavez M.D.      XR Chest PA & Lateral    Result Date: 1/19/2022  TWO-VIEW CHEST  HISTORY: Multiple right rib fractures. Right-sided pain.  FINDINGS: There are multiple right rib fractures as best seen on today's CT scan. There is no associated pneumothorax. There are small bilateral pleural effusions and some vague atelectasis at the bases and the lungs are otherwise clear. The heart is mildly enlarged with slight vascular congestion that is similar to the study of 11/08/2021.  This report was finalized on 1/19/2022 3:21 PM by Dr. Byron Romeo M.D.        I ordered the above noted radiological studies. Reviewed by me and discussed with radiologist.  See dictation for official radiology interpretation.      MEDICATIONS GIVEN IN ER    Medications   sodium chloride 0.9 % flush 10 mL (10 mL Intravenous Given 1/19/22 1111)   acetaminophen (TYLENOL) tablet 650 mg (has no administration in time range)   acetaminophen (TYLENOL) tablet 500 mg (500 mg Oral Given 1/19/22 1824)   HYDROcodone-acetaminophen (NORCO) 5-325 MG per tablet 1 tablet (1 tablet Oral Given 1/19/22 1605)   ondansetron (ZOFRAN) tablet 4 mg (has no administration in time range)     Or   ondansetron (ZOFRAN) injection 4 mg (has no administration in time range)   melatonin tablet 3 mg (has no administration in time range)   lidocaine (LIDODERM) 5 % 1 patch (has no administration in time range)   lidocaine (XYLOCAINE) 1 % injection 10 mL (10 mL Injection Given by Other 1/19/22 1104)   iopamidol (ISOVUE-300) 61 % injection 85 mL (85 mL Intravenous Given 1/19/22 1151)     Laceration Repair    Date/Time: 1/19/2022 7:00 PM  Performed by: Nik Bob  PA  Authorized by: Jose Manuel Gomez MD     Consent:     Consent obtained:  Verbal    Consent given by:  Patient  Anesthesia (see MAR for exact dosages):     Anesthesia method:  Local infiltration    Local anesthetic:  Lidocaine 1% w/o epi  Laceration details:     Location:  Foot    Foot location:  Sole of L foot    Length (cm):  3.8  Repair type:     Repair type:  Simple  Exploration:     Wound exploration: wound explored through full range of motion and entire depth of wound probed and visualized      Wound extent: no nerve damage noted    Treatment:     Area cleansed with:  Hibiclens    Amount of cleaning:  Extensive    Irrigation solution:  Sterile saline    Irrigation method:  Pressure wash  Skin repair:     Repair method:  Sutures    Suture size:  4-0    Suture material:  Nylon    Suture technique:  Simple interrupted    Number of sutures:  9  Approximation:     Approximation:  Close  Post-procedure details:     Dressing:  Antibiotic ointment    Patient tolerance of procedure:  Tolerated well, no immediate complications        PROGRESS, DATA ANALYSIS, CONSULTS, AND MEDICAL DECISION MAKING    All labs have been independently reviewed by me.  All radiology studies have been reviewed by me and discussed with radiologist dictating the report.   EKG's independently viewed and interpreted by me.  Discussion below represents my analysis of pertinent findings related to patient's condition, differential diagnosis, treatment plan and final disposition.    I have discussed case with Dr. Gomez, emergency room physician.  He has performed his own bedside examination and agrees with treatment plan.    ED Course as of 01/19/22 1900   Wed Jan 19, 2022   1032 Patient presents with injury sustained in fall last night.  Patient unsure of exact mechanism.  Patient complains of right abdominal and chest pain.  Patient also had a separate incident today where he hit his left pinky toe on an unknown object.  Unclear if syncope  versus mechanical fall.  Plan to check basic labs, EKG, reevaluate. [EE]   1152 Creatinine(!): 0.75 [EE]   1152 Hemoglobin: 13.6 [EE]   1152 Creatine Kinase: 62 [EE]   1152 WBC: 6.18 [EE]   1152 COVID19: Not Detected [EE]   1158 I reviewed the x-ray myself as well as the radiologist report.  No obvious pneumothorax.  Patient does have the appearance of some cardiomegaly.  Please see complete dictated report from the radiologist. [MM]   1159 I reviewed the foot x-ray patient has diffuse ostomy valdez with angulated fracture with mild displacement of the proximal shaft of the proximal phalanx of the fifth toe.  Please see complete dictated report from the radiologist. [MM]   1232 I discussed CT findings with Dr. Castrejon.  Patient has acute fractures of the right eighth, ninth, 10th ribs.  No pneumothorax or pulmonary contusion.  No intra-abdominal abnormalities. [EE]   1245 I discussed case with Dr. Flores [facility].  He agrees to admit the patient.  He requests a [facility], observation bed. [EE]      ED Course User Index  [EE] Nik Bob PA  [MM] Jose Manuel Gomez MD       AS OF 19:00 EST VITALS:    BP - 125/63  HR - 68  TEMP - 97.6 °F (36.4 °C) (Oral)  O2 SATS - 94%        DIAGNOSIS  Final diagnoses:   Fall, initial encounter   Closed fracture of multiple ribs of right side, initial encounter   Laceration of lesser toe of left foot without foreign body present or damage to nail, initial encounter   Nondisplaced fracture of proximal phalanx of left lesser toe(s), initial encounter for open fracture         DISPOSITION  Admitted         EMR Dragon/Transcription disclaimer:   Much of this encounter note is an electronic transcription/translation of spoken language to printed text. The electronic translation of spoken language may permit erroneous, or at times, nonsensical words or phrases to be inadvertently transcribed; Although I have reviewed the note for such errors, some may still exist.      Nik Bob PA  01/19/22  1904

## 2022-01-19 NOTE — PLAN OF CARE
Goal Outcome Evaluation:              Outcome Summary: Pt admitted from ED d/t fall at home, multiple rib fractures on R side. AxO x4. VSS. NVI. Very [place], no HA present. Came with skin tear in between 4-5th L toes, dressed in ED before coming to floor. Pain controlled with PO pain meds and scheduled tylenol. Bruises to the RUE and right ribcage. Voiding via urinal. Up with assist x1. PT to further evaluate pt. Educated pt on fall prevention and importance of using call light, verbalized understanding. All needs met at this time.

## 2022-01-20 PROCEDURE — 97530 THERAPEUTIC ACTIVITIES: CPT

## 2022-01-20 PROCEDURE — 97166 OT EVAL MOD COMPLEX 45 MIN: CPT

## 2022-01-20 PROCEDURE — 97162 PT EVAL MOD COMPLEX 30 MIN: CPT

## 2022-01-20 PROCEDURE — G0378 HOSPITAL OBSERVATION PER HR: HCPCS

## 2022-01-20 RX ORDER — ISOSORBIDE MONONITRATE 60 MG/1
60 TABLET, EXTENDED RELEASE ORAL DAILY
Status: DISCONTINUED | OUTPATIENT
Start: 2022-01-20 | End: 2022-01-21 | Stop reason: HOSPADM

## 2022-01-20 RX ORDER — AMLODIPINE BESYLATE 5 MG/1
5 TABLET ORAL
Status: DISCONTINUED | OUTPATIENT
Start: 2022-01-20 | End: 2022-01-21 | Stop reason: HOSPADM

## 2022-01-20 RX ORDER — SODIUM CHLORIDE 0.9 % (FLUSH) 0.9 %
10 SYRINGE (ML) INJECTION AS NEEDED
Status: DISCONTINUED | OUTPATIENT
Start: 2022-01-20 | End: 2022-01-21 | Stop reason: HOSPADM

## 2022-01-20 RX ORDER — LOSARTAN POTASSIUM 50 MG/1
50 TABLET ORAL DAILY
Status: DISCONTINUED | OUTPATIENT
Start: 2022-01-20 | End: 2022-01-21 | Stop reason: HOSPADM

## 2022-01-20 RX ORDER — SODIUM CHLORIDE 0.9 % (FLUSH) 0.9 %
10 SYRINGE (ML) INJECTION EVERY 12 HOURS SCHEDULED
Status: DISCONTINUED | OUTPATIENT
Start: 2022-01-20 | End: 2022-01-21 | Stop reason: HOSPADM

## 2022-01-20 RX ORDER — METOPROLOL SUCCINATE 100 MG/1
100 TABLET, EXTENDED RELEASE ORAL DAILY
Status: DISCONTINUED | OUTPATIENT
Start: 2022-01-20 | End: 2022-01-21 | Stop reason: HOSPADM

## 2022-01-20 RX ORDER — ATORVASTATIN CALCIUM 20 MG/1
20 TABLET, FILM COATED ORAL NIGHTLY
Status: DISCONTINUED | OUTPATIENT
Start: 2022-01-20 | End: 2022-01-21 | Stop reason: HOSPADM

## 2022-01-20 RX ORDER — GINSENG 100 MG
1 CAPSULE ORAL EVERY 12 HOURS SCHEDULED
Status: DISCONTINUED | OUTPATIENT
Start: 2022-01-20 | End: 2022-01-21 | Stop reason: HOSPADM

## 2022-01-20 RX ORDER — ASPIRIN 81 MG/1
81 TABLET, CHEWABLE ORAL DAILY
Status: DISCONTINUED | OUTPATIENT
Start: 2022-01-20 | End: 2022-01-21 | Stop reason: HOSPADM

## 2022-01-20 RX ADMIN — ACETAMINOPHEN 500 MG: 500 TABLET, FILM COATED ORAL at 08:22

## 2022-01-20 RX ADMIN — SODIUM CHLORIDE, PRESERVATIVE FREE 10 ML: 5 INJECTION INTRAVENOUS at 08:22

## 2022-01-20 RX ADMIN — AMLODIPINE BESYLATE 5 MG: 5 TABLET ORAL at 10:22

## 2022-01-20 RX ADMIN — METOPROLOL SUCCINATE 100 MG: 100 TABLET, EXTENDED RELEASE ORAL at 10:22

## 2022-01-20 RX ADMIN — ACETAMINOPHEN 500 MG: 500 TABLET, FILM COATED ORAL at 18:23

## 2022-01-20 RX ADMIN — LIDOCAINE 1 PATCH: 50 PATCH TOPICAL at 08:22

## 2022-01-20 RX ADMIN — BACITRACIN 1 APPLICATION: 500 OINTMENT TOPICAL at 10:22

## 2022-01-20 RX ADMIN — LOSARTAN POTASSIUM 50 MG: 50 TABLET, FILM COATED ORAL at 10:22

## 2022-01-20 RX ADMIN — ASPIRIN 81 MG: 81 TABLET, CHEWABLE ORAL at 10:22

## 2022-01-20 RX ADMIN — SODIUM CHLORIDE, PRESERVATIVE FREE 10 ML: 5 INJECTION INTRAVENOUS at 20:22

## 2022-01-20 RX ADMIN — ACETAMINOPHEN 500 MG: 500 TABLET, FILM COATED ORAL at 12:21

## 2022-01-20 RX ADMIN — ACETAMINOPHEN 500 MG: 500 TABLET, FILM COATED ORAL at 20:22

## 2022-01-20 RX ADMIN — ATORVASTATIN CALCIUM 20 MG: 20 TABLET, FILM COATED ORAL at 20:22

## 2022-01-20 RX ADMIN — ISOSORBIDE MONONITRATE 60 MG: 60 TABLET ORAL at 10:22

## 2022-01-20 RX ADMIN — BACITRACIN 1 APPLICATION: 500 OINTMENT TOPICAL at 20:23

## 2022-01-20 NOTE — PROGRESS NOTES
"    DAILY PROGRESS NOTE  [facility]    Patient Identification:  Name: Dinesh Churchill Sr.  Age: 89 y.o.  Sex: male  :  1928  MRN: [id number]         Primary Care Physician: Phyllis Loving PA    Subjective:  Interval History: Definitely notes increased pain control with use of Lidoderm.  Still having discomfort especially with deep breathing but overall is better today.  Denies any new complaints such as nausea vomiting confusion.    Objective: Very nice gentleman.  Calm and conversational.  Nontoxic.  No family currently at bedside    Scheduled Meds:acetaminophen, 500 mg, Oral, 4x Daily  amLODIPine, 5 mg, Oral, Q24H  aspirin, 81 mg, Oral, Daily  atorvastatin, 20 mg, Oral, Nightly  isosorbide mononitrate, 60 mg, Oral, Daily  lidocaine, 1 patch, Transdermal, Q24H  losartan, 50 mg, Oral, Daily  metoprolol succinate XL, 100 mg, Oral, Daily  sodium chloride, 10 mL, Intravenous, Q12H      Continuous Infusions:     Vital signs in last 24 hours:  Temp:  [97 °F (36.1 °C)-97.8 °F (36.6 °C)] 97.3 °F (36.3 °C)  Heart Rate:  [60-86] 70  Resp:  [16-18] 16  BP: (125-167)/(53-97) 153/71    Intake/Output:    Intake/Output Summary (Last 24 hours) at 2022 0858  Last data filed at 2022 0330  Gross per 24 hour   Intake --   Output 405 ml   Net -405 ml       Exam:  /71 (BP Location: Right arm, Patient Position: Lying)   Pulse 70   Temp 97.3 °F (36.3 °C) (Oral)   Resp 16   Ht 190.5 cm (75\")   Wt 83 kg (183 lb)   SpO2 97%   BMI 22.87 kg/m²     General Appearance:    Alert, cooperative, AAOx3                          Head:    Normocephalic, without obvious abnormality, atraumatic                           Eyes:    Conjunctivae/corneas clear, EOM's intact, both eyes                         Throat:   Oral mucosa pink and moist                           Neck:   Supple, no JVD                         Lungs:    Clear to auscultation bilaterally, respirations unlabored                 Chest Wall: "  Tenderness to palpation over fractures.  Still winces in pain but not as much as yesterday with deep breathing                          Heart:    Regular rate and rhythm, S1 and S2 normal                  Abdomen:     Soft, nontender, bowel sounds active                 Extremities: Left fifth digit buddy taped with sutures in place and good hemostasis and no cellulitis, no cyanosis or edema                        Pulses:   Pulses palpable in lower extremities                              Data Review:  Labs in chart were reviewed.    Assessment:  Active Hospital Problems    Diagnosis  POA   • **Closed fracture of multiple ribs of right side [S22.41XA]  Unknown   • Fall [W19.XXXA]  Yes   • Chest wall pain [R07.89]  Unknown   • Closed fracture of phalanx of toe of left foot [S92.912A]  Unknown   • Stuttering [F80.81]  Yes   • Paroxysmal atrial fibrillation (HCC) [I48.0]  Yes   • History of CVA (cerebrovascular accident) [Z86.73]  Not Applicable   • History of coronary artery stent placement [Z95.5]  Not Applicable   • Dyslipidemia [E78.5]  Yes   • Cardiomyopathy (HCC) [I42.9]  Yes   • Essential hypertension [I10]  Yes   • Hearing loss [H91.90]  Yes      Resolved Hospital Problems   No resolved problems to display.       Plan:    Improved pain control for multiple rib fractures -continue current regimen with scheduled Tylenol, Lidoderm and as needed for breakthrough(#1)    Counseled I-S importance again today    Chest x-ray obtained last night was meant to be for today.  I will repeat a chest x-ray tomorrow given his past history of pneumothorax with previous fracture but no complicating features at this time and no TTS consultation    Topical bacitracin to left toe    HTN-CAD stable continue medications              -On ASA but no AC prior to admission     HLD on statin     CODE STATUS DNR        PT to evaluate today.  CCP to coordinate discharge needs but I would anticipate home with home health    Geovanni Schilling  MD Mark  1/20/2022  08:58 EST

## 2022-01-20 NOTE — THERAPY EVALUATION
Patient Name: Dinesh Churchill Sr.  : 1928    MRN: [id number]                              Today's Date: 2022       Admit Date: 2022    Visit Dx:     ICD-10-CM ICD-9-CM   1. Fall, initial encounter  W19.XXXA E888.9   2. Closed fracture of multiple ribs of right side, initial encounter  S22.41XA 807.09   3. Laceration of lesser toe of left foot without foreign body present or damage to nail, initial encounter  S91.115A 893.0   4. Nondisplaced fracture of proximal phalanx of left lesser toe(s), initial encounter for open fracture  S92.515B 826.1     Patient Active Problem List   Diagnosis   • Abnormal magnetic resonance imaging study   • Arthritis   • Osteoarthritis of cervical spine   • Diplopia   • Hearing loss   • Neoplasm of uncertain behavior of skin   • Prediabetes   • Vitamin D deficiency   • Chronic fatigue   • History of supraventricular tachycardia   • Essential hypertension   • B12 deficiency   • Abnormal cardiac function test   • Cardiomyopathy (HCC)   • Coronary artery disease involving native coronary artery of native heart without angina pectoris   • History of coronary artery stent placement   • History of renal calculi   • Dyslipidemia   • Macrocytic anemia   • Precordial pain   • Parkinson's disease (HCC)   • History of CVA (cerebrovascular accident)   • Paroxysmal atrial fibrillation (HCC)   • Otalgia, left   • Chest pain   • Abnormal stress test   • Spontaneous pneumothorax   • Chest pain   • Acute systolic (congestive) heart failure (HCC)   • Thrombocytopenia (HCC)   • Pneumonia of left lower lobe due to infectious organism   • Syncope and collapse   • Status post placement of implantable loop recorder   • Long term (current) use of antithrombotics/antiplatelets   • Stuttering   • Fall   • Closed fracture of multiple ribs of right side   • Chest wall pain   • Closed fracture of phalanx of toe of left foot     Past Medical History:   Diagnosis Date   • Abnormal ECG    • Abnormal MRI     • Acute frontal sinusitis    • Arthritis    • Chronic fatigue    • Coronary artery disease    • Dizziness    • Head injury    • Hearing aid worn     left   • Hearing loss    • Hyperlipidemia    • Hypertension    • Kidney stones    • Macrocytosis    • Neoplasm of skin    • Osteoporosis    • Prediabetes    • Vitamin D deficiency      Past Surgical History:   Procedure Laterality Date   • CARDIAC CATHETERIZATION N/A 5/22/2017    Procedure: Left Heart Cath;  Surgeon: Maninder Quezada MD;  Location: [facility] CATH INVASIVE LOCATION;  Service:    • CARDIAC CATHETERIZATION N/A 5/23/2017    Procedure: Stent BMS coronary;  Surgeon: Maninder Quezada MD;  [facility];  Service:    • CARDIAC CATHETERIZATION N/A 8/8/2019    Procedure: Left Heart Cath;  Surgeon: Maninder Quezada MD;  [facility];  Service: Cardiology   • CARDIAC CATHETERIZATION N/A 8/8/2019    Procedure: Left ventriculography;  Surgeon: Maninder Quezada MD;  Location: [facility] CATH INVASIVE LOCATION;  Service: Cardiology   • CARDIAC CATHETERIZATION N/A 8/8/2019    Procedure: Coronary angiography;  Surgeon: Maninder Quezada MD;  Location: [facility] CATH INVASIVE LOCATION;  Service: Cardiology   • CARDIAC CATHETERIZATION N/A 8/20/2019    Procedure: CORONARY ANGIOGRAPHY;  Surgeon: Maninder Quezada MD;  Location: [facility] CATH INVASIVE LOCATION;  Service: Cardiovascular   • CARDIAC CATHETERIZATION N/A 8/20/2019    Procedure: Stent BMS coronary;  Surgeon: Maninder Quezada MD;  Location: [facility] CATH INVASIVE LOCATION;  Service: Cardiovascular   • CARDIAC ELECTROPHYSIOLOGY PROCEDURE N/A 11/8/2019    Procedure: Loop insertion;  Surgeon: Maninder Quezada MD;  Location: [facility] CATH INVASIVE LOCATION;  Service: Cardiovascular   • HERNIA REPAIR      x 2   • [place] RT/LT HEART CATHETERS N/A 5/23/2017    Procedure: Percutaneous Coronary Intervention;  Surgeon: Maninder Quezada MD;   Location: [facility] CATH INVASIVE LOCATION;  Service: Cardiovascular      General Information     Row Name 01/20/22 1637          Physical Therapy Time and Intention    Document Type evaluation  -DB     Mode of Treatment individual therapy; physical therapy  -DB     Row Name 01/20/22 1637          General Information    Patient Profile Reviewed yes  -DB     Prior Level of Function independent:; ADL's  -DB     Existing Precautions/Restrictions fall  -DB     Barriers to Rehab none identified  -DB     Row Name 01/20/22 1637          Living Environment    Lives With alone  -DB     Row Name 01/20/22 1637          Home Main Entrance    Number of Stairs, Main Entrance five  -DB     Stair Railings, Main Entrance railings safe and in good condition  -DB     Row Name 01/20/22 1637          Stairs Within Home, Primary    Number of Stairs, Within Home, Primary none  -DB     Row Name 01/20/22 1637          Cognition    Orientation Status (Cognition) oriented x 3  -DB     Row Name 01/20/22 1637          Safety Issues, Functional Mobility    Safety Issues Affecting Function (Mobility) awareness of need for assistance  -DB     Impairments Affecting Function (Mobility) balance; strength; pain; endurance/activity tolerance  -DB           User Key  (r) = Recorded By, (t) = Taken By, (c) = Cosigned By    Initials Name Provider Type    DB Lima Oropeza PT Physical Therapist               Mobility     Row Name 01/20/22 1638          Bed Mobility    Bed Mobility supine-sit; sit-supine  -DB     Supine-Sit [place] (Bed Mobility) contact guard; verbal cues  -DB     Sit-Supine [place] (Bed Mobility) contact guard; verbal cues  -DB     Assistive Device (Bed Mobility) bed rails; head of bed elevated  -DB     Row Name 01/20/22 1638          Sit-Stand Transfer    Sit-Stand [place] (Transfers) minimum assist (75% patient effort); 1 person assist; verbal cues  -DB     Assistive Device (Sit-Stand Transfers) walker, front-wheeled  -DB      Row Name 01/20/22 1638          Gait/Stairs (Locomotion)    [place] Level (Gait) minimum assist (75% patient effort); verbal cues  -DB     Assistive Device (Gait) walker, front-wheeled  -DB     Distance in Feet (Gait) 30'  -DB     Deviations/Abnormal Patterns (Gait) festinating/shuffling; gait speed decreased; naldo decreased; base of support, narrow; stride length decreased  -DB     Bilateral Gait Deviations heel strike decreased; forward flexed posture  -DB     Comment (Gait/Stairs) very shuffled steps, VC's for improved gait  -DB           User Key  (r) = Recorded By, (t) = Taken By, (c) = Cosigned By    Initials Name Provider Type    DB Lima Oropeza PT Physical Therapist               Obj/Interventions     Row Name 01/20/22 1639          Range of Motion Comprehensive    General Range of Motion no range of motion deficits identified  -DB     Row Name 01/20/22 1639          Strength Comprehensive (MMT)    Comment, General Manual Muscle Testing (MMT) Assessment generalized weakness  -DB     Row Name 01/20/22 1639          Balance    Balance Assessment sitting static balance; sitting dynamic balance; standing static balance; standing dynamic balance  -DB     Static Sitting Balance WFL; sitting, edge of bed  -DB     Dynamic Sitting Balance WFL; sitting, edge of bed  -DB     Static Standing Balance mild impairment; supported  -DB     Dynamic Standing Balance mild impairment; supported  -DB     Balance Interventions sitting; standing; sit to stand  -DB           User Key  (r) = Recorded By, (t) = Taken By, (c) = Cosigned By    Initials Name Provider Type    DB Lima Oropeza, PT Physical Therapist               Goals/Plan     Row Name 01/20/22 1644          Bed Mobility Goal 1 (PT)    Activity/Assistive Device (Bed Mobility Goal 1, PT) bed mobility activities, all  -DB     [place] Level/Cues Needed (Bed Mobility Goal 1, PT) supervision required  -DB     Time Frame (Bed Mobility Goal 1, PT) 1 week   -DB     Row Name 01/20/22 1644          Transfer Goal 1 (PT)    Activity/Assistive Device (Transfer Goal 1, PT) transfers, all  -DB     [place] Level/Cues Needed (Transfer Goal 1, PT) supervision required  -DB     Time Frame (Transfer Goal 1, PT) 1 week  -DB     Row Name 01/20/22 1644          Gait Training Goal 1 (PT)    Activity/Assistive Device (Gait Training Goal 1, PT) gait (walking locomotion)  -DB     [place] Level (Gait Training Goal 1, PT) contact guard assist  -DB     Time Frame (Gait Training Goal 1, PT) 1 week  -DB           User Key  (r) = Recorded By, (t) = Taken By, (c) = Cosigned By    Initials Name Provider Type    DB Lima Oropeza, PT Physical Therapist               Clinical Impression     Row Name 01/20/22 1640          Plan of Care Review    Plan of Care Reviewed With patient  -DB     Outcome Summary Pt is a 89 y/o M admitted to [facility] for a fall resulting in rib fx and L toe fx. Pt lives alone in mobile home with 5 SOFY. Pt has 4 sons that assist with errands. Today, pt was CGA for bed mobility, Dary for STS, and Dary for ambulating 30' with RW and VC's for safety. Pt demo's dec'd strength, endurance, balance, and inc'd pain. Pt not safe to D/C home alone, rec D/C to SNF. Pt may be safe to D/C home with sons pending progress as long has he will have 24/7 assist.  -DB     Row Name 01/20/22 1640          Therapy Assessment/Plan (PT)    Rehab Potential (PT) good, to achieve stated therapy goals  -DB     Criteria for Skilled Interventions Met (PT) yes  -DB     Row Name 01/20/22 1640          Vital Signs    O2 Delivery Pre Treatment room air  -DB     O2 Delivery Intra Treatment room air  -DB     O2 Delivery Post Treatment room air  -DB     Pre Patient Position Supine  -DB     Intra Patient Position Standing  -DB     Post Patient Position Supine  -DB     Row Name 01/20/22 1640          Positioning and Restraints    Pre-Treatment Position in bed  -DB     Post Treatment Position bed  -DB      In Bed supine; call light within reach; encouraged to call for assist; exit alarm on; notified nsg  -DB           User Key  (r) = Recorded By, (t) = Taken By, (c) = Cosigned By    Initials Name Provider Type    Lima Matt, PT Physical Therapist               Outcome Measures     Row Name 01/20/22 1644          How much help from another person do you currently need...    Turning from your back to your side while in flat bed without using bedrails? 3  -DB     Moving from lying on back to sitting on the side of a flat bed without bedrails? 3  -DB     Moving to and from a bed to a chair (including a wheelchair)? 3  -DB     Standing up from a chair using your arms (e.g., wheelchair, bedside chair)? 3  -DB     Climbing 3-5 steps with a railing? 2  -DB     To walk in hospital room? 3  -DB     AM-PAC 6 Clicks Score (PT) 17  -DB     Row Name 01/20/22 1644 01/20/22 1026       Functional Assessment    Outcome Measure Options AM-PAC 6 Clicks Basic Mobility (PT)  -DB AM-PAC 6 Clicks Daily Activity (OT)  -SM (r) LR (t) SM (c)          User Key  (r) = Recorded By, (t) = Taken By, (c) = Cosigned By    Initials Name Provider Type    Lima Matt, PT Physical Therapist    Yesenia Hernandez, OT Occupational Therapist    Iris Mondragon, OT Student OT Student                             Physical Therapy Education                 Title: PT OT SLP Therapies (In Progress)     Topic: Physical Therapy (Done)     Point: Mobility training (Done)     Learning Progress Summary           Patient Acceptance, E, VU by DB at 1/20/2022 1645                   Point: Home exercise program (Done)     Learning Progress Summary           Patient Acceptance, E, VU by DB at 1/20/2022 1645                   Point: Body mechanics (Done)     Learning Progress Summary           Patient Acceptance, E, VU by DB at 1/20/2022 1645                   Point: Precautions (Done)     Learning Progress Summary           Patient Acceptance, E, VU by  DB at 1/20/2022 1645                               User Key     Initials Effective Dates Name Provider Type Discipline    DB 06/16/21 -  Lima Oropeza PT Physical Therapist PT              PT Recommendation and Plan  Planned Therapy Interventions (PT): balance training, bed mobility training, gait training, home exercise program, postural re-education, patient/family education, neuromuscular re-education, stair training, strengthening, transfer training  Plan of Care Reviewed With: patient  Outcome Summary: Pt is a 89 y/o M admitted to [facility] for a fall resulting in rib fx and L toe fx. Pt lives alone in mobile home with 5 SOFY. Pt has 4 sons that assist with errands. Today, pt was CGA for bed mobility, Dary for STS, and Dary for ambulating 30' with RW and VC's for safety. Pt demo's dec'd strength, endurance, balance, and inc'd pain. Pt not safe to D/C home alone, rec D/C to SNF. Pt may be safe to D/C home with sons pending progress as long has he will have 24/7 assist.     Time Calculation:    PT Charges     Row Name 01/20/22 1645             Time Calculation    Start Time 1402  -DB      Stop Time 1415  -DB      Time Calculation (min) 13 min  -DB      PT Received On 01/20/22  -DB      PT - Next Appointment 01/21/22  -DB      PT Goal Re-Cert Due Date 01/27/22  -DB              Time Calculation- PT    Total Timed Code Minutes- PT 8 minute(s)  -DB            User Key  (r) = Recorded By, (t) = Taken By, (c) = Cosigned By    Initials Name Provider Type    DB Lima Oropeza PT Physical Therapist              Therapy Charges for Today     Code Description Service Date Service Provider Modifiers Qty    02643658995  PT EVAL MOD COMPLEXITY 2 1/20/2022 Lima Oropeza, PT GP 1    [id number]  PT THERAPEUTIC ACT EA 15 MIN 1/20/2022 Lima Oropeza, PT GP 1          PT G-Codes  Outcome Measure Options: AM-PAC 6 Clicks Basic Mobility (PT)  AM-PAC 6 Clicks Score (PT): 17  AM-PAC 6 Clicks Score (OT): 19    Lima  Johnna, PT  1/20/2022

## 2022-01-20 NOTE — PLAN OF CARE
Goal Outcome Evaluation:  Plan of Care Reviewed With: patient           Outcome Summary: Pt is a 89 y/o M admitted to [facility] for a fall resulting in rib fx and L toe fx. Pt lives alone in mobile home with 5 SOFY. Pt has 4 sons that assist with errands. Today, pt was CGA for bed mobility, Dary for STS, and Dary for ambulating 30' with RW and VC's for safety. Pt demo's dec'd strength, endurance, balance, and inc'd pain. Pt not safe to D/C home alone, rec D/C to SNF. Pt may be safe to D/C home with sons pending progress as long has he will have 24/7 assist.

## 2022-01-20 NOTE — PLAN OF CARE
Goal Outcome Evaluation:  Plan of Care Reviewed With: (P) patient           Outcome Summary: (P) Pt is 89 y. o. M admitted to [facility] for fall with R side rib fx and L toe non-displaced fx. Pt lives alone and his son assists with shopping and community errands for pt. Prior to admit pt independent in ADLs. 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 Pt declines transfer to bathroom for toileting or hygiene. Pt required min A for LB dressing sitting EOB. Pt agreeable to transfer to door and bedside chair. Pt with decreased activity tolerance and functional transfers, starting for the door with min A and rwx, and then changing direction to sit in bedside chair. Pt is very [place] and did not follow verbal cues to scoot all the way to chair before sitting. Pt has experienced 3 falls in the last year. Recommend continued skilled OT services to increase independence in self-care, functional mobility, and increase activity tolerance. Recommend subacute rehab at d/c.    OT work PPE including mask, gloves, eye protection, and complete hand hygiene prior to/after session. Pt wore a mask during evaluation.

## 2022-01-20 NOTE — DISCHARGE PLACEMENT REQUEST
"Rochelle Churchill Sr. (89 y.o. Male)             Date of Birth Social Security Number Address Home Phone MRN    05/29/1928  [address] [facility] [address] [phone] [id number]    Anglican Marital Status             Mormon        Admission Date Admission Type Admitting Provider Attending Provider Department, Room/Bed    1/19/22 Emergency Geovanni Flores MD Masden, Troy Andrew, MD [address], P795/1    Discharge Date Discharge Disposition Discharge Destination                         Attending Provider: Geovanni Flores MD    Allergies: No Known Allergies    Isolation: None   Infection: None   Code Status: No CPR   Advance Care Planning Activity    Ht: 190.5 cm (75\")   Wt: 83 kg (183 lb)    Admission Cmt: None   Principal Problem: Closed fracture of multiple ribs of right side [S22.41XA]                 Active Insurance as of 1/19/2022     Primary Coverage     Payor Plan Insurance Group Employer/Plan Group    MEDICARE [place] MEDICARE      Payor Plan Address Payor Plan Phone Number Payor Plan Fax Number Effective Dates    [address] [phone]  10/1/1989 - None Entered    [place] [id number]       Subscriber Name Subscriber Birth Date Member ID       ROCHELLE CHURCHILL SR. 5/29/1928 [id number]                 Emergency Contacts      (Rel.) Home Phone Work Phone Mobile Phone    Rochelle Churchill Jr (Son) [phone] -- [phone]    Alejandro Pool (Friend) [phone] -- --              "

## 2022-01-20 NOTE — PLAN OF CARE
Goal Outcome Evaluation:  Plan of Care Reviewed With: patient        Progress: improving       Pt 89 y/o male admitted for  a fall with rt rib fx and skin tear on 4th and 5th toe. Aox4, VSS on RA. Pt is very [place]. Pain control with lidocaine patch and scheduled tylenol. Up with assist x1 with walker and gaitbelt. Voids using urinal, LBM 1/18, regular diet. Skin tear on toes was stitched up in ER, antibx cream applied to area. Has 20 in rt FA SL. Plan is to d/c home with family and HH, but the three sons are trying to work out a schedule to have someone with him, they do not want a SNF. Will continue to monitor.    Cristiana Rob BSN, RN

## 2022-01-20 NOTE — THERAPY EVALUATION
Patient Name: Dinesh Churchill Sr.  : 1928    MRN: [id number]                              Today's Date: 2022       Admit Date: 2022    Visit Dx:     ICD-10-CM ICD-9-CM   1. Fall, initial encounter  W19.XXXA E888.9   2. Closed fracture of multiple ribs of right side, initial encounter  S22.41XA 807.09   3. Laceration of lesser toe of left foot without foreign body present or damage to nail, initial encounter  S91.115A 893.0   4. Nondisplaced fracture of proximal phalanx of left lesser toe(s), initial encounter for open fracture  S92.515B 826.1     Patient Active Problem List   Diagnosis   • Abnormal magnetic resonance imaging study   • Arthritis   • Osteoarthritis of cervical spine   • Diplopia   • Hearing loss   • Neoplasm of uncertain behavior of skin   • Prediabetes   • Vitamin D deficiency   • Chronic fatigue   • History of supraventricular tachycardia   • Essential hypertension   • B12 deficiency   • Abnormal cardiac function test   • Cardiomyopathy (HCC)   • Coronary artery disease involving native coronary artery of native heart without angina pectoris   • History of coronary artery stent placement   • History of renal calculi   • Dyslipidemia   • Macrocytic anemia   • Precordial pain   • Parkinson's disease (HCC)   • History of CVA (cerebrovascular accident)   • Paroxysmal atrial fibrillation (HCC)   • Otalgia, left   • Chest pain   • Abnormal stress test   • Spontaneous pneumothorax   • Chest pain   • Acute systolic (congestive) heart failure (HCC)   • Thrombocytopenia (HCC)   • Pneumonia of left lower lobe due to infectious organism   • Syncope and collapse   • Status post placement of implantable loop recorder   • Long term (current) use of antithrombotics/antiplatelets   • Stuttering   • Fall   • Closed fracture of multiple ribs of right side   • Chest wall pain   • Closed fracture of phalanx of toe of left foot     Past Medical History:   Diagnosis Date   • Abnormal ECG    • Abnormal MRI     • Acute frontal sinusitis    • Arthritis    • Chronic fatigue    • Coronary artery disease    • Dizziness    • Head injury    • Hearing aid worn     left   • Hearing loss    • Hyperlipidemia    • Hypertension    • Kidney stones    • Macrocytosis    • Neoplasm of skin    • Osteoporosis    • Prediabetes    • Vitamin D deficiency      Past Surgical History:   Procedure Laterality Date   • CARDIAC CATHETERIZATION N/A 5/22/2017    Procedure: Left Heart Cath;  Surgeon: Maninder Quezada MD;  Location: [facility] CATH INVASIVE LOCATION;  Service:    • CARDIAC CATHETERIZATION N/A 5/23/2017    Procedure: Stent BMS coronary;  Surgeon: Maninder Quezada MD;  [facility]AS[facility] LOC[facility];  Service:    • CARDIAC CATHETERIZATION N/A 8/8/2019    Procedure: Left Heart Cath;  Surgeon: Maninder Quezada MD;  [facility];  Service: Cardiology   • CARDIAC CATHETERIZATION N/A 8/8/2019    Procedure: Left ventriculography;  Surgeon: Maninder Quezada MD;  Location: [facility] CATH INVASIVE LOCATION;  Service: Cardiology   • CARDIAC CATHETERIZATION N/A 8/8/2019    Procedure: Coronary angiography;  Surgeon: Maninder Quezada MD;  Location: [facility] CATH INVASIVE LOCATION;  Service: Cardiology   • CARDIAC CATHETERIZATION N/A 8/20/2019    Procedure: CORONARY ANGIOGRAPHY;  Surgeon: Maninder Quezada MD;  Location: [facility] CATH INVASIVE LOCATION;  Service: Cardiovascular   • CARDIAC CATHETERIZATION N/A 8/20/2019    Procedure: Stent BMS coronary;  Surgeon: Maninder Quezada MD;  Location: [facility] CATH INVASIVE LOCATION;  Service: Cardiovascular   • CARDIAC ELECTROPHYSIOLOGY PROCEDURE N/A 11/8/2019    Procedure: Loop insertion;  Surgeon: Maninder Quezada MD;  Location: [facility] CATH INVASIVE LOCATION;  Service: Cardiovascular   • HERNIA REPAIR      x 2   • CA RT/LT HEART CATHETERS N/A 5/23/2017    Procedure: Percutaneous Coronary Intervention;  Surgeon: Maninder Quezada MD;   Location: [facility] INVASIVE LOCATION;  Service: Cardiovascular      General Information     Row Name 01/20/22 1003          OT Time and Intention    Document Type evaluation (P)   -LR     Mode of Treatment individual therapy; occupational therapy (P)   -LR     Row Name 01/20/22 1003          General Information    Prior Level of Function ADL's; independent:; shopping; driving; dependent: (P)   Pt ind in ADLs, pt's son does shopping and community errands for pt.  -LR     Existing Precautions/Restrictions fall (P)   -LR     Barriers to Rehab none identified (P)   -LR     Row Name 01/20/22 1003          Living Environment    Lives With alone (P)   -LR     Row Name 01/20/22 1003          Home Main Entrance    Number of Stairs, Main Entrance five (P)   -LR     Row Name 01/20/22 1003          Cognition    Orientation Status (Cognition) oriented x 3 (P)   -LR     Row Name 01/20/22 1003          Safety Issues, Functional Mobility    Safety Issues Affecting Function (Mobility) safety precautions follow-through/compliance; positioning of assistive device (P)   -LR     Impairments Affecting Function (Mobility) balance; strength; range of motion (ROM); pain; endurance/activity tolerance (P)   -LR     Comment, Safety Issues/Impairments (Mobility) Pt with L 5th toe non displaced fx affecting functional transfers with rwx. (P)   -LR           User Key  (r) = Recorded By, (t) = Taken By, (c) = Cosigned By    Initials Name Provider Type    LR Iris Stevens OT Student OT Student                 Mobility/ADL's     Row Name 01/20/22 1007          Bed Mobility    Bed Mobility supine-sit (P)   -LR     Supine-Sit [place] (Bed Mobility) independent (P)   -LR     Assistive Device (Bed Mobility) head of bed elevated (P)   -LR     Row Name 01/20/22 1007          Transfers    Transfers bed-chair transfer; sit-stand transfer (P)   -LR     Comment (Transfers) Pt presents with shuffling gait during transfer from bed to bedside chair  with rwx. Pt has non-displaced fx of 5th digit on L toe. pt required verbal cues to back up to chair prior to sitting, pt did not follow commands, pt is very [place] and does not have hearing aid with him. (P)   -LR     Bed-Chair [place] (Transfers) minimum assist (75% patient effort); verbal cues (P)   -LR     Assistive Device (Bed-Chair Transfers) walker, front-wheeled (P)   -LR     Sit-Stand [place] (Transfers) minimum assist (75% patient effort); 1 person assist (P)   -LR     Row Name 01/20/22 1007          Sit-Stand Transfer    Assistive Device (Sit-Stand Transfers) walker, front-wheeled (P)   -LR     Row Name 01/20/22 1007          Functional Mobility    Functional Mobility- Ind. Level minimum assist (75% patient effort); 1 person (P)   -LR     Functional Mobility- Device rolling walker (P)   -LR     Functional Mobility-Distance (Feet) 5 (P)   -LR     Row Name 01/20/22 1007          Activities of Daily Living    BADL Assessment/Intervention lower body dressing (P)   -LR     Row Name 01/20/22 1007          Lower Body Dressing Assessment/Training    [place] Level (Lower Body Dressing) lower body dressing skills; doff; don; shoes/slippers; socks; minimum assist (75% patient effort) (P)   -LR     Position (Lower Body Dressing) edge of bed sitting (P)   -LR     Comment (Lower Body Dressing) Pt ind in doffing B socks to place shoes on prior to functional transfer, pt ind in donning R shoe, required min A to don L shoe due to injury on L foot. (P)   -LR           User Key  (r) = Recorded By, (t) = Taken By, (c) = Cosigned By    Initials Name Provider Type    LR Iris Stevens, OT Student OT Student               Obj/Interventions     Row Name 01/20/22 1012          Sensory Assessment (Somatosensory)    Sensory Assessment (Somatosensory) sensation intact (P)   -LR     [facility] Name 01/20/22 1012          Vision Assessment/Intervention    Visual Impairment/Limitations WFL (P)   -[facility] Name 01/20/22 1012           Range of Motion Comprehensive    General Range of Motion other (see comments) (P)   -LR     Comment, General Range of Motion LUE WFL, R UE ROM ~85 degrees, pt with R broken ribs 10-12 limiting R shoulder ROM. (P)   -LR     Row Name 01/20/22 1012          Strength Comprehensive (MMT)    General Manual Muscle Testing (MMT) Assessment other (see comments) (P)   -LR     Comment, General Manual Muscle Testing (MMT) Assessment LUE grossly 3-/5;  RUE- NT- R broken ribs 10-12 (P)   -LR     Row Name 01/20/22 1012          Motor Skills    Motor Skills functional endurance (P)   -LR     Functional Endurance fair (P)   -LR     Row Name 01/20/22 1012          Balance    Balance Assessment sitting static balance; sitting dynamic balance (P)   -LR     Static Sitting Balance WNL (P)   -LR     Dynamic Sitting Balance WFL (P)   -LR           User Key  (r) = Recorded By, (t) = Taken By, (c) = Cosigned By    Initials Name Provider Type    LR Iris Stevens, OT Student OT Student               Goals/Plan     Row Name 01/20/22 1023          Transfer Goal 1 (OT)    Activity/Assistive Device (Transfer Goal 1, OT) transfers, all; toilet (P)   -LR     [place] Level/Cues Needed (Transfer Goal 1, OT) contact guard assist (P)   -LR     Time Frame (Transfer Goal 1, OT) short term goal (STG); 2 weeks (P)   -LR     Progress/Outcome (Transfer Goal 1, OT) goal ongoing (P)   -LR     Row Name 01/20/22 1023          Dressing Goal 1 (OT)    Activity/Device (Dressing Goal 1, OT) lower body dressing (P)   -LR     [place]/Cues Needed (Dressing Goal 1, OT) set-up required (P)   -LR     Time Frame (Dressing Goal 1, OT) short term goal (STG); 2 weeks (P)   -LR     Progress/Outcome (Dressing Goal 1, OT) goal ongoing (P)   -LR     Row Name 01/20/22 1023          Toileting Goal 1 (OT)    Activity/Device (Toileting Goal 1, OT) toileting skills, all; adjust/manage clothing (P)   -LR     [place] Level/Cues Needed (Toileting Goal 1, OT) contact  guard assist (P)   -LR     Time Frame (Toileting Goal 1, OT) short term goal (STG); 2 weeks (P)   -LR     Progress/Outcome (Toileting Goal 1, OT) goal ongoing (P)   -LR     Row Name 01/20/22 1023          Grooming Goal 1 (OT)    Activity/Device (Grooming Goal 1, OT) grooming skills, all; oral care; wash face, hands (P)   -LR     [place] (Grooming Goal 1, OT) contact guard assist (P)   -LR     Time Frame (Grooming Goal 1, OT) short term goal (STG); 2 weeks (P)   -LR     Strategies/Barriers (Grooming Goal 1, OT) Standing sink-side to address activity tolerance. (P)   -LR     Progress/Outcome (Grooming Goal 1, OT) goal ongoing (P)   -LR     Row Name 01/20/22 1023          Therapy Assessment/Plan (OT)    Planned Therapy Interventions (OT) activity tolerance training; occupation/activity based interventions; patient/caregiver education/training; transfer/mobility retraining (P)   -LR           User Key  (r) = Recorded By, (t) = Taken By, (c) = Cosigned By    Initials Name Provider Type    LR Iris Stevens, OT Student OT Student               Clinical Impression     Row Name 01/20/22 1015          Pain Assessment    Additional Documentation Pain Scale: FACES Pre/Post-Treatment (Group) (P)   -LR     Row Name 01/20/22 1015          Pain Scale: FACES Pre/Post-Treatment    Pain: FACES Scale, Pretreatment 2-->hurts little bit (P)   -LR     Posttreatment Pain Rating 2-->hurts little bit (P)   -LR     Pain Location - Side Right; Left (P)   -LR     Pre/Posttreatment Pain Comment Pt reports pain in L foot, R ribs (P)   -LR     Row Name 01/20/22 1015          Plan of Care Review    Plan of Care Reviewed With patient (P)   -LR     Outcome Summary Pt is 89 y. o. M admitted to [facility] for fall with R side rib fx and L toe non-displaced fx. Pt lives alone and his son assists with shopping and community errands for pt. Prior to admit pt independent in ADLs. 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 Pt declines transfer to bathroom  for toileting or hygiene. Pt required min A for LB dressing sitting EOB. Pt agreeable to transfer to door and bedside chair. Pt with decreased activity tolerance and functional transfers, starting for the door with min A and rwx, and then changing direction to sit in bedside chair. Pt is very [place] and did not follow verbal cues to scoot all the way to chair before sitting. Pt has experienced 3 falls in the last year. Recommend continued skilled OT services to increase independence in self-care, functional mobility, and increase activity tolerance. Recommend subacute rehab at d/c. (P)   -LR     Row Name 01/20/22 1015          Therapy Assessment/Plan (OT)    Rehab Potential (OT) good, to achieve stated therapy goals (P)   -LR     Criteria for Skilled Therapeutic Interventions Met (OT) yes; skilled treatment is necessary (P)   -LR     Therapy Frequency (OT) 5 times/wk (P)   -LR     Row Name 01/20/22 1015          Therapy Plan Review/Discharge Plan (OT)    Anticipated Discharge Disposition (OT) skilled nursing facility; sub acute care setting (P)   -LR     Row Name 01/20/22 1015          Positioning and Restraints    Pre-Treatment Position in bed (P)   -LR     Post Treatment Position chair (P)   -LR     In Chair notified nsg; reclined; call light within reach; encouraged to call for assist; exit alarm on (P)   -LR           User Key  (r) = Recorded By, (t) = Taken By, (c) = Cosigned By    Initials Name Provider Type    LR Iris Stevens, OT Student OT Student               Outcome Measures     Row Name 01/20/22 1026          How much help from another is currently needed...    Putting on and taking off regular lower body clothing? 3 (P)   -LR     Bathing (including washing, rinsing, and drying) 3 (P)   -LR     Toileting (which includes using toilet bed pan or urinal) 3 (P)   -LR     Putting on and taking off regular upper body clothing 3 (P)   -LR     Taking care of personal grooming (such as brushing teeth) 3 (P)   -LR      Eating meals 4 (P)   -LR     AM-PAC 6 Clicks Score (OT) 19 (P)   -LR     Row Name 01/20/22 1026          Functional Assessment    Outcome Measure Options AM-PAC 6 Clicks Daily Activity (OT) (P)   -LR           User Key  (r) = Recorded By, (t) = Taken By, (c) = Cosigned By    Initials Name Provider Type    LR Iris Stevens, OT Student OT Student                Occupational Therapy Education                 Title: PT OT SLP Therapies (In Progress)     Topic: Occupational Therapy (In Progress)     Point: ADL training (Done)     Description:   Instruct learner(s) on proper safety adaptation and remediation techniques during self care or transfers.   Instruct in proper use of assistive devices.              Learning Progress Summary           Patient Acceptance, E, VU by LR at 1/20/2022 1027    Comment: Educated pt on POC goals, d/c recommendations, pt verbalized understanding. Educated pt on safe transfer to bedside chair to step all the way back to chair before sitting, pt did not demo understanding, pt is [facility].                   Point: Home exercise program (Not Started)     Description:   Instruct learner(s) on appropriate technique for monitoring, assisting and/or progressing therapeutic exercises/activities.              Learner Progress:  Not documented in this visit.          Point: Precautions (Not Started)     Description:   Instruct learner(s) on prescribed precautions during self-care and functional transfers.              Learner Progress:  Not documented in this visit.          Point: Body mechanics (Not Started)     Description:   Instruct learner(s) on proper positioning and spine alignment during self-care, functional mobility activities and/or exercises.              Learner Progress:  Not documented in this visit.                      User Key     Initials Effective Dates Name Provider Type Discipline     01/18/22 -  Iris Stevens, OT Student OT Student OT              OT Recommendation and  Plan  Planned Therapy Interventions (OT): (P) activity tolerance training, occupation/activity based interventions, patient/caregiver education/training, transfer/mobility retraining  Therapy Frequency (OT): (P) 5 times/wk  Plan of Care Review  Plan of Care Reviewed With: (P) patient  Outcome Summary: (P) Pt is 89 y. o. M admitted to [facility] for fall with R side rib fx and L toe non-displaced fx. Pt lives alone and his son assists with shopping and community errands for pt. Prior to admit pt independent in ADLs. 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 Pt declines transfer to bathroom for toileting or hygiene. Pt required min A for LB dressing sitting EOB. Pt agreeable to transfer to door and bedside chair. Pt with decreased activity tolerance and functional transfers, starting for the door with min A and rwx, and then changing direction to sit in bedside chair. Pt is very [place] and did not follow verbal cues to scoot all the way to chair before sitting. Pt has experienced 3 falls in the last year. Recommend continued skilled OT services to increase independence in self-care, functional mobility, and increase activity tolerance. Recommend subacute rehab at d/c.     Time Calculation:    Time Calculation- OT     Row Name 01/20/22 1032             Time Calculation- OT    OT Start Time 0914 (P)   -LR      OT Stop Time 0936 (P)   -LR      OT Time Calculation (min) 22 min (P)   -LR      Total Timed Code Minutes- OT 15 minute(s) (P)   -LR      OT Received On 01/20/22 (P)   -LR      OT - Next Appointment 01/21/22 (P)   -LR      OT Goal Re-Cert Due Date 02/03/22 (P)   -LR              Timed Charges    09722 - OT Therapeutic Activity Minutes 15 (P)   -LR              Untimed Charges    OT Eval/Re-eval Minutes 7 (P)   -LR              Total Minutes    Timed Charges Total Minutes 15 (P)   -LR      Untimed Charges Total Minutes 7 (P)   -LR       Total Minutes 22 (P)   -LR            User Key  (r) = Recorded By, (t) = Taken By, (c)  = Cosigned By    Initials Name Provider Type    LR Iris Stevens, OT Student OT Student              Therapy Charges for Today     Code Description Service Date Service Provider Modifiers Qty    [id number]  OT THERAPEUTIC ACT EA 15 MIN 1/20/2022 Iris Stevens OT Student GO 1    [id number]  OT EVAL MOD COMPLEXITY 2 1/20/2022 Iris Stevens OT Student GO 1               BLAS HAYWARD  1/20/2022

## 2022-01-20 NOTE — PLAN OF CARE
Goal Outcome Evaluation:  Plan of Care Reviewed With: patient        Progress: improving  Outcome Summary: 89 yo male admitted with R side Rib fx d/t a fall. vss. nvi. pt on room air. axox4. pain managed wiht scheduled tylenol and lidocain patch. educated pt on safety and pain management. x1 asssit. will continue to monitor.

## 2022-01-21 VITALS
WEIGHT: 183 LBS | BODY MASS INDEX: 22.75 KG/M2 | SYSTOLIC BLOOD PRESSURE: 122 MMHG | HEIGHT: 75 IN | TEMPERATURE: 96.9 F | OXYGEN SATURATION: 96 % | HEART RATE: 68 BPM | DIASTOLIC BLOOD PRESSURE: 61 MMHG | RESPIRATION RATE: 16 BRPM

## 2022-01-21 PROCEDURE — G0378 HOSPITAL OBSERVATION PER HR: HCPCS

## 2022-01-21 PROCEDURE — 97530 THERAPEUTIC ACTIVITIES: CPT

## 2022-01-21 RX ORDER — LIDOCAINE 50 MG/G
1 PATCH TOPICAL
Start: 2022-01-21 | End: 2022-06-23

## 2022-01-21 RX ORDER — GINSENG 100 MG
1 CAPSULE ORAL EVERY 12 HOURS SCHEDULED
Start: 2022-01-21 | End: 2022-04-11

## 2022-01-21 RX ORDER — ACETAMINOPHEN 325 MG/1
650 TABLET ORAL EVERY 6 HOURS PRN
Start: 2022-01-21 | End: 2022-06-23

## 2022-01-21 RX ORDER — ACETAMINOPHEN 500 MG
500 TABLET ORAL 4 TIMES DAILY
Start: 2022-01-21

## 2022-01-21 RX ADMIN — METOPROLOL SUCCINATE 100 MG: 100 TABLET, EXTENDED RELEASE ORAL at 08:58

## 2022-01-21 RX ADMIN — LOSARTAN POTASSIUM 50 MG: 50 TABLET, FILM COATED ORAL at 08:58

## 2022-01-21 RX ADMIN — BACITRACIN 1 APPLICATION: 500 OINTMENT TOPICAL at 08:58

## 2022-01-21 RX ADMIN — ISOSORBIDE MONONITRATE 60 MG: 60 TABLET ORAL at 08:58

## 2022-01-21 RX ADMIN — ASPIRIN 81 MG: 81 TABLET, CHEWABLE ORAL at 08:58

## 2022-01-21 RX ADMIN — AMLODIPINE BESYLATE 5 MG: 5 TABLET ORAL at 08:58

## 2022-01-21 RX ADMIN — LIDOCAINE 1 PATCH: 50 PATCH TOPICAL at 08:57

## 2022-01-21 RX ADMIN — ACETAMINOPHEN 500 MG: 500 TABLET, FILM COATED ORAL at 08:58

## 2022-01-21 NOTE — PLAN OF CARE
Goal Outcome Evaluation:Pt VSS,a febrile, lidoderm patch to right ribs, scheduled tylenol, [place], left 5th toes laceration with sutures Stephany, Report called to Julia nurse at [facility], Iv DC'd, Packet with all DC paperwork sent with PT, Pt taken via WC down to  DC exit to be transferred to facility via family.

## 2022-01-21 NOTE — PLAN OF CARE
Goal Outcome Evaluation:  Plan of Care Reviewed With: patient        Progress: improving  Outcome Summary: Pt agreeable to work with PT this morning and ambulate over to the chair. SBA for bed mobility and Dary for transfers and gait. VC's provided thorughout for safety. Pt performs seated ther ex [facility]. Continues to benefit from skilled PT.

## 2022-01-21 NOTE — CASE MANAGEMENT/SOCIAL WORK
Continued Stay Note  [facility]     Patient Name: Dinesh Churchill Sr.  MRN: [id number]  Today's Date: 1/21/2022    Admit Date: 1/19/2022     Discharge Plan     Row Name 01/21/22 1319       Plan    Plan [facility] -- Accepted.    Patient/Family in Agreement with Plan yes    Plan Comments Discharge orders noted. Spoke with Brianne/Jakob who has no availability at [facility], but does have a SNF bed at [facility] today. Spoke with Sudarshan/Angelita who has a bed for the pt today at [facility] SNF. Updated the patient and his son/Dinesh Jones who are agreeable and plan on [facility]. Dinesh Jones said he can transport the patient by car at d/c. Updated MOSES Hernandez and Rodolfo who are agreebale. No other needs identified. Packet is on the chart.    Final Discharge Disposition Code 03 - skilled nursing facility (SNF)    Final Note [facility]. SNF.               Discharge Codes    No documentation.               Expected Discharge Date and Time     Expected Discharge Date Expected Discharge Time    Jan 21, 2022             Berenice Wei RN

## 2022-01-21 NOTE — DISCHARGE SUMMARY
[facility]    Date of Discharge:  1/21/2022    PCP: Phyllis Loving PA    Discharge Diagnosis:   Active Hospital Problems    Diagnosis  POA   • **Closed fracture of multiple ribs of right side [S22.41XA]  Unknown   • Fall [W19.XXXA]  Yes   • Chest wall pain [R07.89]  Unknown   • Closed fracture of phalanx of toe of left foot [S92.912A]  Unknown   • Stuttering [F80.81]  Yes   • Paroxysmal atrial fibrillation (HCC) [I48.0]  Yes   • History of CVA (cerebrovascular accident) [Z86.73]  Not Applicable   • History of coronary artery stent placement [Z95.5]  Not Applicable   • Dyslipidemia [E78.5]  Yes   • Cardiomyopathy (HCC) [I42.9]  Yes   • Essential hypertension [I10]  Yes   • Hearing loss [H91.90]  Yes      Resolved Hospital Problems   No resolved problems to display.          Consults     Date and Time Order Name Status Description    1/19/2022 12:35 PM [facility] (on-call MD unless specified) Details          Hospital Course  89 y.o. male initially admitted by myself for chest wall pain status post fall.  Patient has no past history of paroxysmal atrial fibrillation but was not anticoagulated prior to admission likely secondary to recurrent fall history.  He has had previous falls in which is rib fractures have resulted in a pneumothorax but there is been no complicating features noted on this admission.  He has responded well to medical management utilizing Tylenol scheduled for anti-inflammatory properties with Lidoderm to the chest wall and use of incentive spirometry.  He has done just fine over the last couple days and has not required any additional narcotic for breakthrough pain.  He is feeling a little bit better each day but his physical therapy evaluation is very poor.  He lives independently on some land and he states that his nearest child lives 25 to 30 miles away.  Secondary to the lack of support close, patient is amenable to giving rehab a try.  I  feel this patient is medically stable to transition from the hospital to rehab as we await [facility] to coordinate discharge needs.  Vital signs are stable as well as afebrile and patient's oxygenation is stable on room air at 98%.  All questions answered to this patient he is very thankful for the care he received while here as well as very much amenable to the above disposition plans.  I have clearly counseled the patient that he should give rehab a good chance and see what kind of clinical benefit he gains.  Hopefully he can stay in the home setting for a longer time, But time will tell in regards to its safety.      Condition on Discharge: Improved.     Temp:  [96.9 °F (36.1 °C)-97.1 °F (36.2 °C)] 97 °F (36.1 °C)  Heart Rate:  [60-75] 75  Resp:  [16-18] 16  BP: (124-149)/(58-77) 128/70  Body mass index is 22.87 kg/m².    Physical Exam  HENT:      Head: Normocephalic.      Nose: Nose normal.      Mouth/Throat:      Mouth: Mucous membranes are moist.      Pharynx: Oropharynx is clear.   Eyes:      General: No scleral icterus.     Conjunctiva/sclera: Conjunctivae normal.   Cardiovascular:      Rate and Rhythm: Normal rate and regular rhythm.   Pulmonary:      Effort: Pulmonary effort is normal. No respiratory distress.      Breath sounds: Normal breath sounds.   Chest:      Chest wall: Tenderness present.   Abdominal:      General: Bowel sounds are normal. There is no distension.      Palpations: Abdomen is soft.      Tenderness: There is no abdominal tenderness.   Musculoskeletal:         General: No swelling. Normal range of motion.   Skin:     General: Skin is warm.      Coloration: Skin is not jaundiced.   Neurological:      General: No focal deficit present.      Mental Status: He is alert. Mental status is at baseline.      Cranial Nerves: No cranial nerve deficit.   Psychiatric:         Mood and Affect: Mood normal.         Thought Content: Thought content normal.     [unfilled]    Disposition: Skilled Nursing  Facility ([place] - External)       Discharge Medications      New Medications      Instructions Start Date   bacitracin 500 UNIT/GM ointment   1 application, Topical, Every 12 Hours Scheduled      lidocaine 5 %  Commonly known as: LIDODERM   1 patch, Transdermal, Every 24 Hours Scheduled, Remove & Discard patch within 12 hours or as directed by MD         Changes to Medications      Instructions Start Date   acetaminophen 500 MG tablet  Commonly known as: TYLENOL  What changed: Another medication with the same name was added. Make sure you understand how and when to take each.   1-2 TABLETS BY ONCE TO TWICE DAILY AS NEEDED FOR PAIN      acetaminophen 500 MG tablet  Commonly known as: TYLENOL  What changed: You were already taking a medication with the same name, and this prescription was added. Make sure you understand how and when to take each.   500 mg, Oral, 4 Times Daily      acetaminophen 325 MG tablet  Commonly known as: TYLENOL  What changed: You were already taking a medication with the same name, and this prescription was added. Make sure you understand how and when to take each.   650 mg, Oral, Every 6 Hours PRN         Continue These Medications      Instructions Start Date   amLODIPine 5 MG tablet  Commonly known as: NORVASC   5 mg, Oral, Every 24 Hours Scheduled      aspirin 81 MG chewable tablet   81 mg, Oral, Daily      atorvastatin 20 MG tablet  Commonly known as: LIPITOR   20 mg, Oral, Nightly      B-12 1000 MCG lozenge   DISSOLVE 1 LOZENGE BY MOUTH UNDER THE TONGUE EVERY OTHER DAY      isosorbide mononitrate 60 MG 24 hr tablet  Commonly known as: IMDUR   60 mg, Oral, Daily      losartan 50 MG tablet  Commonly known as: COZAAR   50 mg, Oral, Daily      Lumbar Back Brace/Support Pad misc   Use for lifting/ strenuous exercise.      metoprolol succinate  MG 24 hr tablet  Commonly known as: TOPROL-XL   100 mg, Oral, Daily      Vitamin D-3 25 MCG (1000 UT) capsule   1,000 Units, Oral, Daily               Additional Instructions for the Follow-ups that You Need to Schedule     Discharge Follow-up with PCP   As directed       Currently Documented PCP:    Phyllis Loving PA    PCP Phone Number:    [phone]     Follow Up Details: PCP post rehab            Follow-up Information     Phyllis Loving PA .    Specialty: Family Medicine  Why: PCP post rehab  Contact information:  [address] [facility] [address]  [phone]                           Geovanni Flores MD  01/21/22  08:58 EST    Discharge time spent greater than 30 minutes.

## 2022-01-21 NOTE — THERAPY TREATMENT NOTE
Patient Name: Dinesh Churchill Sr.  : 1928    MRN: [id number]                              Today's Date: 2022       Admit Date: 2022    Visit Dx:     ICD-10-CM ICD-9-CM   1. Fall, initial encounter  W19.XXXA E888.9   2. Closed fracture of multiple ribs of right side, initial encounter  S22.41XA 807.09   3. Laceration of lesser toe of left foot without foreign body present or damage to nail, initial encounter  S91.115A 893.0   4. Nondisplaced fracture of proximal phalanx of left lesser toe(s), initial encounter for open fracture  S92.515B 826.1     Patient Active Problem List   Diagnosis   • Abnormal magnetic resonance imaging study   • Arthritis   • Osteoarthritis of cervical spine   • Diplopia   • Hearing loss   • Neoplasm of uncertain behavior of skin   • Prediabetes   • Vitamin D deficiency   • Chronic fatigue   • History of supraventricular tachycardia   • Essential hypertension   • B12 deficiency   • Abnormal cardiac function test   • Cardiomyopathy (HCC)   • Coronary artery disease involving native coronary artery of native heart without angina pectoris   • History of coronary artery stent placement   • History of renal calculi   • Dyslipidemia   • Macrocytic anemia   • Precordial pain   • Parkinson's disease (HCC)   • History of CVA (cerebrovascular accident)   • Paroxysmal atrial fibrillation (HCC)   • Otalgia, left   • Chest pain   • Abnormal stress test   • Spontaneous pneumothorax   • Chest pain   • Acute systolic (congestive) heart failure (HCC)   • Thrombocytopenia (HCC)   • Pneumonia of left lower lobe due to infectious organism   • Syncope and collapse   • Status post placement of implantable loop recorder   • Long term (current) use of antithrombotics/antiplatelets   • Stuttering   • Fall   • Closed fracture of multiple ribs of right side   • Chest wall pain   • Closed fracture of phalanx of toe of left foot     Past Medical History:   Diagnosis Date   • Abnormal ECG    • Abnormal MRI     • Acute frontal sinusitis    • Arthritis    • Chronic fatigue    • Coronary artery disease    • Dizziness    • Head injury    • Hearing aid worn     left   • Hearing loss    • Hyperlipidemia    • Hypertension    • Kidney stones    • Macrocytosis    • Neoplasm of skin    • Osteoporosis    • Prediabetes    • Vitamin D deficiency      Past Surgical History:   Procedure Laterality Date   • CARDIAC CATHETERIZATION N/A 5/22/2017    Procedure: Left Heart Cath;  Surgeon: Maninder Quezada MD;  Location: [facility] CATH INVASIVE LOCATION;  Service:    • CARDIAC CATHETERIZATION N/A 5/23/2017    Procedure: Stent BMS coronary;  Surgeon: Maninder Quezada MD;  [facility];  Service:    • CARDIAC CATHETERIZATION N/A 8/8/2019    Procedure: Left Heart Cath;  Surgeon: Maninder Quezada MD;  [facility];  Service: Cardiology   • CARDIAC CATHETERIZATION N/A 8/8/2019    Procedure: Left ventriculography;  Surgeon: Maninder Quezada MD;  Location: [facility] CATH INVASIVE LOCATION;  Service: Cardiology   • CARDIAC CATHETERIZATION N/A 8/8/2019    Procedure: Coronary angiography;  Surgeon: Maninder Quezada MD;  Location: [facility] CATH INVASIVE LOCATION;  Service: Cardiology   • CARDIAC CATHETERIZATION N/A 8/20/2019    Procedure: CORONARY ANGIOGRAPHY;  Surgeon: Maninder Quezada MD;  Location: [facility] CATH INVASIVE LOCATION;  Service: Cardiovascular   • CARDIAC CATHETERIZATION N/A 8/20/2019    Procedure: Stent BMS coronary;  Surgeon: Maninder Quezada MD;  Location: [facility] CATH INVASIVE LOCATION;  Service: Cardiovascular   • CARDIAC ELECTROPHYSIOLOGY PROCEDURE N/A 11/8/2019    Procedure: Loop insertion;  Surgeon: Maninder Quezada MD;  Location: [facility] CATH INVASIVE LOCATION;  Service: Cardiovascular   • HERNIA REPAIR      x 2   • MN RT/LT HEART CATHETERS N/A 5/23/2017    Procedure: Percutaneous Coronary Intervention;  Surgeon: Maninder Quezada MD;   Location: [facility] INVASIVE LOCATION;  Service: Cardiovascular      General Information     Row Name 01/21/22 1139          Physical Therapy Time and Intention    Document Type therapy note (daily note)  -DB     Mode of Treatment individual therapy; physical therapy  -DB     Row Name 01/21/22 1139          General Information    Patient Profile Reviewed yes  -DB     Existing Precautions/Restrictions fall  -DB           User Key  (r) = Recorded By, (t) = Taken By, (c) = Cosigned By    Initials Name Provider Type    DB Lima Oropeza PT Physical Therapist               Mobility     Row Name 01/21/22 1139          Bed Mobility    Bed Mobility supine-sit  -DB     Supine-Sit [place] (Bed Mobility) standby assist; verbal cues  -DB     Assistive Device (Bed Mobility) bed rails; head of bed elevated  -DB     Row Name 01/21/22 1139          Sit-Stand Transfer    Sit-Stand [place] (Transfers) minimum assist (75% patient effort); 1 person assist; verbal cues  -DB     Assistive Device (Sit-Stand Transfers) walker, front-wheeled  -DB     Row Name 01/21/22 1139          Gait/Stairs (Locomotion)    [place] Level (Gait) minimum assist (75% patient effort); verbal cues  -DB     Assistive Device (Gait) walker, front-wheeled  -DB     Distance in Feet (Gait) 20'  -DB     Deviations/Abnormal Patterns (Gait) festinating/shuffling; gait speed decreased; naldo decreased; base of support, narrow; stride length decreased  -DB     Bilateral Gait Deviations heel strike decreased; forward flexed posture  -DB           User Key  (r) = Recorded By, (t) = Taken By, (c) = Cosigned By    Initials Name Provider Type    DB Lima Oropeza PT Physical Therapist               Obj/Interventions     Row Name 01/21/22 1140          Motor Skills    Therapeutic Exercise other (see comments)  seated MIP, LAQ, AP x 10  -DB     Row Name 01/21/22 1140          Balance    Balance Assessment sitting static balance; sitting dynamic balance;  standing static balance; standing dynamic balance  -DB     Static Sitting Balance WFL; sitting, edge of bed  -DB     Dynamic Sitting Balance WFL; sitting, edge of bed  -DB     Static Standing Balance mild impairment; supported  -DB     Dynamic Standing Balance mild impairment; supported  -DB     Balance Interventions sitting; standing; sit to stand  -DB           User Key  (r) = Recorded By, (t) = Taken By, (c) = Cosigned By    Initials Name Provider Type    Lima Matt, PT Physical Therapist               Goals/Plan    No documentation.                Clinical Impression     Row Name 01/21/22 1140          Plan of Care Review    Plan of Care Reviewed With patient  -DB     Progress improving  -DB     Outcome Summary Pt agreeable to work with PT this morning and ambulate over to the chair. SBA for bed mobility and Dary for transfers and gait. VC's provided thorughout for safety. Pt performs seated ther ex UIC. Continues to benefit from skilled PT.  -DB     Row Name 01/21/22 1140          Therapy Assessment/Plan (PT)    Criteria for Skilled Interventions Met (PT) yes  -DB     Row Name 01/21/22 1140          Vital Signs    O2 Delivery Pre Treatment room air  -DB     O2 Delivery Intra Treatment room air  -DB     O2 Delivery Post Treatment room air  -DB     Pre Patient Position Supine  -DB     Intra Patient Position Standing  -DB     Post Patient Position Sitting  -DB     Row Name 01/21/22 1140          Positioning and Restraints    Pre-Treatment Position in bed  -DB     Post Treatment Position chair  -DB     In Chair reclined; sitting; call light within reach; encouraged to call for assist; exit alarm on  -DB           User Key  (r) = Recorded By, (t) = Taken By, (c) = Cosigned By    Initials Name Provider Type    Lima Matt, GIANFRANCO Physical Therapist               Outcome Measures     Row Name 01/21/22 1148          How much help from another person do you currently need...    Turning from your back to  your side while in flat bed without using bedrails? 3  -DB     Moving from lying on back to sitting on the side of a flat bed without bedrails? 3  -DB     Moving to and from a bed to a chair (including a wheelchair)? 3  -DB     Standing up from a chair using your arms (e.g., wheelchair, bedside chair)? 3  -DB     Climbing 3-5 steps with a railing? 2  -DB     To walk in hospital room? 3  -DB     AM-PAC 6 Clicks Score (PT) 17  -DB     Row Name 01/21/22 1148          Functional Assessment    Outcome Measure Options AM-PAC 6 Clicks Basic Mobility (PT)  -DB           User Key  (r) = Recorded By, (t) = Taken By, (c) = Cosigned By    Initials Name Provider Type    DB Lima Oropeza PT Physical Therapist                             Physical Therapy Education                 Title: PT OT SLP Therapies (In Progress)     Topic: Physical Therapy (Done)     Point: Mobility training (Done)     Learning Progress Summary           Patient Acceptance, E, VU by DB at 1/21/2022 1149    Acceptance, E, VU by DB at 1/20/2022 1645                   Point: Home exercise program (Done)     Learning Progress Summary           Patient Acceptance, E, VU by DB at 1/21/2022 1149    Acceptance, E, VU by DB at 1/20/2022 1645                   Point: Body mechanics (Done)     Learning Progress Summary           Patient Acceptance, E, VU by DB at 1/21/2022 1149    Acceptance, E, VU by DB at 1/20/2022 1645                   Point: Precautions (Done)     Learning Progress Summary           Patient Acceptance, E, VU by DB at 1/21/2022 1149    Acceptance, E, VU by DB at 1/20/2022 1645                               User Key     Initials Effective Dates Name Provider Type Discipline    DB 06/16/21 -  Lima Oropeza PT Physical Therapist PT              PT Recommendation and Plan  Planned Therapy Interventions (PT): balance training, bed mobility training, gait training, home exercise program, postural re-education, patient/family education,  neuromuscular re-education, stair training, strengthening, transfer training  Plan of Care Reviewed With: patient  Progress: improving  Outcome Summary: Pt agreeable to work with PT this morning and ambulate over to the chair. SBA for bed mobility and Dary for transfers and gait. VC's provided thorughout for safety. Pt performs seated ther ex UIC. Continues to benefit from skilled PT.     Time Calculation:    PT Charges     Row Name 01/21/22 1149             Time Calculation    Start Time 0947  -DB      Stop Time 1000  -DB      Time Calculation (min) 13 min  -DB      PT Received On 01/21/22  -DB      PT - Next Appointment 01/22/22  -DB              Time Calculation- PT    Total Timed Code Minutes- PT 13 minute(s)  -DB            User Key  (r) = Recorded By, (t) = Taken By, (c) = Cosigned By    Initials Name Provider Type    DB Lima Oropeza, PT Physical Therapist              Therapy Charges for Today     Code Description Service Date Service Provider Modifiers Qty    19597939852 HC PT EVAL MOD COMPLEXITY 2 1/20/2022 Lima Oropeza, PT GP 1    57242078927 HC PT THERAPEUTIC ACT EA 15 MIN 1/20/2022 Lima Oropeza, PT GP 1    10810705820 HC PT THERAPEUTIC ACT EA 15 MIN 1/21/2022 Lima Oropeza, PT GP 1          PT G-Codes  Outcome Measure Options: AM-PAC 6 Clicks Basic Mobility (PT)  AM-PAC 6 Clicks Score (PT): 17  AM-PAC 6 Clicks Score (OT): 19    Lima Oropeza PT  1/21/2022

## 2022-01-21 NOTE — PLAN OF CARE
Goal Outcome Evaluation:  Plan of Care Reviewed With: patient        Progress: improving  Outcome Summary: 89 yo male admitted with R sided Rib fxs. vss. nvi. pt on room air. axo x4. [id number] with walker. pt voiding well. pain managed with scheduled tylenol. educated pt on skin integrity. plans for home with h/h. will continue to monitor.

## 2022-02-10 ENCOUNTER — TELEPHONE (OUTPATIENT)
Dept: FAMILY MEDICINE CLINIC | Facility: CLINIC | Age: 87
End: 2022-02-10

## 2022-02-10 NOTE — TELEPHONE ENCOUNTER
BI FROM [facility] CALLED WANTING VERBAL ORDERS ON MR MUELLER,HE IS GETTING RELEASE FROM [facility] IN [place] TOMORROW.

## 2022-02-16 ENCOUNTER — PATIENT OUTREACH (OUTPATIENT)
Dept: CASE MANAGEMENT | Facility: OTHER | Age: 87
End: 2022-02-16

## 2022-02-16 ENCOUNTER — OFFICE VISIT (OUTPATIENT)
Dept: FAMILY MEDICINE CLINIC | Facility: CLINIC | Age: 87
End: 2022-02-16

## 2022-02-16 VITALS
HEIGHT: 75 IN | DIASTOLIC BLOOD PRESSURE: 74 MMHG | BODY MASS INDEX: 23.25 KG/M2 | HEART RATE: 112 BPM | SYSTOLIC BLOOD PRESSURE: 118 MMHG | WEIGHT: 187 LBS | TEMPERATURE: 97.7 F | OXYGEN SATURATION: 99 % | RESPIRATION RATE: 22 BRPM

## 2022-02-16 DIAGNOSIS — R00.0 TACHYCARDIA: ICD-10-CM

## 2022-02-16 DIAGNOSIS — D69.6 THROMBOCYTOPENIA: ICD-10-CM

## 2022-02-16 DIAGNOSIS — S22.41XD CLOSED FRACTURE OF MULTIPLE RIBS OF RIGHT SIDE WITH ROUTINE HEALING, SUBSEQUENT ENCOUNTER: Primary | ICD-10-CM

## 2022-02-16 DIAGNOSIS — M17.0 PRIMARY OSTEOARTHRITIS OF BOTH KNEES: ICD-10-CM

## 2022-02-16 DIAGNOSIS — M25.561 CHRONIC PAIN OF BOTH KNEES: ICD-10-CM

## 2022-02-16 DIAGNOSIS — M25.562 CHRONIC PAIN OF BOTH KNEES: ICD-10-CM

## 2022-02-16 DIAGNOSIS — G89.29 CHRONIC PAIN OF BOTH KNEES: ICD-10-CM

## 2022-02-16 DIAGNOSIS — S92.405S CLOSED NONDISPLACED FRACTURE OF PHALANX OF LEFT GREAT TOE, UNSPECIFIED PHALANX, SEQUELA: ICD-10-CM

## 2022-02-16 DIAGNOSIS — R29.6 RECURRENT FALLS: ICD-10-CM
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
## 2022-02-16 DIAGNOSIS — D53.9 MACROCYTIC ANEMIA: ICD-10-CM

## 2022-02-16 PROCEDURE — 99214 OFFICE O/P EST MOD 30 MIN: CPT | Performed by: PHYSICIAN ASSISTANT

## 2022-02-16 NOTE — OUTREACH NOTE
Ambulatory Case Management Note    SNF Follow-up    Questions/Answers      Responses   Acute Facility Discharged From [facility]   Acute Discharge Date 01/21/22   Name of the Skilled Nursing Facility? [facility]   Purpose of SNF Admission PT,  OT,  SN   Who is the insurance provider or payor of patient stay? Medicare   Progression of Patient? discharged   Skilled Nursing Discharge Date? 02/11/22   Where was the patient discharged to? Home   Home Health Agency at discharge? Yes   Name of Home Health Agency? [facility]   DME Needed at Discharge? No  [Patient Using Walker]   Are there any medication concerns at Discharge No   Does the patient have a follow-up appointment? Yes   Comments Regarding F/U Appt. ? Follow up completed 2/16/22 with Inder CRAIG.      Patient Outreach    Introduced self, explained ACM RN role and provided contact information. Spoke with patient's son regarding patient's transition back home after SNF stay. Patient is doing well. He followed up with PCP. Patient has elevated heart rate. Son plans to check b/p and heart rate twice daily. ACM recommended patient record pulse and B/P daily for the next week. PCP has message out to cardiology. Patient awaiting follow up regarding pulse. Patient active with Home Health, [facility]. No needs. Follow up scheduled next month to assess for continued needs and concerns.     There are no recently modified care plans to display for this patient.      Ashely Domínguez RN  Ambulatory Case Management    2/16/2022, 16:58 EST

## 2022-02-17 LAB
ALBUMIN SERPL-MCNC: 3.8 G/DL (ref 3.5–4.6)
ALBUMIN/GLOB SERPL: 1.7 {RATIO} (ref 1.2–2.2)
ALP SERPL-CCNC: 78 IU/L (ref 44–121)
ALT SERPL-CCNC: 10 IU/L (ref 0–44)
AST SERPL-CCNC: 16 IU/L (ref 0–40)
BASOPHILS # BLD AUTO: 0 X10E3/UL (ref 0–0.2)
BASOPHILS NFR BLD AUTO: 0 %
BILIRUB SERPL-MCNC: 1 MG/DL (ref 0–1.2)
BUN SERPL-MCNC: 23 MG/DL (ref 10–36)
BUN/CREAT SERPL: 29 (ref 10–24)
CALCIUM SERPL-MCNC: 8.7 MG/DL (ref 8.6–10.2)
CHLORIDE SERPL-SCNC: 109 MMOL/L (ref 96–106)
CO2 SERPL-SCNC: 21 MMOL/L (ref 20–29)
CREAT SERPL-MCNC: 0.8 MG/DL (ref 0.76–1.27)
EOSINOPHIL # BLD AUTO: 0.2 X10E3/UL (ref 0–0.4)
EOSINOPHIL NFR BLD AUTO: 5 %
ERYTHROCYTE [DISTWIDTH] IN BLOOD BY AUTOMATED COUNT: 14.3 % (ref 11.6–15.4)
GLOBULIN SER CALC-MCNC: 2.3 G/DL (ref 1.5–4.5)
GLUCOSE SERPL-MCNC: 115 MG/DL (ref 65–99)
HCT VFR BLD AUTO: 39.1 % (ref 37.5–51)
HGB BLD-MCNC: 12.9 G/DL (ref 13–17.7)
IMM GRANULOCYTES # BLD AUTO: 0 X10E3/UL (ref 0–0.1)
IMM GRANULOCYTES NFR BLD AUTO: 0 %
LYMPHOCYTES # BLD AUTO: 1.2 X10E3/UL (ref 0.7–3.1)
LYMPHOCYTES NFR BLD AUTO: 24 %
MCH RBC QN AUTO: 33.3 PG (ref 26.6–33)
MCHC RBC AUTO-ENTMCNC: 33 G/DL (ref 31.5–35.7)
MCV RBC AUTO: 101 FL (ref 79–97)
MONOCYTES # BLD AUTO: 0.5 X10E3/UL (ref 0.1–0.9)
MONOCYTES NFR BLD AUTO: 10 %
NEUTROPHILS # BLD AUTO: 3 X10E3/UL (ref 1.4–7)
NEUTROPHILS NFR BLD AUTO: 61 %
PLATELET # BLD AUTO: 157 X10E3/UL (ref 150–450)
POTASSIUM SERPL-SCNC: 4.8 MMOL/L (ref 3.5–5.2)
PROT SERPL-MCNC: 6.1 G/DL (ref 6–8.5)
RBC # BLD AUTO: 3.87 X10E6/UL (ref 4.14–5.8)
SODIUM SERPL-SCNC: 142 MMOL/L (ref 134–144)
T3FREE SERPL-MCNC: 2.6 PG/ML (ref 2–4.4)
T4 FREE SERPL-MCNC: 1.19 NG/DL (ref 0.82–1.77)
TSH SERPL DL<=0.005 MIU/L-ACNC: 3.14 UIU/ML (ref 0.45–4.5)
WBC # BLD AUTO: 5 X10E3/UL (ref 3.4–10.8)

## 2022-02-21 ENCOUNTER — TELEPHONE (OUTPATIENT)
Dept: FAMILY MEDICINE CLINIC | Facility: CLINIC | Age: 87
End: 2022-02-21

## 2022-02-21 NOTE — TELEPHONE ENCOUNTER
YU FROM [facility] CALLED STATING MR [facility] HAS NO MORE NURSING VISITS SCHEDULED AND WAS WANTING TO KNOW IF SHE COULD GET  A VERBAL TO DISCHARGE HIM      PLS ADVISE

## 2022-02-22 ENCOUNTER — OFFICE VISIT (OUTPATIENT)
Dept: SPORTS MEDICINE | Facility: CLINIC | Age: 87
End: 2022-02-22

## 2022-02-22 VITALS
OXYGEN SATURATION: 96 % | RESPIRATION RATE: 16 BRPM | DIASTOLIC BLOOD PRESSURE: 60 MMHG | HEIGHT: 75 IN | BODY MASS INDEX: 23.25 KG/M2 | WEIGHT: 187 LBS | SYSTOLIC BLOOD PRESSURE: 120 MMHG | HEART RATE: 64 BPM | TEMPERATURE: 97.8 F

## 2022-02-22 DIAGNOSIS — M17.0 PRIMARY OSTEOARTHRITIS OF KNEES, BILATERAL: ICD-10-CM

## 2022-02-22 DIAGNOSIS — G89.29 CHRONIC PAIN OF BOTH KNEES: Primary | ICD-10-CM

## 2022-02-22 DIAGNOSIS — M25.562 CHRONIC PAIN OF BOTH KNEES: Primary | ICD-10-CM

## 2022-02-22 DIAGNOSIS — M25.561 CHRONIC PAIN OF BOTH KNEES: Primary | ICD-10-CM

## 2022-02-22 PROCEDURE — 73562 X-RAY EXAM OF KNEE 3: CPT | Performed by: FAMILY MEDICINE

## 2022-02-22 PROCEDURE — 99214 OFFICE O/P EST MOD 30 MIN: CPT | Performed by: FAMILY MEDICINE

## 2022-02-22 NOTE — TELEPHONE ENCOUNTER
I assume they are saying he does not need nursing visits. Ok to discharge if they feel this is appropriate.

## 2022-02-22 NOTE — PROGRESS NOTES
"Dinesh is a 89 y.o. year old male presents to [facility]    Chief Complaint   Patient presents with   • Knee Pain     Referral from [facility] for eval of B/L knee pain - NKI - chronic pain,ambulates with a walker - here today with new x-rays for further evaluation and treatment        History of Present Illness  Chronic.  No known injury.  To his knowledge, has never had cortisone injection.  Has tried acetaminophen - h/o CAD s/p stents. + daily pain w/limp. Ambulates w/rollator walker. + night pain. Never had CSI to his knowledge. Here to consider gel shots. Presents w/son who lives w/him. Patient makes his own medical decisions.    I have reviewed the patient's medical, family, and social history in detail and updated the computerized patient record.    /60 (BP Location: Left arm, Patient Position: Sitting, Cuff Size: Adult)   Pulse 64   Temp 97.8 °F (36.6 °C) (Temporal)   Resp 16   Ht 190 cm (74.8\")   Wt 84.8 kg (187 lb)   SpO2 96%   BMI 23.50 kg/m²      Physical Exam    Mask worn thru encounter  Vital signs reviewed.   General: No acute distress.  Eyes: conjunctiva clear; pupils equally round and reactive  ENT: external ears atraumatic  CV: no peripheral edema  Resp: normal respiratory effort, no use of accessory muscles  Skin: no rashes or wounds; normal turgor  Psych: mood and affect appropriate; recent and remote memory intact  Neuro: sensation to light touch intact    MSK Exam  Shuffling gait  Bilateral knee demonstrate no effusion with full range of motion.  Positive retropatellar crepitus.    Bilateral Knee X-Ray  Indication: Pain    Views: AP, Lateral, and [place]    Findings:  No fracture  No bony lesion  Normal soft tissues  B/l tricompartmental DJD    No prior studies were available for comparison.           Diagnoses and all orders for this visit:    Chronic pain of both knees  -     XR Knee 3 View Bilateral; Future  -     XR Knee 3 View " Bilateral    Primary osteoarthritis of knees, bilateral    We will start approval process for gel series and follow-up in office once obtained.      Follow Up   No follow-ups on file.  Patient was given instructions and counseling regarding his condition or for health maintenance advice. Please see specific information pulled into the AVS if appropriate.     EMR Dragon/Transcription disclaimer:    Much of this encounter note is an electronic transcription/translation of spoken language to printed text.  The electronic translation of spoken language may permit erroneous, or at times, nonsensical words or phrases to be inadvertently transcribed.  Although I have reviewed the note for such errors some may still exist.

## 2022-03-08 PROCEDURE — 93298 REM INTERROG DEV EVAL SCRMS: CPT | Performed by: INTERNAL MEDICINE

## 2022-03-08 PROCEDURE — G2066 INTER DEVC REMOTE 30D: HCPCS | Performed by: INTERNAL MEDICINE

## 2022-03-14 ENCOUNTER — TELEPHONE (OUTPATIENT)
Dept: SPORTS MEDICINE | Facility: CLINIC | Age: 87
End: 2022-03-14

## 2022-03-14 NOTE — TELEPHONE ENCOUNTER
Received benefit check and patient is eligible to receive Orthovisc injections.    Bill as : BUY & BILL

## 2022-03-14 NOTE — TELEPHONE ENCOUNTER
----- Message from Yemi Garcia Jr., DO sent at 2/22/2022  1:52 PM EST -----  Regarding: gel  Jeremy b/l knee OA.

## 2022-03-15 ENCOUNTER — OFFICE VISIT (OUTPATIENT)
Dept: FAMILY MEDICINE CLINIC | Facility: CLINIC | Age: 87
End: 2022-03-15

## 2022-03-15 VITALS
WEIGHT: 187 LBS | BODY MASS INDEX: 23.25 KG/M2 | RESPIRATION RATE: 12 BRPM | TEMPERATURE: 98.3 F | OXYGEN SATURATION: 94 % | HEIGHT: 75 IN | HEART RATE: 63 BPM

## 2022-03-15 DIAGNOSIS — G89.29 CHRONIC PAIN OF BOTH KNEES: ICD-10-CM

## 2022-03-15 DIAGNOSIS — M17.0 PRIMARY OSTEOARTHRITIS OF BOTH KNEES: ICD-10-CM

## 2022-03-15 DIAGNOSIS — I10 ESSENTIAL HYPERTENSION: Primary | ICD-10-CM

## 2022-03-15 DIAGNOSIS — E53.8 VITAMIN B 12 DEFICIENCY: ICD-10-CM

## 2022-03-15 DIAGNOSIS — R29.6 RECURRENT FALLS: ICD-10-CM

## 2022-03-15 DIAGNOSIS — I48.0 PAROXYSMAL ATRIAL FIBRILLATION: ICD-10-CM

## 2022-03-15 DIAGNOSIS — M25.562 CHRONIC PAIN OF BOTH KNEES: ICD-10-CM

## 2022-03-15 DIAGNOSIS — F80.81 STUTTERING: ICD-10-CM

## 2022-03-15 DIAGNOSIS — R73.03 PREDIABETES: ICD-10-CM

## 2022-03-15 DIAGNOSIS — R47.9 SPEECH DISTURBANCE, UNSPECIFIED TYPE: ICD-10-CM

## 2022-03-15 DIAGNOSIS — Z86.79 HISTORY OF SUPRAVENTRICULAR TACHYCARDIA: ICD-10-CM

## 2022-03-15 DIAGNOSIS — M25.561 CHRONIC PAIN OF BOTH KNEES: ICD-10-CM

## 2022-03-15 DIAGNOSIS — D53.9 MACROCYTIC ANEMIA: ICD-10-CM

## 2022-03-15 DIAGNOSIS — E78.5 DYSLIPIDEMIA: ICD-10-CM

## 2022-03-15 DIAGNOSIS — Z86.73 HISTORY OF CVA (CEREBROVASCULAR ACCIDENT): ICD-10-CM

## 2022-03-15 DIAGNOSIS — I42.9 CARDIOMYOPATHY, UNSPECIFIED TYPE: ICD-10-CM

## 2022-03-15 DIAGNOSIS — Z12.5 PROSTATE CANCER SCREENING: ICD-10-CM

## 2022-03-15 DIAGNOSIS — R51.9 NONINTRACTABLE EPISODIC HEADACHE, UNSPECIFIED HEADACHE TYPE: ICD-10-CM

## 2022-03-15 DIAGNOSIS — G20 PARKINSON'S DISEASE: ICD-10-CM

## 2022-03-15 DIAGNOSIS — E55.9 VITAMIN D DEFICIENCY: ICD-10-CM

## 2022-03-15 DIAGNOSIS — D69.6 THROMBOCYTOPENIA: ICD-10-CM

## 2022-03-15 DIAGNOSIS — I25.10 CORONARY ARTERY DISEASE INVOLVING NATIVE CORONARY ARTERY OF NATIVE HEART WITHOUT ANGINA PECTORIS: ICD-10-CM

## 2022-03-15 PROCEDURE — 99214 OFFICE O/P EST MOD 30 MIN: CPT | Performed by: PHYSICIAN ASSISTANT

## 2022-03-16 ENCOUNTER — PROCEDURE VISIT (OUTPATIENT)
Dept: SPORTS MEDICINE | Facility: CLINIC | Age: 87
End: 2022-03-16

## 2022-03-16 VITALS
HEART RATE: 64 BPM | RESPIRATION RATE: 16 BRPM | DIASTOLIC BLOOD PRESSURE: 64 MMHG | SYSTOLIC BLOOD PRESSURE: 128 MMHG | WEIGHT: 187 LBS | OXYGEN SATURATION: 96 % | TEMPERATURE: 98.6 F | HEIGHT: 75 IN | BODY MASS INDEX: 23.25 KG/M2

## 2022-03-16 DIAGNOSIS — M17.0 PRIMARY OSTEOARTHRITIS OF KNEES, BILATERAL: Primary | ICD-10-CM

## 2022-03-16 LAB
25(OH)D3+25(OH)D2 SERPL-MCNC: 31.3 NG/ML (ref 30–100)
ALBUMIN SERPL-MCNC: 4.2 G/DL (ref 3.5–4.6)
ALBUMIN/GLOB SERPL: 2 {RATIO} (ref 1.2–2.2)
ALP SERPL-CCNC: 62 IU/L (ref 44–121)
ALT SERPL-CCNC: 14 IU/L (ref 0–44)
APPEARANCE UR: CLEAR
AST SERPL-CCNC: 22 IU/L (ref 0–40)
BACTERIA #/AREA URNS HPF: NORMAL /[HPF]
BASOPHILS # BLD AUTO: 0 X10E3/UL (ref 0–0.2)
BASOPHILS NFR BLD AUTO: 0 %
BILIRUB SERPL-MCNC: 1.6 MG/DL (ref 0–1.2)
BILIRUB UR QL STRIP: NEGATIVE
BUN SERPL-MCNC: 26 MG/DL (ref 10–36)
BUN/CREAT SERPL: 30 (ref 10–24)
CALCIUM SERPL-MCNC: 8.9 MG/DL (ref 8.6–10.2)
CASTS URNS QL MICRO: NORMAL /LPF
CHLORIDE SERPL-SCNC: 107 MMOL/L (ref 96–106)
CHOLEST SERPL-MCNC: 133 MG/DL (ref 100–199)
CK SERPL-CCNC: 60 U/L (ref 30–208)
CO2 SERPL-SCNC: 22 MMOL/L (ref 20–29)
COLOR UR: YELLOW
CREAT SERPL-MCNC: 0.86 MG/DL (ref 0.76–1.27)
EGFRCR SERPLBLD CKD-EPI 2021: 81 ML/MIN/1.73
EOSINOPHIL # BLD AUTO: 0.1 X10E3/UL (ref 0–0.4)
EOSINOPHIL NFR BLD AUTO: 2 %
EPI CELLS #/AREA URNS HPF: NORMAL /HPF (ref 0–10)
ERYTHROCYTE [DISTWIDTH] IN BLOOD BY AUTOMATED COUNT: 14.4 % (ref 11.6–15.4)
FERRITIN SERPL-MCNC: 412 NG/ML (ref 30–400)
FOLATE SERPL-MCNC: 10 NG/ML
GLOBULIN SER CALC-MCNC: 2.1 G/DL (ref 1.5–4.5)
GLUCOSE SERPL-MCNC: 108 MG/DL (ref 65–99)
GLUCOSE UR QL STRIP: NEGATIVE
HBA1C MFR BLD: 5.8 % (ref 4.8–5.6)
HCT VFR BLD AUTO: 40.2 % (ref 37.5–51)
HDLC SERPL-MCNC: 49 MG/DL
HGB BLD-MCNC: 13.2 G/DL (ref 13–17.7)
HGB UR QL STRIP: NEGATIVE
IMM GRANULOCYTES # BLD AUTO: 0 X10E3/UL (ref 0–0.1)
IMM GRANULOCYTES NFR BLD AUTO: 0 %
IRON SATN MFR SERPL: 44 % (ref 15–55)
IRON SERPL-MCNC: 126 UG/DL (ref 38–169)
KETONES UR QL STRIP: NEGATIVE
LDLC SERPL CALC-MCNC: 70 MG/DL (ref 0–99)
LDLC/HDLC SERPL: 1.4 RATIO (ref 0–3.6)
LEUKOCYTE ESTERASE UR QL STRIP: NEGATIVE
LYMPHOCYTES # BLD AUTO: 1.4 X10E3/UL (ref 0.7–3.1)
LYMPHOCYTES NFR BLD AUTO: 25 %
MCH RBC QN AUTO: 33.8 PG (ref 26.6–33)
MCHC RBC AUTO-ENTMCNC: 32.8 G/DL (ref 31.5–35.7)
MCV RBC AUTO: 103 FL (ref 79–97)
MICRO URNS: NORMAL
MICRO URNS: NORMAL
MONOCYTES # BLD AUTO: 0.5 X10E3/UL (ref 0.1–0.9)
MONOCYTES NFR BLD AUTO: 9 %
NEUTROPHILS # BLD AUTO: 3.5 X10E3/UL (ref 1.4–7)
NEUTROPHILS NFR BLD AUTO: 64 %
NITRITE UR QL STRIP: NEGATIVE
PH UR STRIP: 6 [PH] (ref 5–7.5)
PLATELET # BLD AUTO: 119 X10E3/UL (ref 150–450)
POTASSIUM SERPL-SCNC: 4.8 MMOL/L (ref 3.5–5.2)
PROT SERPL-MCNC: 6.3 G/DL (ref 6–8.5)
PROT UR QL STRIP: NEGATIVE
PSA SERPL-MCNC: 1.2 NG/ML (ref 0–4)
RBC # BLD AUTO: 3.9 X10E6/UL (ref 4.14–5.8)
RBC #/AREA URNS HPF: NORMAL /HPF (ref 0–2)
SODIUM SERPL-SCNC: 144 MMOL/L (ref 134–144)
SP GR UR STRIP: 1.02 (ref 1–1.03)
T3FREE SERPL-MCNC: 2.5 PG/ML (ref 2–4.4)
T4 FREE SERPL-MCNC: 1.28 NG/DL (ref 0.82–1.77)
TIBC SERPL-MCNC: 289 UG/DL (ref 250–450)
TRIGL SERPL-MCNC: 67 MG/DL (ref 0–149)
TSH SERPL DL<=0.005 MIU/L-ACNC: 2.6 UIU/ML (ref 0.45–4.5)
UIBC SERPL-MCNC: 163 UG/DL (ref 111–343)
URINALYSIS REFLEX: NORMAL
UROBILINOGEN UR STRIP-MCNC: 1 MG/DL (ref 0.2–1)
VIT B12 SERPL-MCNC: 603 PG/ML (ref 232–1245)
VLDLC SERPL CALC-MCNC: 14 MG/DL (ref 5–40)
WBC # BLD AUTO: 5.6 X10E3/UL (ref 3.4–10.8)
WBC #/AREA URNS HPF: NORMAL /HPF (ref 0–5)

## 2022-03-16 PROCEDURE — 20610 DRAIN/INJ JOINT/BURSA W/O US: CPT | Performed by: FAMILY MEDICINE

## 2022-03-16 RX ORDER — METOPROLOL SUCCINATE 100 MG/1
100 TABLET, EXTENDED RELEASE ORAL DAILY
Qty: 30 TABLET | Refills: 2 | Status: SHIPPED | OUTPATIENT
Start: 2022-03-16 | End: 2022-06-14

## 2022-03-16 RX ORDER — AMLODIPINE BESYLATE 5 MG/1
5 TABLET ORAL
Qty: 30 TABLET | Refills: 2 | OUTPATIENT
Start: 2022-03-16

## 2022-03-16 RX ORDER — AMLODIPINE BESYLATE 5 MG/1
5 TABLET ORAL
Qty: 30 TABLET | Refills: 2 | Status: SHIPPED | OUTPATIENT
Start: 2022-03-16 | End: 2022-06-14

## 2022-03-16 RX ORDER — METOPROLOL SUCCINATE 100 MG/1
100 TABLET, EXTENDED RELEASE ORAL DAILY
Qty: 30 TABLET | Refills: 2 | OUTPATIENT
Start: 2022-03-16

## 2022-03-16 RX ORDER — ATORVASTATIN CALCIUM 20 MG/1
20 TABLET, FILM COATED ORAL NIGHTLY
Qty: 30 TABLET | Refills: 2 | OUTPATIENT
Start: 2022-03-16

## 2022-03-16 RX ORDER — ATORVASTATIN CALCIUM 20 MG/1
20 TABLET, FILM COATED ORAL NIGHTLY
Qty: 30 TABLET | Refills: 2 | Status: SHIPPED | OUTPATIENT
Start: 2022-03-16 | End: 2022-06-14

## 2022-03-16 NOTE — PROGRESS NOTES
Large Joint Arthrocentesis: L knee  Date/Time: 3/16/2022 11:20 AM  Consent given by: patient  Site marked: site marked  Timeout: Immediately prior to procedure a time out was called to verify the correct patient, procedure, equipment, support staff and site/side marked as required   Supporting Documentation  Indications: pain   Procedure Details  Location: knee - L knee  Preparation: Patient was prepped and draped in the usual sterile fashion  Needle size: 22 G  Approach: anterolateral  Medications administered: 30 mg Hyaluronan 30 MG/2ML  Patient tolerance: patient tolerated the procedure well with no immediate complications    Large Joint Arthrocentesis: R knee  Date/Time: 3/16/2022 11:20 AM  Consent given by: patient  Site marked: site marked  Timeout: Immediately prior to procedure a time out was called to verify the correct patient, procedure, equipment, support staff and site/side marked as required   Supporting Documentation  Indications: pain   Procedure Details  Location: knee - R knee  Preparation: Patient was prepped and draped in the usual sterile fashion  Needle size: 22 G  Approach: anterolateral  Medications administered: 30 mg Hyaluronan 30 MG/2ML  Patient tolerance: patient tolerated the procedure well with no immediate complications

## 2022-03-18 ENCOUNTER — PATIENT OUTREACH (OUTPATIENT)
Dept: CASE MANAGEMENT | Facility: OTHER | Age: 87
End: 2022-03-18

## 2022-03-18 NOTE — OUTREACH NOTE
AMBULATORY CASE MANAGEMENT NOTE    Name and Relationship of Patient/Support Person: Dinesh Churchill Jr - Emergency Contact    Adult Patient Profile  Questions/Answers    Flowsheet Row Most Recent Value   Symptoms/Conditions Managed at Home chronic pain  [knee pain. ]   Chronic Pain Location bliateral knees   Chronic Pain Frequency constant   Chronic Pain Management Strategies medication therapy  [Recently received injection to knee for pain relieve. ]   Chronic Pain Self-Management Outcome 4 (good)   Chronic Pain Comment Patient reported to be in less pain after injection        Send Education  Questions/Answers    Flowsheet Row Most Recent Value   Annual Wellness Visit:  Patient Will Schedule   AWV Materials Send Materials        Patient Outreach    Introduced self, explained ACM RN role and provided contact information. Spoke with patient's son regarding patient's progress. Patient is doing well. Patient has recently received injections to knees to assist with pain. Pain reportedly better. Patient still working with Home Health but son will take over exercises soon. No other needs at this time. Follow up to review gaps scheduled in 4 weeks.     Education Documentation  No documentation found.        TG CUEVAS  Ambulatory Case Management    3/18/2022, 15:18 EDT

## 2022-03-23 ENCOUNTER — PROCEDURE VISIT (OUTPATIENT)
Dept: SPORTS MEDICINE | Facility: CLINIC | Age: 87
End: 2022-03-23

## 2022-03-23 VITALS
WEIGHT: 187 LBS | HEIGHT: 75 IN | SYSTOLIC BLOOD PRESSURE: 118 MMHG | TEMPERATURE: 97.7 F | HEART RATE: 97 BPM | BODY MASS INDEX: 23.25 KG/M2 | OXYGEN SATURATION: 99 % | RESPIRATION RATE: 16 BRPM | DIASTOLIC BLOOD PRESSURE: 60 MMHG

## 2022-03-23 DIAGNOSIS — M17.0 PRIMARY OSTEOARTHRITIS OF KNEES, BILATERAL: Primary | ICD-10-CM

## 2022-03-23 PROCEDURE — 20610 DRAIN/INJ JOINT/BURSA W/O US: CPT | Performed by: FAMILY MEDICINE

## 2022-03-23 NOTE — PROGRESS NOTES
Large Joint Arthrocentesis: L knee  Date/Time: 3/23/2022 11:24 AM  Consent given by: patient  Site marked: site marked  Timeout: Immediately prior to procedure a time out was called to verify the correct patient, procedure, equipment, support staff and site/side marked as required   Supporting Documentation  Indications: pain   Procedure Details  Location: knee - L knee  Preparation: Patient was prepped and draped in the usual sterile fashion  Needle size: 22 G  Approach: anterolateral  Medications administered: 30 mg Hyaluronan 30 MG/2ML  Patient tolerance: patient tolerated the procedure well with no immediate complications    Large Joint Arthrocentesis: R knee  Date/Time: 3/23/2022 11:24 AM  Consent given by: patient  Site marked: site marked  Timeout: Immediately prior to procedure a time out was called to verify the correct patient, procedure, equipment, support staff and site/side marked as required   Supporting Documentation  Indications: pain   Procedure Details  Location: knee - R knee  Preparation: Patient was prepped and draped in the usual sterile fashion  Needle size: 22 G  Approach: anterolateral  Medications administered: 30 mg Hyaluronan 30 MG/2ML  Patient tolerance: patient tolerated the procedure well with no immediate complications

## 2022-03-30 ENCOUNTER — PROCEDURE VISIT (OUTPATIENT)
Dept: SPORTS MEDICINE | Facility: CLINIC | Age: 87
End: 2022-03-30

## 2022-03-30 VITALS
BODY MASS INDEX: 23 KG/M2 | DIASTOLIC BLOOD PRESSURE: 60 MMHG | WEIGHT: 185 LBS | HEART RATE: 76 BPM | RESPIRATION RATE: 16 BRPM | HEIGHT: 75 IN | SYSTOLIC BLOOD PRESSURE: 115 MMHG | OXYGEN SATURATION: 98 % | TEMPERATURE: 97.7 F

## 2022-03-30 DIAGNOSIS — M17.0 PRIMARY OSTEOARTHRITIS OF KNEES, BILATERAL: Primary | ICD-10-CM

## 2022-03-30 PROCEDURE — 20610 DRAIN/INJ JOINT/BURSA W/O US: CPT | Performed by: FAMILY MEDICINE

## 2022-03-30 NOTE — PROGRESS NOTES
12/2/19 Patient: Mariela Roy YOB: 1952 Date of Visit: 12/2/2019 Fausto Canseco MD 
[facility] [address] [place] [id number] VIA Facsimile: [phone] Dear Fausto Canseco MD, Thank you for referring Ms. Leslie Marques to [facility] for evaluation. My notes for this consultation are attached. If you have questions, please do not hesitate to call me. I look forward to following your patient along with you.  
 
 
Sincerely, 
 
Alberto Sampson MD 
 
 Large Joint Arthrocentesis: L knee  Date/Time: 3/30/2022 11:34 AM  Consent given by: patient  Site marked: site marked  Timeout: Immediately prior to procedure a time out was called to verify the correct patient, procedure, equipment, support staff and site/side marked as required   Supporting Documentation  Indications: pain   Procedure Details  Location: knee - L knee  Preparation: Patient was prepped and draped in the usual sterile fashion  Needle size: 22 G  Approach: anterolateral  Medications administered: 30 mg Hyaluronan 30 MG/2ML  Patient tolerance: patient tolerated the procedure well with no immediate complications    Large Joint Arthrocentesis: R knee  Date/Time: 3/30/2022 11:34 AM  Consent given by: patient  Site marked: site marked  Timeout: Immediately prior to procedure a time out was called to verify the correct patient, procedure, equipment, support staff and site/side marked as required   Supporting Documentation  Indications: pain   Procedure Details  Location: knee - R knee  Preparation: Patient was prepped and draped in the usual sterile fashion  Needle size: 22 G  Approach: anterolateral  Medications administered: 30 mg Hyaluronan 30 MG/2ML  Patient tolerance: patient tolerated the procedure well with no immediate complications

## 2022-04-18 ENCOUNTER — PATIENT OUTREACH (OUTPATIENT)
Dept: CASE MANAGEMENT | Facility: OTHER | Age: 87
End: 2022-04-18

## 2022-04-18 NOTE — OUTREACH NOTE
AMBULATORY CASE MANAGEMENT NOTE    Name and Relationship of Patient/Support Person: Dinesh Churchill Sr. - Self    Send Education  Questions/Answers    Flowsheet Row Most Recent Value   Advanced Directives: Not Interested At This Time      Patient Outreach    Introduced self, explained ACM RN role and provided contact information. Spoke with patient's son regarding patient's health and wellness. Patient is doing exercises once or twice weekly. He is taking his medications as prescribed daily. He has a new pair of 2 pound weights for exercise. Patient has intermittent weakness in knees. Son plans to calling PCP for neurology follow up if weakness continues.     Reviewed advanced directives with son. Patient has reviewed his wishes with his sons. Sons declined advanced directive information. No further outreach scheduled at this time.     Education Documentation  No documentation found.        TG CUEVAS  Ambulatory Case Management    4/18/2022, 13:39 EDT

## 2022-05-09 PROCEDURE — G2066 INTER DEVC REMOTE 30D: HCPCS | Performed by: INTERNAL MEDICINE

## 2022-05-09 PROCEDURE — 93298 REM INTERROG DEV EVAL SCRMS: CPT | Performed by: INTERNAL MEDICINE

## 2022-06-06 NOTE — ED PROVIDER NOTES
EMERGENCY DEPARTMENT ENCOUNTER    CHIEF COMPLAINT  Chief Complaint: chest pain  History given by: patient, patient's son, and EMS  History limited by: nothing  Room Number: ROBERTO/ROBERTO  PMD: Phyllis Loving PA   Cardiologist: Dr. Quezada      HPI:  Pt is a 89 y.o. male with hx of CAD and recent stent placement x 1 (in 08/2019), hypertension, and hyperlipidemia who presents to the ED complaining of anterior left-sided chest pain with radiation to his left shoulder which began two hours ago while at rest. The patient also complains of associated mild shortness of breath. The patient denies associated nausea or vomiting. He reports his chest pain today is similar in nature but more severe than the chest pain he had prior to his recent stent placement. The patient's son reports the patient took 81 mg ASA x 4 prior to arrival. EMS reports the patient was given 0.5 of Nitroglycerin while en route to the ER with improvement of his chest pain. The patient reports his chest pain is currently a 5 or 6 out of 10 severity rating. There are no other complaints at this time. Pt's son at bedside.    Duration: two hours  Onset: gradual  Timing: constant  Location: anterior left chest  Radiation: left shoulder  Quality: pain  Intensity/Severity: moderate (5-6/10 severity rating)  Progression: improved  Associated Symptoms: mild shortness of breath  Aggravating Factors: none specified  Alleviating Factors: none specified  Previous Episodes: the patient has a hx of CAD and a recent stent placement x 1. He reports his chest pain today is similar in nature but more severe than the chest pain he had prior to his recent stent placement.   Treatment before arrival: The patient's son reports the patient took 81 mg ASA x 4 prior to arrival. EMS reports the patient was given 0.5 of Nitroglycerin while en route to the ER with improvement of his chest pain.    PAST MEDICAL HISTORY  Active Ambulatory Problems     Diagnosis Date Noted   •  Abnormal magnetic resonance imaging study 03/18/2016   • Arthritis 03/18/2016   • Osteoarthritis of cervical spine 03/18/2016   • Diplopia 03/18/2016   • Hearing loss 03/18/2016   • Neoplasm of uncertain behavior of skin 03/18/2016   • Prediabetes 03/18/2016   • Vitamin D deficiency 03/18/2016   • Chronic fatigue 10/27/2016   • History of supraventricular tachycardia 04/07/2017   • Essential hypertension 04/07/2017   • B12 deficiency 04/19/2017   • Abnormal cardiac function test 05/08/2017   • Cardiomyopathy (CMS/[facility]) 05/08/2017   • Coronary artery disease involving native coronary artery of native heart without angina pectoris 05/31/2017   • History of coronary artery stent placement 05/31/2017   • History of renal calculi 05/31/2017   • Dyslipidemia 05/31/2017   • Macrocytic anemia 08/09/2017   • Precordial pain 08/16/2017   • Parkinson's disease (CMS/[facility]) 10/25/2017   • History of CVA (cerebrovascular accident) 10/25/2017   • Paroxysmal atrial fibrillation (CMS/[facility]) 11/29/2017   • Otalgia, left 12/29/2017   • Chest pain 08/06/2019   • Abnormal stress test 08/06/2019   • Spontaneous pneumothorax 08/18/2019   • Chest pain 08/18/2019   • Acute systolic (congestive) heart failure (CMS/[facility]) 08/19/2019   • Thrombocytopenia (CMS/[facility]) 08/20/2019   • Pneumonia of left lower lobe due to infectious organism (CMS/[facility]) 09/06/2019     Resolved Ambulatory Problems     Diagnosis Date Noted   • Maxillary sinusitis 03/18/2016   • Dizziness 10/27/2016   • Acute frontal sinusitis 10/27/2016   • Precordial pain 04/07/2017   • SOB (shortness of breath) 04/07/2017   • Acute rhinitis 12/29/2017     Past Medical History:   Diagnosis Date   • Abnormal ECG    • Abnormal MRI    • Acute frontal sinusitis    • Arthritis    • Chronic fatigue    • Coronary artery disease    • Dizziness    • Hearing aid worn    • Hearing loss    • Hyperlipidemia    • Hypertension    • Kidney stones    • Macrocytosis    • Neoplasm of skin    • Osteoporosis    •  Prediabetes    • Vitamin D deficiency        PAST SURGICAL HISTORY  Past Surgical History:   Procedure Laterality Date   • CARDIAC CATHETERIZATION N/A 5/22/2017    Procedure: Left Heart Cath;  Surgeon: Maninder Quezada MD;  Location:  [facility] CATH INVASIVE LOCATION;  Service:    • CARDIAC CATHETERIZATION N/A 5/23/2017    Procedure: Stent BMS coronary;  Surgeon: Maninder Quezada MD;  Location:  [facility] C[facility]VASIVE [facility];  Service:    • CARDIAC CATHETERIZATION N/A 8/8/2019    Procedure: Left Heart Cath;  Surgeon: Maninder Quezada MD;  Location:  [facility] C[facility] INVASIVE [facility];  Service: Cardiology   • CARDIAC CATHETERIZATION N/A 8/8/2019    Procedure: Left ventriculography;  Surgeon: Maninder Quezada MD;  Location:  [facility] CATH INVASIVE [facility];  Service: Cardiology   • CARDIAC CATHETERIZATION N/A 8/8/2019    Procedure: Coronary angiography;  Surgeon: Maninder Quezada MD;  Location:  [facility] CATH INVASIVE [facility];  Service: Cardiology   • CARDIAC CATHETERIZATION N/A 8/20/2019    Procedure: CORONARY ANGIOGRAPHY;  Surgeon: Maninder Quezada MD;  [facility];  Service: Cardiovascular   • CARDIAC CATHETERIZATION N/A 8/20/2019    Procedure: Stent BMS coronary;  Surgeon: Maninder Quezada MD;  Location:  [facility] CATH INVASIVE LOCATION;  Service: Cardiovascular   • HERNIA REPAIR      x 2   • NM RT/LT HEART CATHETERS N/A 5/23/2017    Procedure: Percutaneous Coronary Intervention;  Surgeon: Maninder Quezada MD;  Location:  [facility] CATH INVASIVE LOCATION;  Service: Cardiovascular       FAMILY HISTORY  Family History   Family history unknown: Yes       SOCIAL HISTORY  Social History     Socioeconomic History   • Marital status:      Spouse name: Not on file   • Number of children: Not on file   • Years of education: Not on file   • Highest education level: Not on file   Occupational History     Employer: RETIRED   Tobacco Use   • Smoking status: Never  Smoker   • Smokeless tobacco: Never Used   Substance and Sexual Activity   • Alcohol use: No     Comment: quit 30 years ago    • Drug use: No   • Sexual activity: Defer       ALLERGIES  Patient has no known allergies.    REVIEW OF SYSTEMS  Review of Systems   Constitutional: Negative for activity change, appetite change and fever.   HENT: Negative for congestion and sore throat.    Eyes: Negative.    Respiratory: Positive for shortness of breath (mild). Negative for cough.    Cardiovascular: Positive for chest pain (anterior left-sided chest pain with radiation to his left shoulder). Negative for leg swelling.   Gastrointestinal: Negative for abdominal pain, diarrhea, nausea and vomiting.   Endocrine: Negative.    Genitourinary: Negative for decreased urine volume and dysuria.   Musculoskeletal: Negative for neck pain.   Skin: Negative for rash and wound.   Allergic/Immunologic: Negative.    Neurological: Negative for weakness, numbness and headaches.   Hematological: Negative.    Psychiatric/Behavioral: Negative.    All other systems reviewed and are negative.      PHYSICAL EXAM  ED Triage Vitals   Temp Pulse Resp BP SpO2   -- -- -- -- --      Temp src Heart Rate Source Patient Position BP Location FiO2 (%)   -- -- -- -- --       Physical Exam   Constitutional: He is oriented to person, place, and time. No distress.   HENT:   Head: Normocephalic and atraumatic.   Eyes: EOM are normal. Pupils are equal, round, and reactive to light.   Neck: Normal range of motion. Neck supple.   Cardiovascular: Regular rhythm and normal heart sounds. Tachycardia present. Exam reveals no gallop and no friction rub.   No murmur heard.  Pulmonary/Chest: Effort normal and breath sounds normal. No respiratory distress. He has no decreased breath sounds. He has no wheezes. He has no rhonchi. He has no rales. He exhibits no tenderness.   Abdominal: Soft. Bowel sounds are normal. He exhibits no distension and no mass. There is no  tenderness. There is no rebound and no guarding.   Musculoskeletal: Normal range of motion. He exhibits no edema.   Neurological: He is alert and oriented to person, place, and time. He has normal sensation and normal strength.   Skin: Skin is warm and dry.   Psychiatric: Mood and affect normal.   Nursing note and vitals reviewed.      LAB RESULTS  Lab Results (last 24 hours)     Procedure Component Value Units Date/Time    CBC & Differential [[id number]] Collected:  11/01/19 1549    Specimen:  Blood Updated:  11/01/19 1645    Narrative:       The following orders were created for panel order CBC & Differential.  Procedure                               Abnormality         Status                     ---------                               -----------         ------                     CBC Auto Differential[[id number]]        Abnormal            Final result                 Please view results for these tests on the individual orders.    Comprehensive Metabolic Panel [[id number]]  (Abnormal) Collected:  11/01/19 1549    Specimen:  Blood Updated:  11/01/19 1642     Glucose 102 mg/dL      BUN 20 mg/dL      Creatinine 0.93 mg/dL      Sodium 137 mmol/L      Potassium 4.2 mmol/L      Chloride 102 mmol/L      CO2 23.1 mmol/L      Calcium 9.1 mg/dL      Total Protein 7.5 g/dL      Albumin 4.10 g/dL      ALT (SGPT) 14 U/L      AST (SGOT) 16 U/L      Alkaline Phosphatase 51 U/L      Total Bilirubin 2.2 mg/dL      eGFR Non African Amer 76 mL/min/1.73      Globulin 3.4 gm/dL      A/G Ratio 1.2 g/dL      BUN/Creatinine Ratio 21.5     Anion Gap 11.9 mmol/L     Narrative:       GFR Normal >60  Chronic Kidney Disease <60  Kidney Failure <15    Troponin [[id number]]  (Normal) Collected:  11/01/19 1549    Specimen:  Blood Updated:  11/01/19 1642     Troponin T 0.017 ng/mL     Narrative:       Troponin T Reference Range:  <= 0.03 ng/mL-   Negative for AMI  >0.03 ng/mL-     Abnormal for myocardial necrosis.  Clinicians would have to utilize  clinical acumen, EKG, Troponin and serial changes to determine if it is an Acute Myocardial Infarction or myocardial injury due to an underlying chronic condition.     CBC Auto Differential [[id number]]  (Abnormal) Collected:  11/01/19 1549    Specimen:  Blood Updated:  11/01/19 1645     WBC 3.95 10*3/mm3      RBC 4.32 10*6/mm3      Hemoglobin 14.3 g/dL      Hematocrit 43.3 %      .2 fL      MCH 33.1 pg      MCHC 33.0 g/dL      RDW 15.4 %      RDW-SD 56.9 fl      MPV 11.2 fL      Platelets 129 10*3/mm3     Manual Differential [[id number]]  (Abnormal) Collected:  11/01/19 1549    Specimen:  Blood Updated:  11/01/19 1645     Neutrophil % 75.3 %      Lymphocyte % 15.5 %      Monocyte % 7.2 %      Eosinophil % 2.1 %      Neutrophils Absolute 2.97 10*3/mm3      Lymphocytes Absolute 0.61 10*3/mm3      Monocytes Absolute 0.28 10*3/mm3      Eosinophils Absolute 0.08 10*3/mm3      Anisocytosis Mod/2+     Shawn Cells Slight/1+     Poikilocytes Slight/1+     WBC Morphology Normal     Platelet Morphology Normal    Troponin [[id number]]  (Normal) Collected:  11/01/19 1757    Specimen:  Blood Updated:  11/01/19 1822     Troponin T 0.011 ng/mL     Narrative:       Troponin T Reference Range:  <= 0.03 ng/mL-   Negative for AMI  >0.03 ng/mL-     Abnormal for myocardial necrosis.  Clinicians would have to utilize clinical acumen, EKG, Troponin and serial changes to determine if it is an Acute Myocardial Infarction or myocardial injury due to an underlying chronic condition.           I ordered the above labs and reviewed the results    RADIOLOGY  Xr Chest 1 View    Result Date: 11/1/2019  Narrative: ONE VIEW PORTABLE CHEST  HISTORY: Chest pain.  FINDINGS: The lungs are well-expanded with a small left pleural effusion as also seen on the CT scan performed on 09/06/2019. There is a suspicion of a very small left apical pneumothorax measuring 11 mm in thickness. This was not present on the earlier chest CT scan, but the patient did  have a tiny left apical pneumothorax on an earlier chest x-ray dated 08/20/2019. The heart remains mildly enlarged.         I ordered the above noted radiological studies. Interpreted by radiologist. Reviewed by me in PACS.       PROCEDURES  Procedures    EKG          EKG time: 1526  Rhythm/Rate: ectopic atrial tachycardia, 125  QRS, axis: QRS complex is nml, LAD   ST and T waves: nml; nml     Interpreted Contemporaneously by me, independently viewed  Changed compared to prior from 09/08/2019.    PROGRESS AND CONSULTS  1536 Ordered CXR, EKG, and blood work for further evaluation. Also ordered Nitroglycerin ointment for pain management.    1649 Placed call to Cardiology for consult.    1719 Received a call from Dr. Deepak Stack (Cardiology) and discussed pt's case. Dr. Stack agreed with plan to admit the patient to tele obs for further cardiac workup.    1723 Rechecked the patient who is resting comfortably and in NAD. He feels improvement since receiving Nitroglycerin ointment in the ER. Patient is stable. BP- 139/71 HR- 73 Temp- 97.9 °F (36.6 °C) (Oral) O2 sat- 94%. Informed the patient and his son of his lab work which shows a Troponin of 0.017 and his CXR which is unchanged from prior. Discussed the plan for admission for further cardiac workup and management. They understand and agree with the plan, all questions answered.    1726 Since the patient is an angina patient and is tachycardic, ordered Lopressor 5 mg.    MEDICAL DECISION MAKING  Results were reviewed/discussed with the patient and they were also made aware of online access. Pt also made aware that some labs, such as cultures, will not be resulted during ER visit and follow up with PMD is necessary.     MDM  Number of Diagnoses or Management Options  Chest pain, unspecified type:      Amount and/or Complexity of Data Reviewed  Clinical lab tests: ordered and reviewed (Troponin: 0.017)  Tests in the radiology section of CPT®: ordered and reviewed  (CXR: The lungs are well-expanded with a small left pleural effusion as also seen on the CT scan performed on 09/06/2019. There is a suspicion of a very small left apical pneumothorax measuring 11 mm in thickness. This was not present on the earlier chest CT scan, but the patient did have a tiny left apical pneumothorax on an earlier chest x-ray dated 08/20/2019. The heart remains mildly enlarged.)  Tests in the medicine section of CPT®: ordered and reviewed (See procedure notes for EKG interpretation.)  Decide to obtain previous medical records or to obtain history from someone other than the patient: yes  Obtain history from someone other than the patient: yes (patient's son and EMS)  Review and summarize past medical records: yes (He had a cardiac catheterization with a stent in 08/2019. He had a 99% occulusion and was stented. It was done by Dr. Quezada.)  Discuss the patient with other providers: yes (Dr. Deepak Stack (Cardiology))  Independent visualization of images, tracings, or specimens: yes    Patient Progress  Patient progress: stable         DIAGNOSIS  Final diagnoses:   Chest pain, unspecified type       DISPOSITION  ADMISSION TO [facility]    Discussed treatment plan and reason for admission with pt/family and admitting physician.  Pt/family voiced understanding of the plan for admission for further testing/treatment as needed.    Latest Documented Vital Signs:  As of 6:44 PM  BP- 157/75 HR- 70 Temp- 97.9 °F (36.6 °C) (Oral) O2 sat- 96%    --  Documentation assistance provided by miladis Franco for Dr. Karthikeyan MD.  Information recorded by the scribe was done at my direction and has been verified and validated by me.     Pura Franco  11/01/19 [id number]       Josef Napier MD  11/01/19 0646     Suturegard Intro: Intraoperative tissue expansion was performed, utilizing the SUTUREGARD device, in order to reduce wound tension.

## 2022-06-14 RX ORDER — ISOSORBIDE MONONITRATE 60 MG/1
60 TABLET, EXTENDED RELEASE ORAL DAILY
Qty: 90 TABLET | Refills: 1 | Status: SHIPPED | OUTPATIENT
Start: 2022-06-14 | End: 2022-12-12

## 2022-06-14 RX ORDER — LOSARTAN POTASSIUM 50 MG/1
50 TABLET ORAL DAILY
Qty: 90 TABLET | Refills: 1 | Status: SHIPPED | OUTPATIENT
Start: 2022-06-14 | End: 2022-12-12

## 2022-06-14 RX ORDER — ATORVASTATIN CALCIUM 20 MG/1
20 TABLET, FILM COATED ORAL NIGHTLY
Qty: 30 TABLET | Refills: 2 | Status: SHIPPED | OUTPATIENT
Start: 2022-06-14 | End: 2022-09-12

## 2022-06-14 RX ORDER — AMLODIPINE BESYLATE 5 MG/1
5 TABLET ORAL
Qty: 30 TABLET | Refills: 2 | Status: SHIPPED | OUTPATIENT
Start: 2022-06-14 | End: 2022-09-12

## 2022-06-14 RX ORDER — METOPROLOL SUCCINATE 100 MG/1
100 TABLET, EXTENDED RELEASE ORAL DAILY
Qty: 30 TABLET | Refills: 2 | Status: SHIPPED | OUTPATIENT
Start: 2022-06-14 | End: 2022-09-12

## 2022-06-23 ENCOUNTER — HOSPITAL ENCOUNTER (OUTPATIENT)
Dept: CARDIOLOGY | Facility: HOSPITAL | Age: 87
Discharge: HOME | End: 2022-06-23
Admitting: INTERNAL MEDICINE

## 2022-06-23 ENCOUNTER — OFFICE VISIT (OUTPATIENT)
Dept: CARDIOLOGY | Facility: CLINIC | Age: 87
End: 2022-06-23

## 2022-06-23 VITALS
HEIGHT: 75 IN | DIASTOLIC BLOOD PRESSURE: 52 MMHG | HEART RATE: 59 BPM | WEIGHT: 179 LBS | BODY MASS INDEX: 22.26 KG/M2 | SYSTOLIC BLOOD PRESSURE: 147 MMHG

## 2022-06-23 VITALS — BODY MASS INDEX: 23 KG/M2 | HEIGHT: 75 IN | WEIGHT: 185 LBS

## 2022-06-23 DIAGNOSIS — I25.10 CORONARY ARTERY DISEASE INVOLVING NATIVE CORONARY ARTERY OF NATIVE HEART WITHOUT ANGINA PECTORIS: ICD-10-CM

## 2022-06-23 DIAGNOSIS — I10 ESSENTIAL HYPERTENSION: ICD-10-CM

## 2022-06-23 DIAGNOSIS — I48.0 PAROXYSMAL ATRIAL FIBRILLATION: Primary | ICD-10-CM

## 2022-06-23 LAB
AORTIC DIMENSIONLESS INDEX: 0.8 (DI)
ASCENDING AORTA: 3.5 CM
BH CV ECHO MEAS - ACS: 2.5 CM
BH CV ECHO MEAS - AI P1/2T: 423 MSEC
BH CV ECHO MEAS - AO MAX PG: 7.9 MMHG
BH CV ECHO MEAS - AO MEAN PG: 4 MMHG
BH CV ECHO MEAS - AO ROOT DIAM: 4.1 CM
BH CV ECHO MEAS - AO V2 MAX: 140.7 CM/SEC
BH CV ECHO MEAS - AO V2 VTI: 31.8 CM
BH CV ECHO MEAS - AVA(I,D): 4 CM2
BH CV ECHO MEAS - EDV(CUBED): 356.7 ML
BH CV ECHO MEAS - EDV(MOD-SP2): 122 ML
BH CV ECHO MEAS - EDV(MOD-SP4): 137 ML
BH CV ECHO MEAS - EF(MOD-BP): 48 %
BH CV ECHO MEAS - EF(MOD-SP2): 52.5 %
BH CV ECHO MEAS - EF(MOD-SP4): 46 %
BH CV ECHO MEAS - ESV(CUBED): 142.9 ML
BH CV ECHO MEAS - ESV(MOD-SP2): 58 ML
BH CV ECHO MEAS - ESV(MOD-SP4): 74 ML
BH CV ECHO MEAS - FS: 26.3 %
BH CV ECHO MEAS - IVS/LVPW: 0.91 CM
BH CV ECHO MEAS - IVSD: 1.01 CM
BH CV ECHO MEAS - LAT PEAK E' VEL: 7 CM/SEC
BH CV ECHO MEAS - LV MASS(C)D: 354.3 GRAMS
BH CV ECHO MEAS - LV MAX PG: 4.4 MMHG
BH CV ECHO MEAS - LV MEAN PG: 2.42 MMHG
BH CV ECHO MEAS - LV V1 MAX: 104.8 CM/SEC
BH CV ECHO MEAS - LV V1 VTI: 26 CM
BH CV ECHO MEAS - LVIDD: 7.1 CM
BH CV ECHO MEAS - LVIDS: 5.2 CM
BH CV ECHO MEAS - LVOT AREA: 4.9 CM2
BH CV ECHO MEAS - LVOT DIAM: 2.49 CM
BH CV ECHO MEAS - LVPWD: 1.11 CM
BH CV ECHO MEAS - MED PEAK E' VEL: 5.8 CM/SEC
BH CV ECHO MEAS - MR MAX PG: 89.7 MMHG
BH CV ECHO MEAS - MR MAX VEL: 473.5 CM/SEC
BH CV ECHO MEAS - MV A DUR: 0.13 SEC
BH CV ECHO MEAS - MV A MAX VEL: 67.4 CM/SEC
BH CV ECHO MEAS - MV DEC TIME: 285 MSEC
BH CV ECHO MEAS - MV E MAX VEL: 60.1 CM/SEC
BH CV ECHO MEAS - MV E/A: 0.89
BH CV ECHO MEAS - PA ACC TIME: 0.06 SEC
BH CV ECHO MEAS - PA PR(ACCEL): 51.7 MMHG
BH CV ECHO MEAS - PA V2 MAX: 93.7 CM/SEC
BH CV ECHO MEAS - PI END-D VEL: 120.8 CM/SEC
BH CV ECHO MEAS - PULM A REVS DUR: 0.15 SEC
BH CV ECHO MEAS - PULM A REVS VEL: 25.3 CM/SEC
BH CV ECHO MEAS - PULM DIAS VEL: 51 CM/SEC
BH CV ECHO MEAS - PULM S/D: 0.63
BH CV ECHO MEAS - PULM SYS VEL: 32.4 CM/SEC
BH CV ECHO MEAS - QP/QS: 0.53
BH CV ECHO MEAS - RAP SYSTOLE: 3 MMHG
BH CV ECHO MEAS - RV MAX PG: 1.52 MMHG
BH CV ECHO MEAS - RV V1 MAX: 61.6 CM/SEC
BH CV ECHO MEAS - RV V1 VTI: 11.7 CM
BH CV ECHO MEAS - RVOT DIAM: 2.7 CM
BH CV ECHO MEAS - RVSP: 26 MMHG
BH CV ECHO MEAS - SV(LVOT): 127.2 ML
BH CV ECHO MEAS - SV(MOD-SP2): 64 ML
BH CV ECHO MEAS - SV(MOD-SP4): 63 ML
BH CV ECHO MEAS - SV(RVOT): 67.6 ML
BH CV ECHO MEAS - TAPSE (>1.6): 1.98 CM
BH CV ECHO MEAS - TR MAX PG: 23.4 MMHG
BH CV ECHO MEAS - TR MAX VEL: 241.6 CM/SEC
BH CV ECHO MEASUREMENTS AVERAGE E/E' RATIO: 9.39
BH CV XLRA - RV BASE: 3.1 CM
BH CV XLRA - RV LENGTH: 6.9 CM
BH CV XLRA - RV MID: 3.1 CM
BH CV XLRA - TDI S': 11.3 CM/SEC
LEFT ATRIUM VOLUME INDEX: 55.2 ML/M2
MAXIMAL PREDICTED HEART RATE: 126 BPM
SINUS: 3.8 CM
STJ: 3.3 CM
STRESS TARGET HR: 107 BPM

## 2022-06-23 PROCEDURE — 93306 TTE W/DOPPLER COMPLETE: CPT

## 2022-06-23 PROCEDURE — 93306 TTE W/DOPPLER COMPLETE: CPT | Performed by: INTERNAL MEDICINE

## 2022-06-23 PROCEDURE — 93000 ELECTROCARDIOGRAM COMPLETE: CPT | Performed by: INTERNAL MEDICINE

## 2022-06-23 PROCEDURE — 99213 OFFICE O/P EST LOW 20 MIN: CPT | Performed by: INTERNAL MEDICINE

## 2022-07-27 ENCOUNTER — OFFICE VISIT (OUTPATIENT)
Dept: ONCOLOGY | Facility: CLINIC | Age: 87
End: 2022-07-27

## 2022-07-27 ENCOUNTER — LAB (OUTPATIENT)
Dept: LAB | Facility: HOSPITAL | Age: 87
End: 2022-07-27

## 2022-07-27 VITALS
BODY MASS INDEX: 21.88 KG/M2 | SYSTOLIC BLOOD PRESSURE: 127 MMHG | TEMPERATURE: 97.1 F | OXYGEN SATURATION: 96 % | HEART RATE: 69 BPM | DIASTOLIC BLOOD PRESSURE: 56 MMHG | HEIGHT: 75 IN | WEIGHT: 176 LBS | RESPIRATION RATE: 16 BRPM

## 2022-07-27 DIAGNOSIS — D64.9 ANEMIA, UNSPECIFIED TYPE: ICD-10-CM

## 2022-07-27 DIAGNOSIS — D64.9 ANEMIA, UNSPECIFIED TYPE: Primary | ICD-10-CM

## 2022-07-27 DIAGNOSIS — D75.89 MACROCYTOSIS WITHOUT ANEMIA: ICD-10-CM

## 2022-07-27 LAB
ALBUMIN SERPL-MCNC: 4.2 G/DL (ref 3.5–5.2)
ALBUMIN/GLOB SERPL: 1.8 G/DL (ref 1.1–2.4)
ALP SERPL-CCNC: 56 U/L (ref 38–116)
ALT SERPL W P-5'-P-CCNC: 14 U/L (ref 0–41)
ANION GAP SERPL CALCULATED.3IONS-SCNC: 11.1 MMOL/L (ref 5–15)
AST SERPL-CCNC: 15 U/L (ref 0–40)
BASOPHILS # BLD AUTO: 0.02 10*3/MM3 (ref 0–0.2)
BASOPHILS NFR BLD AUTO: 0.4 % (ref 0–1.5)
BILIRUB SERPL-MCNC: 1.6 MG/DL (ref 0.2–1.2)
BUN SERPL-MCNC: 25 MG/DL (ref 6–20)
BUN/CREAT SERPL: 28.4 (ref 7.3–30)
CALCIUM SPEC-SCNC: 9 MG/DL (ref 8.5–10.2)
CHLORIDE SERPL-SCNC: 108 MMOL/L (ref 98–107)
CO2 SERPL-SCNC: 23.9 MMOL/L (ref 22–29)
CREAT SERPL-MCNC: 0.88 MG/DL (ref 0.7–1.3)
DEPRECATED RDW RBC AUTO: 65.1 FL (ref 37–54)
EGFRCR SERPLBLD CKD-EPI 2021: 79.7 ML/MIN/1.73
EOSINOPHIL # BLD AUTO: 0.12 10*3/MM3 (ref 0–0.4)
EOSINOPHIL NFR BLD AUTO: 2.4 % (ref 0.3–6.2)
ERYTHROCYTE [DISTWIDTH] IN BLOOD BY AUTOMATED COUNT: 16.2 % (ref 12.3–15.4)
GLOBULIN UR ELPH-MCNC: 2.4 GM/DL (ref 1.8–3.5)
GLUCOSE SERPL-MCNC: 112 MG/DL (ref 74–124)
HCT VFR BLD AUTO: 42.1 % (ref 37.5–51)
HGB BLD-MCNC: 13.3 G/DL (ref 13–17.7)
IMM GRANULOCYTES # BLD AUTO: 0.01 10*3/MM3 (ref 0–0.05)
IMM GRANULOCYTES NFR BLD AUTO: 0.2 % (ref 0–0.5)
LYMPHOCYTES # BLD AUTO: 1.42 10*3/MM3 (ref 0.7–3.1)
LYMPHOCYTES NFR BLD AUTO: 28 % (ref 19.6–45.3)
MCH RBC QN AUTO: 33.8 PG (ref 26.6–33)
MCHC RBC AUTO-ENTMCNC: 31.6 G/DL (ref 31.5–35.7)
MCV RBC AUTO: 107.1 FL (ref 79–97)
MONOCYTES # BLD AUTO: 0.4 10*3/MM3 (ref 0.1–0.9)
MONOCYTES NFR BLD AUTO: 7.9 % (ref 5–12)
NEUTROPHILS NFR BLD AUTO: 3.1 10*3/MM3 (ref 1.7–7)
NEUTROPHILS NFR BLD AUTO: 61.1 % (ref 42.7–76)
NRBC BLD AUTO-RTO: 0 /100 WBC (ref 0–0.2)
PLATELET # BLD AUTO: 135 10*3/MM3 (ref 140–450)
PMV BLD AUTO: 10.3 FL (ref 6–12)
POTASSIUM SERPL-SCNC: 4.4 MMOL/L (ref 3.5–4.7)
PROT SERPL-MCNC: 6.6 G/DL (ref 6.3–8)
RBC # BLD AUTO: 3.93 10*6/MM3 (ref 4.14–5.8)
SODIUM SERPL-SCNC: 143 MMOL/L (ref 134–145)
WBC NRBC COR # BLD: 5.07 10*3/MM3 (ref 3.4–10.8)

## 2022-07-27 PROCEDURE — 80053 COMPREHEN METABOLIC PANEL: CPT

## 2022-07-27 PROCEDURE — 36415 COLL VENOUS BLD VENIPUNCTURE: CPT

## 2022-07-27 PROCEDURE — 85025 COMPLETE CBC W/AUTO DIFF WBC: CPT

## 2022-07-27 PROCEDURE — 99214 OFFICE O/P EST MOD 30 MIN: CPT | Performed by: INTERNAL MEDICINE

## 2022-07-27 NOTE — PROGRESS NOTES
[facility]    CONSULTING IN BLOOD DISORDERS & CANCER      REASON FOR CONSULTATION/CHIEF COMPLAINT:     Evaluation & management for macrocytosis and thrombocytopenia                             REQUESTING PHYSICIAN: No ref. provider found  RECORDS OBTAINED:  Records of the patients history including those from the electronic medical record were reviewed and summarized in detail.    HISTORY OF PRESENT ILLNESS:    The patient is a 89 y.o. year old male with past medical history significant for CAD s/p PCI, parkinson's disease, hx CVA, HTN, dyslipidemia, hearing deficit and chronic DJD was noted to have anemia with Hb/Hct of 13.9/40.8 with MCV 99 on a routine cbc from 9/14/21. CBC also noted platelet count of 125k. Wbc & differential were within normal limits. Additional work up showed normal vit B12, folate and TSH levels.   This prompted referral to this clinic.   Patient was first seen in this clinic in 11/23/21. Accompanied by his son. Does not speak much. History is provided by his son. He state [place]. He fairly independent. Has family members & friends living nearby. Has chronic joint issues but no new or excessive fatigue, chest pain, sob or cough. No history of bleed or bruise.   Patient has history of significant alcohol use. Quit 30 yrs ago. Per son, he eats good diet including meat.     INTERIM HISTORY:  Patient returns to the clinic for a follow-up visit.  He is accompanied by his son.  Patient is extremely hard of hearing.  Most of the interview was conducted through his son.  No c/o medical issues since last visit.  Patient's son denies any significant decline in his functional status.    Past Medical History:   Diagnosis Date   • Abnormal ECG    • Abnormal MRI    • Acute frontal sinusitis    • Arthritis    • Chronic fatigue    • Coronary artery disease    • Dizziness    • Head injury    • Hearing aid worn     left   • Hearing loss    • Hyperlipidemia    • Hypertension    •  Kidney stones    • Macrocytosis    • Neoplasm of skin    • Osteoporosis    • Prediabetes    • Vitamin D deficiency      Past Surgical History:   Procedure Laterality Date   • CARDIAC CATHETERIZATION N/A 05/22/2017    Procedure: Left Heart Cath;  Surgeon: Maninder Quezada MD;  Location: [facility] CATH INVASIVE LOCATION;  Service:    • CARDIAC CATHETERIZATION N/A 05/23/2017    Procedure: Stent BMS coronary;  Surgeon: Maninder Quezada MD;  Location: [facility] CATH INVASIVE LOCATION;  Service:    • CARDIAC CATHETERIZATION N/A 08/08/2019    Procedure: Left Heart Cath;  Surgeon: Maninder Quezada MD;  Location:  [facility] CATH INVASIVE LOCATION;  Service: Cardiology   • CARDIAC CATHETERIZATION N/A 08/08/2019    Procedure: Left ventriculography;  Surgeon: Maninder Quezada MD;  Location: [facility] CATH INVASIVE LOCATION;  Service: Cardiology   • CARDIAC CATHETERIZATION N/A 08/08/2019    Procedure: Coronary angiography;  Surgeon: Maninder Quezada MD;  Location: [facility] CATH INVASIVE LOCATION;  Service: Cardiology   • CARDIAC CATHETERIZATION N/A 08/20/2019    Procedure: CORONARY ANGIOGRAPHY;  Surgeon: Maninder Quezada MD;  Location: [facility] CATH INVASIVE LOCATION;  Service: Cardiovascular   • CARDIAC CATHETERIZATION N/A 08/20/2019    Procedure: Stent BMS coronary;  Surgeon: Maninder Quezada MD;  Location: [facility] CATH INVASIVE LOCATION;  Service: Cardiovascular   • CARDIAC ELECTROPHYSIOLOGY PROCEDURE N/A 11/08/2019    Procedure: Loop insertion;  Surgeon: Maninder Quezada MD;  Location: [facility] CATH INVASIVE LOCATION;  Service: Cardiovascular   • HERNIA REPAIR      x 2   • DE RT/LT HEART CATHETERS N/A 05/23/2017    Procedure: Percutaneous Coronary Intervention;  Surgeon: Maninder Quezada MD;  Location: [facility] CATH INVASIVE LOCATION;  Service: Cardiovascular       MEDICATIONS    Current Outpatient Medications:   •  acetaminophen (TYLENOL) 500 MG tablet, Take 1 tablet by mouth 4 (Four) Times a  Day., Disp: , Rfl:   •  amLODIPine (NORVASC) 5 MG tablet, TAKE 1 TABLET BY MOUTH DAILY, Disp: 30 tablet, Rfl: 2  •  aspirin 81 MG chewable tablet, Chew 1 tablet Daily., Disp: , Rfl:   •  atorvastatin (LIPITOR) 20 MG tablet, TAKE 1 TABLET BY MOUTH EVERY NIGHT, Disp: 30 tablet, Rfl: 2  •  Cholecalciferol (VITAMIN D-3) 1000 UNITS capsule, Take 1,000 Units by mouth daily., Disp: , Rfl:   •  Cyanocobalamin (B-12) 1000 MCG lozenge, DISSOLVE 1 LOZENGE BY MOUTH UNDER THE TONGUE EVERY OTHER DAY, Disp: 30 lozenge, Rfl: 4  •  Elastic Bandages & Supports (LUMBAR BACK BRACE/SUPPORT PAD) misc, Use for lifting/ strenuous exercise., Disp: 1 each, Rfl: 0  •  isosorbide mononitrate (IMDUR) 60 MG 24 hr tablet, TAKE 1 TABLET BY MOUTH DAILY, Disp: 90 tablet, Rfl: 1  •  losartan (COZAAR) 50 MG tablet, TAKE 1 TABLET BY MOUTH DAILY, Disp: 90 tablet, Rfl: 1  •  metoprolol succinate XL (TOPROL-XL) 100 MG 24 hr tablet, TAKE 1 TABLET BY MOUTH DAILY, Disp: 30 tablet, Rfl: 2    ALLERGIES:   No Known Allergies    SOCIAL HISTORY:       Social History     Socioeconomic History   • Marital status:    Tobacco Use   • Smoking status: Never Smoker   • Smokeless tobacco: Never Used   Vaping Use   • Vaping Use: Never used   Substance and Sexual Activity   • Alcohol use: No     Comment: quit 30 years ago    • Drug use: No   • Sexual activity: Defer         FAMILY HISTORY:  Family History   Problem Relation Age of Onset   • Arthritis Son    • Cancer Sister    • Diabetes Son        REVIEW OF SYSTEMS:  Constitutional: [No fevers, chills, sweats]  Eye: [No recent visual problems]  ENMT: [No ear pain, nasal congestion, sore throat]  Respiratory: [No shortness of breath, cough]  Cardiovascular: [No Chest pain, palpitations, syncope]  Gastrointestinal: [No nausea, vomiting, diarrhea]  Genitourinary: [No hematuria]  Hema/Lymph: [Negative for bruising tendency, swollen lymph glands]  Endocrine: [Negative for excessive thirst, excessive  "hunger]  Musculoskeletal: [Has musculoskeletal pain. No joint swelling]  Integumentary: [No rash, pruritus, abrasions]  Neurologic: [ No weakness or numbness, Alert & oriented. hard of hearing]         Vitals:    07/27/22 1504   BP: 127/56   Pulse: 69   Resp: 16   Temp: 97.1 °F (36.2 °C)   TempSrc: Temporal   SpO2: 96%   Weight: 79.8 kg (176 lb)   Height: 190 cm (74.8\")   PainSc: 0-No pain     Current Status 7/27/2022   ECOG score 2      PHYSICAL EXAM:    CONSTITUTIONAL:  Vital signs reviewed.  No distress, looks comfortable. Elderly male  EYES:  Conjunctiva and lids unremarkable.   EARS,NOSE,MOUTH,THROAT:  Ears and nose appear unremarkable. .  RESPIRATORY:  Normal respiratory effort.  Lungs clear to auscultation bilaterally.  CARDIOVASCULAR:  Normal S1, S2.  No murmurs rubs or gallops.  No significant lower extremity edema.  GASTROINTESTINAL: Abdomen appears unremarkable.  Nondistended  LYMPHATIC:  No cervical, supraclavicular lymphadenopathy.  NEURO:Hard of hearing.  No other focal deficits.  Appears to have equal strength all 4 extremities.  MUSCULOSKELETAL:  Unremarkable digits/nails.  No cyanosis or clubbing.  No apparent joint deformities.  SKIN:  Warm.  No rashes.  PSYCHIATRIC:   Normal mood and affect. Did not speak much during encounter     RECENT LABS:        Lab on 07/27/2022   Component Date Value Ref Range Status   • Glucose 07/27/2022 112  74 - 124 mg/dL Final   • BUN 07/27/2022 25 (A) 6 - 20 mg/dL Final   • Creatinine 07/27/2022 0.88  0.70 - 1.30 mg/dL Final   • Sodium 07/27/2022 143  134 - 145 mmol/L Final   • Potassium 07/27/2022 4.4  3.5 - 4.7 mmol/L Final   • Chloride 07/27/2022 108 (A) 98 - 107 mmol/L Final   • CO2 07/27/2022 23.9  22.0 - 29.0 mmol/L Final   • Calcium 07/27/2022 9.0  8.5 - 10.2 mg/dL Final   • Total Protein 07/27/2022 6.6  6.3 - 8.0 g/dL Final   • Albumin 07/27/2022 4.20  3.50 - 5.20 g/dL Final   • ALT (SGPT) 07/27/2022 14  0 - 41 U/L Final   • AST (SGOT) 07/27/2022 15  0 - 40 " U/L Final   • Alkaline Phosphatase 07/27/2022 56  38 - 116 U/L Final   • Total Bilirubin 07/27/2022 1.6 (A) 0.2 - 1.2 mg/dL Final   • Globulin 07/27/2022 2.4  1.8 - 3.5 gm/dL Final   • A/G Ratio 07/27/2022 1.8  1.1 - 2.4 g/dL Final   • BUN/Creatinine Ratio 07/27/2022 28.4  7.3 - 30.0 Final   • Anion Gap 07/27/2022 11.1  5.0 - 15.0 mmol/L Final   • eGFR 07/27/2022 79.7  >60.0 mL/min/1.73 Final    National Kidney Foundation and American Society of Nephrology (ASN) Task Force recommended calculation based on the Chronic Kidney Disease Epidemiology Collaboration (CKD-EPI) equation refit without adjustment for race.   • WBC 07/27/2022 5.07  3.40 - 10.80 10*3/mm3 Final   • RBC 07/27/2022 3.93 (A) 4.14 - 5.80 10*6/mm3 Final   • Hemoglobin 07/27/2022 13.3  13.0 - 17.7 g/dL Final   • Hematocrit 07/27/2022 42.1  37.5 - 51.0 % Final   • MCV 07/27/2022 107.1 (A) 79.0 - 97.0 fL Final   • MCH 07/27/2022 33.8 (A) 26.6 - 33.0 pg Final   • MCHC 07/27/2022 31.6  31.5 - 35.7 g/dL Final   • RDW 07/27/2022 16.2 (A) 12.3 - 15.4 % Final   • RDW-SD 07/27/2022 65.1 (A) 37.0 - 54.0 fl Final   • MPV 07/27/2022 10.3  6.0 - 12.0 fL Final   • Platelets 07/27/2022 135 (A) 140 - 450 10*3/mm3 Final   • Neutrophil % 07/27/2022 61.1  42.7 - 76.0 % Final   • Lymphocyte % 07/27/2022 28.0  19.6 - 45.3 % Final   • Monocyte % 07/27/2022 7.9  5.0 - 12.0 % Final   • Eosinophil % 07/27/2022 2.4  0.3 - 6.2 % Final   • Basophil % 07/27/2022 0.4  0.0 - 1.5 % Final   • Immature Grans % 07/27/2022 0.2  0.0 - 0.5 % Final   • Neutrophils, Absolute 07/27/2022 3.10  1.70 - 7.00 10*3/mm3 Final   • Lymphocytes, Absolute 07/27/2022 1.42  0.70 - 3.10 10*3/mm3 Final   • Monocytes, Absolute 07/27/2022 0.40  0.10 - 0.90 10*3/mm3 Final   • Eosinophils, Absolute 07/27/2022 0.12  0.00 - 0.40 10*3/mm3 Final   • Basophils, Absolute 07/27/2022 0.02  0.00 - 0.20 10*3/mm3 Final   • Immature Grans, Absolute 07/27/2022 0.01  0.00 - 0.05 10*3/mm3 Final   • nRBC 07/27/2022 0.0  0.0 -  0.2 /100 WBC Final         ASSESSMENT:   is a 89 y/o WM with past medical history significant for CAD s/p PCI, parkinson's disease, hx CVA, HTN, dyslipidemia, hearing deficit and chronic DJD comes for macrocytosis and thrombocytopenia evaluation & management.     # Macrocytosis without anemia:  · A routine CBC from 9/14/21 had noted Hb/Hct of 13.9/40.8 with MCV 99 fl. The CBC also noted platelet count of 125k. Wbc & differential were within normal limits. Additional work up showed normal vit B12, folate and TSH levels.   · On chart review, patient had macrocytosis in  fl range at least since 2017.   · Peripheral smear review performed by me in clinic shows macrocytic RBCs, no increased schistocytes, no blasts or increased immature cells, low platelets.   · Given history of significant alcohol use and elevated total bilirubin, chronic macrocytosis without anemia is likely liver disease related.   · MDS is in differential, however, chronic stability over last 4-5 years argues against neoplastic process such as MDS/AML.   · CBC from March 2022 and July 2022 showed overall stable MCV with no anemia.  · At this time, will plan to monitor it & repeat CBC in 1 year time.    # Thrombocytopenia:  · Chronic mild thrombocytopenia in 110-130k range at least since 2017.   · No history of bleed or bruise.   · Given history of alcohol use and elevated total bilirubin, it's is likely liver disease related.   · Repeat labs from March and July 2022 overall show stable to slightly improved platelet count.  Monitor for now and repeat labs in a year.    # CAD s/p PCI - F/u PCP, cardiology  # Parkinson' disease - F/u neurology    PLAN:   - Macrocytosis and thrombocytopenia likely liver disease related. MDS remains in differential.   - Will plan to monitor for now and repeat labs in a year.  Patient/son agreeable.     Orders Placed This Encounter   Procedures   • CBC & Differential     Standing Status:   Future     Standing  Expiration Date:   9/30/2023     Order Specific Question:   Manual Differential     Answer:   No   Total time spent during this patient encounter is 35 minutes. The total time spent with the patient includes at least one or more of the following: preparing to see the patient by reviewing of tests, prior notes or other relevant information, performing appropriate independent examination & evaluation, counseling, ordering of medications, tests or procedures, communicating with other healthcare professionals, when appropriate to coordinate care, documenting clinic information in the electronic medical records or other health records, independently interpreting results of tests and communicating the results to the patient/family or caregiver.

## 2022-08-24 RX ORDER — CHOLECALCIFEROL (VITAMIN D3) 25 MCG
TABLET,CHEWABLE ORAL
Qty: 30 LOZENGE | Refills: 4 | Status: SHIPPED | OUTPATIENT
Start: 2022-08-24

## 2022-09-12 RX ORDER — METOPROLOL SUCCINATE 100 MG/1
100 TABLET, EXTENDED RELEASE ORAL DAILY
Qty: 30 TABLET | Refills: 2 | Status: SHIPPED | OUTPATIENT
Start: 2022-09-12 | End: 2022-12-12

## 2022-09-12 RX ORDER — METOPROLOL SUCCINATE 100 MG/1
100 TABLET, EXTENDED RELEASE ORAL DAILY
Qty: 30 TABLET | Refills: 2 | Status: CANCELLED | OUTPATIENT
Start: 2022-09-12

## 2022-09-12 RX ORDER — ATORVASTATIN CALCIUM 20 MG/1
20 TABLET, FILM COATED ORAL NIGHTLY
Qty: 30 TABLET | Refills: 2 | Status: SHIPPED | OUTPATIENT
Start: 2022-09-12 | End: 2022-12-12

## 2022-09-12 RX ORDER — ATORVASTATIN CALCIUM 20 MG/1
20 TABLET, FILM COATED ORAL NIGHTLY
Qty: 30 TABLET | Refills: 2 | Status: CANCELLED | OUTPATIENT
Start: 2022-09-12

## 2022-09-12 RX ORDER — AMLODIPINE BESYLATE 5 MG/1
5 TABLET ORAL
Qty: 30 TABLET | Refills: 2 | Status: CANCELLED | OUTPATIENT
Start: 2022-09-12

## 2022-09-12 RX ORDER — AMLODIPINE BESYLATE 5 MG/1
5 TABLET ORAL
Qty: 30 TABLET | Refills: 2 | Status: SHIPPED | OUTPATIENT
Start: 2022-09-12 | End: 2022-12-12

## 2022-09-13 ENCOUNTER — OFFICE VISIT (OUTPATIENT)
Dept: FAMILY MEDICINE CLINIC | Facility: CLINIC | Age: 87
End: 2022-09-13

## 2022-09-13 VITALS
RESPIRATION RATE: 16 BRPM | BODY MASS INDEX: 21.51 KG/M2 | OXYGEN SATURATION: 95 % | HEIGHT: 75 IN | SYSTOLIC BLOOD PRESSURE: 110 MMHG | WEIGHT: 173 LBS | TEMPERATURE: 97.4 F | DIASTOLIC BLOOD PRESSURE: 60 MMHG | HEART RATE: 68 BPM

## 2022-09-13 DIAGNOSIS — M25.562 CHRONIC PAIN OF BOTH KNEES: ICD-10-CM

## 2022-09-13 DIAGNOSIS — E55.9 VITAMIN D DEFICIENCY: ICD-10-CM

## 2022-09-13 DIAGNOSIS — D69.6 THROMBOCYTOPENIA: ICD-10-CM

## 2022-09-13 DIAGNOSIS — I42.9 CARDIOMYOPATHY, UNSPECIFIED TYPE: ICD-10-CM

## 2022-09-13 DIAGNOSIS — M25.561 CHRONIC PAIN OF BOTH KNEES: ICD-10-CM

## 2022-09-13 DIAGNOSIS — I48.0 PAROXYSMAL ATRIAL FIBRILLATION: ICD-10-CM

## 2022-09-13 DIAGNOSIS — M17.0 PRIMARY OSTEOARTHRITIS OF BOTH KNEES: ICD-10-CM

## 2022-09-13 DIAGNOSIS — G89.29 CHRONIC PAIN OF BOTH KNEES: ICD-10-CM

## 2022-09-13 DIAGNOSIS — G20 PARKINSON'S DISEASE: ICD-10-CM

## 2022-09-13 DIAGNOSIS — R73.03 PREDIABETES: ICD-10-CM

## 2022-09-13 DIAGNOSIS — Z86.79 HISTORY OF SUPRAVENTRICULAR TACHYCARDIA: ICD-10-CM

## 2022-09-13 DIAGNOSIS — F80.81 STUTTERING: ICD-10-CM

## 2022-09-13 DIAGNOSIS — E53.8 VITAMIN B 12 DEFICIENCY: ICD-10-CM

## 2022-09-13 DIAGNOSIS — R29.6 RECURRENT FALLS: ICD-10-CM

## 2022-09-13 DIAGNOSIS — R47.9 SPEECH DISTURBANCE, UNSPECIFIED TYPE: ICD-10-CM

## 2022-09-13 DIAGNOSIS — I10 ESSENTIAL HYPERTENSION: Primary | ICD-10-CM

## 2022-09-13 DIAGNOSIS — Z86.73 HISTORY OF CVA (CEREBROVASCULAR ACCIDENT): ICD-10-CM

## 2022-09-13 DIAGNOSIS — I25.10 CORONARY ARTERY DISEASE INVOLVING NATIVE CORONARY ARTERY OF NATIVE HEART WITHOUT ANGINA PECTORIS: ICD-10-CM

## 2022-09-13 DIAGNOSIS — R51.9 NONINTRACTABLE EPISODIC HEADACHE, UNSPECIFIED HEADACHE TYPE: ICD-10-CM

## 2022-09-13 DIAGNOSIS — D53.9 MACROCYTIC ANEMIA: ICD-10-CM

## 2022-09-13 DIAGNOSIS — E78.5 DYSLIPIDEMIA: ICD-10-CM

## 2022-09-13 PROCEDURE — 99214 OFFICE O/P EST MOD 30 MIN: CPT | Performed by: PHYSICIAN ASSISTANT

## 2022-09-13 NOTE — PROGRESS NOTES
Subjective   Dinesh Churchill Sr. is a 89 y.o. male who presents today in follow up of hypertension, prediabetes, hyperlipidemia, macrocytosis, B12 and vitamin D deficiency, headache and stuttering speech, history of CVA and Parkinson's, atrial fibrillation, coronary artery disease, and cardiomyopathy, cataracts, pulmonary fibrosis, pneumothorax with pleural effusion.     Hyperlipidemia  Associated symptoms include shortness of breath.   Hypertension  Associated symptoms include headaches and shortness of breath.     Couple weeks ago- fell on the floor. Legs got weak and gave out.     Hypertension- has been stable on Norvasc 5 mg once daily, metoprolol  mg daily, and losartan 50 mg once daily.  Prediabetes and hyperlipidemia- continues Lipitor 20 mg once daily & tolerating without AE. Decreased sugar intake. He is not thrilled but is changed- no soda, pies, grapes. Cut out oyster crackers and eat all the time. Get no sugar added cookies.  B12-  Taking B12 Monday, Wed, Fri instead of Monday through Friday. Last injection 12/27/2019.  Vitamin D- taking vitamin D 1000IU daily. Tolerating without AE.     Abnormal speech- Still stuttering- speech therapy gave exercises to help improve the stutter.   · In 2/201- he was asleep and woke up at 2 am with a headache- severe but was a generalized  Headache and lasted 30-45 minutes. No headache since. He noticed that he started stuttering the next day. Has now stuttered since. No slurring of words. No other neurological symptoms. He is getting up and around, has no in creased weakness, numbness, tingling, swallowing issues, recent falls, recurrence of headache, or other symptoms.   Weakness and falls- No recent falls. Stable. Using a walker all the time    Chest pain- taking Imdur as directed by cardiology and tolerating without AE. No CP- controlling CP.   Shortness of breath- no change from baseline. No increase in SOA   Pleural effusion and pneumothorax- no symptoms at  this time.   History of pneumothorax and pleural effusion-  with hospitalization. Pulmonology saw him and advised he was stable and to follow up in 6/2020.     Knee pain- continues with pain but they are working on getting injections approved for relief.       Patient was hospitalized 1/19/2022 through 1/21/2022 for a painful, closed fracture of multiple ribs on the right side, post fracture left toe, chest wall pain, stuttering, A. fib, history of CVA and CAD s/p stent placement, cardiomyopathy, dyslipidemia, hypertension, hearing loss. Per discharge summary, patient was initially admitted for chest wall pain s/p fall.  He was given Tylenol and Lidoderm to chest wall as well as incentive spirometry.  He did well, required no additional narcotic pain medication, and was feeling better each day.  Oxygen saturation 98% on room air.  Physical therapy evaluation was poor.  With living independently in his nurse child 25 to 30 miles away, they recommended inpatient rehab. He was discharged to rehab facility.  Last labs from rehab 1/1/2022.  He had OT evaluation and  PT. Following discharge 2/10/2022 and with no rib pain or foot pain. He only complained of left> right knee pain.     Patient's Specialists:  Cardiology-Dr. Quezada/ CANDELARIA Macias-last seen 12/2021 for CAD, cardiomyopathy, atrial fibrillation, hypertension -stable. Stop Brillinta. Follow-up in 6 months with echo prior.  Neurology- Dr Ashley- last appt 7/2021 for history of CVA, recent stuttering, change in speech, parkinson's disease. They did not want treatment for Parkinson's. Follow up PRN   · Previously-  Dr. Saleh- last appointment 1/2020  For seizure-like activity.  No further work-up at that time.  They will consider EEG if he has any further symptoms.    · Dr. Naik- last seen 10/2017- for Parkinson's, orthostatic hypotension, and ataxia- he did not want to pursue treatment at that time and was advised to follow-up PRN.    Hematology- Dr Pennington- last appt 11/2021 for macrocytosis and thrombocytopenia- differential MDS and liver disease. Advised labs 3/2022 and follow up 7/2022.    · Prior Dr Richardson- last appt 8/2017 for macrocytosis without anemia and thrombocytopenia. Monthly B12 injections, observation without further evaluation if stable.   Sports Medicine- Dr Garcia- seen for bilateral OA knees and knee pain. Will start injections tomorrow- weekly for 3 weeks.   Ophthalmology- [facility] in [place]- will have left cataract surgery 1/27/2020 and right cataract surgery 2/3/2020.   Pulmonology- Dr De Jesus- last appt 12/2019 for pulmonary fibrosis, pulmonary edema, pneumothorax- follow up in 6 months.     The following portions of the patient's history were reviewed and updated as appropriate: allergies, current medications, past family history, past medical history, past social history, past surgical history and problem list.    Review of Systems   Constitutional: Positive for fatigue.   HENT: Negative for sore throat and trouble swallowing.         Stuttering speech   Eyes: Positive for visual disturbance.   Respiratory: Positive for shortness of breath.    Cardiovascular: Negative.    Gastrointestinal: Negative.    Genitourinary: Negative.    Musculoskeletal: Positive for arthralgias and gait problem.   Neurological: Positive for speech difficulty and headaches.        Fall   Hematological: Negative.    Psychiatric/Behavioral: Negative.        Objective    Vitals:    09/13/22 1025   BP: 110/60   Pulse: 68   Resp: 16   Temp: 97.4 °F (36.3 °C)   SpO2: 95%     Body mass index is 21.74 kg/m².    Physical Exam   Constitutional: He is oriented to person, place, and time. He appears well-developed. No distress.   HENT:   Head: Normocephalic and atraumatic.   Right Ear: External ear normal.   Left Ear: External ear normal.   Eyes: Conjunctivae are normal.   Neck: Carotid bruit is not present. No tracheal deviation present.  No thyroid mass and no thyromegaly present.   Cardiovascular: Normal rate, regular rhythm and normal pulses.   Murmur heard.  Pulmonary/Chest: Effort normal and breath sounds normal.   Abdominal: Normal appearance.   Neurological: He is alert and oriented to person, place, and time. Gait normal.   Skin: Skin is warm and dry.   Psychiatric: His behavior is normal. Mood, judgment and thought content normal. His speech is delayed.   Nursing note and vitals reviewed.      Assessment & Plan   Diagnoses and all orders for this visit:    1. Essential hypertension (Primary)  -     Comprehensive Metabolic Panel  -     Urinalysis With Culture If Indicated -    2. Dyslipidemia  -     Comprehensive Metabolic Panel  -     CK  -     Lipid Panel With LDL / HDL Ratio  -     Iron and TIBC  -     Ferritin    3. Prediabetes  -     Comprehensive Metabolic Panel  -     Hemoglobin A1c  -     Iron and TIBC  -     Ferritin  -     TSH  -     T4, free  -     T3, Free    4. Vitamin D deficiency  -     Comprehensive Metabolic Panel  -     Iron and TIBC  -     Ferritin  -     Vitamin D 25 Hydroxy    5. Vitamin B 12 deficiency  -     CBC & Differential  -     Iron and TIBC  -     Ferritin  -     Vitamin B12 & Folate    6. Macrocytic anemia  -     CBC & Differential  -     Iron and TIBC  -     Ferritin  -     Vitamin B12 & Folate    7. Thrombocytopenia (HCC)  -     CBC & Differential  -     Iron and TIBC  -     Ferritin    8. History of CVA (cerebrovascular accident)    9. Parkinson's disease (HCC)    10. Nonintractable episodic headache, unspecified headache type    11. Speech disturbance, unspecified type    12. Stuttering    13. Recurrent falls    14. Coronary artery disease involving native coronary artery of native heart without angina pectoris    15. Cardiomyopathy, unspecified type (HCC)    16. History of supraventricular tachycardia    17. Paroxysmal atrial fibrillation (HCC)    18. Primary osteoarthritis of both knees    19. Chronic pain  of both knees    Other orders  -     Specimen Status Report        Assessment and Plan  Patient will have fasting labs. Call if no results in 1 week. Stability of conditions, plan, follow up, and further recommendations pending labs. If stable, follow up in 3-6 months.     · Hypertension- Blood pressure has been stable. Continue Norvasc 5 mg once daily, Metoprolol  mg daily, and Losartan 50 mg once daily. Continue monitoring BP. Call or return if elevated or low or any weakness or falls.    · Hyperlipidemia- Last lipids increased slightly. Continue Lipitor 20 mg at bedtime.  · Prediabetes- Last A1C was stable 6.0% 9/2021. I will continue to monitor and make further recommendations.   · Vitamin D deficiency- Continue Vitamin D 1000IU daily. Dosing recommendations pending labs.   · B12 deficiency- Continue B12 Monday, Wednesday, and Friday.  Await labs for further recommendations.   · Headache and speech disturbance- Patient had an episode 2/2021 with a severe headache then developed stuttering speech. Patient and son deny any other symptoms and reports no recurrence of headache, however, patient lives alone and has not had much interaction with people other than once a week when his son comes to take care of groceries and medications. This was concerning for CVA, however, without any other deficits and a cardiac device in place, he was unable to have MRI. He underwent CT head and follow up with neurology. He was evaluated by speech therapy, was given home exercises, and has weekly visits scheduled for 8 weeks. His son was unaware of weekly visits and reports they told him that patient could do home exercises. 9-1-1 to ER if he has worsening speech, recurrence of headache, or any other cardiac or neurological symptoms. Patient and son verbalized understanding and agreement with plan and recommendations.   · Balance and gait instability- He avoids any overhead movement, as he loses his balance. He denies any  recent falls or worsening.  He is getting around well with his walker.  He was seen by neurology and diagnosed with Parkinson's, however, they did not feel he needed treatment.  Continue follow up with cardiology and neurology.   · CAD- He continues follow-up with cardiology.  He continues on Imdur 60 mg once daily and denies any changes or increase in chest pain, shortness of breath, palpitations, dizziness, or weakness.   · History of Pleural effusion and Pneumovax- Patient has no increase in SOA. He needs to reschedule with pulmonology.     Patient continues to see specialists as noted above.  I have reviewed available records, including consult notes, labs, and imaging/testing.  Patient to continue follow-up with specialists as directed by them. He needs to schedule follow up with pulmonology and I will refer back to neurology.     I spent 30 minutes caring for Dinesh Churchill SrTerrence on this date of service. This time includes time spent by me in the following activities as necessary: preparing for the visit, reviewing tests, specialists records and previous visits, obtaining and/or reviewing a separately obtained history, performing a medically appropriate exam and/or evaluation, counseling and educating the patient, family, caregiver, referring and/or communicating with other healthcare professionals, documenting information in the medical record, independently interpreting results and communicating that information with the patient, family, caregiver, and developing a medically appropriate treatment plan with consideration of other conditions, medications, and treatments.

## 2022-09-14 LAB
25(OH)D3+25(OH)D2 SERPL-MCNC: 31 NG/ML (ref 30–100)
ALBUMIN SERPL-MCNC: 4.2 G/DL (ref 3.5–4.6)
ALBUMIN/GLOB SERPL: 1.8 {RATIO} (ref 1.2–2.2)
ALP SERPL-CCNC: 53 IU/L (ref 44–121)
ALT SERPL-CCNC: 11 IU/L (ref 0–44)
AST SERPL-CCNC: 14 IU/L (ref 0–40)
BASOPHILS # BLD AUTO: 0 X10E3/UL (ref 0–0.2)
BASOPHILS NFR BLD AUTO: 0 %
BILIRUB SERPL-MCNC: 1.9 MG/DL (ref 0–1.2)
BUN SERPL-MCNC: 21 MG/DL (ref 10–36)
BUN/CREAT SERPL: 24 (ref 10–24)
CALCIUM SERPL-MCNC: 9.2 MG/DL (ref 8.6–10.2)
CHLORIDE SERPL-SCNC: 104 MMOL/L (ref 96–106)
CHOLEST SERPL-MCNC: 149 MG/DL (ref 100–199)
CK SERPL-CCNC: 55 U/L (ref 30–208)
CO2 SERPL-SCNC: 21 MMOL/L (ref 20–29)
CREAT SERPL-MCNC: 0.89 MG/DL (ref 0.76–1.27)
EGFRCR-CYS SERPLBLD CKD-EPI 2021: 79 ML/MIN/1.73
EOSINOPHIL # BLD AUTO: 0.2 X10E3/UL (ref 0–0.4)
EOSINOPHIL NFR BLD AUTO: 3 %
ERYTHROCYTE [DISTWIDTH] IN BLOOD BY AUTOMATED COUNT: 14.6 % (ref 11.6–15.4)
FERRITIN SERPL-MCNC: 550 NG/ML (ref 30–400)
FOLATE SERPL-MCNC: 9.7 NG/ML
GLOBULIN SER CALC-MCNC: 2.4 G/DL (ref 1.5–4.5)
GLUCOSE SERPL-MCNC: 121 MG/DL (ref 65–99)
GLUCOSE UR QL STRIP: NORMAL
HBA1C MFR BLD: 5.8 % (ref 4.8–5.6)
HCT VFR BLD AUTO: 42.6 % (ref 37.5–51)
HDLC SERPL-MCNC: 52 MG/DL
HGB BLD-MCNC: 14.1 G/DL (ref 13–17.7)
IMM GRANULOCYTES # BLD AUTO: 0 X10E3/UL (ref 0–0.1)
IMM GRANULOCYTES NFR BLD AUTO: 0 %
IRON SATN MFR SERPL: 43 % (ref 15–55)
IRON SERPL-MCNC: 121 UG/DL (ref 38–169)
KETONES UR QL STRIP: NORMAL
LDLC SERPL CALC-MCNC: 83 MG/DL (ref 0–99)
LDLC/HDLC SERPL: 1.6 RATIO (ref 0–3.6)
LYMPHOCYTES # BLD AUTO: 1.6 X10E3/UL (ref 0.7–3.1)
LYMPHOCYTES NFR BLD AUTO: 27 %
MCH RBC QN AUTO: 34.1 PG (ref 26.6–33)
MCHC RBC AUTO-ENTMCNC: 33.1 G/DL (ref 31.5–35.7)
MCV RBC AUTO: 103 FL (ref 79–97)
MONOCYTES # BLD AUTO: 0.5 X10E3/UL (ref 0.1–0.9)
MONOCYTES NFR BLD AUTO: 9 %
NEUTROPHILS # BLD AUTO: 3.7 X10E3/UL (ref 1.4–7)
NEUTROPHILS NFR BLD AUTO: 61 %
PH UR STRIP: NORMAL [PH]
PLATELET # BLD AUTO: 126 X10E3/UL (ref 150–450)
POTASSIUM SERPL-SCNC: 4.8 MMOL/L (ref 3.5–5.2)
PROT SERPL-MCNC: 6.6 G/DL (ref 6–8.5)
PROT UR QL STRIP: NORMAL
RBC # BLD AUTO: 4.13 X10E6/UL (ref 4.14–5.8)
SODIUM SERPL-SCNC: 141 MMOL/L (ref 134–144)
SP GR UR STRIP: NORMAL
SPECIMEN STATUS: NORMAL
T3FREE SERPL-MCNC: 2.5 PG/ML (ref 2–4.4)
T4 FREE SERPL-MCNC: 1.3 NG/DL (ref 0.82–1.77)
TIBC SERPL-MCNC: 284 UG/DL (ref 250–450)
TRIGL SERPL-MCNC: 70 MG/DL (ref 0–149)
TSH SERPL DL<=0.005 MIU/L-ACNC: 2.42 UIU/ML (ref 0.45–4.5)
UIBC SERPL-MCNC: 163 UG/DL (ref 111–343)
VIT B12 SERPL-MCNC: 537 PG/ML (ref 232–1245)
VLDLC SERPL CALC-MCNC: 14 MG/DL (ref 5–40)
WBC # BLD AUTO: 6 X10E3/UL (ref 3.4–10.8)

## 2022-11-11 PROCEDURE — 93298 REM INTERROG DEV EVAL SCRMS: CPT | Performed by: INTERNAL MEDICINE

## 2022-11-11 PROCEDURE — G2066 INTER DEVC REMOTE 30D: HCPCS | Performed by: INTERNAL MEDICINE

## 2022-12-12 RX ORDER — ISOSORBIDE MONONITRATE 60 MG/1
60 TABLET, EXTENDED RELEASE ORAL DAILY
Qty: 90 TABLET | Refills: 1 | Status: SHIPPED | OUTPATIENT
Start: 2022-12-12

## 2022-12-12 RX ORDER — AMLODIPINE BESYLATE 5 MG/1
5 TABLET ORAL
Qty: 30 TABLET | Refills: 2 | Status: SHIPPED | OUTPATIENT
Start: 2022-12-12

## 2022-12-12 RX ORDER — ATORVASTATIN CALCIUM 20 MG/1
20 TABLET, FILM COATED ORAL NIGHTLY
Qty: 30 TABLET | Refills: 2 | Status: SHIPPED | OUTPATIENT
Start: 2022-12-12

## 2022-12-12 RX ORDER — METOPROLOL SUCCINATE 100 MG/1
100 TABLET, EXTENDED RELEASE ORAL DAILY
Qty: 30 TABLET | Refills: 2 | Status: SHIPPED | OUTPATIENT
Start: 2022-12-12

## 2022-12-12 RX ORDER — LOSARTAN POTASSIUM 50 MG/1
50 TABLET ORAL DAILY
Qty: 90 TABLET | Refills: 1 | Status: SHIPPED | OUTPATIENT
Start: 2022-12-12

## 2022-12-13 ENCOUNTER — OFFICE VISIT (OUTPATIENT)
Dept: FAMILY MEDICINE CLINIC | Facility: CLINIC | Age: 87
End: 2022-12-13
Payer: MEDICARE

## 2022-12-13 VITALS
TEMPERATURE: 97.3 F | BODY MASS INDEX: 21.88 KG/M2 | HEIGHT: 75 IN | HEART RATE: 68 BPM | RESPIRATION RATE: 16 BRPM | OXYGEN SATURATION: 94 % | SYSTOLIC BLOOD PRESSURE: 128 MMHG | WEIGHT: 176 LBS | DIASTOLIC BLOOD PRESSURE: 60 MMHG

## 2022-12-13 DIAGNOSIS — D69.6 THROMBOCYTOPENIA: ICD-10-CM

## 2022-12-13 DIAGNOSIS — E55.9 VITAMIN D DEFICIENCY: ICD-10-CM

## 2022-12-13 DIAGNOSIS — E78.5 DYSLIPIDEMIA: ICD-10-CM

## 2022-12-13 DIAGNOSIS — I25.10 CORONARY ARTERY DISEASE INVOLVING NATIVE CORONARY ARTERY OF NATIVE HEART WITHOUT ANGINA PECTORIS: ICD-10-CM

## 2022-12-13 DIAGNOSIS — F80.81 STUTTERING: ICD-10-CM

## 2022-12-13 DIAGNOSIS — I42.9 CARDIOMYOPATHY, UNSPECIFIED TYPE: ICD-10-CM

## 2022-12-13 DIAGNOSIS — I48.0 PAROXYSMAL ATRIAL FIBRILLATION: ICD-10-CM

## 2022-12-13 DIAGNOSIS — R47.9 SPEECH DISTURBANCE, UNSPECIFIED TYPE: ICD-10-CM

## 2022-12-13 DIAGNOSIS — R73.03 PREDIABETES: ICD-10-CM

## 2022-12-13 DIAGNOSIS — E53.8 VITAMIN B 12 DEFICIENCY: ICD-10-CM

## 2022-12-13 DIAGNOSIS — M25.561 CHRONIC PAIN OF BOTH KNEES: ICD-10-CM

## 2022-12-13 DIAGNOSIS — R29.6 RECURRENT FALLS: ICD-10-CM

## 2022-12-13 DIAGNOSIS — M17.0 PRIMARY OSTEOARTHRITIS OF BOTH KNEES: ICD-10-CM

## 2022-12-13 DIAGNOSIS — D53.9 MACROCYTIC ANEMIA: ICD-10-CM

## 2022-12-13 DIAGNOSIS — Z86.79 HISTORY OF SUPRAVENTRICULAR TACHYCARDIA: ICD-10-CM

## 2022-12-13 DIAGNOSIS — Z86.73 HISTORY OF CVA (CEREBROVASCULAR ACCIDENT): ICD-10-CM

## 2022-12-13 DIAGNOSIS — R51.9 NONINTRACTABLE EPISODIC HEADACHE, UNSPECIFIED HEADACHE TYPE: ICD-10-CM

## 2022-12-13 DIAGNOSIS — G89.29 CHRONIC PAIN OF BOTH KNEES: ICD-10-CM

## 2022-12-13 DIAGNOSIS — M25.562 CHRONIC PAIN OF BOTH KNEES: ICD-10-CM

## 2022-12-13 DIAGNOSIS — I10 ESSENTIAL HYPERTENSION: Primary | ICD-10-CM

## 2022-12-13 DIAGNOSIS — G20 PARKINSON'S DISEASE: ICD-10-CM

## 2022-12-13 PROCEDURE — 1159F MED LIST DOCD IN RCRD: CPT | Performed by: PHYSICIAN ASSISTANT

## 2022-12-13 PROCEDURE — 99214 OFFICE O/P EST MOD 30 MIN: CPT | Performed by: PHYSICIAN ASSISTANT

## 2022-12-13 PROCEDURE — 1160F RVW MEDS BY RX/DR IN RCRD: CPT | Performed by: PHYSICIAN ASSISTANT

## 2022-12-13 NOTE — PROGRESS NOTES
Subjective   Dinesh Churchill Sr. is a 89 y.o. male who presents today in follow up of hypertension, prediabetes, hyperlipidemia, macrocytosis, B12 and vitamin D deficiency, headache and stuttering speech, history of CVA and Parkinson's, atrial fibrillation, coronary artery disease, and cardiomyopathy, cataracts, pulmonary fibrosis, pneumothorax with pleural effusion.     Hypertension  Associated symptoms include headaches and shortness of breath.   Hyperlipidemia  Associated symptoms include shortness of breath.     Hypertension- has been stable on Norvasc 5 mg once daily, metoprolol  mg daily, and losartan 50 mg once daily.  Prediabetes and hyperlipidemia- continues Lipitor 20 mg once daily & tolerating without AE. Decreased sugar intake. He is not thrilled but is changed- no soda, pies, grapes. Cut out oyster crackers and eat all the time. Get no sugar added cookies.  B12-  Taking B12 Monday, Wed, Fri instead of Monday through Friday.   · Last injection 12/27/2019.  Vitamin D- taking vitamin D 1000IU daily. Tolerating without AE.     Abnormal speech- not talking much- now stuttering and talking very little. He wears hearing aids and talks a little more at home.   Speech therapy gave exercises to help improve the stutter.   · In 2/2021- he was asleep and woke up at 2 am with a headache- severe but was a generalized  Headache and lasted 30-45 minutes. No headache since. He noticed that he started stuttering the next day. Has now stuttered since. No slurring of words. No other neurological symptoms. He is getting up and around, has no in creased weakness, numbness, tingling, swallowing issues, recent falls, recurrence of headache, or other symptoms.   Weakness and falls-Fall- when he was going down, he went down on his buttock and was on carpet. He was ok. No complaints of pain or trouble.   Using a walker all the time. Making his own food, bathing himself, and taking care of himself. He has to get up and do  things. He walks outside in the trail when weather is nice. He does not go out in good weather. Reads the paper and watches basketball. Eats what he wants. Doing his own laundry. Son and daughter bring up son's mail. Doctor said son can leave for 1 hour at a time. Went to [facility] and had to  Rx.     Chest pain- taking Imdur as directed by cardiology and tolerating without AE. No CP- controlling CP.   Shortness of breath- no change from baseline. No increase in SOA   Pleural effusion and pneumothorax- no symptoms at this time.   History of pneumothorax and pleural effusion-  with hospitalization. Pulmonology saw him and advised he was stable and to follow up in 6/2020.     Knee pain- continues with pain but they are working on getting injections approved for relief.       Patient was hospitalized 1/19/2022 through 1/21/2022 for a painful, closed fracture of multiple ribs on the right side, post fracture left toe, chest wall pain, stuttering, A. fib, history of CVA and CAD s/p stent placement, cardiomyopathy, dyslipidemia, hypertension, hearing loss. Per discharge summary, patient was initially admitted for chest wall pain s/p fall.  He was given Tylenol and Lidoderm to chest wall as well as incentive spirometry.  He did well, required no additional narcotic pain medication, and was feeling better each day.  Oxygen saturation 98% on room air.  Physical therapy evaluation was poor.  With living independently in his nurse child 25 to 30 miles away, they recommended inpatient rehab. He was discharged to rehab facility.  Last labs from rehab 1/1/2022.  He had OT evaluation and  PT. Following discharge 2/10/2022 and with no rib pain or foot pain. He only complained of left> right knee pain.     Patient's Specialists:  Cardiology-Dr. Quezada/ CANDELARIA Macias-last seen 12/2021 for CAD, cardiomyopathy, atrial fibrillation, hypertension -stable. Stop Brillinta. Follow-up in 6 months with echo  prior.  Neurology- Dr Ashley- last appt 7/2021 for history of CVA, recent stuttering, change in speech, parkinson's disease. They did not want treatment for Parkinson's. Follow up PRN   · Previously-  Dr. Saleh- last appointment 1/2020  For seizure-like activity.  No further work-up at that time.  They will consider EEG if he has any further symptoms.    · Dr. Naik- last seen 10/2017- for Parkinson's, orthostatic hypotension, and ataxia- he did not want to pursue treatment at that time and was advised to follow-up PRN.   Hematology- Dr Pennington- last appt 11/2021 for macrocytosis and thrombocytopenia- differential MDS and liver disease. Advised labs 3/2022 and follow up 7/2022.    · Prior Dr Richardson- last appt 8/2017 for macrocytosis without anemia and thrombocytopenia. Monthly B12 injections, observation without further evaluation if stable.   Sports Medicine- Dr Garcia- seen for bilateral OA knees and knee pain. Will start injections tomorrow- weekly for 3 weeks.   Ophthalmology- [facility] in [place]- will have left cataract surgery 1/27/2020 and right cataract surgery 2/3/2020.   Pulmonology- Dr De Jesus- last appt 12/2019 for pulmonary fibrosis, pulmonary edema, pneumothorax- follow up in 6 months.     The following portions of the patient's history were reviewed and updated as appropriate: allergies, current medications, past family history, past medical history, past social history, past surgical history and problem list.    Review of Systems   Constitutional: Positive for fatigue.   HENT: Positive for hearing loss. Negative for sore throat and trouble swallowing.         Stuttering speech   Eyes: Positive for visual disturbance.   Respiratory: Positive for shortness of breath.    Cardiovascular: Negative.    Gastrointestinal: Negative.    Genitourinary: Negative.    Musculoskeletal: Positive for arthralgias and gait problem.   Neurological: Positive for speech difficulty and headaches.         Fall   Hematological: Negative.    Psychiatric/Behavioral: Negative.        Objective    Vitals:    12/13/22 1012   BP: 128/60   Pulse: 68   Resp: 16   Temp: 97.3 °F (36.3 °C)   SpO2: 94%     Body mass index is 22.12 kg/m².    Physical Exam   Constitutional: He is oriented to person, place, and time. He appears well-developed. No distress.   HENT:   Head: Normocephalic and atraumatic.   Right Ear: External ear normal.   Left Ear: External ear normal.   Eyes: Conjunctivae are normal.   Neck: Carotid bruit is not present. No tracheal deviation present. No thyroid mass and no thyromegaly present.   Cardiovascular: Normal rate, regular rhythm and normal pulses.   Murmur heard.  Pulmonary/Chest: Effort normal and breath sounds normal.   Abdominal: Normal appearance.   Neurological: He is alert and oriented to person, place, and time. Gait (short, shuffling gait- ambulates with walker) abnormal.   Skin: Skin is warm and dry.   Psychiatric: His behavior is normal. Mood and thought content normal. His speech is delayed.   Nursing note and vitals reviewed.      Assessment & Plan   Diagnoses and all orders for this visit:    1. Essential hypertension (Primary)  -     Comprehensive Metabolic Panel  -     Urinalysis With Culture If Indicated -    2. Dyslipidemia  -     Comprehensive Metabolic Panel  -     CK  -     Lipid Panel With LDL / HDL Ratio  -     TSH  -     T4, free  -     T3, Free    3. Prediabetes  -     Comprehensive Metabolic Panel  -     Hemoglobin A1c  -     TSH  -     T4, free  -     T3, Free    4. Vitamin D deficiency  -     Comprehensive Metabolic Panel  -     Vitamin D,25-Hydroxy    5. Vitamin B 12 deficiency  -     CBC & Differential  -     Vitamin B12 & Folate    6. Macrocytic anemia  -     CBC & Differential  -     Iron and TIBC  -     Ferritin    7. Thrombocytopenia (HCC)  -     CBC & Differential    8. History of CVA (cerebrovascular accident)    9. Parkinson's disease (HCC)    10. Nonintractable episodic  headache, unspecified headache type    11. Speech disturbance, unspecified type    12. Stuttering    13. Recurrent falls    14. Coronary artery disease involving native coronary artery of native heart without angina pectoris    15. Cardiomyopathy, unspecified type (HCC)    16. History of supraventricular tachycardia    17. Paroxysmal atrial fibrillation (HCC)    18. Primary osteoarthritis of both knees    19. Chronic pain of both knees    Other orders  -     Microscopic Examination -        Assessment and Plan  Patient will have fasting labs. Call if no results in 1 week. Stability of conditions, plan, follow up, and further recommendations pending labs. If stable, follow up in 3-6 months.     · Hypertension- Blood pressure has been stable. Continue Norvasc 5 mg once daily, Metoprolol  mg daily, and Losartan 50 mg once daily. Continue monitoring BP. Call or return if elevated or low or any weakness or falls.    · Hyperlipidemia- Last lipids increased slightly. Continue Lipitor 20 mg at bedtime.  · Prediabetes- Last A1C was stable 5.8% 9/2022. I will continue to monitor and make further recommendations.   · Vitamin D deficiency- Continue Vitamin D 1000IU daily. Dosing recommendations pending labs.   · B12 deficiency- Continue B12 Monday, Wednesday, and Friday.  Await labs for further recommendations.   · Headache and speech disturbance- Patient had an episode 2/2021 with a severe headache then developed stuttering speech. Patient and son deny any other symptoms and reports no recurrence of headache, however, patient lived alone and has not had much interaction with people other than once a week when his son came to take care of groceries and medications. This was concerning for CVA, however, without any other deficits and a cardiac device in place, he was unable to have MRI. He underwent CT head and follow up with neurology. He was evaluated by speech therapy, was given home exercises. His son now lives with  him all the time, and he continues to stutter and talks less. He does have known Parkinson's disease that is untreated.  9-1-1 to ER if he has worsening speech, recurrence of headache, or any other cardiac or neurological symptoms. Patient and son verbalized understanding and agreement with plan and recommendations.   · Balance and gait instability- He avoids any overhead movement, as he loses his balance. He fell recently but reports he sat backward and did not have any injuries.  He is getting around well with his walker.  He was seen by neurology and diagnosed with Parkinson's, however, they did not feel he needed treatment.  Continue follow up with cardiology and neurology.   · CAD- He continues follow-up with cardiology.  He continues on Imdur 60 mg once daily and denies any changes or increase in chest pain, shortness of breath, palpitations, dizziness, or weakness.   · History of Pleural effusion and Pneumovax- Patient has no increase in SOA. He needs to reschedule with pulmonology.     Patient continues to see specialists as noted above.  I have reviewed available records, including consult notes, labs, and imaging/testing.  Patient to continue follow-up with specialists as directed by them. He needs to schedule follow up with pulmonology and I will refer back to neurology.     I spent 30 minutes caring for Dinesh Churchill Sr. on this date of service. This time includes time spent by me in the following activities as necessary: preparing for the visit, reviewing tests, specialists records and previous visits, obtaining and/or reviewing a separately obtained history, performing a medically appropriate exam and/or evaluation, counseling and educating the patient, family, caregiver, referring and/or communicating with other healthcare professionals, documenting information in the medical record, independently interpreting results and communicating that information with the patient, family, caregiver, and  developing a medically appropriate treatment plan with consideration of other conditions, medications, and treatments.

## 2022-12-14 LAB
25(OH)D3+25(OH)D2 SERPL-MCNC: 31.2 NG/ML (ref 30–100)
ALBUMIN SERPL-MCNC: 4.2 G/DL (ref 3.5–5.2)
ALBUMIN/GLOB SERPL: 2 G/DL
ALP SERPL-CCNC: 52 U/L (ref 39–117)
ALT SERPL-CCNC: 11 U/L (ref 1–41)
APPEARANCE UR: CLEAR
AST SERPL-CCNC: 11 U/L (ref 1–40)
BACTERIA #/AREA URNS HPF: NORMAL /HPF
BASOPHILS # BLD AUTO: 0.03 10*3/MM3 (ref 0–0.2)
BASOPHILS NFR BLD AUTO: 0.5 % (ref 0–1.5)
BILIRUB SERPL-MCNC: 1.6 MG/DL (ref 0–1.2)
BILIRUB UR QL STRIP: NEGATIVE
BUN SERPL-MCNC: 26 MG/DL (ref 8–23)
BUN/CREAT SERPL: 29.9 (ref 7–25)
CALCIUM SERPL-MCNC: 8.9 MG/DL (ref 8.2–9.6)
CASTS URNS QL MICRO: NORMAL /LPF
CHLORIDE SERPL-SCNC: 109 MMOL/L (ref 98–107)
CHOLEST SERPL-MCNC: 134 MG/DL (ref 0–200)
CK SERPL-CCNC: 54 U/L (ref 20–200)
CO2 SERPL-SCNC: 25.5 MMOL/L (ref 22–29)
COLOR UR: YELLOW
CREAT SERPL-MCNC: 0.87 MG/DL (ref 0.76–1.27)
EGFRCR SERPLBLD CKD-EPI 2021: 80 ML/MIN/1.73
EOSINOPHIL # BLD AUTO: 0.09 10*3/MM3 (ref 0–0.4)
EOSINOPHIL NFR BLD AUTO: 1.5 % (ref 0.3–6.2)
EPI CELLS #/AREA URNS HPF: NORMAL /HPF (ref 0–10)
ERYTHROCYTE [DISTWIDTH] IN BLOOD BY AUTOMATED COUNT: 14 % (ref 12.3–15.4)
FERRITIN SERPL-MCNC: 523 NG/ML (ref 30–400)
FOLATE SERPL-MCNC: 8.41 NG/ML (ref 4.78–24.2)
GLOBULIN SER CALC-MCNC: 2.1 GM/DL
GLUCOSE SERPL-MCNC: 112 MG/DL (ref 65–99)
GLUCOSE UR QL STRIP: NEGATIVE
HBA1C MFR BLD: 5.5 % (ref 4.8–5.6)
HCT VFR BLD AUTO: 37.9 % (ref 37.5–51)
HDLC SERPL-MCNC: 54 MG/DL (ref 40–60)
HGB BLD-MCNC: 13.1 G/DL (ref 13–17.7)
HGB UR QL STRIP: NEGATIVE
IMM GRANULOCYTES # BLD AUTO: 0.01 10*3/MM3 (ref 0–0.05)
IMM GRANULOCYTES NFR BLD AUTO: 0.2 % (ref 0–0.5)
IRON SATN MFR SERPL: 30 % (ref 20–50)
IRON SERPL-MCNC: 102 MCG/DL (ref 59–158)
KETONES UR QL STRIP: NEGATIVE
LDLC SERPL CALC-MCNC: 69 MG/DL (ref 0–100)
LDLC/HDLC SERPL: 1.3 {RATIO}
LEUKOCYTE ESTERASE UR QL STRIP: NEGATIVE
LYMPHOCYTES # BLD AUTO: 1.19 10*3/MM3 (ref 0.7–3.1)
LYMPHOCYTES NFR BLD AUTO: 20.4 % (ref 19.6–45.3)
MCH RBC QN AUTO: 34.3 PG (ref 26.6–33)
MCHC RBC AUTO-ENTMCNC: 34.6 G/DL (ref 31.5–35.7)
MCV RBC AUTO: 99.2 FL (ref 79–97)
MICRO URNS: NORMAL
MICRO URNS: NORMAL
MONOCYTES # BLD AUTO: 0.45 10*3/MM3 (ref 0.1–0.9)
MONOCYTES NFR BLD AUTO: 7.7 % (ref 5–12)
NEUTROPHILS # BLD AUTO: 4.07 10*3/MM3 (ref 1.7–7)
NEUTROPHILS NFR BLD AUTO: 69.7 % (ref 42.7–76)
NITRITE UR QL STRIP: NEGATIVE
NRBC BLD AUTO-RTO: 0.5 /100 WBC (ref 0–0.2)
PH UR STRIP: 6 [PH] (ref 5–7.5)
PLATELET # BLD AUTO: 125 10*3/MM3 (ref 140–450)
POTASSIUM SERPL-SCNC: 4.5 MMOL/L (ref 3.5–5.2)
PROT SERPL-MCNC: 6.3 G/DL (ref 6–8.5)
PROT UR QL STRIP: NEGATIVE
RBC # BLD AUTO: 3.82 10*6/MM3 (ref 4.14–5.8)
RBC #/AREA URNS HPF: NORMAL /HPF (ref 0–2)
SODIUM SERPL-SCNC: 143 MMOL/L (ref 136–145)
SP GR UR STRIP: 1.02 (ref 1–1.03)
T3FREE SERPL-MCNC: 2.9 PG/ML (ref 2–4.4)
T4 FREE SERPL-MCNC: 1.3 NG/DL (ref 0.93–1.7)
TIBC SERPL-MCNC: 341 MCG/DL
TRIGL SERPL-MCNC: 50 MG/DL (ref 0–150)
TSH SERPL DL<=0.005 MIU/L-ACNC: 1.56 UIU/ML (ref 0.27–4.2)
UIBC SERPL-MCNC: 239 MCG/DL (ref 112–346)
URINALYSIS REFLEX: NORMAL
UROBILINOGEN UR STRIP-MCNC: 1 MG/DL (ref 0.2–1)
VIT B12 SERPL-MCNC: 508 PG/ML (ref 211–946)
VLDLC SERPL CALC-MCNC: 11 MG/DL (ref 5–40)
WBC # BLD AUTO: 5.84 10*3/MM3 (ref 3.4–10.8)
WBC #/AREA URNS HPF: NORMAL /HPF (ref 0–5)

## 2022-12-29 ENCOUNTER — OFFICE VISIT (OUTPATIENT)
Dept: CARDIOLOGY | Facility: CLINIC | Age: 87
End: 2022-12-29
Payer: MEDICARE

## 2022-12-29 VITALS
HEART RATE: 74 BPM | SYSTOLIC BLOOD PRESSURE: 127 MMHG | DIASTOLIC BLOOD PRESSURE: 67 MMHG | BODY MASS INDEX: 22.33 KG/M2 | HEIGHT: 74 IN | WEIGHT: 174 LBS

## 2022-12-29 DIAGNOSIS — I25.10 CORONARY ARTERY DISEASE INVOLVING NATIVE CORONARY ARTERY OF NATIVE HEART WITHOUT ANGINA PECTORIS: Primary | ICD-10-CM

## 2022-12-29 DIAGNOSIS — I42.9 CARDIOMYOPATHY, UNSPECIFIED TYPE: ICD-10-CM

## 2022-12-29 DIAGNOSIS — E78.5 DYSLIPIDEMIA: ICD-10-CM

## 2022-12-29 DIAGNOSIS — I48.0 PAROXYSMAL ATRIAL FIBRILLATION: ICD-10-CM

## 2022-12-29 DIAGNOSIS — I10 ESSENTIAL HYPERTENSION: ICD-10-CM

## 2022-12-29 PROCEDURE — 93000 ELECTROCARDIOGRAM COMPLETE: CPT | Performed by: INTERNAL MEDICINE

## 2022-12-29 PROCEDURE — 99214 OFFICE O/P EST MOD 30 MIN: CPT | Performed by: INTERNAL MEDICINE

## 2022-12-29 NOTE — PROGRESS NOTES
[facility]  6 MO FOLLOW UP   Subjective:        Dinesh Churchill Sr. is a 89 y.o. male who here for follow up    CC  Follow-up coronary artery disease cardiomyopathy hypertension dyslipidemia  HPI  89-year-old male with coronary artery disease cardiomyopathy hypertension dyslipidemia here for the follow-up with no complaints of chest pains tightness heaviness or the pressure sensation     Problems Addressed this Visit        Cardiac and Vasculature    Essential hypertension    Cardiomyopathy (HCC)    Coronary artery disease involving native coronary artery of native heart without angina pectoris - Primary    Dyslipidemia    Paroxysmal atrial fibrillation (HCC)   Diagnoses       Codes Comments    Coronary artery disease involving native coronary artery of native heart without angina pectoris    -  Primary ICD-10-CM: I25.10  ICD-9-CM: 414.01     Essential hypertension     ICD-10-CM: I10  ICD-9-CM: 401.9     Paroxysmal atrial fibrillation (HCC)     ICD-10-CM: I48.0  ICD-9-CM: 427.31     Cardiomyopathy, unspecified type (HCC)     ICD-10-CM: I42.9  ICD-9-CM: 425.4     Dyslipidemia     ICD-10-CM: E78.5  ICD-9-CM: 272.4         .    The following portions of the patient's history were reviewed and updated as appropriate: allergies, current medications, past family history, past medical history, past social history, past surgical history and problem list.    Past Medical History:   Diagnosis Date   • Abnormal ECG    • Abnormal MRI    • Acute frontal sinusitis    • Arthritis    • Chronic fatigue    • Coronary artery disease    • Dizziness    • Head injury    • Hearing aid worn     left   • Hearing loss    • Hyperlipidemia    • Hypertension    • Kidney stones    • Macrocytosis    • Neoplasm of skin    • Osteoporosis    • Prediabetes    • Vitamin D deficiency      reports that he has never smoked. He has never used smokeless tobacco. He reports that he does not drink alcohol and does not use drugs.   Family History   Problem Relation  Age of Onset   • Arthritis Son    • Cancer Sister    • Diabetes Son        Review of Systems  Constitutional: No wt loss, fever, fatigue  Gastrointestinal: No nausea, abdominal pain  Behavioral/Psych: No insomnia or anxiety   Cardiovascular no chest pains or tightness in the chest  Objective:       Physical Exam  /67   Pulse 74   Ht 188 cm (74\")   Wt 78.9 kg (174 lb)   BMI 22.34 kg/m²   General appearance: No acute changes   Neck: Trachea midline; NECK, supple, no thyromegaly or lymphadenopathy   Lungs: Normal size and shape, normal breath sounds, equal distribution of air, no rales and rhonchi   CV: S1-S2 regular, no murmurs, no rub, no gallop   Abdomen: Soft, nontender; no masses , no abnormal abdominal sounds   Extremities: No deformity , normal color , no peripheral edema   Skin: Normal temperature, turgor and texture; no rash, ulcers            ECG 12 Lead    Date/Time: 12/29/2022 12:01 PM  Performed by: Maninder Quezada MD  Authorized by: Maninder Quezada MD   Comparison: compared with previous ECG   Similar to previous ECG  Rhythm: sinus rhythm  ST Flattening: all    Clinical impression: non-specific ECG              Echocardiogram:        Current Outpatient Medications:   •  amLODIPine (NORVASC) 5 MG tablet, TAKE 1 TABLET BY MOUTH DAILY, Disp: 30 tablet, Rfl: 2  •  aspirin 81 MG chewable tablet, Chew 1 tablet Daily., Disp: , Rfl:   •  atorvastatin (LIPITOR) 20 MG tablet, TAKE 1 TABLET BY MOUTH EVERY NIGHT, Disp: 30 tablet, Rfl: 2  •  Cholecalciferol (VITAMIN D-3) 1000 UNITS capsule, Take 1,000 Units by mouth daily., Disp: , Rfl:   •  Cyanocobalamin (B-12) 1000 MCG lozenge, DISSOLVE 1 LOZENGE BY MOUTH UNDER THE TONGUE EVERY OTHER DAY, Disp: 30 lozenge, Rfl: 4  •  isosorbide mononitrate (IMDUR) 60 MG 24 hr tablet, TAKE 1 TABLET BY MOUTH DAILY, Disp: 90 tablet, Rfl: 1  •  losartan (COZAAR) 50 MG tablet, TAKE 1 TABLET BY MOUTH DAILY, Disp: 90 tablet, Rfl: 1  •  metoprolol succinate XL  (TOPROL-XL) 100 MG 24 hr tablet, TAKE 1 TABLET BY MOUTH DAILY, Disp: 30 tablet, Rfl: 2  •  acetaminophen (TYLENOL) 500 MG tablet, Take 1 tablet by mouth 4 (Four) Times a Day., Disp: , Rfl:   •  Elastic Bandages & Supports (LUMBAR BACK BRACE/SUPPORT PAD) misc, Use for lifting/ strenuous exercise., Disp: 1 each, Rfl: 0   Assessment:        Patient Active Problem List   Diagnosis   • Abnormal magnetic resonance imaging study   • Arthritis   • Osteoarthritis of cervical spine   • Diplopia   • Hearing loss   • Neoplasm of uncertain behavior of skin   • Prediabetes   • Vitamin D deficiency   • Chronic fatigue   • History of supraventricular tachycardia   • Essential hypertension   • B12 deficiency   • Abnormal cardiac function test   • Cardiomyopathy (HCC)   • Coronary artery disease involving native coronary artery of native heart without angina pectoris   • History of coronary artery stent placement   • History of renal calculi   • Dyslipidemia   • Macrocytic anemia   • Precordial pain   • Parkinson's disease (HCC)   • History of CVA (cerebrovascular accident)   • Paroxysmal atrial fibrillation (HCC)   • Otalgia, left   • Chest pain   • Abnormal stress test   • Spontaneous pneumothorax   • Chest pain   • Acute systolic (congestive) heart failure (HCC)   • Thrombocytopenia (HCC)   • Pneumonia of left lower lobe due to infectious organism   • Syncope and collapse   • Status post placement of implantable loop recorder   • Long term (current) use of antithrombotics/antiplatelets   • Stuttering   • Fall   • Closed fracture of multiple ribs of right side   • Chest wall pain   • Closed fracture of phalanx of toe of left foot               Plan:            ICD-10-CM ICD-9-CM   1. Coronary artery disease involving native coronary artery of native heart without angina pectoris  I25.10 414.01   2. Essential hypertension  I10 401.9   3. Paroxysmal atrial fibrillation (HCC)  I48.0 427.31   4. Cardiomyopathy, unspecified type (HCC)   I42.9 425.4   5. Dyslipidemia  E78.5 272.4     1. Coronary artery disease involving native coronary artery of native heart without angina pectoris  No angina pectoris    2. Essential hypertension  Pressure under control    3. Paroxysmal atrial fibrillation (HCC)  Under control    4. Cardiomyopathy, unspecified type (HCC)  Compensated    5. Dyslipidemia  Continue current treatment       6 MONTHS  COUNSELING:    Dinesh Churchill Sr.Counseling was given to patient for the following topics: diagnostic results, risk factor reductions, impressions, risks and benefits of treatment options and importance of treatment compliance .       SMOKING COUNSELING:        Dictated using Dragon dictation

## 2023-01-12 PROCEDURE — G2066 INTER DEVC REMOTE 30D: HCPCS | Performed by: INTERNAL MEDICINE

## 2023-01-12 PROCEDURE — 93298 REM INTERROG DEV EVAL SCRMS: CPT | Performed by: INTERNAL MEDICINE

## 2023-01-20 ENCOUNTER — HOSPITAL ENCOUNTER (OUTPATIENT)
Facility: HOSPITAL | Age: 88
Setting detail: OBSERVATION
Discharge: HOME | End: 2023-01-23
Attending: EMERGENCY MEDICINE | Admitting: HOSPITALIST
Payer: MEDICARE

## 2023-01-20 DIAGNOSIS — R09.89 PULMONARY VASCULAR CONGESTION: ICD-10-CM

## 2023-01-20 DIAGNOSIS — R09.02 HYPOXEMIA: ICD-10-CM

## 2023-01-20 DIAGNOSIS — R51.9 SUDDEN ONSET OF SEVERE HEADACHE: Primary | ICD-10-CM

## 2023-01-20 DIAGNOSIS — J90 PLEURAL EFFUSION: ICD-10-CM

## 2023-01-20 PROCEDURE — 99285 EMERGENCY DEPT VISIT HI MDM: CPT

## 2023-01-21 ENCOUNTER — APPOINTMENT (OUTPATIENT)
Dept: CT IMAGING | Facility: HOSPITAL | Age: 88
End: 2023-01-21
Payer: MEDICARE

## 2023-01-21 ENCOUNTER — APPOINTMENT (OUTPATIENT)
Dept: GENERAL RADIOLOGY | Facility: HOSPITAL | Age: 88
End: 2023-01-21
Payer: MEDICARE

## 2023-01-21 ENCOUNTER — APPOINTMENT (OUTPATIENT)
Dept: MRI IMAGING | Facility: HOSPITAL | Age: 88
End: 2023-01-21
Payer: MEDICARE

## 2023-01-21 PROBLEM — R51.9 SUDDEN ONSET OF SEVERE HEADACHE: Status: ACTIVE | Noted: 2023-01-21

## 2023-01-21 PROBLEM — R51.9 HEADACHE: Status: ACTIVE | Noted: 2023-01-21

## 2023-01-21 LAB
ALBUMIN SERPL-MCNC: 3.7 G/DL (ref 3.5–5.2)
ALBUMIN/GLOB SERPL: 1.4 G/DL
ALP SERPL-CCNC: 51 U/L (ref 39–117)
ALT SERPL W P-5'-P-CCNC: 11 U/L (ref 1–41)
ANION GAP SERPL CALCULATED.3IONS-SCNC: 8.6 MMOL/L (ref 5–15)
ANION GAP SERPL CALCULATED.3IONS-SCNC: 9 MMOL/L (ref 5–15)
AST SERPL-CCNC: 14 U/L (ref 1–40)
BASOPHILS # BLD AUTO: 0.01 10*3/MM3 (ref 0–0.2)
BASOPHILS # BLD AUTO: 0.02 10*3/MM3 (ref 0–0.2)
BASOPHILS NFR BLD AUTO: 0.2 % (ref 0–1.5)
BASOPHILS NFR BLD AUTO: 0.4 % (ref 0–1.5)
BILIRUB SERPL-MCNC: 1.6 MG/DL (ref 0–1.2)
BUN SERPL-MCNC: 19 MG/DL (ref 8–23)
BUN SERPL-MCNC: 22 MG/DL (ref 8–23)
BUN/CREAT SERPL: 23.2 (ref 7–25)
BUN/CREAT SERPL: 31.4 (ref 7–25)
CALCIUM SPEC-SCNC: 8.4 MG/DL (ref 8.2–9.6)
CALCIUM SPEC-SCNC: 8.8 MG/DL (ref 8.2–9.6)
CHLORIDE SERPL-SCNC: 106 MMOL/L (ref 98–107)
CHLORIDE SERPL-SCNC: 108 MMOL/L (ref 98–107)
CO2 SERPL-SCNC: 24 MMOL/L (ref 22–29)
CO2 SERPL-SCNC: 24.4 MMOL/L (ref 22–29)
CREAT SERPL-MCNC: 0.7 MG/DL (ref 0.76–1.27)
CREAT SERPL-MCNC: 0.82 MG/DL (ref 0.76–1.27)
DEPRECATED RDW RBC AUTO: 52.7 FL (ref 37–54)
DEPRECATED RDW RBC AUTO: 53 FL (ref 37–54)
EGFRCR SERPLBLD CKD-EPI 2021: 81.4 ML/MIN/1.73
EGFRCR SERPLBLD CKD-EPI 2021: 85.4 ML/MIN/1.73
EOSINOPHIL # BLD AUTO: 0.04 10*3/MM3 (ref 0–0.4)
EOSINOPHIL # BLD AUTO: 0.1 10*3/MM3 (ref 0–0.4)
EOSINOPHIL NFR BLD AUTO: 0.6 % (ref 0.3–6.2)
EOSINOPHIL NFR BLD AUTO: 2.1 % (ref 0.3–6.2)
ERYTHROCYTE [DISTWIDTH] IN BLOOD BY AUTOMATED COUNT: 14.3 % (ref 12.3–15.4)
ERYTHROCYTE [DISTWIDTH] IN BLOOD BY AUTOMATED COUNT: 14.3 % (ref 12.3–15.4)
ERYTHROCYTE [SEDIMENTATION RATE] IN BLOOD: 10 MM/HR (ref 0–20)
GLOBULIN UR ELPH-MCNC: 2.7 GM/DL
GLUCOSE SERPL-MCNC: 100 MG/DL (ref 65–99)
GLUCOSE SERPL-MCNC: 134 MG/DL (ref 65–99)
HCT VFR BLD AUTO: 38.6 % (ref 37.5–51)
HCT VFR BLD AUTO: 40.4 % (ref 37.5–51)
HGB BLD-MCNC: 12.9 G/DL (ref 13–17.7)
HGB BLD-MCNC: 13.2 G/DL (ref 13–17.7)
IMM GRANULOCYTES # BLD AUTO: 0.03 10*3/MM3 (ref 0–0.05)
IMM GRANULOCYTES NFR BLD AUTO: 0.5 % (ref 0–0.5)
INR PPP: 1.04 (ref 0.9–1.1)
LYMPHOCYTES # BLD AUTO: 1.01 10*3/MM3 (ref 0.7–3.1)
LYMPHOCYTES # BLD AUTO: 1.3 10*3/MM3 (ref 0.7–3.1)
LYMPHOCYTES NFR BLD AUTO: 16 % (ref 19.6–45.3)
LYMPHOCYTES NFR BLD AUTO: 27.4 % (ref 19.6–45.3)
MCH RBC QN AUTO: 33.4 PG (ref 26.6–33)
MCH RBC QN AUTO: 34.1 PG (ref 26.6–33)
MCHC RBC AUTO-ENTMCNC: 32.7 G/DL (ref 31.5–35.7)
MCHC RBC AUTO-ENTMCNC: 33.4 G/DL (ref 31.5–35.7)
MCV RBC AUTO: 102.1 FL (ref 79–97)
MCV RBC AUTO: 102.3 FL (ref 79–97)
MONOCYTES # BLD AUTO: 0.46 10*3/MM3 (ref 0.1–0.9)
MONOCYTES # BLD AUTO: 0.61 10*3/MM3 (ref 0.1–0.9)
MONOCYTES NFR BLD AUTO: 12.8 % (ref 5–12)
MONOCYTES NFR BLD AUTO: 7.3 % (ref 5–12)
NEUTROPHILS NFR BLD AUTO: 2.7 10*3/MM3 (ref 1.7–7)
NEUTROPHILS NFR BLD AUTO: 4.76 10*3/MM3 (ref 1.7–7)
NEUTROPHILS NFR BLD AUTO: 56.9 % (ref 42.7–76)
NEUTROPHILS NFR BLD AUTO: 75.4 % (ref 42.7–76)
NRBC BLD AUTO-RTO: 0.3 /100 WBC (ref 0–0.2)
NT-PROBNP SERPL-MCNC: 6296 PG/ML (ref 0–1800)
PLATELET # BLD AUTO: 118 10*3/MM3 (ref 140–450)
PLATELET # BLD AUTO: 133 10*3/MM3 (ref 140–450)
PMV BLD AUTO: 10.6 FL (ref 6–12)
PMV BLD AUTO: 11.2 FL (ref 6–12)
POTASSIUM SERPL-SCNC: 4.4 MMOL/L (ref 3.5–5.2)
POTASSIUM SERPL-SCNC: 4.4 MMOL/L (ref 3.5–5.2)
PROT SERPL-MCNC: 6.4 G/DL (ref 6–8.5)
PROTHROMBIN TIME: 13.7 SECONDS (ref 11.7–14.2)
QT INTERVAL: 416 MS
RBC # BLD AUTO: 3.78 10*6/MM3 (ref 4.14–5.8)
RBC # BLD AUTO: 3.95 10*6/MM3 (ref 4.14–5.8)
SODIUM SERPL-SCNC: 139 MMOL/L (ref 136–145)
SODIUM SERPL-SCNC: 141 MMOL/L (ref 136–145)
TROPONIN T SERPL-MCNC: <0.01 NG/ML (ref 0–0.03)
WBC NRBC COR # BLD: 4.75 10*3/MM3 (ref 3.4–10.8)
WBC NRBC COR # BLD: 6.31 10*3/MM3 (ref 3.4–10.8)

## 2023-01-21 PROCEDURE — 96376 TX/PRO/DX INJ SAME DRUG ADON: CPT

## 2023-01-21 PROCEDURE — 93010 ELECTROCARDIOGRAM REPORT: CPT | Performed by: INTERNAL MEDICINE

## 2023-01-21 PROCEDURE — G0378 HOSPITAL OBSERVATION PER HR: HCPCS

## 2023-01-21 PROCEDURE — 70498 CT ANGIOGRAPHY NECK: CPT

## 2023-01-21 PROCEDURE — 25010000002 FUROSEMIDE PER 20 MG: Performed by: PHYSICIAN ASSISTANT

## 2023-01-21 PROCEDURE — 70496 CT ANGIOGRAPHY HEAD: CPT

## 2023-01-21 PROCEDURE — 96374 THER/PROPH/DIAG INJ IV PUSH: CPT

## 2023-01-21 PROCEDURE — 83880 ASSAY OF NATRIURETIC PEPTIDE: CPT | Performed by: PHYSICIAN ASSISTANT

## 2023-01-21 PROCEDURE — 85652 RBC SED RATE AUTOMATED: CPT | Performed by: PSYCHIATRY & NEUROLOGY

## 2023-01-21 PROCEDURE — 96375 TX/PRO/DX INJ NEW DRUG ADDON: CPT

## 2023-01-21 PROCEDURE — 70450 CT HEAD/BRAIN W/O DYE: CPT

## 2023-01-21 PROCEDURE — 85025 COMPLETE CBC W/AUTO DIFF WBC: CPT | Performed by: PHYSICIAN ASSISTANT

## 2023-01-21 PROCEDURE — 85025 COMPLETE CBC W/AUTO DIFF WBC: CPT | Performed by: NURSE PRACTITIONER

## 2023-01-21 PROCEDURE — 36415 COLL VENOUS BLD VENIPUNCTURE: CPT | Performed by: NURSE PRACTITIONER

## 2023-01-21 PROCEDURE — 0 IOPAMIDOL PER 1 ML: Performed by: EMERGENCY MEDICINE

## 2023-01-21 PROCEDURE — 85610 PROTHROMBIN TIME: CPT | Performed by: NURSE PRACTITIONER

## 2023-01-21 PROCEDURE — 80053 COMPREHEN METABOLIC PANEL: CPT | Performed by: PHYSICIAN ASSISTANT

## 2023-01-21 PROCEDURE — 71045 X-RAY EXAM CHEST 1 VIEW: CPT

## 2023-01-21 PROCEDURE — 84484 ASSAY OF TROPONIN QUANT: CPT | Performed by: PHYSICIAN ASSISTANT

## 2023-01-21 PROCEDURE — 99214 OFFICE O/P EST MOD 30 MIN: CPT | Performed by: PSYCHIATRY & NEUROLOGY

## 2023-01-21 PROCEDURE — 25010000002 FUROSEMIDE PER 20 MG: Performed by: STUDENT IN AN ORGANIZED HEALTH CARE EDUCATION/TRAINING PROGRAM

## 2023-01-21 PROCEDURE — 25010000002 MORPHINE PER 10 MG: Performed by: EMERGENCY MEDICINE

## 2023-01-21 PROCEDURE — 93005 ELECTROCARDIOGRAM TRACING: CPT | Performed by: PHYSICIAN ASSISTANT

## 2023-01-21 PROCEDURE — 25010000002 ONDANSETRON PER 1 MG: Performed by: EMERGENCY MEDICINE

## 2023-01-21 RX ORDER — ACETAMINOPHEN 160 MG/5ML
650 SOLUTION ORAL EVERY 4 HOURS PRN
Status: DISCONTINUED | OUTPATIENT
Start: 2023-01-21 | End: 2023-01-23 | Stop reason: HOSPADM

## 2023-01-21 RX ORDER — ONDANSETRON 2 MG/ML
4 INJECTION INTRAMUSCULAR; INTRAVENOUS ONCE
Status: COMPLETED | OUTPATIENT
Start: 2023-01-21 | End: 2023-01-21

## 2023-01-21 RX ORDER — LOSARTAN POTASSIUM 50 MG/1
50 TABLET ORAL DAILY
Status: DISCONTINUED | OUTPATIENT
Start: 2023-01-22 | End: 2023-01-23 | Stop reason: HOSPADM

## 2023-01-21 RX ORDER — METOPROLOL SUCCINATE 100 MG/1
100 TABLET, EXTENDED RELEASE ORAL DAILY
Status: DISCONTINUED | OUTPATIENT
Start: 2023-01-21 | End: 2023-01-23 | Stop reason: HOSPADM

## 2023-01-21 RX ORDER — SODIUM CHLORIDE 0.9 % (FLUSH) 0.9 %
10 SYRINGE (ML) INJECTION EVERY 12 HOURS SCHEDULED
Status: DISCONTINUED | OUTPATIENT
Start: 2023-01-21 | End: 2023-01-23 | Stop reason: HOSPADM

## 2023-01-21 RX ORDER — MORPHINE SULFATE 2 MG/ML
2 INJECTION, SOLUTION INTRAMUSCULAR; INTRAVENOUS ONCE
Status: COMPLETED | OUTPATIENT
Start: 2023-01-21 | End: 2023-01-21

## 2023-01-21 RX ORDER — AMLODIPINE BESYLATE 5 MG/1
5 TABLET ORAL
Status: DISCONTINUED | OUTPATIENT
Start: 2023-01-22 | End: 2023-01-23 | Stop reason: HOSPADM

## 2023-01-21 RX ORDER — NITROGLYCERIN 0.4 MG/1
0.4 TABLET SUBLINGUAL
Status: DISCONTINUED | OUTPATIENT
Start: 2023-01-21 | End: 2023-01-23 | Stop reason: HOSPADM

## 2023-01-21 RX ORDER — FUROSEMIDE 10 MG/ML
20 INJECTION INTRAMUSCULAR; INTRAVENOUS ONCE
Status: COMPLETED | OUTPATIENT
Start: 2023-01-21 | End: 2023-01-21

## 2023-01-21 RX ORDER — ACETAMINOPHEN 650 MG/1
650 SUPPOSITORY RECTAL EVERY 4 HOURS PRN
Status: DISCONTINUED | OUTPATIENT
Start: 2023-01-21 | End: 2023-01-23 | Stop reason: HOSPADM

## 2023-01-21 RX ORDER — ONDANSETRON 2 MG/ML
4 INJECTION INTRAMUSCULAR; INTRAVENOUS EVERY 6 HOURS PRN
Status: DISCONTINUED | OUTPATIENT
Start: 2023-01-21 | End: 2023-01-23 | Stop reason: HOSPADM

## 2023-01-21 RX ORDER — SODIUM CHLORIDE 0.9 % (FLUSH) 0.9 %
10 SYRINGE (ML) INJECTION AS NEEDED
Status: DISCONTINUED | OUTPATIENT
Start: 2023-01-21 | End: 2023-01-23 | Stop reason: HOSPADM

## 2023-01-21 RX ORDER — ISOSORBIDE MONONITRATE 60 MG/1
60 TABLET, EXTENDED RELEASE ORAL DAILY
Status: DISCONTINUED | OUTPATIENT
Start: 2023-01-21 | End: 2023-01-23 | Stop reason: HOSPADM

## 2023-01-21 RX ORDER — ACETAMINOPHEN 325 MG/1
650 TABLET ORAL EVERY 4 HOURS PRN
Status: DISCONTINUED | OUTPATIENT
Start: 2023-01-21 | End: 2023-01-23 | Stop reason: HOSPADM

## 2023-01-21 RX ORDER — FUROSEMIDE 10 MG/ML
80 INJECTION INTRAMUSCULAR; INTRAVENOUS ONCE
Status: COMPLETED | OUTPATIENT
Start: 2023-01-21 | End: 2023-01-21

## 2023-01-21 RX ORDER — ATORVASTATIN CALCIUM 20 MG/1
20 TABLET, FILM COATED ORAL NIGHTLY
Status: DISCONTINUED | OUTPATIENT
Start: 2023-01-21 | End: 2023-01-23 | Stop reason: HOSPADM

## 2023-01-21 RX ORDER — SODIUM CHLORIDE 9 MG/ML
40 INJECTION, SOLUTION INTRAVENOUS AS NEEDED
Status: DISCONTINUED | OUTPATIENT
Start: 2023-01-21 | End: 2023-01-23 | Stop reason: HOSPADM

## 2023-01-21 RX ADMIN — METOPROLOL SUCCINATE 100 MG: 100 TABLET, EXTENDED RELEASE ORAL at 13:30

## 2023-01-21 RX ADMIN — Medication 10 ML: at 20:06

## 2023-01-21 RX ADMIN — ONDANSETRON 4 MG: 2 INJECTION INTRAMUSCULAR; INTRAVENOUS at 01:29

## 2023-01-21 RX ADMIN — MORPHINE SULFATE 2 MG: 2 INJECTION, SOLUTION INTRAMUSCULAR; INTRAVENOUS at 01:29

## 2023-01-21 RX ADMIN — FUROSEMIDE 80 MG: 10 INJECTION, SOLUTION INTRAMUSCULAR; INTRAVENOUS at 04:41

## 2023-01-21 RX ADMIN — FUROSEMIDE 20 MG: 10 INJECTION, SOLUTION INTRAMUSCULAR; INTRAVENOUS at 13:30

## 2023-01-21 RX ADMIN — Medication 10 ML: at 08:12

## 2023-01-21 RX ADMIN — ISOSORBIDE MONONITRATE 60 MG: 60 TABLET, EXTENDED RELEASE ORAL at 13:30

## 2023-01-21 RX ADMIN — IOPAMIDOL 95 ML: 755 INJECTION, SOLUTION INTRAVENOUS at 02:10

## 2023-01-21 RX ADMIN — ATORVASTATIN CALCIUM 20 MG: 20 TABLET, FILM COATED ORAL at 20:06

## 2023-01-21 NOTE — ED NOTES
Pt to triage from home via [facility] ems run [id number] with c/o headache and shaking.  Pt history of parkinson's.  Son told ems he is worried for stroke. Pt has no neuro deficits at triage.  Pt wearing mask in triage. Triage personnel wore appropriate PPE    Pt alert and oriented at triage.

## 2023-01-21 NOTE — ED PROVIDER NOTES
EMERGENCY DEPARTMENT ENCOUNTER    Room Number:  06/06  Date seen:  1/21/2023  PCP: Phyllis Loving PA      HPI:  Chief Complaint: Sudden onset headache    A complete HPI/ROS/PMH/PSH/SH/FH are unobtainable due to: Patient's poor historian and the son augments history    Context: Dinesh Churchill Sr. is a 89 y.o. male who presents to the ED c/o abrupt onset of severe headache that occurred at approximately 10:30 PM.  Per the son the patient was watching TV when the headache began.  Headache is said to be frontal, sharp in nature.  Nothing makes it better or worse.  Patient denies chest pain, shortness of breath, nausea, vomiting, palpitations associated with a headache.  Son reports patient did have a brief tremor at the onset of the headache that is since subsided.  Patient has past medical history of stroke.  EMS was called to the scene and the rapid neuro exam was said to be unremarkable.  Patient was then brought to the emergency department for further evaluation.    Patient is on 81 mg ASA but is not on any anticoagulants.          PAST MEDICAL HISTORY  Active Ambulatory Problems     Diagnosis Date Noted   • Abnormal magnetic resonance imaging study 03/18/2016   • Arthritis 03/18/2016   • Osteoarthritis of cervical spine 03/18/2016   • Diplopia 03/18/2016   • Hearing loss 03/18/2016   • Neoplasm of uncertain behavior of skin 03/18/2016   • Prediabetes 03/18/2016   • Vitamin D deficiency 03/18/2016   • Chronic fatigue 10/27/2016   • History of supraventricular tachycardia 04/07/2017   • Essential hypertension 04/07/2017   • B12 deficiency 04/19/2017   • Abnormal cardiac function test 05/08/2017   • Cardiomyopathy (HCC) 05/08/2017   • Coronary artery disease involving native coronary artery of native heart without angina pectoris 05/31/2017   • History of coronary artery stent placement 05/31/2017   • History of renal calculi 05/31/2017   • Dyslipidemia 05/31/2017   • Macrocytic anemia 08/09/2017   • Precordial  pain 08/16/2017   • Parkinson's disease ([facility]) 10/25/2017   • History of CVA (cerebrovascular accident) 10/25/2017   • Paroxysmal atrial fibrillation ([facility]) 11/29/2017   • Otalgia, left 12/29/2017   • Chest pain 08/06/2019   • Abnormal stress test 08/06/2019   • Spontaneous pneumothorax 08/18/2019   • Chest pain 08/18/2019   • Acute systolic (congestive) heart failure ([facility]) 08/19/2019   • Thrombocytopenia ([facility]) 08/20/2019   • Pneumonia of left lower lobe due to infectious organism 09/06/2019   • Syncope and collapse 11/06/2019   • Status post placement of implantable loop recorder 11/22/2019   • Long term (current) use of antithrombotics/antiplatelets 03/13/2020   • Stuttering 04/09/2021   • Fall 01/19/2022   • Closed fracture of multiple ribs of right side 01/19/2022   • Chest wall pain 01/19/2022   • Closed fracture of phalanx of toe of left foot 01/19/2022     Resolved Ambulatory Problems     Diagnosis Date Noted   • Maxillary sinusitis 03/18/2016   • Dizziness 10/27/2016   • Acute frontal sinusitis 10/27/2016   • Precordial pain 04/07/2017   • SOB (shortness of breath) 04/07/2017   • Acute rhinitis 12/29/2017     Past Medical History:   Diagnosis Date   • Abnormal ECG    • Abnormal MRI    • Coronary artery disease    • Head injury    • Headache 1/21/2023   • Hearing aid worn    • Hyperlipidemia    • Hypertension    • Kidney stones    • Macrocytosis    • Neoplasm of skin    • Osteoporosis          PAST SURGICAL HISTORY  Past Surgical History:   Procedure Laterality Date   • CARDIAC CATHETERIZATION N/A 05/22/2017    Procedure: Left Heart Cath;  Surgeon: Maninder Quezada MD;  Location: [facility] CATH INVASIVE LOCATION;  Service:    • CARDIAC CATHETERIZATION N/A 05/23/2017    Procedure: Stent BMS coronary;  Surgeon: Maninder Quezada MD;  Location: [facility] CATH INVASIVE LOCATION;  Service:    • CARDIAC CATHETERIZATION N/A 08/08/2019    Procedure: Left Heart Cath;  Surgeon: Maninder Quezada MD;  Location:   [facility] CATH INVASIVE LOCATION;  Service: Cardiology   • CARDIAC CATHETERIZATION N/A 08/08/2019    Procedure: Left ventriculography;  Surgeon: Maninder Quezada MD;  Location:  [facility] CATH INVASIVE LOCATION;  Service: Cardiology   • CARDIAC CATHETERIZATION N/A 08/08/2019    Procedure: Coronary angiography;  Surgeon: Maninder Quezada MD;  Location:  [facility] CATH INVASIVE LOCATION;  Service: Cardiology   • CARDIAC CATHETERIZATION N/A 08/20/2019    Procedure: CORONARY ANGIOGRAPHY;  Surgeon: Maninder Quezada MD;  Location:  [facility] CATH INVASIVE LOCATION;  Service: Cardiovascular   • CARDIAC CATHETERIZATION N/A 08/20/2019    Procedure: Stent BMS coronary;  Surgeon: Maninder Quezada MD;  Location:  [facility] CATH INVASIVE LOCATION;  Service: Cardiovascular   • CARDIAC ELECTROPHYSIOLOGY PROCEDURE N/A 11/08/2019    Procedure: Loop insertion;  Surgeon: Maninder Quezada MD;  Location:  [facility] CATH INVASIVE LOCATION;  Service: Cardiovascular   • HERNIA REPAIR      x 2   • SD RT/LT HEART CATHETERS N/A 05/23/2017    Procedure: Percutaneous Coronary Intervention;  Surgeon: Maninder Quezada MD;  Location:  [facility] CATH INVASIVE LOCATION;  Service: Cardiovascular         FAMILY HISTORY  Family History   Problem Relation Age of Onset   • Arthritis Son    • Cancer Sister    • Diabetes Son          SOCIAL HISTORY  Social History     Socioeconomic History   • Marital status:    Tobacco Use   • Smoking status: Never   • Smokeless tobacco: Never   Vaping Use   • Vaping Use: Never used   Substance and Sexual Activity   • Alcohol use: No     Comment: quit 30 years ago    • Drug use: No   • Sexual activity: Defer         ALLERGIES  Patient has no known allergies.        REVIEW OF SYSTEMS  Review of Systems     All systems reviewed and negative except for those discussed in HPI.       PHYSICAL EXAM  ED Triage Vitals [01/20/23 2351]   Temp Heart Rate Resp BP SpO2   98.8 °F (37.1 °C) 77 16 172/70 96 %      Temp src  Heart Rate Source Patient Position BP Location FiO2 (%)   Tympanic Monitor -- -- --       Physical Exam      GENERAL: Frail in appearance, nontoxic  HENT: nares patent  EYES: no scleral icterus PERRL, EOMs intact without obvious nystagmus, visual fields appear to be equal bilaterally and WNL  CV: regular rhythm, normal rate,, normal S1-S2 unilateral leg swelling or gross pedal edema  RESPIRATORY: normal effort, questionable rales at the base.  SPO2 93% on RA   ABDOMEN: soft  MUSCULOSKELETAL: no deformity strength 5 of 5 globally  NEURO: alert oriented x3, moves all extremities, follows commands, no drift in upper and lower extremities, gross sensation intact globally, no ataxia on finger-to-nose shin test   PSYCH:  calm, cooperative  SKIN: warm, dry    Vital signs and nursing notes reviewed.          LAB RESULTS  Recent Results (from the past 24 hour(s))   ECG 12 Lead Stroke Evaluation    Collection Time: 01/21/23 12:28 AM   Result Value Ref Range    QT Interval 416 ms   Comprehensive Metabolic Panel    Collection Time: 01/21/23 12:38 AM    Specimen: Blood   Result Value Ref Range    Glucose 134 (H) 65 - 99 mg/dL    BUN 22 8 - 23 mg/dL    Creatinine 0.70 (L) 0.76 - 1.27 mg/dL    Sodium 141 136 - 145 mmol/L    Potassium 4.4 3.5 - 5.2 mmol/L    Chloride 108 (H) 98 - 107 mmol/L    CO2 24.4 22.0 - 29.0 mmol/L    Calcium 8.8 8.2 - 9.6 mg/dL    Total Protein 6.4 6.0 - 8.5 g/dL    Albumin 3.7 3.5 - 5.2 g/dL    ALT (SGPT) 11 1 - 41 U/L    AST (SGOT) 14 1 - 40 U/L    Alkaline Phosphatase 51 39 - 117 U/L    Total Bilirubin 1.6 (H) 0.0 - 1.2 mg/dL    Globulin 2.7 gm/dL    A/G Ratio 1.4 g/dL    BUN/Creatinine Ratio 31.4 (H) 7.0 - 25.0    Anion Gap 8.6 5.0 - 15.0 mmol/L    eGFR 85.4 >60.0 mL/min/1.73   Troponin    Collection Time: 01/21/23 12:38 AM    Specimen: Blood   Result Value Ref Range    Troponin T <0.010 0.000 - 0.030 ng/mL   CBC Auto Differential    Collection Time: 01/21/23 12:38 AM    Specimen: Blood   Result Value  Ref Range    WBC 6.31 3.40 - 10.80 10*3/mm3    RBC 3.95 (L) 4.14 - 5.80 10*6/mm3    Hemoglobin 13.2 13.0 - 17.7 g/dL    Hematocrit 40.4 37.5 - 51.0 %    .3 (H) 79.0 - 97.0 fL    MCH 33.4 (H) 26.6 - 33.0 pg    MCHC 32.7 31.5 - 35.7 g/dL    RDW 14.3 12.3 - 15.4 %    RDW-SD 53.0 37.0 - 54.0 fl    MPV 10.6 6.0 - 12.0 fL    Platelets 133 (L) 140 - 450 10*3/mm3    Neutrophil % 75.4 42.7 - 76.0 %    Lymphocyte % 16.0 (L) 19.6 - 45.3 %    Monocyte % 7.3 5.0 - 12.0 %    Eosinophil % 0.6 0.3 - 6.2 %    Basophil % 0.2 0.0 - 1.5 %    Immature Grans % 0.5 0.0 - 0.5 %    Neutrophils, Absolute 4.76 1.70 - 7.00 10*3/mm3    Lymphocytes, Absolute 1.01 0.70 - 3.10 10*3/mm3    Monocytes, Absolute 0.46 0.10 - 0.90 10*3/mm3    Eosinophils, Absolute 0.04 0.00 - 0.40 10*3/mm3    Basophils, Absolute 0.01 0.00 - 0.20 10*3/mm3    Immature Grans, Absolute 0.03 0.00 - 0.05 10*3/mm3    nRBC 0.3 (H) 0.0 - 0.2 /100 WBC       Ordered the above labs and reviewed the results.        RADIOLOGY  CT Head Without Contrast    Result Date: 1/21/2023  CT HEAD WITHOUT CONTRAST  HISTORY: Headache  COMPARISON: 09/18/2020  TECHNIQUE: Axial CT imaging was obtained through the brain. No IV contrast was administered.  FINDINGS: No acute intracranial hemorrhage is seen. There is diffuse atrophy. There is periventricular and deep white matter microangiopathic change. There is no midline shift or mass effect. There is an old right cerebellar infarct. There is an additional area of encephalomalacia which is noted within the right frontal corona radiata. There may be tiny old bilateral thalamic lacunar infarcts. Air-fluid levels are noted within the maxillary sinuses. Correlation with any evidence of sinusitis is recommended.      No acute intracranial findings.  Radiation dose reduction techniques were utilized, including automated exposure control and exposure modulation based on body size.  This report was finalized on 1/21/2023 1:15 AM by Dr. Giron  CB Xavier      CT Angiogram Neck, CT Angiogram Head    Result Date: 1/21/2023  NONCONTRAST CRANIAL CT SCAN, CT ANGIOGRAM NECK, CT ANGIOGRAM HEAD   HISTORY: Headache.  COMPARISON: 01/21/2023.  TECHNIQUE:  Radiation dose reduction techniques were utilized, including automated exposure control and exposure modulation based on body size. Initially, a routine noncontrast cranial CT performed from the skull base through the vertex region. After review, thin-section contrast enhanced CT angiogram images obtained from the aortic arch through the calvarial vertex utilizing angiographic technique. Multi projection 3-D MIP reformatted images were supplemented and reviewed.   FINDINGS CRANIAL CT: Please see the earlier CT report for full description of findings on the unenhanced CT. Postcontrast imaging does not show any evidence of venous occlusion or abnormal enhancement.   CERVICAL CAROTID CT ANGIOGRAM:  FINDINGS: There is a common origin of the brachiocephalic artery and left common carotid artery. Both common carotid arteries are widely patent. There is calcified plaque noted at the carotid bifurcations bilaterally, without hemodynamically significant stenosis. The distal cervical internal carotid arteries are widely patent. There is some calcification of the aorta and the right vertebral artery, without hemodynamically significant stenosis. Left vertebral artery is dominant. No stenosis or dissection is seen on either side. Images through the lung apices show some areas of groundglass attenuation within both lungs. This may reflect some vascular congestion, but is incompletely assessed on this exam. There are also bilateral effusions, partially visualized. There is no prevertebral soft tissue swelling.   NASCET criteria utilized in stenosis measurements. Stenosis mild, 0-49%.   CRANIAL CTA ANGIOGRAM:  FINDINGS:  The intracranial internal carotid arteries are patent, although there is significant calcification of  the cavernous segments. There is an anterior communicating artery. Anterior cerebral arteries are normal. Middle cerebral arteries appear normal.  The intracranial vertebral arteries are patent. The left is dominant. Basilar artery appears normal. Both posterior cerebral arteries are patent. The technologist has measured a possible outpouching supraclinoid right internal carotid artery. However, there does appear to be a vessel originating from this area, and I think it more likely reflects a prominent infundibulum, rather than an actual aneurysm. This area measures 1.6 x 3.2 x 4.1 mm.   Radiation dose reduction techniques were utilized, including automated exposure control and exposure modulation based on body size.       1. No acute intracranial findings. 2. No cervical vascular stenosis or occlusion. 3. No intracranial stenosis or occlusion. 4. Technologist has measured a possible outpouching from the supraclinoid right internal carotid artery. I think the appearance is more suggestive of a prominent infundibulum.   Radiation dose reduction techniques were utilized, including automated exposure control and exposure modulation based on body size.  This report was finalized on 1/21/2023 4:04 AM by Dr. Bree Xavier M.D.      XR Chest 1 View    Result Date: 1/21/2023  SINGLE VIEW OF THE CHEST  HISTORY: Shortness of air  COMPARISON: 01/19/2022  FINDINGS: Cardiomegaly is present. There appears to be vascular congestion. No pneumothorax is seen. There is nonspecific bibasilar consolidation. Patient does appear to have bilateral effusions, left greater than right. There are old right-sided rib fractures. Cardiac loop recorder is noted. There is calcification of the aorta.       1. Cardiomegaly with suspected vascular congestion. 2. Nonspecific bibasilar consolidation and effusions, left greater than right.  This report was finalized on 1/21/2023 1:17 AM by Dr. Bree Xavier M.D.        Ordered the above noted  radiological studies. Reviewed by me in PACS.          PROCEDURES  Procedures    Interval: baseline  1a. Level of Consciousness: 0-->Alert, keenly responsive  1b. LOC Questions: 0-->Answers both questions correctly  1c. LOC Commands: 0-->Performs both tasks correctly  2. Best Gaze: 0-->Normal  3. Visual: 0-->No visual loss  4. Facial Palsy: 0-->Normal symmetrical movements  5a. Motor Arm, Left: 0-->No drift, limb holds 90 (or 45) degrees for full 10 secs  5b. Motor Arm, Right: 0-->No drift, limb holds 90 (or 45) degrees for full 10 secs  6a. Motor Leg, Left: 0-->No drift, leg holds 30 degree position for full 5 secs  6b. Motor Leg, Right: 0-->No drift, leg holds 30 degree position for full 5 secs  7. Limb Ataxia: 0-->Absent  8. Sensory: 0-->Normal, no sensory loss  9. Best Language: 0-->No aphasia, normal  10. Dysarthria: 0-->Normal  11. Extinction and Inattention (formerly Neglect): 0-->No abnormality    Total (NIH Stroke Scale): 0      MEDICATIONS GIVEN IN ER  Medications   furosemide (LASIX) injection 80 mg (has no administration in time range)   sodium chloride 0.9 % flush 10 mL (has no administration in time range)   sodium chloride 0.9 % flush 10 mL (has no administration in time range)   sodium chloride 0.9 % infusion 40 mL (has no administration in time range)   nitroglycerin (NITROSTAT) SL tablet 0.4 mg (has no administration in time range)   acetaminophen (TYLENOL) tablet 650 mg (has no administration in time range)     Or   acetaminophen (TYLENOL) 160 MG/5ML solution 650 mg (has no administration in time range)     Or   acetaminophen (TYLENOL) suppository 650 mg (has no administration in time range)   ondansetron (ZOFRAN) injection 4 mg (has no administration in time range)   morphine injection 2 mg (2 mg Intravenous Given 1/21/23 0129)   ondansetron (ZOFRAN) injection 4 mg (4 mg Intravenous Given 1/21/23 0129)   iopamidol (ISOVUE-370) 76 % injection 100 mL (95 mL Intravenous Given 1/21/23 0210)            MEDICAL DECISION MAKING, PROGRESS, and CONSULTS    All labs have been independently reviewed by me.  All radiology studies have been reviewed by me and discussed with radiologist dictating the report.   EKG's independently viewed and interpreted by me.  Discussion below represents my analysis of pertinent findings related to patient's condition, differential diagnosis, treatment plan and final disposition.      Orders placed during this visit:  Orders Placed This Encounter   Procedures   • XR Chest 1 View   • CT Head Without Contrast   • CT Angiogram Neck   • CT Angiogram Head   • Comprehensive Metabolic Panel   • Troponin   • CBC Auto Differential   • BNP   • Potassium   • Magnesium   • Troponin   • Blood Gas, Arterial -   • Basic Metabolic Panel   • CBC Auto Differential   • Protime-INR   • Diet: Cardiac Diets; Healthy Heart (2-3 Na+); Texture: Regular Texture (IDDSI 7); Fluid Consistency: Thin (IDDSI 0)   • Vital Signs   • Intake & Output   • Weigh Patient   • Oral Care   • Place Sequential Compression Device   • Maintain Sequential Compression Device   • Telemetry - Maintain IV Access   • Continuous Cardiac Monitoring   • May Be Off Telemetry for Tests   • Pulse Oximetry, Continuous   • Up With Assistance   • Neuro Checks   • Code Status and Medical Interventions:   • [facility] (on-call MD unless specified) Details   • Inpatient Neurology Consult General   • Oxygen Therapy- Nasal Cannula; Titrate for SPO2: 90% - 95%   • Oxygen Therapy- Nasal Cannula; Titrate for SPO2: 90% - 95%   • ECG 12 Lead Stroke Evaluation   • ECG 12 Lead Chest Pain   • Insert Peripheral IV   • Initiate Observation Status   • CBC & Differential         Additional sources:  - Discussed/obtained information from independent historians: Nothing  Additional information was obtained to confirm the patient's history.    - External (non-ED) record review: Reviewed a MIREYA echo from 12/23/2001.  LVEF was 40% at that time.  Patient is followed by  Dr. Quezada and has a past medical history of coronary artery disease without angina pectoris.  He also has a past medical history of essential hypertension, cardiomyopathy, paroxysmal A. fib.  Patient does not appear to be on diuretic.    - Chronic or social conditions impacting care: Lives far away from medical care with his son        Differential diagnosis:    DDx: SAH, SDH, TIA, stroke, tension headache, cluster headache, atypical migraine headache.  Will obtain CBC, CMP, troponin, EKG, portable chest x-ray, CTA of the head and neck to initiate evaluation.      Additional orders considered but not ordered:          Independent interpretation of labs, radiology studies, and discussions with consultants:  ED Course as of 01/21/23 0424   Sat Jan 21, 2023   0006 BP: 172/70 [RC]   0006 Temp: 98.8 °F (37.1 °C) [RC]   0006 Heart Rate: 77 [RC]   0006 Resp: 16 [RC]   0006 SpO2: 96 %  NC [RC]   0116 Reviewed the patient's CT head without contrast and see no acute bleeding.  We will proceed with angiogram of the head and neck as discussed with my supervising physician [RC]   0117 . [RC]   0131 CT head without contrast was performed approximately 2-1/2 to 3 hours from the time of onset of headache.  Was officially read as no acute intracranial hemorrhage.  This makes SAH muscle is unlikely.  CT angiograms of the head and neck have been ordered [RC]   0335 CT angiograms head and neck show no acute process as discussed with the radiologist.  Repeat NIH performed and is unchanged.  All of patient's work-up is unremarkable up to this point however, he is 89 years old and the abrupt onset of his headache is concerning.  Son also states they live 45 minutes away and is not easy getting to the hospital.  Would prefer to observe the patient and have further evaluated prior to discharge home.   [RC]   0408 Discussed the patient's case with [facility]  [facility] [[facility]]   0409 Patient states he is pain-free after the 2 mg of morphine and 4 mg of Zofran.  Patient did require O2 after the 2 mg of morphine.  Prior to the morphine the patient sats 94% on RA.  His chest x-ray does show some pulmonary vascular congestion as well as pleural effusion.  We will add a BNP [RC]      ED Course User Index  [RC] Irving oBss III, PA                PPE: The patient wore a mask throughout the entire encounter. I wore a well-fitting mask.    DIAGNOSIS  Final diagnoses:   Sudden onset of severe headache   Pulmonary vascular congestion   Pleural effusion   Hypoxemia         DISPOSITION  ADMISSION    Discussed treatment plan and reason for admission with pt/family and admitting physician.  Pt/family voiced understanding of the plan for admission for further testing/treatment as needed.         Latest Documented Vital Signs:  As of 04:24 EST  BP- 148/78 HR- 82 Temp- 98.8 °F (37.1 °C) (Tympanic) O2 sat- 96%      --    Please note that portions of this were completed with a voice recognition program.       Note Disclaimer: At [facility], we believe that sharing information builds trust and better relationships. You are receiving this note because you are receiving care at [facility] or recently visited. It is possible you will see health information before a provider has talked with you about it. This kind of information can be easy to misunderstand. To help you fully understand what it means for your health, we urge you to discuss this note with your provider.       Irving Boss III, PA  01/21/23 0422

## 2023-01-21 NOTE — CONSULTS
"Neurology Consult Note    Consult Date: 1/21/2023    Referring MD: Karthik Dunham*    Reason for Consult I have been asked to see the patient in neurological consultation to render advice and opinion regarding headache    Dinesh Churchill Sr. is a 89 y.o. male with hypertension, hyperlipidemia, CAD, hearing loss.  He reports sudden onset last night of a severe bifrontal headache that was sharp in quality.  Headache was preceded by approximately 10 minutes of complete bilateral vision loss.  His headache lasted about 2 hours and then resolved.  He was fairly hypertensive on presentation but his headache has resolved and has remained resolved.  He denies any other new neurologic symptoms associated with a headache.  He does have some prior reported history of headaches but cannot find any details about that in the medical record.  He previously saw my colleague Dr. 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 He noted that he cannot have MRI due to a cardiac device.    Past Medical History:   Diagnosis Date   • Abnormal ECG    • Abnormal MRI    • Acute frontal sinusitis    • Arthritis    • Chronic fatigue    • Coronary artery disease    • Dizziness    • Head injury    • Headache 1/21/2023   • Hearing aid worn     left   • Hearing loss    • Hyperlipidemia    • Hypertension    • Kidney stones    • Macrocytosis    • Neoplasm of skin    • Osteoporosis    • Prediabetes    • Vitamin D deficiency        ROS:  No fevers, chills  No weakness, numbness    Exam  /63 (BP Location: Right arm, Patient Position: Lying)   Pulse 60   Temp 97.9 °F (36.6 °C) (Oral)   Resp 16   Ht 188 cm (74\")   Wt 80.7 kg (178 lb)   SpO2 95%   BMI 22.85 kg/m²   Gen: NAD, vitals reviewed  MS: oriented x3, recent/remote memory intact, normal attention/concentration, language intact, no neglect.  CN: visual acuity grossly normal, PERRL, EOMI, no facial droop, bilateral hearing loss, no dysarthria  Motor: 5/5 throughout upper and lower " extremities, normal tone    DATA:    Lab Results   Component Value Date    GLUCOSE 100 (H) 01/21/2023    CALCIUM 8.4 01/21/2023     01/21/2023    K 4.4 01/21/2023    CO2 24.0 01/21/2023     01/21/2023    BUN 19 01/21/2023    CREATININE 0.82 01/21/2023    EGFRIFAFRI 89 02/16/2022    EGFRIFNONA 77 02/16/2022    BCR 23.2 01/21/2023    ANIONGAP 9.0 01/21/2023     Lab Results   Component Value Date    WBC 4.75 01/21/2023    HGB 12.9 (L) 01/21/2023    HCT 38.6 01/21/2023    .1 (H) 01/21/2023     (L) 01/21/2023       Lab review: Hemoglobin 12.9, platelets 118, BMP unremarkable.  ESR normal    Imaging review: I personally reviewed his CTA of the head and neck.  No significant flow-limiting stenosis appreciated however the radiologist does note a right supraclinoid ICA outpouching which is felt to represent an infundibulum rather than an aneurysm.    Diagnoses:  Thunderclap headache  Bilateral vision loss, resolved    Pre-stroke MRS: 1    Comment: 89-year-old man with headache with red flags including his age and associated bilateral vision loss.  Differential diagnosis includes hypertensive encephalopathy versus posterior reversible encephalopathy syndrome.  I think sentinel bleed is unlikely based on the character of the headache and the relatively reassuring appearance of this infundibulum on CTA however I do think we should confirm the absence of significant RBCs by CSF which we will try to do today.  From chart review I believe he will be unable to undergo MRI but we will screen him and see.    PLAN:  MRI with and without contrast if able  CSF studies for thunderclap headache to determine need to consult neurosurgery.    We will follow.       monthly or less

## 2023-01-21 NOTE — H&P
Patient Name:  Dinesh Churchill Sr.  YOB: 1928  MRN:  [id number]  Admit Date:  1/20/2023  Patient Care Team:  Phyllis Loving PA as PCP - General (Family Medicine)  Brandon Ricci MD Thomas, Melissa, PA as Referring Physician (Family Medicine)  John Callejas II, MD as Consulting Physician (Hematology and Oncology)  Maninder Quezada MD as Consulting Physician (Cardiology)  Hardeep Shea MD as Consulting Physician (Pulmonary Disease)      Subjective   History Present Illness     Chief Complaint   Patient presents with   • Headache       Mr. Churchill is a 89 y.o. male with Parkinson's disease, HTN, history of CVA, paroxysmal A. fib, CAD who presented with sudden onset of headache as well as tremor.  Patient was reportedly sitting watching TV with son when this occurred.  Denies history of headache like this before.  It was described as frontal, and sharp in nature, lasting about 2 hours, without radiation.  No alleviating or aggravating factors.  Son was concerned about a possible stroke and was the reason for his presentation.  Blood pressure was noted to be in the 170s on arrival.     CT head and CTA head and neck did not show any acute intracranial findings or significant stenosis.    History of Present Illness  Review of Systems   Constitutional: Negative for chills, fatigue and fever.   HENT: Negative for congestion, hearing loss, rhinorrhea and sore throat.    Eyes: Negative for pain, discharge and itching.   Respiratory: Negative for cough, chest tightness and shortness of breath.    Cardiovascular: Negative for chest pain and palpitations.   Gastrointestinal: Negative for abdominal pain, constipation, nausea and vomiting.   Endocrine: Negative for cold intolerance.   Genitourinary: Negative for difficulty urinating, frequency and urgency.   Musculoskeletal: Negative for arthralgias, back pain and joint swelling.   Skin: Negative for color change and rash.   Neurological:  Positive for tremors and headaches. Negative for syncope, speech difficulty, weakness, light-headedness and numbness.   Hematological: Negative for adenopathy. Does not bruise/bleed easily.   Psychiatric/Behavioral: Negative for hallucinations and suicidal ideas.        Personal History     Past Medical History:   Diagnosis Date   • Abnormal ECG    • Abnormal MRI    • Acute frontal sinusitis    • Arthritis    • Chronic fatigue    • Coronary artery disease    • Dizziness    • Head injury    • Headache 1/21/2023   • Hearing aid worn     left   • Hearing loss    • Hyperlipidemia    • Hypertension    • Kidney stones    • Macrocytosis    • Neoplasm of skin    • Osteoporosis    • Prediabetes    • Vitamin D deficiency      Past Surgical History:   Procedure Laterality Date   • CARDIAC CATHETERIZATION N/A 05/22/2017    Procedure: Left Heart Cath;  Surgeon: Maninder Quezada MD;  Location: [facility] CATH INVASIVE LOCATION;  Service:    • CARDIAC CATHETERIZATION N/A 05/23/2017    Procedure: Stent BMS coronary;  Surgeon: Maninder Quezada MD;  Location: [facility] INVASIVE LOCATION;  Service:    • CARDIAC CATHETERIZATION N/A 08/08/2019    Procedure: Left Heart Cath;  Surgeon: Maninder Quezada MD;  Location: [facility] INVASIVE LOCATION;  Service: Cardiology   • CARDIAC CATHETERIZATION N/A 08/08/2019    Procedure: Left ventriculography;  Surgeon: Maninder Quezada MD;  Location: [facility] CATH INVASIVE LOCATION;  Service: Cardiology   • CARDIAC CATHETERIZATION N/A 08/08/2019    Procedure: Coronary angiography;  Surgeon: Maninder Quezada MD;  Location: [facility] INVASIVE LOCATION;  Service: Cardiology   • CARDIAC CATHETERIZATION N/A 08/20/2019    Procedure: CORONARY ANGIOGRAPHY;  Surgeon: Maninder Quezada MD;  Location: [facility] INVASIVE LOCATION;  Service: Cardiovascular   • CARDIAC CATHETERIZATION N/A 08/20/2019    Procedure: Stent BMS coronary;  Surgeon: Maninder Quezada MD;  Location:   [facility] CATH INVASIVE LOCATION;  Service: Cardiovascular   • CARDIAC ELECTROPHYSIOLOGY PROCEDURE N/A 11/08/2019    Procedure: Loop insertion;  Surgeon: Maninder Quezada MD;  Location:  [facility] CATH INVASIVE LOCATION;  Service: Cardiovascular   • HERNIA REPAIR      x 2   • NE RT/LT HEART CATHETERS N/A 05/23/2017    Procedure: Percutaneous Coronary Intervention;  Surgeon: Maninder Quezada MD;  Location:  [facility] CATH INVASIVE LOCATION;  Service: Cardiovascular     Family History   Problem Relation Age of Onset   • Arthritis Son    • Cancer Sister    • Diabetes Son      Social History     Tobacco Use   • Smoking status: Never   • Smokeless tobacco: Never   Vaping Use   • Vaping Use: Never used   Substance Use Topics   • Alcohol use: No     Comment: quit 30 years ago    • Drug use: No     No current facility-administered medications on file prior to encounter.     Current Outpatient Medications on File Prior to Encounter   Medication Sig Dispense Refill   • acetaminophen (TYLENOL) 500 MG tablet Take 1 tablet by mouth 4 (Four) Times a Day.     • amLODIPine (NORVASC) 5 MG tablet TAKE 1 TABLET BY MOUTH DAILY 30 tablet 2   • aspirin 81 MG chewable tablet Chew 1 tablet Daily.     • atorvastatin (LIPITOR) 20 MG tablet TAKE 1 TABLET BY MOUTH EVERY NIGHT 30 tablet 2   • Cholecalciferol (VITAMIN D-3) 1000 UNITS capsule Take 1,000 Units by mouth daily.     • Cyanocobalamin (B-12) 1000 MCG lozenge DISSOLVE 1 LOZENGE BY MOUTH UNDER THE TONGUE EVERY OTHER DAY 30 lozenge 4   • Elastic Bandages & Supports (LUMBAR BACK BRACE/SUPPORT PAD) misc Use for lifting/ strenuous exercise. 1 each 0   • isosorbide mononitrate (IMDUR) 60 MG 24 hr tablet TAKE 1 TABLET BY MOUTH DAILY 90 tablet 1   • losartan (COZAAR) 50 MG tablet TAKE 1 TABLET BY MOUTH DAILY 90 tablet 1   • metoprolol succinate XL (TOPROL-XL) 100 MG 24 hr tablet TAKE 1 TABLET BY MOUTH DAILY 30 tablet 2     No Known Allergies    Objective    Objective     Vital Signs  Temp:  [97.9  °F (36.6 °C)-98.8 °F (37.1 °C)] 97.9 °F (36.6 °C)  Heart Rate:  [60-93] 60  Resp:  [16-20] 16  BP: (131-176)/(63-81) 131/63  SpO2:  [93 %-97 %] 95 %  on  Flow (L/min):  [2-3] 2;   Device (Oxygen Therapy): nasal cannula  Body mass index is 22.85 kg/m².    Physical Exam  Constitutional:       General: He is not in acute distress.     Appearance: He is normal weight. He is not ill-appearing.   HENT:      Nose: Nose normal. No congestion or rhinorrhea.      Mouth/Throat:      Mouth: Mucous membranes are moist.      Pharynx: No oropharyngeal exudate.   Eyes:      General:         Right eye: No discharge.         Left eye: No discharge.      Pupils: Pupils are equal, round, and reactive to light.   Cardiovascular:      Rate and Rhythm: Normal rate and regular rhythm.      Pulses: Normal pulses.      Heart sounds: No murmur heard.  Pulmonary:      Effort: Pulmonary effort is normal. No respiratory distress.      Breath sounds: Normal breath sounds. No wheezing.   Abdominal:      General: Abdomen is flat. There is no distension.      Palpations: Abdomen is soft.      Tenderness: There is no abdominal tenderness.   Musculoskeletal:         General: Normal range of motion.      Right lower leg: No edema.      Left lower leg: No edema.   Skin:     General: Skin is warm and dry.   Neurological:      General: No focal deficit present.      Mental Status: He is alert and oriented to person, place, and time.      Comments: Mild dysarthria and mild word finding difficulty, stuttering, which is reportedly baseline per patient   Psychiatric:         Mood and Affect: Mood normal.         Behavior: Behavior normal.         Results Review:    I have personally reviewed:  [x]  Laboratory  [x]  Old records  [x]  Radiology   [x]  EKG/Telemetry   []  Microbiology    [x]  Cardiology/Vascular   []  Pathology     Lab Results (last 24 hours)     Procedure Component Value Units Date/Time    CBC & Differential [[id number]]  (Abnormal) Collected:  01/21/23 0038    Specimen: Blood Updated: 01/21/23 0051    Narrative:      The following orders were created for panel order CBC & Differential.  Procedure                               Abnormality         Status                     ---------                               -----------         ------                     CBC Auto Differential[[id number]]        Abnormal            Final result                 Please view results for these tests on the individual orders.    Comprehensive Metabolic Panel [[id number]]  (Abnormal) Collected: 01/21/23 0038    Specimen: Blood Updated: 01/21/23 0138     Glucose 134 mg/dL      BUN 22 mg/dL      Creatinine 0.70 mg/dL      Sodium 141 mmol/L      Potassium 4.4 mmol/L      Chloride 108 mmol/L      CO2 24.4 mmol/L      Calcium 8.8 mg/dL      Total Protein 6.4 g/dL      Albumin 3.7 g/dL      ALT (SGPT) 11 U/L      AST (SGOT) 14 U/L      Alkaline Phosphatase 51 U/L      Total Bilirubin 1.6 mg/dL      Globulin 2.7 gm/dL      A/G Ratio 1.4 g/dL      BUN/Creatinine Ratio 31.4     Anion Gap 8.6 mmol/L      eGFR 85.4 mL/min/1.73     Narrative:      GFR Normal >60  Chronic Kidney Disease <60  Kidney Failure <15    The GFR formula is only valid for adults with stable renal function between ages 18 and 70.    Troponin [[id number]]  (Normal) Collected: 01/21/23 0038    Specimen: Blood Updated: 01/21/23 0124     Troponin T <0.010 ng/mL     Narrative:      Troponin T Reference Range:  <= 0.03 ng/mL-   Negative for AMI  >0.03 ng/mL-     Abnormal for myocardial necrosis.  Clinicians would have to utilize clinical acumen, EKG, Troponin and serial changes to determine if it is an Acute Myocardial Infarction or myocardial injury due to an underlying chronic condition.       Results may be falsely decreased if patient taking Biotin.      CBC Auto Differential [[id number]]  (Abnormal) Collected: 01/21/23 0038    Specimen: Blood Updated: 01/21/23 0051     WBC 6.31 10*3/mm3      RBC 3.95 10*6/mm3       Hemoglobin 13.2 g/dL      Hematocrit 40.4 %      .3 fL      MCH 33.4 pg      MCHC 32.7 g/dL      RDW 14.3 %      RDW-SD 53.0 fl      MPV 10.6 fL      Platelets 133 10*3/mm3      Neutrophil % 75.4 %      Lymphocyte % 16.0 %      Monocyte % 7.3 %      Eosinophil % 0.6 %      Basophil % 0.2 %      Immature Grans % 0.5 %      Neutrophils, Absolute 4.76 10*3/mm3      Lymphocytes, Absolute 1.01 10*3/mm3      Monocytes, Absolute 0.46 10*3/mm3      Eosinophils, Absolute 0.04 10*3/mm3      Basophils, Absolute 0.01 10*3/mm3      Immature Grans, Absolute 0.03 10*3/mm3      nRBC 0.3 /100 WBC     BNP [[id number]]  (Abnormal) Collected: 01/21/23 0038    Specimen: Blood Updated: 01/21/23 0510     proBNP 6,296.0 pg/mL     Narrative:      Among patients with dyspnea, NT-proBNP is highly sensitive for the detection of acute congestive heart failure. In addition NT-proBNP of <300 pg/ml effectively rules out acute congestive heart failure with 99% negative predictive value.    Results may be falsely decreased if patient taking Biotin.      Basic Metabolic Panel [[id number]]  (Abnormal) Collected: 01/21/23 0720    Specimen: Blood Updated: 01/21/23 0912     Glucose 100 mg/dL      BUN 19 mg/dL      Creatinine 0.82 mg/dL      Sodium 139 mmol/L      Potassium 4.4 mmol/L      Comment: Specimen hemolyzed.  Results may be affected.        Chloride 106 mmol/L      CO2 24.0 mmol/L      Calcium 8.4 mg/dL      BUN/Creatinine Ratio 23.2     Anion Gap 9.0 mmol/L      eGFR 81.4 mL/min/1.73     Narrative:      GFR Normal >60  Chronic Kidney Disease <60  Kidney Failure <15    The GFR formula is only valid for adults with stable renal function between ages 18 and 70.    CBC Auto Differential [[id number]]  (Abnormal) Collected: 01/21/23 0720    Specimen: Blood Updated: 01/21/23 0816     WBC 4.75 10*3/mm3      RBC 3.78 10*6/mm3      Hemoglobin 12.9 g/dL      Hematocrit 38.6 %      .1 fL      MCH 34.1 pg      MCHC 33.4 g/dL      RDW 14.3 %       RDW-SD 52.7 fl      MPV 11.2 fL      Platelets 118 10*3/mm3      Neutrophil % 56.9 %      Lymphocyte % 27.4 %      Monocyte % 12.8 %      Eosinophil % 2.1 %      Basophil % 0.4 %      Neutrophils, Absolute 2.70 10*3/mm3      Lymphocytes, Absolute 1.30 10*3/mm3      Monocytes, Absolute 0.61 10*3/mm3      Eosinophils, Absolute 0.10 10*3/mm3      Basophils, Absolute 0.02 10*3/mm3     Protime-INR [[id number]]  (Normal) Collected: 01/21/23 0720    Specimen: Blood Updated: 01/21/23 0753     Protime 13.7 Seconds      INR 1.04    Sedimentation Rate [[id number]]  (Normal) Collected: 01/21/23 0720    Specimen: Blood Updated: 01/21/23 0844     Sed Rate 10 mm/hr           Imaging Results (Last 24 Hours)     Procedure Component Value Units Date/Time    CT Angiogram Neck [[id number]] Collected: 01/21/23 0333     Updated: 01/21/23 0407    Narrative:      NONCONTRAST CRANIAL CT SCAN, CT ANGIOGRAM NECK, CT ANGIOGRAM HEAD        HISTORY: Headache.     COMPARISON: 01/21/2023.     TECHNIQUE:  Radiation dose reduction techniques were utilized, including  automated exposure control and exposure modulation based on body size.   Initially, a routine noncontrast cranial CT performed from the skull  base through the vertex region. After review, thin-section contrast  enhanced CT angiogram images obtained from the aortic arch through the  calvarial vertex utilizing angiographic technique. Multi projection 3-D  MIP reformatted images were supplemented and reviewed.        FINDINGS CRANIAL CT: Please see the earlier CT report for full  description of findings on the unenhanced CT. Postcontrast imaging does  not show any evidence of venous occlusion or abnormal enhancement.        CERVICAL CAROTID CT ANGIOGRAM:     FINDINGS: There is a common origin of the brachiocephalic artery and  left common carotid artery. Both common carotid arteries are widely  patent. There is calcified plaque noted at the carotid bifurcations  bilaterally, without  hemodynamically significant stenosis. The distal  cervical internal carotid arteries are widely patent. There is some  calcification of the aorta and the right vertebral artery, without  hemodynamically significant stenosis. Left vertebral artery is dominant.  No stenosis or dissection is seen on either side. Images through the  lung apices show some areas of groundglass attenuation within both  lungs. This may reflect some vascular congestion, but is incompletely  assessed on this exam. There are also bilateral effusions, partially  visualized. There is no prevertebral soft tissue swelling.        NASCET criteria utilized in stenosis measurements. Stenosis mild, 0-49%.        CRANIAL CTA ANGIOGRAM:     FINDINGS:  The intracranial internal carotid arteries are patent,  although there is significant calcification of the cavernous segments.  There is an anterior communicating artery. Anterior cerebral arteries  are normal. Middle cerebral arteries appear normal.  The intracranial  vertebral arteries are patent. The left is dominant. Basilar artery  appears normal. Both posterior cerebral arteries are patent. The  technologist has measured a possible outpouching supraclinoid right  internal carotid artery. However, there does appear to be a vessel  originating from this area, and I think it more likely reflects a  prominent infundibulum, rather than an actual aneurysm. This area  measures 1.6 x 3.2 x 4.1 mm.        Radiation dose reduction techniques were utilized, including automated  exposure control and exposure modulation based on body size.          Impression:      1. No acute intracranial findings.  2. No cervical vascular stenosis or occlusion.  3. No intracranial stenosis or occlusion.  4. Technologist has measured a possible outpouching from the  supraclinoid right internal carotid artery. I think the appearance is  more suggestive of a prominent infundibulum.        Radiation dose reduction techniques were  utilized, including automated  exposure control and exposure modulation based on body size.     This report was finalized on 1/21/2023 4:04 AM by Dr. Bree Xavier M.D.       CT Angiogram Head [[id number]] Collected: 01/21/23 0333     Updated: 01/21/23 0407    Narrative:      NONCONTRAST CRANIAL CT SCAN, CT ANGIOGRAM NECK, CT ANGIOGRAM HEAD        HISTORY: Headache.     COMPARISON: 01/21/2023.     TECHNIQUE:  Radiation dose reduction techniques were utilized, including  automated exposure control and exposure modulation based on body size.   Initially, a routine noncontrast cranial CT performed from the skull  base through the vertex region. After review, thin-section contrast  enhanced CT angiogram images obtained from the aortic arch through the  calvarial vertex utilizing angiographic technique. Multi projection 3-D  MIP reformatted images were supplemented and reviewed.        FINDINGS CRANIAL CT: Please see the earlier CT report for full  description of findings on the unenhanced CT. Postcontrast imaging does  not show any evidence of venous occlusion or abnormal enhancement.        CERVICAL CAROTID CT ANGIOGRAM:     FINDINGS: There is a common origin of the brachiocephalic artery and  left common carotid artery. Both common carotid arteries are widely  patent. There is calcified plaque noted at the carotid bifurcations  bilaterally, without hemodynamically significant stenosis. The distal  cervical internal carotid arteries are widely patent. There is some  calcification of the aorta and the right vertebral artery, without  hemodynamically significant stenosis. Left vertebral artery is dominant.  No stenosis or dissection is seen on either side. Images through the  lung apices show some areas of groundglass attenuation within both  lungs. This may reflect some vascular congestion, but is incompletely  assessed on this exam. There are also bilateral effusions, partially  visualized. There is no prevertebral  soft tissue swelling.        NASCET criteria utilized in stenosis measurements. Stenosis mild, 0-49%.        CRANIAL CTA ANGIOGRAM:     FINDINGS:  The intracranial internal carotid arteries are patent,  although there is significant calcification of the cavernous segments.  There is an anterior communicating artery. Anterior cerebral arteries  are normal. Middle cerebral arteries appear normal.  The intracranial  vertebral arteries are patent. The left is dominant. Basilar artery  appears normal. Both posterior cerebral arteries are patent. The  technologist has measured a possible outpouching supraclinoid right  internal carotid artery. However, there does appear to be a vessel  originating from this area, and I think it more likely reflects a  prominent infundibulum, rather than an actual aneurysm. This area  measures 1.6 x 3.2 x 4.1 mm.        Radiation dose reduction techniques were utilized, including automated  exposure control and exposure modulation based on body size.          Impression:      1. No acute intracranial findings.  2. No cervical vascular stenosis or occlusion.  3. No intracranial stenosis or occlusion.  4. Technologist has measured a possible outpouching from the  supraclinoid right internal carotid artery. I think the appearance is  more suggestive of a prominent infundibulum.        Radiation dose reduction techniques were utilized, including automated  exposure control and exposure modulation based on body size.     This report was finalized on 1/21/2023 4:04 AM by Dr. Bree Xavier M.D.       XR Chest 1 View [[id number]] Collected: 01/21/23 0115     Updated: 01/21/23 0120    Narrative:      SINGLE VIEW OF THE CHEST     HISTORY: Shortness of air     COMPARISON: 01/19/2022     FINDINGS:  Cardiomegaly is present. There appears to be vascular congestion. No  pneumothorax is seen. There is nonspecific bibasilar consolidation.  Patient does appear to have bilateral effusions, left greater  than  right. There are old right-sided rib fractures. Cardiac loop recorder is  noted. There is calcification of the aorta.       Impression:         1. Cardiomegaly with suspected vascular congestion.  2. Nonspecific bibasilar consolidation and effusions, left greater than  right.     This report was finalized on 1/21/2023 1:17 AM by Dr. Bree Xavier M.D.       CT Head Without Contrast [[id number]] Collected: 01/21/23 0107     Updated: 01/21/23 0118    Narrative:      CT HEAD WITHOUT CONTRAST     HISTORY: Headache     COMPARISON: 09/18/2020     TECHNIQUE: Axial CT imaging was obtained through the brain. No IV  contrast was administered.     FINDINGS:  No acute intracranial hemorrhage is seen. There is diffuse atrophy.  There is periventricular and deep white matter microangiopathic change.  There is no midline shift or mass effect. There is an old right  cerebellar infarct. There is an additional area of encephalomalacia  which is noted within the right frontal corona radiata. There may be  tiny old bilateral thalamic lacunar infarcts. Air-fluid levels are noted  within the maxillary sinuses. Correlation with any evidence of sinusitis  is recommended.       Impression:      No acute intracranial findings.     Radiation dose reduction techniques were utilized, including automated  exposure control and exposure modulation based on body size.     This report was finalized on 1/21/2023 1:15 AM by Dr. Bree Xavier M.D.             Results for orders placed in visit on 12/23/21    Adult Transthoracic Echo Complete W/ Cont if Necessary Per Protocol    Interpretation Summary  · Estimated right ventricular systolic pressure from tricuspid regurgitation is normal (<35 mmHg).  · Calculated left ventricular EF = 48% Estimated left ventricular EF was in agreement with the calculated left ventricular EF.  · Left ventricular diastolic function was normal.  · The right ventricular cavity is borderline dilated.  · The  right atrial cavity is mildly dilated.  · There is calcification of the aortic valve.  · There is no evidence of pericardial effusion. .      ECG 12 Lead Stroke Evaluation   Final Result   HEART RATE= 79  bpm   RR Interval= 759  ms   SC Interval= 252  ms   P Horizontal Axis=   deg   P Front Axis= -39  deg   QRSD Interval= 140  ms   QT Interval= 416  ms   QRS Axis= -29  deg   T Wave Axis= 165  deg   - ABNORMAL ECG -   Sinus rhythm   Prolonged SC interval   IVCD, consider atypical LBBB   PVCs resolved from prior   Electronically Signed By: Marita Regalado ([facility]) 21-Jan-2023 07:41:49   Date and Time of Study: 2023-01-21 00:28:19           Assessment/Plan     Active Hospital Problems    Diagnosis  POA   • **Headache [R51.9]  Unknown   • Sudden onset of severe headache [R51.9]  Yes   • Thrombocytopenia (HCC) [D69.6]  Yes   • Paroxysmal atrial fibrillation (HCC) [I48.0]  Yes   • Parkinson's disease (HCC) [G20]  Yes   • History of CVA (cerebrovascular accident) [Z86.73]  Not Applicable   • Macrocytic anemia [D53.9]  Yes   • Cardiomyopathy (HCC) [I42.9]  Yes   • Essential hypertension [I10]  Yes   • Chronic fatigue [R53.82]  Yes      Resolved Hospital Problems   No resolved problems to display.       Mr. Churchill is a 89 y.o. male with Parkinson's disease, HTN, history of CVA, paroxysmal A. fib, CAD who presented with sudden onset of headache     Thunderclap headache  -CTH/CTA H/N (1/21/2023): No acute intracranial findings, no cervical vascular stenosis or occlusion, no intracranial stenosis or occlusion  -Neuro consulted  -Plan for MRI W/WO brain  -LP to eval for RBCs in CSF    HTN  -Home meds: Amlodipine 5 mg, Imdur 60 mg, losartan 50 mg, metoprolol  mg  -Resume metoprolol and Imdur; will start losartan and amlodipine tomorrow    Paroxysmal A. 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    VTE Prophylaxis - SCDs.  Code Status - Full code.       Jefferson Villalobos MD  [facility]  01/21/23  11:30 EST

## 2023-01-21 NOTE — ED PROVIDER NOTES
MD ATTESTATION NOTE    The CHEYANNE and I have discussed this patient's history, physical exam, and treatment plan.  I have reviewed the documentation and personally had a face to face interaction with the patient. I affirm the documentation and agree with the treatment and plan.  The attached note describes my personal findings.      I provided a substantive portion of the care of the patient.  I personally performed the physical exam in its entirety, and below are my findings.  For this patient encounter, the patient wore surgical mask, I wore full protective PPE including N95 and eye protection.      Brief HPI: This patient is a 89-year-old male presents to the emergency room today with a sudden onset headache that began just a couple hours prior to ED arrival today.  The patient was at rest when the symptoms began.  He reports that the pain is actually quite a bit better now than it was initially.  He denies any use of anticoagulants.  He denies chest pain, shortness of breath, neck pain, or nausea and vomiting.      PHYSICAL EXAM  ED Triage Vitals [01/20/23 2351]   Temp Heart Rate Resp BP SpO2   98.8 °F (37.1 °C) 77 16 172/70 96 %      Temp src Heart Rate Source Patient Position BP Location FiO2 (%)   Tympanic Monitor -- -- --         GENERAL: In mild distress, nontoxic in appearance  HENT: nares patent  EYES: no scleral icterus  CV: regular rhythm, normal rate, no M/R/G  RESPIRATORY: normal effort, lungs clear bilaterally  ABDOMEN: soft, nontender, no rebound or guarding  MUSCULOSKELETAL: no deformity, no edema, no nuchal rigidity  NEURO: alert, moves all extremities, follows commands  PSYCH:  calm, cooperative  SKIN: warm, dry    Vital signs and nursing notes reviewed.        Plan: We will treat the patient's discomfort as we obtain labs as well as a CT scan of the head with CTA head and neck.  We will monitor and reassess following.       Josef Napier MD  01/21/23 3561

## 2023-01-21 NOTE — ED NOTES
Pt desaturated to 81% after administration of morphine. Pt was woken up from his sleep but his O2 sat did not improve. Pt placed on 3L nasal cannula. RAFA Boss informed.

## 2023-01-21 NOTE — PLAN OF CARE
Problem: Adult Inpatient Plan of Care  Goal: Plan of Care Review  Outcome: Ongoing, Progressing  Flowsheets (Taken 1/21/2023 0601)  Plan of Care Reviewed With: patient  Goal: Patient-Specific Goal (Individualized)  Outcome: Ongoing, Progressing  Goal: Absence of Hospital-Acquired Illness or Injury  Outcome: Ongoing, Progressing  Goal: Optimal Comfort and Wellbeing  Outcome: Ongoing, Progressing  Goal: Readiness for Transition of Care  Outcome: Ongoing, Progressing     Problem: Fall Injury Risk  Goal: Absence of Fall and Fall-Related Injury  Outcome: Ongoing, Progressing   Goal Outcome Evaluation:  Plan of Care Reviewed With: patient

## 2023-01-21 NOTE — ED NOTES
Nursing report ED to floor  Dinesh Churchill Sr.  89 y.o.  male    HPI :   Chief Complaint   Patient presents with    Headache       Admitting doctor:   Karthik Dunham MD    Admitting diagnosis:   The primary encounter diagnosis was Sudden onset of severe headache. Diagnoses of Pulmonary vascular congestion, Pleural effusion, and Hypoxemia were also pertinent to this visit.    Code status:   Current Code Status       Date Active Code Status Order ID Comments User Context       1/21/2023 0417 CPR (Attempt to Resuscitate) [id number]  Mary Osborn, APRN ED        Question Answer    Code Status (Patient has no pulse and is not breathing) CPR (Attempt to Resuscitate)    Medical Interventions (Patient has pulse or is breathing) Full Support                    Allergies:   Patient has no known allergies.    Isolation:   No active isolations    Intake and Output    Intake/Output Summary (Last 24 hours) at 1/21/2023 0451  Last data filed at 1/21/2023 0450  Gross per 24 hour   Intake --   Output 150 ml   Net -150 ml       Weight:       01/20/23  2351   Weight: 80.7 kg (178 lb)       Most recent vitals:   Vitals:    01/21/23 0321 01/21/23 0330 01/21/23 0421 01/21/23 0450   BP: 148/78  150/71 150/70   Pulse: 71 82 73 70   Resp:    20   Temp:    98 °F (36.7 °C)   TempSrc:       SpO2: 97% 96% 96% 96%   Weight:       Height:           Active LDAs/IV Access:   Lines, Drains & Airways       Active LDAs       Name Placement date Placement time Site Days    Peripheral IV 01/21/23 0037 Left Antecubital 01/21/23 0037  Antecubital  less than 1                    Labs (abnormal labs have a star):   Labs Reviewed   COMPREHENSIVE METABOLIC PANEL - Abnormal; Notable for the following components:       Result Value    Glucose 134 (*)     Creatinine 0.70 (*)     Chloride 108 (*)     Total Bilirubin 1.6 (*)     BUN/Creatinine Ratio 31.4 (*)     All other components within normal limits    Narrative:     GFR Normal >60  Chronic  Kidney Disease <60  Kidney Failure <15    The GFR formula is only valid for adults with stable renal function between ages 18 and 70.   CBC WITH AUTO DIFFERENTIAL - Abnormal; Notable for the following components:    RBC 3.95 (*)     .3 (*)     MCH 33.4 (*)     Platelets 133 (*)     Lymphocyte % 16.0 (*)     nRBC 0.3 (*)     All other components within normal limits   TROPONIN (IN-HOUSE) - Normal    Narrative:     Troponin T Reference Range:  <= 0.03 ng/mL-   Negative for AMI  >0.03 ng/mL-     Abnormal for myocardial necrosis.  Clinicians would have to utilize clinical acumen, EKG, Troponin and serial changes to determine if it is an Acute Myocardial Infarction or myocardial injury due to an underlying chronic condition.       Results may be falsely decreased if patient taking Biotin.     BNP (IN-HOUSE)   BASIC METABOLIC PANEL   CBC WITH AUTO DIFFERENTIAL   PROTIME-INR   CBC AND DIFFERENTIAL    Narrative:     The following orders were created for panel order CBC & Differential.  Procedure                               Abnormality         Status                     ---------                               -----------         ------                     CBC Auto Differential[[id number]]        Abnormal            Final result                 Please view results for these tests on the individual orders.       EKG:   ECG 12 Lead Stroke Evaluation   Preliminary Result   HEART RATE= 79  bpm   RR Interval= 759  ms   KY Interval= 252  ms   P Horizontal Axis=   deg   P Front Axis= -39  deg   QRSD Interval= 140  ms   QT Interval= 416  ms   QRS Axis= -29  deg   T Wave Axis= 165  deg   - ABNORMAL ECG -   Sinus rhythm   Prolonged KY interval   IVCD, consider atypical LBBB   Electronically Signed By:    Date and Time of Study: 2023-01-21 00:28:19          Meds given in ED:   Medications   sodium chloride 0.9 % flush 10 mL (has no administration in time range)   sodium chloride 0.9 % flush 10 mL (has no administration in time  range)   sodium chloride 0.9 % infusion 40 mL (has no administration in time range)   nitroglycerin (NITROSTAT) SL tablet 0.4 mg (has no administration in time range)   acetaminophen (TYLENOL) tablet 650 mg (has no administration in time range)     Or   acetaminophen (TYLENOL) 160 MG/5ML solution 650 mg (has no administration in time range)     Or   acetaminophen (TYLENOL) suppository 650 mg (has no administration in time range)   ondansetron (ZOFRAN) injection 4 mg (has no administration in time range)   morphine injection 2 mg (2 mg Intravenous Given 1/21/23 0129)   ondansetron (ZOFRAN) injection 4 mg (4 mg Intravenous Given 1/21/23 0129)   iopamidol (ISOVUE-370) 76 % injection 100 mL (95 mL Intravenous Given 1/21/23 0210)   furosemide (LASIX) injection 80 mg (80 mg Intravenous Given 1/21/23 0441)       Imaging results:  CT Head Without Contrast    Result Date: 1/21/2023  No acute intracranial findings.  Radiation dose reduction techniques were utilized, including automated exposure control and exposure modulation based on body size.  This report was finalized on 1/21/2023 1:15 AM by Dr. Bree Xavier M.D.      CT Angiogram Neck    Result Date: 1/21/2023  1. No acute intracranial findings. 2. No cervical vascular stenosis or occlusion. 3. No intracranial stenosis or occlusion. 4. Technologist has measured a possible outpouching from the supraclinoid right internal carotid artery. I think the appearance is more suggestive of a prominent infundibulum.   Radiation dose reduction techniques were utilized, including automated exposure control and exposure modulation based on body size.  This report was finalized on 1/21/2023 4:04 AM by Dr. Bree Xavier M.D.      XR Chest 1 View    Result Date: 1/21/2023   1. Cardiomegaly with suspected vascular congestion. 2. Nonspecific bibasilar consolidation and effusions, left greater than right.  This report was finalized on 1/21/2023 1:17 AM by Dr. Bree Xavier M.D.       CT Angiogram Head    Result Date: 1/21/2023  1. No acute intracranial findings. 2. No cervical vascular stenosis or occlusion. 3. No intracranial stenosis or occlusion. 4. Technologist has measured a possible outpouching from the supraclinoid right internal carotid artery. I think the appearance is more suggestive of a prominent infundibulum.   Radiation dose reduction techniques were utilized, including automated exposure control and exposure modulation based on body size.  This report was finalized on 1/21/2023 4:04 AM by Dr. Bree Xavier M.D.       Ambulatory status:   -Assist x1    Social issues:   Social History     Socioeconomic History    Marital status:    Tobacco Use    Smoking status: Never    Smokeless tobacco: Never   Vaping Use    Vaping Use: Never used   Substance and Sexual Activity    Alcohol use: No     Comment: quit 30 years ago     Drug use: No    Sexual activity: Defer       NIH Stroke Scale:  Interval: baseline      Mago Cleaning RN  01/21/23 04:51 EST

## 2023-01-22 ENCOUNTER — APPOINTMENT (OUTPATIENT)
Dept: GENERAL RADIOLOGY | Facility: HOSPITAL | Age: 88
End: 2023-01-22
Payer: MEDICARE

## 2023-01-22 ENCOUNTER — APPOINTMENT (OUTPATIENT)
Dept: MRI IMAGING | Facility: HOSPITAL | Age: 88
End: 2023-01-22
Payer: MEDICARE

## 2023-01-22 LAB
APPEARANCE CSF: CLEAR
APPEARANCE CSF: CLEAR
COLOR CSF: COLORLESS
COLOR CSF: COLORLESS
GLUCOSE CSF-MCNC: 66 MG/DL (ref 40–70)
METHOD: ABNORMAL
METHOD: ABNORMAL
NUC CELL # CSF MANUAL: 1 /MM3 (ref 0–5)
NUC CELL # CSF MANUAL: 1 /MM3 (ref 0–5)
PROT CSF-MCNC: 48.9 MG/DL (ref 15–45)
RBC # CSF MANUAL: 48 /MM3 (ref 0–0)
RBC # CSF MANUAL: 49 /MM3 (ref 0–0)
SPECIMEN VOL CSF: 5 ML
SPECIMEN VOL CSF: 6 ML
TUBE # CSF: 1
TUBE # CSF: 4

## 2023-01-22 PROCEDURE — A9577 INJ MULTIHANCE: HCPCS | Performed by: STUDENT IN AN ORGANIZED HEALTH CARE EDUCATION/TRAINING PROGRAM

## 2023-01-22 PROCEDURE — 99214 OFFICE O/P EST MOD 30 MIN: CPT | Performed by: PHYSICIAN ASSISTANT

## 2023-01-22 PROCEDURE — 87205 SMEAR GRAM STAIN: CPT | Performed by: PSYCHIATRY & NEUROLOGY

## 2023-01-22 PROCEDURE — 70553 MRI BRAIN STEM W/O & W/DYE: CPT

## 2023-01-22 PROCEDURE — 0 GADOBENATE DIMEGLUMINE 529 MG/ML SOLUTION: Performed by: STUDENT IN AN ORGANIZED HEALTH CARE EDUCATION/TRAINING PROGRAM

## 2023-01-22 PROCEDURE — 82945 GLUCOSE OTHER FLUID: CPT | Performed by: PSYCHIATRY & NEUROLOGY

## 2023-01-22 PROCEDURE — 87070 CULTURE OTHR SPECIMN AEROBIC: CPT | Performed by: PSYCHIATRY & NEUROLOGY

## 2023-01-22 PROCEDURE — G0378 HOSPITAL OBSERVATION PER HR: HCPCS

## 2023-01-22 PROCEDURE — 87015 SPECIMEN INFECT AGNT CONCNTJ: CPT | Performed by: PSYCHIATRY & NEUROLOGY

## 2023-01-22 PROCEDURE — 84157 ASSAY OF PROTEIN OTHER: CPT | Performed by: PSYCHIATRY & NEUROLOGY

## 2023-01-22 PROCEDURE — 89050 BODY FLUID CELL COUNT: CPT | Performed by: PSYCHIATRY & NEUROLOGY

## 2023-01-22 RX ADMIN — LOSARTAN POTASSIUM 50 MG: 50 TABLET, FILM COATED ORAL at 08:44

## 2023-01-22 RX ADMIN — AMLODIPINE BESYLATE 5 MG: 5 TABLET ORAL at 08:44

## 2023-01-22 RX ADMIN — Medication 10 ML: at 20:38

## 2023-01-22 RX ADMIN — METOPROLOL SUCCINATE 100 MG: 100 TABLET, EXTENDED RELEASE ORAL at 08:44

## 2023-01-22 RX ADMIN — GADOBENATE DIMEGLUMINE 16 ML: 529 INJECTION, SOLUTION INTRAVENOUS at 11:22

## 2023-01-22 RX ADMIN — ATORVASTATIN CALCIUM 20 MG: 20 TABLET, FILM COATED ORAL at 20:38

## 2023-01-22 RX ADMIN — Medication 10 ML: at 08:44

## 2023-01-22 RX ADMIN — ISOSORBIDE MONONITRATE 60 MG: 60 TABLET, EXTENDED RELEASE ORAL at 08:44

## 2023-01-22 NOTE — PLAN OF CARE
Problem: Adult Inpatient Plan of Care  Goal: Plan of Care Review  Outcome: Ongoing, Progressing  Flowsheets (Taken 1/22/2023 0021)  Plan of Care Reviewed With: patient  Goal: Patient-Specific Goal (Individualized)  Outcome: Ongoing, Progressing  Goal: Absence of Hospital-Acquired Illness or Injury  Outcome: Ongoing, Progressing  Intervention: Identify and Manage Fall Risk  Recent Flowsheet Documentation  Taken 1/22/2023 0000 by Phyllis Kebede RN  Safety Promotion/Fall Prevention: safety round/check completed  Taken 1/21/2023 2200 by Phyllis Kebede RN  Safety Promotion/Fall Prevention: safety round/check completed  Taken 1/21/2023 2000 by Phyllis Kebede RN  Safety Promotion/Fall Prevention: safety round/check completed  Intervention: Prevent Skin Injury  Recent Flowsheet Documentation  Taken 1/22/2023 0000 by Phyllis Kebede RN  Body Position: position changed independently  Taken 1/21/2023 2200 by Phyllis Kebede RN  Body Position: position changed independently  Taken 1/21/2023 2000 by Phyllis Kebede RN  Body Position: position changed independently  Intervention: Prevent and Manage VTE (Venous Thromboembolism) Risk  Recent Flowsheet Documentation  Taken 1/21/2023 1920 by Phyllis Kebede RN  Range of Motion: active ROM (range of motion) encouraged  Intervention: Prevent Infection  Recent Flowsheet Documentation  Taken 1/21/2023 1920 by Phyllis Kebede RN  Infection Prevention: single patient room provided  Goal: Optimal Comfort and Wellbeing  Outcome: Ongoing, Progressing  Intervention: Provide Person-Centered Care  Recent Flowsheet Documentation  Taken 1/21/2023 1920 by Phyllis Kebede RN  Trust Relationship/Rapport:   care explained   choices provided  Goal: Readiness for Transition of Care  Outcome: Ongoing, Progressing     Problem: Fall Injury Risk  Goal: Absence of Fall and Fall-Related Injury  Outcome: Ongoing, Progressing  Intervention: Identify and Manage Contributors  Recent Flowsheet  Documentation  Taken 1/22/2023 0000 by Phyllis Kebede RN  Medication Review/Management: medications reviewed  Taken 1/21/2023 2200 by Phyllis Kebede RN  Medication Review/Management: medications reviewed  Taken 1/21/2023 2000 by Phyllis Kebede RN  Medication Review/Management: medications reviewed  Intervention: Promote Injury-Free Environment  Recent Flowsheet Documentation  Taken 1/22/2023 0000 by Phyllis Kebede RN  Safety Promotion/Fall Prevention: safety round/check completed  Taken 1/21/2023 2200 by Phyllis Kebede RN  Safety Promotion/Fall Prevention: safety round/check completed  Taken 1/21/2023 2000 by Phyllis Kebede RN  Safety Promotion/Fall Prevention: safety round/check completed     Problem: Heart Failure Comorbidity  Goal: Maintenance of Heart Failure Symptom Control  Outcome: Ongoing, Progressing  Intervention: Maintain Heart Failure-Management  Recent Flowsheet Documentation  Taken 1/22/2023 0000 by Phyllis Kebede RN  Medication Review/Management: medications reviewed  Taken 1/21/2023 2200 by Phyllis Kebede RN  Medication Review/Management: medications reviewed  Taken 1/21/2023 2000 by Phyllis Kebede RN  Medication Review/Management: medications reviewed     Problem: Hypertension Comorbidity  Goal: Blood Pressure in Desired Range  Outcome: Ongoing, Progressing  Intervention: Maintain Blood Pressure Management  Recent Flowsheet Documentation  Taken 1/22/2023 0000 by Phyllis Kebede RN  Medication Review/Management: medications reviewed  Taken 1/21/2023 2200 by Phyllis Kebede RN  Medication Review/Management: medications reviewed  Taken 1/21/2023 2000 by Phyllis Kebede RN  Medication Review/Management: medications reviewed   Goal Outcome Evaluation:  Plan of Care Reviewed With: patient

## 2023-01-22 NOTE — PROGRESS NOTES
"DOS: 2023  NAME: Dinesh Churchill Sr.   : 1928  PCP: Phyllis Loving PA  Chief Complaint   Patient presents with   • Headache       Chief complaint: headache  Subjective: Pt just returned from IR.  LP and MRI done.  He denies headache now.  No overnight events.  Son at bedside asking about discharge timing    Objective:  Vital signs: /64 (BP Location: Right arm, Patient Position: Lying)   Pulse 73   Temp 98.1 °F (36.7 °C) (Oral)   Resp 18   Ht 188 cm (74\")   Wt 80.7 kg (178 lb)   SpO2 94%   BMI 22.85 kg/m²      Gen: NAD, vitals reviewed  MS: oriented x3, recent/remote memory intact, normal attention/concentration, language intact, no neglect.  CN: visual acuity grossly normal, PERRL, EOMI, no facial droop, significant hearing loss to spoken voice, no dysarthria  Motor: moves extremities equally throughout, normal tone  Sensory: intact to light touch all 4 ext.    ROS:  No weakness, numbness  No fevers, chills      Laboratory results:  Lab Results   Component Value Date    GLUCOSE 100 (H) 2023    CALCIUM 8.4 2023     2023    K 4.4 2023    CO2 24.0 2023     2023    BUN 19 2023    CREATININE 0.82 2023    EGFRIFAFRI 89 2022    EGFRIFNONA 77 2022    BCR 23.2 2023    ANIONGAP 9.0 2023     Lab Results   Component Value Date    WBC 4.75 2023    HGB 12.9 (L) 2023    HCT 38.6 2023    .1 (H) 2023     (L) 2023     Lab Results   Component Value Date    LDL 69 2022    LDL 83 2022    LDL 70 03/15/2022            Review of labs: Glucose 100, creatinine 1.82, sodium 139, potassium 4.4 WBCs 4000, hemoglobin 12, platelets 118, BNP >6000    Review and interpretation of imaging: CTA head and neck no acute intracranial findings, there is no vascular stenosis or occlusion there is a possible outpouching of the supraclinoid right ICA possible infundibulum    MRI brain with no " acute finding, no hemorrhage, specifically no sulcal hyperintensity, diffuse atrophy, small vessel disease, old lacunes of R frontal and cerebellum    Workup to date:      Diagnoses:  1. Headache    Impression: 89-year-old male with past medical history of hypertension, cardiomyopathy, anemia, Parkinson's disease, prior history of CVA, PAF, thrombocytopenia who presented to the ER on 1/21 with sudden onset headache concerning for thunderclap headache.  CTA was negative for significant vascular abnormality but there was infundibulum of the supraclinoid right ICA mention.  MRI brain done.  LP done and will evaluate for xanthrochromia or other infectious etiology to explain secondary headache    He had MRI which was reviewed and unremarkable-there are old strokes seen and needs asa, statin, reduction of vascular risk factors as mgmt.  CSF not consistent with hemorrhage or infection.  His headache is resolved.  Blood pressure some elevated on admission and hypertensive etiology thought.  Nonetheless I feel representative of benign etiology given negative work-up.    No further neurology work-up needed    I spent at least 30 minutes interviewing, examining, and counseling patient.  I independently reviewed documentation, laboratory and diagnostic findings, external documentation where applicable, and formulated treatment plan which was discussed with the patient.      Thank you for this consultation.  Discussed above plan with neuro attending, Dr. Weiner who agrees with above plan.  Neurology team is available for concerns or questions.

## 2023-01-22 NOTE — PROGRESS NOTES
Name: Dinesh Churchill Sr. ADMIT: 2023   : 1928  PCP: Phyllis Loving PA    MRN: [id number] LOS: 0 days   AGE/SEX: 89 y.o. male  ROOM: [facility]     Subjective   Subjective   No events overnight.  LP pending today.    Objective   Objective   Vital Signs  Temp:  [97.9 °F (36.6 °C)-98.2 °F (36.8 °C)] 98.1 °F (36.7 °C)  Heart Rate:  [60-77] 73  Resp:  [16-18] 18  BP: (111-149)/(52-76) 130/64  SpO2:  [93 %-98 %] 94 %  on  Flow (L/min):  [2] 2;   Device (Oxygen Therapy): room air  Body mass index is 22.85 kg/m².  Physical Exam  Constitutional:       General: He is not in acute distress.     Appearance: Normal appearance.   Cardiovascular:      Rate and Rhythm: Normal rate and regular rhythm.      Pulses: Normal pulses.      Heart sounds: No murmur heard.  Pulmonary:      Effort: Pulmonary effort is normal. No respiratory distress.      Breath sounds: Normal breath sounds.   Abdominal:      General: Abdomen is flat. There is no distension.      Palpations: Abdomen is soft.      Tenderness: There is no abdominal tenderness.   Musculoskeletal:      Right lower leg: No edema.      Left lower leg: No edema.   Neurological:      General: No focal deficit present.      Mental Status: He is alert.      Comments: Mild dysarthria and mild word finding difficulty, stuttering, which is reportedly baseline per patient    Psychiatric:         Mood and Affect: Mood normal.         Behavior: Behavior normal.         Results Review     I reviewed the patient's new clinical results.  Results from last 7 days   Lab Units 23  0720 238   WBC 10*3/mm3 4.75 6.31   HEMOGLOBIN g/dL 12.9* 13.2   PLATELETS 10*3/mm3 118* 133*     Results from last 7 days   Lab Units 23  0720 238   SODIUM mmol/L 139 141   POTASSIUM mmol/L 4.4 4.4   CHLORIDE mmol/L 106 108*   CO2 mmol/L 24.0 24.4   BUN mg/dL 19 22   CREATININE mg/dL 0.82 0.70*   GLUCOSE mg/dL 100* 134*   EGFR mL/min/1.73 81.4 85.4     Results from last 7  days   Lab Units 01/21/23  0038   ALBUMIN g/dL 3.7   BILIRUBIN mg/dL 1.6*   ALK PHOS U/L 51   AST (SGOT) U/L 14   ALT (SGPT) U/L 11     Results from last 7 days   Lab Units 01/21/23  0720 01/21/23  0038   CALCIUM mg/dL 8.4 8.8   ALBUMIN g/dL  --  3.7       No results found for: HGBA1C, POCGLU    CT Head Without Contrast    Result Date: 1/21/2023  No acute intracranial findings.  Radiation dose reduction techniques were utilized, including automated exposure control and exposure modulation based on body size.  This report was finalized on 1/21/2023 1:15 AM by Dr. Bree Xavier M.D.      CT Angiogram Neck    Result Date: 1/21/2023  1. No acute intracranial findings. 2. No cervical vascular stenosis or occlusion. 3. No intracranial stenosis or occlusion. 4. Technologist has measured a possible outpouching from the supraclinoid right internal carotid artery. I think the appearance is more suggestive of a prominent infundibulum.   Radiation dose reduction techniques were utilized, including automated exposure control and exposure modulation based on body size.  This report was finalized on 1/21/2023 4:04 AM by Dr. Bree Xavier M.D.      XR Chest 1 View    Result Date: 1/21/2023   1. Cardiomegaly with suspected vascular congestion. 2. Nonspecific bibasilar consolidation and effusions, left greater than right.  This report was finalized on 1/21/2023 1:17 AM by Dr. Bree Xavier M.D.      CT Angiogram Head    Result Date: 1/21/2023  1. No acute intracranial findings. 2. No cervical vascular stenosis or occlusion. 3. No intracranial stenosis or occlusion. 4. Technologist has measured a possible outpouching from the supraclinoid right internal carotid artery. I think the appearance is more suggestive of a prominent infundibulum.   Radiation dose reduction techniques were utilized, including automated exposure control and exposure modulation based on body size.  This report was finalized on 1/21/2023 4:04 AM by  Dr. Bree Xavier M.D.      Scheduled Medications  amLODIPine, 5 mg, Oral, Q24H  atorvastatin, 20 mg, Oral, Nightly  isosorbide mononitrate, 60 mg, Oral, Daily  losartan, 50 mg, Oral, Daily  metoprolol succinate XL, 100 mg, Oral, Daily  sodium chloride, 10 mL, Intravenous, Q12H    Infusions  hold, 1 each  hold, 1 each    Diet  Diet: Cardiac Diets; Healthy Heart (2-3 Na+); Texture: Regular Texture (IDDSI 7); Fluid Consistency: Thin (IDDSI 0)       Assessment/Plan     Active Hospital Problems    Diagnosis  POA   • **Headache [R51.9]  Unknown   • Sudden onset of severe headache [R51.9]  Yes   • Thrombocytopenia (HCC) [D69.6]  Yes   • Paroxysmal atrial fibrillation (HCC) [I48.0]  Yes   • Parkinson's disease (HCC) [G20]  Yes   • History of CVA (cerebrovascular accident) [Z86.73]  Not Applicable   • Macrocytic anemia [D53.9]  Yes   • Cardiomyopathy (HCC) [I42.9]  Yes   • Essential hypertension [I10]  Yes   • Chronic fatigue [R53.82]  Yes      Resolved Hospital Problems   No resolved problems to display.       89 y.o. male admitted with Headache.    01/22/23  LP pending today. PT consult    Thunderclap headache, resolved  -CTH/CTA H/N (1/21/2023): No acute intracranial findings, no cervical vascular stenosis or occlusion, no intracranial stenosis or occlusion  -Neuro consulted  -MRI W/WO brain (1/21/23): prominent diffuse atrophy and chronic small vessel ischemic change; no acute infarct  -LP to eval for RBCs in CSF    Debility  Weakness  -PT eval.; may need SNF    HTN  -Home meds: Amlodipine 5 mg, Imdur 60 mg, losartan 50 mg, metoprolol  mg  -losartan and amlodipine held, resume when appropriate     Paroxysmal A. 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  · Full code.  · Discussed with patient.  · Anticipate discharge home with HH vs SNU facility timing yet to be determined.      Jefferson Villalobos MD  [facility]  01/22/23  12:47 EST

## 2023-01-22 NOTE — NURSING NOTE
Pt arrived to Radiology triage [place] [facility] for LP.   Pt wearing a mask as well as this RN for any bedside care.

## 2023-01-23 ENCOUNTER — READMISSION MANAGEMENT (OUTPATIENT)
Dept: CALL CENTER | Facility: HOSPITAL | Age: 88
End: 2023-01-23
Payer: MEDICARE

## 2023-01-23 VITALS
SYSTOLIC BLOOD PRESSURE: 135 MMHG | OXYGEN SATURATION: 95 % | HEART RATE: 68 BPM | DIASTOLIC BLOOD PRESSURE: 54 MMHG | HEIGHT: 74 IN | RESPIRATION RATE: 17 BRPM | BODY MASS INDEX: 22.84 KG/M2 | TEMPERATURE: 97.8 F | WEIGHT: 178 LBS

## 2023-01-23 PROCEDURE — 97165 OT EVAL LOW COMPLEX 30 MIN: CPT

## 2023-01-23 PROCEDURE — G0378 HOSPITAL OBSERVATION PER HR: HCPCS

## 2023-01-23 PROCEDURE — 97530 THERAPEUTIC ACTIVITIES: CPT

## 2023-01-23 PROCEDURE — 97162 PT EVAL MOD COMPLEX 30 MIN: CPT

## 2023-01-23 PROCEDURE — 97535 SELF CARE MNGMENT TRAINING: CPT

## 2023-01-23 RX ADMIN — AMLODIPINE BESYLATE 5 MG: 5 TABLET ORAL at 09:16

## 2023-01-23 RX ADMIN — ISOSORBIDE MONONITRATE 60 MG: 60 TABLET, EXTENDED RELEASE ORAL at 09:16

## 2023-01-23 RX ADMIN — METOPROLOL SUCCINATE 100 MG: 100 TABLET, EXTENDED RELEASE ORAL at 09:16

## 2023-01-23 RX ADMIN — LOSARTAN POTASSIUM 50 MG: 50 TABLET, FILM COATED ORAL at 09:16

## 2023-01-23 RX ADMIN — Medication 10 ML: at 09:33

## 2023-01-23 NOTE — PLAN OF CARE
Goal Outcome Evaluation:  Plan of Care Reviewed With: patient, son           Outcome Evaluation: Pt admitted for headache, imaging negative for infarct. PLOF home with son. Pt's son present for PT eval today. Pt states he uses RW to ambulate at baseline. Per son, pt has past hx of falls, but none recently. minAx1 STS from edge of chair. Amb 20' minAx1 with cues for AD use and gait pattern. Pt demos shuffling/festinating gait likely associated with hx of Parkinson's. PT required to help navigate RW when turning. Pt's son states his walking is typical with baseline presentation. Noted plan to d/c home with HH and assist from son. Discussed recommendations with pt's son who feels he can assist with pt's care when at home, but expresses concern leaving pt alone to run errands. PT recommending home with HH vs SNF. Pt will benefit from skilled PT to address functional mobility deficits and improve towards therapy goals.

## 2023-01-23 NOTE — THERAPY EVALUATION
Patient Name: Dinesh Churchill Sr.  : 1928    MRN: [id number]                              Today's Date: 2023       Admit Date: 2023    Visit Dx:     ICD-10-CM ICD-9-CM   1. Sudden onset of severe headache  R51.9 784.0   2. Pulmonary vascular congestion  R09.89 514   3. Pleural effusion  J90 511.9   4. Hypoxemia  R09.02 799.02     Patient Active Problem List   Diagnosis   • Abnormal magnetic resonance imaging study   • Arthritis   • Osteoarthritis of cervical spine   • Diplopia   • Hearing loss   • Neoplasm of uncertain behavior of skin   • Prediabetes   • Vitamin D deficiency   • Chronic fatigue   • History of supraventricular tachycardia   • Essential hypertension   • B12 deficiency   • Abnormal cardiac function test   • Cardiomyopathy (HCC)   • Coronary artery disease involving native coronary artery of native heart without angina pectoris   • History of coronary artery stent placement   • History of renal calculi   • Dyslipidemia   • Macrocytic anemia   • Precordial pain   • Parkinson's disease (HCC)   • History of CVA (cerebrovascular accident)   • Paroxysmal atrial fibrillation (HCC)   • Otalgia, left   • Chest pain   • Abnormal stress test   • Spontaneous pneumothorax   • Chest pain   • Acute systolic (congestive) heart failure (HCC)   • Thrombocytopenia ([facility])   • Pneumonia of left lower lobe due to infectious organism   • Syncope and collapse   • Status post placement of implantable loop recorder   • Long term (current) use of antithrombotics/antiplatelets   • Stuttering   • Fall   • Closed fracture of multiple ribs of right side   • Chest wall pain   • Closed fracture of phalanx of toe of left foot   • Headache   • Sudden onset of severe headache     Past Medical History:   Diagnosis Date   • Abnormal ECG    • Abnormal MRI    • Acute frontal sinusitis    • Arthritis    • Chronic fatigue    • Coronary artery disease    • Dizziness    • Head injury    • Headache 2023   • Hearing aid worn      left   • Hearing loss    • Hyperlipidemia    • Hypertension    • Kidney stones    • Macrocytosis    • Neoplasm of skin    • Osteoporosis    • Prediabetes    • Vitamin D deficiency      Past Surgical History:   Procedure Laterality Date   • CARDIAC CATHETERIZATION N/A 05/22/2017    Procedure: Left Heart Cath;  Surgeon: Maninder Quezada MD;  Location:  [facility] CATH INVASIVE LOCATION;  Service:    • CARDIAC CATHETERIZATION N/A 05/23/2017    Procedure: Stent BMS coronary;  Surgeon: Maninder Quezada MD;  Location: [facility] C[facility];  Service:    • CARDIAC CATHETERIZATION N/A 08/08/2019    Procedure: Left Heart Cath;  Surgeon: Maninder Quezada MD;  Location:  [facility] CATH INVASIVE LOCATION;  Service: Cardiology   • CARDIAC CATHETERIZATION N/A 08/08/2019    Procedure: Left ventriculography;  Surgeon: Maninder Quezada MD;  Location: [facility];  Service: Cardiology   • CARDIAC CATHETERIZATION N/A 08/08/2019    Procedure: Coronary angiography;  Surgeon: Maninder Quezada MD;  Location: [facility] CATH INVASIVE LOC[facility];  Service: Cardiology   • CARDIAC CATHETERIZATION N/A 08/20/2019    Procedure: CORONARY ANGIOGRAPHY;  Surgeon: Maninder Quezada MD;  Location: [facility] C[facility] LOC[facility];  Service: Cardiovascular   • CARDIAC CATHETERIZATION N/A 08/20/2019    Procedure: Stent BMS coronary;  Surgeon: Maninder Quezada MD;  Location: [facility] CATH INVASIVE LOC[facility];  Service: Cardiovascular   • CARDIAC ELECTROPHYSIOLOGY PROCEDURE N/A 11/08/2019    Procedure: Loop insertion;  Surgeon: Maninder Quezada MD;  Location: [facility] CATH INVASIVE LOCATION;  Service: Cardiovascular   • HERNIA REPAIR      x 2   • MI RT/LT HEART CATHETERS N/A 05/23/2017    Procedure: Percutaneous Coronary Intervention;  Surgeon: Maninder Quezada MD;  Location: [facility] CATH INVASIVE LOCATION;  Service: Cardiovascular      General Information     Row Name 01/23/23 1323          Physical  Therapy Time and Intention    Document Type evaluation  -MS (r) MB (t) MS (c)     Mode of Treatment physical therapy  -MS (r) MB (t) MS (c)     Row Name 01/23/23 1323          General Information    Patient Profile Reviewed yes  -MS (r) MB (t) MS (c)     Prior Level of Function independent:;all household mobility  -MS (r) MB (t) MS (c)     Existing Precautions/Restrictions fall  -MS (r) MB (t) MS (c)     Barriers to Rehab previous functional deficit;hearing deficit  -MS (r) MB (t) MS (c)     Row Name 01/23/23 1323          Living Environment    People in Home child(michael), adult  -MS (r) MB (t) MS (c)     Row Name 01/23/23 1323          Home Main Entrance    Number of Stairs, Main Entrance five  -MS (r) MB (t) MS (c)     Row Name 01/23/23 1323          Cognition    Orientation Status (Cognition) oriented x 3  -MS (r) MB (t) MS (c)     Row Name 01/23/23 1323          Safety Issues, Functional Mobility    Safety Issues Affecting Function (Mobility) safety precautions follow-through/compliance;safety precaution awareness;positioning of assistive device;steps too close to assistive device  -MS (r) MB (t) MS (c)     Impairments Affecting Function (Mobility) balance;endurance/activity tolerance;postural/trunk control;strength  -MS (r) MB (t) MS (c)     Comment, Safety Issues/Impairments (Mobility) gait belt and non skid socks donned  -MS (r) MB (t) MS (c)           User Key  (r) = Recorded By, (t) = Taken By, (c) = Cosigned By    Initials Name Provider Type    MS EricManju, PT Physical Therapist    Billy Herron, PT Student PT Student               Mobility     Row Name 01/23/23 1326          Sit-Stand Transfer    Sit-Stand [place] (Transfers) verbal cues;nonverbal cues (demo/gesture);minimum assist (75% patient effort);1 person assist  -MS (r) MB (t) MS (c)     Assistive Device (Sit-Stand Transfers) walker, front-wheeled  -MS (r) MB (t) MS (c)     Row Name 01/23/23 1328          Gait/Stairs (Locomotion)     [place] Level (Gait) minimum assist (75% patient effort);1 person assist  -MS (r) MB (t) MS (c)     Assistive Device (Gait) walker, front-wheeled  -MS (r) MB (t) MS (c)     Distance in Feet (Gait) 20'  -MS (r) MB (t) MS (c)     Deviations/Abnormal Patterns (Gait) naldo decreased;festinating/shuffling;gait speed decreased;stride length decreased;base of support, narrow  -MS (r) MB (t) MS (c)     Bilateral Gait Deviations forward flexed posture  -MS (r) MB (t) MS (c)     [place] Level (Stairs) not tested  -MS (r) MB (t) MS (c)           User Key  (r) = Recorded By, (t) = Taken By, (c) = Cosigned By    Initials Name Provider Type    MS ReyesManju, PT Physical Therapist    Billy Herron, PT Student PT Student               Obj/Interventions     Row Name 01/23/23 1326          Balance    Balance Assessment sitting static balance;sit to stand dynamic balance;standing static balance  -MS (r) MB (t) MS (c)     Static Sitting Balance standby assist  -MS (r) MB (t) MS (c)     Position, Sitting Balance sitting in chair  -MS (r) MB (t) MS (c)     Sit to Stand Dynamic Balance minimal assist;verbal cues;non-verbal cues (demo/gesture)  -MS (r) MB (t) MS (c)     Static Standing Balance verbal cues;non-verbal cues (demo/gesture);minimal assist  -MS (r) MB (t) MS (c)     Position/Device Used, Standing Balance walker, front-wheeled;supported  -MS (r) MB (t) MS (c)     Balance Interventions sitting;standing;sit to stand;static;dynamic;supported;minimal challenge  -MS (r) MB (t) MS (c)           User Key  (r) = Recorded By, (t) = Taken By, (c) = Cosigned By    Initials Name Provider Type    MS ReyesManju, PT Physical Therapist    Billy Herron, PT Student PT Student               Goals/Plan     Row Name 01/23/23 1341          Bed Mobility Goal 1 (PT)    Activity/Assistive Device (Bed Mobility Goal 1, PT) bed mobility activities, all  -MS (r) MB (t) MS (c)     [place] Level/Cues Needed (Bed Mobility  Goal 1, PT) standby assist  -MS (r) MB (t) MS (c)     Time Frame (Bed Mobility Goal 1, PT) short term goal (STG);1 week  -MS (r) MB (t) MS (c)     Row Name 01/23/23 1344          Transfer Goal 1 (PT)    Activity/Assistive Device (Transfer Goal 1, PT) transfers, all  -MS (r) MB (t) MS (c)     Real Level/Cues Needed (Transfer Goal 1, PT) contact guard required;tactile cues required;verbal cues required  -MS (r) MB (t) MS (c)     Time Frame (Transfer Goal 1, PT) short term goal (STG);1 week  -MS (r) MB (t) MS (c)     Row Name 01/23/23 1348          Gait Training Goal 1 (PT)    Activity/Assistive Device (Gait Training Goal 1, PT) gait (walking locomotion);assistive device use;decrease fall risk  -MS (r) MB (t) MS (c)     Real Level (Gait Training Goal 1, PT) contact guard required;verbal cues required;nonverbal cues (demo/gesture) required  -MS (r) MB (t) MS (c)     Distance (Gait Training Goal 1, PT) 40'  -MS (r) MB (t) MS (c)     Time Frame (Gait Training Goal 1, PT) short term goal (STG);1 week  -MS (r) MB (t) MS (c)           User Key  (r) = Recorded By, (t) = Taken By, (c) = Cosigned By    Initials Name Provider Type    Manju Chung, PT Physical Therapist    Billy Herron, PT Student PT Student               Clinical Impression     Row Name 01/23/23 2183          Pain    Pretreatment Pain Rating 0/10 - no pain  -MS (r) MB (t) MS (c)     Posttreatment Pain Rating 0/10 - no pain  -MS (r) MB (t) MS (c)     Row Name 01/23/23 3733          Plan of Care Review    Plan of Care Reviewed With patient;son  -MS (r) MB (t) MS (c)     Outcome Evaluation Pt admitted for headache, imaging negative for infarct. PLOF home with son. Pt's son present for PT eval today. Pt states he uses RW to ambulate at baseline. Per son, pt has past hx of falls, but none recently. minAx1 STS from edge of chair. Amb 20' minAx1 with cues for AD use and gait pattern. Pt demos shuffling/festinating gait likely associated  with hx of Parkinson's. PT required to help navigate RW when turning. Pt's son states his walking is typical with baseline presentation. Noted plan to d/c home with HH and assist from son. Discussed recommendations with pt's son who feels he can assist with pt's care when at home, but expresses concern leaving pt alone to run errands. PT recommending home with HH vs SNF. Pt will benefit from skilled PT to address functional mobility deficits and improve towards therapy goals.  -MS (r) MB (t) MS (c)     Row Name 01/23/23 1329          Therapy Assessment/Plan (PT)    Rehab Potential (PT) good, to achieve stated therapy goals  -MS (r) MB (t) MS (c)     Therapy Frequency (PT) 5 times/wk  -MS (r) MB (t) MS (c)     Row Name 01/23/23 1329          Vital Signs    O2 Delivery Pre Treatment room air  -MS (r) MB (t) MS (c)     Row Name 01/23/23 1329          Positioning and Restraints    Pre-Treatment Position sitting in chair/recliner  -MS (r) MB (t) MS (c)     Post Treatment Position chair  -MS (r) MB (t) MS (c)     In Chair reclined;call light within reach;encouraged to call for assist;exit alarm on;with family/caregiver;legs elevated  -MS (r) MB (t) MS (c)           User Key  (r) = Recorded By, (t) = Taken By, (c) = Cosigned By    Initials Name Provider Type    MS EricManju, PT Physical Therapist    Billy Herron, PT Student PT Student               Outcome Measures     Row Name 01/23/23 1342 01/23/23 1001       How much help from another person do you currently need...    Turning from your back to your side while in flat bed without using bedrails? 3  -MS (r) MB (t) MS (c) 3  -KP    Moving from lying on back to sitting on the side of a flat bed without bedrails? 3  -MS (r) MB (t) MS (c) 3  -KP    Moving to and from a bed to a chair (including a wheelchair)? 2  -MS (r) MB (t) MS (c) 2  -KP    Standing up from a chair using your arms (e.g., wheelchair, bedside chair)? 2  -MS (r) MB (t) MS (c) 2  -KP    Climbing  3-5 steps with a railing? 2  -MS (r) MB (t) MS (c) 2  -KP    To walk in hospital room? 2  -MS (r) MB (t) MS (c) 2  -KP    AM-PAC 6 Clicks Score (PT) 14  -MS (r) MB (t) 14  -KP    Highest level of mobility 4 --> Transferred to chair/commode  -MS (r) MB (t) 4 --> Transferred to chair/commode  -KP    Row Name 01/23/23 1313          Modified [place] Scale    Modified [place] Scale 3 - Moderate disability.  Requiring some help, but able to walk without assistance.  -LE     Row Name 01/23/23 1342 01/23/23 1313       Functional Assessment    Outcome Measure Options AM-PAC 6 Clicks Basic Mobility (PT)  -MS (r) MB (t) MS (c) AM-PAC 6 Clicks Daily Activity (OT);Modified [place]  -LE          User Key  (r) = Recorded By, (t) = Taken By, (c) = Cosigned By    Initials Name Provider Type    Misty East, OTR Occupational Therapist    Manju Chung, PT Physical Therapist    Coco Carranza, RN Registered Nurse    Billy Herron, PT Student PT Student                             Physical Therapy Education     Title: PT OT SLP Therapies (In Progress)     Topic: Physical Therapy (In Progress)     Point: Mobility training (Done)     Learning Progress Summary           Patient Acceptance, E,TB, VU by MB at 1/23/2023 1343   Family Acceptance, E,TB, VU by MB at 1/23/2023 1343                   Point: Home exercise program (Not Started)     Learner Progress:  Not documented in this visit.          Point: Body mechanics (Not Started)     Learner Progress:  Not documented in this visit.          Point: Precautions (Not Started)     Learner Progress:  Not documented in this visit.                      User Key     Initials Effective Dates Name Provider Type Discipline    MB 12/30/22 -  Billy Ortega, PT Student PT Student PT              PT Recommendation and Plan     Plan of Care Reviewed With: patient, son  Outcome Evaluation: Pt admitted for headache, imaging negative for infarct. PLOF home with son. Pt's son present for PT  eval today. Pt states he uses RW to ambulate at baseline. Per son, pt has past hx of falls, but none recently. minAx1 STS from edge of chair. Amb 20' minAx1 with cues for AD use and gait pattern. Pt demos shuffling/festinating gait likely associated with hx of Parkinson's. PT required to help navigate RW when turning. Pt's son states his walking is typical with baseline presentation. Noted plan to d/c home with HH and assist from son. Discussed recommendations with pt's son who feels he can assist with pt's care when at home, but expresses concern leaving pt alone to run errands. PT recommending home with HH vs SNF. Pt will benefit from skilled PT to address functional mobility deficits and improve towards therapy goals.     Time Calculation:    PT Charges     Row Name 01/23/23 1322             Time Calculation    Start Time 1103  -MS (r) MB (t) MS (c)      Stop Time 1120  -MS (r) MB (t) MS (c)      Time Calculation (min) 17 min  -MS (r) MB (t)      PT Received On 01/23/23  -MS (r) MB (t) MS (c)      PT - Next Appointment 01/24/23  -MS (r) MB (t) MS (c)      PT Goal Re-Cert Due Date 01/30/23  -MS (r) MB (t) MS (c)         Time Calculation- PT    Total Timed Code Minutes- PT 9 minute(s)  -MS (r) MB (t) MS (c)         Timed Charges    31688 - PT Therapeutic Activity Minutes 9  -MS (r) MB (t) MS (c)         Total Minutes    Timed Charges Total Minutes 9  -MS (r) MB (t)       Total Minutes 9  -MS (r) MB (t)            User Key  (r) = Recorded By, (t) = Taken By, (c) = Cosigned By    Initials Name Provider Type    MS ReyesManju, PT Physical Therapist    Billy Herron, PT Student PT Student              Therapy Charges for Today     Code Description Service Date Service Provider Modifiers Qty    65665647655 HC PT EVAL MOD COMPLEXITY 2 1/23/2023 Billy Ortega, PT Student GP 1    91473447616 HC PT THERAPEUTIC ACT EA 15 MIN 1/23/2023 Billy Ortega, PT Student GP 1          PT G-Codes  Outcome Measure Options:  AM-PAC 6 Clicks Basic Mobility (PT)  AM-PAC 6 Clicks Score (PT): 14  AM-PAC 6 Clicks Score (OT): 19  Modified [place] Scale: 3 - Moderate disability.  Requiring some help, but able to walk without assistance.  PT Discharge Summary  Anticipated Discharge Disposition (PT): home with home health, skilled nursing facility    Billy Ortega, PT Student  1/23/2023

## 2023-01-23 NOTE — PLAN OF CARE
Goal Outcome Evaluation:  Plan of Care Reviewed With: patientabimbola        Progress: no change  Outcome Evaluation: No change, VSS, discharge pending placement.            Problem: Adult Inpatient Plan of Care  Goal: Plan of Care Review  Outcome: Ongoing, Progressing  Flowsheets (Taken 1/23/2023 1231)  Progress: no change  Plan of Care Reviewed With:   patient   abimbola  Outcome Evaluation: No change, VSS, discharge pending placement.  Goal: Patient-Specific Goal (Individualized)  Outcome: Ongoing, Progressing  Goal: Absence of Hospital-Acquired Illness or Injury  Outcome: Ongoing, Progressing  Intervention: Identify and Manage Fall Risk  Recent Flowsheet Documentation  Taken 1/23/2023 0800 by Coco Harden RN  Safety Promotion/Fall Prevention:   safety round/check completed   fall prevention program maintained   elopement precautions  Intervention: Prevent and Manage VTE (Venous Thromboembolism) Risk  Recent Flowsheet Documentation  Taken 1/23/2023 1001 by Coco Harden RN  Activity Management: activity adjusted per tolerance  Taken 1/23/2023 0800 by Coco Harden RN  VTE Prevention/Management: patient refused intervention  Range of Motion: active ROM (range of motion) encouraged  Goal: Optimal Comfort and Wellbeing  Outcome: Ongoing, Progressing  Intervention: Provide Person-Centered Care  Recent Flowsheet Documentation  Taken 1/23/2023 0800 by Coco Harden RN  Trust Relationship/Rapport:   care explained   choices provided   emotional support provided   empathic listening provided   questions answered   questions encouraged   reassurance provided   thoughts/feelings acknowledged  Goal: Readiness for Transition of Care  Outcome: Ongoing, Progressing     Problem: Fall Injury Risk  Goal: Absence of Fall and Fall-Related Injury  Outcome: Ongoing, Progressing  Intervention: Identify and Manage Contributors  Recent Flowsheet Documentation  Taken 1/23/2023 0800 by Coco Harden, [facility]  Medication  Review/Management: medications reviewed  Intervention: Promote Injury-Free Environment  Recent Flowsheet Documentation  Taken 1/23/2023 0800 by Coco Harden RN  Safety Promotion/Fall Prevention:   safety round/check completed   fall prevention program maintained   elopement precautions     Problem: Heart Failure Comorbidity  Goal: Maintenance of Heart Failure Symptom Control  Outcome: Ongoing, Progressing  Intervention: Maintain Heart Failure-Management  Recent Flowsheet Documentation  Taken 1/23/2023 0800 by Coco Harden RN  Medication Review/Management: medications reviewed     Problem: Hypertension Comorbidity  Goal: Blood Pressure in Desired Range  Outcome: Ongoing, Progressing  Intervention: Maintain Blood Pressure Management  Recent Flowsheet Documentation  Taken 1/23/2023 0800 by Coco Harden RN  Medication Review/Management: medications reviewed

## 2023-01-23 NOTE — PLAN OF CARE
Goal Outcome Evaluation:  Plan of Care Reviewed With: patient           Outcome Evaluation: Pt presents from home with headache, imaging (-) for infarct. Pt lives at home with son and at baseline gets up on own with walker and does ADL. Pt seen by OT and while pt is hard of hearing, is pleasant and cooperative. Pt moves to EOB, xfers to recliner using walker and close SBA/CGA. Pt is able to iqra socks at EOB with setup.  Pt with functional B UE. Noted plan to d/c home today with home health and assist by son.

## 2023-01-23 NOTE — OUTREACH NOTE
Prep Survey    Flowsheet Row Responses   [facility] facility patient discharged from? [facility]   Is LACE score < 7 ? Yes   Eligibility [facility]   Date of Admission 01/20/23   Date of Discharge 01/23/23   Discharge Disposition Home-Health Care Svc   Discharge diagnosis headache, Hx old CVAs   Does the patient have one of the following disease processes/diagnoses(primary or secondary)? Other   Does the patient have Home health ordered? Yes   What is the Home health agency?  [facility]   Is there a DME ordered? No   Prep survey completed? Yes          RUSS MARIE - Registered Nurse

## 2023-01-23 NOTE — THERAPY DISCHARGE NOTE
Acute Care - Occupational Therapy Discharge  [facility]    Patient Name: Dinesh Churchill Sr.  : 1928    MRN: [id number]                              Today's Date: 2023       Admit Date: 2023    Visit Dx:     ICD-10-CM ICD-9-CM   1. Sudden onset of severe headache  R51.9 784.0   2. Pulmonary vascular congestion  R09.89 514   3. Pleural effusion  J90 511.9   4. Hypoxemia  R09.02 799.02     Patient Active Problem List   Diagnosis   • Abnormal magnetic resonance imaging study   • Arthritis   • Osteoarthritis of cervical spine   • Diplopia   • Hearing loss   • Neoplasm of uncertain behavior of skin   • Prediabetes   • Vitamin D deficiency   • Chronic fatigue   • History of supraventricular tachycardia   • Essential hypertension   • B12 deficiency   • Abnormal cardiac function test   • Cardiomyopathy (HCC)   • Coronary artery disease involving native coronary artery of native heart without angina pectoris   • History of coronary artery stent placement   • History of renal calculi   • Dyslipidemia   • Macrocytic anemia   • Precordial pain   • Parkinson's disease (HCC)   • History of CVA (cerebrovascular accident)   • Paroxysmal atrial fibrillation (HCC)   • Otalgia, left   • Chest pain   • Abnormal stress test   • Spontaneous pneumothorax   • Chest pain   • Acute systolic (congestive) heart failure (HCC)   • Thrombocytopenia (HCC)   • Pneumonia of left lower lobe due to infectious organism   • Syncope and collapse   • Status post placement of implantable loop recorder   • Long term (current) use of antithrombotics/antiplatelets   • Stuttering   • Fall   • Closed fracture of multiple ribs of right side   • Chest wall pain   • Closed fracture of phalanx of toe of left foot   • Headache   • Sudden onset of severe headache     Past Medical History:   Diagnosis Date   • Abnormal ECG    • Abnormal MRI    • Acute frontal sinusitis    • Arthritis    • Chronic fatigue    • Coronary artery disease    • Dizziness     • Head injury    • Headache 1/21/2023   • Hearing aid worn     left   • Hearing loss    • Hyperlipidemia    • Hypertension    • Kidney stones    • Macrocytosis    • Neoplasm of skin    • Osteoporosis    • Prediabetes    • Vitamin D deficiency      Past Surgical History:   Procedure Laterality Date   • CARDIAC CATHETERIZATION N/A 05/22/2017    Procedure: Left Heart Cath;  Surgeon: Maninder Quezada MD;  Location:  [facility] CATH INVASIVE LOCATION;  Service:    • CARDIAC CATHETERIZATION N/A 05/23/2017    Procedure: Stent BMS coronary;  Surgeon: Maninder Quezada MD;  Location:  [facility] CATH INVASIVE [facility]ATION;  Service:    • CARDIAC CATHETERIZATION N/A 08/08/2019    Procedure: Left Heart Cath;  Surgeon: Maninder Quezada MD;  Location:  [facility] CATH INVASIVE LOCATION;  Service: Cardiology   • CARDIAC CATHETERIZATION N/A 08/08/2019    Procedure: Left ventriculography;  Surgeon: Maninder Quezada MD;  [facility];  Service: Cardiology   • CARDIAC CATHETERIZATION N/A 08/08/2019    Procedure: Coronary angiography;  Surgeon: Maninder Quezada MD;  [facility];  Service: Cardiology   • CARDIAC CATHETERIZATION N/A 08/20/2019    Procedure: CORONARY ANGIOGRAPHY;  Surgeon: Maninder Quezada MD;  [facility];  Service: Cardiovascular   • CARDIAC CATHETERIZATION N/A 08/20/2019    Procedure: Stent BMS coronary;  Surgeon: Maninder Quezada MD;  [facility];  Service: Cardiovascular   • CARDIAC ELECTROPHYSIOLOGY PROCEDURE N/A 11/08/2019    Procedure: Loop insertion;  Surgeon: Maninder Quezada MD;  Location: [facility] CATH INVASIVE LOCATION;  Service: Cardiovascular   • HERNIA REPAIR      x 2   • AR RT/LT HEART CATHETERS N/A 05/23/2017    Procedure: Percutaneous Coronary Intervention;  Surgeon: Maninder Quezada MD;  [facility];  Service: Cardiovascular       General Information    No documentation.                Mobility/ADL's    No documentation.                Obj/Interventions    No documentation.                Goals/Plan     Row Name 01/23/23 1312          Transfer Goal 1 (OT)    Activity/Assistive Device (Transfer Goal 1, OT) sit-to-stand/stand-to-sit;bed-to-chair/chair-to-bed  -LE     [place] Level/Cues Needed (Transfer Goal 1, OT) verbal cues required;contact guard required;standby assist  -LE     Time Frame (Transfer Goal 1, OT) 1 day  -LE     Progress/Outcome (Transfer Goal 1, OT) goal met  -LE           User Key  (r) = Recorded By, (t) = Taken By, (c) = Cosigned By    Initials Name Provider Type    Misty East, OTR Occupational Therapist               Clinical Impression     Row Name 01/23/23 1307          Pain Assessment    Pretreatment Pain Rating 0/10 - no pain  -LE     Row Name 01/23/23 1305          Plan of Care Review    Plan of Care Reviewed With patient  -LE     Outcome Evaluation Pt presents from home with headache, imaging (-) for infarct. Pt lives at home with son and at baseline gets up on own with walker and does ADL. Pt seen by OT and while pt is hard of hearing, is pleasant and cooperative. Pt moves to EOB, xfers to recliner using walker and close SBA/CGA. Pt is able to iqra socks at EOB with setup.  Pt with functional B UE. Noted plan to d/c home today with home health and assist by son.  -     Row Name 01/23/23 1303          Therapy Assessment/Plan (OT)    Therapy Frequency (OT) evaluation only  -     Row Name 01/23/23 1308          Therapy Plan Review/Discharge Plan (OT)    Equipment Needs Upon Discharge (OT) --  recommend cont walker.  may benefit from shower DME, defer to HH OT.  -LE     Anticipated Discharge Disposition (OT) home with 24/7 care;home with home health  -LE     Row Name 01/23/23 1306          Vital Signs    O2 Delivery Pre Treatment room air  -LE     Pre Patient Position Supine  -LE     Intra Patient Position  Standing  -LE     Post Patient Position Sitting  -LE     Row Name 01/23/23 1307          Positioning and Restraints    Pre-Treatment Position in bed  -LE     Post Treatment Position chair  -LE     In Chair notified nsg;reclined;call light within reach;encouraged to call for assist;exit alarm on  -LE           User Key  (r) = Recorded By, (t) = Taken By, (c) = Cosigned By    Initials Name Provider Type    Misty East OTR Occupational Therapist               Outcome Measures     Row Name 01/23/23 1313          How much help from another is currently needed...    Putting on and taking off regular lower body clothing? 3  -LE     Bathing (including washing, rinsing, and drying) 3  -LE     Toileting (which includes using toilet bed pan or urinal) 3  -LE     Putting on and taking off regular upper body clothing 3  -LE     Taking care of personal grooming (such as brushing teeth) 3  -LE     Eating meals 4  -LE     AM-PAC 6 Clicks Score (OT) 19  -LE     Row Name 01/23/23 1001          How much help from another person do you currently need...    Turning from your back to your side while in flat bed without using bedrails? 3  -KP     Moving from lying on back to sitting on the side of a flat bed without bedrails? 3  -KP     Moving to and from a bed to a chair (including a wheelchair)? 2  -KP     Standing up from a chair using your arms (e.g., wheelchair, bedside chair)? 2  -KP     Climbing 3-5 steps with a railing? 2  -KP     To walk in hospital room? 2  -KP     AM-PAC 6 Clicks Score (PT) 14  -KP     Row Name 01/23/23 1313          Modified [place] Scale    Modified [place] Scale 3 - Moderate disability.  Requiring some help, but able to walk without assistance.  -LE     Row Name 01/23/23 1313          Functional Assessment    Outcome Measure Options AM-PAC 6 Clicks Daily Activity (OT);Modified [place]  -LE           User Key  (r) = Recorded By, (t) = Taken By, (c) = Cosigned By    Initials Name Provider Type    JONH Bob  Misty OTR Occupational Therapist    Coco Carranza RN Registered Nurse              Occupational Therapy Education     Title: PT OT SLP Therapies (Done)     Topic: Occupational Therapy (Done)     Point: ADL training (Done)     Description:   Instruct learner(s) on proper safety adaptation and remediation techniques during self care or transfers.   Instruct in proper use of assistive devices.              Learning Progress Summary           Patient Acceptance, E, Bed IU by LE at 1/23/2023 1313    Comment: approached by son in hallway after session. son was not present during OT. son reports he has been staying with pt and was told when pt d/c from SNU to walk with pt when up.   Family Acceptance, E, Bed IU by LE at 1/23/2023 1313    Comment: approached by son in hallway after session. son was not present during OT. son reports he has been staying with pt and was told when pt d/c from SNU to walk with pt when up.                   Point: Precautions (Done)     Description:   Instruct learner(s) on prescribed precautions during self-care and functional transfers.              Learning Progress Summary           Patient Acceptance, E, Bed IU by LE at 1/23/2023 1313    Comment: approached by son in hallway after session. son was not present during OT. son reports he has been staying with pt and was told when pt d/c from SNU to walk with pt when up.   Family Acceptance, E, Bed IU by LE at 1/23/2023 1313    Comment: approached by son in hallway after session. son was not present during OT. son reports he has been staying with pt and was told when pt d/c from SNU to walk with pt when up.                   Point: Body mechanics (Done)     Description:   Instruct learner(s) on proper positioning and spine alignment during self-care, functional mobility activities and/or exercises.              Learning Progress Summary           Patient Acceptance, E, Bed IU by LE at 1/23/2023 1313    Comment: approached by son in  hallway after session. son was not present during OT. son reports he has been staying with pt and was told when pt d/c from SNU to walk with pt when up.   Family Acceptance, E, Bed IU by LE at 1/23/2023 1313    Comment: approached by son in hallway after session. son was not present during OT. son reports he has been staying with pt and was told when pt d/c from SNU to walk with pt when up.                               User Key     Initials Effective Dates Name Provider Type Discipline     06/16/21 -  Misty Bob OTR Occupational Therapist OT              OT Recommendation and Plan  Therapy Frequency (OT): evaluation only  Plan of Care Review  Plan of Care Reviewed With: patient  Outcome Evaluation: Pt presents from home with headache, imaging (-) for infarct. Pt lives at home with son and at baseline gets up on own with walker and does ADL. Pt seen by OT and while pt is hard of hearing, is pleasant and cooperative. Pt moves to EOB, xfers to recliner using walker and close SBA/CGA. Pt is able to iqra socks at EOB with setup.  Pt with functional B UE. Noted plan to d/c home today with home health and assist by son.  Plan of Care Reviewed With: patient  Outcome Evaluation: Pt presents from home with headache, imaging (-) for infarct. Pt lives at home with son and at baseline gets up on own with walker and does ADL. Pt seen by OT and while pt is hard of hearing, is pleasant and cooperative. Pt moves to EOB, xfers to recliner using walker and close SBA/CGA. Pt is able to iqra socks at EOB with setup.  Pt with functional B UE. Noted plan to d/c home today with home health and assist by son.     Time Calculation:    Time Calculation- OT     Row Name 01/23/23 1314             Time Calculation- OT    OT Start Time 0925  -LE      OT Stop Time 0938  -LE      OT Time Calculation (min) 13 min  -LE      Total Timed Code Minutes- OT 8 minute(s)  -LE      OT Received On 01/23/23  -LE      OT - Next Appointment 01/24/23  -LE          Timed Charges    15368 - OT Self Care/Mgmt Minutes 8  -LE         Total Minutes    Timed Charges Total Minutes 8  -LE       Total Minutes 8  -LE            User Key  (r) = Recorded By, (t) = Taken By, (c) = Cosigned By    Initials Name Provider Type    Misty East OTR Occupational Therapist              Therapy Charges for Today     Code Description Service Date Service Provider Modifiers Qty    [id number]  OT EVAL LOW COMPLEXITY 2 1/23/2023 Misty Bob OTR GO 1    [id number]  OT SELF CARE/MGMT/TRAIN EA 15 MIN 1/23/2023 Misty Bob OTR GO 1             OT Discharge Summary  Anticipated Discharge Disposition (OT): home with 24/7 care, home with home health  Reason for Discharge: Discharge from facility  Discharge Destination: Home with home health, Home with assist    DUTCH Michelle  1/23/2023

## 2023-01-23 NOTE — DISCHARGE PLACEMENT REQUEST
"Rochelle Mueller Sr. (89 y.o. Male)     Date of Birth   05/29/1928    Social Security Number       Address   [address] [place] [address]    Home Phone   [phone]    MRN   [id number]       [facility]    Marital Status                               Admission Date   1/20/23    Admission Type   Emergency    Admitting Provider   Jefferson Villalobos MD    Attending Provider       Department, Room/Bed   [address], P595/1       Discharge Date   1/23/2023    Discharge Disposition   Home or Self Care    Discharge Destination                               Attending Provider: (none)   Allergies: No Known Allergies    Isolation: None   Infection: None   Code Status: CPR    Ht: 188 cm (74\")   Wt: 80.7 kg (178 lb)    Admission Cmt: None   Principal Problem: Headache [R51.9]                 Active Insurance as of 1/20/2023     Primary Coverage     Payor Plan Insurance Group Employer/Plan Group    MEDICARE [place] MEDICARE      Payor Plan Address Payor Plan Phone Number Payor Plan Fax Number Effective Dates    [address] [phone]  10/1/1989 - None Entered    [place] [id number]       Subscriber Name Subscriber Birth Date Member ID       ROCHELLE MUELLER SR. 5/29/1928 [id number]                 Emergency Contacts      (Rel.) Home Phone Work Phone Mobile Phone    Zhao LopezRochelle (Son) [phone] -- [phone]    ZhaoJohn (Son) -- -- [phone]              "

## 2023-01-23 NOTE — PROGRESS NOTES
Case Management Discharge Note      Final Note: Pt discharging home with family to transport/assist as needed. HH ordered per [facility]. No additional needs noted. Maddy Galvin LCSW         Selected Continued Care - Discharged on 1/23/2023 Admission date: 1/20/2023 - Discharge disposition: Home or Self Care    Destination    No services have been selected for the patient.              Durable Medical Equipment    No services have been selected for the patient.              Dialysis/Infusion    No services have been selected for the patient.              Home Medical Care Coordination complete.    Service Provider Selected Services Address Phone Fax Patient Preferred    [facility] [address] [place] [address] [phone] --          Therapy    No services have been selected for the patient.              Community Resources    No services have been selected for the patient.              Community & DME    No services have been selected for the patient.                  Transportation Services  Private: Car    Final Discharge Disposition Code: 06 - home with home health care

## 2023-01-23 NOTE — DISCHARGE SUMMARY
[facility]    Date of Discharge:  1/23/2023    PCP: Phyllis Loving PA    Discharge Diagnosis:   Active Hospital Problems    Diagnosis  POA   • **Headache [R51.9]  Unknown   • Sudden onset of severe headache [R51.9]  Yes   • Thrombocytopenia (HCC) [D69.6]  Yes   • Paroxysmal atrial fibrillation (HCC) [I48.0]  Yes   • Parkinson's disease (HCC) [G20]  Yes   • History of CVA (cerebrovascular accident) [Z86.73]  Not Applicable   • Macrocytic anemia [D53.9]  Yes   • Cardiomyopathy (HCC) [I42.9]  Yes   • Essential hypertension [I10]  Yes   • Chronic fatigue [R53.82]  Yes      Resolved Hospital Problems   No resolved problems to display.          Consults     Date and Time Order Name Status Description    1/21/2023  4:17 AM Inpatient Neurology Consult General Completed     1/21/2023  3:49 AM [facility] (on-call MD unless specified) Details          Hospital Course  89 y.o. male who was essentially admitted due to sudden onset of severe headache.  Patient has significant comorbidities and ultimately he is headache free by the time of discharge.  Neurology saw in consultation on this patient has had a significant work-up over the last 24 hours.  CTA was negative for any acute disease as well as MRI negative for any acute intracranial disease with chronic changes noted.  Patient also underwent lumbar puncture which did not explain any infectious etiology.  He has old strokes and is maintained on aspirin and statin.  Neurology at this point has signed off and no further work-up is endorsed.  The patient's vital signs are stable as well as afebrile and he is comfortably sitting in the chair in absolutely no distress.  No signs of fever and is tolerating p.o. with good appetite.  The son is at bedside and essentially has been living with this patient over the course of the last year.  Patient did undergo subacute rehab less than a year prior and noticed some improvements.  He  has only been in the hospital for 2days and clinical decline has not been that great.  PT has evaluated with her note is pending.  I do not see any indication to transition to subacute rehab at this point.  This patient has advanced age and recurrent falls are good to be a recurrent theme and have clearly illustrated that with the son who understands and accepts.  He does his best to be with his father is much as possible and I think they are starting to reach a point where keeping him in the home setting is becoming tougher.  At this time, patient will be discharged back to home and I have also ordered home health with additional PT and OT.  I discussed these changes and the plan with the patient as well as the son and both are in agreement thinking that he might do a little bit better trying to do the exercises while in the home setting.  Regardless this patient is medically stable for discharge.  I even discussed the case with CCP to ensure a smooth discharge to home and case also discussed with managing RN.      Condition on Discharge: Improved.     Temp:  [97.7 °F (36.5 °C)-98.1 °F (36.7 °C)] 97.8 °F (36.6 °C)  Heart Rate:  [65-72] 68  Resp:  [16-18] 17  BP: (112-141)/(54-75) 135/54  Body mass index is 22.85 kg/m².    Physical Exam  Constitutional:       Comments: Elderly and quite frail but nontoxic   HENT:      Head: Normocephalic.      Mouth/Throat:      Mouth: Mucous membranes are moist.      Pharynx: Oropharynx is clear.   Eyes:      Conjunctiva/sclera: Conjunctivae normal.      Pupils: Pupils are equal, round, and reactive to light.   Cardiovascular:      Rate and Rhythm: Normal rate and regular rhythm.   Pulmonary:      Effort: Pulmonary effort is normal. No respiratory distress.      Breath sounds: Normal breath sounds.   Abdominal:      General: Bowel sounds are normal. There is no distension.      Palpations: Abdomen is soft.      Tenderness: There is no abdominal tenderness.   Musculoskeletal:          General: No swelling.   Skin:     General: Skin is warm and dry.   Neurological:      Mental Status: He is alert. Mental status is at baseline.      Cranial Nerves: No cranial nerve deficit.       Disposition: Home or Self Care       Discharge Medications      Continue These Medications      Instructions Start Date   acetaminophen 500 MG tablet  Commonly known as: TYLENOL   500 mg, Oral, 4 Times Daily      amLODIPine 5 MG tablet  Commonly known as: NORVASC   5 mg, Oral, Every 24 Hours Scheduled      aspirin 81 MG chewable tablet   81 mg, Oral, Daily      atorvastatin 20 MG tablet  Commonly known as: LIPITOR   20 mg, Oral, Nightly      B-12 1000 MCG lozenge   DISSOLVE 1 LOZENGE BY MOUTH UNDER THE TONGUE EVERY OTHER DAY      isosorbide mononitrate 60 MG 24 hr tablet  Commonly known as: IMDUR   60 mg, Oral, Daily      losartan 50 MG tablet  Commonly known as: COZAAR   50 mg, Oral, Daily      Lumbar Back Brace/Support Pad misc   Use for lifting/ strenuous exercise.      metoprolol succinate  MG 24 hr tablet  Commonly known as: TOPROL-XL   100 mg, Oral, Daily      Vitamin D-3 25 MCG (1000 UT) capsule   1,000 Units, Oral, Daily              Additional Instructions for the Follow-ups that You Need to Schedule     Ambulatory Referral to Home Health (Hospital)   As directed      Face to Face Visit Date: 1/23/2023    Follow-up provider for Plan of Care?: I treated the patient in an acute care facility and will not continue treatment after discharge.    Follow-up provider: MAYO CHÁVEZ [[id number]]    Reason/Clinical Findings: Recurrent falls    Describe mobility limitations that make leaving home difficult: Taxing effort to leave home    Nursing/Therapeutic Services Requested: Physical Therapy Occupational Therapy    PT orders: Therapeutic exercise Strengthening Home safety assessment Gait Training    Weight Bearing Status: As Tolerated    Occupational orders: Activities of daily living Energy conservation Strengthening  Home safety assessment    Frequency: 1 Week 1         Discharge Follow-up with PCP   As directed       Currently Documented PCP:    Phyllis Loving PA    PCP Phone Number:    [phone]     Follow Up Details: PCP 2 to 3 weeks.  Neuro as needed            Follow-up Information     Phyllis Loving PA .    Specialty: Family Medicine  Why: PCP 2 to 3 weeks.  Neuro as needed  Contact information:  [address] [place], [place] [id number]  [phone]                        Pending Labs     Order Current Status    Culture, CSF - Cerebrospinal Fluid, Lumbar Puncture Preliminary result         Geovanni Flores MD  [facility]  01/23/23    Discharge time spent greater than 30 minutes.

## 2023-01-24 ENCOUNTER — TRANSITIONAL CARE MANAGEMENT TELEPHONE ENCOUNTER (OUTPATIENT)
Dept: CALL CENTER | Facility: HOSPITAL | Age: 88
End: 2023-01-24
Payer: MEDICARE

## 2023-01-24 NOTE — OUTREACH NOTE
Call Center TCM Note    Flowsheet Row Responses   [facility] patient discharged from? [place]   Does the patient have one of the following disease processes/diagnoses(primary or secondary)? Other   TCM attempt successful? Yes   Call start time 1235   Call end time 1240   Discharge diagnosis headache, Hx old CVAs   Is patient permission given to speak with other caregiver? Yes   List who call center can speak with ROCHELLE MUELLER .    Person spoke with today (if not patient) and relationship ROCHELLE MUELLER JR- SON   Meds reviewed with patient/caregiver? Yes   Does the patient have all medications ordered at discharge? N/A   Is the patient taking all medications as directed (includes completed medication regime)? Yes   Does the patient have an appointment with their PCP within 7 days of discharge? No   Nursing Interventions Assisted patient with making appointment per protocol   What [facility] the [facility] health [facility]   Has home health visited the patient within 72 hours of discharge? Call prior to 72 hours   Psychosocial issues? No   Did the patient receive a copy of their discharge instructions? Yes   Nursing interventions Reviewed instructions with patient   What is the patient's perception of their health status since discharge? Improving   Is the patient/caregiver able to teach back signs and symptoms related to disease process for when to call PCP? Yes   Is the patient/caregiver able to teach back signs and symptoms related to disease process for when to call 911? Yes   Is the patient/caregiver able to teach back the hierarchy of who to call/visit for symptoms/problems? PCP, Specialist, Home health nurse, Urgent Care, ED, 911 Yes   TCM call completed? Yes   Call end time 1240   Would this patient benefit from a Referral to Amb Social Work? No   Is the patient interested in additional calls from an ambulatory ?  NOTE:  applies to high risk patients requiring additional follow-up. No           Cece Maloney, NABIL    1/24/2023, 12:41 EST

## 2023-01-25 LAB
BACTERIA SPEC AEROBE CULT: NORMAL
GRAM STN SPEC: NORMAL

## 2023-01-27 ENCOUNTER — APPOINTMENT (OUTPATIENT)
Dept: GENERAL RADIOLOGY | Facility: HOSPITAL | Age: 88
End: 2023-01-27
Payer: MEDICARE

## 2023-01-27 ENCOUNTER — OFFICE VISIT (OUTPATIENT)
Dept: FAMILY MEDICINE CLINIC | Facility: CLINIC | Age: 88
End: 2023-01-27
Payer: MEDICARE

## 2023-01-27 ENCOUNTER — HOSPITAL ENCOUNTER (OUTPATIENT)
Facility: HOSPITAL | Age: 88
Setting detail: OBSERVATION
Discharge: NURSING, REHAB OR LONG-TERM CARE | End: 2023-01-31
Attending: EMERGENCY MEDICINE | Admitting: STUDENT IN AN ORGANIZED HEALTH CARE EDUCATION/TRAINING PROGRAM
Payer: MEDICARE

## 2023-01-27 VITALS
DIASTOLIC BLOOD PRESSURE: 60 MMHG | HEART RATE: 74 BPM | TEMPERATURE: 98.2 F | RESPIRATION RATE: 16 BRPM | SYSTOLIC BLOOD PRESSURE: 110 MMHG | OXYGEN SATURATION: 96 %

## 2023-01-27 DIAGNOSIS — M25.551 RIGHT HIP PAIN: ICD-10-CM
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
## 2023-01-27 DIAGNOSIS — Z91.81 HISTORY OF RECENT FALL: ICD-10-CM

## 2023-01-27 DIAGNOSIS — R51.9 SUDDEN ONSET OF SEVERE HEADACHE: Primary | ICD-10-CM

## 2023-01-27 DIAGNOSIS — R53.1 WEAKNESS: ICD-10-CM

## 2023-01-27 DIAGNOSIS — R29.6 RECURRENT FALLS: ICD-10-CM
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
## 2023-01-27 DIAGNOSIS — S32.020A CLOSED COMPRESSION FRACTURE OF L2 LUMBAR VERTEBRA, INITIAL ENCOUNTER: ICD-10-CM

## 2023-01-27 DIAGNOSIS — M48.56XA PATHOLOGICAL COMPRESSION FRACTURE OF LUMBAR VERTEBRA, INITIAL ENCOUNTER: ICD-10-CM

## 2023-01-27 DIAGNOSIS — M54.50 ACUTE LOW BACK PAIN, UNSPECIFIED BACK PAIN LATERALITY, UNSPECIFIED WHETHER SCIATICA PRESENT: ICD-10-CM

## 2023-01-27 LAB
ALBUMIN SERPL-MCNC: 3.5 G/DL (ref 3.5–5.2)
ALBUMIN/GLOB SERPL: 1.8 G/DL
ALP SERPL-CCNC: 46 U/L (ref 39–117)
ALT SERPL W P-5'-P-CCNC: 13 U/L (ref 1–41)
ANION GAP SERPL CALCULATED.3IONS-SCNC: 5.4 MMOL/L (ref 5–15)
AST SERPL-CCNC: 16 U/L (ref 1–40)
BACTERIA UR QL AUTO: ABNORMAL /HPF
BASOPHILS # BLD AUTO: 0.01 10*3/MM3 (ref 0–0.2)
BASOPHILS NFR BLD AUTO: 0.1 % (ref 0–1.5)
BILIRUB SERPL-MCNC: 1.1 MG/DL (ref 0–1.2)
BILIRUB UR QL STRIP: NEGATIVE
BUN SERPL-MCNC: 26 MG/DL (ref 8–23)
BUN/CREAT SERPL: 33.8 (ref 7–25)
CALCIUM SPEC-SCNC: 8.3 MG/DL (ref 8.2–9.6)
CHLORIDE SERPL-SCNC: 113 MMOL/L (ref 98–107)
CLARITY UR: CLEAR
CO2 SERPL-SCNC: 23.6 MMOL/L (ref 22–29)
COLOR UR: YELLOW
CREAT SERPL-MCNC: 0.77 MG/DL (ref 0.76–1.27)
DEPRECATED RDW RBC AUTO: 54.7 FL (ref 37–54)
EGFRCR SERPLBLD CKD-EPI 2021: 83 ML/MIN/1.73
EOSINOPHIL # BLD AUTO: 0 10*3/MM3 (ref 0–0.4)
EOSINOPHIL NFR BLD AUTO: 0 % (ref 0.3–6.2)
ERYTHROCYTE [DISTWIDTH] IN BLOOD BY AUTOMATED COUNT: 14.4 % (ref 12.3–15.4)
GLOBULIN UR ELPH-MCNC: 2 GM/DL
GLUCOSE SERPL-MCNC: 133 MG/DL (ref 65–99)
GLUCOSE UR STRIP-MCNC: NEGATIVE MG/DL
HCT VFR BLD AUTO: 36.6 % (ref 37.5–51)
HGB BLD-MCNC: 11.7 G/DL (ref 13–17.7)
HGB UR QL STRIP.AUTO: ABNORMAL
HYALINE CASTS UR QL AUTO: ABNORMAL /LPF
KETONES UR QL STRIP: ABNORMAL
LEUKOCYTE ESTERASE UR QL STRIP.AUTO: NEGATIVE
LYMPHOCYTES # BLD AUTO: 0.52 10*3/MM3 (ref 0.7–3.1)
LYMPHOCYTES NFR BLD AUTO: 6.2 % (ref 19.6–45.3)
MCH RBC QN AUTO: 33.5 PG (ref 26.6–33)
MCHC RBC AUTO-ENTMCNC: 32 G/DL (ref 31.5–35.7)
MCV RBC AUTO: 104.9 FL (ref 79–97)
MONOCYTES # BLD AUTO: 0.48 10*3/MM3 (ref 0.1–0.9)
MONOCYTES NFR BLD AUTO: 5.7 % (ref 5–12)
NEUTROPHILS NFR BLD AUTO: 7.4 10*3/MM3 (ref 1.7–7)
NEUTROPHILS NFR BLD AUTO: 87.5 % (ref 42.7–76)
NITRITE UR QL STRIP: NEGATIVE
PH UR STRIP.AUTO: 5.5 [PH] (ref 5–8)
PLATELET # BLD AUTO: 123 10*3/MM3 (ref 140–450)
PMV BLD AUTO: 11 FL (ref 6–12)
POTASSIUM SERPL-SCNC: 5.1 MMOL/L (ref 3.5–5.2)
PROT SERPL-MCNC: 5.5 G/DL (ref 6–8.5)
PROT UR QL STRIP: NEGATIVE
RBC # BLD AUTO: 3.49 10*6/MM3 (ref 4.14–5.8)
RBC # UR STRIP: ABNORMAL /HPF
REF LAB TEST METHOD: ABNORMAL
SODIUM SERPL-SCNC: 142 MMOL/L (ref 136–145)
SP GR UR STRIP: 1.03 (ref 1–1.03)
SQUAMOUS #/AREA URNS HPF: ABNORMAL /HPF
UROBILINOGEN UR QL STRIP: ABNORMAL
WBC # UR STRIP: ABNORMAL /HPF
WBC NRBC COR # BLD: 8.45 10*3/MM3 (ref 3.4–10.8)

## 2023-01-27 PROCEDURE — G0378 HOSPITAL OBSERVATION PER HR: HCPCS

## 2023-01-27 PROCEDURE — 85025 COMPLETE CBC W/AUTO DIFF WBC: CPT | Performed by: EMERGENCY MEDICINE

## 2023-01-27 PROCEDURE — 72110 X-RAY EXAM L-2 SPINE 4/>VWS: CPT

## 2023-01-27 PROCEDURE — 63710000001 ONDANSETRON ODT 4 MG TABLET DISPERSIBLE: Performed by: EMERGENCY MEDICINE

## 2023-01-27 PROCEDURE — 81001 URINALYSIS AUTO W/SCOPE: CPT | Performed by: EMERGENCY MEDICINE

## 2023-01-27 PROCEDURE — 73502 X-RAY EXAM HIP UNI 2-3 VIEWS: CPT

## 2023-01-27 PROCEDURE — 99214 OFFICE O/P EST MOD 30 MIN: CPT | Performed by: PHYSICIAN ASSISTANT

## 2023-01-27 PROCEDURE — 99284 EMERGENCY DEPT VISIT MOD MDM: CPT

## 2023-01-27 PROCEDURE — 80053 COMPREHEN METABOLIC PANEL: CPT | Performed by: EMERGENCY MEDICINE

## 2023-01-27 RX ORDER — ACETAMINOPHEN 325 MG/1
650 TABLET ORAL EVERY 4 HOURS PRN
Status: DISCONTINUED | OUTPATIENT
Start: 2023-01-27 | End: 2023-01-31 | Stop reason: HOSPADM

## 2023-01-27 RX ORDER — ONDANSETRON 2 MG/ML
4 INJECTION INTRAMUSCULAR; INTRAVENOUS EVERY 6 HOURS PRN
Status: DISCONTINUED | OUTPATIENT
Start: 2023-01-27 | End: 2023-01-31 | Stop reason: HOSPADM

## 2023-01-27 RX ORDER — ACETAMINOPHEN 650 MG/1
650 SUPPOSITORY RECTAL EVERY 4 HOURS PRN
Status: DISCONTINUED | OUTPATIENT
Start: 2023-01-27 | End: 2023-01-31 | Stop reason: HOSPADM

## 2023-01-27 RX ORDER — CHOLECALCIFEROL (VITAMIN D3) 125 MCG
1000 CAPSULE ORAL DAILY
Status: DISCONTINUED | OUTPATIENT
Start: 2023-01-28 | End: 2023-01-31 | Stop reason: HOSPADM

## 2023-01-27 RX ORDER — HYDROCODONE BITARTRATE AND ACETAMINOPHEN 5; 325 MG/1; MG/1
1 TABLET ORAL EVERY 4 HOURS PRN
Status: DISCONTINUED | OUTPATIENT
Start: 2023-01-27 | End: 2023-01-31 | Stop reason: HOSPADM

## 2023-01-27 RX ORDER — METOPROLOL SUCCINATE 100 MG/1
100 TABLET, EXTENDED RELEASE ORAL DAILY
Status: DISCONTINUED | OUTPATIENT
Start: 2023-01-28 | End: 2023-01-31 | Stop reason: HOSPADM

## 2023-01-27 RX ORDER — HYDROCODONE BITARTRATE AND ACETAMINOPHEN 7.5; 325 MG/1; MG/1
1 TABLET ORAL ONCE
Status: COMPLETED | OUTPATIENT
Start: 2023-01-27 | End: 2023-01-27

## 2023-01-27 RX ORDER — MELATONIN
1000 DAILY
Status: DISCONTINUED | OUTPATIENT
Start: 2023-01-28 | End: 2023-01-31 | Stop reason: HOSPADM

## 2023-01-27 RX ORDER — AMLODIPINE BESYLATE 5 MG/1
5 TABLET ORAL
Status: DISCONTINUED | OUTPATIENT
Start: 2023-01-28 | End: 2023-01-31 | Stop reason: HOSPADM

## 2023-01-27 RX ORDER — ACETAMINOPHEN 160 MG/5ML
650 SOLUTION ORAL EVERY 4 HOURS PRN
Status: DISCONTINUED | OUTPATIENT
Start: 2023-01-27 | End: 2023-01-31 | Stop reason: HOSPADM

## 2023-01-27 RX ORDER — LIDOCAINE 50 MG/G
1 PATCH TOPICAL
Status: DISCONTINUED | OUTPATIENT
Start: 2023-01-27 | End: 2023-01-28

## 2023-01-27 RX ORDER — ATORVASTATIN CALCIUM 20 MG/1
20 TABLET, FILM COATED ORAL NIGHTLY
Status: DISCONTINUED | OUTPATIENT
Start: 2023-01-27 | End: 2023-01-31 | Stop reason: HOSPADM

## 2023-01-27 RX ORDER — ISOSORBIDE MONONITRATE 60 MG/1
60 TABLET, EXTENDED RELEASE ORAL DAILY
Status: DISCONTINUED | OUTPATIENT
Start: 2023-01-28 | End: 2023-01-31 | Stop reason: HOSPADM

## 2023-01-27 RX ORDER — ONDANSETRON 4 MG/1
4 TABLET, ORALLY DISINTEGRATING ORAL ONCE
Status: COMPLETED | OUTPATIENT
Start: 2023-01-27 | End: 2023-01-27

## 2023-01-27 RX ORDER — ASPIRIN 81 MG/1
81 TABLET, CHEWABLE ORAL DAILY
Status: DISCONTINUED | OUTPATIENT
Start: 2023-01-28 | End: 2023-01-31 | Stop reason: HOSPADM

## 2023-01-27 RX ORDER — LOSARTAN POTASSIUM 50 MG/1
50 TABLET ORAL DAILY
Status: DISCONTINUED | OUTPATIENT
Start: 2023-01-28 | End: 2023-01-31 | Stop reason: HOSPADM

## 2023-01-27 RX ADMIN — HYDROCODONE BITARTRATE AND ACETAMINOPHEN 1 TABLET: 7.5; 325 TABLET ORAL at 18:08

## 2023-01-27 RX ADMIN — ATORVASTATIN CALCIUM 20 MG: 20 TABLET, FILM COATED ORAL at 22:27

## 2023-01-27 RX ADMIN — ONDANSETRON 4 MG: 4 TABLET, ORALLY DISINTEGRATING ORAL at 18:08

## 2023-01-27 RX ADMIN — HYDROCODONE BITARTRATE AND ACETAMINOPHEN 1 TABLET: 5; 325 TABLET ORAL at 22:35

## 2023-01-27 RX ADMIN — LIDOCAINE 1 PATCH: 50 PATCH TOPICAL at 18:12

## 2023-01-27 NOTE — PROGRESS NOTES
Subjective   Dinesh Churchill Sr. is a 89 y.o. male who presents today for evaluation after a fall this morning.     History of Present Illness     Patient was hospitalized 1/20/2023 through 1/23/2023 for sudden onset of severe headache, shaking, Parkinson's disease, history of CVA, paroxysmal A. fib, thrombocytopenia, macrocytic anemia, cardiomyopathy, hypertension, fatigue.    I have reviewed all records including H&P, consult notes, discharge summary, imaging, and labs.  Per discharge summary- he presented to the ER due to onset of severe headache.  CTA negative for acute disease, MRI negative for acute intracranial disease with chronic changes noted, lumbar puncture did not explain infectious etiology.  He has old CVA and continues aspirin and statin.  Neurology consultation obtained who noted differential of hypertensive encephalopathy versus posterior reversible encephalopathy syndrome and cleared for discharge.  Patient with subacute rehab less than a year prior with some improvement.  They did not see indication for transition to subacute rehab at that time but with advanced age and recurrent falls reported that son is reaching a point where keeping his father at home is becoming tougher.  He was discharged home with home health PT and OT.    Son reports he was seen by physical therapy who noted he was ambulating well and did not need continued therapy.  Son states he was doing well until this morning.  He was standing in the kitchen and his son was in his room.  He reports he heard a thump and went in and his father was on his right side.  Patient has had severe pain in his low back and right hip since his fall.  He reports 10/10 pain and has had no improvement since this morning.  Pain is located in the low back and right hip.    The following portions of the patient's history were reviewed and updated as appropriate: allergies, current medications, past family history, past medical history, past social  history, past surgical history and problem list.    Review of Systems    Objective   Vitals:    01/27/23 1439   BP: 110/60   Pulse: 74   Resp: 16   Temp: 98.2 °F (36.8 °C)   SpO2: 96%     There is no height or weight on file to calculate BMI.    Physical Exam  Constitutional:       General: He is not in acute distress.     Appearance: He is well-developed.   HENT:      Head: Normocephalic and atraumatic.   Eyes:      General:         Right eye: No discharge.         Left eye: No discharge.   Pulmonary:      Effort: Pulmonary effort is normal. No respiratory distress.   Skin:     General: Skin is dry.   Neurological:      Mental Status: He is alert and oriented to person, place, and time.      Gait: Gait abnormal (Requires 2 person assist to stand and transfer to wheelchair).   Psychiatric:         Attention and Perception: He is attentive.         Speech: Speech normal.         Behavior: Behavior normal.         Assessment & Plan   Diagnoses and all orders for this visit:    1. Sudden onset of severe headache (Primary)    2. Recurrent falls    3. Weakness    4. Acute low back pain, unspecified back pain laterality, unspecified whether sciatica present    5. Right hip pain    6. History of recent fall        Assessment and Plan  Patient initially presented today for hospital follow-up, however, he had a fall this morning and is having severe low back and right hip pain.  Pain is reported at 10/10 and excruciating pain.  He was standing and reports his legs gave out and he fell.  He does have recurrent falls and had fallen prior to his last appointment but did not have injury at that time.  With his severe pain, he will need additional imaging of his lumbar spine, pelvis, and right hip.  I do believe he will require further evaluation of ambulation and recommendations.  They are agreeable to go to the ER today for further evaluation.  Patient will need hospital follow-up with me after discharge.    I spent 30 minutes  caring for Dinesh Churchill SrTerrence on this date of service. This time includes time spent by me in the following activities as necessary: preparing for the visit, reviewing tests, specialists records and previous visits, obtaining and/or reviewing a separately obtained history, performing a medically appropriate exam and/or evaluation, counseling and educating the patient, family, caregiver, referring and/or communicating with other healthcare professionals, documenting information in the medical record, independently interpreting results and communicating that information with the patient, family, caregiver, and developing a medically appropriate treatment plan with consideration of other conditions, medications, and treatments.

## 2023-01-28 ENCOUNTER — APPOINTMENT (OUTPATIENT)
Dept: MRI IMAGING | Facility: HOSPITAL | Age: 88
End: 2023-01-28
Payer: MEDICARE

## 2023-01-28 PROBLEM — S32.020A COMPRESSION FRACTURE OF L2: Status: ACTIVE | Noted: 2023-01-28

## 2023-01-28 LAB
ANION GAP SERPL CALCULATED.3IONS-SCNC: 7.3 MMOL/L (ref 5–15)
BASOPHILS # BLD AUTO: 0.02 10*3/MM3 (ref 0–0.2)
BASOPHILS NFR BLD AUTO: 0.3 % (ref 0–1.5)
BUN SERPL-MCNC: 22 MG/DL (ref 8–23)
BUN/CREAT SERPL: 31.4 (ref 7–25)
CALCIUM SPEC-SCNC: 8.7 MG/DL (ref 8.2–9.6)
CHLORIDE SERPL-SCNC: 109 MMOL/L (ref 98–107)
CO2 SERPL-SCNC: 24.7 MMOL/L (ref 22–29)
CREAT SERPL-MCNC: 0.7 MG/DL (ref 0.76–1.27)
DEPRECATED RDW RBC AUTO: 52.2 FL (ref 37–54)
EGFRCR SERPLBLD CKD-EPI 2021: 85.4 ML/MIN/1.73
EOSINOPHIL # BLD AUTO: 0.11 10*3/MM3 (ref 0–0.4)
EOSINOPHIL NFR BLD AUTO: 1.6 % (ref 0.3–6.2)
ERYTHROCYTE [DISTWIDTH] IN BLOOD BY AUTOMATED COUNT: 14 % (ref 12.3–15.4)
GLUCOSE SERPL-MCNC: 94 MG/DL (ref 65–99)
HCT VFR BLD AUTO: 34.2 % (ref 37.5–51)
HGB BLD-MCNC: 11.5 G/DL (ref 13–17.7)
IMM GRANULOCYTES # BLD AUTO: 0.02 10*3/MM3 (ref 0–0.05)
IMM GRANULOCYTES NFR BLD AUTO: 0.3 % (ref 0–0.5)
LYMPHOCYTES # BLD AUTO: 1.17 10*3/MM3 (ref 0.7–3.1)
LYMPHOCYTES NFR BLD AUTO: 17.5 % (ref 19.6–45.3)
MCH RBC QN AUTO: 34.5 PG (ref 26.6–33)
MCHC RBC AUTO-ENTMCNC: 33.6 G/DL (ref 31.5–35.7)
MCV RBC AUTO: 102.7 FL (ref 79–97)
MONOCYTES # BLD AUTO: 0.65 10*3/MM3 (ref 0.1–0.9)
MONOCYTES NFR BLD AUTO: 9.7 % (ref 5–12)
NEUTROPHILS NFR BLD AUTO: 4.73 10*3/MM3 (ref 1.7–7)
NEUTROPHILS NFR BLD AUTO: 70.6 % (ref 42.7–76)
NRBC BLD AUTO-RTO: 0.1 /100 WBC (ref 0–0.2)
PLATELET # BLD AUTO: 122 10*3/MM3 (ref 140–450)
PMV BLD AUTO: 11.9 FL (ref 6–12)
POTASSIUM SERPL-SCNC: 4.2 MMOL/L (ref 3.5–5.2)
RBC # BLD AUTO: 3.33 10*6/MM3 (ref 4.14–5.8)
SODIUM SERPL-SCNC: 141 MMOL/L (ref 136–145)
WBC NRBC COR # BLD: 6.7 10*3/MM3 (ref 3.4–10.8)

## 2023-01-28 PROCEDURE — 72148 MRI LUMBAR SPINE W/O DYE: CPT

## 2023-01-28 PROCEDURE — 80048 BASIC METABOLIC PNL TOTAL CA: CPT | Performed by: INTERNAL MEDICINE

## 2023-01-28 PROCEDURE — G0378 HOSPITAL OBSERVATION PER HR: HCPCS

## 2023-01-28 PROCEDURE — 85025 COMPLETE CBC W/AUTO DIFF WBC: CPT | Performed by: INTERNAL MEDICINE

## 2023-01-28 PROCEDURE — 99213 OFFICE O/P EST LOW 20 MIN: CPT | Performed by: NURSE PRACTITIONER

## 2023-01-28 PROCEDURE — 36415 COLL VENOUS BLD VENIPUNCTURE: CPT | Performed by: INTERNAL MEDICINE

## 2023-01-28 RX ORDER — LIDOCAINE 50 MG/G
1 PATCH TOPICAL
Status: DISCONTINUED | OUTPATIENT
Start: 2023-01-28 | End: 2023-01-28

## 2023-01-28 RX ORDER — LIDOCAINE 50 MG/G
1 PATCH TOPICAL
Status: DISCONTINUED | OUTPATIENT
Start: 2023-01-29 | End: 2023-01-31 | Stop reason: HOSPADM

## 2023-01-28 RX ADMIN — HYDROCODONE BITARTRATE AND ACETAMINOPHEN 1 TABLET: 5; 325 TABLET ORAL at 21:02

## 2023-01-28 RX ADMIN — HYDROCODONE BITARTRATE AND ACETAMINOPHEN 1 TABLET: 5; 325 TABLET ORAL at 11:51

## 2023-01-28 RX ADMIN — LIDOCAINE 1 PATCH: 50 PATCH TOPICAL at 09:17

## 2023-01-28 RX ADMIN — METOPROLOL SUCCINATE 100 MG: 100 TABLET, EXTENDED RELEASE ORAL at 09:14

## 2023-01-28 RX ADMIN — Medication 1000 MCG: at 09:14

## 2023-01-28 RX ADMIN — ISOSORBIDE MONONITRATE 60 MG: 60 TABLET, EXTENDED RELEASE ORAL at 09:14

## 2023-01-28 RX ADMIN — ASPIRIN 81 MG: 81 TABLET, CHEWABLE ORAL at 09:14

## 2023-01-28 RX ADMIN — Medication 1000 UNITS: at 09:14

## 2023-01-28 RX ADMIN — HYDROCODONE BITARTRATE AND ACETAMINOPHEN 1 TABLET: 5; 325 TABLET ORAL at 06:26

## 2023-01-28 RX ADMIN — LOSARTAN POTASSIUM 50 MG: 50 TABLET, FILM COATED ORAL at 09:14

## 2023-01-28 RX ADMIN — ATORVASTATIN CALCIUM 20 MG: 20 TABLET, FILM COATED ORAL at 21:01

## 2023-01-28 RX ADMIN — AMLODIPINE BESYLATE 5 MG: 5 TABLET ORAL at 09:14

## 2023-01-29 PROBLEM — M48.56XA PATHOLOGIC COMPRESSION FRACTURE OF LUMBAR VERTEBRA (HCC): Status: ACTIVE | Noted: 2023-01-28

## 2023-01-29 PROCEDURE — G0378 HOSPITAL OBSERVATION PER HR: HCPCS

## 2023-01-29 PROCEDURE — 97162 PT EVAL MOD COMPLEX 30 MIN: CPT

## 2023-01-29 PROCEDURE — 97530 THERAPEUTIC ACTIVITIES: CPT

## 2023-01-29 PROCEDURE — 99213 OFFICE O/P EST LOW 20 MIN: CPT | Performed by: NURSE PRACTITIONER

## 2023-01-29 RX ORDER — AMOXICILLIN 250 MG
2 CAPSULE ORAL 2 TIMES DAILY
Status: DISCONTINUED | OUTPATIENT
Start: 2023-01-29 | End: 2023-01-31 | Stop reason: HOSPADM

## 2023-01-29 RX ORDER — BISACODYL 10 MG
10 SUPPOSITORY, RECTAL RECTAL DAILY PRN
Status: DISCONTINUED | OUTPATIENT
Start: 2023-01-29 | End: 2023-01-31 | Stop reason: HOSPADM

## 2023-01-29 RX ADMIN — Medication 1000 MCG: at 09:30

## 2023-01-29 RX ADMIN — HYDROCODONE BITARTRATE AND ACETAMINOPHEN 1 TABLET: 5; 325 TABLET ORAL at 18:30

## 2023-01-29 RX ADMIN — ATORVASTATIN CALCIUM 20 MG: 20 TABLET, FILM COATED ORAL at 20:45

## 2023-01-29 RX ADMIN — METOPROLOL SUCCINATE 100 MG: 100 TABLET, EXTENDED RELEASE ORAL at 09:30

## 2023-01-29 RX ADMIN — ISOSORBIDE MONONITRATE 60 MG: 60 TABLET, EXTENDED RELEASE ORAL at 09:30

## 2023-01-29 RX ADMIN — ASPIRIN 81 MG: 81 TABLET, CHEWABLE ORAL at 09:30

## 2023-01-29 RX ADMIN — HYDROCODONE BITARTRATE AND ACETAMINOPHEN 1 TABLET: 5; 325 TABLET ORAL at 09:29

## 2023-01-29 RX ADMIN — Medication 1000 UNITS: at 09:29

## 2023-01-29 RX ADMIN — LOSARTAN POTASSIUM 50 MG: 50 TABLET, FILM COATED ORAL at 09:30

## 2023-01-29 RX ADMIN — DOCUSATE SODIUM 50MG AND SENNOSIDES 8.6MG 2 TABLET: 8.6; 5 TABLET, FILM COATED ORAL at 20:45

## 2023-01-29 RX ADMIN — AMLODIPINE BESYLATE 5 MG: 5 TABLET ORAL at 09:29

## 2023-01-29 RX ADMIN — LIDOCAINE 1 PATCH: 50 PATCH TOPICAL at 09:30

## 2023-01-30 PROCEDURE — 97166 OT EVAL MOD COMPLEX 45 MIN: CPT

## 2023-01-30 PROCEDURE — G0378 HOSPITAL OBSERVATION PER HR: HCPCS

## 2023-01-30 PROCEDURE — 97535 SELF CARE MNGMENT TRAINING: CPT

## 2023-01-30 PROCEDURE — 97530 THERAPEUTIC ACTIVITIES: CPT

## 2023-01-30 RX ADMIN — DOCUSATE SODIUM 50MG AND SENNOSIDES 8.6MG 2 TABLET: 8.6; 5 TABLET, FILM COATED ORAL at 07:56

## 2023-01-30 RX ADMIN — ISOSORBIDE MONONITRATE 60 MG: 60 TABLET, EXTENDED RELEASE ORAL at 07:55

## 2023-01-30 RX ADMIN — HYDROCODONE BITARTRATE AND ACETAMINOPHEN 1 TABLET: 5; 325 TABLET ORAL at 20:05

## 2023-01-30 RX ADMIN — DOCUSATE SODIUM 50MG AND SENNOSIDES 8.6MG 2 TABLET: 8.6; 5 TABLET, FILM COATED ORAL at 20:05

## 2023-01-30 RX ADMIN — ASPIRIN 81 MG: 81 TABLET, CHEWABLE ORAL at 07:54

## 2023-01-30 RX ADMIN — HYDROCODONE BITARTRATE AND ACETAMINOPHEN 1 TABLET: 5; 325 TABLET ORAL at 12:38

## 2023-01-30 RX ADMIN — LOSARTAN POTASSIUM 50 MG: 50 TABLET, FILM COATED ORAL at 07:54

## 2023-01-30 RX ADMIN — AMLODIPINE BESYLATE 5 MG: 5 TABLET ORAL at 07:55

## 2023-01-30 RX ADMIN — METOPROLOL SUCCINATE 100 MG: 100 TABLET, EXTENDED RELEASE ORAL at 07:56

## 2023-01-30 RX ADMIN — Medication 1000 UNITS: at 07:54

## 2023-01-30 RX ADMIN — LIDOCAINE 1 PATCH: 50 PATCH TOPICAL at 07:56

## 2023-01-30 RX ADMIN — Medication 1000 MCG: at 07:55

## 2023-01-30 RX ADMIN — ATORVASTATIN CALCIUM 20 MG: 20 TABLET, FILM COATED ORAL at 20:05

## 2023-01-31 VITALS
DIASTOLIC BLOOD PRESSURE: 66 MMHG | OXYGEN SATURATION: 94 % | TEMPERATURE: 98.3 F | WEIGHT: 175.49 LBS | SYSTOLIC BLOOD PRESSURE: 138 MMHG | BODY MASS INDEX: 22.52 KG/M2 | RESPIRATION RATE: 17 BRPM | HEIGHT: 74 IN | HEART RATE: 84 BPM

## 2023-01-31 PROCEDURE — G0378 HOSPITAL OBSERVATION PER HR: HCPCS

## 2023-01-31 PROCEDURE — 97535 SELF CARE MNGMENT TRAINING: CPT | Performed by: OCCUPATIONAL THERAPIST

## 2023-01-31 RX ORDER — HYDROCODONE BITARTRATE AND ACETAMINOPHEN 5; 325 MG/1; MG/1
1 TABLET ORAL EVERY 4 HOURS PRN
Qty: 18 TABLET | Refills: 0 | Status: SHIPPED | OUTPATIENT
Start: 2023-01-31 | End: 2023-02-03

## 2023-01-31 RX ADMIN — Medication 1000 MCG: at 07:54

## 2023-01-31 RX ADMIN — DOCUSATE SODIUM 50MG AND SENNOSIDES 8.6MG 2 TABLET: 8.6; 5 TABLET, FILM COATED ORAL at 07:56

## 2023-01-31 RX ADMIN — HYDROCODONE BITARTRATE AND ACETAMINOPHEN 1 TABLET: 5; 325 TABLET ORAL at 12:13

## 2023-01-31 RX ADMIN — LOSARTAN POTASSIUM 50 MG: 50 TABLET, FILM COATED ORAL at 07:55

## 2023-01-31 RX ADMIN — Medication 1000 UNITS: at 07:54

## 2023-01-31 RX ADMIN — ASPIRIN 81 MG: 81 TABLET, CHEWABLE ORAL at 07:56

## 2023-01-31 RX ADMIN — ISOSORBIDE MONONITRATE 60 MG: 60 TABLET, EXTENDED RELEASE ORAL at 07:55

## 2023-01-31 RX ADMIN — AMLODIPINE BESYLATE 5 MG: 5 TABLET ORAL at 07:53

## 2023-01-31 RX ADMIN — HYDROCODONE BITARTRATE AND ACETAMINOPHEN 1 TABLET: 5; 325 TABLET ORAL at 07:56

## 2023-01-31 RX ADMIN — LIDOCAINE 1 PATCH: 50 PATCH TOPICAL at 07:54

## 2023-01-31 RX ADMIN — METOPROLOL SUCCINATE 100 MG: 100 TABLET, EXTENDED RELEASE ORAL at 07:55

## 2023-01-31 RX ADMIN — BISACODYL 10 MG: 10 SUPPOSITORY RECTAL at 08:01

## 2023-02-01 NOTE — CASE MANAGEMENT/SOCIAL WORK
Case Management Discharge Note      Final Note: Patient discharged to [facility].    Provided Post Acute Provider List?: Yes  Post Acute Provider List: Nursing Home  Provided Post Acute Provider Quality & Resource List?: Yes  Post Acute Provider Quality and Resource List: Nursing Home  Delivered To: Support Person  Support Person: abimbola Garcia  Method of Delivery: In person    Selected Continued Care - Discharged on 1/31/2023 Admission date: 1/27/2023 - Discharge disposition: Skilled Nursing Facility (DC - External)    Destination Coordination complete.    Service Provider Selected Services Address Phone Fax Patient Preferred    [facility] [place] [facility] [address] [place] [id number] [phone] --          Durable Medical Equipment    No services have been selected for the patient.              Dialysis/Infusion    No services have been selected for the patient.              Home Medical Care    No services have been selected for the patient.              Therapy    No services have been selected for the patient.              Community Resources    No services have been selected for the patient.              Community & DME    No services have been selected for the patient.                Selected Continued Care - Prior Encounters Includes continued care and service providers with selected services from prior encounters from 10/29/2022 to 1/31/2023    Discharged on 1/23/2023 Admission date: 1/20/2023 - Discharge disposition: Home or Self Care    Home Medical Care     Service Provider Selected Services Address Phone Fax Patient Preferred    [facility] [address] [place] [id number] [phone] --                         Final Discharge Disposition Code: 03 - skilled nursing facility (SNF)

## 2023-02-12 PROCEDURE — 93298 REM INTERROG DEV EVAL SCRMS: CPT | Performed by: INTERNAL MEDICINE

## 2023-02-12 PROCEDURE — G2066 INTER DEVC REMOTE 30D: HCPCS | Performed by: INTERNAL MEDICINE

## 2023-02-13 ENCOUNTER — TELEPHONE (OUTPATIENT)
Dept: NEUROSURGERY | Facility: CLINIC | Age: 88
End: 2023-02-13
Payer: MEDICARE

## 2023-02-13 NOTE — TELEPHONE ENCOUNTER
Caller: BETI WITH [facility] IN [place]    Best call back number: [phone]    Patient is needing: BETI WITH [facility] IN [place] CALLED TO SCHEDULE PATIENTS HOSPITAL F/U. PER NOTE: We will re-evaluate once brace is being used and see how he does with PT.     BETI HAS REQUESTED WE CALL HER TO SCHEDULE AS PATIENT IS IN HER CARE CURRENTLY AND SHE HAS TO ARRANGE SCHEDULES FOR MULTIPLE PATIENTS. PLEASE CALL BETI TO SCHEDULE.    THANK YOU

## 2023-02-14 NOTE — TELEPHONE ENCOUNTER
Please review your hospital notes and let me know the time frame patient needs to follow-up. I looked at them but it says after PT and brace wearing.

## 2023-02-17 NOTE — TELEPHONE ENCOUNTER
BEIT FROM [employer] CALLED BACK STATING THAT THE FAMILY HAS CHANGED THEIR MIND AGAIN AND NOW THINKS THEY CAN HANDLE GETTING THE PATIENT TO THE APPT, AND WANTS THE APPT CHANGED BACK TO  3/14 @11:45 AM.    PLEASE CALL BETI BACK TO CONFIRM  [phone]

## 2023-02-20 ENCOUNTER — TELEPHONE (OUTPATIENT)
Dept: FAMILY MEDICINE CLINIC | Facility: CLINIC | Age: 88
End: 2023-02-20
Payer: MEDICARE

## 2023-02-20 NOTE — TELEPHONE ENCOUNTER
Karuna from [facility] is calling wanting a verbal to see patient. She stated that Mr. Churchill is getting discharged from a facility tomorrow.   Her phone number is [phone]

## 2023-02-27 ENCOUNTER — TELEPHONE (OUTPATIENT)
Dept: FAMILY MEDICINE CLINIC | Facility: CLINIC | Age: 88
End: 2023-02-27

## 2023-02-27 NOTE — TELEPHONE ENCOUNTER
Caller: Dinesh Churchill Sr.    Relationship to patient: Self    Best call back number: [phone]    New or established patient?  [] New  [x] Established    Date of discharge: 02/20/2023    Facility discharged from: [facility]    Diagnosis/Symptoms: BROKEN VERTEBRAE     Specialty Only: Did you see a [facility] provider?    [] Yes  [] No  If so, who?   FYI FOR RECORDS

## 2023-02-28 ENCOUNTER — OFFICE VISIT (OUTPATIENT)
Dept: FAMILY MEDICINE CLINIC | Facility: CLINIC | Age: 88
End: 2023-02-28
Payer: MEDICARE

## 2023-02-28 VITALS
BODY MASS INDEX: 21.94 KG/M2 | HEART RATE: 69 BPM | DIASTOLIC BLOOD PRESSURE: 52 MMHG | OXYGEN SATURATION: 97 % | SYSTOLIC BLOOD PRESSURE: 132 MMHG | HEIGHT: 74 IN | WEIGHT: 171 LBS | TEMPERATURE: 97.7 F

## 2023-02-28 DIAGNOSIS — R29.6 RECURRENT FALLS: Primary | ICD-10-CM

## 2023-02-28 DIAGNOSIS — M54.50 ACUTE LOW BACK PAIN, UNSPECIFIED BACK PAIN LATERALITY, UNSPECIFIED WHETHER SCIATICA PRESENT: ICD-10-CM

## 2023-02-28 DIAGNOSIS — R47.9 SPEECH DISTURBANCE, UNSPECIFIED TYPE: ICD-10-CM

## 2023-02-28 DIAGNOSIS — I10 ESSENTIAL HYPERTENSION: ICD-10-CM

## 2023-02-28 DIAGNOSIS — G20 PARKINSON'S DISEASE: ICD-10-CM

## 2023-02-28 DIAGNOSIS — R71.0 DECREASED HEMOGLOBIN: ICD-10-CM

## 2023-02-28 DIAGNOSIS — M48.56XG PATHOLOGICAL COMPRESSION FRACTURE OF LUMBAR VERTEBRA WITH DELAYED HEALING, SUBSEQUENT ENCOUNTER: ICD-10-CM

## 2023-02-28 DIAGNOSIS — R53.1 WEAKNESS: ICD-10-CM

## 2023-02-28 PROCEDURE — 99214 OFFICE O/P EST MOD 30 MIN: CPT | Performed by: PHYSICIAN ASSISTANT

## 2023-03-01 LAB
ALBUMIN SERPL-MCNC: 4.1 G/DL (ref 3.5–4.6)
ALBUMIN/GLOB SERPL: 1.9 {RATIO} (ref 1.2–2.2)
ALP SERPL-CCNC: 106 IU/L (ref 44–121)
ALT SERPL-CCNC: 9 IU/L (ref 0–44)
AST SERPL-CCNC: 14 IU/L (ref 0–40)
BASOPHILS # BLD AUTO: 0 X10E3/UL (ref 0–0.2)
BASOPHILS NFR BLD AUTO: 0 %
BILIRUB SERPL-MCNC: 1.4 MG/DL (ref 0–1.2)
BUN SERPL-MCNC: 23 MG/DL (ref 10–36)
BUN/CREAT SERPL: 27 (ref 10–24)
CALCIUM SERPL-MCNC: 8.9 MG/DL (ref 8.6–10.2)
CHLORIDE SERPL-SCNC: 107 MMOL/L (ref 96–106)
CO2 SERPL-SCNC: 23 MMOL/L (ref 20–29)
CREAT SERPL-MCNC: 0.84 MG/DL (ref 0.76–1.27)
EGFRCR SERPLBLD CKD-EPI 2021: 81 ML/MIN/1.73
EOSINOPHIL # BLD AUTO: 0.2 X10E3/UL (ref 0–0.4)
EOSINOPHIL NFR BLD AUTO: 3 %
ERYTHROCYTE [DISTWIDTH] IN BLOOD BY AUTOMATED COUNT: 14.5 % (ref 11.6–15.4)
GLOBULIN SER CALC-MCNC: 2.2 G/DL (ref 1.5–4.5)
GLUCOSE SERPL-MCNC: 108 MG/DL (ref 70–99)
HCT VFR BLD AUTO: 37.8 % (ref 37.5–51)
HGB BLD-MCNC: 12.3 G/DL (ref 13–17.7)
IMM GRANULOCYTES # BLD AUTO: 0 X10E3/UL (ref 0–0.1)
IMM GRANULOCYTES NFR BLD AUTO: 0 %
LYMPHOCYTES # BLD AUTO: 1.4 X10E3/UL (ref 0.7–3.1)
LYMPHOCYTES NFR BLD AUTO: 26 %
MCH RBC QN AUTO: 33.6 PG (ref 26.6–33)
MCHC RBC AUTO-ENTMCNC: 32.5 G/DL (ref 31.5–35.7)
MCV RBC AUTO: 103 FL (ref 79–97)
MONOCYTES # BLD AUTO: 0.4 X10E3/UL (ref 0.1–0.9)
MONOCYTES NFR BLD AUTO: 8 %
NEUTROPHILS # BLD AUTO: 3.1 X10E3/UL (ref 1.4–7)
NEUTROPHILS NFR BLD AUTO: 63 %
NRBC BLD AUTO-RTO: 1 % (ref 0–0)
PLATELET # BLD AUTO: 143 X10E3/UL (ref 150–450)
POTASSIUM SERPL-SCNC: 4.8 MMOL/L (ref 3.5–5.2)
PROT SERPL-MCNC: 6.3 G/DL (ref 6–8.5)
RBC # BLD AUTO: 3.66 X10E6/UL (ref 4.14–5.8)
SODIUM SERPL-SCNC: 143 MMOL/L (ref 134–144)
WBC # BLD AUTO: 5.1 X10E3/UL (ref 3.4–10.8)

## 2023-03-13 NOTE — PROGRESS NOTES
Subjective   History of Present Illness: Dinesh Churchill Sr. is a 89 y.o. male is here today for follow-up L2 compression fracture. MRI lumbar 1/28/23 and XR lumbar 1/27/23. He has no new complaints or concerns. He does still report back pain but this is a chronic issue and is tolerable. He is still wearing TLSO brace.    The following portions of the patient's history were reviewed and updated as appropriate: allergies, current medications, past family history, past medical history, past social history, past surgical history and problem list.    Past Medical History:   Diagnosis Date   • Abnormal ECG    • Abnormal MRI    • Acute frontal sinusitis    • Arthritis    • Chronic fatigue    • Coronary artery disease    • Dizziness    • Head injury    • Headache 1/21/2023   • Hearing aid worn     left   • Hearing loss    • Hyperlipidemia    • Hypertension    • Kidney stones    • Macrocytosis    • Neoplasm of skin    • Osteoporosis    • Prediabetes    • Vitamin D deficiency         Past Surgical History:   Procedure Laterality Date   • CARDIAC CATHETERIZATION N/A 05/22/2017    Procedure: Left Heart Cath;  Surgeon: Maninder Quezada MD;  Location: [facility] CATH INVASIVE LOCATION;  Service:    • CARDIAC CATHETERIZATION N/A 05/23/2017    Procedure: Stent BMS coronary;  Surgeon: Maninder Quezada MD;  Location: [facility] CATH INVASIVE LOCATION;  Service:    • CARDIAC CATHETERIZATION N/A 08/08/2019    Procedure: Left Heart Cath;  Surgeon: Maninder Quezada MD;  Location[facility] CATH INVASIVE LOC[facility];  Service: Cardiology   • CARDIAC CATHETERIZATION N/A 08/08/2019    Procedure: Left ventriculography;  Surgeon: Maninder Quezada MD;  Location: [facility] CATH INVASIVE LOCATION;  Service: Cardiology   • CARDIAC CATHETERIZATION N/A 08/08/2019    Procedure: Coronary angiography;  Surgeon: Maninder Quezada MD;  Location: [facility] CATH INVASIVE LOCATION;  Service: Cardiology   • CARDIAC CATHETERIZATION N/A 08/20/2019     Procedure: CORONARY ANGIOGRAPHY;  Surgeon: Maninder Quezada MD;  Location:  [facility] CATH INVASIVE LOCATION;  Service: Cardiovascular   • CARDIAC CATHETERIZATION N/A 08/20/2019    Procedure: Stent BMS coronary;  Surgeon: Maninder Quezada MD;  Location:  [facility] CATH INVASIVE LOCATION;  Service: Cardiovascular   • CARDIAC ELECTROPHYSIOLOGY PROCEDURE N/A 11/08/2019    Procedure: Loop insertion;  Surgeon: Maninder Quezada MD;  [facility];  Service: Cardiovascular   • HERNIA REPAIR      x 2   • CA RT/LT HEART CATHETERS N/A 05/23/2017    Procedure: Percutaneous Coronary Intervention;  Surgeon: Maninder Quezada MD;  [facility];  Service: Cardiovascular          Current Outpatient Medications:   •  acetaminophen (TYLENOL) 500 MG tablet, Take 1 tablet by mouth 4 (Four) Times a Day., Disp: , Rfl:   •  amLODIPine (NORVASC) 5 MG tablet, TAKE 1 TABLET BY MOUTH DAILY, Disp: 30 tablet, Rfl: 2  •  aspirin 81 MG chewable tablet, Chew 1 tablet Daily., Disp: , Rfl:   •  atorvastatin (LIPITOR) 20 MG tablet, TAKE 1 TABLET BY MOUTH EVERY NIGHT, Disp: 30 tablet, Rfl: 2  •  Cholecalciferol (VITAMIN D-3) 1000 UNITS capsule, Take 1,000 Units by mouth Daily., Disp: , Rfl:   •  Cyanocobalamin (B-12) 1000 MCG lozenge, DISSOLVE 1 LOZENGE BY MOUTH UNDER THE TONGUE EVERY OTHER DAY, Disp: 30 lozenge, Rfl: 4  •  Elastic Bandages & Supports (LUMBAR BACK BRACE/SUPPORT PAD) misc, Use for lifting/ strenuous exercise., Disp: 1 each, Rfl: 0  •  isosorbide mononitrate (IMDUR) 60 MG 24 hr tablet, TAKE 1 TABLET BY MOUTH DAILY, Disp: 90 tablet, Rfl: 1  •  losartan (COZAAR) 50 MG tablet, TAKE 1 TABLET BY MOUTH DAILY, Disp: 90 tablet, Rfl: 1  •  metoprolol succinate XL (TOPROL-XL) 100 MG 24 hr tablet, TAKE 1 TABLET BY MOUTH DAILY, Disp: 30 tablet, Rfl: 2     No Known Allergies     Social History     Socioeconomic History   • Marital status:    Tobacco Use   • Smoking status:  "Never     Passive exposure: Never   • Smokeless tobacco: Never   Vaping Use   • Vaping Use: Never used   Substance and Sexual Activity   • Alcohol use: No     Comment: quit 30 years ago    • Drug use: No   • Sexual activity: Defer        Family History   Problem Relation Age of Onset   • Arthritis Son    • Cancer Sister    • Diabetes Son         Review of Systems   Gastrointestinal: Negative for constipation.   Genitourinary: Negative for difficulty urinating and enuresis.   Musculoskeletal: Positive for back pain. Negative for gait problem.   Neurological: Negative for weakness and numbness.   Psychiatric/Behavioral: Negative for sleep disturbance.       Objective     Vitals:    03/14/23 1124   BP: 126/62   Weight: 77.6 kg (171 lb)   Height: 188 cm (74.02\")     Body mass index is 21.94 kg/m².      Physical Exam  Vitals and nursing note reviewed.   HENT:      Head: Normocephalic and atraumatic.   Eyes:      Extraocular Movements: Extraocular movements intact.      Pupils: Pupils are equal, round, and reactive to light.   Neurological:      Mental Status: He is alert and oriented to person, place, and time.      Cranial Nerves: No cranial nerve deficit.      Sensory: No sensory deficit.      Motor: Motor strength is normal. No weakness.   Psychiatric:         Mood and Affect: Mood normal.         Behavior: Behavior normal.       Neurologic Exam     Mental Status   Oriented to person, place, and time.     Cranial Nerves     CN III, IV, VI   Pupils are equal, round, and reactive to light.    Motor Exam   Muscle bulk: normal  Overall muscle tone: normal    Strength   Strength 5/5 throughout.     Sensory Exam   Light touch normal.           Assessment & Plan   Independent Review of Radiographic Studies:      I personally reviewed the images from the following studies.    MRI lumbar spine 1/28/2023: L2 compression fracture with 30% loss of height.  No retropulsion.    XR lumbar spine 1/27/2023: Same as above    Medical " Decision Making:      This is a 89-year-old male who is seen today after hospitalization with acute L2 compression fracture following fall.      · Since being home, pain has been apparent but tolerable.  He is current with physical therapy.  Pain is controlled with Tylenol only.    Diagnoses and all orders for this visit:    1. Compression fracture of L2 vertebra with routine healing (Primary)      Return if symptoms worsen or fail to improve.

## 2023-03-14 ENCOUNTER — OFFICE VISIT (OUTPATIENT)
Dept: NEUROSURGERY | Facility: CLINIC | Age: 88
End: 2023-03-14
Payer: MEDICARE

## 2023-03-14 VITALS
SYSTOLIC BLOOD PRESSURE: 126 MMHG | WEIGHT: 171 LBS | HEIGHT: 74 IN | BODY MASS INDEX: 21.94 KG/M2 | DIASTOLIC BLOOD PRESSURE: 62 MMHG

## 2023-03-14 DIAGNOSIS — S32.020D COMPRESSION FRACTURE OF L2 VERTEBRA WITH ROUTINE HEALING: Primary | ICD-10-CM

## 2023-03-14 PROCEDURE — 1159F MED LIST DOCD IN RCRD: CPT | Performed by: NURSE PRACTITIONER

## 2023-03-14 PROCEDURE — 99213 OFFICE O/P EST LOW 20 MIN: CPT | Performed by: NURSE PRACTITIONER

## 2023-03-14 PROCEDURE — 1160F RVW MEDS BY RX/DR IN RCRD: CPT | Performed by: NURSE PRACTITIONER

## 2023-05-04 RX ORDER — AMLODIPINE BESYLATE 5 MG/1
5 TABLET ORAL
Qty: 30 TABLET | Refills: 2 | Status: SHIPPED | OUTPATIENT
Start: 2023-05-04

## 2023-05-04 RX ORDER — METOPROLOL SUCCINATE 100 MG/1
100 TABLET, EXTENDED RELEASE ORAL DAILY
Qty: 30 TABLET | Refills: 2 | Status: SHIPPED | OUTPATIENT
Start: 2023-05-04

## 2023-05-04 RX ORDER — ATORVASTATIN CALCIUM 20 MG/1
20 TABLET, FILM COATED ORAL NIGHTLY
Qty: 30 TABLET | Refills: 2 | Status: SHIPPED | OUTPATIENT
Start: 2023-05-04

## 2023-05-08 ENCOUNTER — OFFICE VISIT (OUTPATIENT)
Dept: NEUROLOGY | Facility: CLINIC | Age: 88
End: 2023-05-08
Payer: MEDICARE

## 2023-05-08 VITALS
SYSTOLIC BLOOD PRESSURE: 126 MMHG | HEIGHT: 74 IN | HEART RATE: 70 BPM | DIASTOLIC BLOOD PRESSURE: 74 MMHG | BODY MASS INDEX: 21.95 KG/M2 | OXYGEN SATURATION: 97 %

## 2023-05-08 DIAGNOSIS — Z86.73 HISTORY OF CVA (CEREBROVASCULAR ACCIDENT): Primary | ICD-10-CM

## 2023-05-08 DIAGNOSIS — R29.898 WEAKNESS OF BOTH LEGS: ICD-10-CM

## 2023-05-08 DIAGNOSIS — G20 PARKINSON'S DISEASE: ICD-10-CM

## 2023-05-08 NOTE — ASSESSMENT & PLAN NOTE
I am going to refer him to have EMG/NCS of bilateral lower extremities for further evaluation.  Maybe due to deconditioning. Has been working with therapy which has been helpful.

## 2023-05-08 NOTE — LETTER
May 8, 2023     RAFA Mcfarland  [address] [facility] [address]    Patient: Dinesh Churchill Sr.   YOB: 1928   Date of Visit: 5/8/2023       Dear RAFA Benjamin:    Thank you for referring Dinesh Churchill to me for evaluation. Below are the relevant portions of my assessment and plan of care.    If you have questions, please do not hesitate to call me. I look forward to following Dinesh along with you.         Sincerely,        Miguel Ashley MD        CC: No Recipients    Miguel Ashley MD  05/08/23 1621  Signed  Chief Complaint  Difficulty Walking    Subjective           Dinesh Churchill Sr. presents to [facility] NEUROLOGY for   HISTORY OF PRESENT ILLNESS:    Dinesh Churchill Sr. is a 89 year old right handed man who returns to neurology clinic for follow up evaluation and treatment of stuttering in the setting of prior CVA and concerns for possible PD and falls.  He presents with his son, Dinesh, on visit today who also provides history.  He has history of stroke, headache and possible parkinson's disease and paroxysmal Afib which is not currently anticoagulated along with coronary artery disease.  He was previously on Brilinta and aspirin combination and the Brilinta was discontinued and he is just on aspirin currently.  He was previously been evaluated for syncope spell in the past which was not thought to be seizures.  He is currently living by himself and doing everything for himself without any problems.  He does have a implanted loop recorder for which he has had monitoring done everyday.  The stuttering started 2 months ago.  He had a head CT scan done which demonstrated old findings of prior strokes on CT scan done on 3/23/21.  He had an echocardiogram done in 12/2020 with EF of 54%.  I am assuming he is not in Afib based on continuous loop recording and this is why he is not on anticoagulation.  He is currently on aspirin and statin combination.  They deny  any new weakness or numbness or trouble with vision and not other issues other than stuttering.  He had a carotid ultrasound done in 2017 which looked good and I had this study repeated following his initial evaluation which did not demonstrate any significant stenosis.  He has had falls in early March 2023 but they tell me he was using a walker on the carpet in his bedroom.  He has had a L2 compression fracture but his lumbar MRI did not demonstrate any significant stenosis.  He is not having any trouble with his ADLs and taking care of himself and his son is living with him.  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  No new stroke symptoms.  He has been evaluated by physical therapy and has had some home therapy as well which they think has been helping.  His Vit B12 level was normal in 12/2022. He has had a more recent brain MRI in 1/2023 which I reviewed the images independently on his visit today and did not demonstrate any evidence of acute findings or acute stroke.  CSF not consistent with hemorrhage or infection.  It was thought that his BP being elevated may have caused the headache.     Past Medical History:   Diagnosis Date   • Abnormal ECG    • Abnormal MRI    • Acute frontal sinusitis    • Arthritis    • Chronic fatigue    • Coronary artery disease    • Dizziness    • Head injury    • Headache 1/21/2023   • Hearing aid worn     left   • Hearing loss    • Hyperlipidemia    • Hypertension    • Kidney stones    • Macrocytosis    • Neoplasm of skin    • Osteoporosis    • Prediabetes    • Vitamin D deficiency         Family History   Problem Relation Age of Onset   • Arthritis Son    • Cancer Sister    • Diabetes Son         Social History     Socioeconomic History   • Marital status:    Tobacco Use   • Smoking status: Never     Passive exposure: Never   • Smokeless tobacco: Never   Vaping Use   • Vaping Use: Never used   Substance and Sexual Activity   • Alcohol  "use: No     Comment: quit 30 years ago    • Drug use: No   • Sexual activity: Defer        I have reviewed and confirmed the accuracy of the ROS as documented by the MA/LPN/RN Miguel Ashley MD    Review of Systems   Musculoskeletal: Positive for gait problem. Negative for arthralgias, back pain, joint swelling, myalgias, neck pain, neck stiffness and bursitis.   Neurological: Positive for speech difficulty and weakness. Negative for dizziness, tremors, seizures, syncope, facial asymmetry, light-headedness, numbness, headache, memory problem and confusion.   Psychiatric/Behavioral: Negative for agitation, behavioral problems, decreased concentration, dysphoric mood, hallucinations, self-injury, sleep disturbance, suicidal ideas, negative for hyperactivity, depressed mood and stress. The patient is not nervous/anxious.         Objective    Vital Signs:   /74   Pulse 70   Ht 188 cm (74.02\")   SpO2 97%   BMI 21.95 kg/m²       PHYSICAL EXAM:  Mental Status: Speech - Normal. Cognitive function - appropriate fund of knowledge. No impairment of attention, Impairment of concentration, impairment of long term memory or impairment of short term memory.  Cranial Nerves:   II Optic: Visual acuity - Left - Normal. Right - Normal. Visual fields - Normal (to confrontation).  III Oculomotor: Pupillary constriction - Left - Normal. Right - Normal.  VII Facial: - Normal Bilaterally.  VIII Acoustic - Bilateral - Hearing normal and (Hearing tested by finger rub).   IX Glossopharyngeal / X Vagus - Normal.  XI Accessory: Trapezius - Bilateral - Normal. Sternocleidomastoid - Bilateral - Normal.  XII Hypoglossal - Bilateral - Normal.  Eye Movements: - Normal Bilaterally.  Sensory:   Light Touch: Intact - Globally.  Motor:   Bulk and Contour: - Normal.  Tone: - Normal.  Tremor: Not present.  Strength: 5/5 normal muscle strength - 5/5 in arms and 4/5 in legs.  Bradykinetic.                                  General Assessment of " Reflexes: - deep tendon reflexes are normal. Coordination - No Impairment of finger-to-nose or Impairment of rapid alternating movements. Gait -  Broad based, stooped posture, uses walker, bradykinetic.        Result Review :                Assessment and Plan    Problem List Items Addressed This Visit        Musculoskeletal and Injuries    Weakness of both legs    Current Assessment & Plan     I am going to refer him to have EMG/NCS of bilateral lower extremities for further evaluation.  Maybe due to deconditioning. Has been working with therapy which has been helpful.          Relevant Orders    EMG & Nerve Conduction Test       Neuro    Parkinson's disease (HCC)    Current Assessment & Plan     They are not interested in starting medicine due to potential side effects. Advised him to exercise and he has been working with therapy.          History of CVA (cerebrovascular accident) - Primary    Current Assessment & Plan     89 year old right handed man with history of CVA and concerns for possible PD and falls possibly due to weakness in lower extremities.  He presents with his son, Dinesh, on visit today who also provides history.  He has history of stroke, headache and possible parkinson's disease and paroxysmal Afib which is not currently anticoagulated along with coronary artery disease.  He was previously on Brilinta and aspirin combination and the Brilinta was discontinued and he is just on aspirin currently.  He was previously been evaluated for syncope spell in the past which was not thought to be seizures.  He is currently living by himself and doing everything for himself without any problems.  He does have a implanted loop recorder for which he has had monitoring done everyday.  The stuttering started 2 months ago.  He had a head CT scan done which demonstrated old findings of prior strokes on CT scan done on 3/23/21.  He had an echocardiogram done in 12/2020 with EF of 54%.  I am assuming he is not in  Afib based on continuous loop recording and this is why he is not on anticoagulation.  He is currently on aspirin and statin combination.  They deny any new weakness or numbness or trouble with vision and not other issues other than stuttering.  He had a carotid ultrasound done in 2017 which looked good and I had this study repeated following his initial evaluation which did not demonstrate any significant stenosis.  He has had falls in early March 2023 but they tell me he was using a walker on the carpet in his bedroom.  He has had a L2 compression fracture but his lumbar MRI did not demonstrate any significant stenosis.  He is not having any trouble with his ADLs and taking care of himself and his son is living with him.  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  No new stroke symptoms.  He has been evaluated by physical therapy and has had some home therapy as well which they think has been helping.  His Vit B12 level was normal in 12/2022. He has had a more recent brain MRI in 1/2023 which I reviewed the images independently on his visit today and did not demonstrate any evidence of acute findings or acute stroke.  CSF not consistent with hemorrhage or infection.  It was thought that his BP being elevated may have caused the headache.             I spent time caring for Dinesh on this date of service. This time includes time spent by me in the following activities:preparing for the visit, reviewing tests, obtaining and/or reviewing a separately obtained history, performing a medically appropriate examination and/or evaluation , counseling and educating the patient/family/caregiver, ordering medications, tests, or procedures, documenting information in the medical record, independently interpreting results and communicating that information with the patient/family/caregiver and care coordination    Follow Up   No follow-ups on file.  Patient was given instructions and  counseling regarding his condition or for health maintenance advice. Please see specific information pulled into the AVS if appropriate.

## 2023-05-08 NOTE — PROGRESS NOTES
Chief Complaint  Difficulty Walking    Subjective          Dinesh Churchill Sr. presents to [facility] NEUROLOGY for   HISTORY OF PRESENT ILLNESS:    Dinesh Churchill Sr. is a 89 year old right handed man who returns to neurology clinic for follow up evaluation and treatment of stuttering in the setting of prior CVA and concerns for possible PD and falls.  He presents with his son, Dinesh, on visit today who also provides history.  He has history of stroke, headache and possible parkinson's disease and paroxysmal Afib which is not currently anticoagulated along with coronary artery disease.  He was previously on Brilinta and aspirin combination and the Brilinta was discontinued and he is just on aspirin currently.  He was previously been evaluated for syncope spell in the past which was not thought to be seizures.  He is currently living by himself and doing everything for himself without any problems.  He does have a implanted loop recorder for which he has had monitoring done everyday.  The stuttering started 2 months ago.  He had a head CT scan done which demonstrated old findings of prior strokes on CT scan done on 3/23/21.  He had an echocardiogram done in 12/2020 with EF of 54%.  I am assuming he is not in Afib based on continuous loop recording and this is why he is not on anticoagulation.  He is currently on aspirin and statin combination.  They deny any new weakness or numbness or trouble with vision and not other issues other than stuttering.  He had a carotid ultrasound done in 2017 which looked good and I had this study repeated following his initial evaluation which did not demonstrate any significant stenosis.  He has had falls in early March 2023 but they tell me he was using a walker on the carpet in his bedroom.  He has had a L2 compression fracture but his lumbar MRI did not demonstrate any significant stenosis.  He is not having any trouble with his ADLs and taking care of himself  and his son is living with him.  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  No new stroke symptoms.  He has been evaluated by physical therapy and has had some home therapy as well which they think has been helping.  His Vit B12 level was normal in 12/2022. He has had a more recent brain MRI in 1/2023 which I reviewed the images independently on his visit today and did not demonstrate any evidence of acute findings or acute stroke.  CSF not consistent with hemorrhage or infection.  It was thought that his BP being elevated may have caused the headache.     Past Medical History:   Diagnosis Date   • Abnormal ECG    • Abnormal MRI    • Acute frontal sinusitis    • Arthritis    • Chronic fatigue    • Coronary artery disease    • Dizziness    • Head injury    • Headache 1/21/2023   • Hearing aid worn     left   • Hearing loss    • Hyperlipidemia    • Hypertension    • Kidney stones    • Macrocytosis    • Neoplasm of skin    • Osteoporosis    • Prediabetes    • Vitamin D deficiency         Family History   Problem Relation Age of Onset   • Arthritis Son    • Cancer Sister    • Diabetes Son         Social History     Socioeconomic History   • Marital status:    Tobacco Use   • Smoking status: Never     Passive exposure: Never   • Smokeless tobacco: Never   Vaping Use   • Vaping Use: Never used   Substance and Sexual Activity   • Alcohol use: No     Comment: quit 30 years ago    • Drug use: No   • Sexual activity: Defer        I have reviewed and confirmed the accuracy of the ROS as documented by the MA/LPN/RN Miguel Ashley MD    Review of Systems   Musculoskeletal: Positive for gait problem. Negative for arthralgias, back pain, joint swelling, myalgias, neck pain, neck stiffness and bursitis.   Neurological: Positive for speech difficulty and weakness. Negative for dizziness, tremors, seizures, syncope, facial asymmetry, light-headedness, numbness, headache, memory problem and  "confusion.   Psychiatric/Behavioral: Negative for agitation, behavioral problems, decreased concentration, dysphoric mood, hallucinations, self-injury, sleep disturbance, suicidal ideas, negative for hyperactivity, depressed mood and stress. The patient is not nervous/anxious.         Objective   Vital Signs:   /74   Pulse 70   Ht 188 cm (74.02\")   SpO2 97%   BMI 21.95 kg/m²       PHYSICAL EXAM:  Mental Status: Speech - Normal. Cognitive function - appropriate fund of knowledge. No impairment of attention, Impairment of concentration, impairment of long term memory or impairment of short term memory.  Cranial Nerves:   II Optic: Visual acuity - Left - Normal. Right - Normal. Visual fields - Normal (to confrontation).  III Oculomotor: Pupillary constriction - Left - Normal. Right - Normal.  VII Facial: - Normal Bilaterally.  VIII Acoustic - Bilateral - Hearing normal and (Hearing tested by finger rub).   IX Glossopharyngeal / X Vagus - Normal.  XI Accessory: Trapezius - Bilateral - Normal. Sternocleidomastoid - Bilateral - Normal.  XII Hypoglossal - Bilateral - Normal.  Eye Movements: - Normal Bilaterally.  Sensory:   Light Touch: Intact - Globally.  Motor:   Bulk and Contour: - Normal.  Tone: - Normal.  Tremor: Not present.  Strength: 5/5 normal muscle strength - 5/5 in arms and 4/5 in legs.  Bradykinetic.                                  General Assessment of Reflexes: - deep tendon reflexes are normal. Coordination - No Impairment of finger-to-nose or Impairment of rapid alternating movements. Gait -  Broad based, stooped posture, uses walker, bradykinetic.        Result Review :                 Assessment and Plan    Problem List Items Addressed This Visit        Musculoskeletal and Injuries    Weakness of both legs    Current Assessment & Plan     I am going to refer him to have EMG/NCS of bilateral lower extremities for further evaluation.  Maybe due to deconditioning. Has been working with therapy " which has been helpful.          Relevant Orders    EMG & Nerve Conduction Test       Neuro    Parkinson's disease (HCC)    Current Assessment & Plan     They are not interested in starting medicine due to potential side effects. Advised him to exercise and he has been working with therapy.          History of CVA (cerebrovascular accident) - Primary    Current Assessment & Plan     89 year old right handed man with history of CVA and concerns for possible PD and falls possibly due to weakness in lower extremities.  He presents with his son, Dinesh, on visit today who also provides history.  He has history of stroke, headache and possible parkinson's disease and paroxysmal Afib which is not currently anticoagulated along with coronary artery disease.  He was previously on Brilinta and aspirin combination and the Brilinta was discontinued and he is just on aspirin currently.  He was previously been evaluated for syncope spell in the past which was not thought to be seizures.  He is currently living by himself and doing everything for himself without any problems.  He does have a implanted loop recorder for which he has had monitoring done everyday.  The stuttering started 2 months ago.  He had a head CT scan done which demonstrated old findings of prior strokes on CT scan done on 3/23/21.  He had an echocardiogram done in 12/2020 with EF of 54%.  I am assuming he is not in Afib based on continuous loop recording and this is why he is not on anticoagulation.  He is currently on aspirin and statin combination.  They deny any new weakness or numbness or trouble with vision and not other issues other than stuttering.  He had a carotid ultrasound done in 2017 which looked good and I had this study repeated following his initial evaluation which did not demonstrate any significant stenosis.  He has had falls in early March 2023 but they tell me he was using a walker on the carpet in his bedroom.  He has had a L2  compression fracture but his lumbar MRI did not demonstrate any significant stenosis.  He is not having any trouble with his ADLs and taking care of himself and his son is living with him.  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  No new stroke symptoms.  He has been evaluated by physical therapy and has had some home therapy as well which they think has been helping.  His Vit B12 level was normal in 12/2022. He has had a more recent brain MRI in 1/2023 which I reviewed the images independently on his visit today and did not demonstrate any evidence of acute findings or acute stroke.  CSF not consistent with hemorrhage or infection.  It was thought that his BP being elevated may have caused the headache.             I spent time caring for Dinesh on this date of service. This time includes time spent by me in the following activities:preparing for the visit, reviewing tests, obtaining and/or reviewing a separately obtained history, performing a medically appropriate examination and/or evaluation , counseling and educating the patient/family/caregiver, ordering medications, tests, or procedures, documenting information in the medical record, independently interpreting results and communicating that information with the patient/family/caregiver and care coordination    Follow Up   No follow-ups on file.  Patient was given instructions and counseling regarding his condition or for health maintenance advice. Please see specific information pulled into the AVS if appropriate.

## 2023-05-08 NOTE — ASSESSMENT & PLAN NOTE
89 year old right handed man with history of CVA and concerns for possible PD and falls possibly due to weakness in lower extremities.  He presents with his son, Dinesh, on visit today who also provides history.  He has history of stroke, headache and possible parkinson's disease and paroxysmal Afib which is not currently anticoagulated along with coronary artery disease.  He was previously on Brilinta and aspirin combination and the Brilinta was discontinued and he is just on aspirin currently.  He was previously been evaluated for syncope spell in the past which was not thought to be seizures.  He is currently living by himself and doing everything for himself without any problems.  He does have a implanted loop recorder for which he has had monitoring done everyday.  The stuttering started 2 months ago.  He had a head CT scan done which demonstrated old findings of prior strokes on CT scan done on 3/23/21.  He had an echocardiogram done in 12/2020 with EF of 54%.  I am assuming he is not in Afib based on continuous loop recording and this is why he is not on anticoagulation.  He is currently on aspirin and statin combination.  They deny any new weakness or numbness or trouble with vision and not other issues other than stuttering.  He had a carotid ultrasound done in 2017 which looked good and I had this study repeated following his initial evaluation which did not demonstrate any significant stenosis.  He has had falls in early March 2023 but they tell me he was using a walker on the carpet in his bedroom.  He has had a L2 compression fracture but his lumbar MRI did not demonstrate any significant stenosis.  He is not having any trouble with his ADLs and taking care of himself and his son is living with him.  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  No new stroke symptoms.  He has been evaluated by physical therapy and has had some home therapy as well which they think has  been helping.  His Vit B12 level was normal in 12/2022. He has had a more recent brain MRI in 1/2023 which I reviewed the images independently on his visit today and did not demonstrate any evidence of acute findings or acute stroke.  CSF not consistent with hemorrhage or infection.  It was thought that his BP being elevated may have caused the headache.

## 2023-05-08 NOTE — ASSESSMENT & PLAN NOTE
They are not interested in starting medicine due to potential side effects. Advised him to exercise and he has been working with therapy.

## 2023-05-10 NOTE — TELEPHONE ENCOUNTER
----- Message from Minna Martines sent at 11/19/2019  9:02 AM EST -----  Regarding: pacemaker working  Contact: [phone]  Needs to know if pacemaker is working, call son   Muscle Hinge Flap Text: The defect edges were debeveled with a #15 scalpel blade.  Given the size, depth and location of the defect and the proximity to free margins a muscle hinge flap was deemed most appropriate.  Using a sterile surgical marker, an appropriate hinge flap was drawn incorporating the defect. The area thus outlined was incised with a #15 scalpel blade.  The skin margins were undermined to an appropriate distance in all directions utilizing iris scissors.

## 2023-07-25 ENCOUNTER — TELEPHONE (OUTPATIENT)
Dept: CARDIOLOGY | Facility: CLINIC | Age: 88
End: 2023-07-25
Payer: MEDICARE

## 2023-07-26 ENCOUNTER — LAB (OUTPATIENT)
Dept: LAB | Facility: HOSPITAL | Age: 88
End: 2023-07-26
Payer: MEDICARE

## 2023-07-26 ENCOUNTER — OFFICE VISIT (OUTPATIENT)
Dept: ONCOLOGY | Facility: CLINIC | Age: 88
End: 2023-07-26
Payer: MEDICARE

## 2023-07-26 VITALS
WEIGHT: 160.8 LBS | HEIGHT: 74 IN | OXYGEN SATURATION: 95 % | SYSTOLIC BLOOD PRESSURE: 148 MMHG | RESPIRATION RATE: 16 BRPM | HEART RATE: 64 BPM | DIASTOLIC BLOOD PRESSURE: 61 MMHG | TEMPERATURE: 97.8 F | BODY MASS INDEX: 20.64 KG/M2

## 2023-07-26 DIAGNOSIS — D75.89 MACROCYTOSIS WITHOUT ANEMIA: ICD-10-CM

## 2023-07-26 DIAGNOSIS — D75.89 MACROCYTOSIS WITHOUT ANEMIA: Primary | ICD-10-CM

## 2023-07-26 DIAGNOSIS — D69.6 THROMBOCYTOPENIA: ICD-10-CM

## 2023-07-26 DIAGNOSIS — D64.9 ANEMIA, UNSPECIFIED TYPE: ICD-10-CM

## 2023-07-26 LAB
BASOPHILS # BLD AUTO: 0.02 10*3/MM3 (ref 0–0.2)
BASOPHILS NFR BLD AUTO: 0.4 % (ref 0–1.5)
DEPRECATED RDW RBC AUTO: 68.2 FL (ref 37–54)
EOSINOPHIL # BLD AUTO: 0.18 10*3/MM3 (ref 0–0.4)
EOSINOPHIL NFR BLD AUTO: 3.3 % (ref 0.3–6.2)
ERYTHROCYTE [DISTWIDTH] IN BLOOD BY AUTOMATED COUNT: 17.4 % (ref 12.3–15.4)
HCT VFR BLD AUTO: 37.7 % (ref 37.5–51)
HGB BLD-MCNC: 12.4 G/DL (ref 13–17.7)
IMM GRANULOCYTES # BLD AUTO: 0.03 10*3/MM3 (ref 0–0.05)
IMM GRANULOCYTES NFR BLD AUTO: 0.6 % (ref 0–0.5)
LYMPHOCYTES # BLD AUTO: 1.57 10*3/MM3 (ref 0.7–3.1)
LYMPHOCYTES NFR BLD AUTO: 29 % (ref 19.6–45.3)
MCH RBC QN AUTO: 35.4 PG (ref 26.6–33)
MCHC RBC AUTO-ENTMCNC: 32.9 G/DL (ref 31.5–35.7)
MCV RBC AUTO: 107.7 FL (ref 79–97)
MONOCYTES # BLD AUTO: 0.49 10*3/MM3 (ref 0.1–0.9)
MONOCYTES NFR BLD AUTO: 9 % (ref 5–12)
NEUTROPHILS NFR BLD AUTO: 3.13 10*3/MM3 (ref 1.7–7)
NEUTROPHILS NFR BLD AUTO: 57.7 % (ref 42.7–76)
NRBC BLD AUTO-RTO: 0.4 /100 WBC (ref 0–0.2)
PLATELET # BLD AUTO: 111 10*3/MM3 (ref 140–450)
PMV BLD AUTO: 12.2 FL (ref 6–12)
RBC # BLD AUTO: 3.5 10*6/MM3 (ref 4.14–5.8)
WBC NRBC COR # BLD: 5.42 10*3/MM3 (ref 3.4–10.8)

## 2023-07-26 PROCEDURE — 36415 COLL VENOUS BLD VENIPUNCTURE: CPT

## 2023-07-26 PROCEDURE — 85025 COMPLETE CBC W/AUTO DIFF WBC: CPT

## 2023-07-26 NOTE — TELEPHONE ENCOUNTER
Ok to wait until December to discuss removal    Electronically signed by CANDELARIA Wong, 07/26/23, 3:45 PM EDT.

## 2023-07-26 NOTE — PROGRESS NOTES
[facility]    CONSULTING IN BLOOD DISORDERS & CANCER      REASON FOR CONSULTATION/CHIEF COMPLAINT:     Evaluation & management for macrocytosis and thrombocytopenia                             REQUESTING PHYSICIAN: No ref. provider found  RECORDS OBTAINED:  Records of the patients history including those from the electronic medical record were reviewed and summarized in detail.    HISTORY OF PRESENT ILLNESS:    The patient is a 89 y.o. year old male with past medical history significant for CAD s/p PCI, parkinson's disease, hx CVA, HTN, dyslipidemia, hearing deficit and chronic DJD was noted to have anemia with Hb/Hct of 13.9/40.8 with MCV 99 on a routine cbc from 9/14/21. CBC also noted platelet count of 125k. Wbc & differential were within normal limits. Additional work up showed normal vit B12, folate and TSH levels.   This prompted referral to this clinic.   Patient was first seen in this clinic in 11/23/21. Accompanied by his son. Does not speak much. History is provided by his son. He state [place]. He fairly independent. Has family members & friends living nearby. Has chronic joint issues but no new or excessive fatigue, chest pain, sob or cough. No history of bleed or bruise.   Patient has history of significant alcohol use. Quit 30 yrs ago. Per son, he eats good diet including meat.     INTERIM HISTORY:  Patient returns to the clinic for a follow-up visit.  He is accompanied by his son.  Patient is extremely hard of hearing.  Most of the interview was conducted through his son.  He states patient fell earlier this year in January 2023 and had a back brace.  Patient's son stated patient is able to clean himself and go to the bathroom.  They fix their meals together.  He has been following up with his PCP and other specialist regularly.    Past Medical History:   Diagnosis Date    Abnormal ECG     Abnormal MRI     Acute frontal sinusitis     Arthritis     Chronic fatigue      Coronary artery disease     Dizziness     Head injury     Headache 01/21/2023    Hearing aid worn     left    Hearing loss     Hyperlipidemia     Hypertension     Kidney stones     Macrocytosis     Neoplasm of skin     Osteoporosis     Prediabetes     Vitamin D deficiency      Past Surgical History:   Procedure Laterality Date    CARDIAC CATHETERIZATION N/A 05/22/2017    Procedure: Left Heart Cath;  Surgeon: Maninder Quezada MD;  Location:  [facility] CATH INVASIVE [facility];  Service:     CARDIAC CATHETERIZATION N/A 05/23/2017    Procedure: Stent BMS coronary;  Surgeon: Maninder Quezada MD;  Location:  [facility];  Service:     CARDIAC CATHETERIZATION N/A 08/08/2019    Procedure: Left Heart Cath;  Surgeon: Maninder Quezada MD;  Location:  [facility];  Service: Cardiology    CARDIAC CATHETERIZATION N/A 08/08/2019    Procedure: Left ventriculography;  Surgeon: Maninder Quezada MD;  Location: [facility] CATH INVASIVE LOCATION;  Service: Cardiology    CARDIAC CATHETERIZATION N/A 08/08/2019    Procedure: Coronary angiography;  Surgeon: Maninder Quezada MD;  Location: [facility] CATH INVASIVE LOCATION;  Service: Cardiology    CARDIAC CATHETERIZATION N/A 08/20/2019    Procedure: CORONARY ANGIOGRAPHY;  Surgeon: Maninder Quezada MD;  Location: [facility] CATH INVASIVE LOCATION;  Service: Cardiovascular    CARDIAC CATHETERIZATION N/A 08/20/2019    Procedure: Stent BMS coronary;  Surgeon: Maninder Quezada MD;  Location: [facility] CATH INVASIVE LOCATION;  Service: Cardiovascular    CARDIAC ELECTROPHYSIOLOGY PROCEDURE N/A 11/08/2019    Procedure: Loop insertion;  Surgeon: Maninder Quezada MD;  Location: [facility] CATH INVASIVE LOCATION;  Service: Cardiovascular    HERNIA REPAIR      x 2    NH RT/LT HEART CATHETERS N/A 05/23/2017    Procedure: Percutaneous Coronary Intervention;  Surgeon: Maninder Quezada MD;  Location: [facility] CATH INVASIVE LOCATION;  Service:  "Cardiovascular       MEDICATIONS    Current Outpatient Medications:     acetaminophen (TYLENOL) 500 MG tablet, Take 1 tablet by mouth 4 (Four) Times a Day., Disp: , Rfl:     amLODIPine (NORVASC) 5 MG tablet, TAKE 1 TABLET BY MOUTH DAILY, Disp: 30 tablet, Rfl: 2    aspirin 81 MG chewable tablet, Chew 1 tablet Daily., Disp: , Rfl:     atorvastatin (LIPITOR) 20 MG tablet, TAKE 1 TABLET BY MOUTH EVERY NIGHT, Disp: 30 tablet, Rfl: 2    Cholecalciferol (VITAMIN D-3) 1000 UNITS capsule, Take 1,000 Units by mouth Daily., Disp: , Rfl:     Cyanocobalamin (B-12) 1000 MCG lozenge, DISSOLVE 1 LOZENGE BY MOUTH UNDER THE TONGUE EVERY OTHER DAY, Disp: 30 lozenge, Rfl: 4    Elastic Bandages & Supports (LUMBAR BACK BRACE/SUPPORT PAD) misc, Use for lifting/ strenuous exercise., Disp: 1 each, Rfl: 0    isosorbide mononitrate (IMDUR) 60 MG 24 hr tablet, TAKE 1 TABLET BY MOUTH DAILY, Disp: 90 tablet, Rfl: 1    losartan (COZAAR) 50 MG tablet, TAKE 1 TABLET BY MOUTH DAILY, Disp: 90 tablet, Rfl: 1    metoprolol succinate XL (TOPROL-XL) 100 MG 24 hr tablet, TAKE 1 TABLET BY MOUTH DAILY, Disp: 30 tablet, Rfl: 2    ALLERGIES:   No Known Allergies    SOCIAL HISTORY:       Social History     Socioeconomic History    Marital status:    Tobacco Use    Smoking status: Never     Passive exposure: Never    Smokeless tobacco: Never   Vaping Use    Vaping Use: Never used   Substance and Sexual Activity    Alcohol use: No     Comment: quit 30 years ago     Drug use: No    Sexual activity: Not Currently     Partners: Male         FAMILY HISTORY:  Family History   Problem Relation Age of Onset    Arthritis Son     Cancer Sister     Diabetes Son        REVIEW OF SYSTEMS:  Per HPI       Vitals:    07/26/23 1442   BP: 148/61   Pulse: 64   Resp: 16   Temp: 97.8 °F (36.6 °C)   TempSrc: Temporal   SpO2: 95%   Weight: 72.9 kg (160 lb 12.8 oz)   Height: 188 cm (74.02\")   PainSc:   7   PainLoc: Generalized  Comment: knees and back         7/26/2023     " 2:44 PM   Current Status   ECOG score 1      PHYSICAL EXAM:    CONSTITUTIONAL:  Vital signs reviewed.  No distress, looks comfortable. Elderly male  EYES:  Conjunctiva and lids unremarkable.   EARS,NOSE,MOUTH,THROAT:  Ears and nose appear unremarkable. .  RESPIRATORY:  Normal respiratory effort.  Lungs clear to auscultation bilaterally.  CARDIOVASCULAR:  Normal S1, S2.  No murmurs rubs or gallops.  No significant lower extremity edema.  GASTROINTESTINAL: Abdomen appears unremarkable.  Nondistended  LYMPHATIC:  No cervical, supraclavicular lymphadenopathy.  NEURO:Hard of hearing.  No other focal deficits.  Appears to have equal strength all 4 extremities.  MUSCULOSKELETAL:  Unremarkable digits/nails.  No cyanosis or clubbing.  No apparent joint deformities.  SKIN:  Warm.  No rashes.  PSYCHIATRIC:   Normal mood and affect. Did not speak much during encounter     RECENT LABS:        Lab on 07/26/2023   Component Date Value Ref Range Status    WBC 07/26/2023 5.42  3.40 - 10.80 10*3/mm3 Final    RBC 07/26/2023 3.50 (L)  4.14 - 5.80 10*6/mm3 Final    Hemoglobin 07/26/2023 12.4 (L)  13.0 - 17.7 g/dL Final    Hematocrit 07/26/2023 37.7  37.5 - 51.0 % Final    MCV 07/26/2023 107.7 (H)  79.0 - 97.0 fL Final    MCH 07/26/2023 35.4 (H)  26.6 - 33.0 pg Final    MCHC 07/26/2023 32.9  31.5 - 35.7 g/dL Final    RDW 07/26/2023 17.4 (H)  12.3 - 15.4 % Final    RDW-SD 07/26/2023 68.2 (H)  37.0 - 54.0 fl Final    MPV 07/26/2023 12.2 (H)  6.0 - 12.0 fL Final    Platelets 07/26/2023 111 (L)  140 - 450 10*3/mm3 Final    Neutrophil % 07/26/2023 57.7  42.7 - 76.0 % Final    Lymphocyte % 07/26/2023 29.0  19.6 - 45.3 % Final    Monocyte % 07/26/2023 9.0  5.0 - 12.0 % Final    Eosinophil % 07/26/2023 3.3  0.3 - 6.2 % Final    Basophil % 07/26/2023 0.4  0.0 - 1.5 % Final    Immature Grans % 07/26/2023 0.6 (H)  0.0 - 0.5 % Final    Neutrophils, Absolute 07/26/2023 3.13  1.70 - 7.00 10*3/mm3 Final    Lymphocytes, Absolute 07/26/2023 1.57  0.70 -  3.10 10*3/mm3 Final    Monocytes, Absolute 07/26/2023 0.49  0.10 - 0.90 10*3/mm3 Final    Eosinophils, Absolute 07/26/2023 0.18  0.00 - 0.40 10*3/mm3 Final    Basophils, Absolute 07/26/2023 0.02  0.00 - 0.20 10*3/mm3 Final    Immature Grans, Absolute 07/26/2023 0.03  0.00 - 0.05 10*3/mm3 Final    nRBC 07/26/2023 0.4 (H)  0.0 - 0.2 /100 WBC Final         ASSESSMENT:   is a 89 y/o WM with past medical history significant for CAD s/p PCI, parkinson's disease, hx CVA, HTN, dyslipidemia, hearing deficit and chronic DJD comes for macrocytosis and thrombocytopenia evaluation & management.     # Macrocytosis without anemia:  A routine CBC from 9/14/21 had noted Hb/Hct of 13.9/40.8 with MCV 99 fl. The CBC also noted platelet count of 125k. Wbc & differential were within normal limits. Additional work up showed normal vit B12, folate and TSH levels.   On chart review, patient had macrocytosis in  fl range at least since 2017.   Peripheral smear review performed by me in clinic shows macrocytic RBCs, no increased schistocytes, no blasts or increased immature cells, low platelets.   Given history of significant alcohol use and elevated total bilirubin, chronic macrocytosis without anemia is likely liver disease related.   MDS is in differential, however, chronic stability over last 4-5 years argues against neoplastic process such as MDS/AML.   CBC from March 2022, July 2022 and July 2023 showed overall stable MCV.  Given the stability over the last few years, will continue to monitor.  Patient is 89 years old and has difficulty coming for various doctors appointments, advised to maintain follow-up with his PCP and return to this clinic only when significant worsening in his MCV or blood counts are noted.    # Thrombocytopenia:  Chronic mild thrombocytopenia in 110-130k range at least since 2017.   No history of bleed or bruise.   Given history of alcohol use and elevated total bilirubin, it's is likely liver  disease related.   Repeat labs from March and July 2022 overall show stable to slightly improved platelet count.   CBC from July 2023 show overall stable platelet count of 111,000.    # CAD s/p PCI - F/u PCP, cardiology  # Parkinson' disease - F/u neurology    PLAN:   - Macrocytosis and thrombocytopenia likely liver disease related. MDS remains in differential.   -Given the stability over the last few years, will continue to monitor.    - Patient is 89 years old and has difficulty coming for various doctors appointments, Advised to maintain follow-up with his PCP and return to this clinic only when significant worsening in his MCV or blood counts are noted.  Patient/son agreeable.     Orders Placed This Encounter   Procedures    CBC & Differential     Standing Status:   Future     Standing Expiration Date:   9/28/2024     Order Specific Question:   Manual Differential     Answer:   No     Order Specific Question:   Release to patient     Answer:   Routine Release   Total time spent during this patient encounter is 35 minutes. The total time spent with the patient includes at least one or more of the following: preparing to see the patient by reviewing of tests, prior notes or other relevant information, performing appropriate independent examination & evaluation, counseling, ordering of medications, tests or procedures, communicating with other healthcare professionals, when appropriate to coordinate care, documenting clinic information in the electronic medical records or other health records, independently interpreting results of tests and communicating the results to the patient/family or caregiver.

## 2023-07-27 RX ORDER — LOSARTAN POTASSIUM 50 MG/1
50 TABLET ORAL DAILY
Qty: 90 TABLET | Refills: 1 | Status: SHIPPED | OUTPATIENT
Start: 2023-07-27

## 2023-07-27 RX ORDER — ISOSORBIDE MONONITRATE 60 MG/1
60 TABLET, EXTENDED RELEASE ORAL DAILY
Qty: 90 TABLET | Refills: 1 | Status: SHIPPED | OUTPATIENT
Start: 2023-07-27

## 2023-08-17 PROCEDURE — G2066 INTER DEVC REMOTE 30D: HCPCS | Performed by: INTERNAL MEDICINE

## 2023-08-17 PROCEDURE — 93298 REM INTERROG DEV EVAL SCRMS: CPT | Performed by: INTERNAL MEDICINE

## 2023-08-25 ENCOUNTER — HOSPITAL ENCOUNTER (OUTPATIENT)
Dept: INFUSION THERAPY | Facility: HOSPITAL | Age: 88
Discharge: HOME | End: 2023-08-25
Payer: MEDICARE

## 2023-08-25 DIAGNOSIS — R29.898 WEAKNESS OF BOTH LEGS: ICD-10-CM

## 2023-08-25 PROCEDURE — 95909 NRV CNDJ TST 5-6 STUDIES: CPT

## 2023-08-25 PROCEDURE — 95886 MUSC TEST DONE W/N TEST COMP: CPT

## 2023-08-25 PROCEDURE — 95886 MUSC TEST DONE W/N TEST COMP: CPT | Performed by: PSYCHIATRY & NEUROLOGY

## 2023-08-25 PROCEDURE — 95910 NRV CNDJ TEST 7-8 STUDIES: CPT | Performed by: PSYCHIATRY & NEUROLOGY

## 2023-08-25 NOTE — PROCEDURES
EMG and Nerve Conduction Studies    I.      Instrument used: Neuromax 1002  II.     Please see data sheets for tabular summary of NCS and details on methods, temperatures and lab standards.   III.    EMG muscles tested for upper extremity studies include the deltoid, biceps, triceps, pronator teres, extensor digitorum communis, first dorsal interosseous and abductor pollicis brevis.    IV.   EMG muscles tested for lower extremity studies include the vastus lateralis, tibialis anterior, peroneus longus, medial gastrocnemius and extensor digitorum brevis.    V.    Additional muscles tested as needed.  Paraspinal muscles tested as needed.   VI.   Please see data sheets for tabular summary of EMG findings.   VII. The complete report includes the data sheets.      Indication: Numbness in the feet, balance trouble and legs giving out right greater than left  History: 89-year-old male with falls related to weakness in legs right more so than left.  Numbness in the feet also present.  Denies diabetes or thyroid disease.  Chronic back pain without radicular pain.  Has had falls with some potential compression fractures or disc bulges.      Ht: 188 cm  Wt: 72.9 kg; BMI 20.64  HbA1C:   Lab Results   Component Value Date    HGBA1C 5.6 06/14/2023     TSH:   Lab Results   Component Value Date    TSH 2.390 06/14/2023       Technical summary:  Nerve conduction studies were obtained in the right leg with 1 comparison on the left.  Skin temperatures were cold and temperature correction was used if indicated.  Needle examination was obtained on selected muscles in both legs.    Results:  1.  Normal right sural sensory distal latency with temperature correction with low amplitude of 3 æV.  2.  Normal superficial peroneal sensory distal latencies with low amplitudes bilaterally at 4.7 æV on the right and 2.3 æV on the left.  3.  Slow right peroneal motor conduction velocity below the knee at 36.5 m/s with a normal velocity in the short  segment across the fibular head.  Normal distal latency with very low amplitude of 0.208 mV from ankle stimulation.  4.  Slow right tibial motor velocity at 32.2 m/s with a normal distal latency but very low amplitude of 0.667 mV from ankle stimulation.  5.  Needle examination of selected muscles in both legs showed fibrillations and positive sharp waves in the right medial gastrocnemius.  There was a mild increased number of large motor units in that muscle and in the right peroneus longus with increased firing rate and reduced interference pattern.  The right extensor digitorum brevis showed rare motor units with very rapid firing rates and reduced interference pattern.  The left extensor digitorum brevis showed an increased number of large motor units increased firing rate and reduced interference pattern.  Remaining muscles tested showed normal insertional activities, motor units and recruitment patterns.  Lumbar paraspinals at L5 showed positive sharp waves on the right but not on the left    Impression:  Abnormal study showing moderate to severe peripheral neuropathy with significant axonal loss on the motor studies of the right leg.  In addition there are some changes that could go along with a right S1 radiculopathy with root level involvement present versus root level involvement of the peripheral neuropathy.  Study results were discussed with patient    Bertin Naik M.D.              Dictated utilizing Dragon dictation.

## 2023-08-29 ENCOUNTER — OFFICE VISIT (OUTPATIENT)
Dept: NEUROLOGY | Facility: CLINIC | Age: 88
End: 2023-08-29
Payer: MEDICARE

## 2023-08-29 VITALS
DIASTOLIC BLOOD PRESSURE: 82 MMHG | HEART RATE: 61 BPM | OXYGEN SATURATION: 92 % | RESPIRATION RATE: 18 BRPM | SYSTOLIC BLOOD PRESSURE: 130 MMHG | BODY MASS INDEX: 20.53 KG/M2 | WEIGHT: 160 LBS

## 2023-08-29 DIAGNOSIS — Z86.73 HISTORY OF CVA (CEREBROVASCULAR ACCIDENT): Primary | ICD-10-CM

## 2023-08-29 DIAGNOSIS — G60.9 IDIOPATHIC PERIPHERAL NEUROPATHY: ICD-10-CM

## 2023-08-29 DIAGNOSIS — G20 PARKINSON'S DISEASE: ICD-10-CM

## 2023-08-29 NOTE — ASSESSMENT & PLAN NOTE
89 year old right handed man with prior CVA and concerns for possible PD and weakness in both legs and falls.  He presents with his son, Dinesh, on visit today who also provides history.  He has history of stroke, headache and possible parkinson's disease and paroxysmal Afib which is not currently anticoagulated along with coronary artery disease.  He was previously on Brilinta and aspirin combination and the Brilinta was discontinued and he is just on aspirin currently.  He was previously been evaluated for syncope spell in the past which was not thought to be seizures.  He is currently living by himself and doing everything for himself without any problems.  He does have a implanted loop recorder for which he has had monitoring done everyday.  The stuttering started 2 months ago.  He had a head CT scan done which demonstrated old findings of prior strokes on CT scan done on 3/23/21.  He had an echocardiogram done in 12/2020 with EF of 54%.  I am assuming he is not in Afib based on continuous loop recording and this is why he is not on anticoagulation.  He is currently on aspirin and statin combination.  They deny any new weakness or numbness or trouble with vision and not other issues other than stuttering.  He had a carotid ultrasound done in 2017 which looked good and I had this study repeated following his initial evaluation which did not demonstrate any significant stenosis.  He has had falls in early March 2023 but they tell me he was using a walker on the carpet in his bedroom.  He has had a L2 compression fracture but his lumbar MRI did not demonstrate any significant stenosis.  He is not having any trouble with his ADLs and taking care of himself and his son is living with him.  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  No new stroke symptoms.  He has been evaluated by physical therapy and has had some home therapy as well which they think has been helping.  His Vit B12  level was normal in 12/2022. He has had a more recent brain MRI in 1/2023 which did not demonstrate any evidence of acute findings or acute stroke.  CSF not consistent with hemorrhage or infection.  It was thought that his BP being elevated may have caused the headache.  His BP looks better today.

## 2023-08-29 NOTE — PROGRESS NOTES
Chief Complaint  History of CVA (cerebrovascular accident) and peripheral neuropathy    Subjective          Dinesh Churchill Sr. presents to [facility] NEUROLOGY for   HISTORY OF PRESENT ILLNESS:    Dinesh Churchill Sr. is a 89 year old right handed man who returns to neurology clinic for follow up evaluation and treatment of stuttering in the setting of prior CVA and concerns for possible PD and weakness in both legs and falls.  He presents with his son, Dinesh, on visit today who also provides history.  He has history of stroke, headache and possible parkinson's disease and paroxysmal Afib which is not currently anticoagulated along with coronary artery disease.  He was previously on Brilinta and aspirin combination and the Brilinta was discontinued and he is just on aspirin currently.  He was previously been evaluated for syncope spell in the past which was not thought to be seizures.  He is currently living by himself and doing everything for himself without any problems.  He does have a implanted loop recorder for which he has had monitoring done everyday.  The stuttering started 2 months ago.  He had a head CT scan done which demonstrated old findings of prior strokes on CT scan done on 3/23/21.  He had an echocardiogram done in 12/2020 with EF of 54%.  I am assuming he is not in Afib based on continuous loop recording and this is why he is not on anticoagulation.  He is currently on aspirin and statin combination.  They deny any new weakness or numbness or trouble with vision and not other issues other than stuttering.  He had a carotid ultrasound done in 2017 which looked good and I had this study repeated following his initial evaluation which did not demonstrate any significant stenosis.  He has had falls in early March 2023 but they tell me he was using a walker on the carpet in his bedroom.  He has had a L2 compression fracture but his lumbar MRI did not demonstrate any significant  stenosis.  He is not having any trouble with his ADLs and taking care of himself and his son is living with him.  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  No new stroke symptoms.  He has been evaluated by physical therapy and has had some home therapy as well which they think has been helping.  His Vit B12 level was normal in 12/2022. He has had a more recent brain MRI in 1/2023 which did not demonstrate any evidence of acute findings or acute stroke.  CSF not consistent with hemorrhage or infection.  It was thought that his BP being elevated may have caused the headache.  He had an EMG/NCS since last visit which was an abnormal study showing moderate to severe peripheral neuropathy with significant axonal loss on the motor studies of the right leg. In addition there are some changes that could go along with a right S1 radiculopathy with root level involvement present versus root level involvement of the peripheral neuropathy.  He had had normal HgbA1c, TSH and globulin.  He had a lumbar spine MRI which demonstrated multilevel lumbar spondyloarthropathy.     Past Medical History:   Diagnosis Date    Abnormal ECG     Abnormal MRI     Acute frontal sinusitis     Arthritis     Chronic fatigue     Coronary artery disease     Difficulty walking     Dizziness     Head injury     Headache 01/21/2023    Hearing aid worn     left    Hearing loss     Hyperlipidemia     Hypertension     Idiopathic peripheral neuropathy 8/29/2023    Kidney stones     Macrocytosis     Neoplasm of skin     Osteoporosis     Prediabetes     Vitamin D deficiency         Family History   Problem Relation Age of Onset    Arthritis Son     Cancer Sister     Diabetes Son         Social History     Socioeconomic History    Marital status:    Tobacco Use    Smoking status: Never     Passive exposure: Never    Smokeless tobacco: Never   Vaping Use    Vaping Use: Never used   Substance and Sexual Activity     Alcohol use: No     Comment: quit 30 years ago     Drug use: No    Sexual activity: Not Currently     Partners: Male        I have reviewed and confirmed the accuracy of the ROS as documented by the MA/LPN/RN Miguel Ashley MD   Review of Systems   Neurological:  Positive for weakness. Negative for dizziness, tremors, seizures, syncope, facial asymmetry, speech difficulty, numbness, headache, memory problem and confusion.   Psychiatric/Behavioral:  Negative for agitation, behavioral problems, decreased concentration, dysphoric mood, hallucinations, self-injury, sleep disturbance, suicidal ideas, negative for hyperactivity, depressed mood and stress. The patient is not nervous/anxious.       Objective   Vital Signs:   /82   Pulse 61   Resp 18   Wt 72.6 kg (160 lb)   SpO2 92%   BMI 20.53 kg/mý       PHYSICAL EXAM:    General   Mental Status - Alert. General Appearance - Well developed, Well groomed, Oriented and Cooperative.        Head and Neck  Head - normocephalic, atraumatic with no lesions or palpable masses.  Neck    Global Assessment - supple.       Eye   Sclera/Conjunctiva - Bilateral - Normal.    ENMT  Mouth and Throat   Oral Cavity/Oropharynx: Oropharynx - the soft palate,uvula and tongue are normal in appearance.    Chest and Lung Exam   Chest - lung clear to auscultation bilaterally.    Cardiovascular   Cardiovascular examination reveals  - normal heart sounds, regular rate and rhythm.    Neurologic   Mental Status: Speech - Normal. Cognitive function - appropriate fund of knowledge. No impairment of attention, Impairment of concentration, impairment of long term memory or impairment of short term memory.  Cranial Nerves:   II Optic: Visual acuity - Left - Normal. Right - Normal. Visual fields - Normal (to confrontation).  III Oculomotor: Pupillary constriction - Left - Normal. Right - Normal.  VII Facial: - Normal Bilaterally.  VIII Acoustic - Bilateral - Hearing normal and (Hearing tested by  finger rub).   IX Glossopharyngeal / X Vagus - Normal.  XI Accessory: Trapezius - Bilateral - Normal. Sternocleidomastoid - Bilateral - Normal.  XII Hypoglossal - Bilateral - Normal.  Eye Movements: - Normal Bilaterally.  Sensory:   Light Touch: Intact - Globally, reduced in his feet.   Motor:   Bulk and Contour: - Normal.  Tone: - Normal.  Tremor: Not present.  Strength: 5/5 normal muscle strength - 5/5 in arms and 4/5 in legs.  Bradykinetic.                                  General Assessment of Reflexes: - deep tendon reflexes are normal. Coordination - No Impairment of finger-to-nose or Impairment of rapid alternating movements. Gait -  Broad based, stooped posture, uses walker, bradykinetic.      Result Review :                 Assessment and Plan    Problem List Items Addressed This Visit          Neuro    Parkinson's disease    Current Assessment & Plan     They are not interested in starting medicine at this time due to concerns for potential side effects.  Advised him to exercise as much as possible. They deny any falls with injury recently.  His son is supporting him and preventing falls.          History of CVA (cerebrovascular accident) - Primary    Current Assessment & Plan     89 year old right handed man with prior CVA and concerns for possible PD and weakness in both legs and falls.  He presents with his son, Dinesh, on visit today who also provides history.  He has history of stroke, headache and possible parkinson's disease and paroxysmal Afib which is not currently anticoagulated along with coronary artery disease.  He was previously on Brilinta and aspirin combination and the Brilinta was discontinued and he is just on aspirin currently.  He was previously been evaluated for syncope spell in the past which was not thought to be seizures.  He is currently living by himself and doing everything for himself without any problems.  He does have a implanted loop recorder for which he has had  monitoring done everyday.  The stuttering started 2 months ago.  He had a head CT scan done which demonstrated old findings of prior strokes on CT scan done on 3/23/21.  He had an echocardiogram done in 12/2020 with EF of 54%.  I am assuming he is not in Afib based on continuous loop recording and this is why he is not on anticoagulation.  He is currently on aspirin and statin combination.  They deny any new weakness or numbness or trouble with vision and not other issues other than stuttering.  He had a carotid ultrasound done in 2017 which looked good and I had this study repeated following his initial evaluation which did not demonstrate any significant stenosis.  He has had falls in early March 2023 but they tell me he was using a walker on the carpet in his bedroom.  He has had a L2 compression fracture but his lumbar MRI did not demonstrate any significant stenosis.  He is not having any trouble with his ADLs and taking care of himself and his son is living with him.  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  No new stroke symptoms.  He has been evaluated by physical therapy and has had some home therapy as well which they think has been helping.  His Vit B12 level was normal in 12/2022. He has had a more recent brain MRI in 1/2023 which did not demonstrate any evidence of acute findings or acute stroke.  CSF not consistent with hemorrhage or infection.  It was thought that his BP being elevated may have caused the headache.  His BP looks better today.          Idiopathic peripheral neuropathy    Current Assessment & Plan     He had an EMG/NCS since last visit which was an abnormal study showing moderate to severe peripheral neuropathy with significant axonal loss on the motor studies of the right leg. In addition there are some changes that could go along with a right S1 radiculopathy with root level involvement present versus root level involvement of the peripheral  neuropathy.  He had had normal HgbA1c, TSH and globulin.  He had a lumbar spine MRI which demonstrated multilevel lumbar spondyloarthropathy.  I informed them with regards to these findings and advised him to be cautious when walking to help prevent any falls and injury.  They are not interested in physical therapy at this time.              I spent 21 minutes caring for Dinesh on this date of service. This time includes time spent by me in the following activities:preparing for the visit, reviewing tests, obtaining and/or reviewing a separately obtained history, performing a medically appropriate examination and/or evaluation , counseling and educating the patient/family/caregiver, documenting information in the medical record, and care coordination    Follow Up   Return if symptoms worsen or fail to improve.  Patient was given instructions and counseling regarding his condition or for health maintenance advice. Please see specific information pulled into the AVS if appropriate.

## 2023-08-29 NOTE — ASSESSMENT & PLAN NOTE
They are not interested in starting medicine at this time due to concerns for potential side effects.  Advised him to exercise as much as possible. They deny any falls with injury recently.  His son is supporting him and preventing falls.

## 2023-08-29 NOTE — ASSESSMENT & PLAN NOTE
He had an EMG/NCS since last visit which was an abnormal study showing moderate to severe peripheral neuropathy with significant axonal loss on the motor studies of the right leg. In addition there are some changes that could go along with a right S1 radiculopathy with root level involvement present versus root level involvement of the peripheral neuropathy.  He had had normal HgbA1c, TSH and globulin.  He had a lumbar spine MRI which demonstrated multilevel lumbar spondyloarthropathy.  I informed them with regards to these findings and advised him to be cautious when walking to help prevent any falls and injury.  They are not interested in physical therapy at this time.

## 2023-10-18 ENCOUNTER — TELEPHONE (OUTPATIENT)
Dept: FAMILY MEDICINE CLINIC | Facility: CLINIC | Age: 88
End: 2023-10-18

## 2023-10-18 NOTE — TELEPHONE ENCOUNTER
Caller: Dinesh Churchill Jr    Relationship: Emergency Contact    Best call back number: [phone]     What is the best time to reach you: ANY    Who are you requesting to speak with (clinical staff, provider,  specific staff member): CLINICAL STAFF    What was the call regarding: PATIENT'S SON IS REQUESTING A CALL BACK TO BE ADVISED ON WHETHER THE PATIENT CAN/ SHOULD GET THE COVID & FLU VACCINE ON THE SAME DAY AT THE SAME TIME, OR SHOULD HE WAIT A WEEK OR TWO IN BETWEEN VACCINATIONS.     PATIENT'S SON IS WANTING TO TAKE THE PATIENT FOR VACCINATIONS TODAY SINCE IT IS A NICE DAY. PLEASE CALL DINESH MARTINES    Is it okay if the provider responds through MyChart: NO

## 2023-10-18 NOTE — PROGRESS NOTES
Discharge Planning Assessment  [facility]     Patient Name: Dinesh Churchill Sr.  MRN: [id number]  Today's Date: 1/20/2022    Admit Date: 1/19/2022     Discharge Needs Assessment     Row Name 01/20/22 1509       Living Environment    Lives With alone    Current Living Arrangements home/apartment/condo    Primary Care Provided by self    Provides Primary Care For no one    Family Caregiver if Needed child(michael), adult    Family Caregiver Names Son, Dinesh Churchill Jr., [phone]    Quality of Family Relationships helpful; involved; supportive    Able to Return to Prior Arrangements yes       Resource/Environmental Concerns    Resource/Environmental Concerns none       Transition Planning    Patient/Family Anticipates Transition to home with help/services; inpatient rehabilitation facility    Patient/Family Anticipated Services at Transition skilled nursing; home health care; rehabilitation services    Transportation Anticipated family or friend will provide       Discharge Needs Assessment    Equipment Currently Used at Home walker, rolling    Concerns to be Addressed discharge planning    Outpatient/Agency/Support Group Needs homecare agency; skilled nursing facility    Discharge Facility/Level of Care Needs home with home health; nursing facility, skilled    Provided Post Acute Provider List? Yes    Post Acute Provider List Nursing Home; Home Health    Discharge Coordination/Progress HH vs SNF, referrals pending               Discharge Plan     Row Name 01/20/22 1515       Plan    Plan HH vs SNF, referrals pending    Patient/Family in Agreement with Plan yes    Plan Comments CCP met with pt at bedside and, per his request, spoke with his son via telephone re; d/c planning. Pt resides alone in a single level home and uses walker for mobility, pt has no h/o HH/SNF. Pt has five children who assist him as needed. [facility] OT recommends SNF, PT eval pending. Pt/son prefer HH with no  agency preference (referral pending to [facility] per CMS review), and are agreeable to SNF referrals if necessary. Referrals placed to [facility] and [facility] per CMS review. CCP to follow for evals and PT recommendations. Pt's son will transport him at d/c. Maddy Galvin LCSW              Continued Care and Services - Admitted Since 1/19/2022     Destination     Service Provider Request Status Selected Services Address Phone Fax Patient Preferred    [facility]  Pending - Request Sent N/A [address] , [facility] [address] [phone] --    [facility]  Pending - Request Sent N/A [address], [facility] [address] [phone] --          Home Medical Care     Service Provider Request Status Selected Services Address Phone Fax Patient Preferred    [facility]  Pending - Request Sent N/A [address], [address], [place] [id number] [phone] --              Expected Discharge Date and Time     Expected Discharge Date Expected Discharge Time    Jan 20, 2022          Demographic Summary     Row Name 01/20/22 1509       General Information    Admission Type observation    Arrived From home    Required Notices Provided Observation Status Notice    Referral Source admission list    Reason for Consult discharge planning    Preferred Language English               Functional Status     Row Name 01/20/22 1509       Functional Status    Usual Activity Tolerance good    Current Activity Tolerance moderate       Functional Status, IADL    Medications independent    Meal Preparation independent    Housekeeping independent    Laundry independent    Shopping independent       Mental Status Summary    Recent Changes in Mental Status/Cognitive Functioning no changes               Psychosocial    No documentation.                Abuse/Neglect    No documentation.                Legal     No documentation.                Substance Abuse    No documentation.                Patient Forms    No documentation.                   Rita Galvin LCSW     see hpi

## 2023-10-18 NOTE — TELEPHONE ENCOUNTER
Patient informed he has not had them both at the same time before so he is going to wait and do the flu now and covid in 4-6 weeks

## 2023-12-21 ENCOUNTER — HOSPITAL ENCOUNTER (OUTPATIENT)
Dept: CARDIOLOGY | Facility: HOSPITAL | Age: 88
Discharge: HOME | End: 2023-12-21
Admitting: INTERNAL MEDICINE
Payer: MEDICARE

## 2023-12-21 ENCOUNTER — OFFICE VISIT (OUTPATIENT)
Dept: CARDIOLOGY | Facility: CLINIC | Age: 88
End: 2023-12-21
Payer: MEDICARE

## 2023-12-21 VITALS
HEIGHT: 74 IN | DIASTOLIC BLOOD PRESSURE: 66 MMHG | HEART RATE: 64 BPM | WEIGHT: 158.73 LBS | SYSTOLIC BLOOD PRESSURE: 140 MMHG | BODY MASS INDEX: 20.37 KG/M2

## 2023-12-21 VITALS
WEIGHT: 148 LBS | BODY MASS INDEX: 18.99 KG/M2 | SYSTOLIC BLOOD PRESSURE: 116 MMHG | DIASTOLIC BLOOD PRESSURE: 55 MMHG | HEART RATE: 66 BPM | HEIGHT: 74 IN

## 2023-12-21 DIAGNOSIS — I48.0 PAROXYSMAL ATRIAL FIBRILLATION: ICD-10-CM

## 2023-12-21 DIAGNOSIS — I10 ESSENTIAL HYPERTENSION: ICD-10-CM

## 2023-12-21 DIAGNOSIS — I25.10 CORONARY ARTERY DISEASE INVOLVING NATIVE CORONARY ARTERY OF NATIVE HEART WITHOUT ANGINA PECTORIS: ICD-10-CM

## 2023-12-21 DIAGNOSIS — I42.9 CARDIOMYOPATHY, UNSPECIFIED TYPE: Primary | ICD-10-CM

## 2023-12-21 PROCEDURE — 93306 TTE W/DOPPLER COMPLETE: CPT

## 2023-12-21 PROCEDURE — 99214 OFFICE O/P EST MOD 30 MIN: CPT | Performed by: INTERNAL MEDICINE

## 2023-12-21 NOTE — PROGRESS NOTES
Echo and ov f/u   Subjective:        Dinesh Churchill Sr. is a 89 y.o. male who here for follow up    CC  Follow-up cardiomyopathy coronary artery disease  HPI  89-year-old with coronary artery disease cardiomyopathy hypertension paroxysmal atrial fibrillation here for the follow-up     Problems Addressed this Visit          Cardiac and Vasculature    Essential hypertension    Cardiomyopathy - Primary    Relevant Orders    Adult Transthoracic Echo Complete W/ Cont if Necessary Per Protocol    Coronary artery disease involving native coronary artery of native heart without angina pectoris    Relevant Orders    Adult Transthoracic Echo Complete W/ Cont if Necessary Per Protocol    Paroxysmal atrial fibrillation     Diagnoses         Codes Comments    Cardiomyopathy, unspecified type    -  Primary ICD-10-CM: I42.9  ICD-9-CM: 425.4     Coronary artery disease involving native coronary artery of native heart without angina pectoris     ICD-10-CM: I25.10  ICD-9-CM: 414.01     Essential hypertension     ICD-10-CM: I10  ICD-9-CM: 401.9     Paroxysmal atrial fibrillation     ICD-10-CM: I48.0  ICD-9-CM: 427.31           .    The following portions of the patient's history were reviewed and updated as appropriate: allergies, current medications, past family history, past medical history, past social history, past surgical history and problem list.    Past Medical History:   Diagnosis Date    Abnormal ECG     Abnormal MRI     Acute frontal sinusitis     Arthritis     Chronic fatigue     Coronary artery disease     Difficulty walking     Dizziness     Head injury     Headache 01/21/2023    Hearing aid worn     left    Hearing loss     Hyperlipidemia     Hypertension     Idiopathic peripheral neuropathy 8/29/2023    Kidney stones     Macrocytosis     Neoplasm of skin     Osteoporosis     Prediabetes     Vitamin D deficiency      reports that he has never smoked. He has never been exposed to tobacco smoke. He has never used  "smokeless tobacco. He reports that he does not drink alcohol and does not use drugs.   Family History   Problem Relation Age of Onset    Arthritis Son     Cancer Sister     Diabetes Son        Review of Systems  Constitutional: No wt loss, fever, fatigue  Gastrointestinal: No nausea, abdominal pain  Behavioral/Psych: No insomnia or anxiety   Cardiovascular no chest pains or tightness in the chest  Objective:       Physical Exam           Physical Exam  /55   Pulse 66   Ht 188 cm (74\")   Wt 67.1 kg (148 lb)   BMI 19.00 kg/m²     General appearance: No acute changes   Eyes: Sclerae conjunctivae normal, pupils reactive   HENT: Atraumatic; oropharynx clear with moist mucous membranes and no mucosal ulcerations;  Neck: Trachea midline; NECK, supple, no thyromegaly or lymphadenopathy   Lungs: Normal size and shape, normal breath sounds, equal distribution of air, no rales and rhonchi   CV: S1-S2 irregular, sys murmurs, no rub, no gallop   Abdomen: Soft, nontender; no masses , no abnormal abdominal sounds   Extremities: No deformity , normal color , no peripheral edema   Skin: Normal temperature, turgor and texture; no rash, ulcers  Psych: Appropriate affect, alert and oriented to person, place and time           Procedures      Echocardiogram:    Results for orders placed in visit on 12/23/21    Adult Transthoracic Echo Complete W/ Cont if Necessary Per Protocol    Interpretation Summary  · Estimated right ventricular systolic pressure from tricuspid regurgitation is normal (<35 mmHg).  · Calculated left ventricular EF = 48% Estimated left ventricular EF was in agreement with the calculated left ventricular EF.  · Left ventricular diastolic function was normal.  · The right ventricular cavity is borderline dilated.  · The right atrial cavity is mildly dilated.  · There is calcification of the aortic valve.  · There is no evidence of pericardial effusion. .          Current Outpatient Medications:     " acetaminophen (TYLENOL) 500 MG tablet, Take 1 tablet by mouth 4 (Four) Times a Day., Disp: , Rfl:     amLODIPine (NORVASC) 5 MG tablet, TAKE 1 TABLET BY MOUTH DAILY, Disp: 30 tablet, Rfl: 2    aspirin 81 MG chewable tablet, Chew 1 tablet Daily., Disp: , Rfl:     atorvastatin (LIPITOR) 20 MG tablet, TAKE 1 TABLET BY MOUTH EVERY NIGHT, Disp: 30 tablet, Rfl: 2    Cholecalciferol (VITAMIN D-3) 1000 UNITS capsule, Take 1,000 Units by mouth Daily., Disp: , Rfl:     Cyanocobalamin (B-12) 1000 MCG lozenge, DISSOLVE 1 LOZENGE BY MOUTH UNDER THE TONGUE EVERY OTHER DAY, Disp: 30 lozenge, Rfl: 4    Elastic Bandages & Supports (LUMBAR BACK BRACE/SUPPORT PAD) misc, Use for lifting/ strenuous exercise., Disp: 1 each, Rfl: 0    isosorbide mononitrate (IMDUR) 60 MG 24 hr tablet, TAKE 1 TABLET BY MOUTH DAILY, Disp: 90 tablet, Rfl: 1    losartan (COZAAR) 50 MG tablet, TAKE 1 TABLET BY MOUTH DAILY, Disp: 90 tablet, Rfl: 1    metoprolol succinate XL (TOPROL-XL) 100 MG 24 hr tablet, TAKE 1 TABLET BY MOUTH DAILY, Disp: 30 tablet, Rfl: 2   Assessment:                Plan:          ICD-10-CM ICD-9-CM   1. Cardiomyopathy, unspecified type  I42.9 425.4   2. Coronary artery disease involving native coronary artery of native heart without angina pectoris  I25.10 414.01   3. Essential hypertension  I10 401.9   4. Paroxysmal atrial fibrillation  I48.0 427.31     1. Cardiomyopathy, unspecified type  Compensated  - Adult Transthoracic Echo Complete W/ Cont if Necessary Per Protocol; Future    2. Coronary artery disease involving native coronary artery of native heart without angina pectoris  No angina pectoris  - Adult Transthoracic Echo Complete W/ Cont if Necessary Per Protocol; Future    3. Essential hypertension  Patient understands importance of blood pressure check at home which patient does regularly and the blood pressures are well under control to the level of less than 140/90      4. Paroxysmal atrial fibrillation  Controlled      1 yr  jenelle echo  COUNSELING:    Dinesh Churchill Sr.Counseling was given to patient for the following topics: diagnostic results, risk factor reductions, impressions, risks and benefits of treatment options and importance of treatment compliance .       SMOKING COUNSELING:        Dictated using Dragon dictation

## 2024-01-09 ENCOUNTER — OFFICE VISIT (OUTPATIENT)
Dept: FAMILY MEDICINE CLINIC | Facility: CLINIC | Age: 89
End: 2024-01-09
Payer: MEDICARE

## 2024-01-09 VITALS
TEMPERATURE: 98.9 F | BODY MASS INDEX: 18.99 KG/M2 | RESPIRATION RATE: 20 BRPM | HEIGHT: 74 IN | SYSTOLIC BLOOD PRESSURE: 98 MMHG | OXYGEN SATURATION: 96 % | DIASTOLIC BLOOD PRESSURE: 54 MMHG | HEART RATE: 70 BPM

## 2024-01-09 DIAGNOSIS — G89.29 CHRONIC PAIN OF BOTH KNEES: ICD-10-CM

## 2024-01-09 DIAGNOSIS — D69.6 THROMBOCYTOPENIA: ICD-10-CM

## 2024-01-09 DIAGNOSIS — R53.1 WEAKNESS: ICD-10-CM

## 2024-01-09 DIAGNOSIS — R07.2 PRECORDIAL PAIN: ICD-10-CM

## 2024-01-09 DIAGNOSIS — M25.562 CHRONIC PAIN OF BOTH KNEES: ICD-10-CM

## 2024-01-09 DIAGNOSIS — S92.405S CLOSED NONDISPLACED FRACTURE OF PHALANX OF LEFT GREAT TOE, UNSPECIFIED PHALANX, SEQUELA: ICD-10-CM

## 2024-01-09 DIAGNOSIS — R29.6 RECURRENT FALLS: ICD-10-CM

## 2024-01-09 DIAGNOSIS — M25.551 RIGHT HIP PAIN: ICD-10-CM

## 2024-01-09 DIAGNOSIS — Z86.79 HISTORY OF SUPRAVENTRICULAR TACHYCARDIA: ICD-10-CM

## 2024-01-09 DIAGNOSIS — Z86.73 HISTORY OF CVA (CEREBROVASCULAR ACCIDENT): ICD-10-CM

## 2024-01-09 DIAGNOSIS — R47.9 SPEECH DISTURBANCE, UNSPECIFIED TYPE: ICD-10-CM

## 2024-01-09 DIAGNOSIS — D53.9 MACROCYTIC ANEMIA: ICD-10-CM

## 2024-01-09 DIAGNOSIS — M48.56XG PATHOLOGICAL COMPRESSION FRACTURE OF LUMBAR VERTEBRA WITH DELAYED HEALING, SUBSEQUENT ENCOUNTER: ICD-10-CM

## 2024-01-09 DIAGNOSIS — S22.41XD CLOSED FRACTURE OF MULTIPLE RIBS OF RIGHT SIDE WITH ROUTINE HEALING, SUBSEQUENT ENCOUNTER: ICD-10-CM

## 2024-01-09 DIAGNOSIS — F80.81 STUTTERING: ICD-10-CM

## 2024-01-09 DIAGNOSIS — E53.8 VITAMIN B 12 DEFICIENCY: ICD-10-CM

## 2024-01-09 DIAGNOSIS — I48.0 PAROXYSMAL ATRIAL FIBRILLATION: ICD-10-CM

## 2024-01-09 DIAGNOSIS — I42.9 CARDIOMYOPATHY, UNSPECIFIED TYPE: ICD-10-CM

## 2024-01-09 DIAGNOSIS — M54.50 ACUTE LOW BACK PAIN, UNSPECIFIED BACK PAIN LATERALITY, UNSPECIFIED WHETHER SCIATICA PRESENT: ICD-10-CM

## 2024-01-09 DIAGNOSIS — E55.9 VITAMIN D DEFICIENCY: ICD-10-CM

## 2024-01-09 DIAGNOSIS — R73.03 PREDIABETES: ICD-10-CM

## 2024-01-09 DIAGNOSIS — G20.B1 PARKINSON'S DISEASE WITH DYSKINESIA, UNSPECIFIED WHETHER MANIFESTATIONS FLUCTUATE: ICD-10-CM

## 2024-01-09 DIAGNOSIS — E78.5 DYSLIPIDEMIA: ICD-10-CM

## 2024-01-09 DIAGNOSIS — I10 ESSENTIAL HYPERTENSION: Primary | ICD-10-CM

## 2024-01-09 DIAGNOSIS — M25.561 CHRONIC PAIN OF BOTH KNEES: ICD-10-CM

## 2024-01-09 DIAGNOSIS — R51.9 NONINTRACTABLE EPISODIC HEADACHE, UNSPECIFIED HEADACHE TYPE: ICD-10-CM

## 2024-01-09 DIAGNOSIS — R00.0 TACHYCARDIA: ICD-10-CM

## 2024-01-09 DIAGNOSIS — I25.10 CORONARY ARTERY DISEASE INVOLVING NATIVE CORONARY ARTERY OF NATIVE HEART WITHOUT ANGINA PECTORIS: ICD-10-CM

## 2024-01-09 DIAGNOSIS — M17.0 PRIMARY OSTEOARTHRITIS OF BOTH KNEES: ICD-10-CM

## 2024-01-09 NOTE — PROGRESS NOTES
Subjective   Dinesh Churchill Sr. is a 89 y.o. male who presents today in follow up of hypertension, prediabetes, hyperlipidemia, macrocytosis, B12 and vitamin D deficiency, headache and stuttering speech, history of CVA and Parkinson's, atrial fibrillation, coronary artery disease, and cardiomyopathy, cataracts, pulmonary fibrosis, pneumothorax with pleural effusion.     Hypertension  Associated symptoms include headaches and shortness of breath.   Hyperlipidemia  Associated symptoms include shortness of breath.       Hypertension- has been stable on Norvasc 5 mg once daily, metoprolol  mg daily, and losartan 50 mg once daily.  Prediabetes and hyperlipidemia- continues Lipitor 20 mg once daily & tolerating without AE. Decreased sugar intake. He is not thrilled but is changed- no soda, pies, grapes. Cut out oyster crackers and eat all the time. Get no sugar added cookies.  Vitamin D- taking vitamin D 1000IU daily. Tolerating without AE.   B12-  not taking B12.   Previously he was on B12 Monday, Wed, Fri instead of Monday through Friday.   Last injection 12/27/2019.  Anemia- Patient is seeing hematology 7/2023 in follow up.     Abnormal speech- not talking much-  stuttering and talking very little. He wears hearing aids and talks a little more at home.   Speech therapy gave exercises to help improve the stutter.   In 2/2021- he was asleep and woke up at 2 am with a headache- severe but was a generalized  Headache and lasted 30-45 minutes. No headache since. He noticed that he started stuttering the next day. Has now stuttered since. No slurring of words. No other neurological symptoms. He is getting up and around, has no in creased weakness, numbness, tingling, swallowing issues, recent falls, recurrence of headache, or other symptoms.   Weakness and falls- no fall since last appt. Son has caught him a couple times. Exercising on machine 3 x weekly for 13 minutes- leg machine for cycling.   Seen by neurology  5/2023- they ordered EMG. Patient will have 8/2023 with Dr Naik.   Fall- when he was going down, he went down on his buttock and was on carpet. He was ok. No complaints of pain or trouble.   Using a walker all the time. Making his own food, bathing himself, and taking care of himself. He has to get up and do things. He walks outside in the trail when weather is nice. He does not go out in good weather. Reads the paper and watches basketball. Eats what he wants. Doing his own laundry. Son and daughter bring up son's mail. Doctor said son can leave for 1 hour at a time. Went to [facility] and had to  Rx.   History of CVA-     Cardiomyopathy, CAD, SVT, A Fib- seeing cardiology 6/29/2023.   Chest pain- taking Imdur as directed by cardiology and tolerating without AE. No CP- controlling CP.   Shortness of breath- no change from baseline. No increase in SOA   Pleural effusion and pneumothorax- no symptoms at this time.   History of pneumothorax and pleural effusion-  with hospitalization. Pulmonology saw him and advised he was stable and to follow up in 6/2020.     Knee pain- continues with pain but they are working on getting injections approved for relief.     Ran out of PT 2-3 weeks ago but still doing exercises.     Patient was hospitalized 1/19/2022 through 1/21/2022 for a painful, closed fracture of multiple ribs on the right side, post fracture left toe, chest wall pain, stuttering, A. fib, history of CVA and CAD s/p stent placement, cardiomyopathy, dyslipidemia, hypertension, hearing loss. Per discharge summary, patient was initially admitted for chest wall pain s/p fall.  He was given Tylenol and Lidoderm to chest wall as well as incentive spirometry.  He did well, required no additional narcotic pain medication, and was feeling better each day.  Oxygen saturation 98% on room air.  Physical therapy evaluation was poor.  With living independently in his nurse child [place] to 30 miles away, they recommended inpatient  rehab. He was discharged to rehab facility.  Last labs from rehab 1/1/2022.  He had OT evaluation and  PT. Following discharge 2/10/2022 and with no rib pain or foot pain. He only complained of left> right knee pain.     Patient was hospitalized 1/20/2023 through 1/23/2023 for sudden onset of severe headache, shaking, Parkinson's disease, history of CVA, paroxysmal A. fib, thrombocytopenia, macrocytic anemia, cardiomyopathy, hypertension, fatigue. Per discharge summary- he presented to the ER due to onset of severe headache.  CTA negative for acute disease, MRI negative for acute intracranial disease with chronic changes noted, lumbar puncture did not explain infectious etiology.  He has old CVA and continues aspirin and statin.  Neurology consultation obtained who noted differential of hypertensive encephalopathy versus posterior reversible encephalopathy syndrome and cleared for discharge.  Patient with subacute rehab less than a year prior with some improvement.  They did not see indication for transition to subacute rehab at that time but with advanced age and recurrent falls reported that son is reaching a point where keeping his father at home is becoming tougher.  He was discharged home with home health PT and OT.  Son reports he was seen by physical therapy who noted he was ambulating well and did not need continued therapy.  Son states he was doing well until this morning.  He was standing in the kitchen and his son was in his room.  He reports he heard a thump and went in and his father was on his right side.  Patient has had severe pain in his low back and right hip since his fall.  He reports 10/10 pain and has had no improvement since this morning.  Pain is located in the low back and right hip.    Patient's Specialists:  Cardiology-Dr. Quezada/ [facility]-last seen 12/2023 for CAD, cardiomyopathy, atrial fibrillation, hypertension -stable.  Stopped Brilinta 2021 Brillinta.  Pressure  goal less than 140/90.  Follow-up in 1 year with echo.  Neurology- Dr Ashley- last appt 8/2023 for history of CVA, Parkinson disease, and peripheral neuropathy.  Not interested in starting medication due to potential side effects.  Discussed exercise much as possible.  EMG/NCS with moderate to severe peripheral neuropathy with significant axonal loss in the motor studies of the right leg and changes that could go along with right S1 radiculopathy with nerve root level involvement versus root level involvement of the peripheral neuropathy.  Normal A1c, TSH, globulin.  MRI lumbar spine with multilevel lumbar spondyloarthropathy.  Be cautious with walking to prevent falls or injury.  Follow-up as needed.  7/2021 for history of CVA, recent stuttering, change in speech, parkinson's disease. They did not want treatment for Parkinson's. Follow up PRN   Previously-  Dr. Saleh- last appointment 1/2020  For seizure-like activity.  No further work-up at that time.  They will consider EEG if he has any further symptoms.    Dr. Naik- last seen 10/2017- for Parkinson's, orthostatic hypotension, and ataxia- he did not want to pursue treatment at that time and was advised to follow-up PRN.   Neurosurgery-CANDELARIA Arthur-last appointment 3/2023 for L2 vertebral body compression fracture.  Continue physical therapy and Tylenol as needed.  Follow-up as needed.  Hematology- Dr Pennington- last appt 7/2023 for macrocytosis and thrombocytopenia- differential MDS and liver disease.  Macrocytosis and thrombocytopenia likely related to liver disease, MDS remains in differential.  With stability over a few years, advised continue monitoring.  With his age and difficulty getting to appointments, they advised he can follow-up with PCP and return to clinic only if significant worsening of his MCV or blood counts.    Prior Dr Richardson- last appt 8/2017 for macrocytosis without anemia and thrombocytopenia. Monthly B12 injections, observation  without further evaluation if stable.   Sports Medicine- Dr Gracia-last appointment 2/2022 for bilateral OA knees and knee pain.  Will start approval process for gel series injections.  Completed injections 3/2022.  Has not had follow-up.  Ophthalmology- Beck Castro in [place]- will have left cataract surgery 1/27/2020 and right cataract surgery 2/3/2020.   Pulmonology- Dr De Jesus- last appt 12/2019 for pulmonary fibrosis, pulmonary edema, pneumothorax- follow up in 6 months.     The following portions of the patient's history were reviewed and updated as appropriate: allergies, current medications, past family history, past medical history, past social history, past surgical history and problem list.    Review of Systems   Constitutional:  Positive for fatigue.   HENT:  Positive for hearing loss. Negative for sore throat and trouble swallowing.         Stuttering speech   Eyes:  Positive for visual disturbance.   Respiratory:  Positive for shortness of breath.    Cardiovascular: Negative.    Gastrointestinal: Negative.    Genitourinary: Negative.    Musculoskeletal:  Positive for arthralgias and gait problem.   Neurological:  Positive for speech difficulty and headaches.        Fall   Hematological: Negative.    Psychiatric/Behavioral: Negative.         Objective    Vitals:    01/09/24 0959   BP: 98/54   Pulse: 70   Resp: 20   Temp: 98.9 °F (37.2 °C)   SpO2: 96%     Body mass index is 18.99 kg/m².    Physical Exam   Constitutional: He is oriented to person, place, and time. He appears well-developed. No distress.   HENT:   Head: Normocephalic and atraumatic.   Right Ear: External ear normal.   Left Ear: External ear normal.   Eyes: Conjunctivae are normal.   Neck: Carotid bruit is not present. No tracheal deviation present. No thyroid mass and no thyromegaly present.   Cardiovascular: Normal rate, regular rhythm and normal pulses.   Murmur heard.  Pulmonary/Chest: Effort normal and breath sounds  normal.   Abdominal: Normal appearance.   Neurological: He is alert and oriented to person, place, and time. Gait (short, shuffling gait- ambulates with walker) abnormal.   Skin: Skin is warm and dry.   Psychiatric: His behavior is normal. Mood and thought content normal. His speech is delayed.   Nursing note and vitals reviewed.      Assessment & Plan   Diagnoses and all orders for this visit:    1. Essential hypertension (Primary)    2. Dyslipidemia    3. Prediabetes    4. Vitamin D deficiency    5. Vitamin B 12 deficiency    6. Macrocytic anemia    7. Thrombocytopenia    8. Speech disturbance, unspecified type    9. Parkinson's disease with dyskinesia, unspecified whether manifestations fluctuate    10. Weakness    11. Recurrent falls    12. History of CVA (cerebrovascular accident)    13. Nonintractable episodic headache, unspecified headache type    14. Stuttering    15. Coronary artery disease involving native coronary artery of native heart without angina pectoris    16. Cardiomyopathy, unspecified type    17. History of supraventricular tachycardia    18. Precordial pain    19. Paroxysmal atrial fibrillation    20. Tachycardia    21. Acute low back pain, unspecified back pain laterality, unspecified whether sciatica present    22. Pathological compression fracture of lumbar vertebra with delayed healing, subsequent encounter    23. Right hip pain    24. Primary osteoarthritis of both knees    25. Chronic pain of both knees    26. Closed fracture of multiple ribs of right side with routine healing, subsequent encounter    27. Closed nondisplaced fracture of phalanx of left great toe, unspecified phalanx, sequela          Assessment and Plan  Patient will have fasting labs. Call if no results in 1 week. Stability of conditions, plan, follow up, and further recommendations pending labs. If labs and symptoms are stable, follow up in 6 months.     Hypertension- Blood pressure has been stable but is low today.  Continue Norvasc 5 mg once daily, Metoprolol  mg daily, and Losartan 50 mg once daily. Continue monitoring BP. Call or return if elevated or low or any weakness or falls.    Hyperlipidemia- Last lipids increased slightly. Continue Lipitor 20 mg at bedtime.  Prediabetes- A1C improved from 5.8% 9/2022 to 5.6% 6/2023. I will continue to monitor and make further recommendations.   Vitamin D deficiency- Continue Vitamin D 1000IU daily. Dosing recommendations pending labs.   B12 deficiency- Await labs for further recommendations.   Headache and speech disturbance- Patient had an episode 2/2021 with a severe headache then developed stuttering speech. Patient and son deny any other symptoms and reports no recurrence of headache, however, patient lived alone and has not had much interaction with people other than once a week when his son came to take care of groceries and medications. This was concerning for CVA, however, without any other deficits and a cardiac device in place, he was unable to have MRI. He underwent CT head and follow up with neurology. He was evaluated by speech therapy, was given home exercises. His son now lives with him all the time, and he continues to stutter and talks less. He does have known Parkinson's disease that is untreated.  9-1-1 to ER if he has worsening speech, recurrence of headache, or any other cardiac or neurological symptoms. Patient and son verbalized understanding and agreement with plan and recommendations.   Balance and gait instability- He avoids any overhead movement, as he loses his balance. He was seen by neurology and diagnosed with Parkinson's, however, patient and son are hesitant for treatment due to concern for side effects.  Continue follow up with cardiology and follow-up with neurology if worsening, new or changing symptoms.   CAD, cardiomyopathy, atrial fibrillation, SVT- He continues follow-up with cardiology.  He continues on Imdur 60 mg once daily and denies  any changes or increase in chest pain, shortness of breath, palpitations, dizziness, or weakness.   History of Pleural effusion and Pneumovax- Patient has no increase in SOA. He needs to reschedule with pulmonology.     Patient continues to see specialists as noted above.  I have reviewed available records, including consult notes, labs, and imaging/testing.  Patient to continue follow-up with specialists as directed by them. He needs to schedule follow up with pulmonology and I will refer back to neurology.     I spent 30 minutes caring for Dinesh Churchill SrTerrence on this date of service. This time includes time spent by me in the following activities as necessary: preparing for the visit, reviewing tests, specialists records and previous visits, obtaining and/or reviewing a separately obtained history, performing a medically appropriate exam and/or evaluation, counseling and educating the patient, family, caregiver, referring and/or communicating with other healthcare professionals, documenting information in the medical record, independently interpreting results and communicating that information with the patient, family, caregiver, and developing a medically appropriate treatment plan with consideration of other conditions, medications, and treatments.

## 2024-01-10 LAB
25(OH)D3+25(OH)D2 SERPL-MCNC: 29.5 NG/ML (ref 30–100)
ALBUMIN SERPL-MCNC: 4 G/DL (ref 3.6–4.6)
ALBUMIN/GLOB SERPL: 1.8 {RATIO} (ref 1.2–2.2)
ALP SERPL-CCNC: 51 IU/L (ref 44–121)
ALT SERPL-CCNC: 9 IU/L (ref 0–44)
APPEARANCE UR: CLEAR
AST SERPL-CCNC: 15 IU/L (ref 0–40)
BACTERIA #/AREA URNS HPF: ABNORMAL /[HPF]
BASOPHILS # BLD AUTO: 0 X10E3/UL (ref 0–0.2)
BASOPHILS NFR BLD AUTO: 0 %
BILIRUB SERPL-MCNC: 1.5 MG/DL (ref 0–1.2)
BILIRUB UR QL STRIP: NEGATIVE
BUN SERPL-MCNC: 23 MG/DL (ref 10–36)
BUN/CREAT SERPL: 30 (ref 10–24)
CALCIUM SERPL-MCNC: 9.1 MG/DL (ref 8.6–10.2)
CASTS URNS QL MICRO: ABNORMAL /LPF
CHLORIDE SERPL-SCNC: 105 MMOL/L (ref 96–106)
CHOLEST SERPL-MCNC: 139 MG/DL (ref 100–199)
CK SERPL-CCNC: 41 U/L (ref 30–208)
CO2 SERPL-SCNC: 23 MMOL/L (ref 20–29)
COLOR UR: YELLOW
CREAT SERPL-MCNC: 0.77 MG/DL (ref 0.76–1.27)
CRYSTALS URNS MICRO: ABNORMAL
EGFRCR SERPLBLD CKD-EPI 2021: 82 ML/MIN/1.73
EOSINOPHIL # BLD AUTO: 0.1 X10E3/UL (ref 0–0.4)
EOSINOPHIL NFR BLD AUTO: 2 %
EPI CELLS #/AREA URNS HPF: ABNORMAL /HPF (ref 0–10)
ERYTHROCYTE [DISTWIDTH] IN BLOOD BY AUTOMATED COUNT: 14.4 % (ref 11.6–15.4)
FERRITIN SERPL-MCNC: 733 NG/ML (ref 30–400)
FOLATE SERPL-MCNC: 8.8 NG/ML
GLOBULIN SER CALC-MCNC: 2.2 G/DL (ref 1.5–4.5)
GLUCOSE SERPL-MCNC: 106 MG/DL (ref 70–99)
GLUCOSE UR QL STRIP: NEGATIVE
HBA1C MFR BLD: 5.5 % (ref 4.8–5.6)
HCT VFR BLD AUTO: 39.8 % (ref 37.5–51)
HDLC SERPL-MCNC: 48 MG/DL
HGB BLD-MCNC: 13.3 G/DL (ref 13–17.7)
HGB UR QL STRIP: NEGATIVE
IMM GRANULOCYTES # BLD AUTO: 0 X10E3/UL (ref 0–0.1)
IMM GRANULOCYTES NFR BLD AUTO: 0 %
IRON SATN MFR SERPL: 34 % (ref 15–55)
IRON SERPL-MCNC: 83 UG/DL (ref 38–169)
KETONES UR QL STRIP: NEGATIVE
LDLC SERPL CALC-MCNC: 77 MG/DL (ref 0–99)
LDLC/HDLC SERPL: 1.6 RATIO (ref 0–3.6)
LEUKOCYTE ESTERASE UR QL STRIP: NEGATIVE
LYMPHOCYTES # BLD AUTO: 1.1 X10E3/UL (ref 0.7–3.1)
LYMPHOCYTES NFR BLD AUTO: 18 %
MCH RBC QN AUTO: 34.4 PG (ref 26.6–33)
MCHC RBC AUTO-ENTMCNC: 33.4 G/DL (ref 31.5–35.7)
MCV RBC AUTO: 103 FL (ref 79–97)
MICRO URNS: NORMAL
MICRO URNS: NORMAL
MONOCYTES # BLD AUTO: 0.5 X10E3/UL (ref 0.1–0.9)
MONOCYTES NFR BLD AUTO: 7 %
NEUTROPHILS # BLD AUTO: 4.4 X10E3/UL (ref 1.4–7)
NEUTROPHILS NFR BLD AUTO: 73 %
NITRITE UR QL STRIP: NEGATIVE
NRBC BLD AUTO-RTO: 1 % (ref 0–0)
PH UR STRIP: 6.5 [PH] (ref 5–7.5)
PLATELET # BLD AUTO: 158 X10E3/UL (ref 150–450)
POTASSIUM SERPL-SCNC: 4.3 MMOL/L (ref 3.5–5.2)
PROT SERPL-MCNC: 6.2 G/DL (ref 6–8.5)
PROT UR QL STRIP: NEGATIVE
RBC # BLD AUTO: 3.87 X10E6/UL (ref 4.14–5.8)
RBC #/AREA URNS HPF: ABNORMAL /HPF (ref 0–2)
SODIUM SERPL-SCNC: 143 MMOL/L (ref 134–144)
SP GR UR STRIP: 1.02 (ref 1–1.03)
T3FREE SERPL-MCNC: 2.5 PG/ML (ref 2–4.4)
T4 FREE SERPL-MCNC: 1.4 NG/DL (ref 0.82–1.77)
TIBC SERPL-MCNC: 242 UG/DL (ref 250–450)
TRIGL SERPL-MCNC: 67 MG/DL (ref 0–149)
TSH SERPL DL<=0.005 MIU/L-ACNC: 3.03 UIU/ML (ref 0.45–4.5)
UIBC SERPL-MCNC: 159 UG/DL (ref 111–343)
UNIDENT CRYS URNS QL MICRO: PRESENT
URINALYSIS REFLEX: NORMAL
UROBILINOGEN UR STRIP-MCNC: 1 MG/DL (ref 0.2–1)
VIT B12 SERPL-MCNC: 449 PG/ML (ref 232–1245)
VLDLC SERPL CALC-MCNC: 14 MG/DL (ref 5–40)
WBC # BLD AUTO: 6.1 X10E3/UL (ref 3.4–10.8)
WBC #/AREA URNS HPF: ABNORMAL /HPF (ref 0–5)

## 2024-01-15 RX ORDER — CHOLECALCIFEROL (VITAMIN D3) 25 MCG
1 TABLET,CHEWABLE ORAL EVERY OTHER DAY
Qty: 45 LOZENGE | Refills: 1 | Status: SHIPPED | OUTPATIENT
Start: 2024-01-15

## 2024-01-22 RX ORDER — ATORVASTATIN CALCIUM 20 MG/1
20 TABLET, FILM COATED ORAL NIGHTLY
Qty: 90 TABLET | Refills: 3 | Status: SHIPPED | OUTPATIENT
Start: 2024-01-22

## 2024-01-22 RX ORDER — METOPROLOL SUCCINATE 100 MG/1
100 TABLET, EXTENDED RELEASE ORAL DAILY
Qty: 90 TABLET | Refills: 3 | Status: SHIPPED | OUTPATIENT
Start: 2024-01-22

## 2024-01-22 RX ORDER — ISOSORBIDE MONONITRATE 60 MG/1
60 TABLET, EXTENDED RELEASE ORAL DAILY
Qty: 90 TABLET | Refills: 3 | Status: SHIPPED | OUTPATIENT
Start: 2024-01-22

## 2024-01-22 RX ORDER — LOSARTAN POTASSIUM 50 MG/1
50 TABLET ORAL DAILY
Qty: 90 TABLET | Refills: 3 | Status: SHIPPED | OUTPATIENT
Start: 2024-01-22

## 2024-01-22 RX ORDER — AMLODIPINE BESYLATE 5 MG/1
5 TABLET ORAL
Qty: 90 TABLET | Refills: 3 | Status: SHIPPED | OUTPATIENT
Start: 2024-01-22

## 2024-02-08 NOTE — TELEPHONE ENCOUNTER
Caller: Dinesh Churchill Sr.    Relationship: Self    Best call back number: [phone]  Requested Prescriptions:   Requested Prescriptions     Pending Prescriptions Disp Refills    Cyanocobalamin (B-12) 1000 MCG lozenge 30 lozenge 4        Pharmacy where request should be sent: [id number] - [facility] [address] [address] [place] [address] - [phone]  - [phone]     Last office visit with prescribing clinician: 1/9/2024   Last telemedicine visit with prescribing clinician: Visit date not found   Next office visit with prescribing clinician: 7/9/2024     Additional details provided by patient: NO    Does the patient have less than a 3 day supply:  [x] Yes  [] No    Would you like a call back once the refill request has been completed: [x] Yes [] No    If the office needs to give you a call back, can they leave a voicemail: [x] Yes [] No    Joaquin Argueta Rep   01/15/24 10:05 EST        Detail Level: Generalized Detail Level: Detailed Detail Level: Simple

## 2024-06-12 ENCOUNTER — APPOINTMENT (OUTPATIENT)
Dept: CT IMAGING | Facility: HOSPITAL | Age: 89
DRG: 552 | End: 2024-06-12
Payer: MEDICARE

## 2024-06-12 ENCOUNTER — HOSPITAL ENCOUNTER (INPATIENT)
Facility: HOSPITAL | Age: 89
LOS: 3 days | Discharge: NURSING, REHAB OR LONG-TERM CARE | DRG: 552 | End: 2024-06-15
Attending: EMERGENCY MEDICINE | Admitting: INTERNAL MEDICINE
Payer: MEDICARE

## 2024-06-12 ENCOUNTER — APPOINTMENT (OUTPATIENT)
Dept: GENERAL RADIOLOGY | Facility: HOSPITAL | Age: 89
DRG: 552 | End: 2024-06-12
Payer: MEDICARE

## 2024-06-12 DIAGNOSIS — S22.089A CLOSED FRACTURE OF TWELFTH THORACIC VERTEBRA, UNSPECIFIED FRACTURE MORPHOLOGY, INITIAL ENCOUNTER: ICD-10-CM

## 2024-06-12 DIAGNOSIS — S32.10XA CLOSED FRACTURE OF SACRUM, UNSPECIFIED PORTION OF SACRUM, INITIAL ENCOUNTER: Primary | ICD-10-CM

## 2024-06-12 DIAGNOSIS — R53.1 GENERALIZED WEAKNESS: ICD-10-CM

## 2024-06-12 DIAGNOSIS — S32.020A CLOSED COMPRESSION FRACTURE OF L2 LUMBAR VERTEBRA, INITIAL ENCOUNTER: ICD-10-CM

## 2024-06-12 DIAGNOSIS — S22.080D COMPRESSION FRACTURE OF T12 VERTEBRA WITH ROUTINE HEALING: ICD-10-CM

## 2024-06-12 PROBLEM — S32.029A L2 VERTEBRAL FRACTURE: Status: ACTIVE | Noted: 2024-06-12

## 2024-06-12 LAB
ALBUMIN SERPL-MCNC: 3.7 G/DL (ref 3.5–5.2)
ALBUMIN/GLOB SERPL: 1.7 G/DL
ALP SERPL-CCNC: 53 U/L (ref 39–117)
ALT SERPL W P-5'-P-CCNC: 17 U/L (ref 1–41)
ANION GAP SERPL CALCULATED.3IONS-SCNC: 9.3 MMOL/L (ref 5–15)
AST SERPL-CCNC: 16 U/L (ref 1–40)
BACTERIA UR QL AUTO: NORMAL /HPF
BILIRUB SERPL-MCNC: 1.7 MG/DL (ref 0–1.2)
BILIRUB UR QL STRIP: NEGATIVE
BUN SERPL-MCNC: 19 MG/DL (ref 8–23)
BUN/CREAT SERPL: 27.9 (ref 7–25)
CALCIUM SPEC-SCNC: 8.6 MG/DL (ref 8.2–9.6)
CHLORIDE SERPL-SCNC: 107 MMOL/L (ref 98–107)
CLARITY UR: CLEAR
CO2 SERPL-SCNC: 24.7 MMOL/L (ref 22–29)
COLOR UR: YELLOW
CREAT SERPL-MCNC: 0.68 MG/DL (ref 0.76–1.27)
EGFRCR SERPLBLD CKD-EPI 2021: 85.1 ML/MIN/1.73
GEN 5 2HR TROPONIN T REFLEX: 36 NG/L
GLOBULIN UR ELPH-MCNC: 2.2 GM/DL
GLUCOSE SERPL-MCNC: 115 MG/DL (ref 65–99)
GLUCOSE UR STRIP-MCNC: NEGATIVE MG/DL
HGB UR QL STRIP.AUTO: NEGATIVE
HYALINE CASTS UR QL AUTO: NORMAL /LPF
KETONES UR QL STRIP: ABNORMAL
LEUKOCYTE ESTERASE UR QL STRIP.AUTO: ABNORMAL
NITRITE UR QL STRIP: NEGATIVE
NT-PROBNP SERPL-MCNC: 9781 PG/ML (ref 0–1800)
PH UR STRIP.AUTO: 7 [PH] (ref 5–8)
POTASSIUM SERPL-SCNC: 4.3 MMOL/L (ref 3.5–5.2)
PROT SERPL-MCNC: 5.9 G/DL (ref 6–8.5)
PROT UR QL STRIP: NEGATIVE
QT INTERVAL: 455 MS
QTC INTERVAL: 466 MS
RBC # UR STRIP: NORMAL /HPF
REF LAB TEST METHOD: NORMAL
SODIUM SERPL-SCNC: 141 MMOL/L (ref 136–145)
SP GR UR STRIP: 1.02 (ref 1–1.03)
SQUAMOUS #/AREA URNS HPF: NORMAL /HPF
TROPONIN T DELTA: -10 NG/L
TROPONIN T SERPL HS-MCNC: 46 NG/L
UROBILINOGEN UR QL STRIP: ABNORMAL
WBC # UR STRIP: NORMAL /HPF

## 2024-06-12 PROCEDURE — 99222 1ST HOSP IP/OBS MODERATE 55: CPT

## 2024-06-12 PROCEDURE — 36415 COLL VENOUS BLD VENIPUNCTURE: CPT

## 2024-06-12 PROCEDURE — 81001 URINALYSIS AUTO W/SCOPE: CPT | Performed by: EMERGENCY MEDICINE

## 2024-06-12 PROCEDURE — 71045 X-RAY EXAM CHEST 1 VIEW: CPT

## 2024-06-12 PROCEDURE — 80053 COMPREHEN METABOLIC PANEL: CPT | Performed by: EMERGENCY MEDICINE

## 2024-06-12 PROCEDURE — 93005 ELECTROCARDIOGRAM TRACING: CPT | Performed by: EMERGENCY MEDICINE

## 2024-06-12 PROCEDURE — 85025 COMPLETE CBC W/AUTO DIFF WBC: CPT | Performed by: EMERGENCY MEDICINE

## 2024-06-12 PROCEDURE — 99285 EMERGENCY DEPT VISIT HI MDM: CPT

## 2024-06-12 PROCEDURE — 93010 ELECTROCARDIOGRAM REPORT: CPT | Performed by: INTERNAL MEDICINE

## 2024-06-12 PROCEDURE — 84484 ASSAY OF TROPONIN QUANT: CPT | Performed by: EMERGENCY MEDICINE

## 2024-06-12 PROCEDURE — 72131 CT LUMBAR SPINE W/O DYE: CPT

## 2024-06-12 PROCEDURE — 83880 ASSAY OF NATRIURETIC PEPTIDE: CPT | Performed by: EMERGENCY MEDICINE

## 2024-06-12 RX ORDER — SODIUM CHLORIDE 0.9 % (FLUSH) 0.9 %
10 SYRINGE (ML) INJECTION EVERY 12 HOURS SCHEDULED
Status: DISCONTINUED | OUTPATIENT
Start: 2024-06-12 | End: 2024-06-15 | Stop reason: HOSPADM

## 2024-06-12 RX ORDER — ACETAMINOPHEN 325 MG/1
650 TABLET ORAL EVERY 4 HOURS PRN
Status: DISCONTINUED | OUTPATIENT
Start: 2024-06-12 | End: 2024-06-15 | Stop reason: HOSPADM

## 2024-06-12 RX ORDER — ISOSORBIDE MONONITRATE 60 MG/1
60 TABLET, EXTENDED RELEASE ORAL DAILY
Status: DISCONTINUED | OUTPATIENT
Start: 2024-06-12 | End: 2024-06-15 | Stop reason: HOSPADM

## 2024-06-12 RX ORDER — ASPIRIN 81 MG/1
81 TABLET, CHEWABLE ORAL DAILY
Status: DISCONTINUED | OUTPATIENT
Start: 2024-06-12 | End: 2024-06-15 | Stop reason: HOSPADM

## 2024-06-12 RX ORDER — HYDROCODONE BITARTRATE AND ACETAMINOPHEN 5; 325 MG/1; MG/1
1 TABLET ORAL EVERY 4 HOURS PRN
Status: DISCONTINUED | OUTPATIENT
Start: 2024-06-12 | End: 2024-06-15 | Stop reason: HOSPADM

## 2024-06-12 RX ORDER — ATORVASTATIN CALCIUM 20 MG/1
20 TABLET, FILM COATED ORAL NIGHTLY
Status: DISCONTINUED | OUTPATIENT
Start: 2024-06-12 | End: 2024-06-15 | Stop reason: HOSPADM

## 2024-06-12 RX ORDER — AMOXICILLIN 250 MG
2 CAPSULE ORAL 2 TIMES DAILY PRN
Status: DISCONTINUED | OUTPATIENT
Start: 2024-06-12 | End: 2024-06-15 | Stop reason: HOSPADM

## 2024-06-12 RX ORDER — LOSARTAN POTASSIUM 50 MG/1
50 TABLET ORAL DAILY
Status: DISCONTINUED | OUTPATIENT
Start: 2024-06-12 | End: 2024-06-15 | Stop reason: HOSPADM

## 2024-06-12 RX ORDER — SODIUM CHLORIDE 0.9 % (FLUSH) 0.9 %
10 SYRINGE (ML) INJECTION AS NEEDED
Status: DISCONTINUED | OUTPATIENT
Start: 2024-06-12 | End: 2024-06-15 | Stop reason: HOSPADM

## 2024-06-12 RX ORDER — AMLODIPINE BESYLATE 5 MG/1
5 TABLET ORAL
Status: DISCONTINUED | OUTPATIENT
Start: 2024-06-12 | End: 2024-06-15 | Stop reason: HOSPADM

## 2024-06-12 RX ORDER — POLYETHYLENE GLYCOL 3350 17 G/17G
17 POWDER, FOR SOLUTION ORAL DAILY PRN
Status: DISCONTINUED | OUTPATIENT
Start: 2024-06-12 | End: 2024-06-15 | Stop reason: HOSPADM

## 2024-06-12 RX ORDER — BISACODYL 5 MG/1
5 TABLET, DELAYED RELEASE ORAL DAILY PRN
Status: DISCONTINUED | OUTPATIENT
Start: 2024-06-12 | End: 2024-06-15 | Stop reason: HOSPADM

## 2024-06-12 RX ORDER — BISACODYL 10 MG
10 SUPPOSITORY, RECTAL RECTAL DAILY PRN
Status: DISCONTINUED | OUTPATIENT
Start: 2024-06-12 | End: 2024-06-15 | Stop reason: HOSPADM

## 2024-06-12 RX ORDER — SODIUM CHLORIDE 9 MG/ML
40 INJECTION, SOLUTION INTRAVENOUS AS NEEDED
Status: DISCONTINUED | OUTPATIENT
Start: 2024-06-12 | End: 2024-06-15 | Stop reason: HOSPADM

## 2024-06-12 RX ORDER — METOPROLOL SUCCINATE 100 MG/1
100 TABLET, EXTENDED RELEASE ORAL DAILY
Status: DISCONTINUED | OUTPATIENT
Start: 2024-06-12 | End: 2024-06-15 | Stop reason: HOSPADM

## 2024-06-12 RX ORDER — ONDANSETRON 4 MG/1
4 TABLET, ORALLY DISINTEGRATING ORAL EVERY 6 HOURS PRN
Status: DISCONTINUED | OUTPATIENT
Start: 2024-06-12 | End: 2024-06-15 | Stop reason: HOSPADM

## 2024-06-12 RX ADMIN — Medication 10 ML: at 20:28

## 2024-06-12 RX ADMIN — HYDROCODONE BITARTRATE AND ACETAMINOPHEN 1 TABLET: 5; 325 TABLET ORAL at 20:28

## 2024-06-12 RX ADMIN — ISOSORBIDE MONONITRATE 60 MG: 60 TABLET, EXTENDED RELEASE ORAL at 18:30

## 2024-06-12 RX ADMIN — LOSARTAN POTASSIUM 50 MG: 50 TABLET, FILM COATED ORAL at 18:30

## 2024-06-12 RX ADMIN — METOPROLOL SUCCINATE 100 MG: 100 TABLET, EXTENDED RELEASE ORAL at 18:30

## 2024-06-12 RX ADMIN — AMLODIPINE BESYLATE 5 MG: 5 TABLET ORAL at 18:30

## 2024-06-12 RX ADMIN — Medication 10 ML: at 15:07

## 2024-06-12 RX ADMIN — ATORVASTATIN CALCIUM 20 MG: 20 TABLET, FILM COATED ORAL at 20:28

## 2024-06-12 RX ADMIN — HYDROCODONE BITARTRATE AND ACETAMINOPHEN 1 TABLET: 5; 325 TABLET ORAL at 15:07

## 2024-06-12 NOTE — ED NOTES
Pt to ed from home via [facility]    Pt had fall yesterday due to weakness. Pts son got pt up. Pt has chronic back pain. Pt reports mid to lower back has worsened. Pt hx stroke and has some problems communicating at baseline. Pt denies numbness and tingling. Pt has unsteady gait at baseline. EMS found pt 88% on RA, EMS reports last time they picked up pt he was on home O2. Pt did not hit head, no LOC, no thinners

## 2024-06-12 NOTE — ED PROVIDER NOTES
EMERGENCY DEPARTMENT ENCOUNTER  Room Number:  32/32  PCP: Phyllis Loving PA  Independent Historians: Patient, Family, and EMS      HPI:  Chief Complaint: had concerns including Fall and Back Pain.     A complete HPI/ROS/PMH/PSH/SH/FH are unobtainable due to: None    Chronic or social conditions impacting patient care (Social Determinants of Health): None      Context: Dinesh Churchill Sr. is a 89 y.o. male with a medical history of dizziness, prediabetes, lumbar fracture who presents to the ED c/o acute generalized weakness and fall.  EMS reports the patient had a fall yesterday.  He has had generalized weakness recently.  They report that the patient was generally weak and fell.  He has a history of prior back fracture.  He is supposed to be on oxygen at home.  He was found on room air at 88%.  He this fall was witnessed.  He did not hit his head.  He reports low back pain which is severe.  He denies any new weakness or numbness.  He improved with oxygen supplementation by EMS.He denies any chest pain or shortness of breath or abdominal pain.      Review of prior external notes (non-ED) -and- Review of prior external test results outside of this encounter:  Laboratory evaluation 1/9/2024 within normal [facility]    Prescription drug monitoring program review:         PAST MEDICAL HISTORY  Active Ambulatory Problems     Diagnosis Date Noted    Abnormal magnetic resonance imaging study 03/18/2016    Arthritis 03/18/2016    Osteoarthritis of cervical spine 03/18/2016    Diplopia 03/18/2016    Hearing loss 03/18/2016    Neoplasm of uncertain behavior of skin 03/18/2016    Prediabetes 03/18/2016    Vitamin D deficiency 03/18/2016    Chronic fatigue 10/27/2016    History of supraventricular tachycardia 04/07/2017    Essential hypertension 04/07/2017    B12 deficiency 04/19/2017    Abnormal cardiac function test 05/08/2017    Cardiomyopathy 05/08/2017    Coronary artery disease involving native coronary artery of native heart  without angina pectoris 05/31/2017    History of coronary artery stent placement 05/31/2017    History of renal calculi 05/31/2017    Dyslipidemia 05/31/2017    Macrocytic anemia 08/09/2017    Precordial pain 08/16/2017    Parkinson's disease 10/25/2017    History of CVA (cerebrovascular accident) 10/25/2017    Paroxysmal atrial fibrillation 11/29/2017    Otalgia, left 12/29/2017    Chest pain 08/06/2019    Abnormal stress test 08/06/2019    Spontaneous pneumothorax 08/18/2019    Chest pain 08/18/2019    Acute systolic (congestive) heart failure 08/19/2019    Thrombocytopenia 08/20/2019    Pneumonia of left lower lobe due to infectious organism 09/06/2019    Syncope and collapse 11/06/2019    Status post placement of implantable loop recorder 11/22/2019    Long term (current) use of antithrombotics/antiplatelets 03/13/2020    Stuttering 04/09/2021    Fall 01/19/2022    Closed fracture of multiple ribs of right side 01/19/2022    Chest wall pain 01/19/2022    Closed fracture of phalanx of toe of left foot 01/19/2022    Headache 01/21/2023    Sudden onset of severe headache 01/21/2023    Fall in home, initial encounter 01/27/2023    Compression fracture of L2 vertebra with routine healing 01/28/2023    Weakness of both legs 05/08/2023    Idiopathic peripheral neuropathy 08/29/2023     Resolved Ambulatory Problems     Diagnosis Date Noted    Maxillary sinusitis 03/18/2016    Dizziness 10/27/2016    Acute frontal sinusitis 10/27/2016    Precordial pain 04/07/2017    SOB (shortness of breath) 04/07/2017    Acute rhinitis 12/29/2017     Past Medical History:   Diagnosis Date    Abnormal ECG     Abnormal MRI     Coronary artery disease     Difficulty walking     Head injury     Hearing aid worn     Hyperlipidemia     Hypertension     Kidney stones     Macrocytosis     Neoplasm of skin     Osteoporosis          PAST SURGICAL HISTORY  Past Surgical History:   Procedure Laterality Date    CARDIAC CATHETERIZATION N/A  05/22/2017    Procedure: Left Heart Cath;  Surgeon: Maninder Quezada MD;  Location:  [facility];  Service:     CARDIAC CATHETERIZATION N/A 05/23/2017    Procedure: Stent BMS coronary;  Surgeon: Maninder Quezada MD;  Location:  [facility];  Service:     CARDIAC CATHETERIZATION N/A 08/08/2019    Procedure: Left Heart Cath;  Surgeon: Maninder Quezada MD;  Location:  [facility];  Service: Cardiology    CARDIAC CATHETERIZATION N/A 08/08/2019    Procedure: Left ventriculography;  Surgeon: Maninder Quezada MD;  Location:  [facility];  Service: Cardiology    CARDIAC CATHETERIZATION N/A 08/08/2019    Procedure: Coronary angiography;  Surgeon: Maninder Quezada MD;  Location:  [facility];  Service: Cardiology    CARDIAC CATHETERIZATION N/A 08/20/2019    Procedure: CORONARY ANGIOGRAPHY;  Surgeon: Maninder Quezada MD;  [facility];  Service: Cardiovascular    CARDIAC CATHETERIZATION N/A 08/20/2019    Procedure: Stent BMS coronary;  Surgeon: Maninder Quezada MD;  Location:  [facility];  Service: Cardiovascular    CARDIAC ELECTROPHYSIOLOGY PROCEDURE N/A 11/08/2019    Procedure: Loop insertion;  Surgeon: Maninder Quezada MD;  [facility];  Service: Cardiovascular    HERNIA REPAIR      x 2    NM RT/LT HEART CATHETERS N/A 05/23/2017    Procedure: Percutaneous Coronary Intervention;  Surgeon: Maninder Quezada MD;  [facility];  Service: Cardiovascular         FAMILY HISTORY  Family History   Problem Relation Age of Onset    Arthritis Son     Cancer Sister     Diabetes Son          SOCIAL HISTORY  Social History     Socioeconomic History    Marital status:    Tobacco Use    Smoking status: Never     Passive exposure: Never    Smokeless tobacco: Never   Vaping Use    Vaping status: Never  Used   Substance and Sexual Activity    Alcohol use: No     Comment: quit 30 years ago     Drug use: No    Sexual activity: Not Currently     Partners: Male         ALLERGIES  Patient has no known allergies.      REVIEW OF SYSTEMS  Review of Systems  Included in HPI  All systems reviewed and negative except for those discussed in HPI.      PHYSICAL EXAM    I have reviewed the triage vital signs and nursing notes.    ED Triage Vitals [06/12/24 0835]   Temp Heart Rate Resp BP SpO2   97.5 °F (36.4 °C) 67 18 116/52 96 %      Temp src Heart Rate Source Patient Position BP Location FiO2 (%)   Tympanic Monitor Lying Right arm --       Physical Exam  GENERAL: Awake, alert, no acute distress, chronically ill-appearing.  Generally weak appearing.  SKIN: Warm, dry  HENT: Normocephalic, atraumatic  EYES: no scleral icterus  CV: regular rhythm, regular rate  RESPIRATORY: normal effort, lungs clear  ABDOMEN: soft, nontender, nondistended  MUSCULOSKELETAL: no deformity, diffuse lumbar tenderness  NEURO: alert, moves all extremities, follows commands, bilateral lower extremity strength intact            LAB RESULTS  Recent Results (from the past 24 hour(s))   ECG 12 Lead Dyspnea    Collection Time: 06/12/24  9:00 AM   Result Value Ref Range    QT Interval 455 ms    QTC Interval 466 ms   Comprehensive Metabolic Panel    Collection Time: 06/12/24 10:01 AM    Specimen: Blood   Result Value Ref Range    Glucose 115 (H) 65 - 99 mg/dL    BUN 19 8 - 23 mg/dL    Creatinine 0.68 (L) 0.76 - 1.27 mg/dL    Sodium 141 136 - 145 mmol/L    Potassium 4.3 3.5 - 5.2 mmol/L    Chloride 107 98 - 107 mmol/L    CO2 24.7 22.0 - 29.0 mmol/L    Calcium 8.6 8.2 - 9.6 mg/dL    Total Protein 5.9 (L) 6.0 - 8.5 g/dL    Albumin 3.7 3.5 - 5.2 g/dL    ALT (SGPT) 17 1 - 41 U/L    AST (SGOT) 16 1 - 40 U/L    Alkaline Phosphatase 53 39 - 117 U/L    Total Bilirubin 1.7 (H) 0.0 - 1.2 mg/dL    Globulin 2.2 gm/dL    A/G Ratio 1.7 g/dL    BUN/Creatinine Ratio 27.9 (H) 7.0  - 25.0    Anion Gap 9.3 5.0 - 15.0 mmol/L    eGFR 85.1 >60.0 mL/min/1.73   CBC Auto Differential    Collection Time: 06/12/24 10:01 AM    Specimen: Blood   Result Value Ref Range    WBC 6.89 3.40 - 10.80 10*3/mm3    RBC 3.61 (L) 4.14 - 5.80 10*6/mm3    Hemoglobin 12.5 (L) 13.0 - 17.7 g/dL    Hematocrit 37.4 (L) 37.5 - 51.0 %    .6 (H) 79.0 - 97.0 fL    MCH 34.6 (H) 26.6 - 33.0 pg    MCHC 33.4 31.5 - 35.7 g/dL    RDW 14.4 12.3 - 15.4 %    RDW-SD 53.8 37.0 - 54.0 fl    MPV 11.0 6.0 - 12.0 fL    Platelets 118 (L) 140 - 450 10*3/mm3    Neutrophil % 80.5 (H) 42.7 - 76.0 %    Lymphocyte % 10.2 (L) 19.6 - 45.3 %    Monocyte % 7.7 5.0 - 12.0 %    Eosinophil % 1.0 0.3 - 6.2 %    Basophil % 0.3 0.0 - 1.5 %    Immature Grans % 0.3 0.0 - 0.5 %    Neutrophils, Absolute 5.55 1.70 - 7.00 10*3/mm3    Lymphocytes, Absolute 0.70 0.70 - 3.10 10*3/mm3    Monocytes, Absolute 0.53 0.10 - 0.90 10*3/mm3    Eosinophils, Absolute 0.07 0.00 - 0.40 10*3/mm3    Basophils, Absolute 0.02 0.00 - 0.20 10*3/mm3    Immature Grans, Absolute 0.02 0.00 - 0.05 10*3/mm3    nRBC 0.0 0.0 - 0.2 /100 WBC   High Sensitivity Troponin T    Collection Time: 06/12/24 10:01 AM    Specimen: Blood   Result Value Ref Range    HS Troponin T 46 (H) <22 ng/L   BNP    Collection Time: 06/12/24 10:01 AM    Specimen: Blood   Result Value Ref Range    proBNP 9,781.0 (H) 0.0 - 1,800.0 pg/mL         RADIOLOGY  CT Lumbar Spine Without Contrast    Result Date: 6/12/2024  CT LUMBAR SPINE WITHOUT CONTRAST  HISTORY: Fall, back pain.  COMPARISON: MRI lumbar spine 01/28/2023.  FINDINGS: The MRI examination of 01/28/2023 demonstrated an acute horizontal fracture involving the L2 vertebral body with only minimal loss of vertical height. On the current examination there has been greater loss of vertical height (by approximately 8 mm anteriorly). 1-2 mm of bony retropulsion is appreciated, present previously.  There is a new compression fracture appreciated involving the T12  vertebral body. The fracture extends from the anterior cortex to the superior endplate. There is loss of approximately 7 mm of vertical height and approximately 3 mm of bony retropulsion of the superior endplate. There is a focal angulation involving the S2 vertebral body which is new versus 01/28/2023. There is a fracture appreciated involving the posterior elements at the level of S2.  T12-L1: Mild facet degenerative disease is present bilaterally.  L1-L2: A mild broad-based disc osteophyte complex is present resulting in mild flattening of the ventral surface of the thecal sac.  L2-L3: A broad-based disc-osteophyte complex is present which is slightly more prominent to the left. It contributes to mild flattening of the ventral surface of the thecal sac and to mild foraminal stenosis bilaterally.  L3-L4: A mild central disc osteophyte complex is present with no evidence of herniation.  L4-L5: A mild central broad-based disc osteophyte complex is present which contributes to mild canal stenosis and lateral recess narrowing bilaterally when combined with posterior element degenerative disease. Mild-to-moderate facet degenerative disease is present bilaterally secondary to facet hypertrophy, loss of disc height and extension of a disc osteophyte complex into the neural foramen.  L5-S1: There is mild and moderate facet degenerative disease on the right and left respectively. Mild foraminal stenosis is present bilaterally secondary to extension of a disc osteophyte complex into the neural foramen.      1.  The MRI examination of 01/28/2023 demonstrated an acute fracture involving the L2 vertebral body. There has been approximately 8 mm of loss of vertical height anteriorly since the prior examination. 2.  A new compression fracture involving T12 is appreciated with a fracture extending from the anterior cortex of the superior endplate and mild bony retropulsion. There is loss of approximately 7 mm of vertical height.  3.  There is a contour deformity involving the S2 vertebral body as well as a nondisplaced fracture involving the posterior elements of S2. This was not present on the MRI examination of 01/28/2023. Further evaluation could be performed with a MRI examination of the pelvis. 4.  Multilevel degenerative disease involving the lumbar spine is noted as described above with no evidence of disc herniation. See above.  The above information was called to and discussed with Dr. Beverly.  Radiation dose reduction techniques were utilized, including automated exposure control and exposure modulation based on body size.       XR Chest 1 View    Result Date: 6/12/2024  XR CHEST 1 VW-  DATE OF EXAM: 6/12/2024 9:15 AM  INDICATION: Cough. Generalized weakness.  COMPARISON: Radiographs 1/21/2023, 1/19/2022, and 1/8/2021. CT 1/19/2022.  TECHNIQUE: A single portable AP view of the chest was obtained.  FINDINGS: Overlying artifacts. Loop recorder overlying the lower left chest. Basilar pulm opacification of both lungs, obscuring the diaphragm in each costophrenic angle. Minimal thorax. Stable cardiomegaly, likely centrally by technique. No acute osseous abnormality is identified.      Stable cardiomegaly with basilar prominent opacities in both lungs that likely represent small to moderate pleural effusions with underlying atelectasis and/or pneumonia and possible superimposed mild pulmonary edema.  This report was finalized on 6/12/2024 9:45 AM by Jef Santos MD on Workstation: [id number]         MEDICATIONS GIVEN IN ER  Medications - No data to display      ORDERS PLACED DURING THIS VISIT:  Orders Placed This Encounter   Procedures    XR Chest 1 View    CT Lumbar Spine Without Contrast    Comprehensive Metabolic Panel    Urinalysis With Microscopic If Indicated (No Culture) - Urine, Clean Catch    CBC Auto Differential    High Sensitivity Troponin T    BNP    High Sensitivity Troponin T 2Hr    [facility] (on-call MD unless specified)  Details    Neurosurgery (on-call MD unless specified)    IP Palliative Care Nurse Consult    ECG 12 Lead Dyspnea    Inpatient Admission    CBC & Differential         OUTPATIENT MEDICATION MANAGEMENT:  No current Epic-ordered facility-administered medications on file.     Current Outpatient Medications Ordered in Epic   Medication Sig Dispense Refill    acetaminophen (TYLENOL) 500 MG tablet Take 1 tablet by mouth 4 (Four) Times a Day.      amLODIPine (NORVASC) 5 MG tablet TAKE 1 TABLET BY MOUTH DAILY 90 tablet 3    aspirin 81 MG chewable tablet Chew 1 tablet Daily.      atorvastatin (LIPITOR) 20 MG tablet TAKE 1 TABLET BY MOUTH EVERY NIGHT 90 tablet 3    Cholecalciferol (VITAMIN D-3) 1000 UNITS capsule Take 1,000 Units by mouth Daily.      Cyanocobalamin (B-12) 1000 MCG lozenge Place 1 lozenge under the tongue Every Other Day. 45 lozenge 1    isosorbide mononitrate (IMDUR) 60 MG 24 hr tablet TAKE 1 TABLET BY MOUTH DAILY 90 tablet 3    losartan (COZAAR) 50 MG tablet TAKE 1 TABLET BY MOUTH DAILY 90 tablet 3    metoprolol succinate XL (TOPROL-XL) 100 MG 24 hr tablet TAKE 1 TABLET BY MOUTH DAILY 90 tablet 3         PROCEDURES  Procedures            PROGRESS, DATA ANALYSIS, CONSULTS, AND MEDICAL DECISION MAKING  All labs have been independently interpreted by me.  All radiology studies have been reviewed by me. All EKG's have been independently viewed and interpreted by me.  Discussion below represents my analysis of pertinent findings related to patient's condition, differential diagnosis, treatment plan and final disposition.    Differential diagnosis includes but is not limited to compression fracture, acute coronary syndrome, pneumonia, hypoxia, UTI, generalized weakness, hyponatremia.    Clinical Scores:                   ED Course as of 06/12/24 1130   Wed Jun 12, 2024   0848 Generalized weakness for the last 2 days.  Weak legs.  Fell yesterday.  Hit back.  Was witnessed.  Did not hit his head.  Not on blood  thinners.  Hard of hearing.  Poor historian.  History of stroke.  Chronic low back pain but worsened now with a fall. [TR]   0946 XR Chest 1 View  My independent interpretation of the imaging study is cardiomegaly [TR]   1016 EKG          EKG time: 0900  Rhythm/Rate: Normal sinus, rate 63  P waves and MT: Normal P, prolonged MT  QRS, axis: Right bundle branch block, left axis  ST and T waves: ST changes V5 and V6    Independently Interpreted by me  Not significantly changed compared to prior 1/21/2023   [TR]   1017 Discussing with the radiologist by phone.  CT of the lumbar spine shows a worsened L2 compression fracture with 8 mm loss of height.  New T12 compression fracture with 7 mm loss of height.  3 mm of retropulsion.  New S2 fracture.  Recommends MRI of the pelvis. [TR]   1050 I reviewed the workup and findings with the patient and family at the bedside.  Answered all questions.  Plan admission for further evaluation and management.  They are agreeable. [TR]   1126 Discussing with Dr Lynn with A. Agrees to admit. [TR]      ED Course User Index  [TR] Bang Beverly MD             AS OF 11:30 EDT VITALS:    BP - 134/63  HR - 64  TEMP - 97.5 °F (36.4 °C) (Tympanic)  O2 SATS - 90%    COMPLEXITY OF CARE  The patient requires admission.      DIAGNOSIS  Final diagnoses:   Closed fracture of sacrum, unspecified portion of sacrum, initial encounter   Closed fracture of twelfth thoracic vertebra, unspecified fracture morphology, initial encounter   Closed compression fracture of L2 lumbar vertebra, initial encounter   Generalized weakness         DISPOSITION  ED Disposition       ED Disposition   Decision to Admit    Condition   --    Comment   Level of Care: Telemetry [5]   Diagnosis: L2 vertebral fracture [225662]   Admitting Physician: MATT LYNN [[id number]]   Attending Physician: MATT LYNN [[id number]]   Certification: I Certify That Inpatient Hospital Services Are Medically Necessary For Greater Than  2 Midnights                  Please note that portions of this document were completed with a voice recognition program.    Note Disclaimer: At [facility], we believe that sharing information builds trust and better relationships. You are receiving this note because you recently visited [facility]. It is possible you will see health information before a provider has talked with you about it. This kind of information can be easy to misunderstand. To help you fully understand what it means for your health, we urge you to discuss this note with your provider.         Bang Beverly MD  06/12/24 [id number]

## 2024-06-12 NOTE — CONSULTS
[facility] NEUROSURGERY CONSULT NOTE    Patient name: Dinesh Churchill Sr.  Referring Provider: Bang Beverly MD   Reason for Consultation: compression fractures/sacral fracture    Patient Care Team:  Phyllis Loving PA as PCP - General (Family Medicine)  Brandon Ricci MD Thomas, Melissa, PA as Referring Physician (Family Medicine)  Maninder Quezada MD as Consulting Physician (Cardiology)  Hardeep Shea MD as Consulting Physician (Pulmonary Disease)  Shamar Pennington MD as Consulting Physician (Hematology and Oncology)    Chief complaint: low back and sacral pain    Subjective .     History of present illness:    Patient is a 89 y.o.  male with history of arthritis, hypertension, CAD status post stent placement 2017, Parkinson's disease, history of CVA, A-fib, systolic heart failure, lower extremity weakness, with son reporting of RLE neuropathy diagnosed on EMG 1 year ago.  The patient lives at home with son.  The patient fell yesterday in the bathroom.  The patient is a poor historian and the son was not able to witness the fall but he was nearby.  The patient was able to walk down the steps to get into the ambulance.  The patient denies any lower extremity pain or new weakness.  He does have some chronic right lower extremity neuropathy x 1 year.  Otherwise no new issues in the legs.  He denies any bowel or bladder incontinence/retention.  There is no report of any saddle anesthesia.  The patient does have some pain in the mid to low back especially with palpation.      Review of Systems  Review of Systems   Gastrointestinal:         Denies bowel issues   Genitourinary:  Negative for enuresis.   Musculoskeletal:  Positive for back pain.   Neurological:  Positive for numbness. Negative for weakness.       History  PAST MEDICAL HISTORY  Past Medical History:   Diagnosis Date    Abnormal ECG     Abnormal MRI     Acute frontal sinusitis     Arthritis     Chronic fatigue     Coronary artery disease      Difficulty walking     Dizziness     Head injury     Headache 01/21/2023    Hearing aid worn     left    Hearing loss     Hyperlipidemia     Hypertension     Idiopathic peripheral neuropathy 8/29/2023    Kidney stones     Macrocytosis     Neoplasm of skin     Osteoporosis     Prediabetes     Vitamin D deficiency        PAST SURGICAL HISTORY  Past Surgical History:   Procedure Laterality Date    CARDIAC CATHETERIZATION N/A 05/22/2017    Procedure: Left Heart Cath;  Surgeon: Maninder Quezada MD;  Location: [facility] CATH [facility]ASIVE LOC[facility];  Service:     CARDIAC CATHETERIZATION N/A 05/23/2017    Procedure: Stent BMS coronary;  Surgeon: Maninder Quezada MD;  Location:  [facility] CATH INVASIVE LOCATION;  Service:     CARDIAC CATHETERIZATION N/A 08/08/2019    Procedure: Left Heart Cath;  Surgeon: Maninder Quezada MD;  Location:  [facility] CATH INVASIVE LOCATION;  Service: Cardiology    CARDIAC CATHETERIZATION N/A 08/08/2019    Procedure: Left ventriculography;  Surgeon: Maninder Quezada MD;  Location[facility];  Service: Cardiology    CARDIAC CATHETERIZATION N/A 08/08/2019    Procedure: Coronary angiography;  Surgeon: Maninder Quezada MD;  Location[facility];  Service: Cardiology    CARDIAC CATHETERIZATION N/A 08/20/2019    Procedure: CORONARY ANGIOGRAPHY;  Surgeon: Maninder Quezada MD;  Location: [facility];  Service: Cardiovascular    CARDIAC CATHETERIZATION N/A 08/20/2019    Procedure: Stent BMS coronary;  Surgeon: Maninder Quezada MD;  Location: [facility];  Service: Cardiovascular    CARDIAC ELECTROPHYSIOLOGY PROCEDURE N/A 11/08/2019    Procedure: Loop insertion;  Surgeon: Maninder Quezada MD;  Location: [facility]ATH [facility];  Service: Cardiovascular    HERNIA REPAIR      x 2    [place] RT/LT HEART CATHETERS N/A 05/23/2017    Procedure: Percutaneous Coronary Intervention;  Surgeon: Maninder  MD Damien;  Location: [facility] LOCATION;  Service: Cardiovascular       FAMILY HISTORY  Family History   Problem Relation Age of Onset    Arthritis Son     Cancer Sister     Diabetes Son        SOCIAL HISTORY  Social History     Tobacco Use    Smoking status: Never     Passive exposure: Never    Smokeless tobacco: Never   Vaping Use    Vaping status: Never Used   Substance Use Topics    Alcohol use: No     Comment: quit 30 years ago     Drug use: No     Lives w/ son    Allergies:  Patient has no known allergies.    MEDICATIONS:  (Not in a hospital admission)      Objective     Results Review:  LABS:    Admission on 06/12/2024   Component Date Value Ref Range Status    Glucose 06/12/2024 115 (H)  65 - 99 mg/dL Final    BUN 06/12/2024 19  8 - 23 mg/dL Final    Creatinine 06/12/2024 0.68 (L)  0.76 - 1.27 mg/dL Final    Sodium 06/12/2024 141  136 - 145 mmol/L Final    Potassium 06/12/2024 4.3  3.5 - 5.2 mmol/L Final    Chloride 06/12/2024 107  98 - 107 mmol/L Final    CO2 06/12/2024 24.7  22.0 - 29.0 mmol/L Final    Calcium 06/12/2024 8.6  8.2 - 9.6 mg/dL Final    Total Protein 06/12/2024 5.9 (L)  6.0 - 8.5 g/dL Final    Albumin 06/12/2024 3.7  3.5 - 5.2 g/dL Final    ALT (SGPT) 06/12/2024 17  1 - 41 U/L Final    AST (SGOT) 06/12/2024 16  1 - 40 U/L Final    Alkaline Phosphatase 06/12/2024 53  39 - 117 U/L Final    Total Bilirubin 06/12/2024 1.7 (H)  0.0 - 1.2 mg/dL Final    Globulin 06/12/2024 2.2  gm/dL Final    A/G Ratio 06/12/2024 1.7  g/dL Final    BUN/Creatinine Ratio 06/12/2024 27.9 (H)  7.0 - 25.0 Final    Anion Gap 06/12/2024 9.3  5.0 - 15.0 mmol/L Final    eGFR 06/12/2024 85.1  >60.0 mL/min/1.73 Final    WBC 06/12/2024 6.89  3.40 - 10.80 10*3/mm3 Final    RBC 06/12/2024 3.61 (L)  4.14 - 5.80 10*6/mm3 Final    Hemoglobin 06/12/2024 12.5 (L)  13.0 - 17.7 g/dL Final    Hematocrit 06/12/2024 37.4 (L)  37.5 - 51.0 % Final    MCV 06/12/2024 103.6 (H)  79.0 - 97.0 fL Final    MCH 06/12/2024 34.6  (H)  26.6 - 33.0 pg Final    MCHC 06/12/2024 33.4  31.5 - 35.7 g/dL Final    RDW 06/12/2024 14.4  12.3 - 15.4 % Final    RDW-SD 06/12/2024 53.8  37.0 - 54.0 fl Final    MPV 06/12/2024 11.0  6.0 - 12.0 fL Final    Platelets 06/12/2024 118 (L)  140 - 450 10*3/mm3 Final    Neutrophil % 06/12/2024 80.5 (H)  42.7 - 76.0 % Final    Lymphocyte % 06/12/2024 10.2 (L)  19.6 - 45.3 % Final    Monocyte % 06/12/2024 7.7  5.0 - 12.0 % Final    Eosinophil % 06/12/2024 1.0  0.3 - 6.2 % Final    Basophil % 06/12/2024 0.3  0.0 - 1.5 % Final    Immature Grans % 06/12/2024 0.3  0.0 - 0.5 % Final    Neutrophils, Absolute 06/12/2024 5.55  1.70 - 7.00 10*3/mm3 Final    Lymphocytes, Absolute 06/12/2024 0.70  0.70 - 3.10 10*3/mm3 Final    Monocytes, Absolute 06/12/2024 0.53  0.10 - 0.90 10*3/mm3 Final    Eosinophils, Absolute 06/12/2024 0.07  0.00 - 0.40 10*3/mm3 Final    Basophils, Absolute 06/12/2024 0.02  0.00 - 0.20 10*3/mm3 Final    Immature Grans, Absolute 06/12/2024 0.02  0.00 - 0.05 10*3/mm3 Final    nRBC 06/12/2024 0.0  0.0 - 0.2 /100 WBC Final    QT Interval 06/12/2024 455  ms Preliminary    QTC Interval 06/12/2024 466  ms Preliminary    HS Troponin T 06/12/2024 46 (H)  <22 ng/L Final    proBNP 06/12/2024 9,781.0 (H)  0.0 - 1,800.0 pg/mL Final       DIAGNOSTICS:  CT lumbar spine 6/12/24    FINDINGS: The MRI examination of 01/28/2023 demonstrated an acute  horizontal fracture involving the L2 vertebral body with only minimal  loss of vertical height. On the current examination there has been  greater loss of vertical height (by approximately 8 mm anteriorly). 1-2  mm of bony retropulsion is appreciated, present previously.     There is a new compression fracture appreciated involving the T12  vertebral body. The fracture extends from the anterior cortex to the  superior endplate. There is loss of approximately 7 mm of vertical  height and approximately 3 mm of bony retropulsion of the superior  endplate. There is a focal angulation  involving the S2 vertebral body  which is new versus 01/28/2023. There is a fracture appreciated  involving the posterior elements at the level of S2.     T12-L1: Mild facet degenerative disease is present bilaterally.     L1-L2: A mild broad-based disc osteophyte complex is present resulting  in mild flattening of the ventral surface of the thecal sac.     L2-L3: A broad-based disc-osteophyte complex is present which is  slightly more prominent to the left. It contributes to mild flattening  of the ventral surface of the thecal sac and to mild foraminal stenosis  bilaterally.     L3-L4: A mild central disc osteophyte complex is present with no  evidence of herniation.     L4-L5: A mild central broad-based disc osteophyte complex is present  which contributes to mild canal stenosis and lateral recess narrowing  bilaterally when combined with posterior element degenerative disease.  Mild-to-moderate facet degenerative disease is present bilaterally  secondary to facet hypertrophy, loss of disc height and extension of a  disc osteophyte complex into the neural foramen.     L5-S1: There is mild and moderate facet degenerative disease on the  right and left respectively. Mild foraminal stenosis is present  bilaterally secondary to extension of a disc osteophyte complex into the  neural foramen.     IMPRESSION:  1.  The MRI examination of 01/28/2023 demonstrated an acute fracture  involving the L2 vertebral body. There has been approximately 8 mm of  loss of vertical height anteriorly since the prior examination.  2.  A new compression fracture involving T12 is appreciated with a  fracture extending from the anterior cortex of the superior endplate and  mild bony retropulsion. There is loss of approximately 7 mm of vertical  height.  3.  There is a contour deformity involving the S2 vertebral body as well  as a nondisplaced fracture involving the posterior elements of S2. This  was not present on the MRI examination of  01/28/2023. Further evaluation  could be performed with a MRI examination of the pelvis.  4.  Multilevel degenerative disease involving the lumbar spine is noted  as described above with no evidence of disc herniation.     I reviewed the imaging of ct L spine on 6/12/24 and discussed with Dr. Parr.   Results Review:   I reviewed the patient's new clinical results.  I personally viewed and interpreted the patient's labs, meds, and chart.     Vital Signs   Temp:  [97.5 °F (36.4 °C)] 97.5 °F (36.4 °C)  Heart Rate:  [64-72] 66  Resp:  [18] 18  BP: (116-145)/(52-69) 133/58    Physical Exam:  Physical Exam  Vitals reviewed.   Constitutional:       General: He is not in acute distress.     Appearance: Normal appearance. He is not ill-appearing, toxic-appearing or diaphoretic.   HENT:      Head: Normocephalic.      Nose: Nose normal.      Mouth/Throat:      Mouth: Mucous membranes are moist.      Pharynx: Oropharynx is clear.   Eyes:      Extraocular Movements: Extraocular movements intact.      Conjunctiva/sclera: Conjunctivae normal.   Cardiovascular:      Rate and Rhythm: Normal rate.   Pulmonary:      Effort: Pulmonary effort is normal.   Musculoskeletal:      Cervical back: Normal range of motion.      Thoracic back: Bony tenderness (lower thoracic, upper lumbar pain) present.      Lumbar back: Bony tenderness present.   Skin:     General: Skin is warm.   Neurological:      Mental Status: He is alert and oriented to person, place, and time. Mental status is at baseline.      Deep Tendon Reflexes:      Reflex Scores:       Patellar reflexes are 1+ on the right side and 1+ on the left side.       Achilles reflexes are 1+ on the right side and 1+ on the left side.  Psychiatric:         Mood and Affect: Mood normal.         Speech: Speech normal.         Behavior: Behavior normal.         Thought Content: Thought content normal.         Judgment: Judgment normal.       Neurologic Exam     Mental Status   Oriented to  person, place, and time.   Attention: normal. Concentration: normal.   Speech: speech is normal   Level of consciousness: alert  Knowledge: consistent with education.     Cranial Nerves     CN VIII   Hearing: intact    Motor Exam   Muscle bulk: normal  Overall muscle tone: normal  Right arm tone: normal  Left arm tone: normal  Right leg tone: normal  Left leg tone: normal    Strength   Right iliopsoas: 5/5  Left iliopsoas: 5/5  Right quadriceps: 5/5  Left quadriceps: 5/5  Right hamstrin/5  Left hamstrin/5  Right anterior tibial: 5/5  Left anterior tibial: 5/5  Right posterior tibial: 5/5  Left posterior tibial: 5/5  Right gastroc: 5/5  Left gastroc: 5/5    Sensory Exam   Son reports chronic RLE neuropathy diagnosed one year ago with EMG. Notes this contributes to his falling     Gait, Coordination, and Reflexes     Gait  Gait: (gait deferred)    Reflexes   Right patellar: 1+  Left patellar: 1+  Right achilles: 1+  Left achilles: 1+  Right ankle clonus: absent  Left ankle clonus: absentSat to edge of bed without much difficulty or experience of pain       Assessment & Plan       L2 vertebral fracture    Compression fracture of T12 vertebra with routine healing    Sacral fracture, closed    89 y/o male with fall yesterday. He has progression of previous L@ VCF as well as new T12 compression deformity. MRI also notes S2 fracture. He has a history of RLE neuropathy diagnosed on EMG 1 year ago.  Exam as noted above.  No new symptoms besides mid to low back and sacral pain. He has a TLSO at home and I have asked his son to bring to hospital. Neurosurgery will reevaluate tomorrow to make sure he has adequate bracing. He has good pain control a this time .  Neurosurgery will evaluate thoracolumbar spine with MRI and follow-up with recommendations.    PLAN:       MRI T and L-spine  Son to bring TLSO brace from home    I discussed the patient's findings and my recommendations with patient and family    During patient  "visit, I utilized appropriate personal protective equipment including mask and gloves.  Mask used was standard procedure mask. Appropriate PPE was worn during the entire visit.  Hand hygiene was completed before and after.     Henry Cobos, APRN 06/12/24  13:55 EDT    \"Dictated utilizing Dragon dictation\".    "

## 2024-06-12 NOTE — H&P
Patient Name:  Dinesh Churchill Sr.  YOB: 1928  MRN:  [id number]  Admit Date:  6/12/2024  Patient Care Team:  Phyllis Loving PA as PCP - General (Family Medicine)  Brandon Ricci MD Thomas, Melissa, PA as Referring Physician (Family Medicine)  Maninder Quezada MD as Consulting Physician (Cardiology)  Hardeep Shea MD as Consulting Physician (Pulmonary Disease)  Shamar Pennington MD as Consulting Physician (Hematology and Oncology)      Subjective   History Present Illness     Chief Complaint   Patient presents with    Fall    Back Pain       Mr. Churchill is a 89 y.o. male with history of hypertension and CAD who presented to the hospital after a fall.  Details of the fall are difficult to obtain as patient is very hard of hearing but he says that he simply was reaching for his walker and missed it when he fell.  He denies any syncope or lightheadedness.  He is complaining of some lower back pain and imaging has noted fractures at S2 as well as T12 which are new as well as an old fracture at L2 which looks worse than before.  He is not able to ambulate and is not safe for discharge home so he has been admitted for further management.  He does report that he has been in his normal state of health recently and denies any recent illnesses.  He apparently lives with his son.        Review of Systems   Unable to perform ROS: Other (Significant hearing loss)        Personal History     Past Medical History:   Diagnosis Date    Abnormal ECG     Abnormal MRI     Acute frontal sinusitis     Arthritis     Chronic fatigue     Coronary artery disease     Difficulty walking     Dizziness     Head injury     Headache 01/21/2023    Hearing aid worn     left    Hearing loss     Hyperlipidemia     Hypertension     Idiopathic peripheral neuropathy 8/29/2023    Kidney stones     Macrocytosis     Neoplasm of skin     Osteoporosis     Prediabetes     Vitamin D deficiency      Past Surgical History:    Procedure Laterality Date    CARDIAC CATHETERIZATION N/A 05/22/2017    Procedure: Left Heart Cath;  Surgeon: Maninder Quezada MD;  Location:  [facility] CATH INVASIVE [facility];  Service:     CARDIAC CATHETERIZATION N/A 05/23/2017    Procedure: Stent BMS coronary;  Surgeon: Maninder Quezada MD;  Location:  [facility];  Service:     CARDIAC CATHETERIZATION N/A 08/08/2019    Procedure: Left Heart Cath;  Surgeon: Maninder Quezada MD;  Location:  [facility];  Service: Cardiology    CARDIAC CATHETERIZATION N/A 08/08/2019    Procedure: Left ventriculography;  Surgeon: Maninder Quezada MD;  Location:  [facility];  Service: Cardiology    CARDIAC CATHETERIZATION N/A 08/08/2019    Procedure: Coronary angiography;  Surgeon: Maninder Quezada MD;  Location:  [facility];  Service: Cardiology    CARDIAC CATHETERIZATION N/A 08/20/2019    Procedure: CORONARY ANGIOGRAPHY;  Surgeon: Maninder Quezada MD;  Location: [facility] CATH [facility];  Service: Cardiovascular    CARDIAC CATHETERIZATION N/A 08/20/2019    Procedure: Stent BMS coronary;  Surgeon: Maninder Quezada MD;  [facility];  Service: Cardiovascular    CARDIAC ELECTROPHYSIOLOGY PROCEDURE N/A 11/08/2019    Procedure: Loop insertion;  Surgeon: Maninder Quezada MD;  [facility];  Service: Cardiovascular    HERNIA REPAIR      x 2    AK RT/LT HEART CATHETERS N/A 05/23/2017    Procedure: Percutaneous Coronary Intervention;  Surgeon: Maninder Quezada MD;  Location: [facility] CATH INVASIVE LOCATION;  Service: Cardiovascular     Family History   Problem Relation Age of Onset    Arthritis Son     Cancer Sister     Diabetes Son      Social History     Tobacco Use    Smoking status: Never     Passive exposure: Never    Smokeless tobacco: Never   Vaping Use    Vaping status: Never Used   Substance Use Topics     Alcohol use: No     Comment: quit 30 years ago     Drug use: No     No current facility-administered medications on file prior to encounter.     Current Outpatient Medications on File Prior to Encounter   Medication Sig Dispense Refill    acetaminophen (TYLENOL) 500 MG tablet Take 1 tablet by mouth 4 (Four) Times a Day.      amLODIPine (NORVASC) 5 MG tablet TAKE 1 TABLET BY MOUTH DAILY 90 tablet 3    aspirin 81 MG chewable tablet Chew 1 tablet Daily.      atorvastatin (LIPITOR) 20 MG tablet TAKE 1 TABLET BY MOUTH EVERY NIGHT 90 tablet 3    Cholecalciferol (VITAMIN D-3) 1000 UNITS capsule Take 1,000 Units by mouth Daily.      Cyanocobalamin (B-12) 1000 MCG lozenge Place 1 lozenge under the tongue Every Other Day. 45 lozenge 1    isosorbide mononitrate (IMDUR) 60 MG 24 hr tablet TAKE 1 TABLET BY MOUTH DAILY 90 tablet 3    losartan (COZAAR) 50 MG tablet TAKE 1 TABLET BY MOUTH DAILY 90 tablet 3    metoprolol succinate XL (TOPROL-XL) 100 MG 24 hr tablet TAKE 1 TABLET BY MOUTH DAILY 90 tablet 3    [DISCONTINUED] Elastic Bandages & Supports (LUMBAR BACK BRACE/SUPPORT PAD) misc Use for lifting/ strenuous exercise. 1 each 0     No Known Allergies    Objective    Objective     Vital Signs  Temp:  [97.3 °F (36.3 °C)-97.5 °F (36.4 °C)] 97.3 °F (36.3 °C)  Heart Rate:  [64-84] 84  Resp:  [18] 18  BP: (116-151)/(52-78) 151/78  SpO2:  [90 %-97 %] 97 %  on  Flow (L/min):  [2] 2;   Device (Oxygen Therapy): nasal cannula  Body mass index is 18.09 kg/m².    Physical Exam  Vitals reviewed.   Constitutional:       General: He is not in acute distress.     Appearance: He is not ill-appearing.   Cardiovascular:      Rate and Rhythm: Normal rate and regular rhythm.   Pulmonary:      Effort: No respiratory distress.      Breath sounds: Normal breath sounds.   Abdominal:      General: There is no distension.      Palpations: Abdomen is soft.      Tenderness: There is no abdominal tenderness.   Musculoskeletal:      Right lower leg: No edema.       Left lower leg: No edema.   Skin:     General: Skin is warm and dry.   Neurological:      Mental Status: He is alert.   Psychiatric:         Mood and Affect: Mood normal.         Results Review:  I reviewed the patient's new clinical results.  I reviewed the patient's new imaging results and agree with the interpretation.  I reviewed the patient's other test results and agree with the interpretation  I personally viewed and interpreted the patient's EKG/Telemetry data  Discussed with ED provider.    Lab Results (last 24 hours)       Procedure Component Value Units Date/Time    CBC & Differential [[id number]]  (Abnormal) Collected: 06/12/24 1001    Specimen: Blood Updated: 06/12/24 1014    Narrative:      The following orders were created for panel order CBC & Differential.  Procedure                               Abnormality         Status                     ---------                               -----------         ------                     CBC Auto Differential[[id number]]        Abnormal            Final result                 Please view results for these tests on the individual orders.    Comprehensive Metabolic Panel [[id number]]  (Abnormal) Collected: 06/12/24 1001    Specimen: Blood Updated: 06/12/24 1029     Glucose 115 mg/dL      BUN 19 mg/dL      Creatinine 0.68 mg/dL      Sodium 141 mmol/L      Potassium 4.3 mmol/L      Chloride 107 mmol/L      CO2 24.7 mmol/L      Calcium 8.6 mg/dL      Total Protein 5.9 g/dL      Albumin 3.7 g/dL      ALT (SGPT) 17 U/L      AST (SGOT) 16 U/L      Alkaline Phosphatase 53 U/L      Total Bilirubin 1.7 mg/dL      Globulin 2.2 gm/dL      A/G Ratio 1.7 g/dL      BUN/Creatinine Ratio 27.9     Anion Gap 9.3 mmol/L      eGFR 85.1 mL/min/1.73     Narrative:      GFR Normal >60  Chronic Kidney Disease <60  Kidney Failure <15    The GFR formula is only valid for adults with stable renal function between ages 18 and 70.    CBC Auto Differential [[id number]]  (Abnormal)  Collected: 06/12/24 1001    Specimen: Blood Updated: 06/12/24 1014     WBC 6.89 10*3/mm3      RBC 3.61 10*6/mm3      Hemoglobin 12.5 g/dL      Hematocrit 37.4 %      .6 fL      MCH 34.6 pg      MCHC 33.4 g/dL      RDW 14.4 %      RDW-SD 53.8 fl      MPV 11.0 fL      Platelets 118 10*3/mm3      Neutrophil % 80.5 %      Lymphocyte % 10.2 %      Monocyte % 7.7 %      Eosinophil % 1.0 %      Basophil % 0.3 %      Immature Grans % 0.3 %      Neutrophils, Absolute 5.55 10*3/mm3      Lymphocytes, Absolute 0.70 10*3/mm3      Monocytes, Absolute 0.53 10*3/mm3      Eosinophils, Absolute 0.07 10*3/mm3      Basophils, Absolute 0.02 10*3/mm3      Immature Grans, Absolute 0.02 10*3/mm3      nRBC 0.0 /100 WBC     High Sensitivity Troponin T [[id number]]  (Abnormal) Collected: 06/12/24 1001    Specimen: Blood Updated: 06/12/24 1029     HS Troponin T 46 ng/L     Narrative:      High Sensitive Troponin T Reference Range:  <14.0 ng/L- Negative Female for AMI  <22.0 ng/L- Negative Male for AMI  >=14 - Abnormal Female indicating possible myocardial injury.  >=22 - Abnormal Male indicating possible myocardial injury.   Clinicians would have to utilize clinical acumen, EKG, Troponin, and serial changes to determine if it is an Acute Myocardial Infarction or myocardial injury due to an underlying chronic condition.         BNP [[id number]]  (Abnormal) Collected: 06/12/24 1001    Specimen: Blood Updated: 06/12/24 1029     proBNP 9,781.0 pg/mL     Narrative:      This assay is used as an aid in the diagnosis of individuals suspected of having heart failure. It can be used as an aid in the diagnosis of acute decompensated heart failure (ADHF) in patients presenting with signs and symptoms of ADHF to the emergency department (ED). In addition, NT-proBNP of <300 pg/mL indicates ADHF is not likely.    Age Range Result Interpretation  NT-proBNP Concentration (pg/mL:      <50             Positive            >450                   Ann                  300-450                    Negative             <300    50-75           Positive            >900                  Gray                300-900                  Negative            <300      >75             Positive            >1800                  Gray                300-1800                  Negative            <300    High Sensitivity Troponin T 2Hr [[id number]]  (Abnormal) Collected: 06/12/24 1319    Specimen: Blood Updated: 06/12/24 1359     HS Troponin T 36 ng/L      Troponin T Delta -10 ng/L     Narrative:      High Sensitive Troponin T Reference Range:  <14.0 ng/L- Negative Female for AMI  <22.0 ng/L- Negative Male for AMI  >=14 - Abnormal Female indicating possible myocardial injury.  >=22 - Abnormal Male indicating possible myocardial injury.   Clinicians would have to utilize clinical acumen, EKG, Troponin, and serial changes to determine if it is an Acute Myocardial Infarction or myocardial injury due to an underlying chronic condition.         Urinalysis With Microscopic If Indicated (No Culture) - Urine, Clean Catch [[id number]]  (Abnormal) Collected: 06/12/24 1343    Specimen: Urine, Clean Catch Updated: 06/12/24 1404     Color, UA Yellow     Appearance, UA Clear     pH, UA 7.0     Specific Gravity, UA 1.021     Glucose, UA Negative     Ketones, UA Trace     Bilirubin, UA Negative     Blood, UA Negative     Protein, UA Negative     Leuk Esterase, UA Trace     Nitrite, UA Negative     Urobilinogen, UA 2.0 E.U./dL    Urinalysis, Microscopic Only - Urine, Clean Catch [[id number]] Collected: 06/12/24 1343    Specimen: Urine, Clean Catch Updated: 06/12/24 1405     RBC, UA 0-2 /HPF      WBC, UA 0-2 /HPF      Bacteria, UA None Seen /HPF      Squamous Epithelial Cells, UA 0-2 /HPF      Hyaline Casts, UA None Seen /LPF      Methodology Automated Microscopy            Imaging Results (Last 24 Hours)       Procedure Component Value Units Date/Time    CT Lumbar Spine Without Contrast [[id number]] Collected:  06/12/24 1023     Updated: 06/12/24 1655    Narrative:      CT LUMBAR SPINE WITHOUT CONTRAST     HISTORY: Fall, back pain.     COMPARISON: MRI lumbar spine 01/28/2023.     FINDINGS: The MRI examination of 01/28/2023 demonstrated an acute  horizontal fracture involving the L2 vertebral body with only minimal  loss of vertical height. On the current examination there has been  greater loss of vertical height (by approximately 8 mm anteriorly). 1-2  mm of bony retropulsion is appreciated, present previously.     There is a new compression fracture appreciated involving the T12  vertebral body. The fracture extends from the anterior cortex to the  superior endplate. There is loss of approximately 7 mm of vertical  height and approximately 3 mm of bony retropulsion of the superior  endplate. There is a focal angulation involving the S2 vertebral body  which is new versus 01/28/2023. There is a fracture appreciated  involving the posterior elements at the level of S2.     T12-L1: Mild facet degenerative disease is present bilaterally.     L1-L2: A mild broad-based disc osteophyte complex is present resulting  in mild flattening of the ventral surface of the thecal sac.     L2-L3: A broad-based disc-osteophyte complex is present which is  slightly more prominent to the left. It contributes to mild flattening  of the ventral surface of the thecal sac and to mild foraminal stenosis  bilaterally.     L3-L4: A mild central disc osteophyte complex is present with no  evidence of herniation.     L4-L5: A mild central broad-based disc osteophyte complex is present  which contributes to mild canal stenosis and lateral recess narrowing  bilaterally when combined with posterior element degenerative disease.  Mild-to-moderate facet degenerative disease is present bilaterally  secondary to facet hypertrophy, loss of disc height and extension of a  disc osteophyte complex into the neural foramen.     L5-S1: There is mild and moderate  facet degenerative disease on the  right and left respectively. Mild foraminal stenosis is present  bilaterally secondary to extension of a disc osteophyte complex into the  neural foramen.     A left adrenal nodule is appreciated consistent with an adrenal adenoma.  Bilateral pleural fluid collections are appreciated, only partially  visualized and larger on the right.       Impression:      1.  The MRI examination of 01/28/2023 demonstrated an acute fracture  involving the L2 vertebral body. There has been approximately 8 mm of  loss of vertical height anteriorly since the prior examination.  2.  A new compression fracture involving T12 is appreciated with a  fracture extending from the anterior cortex of the superior endplate and  mild bony retropulsion. There is loss of approximately 7 mm of vertical  height.  3.  There is a contour deformity involving the S2 vertebral body as well  as a nondisplaced fracture involving the posterior elements of S2. This  was not present on the MRI examination of 01/28/2023. Further evaluation  could be performed with a MRI examination of the pelvis.  4.  Multilevel degenerative disease involving the lumbar spine is noted  as described above with no evidence of disc herniation. See above.  5.  Bilateral pleural fluid collections are appreciated, only partially  visualized and larger on the right.  6.  A low-attenuation nodule is appreciated involving the left adrenal  gland consistent with an adrenal adenoma, measuring approximately 18 to  19 mm in diameter.     The above information was called to and discussed with Dr. Beverly.     Radiation dose reduction techniques were utilized, including automated  exposure control and exposure modulation based on body size.        This report was finalized on 6/12/2024 4:52 PM by Dr. Eric Guevara M.D  on Workstation: [id number]       XR Chest 1 View [[id number]] Collected: 06/12/24 0943     Updated: 06/12/24 0948    Narrative:      XR  CHEST 1 VW-     DATE OF EXAM: 6/12/2024 9:15 AM     INDICATION: Cough. Generalized weakness.     COMPARISON: Radiographs 1/21/2023, 1/19/2022, and 1/8/2021. CT  1/19/2022.     TECHNIQUE: A single portable AP view of the chest was obtained.     FINDINGS:  Overlying artifacts. Loop recorder overlying the lower left chest.  Basilar pulm opacification of both lungs, obscuring the diaphragm in  each costophrenic angle. Minimal thorax. Stable cardiomegaly, likely  centrally by technique. No acute osseous abnormality is identified.       Impression:      Stable cardiomegaly with basilar prominent opacities in both lungs that  likely represent small to moderate pleural effusions with underlying  atelectasis and/or pneumonia and possible superimposed mild pulmonary  edema.     This report was finalized on 6/12/2024 9:45 AM by Jef Santos MD on  Workstation: [id number]                   ECG 12 Lead Dyspnea   Final Result   HEART RATE= 63  bpm   RR Interval= 952  ms   TX Interval= 266  ms   P Horizontal Axis= -20  deg   P Front Axis= -36  deg   QRSD Interval= 142  ms   QT Interval= 455  ms   QTcB= 466  ms   QRS Axis= -36  deg   T Wave Axis= 139  deg   - ABNORMAL ECG -   Sinus rhythm   Prolonged TX interval   IVCD, consider atypical RBBB   LVH with IVCD, LAD and secondary repol abnrm - new   Electronically Signed By: Itzel Baker ([facility]) 12-Jun-2024 15:33:13   Date and Time of Study: 2024-06-12 09:00:22      Telemetry Scan   Final Result      Telemetry Scan   Final Result      Telemetry Scan   Final Result           Assessment/Plan     Active Hospital Problems    Diagnosis  POA    **L2 vertebral fracture [S32.029A]  Yes    Compression fracture of T12 vertebra with routine healing [S22.080D]  Not Applicable    Sacral fracture, closed [S32.10XA]  Yes    Parkinson's disease [G20.A1]  Yes    History of CVA (cerebrovascular accident) [Z86.73]  Not Applicable    Coronary artery disease involving native coronary artery of native  heart without angina pectoris [I25.10]  Yes    Cardiomyopathy [I42.9]  Yes    Essential hypertension [I10]  Yes      Resolved Hospital Problems   No resolved problems to display.       Mr. Churchill is a 89 y.o. male with history of CAD, hypertension and Parkinson's who presented with a fall and acute compression fractures seen on CT    Neurosurgery has already seen the patient per consult.  Is difficult to say where the bulk of his pain is coming from in regards to any potential kyphoplasty option.  I think the plan to try brace and see if he can ambulate is a good 1.  I think you will need skilled nursing placement in all likelihood  MRI of the T and L-spine are pending.  Reviewed and reconciled home meds  Discussed with patient regarding CODE STATUS.  He tells me he would not want to have anything aggressive done which I think is certainly reasonable and indicated at his age.  Note on 1 prior admission he was DNR but on others he was continued as full code.  For now we will make him DNR as per our discussion.  I asked him twice to verify.    Disposition: Likely will need to go to SNF as discussed       Flip Schaffer MD  [facility]  06/12/24  17:03 EDT

## 2024-06-12 NOTE — PROGRESS NOTES
Clinical Pharmacy Services: Medication History    Dinesh Churchill Sr. is a 89 y.o. male presenting to [facility] for   Chief Complaint   Patient presents with    Fall    Back Pain       He  has a past medical history of Abnormal ECG, Abnormal MRI, Acute frontal sinusitis, Arthritis, Chronic fatigue, Coronary artery disease, Difficulty walking, Dizziness, Head injury, Headache (01/21/2023), Hearing aid worn, Hearing loss, Hyperlipidemia, Hypertension, Idiopathic peripheral neuropathy (8/29/2023), Kidney stones, Macrocytosis, Neoplasm of skin, Osteoporosis, Prediabetes, and Vitamin D deficiency.    Allergies as of 06/12/2024    (No Known Allergies)       Medication information was obtained from: Pharmacy  Pharmacy and Phone Number:   [id number] - [place], [place] - [address] [facility] [address] [facility] [address] [address] [facility] [address] - [phone]  - [phone]   [address] [facility] [address]  [facility] [address]  Phone: [phone] Fax: [phone]    [facility] - [place], [place] - [address] [facility] [phone]  - [phone] FX  [address] [facility] [address]  Phone: [phone] Fax: [phone]        Prior to Admission Medications       Prescriptions Last Dose Informant Patient Reported? Taking?    acetaminophen (TYLENOL) 500 MG tablet  Self No Yes    Take 1 tablet by mouth 4 (Four) Times a Day.    amLODIPine (NORVASC) 5 MG tablet  Pharmacy No Yes    TAKE 1 TABLET BY MOUTH DAILY    aspirin 81 MG chewable tablet  Self No Yes    Chew 1 tablet Daily.    atorvastatin (LIPITOR) 20 MG tablet  Pharmacy No Yes    TAKE 1 TABLET BY MOUTH EVERY NIGHT    Cholecalciferol (VITAMIN D-3) 1000 UNITS capsule  Self Yes Yes    Take 1,000 Units by mouth Daily.    Cyanocobalamin (B-12) 1000 MCG lozenge  Pharmacy No Yes    Place 1 lozenge under the tongue Every Other Day.    isosorbide mononitrate (IMDUR) 60 MG 24 hr tablet  Pharmacy No Yes    TAKE 1 TABLET BY MOUTH DAILY     losartan (COZAAR) 50 MG tablet  Pharmacy No Yes    TAKE 1 TABLET BY MOUTH DAILY    metoprolol succinate XL (TOPROL-XL) 100 MG 24 hr tablet  Pharmacy No Yes    TAKE 1 TABLET BY MOUTH DAILY              Medication notes:     This medication list is complete to the best of my knowledge as of 6/12/2024    Please call if questions.    Shlomo Cruz  Medication History Technician  [phone]    6/12/2024 09:42 EDT

## 2024-06-12 NOTE — PLAN OF CARE
Problem: Fall Injury Risk  Goal: Absence of Fall and Fall-Related Injury  Outcome: Ongoing, Progressing  Intervention: Identify and Manage Contributors  Recent Flowsheet Documentation  Taken 6/12/2024 1600 by Elis Avilez RN  Medication Review/Management: medications reviewed  Intervention: Promote Injury-Free Environment  Recent Flowsheet Documentation  Taken 6/12/2024 1600 by Elis Avilez, RN  Safety Promotion/Fall Prevention:   activity supervised   assistive device/personal items within reach   clutter free environment maintained   fall prevention program maintained   nonskid shoes/slippers when out of bed   safety round/check completed   Goal Outcome Evaluation:           Progress: improving  Outcome Evaluation: VSS. Pain controlled with prn mediacation, spoke with son regarding the need to complete MRI paper work per son he will be here at 9am with back brace and to fill out MRI  paper work

## 2024-06-12 NOTE — ED NOTES
Nursing report ED to floor  Dinesh Churchill Sr.  89 y.o.  male    HPI :  HPI (Adult)  Stated Reason for Visit: fall, back pain  History Obtained From: EMS    Chief Complaint  Chief Complaint   Patient presents with    Fall    Back Pain       Admitting doctor:   Flip Lynn MD    Admitting diagnosis:   The primary encounter diagnosis was Closed fracture of sacrum, unspecified portion of sacrum, initial encounter. Diagnoses of Closed fracture of twelfth thoracic vertebra, unspecified fracture morphology, initial encounter, Closed compression fracture of L2 lumbar vertebra, initial encounter, and Generalized weakness were also pertinent to this visit.    Code status:   Current Code Status       Date Active Code Status Order ID Comments User Context       Prior            Allergies:   Patient has no known allergies.    Isolation:   No active isolations    Intake and Output  No intake or output data in the 24 hours ending 06/12/24 1207    Weight:   There were no vitals filed for this visit.    Most recent vitals:   Vitals:    06/12/24 0835 06/12/24 0902 06/12/24 0903   BP: 116/52 134/63    BP Location: Right arm     Patient Position: Lying     Pulse: 67  64   Resp: 18     Temp: 97.5 °F (36.4 °C)     TempSrc: Tympanic     SpO2: 96%  90%       Active LDAs/IV Access:   Lines, Drains & Airways       Active LDAs       None                    Labs (abnormal labs have a star):   Labs Reviewed   COMPREHENSIVE METABOLIC PANEL - Abnormal; Notable for the following components:       Result Value    Glucose 115 (*)     Creatinine 0.68 (*)     Total Protein 5.9 (*)     Total Bilirubin 1.7 (*)     BUN/Creatinine Ratio 27.9 (*)     All other components within normal limits    Narrative:     GFR Normal >60  Chronic Kidney Disease <60  Kidney Failure <15    The GFR formula is only valid for adults with stable renal function between ages 18 and 70.   CBC WITH AUTO DIFFERENTIAL - Abnormal; Notable for the following components:    RBC  3.61 (*)     Hemoglobin 12.5 (*)     Hematocrit 37.4 (*)     .6 (*)     MCH 34.6 (*)     Platelets 118 (*)     Neutrophil % 80.5 (*)     Lymphocyte % 10.2 (*)     All other components within normal limits   TROPONIN - Abnormal; Notable for the following components:    HS Troponin T 46 (*)     All other components within normal limits    Narrative:     High Sensitive Troponin T Reference Range:  <14.0 ng/L- Negative Female for AMI  <22.0 ng/L- Negative Male for AMI  >=14 - Abnormal Female indicating possible myocardial injury.  >=22 - Abnormal Male indicating possible myocardial injury.   Clinicians would have to utilize clinical acumen, EKG, Troponin, and serial changes to determine if it is an Acute Myocardial Infarction or myocardial injury due to an underlying chronic condition.        BNP (IN-HOUSE) - Abnormal; Notable for the following components:    proBNP 9,781.0 (*)     All other components within normal limits    Narrative:     This assay is used as an aid in the diagnosis of individuals suspected of having heart failure. It can be used as an aid in the diagnosis of acute decompensated heart failure (ADHF) in patients presenting with signs and symptoms of ADHF to the emergency department (ED). In addition, NT-proBNP of <300 pg/mL indicates ADHF is not likely.    Age Range Result Interpretation  NT-proBNP Concentration (pg/mL:      <50             Positive            >450                   Gray                 300-450                    Negative             <300    50-75           Positive            >900                  Gray                300-900                  Negative            <300      >75             Positive            >1800                  Gray                300-1800                  Negative            <300   URINALYSIS W/ MICROSCOPIC IF INDICATED (NO CULTURE)   HIGH SENSITIVITIY TROPONIN T 2HR   CBC AND DIFFERENTIAL    Narrative:     The following orders were created for panel order  CBC & Differential.  Procedure                               Abnormality         Status                     ---------                               -----------         ------                     CBC Auto Differential[[id number]]        Abnormal            Final result                 Please view results for these tests on the individual orders.       EKG:   ECG 12 Lead Dyspnea   Preliminary Result   HEART RATE= 63  bpm   RR Interval= 952  ms   GA Interval= 266  ms   P Horizontal Axis= -20  deg   P Front Axis= -36  deg   QRSD Interval= 142  ms   QT Interval= 455  ms   QTcB= 466  ms   QRS Axis= -36  deg   T Wave Axis= 139  deg   - ABNORMAL ECG -   Sinus rhythm   Prolonged GA interval   IVCD, consider atypical RBBB   LVH with IVCD, LAD and secondary repol abnrm   Baseline wander in lead(s) V1   Electronically Signed By:    Date and Time of Study: 2024-06-12 09:00:22          Meds given in ED:   Medications - No data to display    Imaging results:  CT Lumbar Spine Without Contrast    Result Date: 6/12/2024  1.  The MRI examination of 01/28/2023 demonstrated an acute fracture involving the L2 vertebral body. There has been approximately 8 mm of loss of vertical height anteriorly since the prior examination. 2.  A new compression fracture involving T12 is appreciated with a fracture extending from the anterior cortex of the superior endplate and mild bony retropulsion. There is loss of approximately 7 mm of vertical height. 3.  There is a contour deformity involving the S2 vertebral body as well as a nondisplaced fracture involving the posterior elements of S2. This was not present on the MRI examination of 01/28/2023. Further evaluation could be performed with a MRI examination of the pelvis. 4.  Multilevel degenerative disease involving the lumbar spine is noted as described above with no evidence of disc herniation. See above.  The above information was called to and discussed with Dr. Beverly.  Radiation dose reduction  techniques were utilized, including automated exposure control and exposure modulation based on body size.       XR Chest 1 View    Result Date: 6/12/2024  Stable cardiomegaly with basilar prominent opacities in both lungs that likely represent small to moderate pleural effusions with underlying atelectasis and/or pneumonia and possible superimposed mild pulmonary edema.  This report was finalized on 6/12/2024 9:45 AM by Jef Santos MD on Workstation: [id number]       Ambulatory status:   - x1    Social issues:   Social History     Socioeconomic History    Marital status:    Tobacco Use    Smoking status: Never     Passive exposure: Never    Smokeless tobacco: Never   Vaping Use    Vaping status: Never Used   Substance and Sexual Activity    Alcohol use: No     Comment: quit 30 years ago     Drug use: No    Sexual activity: Not Currently     Partners: Male       Peripheral Neurovascular  Peripheral Neurovascular (Adult)  Peripheral Neurovascular WDL: WDL    Neuro Cognitive  Neuro Cognitive (Adult)  Cognitive/Neuro/Behavioral WDL: WDL (history of cva, takes patient a little longer to respond & is [place]. Answers all orientation questions correctly)    Learning  Learning Assessment (Adult)  Learning Readiness and Ability: sensory deficit noted, cognitive limitation noted    Respiratory  Respiratory (Adult)  Airway WDL: WDL  Respiratory WDL  Respiratory WDL: WDL    Abdominal Pain       Pain Assessments  Pain (Adult)  (0-10) Pain Rating: Rest: 8  (0-10) Pain Rating: Activity: 8  Pain Location: back    NIH Stroke Scale       Filiberto Ty RN  06/12/24 12:07 EDT

## 2024-06-13 ENCOUNTER — APPOINTMENT (OUTPATIENT)
Dept: MRI IMAGING | Facility: HOSPITAL | Age: 89
DRG: 552 | End: 2024-06-13
Payer: MEDICARE

## 2024-06-13 LAB
ALBUMIN SERPL-MCNC: 3.2 G/DL (ref 3.5–5.2)
ALBUMIN/GLOB SERPL: 1.5 G/DL
ALP SERPL-CCNC: 46 U/L (ref 39–117)
ALT SERPL W P-5'-P-CCNC: 13 U/L (ref 1–41)
ANION GAP SERPL CALCULATED.3IONS-SCNC: 6.5 MMOL/L (ref 5–15)
AST SERPL-CCNC: 14 U/L (ref 1–40)
BASOPHILS # BLD AUTO: 0.02 10*3/MM3 (ref 0–0.2)
BASOPHILS NFR BLD AUTO: 0.3 % (ref 0–1.5)
BILIRUB SERPL-MCNC: 1.7 MG/DL (ref 0–1.2)
BUN SERPL-MCNC: 23 MG/DL (ref 8–23)
BUN/CREAT SERPL: 34.8 (ref 7–25)
CALCIUM SPEC-SCNC: 8.4 MG/DL (ref 8.2–9.6)
CHLORIDE SERPL-SCNC: 108 MMOL/L (ref 98–107)
CO2 SERPL-SCNC: 25.5 MMOL/L (ref 22–29)
CREAT SERPL-MCNC: 0.66 MG/DL (ref 0.76–1.27)
CYTO UR: NORMAL
DEPRECATED RDW RBC AUTO: 53.8 FL (ref 37–54)
DEPRECATED RDW RBC AUTO: 56.3 FL (ref 37–54)
EGFRCR SERPLBLD CKD-EPI 2021: 85.8 ML/MIN/1.73
EOSINOPHIL # BLD AUTO: 0.07 10*3/MM3 (ref 0–0.4)
EOSINOPHIL NFR BLD AUTO: 1 % (ref 0.3–6.2)
ERYTHROCYTE [DISTWIDTH] IN BLOOD BY AUTOMATED COUNT: 14.4 % (ref 12.3–15.4)
ERYTHROCYTE [DISTWIDTH] IN BLOOD BY AUTOMATED COUNT: 14.6 % (ref 12.3–15.4)
GLOBULIN UR ELPH-MCNC: 2.2 GM/DL
GLUCOSE SERPL-MCNC: 87 MG/DL (ref 65–99)
HCT VFR BLD AUTO: 33.7 % (ref 37.5–51)
HCT VFR BLD AUTO: 37.4 % (ref 37.5–51)
HGB BLD-MCNC: 11.2 G/DL (ref 13–17.7)
HGB BLD-MCNC: 12.5 G/DL (ref 13–17.7)
IMM GRANULOCYTES # BLD AUTO: 0.02 10*3/MM3 (ref 0–0.05)
IMM GRANULOCYTES NFR BLD AUTO: 0.3 % (ref 0–0.5)
LAB AP CASE REPORT: NORMAL
LYMPHOCYTES # BLD AUTO: 0.7 10*3/MM3 (ref 0.7–3.1)
LYMPHOCYTES NFR BLD AUTO: 10.2 % (ref 19.6–45.3)
MCH RBC QN AUTO: 34.6 PG (ref 26.6–33)
MCH RBC QN AUTO: 35 PG (ref 26.6–33)
MCHC RBC AUTO-ENTMCNC: 33.2 G/DL (ref 31.5–35.7)
MCHC RBC AUTO-ENTMCNC: 33.4 G/DL (ref 31.5–35.7)
MCV RBC AUTO: 103.6 FL (ref 79–97)
MCV RBC AUTO: 105.3 FL (ref 79–97)
MONOCYTES # BLD AUTO: 0.53 10*3/MM3 (ref 0.1–0.9)
MONOCYTES NFR BLD AUTO: 7.7 % (ref 5–12)
NEUTROPHILS NFR BLD AUTO: 5.55 10*3/MM3 (ref 1.7–7)
NEUTROPHILS NFR BLD AUTO: 80.5 % (ref 42.7–76)
NRBC BLD AUTO-RTO: 0 /100 WBC (ref 0–0.2)
PATH REPORT.FINAL DX SPEC: NORMAL
PATHOLOGY REVIEW: YES
PLATELET # BLD AUTO: 108 10*3/MM3 (ref 140–450)
PLATELET # BLD AUTO: 118 10*3/MM3 (ref 140–450)
PMV BLD AUTO: 11 FL (ref 6–12)
PMV BLD AUTO: 11.1 FL (ref 6–12)
POTASSIUM SERPL-SCNC: 4.4 MMOL/L (ref 3.5–5.2)
PROT SERPL-MCNC: 5.4 G/DL (ref 6–8.5)
RBC # BLD AUTO: 3.2 10*6/MM3 (ref 4.14–5.8)
RBC # BLD AUTO: 3.61 10*6/MM3 (ref 4.14–5.8)
SODIUM SERPL-SCNC: 140 MMOL/L (ref 136–145)
WBC NRBC COR # BLD AUTO: 5.76 10*3/MM3 (ref 3.4–10.8)
WBC NRBC COR # BLD AUTO: 6.89 10*3/MM3 (ref 3.4–10.8)

## 2024-06-13 PROCEDURE — 72146 MRI CHEST SPINE W/O DYE: CPT

## 2024-06-13 PROCEDURE — 85027 COMPLETE CBC AUTOMATED: CPT | Performed by: HOSPITALIST

## 2024-06-13 PROCEDURE — 72148 MRI LUMBAR SPINE W/O DYE: CPT

## 2024-06-13 PROCEDURE — 36415 COLL VENOUS BLD VENIPUNCTURE: CPT | Performed by: HOSPITALIST

## 2024-06-13 PROCEDURE — 80053 COMPREHEN METABOLIC PANEL: CPT | Performed by: HOSPITALIST

## 2024-06-13 RX ADMIN — HYDROCODONE BITARTRATE AND ACETAMINOPHEN 1 TABLET: 5; 325 TABLET ORAL at 15:38

## 2024-06-13 RX ADMIN — ISOSORBIDE MONONITRATE 60 MG: 60 TABLET, EXTENDED RELEASE ORAL at 09:09

## 2024-06-13 RX ADMIN — HYDROCODONE BITARTRATE AND ACETAMINOPHEN 1 TABLET: 5; 325 TABLET ORAL at 04:12

## 2024-06-13 RX ADMIN — METOPROLOL SUCCINATE 100 MG: 100 TABLET, EXTENDED RELEASE ORAL at 09:09

## 2024-06-13 RX ADMIN — HYDROCODONE BITARTRATE AND ACETAMINOPHEN 1 TABLET: 5; 325 TABLET ORAL at 21:36

## 2024-06-13 RX ADMIN — LOSARTAN POTASSIUM 50 MG: 50 TABLET, FILM COATED ORAL at 09:09

## 2024-06-13 RX ADMIN — Medication 10 ML: at 09:09

## 2024-06-13 RX ADMIN — AMLODIPINE BESYLATE 5 MG: 5 TABLET ORAL at 09:09

## 2024-06-13 RX ADMIN — ATORVASTATIN CALCIUM 20 MG: 20 TABLET, FILM COATED ORAL at 21:34

## 2024-06-13 RX ADMIN — Medication 10 ML: at 21:35

## 2024-06-13 NOTE — SIGNIFICANT NOTE
06/13/24 1105   OTHER   Discipline occupational therapist   Rehab Time/Intention   Session Not Performed other (see comments)  (Per [facility]- plan to evaluate thoracolumbar spine with MRI. Await [facility] recommendations following imaging. F/u next service date for OT)   Recommendation   OT - Next Appointment 06/14/24

## 2024-06-13 NOTE — PROGRESS NOTES
Name: Dinesh Churchill Sr. ADMIT: 2024   : 1928  PCP: Phyllis Loving PA    MRN: [id number] LOS: 1 days   AGE/SEX: 89 y.o. male  ROOM: [facility] [place]/     Subjective   Subjective   Resting in bed.  No family at bedside.  Just got back from MRI.  He states he is having some back pain and just received oral medication.  He denies any chest pain, dyspnea, cough, fever or chills.  Denies any nausea or vomiting.     Objective   Objective   Vital Signs  Temp:  [97.7 °F (36.5 °C)-99 °F (37.2 °C)] 99 °F (37.2 °C)  Heart Rate:  [62-85] 76  Resp:  [16-18] 18  BP: (122-191)/(52-86) 129/52  SpO2:  [92 %-96 %] 96 %  on  Flow (L/min):  [2] 2;   Device (Oxygen Therapy): nasal cannula  Body mass index is 18.09 kg/m².    Physical Exam  Vitals and nursing note reviewed.   Constitutional:       Appearance: He is ill-appearing. He is not toxic-appearing.   Cardiovascular:      Rate and Rhythm: Normal rate and regular rhythm.   Pulmonary:      Effort: Pulmonary effort is normal. No respiratory distress.      Breath sounds: Normal breath sounds.   Abdominal:      General: Bowel sounds are normal. There is no distension.      Palpations: Abdomen is soft.      Tenderness: There is no abdominal tenderness.   Musculoskeletal:         General: No swelling. Normal range of motion.   Skin:     General: Skin is warm and dry.      Findings: No bruising.   Neurological:      General: No focal deficit present.      Mental Status: He is alert and oriented to person, place, and time.      Sensory: No sensory deficit.      Motor: Weakness present.      Coordination: Coordination normal.      Comments: [employer].   Psychiatric:         Mood and Affect: Mood normal.         Behavior: Behavior normal.       Results Review:       I reviewed the patient's new clinical results.  Results from last 7 days   Lab Units 24  0742 24  1001   WBC 10*3/mm3 5.76 6.89   HEMOGLOBIN g/dL 11.2* 12.5*   PLATELETS 10*3/mm3 108* 118*     Results from last 7  "days   Lab Units 06/13/24  0742 06/12/24  1001   SODIUM mmol/L 140 141   POTASSIUM mmol/L 4.4 4.3   CHLORIDE mmol/L 108* 107   CO2 mmol/L 25.5 24.7   BUN mg/dL 23 19   CREATININE mg/dL 0.66* 0.68*   GLUCOSE mg/dL 87 115*   Estimated Creatinine Clearance: 59.2 mL/min (A) (by C-G formula based on SCr of 0.66 mg/dL (L)).  Results from last 7 days   Lab Units 06/13/24  0742 06/12/24  1001   ALBUMIN g/dL 3.2* 3.7   BILIRUBIN mg/dL 1.7* 1.7*   ALK PHOS U/L 46 53   AST (SGOT) U/L 14 16   ALT (SGPT) U/L 13 17     Results from last 7 days   Lab Units 06/13/24  0742 06/12/24  1001   CALCIUM mg/dL 8.4 8.6   ALBUMIN g/dL 3.2* 3.7       No results found for: \"HGBA1C\", \"POCGLU\"    amLODIPine, 5 mg, Oral, Q24H  [Held by provider] aspirin, 81 mg, Oral, Daily  atorvastatin, 20 mg, Oral, Nightly  isosorbide mononitrate, 60 mg, Oral, Daily  losartan, 50 mg, Oral, Daily  metoprolol succinate XL, 100 mg, Oral, Daily  sodium chloride, 10 mL, Intravenous, Q12H       Diet: Regular/House; Fluid Consistency: Thin (IDDSI 0)       Assessment/Plan     Active Hospital Problems    Diagnosis  POA    **L2 vertebral fracture [S32.029A]  Yes    Compression fracture of T12 vertebra with routine healing [S22.080D]  Not Applicable    Sacral fracture, closed [S32.10XA]  Yes    Thrombocytopenia [D69.6]  Yes    Paroxysmal atrial fibrillation [I48.0]  Yes    Parkinson's disease [G20.A1]  Yes    History of CVA (cerebrovascular accident) [Z86.73]  Not Applicable    Coronary artery disease involving native coronary artery of native heart without angina pectoris [I25.10]  Yes    Cardiomyopathy [I42.9]  Yes    Essential hypertension [I10]  Yes      Resolved Hospital Problems   No resolved problems to display.     Mr. Churchill is a 89 y.o. male who presented to the hospital after a fall at home.  This was felt to be mechanical in nature.  He was found to have progression of previous L2 vertebral compression fracture as well as new T12 compression deformity.  " Neurosurgery was consulted.    L2 vertebral fracture  Compression fracture at T12  Closed sacral fracture  -MRI of thoracic and lumbar spine just performed.  Results pending.  -Neurosurgery following.  -TLSO brace to be used.  -Await PT/OT evaluations.  -Pain seems to be controlled with oral medication.    Parkinson's disease  History of CVA  -Stable neurological findings on exam.  -Baby aspirin on hold.  Continues on statin.  -Has been seen by [facility] in the past.    CAD  Cardiomyopathy  Paroxysmal atrial fibrillation  Hypertension  -Established with Dr. Quezada.   -Aspirin on hold as above.  Continues on statin.  -Not chronically anticoagulated due to risk.  -Continues on home Toprol, losartan and Imdur.    Thrombocytopenia  -Previously established with hematology.  Levels have been stable in the low 100s.  -Continue to monitor for now.    Discussed with patient.    VTE Prophylaxis - SCDs  Code Status - DNR  Disposition - Anticipate discharge possibly home with  or SNF tomorrow pending [facility] final plans.      CANDELARIA Pelaez  [facility]  06/13/24  16:46 EDT

## 2024-06-13 NOTE — PROGRESS NOTES
chart reviewed. MRI results have not been read. we will see tomorrow if he is still here. if he is discharged, we will see him in the office in 6-8 weeks with xrays. needs to be in brace when out of bed.

## 2024-06-13 NOTE — PLAN OF CARE
Goal Outcome Evaluation:         A&Ox4, forgetful  Frequently removing O2, desats to upper 80s  NSR on monitor, on 2L NC   [employer], hearing aides at bedside   Complaints of back pain, see MAR   Urinal at bedside, urine odorous   Sacral bruising   MRI screening form to be completed this AM w/ son

## 2024-06-13 NOTE — PROGRESS NOTES
Nutrition Services    Patient Name:  Dinesh Churchill Sr.  YOB: 1928  MRN: [id number]  Admit Date:  6/12/2024    Assessment Date:  06/13/24    Summary: Age/MST 3/BMI    Pt is a 89 y.o. male adm for L2 vertebral fracture. H/o HTN, cardiomyopathy, CAD s/p stent placement in 2017, Parkinson's, CVA, a-fib, heart failure, RLE neuropathy. Nutrition assessment completed. Pt was being transferred to MRI at my time of visit. Per malnutrition screening tool, pt has been experiencing decreased appetite. Pt is reportedly forgetful at times. Wt hx reviewed, 20 lb (13%) wt loss x 10 mo which is significant. On regular diet with thin liquids. Will add oral nutrition supplements to help with PO intake.    Labs: Cl 108, Creat 0.66, Platelets 108, Total Bilirubin 1.7  Meds: metoprolol, atorvastatin    REC:  Boost Plus TID for additional calories/protein and to support PO intake  Will continue to monitor PO/oral nutrition supplement intake and wt   Will f/up for NFPE and interview     RD to follow.    CLINICAL NUTRITION ASSESSMENT      Reason for Assessment Age, BMI, MST score 2+     Diagnosis/Problem   L2 vertebral fracture    Medical/Surgical History Past Medical History:   Diagnosis Date    Abnormal ECG     Abnormal MRI     Acute frontal sinusitis     Arthritis     Chronic fatigue     Coronary artery disease     Difficulty walking     Dizziness     Head injury     Headache 01/21/2023    Hearing aid worn     left    Hearing loss     Hyperlipidemia     Hypertension     Idiopathic peripheral neuropathy 8/29/2023    Kidney stones     Macrocytosis     Neoplasm of skin     Osteoporosis     Prediabetes     Vitamin D deficiency        Past Surgical History:   Procedure Laterality Date    CARDIAC CATHETERIZATION N/A 05/22/2017    Procedure: Left Heart Cath;  Surgeon: Maninder Quezada MD;  Location: [facility] CATH INVASIVE LOCATION;  Service:     CARDIAC CATHETERIZATION N/A 05/23/2017    Procedure: Stent BMS coronary;   "Surgeon: Maninder Quezada MD;  Location:  [facility] CATH INVASIVE LOCATION;  Service:     CARDIAC CATHETERIZATION N/A 08/08/2019    Procedure: Left Heart Cath;  Surgeon: Maninder Quezada MD;  Location:  [facility] CATH INVASIVE LOCATION;  Service: Cardiology    CARDIAC CATHETERIZATION N/A 08/08/2019    Procedure: Left ventriculography;  Surgeon: Maninder Quezada MD;  Location:  [facility] CATH INVASIVE LOCATION;  Service: Cardiology    CARDIAC CATHETERIZATION N/A 08/08/2019    Procedure: Coronary angiography;  Surgeon: Maninder Quezada MD;  Location:  [facility] CATH INVASIVE LOCATION;  Service: Cardiology    CARDIAC CATHETERIZATION N/A 08/20/2019    Procedure: CORONARY ANGIOGRAPHY;  Surgeon: Maninder Quezada MD;  [facility];  Service: Cardiovascular    CARDIAC CATHETERIZATION N/A 08/20/2019    Procedure: Stent BMS coronary;  Surgeon: Maninder Quezada MD;  [facility];  Service: Cardiovascular    CARDIAC ELECTROPHYSIOLOGY PROCEDURE N/A 11/08/2019    Procedure: Loop insertion;  Surgeon: Maninder Quezada MD;  [facility];  Service: Cardiovascular    HERNIA REPAIR      x 2    MD RT/LT HEART CATHETERS N/A 05/23/2017    Procedure: Percutaneous Coronary Intervention;  Surgeon: Maninder Quezada MD;  Location: [facility] CATH INVASIVE LOCATION;  Service: Cardiovascular        Anthropometrics        Current Height  Current Weight  BMI kg/m2 Height: 188 cm (74\")  Weight: 63.9 kg (140 lb 14 oz) (06/12/24 1445)  Body mass index is 18.09 kg/m².   Adjusted BMI (if applicable)    BMI Category Underweight (18.4 or below)   Ideal Body Weight (IBW) 82.2 kg    Usual Body Weight (UBW) 175-180 lbs per wt hx    Weight Trend Loss 20 lb (13%) wt loss x 10 mo    Weight History Wt Readings from Last 30 Encounters:   06/12/24 1445 63.9 kg (140 lb 14 oz)   12/21/23 1033 72 kg (158 lb 11.7 oz)   12/21/23 1137 67.1 kg (148 lb)   08/29/23 " 1027 72.6 kg (160 lb)   07/26/23 1442 72.9 kg (160 lb 12.8 oz)   06/29/23 1110 77.6 kg (171 lb)   03/14/23 1124 77.6 kg (171 lb)   02/28/23 1525 77.6 kg (171 lb)   01/27/23 1830 79.6 kg (175 lb 7.8 oz)   01/27/23 1616 80.7 kg (178 lb)   01/20/23 2351 80.7 kg (178 lb)   12/29/22 1135 78.9 kg (174 lb)   12/13/22 1012 79.8 kg (176 lb)   09/13/22 1025 78.5 kg (173 lb)   07/27/22 1504 79.8 kg (176 lb)   06/23/22 1247 83.9 kg (185 lb)   06/23/22 1303 81.2 kg (179 lb)   03/30/22 1104 83.9 kg (185 lb)   03/23/22 1106 84.8 kg (187 lb)   03/16/22 1114 84.8 kg (187 lb)   03/15/22 1006 84.8 kg (187 lb)   02/22/22 1312 84.8 kg (187 lb)   02/16/22 1302 84.8 kg (187 lb)   01/19/22 1046 83 kg (183 lb)   12/23/21 1208 84.4 kg (186 lb)   11/23/21 1512 84.8 kg (187 lb)   11/15/21 1042 78.9 kg (174 lb)   09/14/21 1137 85.3 kg (188 lb)   07/12/21 0938 84.4 kg (186 lb)   06/24/21 1141 84.4 kg (186 lb)   05/14/21 1016 83.9 kg (185 lb)        Estimated Requirements         Weight used  63.9 kg     Calories  3658-4492 (30 kcal/kg, 35 kcal/kg)    Protein  77-96 (1.2 - 1.5 gm/kg)    Fluid   (1 mL/kcal)     Labs       Pertinent Labs    Results from last 7 days   Lab Units 06/13/24  0742 06/12/24  1001   SODIUM mmol/L 140 141   POTASSIUM mmol/L 4.4 4.3   CHLORIDE mmol/L 108* 107   CO2 mmol/L 25.5 24.7   BUN mg/dL 23 19   CREATININE mg/dL 0.66* 0.68*   CALCIUM mg/dL 8.4 8.6   BILIRUBIN mg/dL 1.7* 1.7*   ALK PHOS U/L 46 53   ALT (SGPT) U/L 13 17   AST (SGOT) U/L 14 16   GLUCOSE mg/dL 87 115*     Results from last 7 days   Lab Units 06/13/24  0742   HEMOGLOBIN g/dL 11.2*   HEMATOCRIT % 33.7*   WBC 10*3/mm3 5.76   ALBUMIN g/dL 3.2*     Results from last 7 days   Lab Units 06/13/24  0742 06/12/24  1001   PLATELETS 10*3/mm3 108* 118*     COVID19   Date Value Ref Range Status   01/19/2022 Not Detected Not Detected - Ref. Range Final     Lab Results   Component Value Date    HGBA1C 5.5 01/09/2024          Medications           Scheduled Medications  amLODIPine, 5 mg, Oral, Q24H  [Held by provider] aspirin, 81 mg, Oral, Daily  atorvastatin, 20 mg, Oral, Nightly  isosorbide mononitrate, 60 mg, Oral, Daily  losartan, 50 mg, Oral, Daily  metoprolol succinate XL, 100 mg, Oral, Daily  sodium chloride, 10 mL, Intravenous, Q12H       Infusions     PRN Medications   acetaminophen    senna-docusate sodium **AND** polyethylene glycol **AND** bisacodyl **AND** bisacodyl    HYDROcodone-acetaminophen    ondansetron ODT    sodium chloride    sodium chloride     Physical Findings          General Findings alert, hard of hearing, oriented, on oxygen therapy, underweight   Oral/Mouth Cavity tooth or teeth missing   Edema  no edema   Gastrointestinal normoactive   Skin  bruising   Tubes/Drains/Lines none   NFPE Unable to perform due to: pt being transferred to MRI at time of visit    --  Current Nutrition Orders & Evaluation of Intake       Oral Nutrition     Food Allergies NKFA   Current PO Diet Diet: Regular/House; Fluid Consistency: Thin (IDDSI 0)   Supplement n/a   PO Evaluation     % PO Intake Not yet documented     Factors Affecting Intake: decreased appetite, forgetful   --  PES STATEMENT / NUTRITION DIAGNOSIS      Nutrition Dx Problem  Problem: Unintentional Weight Loss  Etiology: Factors Affecting Nutrition - decreased appetite, forgetful    Signs/Symptoms: BMI and Unintended Weight Change     NUTRITION INTERVENTION / PLAN OF CARE      Intervention Goal(s) Establish goals of care, Meet estimated needs, Increase intake, Accepts oral nutrition supplement, Appropriate weight gain, and PO intake goal %: 75%         RD Intervention/Action Encourage intake, Continue to monitor, Care plan reviewed, and Recommend/order: oral nutrition supplement    --      Prescription/Orders:       PO Diet       Supplements Boost Plus TID       Enteral Nutrition       Parenteral Nutrition    New Prescription Ordered? Yes   --      Monitor/Evaluation Per protocol, PO intake, Supplement intake,  Pertinent labs, Weight, POC/GOC   Discharge Plan/Needs Pending clinical course   --    RD to follow per protocol.      Electronically signed by:  Cira Joseph RDN, BETSY  06/13/24 14:14 EDT

## 2024-06-13 NOTE — CASE MANAGEMENT/SOCIAL WORK
Discharge Planning Assessment  [facility]     Patient Name: Dinesh Churchill Sr.  MRN: [id number]  Today's Date: 6/13/2024    Admit Date: 6/12/2024    Plan: Home with HH vs SNF pending plan and progress   Discharge Needs Assessment       Row Name 06/13/24 1220       Living Environment    People in Home child(michael), adult    Name(s) of People in Home Dinesh    Current Living Arrangements home    Potentially Unsafe Housing Conditions none    Primary Care Provided by self    Family Caregiver if Needed child(michael), adult    Family Caregiver Names Tano    Quality of Family Relationships involved;helpful    Able to Return to Prior Arrangements yes       Resource/Environmental Concerns    Resource/Environmental Concerns home accessibility    Home Accessibility Concerns stairs to enter home       Transition Planning    Patient/Family Anticipates Transition to home with help/services;inpatient rehabilitation facility    Patient/Family Anticipated Services at Transition skilled nursing    Transportation Anticipated family or friend will provide       Discharge Needs Assessment    Readmission Within the Last 30 Days no previous admission in last 30 days    Equipment Currently Used at Home walker, rolling;shower chair;grab bar    Concerns to be Addressed adjustment to diagnosis/illness    Anticipated Changes Related to Illness inability to care for self    Equipment Needed After Discharge none                   Discharge Plan       Row Name 06/13/24 1220       Plan    Plan Home with HH vs SNF pending plan and progress    Patient/Family in Agreement with Plan yes    Plan Comments Spoke to pt son John, pt extremely [place], introduced self and explained CCP role, face sheet and pharmacy information verified. Pt lives with his son Dinesh, John states Dinesh knows more about pt but he was able to answer most questions. They live in 1 level home with 3 SOFY, pt is normally assist of 1 person for bathroom but can feed himself, he  uses rwx, s/c, gb. He has used HH in the past but unsure company. He has been to SNF at [facility] and would like to return if needed. If appropriate family will transport at d/c, awaiting consults, therapy evals and plan for further needs. CCP will follow - Yesenia LESTER                  Continued Care and Services - Admitted Since 6/12/2024    No active coordination exists for this encounter.       Expected Discharge Date and Time       Expected Discharge Date Expected Discharge Time    [place] 15, 2024            Demographic Summary       Row Name 06/13/24 1219       General Information    Admission Type inpatient                   Functional Status       Row Name 06/13/24 1219       Functional Status    Usual Activity Tolerance good    Current Activity Tolerance moderate       Assessment of Health Literacy    Health Literacy Fair       Functional Status, IADL    Medications assistive person    Meal Preparation assistive person    Housekeeping assistive person    Laundry assistive person    Shopping assistive person    IADL Comments Dinesh helps with needs PRN       Mental Status    General Appearance WDL WDL       Mental Status Summary    Recent Changes in Mental Status/Cognitive Functioning no changes                   Psychosocial    No documentation.                  Abuse/Neglect    No documentation.                  Legal       Row Name 06/13/24 1219       Financial/Legal    Who Manages Finances if Patient Unable Dinesh- Son                   Substance Abuse    No documentation.                  Patient Forms    No documentation.                     Yesenia Castro RN

## 2024-06-14 ENCOUNTER — TELEPHONE (OUTPATIENT)
Dept: NEUROSURGERY | Facility: CLINIC | Age: 89
End: 2024-06-14
Payer: MEDICARE

## 2024-06-14 DIAGNOSIS — S32.029A CLOSED FRACTURE OF SECOND LUMBAR VERTEBRA, UNSPECIFIED FRACTURE MORPHOLOGY, INITIAL ENCOUNTER: ICD-10-CM

## 2024-06-14 DIAGNOSIS — S22.080D COMPRESSION FRACTURE OF T12 VERTEBRA WITH ROUTINE HEALING: Primary | ICD-10-CM

## 2024-06-14 LAB
ANION GAP SERPL CALCULATED.3IONS-SCNC: 7.6 MMOL/L (ref 5–15)
BUN SERPL-MCNC: 27 MG/DL (ref 8–23)
BUN/CREAT SERPL: 38.6 (ref 7–25)
CALCIUM SPEC-SCNC: 8.2 MG/DL (ref 8.2–9.6)
CHLORIDE SERPL-SCNC: 107 MMOL/L (ref 98–107)
CO2 SERPL-SCNC: 25.4 MMOL/L (ref 22–29)
CREAT SERPL-MCNC: 0.7 MG/DL (ref 0.76–1.27)
DEPRECATED RDW RBC AUTO: 52.7 FL (ref 37–54)
EGFRCR SERPLBLD CKD-EPI 2021: 84.3 ML/MIN/1.73
ERYTHROCYTE [DISTWIDTH] IN BLOOD BY AUTOMATED COUNT: 14.4 % (ref 12.3–15.4)
GLUCOSE SERPL-MCNC: 87 MG/DL (ref 65–99)
HCT VFR BLD AUTO: 33.4 % (ref 37.5–51)
HGB BLD-MCNC: 11.3 G/DL (ref 13–17.7)
MCH RBC QN AUTO: 35 PG (ref 26.6–33)
MCHC RBC AUTO-ENTMCNC: 33.8 G/DL (ref 31.5–35.7)
MCV RBC AUTO: 103.4 FL (ref 79–97)
PLATELET # BLD AUTO: 95 10*3/MM3 (ref 140–450)
PMV BLD AUTO: 10.8 FL (ref 6–12)
POTASSIUM SERPL-SCNC: 4 MMOL/L (ref 3.5–5.2)
RBC # BLD AUTO: 3.23 10*6/MM3 (ref 4.14–5.8)
SODIUM SERPL-SCNC: 140 MMOL/L (ref 136–145)
WBC NRBC COR # BLD AUTO: 4.85 10*3/MM3 (ref 3.4–10.8)

## 2024-06-14 PROCEDURE — 97530 THERAPEUTIC ACTIVITIES: CPT

## 2024-06-14 PROCEDURE — 99231 SBSQ HOSP IP/OBS SF/LOW 25: CPT | Performed by: NURSE PRACTITIONER

## 2024-06-14 PROCEDURE — 80048 BASIC METABOLIC PNL TOTAL CA: CPT | Performed by: NURSE PRACTITIONER

## 2024-06-14 PROCEDURE — 97162 PT EVAL MOD COMPLEX 30 MIN: CPT

## 2024-06-14 PROCEDURE — 97166 OT EVAL MOD COMPLEX 45 MIN: CPT

## 2024-06-14 PROCEDURE — 85027 COMPLETE CBC AUTOMATED: CPT | Performed by: NURSE PRACTITIONER

## 2024-06-14 RX ADMIN — HYDROCODONE BITARTRATE AND ACETAMINOPHEN 1 TABLET: 5; 325 TABLET ORAL at 13:07

## 2024-06-14 RX ADMIN — AMLODIPINE BESYLATE 5 MG: 5 TABLET ORAL at 09:12

## 2024-06-14 RX ADMIN — ATORVASTATIN CALCIUM 20 MG: 20 TABLET, FILM COATED ORAL at 20:48

## 2024-06-14 RX ADMIN — ISOSORBIDE MONONITRATE 60 MG: 60 TABLET, EXTENDED RELEASE ORAL at 09:12

## 2024-06-14 RX ADMIN — HYDROCODONE BITARTRATE AND ACETAMINOPHEN 1 TABLET: 5; 325 TABLET ORAL at 20:48

## 2024-06-14 RX ADMIN — Medication 10 ML: at 20:48

## 2024-06-14 RX ADMIN — LOSARTAN POTASSIUM 50 MG: 50 TABLET, FILM COATED ORAL at 09:12

## 2024-06-14 RX ADMIN — METOPROLOL SUCCINATE 100 MG: 100 TABLET, EXTENDED RELEASE ORAL at 09:12

## 2024-06-14 RX ADMIN — HYDROCODONE BITARTRATE AND ACETAMINOPHEN 1 TABLET: 5; 325 TABLET ORAL at 08:09

## 2024-06-14 RX ADMIN — Medication 10 ML: at 09:12

## 2024-06-14 NOTE — PLAN OF CARE
Goal Outcome Evaluation:  Plan of Care Reviewed With: patient           Outcome Evaluation: Pt is a 89 y.o male admitted after a fall at home resulting in acute T12 fx, previous L2 vertebral compression fx, closed sacral fx. He has hx of Parkinson's disease, history of CVA. He has been ordered a TLSO. Pt is up in chair and fed himself lunch, he is very unsteady with transfer from chair back to bed requires up to mod A to only take a few steps. He is limited with ADL independence due to balance, pain, weakness, activity tolerance and would benefit from further OT to address deficits and improve independence. He is currently a falls risk. Would benefit from SNF at [place].      Anticipated Discharge Disposition (OT): skilled nursing facility

## 2024-06-14 NOTE — CASE MANAGEMENT/SOCIAL WORK
Continued Stay Note  [facility]     Patient Name: Dinesh Churchill Sr.  MRN: [id number]  Today's Date: 6/14/2024    Admit Date: 6/12/2024    Plan: SNF referral pending   Discharge Plan       Row Name 06/14/24 1041       Plan    Plan SNF referral pending    Patient/Family in Agreement with Plan yes    Plan Comments CCP contacted patients son Dinesh about therapy reccomending SNF placement. He is agreeable and would like a referral to [facility]. CCP made the referral. CCP called Hardeep with [facility] and he is checking on bed availability for over the weekend. CCP following.                   Discharge Codes    No documentation.                 Expected Discharge Date and Time       Expected Discharge Date Expected Discharge Time    [place] 15, 2024

## 2024-06-14 NOTE — THERAPY EVALUATION
Patient Name: Dinesh Churchill Sr.  : 1928    MRN: [id number]                              Today's Date: 2024       Admit Date: 2024    Visit Dx:     ICD-10-CM ICD-9-CM   1. Closed fracture of sacrum, unspecified portion of sacrum, initial encounter  S32.10XA 805.6   2. Closed fracture of twelfth thoracic vertebra, unspecified fracture morphology, initial encounter  S22.089A 805.2   3. Closed compression fracture of L2 lumbar vertebra, initial encounter  S32.020A 805.4   4. Generalized weakness  R53.1 780.79     Patient Active Problem List   Diagnosis    Abnormal magnetic resonance imaging study    Arthritis    Osteoarthritis of cervical spine    Diplopia    Hearing loss    Neoplasm of uncertain behavior of skin    Prediabetes    Vitamin D deficiency    Chronic fatigue    History of supraventricular tachycardia    Essential hypertension    B12 deficiency    Abnormal cardiac function test    Cardiomyopathy    Coronary artery disease involving native coronary artery of native heart without angina pectoris    History of coronary artery stent placement    History of renal calculi    Dyslipidemia    Macrocytic anemia    Precordial pain    Parkinson's disease    History of CVA (cerebrovascular accident)    Paroxysmal atrial fibrillation    Otalgia, left    Chest pain    Abnormal stress test    Spontaneous pneumothorax    Chest pain    Acute systolic (congestive) heart failure    Thrombocytopenia    Pneumonia of left lower lobe due to infectious organism    Syncope and collapse    Status post placement of implantable loop recorder    Long term (current) use of antithrombotics/antiplatelets    Stuttering    Fall    Closed fracture of multiple ribs of right side    Chest wall pain    Closed fracture of phalanx of toe of left foot    Headache    Sudden onset of severe headache    Fall in home, initial encounter    Compression fracture of L2 vertebra with routine healing    Weakness of both legs    Idiopathic  peripheral neuropathy    L2 vertebral fracture    Compression fracture of T12 vertebra with routine healing    Sacral fracture, closed     Past Medical History:   Diagnosis Date    Abnormal ECG     Abnormal MRI     Acute frontal sinusitis     Arthritis     Chronic fatigue     Coronary artery disease     Difficulty walking     Dizziness     Head injury     Headache 01/21/2023    Hearing aid worn     left    Hearing loss     Hyperlipidemia     Hypertension     Idiopathic peripheral neuropathy 8/29/2023    Kidney stones     Macrocytosis     Neoplasm of skin     Osteoporosis     Prediabetes     Vitamin D deficiency      Past Surgical History:   Procedure Laterality Date    CARDIAC CATHETERIZATION N/A 05/22/2017    Procedure: Left Heart Cath;  Surgeon: Maninder Quezada MD;  Location: [facility] C[facility]VASIVE LOC[facility];  Service:     CARDIAC CATHETERIZATION N/A 05/23/2017    Procedure: Stent BMS coronary;  Surgeon: Maninder Quezada MD;  Location: [facility] C[facility] LOC[facility];  Service:     CARDIAC CATHETERIZATION N/A 08/08/2019    Procedure: Left Heart Cath;  Surgeon: Maninder Quezada MD;  [facility];  Service: Cardiology    CARDIAC CATHETERIZATION N/A 08/08/2019    Procedure: Left ventriculography;  Surgeon: Maninder Quezada MD;  Location: [facility] C[facility] LOC[facility];  Service: Cardiology    CARDIAC CATHETERIZATION N/A 08/08/2019    Procedure: Coronary angiography;  Surgeon: Maninder Quezada MD;  [facility]: [facility] LOC[facility];  Service: Cardiology    CARDIAC CATHETERIZATION N/A 08/20/2019    Procedure: CORONARY ANGIOGRAPHY;  Surgeon: Maninder Quezada MD;  [facility];  Service: Cardiovascular    CARDIAC CATHETERIZATION N/A 08/20/2019    Procedure: Stent BMS coronary;  Surgeon: Maninder Quezada MD;  [facility]: [facility];  Service: Cardiovascular    CARDIAC ELECTROPHYSIOLOGY PROCEDURE N/A 11/08/2019     Procedure: Loop insertion;  Surgeon: Maninder Quezada MD;  Location:  [facility] CATH INVASIVE LOCATION;  Service: Cardiovascular    HERNIA REPAIR      x 2    MT RT/LT HEART CATHETERS N/A 05/23/2017    Procedure: Percutaneous Coronary Intervention;  Surgeon: Maninder Quezada MD;  Location:  [facility] CATH INVASIVE LOCATION;  Service: Cardiovascular      General Information       [facility] Name 06/14/24 0953          Physical Therapy Time and Intention    Document Type evaluation  -     Mode of Treatment individual therapy;physical therapy  -       Row Name 06/14/24 0953          General Information    Patient Profile Reviewed yes  -     Prior Level of Function min assist:;gait;transfer;bed mobility  -     Existing Precautions/Restrictions fall;brace worn when out of bed;TLSO  -     Barriers to Rehab medically complex;previous functional deficit;hearing deficit  -[facility] Name 06/14/24 0953          Living Environment    People in Home child(michael), adult  -[facility] Name 06/14/24 0953          Home Main Entrance    Number of Stairs, Main Entrance three  -[facility] Name 06/14/24 0953          Stairs Within Home, Primary    Number of Stairs, Within Home, Primary none  -[facility] Name 06/14/24 0953          Cognition    Orientation Status (Cognition) oriented x 3  -       Row Name 06/14/24 0953          Safety Issues, Functional Mobility    Impairments Affecting Function (Mobility) balance;endurance/activity tolerance;strength  -               User Key  (r) = Recorded By, (t) = Taken By, (c) = Cosigned By      Initials Name Provider Type     Safia Richardson PT Physical Therapist                   Mobility       Row Name 06/14/24 0953          Bed Mobility    Bed Mobility supine-sit  -     Supine-Sit [place] (Bed Mobility) minimum assist (75% patient effort);1 person assist;verbal cues  -     Assistive Device (Bed Mobility) head of bed elevated;bed rails  -       Row Name 06/14/24  0953          Sit-Stand Transfer    Sit-Stand [place] (Transfers) verbal cues;minimum assist (75% patient effort);moderate assist (50% patient effort);1 person assist  -     Assistive Device (Sit-Stand Transfers) walker, front-wheeled  -[facility] Name 06/14/24 0953          Gait/Stairs (Locomotion)    [place] Level (Gait) verbal cues;minimum assist (75% patient effort);2 person assist  -     Assistive Device (Gait) walker, front-wheeled  -     Distance in Feet (Gait) 5  -     Deviations/Abnormal Patterns (Gait) antalgic;naldo decreased;gait speed decreased;festinating/shuffling;stride length decreased;weight shifting decreased  -     Bilateral Gait Deviations forward flexed posture;heel strike decreased  -     Comment, (Gait/Stairs) Slow pace, shuffled steps, generalized unsteadiness  -               User Key  (r) = Recorded By, (t) = Taken By, (c) = Cosigned By      Initials Name Provider Type     Safia Richardson, PT Physical Therapist                   Obj/Interventions       [facility] Name 06/14/24 0954          Range of Motion Comprehensive    General Range of Motion bilateral lower extremity ROM WFL  -BH       Row Name 06/14/24 0954          Strength Comprehensive (MMT)    General Manual Muscle Testing (MMT) Assessment lower extremity strength deficits identified  -     Comment, General Manual Muscle Testing (MMT) Assessment Generalized weakness, BLE grossly 4-/5  -[facility] Name 06/14/24 0954          Balance    Balance Assessment sitting static balance;sitting dynamic balance;standing static balance;standing dynamic balance  -     Static Sitting Balance standby assist  -     Dynamic Sitting Balance standby assist  -     Position, Sitting Balance unsupported;sitting edge of bed  -     Static Standing Balance verbal cues;contact guard  -     Dynamic Standing Balance minimal assist;verbal cues  -     Position/Device Used, Standing Balance supported;walker, front-wheeled   -     Balance Interventions sitting;standing;sit to stand;supported;static;dynamic  -[facility] Name 06/14/24 0954          Sensory Assessment (Somatosensory)    Sensory Assessment (Somatosensory) unable/difficult to assess  -               User Key  (r) = Recorded By, (t) = Taken By, (c) = Cosigned By      Initials Name Provider Type     Safia Richardson PT Physical Therapist                   Goals/Plan       Row Name 06/14/24 1001          Bed Mobility Goal 1 (PT)    Activity/Assistive Device (Bed Mobility Goal 1, PT) bed mobility activities, all  -     [place] Level/Cues Needed (Bed Mobility Goal 1, PT) standby assist  -     Time Frame (Bed Mobility Goal 1, PT) 1 week  -[facility] Name 06/14/24 1001          Transfer Goal 1 (PT)    Activity/Assistive Device (Transfer Goal 1, PT) transfers, all  -     [place] Level/Cues Needed (Transfer Goal 1, PT) standby assist  -     Time Frame (Transfer Goal 1, PT) 1 week  -[facility] Name 06/14/24 1001          Gait Training Goal 1 (PT)    Activity/Assistive Device (Gait Training Goal 1, PT) gait (walking locomotion)  -     [place] Level (Gait Training Goal 1, PT) standby assist  -     Distance (Gait Training Goal 1, PT) 50ft  -     Time Frame (Gait Training Goal 1, PT) 1 week  -[facility] Name 06/14/24 1001          Therapy Assessment/Plan (PT)    Planned Therapy Interventions (PT) balance training;bed mobility training;gait training;home exercise program;patient/family education;strengthening;transfer training  -               User Key  (r) = Recorded By, (t) = Taken By, (c) = Cosigned By      Initials Name Provider Type     Safia Richardson PT Physical Therapist                   Clinical Impression       Row Name 06/14/24 0909          Pain    Pre/Posttreatment Pain Comment c/o back pain with mobility, unable to rate  -[facility] Name 06/14/24 0974          Plan of Care Review    Plan of Care Reviewed With patient  -      Outcome Evaluation Pt is a 89 yo M admitted from home after a fall in the bathroom. Work-up revealed acute S2 and T12 comp fxs with worsening L2 comp fx from previous fall. Pt to wear TLSO when OOB. Pt lives with his son and reports use of a rwx at [place], son assists with ADLs. Pt has 3 SOFY with no steps inside. Pt is very [place] and with garbled speech d/t previous stroke. Pt presents to PT with impaired strength, endurance, and balance limiting overall mobility. Pt transferred to EOB with min A x1 - heavy use of bedrails. Pt assisted donning TLSO brace and stood with min-mod A x1 and rwx requiring a couple attempts to complete. Pt ambulated 5ft to chair with min A x2 and rwx - shuffled, unsteady steps with slow pace and forward flexed posture. Pt c/o back pain with mobility but unable to rate. Pt assisted with repositioning in chair and left with needs met. PT will continue to follow, anticipate DC to SNF.  -       Row Name 06/14/24 0955          Therapy Assessment/Plan (PT)    Patient/Family Therapy Goals Statement (PT) [facility]  -     Rehab Potential (PT) good, to achieve stated therapy goals  -     Criteria for Skilled Interventions Met (PT) yes  -     Therapy Frequency (PT) 5 times/wk  [facility]       Row Name 06/14/24 0955          Vital Signs    O2 Delivery Pre Treatment supplemental O2  -     O2 Delivery Intra Treatment supplemental O2  -     O2 Delivery Post Treatment supplemental O2  -       Row Name 06/14/24 0955          Positioning and Restraints    Pre-Treatment Position in bed  -     Post Treatment Position chair  -BH     In Chair notified nsg;reclined;call light within reach;encouraged to call for assist;exit alarm on  -               User Key  (r) = Recorded By, (t) = Taken By, (c) = Cosigned By      Initials Name Provider Type     Safia Richardson, PT Physical Therapist                   Outcome Measures       Row Name 06/14/24 1001          How much help from another person do  you currently need...    Turning from your back to your side while in flat bed without using bedrails? 3  -BH     Moving from lying on back to sitting on the side of a flat bed without bedrails? 2  -BH     Moving to and from a bed to a chair (including a wheelchair)? 3  -BH     Standing up from a chair using your arms (e.g., wheelchair, bedside chair)? 3  -BH     Climbing 3-5 steps with a railing? 2  -BH     To walk in hospital room? 3  -BH     AM-PAC 6 Clicks Score (PT) 16  -     Highest Level of Mobility Goal 5 --> Static standing  -       Row Name 06/14/24 1001          Functional Assessment    Outcome Measure Options AM-PAC 6 Clicks Basic Mobility (PT)  -               User Key  (r) = Recorded By, (t) = Taken By, (c) = Cosigned By      Initials Name Provider Type     Safia Richardson PT Physical Therapist                                 Physical Therapy Education       Title: PT OT SLP Therapies (Done)       Topic: Physical Therapy (Done)       Point: Mobility training (Done)       Learning Progress Summary             Patient Acceptance, E,TB,D, NR,DU by  at 6/14/2024 1002                         Point: Home exercise program (Done)       Learning Progress Summary             Patient Acceptance, E,TB,D, NR,DU by  at 6/14/2024 1002                         Point: Body mechanics (Done)       Learning Progress Summary             Patient Acceptance, E,TB,D, NR,DU by  at 6/14/2024 1002                         Point: Precautions (Done)       Learning Progress Summary             Patient Acceptance, E,TB,D, NR,DU by  at 6/14/2024 1002                                         User Key       Initials Effective Dates Name Provider Type [facility] 04/08/22 -  Safia Richardson PT Physical Therapist PT                  PT Recommendation and Plan  Planned Therapy Interventions (PT): balance training, bed mobility training, gait training, home exercise program, patient/family education, strengthening,  transfer training  Plan of Care Reviewed With: patient  Outcome Evaluation: Pt is a 89 yo M admitted from home after a fall in the bathroom. Work-up revealed acute S2 and T12 comp fxs with worsening L2 comp fx from previous fall. Pt to wear TLSO when OOB. Pt lives with his son and reports use of a rwx at [place], son assists with ADLs. Pt has 3 SOFY with no steps inside. Pt is very [place] and with garbled speech d/t previous stroke. Pt presents to PT with impaired strength, endurance, and balance limiting overall mobility. Pt transferred to EOB with min A x1 - heavy use of bedrails. Pt assisted donning TLSO brace and stood with min-mod A x1 and rwx requiring a couple attempts to complete. Pt ambulated 5ft to chair with min A x2 and rwx - shuffled, unsteady steps with slow pace and forward flexed posture. Pt c/o back pain with mobility but unable to rate. Pt assisted with repositioning in chair and left with needs met. PT will continue to follow, anticipate DC to SNF.     Time Calculation:   PT Evaluation Complexity  History, PT Evaluation Complexity: 1-2 personal factors and/or comorbidities  Examination of Body Systems (PT Eval Complexity): total of 3 or more elements  Clinical Presentation (PT Evaluation Complexity): evolving  Clinical Decision Making (PT Evaluation Complexity): moderate complexity  Overall Complexity (PT Evaluation Complexity): moderate complexity     PT Charges       Row Name 06/14/24 1002             Time Calculation    Start Time 0853  -      Stop Time 0907  -      Time Calculation (min) 14 min  -      PT Received On 06/14/24  -      PT - Next Appointment 06/17/24  -      PT Goal Re-Cert Due Date 06/21/24  -         Time Calculation- PT    Total Timed Code Minutes- PT 10 minute(s)  -         Timed Charges    78346 - Gait Training Minutes  2  -BH      28588 - PT Therapeutic Activity Minutes 8  -BH         Total Minutes    Timed Charges Total Minutes 10  -       Total Minutes 10  -                 User Key  (r) = Recorded By, (t) = Taken By, (c) = Cosigned By      Initials Name Provider Type     Safia Richardson, PT Physical Therapist                  Therapy Charges for Today       Code Description Service Date Service Provider Modifiers Qty    19929134473 HC PT THERAPEUTIC ACT EA 15 MIN 6/14/2024 Safia Richardson, PT GP 1    33658990761 HC PT EVAL MOD COMPLEXITY 2 6/14/2024 Safia Richardson, PT GP 1            PT G-Codes  Outcome Measure Options: AM-PAC 6 Clicks Basic Mobility (PT)  AM-PAC 6 Clicks Score (PT): 16  PT Discharge Summary  Anticipated Discharge Disposition (PT): skilled nursing facility    Safia Richardson PT  6/14/2024

## 2024-06-14 NOTE — TELEPHONE ENCOUNTER
----- Message from Marisabel Bai sent at 6/14/2024 11:53 AM EDT -----  Patient will need 4 week hospital follow up appointment with Dr Parr and will need thoracic and lumbar xrays for follow up on compression fractures(I put the xray orders in).    Thank you,    [place]

## 2024-06-14 NOTE — PROGRESS NOTES
Name: Dinesh Churchill Sr. ADMIT: 2024   : 1928  PCP: Phyllis Loving PA    MRN: [id number] LOS: 2 days   AGE/SEX: 89 y.o. male  ROOM: [facility]/1     Subjective   Subjective   Resting in bed eating his breakfast.  Family currently at bedside.  He denies any new complaints.  States he just received a pain pill for his back.  Denies any nausea, vomiting or abdominal pain.  Denies any chest pain or trouble breathing.     Objective   Objective   Vital Signs  Temp:  [98.2 °F (36.8 °C)-99 °F (37.2 °C)] 98.4 °F (36.9 °C)  Heart Rate:  [62-76] 62  Resp:  [18] 18  BP: (123-146)/(52-63) 146/63  SpO2:  [93 %-99 %] 99 %  on  Flow (L/min):  [2] 2;   Device (Oxygen Therapy): nasal cannula  Body mass index is 18.09 kg/m².    Physical Exam  Vitals and nursing note reviewed.   Constitutional:       Appearance: He is ill-appearing (chronically). He is not toxic-appearing.   Cardiovascular:      Rate and Rhythm: Normal rate and regular rhythm.   Pulmonary:      Effort: Pulmonary effort is normal. No respiratory distress.      Breath sounds: Normal breath sounds.   Abdominal:      General: Bowel sounds are normal. There is no distension.      Palpations: Abdomen is soft.      Tenderness: There is no abdominal tenderness.   Musculoskeletal:         General: No swelling. Normal range of motion.   Skin:     General: Skin is warm and dry.      Findings: No bruising.   Neurological:      General: No focal deficit present.      Mental Status: He is alert and oriented to person, place, and time.      Sensory: No sensory deficit.      Motor: Weakness present.      Coordination: Coordination normal.      Comments: [place].   Psychiatric:         Mood and Affect: Mood normal.         Behavior: Behavior normal.     Results Review:       I reviewed the patient's new clinical results.  Results from last 7 days   Lab Units 24  0601 24  0742 24  1001   WBC 10*3/mm3 4.85 5.76 6.89   HEMOGLOBIN g/dL 11.3* 11.2* 12.5*  "  PLATELETS 10*3/mm3 95* 108* 118*     Results from last 7 days   Lab Units 06/14/24  0601 06/13/24  0742 06/12/24  1001   SODIUM mmol/L 140 140 141   POTASSIUM mmol/L 4.0 4.4 4.3   CHLORIDE mmol/L 107 108* 107   CO2 mmol/L 25.4 25.5 24.7   BUN mg/dL 27* 23 19   CREATININE mg/dL 0.70* 0.66* 0.68*   GLUCOSE mg/dL 87 87 115*   Estimated Creatinine Clearance: 55.8 mL/min (A) (by C-G formula based on SCr of 0.7 mg/dL (L)).  Results from last 7 days   Lab Units 06/13/24  0742 06/12/24  1001   ALBUMIN g/dL 3.2* 3.7   BILIRUBIN mg/dL 1.7* 1.7*   ALK PHOS U/L 46 53   AST (SGOT) U/L 14 16   ALT (SGPT) U/L 13 17     Results from last 7 days   Lab Units 06/14/24  0601 06/13/24  0742 06/12/24  1001   CALCIUM mg/dL 8.2 8.4 8.6   ALBUMIN g/dL  --  3.2* 3.7       No results found for: \"HGBA1C\", \"POCGLU\"    amLODIPine, 5 mg, Oral, Q24H  [Held by provider] aspirin, 81 mg, Oral, Daily  atorvastatin, 20 mg, Oral, Nightly  isosorbide mononitrate, 60 mg, Oral, Daily  losartan, 50 mg, Oral, Daily  metoprolol succinate XL, 100 mg, Oral, Daily  sodium chloride, 10 mL, Intravenous, Q12H       Diet: Regular/House; Fluid Consistency: Thin (IDDSI 0)       Assessment/Plan     Active Hospital Problems    Diagnosis  POA    **L2 vertebral fracture [S32.029A]  Yes    Compression fracture of T12 vertebra with routine healing [S22.080D]  Not Applicable    Sacral fracture, closed [S32.10XA]  Yes    Thrombocytopenia [D69.6]  Yes    Paroxysmal atrial fibrillation [I48.0]  Yes    Parkinson's disease [G20.A1]  Yes    History of CVA (cerebrovascular accident) [Z86.73]  Not Applicable    Coronary artery disease involving native coronary artery of native heart without angina pectoris [I25.10]  Yes    Cardiomyopathy [I42.9]  Yes    Essential hypertension [I10]  Yes      Resolved Hospital Problems   No resolved problems to display.     Mr. Churchill is a 89 y.o. male who presented to the hospital after a fall at home.  This was felt to be mechanical in nature.  " He was found to have progression of previous L2 vertebral compression fracture as well as new T12 compression deformity.  Neurosurgery was consulted.     L2 vertebral fracture  Compression fracture at T12  Closed sacral fracture  -MRI of thoracic and lumbar spine 6/13 revealed acute T12 compression fracture with mild loss of vertical height and no other compression fractures identified.  Acute fracture of S2 which extends to and involves the posterior elements.  Moderate compression deformity noted moderate to large Schmorl's node at L2.  -Neurosurgery following-options for kyphoplasty discussed.  Family and patient prefer conservative measures with brace at this time and will consider kyphoplasty if pain worsens down the road.  Needs TLSO brace when out of bed and to follow-up in 1 month with thoracic and lumbar x-rays.  -Pain seems to be controlled with oral medication.  -PT notes today indicate need for SNF.  CCP working on placement and appears he has a bed available tomorrow.     Parkinson's disease  History of CVA  -Stable neurological findings on exam.  -Resume baby aspirin. Continues on statin.  -Has been seen by [facility] in the past.     CAD  Cardiomyopathy  Paroxysmal atrial fibrillation  Hypertension  -Established with Dr. Quezada.   -Aspirin and statin.  -Not chronically anticoagulated due to risk.  -Continues on home Toprol, losartan and Imdur.     Thrombocytopenia  -Previously established with hematology.  Levels have been stable in the low 100s-a little lower today at 95.  Will repeat in a.m.  -Continue to monitor for now.     Discussed with patient and nursing staff.      VTE Prophylaxis - SCDs  Code Status - DNR  Disposition - Anticipate discharge tomorrow to SNF.    CANDELARIA Pelaez  [facility]  06/14/24  08:41 EDT

## 2024-06-14 NOTE — PLAN OF CARE
Goal Outcome Evaluation:  Plan of Care Reviewed With: patient           Outcome Evaluation: Pt is a 89 yo M admitted from home after a fall in the bathroom. Work-up revealed acute S2 and T12 comp fxs with worsening L2 comp fx from previous fall. Pt to wear TLSO when OOB. Pt lives with his son and reports use of a rwx at [place], son assists with ADLs. Pt has 3 SOFY with no steps inside. Pt is very [place] and with garbled speech d/t previous stroke. Pt presents to PT with impaired strength, endurance, and balance limiting overall mobility. Pt transferred to EOB with min A x1 - heavy use of bedrails. Pt assisted donning TLSO brace and stood with min-mod A x1 and rwx requiring a couple attempts to complete. Pt ambulated 5ft to chair with min A x2 and rwx - shuffled, unsteady steps with slow pace and forward flexed posture. Pt c/o back pain with mobility but unable to rate. Pt assisted with repositioning in chair and left with needs met. PT will continue to follow, anticipate DC to SNF.      Anticipated Discharge Disposition (PT): skilled nursing facility

## 2024-06-14 NOTE — THERAPY EVALUATION
Patient Name: Dinesh Churchill Sr.  : 1928    MRN: [id number]                              Today's Date: 2024       Admit Date: 2024    Visit Dx:     ICD-10-CM ICD-9-CM   1. Closed fracture of sacrum, unspecified portion of sacrum, initial encounter  S32.10XA 805.6   2. Closed fracture of twelfth thoracic vertebra, unspecified fracture morphology, initial encounter  S22.089A 805.2   3. Closed compression fracture of L2 lumbar vertebra, initial encounter  S32.020A 805.4   4. Generalized weakness  R53.1 780.79     Patient Active Problem List   Diagnosis    Abnormal magnetic resonance imaging study    Arthritis    Osteoarthritis of cervical spine    Diplopia    Hearing loss    Neoplasm of uncertain behavior of skin    Prediabetes    Vitamin D deficiency    Chronic fatigue    History of supraventricular tachycardia    Essential hypertension    B12 deficiency    Abnormal cardiac function test    Cardiomyopathy    Coronary artery disease involving native coronary artery of native heart without angina pectoris    History of coronary artery stent placement    History of renal calculi    Dyslipidemia    Macrocytic anemia    Precordial pain    Parkinson's disease    History of CVA (cerebrovascular accident)    Paroxysmal atrial fibrillation    Otalgia, left    Chest pain    Abnormal stress test    Spontaneous pneumothorax    Chest pain    Acute systolic (congestive) heart failure    Thrombocytopenia    Pneumonia of left lower lobe due to infectious organism    Syncope and collapse    Status post placement of implantable loop recorder    Long term (current) use of antithrombotics/antiplatelets    Stuttering    Fall    Closed fracture of multiple ribs of right side    Chest wall pain    Closed fracture of phalanx of toe of left foot    Headache    Sudden onset of severe headache    Fall in home, initial encounter    Compression fracture of L2 vertebra with routine healing    Weakness of both legs    Idiopathic  peripheral neuropathy    L2 vertebral fracture    Compression fracture of T12 vertebra with routine healing    Sacral fracture, closed     Past Medical History:   Diagnosis Date    Abnormal ECG     Abnormal MRI     Acute frontal sinusitis     Arthritis     Chronic fatigue     Coronary artery disease     Difficulty walking     Dizziness     Head injury     Headache 01/21/2023    Hearing aid worn     left    Hearing loss     Hyperlipidemia     Hypertension     Idiopathic peripheral neuropathy 8/29/2023    Kidney stones     Macrocytosis     Neoplasm of skin     Osteoporosis     Prediabetes     Vitamin D deficiency      Past Surgical History:   Procedure Laterality Date    CARDIAC CATHETERIZATION N/A 05/22/2017    Procedure: Left Heart Cath;  Surgeon: Maninder Quezada MD;  Location[facility] C[facility];  Service:     CARDIAC CATHETERIZATION N/A 05/23/2017    Procedure: Stent BMS coronary;  Surgeon: Maninder Quezada MD;  Location: [facility] LOC[facility];  Service:     CARDIAC CATHETERIZATION N/A 08/08/2019    Procedure: Left Heart Cath;  Surgeon: Maninder Quezada MD;  [facility];  Service: Cardiology    CARDIAC CATHETERIZATION N/A 08/08/2019    Procedure: Left ventriculography;  Surgeon: Maninder Quezada MD;  Location: [facility];  Service: Cardiology    CARDIAC CATHETERIZATION N/A 08/08/2019    Procedure: Coronary angiography;  Surgeon: Maninder uQezada MD;  [facility];  Service: Cardiology    CARDIAC CATHETERIZATION N/A 08/20/2019    Procedure: CORONARY ANGIOGRAPHY;  Surgeon: Maninder Quezada MD;  [facility]: [facility];  Service: Cardiovascular    CARDIAC CATHETERIZATION N/A 08/20/2019    Procedure: Stent BMS coronary;  Surgeon: Maninder Quezada MD;  [facility]: [facility];  Service: Cardiovascular    CARDIAC ELECTROPHYSIOLOGY PROCEDURE N/A 11/08/2019     Procedure: Loop insertion;  Surgeon: Maninder Quezada MD;  Location:  [facility] CATH INVASIVE LOCATION;  Service: Cardiovascular    HERNIA REPAIR      x 2    NH RT/LT HEART CATHETERS N/A 05/23/2017    Procedure: Percutaneous Coronary Intervention;  Surgeon: Maninder Quezada MD;  Location:  [facility] CATH INVASIVE LOCATION;  Service: Cardiovascular      General Information       Row Name 06/14/24 1447          OT Time and Intention    Document Type evaluation  -     Mode of Treatment occupational therapy;individual therapy  -       Row Name 06/14/24 [facility]7          General Information    Patient Profile Reviewed yes  -SM     Prior Level of Function --  Normally X1 assist to BR, can feed self, needs help with other ADLs.  -     Existing Precautions/Restrictions fall;brace worn when out of bed;TLSO  -     Barriers to Rehab hearing deficit;previous functional deficit  very [place] and hearing aids dead today  -       Row Name 06/14/24 1447          Occupational Profile    Environmental Supports and Barriers (Occupational Profile) Home DME includes shower chair, grab bars, rwx  -       Row Name 06/14/24 1447          Living Environment    People in Home child(michael), adult  -       Row Name 06/14/24 1447          Home Main Entrance    Number of Stairs, Main Entrance two  -       Row Name 06/14/24 1447          Stairs Within Home, Primary    Number of Stairs, Within Home, Primary none  -       Row Name 06/14/24 1447          Cognition    Orientation Status (Cognition) oriented x 3  -       Row Name 06/14/24 1447          Safety Issues, Functional Mobility    Impairments Affecting Function (Mobility) balance;endurance/activity tolerance;strength;pain  -               User Key  (r) = Recorded By, (t) = Taken By, (c) = Cosigned By      Initials Name Provider Type     Yesenia Eaton OT Occupational Therapist                     Mobility/ADL's       Row Name 06/14/24 1440          Bed Mobility    Bed  Mobility sit-supine  -     Sit-Supine [place] (Bed Mobility) moderate assist (50% patient effort);verbal cues  -     Assistive Device (Bed Mobility) head of bed elevated;bed rails  -SM       Row Name 06/14/24 1448          Sit-Stand Transfer    Sit-Stand [place] (Transfers) moderate assist (50% patient effort);verbal cues  -     Assistive Device (Sit-Stand Transfers) walker, front-wheeled  -SM       Row Name 06/14/24 1448          Functional Mobility    Functional Mobility- Ind. Level moderate assist (50% patient effort)  -     Functional Mobility- Device walker, front-wheeled  -     Functional Mobility-Distance (Feet) --  4  -SM     Functional Mobility- Comment very unsteady  -SM       Row Name 06/14/24 1448          Activities of Daily Living    BADL Assessment/Intervention lower body dressing;toileting;feeding  -SM       Row Name 06/14/24 1448          Lower Body Dressing Assessment/Training    [place] Level (Lower Body Dressing) don;socks;dependent (less than 25% patient effort)  -     Position (Lower Body Dressing) supported sitting  -SM       Row Name 06/14/24 1448          Toileting Assessment/Training    Comment, (Toileting) Pt denies any bathroom needs this encounter, in a brief during eval.  -SM       Row Name 06/14/24 1448          Self-Feeding Assessment/Training    [place] Level (Feeding) set up  -     Position (Self-Feeding) supported sitting  -               User Key  (r) = Recorded By, (t) = Taken By, (c) = Cosigned By      Initials Name Provider Type     Yesenia Eaton OT Occupational Therapist                   Obj/Interventions       Row Name 06/14/24 1449          Range of Motion Comprehensive    Comment, General Range of Motion shoulder flex 3/4 AROM, distal UE ROM WFL  -[facility] Name 06/14/24 1449          Strength Comprehensive (MMT)    Comment, General Manual Muscle Testing (MMT) Assessment generalized weakness noted BUE  -SM       Row Name  06/14/24 1449          Motor Skills    Motor Skills functional endurance  -SM     Functional Endurance poor  -[facility] Name 06/14/24 1449          Balance    Static Standing Balance minimal assist  -SM     Dynamic Standing Balance moderate assist  -SM     Position/Device Used, Standing Balance supported;walker, rolling  -SM               User Key  (r) = Recorded By, (t) = Taken By, (c) = Cosigned By      Initials Name Provider Type     Yesenia Eaton OT Occupational Therapist                   Goals/Plan       Row Name 06/14/24 1452          Transfer Goal 1 (OT)    Activity/Assistive Device (Transfer Goal 1, OT) toilet;commode, bedside with drop arms  -SM     [place] Level/Cues Needed (Transfer Goal 1, OT) contact guard required  -SM     Time Frame (Transfer Goal 1, OT) short term goal (STG);2 weeks  -SM     Progress/Outcome (Transfer Goal 1, OT) goal ongoing  -SM       Row Name 06/14/24 1452          Bathing Goal 1 (OT)    Activity/Device (Bathing Goal 1, OT) bathing skills, all  -SM     [place] Level/Cues Needed (Bathing Goal 1, OT) moderate assist (50-74% patient effort)  -SM     Time Frame (Bathing Goal 1, OT) short term goal (STG);2 weeks  -SM     Progress/Outcomes (Bathing Goal 1, OT) goal ongoing  -SM       Row Name 06/14/24 1452          Toileting Goal 1 (OT)    Activity/Device (Toileting Goal 1, OT) toileting skills, all  -SM     [place] Level/Cues Needed (Toileting Goal 1, OT) minimum assist (75% or more patient effort)  -SM     Time Frame (Toileting Goal 1, OT) short term goal (STG);2 weeks  -SM     Progress/Outcome (Toileting Goal 1, OT) goal ongoing  -[facility] Name 06/14/24 1452          Grooming Goal 1 (OT)    Activity/Device (Grooming Goal 1, OT) grooming skills, all  -SM     [place] (Grooming Goal 1, OT) contact guard required  -SM     Time Frame (Grooming Goal 1, OT) short term goal (STG);2 weeks  -SM     Strategies/Barriers (Grooming Goal 1, OT) standing at sink   -     Progress/Outcome (Grooming Goal 1, OT) goal ongoing  -       Row Name 06/14/24 7152          Therapy Assessment/Plan (OT)    Planned Therapy Interventions (OT) activity tolerance training;adaptive equipment training;BADL retraining;functional balance retraining;occupation/activity based interventions;patient/caregiver education/training;ROM/therapeutic exercise;strengthening exercise;transfer/mobility retraining  -               User Key  (r) = Recorded By, (t) = Taken By, (c) = Cosigned By      Initials Name Provider Type    Yesenia Hernandez, BLAS Occupational Therapist                   Clinical Impression       Row Name 06/14/24 6990          Pain Assessment    Posttreatment Pain Rating 9/10  -     Pain Location - back  -     Pain Intervention(s) Repositioned;Rest;Nursing Notified  -       Row Name 06/14/24 5070          Plan of Care Review    Plan of Care Reviewed With patient  -     Outcome Evaluation Pt is a 89 y.o male admitted after a fall at home resulting in acute T12 fx, previous L2 vertebral compression fx, closed sacral fx. He has hx of Parkinson's disease, history of CVA. He has been ordered a TLSO. Pt is up in chair and fed himself lunch, he is very unsteady with transfer from chair back to bed requires up to mod A to only take a few steps. He is limited with ADL independence due to balance, pain, weakness, activity tolerance and would benefit from further OT to address deficits and improve independence. He is currently a falls risk. Would benefit from SNF at [place].  -       Row Name 06/14/24 3530          Therapy Assessment/Plan (OT)    Rehab Potential (OT) good, to achieve stated therapy goals  -     Criteria for Skilled Therapeutic Interventions Met (OT) yes;skilled treatment is necessary  -     Therapy Frequency (OT) 5 times/wk  -       Row Name 06/14/24 6310          Therapy Plan Review/Discharge Plan (OT)    Anticipated Discharge Disposition (OT) skilled nursing  facility  -       Row Name 06/14/24 1450          Vital Signs    O2 Delivery Pre Treatment supplemental O2  -SM     O2 Delivery Intra Treatment supplemental O2  -SM     O2 Delivery Post Treatment supplemental O2  -       Row Name 06/14/24 1450          Positioning and Restraints    Pre-Treatment Position sitting in chair/recliner  -     Post Treatment Position bed  -SM     In Bed fowlers;encouraged to call for assist;exit alarm on;patient within staff view;call light within reach;notified [facility]  -               User Key  (r) = Recorded By, (t) = Taken By, (c) = Cosigned By      Initials Name Provider Type    Yesenia Hernandez, BLAS Occupational Therapist                   Outcome Measures       Row Name 06/14/24 1452          How much help from another is currently needed...    Putting on and taking off regular lower body clothing? 1  -SM     Bathing (including washing, rinsing, and drying) 2  -SM     Toileting (which includes using toilet bed pan or urinal) 1  -SM     Putting on and taking off regular upper body clothing 2  -SM     Taking care of personal grooming (such as brushing teeth) 3  -SM     Eating meals 3  -SM     AM-PAC 6 Clicks Score (OT) 12  -SM       Row Name 06/14/24 1001 06/14/24 0830       How much help from another person do you currently need...    Turning from your back to your side while in flat bed without using bedrails? 3  - 4  -JM    Moving from lying on back to sitting on the side of a flat bed without bedrails? 2  - 3  -JM    Moving to and from a bed to a chair (including a wheelchair)? 3  - 3  -JM    Standing up from a chair using your arms (e.g., wheelchair, bedside chair)? 3  - 3  -JM    Climbing 3-5 steps with a railing? 2  - 2  -JM    To walk in hospital room? 3  - 3  -JM    AM-PAC 6 Clicks Score (PT) 16  -BH 18  -JM    Highest Level of Mobility Goal 5 --> Static standing  - 6 --> Walk 10 steps or more  -      Row Name 06/14/24 1452 06/14/24 1001        Functional Assessment    Outcome Measure Options AM-PAC 6 Clicks Daily Activity (OT)  - AM-PAC 6 Clicks Basic Mobility (PT)  -              User Key  (r) = Recorded By, (t) = Taken By, (c) = Cosigned By      Initials Name Provider Type    Leeanna Joshi, RN Registered Nurse     Yesenia Eaton, OT Occupational Therapist    Safia Briones, PT Physical Therapist                    Occupational Therapy Education       Title: PT OT SLP Therapies (In Progress)       Topic: Occupational Therapy (In Progress)       Point: ADL training (Done)       Description:   Instruct learner(s) on proper safety adaptation and remediation techniques during self care or transfers.   Instruct in proper use of assistive devices.                  Learning Progress Summary             Patient Acceptance, E, VU by  at 6/14/2024 8383    Comment: OT goals, POC, dc recommendations, TLSO                         Point: Home exercise program (Not Started)       Description:   Instruct learner(s) on appropriate technique for monitoring, assisting and/or progressing therapeutic exercises/activities.                  Learner Progress:  Not documented in this visit.              Point: Precautions (Not Started)       Description:   Instruct learner(s) on prescribed precautions during self-care and functional transfers.                  Learner Progress:  Not documented in this visit.              Point: Body mechanics (Not Started)       Description:   Instruct learner(s) on proper positioning and spine alignment during self-care, functional mobility activities and/or exercises.                  Learner Progress:  Not documented in this visit.                              User Key       Initials Effective Dates Name Provider Type Discipline     04/02/20 -  Yesenia Eaton, OT Occupational Therapist OT                  OT Recommendation and Plan  Planned Therapy Interventions (OT): activity tolerance training, adaptive equipment  training, BADL retraining, functional balance retraining, occupation/activity based interventions, patient/caregiver education/training, ROM/therapeutic exercise, strengthening exercise, transfer/mobility retraining  Therapy Frequency (OT): 5 times/wk  Plan of Care Review  Plan of Care Reviewed With: patient  Outcome Evaluation: Pt is a 89 y.o male admitted after a fall at home resulting in acute T12 fx, previous L2 vertebral compression fx, closed sacral fx. He has hx of Parkinson's disease, history of CVA. He has been ordered a TLSO. Pt is up in chair and fed himself lunch, he is very unsteady with transfer from chair back to bed requires up to mod A to only take a few steps. He is limited with ADL independence due to balance, pain, weakness, activity tolerance and would benefit from further OT to address deficits and improve independence. He is currently a falls risk. Would benefit from SNF at [place].     Time Calculation:   Evaluation Complexity (OT)  Review Occupational Profile/Medical/Therapy History Complexity: expanded/moderate complexity  Assessment, Occupational Performance/Identification of Deficit Complexity: 3-5 performance deficits  Clinical Decision Making Complexity (OT): detailed assessment/moderate complexity  Overall Complexity of Evaluation (OT): moderate complexity     Time Calculation- OT       Row Name 06/14/24 1453 06/14/24 1002          Time Calculation- OT    OT Start Time 1236  -SM --     OT Stop Time 1248  -SM --     OT Time Calculation (min) 12 min  -SM --     Total Timed Code Minutes- OT 8 minute(s)  -SM --     OT Received On 06/14/24  - --     OT - Next Appointment 06/17/24 -SM --     OT Goal Re-Cert Due Date 06/28/24 -SM --        Timed Charges    87542 - Gait Training Minutes  -- 2  -BH     79477 - OT Therapeutic Activity Minutes 8  -SM --        Untimed Charges    OT Eval/Re-eval Minutes 4  -SM --        Total Minutes    Timed Charges Total Minutes 8  -SM 2  -BH     Untimed Charges  Total Minutes 4  - --      Total Minutes 12  - 2  -               User Key  (r) = Recorded By, (t) = Taken By, (c) = Cosigned By      Initials Name Provider Type     Yesenia Eaton OT Occupational Therapist     Safia Richardson, PT Physical Therapist                  Therapy Charges for Today       Code Description Service Date Service Provider Modifiers Qty    [id number]  OT THERAPEUTIC ACT EA 15 MIN 6/14/2024 Yesenia Eaton OT GO 1    [id number]  OT EVAL MOD COMPLEXITY 2 6/14/2024 Yesenia Eaton OT GO 1                 Yesenia Eaton OT  6/14/2024

## 2024-06-14 NOTE — CASE MANAGEMENT/SOCIAL WORK
Continued Stay Note  [facility]     Patient Name: Dinesh Churchill Sr.  MRN: [id number]  Today's Date: 6/14/2024    Admit Date: 6/12/2024    Plan: Bed tomorrow at [facility] tomorrow. [facility] scheduled at noon on 6/15   Discharge Plan       Row Name 06/14/24 1300       Plan    Plan Bed tomorrow at [facility] tomorrow. [facility] scheduled at noon on 6/15    Patient/Family in Agreement with Plan yes    Plan Comments Per Hardeep, patient has a bed tomorrow at [facility] skilled. Bed is semi private but will move him to a private once one opens. Steffen Montiel is agreeable. Pharmacy is updated. Packet on chart. Patient has transport scheduled for tomorrow 6/15 via [facility] @ noon. CCP following      Row Name 06/14/24 1041       Plan    Plan SNF referral pending    Patient/Family in Agreement with Plan yes    Plan Comments CCP contacted patients steffen Montiel about therapy reccomending SNF placement. He is agreeable and would like a referral to [facility]. CCP made the referral. CCP called Hardeep with [facility] and he is checking on bed availability for over the weekend. CCP following.                   Discharge Codes    No documentation.                 Expected Discharge Date and Time       Expected Discharge Date Expected Discharge Time    [place] 15, 2024

## 2024-06-14 NOTE — DISCHARGE PLACEMENT REQUEST
"Dinesh Mueller Sr. (89 y.o. Male)       Date of Birth   05/29/1928    Social Security Number       Address   [address] [place] [address]    Home Phone   [phone]    MRN   [id number]       [facility]    Marital Status                               Admission Date   6/12/24    Admission Type   Emergency    Admitting Provider   Flip Lynn MD    Attending Provider   Flip Schaffer MD    Department, Room/Bed   [address], P577/1       Discharge Date       Discharge Disposition       Discharge Destination                                 Attending Provider: Flip Schaffer MD    Allergies: No Known Allergies    Isolation: None   Infection: None   Code Status: No CPR    Ht: 188 cm (74\")   Wt: 63.9 kg (140 lb 14 oz)    Admission Cmt: None   Principal Problem: L2 vertebral fracture [S32.029A]                   Active Insurance as of 6/12/2024       Primary Coverage       Payor Plan Insurance Group Employer/Plan Group    MEDICARE RAILROAD MEDICARE        Payor Plan Address Payor Plan Phone Number Payor Plan Fax Number Effective Dates    [address] [phone]  10/1/1989 - None Entered    [place] [address]         Subscriber Name Subscriber Birth Date Member ID       DINESH MUELLER SR. 5/29/1928 [id number]                     Emergency Contacts        (Rel.) Home Phone Work Phone Mobile Phone    Dinesh Mueller Jr (Son) [phone] -- [phone]    John Mueller (Son) -- -- [phone]                "

## 2024-06-14 NOTE — PROGRESS NOTES
[facility] THORACIC/LUMBAR NEUROSURGERY PROGRESS NOTE      CC: Back pain      Subjective     Interval History: No significant overnight events.  Patient sitting up in chair TLSO brace in place.  Complaining of mild mid to low back pain but states just received a pain pill.        Objective     Vital signs in last 24 hours:  Temp:  [97.7 °F (36.5 °C)-99 °F (37.2 °C)] 97.7 °F (36.5 °C)  Heart Rate:  [62-76] 67  Resp:  [18] 18  BP: (120-146)/(47-63) 120/47    Intake/Output this shift:  I/O this shift:  In: 220 [P.O.:220]  Out: -     LABS:  Results from last 7 days   Lab Units 06/14/24  0601 06/13/24  0742 06/12/24  1001   WBC 10*3/mm3 4.85 5.76 6.89   HEMOGLOBIN g/dL 11.3* 11.2* 12.5*   HEMATOCRIT % 33.4* 33.7* 37.4*   PLATELETS 10*3/mm3 95* 108* 118*     Results from last 7 days   Lab Units 06/14/24  0601 06/13/24  0742 06/12/24  1001   SODIUM mmol/L 140 140 141   POTASSIUM mmol/L 4.0 4.4 4.3   CHLORIDE mmol/L 107 108* 107   CO2 mmol/L 25.4 25.5 24.7   BUN mg/dL 27* 23 19   CREATININE mg/dL 0.70* 0.66* 0.68*   CALCIUM mg/dL 8.2 8.4 8.6   BILIRUBIN mg/dL  --  1.7* 1.7*   ALK PHOS U/L  --  46 53   ALT (SGPT) U/L  --  13 17   AST (SGOT) U/L  --  14 16   GLUCOSE mg/dL 87 87 115*         IMAGING STUDIES:  MRI thoracic spine-acute T12 compression fracture with mild loss of vertical height.  No other compression fractures identified.    MRI lumbar spine: Acute fracture of the S2 which extends to and involves the posterior elements.  Moderate compression deformity noted with moderate to large Schmorl's node at L2.    I personally viewed the patient's MRI thoracic and lumbar, it was also reviewed by and discussed with Dr Karolyn Carvalho reviewed/changed: Yes  Norvasc 5 mg p.o. daily  Aspirin 81 mg p.o. daily  Lipitor 20 mg p.o. nightly  Imdur 60 mg p.o. daily  Cozaar 50 mg p.o. daily  Metoprolol 100 mg p.o. daily  Hydrocodone 5-3 25 1 p.o. every 4 hours as needed      Physical Exam:    General:  Awake & alert  Back: Reproducible  midline tenderness low thoracic and upper lumbar.  TLSO brace on  Motor:  Normal muscle strength, bulk and tone in lower extremities.  No fasciculations, rigidity, spasticity, or abnormal movements  Reflexes: 2+ in bilateral lower extremities  Sensation:  Normal to light touch bilateral LE's  Station and Gait:  Per PT note 6/14- Pt transferred to EOB with min A x1 - heavy use of bedrails. Pt assisted donning TLSO brace and stood with min-mod A x1 and rwx requiring a couple attempts to complete. Pt ambulated 5ft to chair with min A x2 and rwx - shuffled, unsteady steps with slow pace and forward flexed posture. Pt c/o back pain with mobility but unable to rate. Pt assisted with repositioning in chair and left with needs met. PT will continue to follow, anticipate DC to SNF         Assessment & Plan     ASSESSMENT:      L2 vertebral fracture    Essential hypertension    Cardiomyopathy    Coronary artery disease involving native coronary artery of native heart without angina pectoris    Parkinson's disease    History of CVA (cerebrovascular accident)    Paroxysmal atrial fibrillation    Thrombocytopenia    Compression fracture of T12 vertebra with routine healing    Sacral fracture, closed    89-year-old with T12 compression fracture, S2 fracture and progression of an L2 compression fracture after a mechanical fall 2 days ago.  Patient is extremely hard of hearing.  He does have mid to low back pain, reports pain pills do help.  He is tolerating TLSO brace that he had previously.  I did discuss options including conservative treatment with the brace along with the possibility of kyphoplasty if his pain worsens down the road.  At this time patient and family want to continue with the brace.  Patient will follow-up in the office in a month with repeat thoracic and lumbar spine x-rays.  They should call the office with any questions or concerns.  No further recommendations from neurosurgery at this time.    PLAN:  "  -Neurosurgery to sign off  -TLSO brace when out of bed  -1 month office follow-up with thoracic and lumbar    I discussed the patient's findings and my recommendations with patient, family, nursing staff, and Dr Parr    During patient visit, I utilized appropriate personal protective equipment including gloves.  Appropriate PPE was worn during the entire visit.  Hand hygiene was completed before and after.      LOS: 2 days       Marisabel Bai, APRN  6/14/2024  09:51 EDT    \"Dictated utilizing Dragon dictation\".      "

## 2024-06-15 VITALS
DIASTOLIC BLOOD PRESSURE: 60 MMHG | WEIGHT: 140.87 LBS | BODY MASS INDEX: 18.08 KG/M2 | RESPIRATION RATE: 18 BRPM | SYSTOLIC BLOOD PRESSURE: 127 MMHG | TEMPERATURE: 98.4 F | HEIGHT: 74 IN | OXYGEN SATURATION: 95 % | HEART RATE: 72 BPM

## 2024-06-15 LAB
ANION GAP SERPL CALCULATED.3IONS-SCNC: 6.8 MMOL/L (ref 5–15)
BUN SERPL-MCNC: 38 MG/DL (ref 8–23)
BUN/CREAT SERPL: 45.2 (ref 7–25)
CALCIUM SPEC-SCNC: 8.3 MG/DL (ref 8.2–9.6)
CHLORIDE SERPL-SCNC: 109 MMOL/L (ref 98–107)
CO2 SERPL-SCNC: 27.2 MMOL/L (ref 22–29)
CREAT SERPL-MCNC: 0.84 MG/DL (ref 0.76–1.27)
DEPRECATED RDW RBC AUTO: 54.8 FL (ref 37–54)
EGFRCR SERPLBLD CKD-EPI 2021: 79.8 ML/MIN/1.73
ERYTHROCYTE [DISTWIDTH] IN BLOOD BY AUTOMATED COUNT: 14.5 % (ref 12.3–15.4)
GLUCOSE SERPL-MCNC: 91 MG/DL (ref 65–99)
HCT VFR BLD AUTO: 31 % (ref 37.5–51)
HGB BLD-MCNC: 10.5 G/DL (ref 13–17.7)
MCH RBC QN AUTO: 35.4 PG (ref 26.6–33)
MCHC RBC AUTO-ENTMCNC: 33.9 G/DL (ref 31.5–35.7)
MCV RBC AUTO: 104.4 FL (ref 79–97)
PLATELET # BLD AUTO: 92 10*3/MM3 (ref 140–450)
PMV BLD AUTO: 10.5 FL (ref 6–12)
POTASSIUM SERPL-SCNC: 4.4 MMOL/L (ref 3.5–5.2)
RBC # BLD AUTO: 2.97 10*6/MM3 (ref 4.14–5.8)
SODIUM SERPL-SCNC: 143 MMOL/L (ref 136–145)
WBC NRBC COR # BLD AUTO: 5.67 10*3/MM3 (ref 3.4–10.8)

## 2024-06-15 PROCEDURE — 80048 BASIC METABOLIC PNL TOTAL CA: CPT | Performed by: NURSE PRACTITIONER

## 2024-06-15 PROCEDURE — 85027 COMPLETE CBC AUTOMATED: CPT | Performed by: NURSE PRACTITIONER

## 2024-06-15 RX ORDER — AMOXICILLIN 250 MG
2 CAPSULE ORAL DAILY
Start: 2024-06-15

## 2024-06-15 RX ORDER — HYDROCODONE BITARTRATE AND ACETAMINOPHEN 5; 325 MG/1; MG/1
1 TABLET ORAL EVERY 6 HOURS PRN
Qty: 12 TABLET | Refills: 0 | Status: SHIPPED | OUTPATIENT
Start: 2024-06-15 | End: 2024-06-17

## 2024-06-15 RX ADMIN — ASPIRIN 81 MG: 81 TABLET, CHEWABLE ORAL at 09:30

## 2024-06-15 RX ADMIN — HYDROCODONE BITARTRATE AND ACETAMINOPHEN 1 TABLET: 5; 325 TABLET ORAL at 09:35

## 2024-06-15 RX ADMIN — SENNOSIDES AND DOCUSATE SODIUM 2 TABLET: 50; 8.6 TABLET ORAL at 00:01

## 2024-06-15 RX ADMIN — SENNOSIDES AND DOCUSATE SODIUM 2 TABLET: 50; 8.6 TABLET ORAL at 11:54

## 2024-06-15 RX ADMIN — LOSARTAN POTASSIUM 50 MG: 50 TABLET, FILM COATED ORAL at 11:55

## 2024-06-15 RX ADMIN — AMLODIPINE BESYLATE 5 MG: 5 TABLET ORAL at 11:55

## 2024-06-15 RX ADMIN — POLYETHYLENE GLYCOL 3350 17 G: 17 POWDER, FOR SOLUTION ORAL at 11:54

## 2024-06-15 RX ADMIN — HYDROCODONE BITARTRATE AND ACETAMINOPHEN 1 TABLET: 5; 325 TABLET ORAL at 00:31

## 2024-06-15 RX ADMIN — METOPROLOL SUCCINATE 100 MG: 100 TABLET, EXTENDED RELEASE ORAL at 11:55

## 2024-06-15 RX ADMIN — ISOSORBIDE MONONITRATE 60 MG: 60 TABLET, EXTENDED RELEASE ORAL at 11:55

## 2024-06-15 RX ADMIN — Medication 10 ML: at 09:30

## 2024-06-15 NOTE — PLAN OF CARE
Goal Outcome Evaluation:  Plan of Care Reviewed With: patient        Progress: improving     Patiet seen by Dr Flores and ok to d/c to SNF, iv and tele pack d/c'd, [facility], report called to [phone] taken by nurse Lois, pt's son Dinesh notified of transfer, bilateral hearing aids in use and leaving hospital with pt, hearing aids  also with pt.

## 2024-06-15 NOTE — DISCHARGE SUMMARY
Date of Discharge:  6/15/2024    PCP: Phyllis Loving PA    Discharge Diagnosis:   Active Hospital Problems    Diagnosis  POA    **L2 vertebral fracture [S32.029A]  Yes    Compression fracture of T12 vertebra with routine healing [S22.080D]  Not Applicable    Sacral fracture, closed [S32.10XA]  Yes    Thrombocytopenia [D69.6]  Yes    Paroxysmal atrial fibrillation [I48.0]  Yes    Parkinson's disease [G20.A1]  Yes    History of CVA (cerebrovascular accident) [Z86.73]  Not Applicable    Coronary artery disease involving native coronary artery of native heart without angina pectoris [I25.10]  Yes    Cardiomyopathy [I42.9]  Yes    Essential hypertension [I10]  Yes      Resolved Hospital Problems   No resolved problems to display.          Consults       Date and Time Order Name Status Description    6/12/2024 10:49 AM Neurosurgery (on-call MD unless specified) Completed     6/12/2024 10:49 AM [facility] (on-call MD unless specified) Details            Hospital Course  89 y.o. male initially going to by one of my partners for a fall with subsequent back pain.  Further imaging demonstrated T12 compression fracture with S2 fracture and progression of L2 vertebral compression fracture.  No surgical intervention endorsed from [facility] perspective and TLSO bracing and physical therapy was the mode of management.  They will follow-up in an outpatient setting in a month's time with repeat x-ray imaging of thoracic and lumbar spines and their input and assistance was appreciated.  Physical therapy has evaluated and SNF was deemed medically necessary.  CCP is coordinated SNF needs and patient has transport set up this early afternoon with a bed available at [facility].  Patient medically ready for transfer.      Temp:  [97.4 °F (36.3 °C)-98.8 °F (37.1 °C)] 98.1 °F (36.7 °C)  Heart Rate:  [61-84] 84  Resp:  [16-18] 18  BP: (113-145)/(44-74) 124/56  Body mass index is 18.09 kg/m².    Physical Exam  Constitutional:        Comments: Very elderly and frail but conversational and pleasant.  Quite hard of hearing.  Nontoxic.  No family present   HENT:      Head: Normocephalic.      Nose: Nose normal.      Mouth/Throat:      Mouth: Mucous membranes are moist.      Pharynx: Oropharynx is clear.   Cardiovascular:      Rate and Rhythm: Normal rate and regular rhythm.   Pulmonary:      Effort: Pulmonary effort is normal. No respiratory distress.      Breath sounds: Normal breath sounds.   Abdominal:      General: Bowel sounds are normal. There is no distension.      Palpations: Abdomen is soft.      Tenderness: There is no abdominal tenderness.   Neurological:      Mental Status: He is alert.      Cranial Nerves: No cranial nerve deficit.      Motor: Weakness present.      Gait: Gait abnormal.       Disposition: Skilled Nursing Facility ([place] - External)       Discharge Medications        New Medications        Instructions Start Date   HYDROcodone-acetaminophen 5-325 MG per tablet  Commonly known as: NORCO   1 tablet, Oral, Every 6 Hours PRN      sennosides-docusate 8.6-50 MG per tablet  Commonly known as: PERICOLACE   2 tablets, Oral, Daily             Continue These Medications        Instructions Start Date   acetaminophen 500 MG tablet  Commonly known as: TYLENOL   500 mg, Oral, 4 Times Daily      amLODIPine 5 MG tablet  Commonly known as: NORVASC   5 mg, Oral, Every 24 Hours Scheduled      aspirin 81 MG chewable tablet   81 mg, Oral, Daily      atorvastatin 20 MG tablet  Commonly known as: LIPITOR   20 mg, Oral, Nightly      B-12 1000 MCG lozenge   1 lozenge, Sublingual, Every Other Day      isosorbide mononitrate 60 MG 24 hr tablet  Commonly known as: IMDUR   60 mg, Oral, Daily      losartan 50 MG tablet  Commonly known as: COZAAR   50 mg, Oral, Daily      metoprolol succinate  MG 24 hr tablet  Commonly known as: TOPROL-XL   100 mg, Oral, Daily      Vitamin D-3 25 MCG (1000 UT) capsule   1,000 Units, Oral, Daily                 Additional Instructions for the Follow-ups that You Need to Schedule       Discharge Follow-up with PCP   As directed       Currently Documented PCP:    Phyllis Loving PA    PCP Phone Number:    [phone]     Follow Up Details: PCP post rehab. [facility] per their recommendations               Contact information for follow-up providers       Phyllis Loving PA .    Specialty: Family Medicine  Why: PCP post rehab. [facility] per their recommendations  Contact information:  [address] [facility] [address]  [phone]                       Contact information for after-discharge care       Destination       [facility] .    Service: Skilled Nursing  Contact information:  [address] [address]  [phone]                                  Future Appointments   Date Time Provider Department Center   7/9/2024 10:00 AM Phyllis Loving PA [facility]   7/17/2024  2:30 PM Jose Daniel Parr MD [facility]   12/19/2024 11:00 AM [facility]  [facility]   12/19/2024 11:45 AM Maninder Quezada MD [facility]        Geovanni Flores MD  [facility]  06/15/24    Discharge time spent greater than 30 minutes.

## 2024-06-16 NOTE — CASE MANAGEMENT/SOCIAL WORK
Continued Stay Note  [facility]     Patient Name: Dinesh Churchill Sr.  MRN: [id number]  Today's Date: 6/16/2024    Admit Date: 6/12/2024    Plan: Bed tomorrow at [facility] tomorrow. [facility] scheduled at noon on 6/15   Discharge Plan       Row Name 06/16/24 1746       Plan    Final Discharge Disposition Code 03 - skilled nursing facility (SNF)    Final Note [facility]                   Discharge Codes    No documentation.                 Expected Discharge Date and Time       Expected Discharge Date Expected Discharge Time    [place] 15, 2024               Jay Garner RN

## 2024-06-25 DIAGNOSIS — S22.080D COMPRESSION FRACTURE OF T12 VERTEBRA WITH ROUTINE HEALING: ICD-10-CM

## 2024-06-25 DIAGNOSIS — S32.029A CLOSED FRACTURE OF SECOND LUMBAR VERTEBRA, UNSPECIFIED FRACTURE MORPHOLOGY, INITIAL ENCOUNTER: Primary | ICD-10-CM

## 2024-07-15 NOTE — PROGRESS NOTES
Subjective   History of Present Illness: Dinesh Churchill Sr. is a 89 y.o. male is here today for follow-up.  He was previously seen in the hospital for a acute T12 compression fracture and sacral fracture.  He is doing well today.  He is with his son.  He reports that his pain is improving.  He has been wearing his TLSO brace at all times.  He does not want to proceed with a kyphoplasty at this time.  He denies any new neurologic symptoms.  Denies any changes in strength or sensation.    History of Present Illness    The following portions of the patient's history were reviewed and updated as appropriate: allergies, current medications, past family history, past medical history, past social history, past surgical history, and problem list.    Past Medical History:   Diagnosis Date    Abnormal ECG     Abnormal MRI     Acute frontal sinusitis     Arthritis     Chronic fatigue     Coronary artery disease     Difficulty walking     Dizziness     Head injury     Headache 01/21/2023    Hearing aid worn     left    Hearing loss     Hyperlipidemia     Hypertension     Idiopathic peripheral neuropathy 8/29/2023    Kidney stones     Macrocytosis     Neoplasm of skin     Osteoporosis     Prediabetes     Vitamin D deficiency         Past Surgical History:   Procedure Laterality Date    CARDIAC CATHETERIZATION N/A 05/22/2017    Procedure: Left Heart Cath;  Surgeon: Maninder Quezada MD;  Location: [facility] INVASIVE LOCATION;  Service:     CARDIAC CATHETERIZATION N/A 05/23/2017    Procedure: Stent BMS coronary;  Surgeon: Maninder Quezada MD;  Location: [facility] INVASIVE LOCATION;  Service:     CARDIAC CATHETERIZATION N/A 08/08/2019    Procedure: Left Heart Cath;  Surgeon: Maninder Quezada MD;  Location: [facility] INVASIVE LOCATION;  Service: Cardiology    CARDIAC CATHETERIZATION N/A 08/08/2019    Procedure: Left ventriculography;  Surgeon: Maninder Quezada MD;  Location: [facility] INVASIVE LOCATION;   Service: Cardiology    CARDIAC CATHETERIZATION N/A 08/08/2019    Procedure: Coronary angiography;  Surgeon: Maninder Quezada MD;  Location:  [facility];  Service: Cardiology    CARDIAC CATHETERIZATION N/A 08/20/2019    Procedure: CORONARY ANGIOGRAPHY;  Surgeon: Maninder Quezada MD;  Location:  [facility]ATION;  Service: Cardiovascular    CARDIAC CATHETERIZATION N/A 08/20/2019    Procedure: Stent BMS coronary;  Surgeon: Maninder Quezada MD;  Location:  [facility];  Service: Cardiovascular    CARDIAC ELECTROPHYSIOLOGY PROCEDURE N/A 11/08/2019    Procedure: Loop insertion;  Surgeon: Maninder Quezada MD;  Location:  [facility] INVASIVE [facility]ATION;  Service: Cardiovascular    HERNIA REPAIR      x 2    [place] RT/LT HEART CATHETERS N/A 05/23/2017    Procedure: Percutaneous Coronary Intervention;  Surgeon: Maninder Quezada MD;  Location:  [facility] INV[facility];  Service: Cardiovascular          Current Outpatient Medications:     acetaminophen (TYLENOL) 500 MG tablet, Take 1 tablet by mouth 4 (Four) Times a Day., Disp: , Rfl:     amLODIPine (NORVASC) 5 MG tablet, TAKE 1 TABLET BY MOUTH DAILY, Disp: 90 tablet, Rfl: 3    aspirin 81 MG chewable tablet, Chew 1 tablet Daily., Disp: , Rfl:     atorvastatin (LIPITOR) 20 MG tablet, TAKE 1 TABLET BY MOUTH EVERY NIGHT, Disp: 90 tablet, Rfl: 3    Cholecalciferol (VITAMIN D-3) 1000 UNITS capsule, Take 1,000 Units by mouth Daily., Disp: , Rfl:     Cyanocobalamin (B-12) 1000 MCG lozenge, Place 1 lozenge under the tongue Every Other Day., Disp: 45 lozenge, Rfl: 1    isosorbide mononitrate (IMDUR) 60 MG 24 hr tablet, TAKE 1 TABLET BY MOUTH DAILY, Disp: 90 tablet, Rfl: 3    losartan (COZAAR) 50 MG tablet, TAKE 1 TABLET BY MOUTH DAILY, Disp: 90 tablet, Rfl: 3    metoprolol succinate XL (TOPROL-XL) 100 MG 24 hr tablet, TAKE 1 TABLET BY MOUTH DAILY, Disp: 90 tablet, Rfl: 3    sennosides-docusate (PERICOLACE) 8.6-50  MG per tablet, Take 2 tablets by mouth Daily., Disp: , Rfl:      No Known Allergies     Social History     Socioeconomic History    Marital status:    Tobacco Use    Smoking status: Never     Passive exposure: Never    Smokeless tobacco: Never   Vaping Use    Vaping status: Never Used   Substance and Sexual Activity    Alcohol use: No     Comment: quit 30 years ago     Drug use: No    Sexual activity: Not Currently     Partners: Male        Family History   Problem Relation Age of Onset    Arthritis Son     Cancer Sister     Diabetes Son         Review of Systems   Musculoskeletal:  Positive for back pain (Chronic) and gait problem.   Neurological:  Positive for weakness.   All other systems reviewed and are negative.      Objective     Vitals:    24 1355   BP: 126/50   Pulse: 59   Temp: 97.1 °F (36.2 °C)   SpO2: 95%   Weight: Comment: Wheel Chair     There is no height or weight on file to calculate BMI.      Physical Exam  Neurologic Exam  Awake, alert, oriented x3  Extraocular muscles intact  Face symmetric  Speech is fluent and clear  No pronator drift  Motor exam  Bilateral deltoids 4/5, bilateral biceps 4/5, bilateral triceps 4/5, bilateral wrist extension 4/5 bilateral hand  4/5  Bilateral hip flexion 4/5, bilateral knee extension 4/5, bilateral DF/PF 4/5  No clonus  No Jaren's reflex  Gait deferred  Able to detect  light touch in all 4 extremities          Assessment & Plan   Independent Review of Radiographic Studies:      I personally reviewed the images from the following studies.  MRI of the thoracic and lumbar spine from 2024, CT of the lumbar spine from 2024, x-ray of the thoracic and lumbar spine from 2024  The follow-up x-ray images show no significant change.  There might be slight further collapse of the T12 vertebral body when compared to the previous CT.    Medical Decision Makin-year-old male previously seen in the hospital on 2024  for a acute T12 compression fracture  -He reports that his back pain is not any worse than his previous baseline back pain.  We discussed the risks and benefits of a kyphoplasty.  He would like to hold off for now.  I will plan to have him continue wearing the TLSO brace follow-up in 4 to 6 weeks with repeat x-ray images.    Diagnoses and all orders for this visit:    1. Compression fracture of T12 vertebra with routine healing (Primary)  -     XR Spine Thoracic 2 View; Future  -     XR Spine Lumbar 2 or 3 View; Future      Return in about 6 weeks (around 8/28/2024).  I spent 30 minutes reviewing the medical record, reviewing the MRI images, reviewing the CT images, reviewing the follow-up x-ray images, discussing the management of vertebral body fractures, discussing the risks and benefits of a kyphoplasty

## 2024-07-17 ENCOUNTER — OFFICE VISIT (OUTPATIENT)
Dept: NEUROSURGERY | Facility: CLINIC | Age: 89
End: 2024-07-17
Payer: MEDICARE

## 2024-07-17 VITALS
HEART RATE: 59 BPM | SYSTOLIC BLOOD PRESSURE: 126 MMHG | OXYGEN SATURATION: 95 % | TEMPERATURE: 97.1 F | DIASTOLIC BLOOD PRESSURE: 50 MMHG

## 2024-07-17 DIAGNOSIS — S22.080D COMPRESSION FRACTURE OF T12 VERTEBRA WITH ROUTINE HEALING: Primary | ICD-10-CM

## 2024-08-02 ENCOUNTER — OFFICE VISIT (OUTPATIENT)
Dept: FAMILY MEDICINE CLINIC | Facility: CLINIC | Age: 89
End: 2024-08-02
Payer: MEDICARE

## 2024-08-02 VITALS
SYSTOLIC BLOOD PRESSURE: 102 MMHG | HEIGHT: 74 IN | RESPIRATION RATE: 18 BRPM | TEMPERATURE: 98 F | DIASTOLIC BLOOD PRESSURE: 60 MMHG | OXYGEN SATURATION: 94 % | HEART RATE: 77 BPM | BODY MASS INDEX: 18.08 KG/M2

## 2024-08-02 DIAGNOSIS — S22.080D COMPRESSION FRACTURE OF T12 VERTEBRA WITH ROUTINE HEALING: ICD-10-CM

## 2024-08-02 DIAGNOSIS — S22.41XD CLOSED FRACTURE OF MULTIPLE RIBS OF RIGHT SIDE WITH ROUTINE HEALING, SUBSEQUENT ENCOUNTER: ICD-10-CM

## 2024-08-02 DIAGNOSIS — S92.405S CLOSED NONDISPLACED FRACTURE OF PHALANX OF LEFT GREAT TOE, UNSPECIFIED PHALANX, SEQUELA: ICD-10-CM

## 2024-08-02 DIAGNOSIS — I48.0 PAROXYSMAL ATRIAL FIBRILLATION: ICD-10-CM

## 2024-08-02 DIAGNOSIS — G89.29 CHRONIC PAIN OF BOTH KNEES: ICD-10-CM

## 2024-08-02 DIAGNOSIS — R00.0 TACHYCARDIA: ICD-10-CM

## 2024-08-02 DIAGNOSIS — D53.9 MACROCYTIC ANEMIA: ICD-10-CM

## 2024-08-02 DIAGNOSIS — M54.50 ACUTE LOW BACK PAIN, UNSPECIFIED BACK PAIN LATERALITY, UNSPECIFIED WHETHER SCIATICA PRESENT: ICD-10-CM

## 2024-08-02 DIAGNOSIS — I25.10 CORONARY ARTERY DISEASE INVOLVING NATIVE CORONARY ARTERY OF NATIVE HEART WITHOUT ANGINA PECTORIS: ICD-10-CM

## 2024-08-02 DIAGNOSIS — I42.9 CARDIOMYOPATHY, UNSPECIFIED TYPE: ICD-10-CM

## 2024-08-02 DIAGNOSIS — E78.5 DYSLIPIDEMIA: ICD-10-CM

## 2024-08-02 DIAGNOSIS — R07.2 PRECORDIAL PAIN: ICD-10-CM

## 2024-08-02 DIAGNOSIS — D69.6 THROMBOCYTOPENIA: ICD-10-CM

## 2024-08-02 DIAGNOSIS — M25.561 CHRONIC PAIN OF BOTH KNEES: ICD-10-CM

## 2024-08-02 DIAGNOSIS — M48.56XG PATHOLOGICAL COMPRESSION FRACTURE OF LUMBAR VERTEBRA WITH DELAYED HEALING, SUBSEQUENT ENCOUNTER: ICD-10-CM

## 2024-08-02 DIAGNOSIS — S32.029A CLOSED FRACTURE OF SECOND LUMBAR VERTEBRA, UNSPECIFIED FRACTURE MORPHOLOGY, INITIAL ENCOUNTER: ICD-10-CM

## 2024-08-02 DIAGNOSIS — F80.81 STUTTERING: ICD-10-CM

## 2024-08-02 DIAGNOSIS — M25.562 CHRONIC PAIN OF BOTH KNEES: ICD-10-CM

## 2024-08-02 DIAGNOSIS — R73.03 PREDIABETES: ICD-10-CM

## 2024-08-02 DIAGNOSIS — R47.9 SPEECH DISTURBANCE, UNSPECIFIED TYPE: ICD-10-CM

## 2024-08-02 DIAGNOSIS — Z86.79 HISTORY OF SUPRAVENTRICULAR TACHYCARDIA: ICD-10-CM

## 2024-08-02 DIAGNOSIS — E53.8 VITAMIN B 12 DEFICIENCY: ICD-10-CM

## 2024-08-02 DIAGNOSIS — Z00.00 MEDICARE ANNUAL WELLNESS VISIT, SUBSEQUENT: Primary | ICD-10-CM

## 2024-08-02 DIAGNOSIS — Z86.73 HISTORY OF CVA (CEREBROVASCULAR ACCIDENT): ICD-10-CM

## 2024-08-02 DIAGNOSIS — R29.6 RECURRENT FALLS: ICD-10-CM

## 2024-08-02 DIAGNOSIS — R53.1 WEAKNESS: ICD-10-CM

## 2024-08-02 DIAGNOSIS — M25.551 RIGHT HIP PAIN: ICD-10-CM

## 2024-08-02 DIAGNOSIS — R51.9 NONINTRACTABLE EPISODIC HEADACHE, UNSPECIFIED HEADACHE TYPE: ICD-10-CM

## 2024-08-02 DIAGNOSIS — E55.9 VITAMIN D DEFICIENCY: ICD-10-CM

## 2024-08-02 DIAGNOSIS — D48.5 NEOPLASM OF UNCERTAIN BEHAVIOR OF SKIN: ICD-10-CM

## 2024-08-02 DIAGNOSIS — G20.B1 PARKINSON'S DISEASE WITH DYSKINESIA, UNSPECIFIED WHETHER MANIFESTATIONS FLUCTUATE: ICD-10-CM

## 2024-08-02 DIAGNOSIS — S32.120K: ICD-10-CM

## 2024-08-02 DIAGNOSIS — I10 ESSENTIAL HYPERTENSION: ICD-10-CM

## 2024-08-02 DIAGNOSIS — M17.0 PRIMARY OSTEOARTHRITIS OF BOTH KNEES: ICD-10-CM

## 2024-08-02 DIAGNOSIS — S32.020D COMPRESSION FRACTURE OF L2 VERTEBRA WITH ROUTINE HEALING: ICD-10-CM

## 2024-08-02 NOTE — PROGRESS NOTES
Subjective   Dinesh Churchill Sr. is a 89 y.o. male who presents today in follow up of hypertension, prediabetes, hyperlipidemia, macrocytosis, B12 and vitamin D deficiency, headache and stuttering speech, history of CVA and Parkinson's, atrial fibrillation, coronary artery disease, and cardiomyopathy, cataracts, pulmonary fibrosis, pneumothorax with pleural effusion.     Hypertension  Associated symptoms include headaches and shortness of breath.   Hyperlipidemia  Associated symptoms include shortness of breath.     Patient was hospitalized 6/12/2024 through 6/15/2024 for L2 vertebral fracture, compression fracture T12 vertebrae, sacral fracture, thrombocytopenia, paroxysmal atrial fibrillation, Parkinson's disease, history of CVA, CAD, cardiomyopathy, and hypertension.    I have reviewed and discussed with patient hospital notes, including H&P, labs, imaging, consult notes, and discharge summary. Per discharge summary, he was seen for fall with back pain with further imaging demonstrating T12 compression fracture with S2 fracture and progression of L2 vertebral compression fracture.  No surgery recommended per neurosurgery and TLSO bracing and physical therapy was mode of management.  They will follow in an outpatient setting in a month with repeat x-ray of thoracic and lumbar spine.  PT evaluated and advise short-term nursing facility.  CCP coordinated SNF.    Within 48 business hours after discharge, Dinesh Churchill Sr. was contacted via telephone to coordinate care and needs.   Current outpatient and discharge medications have been reconciled for the patient.    Last seen by neurosurgery in follow-up 7/17/2024 and advised back pain was no worse than previous baseline.  Discussed risks of kyphoplasty and they would like to hold off for now.  Continue wearing TLSO brace for 4 to 6 weeks.  Follow-up 6 weeks with repeat x-rays.    Has home health and is to have home health PT but has not started yet. The nurse sent  out emails to ensure he gets PT.     Hypertension- has been stable on Norvasc 5 mg once daily, metoprolol  mg daily, and losartan 50 mg once daily.  Prediabetes and hyperlipidemia- continues Lipitor 20 mg once daily & tolerating without AE. Decreased sugar intake. He is not thrilled but is changed- no soda, pies, grapes. Cut out oyster crackers and eat all the time. Get no sugar added cookies.  Vitamin D- taking vitamin D 1000IU daily. Tolerating without AE.   B12-  taking B every other day.   Previously he was on B12 Monday, Wed, Fri instead of Monday through Friday.   Last injection 12/27/2019.  Anemia- Patient is seeing hematology 7/2023 in follow up.     Abnormal speech- not talking much-  stuttering and talking very little. He wears hearing aids and talks a little more at home.   Speech therapy gave exercises to help improve the stutter.   In 2/2021- he was asleep and woke up at 2 am with a headache- severe but was a generalized  Headache and lasted 30-45 minutes. No headache since. He noticed that he started stuttering the next day. Has now stuttered since. No slurring of words. No other neurological symptoms. He is getting up and around, has no in creased weakness, numbness, tingling, swallowing issues, recent falls, recurrence of headache, or other symptoms.   Weakness and falls- no fall since last appt. Son has caught him a couple times. Exercising on machine 3 x weekly for 13 minutes- leg machine for cycling.   Seen by neurology 5/2023- they ordered EMG. Patient will have 8/2023 with Dr Naik.   Fall- when he was going down, he went down on his buttock and was on carpet. He was ok. No complaints of pain or trouble.   Using a walker all the time. Making his own food, bathing himself, and taking care of himself. He has to get up and do things. He walks outside in the trail when weather is nice. He does not go out in good weather. Reads the paper and watches basketball. Eats what he wants. Doing his own  laundry. Son and daughter bring up son's mail. Doctor said son can leave for 1 hour at a time. Went to [facility] and had to  Rx.   History of CVA-     Cardiomyopathy, CAD, SVT, A Fib- seeing cardiology 6/29/2023.   Chest pain- taking Imdur as directed by cardiology and tolerating without AE. No CP- controlling CP.   Shortness of breath- no change from baseline. No increase in SOA   Pleural effusion and pneumothorax- no symptoms at this time.   History of pneumothorax and pleural effusion-  with hospitalization. Pulmonology saw him and advised he was stable and to follow up in 6/2020.     Knee pain- continues with pain but they are working on getting injections approved for relief.     Ran out of PT 2-3 weeks ago but still doing exercises.     Patient was hospitalized 1/19/2022 through 1/21/2022 for a painful, closed fracture of multiple ribs on the right side, post fracture left toe, chest wall pain, stuttering, A. fib, history of CVA and CAD s/p stent placement, cardiomyopathy, dyslipidemia, hypertension, hearing loss. Per discharge summary, patient was initially admitted for chest wall pain s/p fall.  He was given Tylenol and Lidoderm to chest wall as well as incentive spirometry.  He did well, required no additional narcotic pain medication, and was feeling better each day.  Oxygen saturation 98% on room air.  Physical therapy evaluation was poor.  With living independently in his nurse child 25 to 30 miles away, they recommended inpatient rehab. He was discharged to rehab facility.  Last labs from rehab 1/1/2022.  He had OT evaluation and  PT. Following discharge 2/10/2022 and with no rib pain or foot pain. He only complained of left> right knee pain.     Patient was hospitalized 1/20/2023 through 1/23/2023 for sudden onset of severe headache, shaking, Parkinson's disease, history of CVA, paroxysmal A. fib, thrombocytopenia, macrocytic anemia, cardiomyopathy, hypertension, fatigue. Per discharge summary- he  presented to the ER due to onset of severe headache.  CTA negative for acute disease, MRI negative for acute intracranial disease with chronic changes noted, lumbar puncture did not explain infectious etiology.  He has old CVA and continues aspirin and statin.  Neurology consultation obtained who noted differential of hypertensive encephalopathy versus posterior reversible encephalopathy syndrome and cleared for discharge.  Patient with subacute rehab less than a year prior with some improvement.  They did not see indication for transition to subacute rehab at that time but with advanced age and recurrent falls reported that son is reaching a point where keeping his father at home is becoming tougher.  He was discharged home with home health PT and OT.  Son reports he was seen by physical therapy who noted he was ambulating well and did not need continued therapy.  Son states he was doing well until this morning.  He was standing in the kitchen and his son was in his room.  He reports he heard a thump and went in and his father was on his right side.  Patient has had severe pain in his low back and right hip since his fall.  He reports 10/10 pain and has had no improvement since this morning.  Pain is located in the low back and right hip.    Patient's Specialists:  Cardiology-Dr. Quezada/ [facility]-last seen 12/2023 for CAD, cardiomyopathy, atrial fibrillation, hypertension -stable.  Stopped Brilinta 2021 Brillinta.  Pressure goal less than 140/90.  Follow-up in 1 year with echo.  Neurology- Dr Ashley- last appt 8/2023 for history of CVA, Parkinson disease, and peripheral neuropathy.  Not interested in starting medication due to potential side effects.  Discussed exercise much as possible.  EMG/NCS with moderate to severe peripheral neuropathy with significant axonal loss in the motor studies of the right leg and changes that could go along with right S1 radiculopathy with nerve root level  involvement versus root level involvement of the peripheral neuropathy.  Normal A1c, TSH, globulin.  MRI lumbar spine with multilevel lumbar spondyloarthropathy.  Be cautious with walking to prevent falls or injury.  Follow-up as needed.  7/2021 for history of CVA, recent stuttering, change in speech, parkinson's disease. They did not want treatment for Parkinson's. Follow up PRN   Previously-  Dr. Saleh- last appointment 1/2020  For seizure-like activity.  No further work-up at that time.  They will consider EEG if he has any further symptoms.    Dr. Naik- last seen 10/2017- for Parkinson's, orthostatic hypotension, and ataxia- he did not want to pursue treatment at that time and was advised to follow-up PRN.   Neurosurgery-CANDELARIA Arthur-last appointment 3/2023 for L2 vertebral body compression fracture.  Continue physical therapy and Tylenol as needed.  Follow-up as needed.  Hematology- Dr Pennington- last appt 7/2023 for macrocytosis and thrombocytopenia- differential MDS and liver disease.  Macrocytosis and thrombocytopenia likely related to liver disease, MDS remains in differential.  With stability over a few years, advised continue monitoring.  With his age and difficulty getting to appointments, they advised he can follow-up with PCP and return to clinic only if significant worsening of his MCV or blood counts.    Prior Dr Richardson- last appt 8/2017 for macrocytosis without anemia and thrombocytopenia. Monthly B12 injections, observation without further evaluation if stable.   Sports Medicine- Dr Garcia-last appointment 2/2022 for bilateral OA knees and knee pain.  Will start approval process for gel series injections.  Completed injections 3/2022.  Has not had follow-up.  Ophthalmology- Beck Castor in [place]- will have left cataract surgery 1/27/2020 and right cataract surgery 2/3/2020.   Pulmonology- Dr De Jesus- last appt 12/2019 for pulmonary fibrosis, pulmonary edema, pneumothorax-  follow up in 6 months.     The following portions of the patient's history were reviewed and updated as appropriate: allergies, current medications, past family history, past medical history, past social history, past surgical history and problem list.    Review of Systems   Constitutional:  Positive for fatigue.   HENT:  Positive for hearing loss. Negative for sore throat and trouble swallowing.         Stuttering speech   Eyes:  Positive for visual disturbance.   Respiratory:  Positive for shortness of breath.    Cardiovascular: Negative.    Gastrointestinal: Negative.    Genitourinary: Negative.    Musculoskeletal:  Positive for arthralgias and gait problem.   Neurological:  Positive for speech difficulty and headaches.        Fall   Hematological: Negative.    Psychiatric/Behavioral: Negative.         Objective    Vitals:    08/02/24 1057   BP: 102/60   Pulse: 77   Resp: 18   Temp: 98 °F (36.7 °C)   SpO2: 94%     Body mass index is 18.08 kg/m².    Physical Exam   Constitutional: He is oriented to person, place, and time. He appears well-developed. No distress.   HENT:   Head: Normocephalic and atraumatic.   Right Ear: External ear normal.   Left Ear: External ear normal.   Eyes: Conjunctivae are normal.   Neck: Carotid bruit is not present. No tracheal deviation present. No thyroid mass and no thyromegaly present.   Cardiovascular: Normal rate, regular rhythm and normal pulses.   Murmur heard.  Pulmonary/Chest: Effort normal and breath sounds normal.   Abdominal: Normal appearance.   Neurological: He is alert and oriented to person, place, and time. Gait (short, shuffling gait- ambulates with walker) abnormal.   Skin: Skin is warm and dry.   Psychiatric: His behavior is normal. Mood and thought content normal. His speech is delayed.   Nursing note and vitals reviewed.      Assessment & Plan   Diagnoses and all orders for this visit:    1. Medicare annual wellness visit, subsequent (Primary)    2. Essential  hypertension  Assessment & Plan:  Hypertension is stable and controlled  Continue current treatment regimen.  Blood pressure will be reassessed in 6 months.    Orders:  -     Comprehensive Metabolic Panel  -     Urinalysis With Culture If Indicated -    3. Dyslipidemia  -     Comprehensive Metabolic Panel  -     CK  -     Lipid Panel With LDL / HDL Ratio    4. Prediabetes  -     Comprehensive Metabolic Panel  -     Hemoglobin A1c  -     TSH  -     T4, free  -     T3, Free    5. Vitamin D deficiency  -     Comprehensive Metabolic Panel  -     Vitamin D,25-Hydroxy    6. Vitamin B 12 deficiency  -     CBC & Differential  -     Vitamin B12 & Folate    7. Macrocytic anemia  -     CBC & Differential  -     Iron and TIBC  -     Ferritin  -     Vitamin B12 & Folate    8. Thrombocytopenia  -     CBC & Differential    9. Speech disturbance, unspecified type    10. Parkinson's disease with dyskinesia, unspecified whether manifestations fluctuate    11. Weakness    12. Recurrent falls    13. History of CVA (cerebrovascular accident)    14. Nonintractable episodic headache, unspecified headache type    15. Stuttering    16. Coronary artery disease involving native coronary artery of native heart without angina pectoris  Assessment & Plan:  Coronary Artery Disease (OPTIONAL): Coronary artery disease is stable.  Continue current treatment regimen.  Cardiac status will be reassessed in 6 months.      17. Cardiomyopathy, unspecified type  Assessment & Plan:  Congestive heart failure due to per cardiology.  Heart failure is stable.  NYHA Class .  Continue current treatment regimen.  Heart failure will be reassessed in 6 months.          18. History of supraventricular tachycardia    19. Precordial pain    20. Paroxysmal atrial fibrillation    21. Tachycardia    22. Acute low back pain, unspecified back pain laterality, unspecified whether sciatica present  -     DEXA Bone Density Axial; Future    23. Pathological compression fracture  of lumbar vertebra with delayed healing, subsequent encounter  -     DEXA Bone Density Axial; Future    24. Compression fracture of T12 vertebra with routine healing  -     DEXA Bone Density Axial; Future    25. Closed fracture of second lumbar vertebra, unspecified fracture morphology, initial encounter  -     DEXA Bone Density Axial; Future    26. Compression fracture of L2 vertebra with routine healing  -     DEXA Bone Density Axial; Future    27. Closed nondisplaced zone II fracture of sacrum with nonunion, subsequent encounter  -     DEXA Bone Density Axial; Future    28. Right hip pain    29. Primary osteoarthritis of both knees    30. Chronic pain of both knees    31. Closed fracture of multiple ribs of right side with routine healing, subsequent encounter  -     DEXA Bone Density Axial; Future    32. Closed nondisplaced fracture of phalanx of left great toe, unspecified phalanx, sequela  -     DEXA Bone Density Axial; Future    33. Neoplasm of uncertain behavior of skin  -     Ambulatory Referral to Dermatology    Other orders  -     Microscopic Examination -  -     Urine culture, Comprehensive - ,          Assessment and Plan  AWV completed today. He is not up to date on colonoscopy, however, with age and other medical problems, he likely would not be able to have colonoscopy unless he has any symptoms. He should have DEXA with increased fractures. I will refer today. He needs to check with pharmacy to update vaccinations- Shingrix, Prevnar 20, RSV, ensure annual flu shot and Covid 19 booster.     Patient will have fasting labs. Call if no results in 1 week. Stability of conditions, plan, follow up, and further recommendations pending labs. If labs and symptoms are stable, follow up in 6 months.     Hypertension- Blood pressure has been stable but is low today. Continue Norvasc 5 mg once daily, Metoprolol  mg daily, and Losartan 50 mg once daily. Continue monitoring BP. We will likely need to decrease  medication if persistently low. We will need to consult cardiology if we need to change medication. Call or return if elevated or low or any weakness or falls.    Hyperlipidemia- Last lipids increased slightly. Continue Lipitor 20 mg at bedtime.  Prediabetes- A1C improved from 5.8% 9/2022 to 5.6% 6/2023. I will continue to monitor and make further recommendations.   Vitamin D deficiency- Continue Vitamin D 1000IU daily. Dosing recommendations pending labs.   B12 deficiency- Await labs for further recommendations.   Headache and speech disturbance- Patient had an episode 2/2021 with a severe headache then developed stuttering speech. Patient and son deny any other symptoms and reports no recurrence of headache, however, patient lived alone and has not had much interaction with people other than once a week when his son came to take care of groceries and medications. This was concerning for CVA, however, without any other deficits and a cardiac device in place, he was unable to have MRI. He underwent CT head and follow up with neurology. He was evaluated by speech therapy, was given home exercises. His son now lives with him all the time, and he continues to stutter and talks less. He does have known Parkinson's disease that is untreated.  9-1-1 to ER if he has worsening speech, recurrence of headache, or any other cardiac or neurological symptoms. Patient and son verbalized understanding and agreement with plan and recommendations.   Balance and gait instability- He avoids any overhead movement, as he loses his balance. He was seen by neurology and diagnosed with Parkinson's, however, patient and son are hesitant for treatment due to concern for side effects.  Continue follow up with cardiology and follow-up with neurology if worsening, new or changing symptoms.   Fall with compression fracture- Continue follow up with orthopedic surgery as directed by them.   CAD, cardiomyopathy, atrial fibrillation, SVT- He  continues follow-up with cardiology.  He continues on Imdur 60 mg once daily and denies any changes or increase in chest pain, shortness of breath, palpitations, dizziness, or weakness.   History of Pleural effusion and Pneumovax- Patient has no increase in SOA. He needs to reschedule with pulmonology.     Patient continues to see specialists as noted above.  I have reviewed available records, including consult notes, labs, and imaging/testing.  Patient to continue follow-up with specialists as directed by them. He needs to schedule follow up with pulmonology and I will refer back to neurology.     I spent 45 minutes caring for Dinesh Churchill Sr. on this date of service, including 15 minutes for AWV, and 30 minutes for follow up and problem focused visit. This time includes time spent by me in the following activities as necessary: preparing for the visit, reviewing tests, specialists records and previous visits, obtaining and/or reviewing a separately obtained history, performing a medically appropriate exam and/or evaluation, counseling and educating the patient, family, caregiver, referring and/or communicating with other healthcare professionals, documenting information in the medical record, independently interpreting results and communicating that information with the patient, family, caregiver, and developing a medically appropriate treatment plan with consideration of other conditions, medications, and treatments.

## 2024-08-02 NOTE — LETTER
August 19, 2024     FAMILY DERMATOLOGY  [address]  [facility] [address]    Patient: Dinesh Churchill Sr.   YOB: 1928   Date of Visit: 8/2/2024     Dear [facility] DERMATOLOGY:       Thank you for referring Dinesh Churchill to me for evaluation. Below are the relevant portions of my assessment and plan of care.    If you have questions, please do not hesitate to call me. I look forward to following Dinesh along with you.         Sincerely,        RAFA Mcfarland        CC: No Recipients    Phyllis Loving PA  08/18/24 2323  Signed   Subjective  The ABCs of the Annual Wellness Visit  Medicare Wellness Visit      Dinesh Churchill Sr. is a 89 y.o. patient who presents for a Medicare Wellness Visit.    Last DEXA-has not had but has had multiple fractures  Last colonoscopy-has not had  Last Tdap-9/2020  Prevnar-has not had vaccination  Pneumovax-has not had vaccination  Hepatitis A-has not had vaccination  Last flu shot-9/2020  Shingrix- had varicella as a child.  Has not had vaccination  Covid 19-Quiana 3/2021, 11/2021, Moderna bivalent 10/2022  RSV-has not had vaccination    The following portions of the patient's history were reviewed and   updated as appropriate: allergies, current medications, past family history, past medical history, past social history, past surgical history, and problem list.    Compared to one year ago, the patient's physical   health is worse. Worse with balance, falls and fractures.   Compared to one year ago, the patient's mental   health is worse. In and out- has episodes that he is more argumentative with son. Speech and cognition worse.     Recent Hospitalizations:  This patient has had a [facility] admission record on file within the last 365 days.  Current Medical Providers:  Patient Care Team:  Phyllis Loving PA as PCP - General (Family Medicine)  Brandon Ricci MD Thomas, Melissa, PA as Referring Physician (Family Medicine)  Maninder Quezada MD  as Consulting Physician (Cardiology)  Hardeep Shea MD as Consulting Physician (Pulmonary Disease)  Shamar Pennington MD as Consulting Physician (Hematology and Oncology)    Outpatient Medications Prior to Visit   Medication Sig Dispense Refill   • acetaminophen (TYLENOL) 500 MG tablet Take 1 tablet by mouth 4 (Four) Times a Day.     • amLODIPine (NORVASC) 5 MG tablet TAKE 1 TABLET BY MOUTH DAILY 90 tablet 3   • aspirin 81 MG chewable tablet Chew 1 tablet Daily.     • atorvastatin (LIPITOR) 20 MG tablet TAKE 1 TABLET BY MOUTH EVERY NIGHT 90 tablet 3   • Cholecalciferol (VITAMIN D-3) 1000 UNITS capsule Take 1,000 Units by mouth Daily.     • Cyanocobalamin (B-12) 1000 MCG lozenge Place 1 lozenge under the tongue Every Other Day. 45 lozenge 1   • isosorbide mononitrate (IMDUR) 60 MG 24 hr tablet TAKE 1 TABLET BY MOUTH DAILY 90 tablet 3   • losartan (COZAAR) 50 MG tablet TAKE 1 TABLET BY MOUTH DAILY 90 tablet 3   • metoprolol succinate XL (TOPROL-XL) 100 MG 24 hr tablet TAKE 1 TABLET BY MOUTH DAILY 90 tablet 3   • sennosides-docusate (PERICOLACE) 8.6-50 MG per tablet Take 2 tablets by mouth Daily.       No facility-administered medications prior to visit.     No opioid medication identified on active medication list. I have reviewed chart for other potential  high risk medication/s and harmful drug interactions in the elderly.      Aspirin is on active medication list. Aspirin use is indicated based on review of current medical condition/s. Pros and cons of this therapy have been discussed today. Benefits of this medication outweigh potential harm.  Patient has been encouraged to continue taking this medication.  .      Patient Active Problem List   Diagnosis   • Abnormal magnetic resonance imaging study   • Arthritis   • Osteoarthritis of cervical spine   • Diplopia   • Hearing loss   • Neoplasm of uncertain behavior of skin   • Prediabetes   • Vitamin D deficiency   • Chronic fatigue   • History of  "supraventricular tachycardia   • Essential hypertension   • B12 deficiency   • Abnormal cardiac function test   • Cardiomyopathy   • Coronary artery disease involving native coronary artery of native heart without angina pectoris   • History of coronary artery stent placement   • History of renal calculi   • Dyslipidemia   • Macrocytic anemia   • Precordial pain   • Parkinson's disease   • History of CVA (cerebrovascular accident)   • Paroxysmal atrial fibrillation   • Otalgia, left   • Chest pain   • Abnormal stress test   • Spontaneous pneumothorax   • Chest pain   • Acute systolic (congestive) heart failure   • Thrombocytopenia   • Pneumonia of left lower lobe due to infectious organism   • Syncope and collapse   • Status post placement of implantable loop recorder   • Long term (current) use of antithrombotics/antiplatelets   • Stuttering   • Fall   • Closed fracture of multiple ribs of right side   • Chest wall pain   • Closed fracture of phalanx of toe of left foot   • Headache   • Sudden onset of severe headache   • Fall in home, initial encounter   • Compression fracture of L2 vertebra with routine healing   • Weakness of both legs   • Idiopathic peripheral neuropathy   • L2 vertebral fracture   • Compression fracture of T12 vertebra with routine healing   • Sacral fracture, closed     Advance Care PlanningAdvance Directive is not on file.  ACP discussion was held with the patient during this visit. Patient does not have an advance directive, information provided.            Objective  Vitals:    08/02/24 1057   BP: 102/60   Pulse: 77   Resp: 18   Temp: 98 °F (36.7 °C)   TempSrc: Temporal   SpO2: 94%   Height: 188 cm (74.02\")       Estimated body mass index is 18.08 kg/m² as calculated from the following:    Height as of this encounter: 188 cm (74.02\").    Weight as of 6/12/24: 63.9 kg (140 lb 14 oz).    BMI is below normal parameters (malnutrition). Recommendations: Discussed increasing po intake with son. "        Does the patient have evidence of cognitive impairment? Yes  Lab Results   Component Value Date    CHLPL 125 2024    TRIG 70 2024    HDL 40 2024    LDL 71 2024    VLDL 14 2024    HGBA1C 5.6 2024                                                                                               Health  Risk Assessment    Smoking Status:  Social History     Tobacco Use   Smoking Status Never   • Passive exposure: Never   Smokeless Tobacco Never     Alcohol Consumption:  Social History     Substance and Sexual Activity   Alcohol Use No    Comment: quit 30 years ago        Fall Risk Screen  STEADI Fall Risk Assessment was completed, and patient is at LOW risk for falls.Assessment completed on:2024    Depression Screenin/9/2024     9:59 AM   PHQ-2/PHQ-9 Depression Screening   Little Interest or Pleasure in Doing Things 0-->not at all   Feeling Down, Depressed or Hopeless 0-->not at all   PHQ-9: Brief Depression Severity Measure Score 0     Health Habits and Functional and Cognitive Screening:       No data to display                    Age-appropriate Screening Schedule:  Refer to the list below for future screening recommendations based on patient's age, sex and/or medical conditions. Orders for these recommended tests are listed in the plan section. The patient has been provided with a written plan.    Health Maintenance List  Health Maintenance   Topic Date Due   • BMI FOLLOWUP  Never done   • ZOSTER VACCINE (1 of 2) Never done   • ANNUAL WELLNESS VISIT  Never done   • COVID-19 Vaccine (2023- season) 2023   • Hepatitis B (1 of 3 - Risk 3-dose series) 2025 (Originally 1988)   • INFLUENZA VACCINE  2025 (Originally 2024)   • RSV Vaccine - Adults (1 - 1-dose 60+ series) 2025 (Originally 1988)   • Pneumococcal Vaccine 65+ (1 of 2 - PCV) 2025 (Originally 1934)   • LIPID PANEL  2025   • DXA SCAN  2026   •  TDAP/TD VACCINES (2 - Td or Tdap) 09/22/2030                                                                                                                                                CMS Preventative Services Quick Reference  Risk Factors Identified During Encounter  Chronic Pain: Natural history and expected course discussed. Questions answered.  Depression/Dysphoria:  monitoring with son  Fall Risk-High or Moderate: Discussed Fall Prevention in the home  Hearing Problem:  discussed hearing loss  Immunizations Discussed/Encouraged: Influenza, Prevnar 20 (Pneumococcal 20-valent conjugate), Shingrix, COVID19, and RSV (Respiratory Syncytial Virus)    The above risks/problems have been discussed with the patient.  Pertinent information has been shared with the patient in the After Visit Summary.  An After Visit Summary and PPPS were made available to the patient.    Follow Up:   Next Medicare Wellness visit to be scheduled in 1 year.     Assessment & Plan  Medicare annual wellness visit, subsequent    Essential hypertension  Hypertension is stable and controlled  Continue current treatment regimen.  Blood pressure will be reassessed in 6 months.  Dyslipidemia    Prediabetes    Vitamin D deficiency    Vitamin B 12 deficiency    Macrocytic anemia    Thrombocytopenia    Speech disturbance, unspecified type    Parkinson's disease with dyskinesia, unspecified whether manifestations fluctuate    Weakness    Recurrent falls    History of CVA (cerebrovascular accident)    Nonintractable episodic headache, unspecified headache type    Stuttering    Coronary artery disease involving native coronary artery of native heart without angina pectoris  Coronary Artery Disease (OPTIONAL): Coronary artery disease is stable.  Continue current treatment regimen.  Cardiac status will be reassessed in 6 months.  Cardiomyopathy, unspecified type  Congestive heart failure due to  per cardiology .  Heart failure is stable.  NYHA Class  .  Continue current treatment regimen.  Heart failure will be reassessed in 6 months.      History of supraventricular tachycardia    Precordial pain    Paroxysmal atrial fibrillation    Tachycardia    Acute low back pain, unspecified back pain laterality, unspecified whether sciatica present    Pathological compression fracture of lumbar vertebra with delayed healing, subsequent encounter    Compression fracture of T12 vertebra with routine healing    Closed fracture of second lumbar vertebra, unspecified fracture morphology, initial encounter    Compression fracture of L2 vertebra with routine healing    Closed nondisplaced zone II fracture of sacrum with nonunion, subsequent encounter    Right hip pain    Primary osteoarthritis of both knees    Chronic pain of both knees    Closed fracture of multiple ribs of right side with routine healing, subsequent encounter    Closed nondisplaced fracture of phalanx of left great toe, unspecified phalanx, sequela    Neoplasm of uncertain behavior of skin    Orders Placed This Encounter   Procedures   • Urine culture, Comprehensive - ,   • DEXA Bone Density Axial   • Comprehensive Metabolic Panel   • Hemoglobin A1c   • CK   • Lipid Panel With LDL / HDL Ratio   • Iron and TIBC   • Ferritin   • Vitamin B12 & Folate   • Vitamin D,25-Hydroxy   • TSH   • T4, free   • T3, Free   • Urinalysis With Culture If Indicated -   • Microscopic Examination -   • Ambulatory Referral to Dermatology   • CBC & Differential             Follow Up:   No follow-ups on file.          Phyllis Loving PA  08/18/24 3140  Signed  Subjective  Dinesh Churchill  is a 89 y.o. male who presents today in follow up of hypertension, prediabetes, hyperlipidemia, macrocytosis, B12 and vitamin D deficiency, headache and stuttering speech, history of CVA and Parkinson's, atrial fibrillation, coronary artery disease, and cardiomyopathy, cataracts, pulmonary fibrosis, pneumothorax with pleural effusion.      Hypertension  Associated symptoms include headaches and shortness of breath.   Hyperlipidemia  Associated symptoms include shortness of breath.     Patient was hospitalized 6/12/2024 through 6/15/2024 for L2 vertebral fracture, compression fracture T12 vertebrae, sacral fracture, thrombocytopenia, paroxysmal atrial fibrillation, Parkinson's disease, history of CVA, CAD, cardiomyopathy, and hypertension.    I have reviewed and discussed with patient hospital notes, including H&P, labs, imaging, consult notes, and discharge summary. Per discharge summary, he was seen for fall with back pain with further imaging demonstrating T12 compression fracture with S2 fracture and progression of L2 vertebral compression fracture.  No surgery recommended per neurosurgery and TLSO bracing and physical therapy was mode of management.  They will follow in an outpatient setting in a month with repeat x-ray of thoracic and lumbar spine.  PT evaluated and advise short-term nursing facility.  CCP coordinated SNF.    Within 48 business hours after discharge, Dinesh Churchill Sr. was contacted via telephone to coordinate care and needs.   Current outpatient and discharge medications have been reconciled for the patient.    Last seen by neurosurgery in follow-up 7/17/2024 and advised back pain was no worse than previous baseline.  Discussed risks of kyphoplasty and they would like to hold off for now.  Continue wearing TLSO brace for 4 to 6 weeks.  Follow-up 6 weeks with repeat x-rays.    Has home health and is to have home health PT but has not started yet. The nurse sent out emails to ensure he gets PT.     Hypertension- has been stable on Norvasc 5 mg once daily, metoprolol  mg daily, and losartan 50 mg once daily.  Prediabetes and hyperlipidemia- continues Lipitor 20 mg once daily & tolerating without AE. Decreased sugar intake. He is not thrilled but is changed- no soda, pies, grapes. Cut out oyster crackers and eat all the  time. Get no sugar added cookies.  Vitamin D- taking vitamin D 1000IU daily. Tolerating without AE.   B12-  taking B every other day.   Previously he was on B12 Monday, Wed, Fri instead of Monday through Friday.   Last injection 12/27/2019.  Anemia- Patient is seeing hematology 7/2023 in follow up.     Abnormal speech- not talking much-  stuttering and talking very little. He wears hearing aids and talks a little more at home.   Speech therapy gave exercises to help improve the stutter.   In 2/2021- he was asleep and woke up at 2 am with a headache- severe but was a generalized  Headache and lasted 30-45 minutes. No headache since. He noticed that he started stuttering the next day. Has now stuttered since. No slurring of words. No other neurological symptoms. He is getting up and around, has no in creased weakness, numbness, tingling, swallowing issues, recent falls, recurrence of headache, or other symptoms.   Weakness and falls- no fall since last appt. Son has caught him a couple times. Exercising on machine 3 x weekly for 13 minutes- leg machine for cycling.   Seen by neurology 5/2023- they ordered EMG. Patient will have 8/2023 with Dr Naik.   Fall- when he was going down, he went down on his buttock and was on carpet. He was ok. No complaints of pain or trouble.   Using a walker all the time. Making his own food, bathing himself, and taking care of himself. He has to get up and do things. He walks outside in the trail when weather is nice. He does not go out in good weather. Reads the paper and watches basketball. Eats what he wants. Doing his own laundry. Son and daughter bring up son's mail. Doctor said son can leave for 1 hour at a time. Went to [facility] and had to  Rx.   History of CVA-     Cardiomyopathy, CAD, SVT, A Fib- seeing cardiology 6/29/2023.   Chest pain- taking Imdur as directed by cardiology and tolerating without AE. No CP- controlling CP.   Shortness of breath- no change from baseline. No  increase in SOA   Pleural effusion and pneumothorax- no symptoms at this time.   History of pneumothorax and pleural effusion-  with hospitalization. Pulmonology saw him and advised he was stable and to follow up in 6/2020.     Knee pain- continues with pain but they are working on getting injections approved for relief.     Ran out of PT 2-3 weeks ago but still doing exercises.     Patient was hospitalized 1/19/2022 through 1/21/2022 for a painful, closed fracture of multiple ribs on the right side, post fracture left toe, chest wall pain, stuttering, A. fib, history of CVA and CAD s/p stent placement, cardiomyopathy, dyslipidemia, hypertension, hearing loss. Per discharge summary, patient was initially admitted for chest wall pain s/p fall.  He was given Tylenol and Lidoderm to chest wall as well as incentive spirometry.  He did well, required no additional narcotic pain medication, and was feeling better each day.  Oxygen saturation 98% on room air.  Physical therapy evaluation was poor.  With living independently in his nurse child 25 to 30 miles away, they recommended inpatient rehab. He was discharged to rehab facility.  Last labs from rehab 1/1/2022.  He had OT evaluation and  PT. Following discharge 2/10/2022 and with no rib pain or foot pain. He only complained of left> right knee pain.     Patient was hospitalized 1/20/2023 through 1/23/2023 for sudden onset of severe headache, shaking, Parkinson's disease, history of CVA, paroxysmal A. fib, thrombocytopenia, macrocytic anemia, cardiomyopathy, hypertension, fatigue. Per discharge summary- he presented to the ER due to onset of severe headache.  CTA negative for acute disease, MRI negative for acute intracranial disease with chronic changes noted, lumbar puncture did not explain infectious etiology.  He has old CVA and continues aspirin and statin.  Neurology consultation obtained who noted differential of hypertensive encephalopathy versus posterior  reversible encephalopathy syndrome and cleared for discharge.  Patient with subacute rehab less than a year prior with some improvement.  They did not see indication for transition to subacute rehab at that time but with advanced age and recurrent falls reported that son is reaching a point where keeping his father at home is becoming tougher.  He was discharged home with home health PT and OT.  Son reports he was seen by physical therapy who noted he was ambulating well and did not need continued therapy.  Son states he was doing well until this morning.  He was standing in the kitchen and his son was in his room.  He reports he heard a thump and went in and his father was on his right side.  Patient has had severe pain in his low back and right hip since his fall.  He reports 10/10 pain and has had no improvement since this morning.  Pain is located in the low back and right hip.    Patient's Specialists:  Cardiology-Dr. Quezada/ [facility]-last seen 12/2023 for CAD, cardiomyopathy, atrial fibrillation, hypertension -stable.  Stopped Brilinta 2021 Brillinta.  Pressure goal less than 140/90.  Follow-up in 1 year with echo.  Neurology- Dr Ashley- last appt 8/2023 for history of CVA, Parkinson disease, and peripheral neuropathy.  Not interested in starting medication due to potential side effects.  Discussed exercise much as possible.  EMG/NCS with moderate to severe peripheral neuropathy with significant axonal loss in the motor studies of the right leg and changes that could go along with right S1 radiculopathy with nerve root level involvement versus root level involvement of the peripheral neuropathy.  Normal A1c, TSH, globulin.  MRI lumbar spine with multilevel lumbar spondyloarthropathy.  Be cautious with walking to prevent falls or injury.  Follow-up as needed.  7/2021 for history of CVA, recent stuttering, change in speech, parkinson's disease. They did not want treatment for Parkinson's.  Follow up PRN   Previously-  Dr. Saleh- last appointment 1/2020  For seizure-like activity.  No further work-up at that time.  They will consider EEG if he has any further symptoms.    Dr. Naik- last seen 10/2017- for Parkinson's, orthostatic hypotension, and ataxia- he did not want to pursue treatment at that time and was advised to follow-up PRN.   Neurosurgery-[facility]DEL[facility] Arthur-last appointment 3/2023 for L2 vertebral body compression fracture.  Continue physical therapy and Tylenol as needed.  Follow-up as needed.  Hematology- Dr Pennington- last appt 7/2023 for macrocytosis and thrombocytopenia- differential MDS and liver disease.  Macrocytosis and thrombocytopenia likely related to liver disease, MDS remains in differential.  With stability over a few years, advised continue monitoring.  With his age and difficulty getting to appointments, they advised he can follow-up with PCP and return to clinic only if significant worsening of his MCV or blood counts.    Prior Dr Richardson- last appt 8/2017 for macrocytosis without anemia and thrombocytopenia. Monthly B12 injections, observation without further evaluation if stable.   Sports Medicine- Dr Garcia-last appointment 2/2022 for bilateral OA knees and knee pain.  Will start approval process for gel series injections.  Completed injections 3/2022.  Has not had follow-up.  Ophthalmology- [facility] in [place]- will have left cataract surgery 1/27/2020 and right cataract surgery 2/3/2020.   Pulmonology- Dr De Jesus- last appt 12/2019 for pulmonary fibrosis, pulmonary edema, pneumothorax- follow up in 6 months.     The following portions of the patient's history were reviewed and updated as appropriate: allergies, current medications, past family history, past medical history, past social history, past surgical history and problem list.    Review of Systems   Constitutional:  Positive for fatigue.   HENT:  Positive for hearing loss. Negative for sore  throat and trouble swallowing.         Stuttering speech   Eyes:  Positive for visual disturbance.   Respiratory:  Positive for shortness of breath.    Cardiovascular: Negative.    Gastrointestinal: Negative.    Genitourinary: Negative.    Musculoskeletal:  Positive for arthralgias and gait problem.   Neurological:  Positive for speech difficulty and headaches.        Fall   Hematological: Negative.    Psychiatric/Behavioral: Negative.         Objective   Vitals:    08/02/24 1057   BP: 102/60   Pulse: 77   Resp: 18   Temp: 98 °F (36.7 °C)   SpO2: 94%     Body mass index is 18.08 kg/m².    Physical Exam   Constitutional: He is oriented to person, place, and time. He appears well-developed. No distress.   HENT:   Head: Normocephalic and atraumatic.   Right Ear: External ear normal.   Left Ear: External ear normal.   Eyes: Conjunctivae are normal.   Neck: Carotid bruit is not present. No tracheal deviation present. No thyroid mass and no thyromegaly present.   Cardiovascular: Normal rate, regular rhythm and normal pulses.   Murmur heard.  Pulmonary/Chest: Effort normal and breath sounds normal.   Abdominal: Normal appearance.   Neurological: He is alert and oriented to person, place, and time. Gait (short, shuffling gait- ambulates with walker) abnormal.   Skin: Skin is warm and dry.   Psychiatric: His behavior is normal. Mood and thought content normal. His speech is delayed.   Nursing note and vitals reviewed.      Assessment & Plan  Diagnoses and all orders for this visit:    1. Medicare annual wellness visit, subsequent (Primary)    2. Essential hypertension  Assessment & Plan:  Hypertension is stable and controlled  Continue current treatment regimen.  Blood pressure will be reassessed in 6 months.    Orders:  -     Comprehensive Metabolic Panel  -     Urinalysis With Culture If Indicated -    3. Dyslipidemia  -     Comprehensive Metabolic Panel  -     CK  -     Lipid Panel With LDL / HDL Ratio    4.  Prediabetes  -     Comprehensive Metabolic Panel  -     Hemoglobin A1c  -     TSH  -     T4, free  -     T3, Free    5. Vitamin D deficiency  -     Comprehensive Metabolic Panel  -     Vitamin D,25-Hydroxy    6. Vitamin B 12 deficiency  -     CBC & Differential  -     Vitamin B12 & Folate    7. Macrocytic anemia  -     CBC & Differential  -     Iron and TIBC  -     Ferritin  -     Vitamin B12 & Folate    8. Thrombocytopenia  -     CBC & Differential    9. Speech disturbance, unspecified type    10. Parkinson's disease with dyskinesia, unspecified whether manifestations fluctuate    11. Weakness    12. Recurrent falls    13. History of CVA (cerebrovascular accident)    14. Nonintractable episodic headache, unspecified headache type    15. Stuttering    16. Coronary artery disease involving native coronary artery of native heart without angina pectoris  Assessment & Plan:  Coronary Artery Disease (OPTIONAL): Coronary artery disease is stable.  Continue current treatment regimen.  Cardiac status will be reassessed in 6 months.      17. Cardiomyopathy, unspecified type  Assessment & Plan:  Congestive heart failure due to per cardiology.  Heart failure is stable.  NYHA Class .  Continue current treatment regimen.  Heart failure will be reassessed in 6 months.          18. History of supraventricular tachycardia    19. Precordial pain    20. Paroxysmal atrial fibrillation    21. Tachycardia    22. Acute low back pain, unspecified back pain laterality, unspecified whether sciatica present  -     DEXA Bone Density Axial; Future    23. Pathological compression fracture of lumbar vertebra with delayed healing, subsequent encounter  -     DEXA Bone Density Axial; Future    24. Compression fracture of T12 vertebra with routine healing  -     DEXA Bone Density Axial; Future    25. Closed fracture of second lumbar vertebra, unspecified fracture morphology, initial encounter  -     DEXA Bone Density Axial; Future    26.  Compression fracture of L2 vertebra with routine healing  -     DEXA Bone Density Axial; Future    27. Closed nondisplaced zone II fracture of sacrum with nonunion, subsequent encounter  -     DEXA Bone Density Axial; Future    28. Right hip pain    29. Primary osteoarthritis of both knees    30. Chronic pain of both knees    31. Closed fracture of multiple ribs of right side with routine healing, subsequent encounter  -     DEXA Bone Density Axial; Future    32. Closed nondisplaced fracture of phalanx of left great toe, unspecified phalanx, sequela  -     DEXA Bone Density Axial; Future    33. Neoplasm of uncertain behavior of skin  -     Ambulatory Referral to Dermatology    Other orders  -     Microscopic Examination -  -     Urine culture, Comprehensive - ,          Assessment and Plan  AWV completed today. He is not up to date on colonoscopy, however, with age and other medical problems, he likely would not be able to have colonoscopy unless he has any symptoms. He should have DEXA with increased fractures. I will refer today. He needs to check with pharmacy to update vaccinations- Shingrix, Prevnar 20, RSV, ensure annual flu shot and Covid 19 booster.     Patient will have fasting labs. Call if no results in 1 week. Stability of conditions, plan, follow up, and further recommendations pending labs. If labs and symptoms are stable, follow up in 6 months.     Hypertension- Blood pressure has been stable but is low today. Continue Norvasc 5 mg once daily, Metoprolol  mg daily, and Losartan 50 mg once daily. Continue monitoring BP. We will likely need to decrease medication if persistently low. We will need to consult cardiology if we need to change medication. Call or return if elevated or low or any weakness or falls.    Hyperlipidemia- Last lipids increased slightly. Continue Lipitor 20 mg at bedtime.  Prediabetes- A1C improved from 5.8% 9/2022 to 5.6% 6/2023. I will continue to monitor and make further  recommendations.   Vitamin D deficiency- Continue Vitamin D 1000IU daily. Dosing recommendations pending labs.   B12 deficiency- Await labs for further recommendations.   Headache and speech disturbance- Patient had an episode 2/2021 with a severe headache then developed stuttering speech. Patient and son deny any other symptoms and reports no recurrence of headache, however, patient lived alone and has not had much interaction with people other than once a week when his son came to take care of groceries and medications. This was concerning for CVA, however, without any other deficits and a cardiac device in place, he was unable to have MRI. He underwent CT head and follow up with neurology. He was evaluated by speech therapy, was given home exercises. His son now lives with him all the time, and he continues to stutter and talks less. He does have known Parkinson's disease that is untreated.  9-1-1 to ER if he has worsening speech, recurrence of headache, or any other cardiac or neurological symptoms. Patient and son verbalized understanding and agreement with plan and recommendations.   Balance and gait instability- He avoids any overhead movement, as he loses his balance. He was seen by neurology and diagnosed with Parkinson's, however, patient and son are hesitant for treatment due to concern for side effects.  Continue follow up with cardiology and follow-up with neurology if worsening, new or changing symptoms.   Fall with compression fracture- Continue follow up with orthopedic surgery as directed by them.   CAD, cardiomyopathy, atrial fibrillation, SVT- He continues follow-up with cardiology.  He continues on Imdur 60 mg once daily and denies any changes or increase in chest pain, shortness of breath, palpitations, dizziness, or weakness.   History of Pleural effusion and Pneumovax- Patient has no increase in SOA. He needs to reschedule with pulmonology.     Patient continues to see specialists as noted  above.  I have reviewed available records, including consult notes, labs, and imaging/testing.  Patient to continue follow-up with specialists as directed by them. He needs to schedule follow up with pulmonology and I will refer back to neurology.     I spent 45 minutes caring for Dinesh Churchill Sr. on this date of service, including 15 minutes for AWV, and 30 minutes for follow up and problem focused visit. This time includes time spent by me in the following activities as necessary: preparing for the visit, reviewing tests, specialists records and previous visits, obtaining and/or reviewing a separately obtained history, performing a medically appropriate exam and/or evaluation, counseling and educating the patient, family, caregiver, referring and/or communicating with other healthcare professionals, documenting information in the medical record, independently interpreting results and communicating that information with the patient, family, caregiver, and developing a medically appropriate treatment plan with consideration of other conditions, medications, and treatments.

## 2024-08-02 NOTE — PROGRESS NOTES
Subjective   The ABCs of the Annual Wellness Visit  Medicare Wellness Visit      Dinesh Churchill Sr. is a 89 y.o. patient who presents for a Medicare Wellness Visit.    Last DEXA-has not had but has had multiple fractures  Last colonoscopy-has not had  Last Tdap-9/2020  Prevnar-has not had vaccination  Pneumovax-has not had vaccination  Hepatitis A-has not had vaccination  Last flu shot-9/2020  Shingrix- had varicella as a child.  Has not had vaccination  Covid 19-Quiana 3/2021, 11/2021, Moderna bivalent 10/2022  RSV-has not had vaccination    The following portions of the patient's history were reviewed and   updated as appropriate: allergies, current medications, past family history, past medical history, past social history, past surgical history, and problem list.    Compared to one year ago, the patient's physical   health is worse. Weaker and falls with fractures, speech is worse.   Compared to one year ago, the patient's mental   health is worse. Goes through cycles with some confusion and speech. Weber if has to talk to him about something.     Recent Hospitalizations:  This patient has had a [facility] admission record on file within the last 365 days.  Current Medical Providers:  Patient Care Team:  Phyllis Loving PA as PCP - General (Family Medicine)  Brandon Ricci MD Thomas, Melissa, PA as Referring Physician (Family Medicine)  Maninder Quezada MD as Consulting Physician (Cardiology)  Hardeep Shea MD as Consulting Physician (Pulmonary Disease)  Shamar Pennington MD as Consulting Physician (Hematology and Oncology)    Outpatient Medications Prior to Visit   Medication Sig Dispense Refill    acetaminophen (TYLENOL) 500 MG tablet Take 1 tablet by mouth 4 (Four) Times a Day.      amLODIPine (NORVASC) 5 MG tablet TAKE 1 TABLET BY MOUTH DAILY 90 tablet 3    aspirin 81 MG chewable tablet Chew 1 tablet Daily.      atorvastatin (LIPITOR) 20 MG tablet TAKE 1 TABLET BY MOUTH EVERY  NIGHT 90 tablet 3    Cholecalciferol (VITAMIN D-3) 1000 UNITS capsule Take 1,000 Units by mouth Daily.      Cyanocobalamin (B-12) 1000 MCG lozenge Place 1 lozenge under the tongue Every Other Day. 45 lozenge 1    isosorbide mononitrate (IMDUR) 60 MG 24 hr tablet TAKE 1 TABLET BY MOUTH DAILY 90 tablet 3    losartan (COZAAR) 50 MG tablet TAKE 1 TABLET BY MOUTH DAILY 90 tablet 3    metoprolol succinate XL (TOPROL-XL) 100 MG 24 hr tablet TAKE 1 TABLET BY MOUTH DAILY 90 tablet 3    sennosides-docusate (PERICOLACE) 8.6-50 MG per tablet Take 2 tablets by mouth Daily.       No facility-administered medications prior to visit.     No opioid medication identified on active medication list. I have reviewed chart for other potential  high risk medication/s and harmful drug interactions in the elderly.      Aspirin is on active medication list. Aspirin use is indicated based on review of current medical condition/s. Pros and cons of this therapy have been discussed today. Benefits of this medication outweigh potential harm.  Patient has been encouraged to continue taking this medication.  .      Patient Active Problem List   Diagnosis    Abnormal magnetic resonance imaging study    Arthritis    Osteoarthritis of cervical spine    Diplopia    Hearing loss    Neoplasm of uncertain behavior of skin    Prediabetes    Vitamin D deficiency    Chronic fatigue    History of supraventricular tachycardia    Essential hypertension    B12 deficiency    Abnormal cardiac function test    Cardiomyopathy    Coronary artery disease involving native coronary artery of native heart without angina pectoris    History of coronary artery stent placement    History of renal calculi    Dyslipidemia    Macrocytic anemia    Precordial pain    Parkinson's disease    History of CVA (cerebrovascular accident)    Paroxysmal atrial fibrillation    Otalgia, left    Chest pain    Abnormal stress test    Spontaneous pneumothorax    Chest pain    Acute systolic  "(congestive) heart failure    Thrombocytopenia    Pneumonia of left lower lobe due to infectious organism    Syncope and collapse    Status post placement of implantable loop recorder    Long term (current) use of antithrombotics/antiplatelets    Stuttering    Fall    Closed fracture of multiple ribs of right side    Chest wall pain    Closed fracture of phalanx of toe of left foot    Headache    Sudden onset of severe headache    Fall in home, initial encounter    Compression fracture of L2 vertebra with routine healing    Weakness of both legs    Idiopathic peripheral neuropathy    L2 vertebral fracture    Compression fracture of T12 vertebra with routine healing    Sacral fracture, closed     Advance Care Planning (Click this link to access ACP Navigator)  Advance Directive is not on file.  ACP discussion was held with the patient during this visit. Patient does not have an advance directive, information provided.        Objective   Vitals:    08/02/24 1057   BP: 102/60   Pulse: 77   Resp: 18   Temp: 98 °F (36.7 °C)   TempSrc: Temporal   SpO2: 94%   Height: 188 cm (74.02\")       Estimated body mass index is 18.08 kg/m² as calculated from the following:    Height as of this encounter: 188 cm (74.02\").    Weight as of 6/12/24: 63.9 kg (140 lb 14 oz).    BMI is below normal parameters (malnutrition). Recommendations: discussed eating more        Does the patient have evidence of cognitive impairment? Yes                                                                                               Health  Risk Assessment    Smoking Status:  Social History     Tobacco Use   Smoking Status Never    Passive exposure: Never   Smokeless Tobacco Never     Alcohol Consumption:  Social History     Substance and Sexual Activity   Alcohol Use No    Comment: quit 30 years ago      Fall Risk Screen:  STEADI Fall Risk Assessment was completed, and patient is at LOW risk for falls.Assessment completed on:1/9/2024    Depression " Screenin/9/2024     9:59 AM   PHQ-2/PHQ-9 Depression Screening   Little Interest or Pleasure in Doing Things 0-->not at all   Feeling Down, Depressed or Hopeless 0-->not at all   PHQ-9: Brief Depression Severity Measure Score 0     Health Habits and Functional and Cognitive Screening:       No data to display                      Age-appropriate Screening Schedule:  Refer to the list below for future screening recommendations based on patient's age, sex and/or medical conditions. Orders for these recommended tests are listed in the plan section. The patient has been provided with a written plan.    Health Maintenance List  Health Maintenance   Topic Date Due    DXA SCAN  Never done    ANNUAL WELLNESS VISIT  Never done    ZOSTER VACCINE (1 of 2) 2024 (Originally 1978)    COVID-19 Vaccine (2023- season) 2024 (Originally 2023)    Hepatitis B (1 of 3 - Risk 3-dose series) 2025 (Originally 1988)    INFLUENZA VACCINE  2025 (Originally 2024)    RSV Vaccine - Adults (1 - 1-dose 60+ series) 2025 (Originally 1988)    Pneumococcal Vaccine 65+ (1 of 2 - PCV) 2025 (Originally 1934)    LIPID PANEL  2025    BMI FOLLOWUP  2025    TDAP/TD VACCINES (2 - Td or Tdap) 2030                                                                                                                                                CMS Preventative Services Quick Reference  Risk Factors Identified During Encounter  Depression/Dysphoria:  son at home with him  Hearing Problem:  has hearing aids  Immunizations Discussed/Encouraged: Influenza, Prevnar 20 (Pneumococcal 20-valent conjugate), Shingrix, COVID19, and RSV (Respiratory Syncytial Virus)    The above risks/problems have been discussed with the patient.  Pertinent information has been shared with the patient in the After Visit Summary.  An After Visit Summary and PPPS were made available to the  patient.    Follow Up:{Wrapup  Review (Popup)  Advance Care Planning  Labs  CC  Problem List  Visit Diagnosis  Medications  Result Review  Imaging  [facility] Maintenance  Quality  BestPractice  SmartSets  SnapShot  Encounters  Notes  Media  Procedures :23}   Next Medicare Wellness visit to be scheduled in 1 year.     Assessment & Plan  Medicare annual wellness visit, subsequent    Essential hypertension  {Hypertension is (optional):5619769649}  Dyslipidemia    Prediabetes    Vitamin D deficiency    Vitamin B 12 deficiency    Macrocytic anemia    Thrombocytopenia    Speech disturbance, unspecified type    Parkinson's disease with dyskinesia, unspecified whether manifestations fluctuate    Weakness    Recurrent falls    History of CVA (cerebrovascular accident)    Nonintractable episodic headache, unspecified headache type    Stuttering    Coronary artery disease involving native coronary artery of native heart without angina pectoris  {Coronary Artery Disease (OPTIONAL):48988}  Cardiomyopathy, unspecified type  {CHF (Optional):43126}      History of supraventricular tachycardia    Precordial pain    Paroxysmal atrial fibrillation    Tachycardia    Acute low back pain, unspecified back pain laterality, unspecified whether sciatica present    Pathological compression fracture of lumbar vertebra with delayed healing, subsequent encounter    Compression fracture of T12 vertebra with routine healing    Closed fracture of second lumbar vertebra, unspecified fracture morphology, initial encounter    Compression fracture of L2 vertebra with routine healing    Closed nondisplaced zone II fracture of sacrum with nonunion, subsequent encounter    Right hip pain    Primary osteoarthritis of both knees    Chronic pain of both knees    Closed fracture of multiple ribs of right side with routine healing, subsequent encounter    Closed nondisplaced fracture of phalanx of left great toe, unspecified phalanx,  sequela    Neoplasm of uncertain behavior of skin      Orders Placed This Encounter   Procedures    DEXA Bone Density Axial    Comprehensive Metabolic Panel    Hemoglobin A1c    CK    Lipid Panel With LDL / HDL Ratio    Iron and TIBC    Ferritin    Vitamin B12 & Folate    Vitamin D,25-Hydroxy    TSH    T4, free    T3, Free    Urinalysis With Culture If Indicated -    Ambulatory Referral to Dermatology    CBC & Differential             Follow Up:{Wrapup  Review (Popup)  Advance Care Planning  Labs  CC  Problem List  Visit Diagnosis  Medications  Result Review  Imaging  Health Maintenance  Quality  BestPractice  SmartSets  SnapShot  Encounters  Notes  Media  Procedures :23}   No follow-ups on file.

## 2024-08-08 LAB
25(OH)D3+25(OH)D2 SERPL-MCNC: 41.1 NG/ML (ref 30–100)
ALBUMIN SERPL-MCNC: 3.7 G/DL (ref 3.6–4.6)
ALP SERPL-CCNC: 73 IU/L (ref 44–121)
ALT SERPL-CCNC: 12 IU/L (ref 0–44)
APPEARANCE UR: ABNORMAL
AST SERPL-CCNC: 17 IU/L (ref 0–40)
BACTERIA #/AREA URNS HPF: ABNORMAL /[HPF]
BACTERIA UR CULT: NORMAL
BACTERIA UR CULT: NORMAL
BASOPHILS # BLD AUTO: 0 X10E3/UL (ref 0–0.2)
BASOPHILS NFR BLD AUTO: 0 %
BILIRUB SERPL-MCNC: 1.6 MG/DL (ref 0–1.2)
BILIRUB UR QL STRIP: NEGATIVE
BUN SERPL-MCNC: 20 MG/DL (ref 10–36)
BUN/CREAT SERPL: 27 (ref 10–24)
CALCIUM SERPL-MCNC: 8.9 MG/DL (ref 8.6–10.2)
CASTS URNS QL MICRO: ABNORMAL /LPF
CHLORIDE SERPL-SCNC: 106 MMOL/L (ref 96–106)
CHOLEST SERPL-MCNC: 125 MG/DL (ref 100–199)
CK SERPL-CCNC: 37 U/L (ref 30–208)
CO2 SERPL-SCNC: 24 MMOL/L (ref 20–29)
COLOR UR: YELLOW
CREAT SERPL-MCNC: 0.73 MG/DL (ref 0.76–1.27)
EGFRCR SERPLBLD CKD-EPI 2021: 83 ML/MIN/1.73
EOSINOPHIL # BLD AUTO: 0.1 X10E3/UL (ref 0–0.4)
EOSINOPHIL NFR BLD AUTO: 2 %
EPI CELLS #/AREA URNS HPF: ABNORMAL /HPF (ref 0–10)
ERYTHROCYTE [DISTWIDTH] IN BLOOD BY AUTOMATED COUNT: 14.4 % (ref 11.6–15.4)
FERRITIN SERPL-MCNC: 876 NG/ML (ref 30–400)
FOLATE SERPL-MCNC: 8.8 NG/ML
GLOBULIN SER CALC-MCNC: 2.1 G/DL (ref 1.5–4.5)
GLUCOSE SERPL-MCNC: 89 MG/DL (ref 70–99)
GLUCOSE UR QL STRIP: NEGATIVE
HBA1C MFR BLD: 5.6 % (ref 4.8–5.6)
HCT VFR BLD AUTO: 35.8 % (ref 37.5–51)
HDLC SERPL-MCNC: 40 MG/DL
HGB BLD-MCNC: 11.7 G/DL (ref 13–17.7)
HGB UR QL STRIP: NEGATIVE
IMM GRANULOCYTES # BLD AUTO: 0 X10E3/UL (ref 0–0.1)
IMM GRANULOCYTES NFR BLD AUTO: 0 %
IRON SATN MFR SERPL: 30 % (ref 15–55)
IRON SERPL-MCNC: 62 UG/DL (ref 38–169)
KETONES UR QL STRIP: ABNORMAL
LDLC SERPL CALC-MCNC: 71 MG/DL (ref 0–99)
LDLC/HDLC SERPL: 1.8 RATIO (ref 0–3.6)
LEUKOCYTE ESTERASE UR QL STRIP: ABNORMAL
LYMPHOCYTES # BLD AUTO: 1 X10E3/UL (ref 0.7–3.1)
LYMPHOCYTES NFR BLD AUTO: 21 %
MCH RBC QN AUTO: 35.3 PG (ref 26.6–33)
MCHC RBC AUTO-ENTMCNC: 32.7 G/DL (ref 31.5–35.7)
MCV RBC AUTO: 108 FL (ref 79–97)
MICRO URNS: ABNORMAL
MONOCYTES # BLD AUTO: 0.4 X10E3/UL (ref 0.1–0.9)
MONOCYTES NFR BLD AUTO: 8 %
MORPHOLOGY BLD-IMP: ABNORMAL
NEUTROPHILS # BLD AUTO: 3.4 X10E3/UL (ref 1.4–7)
NEUTROPHILS NFR BLD AUTO: 69 %
NITRITE UR QL STRIP: POSITIVE
PH UR STRIP: 8.5 [PH] (ref 5–7.5)
PLATELET # BLD AUTO: 101 X10E3/UL (ref 150–450)
POTASSIUM SERPL-SCNC: 4.5 MMOL/L (ref 3.5–5.2)
PROT SERPL-MCNC: 5.8 G/DL (ref 6–8.5)
PROT UR QL STRIP: ABNORMAL
RBC # BLD AUTO: 3.31 X10E6/UL (ref 4.14–5.8)
RBC #/AREA URNS HPF: ABNORMAL /HPF (ref 0–2)
SODIUM SERPL-SCNC: 143 MMOL/L (ref 134–144)
SP GR UR STRIP: 1.02 (ref 1–1.03)
T3FREE SERPL-MCNC: 2.5 PG/ML (ref 2–4.4)
T4 FREE SERPL-MCNC: 1.47 NG/DL (ref 0.82–1.77)
TIBC SERPL-MCNC: 209 UG/DL (ref 250–450)
TRIGL SERPL-MCNC: 70 MG/DL (ref 0–149)
TSH SERPL DL<=0.005 MIU/L-ACNC: 4.08 UIU/ML (ref 0.45–4.5)
UIBC SERPL-MCNC: 147 UG/DL (ref 111–343)
URINALYSIS REFLEX: ABNORMAL
UROBILINOGEN UR STRIP-MCNC: 1 MG/DL (ref 0.2–1)
VIT B12 SERPL-MCNC: 1599 PG/ML (ref 232–1245)
VLDLC SERPL CALC-MCNC: 14 MG/DL (ref 5–40)
WBC # BLD AUTO: 4.9 X10E3/UL (ref 3.4–10.8)
WBC #/AREA URNS HPF: ABNORMAL /HPF (ref 0–5)

## 2024-08-16 ENCOUNTER — HOSPITAL ENCOUNTER (OUTPATIENT)
Dept: BONE DENSITY | Facility: HOSPITAL | Age: 89
Discharge: HOME | End: 2024-08-16
Payer: MEDICARE

## 2024-08-16 DIAGNOSIS — S22.080D COMPRESSION FRACTURE OF T12 VERTEBRA WITH ROUTINE HEALING: ICD-10-CM

## 2024-08-16 DIAGNOSIS — S22.41XD CLOSED FRACTURE OF MULTIPLE RIBS OF RIGHT SIDE WITH ROUTINE HEALING, SUBSEQUENT ENCOUNTER: ICD-10-CM

## 2024-08-16 DIAGNOSIS — M54.50 ACUTE LOW BACK PAIN, UNSPECIFIED BACK PAIN LATERALITY, UNSPECIFIED WHETHER SCIATICA PRESENT: ICD-10-CM

## 2024-08-16 DIAGNOSIS — M48.56XG PATHOLOGICAL COMPRESSION FRACTURE OF LUMBAR VERTEBRA WITH DELAYED HEALING, SUBSEQUENT ENCOUNTER: ICD-10-CM

## 2024-08-16 DIAGNOSIS — S32.120K: ICD-10-CM

## 2024-08-16 DIAGNOSIS — S32.020D COMPRESSION FRACTURE OF L2 VERTEBRA WITH ROUTINE HEALING: ICD-10-CM

## 2024-08-16 DIAGNOSIS — S92.405S CLOSED NONDISPLACED FRACTURE OF PHALANX OF LEFT GREAT TOE, UNSPECIFIED PHALANX, SEQUELA: ICD-10-CM

## 2024-08-16 DIAGNOSIS — S32.029A CLOSED FRACTURE OF SECOND LUMBAR VERTEBRA, UNSPECIFIED FRACTURE MORPHOLOGY, INITIAL ENCOUNTER: ICD-10-CM

## 2024-08-16 PROCEDURE — 77080 DXA BONE DENSITY AXIAL: CPT

## 2024-08-19 ENCOUNTER — TELEPHONE (OUTPATIENT)
Dept: FAMILY MEDICINE CLINIC | Facility: CLINIC | Age: 89
End: 2024-08-19
Payer: MEDICARE

## 2024-08-19 NOTE — ASSESSMENT & PLAN NOTE
Coronary Artery Disease (OPTIONAL): Coronary artery disease is stable.  Continue current treatment regimen.  Cardiac status will be reassessed in 6 months.

## 2024-08-19 NOTE — PROGRESS NOTES
Subjective   The ABCs of the Annual Wellness Visit  Medicare Wellness Visit      Dinesh Churchill Sr. is a 89 y.o. patient who presents for a Medicare Wellness Visit.    Last DEXA-has not had but has had multiple fractures  Last colonoscopy-has not had  Last Tdap-9/2020  Prevnar-has not had vaccination  Pneumovax-has not had vaccination  Hepatitis A-has not had vaccination  Last flu shot-9/2020  Shingrix- had varicella as a child.  Has not had vaccination  Covid 19-Quiana 3/2021, 11/2021, Moderna bivalent 10/2022  RSV-has not had vaccination    The following portions of the patient's history were reviewed and   updated as appropriate: allergies, current medications, past family history, past medical history, past social history, past surgical history, and problem list.    Compared to one year ago, the patient's physical   health is worse. Worse with balance, falls and fractures.   Compared to one year ago, the patient's mental   health is worse. In and out- has episodes that he is more argumentative with son. Speech and cognition worse.     Recent Hospitalizations:  This patient has had a [facility] admission record on file within the last 365 days.  Current Medical Providers:  Patient Care Team:  Phyllis Loving PA as PCP - General (Family Medicine)  Brandon Ricci MD Thomas, Melissa, PA as Referring Physician (Family Medicine)  Maninder Quezada MD as Consulting Physician (Cardiology)  Hardeep Shea MD as Consulting Physician (Pulmonary Disease)  Shamar Pennington MD as Consulting Physician (Hematology and Oncology)    Outpatient Medications Prior to Visit   Medication Sig Dispense Refill    acetaminophen (TYLENOL) 500 MG tablet Take 1 tablet by mouth 4 (Four) Times a Day.      amLODIPine (NORVASC) 5 MG tablet TAKE 1 TABLET BY MOUTH DAILY 90 tablet 3    aspirin 81 MG chewable tablet Chew 1 tablet Daily.      atorvastatin (LIPITOR) 20 MG tablet TAKE 1 TABLET BY MOUTH EVERY NIGHT 90 tablet 3     Cholecalciferol (VITAMIN D-3) 1000 UNITS capsule Take 1,000 Units by mouth Daily.      Cyanocobalamin (B-12) 1000 MCG lozenge Place 1 lozenge under the tongue Every Other Day. 45 lozenge 1    isosorbide mononitrate (IMDUR) 60 MG 24 hr tablet TAKE 1 TABLET BY MOUTH DAILY 90 tablet 3    losartan (COZAAR) 50 MG tablet TAKE 1 TABLET BY MOUTH DAILY 90 tablet 3    metoprolol succinate XL (TOPROL-XL) 100 MG 24 hr tablet TAKE 1 TABLET BY MOUTH DAILY 90 tablet 3    sennosides-docusate (PERICOLACE) 8.6-50 MG per tablet Take 2 tablets by mouth Daily.       No facility-administered medications prior to visit.     No opioid medication identified on active medication list. I have reviewed chart for other potential  high risk medication/s and harmful drug interactions in the elderly.      Aspirin is on active medication list. Aspirin use is indicated based on review of current medical condition/s. Pros and cons of this therapy have been discussed today. Benefits of this medication outweigh potential harm.  Patient has been encouraged to continue taking this medication.  .      Patient Active Problem List   Diagnosis    Abnormal magnetic resonance imaging study    Arthritis    Osteoarthritis of cervical spine    Diplopia    Hearing loss    Neoplasm of uncertain behavior of skin    Prediabetes    Vitamin D deficiency    Chronic fatigue    History of supraventricular tachycardia    Essential hypertension    B12 deficiency    Abnormal cardiac function test    Cardiomyopathy    Coronary artery disease involving native coronary artery of native heart without angina pectoris    History of coronary artery stent placement    History of renal calculi    Dyslipidemia    Macrocytic anemia    Precordial pain    Parkinson's disease    History of CVA (cerebrovascular accident)    Paroxysmal atrial fibrillation    Otalgia, left    Chest pain    Abnormal stress test    Spontaneous pneumothorax    Chest pain    Acute systolic (congestive) heart  "failure    Thrombocytopenia    Pneumonia of left lower lobe due to infectious organism    Syncope and collapse    Status post placement of implantable loop recorder    Long term (current) use of antithrombotics/antiplatelets    Stuttering    Fall    Closed fracture of multiple ribs of right side    Chest wall pain    Closed fracture of phalanx of toe of left foot    Headache    Sudden onset of severe headache    Fall in home, initial encounter    Compression fracture of L2 vertebra with routine healing    Weakness of both legs    Idiopathic peripheral neuropathy    L2 vertebral fracture    Compression fracture of T12 vertebra with routine healing    Sacral fracture, closed     Advance Care Planning Advance Directive is not on file.  ACP discussion was held with the patient during this visit. Patient does not have an advance directive, information provided.            Objective   Vitals:    08/02/24 1057   BP: 102/60   Pulse: 77   Resp: 18   Temp: 98 °F (36.7 °C)   TempSrc: Temporal   SpO2: 94%   Height: 188 cm (74.02\")       Estimated body mass index is 18.08 kg/m² as calculated from the following:    Height as of this encounter: 188 cm (74.02\").    Weight as of 6/12/24: 63.9 kg (140 lb 14 oz).    BMI is below normal parameters (malnutrition). Recommendations: Discussed increasing po intake with son.        Does the patient have evidence of cognitive impairment? Yes  Lab Results   Component Value Date    CHLPL 125 08/05/2024    TRIG 70 08/05/2024    HDL 40 08/05/2024    LDL 71 08/05/2024    VLDL 14 08/05/2024    HGBA1C 5.6 08/05/2024                                                                                               Health  Risk Assessment    Smoking Status:  Social History     Tobacco Use   Smoking Status Never    Passive exposure: Never   Smokeless Tobacco Never     Alcohol Consumption:  Social History     Substance and Sexual Activity   Alcohol Use No    Comment: quit 30 years ago        Fall Risk " Screen  STEADI Fall Risk Assessment was completed, and patient is at LOW risk for falls.Assessment completed on:2024    Depression Screenin/9/2024     9:59 AM   PHQ-2/PHQ-9 Depression Screening   Little Interest or Pleasure in Doing Things 0-->not at all   Feeling Down, Depressed or Hopeless 0-->not at all   PHQ-9: Brief Depression Severity Measure Score 0     Health Habits and Functional and Cognitive Screening:       No data to display                    Age-appropriate Screening Schedule:  Refer to the list below for future screening recommendations based on patient's age, sex and/or medical conditions. Orders for these recommended tests are listed in the plan section. The patient has been provided with a written plan.    Health Maintenance List  Health Maintenance   Topic Date Due    BMI FOLLOWUP  Never done    ZOSTER VACCINE (1 of 2) Never done    ANNUAL WELLNESS VISIT  Never done    COVID-19 Vaccine (2023- season) 2023    Hepatitis B (1 of 3 - Risk 3-dose series) 2025 (Originally 1988)    INFLUENZA VACCINE  2025 (Originally 2024)    RSV Vaccine - Adults (1 - 1-dose 60+ series) 2025 (Originally 1988)    Pneumococcal Vaccine 65+ (1 of 2 - PCV) 2025 (Originally 1934)    LIPID PANEL  2025    DXA SCAN  2026    TDAP/TD VACCINES (2 - Td or Tdap) 2030                                                                                                                                                CMS Preventative Services Quick Reference  Risk Factors Identified During Encounter  Chronic Pain: Natural history and expected course discussed. Questions answered.  Depression/Dysphoria:  monitoring with son  Fall Risk-High or Moderate: Discussed Fall Prevention in the home  Hearing Problem:  discussed hearing loss  Immunizations Discussed/Encouraged: Influenza, Prevnar 20 (Pneumococcal 20-valent conjugate), Shingrix, COVID19, and RSV  (Respiratory Syncytial Virus)    The above risks/problems have been discussed with the patient.  Pertinent information has been shared with the patient in the After Visit Summary.  An After Visit Summary and PPPS were made available to the patient.    Follow Up:   Next Medicare Wellness visit to be scheduled in 1 year.     Assessment & Plan  Medicare annual wellness visit, subsequent    Essential hypertension  Hypertension is stable and controlled  Continue current treatment regimen.  Blood pressure will be reassessed in 6 months.  Dyslipidemia    Prediabetes    Vitamin D deficiency    Vitamin B 12 deficiency    Macrocytic anemia    Thrombocytopenia    Speech disturbance, unspecified type    Parkinson's disease with dyskinesia, unspecified whether manifestations fluctuate    Weakness    Recurrent falls    History of CVA (cerebrovascular accident)    Nonintractable episodic headache, unspecified headache type    Stuttering    Coronary artery disease involving native coronary artery of native heart without angina pectoris  Coronary Artery Disease (OPTIONAL): Coronary artery disease is stable.  Continue current treatment regimen.  Cardiac status will be reassessed in 6 months.  Cardiomyopathy, unspecified type  Congestive heart failure due to  per cardiology .  Heart failure is stable.  NYHA Class .  Continue current treatment regimen.  Heart failure will be reassessed in 6 months.      History of supraventricular tachycardia    Precordial pain    Paroxysmal atrial fibrillation    Tachycardia    Acute low back pain, unspecified back pain laterality, unspecified whether sciatica present    Pathological compression fracture of lumbar vertebra with delayed healing, subsequent encounter    Compression fracture of T12 vertebra with routine healing    Closed fracture of second lumbar vertebra, unspecified fracture morphology, initial encounter    Compression fracture of L2 vertebra with routine healing    Closed nondisplaced  zone II fracture of sacrum with nonunion, subsequent encounter    Right hip pain    Primary osteoarthritis of both knees    Chronic pain of both knees    Closed fracture of multiple ribs of right side with routine healing, subsequent encounter    Closed nondisplaced fracture of phalanx of left great toe, unspecified phalanx, sequela    Neoplasm of uncertain behavior of skin    Orders Placed This Encounter   Procedures    Urine culture, Comprehensive - ,    DEXA Bone Density Axial    Comprehensive Metabolic Panel    Hemoglobin A1c    CK    Lipid Panel With LDL / HDL Ratio    Iron and TIBC    Ferritin    Vitamin B12 & Folate    Vitamin D,25-Hydroxy    TSH    T4, free    T3, Free    Urinalysis With Culture If Indicated -    Microscopic Examination -    Ambulatory Referral to Dermatology    CBC & Differential             Follow Up:   No follow-ups on file.

## 2024-08-19 NOTE — ASSESSMENT & PLAN NOTE
Congestive heart failure due to per cardiology.  Heart failure is stable.  NYHA Class .  Continue current treatment regimen.  Heart failure will be reassessed in 6 months.

## 2024-08-25 DIAGNOSIS — M80.00XG AGE-RELATED OSTEOPOROSIS WITH CURRENT PATHOLOGICAL FRACTURE WITH DELAYED HEALING, SUBSEQUENT ENCOUNTER: Primary | ICD-10-CM

## 2024-08-26 NOTE — PROGRESS NOTES
Subjective   History of Present Illness: Dinesh Churchill Sr. is a 89 y.o. male is here today for follow-up on T12 compression fracture. Lumbar and thoracic XR was done on 8/27/24.  He is with his son today.  He is doing well.  Denies any worsening of his back pain.  He believes his back pain is back to baseline levels.  He spends most of his time in the recliner.  He is working with physical therapy and completes 10 to 30 minutes of aerobic exercise at least 3 days a week.    History of Present Illness    The following portions of the patient's history were reviewed and updated as appropriate: allergies, current medications, past family history, past medical history, past social history, past surgical history, and problem list.    Past Medical History:   Diagnosis Date    Abnormal ECG     Abnormal MRI     Acute frontal sinusitis     Arthritis     Chronic fatigue     Coronary artery disease     Difficulty walking     Dizziness     Head injury     Headache 01/21/2023    Hearing aid worn     left    Hearing loss     Hyperlipidemia     Hypertension     Idiopathic peripheral neuropathy 8/29/2023    Kidney stones     Macrocytosis     Neoplasm of skin     Osteoporosis     Prediabetes     Vitamin D deficiency         Past Surgical History:   Procedure Laterality Date    CARDIAC CATHETERIZATION N/A 05/22/2017    Procedure: Left Heart Cath;  Surgeon: Maninder Quezada MD;  Location: [facility] INVASIVE LOCATION;  Service:     CARDIAC CATHETERIZATION N/A 05/23/2017    Procedure: Stent BMS coronary;  Surgeon: Maninder Quezada MD;  Location: [facility] INVASIVE LOCATION;  Service:     CARDIAC CATHETERIZATION N/A 08/08/2019    Procedure: Left Heart Cath;  Surgeon: Maninder Quezada MD;  Location: [facility] CATH INVASIVE LOCATION;  Service: Cardiology    CARDIAC CATHETERIZATION N/A 08/08/2019    Procedure: Left ventriculography;  Surgeon: Maninder Quezada MD;  Location: [facility] INVASIVE LOCATION;  Service:  Cardiology    CARDIAC CATHETERIZATION N/A 08/08/2019    Procedure: Coronary angiography;  Surgeon: Maninder Quezada MD;  Location:  [facility] CATH INVASIVE LOCATION;  Service: Cardiology    CARDIAC CATHETERIZATION N/A 08/20/2019    Procedure: CORONARY ANGIOGRAPHY;  Surgeon: Maninder Quezada MD;  Location:  [facility] CATH INVASIVE LOCATION;  Service: Cardiovascular    CARDIAC CATHETERIZATION N/A 08/20/2019    Procedure: Stent BMS coronary;  Surgeon: Maninder Quezada MD;  Location:  [facility] CATH INVASIVE LOCATION;  Service: Cardiovascular    CARDIAC ELECTROPHYSIOLOGY PROCEDURE N/A 11/08/2019    Procedure: Loop insertion;  Surgeon: Maninder Quezada MD;  Location:  [facility] CATH INVASIVE LOCATION;  Service: Cardiovascular    HERNIA REPAIR      x 2    MS RT/LT HEART CATHETERS N/A 05/23/2017    Procedure: Percutaneous Coronary Intervention;  Surgeon: Maninder Quezada MD;  Location:  [facility] CATH INVASIVE LOCATION;  Service: Cardiovascular          Current Outpatient Medications:     acetaminophen (TYLENOL) 500 MG tablet, Take 1 tablet by mouth 4 (Four) Times a Day., Disp: , Rfl:     amLODIPine (NORVASC) 5 MG tablet, TAKE 1 TABLET BY MOUTH DAILY, Disp: 90 tablet, Rfl: 3    aspirin 81 MG chewable tablet, Chew 1 tablet Daily., Disp: , Rfl:     atorvastatin (LIPITOR) 20 MG tablet, TAKE 1 TABLET BY MOUTH EVERY NIGHT, Disp: 90 tablet, Rfl: 3    Cholecalciferol (VITAMIN D-3) 1000 UNITS capsule, Take 1,000 Units by mouth Daily., Disp: , Rfl:     Cyanocobalamin (B-12) 1000 MCG lozenge, Place 1 lozenge under the tongue Every Other Day., Disp: 45 lozenge, Rfl: 1    isosorbide mononitrate (IMDUR) 60 MG 24 hr tablet, TAKE 1 TABLET BY MOUTH DAILY, Disp: 90 tablet, Rfl: 3    losartan (COZAAR) 50 MG tablet, TAKE 1 TABLET BY MOUTH DAILY, Disp: 90 tablet, Rfl: 3    metoprolol succinate XL (TOPROL-XL) 100 MG 24 hr tablet, TAKE 1 TABLET BY MOUTH DAILY, Disp: 90 tablet, Rfl: 3    sennosides-docusate (PERICOLACE) 8.6-50 MG per  tablet, Take 2 tablets by mouth Daily., Disp: , Rfl:      No Known Allergies     Social History     Socioeconomic History    Marital status:    Tobacco Use    Smoking status: Never     Passive exposure: Never    Smokeless tobacco: Never   Vaping Use    Vaping status: Never Used   Substance and Sexual Activity    Alcohol use: No     Comment: quit 30 years ago     Drug use: No    Sexual activity: Not Currently     Partners: Male        Family History   Problem Relation Age of Onset    Arthritis Son     Cancer Sister     Diabetes Son         Review of Systems   Musculoskeletal:  Positive for back pain (constant) and gait problem.   All other systems reviewed and are negative.      Objective     Vitals:    24 1413   BP: 110/60   Pulse: 64   Temp: 97.8 °F (36.6 °C)   SpO2: 95%     There is no height or weight on file to calculate BMI.      Physical Exam  Neurologic Exam  Awake, alert, oriented   Face symmetric  Speech is fluent and clear  Motor exam  Bilateral deltoids 5/5, bilateral biceps 5/5, bilateral triceps 5/5, bilateral wrist extension 5/5 bilateral hand  5/5  Bilateral hip flexion 5/5, bilateral knee extension 5/5, bilateral DF/PF 5/5  No clonus  No Jaren's reflex  Gait deferred  Able to detect  light touch in all 4 extremities          Assessment & Plan   Independent Review of Radiographic Studies:      I personally reviewed the images from the following studies.  X-ray of the thoracic and lumbar spine from 2024, 2024, MRI of the thoracic and lumbar spine from 2024  The follow-up x-ray images demonstrate no significant progression of the fractures.  No new fractures are identified    Medical Decision Makin-year-old male previously seen in the hospital on 2024 for an acute T12 compression fracture  -He has been wearing his TLSO brace at home.  He believes his pain is back to baseline levels.  He is zoe switch to his previous corset brace which  is more comfortable.  He is to wear his other brace at home when walking.  He spends most of his time in a recliner.  He wants to hold off from the kyphoplasty for now.  He has some signs of early dementia and his son is with him to help provide some of the medical history.  He lives with his son he helps to take care of him.  -I have asked him to call or come back if he develops any worsening of his lower back pain.  If he has any worsening of his pain we could consider proceeding with the T12 kyphoplasty.  Since his symptoms are improving we will plan to hold off  Diagnoses and all orders for this visit:    1. Compression fracture of T12 vertebra with routine healing (Primary)      Return if symptoms worsen or fail to improve.    I spent 30 minutes reviewing the medical record, reviewing the x-ray images, reviewing the MRI images, discussing the management of compression fractures, discussing the signs and symptoms of a compression fracture, discussing the risks and benefits of a kyphoplasty

## 2024-08-30 ENCOUNTER — OFFICE VISIT (OUTPATIENT)
Dept: NEUROSURGERY | Facility: CLINIC | Age: 89
End: 2024-08-30
Payer: MEDICARE

## 2024-08-30 VITALS
SYSTOLIC BLOOD PRESSURE: 110 MMHG | HEART RATE: 64 BPM | OXYGEN SATURATION: 95 % | TEMPERATURE: 97.8 F | DIASTOLIC BLOOD PRESSURE: 60 MMHG

## 2024-08-30 DIAGNOSIS — S22.080D COMPRESSION FRACTURE OF T12 VERTEBRA WITH ROUTINE HEALING: Primary | ICD-10-CM

## 2024-08-30 PROCEDURE — 99214 OFFICE O/P EST MOD 30 MIN: CPT | Performed by: NEUROLOGICAL SURGERY

## 2024-08-30 PROCEDURE — 1159F MED LIST DOCD IN RCRD: CPT | Performed by: NEUROLOGICAL SURGERY

## 2024-08-30 PROCEDURE — 1160F RVW MEDS BY RX/DR IN RCRD: CPT | Performed by: NEUROLOGICAL SURGERY

## 2024-09-03 ENCOUNTER — OFFICE VISIT (OUTPATIENT)
Dept: ENDOCRINOLOGY | Age: 89
End: 2024-09-03
Payer: MEDICARE

## 2024-09-03 VITALS
TEMPERATURE: 96.8 F | HEART RATE: 62 BPM | OXYGEN SATURATION: 96 % | DIASTOLIC BLOOD PRESSURE: 40 MMHG | SYSTOLIC BLOOD PRESSURE: 124 MMHG

## 2024-09-03 DIAGNOSIS — M81.0 SENILE OSTEOPOROSIS: Primary | ICD-10-CM

## 2024-09-03 PROCEDURE — 99204 OFFICE O/P NEW MOD 45 MIN: CPT | Performed by: INTERNAL MEDICINE

## 2024-09-03 PROCEDURE — 1159F MED LIST DOCD IN RCRD: CPT | Performed by: INTERNAL MEDICINE

## 2024-09-03 PROCEDURE — 1160F RVW MEDS BY RX/DR IN RCRD: CPT | Performed by: INTERNAL MEDICINE

## 2024-09-03 NOTE — PROGRESS NOTES
Chief complaint   Chief Complaint   Patient presents with    Osteoporosis          Subjective     History of Present Illness:      This is a 89 years old male I am seeing for Osteoporosis   referred by PCP     other medical issues   1- HLD   2- HTN   3- Other   Pt was Dx with Osteoporosis in 2024   Is not on bone medication now   had blood work done by PCP in 8-2024: TSH was 4.0, GFR was 83, ca was 8.9    pt states that he is living at home with his son who is with him today  taking vit D daily but low on calcium in his diet and has a tendency to fall has No History of long term steroid use and No family history of osteoporosis and No prior history of  hip fracture but does have previous spine fracture at the level of L2 and T12 and has No dysphagia, No dyspnea, No dysphonia  No change size of neck, No neck pain or discomfort  No nervousness, No shakiness, No palpitations  Weight stable   BM daily, No diarrhea, No constipation  No edema, No proximal muscle weak  No Cold or Heat Intolerance  No Insomnia  No hair Loss, No Skin Drynress  Appetite is OK  No visual changes       Latest Reference Range & Units 08/05/24 08:57   Creatine Kinase 30 - 208 U/L 37   Sodium 134 - 144 mmol/L 143   Potassium 3.5 - 5.2 mmol/L 4.5   Chloride 96 - 106 mmol/L 106   CO2 20 - 29 mmol/L 24   BUN 10 - 36 mg/dL 20   Creatinine 0.76 - 1.27 mg/dL 0.73 (L)   BUN/Creatinine Ratio 10 - 24  27 (H)   EGFR Result >59 mL/min/1.73 83   Glucose 70 - 99 mg/dL 89   Calcium 8.6 - 10.2 mg/dL 8.9   Alkaline Phosphatase 44 - 121 IU/L 73   Total Protein 6.0 - 8.5 g/dL 5.8 (L)   Albumin 3.6 - 4.6 g/dL 3.7   AST (SGOT) 0 - 40 IU/L 17   ALT (SGPT) 0 - 44 IU/L 12   Total Bilirubin 0.0 - 1.2 mg/dL 1.6 (H)   Hemoglobin A1C 4.8 - 5.6 % 5.6   TSH Baseline 0.450 - 4.500 uIU/mL 4.080   Free T4 0.82 - 1.77 ng/dL 1.47   T3, Free 2.0 - 4.4 pg/mL 2.5   Iron 38 - 169 ug/dL 62   Ferritin 30 - 400 ng/mL 876 (H)   Iron Saturation 15 - 55 % 30   TIBC 250 - 450 ug/dL 209 (L)    UIBC 111 - 343 ug/dL 147   Vitamin B-12 232 - 1,245 pg/mL 1,599 (H)   Folate >3.0 ng/mL 8.8   Total Cholesterol 100 - 199 mg/dL 125   HDL Cholesterol >39 mg/dL 40   LDL Cholesterol  0 - 99 mg/dL 71   Triglycerides 0 - 149 mg/dL 70   LDL/HDL Ratio 0.0 - 3.6 ratio 1.8   VLDL Cholesterol Sridhar 5 - 40 mg/dL 14   25 Hydroxy, Vitamin D 30.0 - 100.0 ng/mL 41.1   Globulin 1.5 - 4.5 g/dL 2.1   WBC 3.4 - 10.8 x10E3/uL 4.9   RBC 4.14 - 5.80 x10E6/uL 3.31 (L)   Hemoglobin 13.0 - 17.7 g/dL 11.7 (L)   Hematocrit 37.5 - 51.0 % 35.8 (L)   Platelets 150 - 450 x10E3/uL 101 (L)   RDW 11.6 - 15.4 % 14.4   MCV 79 - 97 fL 108 (H)   MCH 26.6 - 33.0 pg 35.3 (H)   MCHC 31.5 - 35.7 g/dL 32.7   Neutrophil Rel % Not Estab. % 69   Lymphocyte Rel % Not Estab. % 21   Monocyte Rel % Not Estab. % 8   Eosinophil Rel % Not Estab. % 2   Basophil Rel % Not Estab. % 0   Immature Granulocyte Rel % Not Estab. % 0   Neutrophils Absolute 1.4 - 7.0 x10E3/uL 3.4   Lymphocytes Absolute 0.7 - 3.1 x10E3/uL 1.0   Monocytes Absolute 0.1 - 0.9 x10E3/uL 0.4   Eosinophils Absolute 0.0 - 0.4 x10E3/uL 0.1   Basophils Absolute 0.0 - 0.2 x10E3/uL 0.0   Immature Grans, Absolute 0.0 - 0.1 x10E3/uL 0.0   Hematology Comments:  Note:   Color, UA Yellow  Yellow   Appearance, UA Clear  Turbid !   Specific Gravity, UA 1.005 - 1.030  1.023   pH, UA 5.0 - 7.5  8.5 (H)   Glucose Negative  Negative   Ketones, UA Negative  Trace !   Blood, UA Negative  Negative   Nitrite, UA Negative  Positive !   Leukocytes, UA Negative  Trace !   Protein, UA Negative/Trace  Trace   Bilirubin, UA Negative  Negative   RBC, UA 0 - 2 /hpf None seen   WBC, UA 0 - 5 /hpf 0-5   Bacteria, UA None seen/Few  Many !   Epithelial Cells (non renal) 0 - 10 /hpf None seen   Casts None seen /lpf None seen   Microscopic Examination  See below:   Urinalysis Reflex  Comment   Urobilinogen, UA 0.2 - 1.0 mg/dL 1.0   CONV URINE CULTURE, COMPREHENSIVE  Rpt   Result 1  Comment   Urine Culture  Final report   (L): Data is  abnormally low  (H): Data is abnormally high  !: Data is abnormal  Rpt: View report in Results Review for more information  Family History   Problem Relation Age of Onset    Arthritis Son     Cancer Sister     Diabetes Son      Social History     Socioeconomic History    Marital status:    Tobacco Use    Smoking status: Never     Passive exposure: Never    Smokeless tobacco: Never   Vaping Use    Vaping status: Never Used   Substance and Sexual Activity    Alcohol use: No     Comment: quit 30 years ago     Drug use: No    Sexual activity: Not Currently     Partners: Male     Past Medical History:   Diagnosis Date    Abnormal ECG     Abnormal MRI     Acute frontal sinusitis     Arthritis     Chronic fatigue     Coronary artery disease     Difficulty walking     Dizziness     Head injury     Headache 01/21/2023    Hearing aid worn     left    Hearing loss     Hyperlipidemia     Hypertension     Idiopathic peripheral neuropathy 08/29/2023    Kidney stones     Macrocytosis     Neoplasm of skin     Osteoporosis     Prediabetes     Vitamin D deficiency      Past Surgical History:   Procedure Laterality Date    CARDIAC CATHETERIZATION N/A 05/22/2017    Procedure: Left Heart Cath;  Surgeon: Maninder Quezada MD;  Location: [facility] CATH INVASIVE LOCATION;  Service:     CARDIAC CATHETERIZATION N/A 05/23/2017    Procedure: Stent BMS coronary;  Surgeon: Maninder Quezada MD;  Location: [facility] CATH INVASIVE LOCATION;  Service:     CARDIAC CATHETERIZATION N/A 08/08/2019    Procedure: Left Heart Cath;  Surgeon: Maninder Quezada MD;  Location: [facility] CATH INVASIVE LOCATION;  Service: Cardiology    CARDIAC CATHETERIZATION N/A 08/08/2019    Procedure: Left ventriculography;  Surgeon: Maninder Quezada MD;  Location: [facility] CATH INVASIVE LOCATION;  Service: Cardiology    CARDIAC CATHETERIZATION N/A 08/08/2019    Procedure: Coronary angiography;  Surgeon: Maninder Quezada MD;  Location: [facility] CATH  INVASIVE LOCATION;  Service: Cardiology    CARDIAC CATHETERIZATION N/A 08/20/2019    Procedure: CORONARY ANGIOGRAPHY;  Surgeon: Maninder Quezada MD;  Location:  [facility] CATH INVASIVE LOCATION;  Service: Cardiovascular    CARDIAC CATHETERIZATION N/A 08/20/2019    Procedure: Stent BMS coronary;  Surgeon: Maninder Quezada MD;  Location:  [facility] CATH INVASIVE LOCATION;  Service: Cardiovascular    CARDIAC ELECTROPHYSIOLOGY PROCEDURE N/A 11/08/2019    Procedure: Loop insertion;  Surgeon: Maninder Quezada MD;  Location:  [facility] CATH INVASIVE LOCATION;  Service: Cardiovascular    HERNIA REPAIR      x 2    [place] RT/LT HEART CATHETERS N/A 05/23/2017    Procedure: Percutaneous Coronary Intervention;  Surgeon: Maninder Quezada MD;  [facility];  Service: Cardiovascular       Current Outpatient Medications:     acetaminophen (TYLENOL) 500 MG tablet, Take 1 tablet by mouth 4 (Four) Times a Day., Disp: , Rfl:     amLODIPine (NORVASC) 5 MG tablet, TAKE 1 TABLET BY MOUTH DAILY, Disp: 90 tablet, Rfl: 3    aspirin 81 MG chewable tablet, Chew 1 tablet Daily., Disp: , Rfl:     atorvastatin (LIPITOR) 20 MG tablet, TAKE 1 TABLET BY MOUTH EVERY NIGHT, Disp: 90 tablet, Rfl: 3    Cholecalciferol (VITAMIN D-3) 1000 UNITS capsule, Take 1,000 Units by mouth Daily., Disp: , Rfl:     Cyanocobalamin (B-12) 1000 MCG lozenge, Place 1 lozenge under the tongue Every Other Day., Disp: 45 lozenge, Rfl: 1    isosorbide mononitrate (IMDUR) 60 MG 24 hr tablet, TAKE 1 TABLET BY MOUTH DAILY, Disp: 90 tablet, Rfl: 3    losartan (COZAAR) 50 MG tablet, TAKE 1 TABLET BY MOUTH DAILY, Disp: 90 tablet, Rfl: 3    metoprolol succinate XL (TOPROL-XL) 100 MG 24 hr tablet, TAKE 1 TABLET BY MOUTH DAILY, Disp: 90 tablet, Rfl: 3  Patient has no known allergies.    Objective   Vitals:    09/03/24 1157   BP: 124/40   Pulse: 62   Temp: 96.8 °F (36 °C)   SpO2: 96%          Physical Exam  Neurological:      General: No focal deficit  present.      Mental Status: He is alert and oriented to person, place, and time.               Diagnoses and all orders for this visit:    1. Senile osteoporosis (Primary)         Assessment:     This is a 89 years old male I am seeing for Osteoporosis, referred by PCP       Pt was Dx with Osteoporosis in 2024   Is not on bone medication now   had blood work done by PCP in 8-2024: TSH was 4.0, GFR was 83, ca was 8.9    pt states that he is living at home with his son who is with him today  taking vit D daily but low on calcium in his diet and has a tendency to fall does have previous spine fracture at the level of L2 and T12 and other   Needs to be treated     Plan:    1. Start prolia 60 mg sc every 6 months   2. Labs at the next visit   TSH, BMP and vit D      3. Diet and exercise and avoid falls   4. Return in 6 months   5. Was informed that he needs three servings of calcium is his daily meals   6. Labs reviewed with the Patient : TSH   7- I reviewed the notes from PCP   8- Stay on Vit D 1000 Iu daily   9- Repeat DXA scan 2 years from last scan       I discussed with the patient/legal representative the risks and the benefits associated with the medications.  The patient/legal representative has been given the opportunity to ask questions.  Alternatives to the proposed treatment (s) were discussed, including the likely results of no treatment.  The patient/legal representative wishes to proceed.       Rene Ordaz MD   09/03/24  12:12 EDT

## 2024-09-06 ENCOUNTER — PATIENT ROUNDING (BHMG ONLY) (OUTPATIENT)
Dept: ENDOCRINOLOGY | Age: 89
End: 2024-09-06
Payer: MEDICARE

## 2024-10-09 RX ORDER — CHOLECALCIFEROL (VITAMIN D3) 25 MCG
TABLET,CHEWABLE ORAL
Qty: 45 LOZENGE | Refills: 1 | Status: SHIPPED | OUTPATIENT
Start: 2024-10-09

## 2024-10-14 ENCOUNTER — TELEPHONE (OUTPATIENT)
Dept: FAMILY MEDICINE CLINIC | Facility: CLINIC | Age: 89
End: 2024-10-14

## 2024-10-14 NOTE — TELEPHONE ENCOUNTER
Caller: Dinesh Churchill Jr    Relationship: Emergency Contact    Best call back number: [phone]     Who are you requesting to speak with (clinical staff, provider,  specific staff member): CLINICAL STAFF     What was the call regarding: WANTS TO KNOW IF ITS OK TO TAKE THE COVID AND FLU TOGETHER OR SHOULD THEY BE  TIMES

## 2024-10-28 ENCOUNTER — HOSPITAL ENCOUNTER (INPATIENT)
Facility: HOSPITAL | Age: 89
LOS: 3 days | Discharge: NURSING, REHAB OR LONG-TERM CARE | End: 2024-10-31
Attending: EMERGENCY MEDICINE | Admitting: INTERNAL MEDICINE
Payer: MEDICARE

## 2024-10-28 ENCOUNTER — APPOINTMENT (OUTPATIENT)
Dept: GENERAL RADIOLOGY | Facility: HOSPITAL | Age: 89
End: 2024-10-28
Payer: MEDICARE

## 2024-10-28 DIAGNOSIS — J81.0 ACUTE PULMONARY EDEMA: ICD-10-CM

## 2024-10-28 DIAGNOSIS — J96.01 ACUTE HYPOXIC RESPIRATORY FAILURE: Primary | ICD-10-CM

## 2024-10-28 DIAGNOSIS — I50.9 ACUTE ON CHRONIC CONGESTIVE HEART FAILURE, UNSPECIFIED HEART FAILURE TYPE: ICD-10-CM

## 2024-10-28 DIAGNOSIS — R07.9 CHEST PAIN, UNSPECIFIED TYPE: ICD-10-CM

## 2024-10-28 LAB
ALBUMIN SERPL-MCNC: 3.4 G/DL (ref 3.5–5.2)
ALBUMIN/GLOB SERPL: 1.4 G/DL
ALP SERPL-CCNC: 60 U/L (ref 39–117)
ALT SERPL W P-5'-P-CCNC: 15 U/L (ref 1–41)
ANION GAP SERPL CALCULATED.3IONS-SCNC: 7.9 MMOL/L (ref 5–15)
ANISOCYTOSIS BLD QL: ABNORMAL
APTT PPP: 27 SECONDS (ref 22.7–35.4)
ARTERIAL PATENCY WRIST A: POSITIVE
AST SERPL-CCNC: 20 U/L (ref 1–40)
ATMOSPHERIC PRESS: 755.5 MMHG
ATMOSPHERIC PRESS: NORMAL MM[HG]
B PARAPERT DNA SPEC QL NAA+PROBE: NOT DETECTED
B PERT DNA SPEC QL NAA+PROBE: NOT DETECTED
BASE EXCESS BLDA CALC-SCNC: 0.5 MMOL/L (ref 0–2)
BASE EXCESS BLDV CALC-SCNC: NORMAL MMOL/L
BASOPHILS # BLD MANUAL: 0 10*3/MM3 (ref 0–0.2)
BASOPHILS NFR BLD MANUAL: 0 % (ref 0–1.5)
BDY SITE: ABNORMAL
BDY SITE: NORMAL
BILIRUB SERPL-MCNC: 1.1 MG/DL (ref 0–1.2)
BUN SERPL-MCNC: 25 MG/DL (ref 8–23)
BUN/CREAT SERPL: 31.3 (ref 7–25)
C PNEUM DNA NPH QL NAA+NON-PROBE: NOT DETECTED
CALCIUM SPEC-SCNC: 8.4 MG/DL (ref 8.2–9.6)
CHLORIDE SERPL-SCNC: 111 MMOL/L (ref 98–107)
CO2 BLDA-SCNC: 25.1 MMOL/L (ref 23–27)
CO2 BLDA-SCNC: NORMAL MMOL/L
CO2 SERPL-SCNC: 25.1 MMOL/L (ref 22–29)
CREAT SERPL-MCNC: 0.8 MG/DL (ref 0.76–1.27)
D-LACTATE SERPL-SCNC: 1.9 MMOL/L (ref 0.5–2)
DEPRECATED RDW RBC AUTO: 56 FL (ref 37–54)
DEVICE COMMENT: ABNORMAL
DEVICE COMMENT: NORMAL
EGFRCR SERPLBLD CKD-EPI 2021: 81 ML/MIN/1.73
ELLIPTOCYTES BLD QL SMEAR: ABNORMAL
EOSINOPHIL # BLD MANUAL: 0.12 10*3/MM3 (ref 0–0.4)
EOSINOPHIL NFR BLD MANUAL: 2 % (ref 0.3–6.2)
ERYTHROCYTE [DISTWIDTH] IN BLOOD BY AUTOMATED COUNT: 14.5 % (ref 12.3–15.4)
FLUAV SUBTYP SPEC NAA+PROBE: NOT DETECTED
FLUBV RNA ISLT QL NAA+PROBE: NOT DETECTED
GAS FLOW AIRWAY: 3 LPM
GAS FLOW AIRWAY: NORMAL L/MIN
GEN 5 2HR TROPONIN T REFLEX: 43 NG/L
GLOBULIN UR ELPH-MCNC: 2.5 GM/DL
GLUCOSE SERPL-MCNC: 115 MG/DL (ref 65–99)
HADV DNA SPEC NAA+PROBE: NOT DETECTED
HCO3 BLDA-SCNC: 24.1 MMOL/L (ref 22–28)
HCO3 BLDV-SCNC: NORMAL MMOL/L
HCOV 229E RNA SPEC QL NAA+PROBE: NOT DETECTED
HCOV HKU1 RNA SPEC QL NAA+PROBE: NOT DETECTED
HCOV NL63 RNA SPEC QL NAA+PROBE: NOT DETECTED
HCOV OC43 RNA SPEC QL NAA+PROBE: NOT DETECTED
HCT VFR BLD AUTO: 38.3 % (ref 37.5–51)
HEMODILUTION: NO
HGB BLD-MCNC: 12 G/DL (ref 13–17.7)
HMPV RNA NPH QL NAA+NON-PROBE: NOT DETECTED
HOLD SPECIMEN: NORMAL
HPIV1 RNA ISLT QL NAA+PROBE: NOT DETECTED
HPIV2 RNA SPEC QL NAA+PROBE: NOT DETECTED
HPIV3 RNA NPH QL NAA+PROBE: NOT DETECTED
HPIV4 P GENE NPH QL NAA+PROBE: NOT DETECTED
INR PPP: 1.07 (ref 0.9–1.1)
LYMPHOCYTES # BLD MANUAL: 1.12 10*3/MM3 (ref 0.7–3.1)
LYMPHOCYTES NFR BLD MANUAL: 4 % (ref 5–12)
M PNEUMO IGG SER IA-ACNC: NOT DETECTED
MAGNESIUM SERPL-MCNC: 2 MG/DL (ref 1.7–2.3)
MCH RBC QN AUTO: 33.5 PG (ref 26.6–33)
MCHC RBC AUTO-ENTMCNC: 31.3 G/DL (ref 31.5–35.7)
MCV RBC AUTO: 107 FL (ref 79–97)
MODALITY: ABNORMAL
MODALITY: NORMAL
MONOCYTES # BLD: 0.25 10*3/MM3 (ref 0.1–0.9)
NEUTROPHILS # BLD AUTO: 4.71 10*3/MM3 (ref 1.7–7)
NEUTROPHILS NFR BLD MANUAL: 76 % (ref 42.7–76)
NRBC SPEC MANUAL: 1 /100 WBC (ref 0–0.2)
NT-PROBNP SERPL-MCNC: ABNORMAL PG/ML (ref 0–1800)
PCO2 BLDA: 34.7 MM HG (ref 35–45)
PCO2 BLDV: NORMAL MM[HG]
PH BLDA: 7.45 PH UNITS (ref 7.35–7.45)
PH BLDV: NORMAL [PH]
PHOSPHATE SERPL-MCNC: 2.9 MG/DL (ref 2.5–4.5)
PLAT MORPH BLD: NORMAL
PLATELET # BLD AUTO: 132 10*3/MM3 (ref 140–450)
PMV BLD AUTO: 12.4 FL (ref 6–12)
PO2 BLDA: 67.7 MM HG (ref 80–100)
PO2 BLDV: NORMAL MM[HG]
POTASSIUM SERPL-SCNC: 4.1 MMOL/L (ref 3.5–5.2)
PROCALCITONIN SERPL-MCNC: 0.03 NG/ML (ref 0–0.25)
PROT SERPL-MCNC: 5.9 G/DL (ref 6–8.5)
PROTHROMBIN TIME: 14.2 SECONDS (ref 11.7–14.2)
QT INTERVAL: 460 MS
QTC INTERVAL: 482 MS
RBC # BLD AUTO: 3.58 10*6/MM3 (ref 4.14–5.8)
RHINOVIRUS RNA SPEC NAA+PROBE: NOT DETECTED
RSV RNA NPH QL NAA+NON-PROBE: NOT DETECTED
SAO2 % BLDCOA: 94.2 % (ref 92–98.5)
SAO2 % BLDCOV: NORMAL %
SARS-COV-2 RNA NPH QL NAA+NON-PROBE: NOT DETECTED
SODIUM SERPL-SCNC: 144 MMOL/L (ref 136–145)
TOTAL RATE: 18 BREATHS/MINUTE
TOTAL RATE: NORMAL
TROPONIN T DELTA: -5 NG/L
TROPONIN T SERPL HS-MCNC: 48 NG/L
VARIANT LYMPHS NFR BLD MANUAL: 18 % (ref 19.6–45.3)
WBC MORPH BLD: NORMAL
WBC NRBC COR # BLD AUTO: 6.2 10*3/MM3 (ref 3.4–10.8)

## 2024-10-28 PROCEDURE — 84145 PROCALCITONIN (PCT): CPT | Performed by: EMERGENCY MEDICINE

## 2024-10-28 PROCEDURE — 25010000002 FUROSEMIDE PER 20 MG: Performed by: EMERGENCY MEDICINE

## 2024-10-28 PROCEDURE — 93010 ELECTROCARDIOGRAM REPORT: CPT | Performed by: INTERNAL MEDICINE

## 2024-10-28 PROCEDURE — 83605 ASSAY OF LACTIC ACID: CPT | Performed by: EMERGENCY MEDICINE

## 2024-10-28 PROCEDURE — 36415 COLL VENOUS BLD VENIPUNCTURE: CPT | Performed by: EMERGENCY MEDICINE

## 2024-10-28 PROCEDURE — 85730 THROMBOPLASTIN TIME PARTIAL: CPT | Performed by: EMERGENCY MEDICINE

## 2024-10-28 PROCEDURE — 83735 ASSAY OF MAGNESIUM: CPT | Performed by: EMERGENCY MEDICINE

## 2024-10-28 PROCEDURE — 99222 1ST HOSP IP/OBS MODERATE 55: CPT

## 2024-10-28 PROCEDURE — 94640 AIRWAY INHALATION TREATMENT: CPT

## 2024-10-28 PROCEDURE — 0202U NFCT DS 22 TRGT SARS-COV-2: CPT | Performed by: EMERGENCY MEDICINE

## 2024-10-28 PROCEDURE — 84100 ASSAY OF PHOSPHORUS: CPT | Performed by: EMERGENCY MEDICINE

## 2024-10-28 PROCEDURE — 99285 EMERGENCY DEPT VISIT HI MDM: CPT

## 2024-10-28 PROCEDURE — 85610 PROTHROMBIN TIME: CPT | Performed by: EMERGENCY MEDICINE

## 2024-10-28 PROCEDURE — 84484 ASSAY OF TROPONIN QUANT: CPT | Performed by: EMERGENCY MEDICINE

## 2024-10-28 PROCEDURE — 85025 COMPLETE CBC W/AUTO DIFF WBC: CPT | Performed by: EMERGENCY MEDICINE

## 2024-10-28 PROCEDURE — 99291 CRITICAL CARE FIRST HOUR: CPT

## 2024-10-28 PROCEDURE — 82803 BLOOD GASES ANY COMBINATION: CPT | Performed by: EMERGENCY MEDICINE

## 2024-10-28 PROCEDURE — 94799 UNLISTED PULMONARY SVC/PX: CPT

## 2024-10-28 PROCEDURE — 71045 X-RAY EXAM CHEST 1 VIEW: CPT

## 2024-10-28 PROCEDURE — 36600 WITHDRAWAL OF ARTERIAL BLOOD: CPT | Performed by: EMERGENCY MEDICINE

## 2024-10-28 PROCEDURE — 93005 ELECTROCARDIOGRAM TRACING: CPT | Performed by: EMERGENCY MEDICINE

## 2024-10-28 PROCEDURE — 80053 COMPREHEN METABOLIC PANEL: CPT | Performed by: EMERGENCY MEDICINE

## 2024-10-28 PROCEDURE — 83880 ASSAY OF NATRIURETIC PEPTIDE: CPT | Performed by: EMERGENCY MEDICINE

## 2024-10-28 PROCEDURE — 85007 BL SMEAR W/DIFF WBC COUNT: CPT | Performed by: EMERGENCY MEDICINE

## 2024-10-28 PROCEDURE — 94664 DEMO&/EVAL PT USE INHALER: CPT

## 2024-10-28 PROCEDURE — 25010000002 FUROSEMIDE PER 20 MG

## 2024-10-28 RX ORDER — IPRATROPIUM BROMIDE AND ALBUTEROL SULFATE 2.5; .5 MG/3ML; MG/3ML
3 SOLUTION RESPIRATORY (INHALATION) ONCE
Status: COMPLETED | OUTPATIENT
Start: 2024-10-28 | End: 2024-10-28

## 2024-10-28 RX ORDER — FUROSEMIDE 10 MG/ML
40 INJECTION INTRAMUSCULAR; INTRAVENOUS EVERY 12 HOURS
Status: COMPLETED | OUTPATIENT
Start: 2024-10-28 | End: 2024-10-29

## 2024-10-28 RX ORDER — ATORVASTATIN CALCIUM 20 MG/1
20 TABLET, FILM COATED ORAL NIGHTLY
Status: DISCONTINUED | OUTPATIENT
Start: 2024-10-28 | End: 2024-10-28

## 2024-10-28 RX ORDER — FUROSEMIDE 10 MG/ML
80 INJECTION INTRAMUSCULAR; INTRAVENOUS ONCE
Status: COMPLETED | OUTPATIENT
Start: 2024-10-28 | End: 2024-10-28

## 2024-10-28 RX ORDER — METOPROLOL SUCCINATE 100 MG/1
100 TABLET, EXTENDED RELEASE ORAL DAILY
Status: DISCONTINUED | OUTPATIENT
Start: 2024-10-28 | End: 2024-10-31 | Stop reason: HOSPADM

## 2024-10-28 RX ORDER — AMLODIPINE BESYLATE 5 MG/1
5 TABLET ORAL
Status: DISCONTINUED | OUTPATIENT
Start: 2024-10-28 | End: 2024-10-28

## 2024-10-28 RX ORDER — LOSARTAN POTASSIUM 50 MG/1
50 TABLET ORAL DAILY
Status: DISCONTINUED | OUTPATIENT
Start: 2024-10-28 | End: 2024-10-31 | Stop reason: HOSPADM

## 2024-10-28 RX ORDER — ASPIRIN 81 MG/1
81 TABLET, CHEWABLE ORAL DAILY
Status: DISCONTINUED | OUTPATIENT
Start: 2024-10-28 | End: 2024-10-31 | Stop reason: HOSPADM

## 2024-10-28 RX ORDER — ATORVASTATIN CALCIUM 20 MG/1
10 TABLET, FILM COATED ORAL NIGHTLY
Status: DISCONTINUED | OUTPATIENT
Start: 2024-10-28 | End: 2024-10-31 | Stop reason: HOSPADM

## 2024-10-28 RX ORDER — ACETAMINOPHEN 500 MG
500 TABLET ORAL 4 TIMES DAILY PRN
Status: DISCONTINUED | OUTPATIENT
Start: 2024-10-28 | End: 2024-10-31 | Stop reason: HOSPADM

## 2024-10-28 RX ORDER — AMLODIPINE BESYLATE 10 MG/1
10 TABLET ORAL
Status: DISCONTINUED | OUTPATIENT
Start: 2024-10-29 | End: 2024-10-30

## 2024-10-28 RX ORDER — PANTOPRAZOLE SODIUM 40 MG/1
40 TABLET, DELAYED RELEASE ORAL
Status: DISCONTINUED | OUTPATIENT
Start: 2024-10-29 | End: 2024-10-31 | Stop reason: HOSPADM

## 2024-10-28 RX ORDER — ISOSORBIDE MONONITRATE 60 MG/1
60 TABLET, EXTENDED RELEASE ORAL DAILY
Status: DISCONTINUED | OUTPATIENT
Start: 2024-10-28 | End: 2024-10-28

## 2024-10-28 RX ORDER — IPRATROPIUM BROMIDE AND ALBUTEROL SULFATE 2.5; .5 MG/3ML; MG/3ML
3 SOLUTION RESPIRATORY (INHALATION) EVERY 4 HOURS PRN
Status: DISCONTINUED | OUTPATIENT
Start: 2024-10-28 | End: 2024-10-31 | Stop reason: HOSPADM

## 2024-10-28 RX ORDER — SODIUM CHLORIDE 0.9 % (FLUSH) 0.9 %
10 SYRINGE (ML) INJECTION AS NEEDED
Status: DISCONTINUED | OUTPATIENT
Start: 2024-10-28 | End: 2024-10-31 | Stop reason: HOSPADM

## 2024-10-28 RX ADMIN — ATORVASTATIN CALCIUM 10 MG: 20 TABLET, FILM COATED ORAL at 20:40

## 2024-10-28 RX ADMIN — LOSARTAN POTASSIUM 50 MG: 50 TABLET, FILM COATED ORAL at 17:13

## 2024-10-28 RX ADMIN — ASPIRIN 81 MG: 81 TABLET, CHEWABLE ORAL at 17:13

## 2024-10-28 RX ADMIN — IPRATROPIUM BROMIDE AND ALBUTEROL SULFATE 3 ML: .5; 3 SOLUTION RESPIRATORY (INHALATION) at 11:08

## 2024-10-28 RX ADMIN — METOPROLOL SUCCINATE 100 MG: 100 TABLET, EXTENDED RELEASE ORAL at 17:13

## 2024-10-28 RX ADMIN — FUROSEMIDE 80 MG: 10 INJECTION, SOLUTION INTRAMUSCULAR; INTRAVENOUS at 13:29

## 2024-10-28 RX ADMIN — FUROSEMIDE 40 MG: 10 INJECTION, SOLUTION INTRAMUSCULAR; INTRAVENOUS at 20:40

## 2024-10-28 RX ADMIN — NITROGLYCERIN 1 INCH: 20 OINTMENT TOPICAL at 18:37

## 2024-10-28 RX ADMIN — NITROGLYCERIN 1 INCH: 20 OINTMENT TOPICAL at 13:29

## 2024-10-28 RX ADMIN — Medication 10 ML: at 20:40

## 2024-10-28 NOTE — CONSULTS
Internal medicine consult    Referring physician  Dr. GOZNALES    Chief complaint  Chest pain    Reason for consult  Follow medical problems    History of present illness  89-year-old white male with history of paroxysmal atrial fibrillation Parkinson disease CVA coronary artery disease cardiomyopathy hypertension osteoarthritis and compression fracture at T12 L2 sacral fracture admitted to cardiology service with chest pain shortness of breath started this morning.  I am asked to follow patient for medical problem.  Patient is poor historian and very hard on hearing unable to give detailed history and most of the history obtained from the family chart nursing staff and old record.  Patient has no fever chills cough but does have sinus drainage and feels fatigue.    PAST MEDICAL HISTORY   Abnormal ECG      Abnormal MRI      Coronary artery disease      Difficulty walking      Head injury      Hearing aid worn      Hyperlipidemia      Hypertension      Kidney stones      Macrocytosis      Neoplasm of skin      Osteoporosis        PAST SURGICAL HISTORY              Procedure Laterality Date    CARDIAC CATHETERIZATION N/A 05/22/2017     Procedure: Left Heart Cath;  Surgeon: Maninder Quezada MD;  Location: [facility] CATH INVASIVE LOCATION;  Service:     CARDIAC CATHETERIZATION N/A 05/23/2017     Procedure: Stent BMS coronary;  Surgeon: Maninder Quezada MD;  Location: [facility] CATH INVASIVE LOCATION;  Service:     CARDIAC CATHETERIZATION N/A 08/08/2019     Procedure: Left Heart Cath;  Surgeon: Maninder Quezada MD;  Location: [facility] CATH INVASIVE LOCATION;  Service: Cardiology    CARDIAC CATHETERIZATION N/A 08/08/2019     Procedure: Left ventriculography;  Surgeon: Maninder Quezada MD;  Location: [facility] CATH INVASIVE LOCATION;  Service: Cardiology    CARDIAC CATHETERIZATION N/A 08/08/2019     Procedure: Coronary angiography;  Surgeon: Maninder Quezada MD;  Location: [facility] INVASIVE LOCATION;  Service:  "Cardiology    CARDIAC CATHETERIZATION N/A 08/20/2019     Procedure: CORONARY ANGIOGRAPHY;  Surgeon: Maninder Quezada MD;  Location:  [facility] CATH INVASIVE LOCATION;  Service: Cardiovascular    CARDIAC CATHETERIZATION N/A 08/20/2019     Procedure: Stent BMS coronary;  Surgeon: Maninder Quezada MD;  Location:  [facility] CATH INVASIVE LOCATION;  Service: Cardiovascular    CARDIAC ELECTROPHYSIOLOGY PROCEDURE N/A 11/08/2019     Procedure: Loop insertion;  Surgeon: Maninder Quezada MD;  Location:  [facility] CATH INVASIVE LOCATION;  Service: Cardiovascular    HERNIA REPAIR         x 2    SC RT/LT HEART CATHETERS N/A 05/23/2017     Procedure: Percutaneous Coronary Intervention;  Surgeon: Maninder Quezada MD;  Location:  [facility] CATH INVASIVE LOCATION;  Service: Cardiovascular         FAMILY HISTORY           Problem Relation Age of Onset    Arthritis Son      Cancer Sister      Diabetes Son        SOCIAL HISTORY                 Socioeconomic History    Marital status:    Tobacco Use    Smoking status: Never       Passive exposure: Never    Smokeless tobacco: Never   Vaping Use    Vaping status: Never Used   Substance and Sexual Activity    Alcohol use: No       Comment: quit 30 years ago     Drug use: No    Sexual activity: Not Currently       Partners: Male         ALLERGIES  Patient has no known allergies.  Home medications reviewed     REVIEW OF SYSTEMS  All systems reviewed and negative except for those discussed in HPI.     PHYSICAL EXAM   Blood pressure 164/67, pulse 103, temperature 97.3 °F (36.3 °C), temperature source Oral, resp. rate 18, height 188 cm (74.02\"), weight 63.9 kg (140 lb 14 oz), SpO2 98%.    GENERAL: Awake and alert in no distress but very hard on hearing  SKIN: Warm, dry  HENT: Normocephalic, atraumatic  EYES: no scleral icterus  CV: regular rhythm, regular rate  RESPIRATORY: normal effort, moving air bilaterally  ABDOMEN: soft, nontender, nondistended bowel sounds " positive  MUSCULOSKELETAL: Lateral lower extremity trace edema  NEURO: alert, moves all extremities, follows commands     LAB RESULTS  Lab Results (last 24 hours)       Procedure Component Value Units Date/Time    High Sensitivity Troponin T 2Hr [[id number]]  (Abnormal) Collected: 10/28/24 1328    Specimen: Blood from Arm, Left Updated: 10/28/24 1400     HS Troponin T 43 ng/L      Troponin T Delta -5 ng/L     Narrative:      High Sensitive Troponin T Reference Range:  <14.0 ng/L- Negative Female for AMI  <22.0 ng/L- Negative Male for AMI  >=14 - Abnormal Female indicating possible myocardial injury.  >=22 - Abnormal Male indicating possible myocardial injury.   Clinicians would have to utilize clinical acumen, EKG, Troponin, and serial changes to determine if it is an Acute Myocardial Infarction or myocardial injury due to an underlying chronic condition.         [place] Draw [[id number]] Collected: 10/28/24 1206    Specimen: Blood Updated: 10/28/24 1215    Narrative:      The following orders were created for panel order [place] Draw.  Procedure                               Abnormality         Status                     ---------                               -----------         ------                     Gold Top - SST[[id number]]                                   Final result                 Please view results for these tests on the individual orders.    Gold Top - SST [[id number]] Collected: 10/28/24 1206    Specimen: Blood Updated: 10/28/24 1215     Extra Tube Hold for add-ons.     Comment: Auto resulted.       Respiratory Panel PCR w/COVID-19(SARS-CoV-2) [facility] In-House, NP Swab in UTM/VTM, 2 HR TAT - Swab, Nasopharynx [[id number]]  (Normal) Collected: 10/28/24 1059    Specimen: Swab from Nasopharynx Updated: 10/28/24 1206     ADENOVIRUS, PCR Not Detected     Coronavirus 229E Not Detected     Coronavirus HKU1 Not Detected     Coronavirus NL63 Not Detected     Coronavirus OC43 Not Detected      COVID19 Not Detected     Human Metapneumovirus Not Detected     Human Rhinovirus/Enterovirus Not Detected     Influenza A PCR Not Detected     Influenza B PCR Not Detected     Parainfluenza Virus 1 Not Detected     Parainfluenza Virus 2 Not Detected     Parainfluenza Virus 3 Not Detected     Parainfluenza Virus 4 Not Detected     RSV, PCR Not Detected     Bordetella pertussis pcr Not Detected     Bordetella parapertussis PCR Not Detected     Chlamydophila pneumoniae PCR Not Detected     Mycoplasma pneumo by PCR Not Detected    Narrative:      In the setting of a positive respiratory panel with a viral infection PLUS a negative procalcitonin without other underlying concern for bacterial infection, consider observing off antibiotics or discontinuation of antibiotics and continue supportive care. If the respiratory panel is positive for atypical bacterial infection (Bordetella pertussis, Chlamydophila pneumoniae, or Mycoplasma pneumoniae), consider antibiotic de-escalation to target atypical bacterial infection.    BNP [[id number]]  (Abnormal) Collected: 10/28/24 1059    Specimen: Blood Updated: 10/28/24 1144     proBNP 12,636.0 pg/mL     Narrative:      This assay is used as an aid in the diagnosis of individuals suspected of having heart failure. It can be used as an aid in the diagnosis of acute decompensated heart failure (ADHF) in patients presenting with signs and symptoms of ADHF to the emergency department (ED). In addition, NT-proBNP of <300 pg/mL indicates ADHF is not likely.    Age Range Result Interpretation  NT-proBNP Concentration (pg/mL:      <50             Positive            >450                   Gray                 300-450                    Negative             <300    50-75           Positive            >900                  Gray                300-900                  Negative            <300      >75             Positive            >1800                  Gray                300-1800              "     Negative            <300    High Sensitivity Troponin T [[id number]]  (Abnormal) Collected: 10/28/24 1059    Specimen: Blood Updated: 10/28/24 1144     HS Troponin T 48 ng/L     Narrative:      High Sensitive Troponin T Reference Range:  <14.0 ng/L- Negative Female for AMI  <22.0 ng/L- Negative Male for AMI  >=14 - Abnormal Female indicating possible myocardial injury.  >=22 - Abnormal Male indicating possible myocardial injury.   Clinicians would have to utilize clinical acumen, EKG, Troponin, and serial changes to determine if it is an Acute Myocardial Infarction or myocardial injury due to an underlying chronic condition.         Procalcitonin [[id number]]  (Normal) Collected: 10/28/24 1059    Specimen: Blood Updated: 10/28/24 1144     Procalcitonin 0.03 ng/mL     Narrative:      As a Marker for Sepsis (Non-Neonates):    1. <0.5 ng/mL represents a low risk of severe sepsis and/or septic shock.  2. >2 ng/mL represents a high risk of severe sepsis and/or septic shock.    As a Marker for Lower Respiratory Tract Infections that require antibiotic therapy:    PCT on Admission    Antibiotic Therapy       6-12 Hrs later    >0.5                Strongly Recommended  >0.25 - <0.5        Recommended   0.1 - 0.25          Discouraged              Remeasure/reassess PCT  <0.1                Strongly Discouraged     Remeasure/reassess PCT    As 28 day mortality risk marker: \"Change in Procalcitonin Result\" (>80% or <=80%) if Day 0 (or Day 1) and Day 4 values are available. Refer to http://[email or web address]-pct-[email or web address]    Change in PCT <=80%  A decrease of PCT levels below or equal to 80% defines a positive change in PCT test result representing a higher risk for 28-day all-cause mortality of patients diagnosed with severe sepsis for septic shock.    Change in PCT >80%  A decrease of PCT levels of more than 80% defines a negative change in PCT result representing a lower risk for 28-day all-cause mortality of patients " diagnosed with severe sepsis or septic shock.       Manual Differential [[id number]]  (Abnormal) Collected: 10/28/24 1059    Specimen: Blood Updated: 10/28/24 1141     Neutrophil % 76.0 %      Lymphocyte % 18.0 %      Monocyte % 4.0 %      Eosinophil % 2.0 %      Basophil % 0.0 %      Neutrophils Absolute 4.71 10*3/mm3      Lymphocytes Absolute 1.12 10*3/mm3      Monocytes Absolute 0.25 10*3/mm3      Eosinophils Absolute 0.12 10*3/mm3      Basophils Absolute 0.00 10*3/mm3      nRBC 1.0 /100 WBC      Anisocytosis Mod/2+     Elliptocytes Mod/2+     WBC Morphology Normal     Platelet Morphology Normal    CBC & Differential [[id number]]  (Abnormal) Collected: 10/28/24 1059    Specimen: Blood Updated: 10/28/24 1139    Narrative:      The following orders were created for panel order CBC & Differential.  Procedure                               Abnormality         Status                     ---------                               -----------         ------                     CBC Auto Differential[[id number]]        Abnormal            Final result                 Please view results for these tests on the individual orders.    CBC Auto Differential [[id number]]  (Abnormal) Collected: 10/28/24 1059    Specimen: Blood Updated: 10/28/24 1139     WBC 6.20 10*3/mm3      RBC 3.58 10*6/mm3      Hemoglobin 12.0 g/dL      Hematocrit 38.3 %      .0 fL      MCH 33.5 pg      MCHC 31.3 g/dL      RDW 14.5 %      RDW-SD 56.0 fl      MPV 12.4 fL      Platelets 132 10*3/mm3     Comprehensive Metabolic Panel [[id number]]  (Abnormal) Collected: 10/28/24 1059    Specimen: Blood Updated: 10/28/24 1137     Glucose 115 mg/dL      BUN 25 mg/dL      Creatinine 0.80 mg/dL      Sodium 144 mmol/L      Potassium 4.1 mmol/L      Chloride 111 mmol/L      CO2 25.1 mmol/L      Calcium 8.4 mg/dL      Total Protein 5.9 g/dL      Albumin 3.4 g/dL      ALT (SGPT) 15 U/L      AST (SGOT) 20 U/L      Alkaline Phosphatase 60 U/L      Total Bilirubin 1.1 mg/dL       Globulin 2.5 gm/dL      A/G Ratio 1.4 g/dL      BUN/Creatinine Ratio 31.3     Anion Gap 7.9 mmol/L      eGFR 81.0 mL/min/1.73     Narrative:      GFR Normal >60  Chronic Kidney Disease <60  Kidney Failure <15    The GFR formula is only valid for adults with stable renal function between ages 18 and 70.    Magnesium [[id number]]  (Normal) Collected: 10/28/24 1059    Specimen: Blood Updated: 10/28/24 1137     Magnesium 2.0 mg/dL     Phosphorus [[id number]]  (Normal) Collected: 10/28/24 1059    Specimen: Blood Updated: 10/28/24 1137     Phosphorus 2.9 mg/dL     Protime-INR [[id number]]  (Normal) Collected: 10/28/24 1059    Specimen: Blood Updated: 10/28/24 1133     Protime 14.2 Seconds      INR 1.07    aPTT [[id number]]  (Normal) Collected: 10/28/24 1059    Specimen: Blood Updated: 10/28/24 1133     PTT 27.0 seconds     Lactic Acid, Plasma [[id number]]  (Normal) Collected: 10/28/24 1059    Specimen: Blood Updated: 10/28/24 1133     Lactate 1.9 mmol/L     Blood Gas, Venous - [[id number]] Collected: 10/28/24 1107    Specimen: Venous Blood Updated: 10/28/24 1119     Site --     Comment: Corrected result. Previous result was Right Radial on 10/28/2024 at 1111 EDT.        pH, Venous --     Comment: Arterial sample; results resent as ABG  10/28/2024  11:18 EDT  Maddie Padilla, RRT  Corrected result. Previous result was 7.450 pH Units on 10/28/2024 at 1111 EDT.        pCO2, Venous --     Comment: Arterial sample; results resent as ABG  10/28/2024  11:18 EDT  Maddie Padilla, RRT    Corrected result. Previous result was 34.7 mm Hg on 10/28/2024 at 1111 EDT.        pO2, Venous --     Comment: Arterial sample; results resent as ABG  10/28/2024  11:18 EDT  Maddie Padilla, RRT    Corrected result. Previous result was 67.7 mm Hg on 10/28/2024 at 1111 EDT.        HCO3, Venous --     Comment: Arterial sample; results resent as ABG  10/28/2024  11:18 EDT  Maddie Padilla, RRT    Corrected result. Previous result was 24.1 mmol/L on  10/28/2024 at 1111 EDT.        Base Excess, Venous --     Comment: Arterial sample; results resent as ABG  10/28/2024  11:18 EDT  Maddie Padilla, TOM    Corrected result. Previous result was 0.5 mmol/L on 10/28/2024 at 1111 EDT.        O2 Saturation, Venous --     Comment: Corrected result. Previous result was 94.2 % on 10/28/2024 at 1111 EDT.        CO2 Content --     Comment: Corrected result. Previous result was 25.1 mmol/L on 10/28/2024 at [facility] EDT.        Barometric Pressure for Blood Gas --     Comment: Arterial sample; results resent as ABG  10/28/2024  11:18 EDT  Maddie Padilla RRT    Corrected result. Previous result was 755.5000 mmHg on 10/28/2024 at 1111 EDT.        Modality --     Comment: Arterial sample; results resent as ABG  10/28/2024  11:18 EDT  Maddie Padilla, TOM    Corrected result. Previous result was Cannula on 10/28/2024 at [facility] EDT.        Flow Rate --     Comment: Corrected result. Previous result was 3.0000 lpm on 10/28/2024 at [facility] EDT.        Rate --     Comment: Corrected result. Previous result was 18 Breaths/minute on 10/28/2024 at [facility] EDT.        Device Comment --     Comment: Corrected result. Previous result was sats 96% on 10/28/2024 at [facility] EDT.       Blood Gas, Arterial - [[id number]]  (Abnormal) Collected: 10/28/24 1107    Specimen: Arterial Blood Updated: 10/28/24 1118     Site Right Radial     Lalit's Test Positive     pH, Arterial 7.450 pH units      pCO2, Arterial 34.7 mm Hg      pO2, Arterial 67.7 mm Hg      HCO3, Arterial 24.1 mmol/L      Base Excess, Arterial 0.5 mmol/L      Comment: Serial Number: [id number]:  [id number]        O2 Saturation, Arterial 94.2 %      CO2 Content 25.1 mmol/L      Barometric Pressure for Blood Gas 755.5000 mmHg      Modality Cannula     Flow Rate 3.0000 lpm      Rate 18 Breaths/minute      Hemodilution No     Device Comment sats 96%          Imaging Results (Last 24 Hours)       Procedure Component Value Units Date/Time    XR Chest 1 View  [[id number]] Collected: 10/28/24 1148     Updated: 10/28/24 1153    Narrative:      XR CHEST 1 VW-     HISTORY: Male who is 89 years-old, chest pain     TECHNIQUE: Frontal view of the chest     COMPARISON: 6/12/2024     FINDINGS: The heart is enlarged. Cardiac loop recorder is seen.  Pulmonary vasculature is congested. Bilateral pulmonary opacities have  increased, and may reflect edema an/or pneumonia with mild bilateral  pleural effusions. No pneumothorax. Skinfolds are noted. Otherwise  stable.       Impression:      Interval increase of bilateral pulmonary opacities.     This report was finalized on 10/28/2024 11:50 AM by Dr. Oscar Emery M.D on Workstation: [facility]             Scan on 10/28/2024 1043 by [place]: ECG 12-LEAD         Author: -- Service: -- Author Type: --   Filed: Date of Service: Creation Time:   Status: (Other)   HEART RATE=66  bpm  RR Pooakpxs=477  ms  WA Interval=  ms  P Horizontal Axis=  deg  P Front Axis=  deg  QRSD Gxbzavvm=824  ms  QT Qzqvpqax=978  ms  KLfF=882  ms  QRS Axis=-52  deg  T Wave Axis=120  deg  - ABNORMAL ECG -  Atrial fibrillation- new  Ventricular premature complex  Intraventricular conduction delay          Current Facility-Administered Medications:     acetaminophen (TYLENOL) tablet 500 mg, 500 mg, Oral, 4x Daily PRN, Zoila Diaz APRN    amLODIPine (NORVASC) tablet 5 mg, 5 mg, Oral, Q24H, Zoila Diaz APRN    aspirin chewable tablet 81 mg, 81 mg, Oral, Daily, Zoila Diaz APRN    atorvastatin (LIPITOR) tablet 20 mg, 20 mg, Oral, Nightly, Zoila Diaz APRN    furosemide (LASIX) injection 40 mg, 40 mg, Intravenous, Q12H, Zoila Diaz APRN    losartan (COZAAR) tablet 50 mg, 50 mg, Oral, Daily, Zoila Diaz APRN    metoprolol succinate XL (TOPROL-XL) 24 hr tablet 100 mg, 100 mg, Oral, Daily, Zoila Diaz APRN    nitroglycerin (NITROSTAT) ointment 1 inch, 1 inch, Topical, TID - Nitrates, Zoila Diaz APRN    [COMPLETED]  Insert Peripheral IV, , , Once **AND** sodium chloride 0.9 % flush 10 mL, 10 mL, Intravenous, PRN, Johanna Peoples MD        ASSESSMENT  Acute on chronic systolic and diastolic congestive heart failure  Coronary artery disease  Paroxysmal atrial fibrillation  Hypertension  Hyperlipidemia  Severe hearing loss  Multiple spine compression fractures    PLAN  Agree with current care  IV diuresis  Aspirin nitroglycerin Toprol-XL Cozaar Norvasc and Lipitor  No anticoagulation per cardiology  Serial cardiac enzymes and EKG  Strict I's and O's and daily weight  Check 2D echo  Adjust home medications  Stress ulcer DVT prophylaxis  Supportive care  Discussed with nursing staff  Follow closely further recommendation current hospital course    FELIZ WHITE MD

## 2024-10-28 NOTE — ED PROVIDER NOTES
EMERGENCY DEPARTMENT ENCOUNTER    Room Number:  28/28  PCP: Phyllis Loving PA  Independent Historians: Patient    HPI:  Chief Complaint: had concerns including Chest Pain.      A complete HPI/ROS/PMH/PSH/SH/FH are unobtainable due to: None    Chronic or social conditions impacting patient care (Social Determinants of Health): None      Context: Dinesh Churchill Sr. is a 89 y.o. male with a medical history of Parkinson's, coronary artery disease, cardiomyopathy, paroxysmal A-fib, hypertension who presents to the ED c/o acute chest discomfort with shortness of breath that started around 4 AM.  Patient yelled for his son around 8 AM and his son gave him 2 sublingual nitro and called EMS.  Patient with no chest pain currently.  Patient does endorse shortness of breath currently.  Patient denies fever or new cough.  He has had some sinus drainage.  He felt fatigued yesterday and ate very little compared to his normal p.o. intake.        Review of prior external notes (non-ED) -and- Review of prior external test results outside of this encounter:   Pt sees Dr. Quezada with last office note from Dec 2023    I reviewed discharge summary from patient's last admission which was from June of this year.  Patient admitted after a fall with back pain new T12 and L2 compression fractures.  Patient evaluated by neurosurgery and had TLSO brace.  Patient discharged to skilled nursing facility.    Patient's last echocardiogram from December 2023:    Left ventricular ejection fraction appears to be 41 - 45%.    The left ventricular cavity is dilated.    Left ventricular diastolic function was normal.    The left atrial cavity is moderately dilated.    Left atrial volume is moderately increased.    Estimated right ventricular systolic pressure from tricuspid regurgitation is moderately elevated (45-55 mmHg).    Mild dilation of the aortic root is present. Mild dilation of the sinuses of Valsalva is present.    Prescription drug  monitoring program review:         PAST MEDICAL HISTORY  Active Ambulatory Problems     Diagnosis Date Noted    Abnormal magnetic resonance imaging study 03/18/2016    Arthritis 03/18/2016    Osteoarthritis of cervical spine 03/18/2016    Diplopia 03/18/2016    Hearing loss 03/18/2016    Neoplasm of uncertain behavior of skin 03/18/2016    Prediabetes 03/18/2016    Vitamin D deficiency 03/18/2016    Chronic fatigue 10/27/2016    History of supraventricular tachycardia 04/07/2017    Essential hypertension 04/07/2017    B12 deficiency 04/19/2017    Abnormal cardiac function test 05/08/2017    Cardiomyopathy 05/08/2017    Coronary artery disease involving native coronary artery of native heart without angina pectoris 05/31/2017    History of coronary artery stent placement 05/31/2017    History of renal calculi 05/31/2017    Dyslipidemia 05/31/2017    Macrocytic anemia 08/09/2017    Precordial pain 08/16/2017    Parkinson's disease 10/25/2017    History of CVA (cerebrovascular accident) 10/25/2017    Paroxysmal atrial fibrillation 11/29/2017    Otalgia, left 12/29/2017    Chest pain 08/06/2019    Abnormal stress test 08/06/2019    Spontaneous pneumothorax 08/18/2019    Chest pain 08/18/2019    Acute systolic (congestive) heart failure 08/19/2019    Thrombocytopenia 08/20/2019    Pneumonia of left lower lobe due to infectious organism 09/06/2019    Syncope and collapse 11/06/2019    Status post placement of implantable loop recorder 11/22/2019    Long term (current) use of antithrombotics/antiplatelets 03/13/2020    Stuttering 04/09/2021    Fall 01/19/2022    Closed fracture of multiple ribs of right side 01/19/2022    Chest wall pain 01/19/2022    Closed fracture of phalanx of toe of left foot 01/19/2022    Headache 01/21/2023    Sudden onset of severe headache 01/21/2023    Fall in home, initial encounter 01/27/2023    Compression fracture of L2 vertebra with routine healing 01/28/2023    Weakness of both legs  05/08/2023    Idiopathic peripheral neuropathy 08/29/2023    L2 vertebral fracture 06/12/2024    Compression fracture of T12 vertebra with routine healing 06/12/2024    Sacral fracture, closed 06/12/2024    Senile osteoporosis 09/03/2024     Resolved Ambulatory Problems     Diagnosis Date Noted    Maxillary sinusitis 03/18/2016    Dizziness 10/27/2016    Acute frontal sinusitis 10/27/2016    Precordial pain 04/07/2017    SOB (shortness of breath) 04/07/2017    Acute rhinitis 12/29/2017     Past Medical History:   Diagnosis Date    Abnormal ECG     Abnormal MRI     Coronary artery disease     Difficulty walking     Head injury     Hearing aid worn     Hyperlipidemia     Hypertension     Kidney stones     Macrocytosis     Neoplasm of skin     Osteoporosis          PAST SURGICAL HISTORY  Past Surgical History:   Procedure Laterality Date    CARDIAC CATHETERIZATION N/A 05/22/2017    Procedure: Left Heart Cath;  Surgeon: Maninder Quezada MD;  Location: [facility] C[facility]AS[facility] LOC[facility];  Service:     CARDIAC CATHETERIZATION N/A 05/23/2017    Procedure: Stent BMS coronary;  Surgeon: Maninder Quezada MD;  Location: [facility] C[facility] LOC[facility];  Service:     CARDIAC CATHETERIZATION N/A 08/08/2019    Procedure: Left Heart Cath;  Surgeon: Maninder Quezada MD;  Location:  [facility] CATH INVASIVE LOCATION;  Service: Cardiology    CARDIAC CATHETERIZATION N/A 08/08/2019    Procedure: Left ventriculography;  Surgeon: Maninder Quezada MD;  [facility];  Service: Cardiology    CARDIAC CATHETERIZATION N/A 08/08/2019    Procedure: Coronary angiography;  Surgeon: Maninder Quezada MD;  Location: [facility];  Service: Cardiology    CARDIAC CATHETERIZATION N/A 08/20/2019    Procedure: CORONARY ANGIOGRAPHY;  Surgeon: Maninder Quezada MD;  Location: [facility] C[facility];  Service: Cardiovascular    CARDIAC CATHETERIZATION N/A 08/20/2019    Procedure: Stent  BMS coronary;  Surgeon: Maninder Quezada MD;  Location:  [facility] CATH INVASIVE LOCATION;  Service: Cardiovascular    CARDIAC ELECTROPHYSIOLOGY PROCEDURE N/A 11/08/2019    Procedure: Loop insertion;  Surgeon: Maninder Quezada MD;  Location:  [facility] CATH INVASIVE LOCATION;  Service: Cardiovascular    HERNIA REPAIR      x 2    DC RT/LT HEART CATHETERS N/A 05/23/2017    Procedure: Percutaneous Coronary Intervention;  Surgeon: Maninder Quezada MD;  Location:  [facility] CATH INVASIVE LOCATION;  Service: Cardiovascular         FAMILY HISTORY  Family History   Problem Relation Age of Onset    Arthritis Son     Cancer Sister     Diabetes Son          SOCIAL HISTORY  Social History     Socioeconomic History    Marital status:    Tobacco Use    Smoking status: Never     Passive exposure: Never    Smokeless tobacco: Never   Vaping Use    Vaping status: Never Used   Substance and Sexual Activity    Alcohol use: No     Comment: quit 30 years ago     Drug use: No    Sexual activity: Not Currently     Partners: Male         ALLERGIES  Patient has no known allergies.        REVIEW OF SYSTEMS  Review of Systems  Included in HPI  All systems reviewed and negative except for those discussed in HPI.      PHYSICAL EXAM    I have reviewed the triage vital signs and nursing notes.    ED Triage Vitals [10/28/24 1022]   Temp Heart Rate Resp BP SpO2   97.9 °F (36.6 °C) 64 16 115/60 94 %      Temp src Heart Rate Source Patient Position BP Location FiO2 (%)   -- -- -- -- --       Physical Exam  GENERAL: Cooperative and conversant male with audible wheezing, alert, no acute distress  SKIN: Warm, dry  HENT: Normocephalic, atraumatic  EYES: no scleral icterus  CV: regular rhythm, regular rate  RESPIRATORY: normal effort, Rales bilaterally  ABDOMEN: soft, nontender, nondistended  MUSCULOSKELETAL: Lateral lower extremity trace edema  NEURO: alert, moves all extremities, follows commands                                                                    LAB RESULTS  Recent Results (from the past 24 hours)   ECG 12 Lead Chest Pain    Collection Time: 10/28/24 10:42 AM   Result Value Ref Range    QT Interval 460 ms    QTC Interval 482 ms   Respiratory Panel PCR w/COVID-19(SARS-CoV-2) [facility]-House, NP Swab in UTM/VTM, 2 HR TAT - Swab, Nasopharynx    Collection Time: 10/28/24 10:59 AM    Specimen: Nasopharynx; Swab   Result Value Ref Range    ADENOVIRUS, PCR Not Detected Not Detected    Coronavirus 229E Not Detected Not Detected    Coronavirus HKU1 Not Detected Not Detected    Coronavirus NL63 Not Detected Not Detected    Coronavirus OC43 Not Detected Not Detected    COVID19 Not Detected Not Detected - Ref. Range    Human Metapneumovirus Not Detected Not Detected    Human Rhinovirus/Enterovirus Not Detected Not Detected    Influenza A PCR Not Detected Not Detected    Influenza B PCR Not Detected Not Detected    Parainfluenza Virus 1 Not Detected Not Detected    Parainfluenza Virus 2 Not Detected Not Detected    Parainfluenza Virus 3 Not Detected Not Detected    Parainfluenza Virus 4 Not Detected Not Detected    RSV, PCR Not Detected Not Detected    Bordetella pertussis pcr Not Detected Not Detected    Bordetella parapertussis PCR Not Detected Not Detected    Chlamydophila pneumoniae PCR Not Detected Not Detected    Mycoplasma pneumo by PCR Not Detected Not Detected   Comprehensive Metabolic Panel    Collection Time: 10/28/24 10:59 AM    Specimen: Blood   Result Value Ref Range    Glucose 115 (H) 65 - 99 mg/dL    BUN 25 (H) 8 - 23 mg/dL    Creatinine 0.80 0.76 - 1.27 mg/dL    Sodium 144 136 - 145 mmol/L    Potassium 4.1 3.5 - 5.2 mmol/L    Chloride 111 (H) 98 - 107 mmol/L    CO2 25.1 22.0 - 29.0 mmol/L    Calcium 8.4 8.2 - 9.6 mg/dL    Total Protein 5.9 (L) 6.0 - 8.5 g/dL    Albumin 3.4 (L) 3.5 - 5.2 g/dL    ALT (SGPT) 15 1 - 41 U/L    AST (SGOT) 20 1 - 40 U/L    Alkaline Phosphatase 60 39 - 117 U/L    Total Bilirubin 1.1 0.0 -  1.2 mg/dL    Globulin 2.5 gm/dL    A/G Ratio 1.4 g/dL    BUN/Creatinine Ratio 31.3 (H) 7.0 - 25.0    Anion Gap 7.9 5.0 - 15.0 mmol/L    eGFR 81.0 >60.0 mL/min/1.73   Protime-INR    Collection Time: 10/28/24 10:59 AM    Specimen: Blood   Result Value Ref Range    Protime 14.2 11.7 - 14.2 Seconds    INR 1.07 0.90 - 1.10   aPTT    Collection Time: 10/28/24 10:59 AM    Specimen: Blood   Result Value Ref Range    PTT 27.0 22.7 - 35.4 seconds   BNP    Collection Time: 10/28/24 10:59 AM    Specimen: Blood   Result Value Ref Range    proBNP 12,636.0 (H) 0.0 - 1,800.0 pg/mL   High Sensitivity Troponin T    Collection Time: 10/28/24 10:59 AM    Specimen: Blood   Result Value Ref Range    HS Troponin T 48 (H) <22 ng/L   Lactic Acid, Plasma    Collection Time: 10/28/24 10:59 AM    Specimen: Blood   Result Value Ref Range    Lactate 1.9 0.5 - 2.0 mmol/L   Procalcitonin    Collection Time: 10/28/24 10:59 AM    Specimen: Blood   Result Value Ref Range    Procalcitonin 0.03 0.00 - 0.25 ng/mL   Magnesium    Collection Time: 10/28/24 10:59 AM    Specimen: Blood   Result Value Ref Range    Magnesium 2.0 1.7 - 2.3 mg/dL   Phosphorus    Collection Time: 10/28/24 10:59 AM    Specimen: Blood   Result Value Ref Range    Phosphorus 2.9 2.5 - 4.5 mg/dL   CBC Auto Differential    Collection Time: 10/28/24 10:59 AM    Specimen: Blood   Result Value Ref Range    WBC 6.20 3.40 - 10.80 10*3/mm3    RBC 3.58 (L) 4.14 - 5.80 10*6/mm3    Hemoglobin 12.0 (L) 13.0 - 17.7 g/dL    Hematocrit 38.3 37.5 - 51.0 %    .0 (H) 79.0 - 97.0 fL    MCH 33.5 (H) 26.6 - 33.0 pg    MCHC 31.3 (L) 31.5 - 35.7 g/dL    RDW 14.5 12.3 - 15.4 %    RDW-SD 56.0 (H) 37.0 - 54.0 fl    MPV 12.4 (H) 6.0 - 12.0 fL    Platelets 132 (L) 140 - 450 10*3/mm3   Manual Differential    Collection Time: 10/28/24 10:59 AM    Specimen: Blood   Result Value Ref Range    Neutrophil % 76.0 42.7 - 76.0 %    Lymphocyte % 18.0 (L) 19.6 - 45.3 %    Monocyte % 4.0 (L) 5.0 - 12.0 %     Eosinophil % 2.0 0.3 - 6.2 %    Basophil % 0.0 0.0 - 1.5 %    Neutrophils Absolute 4.71 1.70 - 7.00 10*3/mm3    Lymphocytes Absolute 1.12 0.70 - 3.10 10*3/mm3    Monocytes Absolute 0.25 0.10 - 0.90 10*3/mm3    Eosinophils Absolute 0.12 0.00 - 0.40 10*3/mm3    Basophils Absolute 0.00 0.00 - 0.20 10*3/mm3    nRBC 1.0 (H) 0.0 - 0.2 /100 WBC    Anisocytosis Mod/2+ None Seen    Elliptocytes Mod/2+ None Seen    WBC Morphology Normal Normal    Platelet Morphology Normal Normal   Blood Gas, Arterial -    Collection Time: 10/28/24 11:07 AM    Specimen: Arterial Blood   Result Value Ref Range    Site Right Radial     Lalit's Test Positive     pH, Arterial 7.450 7.350 - 7.450 pH units    pCO2, Arterial 34.7 (L) 35.0 - 45.0 mm Hg    pO2, Arterial 67.7 (L) 80.0 - 100.0 mm Hg    HCO3, Arterial 24.1 22.0 - 28.0 mmol/L    Base Excess, Arterial 0.5 0.0 - 2.0 mmol/L    O2 Saturation, Arterial 94.2 92.0 - 98.5 %    CO2 Content 25.1 23 - 27 mmol/L    Barometric Pressure for Blood Gas 755.5000 mmHg    Modality Cannula     Flow Rate 3.0000 lpm    Rate 18 Breaths/minute    Hemodilution No     Device Comment sats 96%    Blood Gas, Venous -    Collection Time: 10/28/24 11:07 AM    Specimen: Venous Blood   Result Value Ref Range    Site      pH, Venous      pCO2, Venous      pO2, Venous      HCO3, Venous      Base Excess, Venous      O2 Saturation, Venous      CO2 Content      Barometric Pressure for Blood Gas      Modality      Flow Rate      Rate      Device Comment     Gold Top - SST    Collection Time: 10/28/24 12:06 PM   Result Value Ref Range    Extra Tube Hold for add-ons.          RADIOLOGY  XR Chest 1 View    Result Date: 10/28/2024  XR CHEST 1 VW-  HISTORY: Male who is 89 years-old, chest pain  TECHNIQUE: Frontal view of the chest  COMPARISON: 6/12/2024  FINDINGS: The heart is enlarged. Cardiac loop recorder is seen. Pulmonary vasculature is congested. Bilateral pulmonary opacities have increased, and may reflect edema an/or  pneumonia with mild bilateral pleural effusions. No pneumothorax. Skinfolds are noted. Otherwise stable.      Interval increase of bilateral pulmonary opacities.  This report was finalized on 10/28/2024 11:50 AM by Dr. Oscar Emery M.D on Workstation: [id number]         MEDICATIONS GIVEN IN ER  Medications   sodium chloride 0.9 % flush 10 mL (has no administration in time range)   furosemide (LASIX) injection 80 mg (has no administration in time range)   nitroglycerin (NITROSTAT) ointment 1 inch (has no administration in time range)   ipratropium-albuterol (DUO-NEB) nebulizer solution 3 mL (3 mL Nebulization Given 10/28/24 1108)         ORDERS PLACED DURING THIS VISIT:  Orders Placed This Encounter   Procedures    Respiratory Panel PCR w/COVID-19(SARS-CoV-2) [facility], NP Swab in UTM/VTM, 2 HR TAT - Swab, Nasopharynx    XR Chest 1 View    Comprehensive Metabolic Panel    Protime-INR    aPTT    BNP    High Sensitivity Troponin T    Lactic Acid, Plasma    Procalcitonin    Blood Gas, Arterial -    Magnesium    Phosphorus    CBC Auto Differential    Blood Gas, Venous -    Manual Differential    [place] Draw    High Sensitivity Troponin T 2Hr    LCG (on-call MD unless specified)    ECG 12 Lead Chest Pain    Insert Peripheral IV    Inpatient Admission    CBC & Differential    Gold Top - [facility]         OUTPATIENT MEDICATION MANAGEMENT:  Current Facility-Administered Medications Ordered in Epic   Medication Dose Route Frequency Provider Last Rate Last Admin    furosemide (LASIX) injection 80 mg  80 mg Intravenous Once Johanna Peoples MD        nitroglycerin (NITROSTAT) ointment 1 inch  1 inch Topical Once Johanna Peoples MD        sodium chloride 0.9 % flush 10 mL  10 mL Intravenous PRN Johanna Peoples MD         Current Outpatient Medications Ordered in Epic   Medication Sig Dispense Refill    acetaminophen (TYLENOL) 500 MG tablet Take 1 tablet by mouth 4 (Four) Times a Day.       amLODIPine (NORVASC) 5 MG tablet TAKE 1 TABLET BY MOUTH DAILY 90 tablet 3    aspirin 81 MG chewable tablet Chew 1 tablet Daily.      atorvastatin (LIPITOR) 20 MG tablet TAKE 1 TABLET BY MOUTH EVERY NIGHT 90 tablet 3    Cholecalciferol (VITAMIN D-3) 1000 UNITS capsule Take 1,000 Units by mouth Daily.      Cyanocobalamin (B-12) 1000 MCG lozenge PLACE 1 LOZENGE UNDER THE TOUNGE EVERY OTHER DAY (Patient taking differently: Place 1 tablet under the tongue Every Other Day.) 45 lozenge 1    isosorbide mononitrate (IMDUR) 60 MG 24 hr tablet TAKE 1 TABLET BY MOUTH DAILY 90 tablet 3    losartan (COZAAR) 50 MG tablet TAKE 1 TABLET BY MOUTH DAILY 90 tablet 3    metoprolol succinate XL (TOPROL-XL) 100 MG 24 hr tablet TAKE 1 TABLET BY MOUTH DAILY 90 tablet 3         PROCEDURES  Procedures      Critical care provider statement:    Critical care time (minutes): 44.   Critical care time was exclusive of:  Separately billable procedures and treating other patients   Critical care was necessary to treat or prevent imminent or life-threatening deterioration of the following conditions:  Cardiac Failure, Circulatory Failure, and Respiratory Failure   Critical care was time spent personally by me on the following activities:  Development of treatment plan with patient or surrogate, discussions with consultants, evaluation of patient's response to treatment, examination of patient, obtaining history from patient or surrogate, ordering and performing treatments and interventions, ordering and review of laboratory studies, ordering and review of radiographic studies, pulse oximetry, re-evaluation of patient's condition and review of old charts. Critical Care indicators: Hypoxia / hypoxemia       PROGRESS, DATA ANALYSIS, CONSULTS, AND MEDICAL DECISION MAKING  All labs have been independently interpreted by me.  All radiology studies have been reviewed by me. All EKG's have been independently viewed and interpreted by me.  Discussion below  represents my analysis of pertinent findings related to patient's condition, differential diagnosis, treatment plan and final disposition.    Differential diagnosis includes but is not limited to   This patient presents with chest pain with a history suggestive of possible ACS. No evidence of shock on exam but patient does appear fluid overloaded. EKG without signs of active ischemia. EKG without evidence of STEMI. I have a low suspicion for acute PE (Wells/PERC score), pneumothorax, thoracic aortic dissection, pericardial effusion, pneumonia/infection, etc. Overall, ACS is being considered given higher risk features on this patient's history and physical exam and consideration of HEART score.  This patient will require admission for diuresis and possible ischemic eval.  Plan for cardiac monitoring, serum labs including troponin, chest xray, aspirin, and cardiology consult and likely admission. .          ED Course as of 10/28/24 1313   Mon Oct 28, 2024   1126 EKG ER MD interpretation   Time: 10: 42  Rhythm and rate: Atrial flutter at a rate of 66 with apparent 4-1 AV block  Axis: Leftward  QRS complexes: Left bundle branch block  ST segments: no elevation  Comparison EKG is from June 12, 2024 and had similar findings [AR]   1146 I viewed patient's chest x-ray and on my interpretation patient has pulmonary edema with cardiomegaly [AR]   1307 I discussed patient's case with Dr. Quezada who is amenable to admitting the patient for further care.  Plan for Nitropaste in addition to the Lasix ordered already. [AR]      ED Course User Index  [AR] Johanna Peoples MD             AS OF 13:13 EDT VITALS:    BP - 150/58  HR - 64  TEMP - 97.9 °F (36.6 °C)  O2 SATS - 95%      COMPLEXITY OF CARE  The patient requires admission.    DIAGNOSIS  Final diagnoses:   Acute hypoxic respiratory failure   Acute on chronic congestive heart failure, unspecified heart failure type   Chest pain, unspecified type   Acute pulmonary  edema         DISPOSITION  ED Disposition       ED Disposition   Decision to Admit    Condition   --    Comment   Level of Care: Telemetry [5]   Diagnosis: Acute hypoxic respiratory failure [2900469]   Admitting Physician: ASHISH COPELAND [[id number]]   Attending Physician: ASHISH COPELAND [[id number]]   Isolate for COVID?: No [0]   Certification: I Certify That Inpatient Hospital Services Are Medically Necessary For Greater Than 2 Midnights                  Please note that portions of this document were completed with a voice recognition program.    Note Disclaimer: At [facility], we believe that sharing information builds trust and better relationships. You are receiving this note because you recently visited [facility]. It is possible you will see health information before a provider has talked with you about it. This kind of information can be easy to misunderstand. To help you fully understand what it means for your health, we urge you to discuss this note with your provider.         Johanna Peoples MD  10/28/24 3974

## 2024-10-28 NOTE — PROGRESS NOTES
Clinical Pharmacy Services: Medication History    Dinesh Churchill Sr. is a 89 y.o. male presenting to [facility] for   Chief Complaint   Patient presents with    Chest Pain       He  has a past medical history of Abnormal ECG, Abnormal MRI, Acute frontal sinusitis, Arthritis, Chronic fatigue, Coronary artery disease, Difficulty walking, Dizziness, Head injury, Headache (01/21/2023), Hearing aid worn, Hearing loss, Hyperlipidemia, Hypertension, Idiopathic peripheral neuropathy (08/29/2023), Kidney stones, Macrocytosis, Neoplasm of skin, Osteoporosis, Prediabetes, and Vitamin D deficiency.    Allergies as of 10/28/2024    (No Known Allergies)       Medication information was obtained from: Son   Pharmacy and Phone Number:     Prior to Admission Medications       Prescriptions Last Dose Informant Patient Reported? Taking?    acetaminophen (TYLENOL) 500 MG tablet  Child No Yes    Take 1 tablet by mouth 4 (Four) Times a Day.    amLODIPine (NORVASC) 5 MG tablet  Child No Yes    TAKE 1 TABLET BY MOUTH DAILY    aspirin 81 MG chewable tablet  Child No Yes    Chew 1 tablet Daily.    atorvastatin (LIPITOR) 20 MG tablet  Child No Yes    TAKE 1 TABLET BY MOUTH EVERY NIGHT    Cholecalciferol (VITAMIN D-3) 1000 UNITS capsule  Child Yes Yes    Take 1,000 Units by mouth Daily.    Cyanocobalamin (B-12) 1000 MCG lozenge  Child No Yes    PLACE 1 LOZENGE UNDER THE TOUNGE EVERY OTHER DAY    Patient taking differently:  Place 1 tablet under the tongue Every Other Day.    isosorbide mononitrate (IMDUR) 60 MG 24 hr tablet  Child No Yes    TAKE 1 TABLET BY MOUTH DAILY    losartan (COZAAR) 50 MG tablet  Child No Yes    TAKE 1 TABLET BY MOUTH DAILY    metoprolol succinate XL (TOPROL-XL) 100 MG 24 hr tablet  Child No Yes    TAKE 1 TABLET BY MOUTH DAILY              Medication notes: Verified medications with pt son at bedside.     This medication list is complete to the best of my knowledge as of 10/28/2024    Please call if  questions.    Bhavin Beck  Medication History Technician   [phone]    10/28/2024 12:55 EDT

## 2024-10-28 NOTE — H&P
[facility]  History & Physical Note                                                                                    Patient Identification:  Dinesh Churchill Sr.:   89 y.o.  male  5/29/1928  [id number]            Chief Complaint   Patient presents with    Chest Pain       Date of Admission: 10/28/24    Admitting Physician: Dr Quezada    Reason for Admission:Acute pulmonary edema [J81.0]  Chest pain, unspecified type [R07.9]  Acute on chronic congestive heart failure, unspecified heart failure type [I50.9]  Acute hypoxic respiratory failure [J96.01],   Chief Complaint   Patient presents with    Chest Pain       History of Present Illness:     This is a 89 year old male who is well known to our service with with hypertension, hyperlipidemia, coronary artery disease. He presented to [facility] ER with shortness of breath. Work up in ER with chest xr withpulmonary edema, acute CHF. HS troponin 48, repeat 43. BNP 97560. He was treated with IV lasix, nitro paste and admitted for further management.     Echo 12/21/2023 EF 41 to 45%, LV C dilated, normal LV diastolic function.  Left atrial cavity moderately dilated, left atrial volume moderately increased.  Moderately elevated RVSP.      Stress test 2019 myocardial perfusion which indicates a medium sized mildly severe ischemia located in the inferior wall lateral wall.      Cardiac catheterization 8/8/2019 normal left main, LAD midportion 50% stenosis, first large diagonal branch proximally diffuse 90% stenosis.  Circumflex nondominant midportion 50% stenosis, RCA dominant with proximal stent widely patent.  Successful angioplasty and stent to the subtotal diagonal reduced to 0% with vision stent    Cardiac Risk Factors:    Past Medical History:  Past Medical History:   Diagnosis Date    Abnormal ECG     Abnormal MRI     Acute frontal sinusitis     Arthritis     Chronic fatigue     Coronary artery disease     Difficulty walking     Dizziness     Head  injury     Headache 01/21/2023    Hearing aid worn     left    Hearing loss     Hyperlipidemia     Hypertension     Idiopathic peripheral neuropathy 08/29/2023    Kidney stones     Macrocytosis     Neoplasm of skin     Osteoporosis     Prediabetes     Vitamin D deficiency     at least 3 stable    Past Surgical History:  Past Surgical History:   Procedure Laterality Date    CARDIAC CATHETERIZATION N/A 05/22/2017    Procedure: Left Heart Cath;  Surgeon: Maninder Quezada MD;  Location: [facility] CATH INVASIVE LOCATION;  Service:     CARDIAC CATHETERIZATION N/A 05/23/2017    Procedure: Stent BMS coronary;  Surgeon: Maninder Quezada MD;  Location: [facility] CATH INVASIVE LOCATION;  Service:     CARDIAC CATHETERIZATION N/A 08/08/2019    Procedure: Left Heart Cath;  Surgeon: Maninder Quezada MD;  Location: [facility] CATH INVASIVE LOCATION;  Service: Cardiology    CARDIAC CATHETERIZATION N/A 08/08/2019    Procedure: Left ventriculography;  Surgeon: Maninder Quezada MD;  Location: [facility] CATH INVASIVE LOCATION;  Service: Cardiology    CARDIAC CATHETERIZATION N/A 08/08/2019    Procedure: Coronary angiography;  Surgeon: Maninder Quezada MD;  Location: [facility] CATH INVASIVE LOCATION;  Service: Cardiology    CARDIAC CATHETERIZATION N/A 08/20/2019    Procedure: CORONARY ANGIOGRAPHY;  Surgeon: Maninder Quezada MD;  Location: [facility] CATH INVASIVE LOCATION;  Service: Cardiovascular    CARDIAC CATHETERIZATION N/A 08/20/2019    Procedure: Stent BMS coronary;  Surgeon: Maninder Quezada MD;  Location: [facility] CATH INVASIVE LOCATION;  Service: Cardiovascular    CARDIAC ELECTROPHYSIOLOGY PROCEDURE N/A 11/08/2019    Procedure: Loop insertion;  Surgeon: Maninder Quezada MD;  Location: [facility] INVASIVE LOCATION;  Service: Cardiovascular    HERNIA REPAIR      x 2    OH RT/LT HEART CATHETERS N/A 05/23/2017    Procedure: Percutaneous Coronary Intervention;  Surgeon: Maninder Quezada MD;  [facility];  Service: Cardiovascular        Social History:   Social History     Tobacco Use    Smoking status: Never     Passive exposure: Never    Smokeless tobacco: Never   Substance Use Topics    Alcohol use: No     Comment: quit 30 years ago         Family History:  Family History   Problem Relation Age of Onset    Arthritis Son     Cancer Sister     Diabetes Son           Allergies:  No Known Allergies    Home Meds:  No current facility-administered medications on file prior to encounter.     Current Outpatient Medications on File Prior to Encounter   Medication Sig Dispense Refill    acetaminophen (TYLENOL) 500 MG tablet Take 1 tablet by mouth 4 (Four) Times a Day.      amLODIPine (NORVASC) 5 MG tablet TAKE 1 TABLET BY MOUTH DAILY 90 tablet 3    aspirin 81 MG chewable tablet Chew 1 tablet Daily.      atorvastatin (LIPITOR) 20 MG tablet TAKE 1 TABLET BY MOUTH EVERY NIGHT 90 tablet 3    Cholecalciferol (VITAMIN D-3) 1000 UNITS capsule Take 1,000 Units by mouth Daily.      Cyanocobalamin (B-12) 1000 MCG lozenge PLACE 1 LOZENGE UNDER THE TOUNGE EVERY OTHER DAY (Patient taking differently: Place 1 tablet under the tongue Every Other Day.) 45 lozenge 1    isosorbide mononitrate (IMDUR) 60 MG 24 hr tablet TAKE 1 TABLET BY MOUTH DAILY 90 tablet 3    losartan (COZAAR) 50 MG tablet TAKE 1 TABLET BY MOUTH DAILY 90 tablet 3    metoprolol succinate XL (TOPROL-XL) 100 MG 24 hr tablet TAKE 1 TABLET BY MOUTH DAILY 90 tablet 3         Scheduled Meds:           INTAKE AND OUTPUT:  No intake or output data in the 24 hours ending 10/28/24 1512      Review of Systems  Constitutional: No wt loss, fever   Gastrointestinal: No nausea , abdominal pain  Behavioral/Psych: No insomnia or anxiety   Cardiovascular ----positive for shortness of breath. All other systems reviewed and are negative        /59 (BP Location: Right arm, Patient Position: Sitting)   Pulse 73   Temp 97.7 °F (36.5 °C) (Oral)   Resp 18   Ht  "188 cm (74.02\")   Wt 58.1 kg (128 lb 1.4 oz)   SpO2 96%   BMI 16.44 kg/m²   General appearance: No acute changes   Neck: Trachea midline; NECK, supple, no thyromegaly or lymphadenopathy   Lungs: Normal size and shape, normal breath sounds, equal distribution of air, no rales and rhonchi   CV: S1-S2 regular, no murmurs, no rub, no gallop   Abdomen: Soft, nontender; no masses , no abnormal abdominal sounds   Extremities: No deformity , normal color , no peripheral edema   Skin: Normal temperature, turgor and texture; no rash, ulcers                             Cardiographics    ECG:           Telemetry:      ECHO:  Interpretation Summary         Left ventricular ejection fraction appears to be 41 - 45%.    The left ventricular cavity is dilated.    Left ventricular diastolic function was normal.    The left atrial cavity is moderately dilated.    Left atrial volume is moderately increased.    Estimated right ventricular systolic pressure from tricuspid regurgitation is moderately elevated (45-55 mmHg).    Mild dilation of the aortic root is present. Mild dilation of the sinuses of Valsalva is present.    Stress 2019 Interpretation Summary    Findings consistent with an abnormal ECG stress test.  Left ventricular ejection fraction is mildly reduced (Calculated EF = 46%).  Myocardial perfusion imaging indicates a medium-sized, moderately severe area of ischemia located in the inferior wall and lateral wall.  Impressions are consistent with a high risk study.    Cath 8/2019  Conclusion       Successful right and left coronary angiogram and LV gram  Normal left main  Left anterior descending midportion 50% stenosis, first large diagonal branch was approximately diffuse 90% stenosis  Circumflex artery was a nondominant midportion was 50% stenosis  Right coronary artery was dominant with the proximal had a stent widely patent  Normal LV gram     August 8, 2019   RESULTS:  Initial aortic pressure was approximately 130/80, " and the patient remained hemodynamically stable through the case.  Initial coronary arteriography showed 99% subtotally occluded diagonal branch reduced to 0% with 2.0/15 vision stent dilated to 2.2.     IVONNE  FLOW   PRE      3    POST  3     TYPE OF LESION    C     RECOMMENDATIONS:  The patient has had an excellent result from intervention.  The patient will be maintained on antiplatelet therapy and follow up with me and his primary care physician,   Importance of taking dual antiplatelets for one year  has been explained, risk of stent thrombosis leading to the acute MI, which carries high morbidity and mortality has been explained     Discontinuation or interruptions of these medications should be under the strict guidance of appropriate health professional  .     Lab Review:  Results from last 7 days   Lab Units 10/28/24  1328 10/28/24  1059   HSTROP T ng/L 43* 48*     Results from last 7 days   Lab Units 10/28/24  1059   MAGNESIUM mg/dL 2.0     Results from last 7 days   Lab Units 10/28/24  1059   SODIUM mmol/L 144   POTASSIUM mmol/L 4.1   BUN mg/dL 25*   CREATININE mg/dL 0.80   CALCIUM mg/dL 8.4     @[id number]@  Results from last 7 days   Lab Units 10/28/24  1059   WBC 10*3/mm3 6.20   HEMOGLOBIN g/dL 12.0*   HEMATOCRIT % 38.3   PLATELETS 10*3/mm3 132*     Results from last 7 days   Lab Units 10/28/24  1059   INR  1.07   APTT seconds 27.0         CXR:  FINDINGS: The heart is enlarged. Cardiac loop recorder is seen.  Pulmonary vasculature is congested. Bilateral pulmonary opacities have  increased, and may reflect edema an/or pneumonia with mild bilateral  pleural effusions. No pneumothorax. Skinfolds are noted. Otherwise  stable.     IMPRESSION:  Interval increase of bilateral pulmonary opacities.     This report was finalized on 10/28/2024 11:50 AM by Dr. Oscar Emery M.D on Workstation: [id number]     Assessment / Plan:  Acute congestive heart failure  Pulmonary edema  Coronary artery  "disease  Hypertension  Hyperlipidemia      Recommendations:  This is a 89 year old male who is well known with our service admitted with acute on chronic CHF. Will continue IV lasix and nitropaste. Resume home meds. Surveillance labs, trop, ecg in am. Check echo. Consult IM. ECG afib, he has hx paroxysmal atrial fibrillation, ECG in June SR, will start anticoagulation, further discussion regarding risk and benefits of long term anticoagulation to be discussed.     Zoila Diaz, APRN  10/28/2024  15:12 EDT  89 years old with congestive heart failure will be treated conservatively with IV diuretics denies any chest pains or tightness in the chest  Maninder Quezada MD    EMR Dragon/Transcription:   \"Dictated utilizing Dragon dictation\".     "

## 2024-10-28 NOTE — PLAN OF CARE
Goal Outcome Evaluation:           Progress: no change  Outcome Evaluation: pt admitted to hospital w/ c/o chest pain. Pt states his son gave him nitro x2 and pain subsided. pt denies any current chest pain or any other pain. pt does c/o of some minimal SOA, maintained on 3L NC. pt tolerated diet. wounc consulted. pt able to take small steps from stretcher to bed w/ Ax2. gait very unsteady. hard of hearing, Left hearing aid placed w/ pt, pt states R. hearing aid w/ son at home. pt expresses no needs at this time. call light within reach.

## 2024-10-28 NOTE — ED NOTES
[facility] 
 [address] [facility], [address] [facility] Phone: [phone]   [phone]   
  
Nephrology Progress Note Sona Kiser     1950     [id number] Date of Admission : 10/25/2019 
12/13/19 CC:  Follow up for JUAN J, CKD, Hyponatremia Assessment and Plan JUAN J on CKD: 
- likely ATN +/- overdiuresis - Cr improving - bumex x 1 today 
- daily labs Hypokalemia  
- replete PRN Hypernatremia : 
- Na improved to 140. Myoclonus and Encephalopathy Possible CVA, 3 rd nerve palsy  
- unable to get CTA/MRA 
- per neuro - On Keppra 
  
Gross hematuria, BPH w/ retention: 
- off CBI pre VAD. cysto pre op showed catheter related Cystitis @ postr wall  
- neal had to be replaced due to urinary retention 
- flomax - on hold  
- keep neal for now until he is stable from CHF stand point Acute on Chronic HFrEF  
- EF 16-20%, NYHA Class IV , hx of VF arrest - s/p AICD 
- Impella insertion 10/29- removed 11/18  
- s/p LVAD placement on 11/18 
- s/p right thoracentesis. CT in place Hoarseness, vocal cord paralysis, mediastinal adenopathy: 
- will need eventual PET/CT 
  
L arm clot s/p thrombectomy on 11/25 JOSE on CPAP  
  
PAF s/p DCCV 6/19 
  
Anemia: 
- from blood loss s/p multiple blood products - s/p IV iron,  Repeat iron studies ok 
- on PAVEL q7 d Serratia and Enteroccocus UTI: 
- completed abx Interval History:   
Seen and examined. Feeling ok. UOP stable overnight. CXR noted. Some SOB. No cp reported. Still w/ intermittent confusion overnight. Review of Systems: as per HPI Current Medications:  
Current Facility-Administered Medications Medication Dose Route Frequency  potassium chloride 20 mEq in 50 ml IVPB  20 mEq IntraVENous ONCE  
 0.9% sodium chloride infusion 250 mL  250 mL IntraVENous PRN  
 milrinone (PRIMACOR) 20 MG/100 ML D5W infusion  0.1 mcg/kg/min IntraVENous CONTINUOUS  
 Pt arrives via [facility] from home for complaints of chest pain that started around 0800. Son gave the patient two sublingual nitroglycerin tablets. Patient states the nitro tablets helped with the chest pain. No thinners.     piperacillin-tazobactam (ZOSYN) 3.375 g in 0.9% sodium chloride (MBP/ADV) 100 mL  3.375 g IntraVENous Q8H  
 levETIRAcetam (KEPPRA) oral solution 250 mg  250 mg Oral Q12H  pantoprazole (PROTONIX) tablet 40 mg  40 mg Oral ACB  aspirin chewable tablet 81 mg  81 mg Oral DAILY  0.9% sodium chloride infusion  3 mL/hr IntraVENous CONTINUOUS  
 albuterol-ipratropium (DUO-NEB) 2.5 MG-0.5 MG/3 ML  3 mL Nebulization Q4H RT  
 allopurinol (ZYLOPRIM) tablet 100 mg  100 mg Oral DAILY  sildenafil (REVATIO) 2 mg/mL oral suspension 20 mg  20 mg Oral Q8H  
 arformoterol (BROVANA) neb solution 15 mcg  15 mcg Nebulization BID RT And  
 budesonide (PULMICORT) 500 mcg/2 ml nebulizer suspension  500 mcg Nebulization BID RT  
 magnesium oxide (MAG-OX) tablet 400 mg  400 mg Oral DAILY  artificial saliva (MOUTH KOTE) 1 Spray  1 Spray Oral PRN  
 lactobac ac& pc-s.therm-b.anim (TIAN Q/RISAQUAD)  1 Cap Oral DAILY  oxyCODONE IR (ROXICODONE) tablet 5 mg  5 mg Oral Q6H PRN  
 balsam peru-castor oil (VENELEX) ointment   Topical TID  tamsulosin (FLOMAX) capsule 0.4 mg  0.4 mg Oral DAILY  insulin lispro (HUMALOG) injection   SubCUTAneous AC&HS  Warfarin MD/NP dosing   Other PRN  
 epoetin yvonne-epbx (RETACRIT) injection 20,000 Units  20,000 Units SubCUTAneous Q7D  
 sodium chloride (NS) flush 5-40 mL  5-40 mL IntraVENous Q8H  
 sodium chloride (NS) flush 5-40 mL  5-40 mL IntraVENous PRN  
 acetaminophen (TYLENOL) tablet 650 mg  650 mg Oral Q6H PRN  
 naloxone (NARCAN) injection 0.4 mg  0.4 mg IntraVENous PRN  
 ondansetron (ZOFRAN) injection 4 mg  4 mg IntraVENous Q4H PRN  
 senna-docusate (PERICOLACE) 8.6-50 mg per tablet 1 Tab  1 Tab Oral DAILY  bisacodyl (DULCOLAX) tablet 5 mg  5 mg Oral DAILY PRN  
 sucralfate (CARAFATE) tablet 1 g  1 g Oral AC&HS  influenza vaccine 2019-20 (6 mos+)(PF) (FLUARIX/FLULAVAL/FLUZONE QUAD) injection 0.5 mL  0.5 mL IntraMUSCular PRIOR TO DISCHARGE  
  hydrALAZINE (APRESOLINE) 20 mg/mL injection 20 mg  20 mg IntraVENous Q6H PRN  
 dextrose 10 % infusion 125-250 mL  125-250 mL IntraVENous PRN No Known Allergies Objective: 
Vitals:   
Vitals:  
 12/13/19 0600 12/13/19 0700 12/13/19 0800 12/13/19 1568 BP:      
Pulse: 81 88 79 Resp: 21 22 22 Temp:   98.7 °F (37.1 °C) SpO2: 95% 94% 99% 93% Weight: 90.5 kg (199 lb 8.3 oz) Height:      
 
Intake and Output: 
No intake/output data recorded. 12/11 1901 - 12/13 0700 In: 3612.4 [P.O.:1040; I.V.:799.4] Out: 5205 [[facility]:3296] Physical Examination: 
 
General: Elderly man in no acute distress HEENT:            Ng in place; very dry mucous membranes Neck:  Lines + Resp:  Decreased bibasilar breath sounds; no resp distress CV:  VAD sounds;  1-2+ LE edema Chest:             Chest tube in place GI:  Soft and non-tender; no distension Neurologic:  Alert and appropriate; normal speech :  + neal; clear urine [x]    High complexity decision making was performed 
[x]    Patient is at high-risk of decompensation with multiple organ involvement Lab Data Personally Reviewed: I have reviewed all the pertinent labs, microbiology data and radiology studies during assessment. Recent Labs 12/13/19 
0300 12/12/19 
0403 12/11/19 
0435  140 142  
K 3.9 4.0 4.1  107 107 CO2 29 27 26 * 105* 97 BUN 39* 38* 38* CREA 2.44* 2.56* 2.72* CA 9.0 9.0 8.5 MG 1.9 2.0 2.0 ALB 2.5* 2.5* 2.4* SGOT 18 16 15 ALT 12 11* 11* INR 1.6* 2.3* 2.4* Recent Labs 12/13/19 
0300 12/12/19 
0403 12/11/19 
0435 WBC 4.7 4.5 4.2 HGB 7.5* 7.8* 7.8* HCT 25.0* 25.9* 26.3*  
 149* 131* No results found for: SDES Lab Results Component Value Date/Time  Culture result: NO GROWTH 5 DAYS 12/06/2019 11:23 PM  
 Culture result: HEAVY ENTEROCOCCUS SPECIES NOTED (A) 11/27/2019 11:10 AM  
 Culture result: LIGHT YEAST (A) 11/27/2019 11:10 AM  
 Culture result: NO GROWTH 5 DAYS 11/12/2019 11:18 AM  
 Culture result: SERRATIA MARCESCENS (A) 10/31/2019 10:05 AM  
 Culture result: SERRATIA MARCESCENS (2ND COLONY TYPE/STRAIN) (A) 10/31/2019 10:05 AM  
 Culture result: ENTEROCOCCUS FAECALIS GROUP D (A) 10/31/2019 10:05 AM  
 
Recent Results (from the past 24 hour(s)) GLUCOSE, POC Collection Time: 12/12/19  8:49 AM  
Result Value Ref Range Glucose (POC) 100 65 - 100 mg/dL Performed by Olga Mack TYPE & SCREEN Collection Time: 12/12/19 10:45 AM  
Result Value Ref Range Crossmatch Expiration 12/15/2019 ABO/Rh(D) O POSITIVE Antibody screen NEG Comment PREVIOUSLY IDENTIFIED NONSPECIFIC ANTIBODIES   
 ROYER Poly POS   
 ROYER IgG POS   
 ROYER C3b/C3d NEG Unit number [id number] Blood component type RC LR Unit division 00 Status of unit REL FROM [facility] Crossmatch result Incompatible Unit number [id number] Blood component type RC LR,2 Unit division 00 Status of unit TRANSFUSED Crossmatch result Compatible GLUCOSE, POC Collection Time: 12/12/19 10:45 AM  
Result Value Ref Range Glucose (POC) 116 (H) 65 - 100 mg/dL Performed by Olga Mack ECHO ADULT COMPLETE Collection Time: 12/12/19  2:18 PM  
Result Value Ref Range LVIDd 6.07 (A) 4.2 - 5.9 cm  
 LVPWd 1.32 (A) 0.6 - 1.0 cm LVIDs 4.79 cm IVSd 0.88 0.6 - 1.0 cm  
 LV Mass .7 (A) 88 - 224 g LV Mass AL Index 155.1 49 - 115 g/m2 Triscuspid Valve Regurgitation Peak Gradient 47.4 mmHg  
 TR Max Velocity 344.12 cm/s Left Ventricular Fractional Shortening by 2D [id number] % GLUCOSE, POC Collection Time: 12/12/19  4:18 PM  
Result Value Ref Range Glucose (POC) 123 (H) 65 - 100 mg/dL Performed by Olga Mack GLUCOSE, POC Collection Time: 12/12/19  9:38 PM  
Result Value Ref Range Glucose (POC) 128 (H) 65 - 100 mg/dL Performed by Jose Chapman METABOLIC PANEL, COMPREHENSIVE Collection Time: 12/13/19  3:00 AM  
Result Value Ref Range Sodium 140 136 - 145 mmol/L Potassium 3.9 3.5 - 5.1 mmol/L Chloride 105 97 - 108 mmol/L  
 CO2 29 21 - 32 mmol/L Anion gap 6 5 - 15 mmol/L Glucose 123 (H) 65 - 100 mg/dL BUN 39 (H) 6 - 20 MG/DL Creatinine 2.44 (H) 0.70 - 1.30 MG/DL  
 BUN/Creatinine ratio 16 12 - 20 GFR est AA 32 (L) >60 ml/min/1.73m2 GFR est non-AA 26 (L) >60 ml/min/1.73m2 Calcium 9.0 8.5 - 10.1 MG/DL Bilirubin, total 0.5 0.2 - 1.0 MG/DL  
 ALT (SGPT) 12 12 - 78 U/L  
 AST (SGOT) 18 15 - 37 U/L Alk. phosphatase 105 45 - 117 U/L Protein, total 6.7 6.4 - 8.2 g/dL Albumin 2.5 (L) 3.5 - 5.0 g/dL Globulin 4.2 (H) 2.0 - 4.0 g/dL A-G Ratio 0.6 (L) 1.1 - 2.2 MAGNESIUM Collection Time: 12/13/19  3:00 AM  
Result Value Ref Range Magnesium 1.9 1.6 - 2.4 mg/dL CBC W/O DIFF Collection Time: 12/13/19  3:00 AM  
Result Value Ref Range WBC 4.7 4.1 - 11.1 K/uL  
 RBC 2.56 (L) 4.10 - 5.70 M/uL HGB 7.5 (L) 12.1 - 17.0 g/dL HCT 25.0 (L) 36.6 - 50.3 % MCV 97.7 80.0 - 99.0 FL  
 MCH 29.3 26.0 - 34.0 PG  
 MCHC 30.0 30.0 - 36.5 g/dL  
 RDW 17.6 (H) 11.5 - 14.5 % PLATELET 068 753 - 368 K/uL MPV 10.1 8.9 - 12.9 FL  
 NRBC 0.0 0  WBC ABSOLUTE NRBC 0.00 0.00 - 0.01 K/uL PROTHROMBIN TIME + INR Collection Time: 12/13/19  3:00 AM  
Result Value Ref Range INR 1.6 (H) 0.9 - 1.1 Prothrombin time 15.4 (H) 9.0 - 11.1 sec PTT Collection Time: 12/13/19  3:00 AM  
Result Value Ref Range aPTT 31.2 22.1 - 32.0 sec  
 aPTT, therapeutic range     58.0 - 77.0 SECS  
DIGOXIN Collection Time: 12/13/19  3:00 AM  
Result Value Ref Range Digoxin level 0.8 (L) 0.90 - 2.00 NG/ML  
LACTIC ACID Collection Time: 12/13/19  3:00 AM  
Result Value Ref Range Lactic acid 1.0 0.4 - 2.0 MMOL/L  
PROCALCITONIN Collection Time: 12/13/19  3:00 AM  
Result Value Ref Range Procalcitonin 0.12 ng/mL LD Collection Time: 12/13/19  3:00 AM  
Result Value Ref Range  (H) 85 - 241 U/L  
NT-PRO BNP Collection Time: 12/13/19  3:00 AM  
Result Value Ref Range NT pro-BNP 17,229 (H) <125 PG/ML  
GLUCOSE, POC Collection Time: 12/13/19  7:42 AM  
Result Value Ref Range Glucose (POC) 139 (H) 65 - 100 mg/dL Performed by Swathi Perez MD

## 2024-10-28 NOTE — ED NOTES
"Nursing report ED to floor  Dinesh Churchill Sr.  89 y.o.  male    HPI :  HPI  Stated Reason for Visit: chest pain  History Obtained From: patient, EMS    Chief Complaint  Chief Complaint   Patient presents with    Chest Pain       Admitting doctor:   Maninder Quezada MD    Admitting diagnosis:   The primary encounter diagnosis was Acute hypoxic respiratory failure. Diagnoses of Acute on chronic congestive heart failure, unspecified heart failure type, Chest pain, unspecified type, and Acute pulmonary edema were also pertinent to this visit.    Code status:   Current Code Status       Date Active Code Status Order ID Comments User Context       Prior            Allergies:   Patient has no known allergies.    Isolation:   No active isolations    Intake and Output  No intake or output data in the 24 hours ending 10/28/24 1346    Weight:       10/28/24  1022   Weight: 63.9 kg (140 lb 14 oz)       Most recent vitals:   Vitals:    10/28/24 1022 10/28/24 1108 10/28/24 1115 10/28/24 1329   BP: 115/60  150/58 164/90   Pulse: 64 72 64 71   Resp: 16 18 18    Temp: 97.9 °F (36.6 °C)      SpO2: 94% 96% 95%    Weight: 63.9 kg (140 lb 14 oz)      Height: 188 cm (74.02\")          Active LDAs/IV Access:   Lines, Drains & Airways       Active LDAs       Name Placement date Placement time Site Days    Peripheral IV 10/28/24 1018 Right Antecubital 10/28/24  1018  Antecubital  less than 1                    Labs (abnormal labs have a star):   Labs Reviewed   COMPREHENSIVE METABOLIC PANEL - Abnormal; Notable for the following components:       Result Value    Glucose 115 (*)     BUN 25 (*)     Chloride 111 (*)     Total Protein 5.9 (*)     Albumin 3.4 (*)     BUN/Creatinine Ratio 31.3 (*)     All other components within normal limits    Narrative:     GFR Normal >60  Chronic Kidney Disease <60  Kidney Failure <15    The GFR formula is only valid for adults with stable renal function between ages 18 and 70.   BNP (IN-HOUSE) - " Abnormal; Notable for the following components:    proBNP 12,636.0 (*)     All other components within normal limits    Narrative:     This assay is used as an aid in the diagnosis of individuals suspected of having heart failure. It can be used as an aid in the diagnosis of acute decompensated heart failure (ADHF) in patients presenting with signs and symptoms of ADHF to the emergency department (ED). In addition, NT-proBNP of <300 pg/mL indicates ADHF is not likely.    Age Range Result Interpretation  NT-proBNP Concentration (pg/mL:      <50             Positive            >450                   Gray                 300-450                    Negative             <300    50-75           Positive            >900                  Gray                300-900                  Negative            <300      >75             Positive            >1800                  Gray                300-1800                  Negative            <300   TROPONIN - Abnormal; Notable for the following components:    HS Troponin T 48 (*)     All other components within normal limits    Narrative:     High Sensitive Troponin T Reference Range:  <14.0 ng/L- Negative Female for AMI  <22.0 ng/L- Negative Male for AMI  >=14 - Abnormal Female indicating possible myocardial injury.  >=22 - Abnormal Male indicating possible myocardial injury.   Clinicians would have to utilize clinical acumen, EKG, Troponin, and serial changes to determine if it is an Acute Myocardial Infarction or myocardial injury due to an underlying chronic condition.        BLOOD GAS, ARTERIAL - Abnormal; Notable for the following components:    pCO2, Arterial 34.7 (*)     pO2, Arterial 67.7 (*)     All other components within normal limits   CBC WITH AUTO DIFFERENTIAL - Abnormal; Notable for the following components:    RBC 3.58 (*)     Hemoglobin 12.0 (*)     .0 (*)     MCH 33.5 (*)     MCHC 31.3 (*)     RDW-SD 56.0 (*)     MPV 12.4 (*)     Platelets 132 (*)     All  "other components within normal limits   MANUAL DIFFERENTIAL - Abnormal; Notable for the following components:    Lymphocyte % 18.0 (*)     Monocyte % 4.0 (*)     nRBC 1.0 (*)     All other components within normal limits   RESPIRATORY PANEL PCR W/ COVID-19 (SARS-COV-2), NP SWAB IN UTM/VTP, 2 HR TAT - Normal    Narrative:     In the setting of a positive respiratory panel with a viral infection PLUS a negative procalcitonin without other underlying concern for bacterial infection, consider observing off antibiotics or discontinuation of antibiotics and continue supportive care. If the respiratory panel is positive for atypical bacterial infection (Bordetella pertussis, Chlamydophila pneumoniae, or Mycoplasma pneumoniae), consider antibiotic de-escalation to target atypical bacterial infection.   PROTIME-INR - Normal   APTT - Normal   LACTIC ACID, PLASMA - Normal   PROCALCITONIN - Normal    Narrative:     As a Marker for Sepsis (Non-Neonates):    1. <0.5 ng/mL represents a low risk of severe sepsis and/or septic shock.  2. >2 ng/mL represents a high risk of severe sepsis and/or septic shock.    As a Marker for Lower Respiratory Tract Infections that require antibiotic therapy:    PCT on Admission    Antibiotic Therapy       6-12 Hrs later    >0.5                Strongly Recommended  >0.25 - <0.5        Recommended   0.1 - 0.25          Discouraged              Remeasure/reassess PCT  <0.1                Strongly Discouraged     Remeasure/reassess PCT    As 28 day mortality risk marker: \"Change in Procalcitonin Result\" (>80% or <=80%) if Day 0 (or Day 1) and Day 4 values are available. Refer to http://www.[facility]-pct-calculator.com    Change in PCT <=80%  A decrease of PCT levels below or equal to 80% defines a positive change in PCT test result representing a higher risk for 28-day all-cause mortality of patients diagnosed with severe sepsis for septic shock.    Change in PCT >80%  A decrease of PCT levels of more than " 80% defines a negative change in PCT result representing a lower risk for 28-day all-cause mortality of patients diagnosed with severe sepsis or septic shock.      MAGNESIUM - Normal   PHOSPHORUS - Normal   BLOOD GAS, VENOUS   RAINBOW DRAW    Narrative:     The following orders were created for panel order [place] Draw.  Procedure                               Abnormality         Status                     ---------                               -----------         ------                     Gold Top - SST[[id number]]                                   Final result                 Please view results for these tests on the individual orders.   HIGH SENSITIVITIY TROPONIN T 2HR   CBC AND DIFFERENTIAL    Narrative:     The following orders were created for panel order CBC & Differential.  Procedure                               Abnormality         Status                     ---------                               -----------         ------                     CBC Auto Differential[[id number]]        Abnormal            Final result                 Please view results for these tests on the individual orders.   [facility]       EKG:   ECG 12 Lead Chest Pain   Preliminary Result   HEART RATE=66  bpm   RR Jmlckjxi=234  ms   IL Interval=  ms   P Horizontal Axis=  deg   P Front Axis=  deg   QRSD Ezvtkmro=890  ms   QT Mjzdnckp=141  ms   OYwZ=778  ms   QRS Axis=-52  deg   T Wave Axis=120  deg   - ABNORMAL ECG -   Atrial flutter with predominant 4:1 AV block   Ventricular premature complex   Left bundle branch block   Date and Time of Study:2024-10-28 10:42:31          Meds given in ED:   Medications   sodium chloride 0.9 % flush 10 mL (has no administration in time range)   ipratropium-albuterol (DUO-NEB) nebulizer solution 3 mL (3 mL Nebulization Given 10/28/24 1108)   furosemide (LASIX) injection 80 mg (80 mg Intravenous Given 10/28/24 1329)   nitroglycerin (NITROSTAT) ointment 1 inch (1 inch Topical Given 10/28/24 1329)        Imaging results:  XR Chest 1 View    Result Date: 10/28/2024  Interval increase of bilateral pulmonary opacities.  This report was finalized on 10/28/2024 11:50 AM by Dr. Oscar Emery M.D on Workstation: "[employer]       Ambulatory status:   - with assist    Social issues:   Social History     Socioeconomic History    Marital status:    Tobacco Use    Smoking status: Never     Passive exposure: Never    Smokeless tobacco: Never   Vaping Use    Vaping status: Never Used   Substance and Sexual Activity    Alcohol use: No     Comment: quit 30 years ago     Drug use: No    Sexual activity: Not Currently     Partners: Male       Peripheral Neurovascular       Neuro Cognitive       Learning       Respiratory  Respiratory WDL  Rhythm/Pattern, Respiratory: shortness of breath  Breath Sounds  All Lung Fields Breath Sounds: All Fields, DELORES, LLL, RUL, RML, RLL  All Lung Fields Breath Sounds: Anterior:, Posterior:  DELORES Breath Sounds: clear  LLL Breath Sounds: diminished  RUL Breath Sounds: clear  RML Breath Sounds: diminished  RLL Breath Sounds: diminished  Breath Sounds Post-Respiratory Treatment  Breath Sounds Posttreatment All Fields: All Fields  Breath Sounds Posttreatment All Fields: no change    Abdominal Pain       Pain Assessments  Pain (Adult)  (0-10) Pain Rating: Rest: 7  Pain Location: chest    NIH Stroke Scale       Rabia Wadsworth RN  10/28/24 13:46 EDT

## 2024-10-29 ENCOUNTER — APPOINTMENT (OUTPATIENT)
Dept: CARDIOLOGY | Facility: HOSPITAL | Age: 89
End: 2024-10-29
Payer: MEDICARE

## 2024-10-29 LAB
ALBUMIN SERPL-MCNC: 3.4 G/DL (ref 3.5–5.2)
ALBUMIN/GLOB SERPL: 1.4 G/DL
ALP SERPL-CCNC: 66 U/L (ref 39–117)
ALT SERPL W P-5'-P-CCNC: 18 U/L (ref 1–41)
ANION GAP SERPL CALCULATED.3IONS-SCNC: 9 MMOL/L (ref 5–15)
AST SERPL-CCNC: 20 U/L (ref 1–40)
BASOPHILS # BLD AUTO: 0.01 10*3/MM3 (ref 0–0.2)
BASOPHILS NFR BLD AUTO: 0.2 % (ref 0–1.5)
BILIRUB SERPL-MCNC: 1 MG/DL (ref 0–1.2)
BUN SERPL-MCNC: 29 MG/DL (ref 8–23)
BUN/CREAT SERPL: 29.9 (ref 7–25)
CALCIUM SPEC-SCNC: 8.7 MG/DL (ref 8.2–9.6)
CHLORIDE SERPL-SCNC: 105 MMOL/L (ref 98–107)
CHOLEST SERPL-MCNC: 129 MG/DL (ref 0–200)
CO2 SERPL-SCNC: 28 MMOL/L (ref 22–29)
CREAT SERPL-MCNC: 0.97 MG/DL (ref 0.76–1.27)
DEPRECATED RDW RBC AUTO: 54.2 FL (ref 37–54)
EGFRCR SERPLBLD CKD-EPI 2021: 71.4 ML/MIN/1.73
EOSINOPHIL # BLD AUTO: 0.08 10*3/MM3 (ref 0–0.4)
EOSINOPHIL NFR BLD AUTO: 1.6 % (ref 0.3–6.2)
ERYTHROCYTE [DISTWIDTH] IN BLOOD BY AUTOMATED COUNT: 14.4 % (ref 12.3–15.4)
GLOBULIN UR ELPH-MCNC: 2.4 GM/DL
GLUCOSE SERPL-MCNC: 86 MG/DL (ref 65–99)
HBA1C MFR BLD: 5.6 % (ref 4.8–5.6)
HCT VFR BLD AUTO: 38.1 % (ref 37.5–51)
HDLC SERPL-MCNC: 37 MG/DL (ref 40–60)
HGB BLD-MCNC: 12.7 G/DL (ref 13–17.7)
IMM GRANULOCYTES # BLD AUTO: 0.01 10*3/MM3 (ref 0–0.05)
IMM GRANULOCYTES NFR BLD AUTO: 0.2 % (ref 0–0.5)
LDLC SERPL CALC-MCNC: 78 MG/DL (ref 0–100)
LDLC/HDLC SERPL: 2.13 {RATIO}
LYMPHOCYTES # BLD AUTO: 1.1 10*3/MM3 (ref 0.7–3.1)
LYMPHOCYTES NFR BLD AUTO: 22.7 % (ref 19.6–45.3)
MAGNESIUM SERPL-MCNC: 2 MG/DL (ref 1.7–2.3)
MCH RBC QN AUTO: 34.6 PG (ref 26.6–33)
MCHC RBC AUTO-ENTMCNC: 33.3 G/DL (ref 31.5–35.7)
MCV RBC AUTO: 103.8 FL (ref 79–97)
MONOCYTES # BLD AUTO: 0.47 10*3/MM3 (ref 0.1–0.9)
MONOCYTES NFR BLD AUTO: 9.7 % (ref 5–12)
NEUTROPHILS NFR BLD AUTO: 3.18 10*3/MM3 (ref 1.7–7)
NEUTROPHILS NFR BLD AUTO: 65.6 % (ref 42.7–76)
NRBC BLD AUTO-RTO: 0.4 /100 WBC (ref 0–0.2)
PLATELET # BLD AUTO: 133 10*3/MM3 (ref 140–450)
PMV BLD AUTO: 11.7 FL (ref 6–12)
POTASSIUM SERPL-SCNC: 3.6 MMOL/L (ref 3.5–5.2)
PROT SERPL-MCNC: 5.8 G/DL (ref 6–8.5)
QT INTERVAL: 484 MS
QTC INTERVAL: 496 MS
RBC # BLD AUTO: 3.67 10*6/MM3 (ref 4.14–5.8)
SODIUM SERPL-SCNC: 142 MMOL/L (ref 136–145)
TRIGL SERPL-MCNC: 66 MG/DL (ref 0–150)
TROPONIN T SERPL HS-MCNC: 56 NG/L
TSH SERPL DL<=0.05 MIU/L-ACNC: 2.9 UIU/ML (ref 0.27–4.2)
VLDLC SERPL-MCNC: 14 MG/DL (ref 5–40)
WBC NRBC COR # BLD AUTO: 4.85 10*3/MM3 (ref 3.4–10.8)

## 2024-10-29 PROCEDURE — 93306 TTE W/DOPPLER COMPLETE: CPT | Performed by: INTERNAL MEDICINE

## 2024-10-29 PROCEDURE — 93306 TTE W/DOPPLER COMPLETE: CPT

## 2024-10-29 PROCEDURE — 99232 SBSQ HOSP IP/OBS MODERATE 35: CPT | Performed by: NURSE PRACTITIONER

## 2024-10-29 PROCEDURE — 25510000001 PERFLUTREN 6.52 MG/ML SUSPENSION 2 ML VIAL

## 2024-10-29 PROCEDURE — 93010 ELECTROCARDIOGRAM REPORT: CPT | Performed by: INTERNAL MEDICINE

## 2024-10-29 PROCEDURE — 25010000002 FUROSEMIDE PER 20 MG

## 2024-10-29 PROCEDURE — 83735 ASSAY OF MAGNESIUM: CPT | Performed by: NURSE PRACTITIONER

## 2024-10-29 PROCEDURE — 84443 ASSAY THYROID STIM HORMONE: CPT | Performed by: HOSPITALIST

## 2024-10-29 PROCEDURE — 80053 COMPREHEN METABOLIC PANEL: CPT | Performed by: HOSPITALIST

## 2024-10-29 PROCEDURE — 85025 COMPLETE CBC W/AUTO DIFF WBC: CPT | Performed by: HOSPITALIST

## 2024-10-29 PROCEDURE — 25010000002 FUROSEMIDE PER 20 MG: Performed by: NURSE PRACTITIONER

## 2024-10-29 PROCEDURE — 93005 ELECTROCARDIOGRAM TRACING: CPT

## 2024-10-29 PROCEDURE — 83036 HEMOGLOBIN GLYCOSYLATED A1C: CPT | Performed by: HOSPITALIST

## 2024-10-29 PROCEDURE — 25010000002 ENOXAPARIN PER 10 MG: Performed by: INTERNAL MEDICINE

## 2024-10-29 PROCEDURE — 80061 LIPID PANEL: CPT | Performed by: HOSPITALIST

## 2024-10-29 PROCEDURE — 84484 ASSAY OF TROPONIN QUANT: CPT

## 2024-10-29 RX ORDER — ISOSORBIDE MONONITRATE 30 MG/1
30 TABLET, EXTENDED RELEASE ORAL
Status: DISCONTINUED | OUTPATIENT
Start: 2024-10-29 | End: 2024-10-31 | Stop reason: HOSPADM

## 2024-10-29 RX ORDER — FUROSEMIDE 10 MG/ML
40 INJECTION INTRAMUSCULAR; INTRAVENOUS ONCE
Status: DISCONTINUED | OUTPATIENT
Start: 2024-10-29 | End: 2024-10-29

## 2024-10-29 RX ORDER — FUROSEMIDE 10 MG/ML
20 INJECTION INTRAMUSCULAR; INTRAVENOUS ONCE
Status: COMPLETED | OUTPATIENT
Start: 2024-10-29 | End: 2024-10-29

## 2024-10-29 RX ORDER — ENOXAPARIN SODIUM 100 MG/ML
60 INJECTION SUBCUTANEOUS EVERY 12 HOURS
Status: DISCONTINUED | OUTPATIENT
Start: 2024-10-29 | End: 2024-10-31 | Stop reason: HOSPADM

## 2024-10-29 RX ADMIN — ENOXAPARIN SODIUM 60 MG: 100 INJECTION SUBCUTANEOUS at 20:13

## 2024-10-29 RX ADMIN — METOPROLOL SUCCINATE 100 MG: 100 TABLET, EXTENDED RELEASE ORAL at 10:24

## 2024-10-29 RX ADMIN — NITROGLYCERIN 1 INCH: 20 OINTMENT TOPICAL at 10:24

## 2024-10-29 RX ADMIN — FUROSEMIDE 40 MG: 10 INJECTION, SOLUTION INTRAMUSCULAR; INTRAVENOUS at 10:24

## 2024-10-29 RX ADMIN — ATORVASTATIN CALCIUM 10 MG: 20 TABLET, FILM COATED ORAL at 20:12

## 2024-10-29 RX ADMIN — ANORECTAL OINTMENT 1 APPLICATION: 15.7; .44; 24; 20.6 OINTMENT TOPICAL at 20:12

## 2024-10-29 RX ADMIN — PANTOPRAZOLE SODIUM 40 MG: 40 TABLET, DELAYED RELEASE ORAL at 05:38

## 2024-10-29 RX ADMIN — LOSARTAN POTASSIUM 50 MG: 50 TABLET, FILM COATED ORAL at 10:24

## 2024-10-29 RX ADMIN — FUROSEMIDE 20 MG: 10 INJECTION, SOLUTION INTRAMUSCULAR; INTRAVENOUS at 17:15

## 2024-10-29 RX ADMIN — PERFLUTREN 3 ML: 6.52 INJECTION, SUSPENSION INTRAVENOUS at 16:37

## 2024-10-29 RX ADMIN — ASPIRIN 81 MG: 81 TABLET, CHEWABLE ORAL at 10:24

## 2024-10-29 RX ADMIN — ANORECTAL OINTMENT 1 APPLICATION: 15.7; .44; 24; 20.6 OINTMENT TOPICAL at 12:29

## 2024-10-29 RX ADMIN — AMLODIPINE BESYLATE 10 MG: 10 TABLET ORAL at 10:24

## 2024-10-29 RX ADMIN — ISOSORBIDE MONONITRATE 30 MG: 30 TABLET, EXTENDED RELEASE ORAL at 17:15

## 2024-10-29 RX ADMIN — ENOXAPARIN SODIUM 60 MG: 100 INJECTION SUBCUTANEOUS at 10:26

## 2024-10-29 NOTE — PROGRESS NOTES
[facility]  Cardiology Progress Note    Patient Identification:  Name: Dinesh Churchill Sr.  Age: 89 y.o.  Sex: male  :  1928  MRN: [id number]                 Follow Up / Chief Complaint: Follow-up for acute on chronic congestive heart failure/acute hypoxic respiratory failure    Interval History: Diuresing well on IV Lasix.  Improvement with shortness of breath and no chest pain.            Objective:    Past Medical History:  Past Medical History:   Diagnosis Date    Abnormal ECG     Abnormal MRI     Acute frontal sinusitis     Arthritis     Chronic fatigue     Coronary artery disease     Difficulty walking     Dizziness     Head injury     Headache 2023    Hearing aid worn     left    Hearing loss     Hyperlipidemia     Hypertension     Idiopathic peripheral neuropathy 2023    Kidney stones     Macrocytosis     Neoplasm of skin     Osteoporosis     Prediabetes     Vitamin D deficiency      Past Surgical History:  Past Surgical History:   Procedure Laterality Date    CARDIAC CATHETERIZATION N/A 2017    Procedure: Left Heart Cath;  Surgeon: Maninder Quezada MD;  Location: [facility] CATH [facility]VAS[facility] LOC[facility];  Service:     CARDIAC CATHETERIZATION N/A 2017    Procedure: Stent BMS coronary;  Surgeon: Maninder Quezada MD;  Location: [facility] LOC[facility];  Service:     CARDIAC CATHETERIZATION N/A 2019    Procedure: Left Heart Cath;  Surgeon: Maninder Quezada MD;  Location: [facility] LOC[facility];  Service: Cardiology    CARDIAC CATHETERIZATION N/A 2019    Procedure: Left ventriculography;  Surgeon: Maninder Quezada MD;  Location: [facility];  Service: Cardiology    CARDIAC CATHETERIZATION N/A 2019    Procedure: Coronary angiography;  Surgeon: Maninder Quezada MD;  Location: [facility];  Service: Cardiology    CARDIAC CATHETERIZATION N/A 2019    Procedure: CORONARY ANGIOGRAPHY;   Surgeon: Maninder Quezada MD;  Location:  [facility] CATH INVASIVE LOCATION;  Service: Cardiovascular    CARDIAC CATHETERIZATION N/A 08/20/2019    Procedure: Stent BMS coronary;  Surgeon: Maninder Quezada MD;  Location:  [facility] CATH INVASIVE LOCATION;  Service: Cardiovascular    CARDIAC ELECTROPHYSIOLOGY PROCEDURE N/A 11/08/2019    Procedure: Loop insertion;  Surgeon: Maninder Quezada MD;  Location:  [facility] CATH INVASIVE LOCATION;  Service: Cardiovascular    HERNIA REPAIR      x 2    SC RT/LT HEART CATHETERS N/A 05/23/2017    Procedure: Percutaneous Coronary Intervention;  Surgeon: Maninder Quezada MD;  Location:  [facility] CATH INVASIVE LOCATION;  Service: Cardiovascular        Social History:   Social History     Tobacco Use    Smoking status: Never     Passive exposure: Never    Smokeless tobacco: Never   Substance Use Topics    Alcohol use: No     Comment: quit 30 years ago       Family History:  Family History   Problem Relation Age of Onset    Arthritis Son     Cancer Sister     Diabetes Son           Allergies:  No Known Allergies  Scheduled Meds:  amLODIPine, 10 mg, Q24H  aspirin, 81 mg, Daily  atorvastatin, 10 mg, Nightly  enoxaparin, 60 mg, Q12H  losartan, 50 mg, Daily  Menthol-Zinc Oxide, 1 Application, Q12H  metoprolol succinate XL, 100 mg, Daily  nitroglycerin, 1 inch, TID - Nitrates  pantoprazole, 40 mg, Q AM            INTAKE AND OUTPUT:    Intake/Output Summary (Last 24 hours) at 10/29/2024 1308  Last data filed at 10/29/2024 0940  Gross per 24 hour   Intake 360 ml   Output 3000 ml   Net -2640 ml       Review of Systems:    GI: No nausea or vomiting  Cardiac: No chest pain  Pulmonary: Improvement with shortness of breath    Constitutional:  Temp:  [97.3 °F (36.3 °C)-98.1 °F (36.7 °C)] 98.1 °F (36.7 °C)  Heart Rate:  [] 62  Resp:  [18] 18  BP: (120-164)/(40-90) 120/40    Physical Exam:  General:  Appears in no acute distress, resting in bed  Eyes: eom normal no conjunctival  drainage  HEENT:  No JVD. Thyroid not visibly enlarged. No mucosal pallor or cyanosis  Respiratory: Respirations regular and unlabored at rest. BBS with good air entry in all fields. No crackles, rubs or wheezes auscultated  Cardiovascular: S1S2 irregularly irregular rhythm. No murmur, rub or gallop. No carotid bruits. DP/PT pulses     . No pretibial pitting edema  Gastrointestinal: Abdomen soft, flat, non tender. Bowel sounds present. No hepatosplenomegaly. No ascites  Skin:   Skin warm and dry to touch. No rashes    Neuro: AAO x3 CN II-XII grossly intact  Psych: Mood and affect normal, pleasant and cooperative        Cardiographics    ECG:                  ECHO:  Interpretation Summary          Left ventricular ejection fraction appears to be 41 - 45%.    The left ventricular cavity is dilated.    Left ventricular diastolic function was normal.    The left atrial cavity is moderately dilated.    Left atrial volume is moderately increased.    Estimated right ventricular systolic pressure from tricuspid regurgitation is moderately elevated (45-55 mmHg).    Mild dilation of the aortic root is present. Mild dilation of the sinuses of Valsalva is present.     Stress 2019 Interpretation Summary     Findings consistent with an abnormal ECG stress test.  Left ventricular ejection fraction is mildly reduced (Calculated EF = 46%).  Myocardial perfusion imaging indicates a medium-sized, moderately severe area of ischemia located in the inferior wall and lateral wall.  Impressions are consistent with a high risk study.     Cath 8/2019  Conclusion        Successful right and left coronary angiogram and LV gram  Normal left main  Left anterior descending midportion 50% stenosis, first large diagonal branch was approximately diffuse 90% stenosis  Circumflex artery was a nondominant midportion was 50% stenosis  Right coronary artery was dominant with the proximal had a stent widely patent  Normal LV gram     August 8, 2019    RESULTS:  Initial aortic pressure was approximately 130/80, and the patient remained hemodynamically stable through the case.  Initial coronary arteriography showed 99% subtotally occluded diagonal branch reduced to 0% with 2.0/15 vision stent dilated to 2.2.     IVONNE  FLOW   PRE      3    POST  3     TYPE OF LESION    C     RECOMMENDATIONS:  The patient has had an excellent result from intervention.  The patient will be maintained on antiplatelet therapy and follow up with me and his primary care physician,   Importance of taking dual antiplatelets for one year  has been explained, risk of stent thrombosis leading to the acute MI, which carries high morbidity and mortality has been explained     Discontinuation or interruptions of these medications should be under the strict guidance of appropriate health professional  .            Lab Review   Results from last 7 days   Lab Units 10/29/24  0255 10/28/24  1328 10/28/24  1059   HSTROP T ng/L 56* 43* 48*     Results from last 7 days   Lab Units 10/28/24  1059   MAGNESIUM mg/dL 2.0     Results from last 7 days   Lab Units 10/29/24  0255   SODIUM mmol/L 142   POTASSIUM mmol/L 3.6   BUN mg/dL 29*   CREATININE mg/dL 0.97   CALCIUM mg/dL 8.7     @[id number]@  Results from last 7 days   Lab Units 10/29/24  0255 10/28/24  1059   WBC 10*3/mm3 4.85 6.20   HEMOGLOBIN g/dL 12.7* 12.0*   HEMATOCRIT % 38.1 38.3   PLATELETS 10*3/mm3 133* 132*     Results from last 7 days   Lab Units 10/28/24  1059   INR  1.07   APTT seconds 27.0       Intake/Output Summary (Last 24 hours) at 10/29/2024 1321  Last data filed at 10/29/2024 0940  Gross per 24 hour   Intake 360 ml   Output 3000 ml   Net -2640 ml         Assessment plan:  1.  Acute on chronic congestive heart failure: BNP 12K. Diuresing well on IV Lasix.  Continue IV Lasix, beta-blockade, losartan, and Nitropaste.  Creatinine stable.  Echo pending  2.  Pulmonary edema  3.  CAD: Continue aspirin, and beta-blockade.  Troponins 43, and  "56.  4.  Hypertension  5. Hyperlipidemia  6.  PAF: Historically has not been anticoagulation.  On IV Lovenox here.  Risk and benefits for long-term anticoagulation to be discussed.    IV Lasix 20 mg x's 1 today.  Labs in AM. Bnp in am    )10/29/2024  CANDELARIA Macias/Transcription:   \"Dictated utilizing Dragon dictation\".     "

## 2024-10-29 NOTE — PROGRESS NOTES
"[facility] Clinical Pharmacy Services: Enoxaparin Consult    Dinesh Churchill Sr. has a pharmacy consult to dose full-dose enoxaparin per CANDELARIA Stone's request.     Indication: A Fib - requiring full anticoagulation  Home Anticoagulation: none     Relevant clinical data and objective history reviewed:  89 y.o. male 188 cm (74.02\") 58.1 kg (128 lb 1.4 oz)   Body mass index is 16.44 kg/m².   Results from last 7 days   Lab Units 10/29/24  0255   PLATELETS 10*3/mm3 133*     Estimated Creatinine Clearance: 36.6 mL/min (by C-G formula based on SCr of 0.97 mg/dL).    Assessment/Plan    Will start patient on 60mg subcutaneous every 12 hours, adjusted for renal function. Consult order will be discontinued but pharmacy will continue to follow.     Marisol Villagomez, PharmD  Clinical Pharmacist    "

## 2024-10-29 NOTE — PROGRESS NOTES
Discharge Planning Assessment  [facility]     Patient Name: Dinesh Churchill Sr.  MRN: [id number]  Today's Date: 10/29/2024    Admit Date: 10/28/2024    Plan: Home with son pending progress   Discharge Needs Assessment       Row Name 10/29/24 1258       Living Environment    People in Home child(michael), adult    Name(s) of People in Home Dinesh daily    Current Living Arrangements home    Primary Care Provided by self;child(michael)    Provides Primary Care For no one    Family Caregiver if Needed child(michael), adult    Family Caregiver Names dinesh daily    Quality of Family Relationships supportive;involved;helpful    Able to Return to Prior Arrangements yes       Resource/Environmental Concerns    Resource/Environmental Concerns none    Transportation Concerns none       Transition Planning    Patient/Family Anticipates Transition to home with family       Discharge Needs Assessment    Equipment Currently Used at Home walker, standard;wheelchair                   Discharge Plan       Row Name 10/29/24 1257       Plan    Plan Home with son pending progress    Plan Comments Spoke with pt and son Dinesh Lopez at bedside to screen for DCP/needs.   Pt did confirm facesheet address as correct including [place].  Pt reports that he  maharaj slive with his son and plans to return home at [place].  Pt has used  recently unsure of agency but son will locate agency name and update CCP.  Pt has also been to [employer] in the past.  Pt does use a walker at home and a wheelchair for community ambulation.  pt's son does assist pt some in getting to a standing postion and keeping him steady when he walks with his walker.  Pt reports being independent with self care.  [facility].    Pt will need a PT eval order to confirm pt can return back home and will not need SNF at [place].                  Continued Care and Services - Admitted Since 10/28/2024    No active coordination exists for  this encounter.       Expected Discharge Date and Time       Expected Discharge Date Expected Discharge Time    Oct 30, 2024            Demographic Summary       Row Name 10/29/24 1257       General Information    Admission Type inpatient    Arrived From home    Referral Source admission list    Reason for Consult discharge planning    Preferred Language English                   Functional Status       Row Name 10/29/24 1258       Functional Status    Usual Activity Tolerance fair    Current Activity Tolerance fair       Functional Status, IADL    Medications assistive person    Meal Preparation assistive person    Housekeeping assistive person    Laundry assistive person    Shopping assistive person                   Psychosocial    No documentation.                  Abuse/Neglect    No documentation.                  Legal    No documentation.                  Substance Abuse    No documentation.                  Patient Forms    No documentation.                     Toshia Colbert MSW

## 2024-10-29 NOTE — PLAN OF CARE
Goal Outcome Evaluation:           Progress: no change  Outcome Evaluation: Maintained on 2L N/C, no c/o's of chest pain or pressure. Pt reports some mild exertional soa. Diuresing well with IV Lasix. Troponin noted this am. Safety maintained.

## 2024-10-29 NOTE — PROGRESS NOTES
"Daily progress note    Referring physician  Dr. GONZALES    Subjective  Awake and alert and feeling same like yesterday with no new complaints.  Patient denies any chest pain shortness of breath palpitation at the time of examination    History of present illness  89-year-old white male with history of paroxysmal atrial fibrillation Parkinson disease CVA coronary artery disease cardiomyopathy hypertension osteoarthritis and compression fracture at T12 L2 sacral fracture admitted to cardiology service with chest pain shortness of breath started this morning.  I am asked to follow patient for medical problem.  Patient is poor historian and very hard on hearing unable to give detailed history and most of the history obtained from the family chart nursing staff and old record.  Patient has no fever chills cough but does have sinus drainage and feels fatigue.     REVIEW OF SYSTEMS  All systems reviewed and negative except for those discussed in HPI.     PHYSICAL EXAM   Blood pressure 123/59, pulse 73, temperature 97.7 °F (36.5 °C), temperature source Oral, resp. rate 18, height 188 cm (74.02\"), weight 58.1 kg (128 lb 1.4 oz), SpO2 96%.    GENERAL: Awake and alert in no distress but very hard on hearing  SKIN: Warm, dry  HENT: Normocephalic, atraumatic  EYES: no scleral icterus  CV: regular rhythm, regular rate  RESPIRATORY: normal effort, moving air bilaterally  ABDOMEN: soft, nontender, nondistended bowel sounds positive  MUSCULOSKELETAL: Lateral lower extremity trace edema  NEURO: alert, moves all extremities, follows commands     LAB RESULTS  Lab Results (last 24 hours)       Procedure Component Value Units Date/Time    CBC & Differential [[id number]]  (Abnormal) Collected: 10/29/24 0255    Specimen: Blood Updated: 10/29/24 5488    Narrative:      The following orders were created for panel order CBC & Differential.  Procedure                               Abnormality         Status                     ---------               "                 -----------         ------                     CBC Auto Differential[[id number]]        Abnormal            Final result                 Please view results for these tests on the individual orders.    CBC Auto Differential [[id number]]  (Abnormal) Collected: 10/29/24 0255    Specimen: Blood Updated: 10/29/24 0451     WBC 4.85 10*3/mm3      RBC 3.67 10*6/mm3      Hemoglobin 12.7 g/dL      Hematocrit 38.1 %      .8 fL      MCH 34.6 pg      MCHC 33.3 g/dL      RDW 14.4 %      RDW-SD 54.2 fl      MPV 11.7 fL      Platelets 133 10*3/mm3      Neutrophil % 65.6 %      Lymphocyte % 22.7 %      Monocyte % 9.7 %      Eosinophil % 1.6 %      Basophil % 0.2 %      Immature Grans % 0.2 %      Neutrophils, Absolute 3.18 10*3/mm3      Lymphocytes, Absolute 1.10 10*3/mm3      Monocytes, Absolute 0.47 10*3/mm3      Eosinophils, Absolute 0.08 10*3/mm3      Basophils, Absolute 0.01 10*3/mm3      Immature Grans, Absolute 0.01 10*3/mm3      nRBC 0.4 /100 WBC     High Sensitivity Troponin T [[id number]]  (Abnormal) Collected: 10/29/24 0255    Specimen: Blood Updated: 10/29/24 0436     HS Troponin T 56 ng/L     Narrative:      High Sensitive Troponin T Reference Range:  <14.0 ng/L- Negative Female for AMI  <22.0 ng/L- Negative Male for AMI  >=14 - Abnormal Female indicating possible myocardial injury.  >=22 - Abnormal Male indicating possible myocardial injury.   Clinicians would have to utilize clinical acumen, EKG, Troponin, and serial changes to determine if it is an Acute Myocardial Infarction or myocardial injury due to an underlying chronic condition.         TSH [[id number]]  (Normal) Collected: 10/29/24 0255    Specimen: Blood Updated: 10/29/24 0434     TSH 2.900 uIU/mL     Comprehensive Metabolic Panel [[id number]]  (Abnormal) Collected: 10/29/24 0255    Specimen: Blood Updated: 10/29/24 0429     Glucose 86 mg/dL      BUN 29 mg/dL      Creatinine 0.97 mg/dL      Sodium 142 mmol/L      Potassium 3.6 mmol/L       Chloride 105 mmol/L      CO2 28.0 mmol/L      Calcium 8.7 mg/dL      Total Protein 5.8 g/dL      Albumin 3.4 g/dL      ALT (SGPT) 18 U/L      AST (SGOT) 20 U/L      Alkaline Phosphatase 66 U/L      Total Bilirubin 1.0 mg/dL      Globulin 2.4 gm/dL      A/G Ratio 1.4 g/dL      BUN/Creatinine Ratio 29.9     Anion Gap 9.0 mmol/L      eGFR 71.4 mL/min/1.73     Narrative:      GFR Normal >60  Chronic Kidney Disease <60  Kidney Failure <15    The GFR formula is only valid for adults with stable renal function between ages 18 and 70.    Lipid Panel [[id number]]  (Abnormal) Collected: 10/29/24 0255    Specimen: Blood Updated: 10/29/24 0428     Total Cholesterol 129 mg/dL      Triglycerides 66 mg/dL      HDL Cholesterol 37 mg/dL      LDL Cholesterol  78 mg/dL      VLDL Cholesterol 14 mg/dL      LDL/HDL Ratio 2.13    Narrative:      Cholesterol Reference Ranges  (U.S. Department of Health and Human Services ATP III Classifications)    Desirable          <200 mg/dL  Borderline High    200-239 mg/dL  High Risk          >240 mg/dL      Triglyceride Reference Ranges  (U.S. Department of Health and Human Services ATP III Classifications)    Normal           <150 mg/dL  Borderline High  150-199 mg/dL  High             200-499 mg/dL  Very High        >500 mg/dL    HDL Reference Ranges  (U.S. Department of Health and Human Services ATP III Classifications)    Low     <40 mg/dl (major risk factor for CHD)  High    >60 mg/dl ('negative' risk factor for CHD)        LDL Reference Ranges  (U.S. Department of Health and Human Services ATP III Classifications)    Optimal          <100 mg/dL  Near Optimal     100-129 mg/dL  Borderline High  130-159 mg/dL  High             160-189 mg/dL  Very High        >189 mg/dL    Hemoglobin A1c [[id number]]  (Normal) Collected: 10/29/24 0255    Specimen: Blood Updated: 10/29/24 0420     Hemoglobin A1C 5.60 %     Narrative:      Hemoglobin A1C Ranges:    Increased Risk for Diabetes  5.7% to 6.4%  Diabetes                      >= 6.5%  Diabetic Goal                < 7.0%          Imaging Results (Last 24 Hours)       ** No results found for the last 24 hours. **          Scan on 10/28/2024 1043 by [facility], [place]: ECG 12-LEAD         Author: -- Service: -- Author Type: --   Filed: Date of Service: Creation Time:   Status: (Other)   HEART RATE=66  bpm  RR Rfxytduk=784  ms  NE Interval=  ms  P Horizontal Axis=  deg  P Front Axis=  deg  QRSD Hnvtwobd=043  ms  QT Uxabdgfg=331  ms  DGaN=942  ms  QRS Axis=-52  deg  T Wave Axis=120  deg  - ABNORMAL ECG -  Atrial fibrillation- new  Ventricular premature complex  Intraventricular conduction delay          Current Facility-Administered Medications:     acetaminophen (TYLENOL) tablet 500 mg, 500 mg, Oral, 4x Daily PRN, Zoila Diaz APRN    amLODIPine (NORVASC) tablet 10 mg, 10 mg, Oral, Q24H, Iglesia Acosta MD, 10 mg at 10/29/24 1024    aspirin chewable tablet 81 mg, 81 mg, Oral, Daily, Zoila Diaz APRN, 81 mg at 10/29/24 1024    atorvastatin (LIPITOR) tablet 10 mg, 10 mg, Oral, Nightly, Iglesia Acosta MD, 10 mg at 10/28/24 2040    Enoxaparin Sodium (LOVENOX) syringe 60 mg, 60 mg, Subcutaneous, Q12H, Maninder Quezada MD, 60 mg at 10/29/24 1026    furosemide (LASIX) injection 20 mg, 20 mg, Intravenous, Once, Juliana Jesus APRN    ipratropium-albuterol (DUO-NEB) nebulizer solution 3 mL, 3 mL, Nebulization, Q4H PRN, Iglesia Acosta MD    losartan (COZAAR) tablet 50 mg, 50 mg, Oral, Daily, Zoila Diaz APRN, 50 mg at 10/29/24 1024    Menthol-Zinc Oxide 1 Application, 1 Application, Topical, Q12H, Maninder Quezada MD, 1 Application at 10/29/24 1229    metoprolol succinate XL (TOPROL-XL) 24 hr tablet 100 mg, 100 mg, Oral, Daily, Zoila Diaz APRN, 100 mg at 10/29/24 1024    nitroglycerin (NITROSTAT) ointment 1 inch, 1 inch, Topical, TID - Nitrates, Zoila Diaz APRN, 1 inch at 10/29/24 1024    pantoprazole (PROTONIX) EC tablet 40 mg, 40 mg,  Oral, Q AM, Feliz Acosta MD, 40 mg at 10/29/24 0538    [COMPLETED] Insert Peripheral IV, , , Once **AND** sodium chloride 0.9 % flush 10 mL, 10 mL, Intravenous, PRN, Johanna Peoples MD, 10 mL at 10/28/24 2040        ASSESSMENT  Acute on chronic systolic and diastolic congestive heart failure  Coronary artery disease  Paroxysmal atrial fibrillation  Hypertension  Hyperlipidemia  Severe hearing loss  Multiple spine compression fractures    PLAN  CPM  Continue IV diuresis  Continue aspirin nitroglycerin Toprol-XL Cozaar Norvasc and Lipitor  Continue anticoagulation   Serial cardiac enzymes and EKG  Strict I's and O's and daily weight  2D echo pending  Adjust home medications  Stress ulcer DVT prophylaxis  Supportive care  Discussed with nursing staff  Follow closely further recommendation current hospital course    FELIZ ACOSTA MD    Copied text in this note has been reviewed and is accurate as of 10/29/24

## 2024-10-29 NOTE — PROGRESS NOTES
Nutrition Services    Patient Name:  Dinesh Churchill Sr.  YOB: 1928  MRN: [id number]  Admit Date:  10/28/2024    Assessment Date:  10/29/24    Summary: 89 yo male adm with CHF, CAD, PAF, HTN, HLD, Severe Hearing Loss, Multiple Spine Compression Fx, Parkinson's, CVA      CLINICAL NUTRITION ASSESSMENT      Reason for Assessment BMI     Diagnosis/Problem   Very thin male   Medical/Surgical History Past Medical History:   Diagnosis Date    Abnormal ECG     Abnormal MRI     Acute frontal sinusitis     Arthritis     Chronic fatigue     Coronary artery disease     Difficulty walking     Dizziness     Head injury     Headache 01/21/2023    Hearing aid worn     left    Hearing loss     Hyperlipidemia     Hypertension     Idiopathic peripheral neuropathy 08/29/2023    Kidney stones     Macrocytosis     Neoplasm of skin     Osteoporosis     Prediabetes     Vitamin D deficiency        Past Surgical History:   Procedure Laterality Date    CARDIAC CATHETERIZATION N/A 05/22/2017    Procedure: Left Heart Cath;  Surgeon: Maninder Quezada MD;  Location: [facility] CATH INVASIVE LOCATION;  Service:     CARDIAC CATHETERIZATION N/A 05/23/2017    Procedure: Stent BMS coronary;  Surgeon: Maninder Quezada MD;  Location: [facility] CATH INVASIVE LOCATION;  Service:     CARDIAC CATHETERIZATION N/A 08/08/2019    Procedure: Left Heart Cath;  Surgeon: Maninder Quezada MD;  Location: [facility] CATH INVASIVE LOCATION;  Service: Cardiology    CARDIAC CATHETERIZATION N/A 08/08/2019    Procedure: Left ventriculography;  Surgeon: Maninder Quezada MD;  Location: [facility] CATH INVASIVE LOCATION;  Service: Cardiology    CARDIAC CATHETERIZATION N/A 08/08/2019    Procedure: Coronary angiography;  Surgeon: Maninder Quezada MD;  Location: [facility] CATH INVASIVE LOCATION;  Service: Cardiology    CARDIAC CATHETERIZATION N/A 08/20/2019    Procedure: CORONARY ANGIOGRAPHY;  Surgeon: Maninder Quezada MD;  Location: [facility] CATH  "INVASIVE LOCATION;  Service: Cardiovascular    CARDIAC CATHETERIZATION N/A 08/20/2019    Procedure: Stent BMS coronary;  Surgeon: Maninder Quezada MD;  Location:  [facility] CATH INVASIVE LOCATION;  Service: Cardiovascular    CARDIAC ELECTROPHYSIOLOGY PROCEDURE N/A 11/08/2019    Procedure: Loop insertion;  Surgeon: Maninder Quezada MD;  Location:  [facility] CATH INVASIVE LOCATION;  Service: Cardiovascular    HERNIA REPAIR      x 2    DE RT/LT HEART CATHETERS N/A 05/23/2017    Procedure: Percutaneous Coronary Intervention;  Surgeon: Maninder Quezada MD;  Location:  [facility] CATH INVASIVE LOCATION;  Service: Cardiovascular        Anthropometrics        Current Height  Current Weight  BMI kg/m2 Height: 188 cm (74.02\")  Weight: 58.1 kg (128 lb 1.4 oz) (10/29/24 0510)  Body mass index is 16.44 kg/m².   Adjusted BMI (if applicable)    BMI Category Underweight (18.4 or below)   Ideal Body Weight (IBW) 190# +/- 10%   Usual Body Weight (UBW) Approx stable ~180 about 15 months ago   Weight Trend Loss   Weight History Wt Readings from Last 30 Encounters:   10/29/24 0510 58.1 kg (128 lb 1.4 oz)   10/28/24 1022 63.9 kg (140 lb 14 oz)   06/12/24 1445 63.9 kg (140 lb 14 oz)   12/21/23 1033 72 kg (158 lb 11.7 oz)   12/21/23 1137 67.1 kg (148 lb)   08/29/23 1027 72.6 kg (160 lb)   07/26/23 1442 72.9 kg (160 lb 12.8 oz)   06/29/23 1110 77.6 kg (171 lb)   03/14/23 1124 77.6 kg (171 lb)   02/28/23 1525 77.6 kg (171 lb)   01/27/23 1830 79.6 kg (175 lb 7.8 oz)   01/27/23 1616 80.7 kg (178 lb)   01/20/23 2351 80.7 kg (178 lb)   12/29/22 1135 78.9 kg (174 lb)   12/13/22 1012 79.8 kg (176 lb)   09/13/22 1025 78.5 kg (173 lb)   07/27/22 1504 79.8 kg (176 lb)   06/23/22 1247 83.9 kg (185 lb)   06/23/22 1303 81.2 kg (179 lb)   03/30/22 1104 83.9 kg (185 lb)   03/23/22 1106 84.8 kg (187 lb)   03/16/22 1114 84.8 kg (187 lb)   03/15/22 1006 84.8 kg (187 lb)   02/22/22 1312 84.8 kg (187 lb)   02/16/22 1302 84.8 kg (187 lb)   01/19/22 1046 83 " kg (183 lb)   12/23/21 1208 84.4 kg (186 lb)   11/23/21 1512 84.8 kg (187 lb)   11/15/21 1042 78.9 kg (174 lb)   09/14/21 1137 85.3 kg (188 lb)   07/12/21 0938 84.4 kg (186 lb)   06/24/21 1141 84.4 kg (186 lb)      --  Labs       Pertinent Labs    Results from last 7 days   Lab Units 10/29/24  0255 10/28/24  1059   SODIUM mmol/L 142 144   POTASSIUM mmol/L 3.6 4.1   CHLORIDE mmol/L 105 111*   CO2 mmol/L 28.0 25.1   BUN mg/dL 29* 25*   CREATININE mg/dL 0.97 0.80   CALCIUM mg/dL 8.7 8.4   BILIRUBIN mg/dL 1.0 1.1   ALK PHOS U/L 66 60   ALT (SGPT) U/L 18 15   AST (SGOT) U/L 20 20   GLUCOSE mg/dL 86 115*     Results from last 7 days   Lab Units 10/29/24  0255 10/28/24  1059   MAGNESIUM mg/dL  --  2.0   PHOSPHORUS mg/dL  --  2.9   HEMOGLOBIN g/dL 12.7* 12.0*   HEMATOCRIT % 38.1 38.3   WBC 10*3/mm3 4.85 6.20   TRIGLYCERIDES mg/dL 66  --    ALBUMIN g/dL 3.4* 3.4*     Results from last 7 days   Lab Units 10/29/24  0255 10/28/24  1059   INR   --  1.07   APTT seconds  --  27.0   PLATELETS 10*3/mm3 133* 132*     COVID19   Date Value Ref Range Status   10/28/2024 Not Detected Not Detected - Ref. Range Final     Lab Results   Component Value Date    HGBA1C 5.60 10/29/2024          Medications           Scheduled Medications amLODIPine, 10 mg, Oral, Q24H  aspirin, 81 mg, Oral, Daily  atorvastatin, 10 mg, Oral, Nightly  enoxaparin, 60 mg, Subcutaneous, Q12H  losartan, 50 mg, Oral, Daily  Menthol-Zinc Oxide, 1 Application, Topical, Q12H  metoprolol succinate XL, 100 mg, Oral, Daily  nitroglycerin, 1 inch, Topical, TID - Nitrates  pantoprazole, 40 mg, Oral, Q AM       Infusions     PRN Medications   acetaminophen    ipratropium-albuterol    [COMPLETED] Insert Peripheral IV **AND** sodium chloride     Physical Findings          General Findings confused, disheveled , frail, hard of hearing, loss of muscle mass, loss of subcutaneous fat, underweight   Oral/Mouth Cavity tooth or teeth missing   Edema  1+ (trace), 2+ (mild)    Gastrointestinal WDL   Skin  bruising, MASD, skin tear   Tubes/Drains/Lines none   NFPE Unable to perform due to: patient using restroom for extended period   --  Current Nutrition Orders & Evaluation of Intake       Oral Nutrition     Food Allergies NKFA   Current PO Diet Diet: Regular/House; Fluid Consistency: Thin (IDDSI 0)   Supplement n/a   PO Evaluation     % PO Intake 75% lunch    Factors Affecting Intake: altered mental status   --  PES STATEMENT / NUTRITION DIAGNOSIS      Nutrition Dx Problem  Problem: Inadequate Protein Energy Intake  Etiology: Medical Diagnosis - CHF    Signs/Symptoms: BMI 16.44, 67% IBW     NUTRITION INTERVENTION / PLAN OF CARE      Intervention Goal(s) Reduce/improve symptoms, Tolerate PO , and Accepts oral nutrition supplement         RD Intervention/Action Supplement provided, Encourage intake, and Continue to monitor   --      Prescription/Orders:       PO Diet       Supplements       Enteral Nutrition       Parenteral Nutrition    New Prescription Ordered? Yes, will add Boost Original BID (Provides 480 kcals, 20 g protein if consumed)      --      Monitor/Evaluation PO intake, Supplement intake, Weight, Skin status   Discharge Plan/Needs Pending clinical course   --    RD to follow per protocol.      Electronically signed by:  Jay Avila RD  10/29/24 14:05 EDT

## 2024-10-29 NOTE — NURSING NOTE
CWCN: We see patient per request of floor nurse regarding skin issue on rt forearm and Coccyx area. Patient alert, able to turn in for assistance, upon assessment we could see redness, blanchable, on Coccyx/Buttocks area, related to MASD, Calmoseptine was ordered.  In addition on rt Forearm partial-thickness skin loss, with dry serosanguineous drainage of scat amount was seen, possibly related to skin neoplasm. Area should be cleansed with N/S, 4x4 gauze and Kerlix warp applied.  The spine and bilateral heels are with intact skin and normal coloration, protective padding should be used to facilitate cushioning, the heels must remain off-loaded at all times.  Wound care order and pressure ulcer preventives  measures have been implemented into Epic.   Please add Accumax pump for pressure relief.  Re-consult for any additional needs.

## 2024-10-30 LAB
ALBUMIN SERPL-MCNC: 3 G/DL (ref 3.5–5.2)
ALBUMIN/GLOB SERPL: 1.1 G/DL
ALP SERPL-CCNC: 55 U/L (ref 39–117)
ALT SERPL W P-5'-P-CCNC: 13 U/L (ref 1–41)
ANION GAP SERPL CALCULATED.3IONS-SCNC: 10.3 MMOL/L (ref 5–15)
AORTIC DIMENSIONLESS INDEX: 0.5 (DI)
ASCENDING AORTA: 3.8 CM
AST SERPL-CCNC: 17 U/L (ref 1–40)
BASOPHILS # BLD AUTO: 0.02 10*3/MM3 (ref 0–0.2)
BASOPHILS NFR BLD AUTO: 0.4 % (ref 0–1.5)
BH CV ECHO MEAS - ACS: 2.3 CM
BH CV ECHO MEAS - AI P1/2T: 508.2 MSEC
BH CV ECHO MEAS - AO MAX PG: 7 MMHG
BH CV ECHO MEAS - AO MEAN PG: 4.1 MMHG
BH CV ECHO MEAS - AO ROOT DIAM: 3.7 CM
BH CV ECHO MEAS - AO V2 MAX: 132.5 CM/SEC
BH CV ECHO MEAS - AO V2 VTI: 28.2 CM
BH CV ECHO MEAS - AVA(I,D): 2.13 CM2
BH CV ECHO MEAS - EDV(CUBED): 239.6 ML
BH CV ECHO MEAS - EDV(MOD-SP2): 120 ML
BH CV ECHO MEAS - EDV(MOD-SP4): 115 ML
BH CV ECHO MEAS - EF(MOD-BP): 43.8 %
BH CV ECHO MEAS - EF(MOD-SP2): 46.7 %
BH CV ECHO MEAS - EF(MOD-SP4): 44.3 %
BH CV ECHO MEAS - ESV(CUBED): 154.5 ML
BH CV ECHO MEAS - ESV(MOD-SP2): 64 ML
BH CV ECHO MEAS - ESV(MOD-SP4): 64 ML
BH CV ECHO MEAS - FS: 13.6 %
BH CV ECHO MEAS - IVS/LVPW: 1.01 CM
BH CV ECHO MEAS - IVSD: 1.17 CM
BH CV ECHO MEAS - LAT PEAK E' VEL: 4.7 CM/SEC
BH CV ECHO MEAS - LV DIASTOLIC VOL/BSA (35-75): 64 CM2
BH CV ECHO MEAS - LV MASS(C)D: 320.7 GRAMS
BH CV ECHO MEAS - LV MAX PG: 3.6 MMHG
BH CV ECHO MEAS - LV MEAN PG: 1.71 MMHG
BH CV ECHO MEAS - LV SYSTOLIC VOL/BSA (12-30): 35.6 CM2
BH CV ECHO MEAS - LV V1 MAX: 95.2 CM/SEC
BH CV ECHO MEAS - LV V1 VTI: 15.7 CM
BH CV ECHO MEAS - LVIDD: 6.2 CM
BH CV ECHO MEAS - LVIDS: 5.4 CM
BH CV ECHO MEAS - LVOT AREA: 3.8 CM2
BH CV ECHO MEAS - LVOT DIAM: 2.2 CM
BH CV ECHO MEAS - LVPWD: 1.16 CM
BH CV ECHO MEAS - MED PEAK E' VEL: 3.3 CM/SEC
BH CV ECHO MEAS - MR MAX PG: 60.6 MMHG
BH CV ECHO MEAS - MR MAX VEL: 389.3 CM/SEC
BH CV ECHO MEAS - MV DEC SLOPE: 99.9 CM/SEC2
BH CV ECHO MEAS - MV DEC TIME: 0.46 SEC
BH CV ECHO MEAS - MV E MAX VEL: 53.6 CM/SEC
BH CV ECHO MEAS - MV MAX PG: 1.35 MMHG
BH CV ECHO MEAS - MV MEAN PG: 0.66 MMHG
BH CV ECHO MEAS - MV P1/2T: 166.1 MSEC
BH CV ECHO MEAS - MV V2 VTI: 18.6 CM
BH CV ECHO MEAS - MVA(P1/2T): 1.32 CM2
BH CV ECHO MEAS - MVA(VTI): 3.2 CM2
BH CV ECHO MEAS - PA ACC TIME: 0.06 SEC
BH CV ECHO MEAS - PA V2 MAX: 67.9 CM/SEC
BH CV ECHO MEAS - PI END-D VEL: 155.3 CM/SEC
BH CV ECHO MEAS - RAP SYSTOLE: 3 MMHG
BH CV ECHO MEAS - RV MAX PG: 1.06 MMHG
BH CV ECHO MEAS - RV V1 MAX: 51.4 CM/SEC
BH CV ECHO MEAS - RV V1 VTI: 8.7 CM
BH CV ECHO MEAS - RVSP: 28 MMHG
BH CV ECHO MEAS - SV(LVOT): 60 ML
BH CV ECHO MEAS - SV(MOD-SP2): 56 ML
BH CV ECHO MEAS - SV(MOD-SP4): 51 ML
BH CV ECHO MEAS - SVI(LVOT): 33.4 ML/M2
BH CV ECHO MEAS - SVI(MOD-SP2): 31.2 ML/M2
BH CV ECHO MEAS - SVI(MOD-SP4): 28.4 ML/M2
BH CV ECHO MEAS - TAPSE (>1.6): 1.7 CM
BH CV ECHO MEAS - TR MAX PG: 25.4 MMHG
BH CV ECHO MEAS - TR MAX VEL: 251.9 CM/SEC
BH CV ECHO MEASUREMENTS AVERAGE E/E' RATIO: 13.4
BH CV XLRA - RV BASE: 2.6 CM
BH CV XLRA - RV LENGTH: 5.6 CM
BH CV XLRA - RV MID: 2.6 CM
BH CV XLRA - TDI S': 11 CM/SEC
BILIRUB SERPL-MCNC: 1.2 MG/DL (ref 0–1.2)
BUN SERPL-MCNC: 38 MG/DL (ref 8–23)
BUN/CREAT SERPL: 38 (ref 7–25)
CALCIUM SPEC-SCNC: 8.2 MG/DL (ref 8.2–9.6)
CHLORIDE SERPL-SCNC: 104 MMOL/L (ref 98–107)
CO2 SERPL-SCNC: 27.7 MMOL/L (ref 22–29)
CREAT SERPL-MCNC: 1 MG/DL (ref 0.76–1.27)
DEPRECATED RDW RBC AUTO: 54.3 FL (ref 37–54)
EGFRCR SERPLBLD CKD-EPI 2021: 68.9 ML/MIN/1.73
EOSINOPHIL # BLD AUTO: 0.11 10*3/MM3 (ref 0–0.4)
EOSINOPHIL NFR BLD AUTO: 2.3 % (ref 0.3–6.2)
ERYTHROCYTE [DISTWIDTH] IN BLOOD BY AUTOMATED COUNT: 14.3 % (ref 12.3–15.4)
GEN 5 2HR TROPONIN T REFLEX: 57 NG/L
GLOBULIN UR ELPH-MCNC: 2.7 GM/DL
GLUCOSE SERPL-MCNC: 90 MG/DL (ref 65–99)
HCT VFR BLD AUTO: 36.3 % (ref 37.5–51)
HGB BLD-MCNC: 12 G/DL (ref 13–17.7)
IMM GRANULOCYTES # BLD AUTO: 0.01 10*3/MM3 (ref 0–0.05)
IMM GRANULOCYTES NFR BLD AUTO: 0.2 % (ref 0–0.5)
LEFT ATRIUM VOLUME INDEX: 61.7 ML/M2
LYMPHOCYTES # BLD AUTO: 1.45 10*3/MM3 (ref 0.7–3.1)
LYMPHOCYTES NFR BLD AUTO: 30.1 % (ref 19.6–45.3)
MAGNESIUM SERPL-MCNC: 2 MG/DL (ref 1.7–2.3)
MCH RBC QN AUTO: 34.7 PG (ref 26.6–33)
MCHC RBC AUTO-ENTMCNC: 33.1 G/DL (ref 31.5–35.7)
MCV RBC AUTO: 104.9 FL (ref 79–97)
MONOCYTES # BLD AUTO: 0.48 10*3/MM3 (ref 0.1–0.9)
MONOCYTES NFR BLD AUTO: 10 % (ref 5–12)
NEUTROPHILS NFR BLD AUTO: 2.74 10*3/MM3 (ref 1.7–7)
NEUTROPHILS NFR BLD AUTO: 57 % (ref 42.7–76)
PLATELET # BLD AUTO: 141 10*3/MM3 (ref 140–450)
PMV BLD AUTO: 11 FL (ref 6–12)
POTASSIUM SERPL-SCNC: 3.3 MMOL/L (ref 3.5–5.2)
PROT SERPL-MCNC: 5.7 G/DL (ref 6–8.5)
RBC # BLD AUTO: 3.46 10*6/MM3 (ref 4.14–5.8)
SINUS: 3.8 CM
SODIUM SERPL-SCNC: 142 MMOL/L (ref 136–145)
STJ: 4 CM
TROPONIN T DELTA: -4 NG/L
TROPONIN T SERPL HS-MCNC: 61 NG/L
WBC NRBC COR # BLD AUTO: 4.81 10*3/MM3 (ref 3.4–10.8)

## 2024-10-30 PROCEDURE — 80053 COMPREHEN METABOLIC PANEL: CPT | Performed by: NURSE PRACTITIONER

## 2024-10-30 PROCEDURE — 97110 THERAPEUTIC EXERCISES: CPT

## 2024-10-30 PROCEDURE — 25010000002 ENOXAPARIN PER 10 MG: Performed by: INTERNAL MEDICINE

## 2024-10-30 PROCEDURE — 97162 PT EVAL MOD COMPLEX 30 MIN: CPT

## 2024-10-30 PROCEDURE — 84484 ASSAY OF TROPONIN QUANT: CPT | Performed by: HOSPITALIST

## 2024-10-30 PROCEDURE — 85025 COMPLETE CBC W/AUTO DIFF WBC: CPT | Performed by: NURSE PRACTITIONER

## 2024-10-30 PROCEDURE — 99232 SBSQ HOSP IP/OBS MODERATE 35: CPT

## 2024-10-30 PROCEDURE — 83735 ASSAY OF MAGNESIUM: CPT | Performed by: NURSE PRACTITIONER

## 2024-10-30 RX ORDER — POTASSIUM CHLORIDE 750 MG/1
40 TABLET, FILM COATED, EXTENDED RELEASE ORAL ONCE
Status: COMPLETED | OUTPATIENT
Start: 2024-10-30 | End: 2024-10-30

## 2024-10-30 RX ORDER — POTASSIUM CHLORIDE 750 MG/1
10 TABLET, FILM COATED, EXTENDED RELEASE ORAL DAILY
Status: DISCONTINUED | OUTPATIENT
Start: 2024-10-30 | End: 2024-10-30

## 2024-10-30 RX ORDER — AMLODIPINE BESYLATE 5 MG/1
5 TABLET ORAL
Status: DISCONTINUED | OUTPATIENT
Start: 2024-10-31 | End: 2024-10-31 | Stop reason: HOSPADM

## 2024-10-30 RX ORDER — FUROSEMIDE 20 MG/1
20 TABLET ORAL DAILY
Status: DISCONTINUED | OUTPATIENT
Start: 2024-10-30 | End: 2024-10-31 | Stop reason: HOSPADM

## 2024-10-30 RX ADMIN — ANORECTAL OINTMENT 1 APPLICATION: 15.7; .44; 24; 20.6 OINTMENT TOPICAL at 21:27

## 2024-10-30 RX ADMIN — ENOXAPARIN SODIUM 60 MG: 100 INJECTION SUBCUTANEOUS at 21:27

## 2024-10-30 RX ADMIN — METOPROLOL SUCCINATE 100 MG: 100 TABLET, EXTENDED RELEASE ORAL at 08:23

## 2024-10-30 RX ADMIN — POTASSIUM CHLORIDE 40 MEQ: 750 TABLET, EXTENDED RELEASE ORAL at 14:32

## 2024-10-30 RX ADMIN — PANTOPRAZOLE SODIUM 40 MG: 40 TABLET, DELAYED RELEASE ORAL at 06:42

## 2024-10-30 RX ADMIN — ENOXAPARIN SODIUM 60 MG: 100 INJECTION SUBCUTANEOUS at 14:31

## 2024-10-30 RX ADMIN — ANORECTAL OINTMENT 1 APPLICATION: 15.7; .44; 24; 20.6 OINTMENT TOPICAL at 08:24

## 2024-10-30 RX ADMIN — AMLODIPINE BESYLATE 10 MG: 10 TABLET ORAL at 08:23

## 2024-10-30 RX ADMIN — FUROSEMIDE 20 MG: 20 TABLET ORAL at 14:32

## 2024-10-30 RX ADMIN — ATORVASTATIN CALCIUM 10 MG: 20 TABLET, FILM COATED ORAL at 21:27

## 2024-10-30 RX ADMIN — LOSARTAN POTASSIUM 50 MG: 50 TABLET, FILM COATED ORAL at 08:23

## 2024-10-30 RX ADMIN — ASPIRIN 81 MG: 81 TABLET, CHEWABLE ORAL at 08:23

## 2024-10-30 RX ADMIN — ISOSORBIDE MONONITRATE 30 MG: 30 TABLET, EXTENDED RELEASE ORAL at 08:23

## 2024-10-30 NOTE — THERAPY EVALUATION
Patient Name: Dinesh Churchill Sr.  : 1928    MRN: [id number]                              Today's Date: 10/30/2024       Admit Date: 10/28/2024    Visit Dx:     ICD-10-CM ICD-9-CM   1. Acute hypoxic respiratory failure  J96.01 518.81   2. Acute on chronic congestive heart failure, unspecified heart failure type  I50.9 428.0   3. Chest pain, unspecified type  R07.9 786.50   4. Acute pulmonary edema  J81.0 518.4     Patient Active Problem List   Diagnosis    Abnormal magnetic resonance imaging study    Arthritis    Osteoarthritis of cervical spine    Diplopia    Hearing loss    Neoplasm of uncertain behavior of skin    Prediabetes    Vitamin D deficiency    Chronic fatigue    History of supraventricular tachycardia    Essential hypertension    B12 deficiency    Abnormal cardiac function test    Cardiomyopathy    Coronary artery disease involving native coronary artery of native heart without angina pectoris    History of coronary artery stent placement    History of renal calculi    Dyslipidemia    Macrocytic anemia    Precordial pain    Parkinson's disease    History of CVA (cerebrovascular accident)    Paroxysmal atrial fibrillation    Otalgia, left    Chest pain    Abnormal stress test    Spontaneous pneumothorax    Chest pain    Acute systolic (congestive) heart failure    Thrombocytopenia    Pneumonia of left lower lobe due to infectious organism    Syncope and collapse    Status post placement of implantable loop recorder    Long term (current) use of antithrombotics/antiplatelets    Stuttering    Fall    Closed fracture of multiple ribs of right side    Chest wall pain    Closed fracture of phalanx of toe of left foot    Headache    Sudden onset of severe headache    Fall in home, initial encounter    Compression fracture of L2 vertebra with routine healing    Weakness of both legs    Idiopathic peripheral neuropathy    L2 vertebral fracture    Compression fracture of T12 vertebra with routine healing     Sacral fracture, closed    Senile osteoporosis    Acute hypoxic respiratory failure     Past Medical History:   Diagnosis Date    Abnormal ECG     Abnormal MRI     Acute frontal sinusitis     Arthritis     Chronic fatigue     Coronary artery disease     Difficulty walking     Dizziness     Head injury     Headache 01/21/2023    Hearing aid worn     left    Hearing loss     Hyperlipidemia     Hypertension     Idiopathic peripheral neuropathy 08/29/2023    Kidney stones     Macrocytosis     Neoplasm of skin     Osteoporosis     Prediabetes     Vitamin D deficiency      Past Surgical History:   Procedure Laterality Date    CARDIAC CATHETERIZATION N/A 05/22/2017    Procedure: Left Heart Cath;  Surgeon: Maninder Quezada MD;  Location: [facility] CATH INVASIVE LOCATION;  Service:     CARDIAC CATHETERIZATION N/A 05/23/2017    Procedure: Stent BMS coronary;  Surgeon: Maninder Quezada MD;  Location: [facility] CATH INVASIVE LOCATION;  Service:     CARDIAC CATHETERIZATION N/A 08/08/2019    Procedure: Left Heart Cath;  Surgeon: Maninder Quezada MD;  Location: [facility] CATH INVASIVE LOCATION;  Service: Cardiology    CARDIAC CATHETERIZATION N/A 08/08/2019    Procedure: Left ventriculography;  Surgeon: Maninder Quezada MD;  Location: [facility] CATH INVASIVE LOCATION;  Service: Cardiology    CARDIAC CATHETERIZATION N/A 08/08/2019    Procedure: Coronary angiography;  Surgeon: Maninder Quezada MD;  Location: [facility] CATH INVASIVE LOCATION;  Service: Cardiology    CARDIAC CATHETERIZATION N/A 08/20/2019    Procedure: CORONARY ANGIOGRAPHY;  Surgeon: Maninder Quezada MD;  Location: [facility] CATH INVASIVE LOCATION;  Service: Cardiovascular    CARDIAC CATHETERIZATION N/A 08/20/2019    Procedure: Stent BMS coronary;  Surgeon: Maninder Quezada MD;  Location: [facility] CATH INVASIVE LOCATION;  Service: Cardiovascular    CARDIAC ELECTROPHYSIOLOGY PROCEDURE N/A 11/08/2019    Procedure: Loop insertion;  Surgeon:  Maninder Quezada MD;  Location:  [facility] CATH INVASIVE LOCATION;  Service: Cardiovascular    HERNIA REPAIR      x 2    NV RT/LT HEART CATHETERS N/A 05/23/2017    Procedure: Percutaneous Coronary Intervention;  Surgeon: Maninder Quezada MD;  Location:  [facility] CATH INVASIVE LOCATION;  Service: Cardiovascular      General Information       Row Name 10/30/24 1545          Physical Therapy Time and Intention    Document Type evaluation  -CB     Mode of Treatment individual therapy;physical therapy  -CB       Row Name 10/30/24 154          General Information    Patient Profile Reviewed yes  -CB     Prior Level of Function independent:;gait;transfer;bed mobility  pt states he uses rwx to walk around home and uses wc when going to the dr  -CB     Existing Precautions/Restrictions fall  -CB     Barriers to Rehab hearing deficit  -CB       Row Name 10/30/24 1543          Living Environment    People in Home child(michael), adult  -CB       Row Name 10/30/24 1540          Home Main Entrance    Number of Stairs, Main Entrance five  -CB     Stair Railings, Main Entrance railings safe and in good condition  -CB       Row Name 10/30/24 1541          Cognition    Orientation Status (Cognition) oriented x 3  -CB       Row Name 10/30/24 1544          Safety Issues/Impairments Affecting Functional Mobility    Safety Issues Affecting Function (Mobility) insight into deficits/self-awareness;problem-solving;safety precaution awareness  -CB     Impairments Affecting Function (Mobility) balance;endurance/activity tolerance;strength;postural/trunk control  -CB               User Key  (r) = Recorded By, (t) = Taken By, (c) = Cosigned By      Initials Name Provider Type    CB Vanessa Helton PT Physical Therapist                   Mobility       Row Name 10/30/24 8529          Bed Mobility    Bed Mobility supine-sit;sit-supine  -CB     Supine-Sit [place] (Bed Mobility) standby assist  -CB     Sit-Supine [place] (Bed Mobility)  standby assist  -CB     Assistive Device (Bed Mobility) head of bed elevated;leg ;bed rails  -CB       Row Name 10/30/24 1541          Sit-Stand Transfer    Sit-Stand [place] (Transfers) moderate assist (50% patient effort);maximum assist (25% patient effort);verbal cues  -CB     Assistive Device (Sit-Stand Transfers) walker, front-wheeled  -CB     Comment, (Sit-Stand Transfer) x4 STS; unable to take side steps; severe posterior lean requiring maxA for balance  -CB               User Key  (r) = Recorded By, (t) = Taken By, (c) = Cosigned By      Initials Name Provider Type    CB Vanessa Helton, PT Physical Therapist                   Obj/Interventions       Row Name 10/30/24 1547          Range of Motion Comprehensive    General Range of Motion bilateral lower extremity ROM WNL  -CB       Row Name 10/30/24 1547          Strength Comprehensive (MMT)    General Manual Muscle Testing (MMT) Assessment lower extremity strength deficits identified  -CB       Row Name 10/30/24 1547          Motor Skills    Therapeutic Exercise --  AP, HS, SLR x10 reps  -CB       Row Name 10/30/24 1547          Balance    Balance Assessment standing static balance;sitting static balance  -CB     Static Sitting Balance contact guard  -CB     Static Standing Balance moderate assist;maximum assist;verbal cues  -CB     Position/Device Used, Standing Balance supported;walker, front-wheeled  -CB     Balance Interventions sitting;standing;sit to stand;supported;static;dynamic;minimal challenge  -CB     Comment, Balance severe posterior lean in standing and pt unable to correct  -CB       Row Name 10/30/24 1547          Sensory Assessment (Somatosensory)    Sensory Assessment (Somatosensory) sensation intact  -CB               User Key  (r) = Recorded By, (t) = Taken By, (c) = Cosigned By      Initials Name Provider Type    Vanessa Corrales, PT Physical Therapist                   Goals/Plan       Row Name 10/30/24 1548          Bed  Mobility Goal 1 (PT)    Activity/Assistive Device (Bed Mobility Goal 1, PT) bed mobility activities, all  -CB     [place] Level/Cues Needed (Bed Mobility Goal 1, PT) modified independence  -CB     Time Frame (Bed Mobility Goal 1, PT) long term goal (LTG);1 week  -CB       Row Name 10/30/24 1548          Transfer Goal 1 (PT)    Activity/Assistive Device (Transfer Goal 1, PT) sit-to-stand/stand-to-sit;bed-to-chair/chair-to-bed;walker, rolling  -CB     [place] Level/Cues Needed (Transfer Goal 1, PT) contact guard required  -CB     Time Frame (Transfer Goal 1, PT) long term goal (LTG);1 week  -CB       Row Name 10/30/24 154          Gait Training Goal 1 (PT)    Activity/Assistive Device (Gait Training Goal 1, PT) gait (walking locomotion);assistive device use;improve balance and speed;increase endurance/gait distance;decrease fall risk;diminish gait deviation  -CB     [place] Level (Gait Training Goal 1, PT) minimum assist (75% or more patient effort)  -CB     Distance (Gait Training Goal 1, PT) 15ft  -CB     Time Frame (Gait Training Goal 1, PT) long term goal (LTG);1 week  -CB       Row Name 10/30/24 1541          Therapy Assessment/Plan (PT)    Planned Therapy Interventions (PT) balance training;bed mobility training;gait training;home exercise program;patient/family education;strengthening;transfer training;postural re-education  -CB               User Key  (r) = Recorded By, (t) = Taken By, (c) = Cosigned By      Initials Name Provider Type    Vanessa Corrales, PT Physical Therapist                   Clinical Impression       Row Name 10/30/24 1548          Pain    Pretreatment Pain Rating 0/10 - no pain  -CB     Posttreatment Pain Rating 0/10 - no pain  -CB       Row Name 10/30/24 1546          Plan of Care Review    Plan of Care Reviewed With patient  -CB     Outcome Evaluation Patient is a 89 y.o. male admitted to [facility] for acute on chronic congestive heart failure on 10/28/2024. PMHx includes  CAD, HTN,hyperlipidemia. Patient reports he lives with his son and walks within home using rwx. He states he uses wc for MD appointments. He has 5 SOFY home with B handrails. Today, patient performed bed mobility with SBA, required mod-maxA for STS to rwx and severe posterior lean in standing. Unable to correct with VC and tactile cues. Balance, strength, activity tolerance, postural control deficits noted. Patient may benefit from skilled PT services to address functional deficits and improve level of independence prior to discharge. Anticipate SNF upon DC.  -CB       Row Name 10/30/24 1548          Therapy Assessment/Plan (PT)    Rehab Potential (PT) good  -CB     Criteria for Skilled Interventions Met (PT) yes  -CB     Therapy Frequency (PT) 5 times/wk  -CB       Row Name 10/30/24 1548          Positioning and Restraints    Pre-Treatment Position in bed  -CB     Post Treatment Position bed  -CB     In Bed notified nsg;fowlers;call light within reach;encouraged to call for assist;exit alarm on;side rails up x3  -CB               User Key  (r) = Recorded By, (t) = Taken By, (c) = Cosigned By      Initials Name Provider Type    Vanessa Corrales, PT Physical Therapist                   Outcome Measures       Row Name 10/30/24 1549 10/30/24 0800       How much help from another person do you currently need...    Turning from your back to your side while in flat bed without using bedrails? 3  -CB 3  -HS    Moving from lying on back to sitting on the side of a flat bed without bedrails? 3  -CB 3  -HS    Moving to and from a bed to a chair (including a wheelchair)? 2  -CB 2  -HS    Standing up from a chair using your arms (e.g., wheelchair, bedside chair)? 2  -CB 2  -HS    Climbing 3-5 steps with a railing? 1  -CB 2  -HS    To walk in hospital room? 1  -CB 2  -HS    AM-PAC 6 Clicks Score (PT) 12  -CB 14  -HS    Highest Level of Mobility Goal 4 --> Transfer to chair/commode  -CB 4 --> Transfer to chair/commode  -HS       Row Name 10/30/24 1549          Functional Assessment    Outcome Measure Options AM-PAC 6 Clicks Basic Mobility (PT)  -CB               User Key  (r) = Recorded By, (t) = Taken By, (c) = Cosigned By      Initials Name Provider Type    HS Aysha Guerra, RN Registered Nurse    Vanessa Corrales, PT Physical Therapist                                 Physical Therapy Education       Title: PT OT SLP Therapies (In Progress)       Topic: Physical Therapy (In Progress)       Point: Mobility training (In Progress)       Learning Progress Summary            Patient Acceptance, E,TB, NR by CB at 10/30/2024 1549                      Point: Home exercise program (Not Started)       Learner Progress:  Not documented in this visit.              Point: Body mechanics (In Progress)       Learning Progress Summary            Patient Acceptance, E,TB, NR by CB at 10/30/2024 1549                      Point: Precautions (In Progress)       Learning Progress Summary            Patient Acceptance, E,TB, NR by CB at 10/30/2024 1549                                      User Key       Initials Effective Dates Name Provider Type Discipline    CB 10/22/21 -  Vanessa Helton PT Physical Therapist PT                  PT Recommendation and Plan  Planned Therapy Interventions (PT): balance training, bed mobility training, gait training, home exercise program, patient/family education, strengthening, transfer training, postural re-education  Outcome Evaluation: Patient is a 89 y.o. male admitted to [facility] for acute on chronic congestive heart failure on 10/28/2024. PMHx includes CAD, HTN,hyperlipidemia. Patient reports he lives with his son and walks within home using rwx. He states he uses wc for MD appointments. He has 5 SOFY home with B handrails. Today, patient performed bed mobility with SBA, required mod-maxA for STS to rwx and severe posterior lean in standing. Unable to correct with VC and tactile cues. Balance, strength, activity  tolerance, postural control deficits noted. Patient may benefit from skilled PT services to address functional deficits and improve level of independence prior to discharge. Anticipate SNF upon DC.     Time Calculation:         PT Charges       Row Name 10/30/24 1552             Time Calculation    Start Time 1348  -CB      Stop Time 1409  -CB      Time Calculation (min) 21 min  -CB      PT Received On 10/30/24  -CB      PT - Next Appointment 10/31/24  -CB      PT Goal Re-Cert Due Date 11/06/24  -CB         Time Calculation- PT    Total Timed Code Minutes- PT 12 minute(s)  -CB         Timed Charges    77258 - PT Therapeutic Exercise Minutes 8  -CB      09787 - PT Therapeutic Activity Minutes 4  -CB         Total Minutes    Timed Charges Total Minutes 12  -CB       Total Minutes 12  -CB                User Key  (r) = Recorded By, (t) = Taken By, (c) = Cosigned By      Initials Name Provider Type    CB Vanessa Helton, PT Physical Therapist                  Therapy Charges for Today       Code Description Service Date Service Provider Modifiers Qty    54796930234 HC PT THER PROC EA 15 MIN 10/30/2024 Vanessa Helton, PT GP 1    25181252900 HC PT EVAL MOD COMPLEXITY 3 10/30/2024 Vanessa Helton, PT GP 1            PT G-Codes  Outcome Measure Options: AM-PAC 6 Clicks Basic Mobility (PT)  AM-PAC 6 Clicks Score (PT): 12  PT Discharge Summary  Anticipated Discharge Disposition (PT): skilled nursing facility    Vanessa Helton PT  10/30/2024

## 2024-10-30 NOTE — PLAN OF CARE
Goal Outcome Evaluation:  Plan of Care Reviewed With: patient              Patient is a 89 y.o. male admitted to [facility] for acute on chronic congestive heart failure on 10/28/2024. PMHx includes CAD, HTN,hyperlipidemia. Patient reports he lives with his son and walks within home using rwx. He states he uses wc for MD appointments. He has 5 SOFY home with B handrails. Today, patient performed bed mobility with SBA, required mod-maxA for STS to rwx and severe posterior lean in standing. Unable to correct with VC and tactile cues. Balance, strength, activity tolerance, postural control deficits noted. Patient may benefit from skilled PT services to address functional deficits and improve level of independence prior to discharge. Anticipate SNF upon DC.      Anticipated Discharge Disposition (PT): skilled nursing facility

## 2024-10-30 NOTE — PROGRESS NOTES
"Daily progress note    Referring physician  Dr. CHRISTIAN Garcia  Doing same with no new complaints and family at bedside.  Patient denies any chest pain shortness of breath palpitation    History of present illness  89-year-old white male with history of paroxysmal atrial fibrillation Parkinson disease CVA coronary artery disease cardiomyopathy hypertension osteoarthritis and compression fracture at T12 L2 sacral fracture admitted to cardiology service with chest pain shortness of breath started this morning.  I am asked to follow patient for medical problem.  Patient is poor historian and very hard on hearing unable to give detailed history and most of the history obtained from the family chart nursing staff and old record.  Patient has no fever chills cough but does have sinus drainage and feels fatigue.     REVIEW OF SYSTEMS  Unremarkable except weakness     PHYSICAL EXAM   Blood pressure 109/43, pulse 68, temperature 97.3 °F (36.3 °C), temperature source Oral, resp. rate 18, height 188 cm (74.02\"), weight 58.4 kg (128 lb 12.8 oz), SpO2 97%.    GENERAL: Awake and alert in no distress but very hard on hearing  SKIN: Warm, dry  HENT: Normocephalic, atraumatic  EYES: no scleral icterus  CV: regular rhythm, regular rate  RESPIRATORY: normal effort, moving air bilaterally  ABDOMEN: soft, nontender, nondistended bowel sounds positive  MUSCULOSKELETAL: Lateral lower extremity trace edema  NEURO: alert, moves all extremities, follows commands     LAB RESULTS  Lab Results (last 24 hours)       Procedure Component Value Units Date/Time    High Sensitivity Troponin T 2Hr [[id number]]  (Abnormal) Collected: 10/30/24 0513    Specimen: Blood Updated: 10/30/24 0648     HS Troponin T 57 ng/L      Troponin T Delta -4 ng/L     Narrative:      High Sensitive Troponin T Reference Range:  <14.0 ng/L- Negative Female for AMI  <22.0 ng/L- Negative Male for AMI  >=14 - Abnormal Female indicating possible myocardial injury.  >=22 - " Abnormal Male indicating possible myocardial injury.   Clinicians would have to utilize clinical acumen, EKG, Troponin, and serial changes to determine if it is an Acute Myocardial Infarction or myocardial injury due to an underlying chronic condition.         High Sensitivity Troponin T [[id number]]  (Abnormal) Collected: 10/30/24 0317    Specimen: Blood Updated: 10/30/24 0434     HS Troponin T 61 ng/L     Narrative:      High Sensitive Troponin T Reference Range:  <14.0 ng/L- Negative Female for AMI  <22.0 ng/L- Negative Male for AMI  >=14 - Abnormal Female indicating possible myocardial injury.  >=22 - Abnormal Male indicating possible myocardial injury.   Clinicians would have to utilize clinical acumen, EKG, Troponin, and serial changes to determine if it is an Acute Myocardial Infarction or myocardial injury due to an underlying chronic condition.         Magnesium [[id number]]  (Normal) Collected: 10/30/24 0317    Specimen: Blood Updated: 10/30/24 0430     Magnesium 2.0 mg/dL     Comprehensive Metabolic Panel [[id number]]  (Abnormal) Collected: 10/30/24 0317    Specimen: Blood Updated: 10/30/24 0430     Glucose 90 mg/dL      BUN 38 mg/dL      Creatinine 1.00 mg/dL      Sodium 142 mmol/L      Potassium 3.3 mmol/L      Chloride 104 mmol/L      CO2 27.7 mmol/L      Calcium 8.2 mg/dL      Total Protein 5.7 g/dL      Albumin 3.0 g/dL      ALT (SGPT) 13 U/L      AST (SGOT) 17 U/L      Alkaline Phosphatase 55 U/L      Total Bilirubin 1.2 mg/dL      Globulin 2.7 gm/dL      A/G Ratio 1.1 g/dL      BUN/Creatinine Ratio 38.0     Anion Gap 10.3 mmol/L      eGFR 68.9 mL/min/1.73     Narrative:      GFR Normal >60  Chronic Kidney Disease <60  Kidney Failure <15    The GFR formula is only valid for adults with stable renal function between ages 18 and 70.    CBC & Differential [[id number]]  (Abnormal) Collected: 10/30/24 0317    Specimen: Blood Updated: 10/30/24 0417    Narrative:      The following orders were created for  panel order CBC & Differential.  Procedure                               Abnormality         Status                     ---------                               -----------         ------                     CBC Auto Differential[[id number]]        Abnormal            Final result                 Please view results for these tests on the individual orders.    CBC Auto Differential [[id number]]  (Abnormal) Collected: 10/30/24 0317    Specimen: Blood Updated: 10/30/24 0417     WBC 4.81 10*3/mm3      RBC 3.46 10*6/mm3      Hemoglobin 12.0 g/dL      Hematocrit 36.3 %      .9 fL      MCH 34.7 pg      MCHC 33.1 g/dL      RDW 14.3 %      RDW-SD 54.3 fl      MPV 11.0 fL      Platelets 141 10*3/mm3      Neutrophil % 57.0 %      Lymphocyte % 30.1 %      Monocyte % 10.0 %      Eosinophil % 2.3 %      Basophil % 0.4 %      Immature Grans % 0.2 %      Neutrophils, Absolute 2.74 10*3/mm3      Lymphocytes, Absolute 1.45 10*3/mm3      Monocytes, Absolute 0.48 10*3/mm3      Eosinophils, Absolute 0.11 10*3/mm3      Basophils, Absolute 0.02 10*3/mm3      Immature Grans, Absolute 0.01 10*3/mm3     Magnesium [[id number]]  (Normal) Collected: 10/29/24 1501    Specimen: Blood Updated: 10/29/24 1540     Magnesium 2.0 mg/dL           Imaging Results (Last 24 Hours)       ** No results found for the last 24 hours. **          [facility]20[facility] 10[facility]         Author: -- Service: -- Author Type: --   Filed: Date of Service: Creation Time:   Status: (Other)   HEART RATE=66  bpm  RR Nthuptlj=482  ms  MO Interval=  ms  P Horizontal Axis=  deg  P Front Axis=  deg  QRSD Wsggnqyh=867  ms  QT Ogkyrxxy=588  ms  SYnC=153  ms  QRS Axis=-52  deg  T Wave Axis=120  deg  - ABNORMAL ECG -  Atrial fibrillation- new  Ventricular premature complex  Intraventricular conduction delay          Current Facility-Administered Medications:     acetaminophen (TYLENOL) tablet 500 mg, 500 mg, Oral, 4x Daily PRN, Zoila Diaz  CANDELARIA    amLODIPine (NORVASC) tablet 10 mg, 10 mg, Oral, Q24H, Feliz Acosta MD, 10 mg at 10/30/24 0823    aspirin chewable tablet 81 mg, 81 mg, Oral, Daily, Zoila Diaz APRN, 81 mg at 10/30/24 0823    atorvastatin (LIPITOR) tablet 10 mg, 10 mg, Oral, Nightly, Feliz Acosta MD, 10 mg at 10/29/24 2012    Enoxaparin Sodium (LOVENOX) syringe 60 mg, 60 mg, Subcutaneous, Q12H, Maninder Quezada MD, 60 mg at 10/29/24 2013    ipratropium-albuterol (DUO-NEB) nebulizer solution 3 mL, 3 mL, Nebulization, Q4H PRN, Feliz Acosta MD    isosorbide mononitrate (IMDUR) 24 hr tablet 30 mg, 30 mg, Oral, Q24H, Feliz Acosta MD, 30 mg at 10/30/24 0823    losartan (COZAAR) tablet 50 mg, 50 mg, Oral, Daily, Zoila Diaz APRN, 50 mg at 10/30/24 0823    Menthol-Zinc Oxide 1 Application, 1 Application, Topical, Q12H, Maninder Quezada MD, 1 Application at 10/30/24 0824    metoprolol succinate XL (TOPROL-XL) 24 hr tablet 100 mg, 100 mg, Oral, Daily, Zoila Diaz APRN, 100 mg at 10/30/24 0823    pantoprazole (PROTONIX) EC tablet 40 mg, 40 mg, Oral, Q AM, Feliz Acosta MD, 40 mg at 10/30/24 0642    [COMPLETED] Insert Peripheral IV, , , Once **AND** sodium chloride 0.9 % flush 10 mL, 10 mL, Intravenous, PRN, Johanna Peoples MD, 10 mL at 10/28/24 2040        ASSESSMENT  Acute on chronic systolic and diastolic congestive heart failure  Coronary artery disease  Paroxysmal atrial fibrillation  Hypertension  Hyperlipidemia  Severe hearing loss  Multiple spine compression fractures    PLAN  CPM  Continue IV diuresis  Replace potassium  Continue aspirin nitroglycerin Toprol-XL Cozaar Norvasc and Lipitor  Continue anticoagulation   Strict I's and O's and daily weight  Adjust home medications  Stress ulcer DVT prophylaxis  Supportive care  PT OT  Discussed with nursing staff and family  Follow closely further recommendation current hospital course    FELIZ ACOSTA MD    Copied text in this note has been reviewed and is  accurate as of 10/30/24

## 2024-10-30 NOTE — PLAN OF CARE
Goal Outcome Evaluation:  Plan of Care Reviewed With: patient        Progress: improving  Outcome Evaluation: Patient admitted on 10/28 for respiratory failure. IV lasix changed to PO lasix.  Maintaining oxygen saturations on RA. Son at bedside throughout the shift. New hearing aids brought in by son, bilateral hearing aids in place. Worked with PT today. Up in the chair for meals. Patient expresses no other needs at this time. Call light within reach.     Discharge: Tomorrow.

## 2024-10-30 NOTE — PROGRESS NOTES
[facility]  Cardiology Progress Note    Patient Identification:  Name: Dinesh Churchill Sr.  Age: 89 y.o.  Sex: male  :  1928  MRN: [id number]                 Follow Up / Chief Complaint: Follow-up for acute on chronic congestive heart failure/acute hypoxic respiratory failure    Interval History: resting in bed, no chest pain, reports shortness of breath but somewhat better.       Objective:    Past Medical History:  Past Medical History:   Diagnosis Date    Abnormal ECG     Abnormal MRI     Acute frontal sinusitis     Arthritis     Chronic fatigue     Coronary artery disease     Difficulty walking     Dizziness     Head injury     Headache 2023    Hearing aid worn     left    Hearing loss     Hyperlipidemia     Hypertension     Idiopathic peripheral neuropathy 2023    Kidney stones     Macrocytosis     Neoplasm of skin     Osteoporosis     Prediabetes     Vitamin D deficiency      Past Surgical History:  Past Surgical History:   Procedure Laterality Date    CARDIAC CATHETERIZATION N/A 2017    Procedure: Left Heart Cath;  Surgeon: Maninder Quezada MD;  Location: [facility] CATH INVASIVE LOCATION;  Service:     CARDIAC CATHETERIZATION N/A 2017    Procedure: Stent BMS coronary;  Surgeon: Maninder Quezada MD;  Location: [facility] CATH INVASIVE LOCATION;  Service:     CARDIAC CATHETERIZATION N/A 2019    Procedure: Left Heart Cath;  Surgeon: Maninder Quezada MD;  Location: [facility] CATH INVASIVE LOCATION;  Service: Cardiology    CARDIAC CATHETERIZATION N/A 2019    Procedure: Left ventriculography;  Surgeon: Maninder Quezada MD;  Location: [facility] CATH INVASIVE LOCATION;  Service: Cardiology    CARDIAC CATHETERIZATION N/A 2019    Procedure: Coronary angiography;  Surgeon: Maninder Quezada MD;  Location: [facility] CATH INVASIVE LOCATION;  Service: Cardiology    CARDIAC CATHETERIZATION N/A 2019    Procedure: CORONARY ANGIOGRAPHY;  Surgeon:  Maninder Quezada MD;  Location:  [facility] CATH INVASIVE LOCATION;  Service: Cardiovascular    CARDIAC CATHETERIZATION N/A 08/20/2019    Procedure: Stent BMS coronary;  Surgeon: Maninder Quezada MD;  Location:  [facility] CATH INVASIVE LOCATION;  Service: Cardiovascular    CARDIAC ELECTROPHYSIOLOGY PROCEDURE N/A 11/08/2019    Procedure: Loop insertion;  Surgeon: Maninder Quezada MD;  Location:  [facility] CATH INVASIVE LOCATION;  Service: Cardiovascular    HERNIA REPAIR      x 2    FL RT/LT HEART CATHETERS N/A 05/23/2017    Procedure: Percutaneous Coronary Intervention;  Surgeon: Maninder Quezada MD;  Location:  [facility] CATH INVASIVE LOCATION;  Service: Cardiovascular        Social History:   Social History     Tobacco Use    Smoking status: Never     Passive exposure: Never    Smokeless tobacco: Never   Substance Use Topics    Alcohol use: No     Comment: quit 30 years ago       Family History:  Family History   Problem Relation Age of Onset    Arthritis Son     Cancer Sister     Diabetes Son           Allergies:  No Known Allergies  Scheduled Meds:  amLODIPine, 10 mg, Q24H  aspirin, 81 mg, Daily  atorvastatin, 10 mg, Nightly  enoxaparin, 60 mg, Q12H  isosorbide mononitrate, 30 mg, Q24H  losartan, 50 mg, Daily  Menthol-Zinc Oxide, 1 Application, Q12H  metoprolol succinate XL, 100 mg, Daily  pantoprazole, 40 mg, Q AM            INTAKE AND OUTPUT:    Intake/Output Summary (Last 24 hours) at 10/30/2024 1230  Last data filed at 10/30/2024 0900  Gross per 24 hour   Intake 1280 ml   Output 1950 ml   Net -670 ml       Review of Systems:   GI: no n/v or abd pain  Cardiac: no chest pain or palpitations  Pulmonary: shortness of breath Improving      Constitutional:  Temp:  [97.3 °F (36.3 °C)-97.7 °F (36.5 °C)] 97.3 °F (36.3 °C)  Heart Rate:  [54-73] 68  Resp:  [17-18] 18  BP: (100-123)/(40-59) 109/43    Physical Exam:  General:  Alert, cooperative, appears in no acute distress  Respiratory: Clear to auscultation.   Normal respiratory effort and rate.  Cardiovascular: S1S2 Irregular rate and rhythm. No murmur, rub or gallop.   Gastrointestinal: soft, non tender. Bowel sounds present.   Extremities: NEWSOME x4. No pretibial pitting edema. Adequate musculoskeletal strength.   Neuro: AAO x3 CN II-XII grossly intact        Cardiographics    ECG:                  ECHO:  Interpretation Summary     Left ventricular ejection fraction appears to be 41 - 45%.    The left ventricular cavity is moderately dilated.    The following left ventricular wall segments are hypokinetic: apical lateral, mid inferolateral, mid inferior, mid inferoseptal and mid anteroseptal.    Left ventricular diastolic function was indeterminate.    The left atrial cavity is moderate to severely dilated.    There is calcification of the aortic valve.    Moderate to severe mitral valve regurgitation is present.    Estimated right ventricular systolic pressure from tricuspid regurgitation is normal (<35 mmHg).    Mild dilation of the aortic root is present. Mild dilation of the sinuses of Valsalva is present.       Lab Review   Results from last 7 days   Lab Units 10/30/24  0513 10/30/24  0317 10/29/24  0255   HSTROP T ng/L 57* 61* 56*     Results from last 7 days   Lab Units 10/30/24  0317   MAGNESIUM mg/dL 2.0     Results from last 7 days   Lab Units 10/30/24  0317   SODIUM mmol/L 142   POTASSIUM mmol/L 3.3*   BUN mg/dL 38*   CREATININE mg/dL 1.00   CALCIUM mg/dL 8.2     @LABRCNTIPbnp@  Results from last 7 days   Lab Units 10/30/24  0317 10/29/24  0255 10/28/24  1059   WBC 10*3/mm3 4.81 4.85 6.20   HEMOGLOBIN g/dL 12.0* 12.7* 12.0*   HEMATOCRIT % 36.3* 38.1 38.3   PLATELETS 10*3/mm3 141 133* 132*     Results from last 7 days   Lab Units 10/28/24  1059   INR  1.07   APTT seconds 27.0       Intake/Output Summary (Last 24 hours) at 10/30/2024 1230  Last data filed at 10/30/2024 0900  Gross per 24 hour   Intake 1280 ml   Output 1950 ml   Net -670 ml     CHADS-VASc Risk  "Assessment               4 Total Score    1 CHF    1 Hypertension    2 Age >/= 75    Criteria that do not apply:    DM    PRIOR STROKE/TIA/THROMBO    Vascular Disease    Age 65-74    Sex: Female                Assessment/ plan:  1.  Acute on chronic congestive heart failure: cr stable, add po lasix 20mg daily. Echo EF 41-45%, moderately dilated LVC, LV wma, indeterminate diastolic function, LAC moderately to severely dilated, moderate to severe MR  2.  Pulmonary edema  3.  CAD: no chest pain, continue aspirin, atorvastatin, metoprolol  4.  Hypertension- BP stable  5. Hyperlipidemia-continue statin therapy  6.  PAF/aflutter: remains in atrial flutter, not historically anticoagulated, on lovenox here, pharmacy dosing. He will be high risk for long term anticoagulation due to age and frailty. Will discharge on full dose aspirin      )10/30/2024  CANDELARIA Wong/Transcription:   \"Dictated utilizing Dragon dictation\".     "

## 2024-10-30 NOTE — PLAN OF CARE
Goal Outcome Evaluation:              Outcome Evaluation: VSS. on room air. no complaints. called troponin results this morning.

## 2024-10-30 NOTE — PLAN OF CARE
Patient admitted on 10/28 for respiratory failure. IV lasix continued for CHF. Maintaining oxygen saturations on 2L-NC. Son at bedside throughout the shift. Up in the chair for meals. Patient expresses no other needs at this time. Call light within reach.     Discharge: 1-2 days.

## 2024-10-30 NOTE — PROGRESS NOTES
Nutrition Services    Patient Name:  Dinesh Churchill Sr.  YOB: 1928  MRN: [id number]  Admit Date:  10/28/2024    NFPE completed - Patient meets ASPEN/.AND criteria for nutritional diagnosis of moderate malnutrition of chronic illness based on muscular and fat wasting.    Patient eating well currently.  Son present asking if a MVT such as Centrum Silver would be a good option for patient.  Agreed that it would but to check with pt physician or pharmacist to ensure no medical interactions.  Will add supplement to help aid intake    See assessment below.    Malnutrition Severity Assessment      Flowsheet Row Most Recent Value   Insufficient Energy Intake     Insufficient Energy Intake Findings Severe  [Pt 68% IBW of 190#]   Insufficient Energy Intake  <75% of est. energy requirement for > or equal to 3 months   Unintentional Weight Loss     Unintentional Weight Loss  Weight loss of 20% in one year   Muscle Loss    Loss of Muscle Mass Findings Moderate   [place] Moderate - slight depression   Clavicle Bone Region Moderate - some protrusion in females, visible in males   Acromion Bone Region Moderate - acromion may slightly protrude   Scapular Bone Region Moderate - mild depression, bones may show slightly   Dorsal Hand Region Severe - prominent depression   Fat Loss    Subcutaneous Fat Loss Findings Moderate   Orbital Region  Moderate -  somewhat hollowness, slightly dark circles   Upper Arm Region Moderate - some fat tissue, not ample   Fluid Accumulation (Edema)    Fluid Acumulation Findings --  [none noted on flowsheet, however pts LE appeared to be retaining fluid]

## 2024-10-31 VITALS
WEIGHT: 128.09 LBS | HEIGHT: 74 IN | DIASTOLIC BLOOD PRESSURE: 58 MMHG | BODY MASS INDEX: 16.44 KG/M2 | HEART RATE: 57 BPM | OXYGEN SATURATION: 94 % | SYSTOLIC BLOOD PRESSURE: 102 MMHG | RESPIRATION RATE: 18 BRPM | TEMPERATURE: 97.3 F

## 2024-10-31 LAB
ANION GAP SERPL CALCULATED.3IONS-SCNC: 7.7 MMOL/L (ref 5–15)
BASOPHILS # BLD AUTO: 0.01 10*3/MM3 (ref 0–0.2)
BASOPHILS NFR BLD AUTO: 0.2 % (ref 0–1.5)
BUN SERPL-MCNC: 41 MG/DL (ref 8–23)
BUN/CREAT SERPL: 45.6 (ref 7–25)
CALCIUM SPEC-SCNC: 8.4 MG/DL (ref 8.2–9.6)
CHLORIDE SERPL-SCNC: 107 MMOL/L (ref 98–107)
CO2 SERPL-SCNC: 27.3 MMOL/L (ref 22–29)
CREAT SERPL-MCNC: 0.9 MG/DL (ref 0.76–1.27)
DEPRECATED RDW RBC AUTO: 55 FL (ref 37–54)
EGFRCR SERPLBLD CKD-EPI 2021: 78.2 ML/MIN/1.73
EOSINOPHIL # BLD AUTO: 0.13 10*3/MM3 (ref 0–0.4)
EOSINOPHIL NFR BLD AUTO: 2.8 % (ref 0.3–6.2)
ERYTHROCYTE [DISTWIDTH] IN BLOOD BY AUTOMATED COUNT: 14.3 % (ref 12.3–15.4)
GLUCOSE SERPL-MCNC: 96 MG/DL (ref 65–99)
HCT VFR BLD AUTO: 34 % (ref 37.5–51)
HGB BLD-MCNC: 10.8 G/DL (ref 13–17.7)
IMM GRANULOCYTES # BLD AUTO: 0.01 10*3/MM3 (ref 0–0.05)
IMM GRANULOCYTES NFR BLD AUTO: 0.2 % (ref 0–0.5)
LYMPHOCYTES # BLD AUTO: 1.38 10*3/MM3 (ref 0.7–3.1)
LYMPHOCYTES NFR BLD AUTO: 29.6 % (ref 19.6–45.3)
MCH RBC QN AUTO: 33.8 PG (ref 26.6–33)
MCHC RBC AUTO-ENTMCNC: 31.8 G/DL (ref 31.5–35.7)
MCV RBC AUTO: 106.3 FL (ref 79–97)
MONOCYTES # BLD AUTO: 0.54 10*3/MM3 (ref 0.1–0.9)
MONOCYTES NFR BLD AUTO: 11.6 % (ref 5–12)
NEUTROPHILS NFR BLD AUTO: 2.59 10*3/MM3 (ref 1.7–7)
NEUTROPHILS NFR BLD AUTO: 55.6 % (ref 42.7–76)
PLATELET # BLD AUTO: 130 10*3/MM3 (ref 140–450)
PMV BLD AUTO: 12 FL (ref 6–12)
POTASSIUM SERPL-SCNC: 3.9 MMOL/L (ref 3.5–5.2)
RBC # BLD AUTO: 3.2 10*6/MM3 (ref 4.14–5.8)
SODIUM SERPL-SCNC: 142 MMOL/L (ref 136–145)
WBC NRBC COR # BLD AUTO: 4.66 10*3/MM3 (ref 3.4–10.8)

## 2024-10-31 PROCEDURE — 97530 THERAPEUTIC ACTIVITIES: CPT

## 2024-10-31 PROCEDURE — 97166 OT EVAL MOD COMPLEX 45 MIN: CPT

## 2024-10-31 PROCEDURE — 99238 HOSP IP/OBS DSCHRG MGMT 30/<: CPT | Performed by: NURSE PRACTITIONER

## 2024-10-31 PROCEDURE — 80048 BASIC METABOLIC PNL TOTAL CA: CPT | Performed by: HOSPITALIST

## 2024-10-31 PROCEDURE — 97535 SELF CARE MNGMENT TRAINING: CPT

## 2024-10-31 PROCEDURE — 85025 COMPLETE CBC W/AUTO DIFF WBC: CPT | Performed by: HOSPITALIST

## 2024-10-31 PROCEDURE — 25010000002 ENOXAPARIN PER 10 MG: Performed by: INTERNAL MEDICINE

## 2024-10-31 RX ORDER — ISOSORBIDE MONONITRATE 30 MG/1
30 TABLET, EXTENDED RELEASE ORAL
Qty: 30 TABLET | Refills: 2 | Status: SHIPPED | OUTPATIENT
Start: 2024-11-01

## 2024-10-31 RX ORDER — FUROSEMIDE 20 MG/1
20 TABLET ORAL DAILY
Qty: 30 TABLET | Refills: 2 | Status: SHIPPED | OUTPATIENT
Start: 2024-11-01

## 2024-10-31 RX ADMIN — FUROSEMIDE 20 MG: 20 TABLET ORAL at 08:26

## 2024-10-31 RX ADMIN — ENOXAPARIN SODIUM 60 MG: 100 INJECTION SUBCUTANEOUS at 08:29

## 2024-10-31 RX ADMIN — ANORECTAL OINTMENT 1 APPLICATION: 15.7; .44; 24; 20.6 OINTMENT TOPICAL at 08:27

## 2024-10-31 RX ADMIN — ASPIRIN 81 MG: 81 TABLET, CHEWABLE ORAL at 08:27

## 2024-10-31 RX ADMIN — PANTOPRAZOLE SODIUM 40 MG: 40 TABLET, DELAYED RELEASE ORAL at 08:25

## 2024-10-31 RX ADMIN — LOSARTAN POTASSIUM 50 MG: 50 TABLET, FILM COATED ORAL at 08:26

## 2024-10-31 RX ADMIN — METOPROLOL SUCCINATE 100 MG: 100 TABLET, EXTENDED RELEASE ORAL at 08:26

## 2024-10-31 RX ADMIN — ISOSORBIDE MONONITRATE 30 MG: 30 TABLET, EXTENDED RELEASE ORAL at 08:26

## 2024-10-31 RX ADMIN — AMLODIPINE BESYLATE 5 MG: 5 TABLET ORAL at 08:26

## 2024-10-31 NOTE — DISCHARGE SUMMARY
[facility]  Physician Discharge Summary    Patient Identification:  Name: Dinesh Churchill Sr.  Age: 89 y.o.  Sex: male  :  1928  MRN: [id number]    Admit date: 10/28/2024    Discharge date and time: 10/31/2024 at 10:29 AM      Admitting Physician: Maninder Quezada MD     Discharge Physician: Juliana GAONA    Discharge Diagnoses:   Active Hospital Problems    Diagnosis     **Acute hypoxic respiratory failure        Discharged Condition: stable    Hospital Course:   Refer to H&P for full details.  Mr. Churchill is a 89-year-old male who presented to [facility] with shortness of breath, chest pain,, acute hypoxic respiratory failure, acute on chronic congestive heart failure.  He was diuresed well and euvolemic on exam at discharge.  Due to age and frailty goal-directed medical therapy will be completed as able.  Troponins remain flat and no further workup indicated.  He is without chest pain Creatinine stable.  Case discussed with care management, nurse and son at bedside.  At discharge vital stable,  and he denies any chest pain, or shortness of breath.    Consults:   IP CONSULT TO CARDIOLOGY  IP CONSULT TO INTERNAL MEDICINE  IP CONSULT TO CASE MANAGEMENT     Discharge Exam:  General: No acute distress, resting in bed   Skin: Warm and dry, no diaphoresis noted   EYES: EOM normal no conjunctival drainage   HEENT: external ear and nose normal; oral mucosa moist   Neck: Supple; no carotid bruits; no JVD   Heart: S1S2 regular rate and rhythm; no murmurs; no gallop or rub appreciated   Pulmonary: Respirations regular, unlabored at rest, bilateral breath sounds have good air entry throughout all lung fields; no crackles, rubs or wheezes auscultated.     GI: Soft, non-tender, non-distended, positive bowel sounds  No hepatosplenomegaly   Extremities: Bilateral lower extremities have no pre-tibial pitting edema; DP/PT pulses are palpable   Neurological: Alert and oriented  x 3; no neuro deficits          LABS:  Results from last 7 days   Lab Units 10/30/24  0513 10/30/24  0317 10/29/24  0255   HSTROP T ng/L 57* 61* 56*     Results from last 7 days   Lab Units 10/30/24  0317   MAGNESIUM mg/dL 2.0     Results from last 7 days   Lab Units 10/31/24  0340   SODIUM mmol/L 142   POTASSIUM mmol/L 3.9   BUN mg/dL 41*   CREATININE mg/dL 0.90   CALCIUM mg/dL 8.4     @LABRCNTbnp@  Results from last 7 days   Lab Units 10/31/24  0340 10/30/24  0317 10/29/24  0255   WBC 10*3/mm3 4.66 4.81 4.85   HEMOGLOBIN g/dL 10.8* 12.0* 12.7*   HEMATOCRIT % 34.0* 36.3* 38.1   PLATELETS 10*3/mm3 130* 141 133*     Results from last 7 days   Lab Units 10/28/24  1059   INR  1.07   APTT seconds 27.0     Results from last 7 days   Lab Units 10/29/24  0255   CHOLESTEROL mg/dL 129   TRIGLYCERIDES mg/dL 66   HDL CHOL mg/dL 37*   LDL CHOL mg/dL 78     Disposition:  Rehab    Discharge Medications:      Discharge Medications        New Medications        Instructions Start Date   furosemide 20 MG tablet  Commonly known as: LASIX   20 mg, Oral, Daily   Start Date: November 1, 2024            Changes to Medications        Instructions Start Date   B-12 1000 MCG lozenge  What changed: See the new instructions.   PLACE 1 LOZENGE UNDER THE TOUNGE EVERY OTHER DAY      isosorbide mononitrate 30 MG 24 hr tablet  Commonly known as: IMDUR  What changed:   medication strength  how much to take  when to take this   30 mg, Oral, Every 24 Hours Scheduled   Start Date: November 1, 2024            Continue These Medications        Instructions Start Date   acetaminophen 500 MG tablet  Commonly known as: TYLENOL   500 mg, Oral, 4 Times Daily      amLODIPine 5 MG tablet  Commonly known as: NORVASC   5 mg, Oral, Every 24 Hours Scheduled      aspirin 81 MG chewable tablet   81 mg, Oral, Daily      atorvastatin 20 MG tablet  Commonly known as: LIPITOR   20 mg, Oral, Nightly      losartan 50 MG tablet  Commonly known as: COZAAR   50 mg, Oral,  "Daily      metoprolol succinate  MG 24 hr tablet  Commonly known as: TOPROL-XL   100 mg, Oral, Daily      Vitamin D-3 25 MCG (1000 UT) capsule   1,000 Units, Daily                 Discharge Home Instructions:  1.  Follow-up on November 20 th at 1 PM with CANDELARIA Stone  2.  Follow-up with your primary care physician in 1 week.  Please call for an appointment.  3.  He will need a CMP in 1 week to monitor electrolytes..    4. Take all medications as prescribed.    Signed:  CANDELARIA Macias  10/31/2024  10:29 EDT      EMR Dragon/Transcription:   \"Dictated utilizing Dragon dictation\".     "

## 2024-10-31 NOTE — PROGRESS NOTES
"Daily progress note    Referring physician  Dr. GONZALES    Subjective  Doing same with no new complaints and and agreeable to go to subacute rehab    History of present illness  89-year-old white male with history of paroxysmal atrial fibrillation Parkinson disease CVA coronary artery disease cardiomyopathy hypertension osteoarthritis and compression fracture at T12 L2 sacral fracture admitted to cardiology service with chest pain shortness of breath started this morning.  I am asked to follow patient for medical problem.  Patient is poor historian and very hard on hearing unable to give detailed history and most of the history obtained from the family chart nursing staff and old record.  Patient has no fever chills cough but does have sinus drainage and feels fatigue.     REVIEW OF SYSTEMS  Unremarkable except weakness     PHYSICAL EXAM   Blood pressure 124/58, pulse 66, temperature 97.3 °F (36.3 °C), temperature source Oral, resp. rate 18, height 188 cm (74.02\"), weight 58.1 kg (128 lb 1.4 oz), SpO2 (!) 89%.    GENERAL: Awake and alert in no distress but very hard on hearing  SKIN: Warm, dry  HENT: Normocephalic, atraumatic  EYES: no scleral icterus  CV: regular rhythm, regular rate  RESPIRATORY: normal effort, moving air bilaterally  ABDOMEN: soft, nontender, nondistended bowel sounds positive  MUSCULOSKELETAL: Lateral lower extremity trace edema  NEURO: alert, moves all extremities, follows commands     LAB RESULTS  Lab Results (last 24 hours)       Procedure Component Value Units Date/Time    Basic Metabolic Panel [[id number]]  (Abnormal) Collected: 10/31/24 0340    Specimen: Blood Updated: 10/31/24 0553     Glucose 96 mg/dL      BUN 41 mg/dL      Creatinine 0.90 mg/dL      Sodium 142 mmol/L      Potassium 3.9 mmol/L      Chloride 107 mmol/L      CO2 27.3 mmol/L      Calcium 8.4 mg/dL      BUN/Creatinine Ratio 45.6     Anion Gap 7.7 mmol/L      eGFR 78.2 mL/min/1.73     Narrative:      GFR Normal >60  Chronic Kidney " Disease <60  Kidney Failure <15    The GFR formula is only valid for adults with stable renal function between ages 18 and 70.    CBC & Differential [[id number]]  (Abnormal) Collected: 10/31/24 0340    Specimen: Blood Updated: 10/31/24 0429    Narrative:      The following orders were created for panel order CBC & Differential.  Procedure                               Abnormality         Status                     ---------                               -----------         ------                     CBC Auto Differential[[id number]]        Abnormal            Final result                 Please view results for these tests on the individual orders.    CBC Auto Differential [[id number]]  (Abnormal) Collected: 10/31/24 0340    Specimen: Blood Updated: 10/31/24 0429     WBC 4.66 10*3/mm3      RBC 3.20 10*6/mm3      Hemoglobin 10.8 g/dL      Hematocrit 34.0 %      .3 fL      MCH 33.8 pg      MCHC 31.8 g/dL      RDW 14.3 %      RDW-SD 55.0 fl      MPV 12.0 fL      Platelets 130 10*3/mm3      Neutrophil % 55.6 %      Lymphocyte % 29.6 %      Monocyte % 11.6 %      Eosinophil % 2.8 %      Basophil % 0.2 %      Immature Grans % 0.2 %      Neutrophils, Absolute 2.59 10*3/mm3      Lymphocytes, Absolute 1.38 10*3/mm3      Monocytes, Absolute 0.54 10*3/mm3      Eosinophils, Absolute 0.13 10*3/mm3      Basophils, Absolute 0.01 10*3/mm3      Immature Grans, Absolute 0.01 10*3/mm3           Imaging Results (Last 24 Hours)       ** No results found for the last 24 hours. **          Scan on 10/28/2024 1043 by [employer], [place]: ECG 12-LEAD         Author: -- Service: -- Author Type: --   Filed: Date of Service: Creation Time:   Status: (Other)   HEART RATE=66  bpm  RR Mezewqgi=117  ms  FL Interval=  ms  P Horizontal Axis=  deg  P Front Axis=  deg  QRSD Whybmgbv=553  ms  QT Tszltnyt=278  ms  FVjZ=144  ms  QRS Axis=-52  deg  T Wave Axis=120  deg  - ABNORMAL ECG -  Atrial fibrillation- new  Ventricular premature  complex  Intraventricular conduction delay          Current Facility-Administered Medications:     acetaminophen (TYLENOL) tablet 500 mg, 500 mg, Oral, 4x Daily PRN, Zoila Diaz APRN    amLODIPine (NORVASC) tablet 5 mg, 5 mg, Oral, Q24H, Iglesia Acosta MD, 5 mg at 10/31/24 0826    aspirin chewable tablet 81 mg, 81 mg, Oral, Daily, Zoila Diaz APRN, 81 mg at 10/31/24 0827    atorvastatin (LIPITOR) tablet 10 mg, 10 mg, Oral, Nightly, Iglesia Acosta MD, 10 mg at 10/30/24 2127    Enoxaparin Sodium (LOVENOX) syringe 60 mg, 60 mg, Subcutaneous, Q12H, Maninder Quezada MD, 60 mg at 10/31/24 0829    furosemide (LASIX) tablet 20 mg, 20 mg, Oral, Daily, Zoila Diaz APRN, 20 mg at 10/31/24 0826    ipratropium-albuterol (DUO-NEB) nebulizer solution 3 mL, 3 mL, Nebulization, Q4H PRN, Iglesia Acosta MD    isosorbide mononitrate (IMDUR) 24 hr tablet 30 mg, 30 mg, Oral, Q24H, Iglesia Acosta MD, 30 mg at 10/31/24 0826    losartan (COZAAR) tablet 50 mg, 50 mg, Oral, Daily, Zoila Diaz APRN, 50 mg at 10/31/24 0826    Menthol-Zinc Oxide 1 Application, 1 Application, Topical, Q12H, Maninder Quezada MD, 1 Application at 10/31/24 0827    metoprolol succinate XL (TOPROL-XL) 24 hr tablet 100 mg, 100 mg, Oral, Daily, Zoila Diaz APRN, 100 mg at 10/31/24 0826    pantoprazole (PROTONIX) EC tablet 40 mg, 40 mg, Oral, Q AM, Iglesia Acosta MD, 40 mg at 10/31/24 0825    [COMPLETED] Insert Peripheral IV, , , Once **AND** sodium chloride 0.9 % flush 10 mL, 10 mL, Intravenous, PRN, Shelton, April Bubba, MD, 10 mL at 10/28/24 2040        ASSESSMENT  Acute on chronic systolic and diastolic congestive heart failure  Coronary artery disease  Paroxysmal atrial fibrillation  Hypertension  Hyperlipidemia  Severe hearing loss  Multiple spine compression fractures    PLAN  CPM  Continue diuresis  Continue aspirin nitroglycerin Toprol-XL Cozaar Norvasc and Lipitor  Continue anticoagulation   Adjust home medications  Stress  ulcer DVT prophylaxis  Supportive care  PT OT  Discussed with nursing staff and family  Okay to discharge to subacute rehab once bed available    FELIZ WHITE MD    Copied text in this note has been reviewed and is accurate as of 10/31/24

## 2024-10-31 NOTE — THERAPY EVALUATION
Patient Name: Dinesh Churchill Sr.  : 1928    MRN: [id number]                              Today's Date: 10/31/2024       Admit Date: 10/28/2024    Visit Dx:     ICD-10-CM ICD-9-CM   1. Acute hypoxic respiratory failure  J96.01 518.81   2. Acute on chronic congestive heart failure, unspecified heart failure type  I50.9 428.0   3. Chest pain, unspecified type  R07.9 786.50   4. Acute pulmonary edema  J81.0 518.4     Patient Active Problem List   Diagnosis    Abnormal magnetic resonance imaging study    Arthritis    Osteoarthritis of cervical spine    Diplopia    Hearing loss    Neoplasm of uncertain behavior of skin    Prediabetes    Vitamin D deficiency    Chronic fatigue    History of supraventricular tachycardia    Essential hypertension    B12 deficiency    Abnormal cardiac function test    Cardiomyopathy    Coronary artery disease involving native coronary artery of native heart without angina pectoris    History of coronary artery stent placement    History of renal calculi    Dyslipidemia    Macrocytic anemia    Precordial pain    Parkinson's disease    History of CVA (cerebrovascular accident)    Paroxysmal atrial fibrillation    Otalgia, left    Chest pain    Abnormal stress test    Spontaneous pneumothorax    Chest pain    Acute systolic (congestive) heart failure    Thrombocytopenia    Pneumonia of left lower lobe due to infectious organism    Syncope and collapse    Status post placement of implantable loop recorder    Long term (current) use of antithrombotics/antiplatelets    Stuttering    Fall    Closed fracture of multiple ribs of right side    Chest wall pain    Closed fracture of phalanx of toe of left foot    Headache    Sudden onset of severe headache    Fall in home, initial encounter    Compression fracture of L2 vertebra with routine healing    Weakness of both legs    Idiopathic peripheral neuropathy    L2 vertebral fracture    Compression fracture of T12 vertebra with routine healing     Sacral fracture, closed    Senile osteoporosis    Acute hypoxic respiratory failure     Past Medical History:   Diagnosis Date    Abnormal ECG     Abnormal MRI     Acute frontal sinusitis     Arthritis     Chronic fatigue     Coronary artery disease     Difficulty walking     Dizziness     Head injury     Headache 01/21/2023    Hearing aid worn     left    Hearing loss     Hyperlipidemia     Hypertension     Idiopathic peripheral neuropathy 08/29/2023    Kidney stones     Macrocytosis     Neoplasm of skin     Osteoporosis     Prediabetes     Vitamin D deficiency      Past Surgical History:   Procedure Laterality Date    CARDIAC CATHETERIZATION N/A 05/22/2017    Procedure: Left Heart Cath;  Surgeon: Maninder Quezada MD;  Location: [facility] CATH INVASIVE LOCATION;  Service:     CARDIAC CATHETERIZATION N/A 05/23/2017    Procedure: Stent BMS coronary;  Surgeon: Maninder Quezada MD;  Location: [facility] CATH INVASIVE LOCATION;  Service:     CARDIAC CATHETERIZATION N/A 08/08/2019    Procedure: Left Heart Cath;  Surgeon: Maninder Quezada MD;  Location: [facility] CATH INVASIVE LOCATION;  Service: Cardiology    CARDIAC CATHETERIZATION N/A 08/08/2019    Procedure: Left ventriculography;  Surgeon: Maninder Quezada MD;  Location: [facility] CATH INVASIVE LOCATION;  Service: Cardiology    CARDIAC CATHETERIZATION N/A 08/08/2019    Procedure: Coronary angiography;  Surgeon: Maninder Quezada MD;  Location: [facility] CATH INVASIVE LOCATION;  Service: Cardiology    CARDIAC CATHETERIZATION N/A 08/20/2019    Procedure: CORONARY ANGIOGRAPHY;  Surgeon: Maninder Quezada MD;  Location: [facility] CATH INVASIVE LOCATION;  Service: Cardiovascular    CARDIAC CATHETERIZATION N/A 08/20/2019    Procedure: Stent BMS coronary;  Surgeon: Maninder Quezada MD;  Location: [facility] CATH INVASIVE LOCATION;  Service: Cardiovascular    CARDIAC ELECTROPHYSIOLOGY PROCEDURE N/A 11/08/2019    Procedure: Loop insertion;  Surgeon:  Maninder Quezada MD;  Location:  [facility] CATH INVASIVE LOCATION;  Service: Cardiovascular    HERNIA REPAIR      x 2    MD RT/LT HEART CATHETERS N/A 05/23/2017    Procedure: Percutaneous Coronary Intervention;  Surgeon: Maninder Quezada MD;  Location:  [facility] CATH INVASIVE LOCATION;  Service: Cardiovascular      General Information       Row Name 10/31/24 1242          OT Time and Intention    Document Type evaluation  -KR     Mode of Treatment occupational therapy  -KR     Patient Effort good  -KR       Row Name 10/31/24 1242          General Information    Patient Profile Reviewed yes  -KR     Existing Precautions/Restrictions fall  -KR       Row Name 10/31/24 1242          Living Environment    People in Home child(michael), adult  -KR       Row Name 10/31/24 1242          Home Main Entrance    Number of Stairs, Main Entrance five  -KR     Stair Railings, Main Entrance railings safe and in good condition  -KR       Row Name 10/31/24 1242          Cognition    Orientation Status (Cognition) oriented x 3  [place]  -KR       Row Name 10/31/24 1242          Safety Issues/Impairments Affecting Functional Mobility    Impairments Affecting Function (Mobility) balance;endurance/activity tolerance;strength;postural/trunk control  -KR     Comment, Safety Issues/Impairments (Mobility) [place] medically appropriate and necessary due to pt acuity, activity tolerance, to maximize mobility efforts and safety of pt and staff. Focus on progression of care and goals established in plan of care.  -KR               User Key  (r) = Recorded By, (t) = Taken By, (c) = Cosigned By      Initials Name Provider Type    KR Rakel Au OT Occupational Therapist                     Mobility/ADL's       Row Name 10/31/24 1243          Bed Mobility    Supine-Sit [place] (Bed Mobility) standby assist  -KR     Sit-Supine [place] (Bed Mobility) standby assist  -KR     Assistive Device (Bed Mobility) head of bed elevated;leg ;bed  rails  -KR     Comment, (Bed Mobility) increased time  -KR       Row Name 10/31/24 1245          Transfers    Transfers sit-stand transfer;toilet transfer  -KR       Row Name 10/31/24 1245          Bed-Chair Transfer    Bed-Chair [place] (Transfers) 2 person assist  -KR       Row Name 10/31/24 1245          Sit-Stand Transfer    Sit-Stand [place] (Transfers) moderate assist (50% patient effort);2 person assist;verbal cues  -KR     Assistive Device (Sit-Stand Transfers) walker, front-wheeled  -KR     Comment, (Sit-Stand Transfer) x4 reps STS; posterior lean initially, able to correct today with cues  -KR       Row Name 10/31/24 1245          Toilet Transfer    Type (Toilet Transfer) sit-stand  -KR     [place] Level (Toilet Transfer) moderate assist (50% patient effort);2 person assist  -KR     Assistive Device (Toilet Transfer) walker, front-wheeled  -KR       Row Name 10/31/24 1245          Functional Mobility    Functional Mobility- Comment unable to attempt today. Pt too unsteady on his feet unsafe to attempt  -KR       Row Name 10/31/24 1245          Activities of Daily Living    BADL Assessment/Intervention toileting  -KR       Row Name 10/31/24 1245          Toileting Assessment/Training    [place] Level (Toileting) toileting skills;adjust/manage clothing;change pad/brief;perform perineal hygiene;maximum assist (25% patient effort)  -KR               User Key  (r) = Recorded By, (t) = Taken By, (c) = Cosigned By      Initials Name Provider Type    Rakel Meeks OT Occupational Therapist                   Obj/Interventions       Row Name 10/31/24 1248          Strength Comprehensive (MMT)    General Manual Muscle Testing (MMT) Assessment upper extremity strength deficits identified  -KR       Row Name 10/31/24 1248          Balance    Static Sitting Balance supervision;standby assist  -KR     Position, Sitting Balance sitting edge of bed  -KR     Static Standing Balance moderate  assist;2-person assist;verbal cues  -KR     Dynamic Standing Balance moderate assist;2-person assist;verbal cues  -KR     Position/Device Used, Standing Balance supported;walker, front-wheeled  -KR     Balance Interventions sitting;standing;occupation based/functional task  -KR               User Key  (r) = Recorded By, (t) = Taken By, (c) = Cosigned By      Initials Name Provider Type    Rakel Meeks, OT Occupational Therapist                   Goals/Plan       Row Name 10/31/24 1249          Transfer Goal 1 (OT)    Activity/Assistive Device (Transfer Goal 1, OT) transfers, all  -KR     [place] Level/Cues Needed (Transfer Goal 1, OT) minimum assist (75% or more patient effort)  -KR     Time Frame (Transfer Goal 1, OT) short term goal (STG);2 weeks  -KR       Row Name 10/31/24 1249          Bathing Goal 1 (OT)    Activity/Device (Bathing Goal 1, OT) bathing skills, all  -KR     [place] Level/Cues Needed (Bathing Goal 1, OT) minimum assist (75% or more patient effort)  -KR     Time Frame (Bathing Goal 1, OT) short term goal (STG);2 weeks  -KR       Row Name 10/31/24 1249          Dressing Goal 1 (OT)    Activity/Device (Dressing Goal 1, OT) dressing skills, all  -KR     [place]/Cues Needed (Dressing Goal 1, OT) minimum assist (75% or more patient effort)  -KR     Time Frame (Dressing Goal 1, OT) short term goal (STG);2 weeks  -KR       Row Name 10/31/24 1249          Toileting Goal 1 (OT)    Activity/Device (Toileting Goal 1, OT) toileting skills, all  -KR     [place] Level/Cues Needed (Toileting Goal 1, OT) minimum assist (75% or more patient effort)  -KR     Time Frame (Toileting Goal 1, OT) short term goal (STG);2 weeks  -KR     Progress/Outcome (Toileting Goal 1, OT) goal ongoing  -KR       Row Name 10/31/24 1243          Therapy Assessment/Plan (OT)    Planned Therapy Interventions (OT) activity tolerance training;BADL retraining;patient/caregiver education/training;ROM/therapeutic  exercise;strengthening exercise;occupation/activity based interventions;functional balance retraining;transfer/mobility retraining  -KR               User Key  (r) = Recorded By, (t) = Taken By, (c) = Cosigned By      Initials Name Provider Type    Rakel Meeks OT Occupational Therapist                   Clinical Impression       Row Name 10/31/24 1248          Pain Assessment    Pretreatment Pain Rating 0/10 - no pain  -KR     Posttreatment Pain Rating 0/10 - no pain  -KR       Row Name 10/31/24 1248          Plan of Care Review    Plan of Care Reviewed With patient  -KR     Outcome Evaluation Pt seen for OT/PT this AM. Admitted with acute on chronic CHF. He lives with his son and uses a rwx with his son's assist. He reports 5 SOFY home. Worked on functional transfers today. Required SBA for bed mobility and mod Ax2 for STS. Heavy posterior lean initially but able to correct with cues today. Required mod Ax2 for BSC transfer and assist navigating his rwx. At this time max A for all toileting needs due to decerased balance in standing. Pt presents with decreased strength, endurance, activity tolerance, ADL performance and functional mobility. Pt to benefit from skilled OT to address deficits. Rec SNF at [place].  -KR       Row Name 10/31/24 1248          Therapy Assessment/Plan (OT)    Rehab Potential (OT) good  -KR     Therapy Frequency (OT) 5 times/wk  -KR       Row Name 10/31/24 1248          Therapy Plan Review/Discharge Plan (OT)    Anticipated Discharge Disposition (OT) skilled nursing facility  -KR       Row Name 10/31/24 1248          Vital Signs    Pre Patient Position Supine  -KR     Intra Patient Position Standing  -KR     Post Patient Position Supine  -KR       Row Name 10/31/24 1248          Positioning and Restraints    Pre-Treatment Position in bed  -KR     Post Treatment Position bed  -KR     In Bed notified nsg;supine;call light within reach;encouraged to call for assist;exit alarm on  -KR                User Key  (r) = Recorded By, (t) = Taken By, (c) = Cosigned By      Initials Name Provider Type    Rakel Meeks OT Occupational Therapist                   Outcome Measures       Row Name 10/31/24 1250          How much help from another is currently needed...    Putting on and taking off regular lower body clothing? 2  -KR     Bathing (including washing, rinsing, and drying) 2  -KR     Toileting (which includes using toilet bed pan or urinal) 1  -KR     Putting on and taking off regular upper body clothing 2  -KR     Taking care of personal grooming (such as brushing teeth) 3  -KR     Eating meals 3  -KR     AM-PAC 6 Clicks Score (OT) 13  -KR       Row Name 10/31/24 1138          How much help from another person do you currently need...    Turning from your back to your side while in flat bed without using bedrails? 3  -CB     Moving from lying on back to sitting on the side of a flat bed without bedrails? 3  -CB     Moving to and from a bed to a chair (including a wheelchair)? 2  -CB     Standing up from a chair using your arms (e.g., wheelchair, bedside chair)? 2  -CB     Climbing 3-5 steps with a railing? 1  -CB     To walk in hospital room? 1  -CB     AM-PAC 6 Clicks Score (PT) 12  -CB     Highest Level of Mobility Goal 4 --> Transfer to chair/commode  -CB       Row Name 10/31/24 1250 10/31/24 1138       Functional Assessment    Outcome Measure Options AM-PAC 6 Clicks Daily Activity (OT)  -KR AM-PAC 6 Clicks Basic Mobility (PT)  -CB              User Key  (r) = Recorded By, (t) = Taken By, (c) = Cosigned By      Initials Name Provider Type    CB Vanessa Helton, PT Physical Therapist    Rakel Meeks OT Occupational Therapist                    Occupational Therapy Education        No education to display                  OT Recommendation and Plan  Planned Therapy Interventions (OT): activity tolerance training, BADL retraining, patient/caregiver education/training, ROM/therapeutic exercise,  strengthening exercise, occupation/activity based interventions, functional balance retraining, transfer/mobility retraining  Therapy Frequency (OT): 5 times/wk  Plan of Care Review  Plan of Care Reviewed With: patient  Outcome Evaluation: Pt seen for OT/PT this AM. Admitted with acute on chronic CHF. He lives with his son and uses a rwx with his son's assist. He reports 5 SOFY home. Worked on functional transfers today. Required SBA for bed mobility and mod Ax2 for STS. Heavy posterior lean initially but able to correct with cues today. Required mod Ax2 for BSC transfer and assist navigating his rwx. At this time max A for all toileting needs due to decerased balance in standing. Pt presents with decreased strength, endurance, activity tolerance, ADL performance and functional mobility. Pt to benefit from skilled OT to address deficits. Rec SNF at [place].     Time Calculation:         Time Calculation- OT       Row Name 10/31/24 1250             Time Calculation- OT    OT Start Time 0836  -KR      OT Stop Time 0900  -KR      OT Time Calculation (min) 24 min  -KR      OT Received On 10/31/24  -KR      OT - Next Appointment 11/01/24  -KR      OT Goal Re-Cert Due Date 11/14/24  -KR         Timed Charges    71544 - OT Self Care/Mgmt Minutes 14  -KR         Untimed Charges    OT Eval/Re-eval Minutes 10  -KR         Total Minutes    Timed Charges Total Minutes 14  -KR      Untimed Charges Total Minutes 10  -KR       Total Minutes 24  -KR                User Key  (r) = Recorded By, (t) = Taken By, (c) = Cosigned By      Initials Name Provider Type    KR Rakel Au OT Occupational Therapist                  Therapy Charges for Today       Code Description Service Date Service Provider Modifiers Qty    35028077776 HC OT SELF CARE/MGMT/TRAIN EA 15 MIN 10/31/2024 Rakel Au OT GO 1    65152347314 HC OT EVAL MOD COMPLEXITY 3 10/31/2024 Rakel Au OT GO 1                 Rakel Au OT  10/31/2024

## 2024-10-31 NOTE — PROGRESS NOTES
Continued Stay Note  [facility]     Patient Name: Dinesh Churchill Sr.  MRN: [id number]  Today's Date: 10/31/2024    Admit Date: 10/28/2024    Plan: [facility] SNF; approved and bed available   Discharge Plan       Row Name 10/31/24 0952       Plan    Plan [facility]; approved and bed available    Plan Comments Call recieved from Hardeep with [facility].  Per Hardeep [place] [facility] [facility] and bed will is available.  CCP will follow with tx team finialize pt's DCP once medically erady for DC.      Row Name 10/31/24 0935       Plan    Plan [facility] SNF referral pending    Plan Comments Spoke with pt and jeanie webb about rec. by PT for SNF before pt does reurn back home.  Both agreebale and requested referral to [facility] SNF where pt has been in the past.   Referral placed in [facility] and spoke with Hardeep who will assess pt for [facility].      Row Name 10/31/24 0901       Plan    Plan SNF vs Home with son and HH    Plan Comments Noted and discussed with PT/OT recommendation for SNF for pt at [place].  Will discuss with pt and his son today to see if they would like pt assessed for SNF vs return home with possible HH.                   Discharge Codes    No documentation.                 Expected Discharge Date and Time       Expected Discharge Date Expected Discharge Time    Oct 31, 2024               CRISS Nye

## 2024-10-31 NOTE — THERAPY TREATMENT NOTE
Patient Name: Dinesh Churchill Sr.  : 1928    MRN: [id number]                              Today's Date: 10/31/2024       Admit Date: 10/28/2024    Visit Dx:     ICD-10-CM ICD-9-CM   1. Acute hypoxic respiratory failure  J96.01 518.81   2. Acute on chronic congestive heart failure, unspecified heart failure type  I50.9 428.0   3. Chest pain, unspecified type  R07.9 786.50   4. Acute pulmonary edema  J81.0 518.4     Patient Active Problem List   Diagnosis    Abnormal magnetic resonance imaging study    Arthritis    Osteoarthritis of cervical spine    Diplopia    Hearing loss    Neoplasm of uncertain behavior of skin    Prediabetes    Vitamin D deficiency    Chronic fatigue    History of supraventricular tachycardia    Essential hypertension    B12 deficiency    Abnormal cardiac function test    Cardiomyopathy    Coronary artery disease involving native coronary artery of native heart without angina pectoris    History of coronary artery stent placement    History of renal calculi    Dyslipidemia    Macrocytic anemia    Precordial pain    Parkinson's disease    History of CVA (cerebrovascular accident)    Paroxysmal atrial fibrillation    Otalgia, left    Chest pain    Abnormal stress test    Spontaneous pneumothorax    Chest pain    Acute systolic (congestive) heart failure    Thrombocytopenia    Pneumonia of left lower lobe due to infectious organism    Syncope and collapse    Status post placement of implantable loop recorder    Long term (current) use of antithrombotics/antiplatelets    Stuttering    Fall    Closed fracture of multiple ribs of right side    Chest wall pain    Closed fracture of phalanx of toe of left foot    Headache    Sudden onset of severe headache    Fall in home, initial encounter    Compression fracture of L2 vertebra with routine healing    Weakness of both legs    Idiopathic peripheral neuropathy    L2 vertebral fracture    Compression fracture of T12 vertebra with routine healing     Sacral fracture, closed    Senile osteoporosis    Acute hypoxic respiratory failure     Past Medical History:   Diagnosis Date    Abnormal ECG     Abnormal MRI     Acute frontal sinusitis     Arthritis     Chronic fatigue     Coronary artery disease     Difficulty walking     Dizziness     Head injury     Headache 01/21/2023    Hearing aid worn     left    Hearing loss     Hyperlipidemia     Hypertension     Idiopathic peripheral neuropathy 08/29/2023    Kidney stones     Macrocytosis     Neoplasm of skin     Osteoporosis     Prediabetes     Vitamin D deficiency      Past Surgical History:   Procedure Laterality Date    CARDIAC CATHETERIZATION N/A 05/22/2017    Procedure: Left Heart Cath;  Surgeon: Maninder Quezada MD;  [facility];  Service:     CARDIAC CATHETERIZATION N/A 05/23/2017    Procedure: Stent BMS coronary;  Surgeon: Maninder Quezada MD;  [facility] LOC[facility];  Service:     CARDIAC CATHETERIZATION N/A 08/08/2019    Procedure: Left Heart Cath;  Surgeon: Maninder Quezada MD;  [facility] LOC[facility];  Service: Cardiology    CARDIAC CATHETERIZATION N/A 08/08/2019    Procedure: Left ventriculography;  Surgeon: Maninder Quezada MD;  [facility];  Service: Cardiology    CARDIAC CATHETERIZATION N/A 08/08/2019    Procedure: Coronary angiography;  Surgeon: Maninder Quezada MD;  [facility] LOC[facility];  Service: Cardiology    CARDIAC CATHETERIZATION N/A 08/20/2019    Procedure: CORONARY ANGIOGRAPHY;  Surgeon: Maninder Quezada MD;  [facility]: [facility] C[facility] LOC[facility];  Service: Cardiovascular    CARDIAC CATHETERIZATION N/A 08/20/2019    Procedure: Stent BMS coronary;  Surgeon: Maninder Quezada MD;  Location: [facility] CATH [facility] LOC[facility];  Service: Cardiovascular    CARDIAC ELECTROPHYSIOLOGY PROCEDURE N/A 11/08/2019    Procedure: Loop insertion;  Surgeon:  Maninder Quezada MD;  Location:  [facility] CATH INVASIVE LOCATION;  Service: Cardiovascular    HERNIA REPAIR      x 2    MT RT/LT HEART CATHETERS N/A 05/23/2017    Procedure: Percutaneous Coronary Intervention;  Surgeon: Maninder Quezada MD;  Location:  [facility] CATH INVASIVE LOCATION;  Service: Cardiovascular      General Information       Row Name 10/31/24 1134          Physical Therapy Time and Intention    Document Type therapy note (daily note)  -CB     Mode of Treatment physical therapy  -CB       Row Name 10/31/24 1134          General Information    Existing Precautions/Restrictions fall  -CB       Row Name 10/31/24 1134          Cognition    Orientation Status (Cognition) oriented x 3  -CB       Row Name 10/31/24 1134          Safety Issues/Impairments Affecting Functional Mobility    Safety Issues Affecting Function (Mobility) insight into deficits/self-awareness;judgment;problem-solving;safety precaution awareness;safety precautions follow-through/compliance  -CB     Impairments Affecting Function (Mobility) balance;endurance/activity tolerance;strength;postural/trunk control  -CB     Comment, Safety Issues/Impairments (Mobility) Co treatment medically appropriate and necessary due to patient acuity level, activity tolerance and safety of patient and staff. Treatment is focusing on progression of care and goals established in the POC.  -CB               User Key  (r) = Recorded By, (t) = Taken By, (c) = Cosigned By      Initials Name Provider Type    CB Vanessa Helton PT Physical Therapist                   Mobility       Row Name 10/31/24 0915          Bed Mobility    Bed Mobility supine-sit;sit-supine  -CB     Supine-Sit [place] (Bed Mobility) standby assist  -CB     Sit-Supine [place] (Bed Mobility) standby assist  -CB     Assistive Device (Bed Mobility) head of bed elevated;leg ;bed rails  -CB       Row Name 10/31/24 2387          Bed-Chair Transfer    Bed-Chair [place]  (Transfers) moderate assist (50% patient effort);2 person assist;verbal cues  -CB     Assistive Device (Bed-Chair Transfers) walker, front-wheeled  -CB     Comment, (Bed-Chair Transfer) bed<>BSC  -CB       Row Name 10/31/24 1135          Sit-Stand Transfer    Sit-Stand [place] (Transfers) moderate assist (50% patient effort);2 person assist;verbal cues  -CB     Assistive Device (Sit-Stand Transfers) walker, front-wheeled  -CB     Comment, (Sit-Stand Transfer) x4 STS reps  -CB               User Key  (r) = Recorded By, (t) = Taken By, (c) = Cosigned By      Initials Name Provider Type    Vanessa Corrales PT Physical Therapist                   Obj/Interventions       Row Name 10/31/24 1135          Balance    Balance Assessment standing static balance;standing dynamic balance;sitting static balance  -CB     Static Sitting Balance standby assist  -CB     Position, Sitting Balance sitting edge of bed  -CB     Static Standing Balance moderate assist;2-person assist;verbal cues  -CB     Dynamic Standing Balance moderate assist;2-person assist;verbal cues  -CB     Position/Device Used, Standing Balance supported;walker, front-wheeled  -CB     Balance Interventions sitting;standing;sit to stand;supported;static;dynamic;minimal challenge  -CB               User Key  (r) = Recorded By, (t) = Taken By, (c) = Cosigned By      Initials Name Provider Type    Vanessa Corrales, GIANFRANCO Physical Therapist                   Goals/Plan    No documentation.                  Clinical Impression       Row Name 10/31/24 1136          Pain    Pretreatment Pain Rating 0/10 - no pain  -CB     Posttreatment Pain Rating 0/10 - no pain  -CB       Row Name 10/31/24 1136          Plan of Care Review    Plan of Care Reviewed With patient  -CB     Progress improving  -CB     Outcome Evaluation Patient is agreeable to PT/OT this AM. He completed bed mobility requiring SBA and sitting balance at EOB SBA. He completed multiple STS to rwx requiring  modAx2 and continues with posterior lean in standing but able to correct this date with cues. He completed stand pivot tx to [facility] requiring modAx2 and assist moving rwx. Pt motivated to improve mobility. PT will continue to progress as pt tolerates and rec SNF at [place].  -CB       Row Name 10/31/24 1136          Positioning and Restraints    Pre-Treatment Position in bed  -CB     Post Treatment Position bed  -CB     In Bed notified nsg;fowlers;call light within reach;encouraged to call for assist;exit alarm on;side rails up x3  -CB               User Key  (r) = Recorded By, (t) = Taken By, (c) = Cosigned By      Initials Name Provider Type    CB Vanessa Helton, PT Physical Therapist                   Outcome Measures       Row Name 10/31/24 1138          How much help from another person do you currently need...    Turning from your back to your side while in flat bed without using bedrails? 3  -CB     Moving from lying on back to sitting on the side of a flat bed without bedrails? 3  -CB     Moving to and from a bed to a chair (including a wheelchair)? 2  -CB     Standing up from a chair using your arms (e.g., wheelchair, bedside chair)? 2  -CB     Climbing 3-5 steps with a railing? 1  -CB     To walk in hospital room? 1  -CB     AM-PAC 6 Clicks Score (PT) 12  -CB     Highest Level of Mobility Goal 4 --> Transfer to chair/commode  -CB       Row Name 10/31/24 1138          Functional Assessment    Outcome Measure Options AM-PAC 6 Clicks Basic Mobility (PT)  -CB               User Key  (r) = Recorded By, (t) = Taken By, (c) = Cosigned By      Initials Name Provider Type    Vanessa Corrales, PT Physical Therapist                                 Physical Therapy Education       Title: PT OT SLP Therapies (In Progress)       Topic: Physical Therapy (In Progress)       Point: Mobility training (Done)       Learning Progress Summary            Patient Acceptance, E,TB, VU,NR by CB at 10/31/2024 1139    Acceptance, E,TB, NR  by CB at 10/30/2024 1549                      Point: Home exercise program (Not Started)       Learner Progress:  Not documented in this visit.              Point: Body mechanics (Done)       Learning Progress Summary            Patient Acceptance, E,TB, VU,NR by CB at 10/31/2024 1139    Acceptance, E,TB, NR by CB at 10/30/2024 1549                      Point: Precautions (Done)       Learning Progress Summary            Patient Acceptance, E,TB, VU,NR by CB at 10/31/2024 1139    Acceptance, E,TB, NR by CB at 10/30/2024 1549                                      User Key       Initials Effective Dates Name Provider Type Discipline    CB 10/22/21 -  Vanessa Helton, PT Physical Therapist PT                  PT Recommendation and Plan  Planned Therapy Interventions (PT): balance training, bed mobility training, gait training, home exercise program, patient/family education, strengthening, transfer training, postural re-education  Progress: improving  Outcome Evaluation: Patient is agreeable to PT/OT this AM. He completed bed mobility requiring SBA and sitting balance at EOB SBA. He completed multiple STS to rwx requiring modAx2 and continues with posterior lean in standing but able to correct this date with cues. He completed stand pivot tx to BSC requiring modAx2 and assist moving rwx. Pt motivated to improve mobility. PT will continue to progress as pt tolerates and rec SNF at [place].     Time Calculation:         PT Charges       Row Name 10/31/24 1139             Time Calculation    Start Time 0835  -CB      Stop Time 0900  -CB      Time Calculation (min) 25 min  -CB      PT Received On 10/31/24  -CB      PT - Next Appointment 11/01/24  -CB         Time Calculation- PT    Total Timed Code Minutes- PT 25 minute(s)  -CB         Timed Charges    38934 - PT Therapeutic Activity Minutes 25  -CB         Total Minutes    Timed Charges Total Minutes 25  -CB       Total Minutes 25  -CB                User Key  (r) = Recorded By,  (t) = Taken By, (c) = Cosigned By      Initials Name Provider Type    CB Vanessa Helton, PT Physical Therapist                  Therapy Charges for Today       Code Description Service Date Service Provider Modifiers Qty    58307158315 HC PT THER PROC EA 15 MIN 10/30/2024 Vanessa Helton, PT GP 1    12194076127 HC PT EVAL MOD COMPLEXITY 3 10/30/2024 Vanessa Helton, PT GP 1    81233417738 HC PT THERAPEUTIC ACT EA 15 MIN 10/31/2024 Vanessa Helton, PT GP 2            PT G-Codes  Outcome Measure Options: AM-PAC 6 Clicks Basic Mobility (PT)  AM-PAC 6 Clicks Score (PT): 12  PT Discharge Summary  Anticipated Discharge Disposition (PT): skilled nursing facility    Vanessa Helton PT  10/31/2024

## 2024-10-31 NOTE — PLAN OF CARE
Problem: Adult Inpatient Plan of Care  Goal: Plan of Care Review  Outcome: Progressing  Flowsheets (Taken 10/31/2024 0421)  Outcome Evaluation: No complaints overnight. Rested comfortably. VSS. 2L O2 while asleep. Turns and skin care. Plan of care updated. Continue safety measures and progress towards goals.  Plan of Care Reviewed With: patient  Goal: Patient-Specific Goal (Individualized)  Outcome: Progressing  Goal: Absence of Hospital-Acquired Illness or Injury  Outcome: Progressing  Intervention: Identify and Manage Fall Risk  Recent Flowsheet Documentation  Taken 10/31/2024 0418 by Erika Escudero, RN  Safety Promotion/Fall Prevention:   safety round/check completed   activity supervised   assistive device/personal items within reach   fall prevention program maintained   nonskid shoes/slippers when out of bed  Taken 10/31/2024 0200 by Erika Escudero, RN  Safety Promotion/Fall Prevention:   safety round/check completed   activity supervised   assistive device/personal items within reach   fall prevention program maintained   nonskid shoes/slippers when out of bed  Taken 10/31/2024 0044 by Erika Escudero, RN  Safety Promotion/Fall Prevention:   safety round/check completed   activity supervised   assistive device/personal items within reach   fall prevention program maintained   nonskid shoes/slippers when out of bed  Taken 10/30/2024 2230 by Erika Escudero, RN  Safety Promotion/Fall Prevention:   safety round/check completed   activity supervised   assistive device/personal items within reach   fall prevention program maintained   nonskid shoes/slippers when out of bed  Taken 10/30/2024 2055 by Erika Escudero, RN  Safety Promotion/Fall Prevention:   safety round/check completed   activity supervised   assistive device/personal items within reach   fall prevention program maintained   nonskid shoes/slippers when out of bed  Intervention: Prevent and Manage VTE (Venous Thromboembolism) Risk  Recent  Flowsheet Documentation  Taken 10/30/2024 2055 by Erika Escudero RN  VTE Prevention/Management: (lovenox) other (see comments)  Intervention: Prevent Infection  Recent Flowsheet Documentation  Taken 10/31/2024 0418 by Erika Escudero RN  Infection Prevention:   rest/sleep promoted   personal protective equipment utilized  Taken 10/31/2024 0200 by Erika Escudero RN  Infection Prevention:   rest/sleep promoted   personal protective equipment utilized  Taken 10/31/2024 0044 by Erika Escudero RN  Infection Prevention:   rest/sleep promoted   personal protective equipment utilized  Taken 10/30/2024 2230 by Erika Escudero RN  Infection Prevention:   rest/sleep promoted   personal protective equipment utilized  Taken 10/30/2024 2055 by Erika Escudero RN  Infection Prevention:   rest/sleep promoted   personal protective equipment utilized  Goal: Optimal Comfort and Wellbeing  Outcome: Progressing  Intervention: Provide Person-Centered Care  Recent Flowsheet Documentation  Taken 10/31/2024 0044 by Erika Escudero RN  Trust Relationship/Rapport:   care explained   choices provided   questions answered   reassurance provided   thoughts/feelings acknowledged  Taken 10/30/2024 2055 by Erika Escudero RN  Trust Relationship/Rapport:   care explained   choices provided   questions answered   reassurance provided   thoughts/feelings acknowledged  Goal: Readiness for Transition of Care  Outcome: Progressing   Goal Outcome Evaluation:  Plan of Care Reviewed With: patient           Outcome Evaluation: No complaints overnight. Rested comfortably. VSS. 2L O2 while asleep. Turns and skin care. Plan of care updated. Continue safety measures and progress towards goals.

## 2024-10-31 NOTE — PLAN OF CARE
Goal Outcome Evaluation:  Plan of Care Reviewed With: patient           Outcome Evaluation: Pt seen for OT/PT this AM. Admitted with acute on chronic CHF. He lives with his son and uses a rwx with his son's assist. He reports 5 SOFY home. Worked on functional transfers today. Required SBA for bed mobility and mod Ax2 for STS. Heavy posterior lean initially but able to correct with cues today. Required mod Ax2 for BSC transfer and assist navigating his rwx. At this time max A for all toileting needs due to decerased balance in standing. Pt presents with decreased strength, endurance, activity tolerance, ADL performance and functional mobility. Pt to benefit from skilled OT to address deficits. Rec SNF at [place].    Anticipated Discharge Disposition (OT): skilled nursing facility

## 2024-10-31 NOTE — DISCHARGE PLACEMENT REQUEST
"Rochelle Mueller Sr. (89 y.o. Male)       Date of Birth   05/29/1928    Social Security Number       Address   [address] [place] [address]    Home Phone   [phone]    MRN   [id number]       [facility]    Marital Status                               Admission Date   10/28/24    Admission Type   Emergency    Admitting Provider   Maninder Quezada MD    Attending Provider   Maninder Quezada MD    Department, Room/Bed   [address], [address]       Discharge Date       Discharge Disposition       Discharge Destination                                 Attending Provider: Maninder Quezada MD    Allergies: No Known Allergies    Isolation: None   Infection: None   Code Status: Prior    Ht: 188 cm (74.02\")   Wt: 58.1 kg (128 lb 1.4 oz)    Admission Cmt: None   Principal Problem: Acute hypoxic respiratory failure [J96.01]                   Active Insurance as of 10/28/2024       Primary Coverage       Payor Plan Insurance Group Employer/Plan Group    MEDICARE RAILROAD MEDICARE        Payor Plan Address Payor Plan Phone Number Payor Plan Fax Number Effective Dates    [address] [phone]  10/1/1989 - None Entered    [place] [address]         Subscriber Name Subscriber Birth Date Member ID       ROCHELLE MUELLER SR. 5/29/1928 [id number]                     Emergency Contacts        (Rel.) Home Phone Work Phone Mobile Phone    Rochelle Mueller Jr (Son) [phone] -- [phone]    ZhaoJohn (Son) -- -- [phone]                "

## 2024-10-31 NOTE — PROGRESS NOTES
Case Management Discharge Note      Final Note: Pt DC to [facility] for skilled level care at OR.  Pt was transported by  EMS stretcher at 8:00pm         Selected Continued Care - Admitted Since 10/28/2024       Destination Coordination complete.      Service Provider Services Address Phone Fax Patient Preferred    [facility] [address] [address] [phone] --              Durable Medical Equipment    No services have been selected for the patient.                Dialysis/Infusion    No services have been selected for the patient.                Home Medical Care    No services have been selected for the patient.                Therapy    No services have been selected for the patient.                Community Resources    No services have been selected for the patient.                Community & DME    No services have been selected for the patient.                         Final Discharge Disposition Code: 03 - skilled nursing facility (SNF)

## 2024-10-31 NOTE — PROGRESS NOTES
"Physicians Statement of Medical Necessity for  Ambulance Transportation    GENERAL INFORMATION     Name: Dinesh Churchill Sr.  YOB: 1928  Medicare #: [id number]  Transport Date:     10/31/24  (Valid for round trips this date, or for scheduled repetitive trips for 60 days from the date signed below.)  Origin:  [facility]  Destination: [facility]   Is the Patient's stay covered under Medicare Part A (PPS/DRG?)Yes  Closest appropriate facility? No, reason transport to more distant facility is required: Pt choice for rehab  If this a hosp-hosp transfer? No  Is this a hospice patient? No    MEDICAL NECESSITY QUESTIONAIRE    Ambulance Transportation is medically necessary only if other means of transportation are contraindicated or would be potentially harmful to the patient.  To meet this requirement, the patient must be either \"bed confined\" or suffer from a condition such that transport by means other than an ambulance is contraindicated by the patient's condition.  The following questions must be answered by the healthcare professional signing below for this form to be valid:     1) Describe the MEDICAL CONDITION (physical and/or mental) of this patient AT THE TIME OF AMBULANCE TRANSPORT that requires the patient to be transported in an ambulance, and why transport by other means is contraindicated by the patient's condition: Pt is deconditioned due to Parkinson and is max Ax2 for transfers  Past Medical History:   Diagnosis Date    Abnormal ECG     Abnormal MRI     Acute frontal sinusitis     Arthritis     Chronic fatigue     Coronary artery disease     Difficulty walking     Dizziness     Head injury     Headache 01/21/2023    Hearing aid worn     left    Hearing loss     Hyperlipidemia     Hypertension     Idiopathic peripheral neuropathy 08/29/2023    Kidney stones     Macrocytosis     Neoplasm of skin     Osteoporosis     Prediabetes     Vitamin D deficiency       Past Surgical " "History:   Procedure Laterality Date    CARDIAC CATHETERIZATION N/A 05/22/2017    Procedure: Left Heart Cath;  Surgeon: Maninder Quezada MD;  Location:  [facility];  Service:     CARDIAC CATHETERIZATION N/A 05/23/2017    Procedure: Stent BMS coronary;  Surgeon: Maninder Quezada MD;  Location:  [facility];  Service:     CARDIAC CATHETERIZATION N/A 08/08/2019    Procedure: Left Heart Cath;  Surgeon: Maninder Quezada MD;  Location:  [facility];  Service: Cardiology    CARDIAC CATHETERIZATION N/A 08/08/2019    Procedure: Left ventriculography;  Surgeon: Maninder Quezada MD;  Location:  [facility];  Service: Cardiology    CARDIAC CATHETERIZATION N/A 08/08/2019    Procedure: Coronary angiography;  Surgeon: Maninder Quezada MD;  Location:  [facility];  Service: Cardiology    CARDIAC CATHETERIZATION N/A 08/20/2019    Procedure: CORONARY ANGIOGRAPHY;  Surgeon: Maninder Quezada MD;  Location[facility] CATH [facility];  Service: Cardiovascular    CARDIAC CATHETERIZATION N/A 08/20/2019    Procedure: Stent BMS coronary;  Surgeon: Maninder Quezada MD;  Location: [facility];  Service: Cardiovascular    CARDIAC ELECTROPHYSIOLOGY PROCEDURE N/A 11/08/2019    Procedure: Loop insertion;  Surgeon: Maninder Quezada MD;  [facility]: [facility];  Service: Cardiovascular    HERNIA REPAIR      x 2    OR RT/LT HEART CATHETERS N/A 05/23/2017    Procedure: Percutaneous Coronary Intervention;  Surgeon: Maninder Quezada MD;  Location: [facility] CATH INVASIVE LOCATION;  Service: Cardiovascular      2) Is this patient \"bed confined\" as defined below?No    To be \"bed confined\" the patient must satisfy all three of the following criteria:  (1) unable to get up from bed without assistance; AND (2) unable to ambulate;  AND (3) unable to sit in a chair or wheelchair.  3) Can " this patient safely be transported by car or wheelchair van (I.e., may safely sit during transport, without an attendant or monitoring?)No   4. In addition to completing questions 1-3 above, please check any of the following conditions that apply*:          *Note: supporting documentation for any boxes checked must be maintained in the patient's medical records   Pt max A x2 for transfers     SIGNATURE OF PHYSICIAN OR OTHER AUTHORIZED HEALTHCARE PROFESSIONAL    I certify that the above information is true and correct based on my evaluation of this patient, and represent that the patient requires transport by ambulance and that other forms of transport are contraindicated.  I understand that this information will be used by the Centers for Medicare and Medicaid Services (CMS) to support the determiniation of medical necessity for ambulance services, and I represent that I have personal knowledge of the patient's condition at the time of transport.       If this box is checked, I also certify that the patient is physically or mentally incapable of signing the ambulance service's claim form and that the institution with which I am affiliated has furnished care, services or assistance to the patient.  My signature below is made on behalf of the patient pursuant to 42 .36(b)(4). In accordance with 42 .37, the specific reason(s) that the patient is physically or mentally incapable of signing the claim for is as follows:     Signature of Physician or Healthcare Professional  Date/Time:        (For Scheduled repetitive transport, this form is not valid for transports performed more than 60 days after this date).                                                                                                                                            --------------------------------------------------------------------------------------------  Printed Name and Credentials of Physician or Authorized Healthcare  Professional     *Form must be signed by patient's attending physician for scheduled, repetitive transports,.  For non-repetitive ambulance transports, if unable to obtain the signature of the attending physician, any of the following may sign (please select below):     Physician  Clinical Nurse Specialist  Registered Nurse     Physician Assistant  Discharge Planner  Licensed Practical Nurse     Nurse Practitioner

## 2024-10-31 NOTE — PROGRESS NOTES
Continued Stay Note  [facility]     Patient Name: Dinesh Churchill Sr.  MRN: [id number]  Today's Date: 10/31/2024    Admit Date: 10/28/2024    [facility]   Discharge Plan       Row Name 10/31/24 0935       Plan    Plan [facility] referral pending    Plan Comments Spoke with pt and jeanie webb about rec. by PT for SNF before pt does reurn back home.  Both agreebale and [facility].   Referral placed in [facility] and spoke with Hardeep who will assess pt for [facility]      Row Name 10/31/24 0901       Plan    Plan SNF vs Home with son and HH    Plan Comments Noted and discussed with PT/OT recommendation for SNF for pt at [place].  Will discuss with pt and his son today to see if they would like pt assessed for SNF vs return home with possible HH.                   Discharge Codes    No documentation.                 Expected Discharge Date and Time       Expected Discharge Date Expected Discharge Time    Oct 31, 2024               CRISS Nye

## 2024-10-31 NOTE — PLAN OF CARE
Goal Outcome Evaluation:  Plan of Care Reviewed With: patient        Progress: improving  Outcome Evaluation: Patient is agreeable to PT/OT this AM. He completed bed mobility requiring SBA and sitting balance at EOB SBA. He completed multiple STS to rwx requiring modAx2 and continues with posterior lean in standing but able to correct this date with cues. He completed stand pivot tx to BSC requiring modAx2 and assist moving rwx. Pt motivated to improve mobility. PT will continue to progress as pt tolerates and rec SNF at [place].    Anticipated Discharge Disposition (PT): skilled nursing facility

## 2024-10-31 NOTE — PROGRESS NOTES
Continued Stay Note  [facility]     Patient Name: Dinesh Churchill Sr.  MRN: [id number]  Today's Date: 10/31/2024    Admit Date: 10/28/2024    Plan: SNF vs Home with son and HH   Discharge Plan       Row Name 10/31/24 0901       Plan    Plan SNF vs Home with son and HH    Plan Comments Noted and discussed with PT/OT recommendation for SNF for pt at [place].  Will discuss with pt and his son today to see if they would like pt assessed for SNF vs return home with possible HH.                   Discharge Codes    No documentation.                 Expected Discharge Date and Time       Expected Discharge Date Expected Discharge Time    Oct 31, 2024               CRISS Nye

## 2024-11-11 ENCOUNTER — PATIENT OUTREACH (OUTPATIENT)
Dept: CASE MANAGEMENT | Facility: OTHER | Age: 89
End: 2024-11-11
Payer: MEDICARE

## 2024-11-11 NOTE — OUTREACH NOTE
AMBULATORY CASE MANAGEMENT NOTE    Names and Relationships of Patient/Support Persons:  -     Care Coordination    Sent email communication to admission director at [facility] with request for discharge plan update. Follow up scheduled next week.     [email or web address]    Education Documentation  No documentation found.        Ashely CUEVAS  Ambulatory Case Management    11/11/2024, 12:34 EST

## 2024-11-12 ENCOUNTER — PATIENT OUTREACH (OUTPATIENT)
Dept: CASE MANAGEMENT | Facility: OTHER | Age: 89
End: 2024-11-12
Payer: MEDICARE

## 2024-11-12 NOTE — OUTREACH NOTE
AMBULATORY CASE MANAGEMENT NOTE    Names and Relationships of Patient/Support Persons: Contact: admissions; Relationship: Other -     Care Coordination    [facility] reported patient will be staying LTC. Closing HRCM.     Education Documentation  No documentation found.        Ashely CUEVAS  Ambulatory Case Management    11/12/2024, 08:29 EST

## 2024-11-14 ENCOUNTER — TELEPHONE (OUTPATIENT)
Dept: FAMILY MEDICINE CLINIC | Facility: CLINIC | Age: 89
End: 2024-11-14

## 2024-11-14 RX ORDER — FUROSEMIDE 20 MG/1
20 TABLET ORAL DAILY
Qty: 30 TABLET | Refills: 2 | Status: CANCELLED | OUTPATIENT
Start: 2024-11-14

## 2024-11-14 NOTE — TELEPHONE ENCOUNTER
PATIENT'S GRANDSON IN LAW JUANY CALLED BACK IN REGARDS TO MEDICAIO REFILL OF   furosemide (LASIX) 20 MG tablet   \  PATIENT WAS IN A NURSING HOME ON 10/31/24 AND HE WENT HOME LAST NIGHT.    THE  NURSING HOME KEPT HIS MEDICATION. NEEDING REFILL     THE NURSING HOME ADVISED GRANDDAUGHTER HE IS TO TAKE THE MEDICATION AT 2:00 DAILY TO KEEP FLUID OFF HIS LUNGS    [email or web address][id number] - [facility] [address] [facility] [address] OF [place] [address] & [facility] [address] [facility] [address] - [phone] [facility] [phone]  [phone]     CALL BACK NUMBER [phone]    ALSO THE MEDICATION   isosorbide mononitrate (IMDUR) 30 MG 24 hr tablet   WAS NOT PICKED UP.     HE IS GOING TO [facility]ARDIOLOG[facility] ON 11/20/24

## 2024-11-14 NOTE — TELEPHONE ENCOUNTER
JENNY WITH [facility] ( [phone]) ALSO CALLED TO SEE IF YOU WOULD GIVE ORDERS FOR THIS PT FOR HOME HEALTH DUE TO PT LEAVING [facility] SO THEY WILL NOT WRITE ORDERS FOR PT

## 2024-11-14 NOTE — TELEPHONE ENCOUNTER
Caller: JUSTIN ONEILL PATIENT GAVE VERBAL PERMISSION TO SPEAK WITH JUSTIN AND HER .    Relationship:     Best call back number: [phone]     Requested Prescriptions:     isosorbide mononitrate (IMDUR) 30 MG 24 hr tablet          Pharmacy where request should be sent:  [facility][id number] - [facility] [address] [facility] [address] [facility] - [phone] [facility] [phone] FX     Last office visit with prescribing clinician: 8/2/2024   Last telemedicine visit with prescribing clinician: Visit date not found   Next office visit with prescribing clinician: Visit date not found     Additional details provided by patient: PATIENT CHECKED HIMSELF OUT TO THE NURSING HOME, AND THEY WOULD NOT GIVE HIM THIS MEDICATION WHEN HE LEFT LAST EVENING. PATIENT NEEDS TO HAVE THIS MEDICATION BY 2 PM TODAY FOR THE FLUID THAT HE HAS ON HIS BODY.     Does the patient have less than a 3 day supply:  [x] Yes  [] No    Would you like a call back once the refill request has been completed: [] Yes [] No    If the office needs to give you a call back, can they leave a voicemail: [] Yes [] No    Joaquin Rudolph Rep   11/14/24 08:08 EST         THANKS

## 2024-11-14 NOTE — TELEPHONE ENCOUNTER
Caller: JUSTIN ONEILL    Relationship: Other    Best call back number: [phone]       What was the call regarding: PATIENT IS NEEDING ASSISTANCE DURING THE DAY WHILE EVERYONE IS GONE TO WORK. SHE STATED THAT HE IS BED BOUND AND NEEDING HELP BATHING, GETTING HIM OUT OF THE BED ETC. SHE WOULD LIKE TO KNOW IF MAYO CHÁVEZ COULD RECOMMEND A COMPANY THAT WOULD BE ABLE TO HELP WITH THIS.

## 2024-11-14 NOTE — TELEPHONE ENCOUNTER
Caller: JUSTIN ONEILL    Relationship: Other    Best call back number: [phone]     Requested Prescriptions:   Requested Prescriptions     Pending Prescriptions Disp Refills    furosemide (LASIX) 20 MG tablet 30 tablet 2     Sig: Take 1 tablet by mouth Daily.        Pharmacy where request should be sent: [facility] #[id number] - [facility] [address] [facility] [address] AT [facility] OF [place] [address] & [place] [address] - [phone]  - [phone] FX     Last office visit with prescribing clinician: 8/2/2024   Last telemedicine visit with prescribing clinician: Visit date not found   Next office visit with prescribing clinician: Visit date not found     Additional details provided by patient: PATIENT IS NEEDING A PRESCRIPTION SENT TO [facility]    Does the patient have less than a 3 day supply:  [x] Yes  [] No      Joaquin Nagel Rep   11/14/24 10:14 EST

## 2024-11-15 RX ORDER — FUROSEMIDE 20 MG/1
20 TABLET ORAL DAILY
Qty: 30 TABLET | Refills: 5 | Status: SHIPPED | OUTPATIENT
Start: 2024-11-15

## 2024-11-16 NOTE — TELEPHONE ENCOUNTER
Please find out why he left [facility], as this is not like him. He needs hospital follow up. I cannot give home health orders without knowing what happened at [facility] and how he is doing.

## 2024-11-16 NOTE — TELEPHONE ENCOUNTER
This is filled by cardiology. It looks like it was filled 11/1/2024 by cardiology. They should be contacted if refill is needed.

## 2024-11-18 NOTE — TELEPHONE ENCOUNTER
I called and spoke with santiago who  has given verbal orders that we can speak with her. She stated that he left [facility] due to him not liking the facility and him not wanting to be there. She stated that she found a CMA that is coming to the house through the week but they are needing someone for the weekend. She stated that she can not lift more than 5-10 pounds herself so she is needing help. Santiago stated that since Mr. Churchill has been home she and her  have been working with him trying to get the strength back in his legs and he can walk with his walker now but does need assistance from behind. She stated that he is doing better than he was at [facility]. The soonest I could get him in for a hospital visit was 12/10/24 that worked with both schedules.

## 2024-11-20 ENCOUNTER — HOSPITAL ENCOUNTER (OUTPATIENT)
Dept: CARDIOLOGY | Facility: HOSPITAL | Age: 89
Discharge: HOME | End: 2024-11-20
Payer: MEDICARE

## 2024-11-20 ENCOUNTER — OFFICE VISIT (OUTPATIENT)
Dept: CARDIOLOGY | Facility: CLINIC | Age: 89
End: 2024-11-20
Payer: MEDICARE

## 2024-11-20 VITALS
BODY MASS INDEX: 18.28 KG/M2 | HEART RATE: 62 BPM | DIASTOLIC BLOOD PRESSURE: 54 MMHG | SYSTOLIC BLOOD PRESSURE: 136 MMHG | WEIGHT: 135 LBS | HEIGHT: 72 IN

## 2024-11-20 DIAGNOSIS — E78.5 HYPERLIPIDEMIA, UNSPECIFIED HYPERLIPIDEMIA TYPE: ICD-10-CM

## 2024-11-20 DIAGNOSIS — I10 ESSENTIAL HYPERTENSION: ICD-10-CM

## 2024-11-20 DIAGNOSIS — I25.10 CORONARY ARTERY DISEASE INVOLVING NATIVE CORONARY ARTERY OF NATIVE HEART WITHOUT ANGINA PECTORIS: ICD-10-CM

## 2024-11-20 DIAGNOSIS — I48.0 PAROXYSMAL ATRIAL FIBRILLATION: ICD-10-CM

## 2024-11-20 DIAGNOSIS — I42.9 CARDIOMYOPATHY, UNSPECIFIED TYPE: Primary | ICD-10-CM

## 2024-11-20 LAB
ANION GAP SERPL CALCULATED.3IONS-SCNC: 9.6 MMOL/L (ref 5–15)
BUN SERPL-MCNC: 23 MG/DL (ref 8–23)
BUN/CREAT SERPL: 28.4 (ref 7–25)
CALCIUM SPEC-SCNC: 8.7 MG/DL (ref 8.2–9.6)
CHLORIDE SERPL-SCNC: 111 MMOL/L (ref 98–107)
CO2 SERPL-SCNC: 25.4 MMOL/L (ref 22–29)
CREAT SERPL-MCNC: 0.81 MG/DL (ref 0.76–1.27)
EGFRCR SERPLBLD CKD-EPI 2021: 80.7 ML/MIN/1.73
GLUCOSE SERPL-MCNC: 95 MG/DL (ref 65–99)
POTASSIUM SERPL-SCNC: 4.3 MMOL/L (ref 3.5–5.2)
SODIUM SERPL-SCNC: 146 MMOL/L (ref 136–145)

## 2024-11-20 PROCEDURE — 80048 BASIC METABOLIC PNL TOTAL CA: CPT

## 2024-11-20 PROCEDURE — 36415 COLL VENOUS BLD VENIPUNCTURE: CPT

## 2024-11-20 NOTE — PROGRESS NOTES
Subjective:        Dinesh Churchill Sr. is a 89 y.o. male who here for follow up    Chief Complaint   Patient presents with    Hospital Follow Up Visit     CHF       HPI    This is a 89 year old male who is known with this provider with hypertension, hyperlipidemia, coronary artery disease, cardiomyopathy. He follows up in office today after discharge from the hospital. He reports he Feels short of breath at times, like after showering and after using his recumbent bike.     Echo 10/29/2024 EF 41 to 45%, moderately dilated LV C.  LV wall motion abnormality.  Indeterminate diastolic function.  Left atrial cavity moderate severely dilated, moderate severe MR.  Cardiac catheterization 8/8/2019 normal left main, LAD midportion 50% stenosis, first large diagonal branch proximally diffuse 90% stenosis.  Circumflex nondominant midportion 50% stenosis.  RCA dominant with proximal stent widely patent.  Successful angioplasty and stent to the subtotal diagonal reduced 0% with vision stent.    The following portions of the patient's history were reviewed and updated as appropriate: allergies, current medications, past family history, past medical history, past social history, past surgical history and problem list.    Past Medical History:   Diagnosis Date    Abnormal ECG     Abnormal MRI     Acute frontal sinusitis     Arthritis     Chronic fatigue     Coronary artery disease     Difficulty walking     Dizziness     Head injury     Headache 01/21/2023    Hearing aid worn     left    Hearing loss     Hyperlipidemia     Hypertension     Idiopathic peripheral neuropathy 08/29/2023    Kidney stones     Macrocytosis     Neoplasm of skin     Osteoporosis     Prediabetes     Vitamin D deficiency          reports that he has never smoked. He has never been exposed to tobacco smoke. He has never used smokeless tobacco. He reports that he does not drink alcohol and does not use drugs.     Family History   Problem Relation Age of Onset     Arthritis Son     Cancer Sister     Diabetes Son        ROS     Review of Systems  Constitutional: No wt loss, fever, fatigue  Gastrointestinal: No nausea, abdominal pain  Behavioral/Psych: No insomnia or anxiety  Cardiovascular no chest pain or shortness of breath      Objective:           Vitals and nursing note reviewed.   Constitutional:       Appearance: Well-developed.   HENT:      Head: Normocephalic.      Right Ear: External ear normal.      Left Ear: External ear normal.   Neck:      Vascular: No JVD.   Pulmonary:      Effort: Pulmonary effort is normal. No respiratory distress.      Breath sounds: Normal breath sounds. No stridor. No rales.   Cardiovascular:      Normal rate. Regular rhythm.      No gallop.    Pulses:     Intact distal pulses.   Edema:     Peripheral edema absent.   Abdominal:      General: Bowel sounds are normal. There is no distension.      Palpations: Abdomen is soft.      Tenderness: There is no abdominal tenderness. There is no guarding.   Musculoskeletal: Normal range of motion.         General: No tenderness.      Cervical back: Normal range of motion. Skin:     General: Skin is warm.   Neurological:      Mental Status: Alert and oriented to person, place, and time.      Deep Tendon Reflexes: Reflexes are normal and symmetric.   Psychiatric:         Judgment: Judgment normal.         Procedures    RESULTS:  Initial aortic pressure was approximately 130/80, and the patient remained hemodynamically stable through the case.  Initial coronary arteriography showed 99% subtotally occluded diagonal branch reduced to 0% with 2.0/15 vision stent dilated to 2.2.     IVONNE  FLOW   PRE      3    POST  3     TYPE OF LESION    C     RECOMMENDATIONS:  The patient has had an excellent result from intervention.  The patient will be maintained on antiplatelet therapy and follow up with me and his primary care physician,   Importance of taking dual antiplatelets for one year  has been explained,  risk of stent thrombosis leading to the acute MI, which carries high morbidity and mortality has been explained     Discontinuation or interruptions of these medications should be under the strict guidance of appropriate health professional  .    Coronary Angiography      Left Main:  The left main originates in the left coronary cusp.  It bifurcates and gives rise to the LAD and circumflex system.  Normal left main     Left Anterior Descending:  Left anterior descending midportion 50% stenosis, first large diagonal branch was approximately diffuse 90% stenosis     Left Circumflex:  Circumflex artery was a nondominant midportion was 50% stenosis     Right Coronary Artery:  The right coronary artery originates in the right coronary cusp.  Right coronary artery was dominant with the proximal had a stent widely patent     Left Ventriculography:       Estimated Ejection Fraction:         60 %   Wall motion abnormalities:  None   Mitral Regurgitation:  None      Impression:      Normal left main  Left anterior descending midportion 50% stenosis, first large diagonal branch was approximately diffuse 90% stenosis  Circumflex artery was a nondominant midportion was 50% stenosis  Right coronary artery was dominant with the proximal had a stent widely patent  Normal LV     Recommendations:      Most likely cause a positive stress test with a high diagonal will be treated medically PCI would be considered if continues to her chest pain            I sincerely appreciate the opportunity to participate in your patient's care. Please feel free to contact me anytime if I can be of assistance in this or any other way.     Maninder Quezada MD  8/8/2019  8:31 AM       Interpretation Summary    Findings consistent with an abnormal ECG stress test.  Left ventricular ejection fraction is mildly reduced (Calculated EF = 46%).  Myocardial perfusion imaging indicates a medium-sized, moderately severe area of ischemia located in the  inferior wall and lateral wall.  Impressions are consistent with a high risk study.    Interpretation Summary         Left ventricular ejection fraction appears to be 41 - 45%.    The left ventricular cavity is moderately dilated.    The following left ventricular wall segments are hypokinetic: apical lateral, mid inferolateral, mid inferior, mid inferoseptal and mid anteroseptal.    Left ventricular diastolic function was indeterminate.    The left atrial cavity is moderate to severely dilated.    There is calcification of the aortic valve.    Moderate to severe mitral valve regurgitation is present.    Estimated right ventricular systolic pressure from tricuspid regurgitation is normal (<35 mmHg).    Mild dilation of the aortic root is present. Mild dilation of the sinuses of Valsalva is present.       Interpretation Summary         Left ventricular ejection fraction appears to be 41 - 45%.    The left ventricular cavity is moderately dilated.    The following left ventricular wall segments are hypokinetic: apical lateral, mid inferolateral, mid inferior, mid inferoseptal and mid anteroseptal.    Left ventricular diastolic function was indeterminate.    The left atrial cavity is moderate to severely dilated.    There is calcification of the aortic valve.    Moderate to severe mitral valve regurgitation is present.    Estimated right ventricular systolic pressure from tricuspid regurgitation is normal (<35 mmHg).    Mild dilation of the aortic root is present. Mild dilation of the sinuses of Valsalva is present.    Conclusion       Successful right and left coronary angiogram and LV gram  Normal left main  Left anterior descending midportion 50% stenosis, first large diagonal branch was approximately diffuse 90% stenosis  Circumflex artery was a nondominant midportion was 50% stenosis  Right coronary artery was dominant with the proximal had a stent widely patent  Normal LV gram     August 8, 2019         Current  Outpatient Medications:     acetaminophen (TYLENOL) 500 MG tablet, Take 1 tablet by mouth 4 (Four) Times a Day., Disp: , Rfl:     amLODIPine (NORVASC) 5 MG tablet, TAKE 1 TABLET BY MOUTH DAILY, Disp: 90 tablet, Rfl: 3    aspirin 81 MG chewable tablet, Chew 1 tablet Daily., Disp: , Rfl:     atorvastatin (LIPITOR) 20 MG tablet, TAKE 1 TABLET BY MOUTH EVERY NIGHT, Disp: 90 tablet, Rfl: 3    Cholecalciferol (VITAMIN D-3) 1000 UNITS capsule, Take 1,000 Units by mouth Daily., Disp: , Rfl:     Cyanocobalamin (B-12) 1000 MCG lozenge, PLACE 1 LOZENGE UNDER THE TOUNGE EVERY OTHER DAY (Patient taking differently: Place 1 tablet under the tongue Every Other Day.), Disp: 45 lozenge, Rfl: 1    furosemide (LASIX) 20 MG tablet, Take 1 tablet by mouth Daily., Disp: 30 tablet, Rfl: 5    isosorbide mononitrate (IMDUR) 30 MG 24 hr tablet, Take 1 tablet by mouth Daily., Disp: 30 tablet, Rfl: 2    losartan (COZAAR) 50 MG tablet, TAKE 1 TABLET BY MOUTH DAILY, Disp: 90 tablet, Rfl: 3    metoprolol succinate XL (TOPROL-XL) 100 MG 24 hr tablet, TAKE 1 TABLET BY MOUTH DAILY, Disp: 90 tablet, Rfl: 3     Assessment:        Patient Active Problem List   Diagnosis    Abnormal magnetic resonance imaging study    Arthritis    Osteoarthritis of cervical spine    Diplopia    Hearing loss    Neoplasm of uncertain behavior of skin    Prediabetes    Vitamin D deficiency    Chronic fatigue    History of supraventricular tachycardia    Essential hypertension    B12 deficiency    Abnormal cardiac function test    Cardiomyopathy    Coronary artery disease involving native coronary artery of native heart without angina pectoris    History of coronary artery stent placement    History of renal calculi    Dyslipidemia    Macrocytic anemia    Precordial pain    Parkinson's disease    History of CVA (cerebrovascular accident)    Paroxysmal atrial fibrillation    Otalgia, left    Chest pain    Abnormal stress test    Spontaneous pneumothorax    Chest pain     Acute systolic (congestive) heart failure    Thrombocytopenia    Pneumonia of left lower lobe due to infectious organism    Syncope and collapse    Status post placement of implantable loop recorder    Long term (current) use of antithrombotics/antiplatelets    Stuttering    Fall    Closed fracture of multiple ribs of right side    Chest wall pain    Closed fracture of phalanx of toe of left foot    Headache    Sudden onset of severe headache    Fall in home, initial encounter    Compression fracture of L2 vertebra with routine healing    Weakness of both legs    Idiopathic peripheral neuropathy    L2 vertebral fracture    Compression fracture of T12 vertebra with routine healing    Sacral fracture, closed    Senile osteoporosis    Acute hypoxic respiratory failure               Plan:   1.  Hospital discharge follow-up for acute on chronic HFrEF: has shortness of breath with bicycle exercise and showering. Does weights without issue. O2 sats are checked daily about 96%, after bike and shower he feels short of breath. Son inquiring about oxygen at home, Will check 6min walking o2 sats here to see if he qualifies and order accordingly. Continue lasix, losartan, toprol. Volume stable on exam.     2.  Coronary artery disease: no chest pain. Continue aspirin, toprol, atorvastatin, imdur    Risk reduction for the coronary artery disease, controlling the blood pressure, blood sugar management, cholesterol management, exercise, stress management, and proper compliance with medications and follow-up has been discussed    3.  Hypertension: controlled, continue losartan, toprol, amlodpine    Importance of controlling hypertension and blood pressure  checkup on the regular basis has been explained. Hypertension as a silent killer has been discussed. Risk reduction of the weight and regular exercises to control the hypertension has been explained.    4.  Hyperlipidemia: continue atorvastatin    Risk of the hyperlipidemia,  importance of the treatment has been explained. Pros and cons of the statins has been explained. Regular blood workup as well as side effects including the liver failure, myelopathy death has been explained.    5. Paroxysmal atrial fibrillation: controlled on toprol. Not anticoagulated historically due to increased risk for bleeding with age/frailty.              No diagnosis found.    There are no diagnoses linked to this encounter.    COUNSELING: Done    Dinesh Churchill Sr.Counseling was given to patient for the following topics: diagnostic results, risk factor reductions, impressions, risks and benefits of treatment options and importance of treatment compliance .       SMOKING COUNSELING: denies    Bmp now  6min walking   Follow up in 1 month    Sincerely,   CANDELARIA Wong  [facility]  11/20/24  10:58 EST    EMR Dragon/Transcription disclaimer:   Much of this encounter note is an electronic transcription/translation of spoken language to printed text. The electronic translation of spoken language may permit erroneous, or at times, nonsensical words or phrases to be inadvertently transcribed; Although I have reviewed the note for such errors, some may still exist.

## 2024-12-03 ENCOUNTER — APPOINTMENT (OUTPATIENT)
Dept: GENERAL RADIOLOGY | Facility: HOSPITAL | Age: 89
DRG: 291 | End: 2024-12-03
Payer: MEDICARE

## 2024-12-03 ENCOUNTER — HOSPITAL ENCOUNTER (INPATIENT)
Facility: HOSPITAL | Age: 89
LOS: 3 days | Discharge: HOME | DRG: 291 | End: 2024-12-06
Attending: EMERGENCY MEDICINE | Admitting: INTERNAL MEDICINE
Payer: MEDICARE

## 2024-12-03 DIAGNOSIS — I50.9 ACUTE CONGESTIVE HEART FAILURE, UNSPECIFIED HEART FAILURE TYPE: Primary | ICD-10-CM

## 2024-12-03 DIAGNOSIS — E43 SEVERE PROTEIN-CALORIE MALNUTRITION: ICD-10-CM

## 2024-12-03 PROBLEM — R79.89 ELEVATED TROPONIN: Status: ACTIVE | Noted: 2024-12-03

## 2024-12-03 LAB
ALBUMIN SERPL-MCNC: 3.3 G/DL (ref 3.5–5.2)
ALBUMIN/GLOB SERPL: 1.4 G/DL
ALP SERPL-CCNC: 54 U/L (ref 39–117)
ALT SERPL W P-5'-P-CCNC: 9 U/L (ref 1–41)
ANION GAP SERPL CALCULATED.3IONS-SCNC: 10 MMOL/L (ref 5–15)
ARTERIAL PATENCY WRIST A: POSITIVE
AST SERPL-CCNC: 14 U/L (ref 1–40)
ATMOSPHERIC PRESS: 765.1 MMHG
B PARAPERT DNA SPEC QL NAA+PROBE: NOT DETECTED
B PERT DNA SPEC QL NAA+PROBE: NOT DETECTED
BASE EXCESS BLDA CALC-SCNC: 3.1 MMOL/L (ref 0–2)
BASOPHILS # BLD AUTO: 0.02 10*3/MM3 (ref 0–0.2)
BASOPHILS NFR BLD AUTO: 0.4 % (ref 0–1.5)
BDY SITE: ABNORMAL
BILIRUB SERPL-MCNC: 1.2 MG/DL (ref 0–1.2)
BILIRUB UR QL STRIP: NEGATIVE
BUN SERPL-MCNC: 23 MG/DL (ref 8–23)
BUN/CREAT SERPL: 33.8 (ref 7–25)
C PNEUM DNA NPH QL NAA+NON-PROBE: NOT DETECTED
CALCIUM SPEC-SCNC: 8.6 MG/DL (ref 8.2–9.6)
CHLORIDE SERPL-SCNC: 109 MMOL/L (ref 98–107)
CLARITY UR: ABNORMAL
CO2 BLDA-SCNC: 29.9 MMOL/L (ref 23–27)
CO2 SERPL-SCNC: 24 MMOL/L (ref 22–29)
COLOR UR: YELLOW
CREAT SERPL-MCNC: 0.68 MG/DL (ref 0.76–1.27)
DEPRECATED RDW RBC AUTO: 57.6 FL (ref 37–54)
EGFRCR SERPLBLD CKD-EPI 2021: 85.1 ML/MIN/1.73
EOSINOPHIL # BLD AUTO: 0.08 10*3/MM3 (ref 0–0.4)
EOSINOPHIL NFR BLD AUTO: 1.5 % (ref 0.3–6.2)
ERYTHROCYTE [DISTWIDTH] IN BLOOD BY AUTOMATED COUNT: 15.1 % (ref 12.3–15.4)
FLUAV SUBTYP SPEC NAA+PROBE: NOT DETECTED
FLUBV RNA ISLT QL NAA+PROBE: NOT DETECTED
GAS FLOW AIRWAY: 3 LPM
GEN 5 1HR TROPONIN T REFLEX: 60 NG/L
GLOBULIN UR ELPH-MCNC: 2.3 GM/DL
GLUCOSE SERPL-MCNC: 112 MG/DL (ref 65–99)
GLUCOSE UR STRIP-MCNC: NEGATIVE MG/DL
HADV DNA SPEC NAA+PROBE: NOT DETECTED
HCO3 BLDA-SCNC: 28.5 MMOL/L (ref 22–28)
HCOV 229E RNA SPEC QL NAA+PROBE: NOT DETECTED
HCOV HKU1 RNA SPEC QL NAA+PROBE: NOT DETECTED
HCOV NL63 RNA SPEC QL NAA+PROBE: NOT DETECTED
HCOV OC43 RNA SPEC QL NAA+PROBE: NOT DETECTED
HCT VFR BLD AUTO: 37.3 % (ref 37.5–51)
HEMODILUTION: NO
HGB BLD-MCNC: 11.8 G/DL (ref 13–17.7)
HGB UR QL STRIP.AUTO: NEGATIVE
HMPV RNA NPH QL NAA+NON-PROBE: NOT DETECTED
HOLD SPECIMEN: NORMAL
HOLD SPECIMEN: NORMAL
HPIV1 RNA ISLT QL NAA+PROBE: NOT DETECTED
HPIV2 RNA SPEC QL NAA+PROBE: NOT DETECTED
HPIV3 RNA NPH QL NAA+PROBE: NOT DETECTED
HPIV4 P GENE NPH QL NAA+PROBE: NOT DETECTED
IMM GRANULOCYTES # BLD AUTO: 0.01 10*3/MM3 (ref 0–0.05)
IMM GRANULOCYTES NFR BLD AUTO: 0.2 % (ref 0–0.5)
KETONES UR QL STRIP: NEGATIVE
LEUKOCYTE ESTERASE UR QL STRIP.AUTO: NEGATIVE
LYMPHOCYTES # BLD AUTO: 1.1 10*3/MM3 (ref 0.7–3.1)
LYMPHOCYTES NFR BLD AUTO: 20.4 % (ref 19.6–45.3)
M PNEUMO IGG SER IA-ACNC: NOT DETECTED
MCH RBC QN AUTO: 33.4 PG (ref 26.6–33)
MCHC RBC AUTO-ENTMCNC: 31.6 G/DL (ref 31.5–35.7)
MCV RBC AUTO: 105.7 FL (ref 79–97)
MODALITY: ABNORMAL
MONOCYTES # BLD AUTO: 0.49 10*3/MM3 (ref 0.1–0.9)
MONOCYTES NFR BLD AUTO: 9.1 % (ref 5–12)
NEUTROPHILS NFR BLD AUTO: 3.68 10*3/MM3 (ref 1.7–7)
NEUTROPHILS NFR BLD AUTO: 68.4 % (ref 42.7–76)
NITRITE UR QL STRIP: NEGATIVE
NT-PROBNP SERPL-MCNC: ABNORMAL PG/ML (ref 0–1800)
PCO2 BLDA: 45.8 MM HG (ref 35–45)
PH BLDA: 7.4 PH UNITS (ref 7.35–7.45)
PH UR STRIP.AUTO: 6 [PH] (ref 5–8)
PLATELET # BLD AUTO: 129 10*3/MM3 (ref 140–450)
PMV BLD AUTO: 11 FL (ref 6–12)
PO2 BLDA: 95.8 MM HG (ref 80–100)
POTASSIUM SERPL-SCNC: 4.2 MMOL/L (ref 3.5–5.2)
PROCALCITONIN SERPL-MCNC: 0.05 NG/ML (ref 0–0.25)
PROT SERPL-MCNC: 5.6 G/DL (ref 6–8.5)
PROT UR QL STRIP: ABNORMAL
QT INTERVAL: 468 MS
QTC INTERVAL: 487 MS
RBC # BLD AUTO: 3.53 10*6/MM3 (ref 4.14–5.8)
RHINOVIRUS RNA SPEC NAA+PROBE: NOT DETECTED
RSV RNA NPH QL NAA+NON-PROBE: NOT DETECTED
SAO2 % BLDCOA: 97.4 % (ref 92–98.5)
SARS-COV-2 RNA NPH QL NAA+NON-PROBE: NOT DETECTED
SODIUM SERPL-SCNC: 143 MMOL/L (ref 136–145)
SP GR UR STRIP: 1.02 (ref 1–1.03)
TOTAL RATE: 18 BREATHS/MINUTE
TROPONIN T NUMERIC DELTA: 3 NG/L
TROPONIN T SERPL HS-MCNC: 57 NG/L
UROBILINOGEN UR QL STRIP: ABNORMAL
WBC NRBC COR # BLD AUTO: 5.38 10*3/MM3 (ref 3.4–10.8)
WHOLE BLOOD HOLD COAG: NORMAL
WHOLE BLOOD HOLD SPECIMEN: NORMAL

## 2024-12-03 PROCEDURE — 85025 COMPLETE CBC W/AUTO DIFF WBC: CPT | Performed by: PHYSICIAN ASSISTANT

## 2024-12-03 PROCEDURE — 93005 ELECTROCARDIOGRAM TRACING: CPT | Performed by: PHYSICIAN ASSISTANT

## 2024-12-03 PROCEDURE — 99285 EMERGENCY DEPT VISIT HI MDM: CPT

## 2024-12-03 PROCEDURE — 25010000002 ENOXAPARIN PER 10 MG: Performed by: INTERNAL MEDICINE

## 2024-12-03 PROCEDURE — 36415 COLL VENOUS BLD VENIPUNCTURE: CPT | Performed by: PHYSICIAN ASSISTANT

## 2024-12-03 PROCEDURE — 25010000002 FUROSEMIDE PER 20 MG: Performed by: PHYSICIAN ASSISTANT

## 2024-12-03 PROCEDURE — 71045 X-RAY EXAM CHEST 1 VIEW: CPT

## 2024-12-03 PROCEDURE — 36600 WITHDRAWAL OF ARTERIAL BLOOD: CPT

## 2024-12-03 PROCEDURE — 25010000002 FUROSEMIDE PER 20 MG: Performed by: INTERNAL MEDICINE

## 2024-12-03 PROCEDURE — 82803 BLOOD GASES ANY COMBINATION: CPT

## 2024-12-03 PROCEDURE — 84484 ASSAY OF TROPONIN QUANT: CPT | Performed by: PHYSICIAN ASSISTANT

## 2024-12-03 PROCEDURE — 81003 URINALYSIS AUTO W/O SCOPE: CPT | Performed by: PHYSICIAN ASSISTANT

## 2024-12-03 PROCEDURE — 83880 ASSAY OF NATRIURETIC PEPTIDE: CPT | Performed by: PHYSICIAN ASSISTANT

## 2024-12-03 PROCEDURE — 80053 COMPREHEN METABOLIC PANEL: CPT | Performed by: PHYSICIAN ASSISTANT

## 2024-12-03 PROCEDURE — 93010 ELECTROCARDIOGRAM REPORT: CPT | Performed by: INTERNAL MEDICINE

## 2024-12-03 PROCEDURE — 0202U NFCT DS 22 TRGT SARS-COV-2: CPT | Performed by: PHYSICIAN ASSISTANT

## 2024-12-03 PROCEDURE — 84145 PROCALCITONIN (PCT): CPT | Performed by: PHYSICIAN ASSISTANT

## 2024-12-03 RX ORDER — LOSARTAN POTASSIUM 50 MG/1
50 TABLET ORAL DAILY
Status: DISCONTINUED | OUTPATIENT
Start: 2024-12-04 | End: 2024-12-06 | Stop reason: HOSPADM

## 2024-12-03 RX ORDER — ONDANSETRON 2 MG/ML
4 INJECTION INTRAMUSCULAR; INTRAVENOUS EVERY 6 HOURS PRN
Status: DISCONTINUED | OUTPATIENT
Start: 2024-12-03 | End: 2024-12-06 | Stop reason: HOSPADM

## 2024-12-03 RX ORDER — SODIUM CHLORIDE 0.9 % (FLUSH) 0.9 %
10 SYRINGE (ML) INJECTION AS NEEDED
Status: DISCONTINUED | OUTPATIENT
Start: 2024-12-03 | End: 2024-12-06 | Stop reason: HOSPADM

## 2024-12-03 RX ORDER — ISOSORBIDE MONONITRATE 60 MG/1
60 TABLET, EXTENDED RELEASE ORAL DAILY
COMMUNITY
Start: 2024-12-01

## 2024-12-03 RX ORDER — CHOLECALCIFEROL (VITAMIN D3) 25 MCG
1000 TABLET ORAL DAILY
Status: DISCONTINUED | OUTPATIENT
Start: 2024-12-04 | End: 2024-12-06 | Stop reason: HOSPADM

## 2024-12-03 RX ORDER — ISOSORBIDE MONONITRATE 30 MG/1
60 TABLET, EXTENDED RELEASE ORAL DAILY
Status: DISCONTINUED | OUTPATIENT
Start: 2024-12-04 | End: 2024-12-06 | Stop reason: HOSPADM

## 2024-12-03 RX ORDER — ATORVASTATIN CALCIUM 20 MG/1
20 TABLET, FILM COATED ORAL NIGHTLY
Status: DISCONTINUED | OUTPATIENT
Start: 2024-12-04 | End: 2024-12-06 | Stop reason: HOSPADM

## 2024-12-03 RX ORDER — ENOXAPARIN SODIUM 100 MG/ML
40 INJECTION SUBCUTANEOUS EVERY 24 HOURS
Status: DISCONTINUED | OUTPATIENT
Start: 2024-12-03 | End: 2024-12-06 | Stop reason: HOSPADM

## 2024-12-03 RX ORDER — AMLODIPINE BESYLATE 5 MG/1
5 TABLET ORAL
Status: DISCONTINUED | OUTPATIENT
Start: 2024-12-04 | End: 2024-12-06 | Stop reason: HOSPADM

## 2024-12-03 RX ORDER — SODIUM CHLORIDE 0.9 % (FLUSH) 0.9 %
10 SYRINGE (ML) INJECTION EVERY 12 HOURS SCHEDULED
Status: DISCONTINUED | OUTPATIENT
Start: 2024-12-03 | End: 2024-12-06 | Stop reason: HOSPADM

## 2024-12-03 RX ORDER — FUROSEMIDE 10 MG/ML
80 INJECTION INTRAMUSCULAR; INTRAVENOUS
Status: COMPLETED | OUTPATIENT
Start: 2024-12-03 | End: 2024-12-04

## 2024-12-03 RX ORDER — NITROGLYCERIN 0.4 MG/1
0.4 TABLET SUBLINGUAL
Status: DISCONTINUED | OUTPATIENT
Start: 2024-12-03 | End: 2024-12-06 | Stop reason: HOSPADM

## 2024-12-03 RX ORDER — UREA 10 %
1000 LOTION (ML) TOPICAL DAILY
Status: DISCONTINUED | OUTPATIENT
Start: 2024-12-04 | End: 2024-12-06 | Stop reason: HOSPADM

## 2024-12-03 RX ORDER — ACETAMINOPHEN 500 MG
500 TABLET ORAL EVERY 6 HOURS SCHEDULED
Status: DISCONTINUED | OUTPATIENT
Start: 2024-12-03 | End: 2024-12-06 | Stop reason: HOSPADM

## 2024-12-03 RX ORDER — ASPIRIN 81 MG/1
81 TABLET, CHEWABLE ORAL DAILY
Status: DISCONTINUED | OUTPATIENT
Start: 2024-12-04 | End: 2024-12-06 | Stop reason: HOSPADM

## 2024-12-03 RX ORDER — FUROSEMIDE 10 MG/ML
80 INJECTION INTRAMUSCULAR; INTRAVENOUS ONCE
Status: COMPLETED | OUTPATIENT
Start: 2024-12-03 | End: 2024-12-03

## 2024-12-03 RX ORDER — FAMOTIDINE 20 MG
1000 TABLET ORAL DAILY
Status: DISCONTINUED | OUTPATIENT
Start: 2024-12-04 | End: 2024-12-03

## 2024-12-03 RX ORDER — SODIUM CHLORIDE 9 MG/ML
40 INJECTION, SOLUTION INTRAVENOUS AS NEEDED
Status: DISCONTINUED | OUTPATIENT
Start: 2024-12-03 | End: 2024-12-06 | Stop reason: HOSPADM

## 2024-12-03 RX ORDER — METOPROLOL SUCCINATE 100 MG/1
100 TABLET, EXTENDED RELEASE ORAL DAILY
Status: DISCONTINUED | OUTPATIENT
Start: 2024-12-04 | End: 2024-12-06 | Stop reason: HOSPADM

## 2024-12-03 RX ADMIN — ENOXAPARIN SODIUM 40 MG: 100 INJECTION SUBCUTANEOUS at 18:44

## 2024-12-03 RX ADMIN — Medication 10 ML: at 21:01

## 2024-12-03 RX ADMIN — FUROSEMIDE 80 MG: 10 INJECTION, SOLUTION INTRAMUSCULAR; INTRAVENOUS at 12:54

## 2024-12-03 RX ADMIN — FUROSEMIDE 80 MG: 10 INJECTION, SOLUTION INTRAMUSCULAR; INTRAVENOUS at 18:44

## 2024-12-03 RX ADMIN — ACETAMINOPHEN 500 MG: 500 TABLET, FILM COATED ORAL at 23:07

## 2024-12-03 RX ADMIN — ACETAMINOPHEN 500 MG: 500 TABLET, FILM COATED ORAL at 18:44

## 2024-12-03 NOTE — ED NOTES
Nursing report ED to floor  Dinesh Churchill Sr.  89 y.o.  male    HPI :  HPI  Stated Reason for Visit: soa, cough  History Obtained From: EMS    Chief Complaint  Chief Complaint   Patient presents with    Shortness of Breath    Cough       Admitting doctor:   Juan Oropeza MD    Admitting diagnosis:   The encounter diagnosis was Acute congestive heart failure, unspecified heart failure type.    Code status:   Current Code Status       Date Active Code Status Order ID Comments User Context       Prior            Allergies:   Patient has no known allergies.    Isolation:   No active isolations    Intake and Output  No intake or output data in the 24 hours ending 12/03/24 1311    Weight:       12/03/24  1101   Weight: 61.2 kg (134 lb 14.7 oz)       Most recent vitals:   Vitals:    12/03/24 1122 12/03/24 1231 12/03/24 1249 12/03/24 1254   BP:  145/81  145/81   Pulse: 65 67 64 64   Resp:       Temp:       SpO2: 95%  95%    Weight:       Height:           Active LDAs/IV Access:   Lines, Drains & Airways       Active LDAs       Name Placement date Placement time Site Days    Peripheral IV 12/03/24 1058 Left Antecubital 12/03/24  1058  Antecubital  less than 1                    Labs (abnormal labs have a star):   Labs Reviewed   COMPREHENSIVE METABOLIC PANEL - Abnormal; Notable for the following components:       Result Value    Glucose 112 (*)     Creatinine 0.68 (*)     Chloride 109 (*)     Total Protein 5.6 (*)     Albumin 3.3 (*)     BUN/Creatinine Ratio 33.8 (*)     All other components within normal limits    Narrative:     GFR Normal >60  Chronic Kidney Disease <60  Kidney Failure <15    The GFR formula is only valid for adults with stable renal function between ages 18 and 70.   BNP (IN-HOUSE) - Abnormal; Notable for the following components:    proBNP 17,076.0 (*)     All other components within normal limits    Narrative:     This assay is used as an aid in the diagnosis of individuals suspected of having heart  failure. It can be used as an aid in the diagnosis of acute decompensated heart failure (ADHF) in patients presenting with signs and symptoms of ADHF to the emergency department (ED). In addition, NT-proBNP of <300 pg/mL indicates ADHF is not likely.    Age Range Result Interpretation  NT-proBNP Concentration (pg/mL:      <50             Positive            >450                   Gray                 300-450                    Negative             <300    50-75           Positive            >900                  Gray                300-900                  Negative            <300      >75             Positive            >1800                  Gray                300-1800                  Negative            <300   SINGLE HS TROPONIN T - Abnormal; Notable for the following components:    HS Troponin T 57 (*)     All other components within normal limits    Narrative:     High Sensitive Troponin T Reference Range:  <14.0 ng/L- Negative Female for AMI  <22.0 ng/L- Negative Male for AMI  >=14 - Abnormal Female indicating possible myocardial injury.  >=22 - Abnormal Male indicating possible myocardial injury.   Clinicians would have to utilize clinical acumen, EKG, Troponin, and serial changes to determine if it is an Acute Myocardial Infarction or myocardial injury due to an underlying chronic condition.        CBC WITH AUTO DIFFERENTIAL - Abnormal; Notable for the following components:    RBC 3.53 (*)     Hemoglobin 11.8 (*)     Hematocrit 37.3 (*)     .7 (*)     MCH 33.4 (*)     RDW-SD 57.6 (*)     Platelets 129 (*)     All other components within normal limits   BLOOD GAS, ARTERIAL - Abnormal; Notable for the following components:    pCO2, Arterial 45.8 (*)     HCO3, Arterial 28.5 (*)     Base Excess, Arterial 3.1 (*)     CO2 Content 29.9 (*)     All other components within normal limits   RESPIRATORY PANEL PCR W/ COVID-19 (SARS-COV-2), NP SWAB IN UTM/VTP, 2 HR TAT - Normal    Narrative:     In the setting of  "a positive respiratory panel with a viral infection PLUS a negative procalcitonin without other underlying concern for bacterial infection, consider observing off antibiotics or discontinuation of antibiotics and continue supportive care. If the respiratory panel is positive for atypical bacterial infection (Bordetella pertussis, Chlamydophila pneumoniae, or Mycoplasma pneumoniae), consider antibiotic de-escalation to target atypical bacterial infection.   PROCALCITONIN - Normal    Narrative:     As a Marker for Sepsis (Non-Neonates):    1. <0.5 ng/mL represents a low risk of severe sepsis and/or septic shock.  2. >2 ng/mL represents a high risk of severe sepsis and/or septic shock.    As a Marker for Lower Respiratory Tract Infections that require antibiotic therapy:    PCT on Admission    Antibiotic Therapy       6-12 Hrs later    >0.5                Strongly Recommended  >0.25 - <0.5        Recommended   0.1 - 0.25          Discouraged              Remeasure/reassess PCT  <0.1                Strongly Discouraged     Remeasure/reassess PCT    As 28 day mortality risk marker: \"Change in Procalcitonin Result\" (>80% or <=80%) if Day 0 (or Day 1) and Day 4 values are available. Refer to http://www.[facility]-pct-calculator.com    Change in PCT <=80%  A decrease of PCT levels below or equal to 80% defines a positive change in PCT test result representing a higher risk for 28-day all-cause mortality of patients diagnosed with severe sepsis for septic shock.    Change in PCT >80%  A decrease of PCT levels of more than 80% defines a negative change in PCT result representing a lower risk for 28-day all-cause mortality of patients diagnosed with severe sepsis or septic shock.      RAINBOW DRAW    Narrative:     The following orders were created for panel order [facility] Draw.  Procedure                               Abnormality         Status                     ---------                               -----------         ------  "                    Green Top (Gel)[[id number]]                                  Final result               Lavender Top[[id number]]                                     Final result               Gold Top - SST[[id number]]                                   Final result               Light Blue Top[[id number]]                                   Final result                 Please view results for these tests on the individual orders.   BLOOD GAS, ARTERIAL   URINALYSIS W/ MICROSCOPIC IF INDICATED (NO CULTURE)   HIGH SENSITIVITIY TROPONIN T 2HR   CBC AND DIFFERENTIAL    Narrative:     The following orders were created for panel order CBC & Differential.  Procedure                               Abnormality         Status                     ---------                               -----------         ------                     CBC Auto Differential[[id number]]        Abnormal            Final result                 Please view results for these tests on the individual orders.   GREEN TOP   LAVENDER TOP   GOLD TOP - SST   LIGHT BLUE TOP       EKG:   ECG 12 Lead Dyspnea   Final Result   HEART RATE=65  bpm   RR Jnfgordl=078  ms   WY Interval=  ms   P Horizontal Axis=  deg   P Front Axis=  deg   QRSD Vevedklv=972  ms   QT Xqgmdwnx=532  ms   VGcH=082  ms   QRS Axis=-49  deg   T Wave Axis=153  deg   - ABNORMAL ECG -   Atrial flutter with predominant 4:1 AV block   Left bundle branch block   repolarization abnormalities have improved   Electronically Signed By: Itzel Baker ([facility]) 2024-12-03 12:15:07   Date and Time of Study:2024-12-03 11:15:54          Meds given in ED:   Medications   sodium chloride 0.9 % flush 10 mL (has no administration in time range)   furosemide (LASIX) injection 80 mg (80 mg Intravenous Given 12/3/24 1254)       Imaging results:  XR Chest 1 View    Result Date: 12/3/2024   1. Cardiomegaly with vascular congestion. 2. Bibasilar lung opacities with possible bilateral lower lobe atelectasis or consolidation.  3. Mild to moderate layering pleural effusions, increased on the right  This report was finalized on 12/3/2024 12:07 PM by Dr. Carter Sanches M.D on Workstation: [id number]       Ambulatory status:   - assist Xs 1; uses walker at baseline, wheelchair for long distances     Social issues:   Social History     Socioeconomic History    Marital status:    Tobacco Use    Smoking status: Never     Passive exposure: Never    Smokeless tobacco: Never   Vaping Use    Vaping status: Never Used   Substance and Sexual Activity    Alcohol use: No     Comment: quit 30 years ago     Drug use: No    Sexual activity: Not Currently     Partners: Male       Peripheral Neurovascular       Neuro Cognitive       Learning       Respiratory       Abdominal Pain       Pain Assessments  Pain (Adult)  (0-10) Pain Rating: Rest: 0    NIH Stroke Scale       Cristiana Leal RN  12/03/24 13:11 EST

## 2024-12-03 NOTE — ED PROVIDER NOTES
EMERGENCY DEPARTMENT ENCOUNTER      Room Number:  03/03  PCP: Phyllis Loving PA  Independent Historians: Caregiver provided history   Patient Care Team:  Phyllis Loving PA as PCP - General (Family Medicine)  Brandon Ricci MD Thomas, Melissa, PA as Referring Physician (Family Medicine)  Maninder Quezada MD as Consulting Physician (Cardiology)  Hardeep Shea MD as Consulting Physician (Pulmonary Disease)  Shamar Pennington MD as Consulting Physician (Hematology and Oncology)       HPI:  Chief Complaint: SOA    A complete HPI/ROS/PMH/PSH/SH/FH are unobtainable due to: None    Chronic or social conditions impacting patient care (Social Determinants of Health): None      Context: Dinesh Churchill Sr. is a 89 y.o. male with a PMH significant for heart failure, chronic fatigue, CAD, cardiomyopathy, Parkinson's disease, paroxysmal atrial fibrillation who presents to the ED c/o acute SOA since this morning. Pt's caregiver provided the history explaining that the pt's oxygen was 84% this morning and he was altered from baseline. She also describes strong smelling urine. She was able to correct his O2 to  88% before calling EMS. Pt admits to chest pain SOA and fatigue. No N/V/D, fever and chills.      Upon review of prior external notes (non-ED) -and- Review of prior external test results outside of this encounter it appears the patient was evaluated in the office with endocrinology for osteoporosis on 9/3/2024.  The patient had a normal magnesium on 10/30/2024 and 10/29/2024 respectively.      PAST MEDICAL HISTORY  Active Ambulatory Problems     Diagnosis Date Noted    Abnormal magnetic resonance imaging study 03/18/2016    Arthritis 03/18/2016    Osteoarthritis of cervical spine 03/18/2016    Diplopia 03/18/2016    Hearing loss 03/18/2016    Neoplasm of uncertain behavior of skin 03/18/2016    Prediabetes 03/18/2016    Vitamin D deficiency 03/18/2016    Chronic fatigue 10/27/2016    History of  supraventricular tachycardia 04/07/2017    Essential hypertension 04/07/2017    B12 deficiency 04/19/2017    Abnormal cardiac function test 05/08/2017    Cardiomyopathy 05/08/2017    Coronary artery disease involving native coronary artery of native heart without angina pectoris 05/31/2017    History of coronary artery stent placement 05/31/2017    History of renal calculi 05/31/2017    Dyslipidemia 05/31/2017    Macrocytic anemia 08/09/2017    Precordial pain 08/16/2017    Parkinson's disease 10/25/2017    History of CVA (cerebrovascular accident) 10/25/2017    Paroxysmal atrial fibrillation 11/29/2017    Otalgia, left 12/29/2017    Chest pain 08/06/2019    Abnormal stress test 08/06/2019    Spontaneous pneumothorax 08/18/2019    Chest pain 08/18/2019    Acute systolic (congestive) heart failure 08/19/2019    Thrombocytopenia 08/20/2019    Pneumonia of left lower lobe due to infectious organism 09/06/2019    Syncope and collapse 11/06/2019    Status post placement of implantable loop recorder 11/22/2019    Long term (current) use of antithrombotics/antiplatelets 03/13/2020    Stuttering 04/09/2021    Fall 01/19/2022    Closed fracture of multiple ribs of right side 01/19/2022    Chest wall pain 01/19/2022    Closed fracture of phalanx of toe of left foot 01/19/2022    Headache 01/21/2023    Sudden onset of severe headache 01/21/2023    Fall in home, initial encounter 01/27/2023    Compression fracture of L2 vertebra with routine healing 01/28/2023    Weakness of both legs 05/08/2023    Idiopathic peripheral neuropathy 08/29/2023    L2 vertebral fracture 06/12/2024    Compression fracture of T12 vertebra with routine healing 06/12/2024    Sacral fracture, closed 06/12/2024    Senile osteoporosis 09/03/2024    Acute hypoxic respiratory failure 10/28/2024     Resolved Ambulatory Problems     Diagnosis Date Noted    Maxillary sinusitis 03/18/2016    Dizziness 10/27/2016    Acute frontal sinusitis 10/27/2016     Precordial pain 04/07/2017    SOB (shortness of breath) 04/07/2017    Acute rhinitis 12/29/2017     Past Medical History:   Diagnosis Date    Abnormal ECG     Abnormal MRI     Acute congestive heart failure 12/3/2024    Coronary artery disease     Difficulty walking     Head injury     Hearing aid worn     Hyperlipidemia     Hypertension     Kidney stones     Macrocytosis     Neoplasm of skin     Osteoporosis          PAST SURGICAL HISTORY  Past Surgical History:   Procedure Laterality Date    CARDIAC CATHETERIZATION N/A 05/22/2017    Procedure: Left Heart Cath;  Surgeon: Maninder Quezada MD;  [facility];  Service:     CARDIAC CATHETERIZATION N/A 05/23/2017    Procedure: Stent BMS coronary;  Surgeon: Maninder Quezada MD;  [facility];  Service:     CARDIAC CATHETERIZATION N/A 08/08/2019    Procedure: Left Heart Cath;  Surgeon: Maninder Quezada MD;  [facility];  Service: Cardiology    CARDIAC CATHETERIZATION N/A 08/08/2019    Procedure: Left ventriculography;  Surgeon: Maninder Quezada MD;  [facility];  Service: Cardiology    CARDIAC CATHETERIZATION N/A 08/08/2019    Procedure: Coronary angiography;  Surgeon: Maninder Quezada MD;  [facility];  Service: Cardiology    CARDIAC CATHETERIZATION N/A 08/20/2019    Procedure: CORONARY ANGIOGRAPHY;  Surgeon: Maninder Quezada MD;  [facility];  Service: Cardiovascular    CARDIAC CATHETERIZATION N/A 08/20/2019    Procedure: Stent BMS coronary;  Surgeon: Maninder Quezada MD;  Location: [facility] CATH INVASIVE LOCATION;  Service: Cardiovascular    CARDIAC ELECTROPHYSIOLOGY PROCEDURE N/A 11/08/2019    Procedure: Loop insertion;  Surgeon: Maninder Quezada MD;  [facility];  Service: Cardiovascular    HERNIA REPAIR      x 2    NC RT/LT HEART  CATHETERS N/A 05/23/2017    Procedure: Percutaneous Coronary Intervention;  Surgeon: Maninder Quezada MD;  Location: [facility] INVASIVE LOCATION;  Service: Cardiovascular         FAMILY HISTORY  Family History   Problem Relation Age of Onset    Arthritis Son     Cancer Sister     Diabetes Son          SOCIAL HISTORY  Social History     Socioeconomic History    Marital status:    Tobacco Use    Smoking status: Never     Passive exposure: Never    Smokeless tobacco: Never   Vaping Use    Vaping status: Never Used   Substance and Sexual Activity    Alcohol use: No     Comment: quit 30 years ago     Drug use: No    Sexual activity: Not Currently     Partners: Male         ALLERGIES  Patient has no known allergies.      REVIEW OF SYSTEMS  Included in HPI  All systems reviewed and negative except for those discussed in HPI.      PHYSICAL EXAM    I have reviewed the triage vital signs and nursing notes.    ED Triage Vitals [12/03/24 1101]   Temp Heart Rate Resp BP SpO2   97.3 °F (36.3 °C) 66 16 142/64 99 %      Temp src Heart Rate Source Patient Position BP Location FiO2 (%)   -- -- -- -- --       Physical Exam  Constitutional:       General: He is not in acute distress.     Appearance: He is ill-appearing (chronically).   HENT:      Head: Normocephalic and atraumatic.   Eyes:      General: No scleral icterus.     Conjunctiva/sclera: Conjunctivae normal.   Neck:      Trachea: No tracheal deviation.   Cardiovascular:      Rate and Rhythm: Normal rate and regular rhythm.   Pulmonary:      Effort: Pulmonary effort is normal.      Breath sounds: Decreased air movement present. Rhonchi (Diffuse, mild) present.   Abdominal:      Palpations: Abdomen is soft.      Tenderness: There is no abdominal tenderness. There is no guarding.   Musculoskeletal:         General: No deformity.      Cervical back: Normal range of motion.   Lymphadenopathy:      Cervical: No cervical adenopathy.   Skin:     General: Skin is warm  and dry.   Neurological:      Mental Status: He is alert and oriented to person, place, and time.   Psychiatric:         Behavior: Behavior normal.         Vital signs and nursing notes reviewed.      PPE: I wore a surgical mask throughout my encounters with the pt. I performed hand hygiene on entry into the pt room and upon exit.     LAB RESULTS  Recent Results (from the past 24 hours)   ECG 12 Lead Dyspnea    Collection Time: 12/03/24 11:15 AM   Result Value Ref Range    QT Interval 468 ms    QTC Interval 487 ms   Comprehensive Metabolic Panel    Collection Time: 12/03/24 11:16 AM    Specimen: Arm, Right; Blood   Result Value Ref Range    Glucose 112 (H) 65 - 99 mg/dL    BUN 23 8 - 23 mg/dL    Creatinine 0.68 (L) 0.76 - 1.27 mg/dL    Sodium 143 136 - 145 mmol/L    Potassium 4.2 3.5 - 5.2 mmol/L    Chloride 109 (H) 98 - 107 mmol/L    CO2 24.0 22.0 - 29.0 mmol/L    Calcium 8.6 8.2 - 9.6 mg/dL    Total Protein 5.6 (L) 6.0 - 8.5 g/dL    Albumin 3.3 (L) 3.5 - 5.2 g/dL    ALT (SGPT) 9 1 - 41 U/L    AST (SGOT) 14 1 - 40 U/L    Alkaline Phosphatase 54 39 - 117 U/L    Total Bilirubin 1.2 0.0 - 1.2 mg/dL    Globulin 2.3 gm/dL    A/G Ratio 1.4 g/dL    BUN/Creatinine Ratio 33.8 (H) 7.0 - 25.0    Anion Gap 10.0 5.0 - 15.0 mmol/L    eGFR 85.1 >60.0 mL/min/1.73   BNP    Collection Time: 12/03/24 11:16 AM    Specimen: Arm, Right; Blood   Result Value Ref Range    proBNP 17,076.0 (H) 0.0 - 1,800.0 pg/mL   Single High Sensitivity Troponin T    Collection Time: 12/03/24 11:16 AM    Specimen: Arm, Right; Blood   Result Value Ref Range    HS Troponin T 57 (C) <22 ng/L   Procalcitonin    Collection Time: 12/03/24 11:16 AM    Specimen: Arm, Right; Blood   Result Value Ref Range    Procalcitonin 0.05 0.00 - 0.25 ng/mL   Green Top (Gel)    Collection Time: 12/03/24 11:16 AM   Result Value Ref Range    Extra Tube Hold for add-ons.    Lavender Top    Collection Time: 12/03/24 11:16 AM   Result Value Ref Range    Extra Tube hold for add-on     Gold Top - SST    Collection Time: 12/03/24 11:16 AM   Result Value Ref Range    Extra Tube Hold for add-ons.    Light Blue Top    Collection Time: 12/03/24 11:16 AM   Result Value Ref Range    Extra Tube Hold for add-ons.    CBC Auto Differential    Collection Time: 12/03/24 11:16 AM    Specimen: Arm, Right; Blood   Result Value Ref Range    WBC 5.38 3.40 - 10.80 10*3/mm3    RBC 3.53 (L) 4.14 - 5.80 10*6/mm3    Hemoglobin 11.8 (L) 13.0 - 17.7 g/dL    Hematocrit 37.3 (L) 37.5 - 51.0 %    .7 (H) 79.0 - 97.0 fL    MCH 33.4 (H) 26.6 - 33.0 pg    MCHC 31.6 31.5 - 35.7 g/dL    RDW 15.1 12.3 - 15.4 %    RDW-SD 57.6 (H) 37.0 - 54.0 fl    MPV 11.0 6.0 - 12.0 fL    Platelets 129 (L) 140 - 450 10*3/mm3    Neutrophil % 68.4 42.7 - 76.0 %    Lymphocyte % 20.4 19.6 - 45.3 %    Monocyte % 9.1 5.0 - 12.0 %    Eosinophil % 1.5 0.3 - 6.2 %    Basophil % 0.4 0.0 - 1.5 %    Immature Grans % 0.2 0.0 - 0.5 %    Neutrophils, Absolute 3.68 1.70 - 7.00 10*3/mm3    Lymphocytes, Absolute 1.10 0.70 - 3.10 10*3/mm3    Monocytes, Absolute 0.49 0.10 - 0.90 10*3/mm3    Eosinophils, Absolute 0.08 0.00 - 0.40 10*3/mm3    Basophils, Absolute 0.02 0.00 - 0.20 10*3/mm3    Immature Grans, Absolute 0.01 0.00 - 0.05 10*3/mm3   Respiratory Panel PCR w/COVID-19(SARS-CoV-2) VANDANA/CARLOS/JUAN PABLO/PAD/COR/RANDOLPH In-House, NP Swab in UTM/VTM, 2 HR TAT - Swab, Nasopharynx    Collection Time: 12/03/24 11:34 AM    Specimen: Nasopharynx; Swab   Result Value Ref Range    ADENOVIRUS, PCR Not Detected Not Detected    Coronavirus 229E Not Detected Not Detected    Coronavirus HKU1 Not Detected Not Detected    Coronavirus NL63 Not Detected Not Detected    Coronavirus OC43 Not Detected Not Detected    COVID19 Not Detected Not Detected - Ref. Range    Human Metapneumovirus Not Detected Not Detected    Human Rhinovirus/Enterovirus Not Detected Not Detected    Influenza A PCR Not Detected Not Detected    Influenza B PCR Not Detected Not Detected    Parainfluenza Virus 1 Not  Detected Not Detected    Parainfluenza Virus 2 Not Detected Not Detected    Parainfluenza Virus 3 Not Detected Not Detected    Parainfluenza Virus 4 Not Detected Not Detected    RSV, PCR Not Detected Not Detected    Bordetella pertussis pcr Not Detected Not Detected    Bordetella parapertussis PCR Not Detected Not Detected    Chlamydophila pneumoniae PCR Not Detected Not Detected    Mycoplasma pneumo by PCR Not Detected Not Detected   Blood Gas, Arterial -    Collection Time: 12/03/24 11:39 AM    Specimen: Arterial Blood   Result Value Ref Range    Site Right Radial     Lalit's Test Positive     pH, Arterial 7.402 7.350 - 7.450 pH units    pCO2, Arterial 45.8 (H) 35.0 - 45.0 mm Hg    pO2, Arterial 95.8 80.0 - 100.0 mm Hg    HCO3, Arterial 28.5 (H) 22.0 - 28.0 mmol/L    Base Excess, Arterial 3.1 (H) 0.0 - 2.0 mmol/L    O2 Saturation, Arterial 97.4 92.0 - 98.5 %    CO2 Content 29.9 (H) 23 - 27 mmol/L    Barometric Pressure for Blood Gas 765.1000 mmHg    Modality Cannula     Flow Rate 3.0000 lpm    Rate 18 Breaths/minute    Hemodilution No          RADIOLOGY  XR Chest 1 View    Result Date: 12/3/2024  XR CHEST 1 VW-   INDICATION: Congestive heart failure/COPD. Shortness of air. Cough.  COMPARISON: Chest radiograph October 28, 2024  TECHNIQUE: 1 view chest  FINDINGS:  Vascular congestion. Basilar lung opacities. Blunting costophrenic angles. Stable mediastinum. Enlarged cardiac silhouette. Left chest wall implantable loop recorder.       1. Cardiomegaly with vascular congestion. 2. Bibasilar lung opacities with possible bilateral lower lobe atelectasis or consolidation. 3. Mild to moderate layering pleural effusions, increased on the right  This report was finalized on 12/3/2024 12:07 PM by Dr. Carter Sanches M.D on Workstation: [id number]         MEDICATIONS GIVEN IN ER  Medications   sodium chloride 0.9 % flush 10 mL (has no administration in time range)   furosemide (LASIX) injection 80 mg (80 mg Intravenous  Given 12/3/24 1254)         ORDERS PLACED DURING THIS VISIT:  Orders Placed This Encounter   Procedures    Respiratory Panel PCR w/COVID-19(SARS-CoV-2) VANDANA/CARLOS/JUAN PABLO/PAD/COR/RANDOLPH In-House, NP Swab in UTM/VTM, 2 HR TAT - Swab, Nasopharynx    XR Chest 1 View    [place] Draw    Comprehensive Metabolic Panel    BNP    Single High Sensitivity Troponin T    Procalcitonin    CBC Auto Differential    Blood Gas, Arterial -    Urinalysis With Microscopic If Indicated (No Culture) - Urine, Clean Catch    Blood Gas, Arterial -    High Sensitivity Troponin T 2Hr    Continuous Pulse Oximetry    Vital Signs    [facility] (on-call MD unless specified) Details    Oxygen Therapy- Nasal Cannula; Titrate 1-6 LPM Per SpO2; 90 - 95%    ECG 12 Lead Dyspnea    Insert Peripheral IV    Inpatient Admission    CBC & Differential    Green Top (Gel)    Lavender Top    Gold Top - SST    Light Blue Top         OUTPATIENT MEDICATION MANAGEMENT:  Current Facility-Administered Medications Ordered in Epic   Medication Dose Route Frequency Provider Last Rate Last Admin    sodium chloride 0.9 % flush 10 mL  10 mL Intravenous PRN Sam Foley PA         Current Outpatient Medications Ordered in Epic   Medication Sig Dispense Refill    acetaminophen (TYLENOL) 500 MG tablet Take 1 tablet by mouth 4 (Four) Times a Day.      amLODIPine (NORVASC) 5 MG tablet TAKE 1 TABLET BY MOUTH DAILY 90 tablet 3    aspirin 81 MG chewable tablet Chew 1 tablet Daily.      atorvastatin (LIPITOR) 20 MG tablet TAKE 1 TABLET BY MOUTH EVERY NIGHT 90 tablet 3    Cholecalciferol (VITAMIN D-3) 1000 UNITS capsule Take 1,000 Units by mouth Daily.      Cyanocobalamin (B-12) 1000 MCG lozenge PLACE 1 LOZENGE UNDER THE TOUNGE EVERY OTHER DAY (Patient taking differently: Place 1 tablet under the tongue Every Other Day.) 45 lozenge 1    furosemide (LASIX) 20 MG tablet Take 1 tablet by mouth Daily. 30 tablet 5    isosorbide mononitrate (IMDUR) 60 MG 24 hr tablet Take 1 tablet by mouth Daily.       losartan (COZAAR) 50 MG tablet TAKE 1 TABLET BY MOUTH DAILY 90 tablet 3    metoprolol succinate XL (TOPROL-XL) 100 MG 24 hr tablet TAKE 1 TABLET BY MOUTH DAILY 90 tablet 3              PROGRESS, DATA ANALYSIS, CONSULTS, AND MEDICAL DECISION MAKING  All labs have been independently interpreted by me.  All radiology studies have been reviewed by me. All EKG's have been independently viewed and interpreted by me.  Discussion below represents my analysis of pertinent findings related to patient's condition, differential diagnosis, treatment plan and final disposition.      DIFFERENTIAL DIAGNOSIS INCLUDE BUT NOT LIMITED TO:     Differential diagnosis includes but is not limited to:  -COVID  -CHF  -acute coronary syndrome  -pulmonary embolism  -pneumothorax  -pneumonia  -asthma/COPD  -deconditioning  -anemia  -anxiety     Clinical Scores: N/A      ED Course as of 12/03/24 1300   Tue Dec 03, 2024   1204 HS Troponin T(!!): 57  At baseline [DC]   1204 proBNP(!): 17,076.0  Up from 12,600 [DC]   1204 Per my independent interpretation of the chest x-ray, there is no obvious pneumothorax. [DC]   1216 Patient presentation and evaluation consistent with acute congestive heart failure with elevated BNP and hypoxia requiring admission for IV diuresis and cardiology consultation.  Patient and family agreeable with this plan and all questions answered. [DC]   1258 I discussed the case with MD Sandip, with [facility] at this time regarding the patient.  I discussed work-up, results, concerns.  I discussed the consulting provider's desire for tele admit.  [DC]      ED Course User Index  [DC] Sam Foley, PA         1301 I rechecked the patient.  I discussed the patient's labs, radiology findings (including all incidental findings), diagnosis, and plan for admission. The patient understands and agrees with the plan.      AS OF 13:00 EST VITALS:    BP - 145/81  HR - 64  TEMP - 97.3 °F (36.3 °C)  O2 SATS - 95%    COMPLEXITY OF  CARE  Admission was considered but after careful review of the patient's presentation, physical examination, diagnostic results, and response to treatment the patient may be safely discharged with outpatient follow-up.      DIAGNOSIS  Final diagnoses:   Acute congestive heart failure, unspecified heart failure type         DISPOSITION  ED Disposition       ED Disposition   Decision to Admit    Condition   --    Comment   Level of Care: Telemetry [5]   Diagnosis: Acute congestive heart failure [060074]   Admitting Physician: ASHLEY BERG [[id number]]   Attending Physician: ASHLEY BERG [[id number]]   Certification: I Certify That Inpatient Hospital Services Are Medically Necessary For Greater Than 2 Midnights                  Please note that portions of this document were completed with a voice recognition program.    Note Disclaimer: At [facility], we believe that sharing information builds trust and better relationships. You are receiving this note because you recently visited [facility]. It is possible you will see health information before a provider has talked with you about it. This kind of information can be easy to misunderstand. To help you fully understand what it means for your health, we urge you to discuss this note with your provider.         Sam Foley PA  12/04/24 0547

## 2024-12-03 NOTE — H&P
Internal medicine history and physical  INTERNAL MEDICINE   [facility]       Patient Identification:  Name: Dinesh Churchill Sr.  Age: 89 y.o.  Sex: male  :  1928  MRN: [id number]                   Primary Care Physician: Phyllis Loving PA                               Date of admission:12/3/2024    Chief Complaint: Sent from home via EMS by family members for progressive shortness of breath for 3 days.    History of Present Illness:   Source of information review of records and discussion with ER provider.  Attempted conversation with the patient was hampered because patient clearly stated that he cannot hear, cannot remember and has Parkinson's disease and dementia so he would be able to help.  He does admit that he is feeling better and breathing better compared to when he come into the hospital.  Patient is a 89-year-old male who has complicated past medical history including history of Parkinson's disease, dementia, paroxysmal atrial fibrillation, history of osteoporosis and osteoarthritis and compression fracture involving T12 L2 and sacral vertebrae, hypertension, chronic kidney disease and history of congestive heart failure with last echocardiogram showing left ventricular ejection fraction 41 to 45% with indeterminate diastolic function in 2024 and was recently seen by his cardiologist on 2024 for out of hospital follow-up from recent discharge from the hospital on 10/31/2024 .  Patient was hospitalized for 3 days at that time and was diuresed by cardiology service over the course of 3 days.  At that time Lasix was added to his regimen and was continued on his amlodipine metoprolol aspirin and atorvastatin.  Is unclear what changed since his last visit with his cardiology in the office on 2024 but patient apparently started having increasing shortness of breath for 3 days and was noted to be worse by caregiver this morning.  Patient was checked with pulse  oximetry and found to have 84% oxygen saturation and he was also noted to be more confused and lethargic.  Evaluation in the emergency room revealed abnormal chest x-ray consistent with vascular congestion and bibasilar atelectasis and bilateral pleural effusion slightly increased on the right compared to left.  He was noted to have proBNP of 17,000 and troponin of 57.  Patient received 80 mg of Lasix in the emergency room with significant diuresis and was placed on 2 L nasal cannula oxygen with oxygen saturation improved to 98%.  Patient claims that he is feeling better but he does not remember why he came to the hospital and what was his symptoms earlier as stated above.  During his last admission at the hospital in October 2024 patient was seen by Dr. Li.      Past Medical History:  Past Medical History:   Diagnosis Date    Abnormal ECG     Abnormal MRI     Acute congestive heart failure 12/3/2024    Acute frontal sinusitis     Arthritis     Chronic fatigue     Coronary artery disease     Difficulty walking     Dizziness     Head injury     Headache 01/21/2023    Hearing aid worn     left    Hearing loss     Hyperlipidemia     Hypertension     Idiopathic peripheral neuropathy 08/29/2023    Kidney stones     Macrocytosis     Neoplasm of skin     Osteoporosis     Prediabetes     Vitamin D deficiency      Past Surgical History:  Past Surgical History:   Procedure Laterality Date    CARDIAC CATHETERIZATION N/A 05/22/2017    Procedure: Left Heart Cath;  Surgeon: Maninder Quezada MD;  Location: [facility] INVASIVE LOCATION;  Service:     CARDIAC CATHETERIZATION N/A 05/23/2017    Procedure: Stent BMS coronary;  Surgeon: Maninder Quezada MD;  Location: [facility] CATH INVASIVE LOCATION;  Service:     CARDIAC CATHETERIZATION N/A 08/08/2019    Procedure: Left Heart Cath;  Surgeon: Maninder Quezada MD;  Location: [facility] INVASIVE LOCATION;  Service: Cardiology    CARDIAC CATHETERIZATION N/A 08/08/2019     Procedure: Left ventriculography;  Surgeon: Maninder Quezada MD;  Location:  [facility] CATH INVASIVE LOCATION;  Service: Cardiology    CARDIAC CATHETERIZATION N/A 08/08/2019    Procedure: Coronary angiography;  Surgeon: Maninder Quezada MD;  Location:  [facility] CATH INVASIVE LOCATION;  Service: Cardiology    CARDIAC CATHETERIZATION N/A 08/20/2019    Procedure: CORONARY ANGIOGRAPHY;  Surgeon: Maninder Quezada MD;  [facility];  Service: Cardiovascular    CARDIAC CATHETERIZATION N/A 08/20/2019    Procedure: Stent BMS coronary;  Surgeon: Maninder Quezada MD;  [facility];  Service: Cardiovascular    CARDIAC ELECTROPHYSIOLOGY PROCEDURE N/A 11/08/2019    Procedure: Loop insertion;  Surgeon: Maninder Quezada MD;  [facility];  Service: Cardiovascular    HERNIA REPAIR      x 2    WA RT/LT HEART CATHETERS N/A 05/23/2017    Procedure: Percutaneous Coronary Intervention;  Surgeon: Maninder Quezada MD;  Location: [facility] CATH INVASIVE LOCATION;  Service: Cardiovascular      Home Meds:  Medications Prior to Admission   Medication Sig Dispense Refill Last Dose/Taking    acetaminophen (TYLENOL) 500 MG tablet Take 1 tablet by mouth 4 (Four) Times a Day.   12/3/2024 Morning    amLODIPine (NORVASC) 5 MG tablet TAKE 1 TABLET BY MOUTH DAILY 90 tablet 3 12/3/2024 Morning    aspirin 81 MG chewable tablet Chew 1 tablet Daily.   12/3/2024 Morning    atorvastatin (LIPITOR) 20 MG tablet TAKE 1 TABLET BY MOUTH EVERY NIGHT 90 tablet 3 12/3/2024 Morning    Cholecalciferol (VITAMIN D-3) 1000 UNITS capsule Take 1,000 Units by mouth Daily.   12/3/2024 Morning    Cyanocobalamin (B-12) 1000 MCG lozenge PLACE 1 LOZENGE UNDER THE TOUNGE EVERY OTHER DAY (Patient taking differently: Place 1 tablet under the tongue Every Other Day.) 45 lozenge 1 12/3/2024 Morning    furosemide (LASIX) 20 MG tablet Take 1 tablet by mouth Daily. 30 tablet 5  "12/2/2024 Noon    isosorbide mononitrate (IMDUR) 60 MG 24 hr tablet Take 1 tablet by mouth Daily.   12/3/2024 Morning    losartan (COZAAR) 50 MG tablet TAKE 1 TABLET BY MOUTH DAILY 90 tablet 3 12/3/2024 Morning    metoprolol succinate XL (TOPROL-XL) 100 MG 24 hr tablet TAKE 1 TABLET BY MOUTH DAILY 90 tablet 3 12/3/2024 Morning     Current Meds:     Current Facility-Administered Medications:     sodium chloride 0.9 % flush 10 mL, 10 mL, Intravenous, PRN, Sam Foley PA  Allergies:  No Known Allergies  Social History:   Social History     Tobacco Use    Smoking status: Never     Passive exposure: Never    Smokeless tobacco: Never   Substance Use Topics    Alcohol use: No     Comment: quit 30 years ago       Family History:  Family History   Problem Relation Age of Onset    Arthritis Son     Cancer Sister     Diabetes Son           Review of Systems  See history of present illness and past medical history.  Limited because of hearing deficit and his openly stated disability due to dementia and Parkinson's disease and he claims that he does not remember much.      Vitals:   /67   Pulse 61   Temp 97.3 °F (36.3 °C)   Resp 16   Ht 182.9 cm (72.01\")   Wt 61.2 kg (134 lb 14.7 oz)   SpO2 98%   BMI 18.29 kg/m²   I/O: No intake or output data in the 24 hours ending 12/03/24 1427  Exam:  Patient is examined using the personal protective equipment as per guidelines from infection control for this particular patient as enacted.  Hand washing was performed before and after patient interaction.  General Appearance:  Alert emaciated nontoxic elderly male who does not appear to be in any acute distress and significantly hard of hearing.   Head:    Normocephalic, without obvious abnormality, atraumatic   Eyes:    PERRL, conjunctiva/corneas clear, EOM's intact, both eyes   Ears:  Significant hearing deficit   Nose:   Nares normal, septum midline, mucosa normal, no drainage    or sinus tenderness   Throat:   Lips, " tongue, gums normal; oral mucosa pink and moist   Neck:   Supple, symmetrical, trachea midline, no adenopathy;     thyroid:  no enlargement/tenderness/nodules; no carotid    bruit or JVD   Back:     Symmetric, no curvature, ROM normal, no CVA tenderness   Lungs:   Decreased breath sounds bilaterally no obvious rhonchi or crackles although use of accessory muscles of breathing noted   Chest Wall:    No tenderness or deformity    Heart:  S1-S2 irregular   Abdomen:   Soft scaphoid nontender   Extremities: Trace ankle edema noted   Pulses:   Pulses palpable in all extremities; symmetric all extremities   Skin: No obvious rash noted   Neurologic: Resting tremor noted gait not tested.  Once he is able to hear me out he is oriented x 3 but claims that he does not remember much.       Data Review:      I reviewed the patient's new clinical results.  Results from last 7 days   Lab Units 12/03/24  1116   WBC 10*3/mm3 5.38   HEMOGLOBIN g/dL 11.8*   PLATELETS 10*3/mm3 129*     Results from last 7 days   Lab Units 12/03/24  1116   SODIUM mmol/L 143   POTASSIUM mmol/L 4.2   CHLORIDE mmol/L 109*   CO2 mmol/L 24.0   BUN mg/dL 23   CREATININE mg/dL 0.68*   CALCIUM mg/dL 8.6   GLUCOSE mg/dL 112*     XR Chest 1 View    Result Date: 12/3/2024   1. Cardiomegaly with vascular congestion. 2. Bibasilar lung opacities with possible bilateral lower lobe atelectasis or consolidation. 3. Mild to moderate layering pleural effusions, increased on the right  This report was finalized on 12/3/2024 12:07 PM by Dr. Carter Sanches M.D on Workstation: [id number]     ECG 12 Lead Dyspnea   Final Result   HEART RATE=65  bpm   RR Mqbjhkqb=062  ms   UT Interval=  ms   P Horizontal Axis=  deg   P Front Axis=  deg   QRSD Wszocwmb=122  ms   QT Rbesdrlo=450  ms   JUtW=786  ms   QRS Axis=-49  deg   T Wave Axis=153  deg   - ABNORMAL ECG -   Atrial flutter with predominant 4:1 AV block   Left bundle branch block   repolarization abnormalities have improved    Electronically Signed By: Itzel Baker ([facility]) 2024-12-03 12:15:07   Date and Time of Study:2024-12-03 11:15:54      Telemetry Scan   Final Result        Microbiology Results (last 10 days)       Procedure Component Value - Date/Time    Respiratory Panel PCR w/COVID-19(SARS-CoV-2) [facility] In-House, NP Swab in UTM/VTM, 2 HR TAT - Swab, Nasopharynx [[id number]]  (Normal) Collected: 12/03/24 1134    Lab Status: Final result Specimen: Swab from Nasopharynx Updated: 12/03/24 1249     ADENOVIRUS, PCR Not Detected     Coronavirus 229E Not Detected     Coronavirus HKU1 Not Detected     Coronavirus NL63 Not Detected     Coronavirus OC43 Not Detected     COVID19 Not Detected     Human Metapneumovirus Not Detected     Human Rhinovirus/Enterovirus Not Detected     Influenza A PCR Not Detected     Influenza B PCR Not Detected     Parainfluenza Virus 1 Not Detected     Parainfluenza Virus 2 Not Detected     Parainfluenza Virus 3 Not Detected     Parainfluenza Virus 4 Not Detected     RSV, PCR Not Detected     Bordetella pertussis pcr Not Detected     Bordetella parapertussis PCR Not Detected     Chlamydophila pneumoniae PCR Not Detected     Mycoplasma pneumo by PCR Not Detected    Narrative:      In the setting of a positive respiratory panel with a viral infection PLUS a negative procalcitonin without other underlying concern for bacterial infection, consider observing off antibiotics or discontinuation of antibiotics and continue supportive care. If the respiratory panel is positive for atypical bacterial infection (Bordetella pertussis, Chlamydophila pneumoniae, or Mycoplasma pneumoniae), consider antibiotic de-escalation to target atypical bacterial infection.            Assessment:  Active Hospital Problems    Diagnosis  POA    **Acute congestive heart failure [I50.9]  Yes    Elevated troponin [R79.89]  Yes    Acute hypoxic respiratory failure [J96.01]  Yes    Idiopathic peripheral neuropathy [G60.9]  Yes     Long term (current) use of antithrombotics/antiplatelets [Z79.02]  Not Applicable    Thrombocytopenia [D69.6]  Yes    Paroxysmal atrial fibrillation [I48.0]  Yes    Parkinson's disease [G20.A1]  Yes    History of CVA (cerebrovascular accident) [Z86.73]  Not Applicable    Macrocytic anemia [D53.9]  Yes    Cardiomyopathy [I42.9]  Yes    Essential hypertension [I10]  Yes    Chronic fatigue [R53.82]  Yes       Medical decision making/care plan: See admitting orders  Acute exacerbation of congestive heart failure with hypoxia-patient is clinically improved after IV diuretics.  Plan is to continue his current regimen and watch for renal dysfunction and electrolyte imbalance due to IV diuretics and consult cardiology service.  Repeat chest x-ray to see if his pleural effusions and vascular congestion is improved.  Paroxysmal atrial fibrillation-patient is not on anticoagulation therapy probably due to increased fall risk-plan is to continue his current regimen of rate control with beta-blocker.  Hypertension-continue his amlodipine, losartan and nitroglycerin and monitor his blood pressure  Cardiomyopathy-with last EF on 10/29/2024 was noted to be between 41 to 45% with indeterminate diastolic function.  Plan is to continue his current regimen of diuresis and switch to oral diuretic once his respiratory status is improved and his oxygen saturation is greater than 94% on room air.  Chronically elevated troponin-check serial troponin and monitor EKG does not show any acute changes.  Parkinson's disease-provided with fall precautions patient currently not on any specific regimen for Parkinson's disease.  History of falls and osteoporosis with compression fractures currently not complaining of any pain-monitor and provide Tylenol on as needed basis.  Chronic thrombocytopenia-monitor  History of dementia and memory issues-monitor for sundowning.    Juan Oropeza MD   12/3/2024  14:27 EST    Parts of this note may be an  electronic transcription/translation of spoken language to printed text using the Dragon dictation system.

## 2024-12-03 NOTE — CASE MANAGEMENT/SOCIAL WORK
Discharge Planning Assessment  [facility]     Patient Name: Dinesh Churchill Sr.  MRN: [id number]  Today's Date: 12/3/2024    Admit Date: 12/3/2024    Plan: Home with family and caregiver   Discharge Needs Assessment       Row Name 12/03/24 1450       Living Environment    People in Home grandchild(michael);other relative(s)    Name(s) of People in Home daughter Sylwia    Current Living Arrangements home    Potentially Unsafe Housing Conditions none    Primary Care Provided by self;child(michael);other (see comments)    Family Caregiver if Needed grandchild(michael), adult;other relative(s)    Family Caregiver Names Sylwia granddaughter and grandson    Quality of Family Relationships involved;helpful    Able to Return to Prior Arrangements yes       Resource/Environmental Concerns    Resource/Environmental Concerns none    Transportation Concerns none       Transition Planning    Patient/Family Anticipates Transition to home with family    Patient/Family Anticipated Services at Transition none    Transportation Anticipated family or friend will provide       Discharge Needs Assessment    Readmission Within the Last 30 Days no previous admission in last 30 days    Equipment Currently Used at Home walker, rolling;shower chair    Concerns to be Addressed adjustment to diagnosis/illness    Anticipated Changes Related to Illness none    Equipment Needed After Discharge none                   Discharge Plan       Row Name 12/03/24 6844       Plan    Plan Home with family and caregiver    Patient/Family in Agreement with Plan yes    Plan Comments Spoke to patient caregiver/relative kelly Hamilton very [place], verified face sheet and pharmacy information, pt lives with granddamiranda Dao in 1 level home with 2 SOFY, he normally needs minimal assistance for self-care tasks, uses rwx, s/c and hs w/c for outside the home. Pt has caregiver in home when Sylwia and her  are not home, no HH history, he was at [facility] for a  few weeks but does not wish to return. Pt plans home with family to transport, on new oxygen, no preference for DME company if needed at [place], no other anticipated needs, CCP will follow - Yesenia MARIETerrence                  Continued Care and Services - Admitted Since 12/3/2024    No active coordination exists for this encounter.       Selected Continued Care - Prior Encounters Includes continued care and service providers with selected services from prior encounters from 9/4/2024 to 12/3/2024      Discharged on 10/31/2024 Admission date: 10/28/2024 - Discharge disposition: Skilled Nursing Facility (DC - External)      Destination       Service Provider Services Address Phone Fax Patient Preferred    [facility] [place] [id number] [phone] --                          Expected Discharge Date and Time       Expected Discharge Date Expected Discharge Time    Dec 5, 2024            Demographic Summary       Row Name 12/03/24 1449       General Information    Admission Type inpatient                   Functional Status       Row Name 12/03/24 1449       Functional Status    Usual Activity Tolerance good    Current Activity Tolerance good       Assessment of Health Literacy    Health Literacy Good       Functional Status, IADL    Medications assistive person    Meal Preparation assistive person    Housekeeping assistive person    Laundry assistive person    Shopping assistive person    IADL Comments assist of one       Mental Status    General Appearance WDL WDL       Mental Status Summary    Recent Changes in Mental Status/Cognitive Functioning no changes                   Psychosocial    No documentation.                  Abuse/Neglect    No documentation.                  Legal       Row Name 12/03/24 1453       Financial/Legal    Who Manages Finances if Patient Unable children                   Substance Abuse    No documentation.                   Patient Forms    No documentation.                     Yesenia Castro RN

## 2024-12-03 NOTE — ED NOTES
Patient arrives via [facility] from home for complaints of soa x3 days. Patient does not wear oxygen. Patient is on 3L NC. Patient has had a cough. Patient was on 88% RA when EMS arrived. Alert and oriented x4.

## 2024-12-03 NOTE — PROGRESS NOTES
Clinical Pharmacy Services: Medication History    Dinesh Churchill Sr. is a 89 y.o. male presenting to [facility] for   Chief Complaint   Patient presents with    Shortness of Breath    Cough       He  has a past medical history of Abnormal ECG, Abnormal MRI, Acute frontal sinusitis, Arthritis, Chronic fatigue, Coronary artery disease, Difficulty walking, Dizziness, Head injury, Headache (01/21/2023), Hearing aid worn, Hearing loss, Hyperlipidemia, Hypertension, Idiopathic peripheral neuropathy (08/29/2023), Kidney stones, Macrocytosis, Neoplasm of skin, Osteoporosis, Prediabetes, and Vitamin D deficiency.    Allergies as of 12/03/2024    (No Known Allergies)       Medication information was obtained from: Pharmacy  Pharmacy and Phone Number:   [id number] - [place], [place] - [address] - [phone] PH - [phone] FX  [address]  [facility] [address]  Phone: [phone] Fax: [phone]    [facility] [place] - [address] [place] [phone] PH - [phone] FX  [address]   [facility] [address]  Phone: [phone] Fax: [phone]    [facility]  [address] [facility] [address]  Phone: [phone] Fax: [phone]    [facility] [place], [place] - [address] [place] [phone] PH - [phone] FX  [address] [place] [address]  Phone: [phone] Fax: [phone]        Prior to Admission Medications       Prescriptions Last Dose Informant Patient Reported? Taking?    acetaminophen (TYLENOL) 500 MG tablet  Child No Yes    Take 1 tablet by mouth 4 (Four) Times a Day.    amLODIPine (NORVASC) 5 MG tablet  Child, Pharmacy No Yes    TAKE 1 TABLET BY MOUTH DAILY    aspirin 81 MG chewable tablet  Child No Yes    Chew 1 tablet Daily.    atorvastatin (LIPITOR) 20 MG tablet  Pharmacy No Yes    TAKE 1 TABLET BY MOUTH EVERY NIGHT     Cholecalciferol (VITAMIN D-3) 1000 UNITS capsule  Child Yes Yes    Take 1,000 Units by mouth Daily.    Cyanocobalamin (B-12) 1000 MCG lozenge  Pharmacy No Yes    PLACE 1 LOZENGE UNDER THE TOUNGE EVERY OTHER DAY    Patient taking differently:  Place 1 tablet under the tongue Every Other Day.    furosemide (LASIX) 20 MG tablet  Pharmacy No Yes    Take 1 tablet by mouth Daily.    isosorbide mononitrate (IMDUR) 60 MG 24 hr tablet   Yes Yes    Take 1 tablet by mouth Daily.    losartan (COZAAR) 50 MG tablet  Child No Yes    TAKE 1 TABLET BY MOUTH DAILY    metoprolol succinate XL (TOPROL-XL) 100 MG 24 hr tablet  Pharmacy No Yes    TAKE 1 TABLET BY MOUTH DAILY              Medication notes:     This medication list is complete to the best of my knowledge as of 12/3/2024    Please call if questions.    Shlomo Cruz  Medication History Technician  [phone]    12/3/2024 12:21 EST

## 2024-12-04 PROBLEM — E43 SEVERE PROTEIN-CALORIE MALNUTRITION: Status: ACTIVE | Noted: 2024-12-04

## 2024-12-04 LAB
ALBUMIN SERPL-MCNC: 3.3 G/DL (ref 3.5–5.2)
ALBUMIN/GLOB SERPL: 1.5 G/DL
ALP SERPL-CCNC: 53 U/L (ref 39–117)
ALT SERPL W P-5'-P-CCNC: 6 U/L (ref 1–41)
ANION GAP SERPL CALCULATED.3IONS-SCNC: 7.6 MMOL/L (ref 5–15)
AST SERPL-CCNC: 11 U/L (ref 1–40)
BASOPHILS # BLD AUTO: 0.03 10*3/MM3 (ref 0–0.2)
BASOPHILS NFR BLD AUTO: 0.7 % (ref 0–1.5)
BILIRUB SERPL-MCNC: 1.2 MG/DL (ref 0–1.2)
BUN SERPL-MCNC: 24 MG/DL (ref 8–23)
BUN/CREAT SERPL: 31.2 (ref 7–25)
CALCIUM SPEC-SCNC: 8.5 MG/DL (ref 8.2–9.6)
CHLORIDE SERPL-SCNC: 106 MMOL/L (ref 98–107)
CO2 SERPL-SCNC: 28.4 MMOL/L (ref 22–29)
CREAT SERPL-MCNC: 0.77 MG/DL (ref 0.76–1.27)
DEPRECATED RDW RBC AUTO: 55.6 FL (ref 37–54)
EGFRCR SERPLBLD CKD-EPI 2021: 81.9 ML/MIN/1.73
EOSINOPHIL # BLD AUTO: 0.17 10*3/MM3 (ref 0–0.4)
EOSINOPHIL NFR BLD AUTO: 3.8 % (ref 0.3–6.2)
ERYTHROCYTE [DISTWIDTH] IN BLOOD BY AUTOMATED COUNT: 14.8 % (ref 12.3–15.4)
GLOBULIN UR ELPH-MCNC: 2.2 GM/DL
GLUCOSE SERPL-MCNC: 86 MG/DL (ref 65–99)
HCT VFR BLD AUTO: 35.8 % (ref 37.5–51)
HGB BLD-MCNC: 11.6 G/DL (ref 13–17.7)
IMM GRANULOCYTES # BLD AUTO: 0.01 10*3/MM3 (ref 0–0.05)
IMM GRANULOCYTES NFR BLD AUTO: 0.2 % (ref 0–0.5)
LYMPHOCYTES # BLD AUTO: 1.13 10*3/MM3 (ref 0.7–3.1)
LYMPHOCYTES NFR BLD AUTO: 25.1 % (ref 19.6–45.3)
MCH RBC QN AUTO: 33.6 PG (ref 26.6–33)
MCHC RBC AUTO-ENTMCNC: 32.4 G/DL (ref 31.5–35.7)
MCV RBC AUTO: 103.8 FL (ref 79–97)
MONOCYTES # BLD AUTO: 0.47 10*3/MM3 (ref 0.1–0.9)
MONOCYTES NFR BLD AUTO: 10.4 % (ref 5–12)
NEUTROPHILS NFR BLD AUTO: 2.7 10*3/MM3 (ref 1.7–7)
NEUTROPHILS NFR BLD AUTO: 59.8 % (ref 42.7–76)
NRBC BLD AUTO-RTO: 0.4 /100 WBC (ref 0–0.2)
PLATELET # BLD AUTO: 113 10*3/MM3 (ref 140–450)
PMV BLD AUTO: 12.3 FL (ref 6–12)
POTASSIUM SERPL-SCNC: 3.5 MMOL/L (ref 3.5–5.2)
POTASSIUM SERPL-SCNC: 4.9 MMOL/L (ref 3.5–5.2)
PROT SERPL-MCNC: 5.5 G/DL (ref 6–8.5)
RBC # BLD AUTO: 3.45 10*6/MM3 (ref 4.14–5.8)
SODIUM SERPL-SCNC: 142 MMOL/L (ref 136–145)
TROPONIN T SERPL HS-MCNC: 63 NG/L
WBC NRBC COR # BLD AUTO: 4.51 10*3/MM3 (ref 3.4–10.8)

## 2024-12-04 PROCEDURE — 25010000002 FUROSEMIDE PER 20 MG: Performed by: INTERNAL MEDICINE

## 2024-12-04 PROCEDURE — 97162 PT EVAL MOD COMPLEX 30 MIN: CPT

## 2024-12-04 PROCEDURE — 84484 ASSAY OF TROPONIN QUANT: CPT | Performed by: INTERNAL MEDICINE

## 2024-12-04 PROCEDURE — 84132 ASSAY OF SERUM POTASSIUM: CPT | Performed by: HOSPITALIST

## 2024-12-04 PROCEDURE — 80053 COMPREHEN METABOLIC PANEL: CPT | Performed by: INTERNAL MEDICINE

## 2024-12-04 PROCEDURE — 25010000002 ENOXAPARIN PER 10 MG: Performed by: INTERNAL MEDICINE

## 2024-12-04 PROCEDURE — 97530 THERAPEUTIC ACTIVITIES: CPT

## 2024-12-04 PROCEDURE — 97166 OT EVAL MOD COMPLEX 45 MIN: CPT

## 2024-12-04 PROCEDURE — 85025 COMPLETE CBC W/AUTO DIFF WBC: CPT | Performed by: INTERNAL MEDICINE

## 2024-12-04 PROCEDURE — 25010000002 FUROSEMIDE PER 20 MG: Performed by: HOSPITALIST

## 2024-12-04 RX ORDER — POTASSIUM CHLORIDE 750 MG/1
40 TABLET, FILM COATED, EXTENDED RELEASE ORAL EVERY 4 HOURS
Status: COMPLETED | OUTPATIENT
Start: 2024-12-04 | End: 2024-12-04

## 2024-12-04 RX ADMIN — ISOSORBIDE MONONITRATE 60 MG: 30 TABLET, EXTENDED RELEASE ORAL at 11:50

## 2024-12-04 RX ADMIN — Medication 1000 UNITS: at 08:32

## 2024-12-04 RX ADMIN — POTASSIUM CHLORIDE 40 MEQ: 750 TABLET, EXTENDED RELEASE ORAL at 14:32

## 2024-12-04 RX ADMIN — ACETAMINOPHEN 500 MG: 500 TABLET, FILM COATED ORAL at 14:32

## 2024-12-04 RX ADMIN — FUROSEMIDE 80 MG: 10 INJECTION, SOLUTION INTRAMUSCULAR; INTRAVENOUS at 18:54

## 2024-12-04 RX ADMIN — ACETAMINOPHEN 500 MG: 500 TABLET, FILM COATED ORAL at 18:53

## 2024-12-04 RX ADMIN — ENOXAPARIN SODIUM 40 MG: 100 INJECTION SUBCUTANEOUS at 18:53

## 2024-12-04 RX ADMIN — ASPIRIN 81 MG: 81 TABLET, CHEWABLE ORAL at 08:33

## 2024-12-04 RX ADMIN — FUROSEMIDE 80 MG: 10 INJECTION, SOLUTION INTRAMUSCULAR; INTRAVENOUS at 08:32

## 2024-12-04 RX ADMIN — CYANOCOBALAMIN TAB 500 MCG 1000 MCG: 500 TAB at 08:32

## 2024-12-04 RX ADMIN — Medication 10 ML: at 21:22

## 2024-12-04 RX ADMIN — Medication 10 ML: at 08:33

## 2024-12-04 RX ADMIN — POTASSIUM CHLORIDE 40 MEQ: 750 TABLET, EXTENDED RELEASE ORAL at 12:12

## 2024-12-04 RX ADMIN — LOSARTAN POTASSIUM 50 MG: 50 TABLET, FILM COATED ORAL at 11:55

## 2024-12-04 RX ADMIN — AMLODIPINE BESYLATE 5 MG: 5 TABLET ORAL at 08:33

## 2024-12-04 RX ADMIN — ACETAMINOPHEN 500 MG: 500 TABLET, FILM COATED ORAL at 05:56

## 2024-12-04 RX ADMIN — ACETAMINOPHEN 500 MG: 500 TABLET, FILM COATED ORAL at 21:22

## 2024-12-04 RX ADMIN — METOPROLOL SUCCINATE 100 MG: 100 TABLET, EXTENDED RELEASE ORAL at 11:55

## 2024-12-04 RX ADMIN — ATORVASTATIN CALCIUM 20 MG: 20 TABLET, FILM COATED ORAL at 21:22

## 2024-12-04 NOTE — PROGRESS NOTES
[facility]ed[facility] to Hospital Care    [phone]   LOS: 1 day     Name: Dinesh Churchill Sr.  Age/Sex: 89 y.o. male  :  1928        PCP: Phyllis Loving PA  Chief Complaint   Patient presents with    Shortness of Breath    Cough      Subjective   Denies new issues or complaints this AM and overall feeling ok somewhat [place].    General: No Fever or Chills, Cardiac: No Chest Pain or Palpitations, Resp: No Cough or SOA, GI: No Nausea, Vomiting, or Diarrhea, and Other: No bleeding    acetaminophen, 500 mg, Oral, Q6H  amLODIPine, 5 mg, Oral, Q24H  aspirin, 81 mg, Oral, Daily  atorvastatin, 20 mg, Oral, Nightly  cholecalciferol, 1,000 Units, Oral, Daily  enoxaparin, 40 mg, Subcutaneous, Q24H  furosemide, 80 mg, Intravenous, BID  isosorbide mononitrate, 60 mg, Oral, Daily  losartan, 50 mg, Oral, Daily  metoprolol succinate XL, 100 mg, Oral, Daily  sodium chloride, 10 mL, Intravenous, Q12H  vitamin B-12, 1,000 mcg, Oral, Daily           Objective   Vital Signs  Temp:  [97.5 °F (36.4 °C)-97.7 °F (36.5 °C)] 97.7 °F (36.5 °C)  Heart Rate:  [56-79] 61  Resp:  [16] 16  BP: ()/(41-60) 95/41  Body mass index is 16.95 kg/m².    Intake/Output Summary (Last 24 hours) at 2024 1550  Last data filed at 2024 1300  Gross per 24 hour   Intake 360 ml   Output 50 ml   Net 310 ml       Physical Exam  Vitals reviewed.   Constitutional:       General: He is not in acute distress.     Appearance: He is ill-appearing.   Cardiovascular:      Rate and Rhythm: Normal rate and regular rhythm.   Pulmonary:      Effort: No respiratory distress.      Breath sounds: Normal breath sounds.   Abdominal:      General: Bowel sounds are normal. There is no distension.      Palpations: Abdomen is soft.   Skin:     General: Skin is warm and dry.   Neurological:      Mental Status: He is alert.           Results Review:       I reviewed the patient's new clinical results.  Results from last 7 days   Lab Units 24  0508 24  1116   WBC  10*3/mm3 4.51 5.38   HEMOGLOBIN g/dL 11.6* 11.8*   PLATELETS 10*3/mm3 113* 129*     Results from last 7 days   Lab Units 12/04/24  0508 12/03/24  1116   SODIUM mmol/L 142 143   POTASSIUM mmol/L 3.5 4.2   CHLORIDE mmol/L 106 109*   CO2 mmol/L 28.4 24.0   BUN mg/dL 24* 23   CREATININE mg/dL 0.77 0.68*   CALCIUM mg/dL 8.5 8.6   Estimated Creatinine Clearance: 45 mL/min (by C-G formula based on SCr of 0.77 mg/dL).      Assessment & Plan   Active Hospital Problems    Diagnosis  POA    **Acute congestive heart failure [I50.9]  Yes    Elevated troponin [R79.89]  Yes    Acute hypoxic respiratory failure [J96.01]  Yes    Idiopathic peripheral neuropathy [G60.9]  Yes    Long term (current) use of antithrombotics/antiplatelets [Z79.02]  Not Applicable    Thrombocytopenia [D69.6]  Yes    Paroxysmal atrial fibrillation [I48.0]  Yes    Parkinson's disease [G20.A1]  Yes    History of CVA (cerebrovascular accident) [Z86.73]  Not Applicable    Macrocytic anemia [D53.9]  Yes    Cardiomyopathy [I42.9]  Yes    Essential hypertension [I10]  Yes    Chronic fatigue [R53.82]  Yes      Resolved Hospital Problems   No resolved problems to display.       PLAN  89-year-old male who has complicated past medical history including history of Parkinson's disease, dementia, paroxysmal atrial fibrillation, history of osteoporosis and osteoarthritis and compression fracture involving T12 L2 and sacral vertebrae, hypertension, chronic kidney disease and history of congestive heart failure who presents to the hospital with increasing SOA and found with decompensated HF with hypoxia  -I&O not accurate and bed weights being utilized discussed with nursing for strict I&O and standing weights  -Continue diuretics but edema is multifactorial and a component of this is liekly malnutrition.    -He is losing weight and has complicated recent and past history overall prognosis is extremely guarded need to have further ACP discussion with patients  family      Disposition  Expected Discharge Date: 12/5/2024; Expected Discharge Time:        Eric Mcqueen MD  [facility]  12/04/24  15:50 EST

## 2024-12-04 NOTE — PROGRESS NOTES
Nutrition Services    Patient Name:  Dinesh Churchill Sr.  YOB: 1928  MRN: [id number]  Admit Date:  12/3/2024    Assessment Date:  12/04/24    Summary: BMI nutrition screen    89yoM admitted with acute CHF with hypoxia. Hx of dementia. Pt is also hard of hearing. Wt hx reviewed. Noted significant wt loss of 21% x 1 yr but has been losing wt over the last 2 years. NFPE completed. Noted muscle wasting and fat loss. Eating % here per EMR. Boost ordered to help with PO intake and wt gain. Pt also with missing teeth. Unable to obtain much nutrition hx d/t poor hearing. Will downgrade to soft to chew to help with chewing. Meds, labs, skin reviewed.     Patient meets ASPEN/AND criteria for nutrition diagnosis of severe malnutrition of chronic illness based on: 21% wt loss x 1 yr, NFPE results, BMI 16.95    Recommendations:  Boost TID with meals  Adjust diet to soft to chew, chopped meat 2/2 pt is missing teeth  Encourage good PO intakes    RD to follow  CLINICAL NUTRITION ASSESSMENT      Reason for Assessment BMI     Diagnosis/Problem   Acute congestive heart failure   Medical/Surgical History Past Medical History:   Diagnosis Date    Abnormal ECG     Abnormal MRI     Acute congestive heart failure 12/3/2024    Acute frontal sinusitis     Arthritis     Chronic fatigue     Coronary artery disease     Difficulty walking     Dizziness     Head injury     Headache 01/21/2023    Hearing aid worn     left    Hearing loss     Hyperlipidemia     Hypertension     Idiopathic peripheral neuropathy 08/29/2023    Kidney stones     Macrocytosis     Neoplasm of skin     Osteoporosis     Prediabetes     Vitamin D deficiency        Past Surgical History:   Procedure Laterality Date    CARDIAC CATHETERIZATION N/A 05/22/2017    Procedure: Left Heart Cath;  Surgeon: Maninder Quezada MD;  Location: [facility] CATH INVASIVE LOCATION;  Service:     CARDIAC CATHETERIZATION N/A 05/23/2017    Procedure: Stent BMS coronary;   "Surgeon: Maninder Quezada MD;  Location:  [facility] CATH INVASIVE LOCATION;  Service:     CARDIAC CATHETERIZATION N/A 08/08/2019    Procedure: Left Heart Cath;  Surgeon: Maninder Quezada MD;  Location:  [facility] CATH INVASIVE LOCATION;  Service: Cardiology    CARDIAC CATHETERIZATION N/A 08/08/2019    Procedure: Left ventriculography;  Surgeon: Maninder Quezada MD;  Location:  [facility] CATH INVASIVE LOCATION;  Service: Cardiology    CARDIAC CATHETERIZATION N/A 08/08/2019    Procedure: Coronary angiography;  Surgeon: Maninder Quezada MD;  Location:  [facility] CATH INVASIVE LOCATION;  Service: Cardiology    CARDIAC CATHETERIZATION N/A 08/20/2019    Procedure: CORONARY ANGIOGRAPHY;  Surgeon: Maninder Quezada MD;  Location[facility] CATH INVASIVE LOC[facility];  Service: Cardiovascular    CARDIAC CATHETERIZATION N/A 08/20/2019    Procedure: Stent BMS coronary;  Surgeon: Maninder Quezada MD;  Location: [facility] CATH INVASIVE LOC[facility];  Service: Cardiovascular    CARDIAC ELECTROPHYSIOLOGY PROCEDURE N/A 11/08/2019    Procedure: Loop insertion;  Surgeon: Maninder Quezada MD;  Location[facility] CATH [facility] LOC[facility];  Service: Cardiovascular    HERNIA REPAIR      x 2    ID RT/LT HEART CATHETERS N/A 05/23/2017    Procedure: Percutaneous Coronary Intervention;  Surgeon: Maninder Quezada MD;  Location: [facility] CATH INVAS[facility] LOC[facility];  Service: Cardiovascular        Anthropometrics        Current Height  Current Weight  BMI kg/m2 Height: 182.9 cm (72.01\")  Weight: 56.7 kg (125 lb) (12/04/24 1230)  Body mass index is 16.95 kg/m².   Adjusted BMI (if applicable)    BMI Category Underweight (18.4 or below)   Ideal Body Weight (IBW) 171#   Usual Body Weight (UBW) See below   Weight Trend Loss   Weight History Wt Readings from Last 30 Encounters:   12/04/24 1230 56.7 kg (125 lb)   12/03/24 1101 61.2 kg (134 lb 14.7 oz)   11/20/24 1054 61.2 kg (135 lb)   10/31/24 0510 58.1 kg (128 lb 1.4 oz)   10/30/24 0351 58.4 kg " (128 lb 12.8 oz)   10/29/24 0510 58.1 kg (128 lb 1.4 oz)   10/28/24 1022 63.9 kg (140 lb 14 oz)   06/12/24 1445 63.9 kg (140 lb 14 oz)   12/21/23 1033 72 kg (158 lb 11.7 oz)   12/21/23 1137 67.1 kg (148 lb)   08/29/23 1027 72.6 kg (160 lb)   07/26/23 1442 72.9 kg (160 lb 12.8 oz)   06/29/23 1110 77.6 kg (171 lb)   03/14/23 1124 77.6 kg (171 lb)   02/28/23 1525 77.6 kg (171 lb)   01/27/23 1830 79.6 kg (175 lb 7.8 oz)   01/27/23 1616 80.7 kg (178 lb)   01/20/23 2351 80.7 kg (178 lb)   12/29/22 1135 78.9 kg (174 lb)   12/13/22 1012 79.8 kg (176 lb)   09/13/22 1025 78.5 kg (173 lb)   07/27/22 1504 79.8 kg (176 lb)   06/23/22 1247 83.9 kg (185 lb)   06/23/22 1303 81.2 kg (179 lb)   03/30/22 1104 83.9 kg (185 lb)   03/23/22 1106 84.8 kg (187 lb)   03/16/22 1114 84.8 kg (187 lb)   03/15/22 1006 84.8 kg (187 lb)   02/22/22 1312 84.8 kg (187 lb)   02/16/22 1302 84.8 kg (187 lb)   01/19/22 1046 83 kg (183 lb)   12/23/21 1208 84.4 kg (186 lb)   11/23/21 1512 84.8 kg (187 lb)   11/15/21 1042 78.9 kg (174 lb)   09/14/21 1137 85.3 kg (188 lb)      --  Labs       Pertinent Labs    Results from last 7 days   Lab Units 12/04/24  0508 12/03/24  1116   SODIUM mmol/L 142 143   POTASSIUM mmol/L 3.5 4.2   CHLORIDE mmol/L 106 109*   CO2 mmol/L 28.4 24.0   BUN mg/dL 24* 23   CREATININE mg/dL 0.77 0.68*   CALCIUM mg/dL 8.5 8.6   BILIRUBIN mg/dL 1.2 1.2   ALK PHOS U/L 53 54   ALT (SGPT) U/L 6 9   AST (SGOT) U/L 11 14   GLUCOSE mg/dL 86 112*     Results from last 7 days   Lab Units 12/04/24  0508   HEMOGLOBIN g/dL 11.6*   HEMATOCRIT % 35.8*   WBC 10*3/mm3 4.51   ALBUMIN g/dL 3.3*     Results from last 7 days   Lab Units 12/04/24  0508 12/03/24  1116   PLATELETS 10*3/mm3 113* 129*     COVID19   Date Value Ref Range Status   12/03/2024 Not Detected Not Detected - Ref. Range Final     Lab Results   Component Value Date    HGBA1C 5.60 10/29/2024          Medications           Scheduled Medications acetaminophen, 500 mg, Oral, Q6H  amLODIPine, 5  mg, Oral, Q24H  aspirin, 81 mg, Oral, Daily  atorvastatin, 20 mg, Oral, Nightly  cholecalciferol, 1,000 Units, Oral, Daily  enoxaparin, 40 mg, Subcutaneous, Q24H  furosemide, 80 mg, Intravenous, BID  isosorbide mononitrate, 60 mg, Oral, Daily  losartan, 50 mg, Oral, Daily  metoprolol succinate XL, 100 mg, Oral, Daily  sodium chloride, 10 mL, Intravenous, Q12H  vitamin B-12, 1,000 mcg, Oral, Daily       Infusions     PRN Medications   Calcium Replacement - Follow Nurse / BPA Driven Protocol    Magnesium Standard Dose Replacement - Follow Nurse / BPA Driven Protocol    nitroglycerin    ondansetron    Phosphorus Replacement - Follow Nurse / BPA Driven Protocol    Potassium Replacement - Follow Nurse / BPA Driven Protocol    sodium chloride    sodium chloride    sodium chloride     Physical Findings          General Findings alert, disoriented, underweight   Oral/Mouth Cavity tooth or teeth missing   Edema  not assessed   Gastrointestinal WDL   Skin  skin intact   Tubes/Drains/Lines none   NFPE See Malnutrition Severity Assessment, Date Completed: 12/4     Malnutrition Severity Assessment      Patient meets criteria for : Severe Malnutrition  Malnutrition Type (Last 8 Hours)       Malnutrition Severity Assessment       Row Name 12/04/24 1544       Malnutrition Severity Assessment    Malnutrition Type Chronic Disease - Related Malnutrition      Row Name 12/04/24 1544       Unintentional Weight Loss     Unintentional Weight Loss Findings Severe    Unintentional Weight Loss  Weight loss greater than 20% in one year      Row Name 12/04/24 1544       Muscle Loss    Loss of Muscle Mass Findings Severe    Confucianism Region Severe - deep hollowing/scooping, lack of muscle to touch, facial bones well defined    Clavicle Bone Region Severe - protruding prominent bone    Acromion Bone Region Severe - squared shoulders, bones, and acromion process protrusion prominent    Patellar Region Severe - prominent bone, square looking, very  little muscle definition    Anterior Thigh Region Severe - line/depression along thigh, obviously thin    Posterior Calf Region Severe - thin with very little definition/firmness      Row Name 12/04/24 1544       Fat Loss    Subcutaneous Fat Loss Findings Severe    Orbital Region  Severe - pronounced hollowness/depression, dark circles, loose saggy skin    Upper Arm Region Severe - mostly skin, very little space between folds, fingers touch    Thoracic & Lumbar Region Severe - ribs visible with prominent depressions, iliac crest very prominent      Row Name 12/04/24 1544       Criteria Met (Must meet criteria for severity in at least 2 of these categories: M Wasting, Fat Loss, Fluid, Secondary Signs, Wt. Status, Intake)    Patient meets criteria for  Severe Malnutrition                       Current Nutrition Orders & Evaluation of Intake       Oral Nutrition     Food Allergies NKFA   Current PO Diet Diet: Cardiac; Healthy Heart (2-3 Na+); Fluid Consistency: Thin (IDDSI 0)   Supplement n/a   PO Evaluation     % PO Intake % x 2 meals    Factors Affecting Intake: decreased appetite, Other: hx of dementia   --  PES STATEMENT / NUTRITION DIAGNOSIS      Nutrition Dx Problem  Problem: Malnutrition (severe)  Etiology: Functional Diagnosis - hx of dementia    Signs/Symptoms: NFPE Results, BMI, and Unintended Weight Change     NUTRITION INTERVENTION / PLAN OF CARE      Intervention Goal(s) Establish goals of care, Accepts oral nutrition supplement, No significant weight loss, Appropriate weight gain, and PO intake goal %: >50%         RD Intervention/Action Liberalize or adjust diet to: soft to chew, chopped meat, Encourage intake, Continue to monitor, Care plan reviewed, and Recommend/order: boost TID   --      Prescription/Orders:       PO Diet Soft to chew, chopped meat      Supplements Boost TID      Enteral Nutrition       Parenteral Nutrition    New Prescription Ordered? Yes   --      Monitor/Evaluation Per  protocol   Discharge Plan/Needs Pending clinical course   --    RD to follow per protocol.      Electronically signed by:  Jenniffer Pradhan RD  12/04/24 15:35 EST

## 2024-12-04 NOTE — PLAN OF CARE
Problem: Adult Inpatient Plan of Care  Goal: Plan of Care Review  Outcome: Progressing  Flowsheets (Taken 12/4/2024 0440)  Progress: no change  Outcome Evaluation:   VSS   aflutter/SA/SR/mary/BBB/PVCs on monitor   2L NC O2   very good urine output overnight   Q2H turn   no c/o soa or CP   no acute distress noted   will cont to monitor  Plan of Care Reviewed With: patient   Goal Outcome Evaluation:  Plan of Care Reviewed With: patient        Progress: no change  Outcome Evaluation: VSS; aflutter/SA/SR/mary/BBB/PVCs on monitor; 2L NC O2; very good urine output overnight; Q2H turn; no c/o soa or CP; no acute distress noted; will cont to monitor

## 2024-12-04 NOTE — ED PROVIDER NOTES
EMERGENCY DEPARTMENT MD ATTESTATION NOTE    Room Number:  N630/1  PCP: Phyllis Loving PA  Independent Historians: Patient and Family    HPI:  A complete HPI/ROS/PMH/PSH/SH/FH are unobtainable due to: None    Chronic or social conditions impacting patient care (Social Determinants of Health): None      Context: Dinesh Churchill Sr. is a 89 y.o. male with a medical history of heart failure, CAD, Parkinson's and cardiomyopathy who presents to the ED c/o acute worsening shortness of breath throughout the day.  Patient denies any chest pain.  He has not had any fevers or chills.  He denies any abdominal pain or nausea.  His family noted that he seemed more short of breath this morning and had some increased cough and symptoms.  Paramedics were called to the house and brought him here for further evaluation.  He was found to have hypoxia with oxygenation of 84% prior to arrival.        Review of prior external notes (non-ED) -and- Review of prior external test results outside of this encounter: I independently reviewed the cardiology discharge summary from October 31, 2024.  He was admitted for acute hypoxic respiratory failure with acute on chronic congestive heart failure symptoms.    Prescription drug monitoring program review: ROSEMARIE reviewed by Juan Oropeza MD, Morgan Schroeder MD, Eric Mcqueen MD         PHYSICAL EXAM    I have reviewed the triage vital signs and nursing notes.    ED Triage Vitals   Temp Heart Rate Resp BP SpO2   12/03/24 1101 12/03/24 1101 12/03/24 1101 12/03/24 1101 12/03/24 1101   97.3 °F (36.3 °C) 66 16 142/64 99 %      Temp src Heart Rate Source Patient Position BP Location FiO2 (%)   12/03/24 2230 12/03/24 2230 12/03/24 2230 12/03/24 2230 --   Oral Monitor Lying Right arm        Physical Exam  GENERAL: alert, elderly, frail-appearing   SKIN: Warm, dry  HENT: Normocephalic, atraumatic  EYES: no scleral icterus  CV: Irregular rhythm, regular rate  RESPIRATORY: Mild accessory muscle use  is noted.  Breath sounds are diminished at the bases with scattered rales and rhonchi noted.  Occasional wet sounding cough noted.  ABDOMEN: soft, nondistended, nontender  MUSCULOSKELETAL: no deformity, no asymmetry of the lower extremities.  Mild edema present.  NEURO: alert, moves all extremities, follows commands      MEDICATIONS GIVEN IN ER  Medications   sodium chloride 0.9 % flush 10 mL (has no administration in time range)   aspirin chewable tablet 81 mg (81 mg Oral Given 12/4/24 0833)   acetaminophen (TYLENOL) tablet 500 mg ( Oral Canceled Entry 12/5/24 0000)   metoprolol succinate XL (TOPROL-XL) 24 hr tablet 100 mg (100 mg Oral Given 12/4/24 1155)   losartan (COZAAR) tablet 50 mg (50 mg Oral Given 12/4/24 1155)   amLODIPine (NORVASC) tablet 5 mg (5 mg Oral Given 12/4/24 0833)   atorvastatin (LIPITOR) tablet 20 mg (20 mg Oral Given 12/4/24 2122)   isosorbide mononitrate (IMDUR) 24 hr tablet 60 mg (60 mg Oral Given 12/4/24 1150)   vitamin B-12 (CYANOCOBALAMIN) tablet 1,000 mcg (1,000 mcg Oral Given 12/4/24 0832)   sodium chloride 0.9 % flush 10 mL (10 mL Intravenous Given 12/4/24 2122)   sodium chloride 0.9 % flush 10 mL (has no administration in time range)   sodium chloride 0.9 % infusion 40 mL (has no administration in time range)   nitroglycerin (NITROSTAT) SL tablet 0.4 mg (has no administration in time range)   Enoxaparin Sodium (LOVENOX) syringe 40 mg (40 mg Subcutaneous Given 12/4/24 1853)   Potassium Replacement - Follow Nurse / BPA Driven Protocol (has no administration in time range)   Magnesium Standard Dose Replacement - Follow Nurse / BPA Driven Protocol (has no administration in time range)   Phosphorus Replacement - Follow Nurse / BPA Driven Protocol (has no administration in time range)   Calcium Replacement - Follow Nurse / BPA Driven Protocol (has no administration in time range)   ondansetron (ZOFRAN) injection 4 mg (has no administration in time range)   cholecalciferol (VITAMIN D3)  tablet 1,000 Units (1,000 Units Oral Given 12/4/24 0832)   furosemide (LASIX) injection 80 mg (80 mg Intravenous Given 12/3/24 1254)   furosemide (LASIX) injection 80 mg (80 mg Intravenous Given 12/4/24 1854)   potassium chloride (K-DUR,KLOR-CON) ER tablet 40 mEq (40 mEq Oral Given 12/4/24 1432)         ORDERS PLACED DURING THIS VISIT:  Orders Placed This Encounter   Procedures    Respiratory Panel PCR w/COVID-19(SARS-CoV-2) VANDANA/CARLOS/JUAN PABLO/PAD/COR/RANDOLPH In-House, NP Swab in UTM/VTM, 2 HR TAT - Swab, Nasopharynx    XR Chest 1 View    [place] Draw    Comprehensive Metabolic Panel    BNP    Single High Sensitivity Troponin T    Procalcitonin    CBC Auto Differential    Blood Gas, Arterial -    Urinalysis With Microscopic If Indicated (No Culture) - Urine, Clean Catch    Blood Gas, Arterial -    High Sensitivity Troponin T 2Hr    Comprehensive Metabolic Panel    CBC Auto Differential    High Sensitivity Troponin T    Potassium    Renal Function Panel    Magnesium    Uric Acid    CBC Auto Differential    Diet: Cardiac; Healthy Heart (2-3 Na+); Texture: Soft to Chew (NDD 3); Soft to Chew: Chopped Meat; Fluid Consistency: Thin (IDDSI 0)    Vital Signs    Vital Signs    Intake & Output    Weigh Patient    Oral Care    Place Sequential Compression Device    Maintain Sequential Compression Device    Maintain IV Access    Telemetry - Place Orders & Notify Provider of Results When Patient Experiences Acute Chest Pain, Dysrhythmia or Respiratory Distress    May Be Off Telemetry for Tests    Continuous Pulse Oximetry    Code Status and Medical Interventions: No CPR (Do Not Attempt to Resuscitate); Limited Support; No intubation (DNI)    [facility] (on-call MD unless specified) Details    Inpatient Case Management  Consult    Dietary Nutrition Supplements Other (see comment); Boost original    OT Consult: Eval & Treat ADL Performance Below Baseline    PT Consult: Eval & Treat Functional Mobility Below Baseline    Oxygen  Therapy- Nasal Cannula; Titrate 1-6 LPM Per SpO2; 90 - 95%    Oxygen Therapy- Nasal Cannula; Titrate 1-6 LPM Per SpO2; 90 - 95%    ECG 12 Lead Dyspnea    Telemetry Scan    Telemetry Scan    Telemetry Scan    Telemetry Scan    Telemetry Scan    Telemetry Scan    Telemetry Scan    Telemetry Scan    Telemetry Scan    Insert Peripheral IV    Insert Peripheral IV    Inpatient Admission    CBC & Differential    Green Top (Gel)    Lavender Top    Gold Top - SST    Light Blue Top    CBC & Differential         PROCEDURES  Procedures      PROGRESS, DATA ANALYSIS, CONSULTS, AND MEDICAL DECISION MAKING  All labs have been independently interpreted by me.  All radiology studies have been reviewed by me. All EKG's have been independently viewed and interpreted by me.  Discussion below represents my analysis of pertinent findings related to patient's condition, differential diagnosis, treatment plan and final disposition.    Differential diagnosis includes but is not limited to pneumonia, pneumothorax, congestive heart failure, pulmonary embolism, URI, COVID-19.    Clinical Scores:    [id number] Score: 5              MDM:  ED Course as of 12/04/24 2250   Tue Dec 03, 2024   1204 HS Troponin T(!!): 57  At baseline [DC]   1204 proBNP(!): 17,076.0  Up from 12,600 [DC]   1204 Per my independent interpretation of the chest x-ray, there is no obvious pneumothorax. [DC]   1216 Patient presentation and evaluation consistent with acute congestive heart failure with elevated BNP and hypoxia requiring admission for IV diuresis and cardiology consultation.  Patient and family agreeable with this plan and all questions answered. [DC]   1258 I discussed the case with MD Sandip, with A at this time regarding the patient.  I discussed work-up, results, concerns.  I discussed the consulting provider's desire for tele admit.  [DC]      ED Course User Index  [DC] Sam Foley PA       EKG         EKG time/Interp time: 1115/1225  Rhythm/Rate:  Atrial flutter, 65 bpm  P waves and ND: None  QRS, axis: 146 ms, right bundle branch block  ST and T waves: Interpretation limited because of respiratory motion artifact and bundle branch block present.  No apparent acute ischemic changes features notable.  Independently interpreted by me contemporaneously with treatment    I independently interpreted the chest x-ray and my findings are: Cardiomegaly, vascular congestion, bibasilar edema versus infiltrate, no pneumothorax    I reviewed the labs from today's ED visit.  There is no respiratory acidosis present.  The respiratory viral panel is negative.  The proBNP value is certainly elevated above his typical previous baseline.  Therefore I think the most likely explanation for his acute hypoxic respiratory failure today is decompensation of his CHF.  We are planning to admit him to the hospitalist service for further diuresis and pulmonary supportive care.  He and his family are agreeable with that plan.    COMPLEXITY OF CARE  The patient requires admission.    Please note that portions of this document were completed with a voice recognition program.    Note Disclaimer: At [facility], we believe that sharing information builds trust and better relationships. You are receiving this note because you recently visited [facility]. It is possible you will see health information before a provider has talked with you about it. This kind of information can be easy to misunderstand. To help you fully understand what it means for your health, we urge you to discuss this note with your provider.       Morgan Schroeder MD  12/04/24 2121

## 2024-12-04 NOTE — PLAN OF CARE
Goal Outcome Evaluation:  Plan of Care Reviewed With: patient           Outcome Evaluation: Pt seen for OT royal this AM. Admitted with acute on chronic CHF with c/o of feeling SOA. Pt is a limited historian due to being very [place]. Pt states he has a caregiver M-F 8:00am-5:00pm and his granddaughter and her  are with him on the weekends. Today he performed bed mobility with CGA. He stood requiring min-mod Ax2 with use of rwx and took steps to bedside chair with mod Ax2. Pt forward flexed but also with a posterior lean with mobility requiring assist to correct. Able to self feed with set up seated in the chair. At this time pt would require max A for all toileting needs, donned socks with min A seated EOB. Would require more assist with donning pants due to balance in standing. Pt presents with decreased strength, endurance, activity tolerance, ADL performance and functional mobility. Pt to benefit from skilled OT to address deficits. Rec SNF at [facility].    Anticipated Discharge Disposition (OT): skilled nursing facility

## 2024-12-04 NOTE — THERAPY EVALUATION
Patient Name: Dinesh Churchill Sr.  : 1928    MRN: [id number]                              Today's Date: 2024       Admit Date: 12/3/2024    Visit Dx:     ICD-10-CM ICD-9-CM   1. Acute congestive heart failure, unspecified heart failure type  I50.9 428.0     Patient Active Problem List   Diagnosis    Abnormal magnetic resonance imaging study    Arthritis    Osteoarthritis of cervical spine    Diplopia    Hearing loss    Neoplasm of uncertain behavior of skin    Prediabetes    Vitamin D deficiency    Chronic fatigue    History of supraventricular tachycardia    Essential hypertension    B12 deficiency    Abnormal cardiac function test    Cardiomyopathy    Coronary artery disease involving native coronary artery of native heart without angina pectoris    History of coronary artery stent placement    History of renal calculi    Dyslipidemia    Macrocytic anemia    Precordial pain    Parkinson's disease    History of CVA (cerebrovascular accident)    Paroxysmal atrial fibrillation    Otalgia, left    Chest pain    Abnormal stress test    Spontaneous pneumothorax    Chest pain    Acute systolic (congestive) heart failure    Thrombocytopenia    Pneumonia of left lower lobe due to infectious organism    Syncope and collapse    Status post placement of implantable loop recorder    Long term (current) use of antithrombotics/antiplatelets    Stuttering    Fall    Closed fracture of multiple ribs of right side    Chest wall pain    Closed fracture of phalanx of toe of left foot    Headache    Sudden onset of severe headache    Fall in home, initial encounter    Compression fracture of L2 vertebra with routine healing    Weakness of both legs    Idiopathic peripheral neuropathy    L2 vertebral fracture    Compression fracture of T12 vertebra with routine healing    Sacral fracture, closed    Senile osteoporosis    Acute hypoxic respiratory failure    Acute congestive heart failure    Elevated troponin     Past  Medical History:   Diagnosis Date    Abnormal ECG     Abnormal MRI     Acute congestive heart failure 12/3/2024    Acute frontal sinusitis     Arthritis     Chronic fatigue     Coronary artery disease     Difficulty walking     Dizziness     Head injury     Headache 01/21/2023    Hearing aid worn     left    Hearing loss     Hyperlipidemia     Hypertension     Idiopathic peripheral neuropathy 08/29/2023    Kidney stones     Macrocytosis     Neoplasm of skin     Osteoporosis     Prediabetes     Vitamin D deficiency      Past Surgical History:   Procedure Laterality Date    CARDIAC CATHETERIZATION N/A 05/22/2017    Procedure: Left Heart Cath;  Surgeon: Maninder Quezada MD;  [facility];  Service:     CARDIAC CATHETERIZATION N/A 05/23/2017    Procedure: Stent BMS coronary;  Surgeon: Maninder Quezada MD;  [facility];  Service:     CARDIAC CATHETERIZATION N/A 08/08/2019    Procedure: Left Heart Cath;  Surgeon: Maninder Quezada MD;  [facility];  Service: Cardiology    CARDIAC CATHETERIZATION N/A 08/08/2019    Procedure: Left ventriculography;  Surgeon: Maninder Quezada MD;  [facility];  Service: Cardiology    CARDIAC CATHETERIZATION N/A 08/08/2019    Procedure: Coronary angiography;  Surgeon: Maninder Quezada MD;  [facility];  Service: Cardiology    CARDIAC CATHETERIZATION N/A 08/20/2019    Procedure: CORONARY ANGIOGRAPHY;  Surgeon: Maninder Quezada MD;  [facility];  Service: Cardiovascular    CARDIAC CATHETERIZATION N/A 08/20/2019    Procedure: Stent BMS coronary;  Surgeon: Maninder Quezada MD;  Location: [facility] CATH INVASIVE LOCATION;  Service: Cardiovascular    CARDIAC ELECTROPHYSIOLOGY PROCEDURE N/A 11/08/2019    Procedure: Loop insertion;  Surgeon: Maninder Quezada MD;  [facility]  [facility]ATION;  Service: Cardiovascular    HERNIA REPAIR      x 2    WI RT/LT HEART CATHETERS N/A 05/23/2017    Procedure: Percutaneous Coronary Intervention;  Surgeon: Maninder Quezada MD;  Location: [facility] INVASIVE LOCATION;  Service: Cardiovascular      General Information       Row Name 12/04/24 1111          Physical Therapy Time and Intention    Document Type evaluation  -     Mode of Treatment co-treatment;physical therapy  -       Row Name 12/04/24 1111          General Information    Patient Profile Reviewed yes  -     Barriers to Rehab hearing deficit  -       Row Name 12/04/24 1111          Living Environment    People in Home --  Per chart patient lives with his lynette. Patient reports she is only there on weekends and has caregivers daily 8-5 but noone at night.  -       Row Name 12/04/24 1111          Cognition    Orientation Status (Cognition) oriented x 3  -       Row Name 12/04/24 1111          Safety Issues/Impairments Affecting Functional Mobility    Impairments Affecting Function (Mobility) balance;endurance/activity tolerance;strength  -     Comment, Safety Issues/Impairments (Mobility) Co treatment medically appropriate and necessary due to patient acuity level, activity tolerance and safety of patient and staff. Evaluation established to achieve all goals in POC.  -               User Key  (r) = Recorded By, (t) = Taken By, (c) = Cosigned By      Initials Name Provider Type     Sangeetha Mederos, PT Physical Therapist                   Mobility       Row Name 12/04/24 1113          Bed Mobility    Bed Mobility supine-sit  -     Supine-Sit [place] (Bed Mobility) contact guard;standby assist;verbal cues  -     Assistive Device (Bed Mobility) head of bed elevated;bed rails  -     Comment, (Bed Mobility) UIC at end of session  -       Row Name 12/04/24 1113          Sit-Stand Transfer    Sit-Stand [place] (Transfers) minimum assist (75% patient  effort);moderate assist (50% patient effort);2 person assist;verbal cues  -     Assistive Device (Sit-Stand Transfers) walker, front-wheeled  -       Row Name 12/04/24 1113          Gait/Stairs (Locomotion)    [place] Level (Gait) moderate assist (50% patient effort);2 person assist;verbal cues  -     Distance in Feet (Gait) 3  -SM     Deviations/Abnormal Patterns (Gait) gait speed decreased;naldo decreased  -     Bilateral Gait Deviations forward flexed posture  -     Comment, (Gait/Stairs) Patient fwd flexed taking slow shaky steps. Quick to sit in chair without reaching back.  -               User Key  (r) = Recorded By, (t) = Taken By, (c) = Cosigned By      Initials Name Provider Type     Sangeetha Mederos PT Physical Therapist                   Obj/Interventions       Row Name 12/04/24 1115          Range of Motion Comprehensive    General Range of Motion bilateral lower extremity ROM WFL  -       Row Name 12/04/24 1115          Strength Comprehensive (MMT)    General Manual Muscle Testing (MMT) Assessment lower extremity strength deficits identified  -     Comment, General Manual Muscle Testing (MMT) Assessment B LEs grossly 3+/5  -       Row Name 12/04/24 1115          Balance    Balance Assessment sitting static balance;sitting dynamic balance;standing static balance;standing dynamic balance  -     Static Sitting Balance standby assist  -     Dynamic Sitting Balance standby assist;contact guard  -     Position, Sitting Balance sitting edge of bed  -     Static Standing Balance minimal assist;2-person assist  -     Dynamic Standing Balance moderate assist;2-person assist  -     Position/Device Used, Standing Balance supported;walker, front-wheeled  -     Balance Interventions sitting;standing;sit to stand;supported;static;dynamic  -               User Key  (r) = Recorded By, (t) = Taken By, (c) = Cosigned By      Initials Name Provider Type    Sangeetha Jay  PT Physical Therapist                   Goals/Plan       Row Name 12/04/24 1120          Bed Mobility Goal 1 (PT)    Activity/Assistive Device (Bed Mobility Goal 1, PT) bed mobility activities, all  -SM     [place] Level/Cues Needed (Bed Mobility Goal 1, PT) independent  -SM     Time Frame (Bed Mobility Goal 1, PT) 2 weeks  -SM       Row Name 12/04/24 1120          Transfer Goal 1 (PT)    Activity/Assistive Device (Transfer Goal 1, PT) sit-to-stand/stand-to-sit;bed-to-chair/chair-to-bed  -SM     [place] Level/Cues Needed (Transfer Goal 1, PT) contact guard required  -SM     Time Frame (Transfer Goal 1, PT) 2 weeks  -SM       Row Name 12/04/24 1120          Gait Training Goal 1 (PT)    Activity/Assistive Device (Gait Training Goal 1, PT) gait (walking locomotion);walker, rolling  -SM     [place] Level (Gait Training Goal 1, PT) contact guard required  -SM     Distance (Gait Training Goal 1, PT) 50ft  -SM     Time Frame (Gait Training Goal 1, PT) 2 weeks  -SM               User Key  (r) = Recorded By, (t) = Taken By, (c) = Cosigned By      Initials Name Provider Type     Sangeetha Mederos, PT Physical Therapist                   Clinical Impression       Row Name 12/04/24 1116          Pain    Pretreatment Pain Rating 0/10 - no pain  -SM     Posttreatment Pain Rating 0/10 - no pain  -SM       Row Name 12/04/24 1116          Plan of Care Review    Plan of Care Reviewed With patient  -     Outcome Evaluation Patient is a 89 y.o male who presented to [facility] with increased shortness of breath. Patient AOx3 supine in bed upon arrival. Patient is [facility]. Patient reports he has caregivers from 8-5 each day and that his [facility] stays on the weekends. Patient reports he typically ambulates using a rwx. Patient sat up to EOB with CGA/SBA this date. Patient required minAx2 to stand from EOB. Patient took a few shuffled steps from bed to chair with modAx2 and support of rwx. Steps were shaky with patient fwd  flexed and quick to sit in the chair before reaching back. Patient reclined in chair with breakfast infront of him at end of session. Patient would continue to benefit from skilled PT intervention to address deficits in functional mobility and maximize safety and independence. Patient may benefit from SNF at d/c. PT will continue to monitor.  -       Row Name 12/04/24 1116          Therapy Assessment/Plan (PT)    Rehab Potential (PT) good  -SM     Criteria for Skilled Interventions Met (PT) yes  -SM     Therapy Frequency (PT) 5 times/wk  -       Row Name 12/04/24 1116          Vital Signs    Pre Patient Position Supine  -SM     Intra Patient Position Standing  -SM     Post Patient Position Sitting  -       Row Name 12/04/24 1116          Positioning and Restraints    Pre-Treatment Position in bed  -SM     Post Treatment Position chair  -SM     In Chair call light within reach;encouraged to call for assist;exit alarm on;notified nsg  -               User Key  (r) = Recorded By, (t) = Taken By, (c) = Cosigned By      Initials Name Provider Type     Sangeetha Mederos, PT Physical Therapist                   Outcome Measures       Row Name 12/04/24 1121          How much help from another person do you currently need...    Turning from your back to your side while in flat bed without using bedrails? 4  -SM     Moving from lying on back to sitting on the side of a flat bed without bedrails? 3  -SM     Moving to and from a bed to a chair (including a wheelchair)? 2  -SM     Standing up from a chair using your arms (e.g., wheelchair, bedside chair)? 2  -SM     Climbing 3-5 steps with a railing? 1  -SM     To walk in hospital room? 2  -SM     AM-PAC 6 Clicks Score (PT) 14  -SM     Highest Level of Mobility Goal 4 --> Transfer to chair/commode  -       Row Name 12/04/24 1121          Functional Assessment    Outcome Measure Options AM-PAC 6 Clicks Basic Mobility (PT)  -               User Key  (r) = Recorded  By, (t) = Taken By, (c) = Cosigned By      Initials Name Provider Type     Sangeetha Mederos PT Physical Therapist                                 Physical Therapy Education       Title: PT OT SLP Therapies (In Progress)       Topic: Physical Therapy (In Progress)       Point: Mobility training (In Progress)       Learning Progress Summary            Patient Acceptance, E, NR by  at 12/4/2024 1121                      Point: Home exercise program (In Progress)       Learning Progress Summary            Patient Acceptance, E, NR by  at 12/4/2024 1121                      Point: Body mechanics (In Progress)       Learning Progress Summary            Patient Acceptance, E, NR by  at 12/4/2024 1121                      Point: Precautions (In Progress)       Learning Progress Summary            Patient Acceptance, E, NR by  at 12/4/2024 1121                                      User Key       Initials Effective Dates Name Provider Type Discipline     05/02/22 -  Sangeetha Mederos PT Physical Therapist PT                  PT Recommendation and Plan     Outcome Evaluation: Patient is a 89 y.o male who presented to [facility] with increased shortness of breath. Patient AOx3 supine in bed upon arrival. Patient is [place]. Patient reports he has caregivers from 8-5 each day and that his [facility] stays on the weekends. Patient reports he typically ambulates using a rwx. Patient sat up to EOB with CGA/SBA this date. Patient required minAx2 to stand from EOB. Patient took a few shuffled steps from bed to chair with modAx2 and support of rwx. Steps were shaky with patient fwd flexed and quick to sit in the chair before reaching back. Patient reclined in chair with breakfast infront of him at end of session. Patient would continue to benefit from skilled PT intervention to address deficits in functional mobility and maximize safety and independence. Patient may benefit from SNF at d/c. PT will continue to monitor.     Time  Calculation:         PT Charges       Row Name 12/04/24 1121             Time Calculation    Start Time 0836  -      Stop Time 0854  -      Time Calculation (min) 18 min  -      PT Received On 12/04/24  -      PT - Next Appointment 12/05/24  -      PT Goal Re-Cert Due Date 12/18/24  -         Time Calculation- PT    Total Timed Code Minutes- PT 10 minute(s)  -SM         Timed Charges    99470 - PT Therapeutic Activity Minutes 10  -SM         Total Minutes    Timed Charges Total Minutes 10  -SM       Total Minutes 10  -SM                User Key  (r) = Recorded By, (t) = Taken By, (c) = Cosigned By      Initials Name Provider Type     Sangeetha Mederos, GIANFRANCO Physical Therapist                  Therapy Charges for Today       Code Description Service Date Service Provider Modifiers Qty    05398591050 HC PT THERAPEUTIC ACT EA 15 MIN 12/4/2024 Sangeetha Mederos, PT GP 1    38157233150 HC PT EVAL MOD COMPLEXITY 3 12/4/2024 Sangeetha Mederos, PT GP 1            PT G-Codes  Outcome Measure Options: AM-PAC 6 Clicks Basic Mobility (PT)  AM-PAC 6 Clicks Score (PT): 14  PT Discharge Summary  Anticipated Discharge Disposition (PT): skilled nursing facility    Sangeetha Mederos PT  12/4/2024

## 2024-12-04 NOTE — PLAN OF CARE
Goal Outcome Evaluation:  Plan of Care Reviewed With: patient           Outcome Evaluation: Patient is a 89 y.o male who presented to [facility] with increased shortness of breath. Patient AOx3 supine in bed upon arrival. Patient is [place]. Patient reports he has caregivers from 8-5 each day and that his [facility] stays on the weekends. Patient reports he typically ambulates using a rwx. Patient sat up to EOB with CGA/SBA this date. Patient required minAx2 to stand from EOB. Patient took a few shuffled steps from bed to chair with modAx2 and support of rwx. Steps were shaky with patient fwd flexed and quick to sit in the chair before reaching back. Patient reclined in chair with breakfast infront of him at end of session. Patient would continue to benefit from skilled PT intervention to address deficits in functional mobility and maximize safety and independence. Patient may benefit from SNF at d/c. PT will continue to monitor.    Anticipated Discharge Disposition (PT): skilled nursing facility

## 2024-12-04 NOTE — THERAPY EVALUATION
Patient Name: Dinesh Churchill Sr.  : 1928    MRN: [id number]                              Today's Date: 2024       Admit Date: 12/3/2024    Visit Dx:     ICD-10-CM ICD-9-CM   1. Acute congestive heart failure, unspecified heart failure type  I50.9 428.0     Patient Active Problem List   Diagnosis    Abnormal magnetic resonance imaging study    Arthritis    Osteoarthritis of cervical spine    Diplopia    Hearing loss    Neoplasm of uncertain behavior of skin    Prediabetes    Vitamin D deficiency    Chronic fatigue    History of supraventricular tachycardia    Essential hypertension    B12 deficiency    Abnormal cardiac function test    Cardiomyopathy    Coronary artery disease involving native coronary artery of native heart without angina pectoris    History of coronary artery stent placement    History of renal calculi    Dyslipidemia    Macrocytic anemia    Precordial pain    Parkinson's disease    History of CVA (cerebrovascular accident)    Paroxysmal atrial fibrillation    Otalgia, left    Chest pain    Abnormal stress test    Spontaneous pneumothorax    Chest pain    Acute systolic (congestive) heart failure    Thrombocytopenia    Pneumonia of left lower lobe due to infectious organism    Syncope and collapse    Status post placement of implantable loop recorder    Long term (current) use of antithrombotics/antiplatelets    Stuttering    Fall    Closed fracture of multiple ribs of right side    Chest wall pain    Closed fracture of phalanx of toe of left foot    Headache    Sudden onset of severe headache    Fall in home, initial encounter    Compression fracture of L2 vertebra with routine healing    Weakness of both legs    Idiopathic peripheral neuropathy    L2 vertebral fracture    Compression fracture of T12 vertebra with routine healing    Sacral fracture, closed    Senile osteoporosis    Acute hypoxic respiratory failure    Acute congestive heart failure    Elevated troponin     Past  Medical History:   Diagnosis Date    Abnormal ECG     Abnormal MRI     Acute congestive heart failure 12/3/2024    Acute frontal sinusitis     Arthritis     Chronic fatigue     Coronary artery disease     Difficulty walking     Dizziness     Head injury     Headache 01/21/2023    Hearing aid worn     left    Hearing loss     Hyperlipidemia     Hypertension     Idiopathic peripheral neuropathy 08/29/2023    Kidney stones     Macrocytosis     Neoplasm of skin     Osteoporosis     Prediabetes     Vitamin D deficiency      Past Surgical History:   Procedure Laterality Date    CARDIAC CATHETERIZATION N/A 05/22/2017    Procedure: Left Heart Cath;  Surgeon: Maninder Quezada MD;  Location: [facility] CATH INVASIVE LOCATION;  Service:     CARDIAC CATHETERIZATION N/A 05/23/2017    Procedure: Stent BMS coronary;  Surgeon: Maninder Quezada MD;  Location: [facility] CATH INVASIVE LOCATION;  Service:     CARDIAC CATHETERIZATION N/A 08/08/2019    Procedure: Left Heart Cath;  Surgeon: Maninder Quezada MD;  Location: [facility] CATH INVASIVE LOCATION;  Service: Cardiology    CARDIAC CATHETERIZATION N/A 08/08/2019    Procedure: Left ventriculography;  Surgeon: Maninder Quezada MD;  Location: [facility] CATH INVASIVE LOCATION;  Service: Cardiology    CARDIAC CATHETERIZATION N/A 08/08/2019    Procedure: Coronary angiography;  Surgeon: Maninder Quezada MD;  Location: [facility] CATH INVASIVE LOCATION;  Service: Cardiology    CARDIAC CATHETERIZATION N/A 08/20/2019    Procedure: CORONARY ANGIOGRAPHY;  Surgeon: Maninder Quezada MD;  Location: [facility] CATH INVASIVE LOCATION;  Service: Cardiovascular    CARDIAC CATHETERIZATION N/A 08/20/2019    Procedure: Stent BMS coronary;  Surgeon: Maninder Quezada MD;  Location: [facility] CATH INVASIVE LOCATION;  Service: Cardiovascular    CARDIAC ELECTROPHYSIOLOGY PROCEDURE N/A 11/08/2019    Procedure: Loop insertion;  Surgeon: Maninder Quezada MD;  Location: [facility] CATH INVASIVE  "LOCATION;  Service: Cardiovascular    HERNIA REPAIR      x 2    NH RT/LT HEART CATHETERS N/A 05/23/2017    Procedure: Percutaneous Coronary Intervention;  Surgeon: Maninder Quezada MD;  Location:  INVASIVE LOCATION;  Service: Cardiovascular      General Information       Row Name 12/04/24 1106          OT Time and Intention    Document Type evaluation  -KR     Mode of Treatment occupational therapy;physical therapy;co-treatment  -KR       Row Name 12/04/24 1106          General Information    Prior Level of Function --  Pt reports he \"sometimes\" requires assist for ADLs  -KR     Existing Precautions/Restrictions fall  -KR     Barriers to Rehab hearing deficit  -KR       Row Name 12/04/24 1106          Living Environment    People in Home --  Per chart he lives with his granddaughter. pt states he has a caregiver with him monday-friday 8:00am-5:00pm and his granddaughter and her  are with him on the weekends  -KR       Row Name 12/04/24 1106          Cognition    Orientation Status (Cognition) oriented x 3  very [place]. Hearing aides present and required assist to don this AM  -KR       Row Name 12/04/24 1106          Safety Issues/Impairments Affecting Functional Mobility    Impairments Affecting Function (Mobility) balance;endurance/activity tolerance;strength  -KR     Comment, Safety Issues/Impairments (Mobility) [place] medically appropriate and necessary due to pt acuity, activity tolerance, to maximize mobility efforts and safety of pt and staff. Focus on progression of care and goals established in plan of care.  -KR               User Key  (r) = Recorded By, (t) = Taken By, (c) = Cosigned By      Initials Name Provider Type    KR Rakel Au, BLAS Occupational Therapist                     Mobility/ADL's       Row Name 12/04/24 1107          Bed Mobility    Bed Mobility supine-sit  -KR     Supine-Sit [place] (Bed Mobility) contact guard;standby assist;verbal cues  -KR     Assistive " Device (Bed Mobility) head of bed elevated;bed rails  -KR     Comment, (Bed Mobility) UIC at the end of session  -KR       Row Name 12/04/24 1107          Transfers    Transfers sit-stand transfer  -KR       Row Name 12/04/24 1107          Sit-Stand Transfer    Sit-Stand [place] (Transfers) minimum assist (75% patient effort);moderate assist (50% patient effort);2 person assist;verbal cues  -KR     Assistive Device (Sit-Stand Transfers) walker, front-wheeled  -KR       Row Name 12/04/24 1107          Functional Mobility    Functional Mobility- Safety Issues balance decreased during turns;loses balance backward  -KR     Functional Mobility- Comment Mod Ax2 for steps to bedside chair  -KR       Row Name 12/04/24 1107          Activities of Daily Living    BADL Assessment/Intervention lower body dressing;feeding  -KR       Row Name 12/04/24 1107          Lower Body Dressing Assessment/Training    [place] Level (Lower Body Dressing) lower body dressing skills;socks;minimum assist (75% patient effort);don  -KR     Comment, (Lower Body Dressing) would require more assist for donning pants due to balance and posterior lean  -KR       Row Name 12/04/24 1107          Self-Feeding Assessment/Training    [place] Level (Feeding) feeding skills;liquids to mouth;prepare tray/open items;scoop food and bring to mouth;set up  -KR     Position (Feeding) supported sitting  -KR     Comment, (Feeding) Seated in recliner  -KR               User Key  (r) = Recorded By, (t) = Taken By, (c) = Cosigned By      Initials Name Provider Type    Rakel Meeks OT Occupational Therapist                   Obj/Interventions       Row Name 12/04/24 1221          Range of Motion Comprehensive    General Range of Motion bilateral upper extremity ROM WFL  -KR       Row Name 12/04/24 1221          Strength Comprehensive (MMT)    General Manual Muscle Testing (MMT) Assessment upper extremity strength deficits identified  -KR     Comment,  General Manual Muscle Testing (MMT) Assessment BUE grossly 3+/5  -KR       Row Name 12/04/24 1221          Balance    Static Sitting Balance standby assist  -KR     Dynamic Sitting Balance standby assist;contact guard  -KR     Position, Sitting Balance sitting edge of bed  -KR     Static Standing Balance minimal assist;2-person assist  -KR     Dynamic Standing Balance moderate assist;2-person assist  -KR     Position/Device Used, Standing Balance supported;walker, front-wheeled  -KR     Balance Interventions sitting;standing  -KR               User Key  (r) = Recorded By, (t) = Taken By, (c) = Cosigned By      Initials Name Provider Type    Rakel Meeks OT Occupational Therapist                   Goals/Plan       Row Name 12/04/24 1225          Transfer Goal 1 (OT)    Activity/Assistive Device (Transfer Goal 1, OT) transfers, all  -KR     [place] Level/Cues Needed (Transfer Goal 1, OT) minimum assist (75% or more patient effort)  -KR     Time Frame (Transfer Goal 1, OT) short term goal (STG);2 weeks  -KR     Progress/Outcome (Transfer Goal 1, OT) goal ongoing  -KR       Row Name 12/04/24 1225          Dressing Goal 1 (OT)    Activity/Device (Dressing Goal 1, OT) dressing skills, all  -KR     [place]/Cues Needed (Dressing Goal 1, OT) minimum assist (75% or more patient effort)  -KR     Time Frame (Dressing Goal 1, OT) short term goal (STG);2 weeks  -KR     Progress/Outcome (Dressing Goal 1, OT) goal ongoing  -KR       Row Name 12/04/24 1225          Toileting Goal 1 (OT)    Activity/Device (Toileting Goal 1, OT) toileting skills, all  -KR     [place] Level/Cues Needed (Toileting Goal 1, OT) minimum assist (75% or more patient effort)  -KR     Time Frame (Toileting Goal 1, OT) short term goal (STG);2 weeks  -KR     Progress/Outcome (Toileting Goal 1, OT) goal ongoing  -KR       Row Name 12/04/24 1225          Grooming Goal 1 (OT)    Activity/Device (Grooming Goal 1, OT) grooming skills, all  -KR      [place] (Grooming Goal 1, OT) standby assist  -KR     Time Frame (Grooming Goal 1, OT) short term goal (STG);2 weeks  -KR     Progress/Outcome (Grooming Goal 1, OT) goal ongoing  -KR       Row Name 12/04/24 1225          Therapy Assessment/Plan (OT)    Planned Therapy Interventions (OT) activity tolerance training;BADL retraining;patient/caregiver education/training;ROM/therapeutic exercise;functional balance retraining;transfer/mobility retraining;strengthening exercise;occupation/activity based interventions  -KR               User Key  (r) = Recorded By, (t) = Taken By, (c) = Cosigned By      Initials Name Provider Type    Rakel Meeks OT Occupational Therapist                   Clinical Impression       Row Name 12/04/24 1221          Pain Assessment    Pretreatment Pain Rating 0/10 - no pain  -KR     Posttreatment Pain Rating 0/10 - no pain  -KR       Row Name 12/04/24 1221          Plan of Care Review    Plan of Care Reviewed With patient  -KR     Outcome Evaluation Pt seen for OT eval this AM. Admitted with acute on chronic CHF with c/o of feeling SOA. Pt is a limited historian due to being very [place]. Pt states he has a caregiver M-F 8:00am-5:00pm and his granddaughter and her  are with him on the weekends. Today he performed bed mobility with CGA. He stood requiring min-mod Ax2 with use of rwx and took steps to bedside chair with mod Ax2. Pt forward flexed but also with a posterior lean with mobility requiring assist to correct. Able to self feed with set up seated in the chair. At this time pt would require max A for all toileting needs, donned socks with min A seated EOB. Would require more assist with donning pants due to balance in standing. Pt presents with decreased strength, endurance, activity tolerance, ADL performance and functional mobility. Pt to benefit from skilled OT to address deficits. Rec SNF at [place].  -KR       Row Name 12/04/24 1221          Therapy Assessment/Plan (OT)     Therapy Frequency (OT) 5 times/wk  -KR       Row Name 12/04/24 1221          Therapy Plan Review/Discharge Plan (OT)    Anticipated Discharge Disposition (OT) skilled nursing facility  -KR       Row Name 12/04/24 1221          Vital Signs    Pre Patient Position Supine  -KR     Intra Patient Position Standing  -KR     Post Patient Position Sitting  -KR       Row Name 12/04/24 1221          Positioning and Restraints    Pre-Treatment Position in bed  -KR     Post Treatment Position chair  -KR     In Chair notified nsg;sitting;call light within reach;encouraged to call for assist;exit alarm on  -KR               User Key  (r) = Recorded By, (t) = Taken By, (c) = Cosigned By      Initials Name Provider Type    Rakel Meeks, BLAS Occupational Therapist                   Outcome Measures       Row Name 12/04/24 1226          How much help from another is currently needed...    Putting on and taking off regular lower body clothing? 2  -KR     Bathing (including washing, rinsing, and drying) 2  -KR     Toileting (which includes using toilet bed pan or urinal) 2  -KR     Putting on and taking off regular upper body clothing 3  -KR     Taking care of personal grooming (such as brushing teeth) 3  -KR     Eating meals 3  -KR     AM-PAC 6 Clicks Score (OT) 15  -KR       Row Name 12/04/24 1121          How much help from another person do you currently need...    Turning from your back to your side while in flat bed without using bedrails? 4  -SM     Moving from lying on back to sitting on the side of a flat bed without bedrails? 3  -SM     Moving to and from a bed to a chair (including a wheelchair)? 2  -SM     Standing up from a chair using your arms (e.g., wheelchair, bedside chair)? 2  -SM     Climbing 3-5 steps with a railing? 1  -SM     To walk in hospital room? 2  -SM     AM-PAC 6 Clicks Score (PT) 14  -SM     Highest Level of Mobility Goal 4 --> Transfer to chair/commode  -SM       Row Name 12/04/24 1226 12/04/24  1121       Functional Assessment    Outcome Measure Options AM-PAC 6 Clicks Daily Activity (OT)  -JERRY AM-PAC 6 Clicks Basic Mobility (PT)  -MICHELLE              User Key  (r) = Recorded By, (t) = Taken By, (c) = Cosigned By      Initials Name Provider Type    Rakel Meeks OT Occupational Therapist    Sangeetha Jay PT Physical Therapist                    Occupational Therapy Education       Title: PT OT SLP Therapies (In Progress)       Topic: Occupational Therapy (Done)       Point: ADL training (Done)       Description:   Instruct learner(s) on proper safety adaptation and remediation techniques during self care or transfers.   Instruct in proper use of assistive devices.                  Learning Progress Summary            Patient Acceptance, E, DU,VU by JERRY at 12/4/2024 1227                      Point: Home exercise program (Done)       Description:   Instruct learner(s) on appropriate technique for monitoring, assisting and/or progressing therapeutic exercises/activities.                  Learning Progress Summary            Patient Acceptance, E, DU,VU by JERRY at 12/4/2024 1227                      Point: Precautions (Done)       Description:   Instruct learner(s) on prescribed precautions during self-care and functional transfers.                  Learning Progress Summary            Patient Acceptance, E, DU,VU by JERRY at 12/4/2024 1227                                      User Key       Initials Effective Dates Name Provider Type Discipline    JERRY 06/25/24 -  Rakel Au OT Occupational Therapist OT                  OT Recommendation and Plan  Planned Therapy Interventions (OT): activity tolerance training, BADL retraining, patient/caregiver education/training, ROM/therapeutic exercise, functional balance retraining, transfer/mobility retraining, strengthening exercise, occupation/activity based interventions  Therapy Frequency (OT): 5 times/wk  Plan of Care Review  Plan of Care Reviewed With:  patient  Outcome Evaluation: Pt seen for OT eval this AM. Admitted with acute on chronic CHF with c/o of feeling SOA. Pt is a limited historian due to being very [place]. Pt states he has a caregiver M-F 8:00am-5:00pm and his granddaughter and her  are with him on the weekends. Today he performed bed mobility with CGA. He stood requiring min-mod Ax2 with use of rwx and took steps to bedside chair with mod Ax2. Pt forward flexed but also with a posterior lean with mobility requiring assist to correct. Able to self feed with set up seated in the chair. At this time pt would require max A for all toileting needs, donned socks with min A seated EOB. Would require more assist with donning pants due to balance in standing. Pt presents with decreased strength, endurance, activity tolerance, ADL performance and functional mobility. Pt to benefit from skilled OT to address deficits. Rec SNF at [place].     Time Calculation:         Time Calculation- OT       Row Name 12/04/24 1227             Time Calculation- OT    OT Start Time 0836  -KR      OT Stop Time 0854  -KR      OT Time Calculation (min) 18 min  -KR      OT Received On 12/04/24  -KR      OT - Next Appointment 12/05/24  -KR      OT Goal Re-Cert Due Date 12/18/24  -KR         Timed Charges    43722 - OT Therapeutic Activity Minutes 8  -KR         Untimed Charges    OT Eval/Re-eval Minutes 10  -KR         Total Minutes    Timed Charges Total Minutes 8  -KR      Untimed Charges Total Minutes 10  -KR       Total Minutes 18  -KR                User Key  (r) = Recorded By, (t) = Taken By, (c) = Cosigned By      Initials Name Provider Type    KR Rakel uA OT Occupational Therapist                  Therapy Charges for Today       Code Description Service Date Service Provider Modifiers Qty    42002719470 HC OT THERAPEUTIC ACT EA 15 MIN 12/4/2024 Rakel Au OT GO 1    67276480266 HC OT EVAL MOD COMPLEXITY 3 12/4/2024 Rakel Au OT GO 1                 Rakel  Angely, OT  12/4/2024

## 2024-12-04 NOTE — PLAN OF CARE
Problem: Malnutrition  Goal: Improved Nutritional Intake  Outcome: Progressing   Goal Outcome Evaluation:         RD to follow

## 2024-12-05 ENCOUNTER — APPOINTMENT (OUTPATIENT)
Dept: CT IMAGING | Facility: HOSPITAL | Age: 89
DRG: 291 | End: 2024-12-05
Payer: MEDICARE

## 2024-12-05 LAB
ALBUMIN SERPL-MCNC: 3.1 G/DL (ref 3.5–5.2)
ANION GAP SERPL CALCULATED.3IONS-SCNC: 11.9 MMOL/L (ref 5–15)
BASOPHILS # BLD AUTO: 0.02 10*3/MM3 (ref 0–0.2)
BASOPHILS NFR BLD AUTO: 0.4 % (ref 0–1.5)
BUN SERPL-MCNC: 34 MG/DL (ref 8–23)
BUN/CREAT SERPL: 36.2 (ref 7–25)
CALCIUM SPEC-SCNC: 8.5 MG/DL (ref 8.2–9.6)
CHLORIDE SERPL-SCNC: 102 MMOL/L (ref 98–107)
CO2 SERPL-SCNC: 24.1 MMOL/L (ref 22–29)
CREAT SERPL-MCNC: 0.94 MG/DL (ref 0.76–1.27)
DEPRECATED RDW RBC AUTO: 56.1 FL (ref 37–54)
EGFRCR SERPLBLD CKD-EPI 2021: 74.2 ML/MIN/1.73
EOSINOPHIL # BLD AUTO: 0.12 10*3/MM3 (ref 0–0.4)
EOSINOPHIL NFR BLD AUTO: 2.5 % (ref 0.3–6.2)
ERYTHROCYTE [DISTWIDTH] IN BLOOD BY AUTOMATED COUNT: 14.7 % (ref 12.3–15.4)
GLUCOSE SERPL-MCNC: 91 MG/DL (ref 65–99)
HCT VFR BLD AUTO: 37.5 % (ref 37.5–51)
HGB BLD-MCNC: 12 G/DL (ref 13–17.7)
IMM GRANULOCYTES # BLD AUTO: 0.02 10*3/MM3 (ref 0–0.05)
IMM GRANULOCYTES NFR BLD AUTO: 0.4 % (ref 0–0.5)
LYMPHOCYTES # BLD AUTO: 1.03 10*3/MM3 (ref 0.7–3.1)
LYMPHOCYTES NFR BLD AUTO: 21.3 % (ref 19.6–45.3)
MAGNESIUM SERPL-MCNC: 2.1 MG/DL (ref 1.7–2.3)
MCH RBC QN AUTO: 33.5 PG (ref 26.6–33)
MCHC RBC AUTO-ENTMCNC: 32 G/DL (ref 31.5–35.7)
MCV RBC AUTO: 104.7 FL (ref 79–97)
MONOCYTES # BLD AUTO: 0.49 10*3/MM3 (ref 0.1–0.9)
MONOCYTES NFR BLD AUTO: 10.1 % (ref 5–12)
NEUTROPHILS NFR BLD AUTO: 3.16 10*3/MM3 (ref 1.7–7)
NEUTROPHILS NFR BLD AUTO: 65.3 % (ref 42.7–76)
PHOSPHATE SERPL-MCNC: 3.8 MG/DL (ref 2.5–4.5)
PLATELET # BLD AUTO: 110 10*3/MM3 (ref 140–450)
PMV BLD AUTO: 10.5 FL (ref 6–12)
POTASSIUM SERPL-SCNC: 4.3 MMOL/L (ref 3.5–5.2)
RBC # BLD AUTO: 3.58 10*6/MM3 (ref 4.14–5.8)
SODIUM SERPL-SCNC: 138 MMOL/L (ref 136–145)
URATE SERPL-MCNC: 6 MG/DL (ref 3.4–7)
WBC NRBC COR # BLD AUTO: 4.84 10*3/MM3 (ref 3.4–10.8)

## 2024-12-05 PROCEDURE — 97530 THERAPEUTIC ACTIVITIES: CPT

## 2024-12-05 PROCEDURE — 84550 ASSAY OF BLOOD/URIC ACID: CPT | Performed by: HOSPITALIST

## 2024-12-05 PROCEDURE — 85025 COMPLETE CBC W/AUTO DIFF WBC: CPT | Performed by: HOSPITALIST

## 2024-12-05 PROCEDURE — 25010000002 ENOXAPARIN PER 10 MG: Performed by: INTERNAL MEDICINE

## 2024-12-05 PROCEDURE — 83735 ASSAY OF MAGNESIUM: CPT | Performed by: HOSPITALIST

## 2024-12-05 PROCEDURE — 74176 CT ABD & PELVIS W/O CONTRAST: CPT

## 2024-12-05 PROCEDURE — 80069 RENAL FUNCTION PANEL: CPT | Performed by: HOSPITALIST

## 2024-12-05 RX ORDER — FUROSEMIDE 40 MG/1
40 TABLET ORAL DAILY
Status: DISCONTINUED | OUTPATIENT
Start: 2024-12-05 | End: 2024-12-06 | Stop reason: HOSPADM

## 2024-12-05 RX ADMIN — AMLODIPINE BESYLATE 5 MG: 5 TABLET ORAL at 08:52

## 2024-12-05 RX ADMIN — FUROSEMIDE 40 MG: 40 TABLET ORAL at 13:06

## 2024-12-05 RX ADMIN — ASPIRIN 81 MG: 81 TABLET, CHEWABLE ORAL at 08:51

## 2024-12-05 RX ADMIN — ACETAMINOPHEN 500 MG: 500 TABLET, FILM COATED ORAL at 20:55

## 2024-12-05 RX ADMIN — ENOXAPARIN SODIUM 40 MG: 100 INJECTION SUBCUTANEOUS at 18:20

## 2024-12-05 RX ADMIN — Medication 10 ML: at 08:53

## 2024-12-05 RX ADMIN — Medication 10 ML: at 20:55

## 2024-12-05 RX ADMIN — LOSARTAN POTASSIUM 50 MG: 50 TABLET, FILM COATED ORAL at 08:51

## 2024-12-05 RX ADMIN — ACETAMINOPHEN 500 MG: 500 TABLET, FILM COATED ORAL at 13:06

## 2024-12-05 RX ADMIN — ACETAMINOPHEN 500 MG: 500 TABLET, FILM COATED ORAL at 18:20

## 2024-12-05 RX ADMIN — CYANOCOBALAMIN TAB 500 MCG 1000 MCG: 500 TAB at 08:52

## 2024-12-05 RX ADMIN — ATORVASTATIN CALCIUM 20 MG: 20 TABLET, FILM COATED ORAL at 20:55

## 2024-12-05 RX ADMIN — METOPROLOL SUCCINATE 100 MG: 100 TABLET, EXTENDED RELEASE ORAL at 08:52

## 2024-12-05 RX ADMIN — Medication 1000 UNITS: at 08:51

## 2024-12-05 RX ADMIN — ISOSORBIDE MONONITRATE 60 MG: 30 TABLET, EXTENDED RELEASE ORAL at 08:52

## 2024-12-05 NOTE — PROGRESS NOTES
[facility]    [phone]   LOS: 2 days     Name: Dinesh Churchill Sr.  Age/Sex: 89 y.o. male  :  1928        PCP: Phyllis Loving PA  Chief Complaint   Patient presents with    Shortness of Breath    Cough      Subjective   Denies new issues or complaints this AM and overall feeling ok somewhat [place].  Asking about when he will get to go home.  General: No Fever or Chills, Cardiac: No Chest Pain or Palpitations, Resp: No Cough or SOA, GI: No Nausea, Vomiting, or Diarrhea, and Other: No bleeding    acetaminophen, 500 mg, Oral, Q6H  amLODIPine, 5 mg, Oral, Q24H  aspirin, 81 mg, Oral, Daily  atorvastatin, 20 mg, Oral, Nightly  cholecalciferol, 1,000 Units, Oral, Daily  enoxaparin, 40 mg, Subcutaneous, Q24H  furosemide, 40 mg, Oral, Daily  isosorbide mononitrate, 60 mg, Oral, Daily  losartan, 50 mg, Oral, Daily  metoprolol succinate XL, 100 mg, Oral, Daily  sodium chloride, 10 mL, Intravenous, Q12H  vitamin B-12, 1,000 mcg, Oral, Daily           Objective   Vital Signs  Temp:  [97.3 °F (36.3 °C)-97.5 °F (36.4 °C)] 97.5 °F (36.4 °C)  Heart Rate:  [55-73] 71  Resp:  [16] 16  BP: (116-124)/(42-57) 124/53  Body mass index is 16.92 kg/m².    Intake/Output Summary (Last 24 hours) at 2024 1449  Last data filed at 2024 0900  Gross per 24 hour   Intake 720 ml   Output 100 ml   Net 620 ml       Physical Exam  Vitals reviewed.   Constitutional:       General: He is not in acute distress.     Appearance: He is ill-appearing.   Cardiovascular:      Rate and Rhythm: Normal rate and regular rhythm.   Pulmonary:      Effort: No respiratory distress.      Breath sounds: Normal breath sounds.   Abdominal:      General: Bowel sounds are normal. There is no distension.      Palpations: Abdomen is soft.   Skin:     General: Skin is warm and dry.   Neurological:      Mental Status: He is alert.           Results Review:       I reviewed the patient's new clinical results.  Results from last 7 days   Lab  Units 12/05/24  0449 12/04/24  0508 12/03/24  1116   WBC 10*3/mm3 4.84 4.51 5.38   HEMOGLOBIN g/dL 12.0* 11.6* 11.8*   PLATELETS 10*3/mm3 110* 113* 129*     Results from last 7 days   Lab Units 12/05/24  0449 12/04/24  1816 12/04/24  0508 12/03/24  1116   SODIUM mmol/L 138  --  142 143   POTASSIUM mmol/L 4.3 4.9 3.5 4.2   CHLORIDE mmol/L 102  --  106 109*   CO2 mmol/L 24.1  --  28.4 24.0   BUN mg/dL 34*  --  24* 23   CREATININE mg/dL 0.94  --  0.77 0.68*   CALCIUM mg/dL 8.5  --  8.5 8.6   MAGNESIUM mg/dL 2.1  --   --   --    PHOSPHORUS mg/dL 3.8  --   --   --    Estimated Creatinine Clearance: 36.8 mL/min (by C-G formula based on SCr of 0.94 mg/dL).      Assessment & Plan   Active Hospital Problems    Diagnosis  POA    **Acute congestive heart failure [I50.9]  Yes    Severe protein-calorie malnutrition [E43]  Yes    Elevated troponin [R79.89]  Yes    Acute hypoxic respiratory failure [J96.01]  Yes    Idiopathic peripheral neuropathy [G60.9]  Yes    Long term (current) use of antithrombotics/antiplatelets [Z79.02]  Not Applicable    Thrombocytopenia [D69.6]  Yes    Paroxysmal atrial fibrillation [I48.0]  Yes    Parkinson's disease [G20.A1]  Yes    History of CVA (cerebrovascular accident) [Z86.73]  Not Applicable    Macrocytic anemia [D53.9]  Yes    Cardiomyopathy [I42.9]  Yes    Essential hypertension [I10]  Yes    Chronic fatigue [R53.82]  Yes      Resolved Hospital Problems   No resolved problems to display.       PLAN  89-year-old male who has complicated past medical history including history of Parkinson's disease, dementia, paroxysmal atrial fibrillation, history of osteoporosis and osteoarthritis and compression fracture involving T12 L2 and sacral vertebrae, hypertension, chronic kidney disease and history of congestive heart failure who presents to the hospital with increasing SOA and found with decompensated HF with hypoxia  -Transition to oral diuretics today  -Continue diuretics but edema is  multifactorial and a component of this is liekly malnutrition.    -He is losing weight and has complicated recent and past history overall prognosis is extremely guarded need to have further ACP discussion with patients family      Disposition  Expected Discharge Date: 12/6/2024; Expected Discharge Time:        Eric Mcqueen MD  [facility]  12/05/24  14:49 EST    Discussed with his granddaughter on the phone today regarding his advance care planning and goals of care moving forward.  He is already a DNR but I think we need to plan for what we can do to make home a more successful plan.  Their ultimate goal is for him to be at home.  They do not want him to be in the hospital.  His current palliative performance scale places him at a PPS of 40%.  His palliative prognostic index is measured at 4.  Both of these would place his prognosis long-term at weeks to months.  I think he is probably more in the months category.  The big question at this point is what we want the final months to look like.  They are agreeable with discussions with hospice regarding home-based services.  The ultimate goal being to keep him out of the hospital and maintain his quality life as long as possible.  Hospice consult has been placed but they can meet with him at home after discharge.  Total of 17 minutes was spent in discussion with the patient's granddaughter review of the chart and discussion with subspecialists  Electronically signed by Eric Mcqueen MD, 12/05/24, 4:18 PM EST.

## 2024-12-05 NOTE — PLAN OF CARE
Goal Outcome Evaluation:  Plan of Care Reviewed With: patient           Outcome Evaluation: Patient seen for PT session this AM. Patient supine in bed upon arrival. Patient sat up to EOB with CGA. Patient performed multiple repetitions of STS this date from EOB with modAx2. Patient with strong posterior lean. Patient able to take a few shuffled steps from bed to chair with modA-maxAx2 and support or rwx. Strong posterior lean remained during steps. Patient performed B LE exercises while seated at EOB. Patient would continue to benefit from skilled PT intervention to address deficits. PT will continue to monitor.    Anticipated Discharge Disposition (PT): skilled nursing facility

## 2024-12-05 NOTE — THERAPY TREATMENT NOTE
Patient Name: Dinesh Churchill Sr.  : 1928    MRN: [id number]                              Today's Date: 2024       Admit Date: 12/3/2024    Visit Dx:     ICD-10-CM ICD-9-CM   1. Acute congestive heart failure, unspecified heart failure type  I50.9 428.0     Patient Active Problem List   Diagnosis    Abnormal magnetic resonance imaging study    Arthritis    Osteoarthritis of cervical spine    Diplopia    Hearing loss    Neoplasm of uncertain behavior of skin    Prediabetes    Vitamin D deficiency    Chronic fatigue    History of supraventricular tachycardia    Essential hypertension    B12 deficiency    Abnormal cardiac function test    Cardiomyopathy    Coronary artery disease involving native coronary artery of native heart without angina pectoris    History of coronary artery stent placement    History of renal calculi    Dyslipidemia    Macrocytic anemia    Precordial pain    Parkinson's disease    History of CVA (cerebrovascular accident)    Paroxysmal atrial fibrillation    Otalgia, left    Chest pain    Abnormal stress test    Spontaneous pneumothorax    Chest pain    Acute systolic (congestive) heart failure    Thrombocytopenia    Pneumonia of left lower lobe due to infectious organism    Syncope and collapse    Status post placement of implantable loop recorder    Long term (current) use of antithrombotics/antiplatelets    Stuttering    Fall    Closed fracture of multiple ribs of right side    Chest wall pain    Closed fracture of phalanx of toe of left foot    Headache    Sudden onset of severe headache    Fall in home, initial encounter    Compression fracture of L2 vertebra with routine healing    Weakness of both legs    Idiopathic peripheral neuropathy    L2 vertebral fracture    Compression fracture of T12 vertebra with routine healing    Sacral fracture, closed    Senile osteoporosis    Acute hypoxic respiratory failure    Acute congestive heart failure    Elevated troponin    Severe  protein-calorie malnutrition     Past Medical History:   Diagnosis Date    Abnormal ECG     Abnormal MRI     Acute congestive heart failure 12/3/2024    Acute frontal sinusitis     Arthritis     Chronic fatigue     Coronary artery disease     Difficulty walking     Dizziness     Head injury     Headache 01/21/2023    Hearing aid worn     left    Hearing loss     Hyperlipidemia     Hypertension     Idiopathic peripheral neuropathy 08/29/2023    Kidney stones     Macrocytosis     Neoplasm of skin     Osteoporosis     Prediabetes     Vitamin D deficiency      Past Surgical History:   Procedure Laterality Date    CARDIAC CATHETERIZATION N/A 05/22/2017    Procedure: Left Heart Cath;  Surgeon: Maninder Quezada MD;  Location: [facility] CATH INVASIVE LOCATION;  Service:     CARDIAC CATHETERIZATION N/A 05/23/2017    Procedure: Stent BMS coronary;  Surgeon: Maninder Quezada MD;  Location: [facility] CATH INVASIVE LOCATION;  Service:     CARDIAC CATHETERIZATION N/A 08/08/2019    Procedure: Left Heart Cath;  Surgeon: Maninder Quezada MD;  Location: [facility] CATH INVASIVE LOCATION;  Service: Cardiology    CARDIAC CATHETERIZATION N/A 08/08/2019    Procedure: Left ventriculography;  Surgeon: Maninder Quezada MD;  Location: [facility] CATH INVASIVE LOCATION;  Service: Cardiology    CARDIAC CATHETERIZATION N/A 08/08/2019    Procedure: Coronary angiography;  Surgeon: Maninder Quezada MD;  Location: [facility] CATH INVASIVE LOCATION;  Service: Cardiology    CARDIAC CATHETERIZATION N/A 08/20/2019    Procedure: CORONARY ANGIOGRAPHY;  Surgeon: Maninder Quezada MD;  Location: [facility] CATH INVASIVE LOCATION;  Service: Cardiovascular    CARDIAC CATHETERIZATION N/A 08/20/2019    Procedure: Stent BMS coronary;  Surgeon: Maninder Quezada MD;  Location: [facility] CATH INVASIVE LOCATION;  Service: Cardiovascular    CARDIAC ELECTROPHYSIOLOGY PROCEDURE N/A 11/08/2019    Procedure: Loop insertion;  Surgeon: Maninder Quezada  MD;  Location:  [facility] CATH INVASIVE LOCATION;  Service: Cardiovascular    HERNIA REPAIR      x 2    VT RT/LT HEART CATHETERS N/A 05/23/2017    Procedure: Percutaneous Coronary Intervention;  Surgeon: Maninder Quezada MD;  Location:  [facility] CATH INVASIVE LOCATION;  Service: Cardiovascular      General Information       Row Name 12/05/24 0956          Physical Therapy Time and Intention    Document Type evaluation  -     Mode of Treatment individual therapy;physical therapy  -       Row Name 12/05/24 0956          General Information    Patient Profile Reviewed yes  -       Row Name 12/05/24 0956          Cognition    Orientation Status (Cognition) oriented x 3  -       Row Name 12/05/24 0956          Safety Issues/Impairments Affecting Functional Mobility    Impairments Affecting Function (Mobility) balance;endurance/activity tolerance;strength;postural/trunk control  -               User Key  (r) = Recorded By, (t) = Taken By, (c) = Cosigned By      Initials Name Provider Type     Sangeetha Mederos PT Physical Therapist                   Mobility       Row Name 12/05/24 0956          Bed Mobility    Bed Mobility supine-sit  -     Supine-Sit [place] (Bed Mobility) contact guard  -     Assistive Device (Bed Mobility) head of bed elevated;bed rails  -       Row Name 12/05/24 0956          Sit-Stand Transfer    Sit-Stand [place] (Transfers) moderate assist (50% patient effort);2 person assist  -     Assistive Device (Sit-Stand Transfers) walker, front-wheeled  -     Comment, (Sit-Stand Transfer) 3x from EOB  -       Row Name 12/05/24 0956          Gait/Stairs (Locomotion)    [place] Level (Gait) moderate assist (50% patient effort);maximum assist (25% patient effort);2 person assist;verbal cues  -     Assistive Device (Gait) walker, front-wheeled  -     Distance in Feet (Gait) 3  -SM     Deviations/Abnormal Patterns (Gait) gait speed decreased;naldo decreased  -      Comment, (Gait/Stairs) Strong posterior lean when taking steps  -               User Key  (r) = Recorded By, (t) = Taken By, (c) = Cosigned By      Initials Name Provider Type    Sangeetha Jay PT Physical Therapist                   Obj/Interventions       Row Name 12/05/24 0957          Motor Skills    Therapeutic Exercise other (see comments)  LAQs, seated marches, overhead press  -       Row Name 12/05/24 0957          Balance    Balance Assessment sitting static balance;sitting dynamic balance;standing static balance;standing dynamic balance  -     Static Sitting Balance standby assist  -     Dynamic Sitting Balance contact guard  -     Position, Sitting Balance sitting edge of bed  -     Static Standing Balance moderate assist;2-person assist  -     Dynamic Standing Balance maximum assist;moderate assist;2-person assist;verbal cues  -     Position/Device Used, Standing Balance supported;walker, front-wheeled  -     Balance Interventions sitting;standing;sit to stand;supported;static;dynamic  -               User Key  (r) = Recorded By, (t) = Taken By, (c) = Cosigned By      Initials Name Provider Type     Sangeetha Mederos PT Physical Therapist                   Goals/Plan    No documentation.                  Clinical Impression       Row Name 12/05/24 0958          Pain    Pretreatment Pain Rating 0/10 - no pain  -     Posttreatment Pain Rating 0/10 - no pain  -       Row Name 12/05/24 0958          Plan of Care Review    Plan of Care Reviewed With patient  -     Outcome Evaluation Patient seen for PT session this AM. Patient supine in bed upon arrival. Patient sat up to EOB with CGA. Patient performed multiple repetitions of STS this date from EOB with modAx2. Patient with strong posterior lean. Patient able to take a few shuffled steps from bed to chair with modA-maxAx2 and support or rwx. Strong posterior lean remained during steps. Patient performed B LE exercises  while seated at EOB. Patient would continue to benefit from skilled PT intervention to address deficits. PT will continue to monitor.  -       Row Name 12/05/24 0958          Vital Signs    Pre Patient Position Supine  -     Intra Patient Position Standing  -     Post Patient Position Sitting  -       Row Name 12/05/24 0958          Positioning and Restraints    Pre-Treatment Position in bed  -SM     Post Treatment Position chair  -SM     In Chair notified nsg;reclined;call light within reach;encouraged to call for assist;exit alarm on  -               User Key  (r) = Recorded By, (t) = Taken By, (c) = Cosigned By      Initials Name Provider Type     Sangeetha Mederos PT Physical Therapist                   Outcome Measures       Row Name 12/05/24 1000          How much help from another person do you currently need...    Turning from your back to your side while in flat bed without using bedrails? 3  -SM     Moving from lying on back to sitting on the side of a flat bed without bedrails? 3  -SM     Moving to and from a bed to a chair (including a wheelchair)? 2  -SM     Standing up from a chair using your arms (e.g., wheelchair, bedside chair)? 2  -SM     Climbing 3-5 steps with a railing? 1  -SM     To walk in hospital room? 2  -SM     AM-PAC 6 Clicks Score (PT) 13  -SM     Highest Level of Mobility Goal 4 --> Transfer to chair/commode  -       Row Name 12/05/24 1000          Functional Assessment    Outcome Measure Options AM-PAC 6 Clicks Basic Mobility (PT)  -               User Key  (r) = Recorded By, (t) = Taken By, (c) = Cosigned By      Initials Name Provider Type     Sangeetha Mederos PT Physical Therapist                                 Physical Therapy Education       Title: PT OT SLP Therapies (Done)       Topic: Physical Therapy (Done)       Point: Mobility training (Done)       Learning Progress Summary            Patient Acceptance, E, VU,NR by  at 12/5/2024 1000    Acceptance,  E, NR by  at 12/4/2024 1121                      Point: Home exercise program (Done)       Learning Progress Summary            Patient Acceptance, E, VU,NR by  at 12/5/2024 1000    Acceptance, E, NR by  at 12/4/2024 1121                      Point: Body mechanics (Done)       Learning Progress Summary            Patient Acceptance, E, VU,NR by  at 12/5/2024 1000    Acceptance, E, NR by  at 12/4/2024 1121                      Point: Precautions (Done)       Learning Progress Summary            Patient Acceptance, E, VU,NR by  at 12/5/2024 1000    Acceptance, E, NR by  at 12/4/2024 1121                                      User Key       Initials Effective Dates Name Provider Type Discipline     05/02/22 -  Sangeetha Mederos, PT Physical Therapist PT                  PT Recommendation and Plan     Outcome Evaluation: Patient seen for PT session this AM. Patient supine in bed upon arrival. Patient sat up to EOB with CGA. Patient performed multiple repetitions of STS this date from EOB with modAx2. Patient with strong posterior lean. Patient able to take a few shuffled steps from bed to chair with modA-maxAx2 and support or rwx. Strong posterior lean remained during steps. Patient performed B LE exercises while seated at EOB. Patient would continue to benefit from skilled PT intervention to address deficits. PT will continue to monitor.     Time Calculation:         PT Charges       Row Name 12/05/24 1001             Time Calculation    Start Time 0833  -      Stop Time 0849  -      Time Calculation (min) 16 min  -      PT Received On 12/05/24  -      PT - Next Appointment 12/06/24  -         Time Calculation- PT    Total Timed Code Minutes- PT 16 minute(s)  -         Timed Charges    21282 - PT Therapeutic Exercise Minutes 4  -SM      40555 - PT Therapeutic Activity Minutes 12  -SM         Total Minutes    Timed Charges Total Minutes 16  -SM       Total Minutes 16  -SM                User  Key  (r) = Recorded By, (t) = Taken By, (c) = Cosigned By      Initials Name Provider Type     Sangeetha Mederos, GIANFRANCO Physical Therapist                  Therapy Charges for Today       Code Description Service Date Service Provider Modifiers Qty    27241961695 HC PT THERAPEUTIC ACT EA 15 MIN 12/4/2024 Sangeetha Mederos, PT GP 1    25051478480 HC PT EVAL MOD COMPLEXITY 3 12/4/2024 Sangeetha Mederos, PT GP 1    09709855853 HC PT THERAPEUTIC ACT EA 15 MIN 12/5/2024 Sangeetha Mederos, PT GP 1    36543409038 HC PT THER SUPP EA 15 MIN 12/5/2024 Sangeetha Mederos, PT GP 1            PT G-Codes  Outcome Measure Options: AM-PAC 6 Clicks Basic Mobility (PT)  AM-PAC 6 Clicks Score (PT): 13  AM-PAC 6 Clicks Score (OT): 15  PT Discharge Summary  Anticipated Discharge Disposition (PT): skilled nursing facility    Sangeetha Mederos PT  12/5/2024

## 2024-12-05 NOTE — PLAN OF CARE
Goal Outcome Evaluation:  Plan of Care Reviewed With: patient        Progress: no change  Outcome Evaluation: Forgetful and very [place] (even with Harsh[place] hearing aids in place).  Bed alarm on for safety.  Voids per urinal and also INC at times.  Very weak and unsteady when getting back to bed from chair.

## 2024-12-06 ENCOUNTER — READMISSION MANAGEMENT (OUTPATIENT)
Dept: CALL CENTER | Facility: HOSPITAL | Age: 89
End: 2024-12-06
Payer: MEDICARE

## 2024-12-06 VITALS
RESPIRATION RATE: 16 BRPM | DIASTOLIC BLOOD PRESSURE: 54 MMHG | HEART RATE: 84 BPM | BODY MASS INDEX: 18.81 KG/M2 | TEMPERATURE: 97.7 F | SYSTOLIC BLOOD PRESSURE: 119 MMHG | OXYGEN SATURATION: 92 % | WEIGHT: 138.89 LBS | HEIGHT: 72 IN

## 2024-12-06 RX ORDER — FUROSEMIDE 40 MG/1
40 TABLET ORAL DAILY
Qty: 30 TABLET | Refills: 0 | Status: SHIPPED | OUTPATIENT
Start: 2024-12-07

## 2024-12-06 RX ADMIN — ACETAMINOPHEN 500 MG: 500 TABLET, FILM COATED ORAL at 11:21

## 2024-12-06 RX ADMIN — CYANOCOBALAMIN TAB 500 MCG 1000 MCG: 500 TAB at 09:35

## 2024-12-06 RX ADMIN — AMLODIPINE BESYLATE 5 MG: 5 TABLET ORAL at 09:35

## 2024-12-06 RX ADMIN — LOSARTAN POTASSIUM 50 MG: 50 TABLET, FILM COATED ORAL at 09:35

## 2024-12-06 RX ADMIN — Medication 1000 UNITS: at 09:35

## 2024-12-06 RX ADMIN — ACETAMINOPHEN 500 MG: 500 TABLET, FILM COATED ORAL at 06:14

## 2024-12-06 RX ADMIN — ASPIRIN 81 MG: 81 TABLET, CHEWABLE ORAL at 09:35

## 2024-12-06 RX ADMIN — METOPROLOL SUCCINATE 100 MG: 100 TABLET, EXTENDED RELEASE ORAL at 09:35

## 2024-12-06 RX ADMIN — ISOSORBIDE MONONITRATE 60 MG: 30 TABLET, EXTENDED RELEASE ORAL at 09:35

## 2024-12-06 RX ADMIN — FUROSEMIDE 40 MG: 40 TABLET ORAL at 09:35

## 2024-12-06 NOTE — DISCHARGE SUMMARY
Patient Name: Dinesh Churchill Sr.  : 1928  MRN: [id number]    Date of Admission: 12/3/2024  Date of Discharge:  2024  Primary Care Physician: Phyllis Loving PA      Chief Complaint:   Shortness of Breath and Cough      Discharge Diagnoses     Active Hospital Problems    Diagnosis  POA    **Acute congestive heart failure [I50.9]  Yes    Severe protein-calorie malnutrition [E43]  Yes    Elevated troponin [R79.89]  Yes    Acute hypoxic respiratory failure [J96.01]  Yes    Idiopathic peripheral neuropathy [G60.9]  Yes    Long term (current) use of antithrombotics/antiplatelets [Z79.02]  Not Applicable    Thrombocytopenia [D69.6]  Yes    Paroxysmal atrial fibrillation [I48.0]  Yes    Parkinson's disease [G20.A1]  Yes    History of CVA (cerebrovascular accident) [Z86.73]  Not Applicable    Macrocytic anemia [D53.9]  Yes    Cardiomyopathy [I42.9]  Yes    Essential hypertension [I10]  Yes    Chronic fatigue [R53.82]  Yes      Resolved Hospital Problems   No resolved problems to display.        Hospital Course     Mr. Churchill is a 89 y.o. male with a history of Parkinson's disease, dementia, paroxysmal A-fib, osteoporosis osteoarthritis and recent compression fractures as well as other complicated past medical history who presented to [facility] initially complaining of shortness of breath.  Please see the admitting history and physical for further details.  He was found to have decompensated heart failure and was admitted to the hospital for further evaluation and treatment.  He was admitted to the hospital and diuresed aggressively and transition to oral Lasix at a higher dose.  His care was discussed with his family.  Ultimately they want him to get him home.  Their goal is to keep him at home and we discussed hospice care at home to provide additional assistance.      Day of Discharge     Subjective:  He rested well overnight.  Family is present at the bedside and eager to get him  home    Physical Exam:  Temp:  [97.2 °F (36.2 °C)-97.7 °F (36.5 °C)] 97.7 °F (36.5 °C)  Heart Rate:  [67-84] 84  Resp:  [14-16] 16  BP: (105-125)/(42-54) 119/54  Body mass index is 18.83 kg/m².  Physical Exam  Vitals reviewed.   Constitutional:       General: He is not in acute distress.     Appearance: He is ill-appearing.   Cardiovascular:      Rate and Rhythm: Normal rate and regular rhythm.   Pulmonary:      Effort: Pulmonary effort is normal. No respiratory distress.   Abdominal:      General: Bowel sounds are normal. There is no distension.      Palpations: Abdomen is soft.   Neurological:      Mental Status: He is alert. Mental status is at baseline.      Comments: Frail elderly hard of hearing         Consultants     Consult Orders (all) (From admission, onward)       Start     Ordered    12/05/24 1450  Inpatient Hospice / [facility] Consult  Once        Specialty:  Hospice and Palliative Medicine  Provider:  (Not yet assigned)    12/05/24 1449    12/03/24 1428  Inpatient Case Management  Consult  Once        Provider:  (Not yet assigned)    12/03/24 1427    12/03/24 1213  [facility] (on-call MD unless specified) Details  Once        Specialty:  Hospitalist  Provider:  (Not yet assigned)    12/03/24 1212                  Procedures     * Surgery not found *    Imaging Results (All)       Procedure Component Value Units Date/Time    CT Abdomen Pelvis Without Contrast [[id number]] Collected: 12/05/24 1314     Updated: 12/05/24 1329    Narrative:      CT ABDOMEN PELVIS WO CONTRAST-     INDICATION: Right lower quadrant pain     COMPARISON: CT abdomen pelvis January 19, 2022     TECHNIQUE:  Routine CT abdomen and pelvis without IV contrast. Coronal and sagittal  reformats. Radiation dose reduction techniques were utilized, including  automated exposure control and exposure modulation based on body size.     FINDINGS:      Lung bases: Mild right pleural effusion. Small left pleural effusion.  Partial  atelectasis in the basilar right lower lobe. Subsegmental  atelectasis at the left lung base. Airways disease at the bases.  Coronary artery atherosclerotic calcifications. Moderate cardiomegaly.     ABDOMEN: Hyperattenuating liver. Normal gallbladder. No biliary ductal  dilatation. Spleen is normal in size. Splenic capsule capsular  calcifications, unchanged. Moderate pancreatic atrophy. No pancreatic  ductal dilatation or mass identified. Mild thickening of the adrenal  glands. Bilateral fluid attenuating renal cysts. Suspect vascular  calcifications in the kidneys. No hydronephrosis.     Pelvis: Prostatomegaly with the prostate measuring 5.8 cm in right to  left dimension. Trabeculated bladder with multiple small and large  bladder diverticula. Multiple bladder calculi including calculi within  bladder diverticulum.     Bowel: No obstruction. Large amount of gas and stool seen in the rectum,  with distention. Colonic diverticulosis. Normal appendix.     Abdominal wall: Rectus diastases. Mesh repair of left inguinal hernia.     Retroperitoneum: No lymphadenopathy.     Vasculature: Moderate aortoiliac atherosclerotic calcification. No  abdominal aortic aneurysm.     Osseous structures: Decreased bone mineralization. Old right-sided rib  fractures. Old left-sided rib fractures. Old superior endplate  compression deformity at T12, with 50% collapse. Old superior endplate  compression deformity at L2, with 60% collapse, with a large superior  plate Schmorl's node.       Impression:         1. Mild right and small left pleural effusions.  2. Moderate cardiomegaly.  3. Hyperattenuating liver may be secondary to iron deposition or  amiodarone use. Finding can be correlated with iron studies.  4. Trabeculated bladder with multiple bladder diverticuli and multiple  bladder calculi.  5. Prostatomegaly.  6. Large amount of gas and stool in the rectum with distention.  7. Colonic diverticulosis.  8. Adrenal gland  hyperplasia.  9. Bone demineralization with multiple old appearing fractures.     This report was finalized on 12/5/2024 1:26 PM by Dr. Carter Sanches M.D on Workstation: [id number]       XR Chest 1 View [[id number]] Collected: 12/03/24 1204     Updated: 12/03/24 1210    Narrative:      XR CHEST 1 VW-        INDICATION: Congestive heart failure/COPD. Shortness of air. Cough.     COMPARISON: Chest radiograph October 28, 2024     TECHNIQUE: 1 view chest     FINDINGS:      Vascular congestion. Basilar lung opacities. Blunting costophrenic  angles. Stable mediastinum. Enlarged cardiac silhouette. Left chest wall  implantable loop recorder.       Impression:         1. Cardiomegaly with vascular congestion.  2. Bibasilar lung opacities with possible bilateral lower lobe  atelectasis or consolidation.  3. Mild to moderate layering pleural effusions, increased on the right     This report was finalized on 12/3/2024 12:07 PM by Dr. Carter Sanches M.D on Workstation: [id number]             Results for orders placed during the hospital encounter of 04/23/21    Duplex Carotid Ultrasound CAR    Interpretation Summary  · Proximal right internal carotid artery mild stenosis.  · Proximal left internal carotid artery plaque without significant stenosis.    Results for orders placed during the hospital encounter of 10/28/24    Adult Transthoracic Echo Complete W/ Cont if Necessary Per Protocol    Interpretation Summary    Left ventricular ejection fraction appears to be 41 - 45%.    The left ventricular cavity is moderately dilated.    The following left ventricular wall segments are hypokinetic: apical lateral, mid inferolateral, mid inferior, mid inferoseptal and mid anteroseptal.    Left ventricular diastolic function was indeterminate.    The left atrial cavity is moderate to severely dilated.    There is calcification of the aortic valve.    Moderate to severe mitral valve regurgitation is present.    Estimated right  "ventricular systolic pressure from tricuspid regurgitation is normal (<35 mmHg).    Mild dilation of the aortic root is present. Mild dilation of the sinuses of Valsalva is present.    Pertinent Labs     Results from last 7 days   Lab Units 12/05/24 0449 12/04/24  0508 12/03/24  1116   WBC 10*3/mm3 4.84 4.51 5.38   HEMOGLOBIN g/dL 12.0* 11.6* 11.8*   PLATELETS 10*3/mm3 110* 113* 129*     Results from last 7 days   Lab Units 12/05/24  0449 12/04/24  1816 12/04/24  0508 12/03/24  1116   SODIUM mmol/L 138  --  142 143   POTASSIUM mmol/L 4.3 4.9 3.5 4.2   CHLORIDE mmol/L 102  --  106 109*   CO2 mmol/L 24.1  --  28.4 24.0   BUN mg/dL 34*  --  24* 23   CREATININE mg/dL 0.94  --  0.77 0.68*   GLUCOSE mg/dL 91  --  86 112*   EGFR mL/min/1.73 74.2  --  81.9 85.1     Results from last 7 days   Lab Units 12/05/24 0449 12/04/24  0508 12/03/24  1116   ALBUMIN g/dL 3.1* 3.3* 3.3*   BILIRUBIN mg/dL  --  1.2 1.2   ALK PHOS U/L  --  53 54   AST (SGOT) U/L  --  11 14   ALT (SGPT) U/L  --  6 9     Results from last 7 days   Lab Units 12/05/24 0449 12/04/24  0508 12/03/24  1116   CALCIUM mg/dL 8.5 8.5 8.6   ALBUMIN g/dL 3.1* 3.3* 3.3*   MAGNESIUM mg/dL 2.1  --   --    PHOSPHORUS mg/dL 3.8  --   --        Results from last 7 days   Lab Units 12/04/24  0508 12/03/24  1341 12/03/24  1116   HSTROP T ng/L 63* 60* 57*   PROBNP pg/mL  --   --  17,076.0*     Results from last 7 days   Lab Units 12/05/24  0449   URIC ACID mg/dL 6.0         Invalid input(s): \"LDLCALC\"      Results from last 7 days   Lab Units 12/03/24  1134   COVID19  Not Detected       Test Results Pending at Discharge     Pending Results       None              Discharge Details        Discharge Medications        Changes to Medications        Instructions Start Date   B-12 1000 MCG lozenge  What changed: See the new instructions.   PLACE 1 LOZENGE UNDER THE TOUNGE EVERY OTHER DAY      furosemide 40 MG tablet  Commonly known as: LASIX  What changed:   medication " strength  how much to take   40 mg, Oral, Daily   Start Date: December 7, 2024            Continue These Medications        Instructions Start Date   acetaminophen 500 MG tablet  Commonly known as: TYLENOL   500 mg, Oral, 4 Times Daily      amLODIPine 5 MG tablet  Commonly known as: NORVASC   5 mg, Oral, Every 24 Hours Scheduled      aspirin 81 MG chewable tablet   81 mg, Oral, Daily      atorvastatin 20 MG tablet  Commonly known as: LIPITOR   20 mg, Oral, Nightly      isosorbide mononitrate 60 MG 24 hr tablet  Commonly known as: IMDUR   60 mg, Daily      losartan 50 MG tablet  Commonly known as: COZAAR   50 mg, Oral, Daily      metoprolol succinate  MG 24 hr tablet  Commonly known as: TOPROL-XL   100 mg, Oral, Daily      Vitamin D-3 25 MCG (1000 UT) capsule   1,000 Units, Daily               No Known Allergies    Discharge Disposition:  Home or Self Care      Discharge Diet:  Diet Order   Procedures    Diet: Cardiac; Healthy Heart (2-3 Na+); Texture: Soft to Chew (NDD 3); Soft to Chew: Chopped Meat; Fluid Consistency: Thin (IDDSI 0)       Discharge Activity:   Activity Instructions       Activity as Tolerated              CODE STATUS:    Code Status and Medical Interventions: No CPR (Do Not Attempt to Resuscitate); Limited Support; No intubation (DNI)   Ordered at: 12/03/24 1430     Medical Intervention Limits:    No intubation (DNI)     Code Status (Patient has no pulse and is not breathing):    No CPR (Do Not Attempt to Resuscitate)     Medical Interventions (Patient has pulse or is breathing):    Limited Support       Future Appointments   Date Time Provider Department Center   12/10/2024  3:00 PM Phyllis Loving PA [facility]   12/19/2024 11:45 AM Maninder Quezada MD [facility]   3/3/2025 12:30 PM Rene Ordaz MD [facility]     Additional Instructions for the Follow-ups that You Need to Schedule       Ambulatory Referral to Home Hospice   As directed      Please evaluate  Dinesh Churchill Sr. for admission to Hospice.    Patient/Family aware of referral: yes    Services to provide: Pain and Symptom management    Physician to follow patient's care (the person listed here will be responsible for signing ongoing orders): Referring Provider    Referring Provider Call-back Phone Number: [phone]    Requested Start of Care Date: Within 2-3 days    Special Instructions: none    Order Comments: Please evaluate Dinesh Churchill Sr. for admission to Hospice.  Patient/Family aware of referral: yes  Services to provide: Pain and Symptom management  Physician to follow patient's care (the person listed here will be responsible for signing ongoing orders): Referring Provider  Referring Provider Call-back Phone Number: [phone]  Requested Start of Care Date: Within 2-3 days  Special Instructions: none         Discharge Follow-up with PCP   As directed       Currently Documented PCP:    Phyllis Loving PA    PCP Phone Number:    [phone]     Follow Up Details: 2-3 weeks               Follow-up Information       Phyllis Loving PA .    Specialty: Family Medicine  Why: 2-3 weeks  Contact information:  [address] [place], [place] [id number]  [phone]                             Additional Instructions for the Follow-ups that You Need to Schedule       Ambulatory Referral to Home Hospice   As directed      Please evaluate Dinesh Churchill Sr. for admission to Hospice.    Patient/Family aware of referral: yes    Services to provide: Pain and Symptom management    Physician to follow patient's care (the person listed here will be responsible for signing ongoing orders): Referring Provider    Referring Provider Call-back Phone Number: [phone]    Requested Start of Care Date: Within 2-3 days    Special Instructions: none    Order Comments: Please evaluate Dinesh Churchill Sr. for admission to Hospice.  Patient/Family aware of referral: yes  Services to provide: Pain and Symptom management  Physician to  follow patient's care (the person listed here will be responsible for signing ongoing orders): Referring Provider  Referring Provider Call-back Phone Number: [phone]  Requested Start of Care Date: Within 2-3 days  Special Instructions: none         Discharge Follow-up with PCP   As directed       Currently Documented PCP:    Phyllis Loving PA    PCP Phone Number:    [phone]     Follow Up Details: 2-3 weeks            Time Spent on Discharge:  Greater than 30 minutes      Eric Mcqueen MD  [facility] [place] [facility]  12/06/24  10:41 EST

## 2024-12-06 NOTE — PROGRESS NOTES
Nutrition Services    Patient Name:  Dinesh Churchill Sr.  YOB: 1928  MRN: [id number]  Admit Date:  12/3/2024    Nutrition Services    Patient Name: Dinesh Churchill Sr.  YOB: 1928  MRN: [id number]  Admission date: 12/3/2024    PROGRESS NOTE      Encounter Information: Follow up       PO Diet: Diet: Cardiac; Healthy Heart (2-3 Na+); Texture: Soft to Chew (NDD 3); Soft to Chew: Chopped Meat; Fluid Consistency: Thin (IDDSI 0)   PO Supplements: Boost original TID   PO Intake:  Mostly %       Current nutrition support: --   Nutrition support review: --       Labs (reviewed below): Reviewed. Management per attending.         GI Function:  WDL.       Nutrition Intervention Updates: - Pt eating mostly %   - Continue boost TID with meals       Results from last 7 days   Lab Units 12/05/24  0449 12/04/24  1816 12/04/24  0508 12/03/24  1116   SODIUM mmol/L 138  --  142 143   POTASSIUM mmol/L 4.3 4.9 3.5 4.2   CHLORIDE mmol/L 102  --  106 109*   CO2 mmol/L 24.1  --  28.4 24.0   BUN mg/dL 34*  --  24* 23   CREATININE mg/dL 0.94  --  0.77 0.68*   CALCIUM mg/dL 8.5  --  8.5 8.6   BILIRUBIN mg/dL  --   --  1.2 1.2   ALK PHOS U/L  --   --  53 54   ALT (SGPT) U/L  --   --  6 9   AST (SGOT) U/L  --   --  11 14   GLUCOSE mg/dL 91  --  86 112*     Results from last 7 days   Lab Units 12/05/24  0449   MAGNESIUM mg/dL 2.1   PHOSPHORUS mg/dL 3.8   HEMOGLOBIN g/dL 12.0*   HEMATOCRIT % 37.5     COVID19   Date Value Ref Range Status   12/03/2024 Not Detected Not Detected - Ref. Range Final     Lab Results   Component Value Date    HGBA1C 5.60 10/29/2024       Wt Readings from Last 10 Encounters:   12/06/24 0500 63 kg (138 lb 14.2 oz)   12/05/24 1046 56.6 kg (124 lb 12.5 oz)   12/04/24 1230 56.7 kg (125 lb)   12/03/24 1101 61.2 kg (134 lb 14.7 oz)   11/20/24 1054 61.2 kg (135 lb)   10/31/24 0510 58.1 kg (128 lb 1.4 oz)   10/30/24 0351 58.4 kg (128 lb 12.8 oz)   10/29/24 0510 58.1 kg (128 lb 1.4 oz)    10/28/24 1022 63.9 kg (140 lb 14 oz)   06/12/24 1445 63.9 kg (140 lb 14 oz)   12/21/23 1033 72 kg (158 lb 11.7 oz)   12/21/23 1137 67.1 kg (148 lb)   08/29/23 1027 72.6 kg (160 lb)   07/26/23 1442 72.9 kg (160 lb 12.8 oz)   06/29/23 1110 77.6 kg (171 lb)   03/14/23 1124 77.6 kg (171 lb)         RD to follow up per protocol.    Electronically signed by:  Jenniffer Pradhan RD  12/06/24 09:54 EST

## 2024-12-06 NOTE — PLAN OF CARE
Goal Outcome Evaluation:  Plan of Care Reviewed With: patient        Progress: improving  Outcome Evaluation: Pt alert to self and place, forgetful, [place] with aides in place, incontinent with skin care as needed, assist x2 for moving to chair or back to bed, RX to be delivered by [facility] pharmacy prior to discharge, family to transport home, all belongings sent, room air, Afib occassionally on monitor with vitals as charted

## 2024-12-06 NOTE — CASE MANAGEMENT/SOCIAL WORK
Case Management Discharge Note      Final Note: home denied needs         Selected Continued Care - Discharged on 12/6/2024 Admission date: 12/3/2024 - Discharge disposition: Home or Self Care      Destination    No services have been selected for the patient.                Durable Medical Equipment    No services have been selected for the patient.                Dialysis/Infusion    No services have been selected for the patient.                Home Medical Care    No services have been selected for the patient.                Therapy    No services have been selected for the patient.                Community Resources    No services have been selected for the patient.                Community & DME    No services have been selected for the patient.                    Selected Continued Care - Prior Encounters Includes continued care and service providers with selected services from prior encounters from 9/4/2024 to 12/6/2024      Discharged on 10/31/2024 Admission date: 10/28/2024 - Discharge disposition: Skilled Nursing Facility (DC - External)      Destination       Service Provider Services Address Phone Fax Patient Preferred    [facility] [address] [place] [phone] --                          Transportation Services  Private: Car    Final Discharge Disposition Code: 01 - home or self-care

## 2024-12-06 NOTE — PLAN OF CARE
Goal Outcome Evaluation:  Plan of Care Reviewed With: patient        Progress: no change  Outcome Evaluation: Forgetful at times.  Bed alarm in use.  Was up in chair at beginning of shift.  INC of urine and removed all gown/brief/monitor.  Pt is assist of 2 back to bed and unable to do standing wait this shift.

## 2024-12-09 ENCOUNTER — TRANSITIONAL CARE MANAGEMENT TELEPHONE ENCOUNTER (OUTPATIENT)
Dept: CALL CENTER | Facility: HOSPITAL | Age: 89
End: 2024-12-09
Payer: MEDICARE

## 2024-12-09 NOTE — OUTREACH NOTE
Call Center TCM Note      Flowsheet Row Responses   [facility] patient discharged from? [place]   Does the patient have one of the following disease processes/diagnoses(primary or secondary)? CHF   TCM attempt successful? Yes  [AL- gil- son]   Call start time 0935   Call end time 0942   Discharge diagnosis Acute congestive heart failure   Person spoke with today (if not patient) and relationship Per Son- jazz - call santiago as she is caregiver   Meds reviewed with patient/caregiver? Yes   Is the patient having any side effects they believe may be caused by any medication additions or changes? No   Does the patient have all medications ordered at discharge? Yes   Is the patient taking all medications as directed (includes completed medication regime)? Yes   Comments HOSP DC FU appt 12/10/24 3 pm.   Does the patient have an appointment with their PCP within 7-14 days of discharge? Yes   Has home health visited the patient within 72 hours of discharge? Unsure   Home health comments Had hospice referral   Did the patient receive a copy of their discharge instructions? Yes   Nursing interventions Reviewed instructions with patient   What is the patient's perception of their health status since discharge? Improving   Nursing interventions Nurse provided patient education   Is the patient able to teach back signs and symptoms of worsening condition? (i.e. weight gain, shortness of air, etc.) Yes   Is the patient/caregiver able to teach back the hierarchy of who to call/visit for symptoms/problems? PCP, Specialist, Home health nurse, Urgent Care, ED, 911 Yes   TCM call completed? Yes   Wrap up additional comments Santiago reports Pt is doing better. Hospice referral was put in and she is awaiting call. Did not transfer to HF clinic as Santiago took down number and will decide after they meet with Hospice.   Call end time 0942             Kenyatta Ty RN    12/9/2024, 09:43 EST

## 2024-12-10 ENCOUNTER — OFFICE VISIT (OUTPATIENT)
Dept: FAMILY MEDICINE CLINIC | Facility: CLINIC | Age: 89
End: 2024-12-10
Payer: MEDICARE

## 2024-12-10 VITALS
SYSTOLIC BLOOD PRESSURE: 110 MMHG | HEIGHT: 72 IN | OXYGEN SATURATION: 96 % | RESPIRATION RATE: 20 BRPM | BODY MASS INDEX: 18.83 KG/M2 | TEMPERATURE: 97.1 F | DIASTOLIC BLOOD PRESSURE: 38 MMHG | HEART RATE: 66 BPM

## 2024-12-10 DIAGNOSIS — S32.020D COMPRESSION FRACTURE OF L2 VERTEBRA WITH ROUTINE HEALING: ICD-10-CM

## 2024-12-10 DIAGNOSIS — D69.6 THROMBOCYTOPENIA: ICD-10-CM

## 2024-12-10 DIAGNOSIS — E53.8 VITAMIN B 12 DEFICIENCY: ICD-10-CM

## 2024-12-10 DIAGNOSIS — D53.9 MACROCYTIC ANEMIA: ICD-10-CM

## 2024-12-10 DIAGNOSIS — I50.9 ACUTE CONGESTIVE HEART FAILURE, UNSPECIFIED HEART FAILURE TYPE: ICD-10-CM

## 2024-12-10 DIAGNOSIS — J84.10 PULMONARY FIBROSIS: ICD-10-CM

## 2024-12-10 DIAGNOSIS — R73.03 PREDIABETES: ICD-10-CM

## 2024-12-10 DIAGNOSIS — S22.41XS CLOSED FRACTURE OF MULTIPLE RIBS OF RIGHT SIDE, SEQUELA: ICD-10-CM

## 2024-12-10 DIAGNOSIS — S32.029A CLOSED FRACTURE OF SECOND LUMBAR VERTEBRA, UNSPECIFIED FRACTURE MORPHOLOGY, INITIAL ENCOUNTER: ICD-10-CM

## 2024-12-10 DIAGNOSIS — G60.9 IDIOPATHIC PERIPHERAL NEUROPATHY: ICD-10-CM

## 2024-12-10 DIAGNOSIS — Z95.818 STATUS POST PLACEMENT OF IMPLANTABLE LOOP RECORDER: ICD-10-CM

## 2024-12-10 DIAGNOSIS — J90 PLEURAL EFFUSION: ICD-10-CM

## 2024-12-10 DIAGNOSIS — J96.01 ACUTE HYPOXIC RESPIRATORY FAILURE: ICD-10-CM

## 2024-12-10 DIAGNOSIS — Z79.02 LONG TERM (CURRENT) USE OF ANTITHROMBOTICS/ANTIPLATELETS: ICD-10-CM

## 2024-12-10 DIAGNOSIS — Z86.73 HISTORY OF CVA (CEREBROVASCULAR ACCIDENT): ICD-10-CM

## 2024-12-10 DIAGNOSIS — Z86.79 HISTORY OF SUPRAVENTRICULAR TACHYCARDIA: ICD-10-CM

## 2024-12-10 DIAGNOSIS — R29.898 WEAKNESS OF BOTH LEGS: ICD-10-CM

## 2024-12-10 DIAGNOSIS — H91.90 HEARING LOSS, UNSPECIFIED HEARING LOSS TYPE, UNSPECIFIED LATERALITY: ICD-10-CM

## 2024-12-10 DIAGNOSIS — I42.9 CARDIOMYOPATHY, UNSPECIFIED TYPE: Primary | ICD-10-CM

## 2024-12-10 DIAGNOSIS — I25.10 CORONARY ARTERY DISEASE INVOLVING NATIVE CORONARY ARTERY OF NATIVE HEART WITHOUT ANGINA PECTORIS: ICD-10-CM

## 2024-12-10 DIAGNOSIS — I48.0 PAROXYSMAL ATRIAL FIBRILLATION: ICD-10-CM

## 2024-12-10 DIAGNOSIS — E43 SEVERE PROTEIN-CALORIE MALNUTRITION: ICD-10-CM

## 2024-12-10 DIAGNOSIS — G20.B1 PARKINSON'S DISEASE WITH DYSKINESIA, UNSPECIFIED WHETHER MANIFESTATIONS FLUCTUATE: ICD-10-CM

## 2024-12-10 DIAGNOSIS — E78.5 DYSLIPIDEMIA: ICD-10-CM

## 2024-12-10 DIAGNOSIS — H53.2 DIPLOPIA: ICD-10-CM

## 2024-12-10 DIAGNOSIS — R07.2 PRECORDIAL PAIN: ICD-10-CM

## 2024-12-10 DIAGNOSIS — E55.9 VITAMIN D DEFICIENCY: ICD-10-CM

## 2024-12-10 DIAGNOSIS — I50.21 ACUTE SYSTOLIC (CONGESTIVE) HEART FAILURE: ICD-10-CM

## 2024-12-10 DIAGNOSIS — S22.080D COMPRESSION FRACTURE OF T12 VERTEBRA WITH ROUTINE HEALING: ICD-10-CM

## 2024-12-10 DIAGNOSIS — M47.812 OSTEOARTHRITIS OF CERVICAL SPINE, UNSPECIFIED SPINAL OSTEOARTHRITIS COMPLICATION STATUS: ICD-10-CM

## 2024-12-10 DIAGNOSIS — Z95.5 HISTORY OF CORONARY ARTERY STENT PLACEMENT: ICD-10-CM

## 2024-12-10 PROCEDURE — 99215 OFFICE O/P EST HI 40 MIN: CPT | Performed by: PHYSICIAN ASSISTANT

## 2024-12-10 PROCEDURE — 1159F MED LIST DOCD IN RCRD: CPT | Performed by: PHYSICIAN ASSISTANT

## 2024-12-10 PROCEDURE — 1125F AMNT PAIN NOTED PAIN PRSNT: CPT | Performed by: PHYSICIAN ASSISTANT

## 2024-12-10 PROCEDURE — 1160F RVW MEDS BY RX/DR IN RCRD: CPT | Performed by: PHYSICIAN ASSISTANT

## 2024-12-10 PROCEDURE — G2211 COMPLEX E/M VISIT ADD ON: HCPCS | Performed by: PHYSICIAN ASSISTANT

## 2024-12-10 NOTE — PROGRESS NOTES
"Jose Churchill Sr. is a 89 y.o. male who presents today in follow up of hospitalization for CHF and cardiomyopathy and respiratory failure with concomitant pulmonary fibrosis, pneumothorax with pleural effusionand due for follow up of hypertension, prediabetes, hyperlipidemia, macrocytosis, B12 and vitamin D deficiency, headache and stuttering speech, history of CVA and Parkinson's, atrial fibrillation, coronary artery disease, and cardiomyopathy, cataracts, and specialists.     Hypertension    Hyperlipidemia      Patient was hospitalized 10/28/2024 through 10/31/2024 for acute hypoxic respiratory failure.    I have reviewed and discussed with patient hospital notes, including H&P, labs, imaging, consult notes, and discharge summary. Per discharge summary, he presented with shortness of breath, chest pain, acute hypoxic respiratory failure, acute on chronic congestive heart failure.  He was diuresed well and euvolemic on discharge.  Due to age and frailty goal-directed medical therapy completed as able.  Troponin was flat.  He was without chest pain and creatinine was stable.  Case discussed with , nurse, and son at bedside and he was discharged to and advised to follow-up with PCP in 1 week, cardiology 11/20/2024 and would need CMP in 1 week.    He received a telephone call that patient checked himself out of the nursing home and they would not give Imdur when he left that evening.  He needed the medication by 2 PM for the \"fluid that he has on his body\".  I advised that this is filled by cardiology and was filled 11/1/2024.    Then received a phone call 11/14/2024 about the patient needing assistance during the day while everyone was gone to work-stated he was bedbound and needed help bathing, getting him out of the bed and wanted to know if we could recommend a company.  [facility] also called for orders for home health.  I asked that we find out why he left AMA and that he needed hospital " follow-up.  They stated he left [facility] due to him not liking the facility and not wanting to be there.  They found a CMA that was coming to the house through the week but were needing someone on the weekends.  Stated she could not lift more than 5 to 10 pounds so she was needing help.  They stated that since Mr. Churchill has been home, her  and patient have been working with him trying to get his strength back in his legs and he could walk with his walker but needed assistance from behind.  She reported he was doing better than he was at [facility].  We okayed home health.    Then got a patient message 11/16/2024 that Mr. Churchill was living with them and they noticed that his feet and ankles were swelling, prop them up, had him drink extra water and help.  They were thinking about LORETTA hose to the knee and wanted to know what we could recommend as well as a prescription.  I advised the patient needed to go to the emergency department ASAP.  They reported he was sitting for 2 days and feet and ankles were no longer swollen and then he dealt with swelling in the right ankle for a long time.  They would let me know this and if I still want him to go to the ER, would let them know.  I advised he go to the ER.    He was then seen by cardiology 11/20/2024 for hospital discharge for acute on chronic heart failure, shortness of breath with bicycle exercise and showering.  They reported he does weights without issue.  Oxygen sat checked daily at 96% after bike in the shower he felt short of breath.  Son was inquiring about oxygen at home.  They checked 6-minute walk test to see if he qualified.  Advised to continue Lasix, losartan, Toprol. For CAD-advised to continue aspirin, Toprol, atorvastatin, and Imdur.  For atrial fibrillation-controlled on Toprol.  Not anticoagulated due to increased risk of bleeding with age and fragility.  Advised follow-up 1 month and rechecked BMP and 6-minute walk test.    6-minute walk  test with oxygen saturation to 88% and they had to stop before 6 minutes for patient to catch his breath and rest.  Cardiology ordered oxygen through [facility].    Patient was hospitalized 12/3/2024 through 12/6/2024 for shortness of breath and cough, acute congestive heart failure, severe protein calorie malnutrition, elevated troponin, acute hypoxic respiratory failure, idiopathic peripheral neuropathy, long-term use of antithrombotics/antiplatelets, thrombocytopenia, paroxysmal A-fib, Parkinson's disease, history of CVA, macrocytic anemia, cardiomyopathy, hypertension, and chronic fatigue.    I have reviewed and discussed with patient hospital notes, including H&P, labs, imaging, consult notes, and discharge summary. Per discharge summary, he presented to the ER with shortness of breath and was found to have decompensated heart failure and was admitted to the hospital for further evaluation and treatment.  He was admitted to the hospital, diuresed aggressively and transition to oral Lasix at higher dose.  Care was discussed with family and ultimately they wanted him to go home-their goal was to keep him at home.  They discussed hospice care at home to provide additional assistance.  They changed B12 to every other day and furosemide 40 mg daily.  CT abdomen pelvis-mild right pleural effusion, small left pleural effusion, partial atelectasis right lower lobe, subsegmental atelectasis left lung base, airway disease at the bases, coronary artery atherosclerotic calcifications, moderate cardiomegaly, hyperattenuating liver-noted to be secondary to iron deposition or amiodarone use, splenic capsule capsular calcification unchanged, moderate pancreatic atrophy, mild thickening of the adrenal glands, bilateral fluid attenuating renal cysts, suspect vascular calcifications in the kidneys, prostatomegaly with prostate at 5.8 cm, trabeculated bladder with multiple small and large bladder diverticula, multiple bladder calculi  bleeding calculi within the bladder diverticulum.  Large amount of gas and stool seen in the rectum with distention.  Diastases rectus, mesh repair of left inguinal hernia, moderate aortic iliac atherosclerotic calcification, decreased bone mineralization, old right-sided rib fractures, old left sided rib fractures, old superior endplate compression deformity T12 with 50% collapse, old superior endplate compression deformity at L2 with 60% collapse with large superior plate Schmorl node.  Chest x-ray 12/3/2024 with vascular congestion, basilar lung opacities with possible bilateral lower lobe atelectasis or consolidation, blunt CPA-mild to moderate layering pleural effusions, increased on the right, stable mediastinum, enlarged cardiac silhouette, left chest wall implantable loop recorder.  Lab-hemoglobin 11.  To 12.0, BUN 34 at discharge, troponin 57-63, proBNP 17,076, negative uric acid and negative COVID.    Within 48 business hours after discharge, Dinesh Churchill Sr. was contacted via telephone to coordinate care and needs.   Current outpatient and discharge medications have been reconciled for the patient.      Patient was hospitalized 6/12/2024 through 6/15/2024 for L2 vertebral fracture, compression fracture T12 vertebrae, sacral fracture, thrombocytopenia, paroxysmal atrial fibrillation, Parkinson's disease, history of CVA, CAD, cardiomyopathy, and hypertension. Per discharge summary, he was seen for fall with back pain with further imaging demonstrating T12 compression fracture with S2 fracture and progression of L2 vertebral compression fracture.  No surgery recommended per neurosurgery and TLSO bracing and physical therapy was mode of management.  They will follow in an outpatient setting in a month with repeat x-ray of thoracic and lumbar spine.  PT evaluated and advise short-term nursing facility.  CCP coordinated SNF.  Seen by neurosurgery in follow-up 7/17/2024 and advised back pain was no worse than  previous baseline.  Discussed risks of kyphoplasty and they would like to hold off for now.  Continue wearing TLSO brace for 4 to 6 weeks.  Follow-up 6 weeks with repeat x-rays.  Home health- he was to have home health PT but had not started yet. The nurse sent out emails to ensure he gets PT.     Hypertension- has been stable on Norvasc 5 mg once daily, metoprolol  mg daily, and losartan 50 mg once daily.  Prediabetes and hyperlipidemia- continues Lipitor 20 mg once daily & tolerating without AE. Decreased sugar intake. He is not thrilled but is changed- no soda, pies, grapes. Cut out oyster crackers and eat all the time. Get no sugar added cookies.  Vitamin D- taking vitamin D 1000IU daily. Tolerating without AE.   B12-  taking B every other day.   Previously he was on B12 Monday, Wed, Fri instead of Monday through Friday.   Last injection 12/27/2019.  Anemia- Patient is seeing hematology 7/2023 in follow up.     Abnormal speech- talking more- sense of humor is back and talks. If given the time to speak and patience to listen, he speaks ok.   He was not talking much-  stuttering and talking very little. He wears hearing aids and talks a little more at home.   Speech therapy gave exercises to help improve the stutter.   In 2/2021- he was asleep and woke up at 2 am with a headache- severe but was a generalized  Headache and lasted 30-45 minutes. No headache since. He noticed that he started stuttering the next day. Has now stuttered since. No slurring of words. No other neurological symptoms. He is getting up and around, has no in creased weakness, numbness, tingling, swallowing issues, recent falls, recurrence of headache, or other symptoms.   Weakness and falls- no recent falls  Son caught him a couple times. Exercising on machine 3 x weekly for 13 minutes- leg machine for cycling.   Seen by neurology 5/2023- they ordered EMG. Patient will have 8/2023 with Dr Naik.   Fall- when he was going down, he went  down on his buttock and was on carpet. He was ok. No complaints of pain or trouble.   Using a walker all the time. Making his own food, bathing himself, and taking care of himself. He has to get up and do things. He walks outside in the trail when weather is nice. He does not go out in good weather. Reads the paper and watches basketball. Eats what he wants. Doing his own laundry. Son and daughter bring up son's mail. Doctor said son can leave for 1 hour at a time. Went to [facility] and had to  Rx.   History of CVA-     Cardiomyopathy, CAD, SVT, A Fib- seeing cardiology 6/29/2023.   Chest pain- taking Imdur as directed by cardiology and tolerating without AE. No CP- controlling CP.   Shortness of breath- no change from baseline. No increase in SOA   Pleural effusion and pneumothorax- no symptoms at this time.   History of pneumothorax and pleural effusion-  with hospitalization. Pulmonology saw him and advised he was stable and to follow up in 6/2020.   Interstitial lung disease, pulmonary fibrosis- has not seen pulmonology in some time. Oxygen ordered by cardiology.     Knee pain- continues with pain but they are working on getting injections approved for relief.     Ran out of PT 2-3 weeks ago but still doing exercises.     Previous hospitalizations-   Patient was hospitalized 1/19/2022 through 1/21/2022 for a painful, closed fracture of multiple ribs on the right side, post fracture left toe, chest wall pain, stuttering, A. fib, history of CVA and CAD s/p stent placement, cardiomyopathy, dyslipidemia, hypertension, hearing loss. Per discharge summary, patient was initially admitted for chest wall pain s/p fall.  He was given Tylenol and Lidoderm to chest wall as well as incentive spirometry.  He did well, required no additional narcotic pain medication, and was feeling better each day.  Oxygen saturation 98% on room air.  Physical therapy evaluation was poor.  With living independently in his nurse child 25 to  30 miles away, they recommended inpatient rehab. He was discharged to rehab facility.  Last labs from rehab 1/1/2022.  He had OT evaluation and  PT. Following discharge 2/10/2022 and with no rib pain or foot pain. He only complained of left> right knee pain.     Patient was hospitalized 1/20/2023 through 1/23/2023 for sudden onset of severe headache, shaking, Parkinson's disease, history of CVA, paroxysmal A. fib, thrombocytopenia, macrocytic anemia, cardiomyopathy, hypertension, fatigue. Per discharge summary- he presented to the ER due to onset of severe headache.  CTA negative for acute disease, MRI negative for acute intracranial disease with chronic changes noted, lumbar puncture did not explain infectious etiology.  He has old CVA and continues aspirin and statin.  Neurology consultation obtained who noted differential of hypertensive encephalopathy versus posterior reversible encephalopathy syndrome and cleared for discharge.  Patient with subacute rehab less than a year prior with some improvement.  They did not see indication for transition to subacute rehab at that time but with advanced age and recurrent falls reported that son is reaching a point where keeping his father at home is becoming tougher.  He was discharged home with home health PT and OT.  Son reports he was seen by physical therapy who noted he was ambulating well and did not need continued therapy.  Son states he was doing well until this morning.  He was standing in the kitchen and his son was in his room.  He reports he heard a thump and went in and his father was on his right side.  Patient has had severe pain in his low back and right hip since his fall.  He reports 10/10 pain and has had no improvement since this morning.  Pain is located in the low back and right hip.    Patient's Specialists:  Cardiology-Dr. Quezada/ CANDELARIA Macias, CANDELARIA Stone-last appt 11/2024 for chronic heart faily, CAD, hypertension,  hyperlipidemia, and paroxysmal atrial fibrillation. SOA with bicycle exercises and showering but ok with weights. Oxygen saturation about 96% when feeling SOA. Continue ASA, Toprol, Lipitor, Imdur, Losartan, Norvasc. Ordered 6 minute walk test to see if qualifies for oxygen. BMP ordered. Follow up 1 month. Ordered oxygen but has not heard from them.   12/2023 for CAD, cardiomyopathy, atrial fibrillation, hypertension -stable.  Stopped Brilinta 2021 Brillinta.  Pressure goal less than 140/90.  Follow-up in 1 year with echo.  Neurology- Dr Ashley- last appt 8/2023 for history of CVA, Parkinson disease, and peripheral neuropathy.  Not interested in starting medication due to potential side effects.  Discussed exercise much as possible.  EMG/NCS with moderate to severe peripheral neuropathy with significant axonal loss in the motor studies of the right leg and changes that could go along with right S1 radiculopathy with nerve root level involvement versus root level involvement of the peripheral neuropathy.  Normal A1c, TSH, globulin.  MRI lumbar spine with multilevel lumbar spondyloarthropathy.  Be cautious with walking to prevent falls or injury.  Follow-up as needed.  7/2021 for history of CVA, recent stuttering, change in speech, parkinson's disease. They did not want treatment for Parkinson's. Follow up PRN   Previously-  Dr. Saleh- last appointment 1/2020  For seizure-like activity.  No further work-up at that time.  They will consider EEG if he has any further symptoms.    Dr. Naik- last seen 10/2017- for Parkinson's, orthostatic hypotension, and ataxia- he did not want to pursue treatment at that time and was advised to follow-up PRN.   Neurosurgery- Dr Parr- last appt 8/2024 for compression fracture T12/Thoracic and Lumbar xray performed and was doing well. Pain was back for baseline with TLSO brace. Change to corsett brace that was more comfortable. To wear brace when walking. Hold on Kyphoplasty. If  worsening pain, call back and consider kyphoplasty. Follow up CANDELARIA Benitez-last appointment 3/2023 for L2 vertebral body compression fracture.  Continue physical therapy and Tylenol as needed.  Follow-up as needed.  Hematology- Dr Pennington- last appt 7/2023 for macrocytosis and thrombocytopenia- differential MDS and liver disease.  Macrocytosis and thrombocytopenia likely related to liver disease, MDS remains in differential.  With stability over a few years, advised continue monitoring.  With his age and difficulty getting to appointments, they advised he can follow-up with PCP and return to clinic only if significant worsening of his MCV or blood counts.    Prior Dr Richardson- last appt 8/2017 for macrocytosis without anemia and thrombocytopenia. Monthly B12 injections, observation without further evaluation if stable.   Sports Medicine- Dr Garcia-last appointment 2/2022 for bilateral OA knees and knee pain.  Will start approval process for gel series injections.  Completed injections 3/2022.  Has not had follow-up.  Ophthalmology- [facility] [place] [facility] [place]- will have left cataract surgery 1/27/2020 and right cataract surgery 2/3/2020.   Pulmonology- Dr De Jesus- last appt 12/2019 for pulmonary fibrosis, pulmonary edema, pneumothorax- follow up in 6 months.     The following portions of the patient's history were reviewed and updated as appropriate: allergies, current medications, past family history, past medical history, past social history, past surgical history and problem list.    Review of Systems    Objective    Vitals:    12/10/24 1459   BP: (!) 110/38   Pulse: 66   Resp: 20   Temp: 97.1 °F (36.2 °C)   SpO2: 96%     Body mass index is 18.83 kg/m².  BMI is within normal parameters. No other follow-up for BMI required.    Physical Exam   Constitutional: He is oriented to person, place, and time. He appears well-developed. No distress.   HENT:   Head: Normocephalic and atraumatic.   Right  Ear: External ear normal.   Left Ear: External ear normal.   Eyes: Conjunctivae are normal.   Neck: Carotid bruit is not present. No tracheal deviation present. No thyroid mass and no thyromegaly present.   Cardiovascular: Normal rate, regular rhythm and normal pulses.   Murmur heard.  Pulmonary/Chest: Effort normal and breath sounds normal.   Abdominal: Normal appearance.   Neurological: He is alert and oriented to person, place, and time. Gait (short, shuffling gait- ambulates with walker) abnormal.   Skin: Skin is warm and dry.   Psychiatric: His behavior is normal. Mood and thought content normal. His speech is delayed.   Nursing note and vitals reviewed.      Assessment & Plan   Diagnoses and all orders for this visit:    1. Cardiomyopathy, unspecified type (Primary)  -     Ambulatory Referral to Home Hospice  -     proBNP    2. Acute systolic (congestive) heart failure  -     Ambulatory Referral to Home Hospice  -     proBNP    3. Acute congestive heart failure, unspecified heart failure type  -     Ambulatory Referral to Home Hospice  -     proBNP    4. Coronary artery disease involving native coronary artery of native heart without angina pectoris  -     Ambulatory Referral to Home Hospice  -     proBNP    5. History of coronary artery stent placement  -     Ambulatory Referral to Home Hospice  -     proBNP    6. History of supraventricular tachycardia  -     Ambulatory Referral to Home Hospice  -     proBNP    7. Paroxysmal atrial fibrillation  -     Ambulatory Referral to Home Hospice  -     proBNP    8. Precordial pain  -     Ambulatory Referral to Home Hospice  -     proBNP    9. Status post placement of implantable loop recorder  -     Ambulatory Referral to Home Hospice    10. Long term (current) use of antithrombotics/antiplatelets  -     Ambulatory Referral to Home Hospice    11. Acute hypoxic respiratory failure  -     Ambulatory Referral to Home Hospice  -     Ambulatory Referral to  Pulmonology    12. Severe protein-calorie malnutrition  -     Ambulatory Referral to Home Hospice    13. Pulmonary fibrosis  -     Ambulatory Referral to Pulmonology    14. Pleural effusion  -     Ambulatory Referral to Pulmonology    15. Dyslipidemia  -     Comprehensive Metabolic Panel    16. Prediabetes  -     Comprehensive Metabolic Panel    17. Vitamin D deficiency  -     Comprehensive Metabolic Panel  -     Vitamin D,25-Hydroxy    18. Vitamin B 12 deficiency  -     CBC & Differential  -     Vitamin B12 & Folate    19. Macrocytic anemia  -     CBC & Differential  -     Iron and TIBC  -     Ferritin  -     Vitamin B12 & Folate  -     TSH  -     T4, free  -     T3, Free    20. Thrombocytopenia  -     Ambulatory Referral to Home Hospice    21. History of CVA (cerebrovascular accident)  -     Ambulatory Referral to Home Hospice    22. Parkinson's disease with dyskinesia, unspecified whether manifestations fluctuate  -     Ambulatory Referral to Home Hospice    23. Diplopia  -     Ambulatory Referral to Home Hospice    24. Hearing loss, unspecified hearing loss type, unspecified laterality  -     Ambulatory Referral to Home Hospice    25. Weakness of both legs  -     Ambulatory Referral to Home Hospice    26. Idiopathic peripheral neuropathy  -     Ambulatory Referral to Home Hospice    27. Closed fracture of multiple ribs of right side, sequela  -     Ambulatory Referral to Home Hospice    28. Compression fracture of L2 vertebra with routine healing  -     Ambulatory Referral to Home Hospice    29. Closed fracture of second lumbar vertebra, unspecified fracture morphology, initial encounter  -     Ambulatory Referral to Home Hospice    30. Compression fracture of T12 vertebra with routine healing  -     Ambulatory Referral to Home Hospice    31. Osteoarthritis of cervical spine, unspecified spinal osteoarthritis complication status  -     Ambulatory Referral to Home Hospice      Assessment and Plan  Patient will  have labs. Call if no results in 1 week. Stability of conditions, plan, follow up, and further recommendations pending labs. If labs and symptoms are stable, follow up in 3 months.     CHF, CAD, cardiomyopathy, atrial fibrillation, SVT- He continues follow-up with cardiology.  He denies any changes or increase in chest pain, shortness of breath, palpitations, dizziness, or weakness. He will continue follow up and treatment as directed by cardiology. He was referred to hospice from the hospital, however, they have not received a call from hospice. I will refer again today.   Pulmonary fibrosis/ interstitial lung disease, History of Pleural effusion and Pneumothorax- Patient continues with SOA. Cardiology ordered oxygen with failure of 6 minute walk test. They have not received oxygen. I will reach out to cardiology about oxygen and they can contact them about oxygen and talk with them at follow up. He also needs to see pulmonology, as some of his SOA and hypoxia could be pulmonary in etiology. I will refer back today.    Hypertension- Blood pressure has been stable. Continue Norvasc 5 mg once daily, Metoprolol  mg daily, and Losartan 50 mg once daily. Continue monitoring BP and pulse and call or return to cardiology if elevated or low consistently.    Hyperlipidemia- Last lipids were stable 10/2024 in the hospital. Continue Lipitor 20 mg at bedtime. I will recheck at follow up.  Prediabetes- A1C improved from 5.8% 9/2022 to 5.6% 6/2023 and remained 5.6% 10/2024 in the hospital. I will continue to monitor and make further recommendations.   Vitamin D deficiency- Continue Vitamin D 1000IU daily. Dosing recommendations pending labs.   B12 deficiency- Continue B every other day. Await labs for further recommendations.   Anemia, Thrombocytopenia- I will recheck labs and make recommendations. Consideration of referral back to hematology if needed.   Parkinson's disease, Headache and speech disturbance, Diplopia,  hearing loss, history of CVA- Patient had an episode 2/2021 with a severe headache then developed stuttering speech. Patient and son denied any other symptoms and reports no recurrence of headache, however, patient lived alone and had not had much interaction with people other than once a week when his son came to take care of groceries and medications. This was concerning for CVA, however, without any other deficits and a cardiac device in place, he was unable to have MRI. He underwent CT head and follow up with neurology. He was evaluated by speech therapy, was given home exercises. His son now moved in with him all the time, and he continued to stutter and talked less. He does have known Parkinson's disease that is untreated.  He is now living with family and is more engaged and is talking more when people give him time to respond. He will need to see neurology in follow up and consider treatment for Parkinson's if they feel this could help gait or speech or help him deteriorate slower. 9-1-1 to ER if he has worsening speech, recurrence of headache, or any other cardiac or neurological symptoms.  I will also refer to hospice since they have not received a call from hospital referral from hospice.  Balance and gait instability, weakness LE, peripheral neuropathy- He avoids any overhead movement, as he loses his balance. He was seen by neurology and diagnosed with Parkinson's, however, patient and son were hesitant for treatment due to concern for side effects. He should follow up with neurology to consider treatment if they feel this would be beneficial. Continue follow up with cardiology and neurology. Continue exercises at home and PT. I will also refer to hospice since they have not received a call from hospital referral from hospice.    Fall with compression fractures, history of multiple rib fractures- He has had multiple spinal compression fractures. He will see neurosurgery in follow up if he has any  worsening pain. Continue corset brace when he is up. I will also refer to hospice since they have not received a call from hospital referral from hospice.    From [facility] 8/2024. He is not up to date on colonoscopy, however, with age and other medical problems, he likely would not be able to have colonoscopy unless he has any symptoms. He should have DEXA with increased fractures. I will refer today. He needs to check with pharmacy to update vaccinations- Shingrix, Prevnar 20, RSV, ensure annual flu shot and Covid 19 booster.     Patient continues to see specialists as noted above.  I have reviewed available records, including consult notes, labs, and imaging/testing.  Patient to continue follow-up with specialists as directed by them. He needs to schedule follow up with pulmonology and I will refer back to neurology.     I spent 45 minutes caring for Dinesh Churchill Sr. on this date of service. This time includes time spent by me in the following activities as necessary: preparing for the visit, reviewing tests, specialists records and previous visits, obtaining and/or reviewing a separately obtained history, performing a medically appropriate exam and/or evaluation, counseling and educating the patient, family, caregiver, referring and/or communicating with other healthcare professionals, documenting information in the medical record, independently interpreting results and communicating that information with the patient, family, caregiver, and developing a medically appropriate treatment plan with consideration of other conditions, medications, and treatments.

## 2024-12-10 NOTE — LETTER
December 30, 2024     Jose Manuel De Jesus MD  [address]  [place] [id number]    Patient: Dinesh Churchill Sr.   YOB: 1928   Date of Visit: 12/10/2024     Dear Jose Manuel De Jesus MD:       Thank you for referring Dinesh Churchill to me for evaluation. Below are the relevant portions of my assessment and plan of care.    If you have questions, please do not hesitate to call me. I look forward to following Dinesh along with you.         Sincerely,        RAFA Mcfarland        CC: No Recipients    Phyllis Loving PA  12/29/24 1228  Signed  Subjective  Dinesh Churchill Sr. is a 89 y.o. male who presents today in follow up of hospitalization for CHF and cardiomyopathy and respiratory failure with concomitant pulmonary fibrosis, pneumothorax with pleural effusionand due for follow up of hypertension, prediabetes, hyperlipidemia, macrocytosis, B12 and vitamin D deficiency, headache and stuttering speech, history of CVA and Parkinson's, atrial fibrillation, coronary artery disease, and cardiomyopathy, cataracts, and specialists.     Hypertension    Hyperlipidemia      Patient was hospitalized 10/28/2024 through 10/31/2024 for acute hypoxic respiratory failure.    I have reviewed and discussed with patient hospital notes, including H&P, labs, imaging, consult notes, and discharge summary. Per discharge summary, he presented with shortness of breath, chest pain, acute hypoxic respiratory failure, acute on chronic congestive heart failure.  He was diuresed well and euvolemic on discharge.  Due to age and frailty goal-directed medical therapy completed as able.  Troponin was flat.  He was without chest pain and creatinine was stable.  Case discussed with , nurse, and son at bedside and he was discharged to and advised to follow-up with PCP in 1 week, cardiology 11/20/2024 and would need CMP in 1 week.    He received a telephone call that patient checked himself out of the nursing home  "and they would not give Imdur when he left that evening.  He needed the medication by 2 PM for the \"fluid that he has on his body\".  I advised that this is filled by cardiology and was filled 11/1/2024.    Then received a phone call 11/14/2024 about the patient needing assistance during the day while everyone was gone to work-stated he was bedbound and needed help bathing, getting him out of the bed and wanted to know if we could recommend a company.  [facility] also called for orders for home health.  I asked that we find out why he left AMA and that he needed hospital follow-up.  They stated he left [facility] due to him not liking the facility and not wanting to be there.  They found a CMA that was coming to the house through the week but were needing someone on the weekends.  Stated she could not lift more than 5 to 10 pounds so she was needing help.  They stated that since Mr. Churchill has been home, her  and patient have been working with him trying to get his strength back in his legs and he could walk with his walker but needed assistance from behind.  She reported he was doing better than he was at [facility].  We okayed home health.    Then got a patient message 11/16/2024 that Mr. Churchill was living with them and they noticed that his feet and ankles were swelling, prop them up, had him drink extra water and help.  They were thinking about LORETTA hose to the knee and wanted to know what we could recommend as well as a prescription.  I advised the patient needed to go to the emergency department ASAP.  They reported he was sitting for 2 days and feet and ankles were no longer swollen and then he dealt with swelling in the right ankle for a long time.  They would let me know this and if I still want him to go to the ER, would let them know.  I advised he go to the ER.    He was then seen by cardiology 11/20/2024 for hospital discharge for acute on chronic heart failure, shortness of breath with bicycle exercise " and showering.  They reported he does weights without issue.  Oxygen sat checked daily at 96% after bike in the shower he felt short of breath.  Son was inquiring about oxygen at home.  They checked 6-minute walk test to see if he qualified.  Advised to continue Lasix, losartan, Toprol. For CAD-advised to continue aspirin, Toprol, atorvastatin, and Imdur.  For atrial fibrillation-controlled on Toprol.  Not anticoagulated due to increased risk of bleeding with age and fragility.  Advised follow-up 1 month and rechecked BMP and 6-minute walk test.    6-minute walk test with oxygen saturation to 88% and they had to stop before 6 minutes for patient to catch his breath and rest.  Cardiology ordered oxygen through [facility].    Patient was hospitalized 12/3/2024 through 12/6/2024 for shortness of breath and cough, acute congestive heart failure, severe protein calorie malnutrition, elevated troponin, acute hypoxic respiratory failure, idiopathic peripheral neuropathy, long-term use of antithrombotics/antiplatelets, thrombocytopenia, paroxysmal A-fib, Parkinson's disease, history of CVA, macrocytic anemia, cardiomyopathy, hypertension, and chronic fatigue.    I have reviewed and discussed with patient hospital notes, including H&P, labs, imaging, consult notes, and discharge summary. Per discharge summary, he presented to the ER with shortness of breath and was found to have decompensated heart failure and was admitted to the hospital for further evaluation and treatment.  He was admitted to the hospital, diuresed aggressively and transition to oral Lasix at higher dose.  Care was discussed with family and ultimately they wanted him to go home-their goal was to keep him at home.  They discussed hospice care at home to provide additional assistance.  They changed B12 to every other day and furosemide 40 mg daily.  CT abdomen pelvis-mild right pleural effusion, small left pleural effusion, partial atelectasis right lower  lobe, subsegmental atelectasis left lung base, airway disease at the bases, coronary artery atherosclerotic calcifications, moderate cardiomegaly, hyperattenuating liver-noted to be secondary to iron deposition or amiodarone use, splenic capsule capsular calcification unchanged, moderate pancreatic atrophy, mild thickening of the adrenal glands, bilateral fluid attenuating renal cysts, suspect vascular calcifications in the kidneys, prostatomegaly with prostate at 5.8 cm, trabeculated bladder with multiple small and large bladder diverticula, multiple bladder calculi bleeding calculi within the bladder diverticulum.  Large amount of gas and stool seen in the rectum with distention.  Diastases rectus, mesh repair of left inguinal hernia, moderate aortic iliac atherosclerotic calcification, decreased bone mineralization, old right-sided rib fractures, old left sided rib fractures, old superior endplate compression deformity T12 with 50% collapse, old superior endplate compression deformity at L2 with 60% collapse with large superior plate Schmorl node.  Chest x-ray 12/3/2024 with vascular congestion, basilar lung opacities with possible bilateral lower lobe atelectasis or consolidation, blunt CPA-mild to moderate layering pleural effusions, increased on the right, stable mediastinum, enlarged cardiac silhouette, left chest wall implantable loop recorder.  Lab-hemoglobin 11.  To 12.0, BUN 34 at discharge, troponin 57-63, proBNP 17,076, negative uric acid and negative COVID.    Within 48 business hours after discharge, Dinesh Churchill Sr. was contacted via telephone to coordinate care and needs.   Current outpatient and discharge medications have been reconciled for the patient.      Patient was hospitalized 6/12/2024 through 6/15/2024 for L2 vertebral fracture, compression fracture T12 vertebrae, sacral fracture, thrombocytopenia, paroxysmal atrial fibrillation, Parkinson's disease, history of CVA, CAD, cardiomyopathy,  and hypertension. Per discharge summary, he was seen for fall with back pain with further imaging demonstrating T12 compression fracture with S2 fracture and progression of L2 vertebral compression fracture.  No surgery recommended per neurosurgery and TLSO bracing and physical therapy was mode of management.  They will follow in an outpatient setting in a month with repeat x-ray of thoracic and lumbar spine.  PT evaluated and advise short-term nursing facility.  CCP coordinated SNF.  Seen by neurosurgery in follow-up 7/17/2024 and advised back pain was no worse than previous baseline.  Discussed risks of kyphoplasty and they would like to hold off for now.  Continue wearing TLSO brace for 4 to 6 weeks.  Follow-up 6 weeks with repeat x-rays.  Home health- he was to have home health PT but had not started yet. The nurse sent out emails to ensure he gets PT.     Hypertension- has been stable on Norvasc 5 mg once daily, metoprolol  mg daily, and losartan 50 mg once daily.  Prediabetes and hyperlipidemia- continues Lipitor 20 mg once daily & tolerating without AE. Decreased sugar intake. He is not thrilled but is changed- no soda, pies, grapes. Cut out oyster crackers and eat all the time. Get no sugar added cookies.  Vitamin D- taking vitamin D 1000IU daily. Tolerating without AE.   B12-  taking B every other day.   Previously he was on B12 Monday, Wed, Fri instead of Monday through Friday.   Last injection 12/27/2019.  Anemia- Patient is seeing hematology 7/2023 in follow up.     Abnormal speech- talking more- sense of humor is back and talks. If given the time to speak and patience to listen, he speaks ok.   He was not talking much-  stuttering and talking very little. He wears hearing aids and talks a little more at home.   Speech therapy gave exercises to help improve the stutter.   In 2/2021- he was asleep and woke up at 2 am with a headache- severe but was a generalized  Headache and lasted 30-45 minutes.  No headache since. He noticed that he started stuttering the next day. Has now stuttered since. No slurring of words. No other neurological symptoms. He is getting up and around, has no in creased weakness, numbness, tingling, swallowing issues, recent falls, recurrence of headache, or other symptoms.   Weakness and falls- no recent falls  Son caught him a couple times. Exercising on machine 3 x weekly for 13 minutes- leg machine for cycling.   Seen by neurology 5/2023- they ordered EMG. Patient will have 8/2023 with Dr aNik.   Fall- when he was going down, he went down on his buttock and was on carpet. He was ok. No complaints of pain or trouble.   Using a walker all the time. Making his own food, bathing himself, and taking care of himself. He has to get up and do things. He walks outside in the trail when weather is nice. He does not go out in good weather. Reads the paper and watches basketball. Eats what he wants. Doing his own laundry. Son and daughter bring up son's mail. Doctor said son can leave for 1 hour at a time. Went to [facility] and had to  Rx.   History of CVA-     Cardiomyopathy, CAD, SVT, A Fib- seeing cardiology 6/29/2023.   Chest pain- taking Imdur as directed by cardiology and tolerating without AE. No CP- controlling CP.   Shortness of breath- no change from baseline. No increase in SOA   Pleural effusion and pneumothorax- no symptoms at this time.   History of pneumothorax and pleural effusion-  with hospitalization. Pulmonology saw him and advised he was stable and to follow up in 6/2020.   Interstitial lung disease, pulmonary fibrosis- has not seen pulmonology in some time. Oxygen ordered by cardiology.     Knee pain- continues with pain but they are working on getting injections approved for relief.     Ran out of PT 2-3 weeks ago but still doing exercises.     Previous hospitalizations-   Patient was hospitalized 1/19/2022 through 1/21/2022 for a painful, closed fracture of multiple ribs  on the right side, post fracture left toe, chest wall pain, stuttering, A. fib, history of CVA and CAD s/p stent placement, cardiomyopathy, dyslipidemia, hypertension, hearing loss. Per discharge summary, patient was initially admitted for chest wall pain s/p fall.  He was given Tylenol and Lidoderm to chest wall as well as incentive spirometry.  He did well, required no additional narcotic pain medication, and was feeling better each day.  Oxygen saturation 98% on room air.  Physical therapy evaluation was poor.  With living independently in his nurse child 25 to 30 miles away, they recommended inpatient rehab. He was discharged to rehab facility.  Last labs from rehab 1/1/2022.  He had OT evaluation and  PT. Following discharge 2/10/2022 and with no rib pain or foot pain. He only complained of left> right knee pain.     Patient was hospitalized 1/20/2023 through 1/23/2023 for sudden onset of severe headache, shaking, Parkinson's disease, history of CVA, paroxysmal A. fib, thrombocytopenia, macrocytic anemia, cardiomyopathy, hypertension, fatigue. Per discharge summary- he presented to the ER due to onset of severe headache.  CTA negative for acute disease, MRI negative for acute intracranial disease with chronic changes noted, lumbar puncture did not explain infectious etiology.  He has old CVA and continues aspirin and statin.  Neurology consultation obtained who noted differential of hypertensive encephalopathy versus posterior reversible encephalopathy syndrome and cleared for discharge.  Patient with subacute rehab less than a year prior with some improvement.  They did not see indication for transition to subacute rehab at that time but with advanced age and recurrent falls reported that son is reaching a point where keeping his father at home is becoming tougher.  He was discharged home with home health PT and OT.  Son reports he was seen by physical therapy who noted he was ambulating well and did not need  continued therapy.  Son states he was doing well until this morning.  He was standing in the kitchen and his son was in his room.  He reports he heard a thump and went in and his father was on his right side.  Patient has had severe pain in his low back and right hip since his fall.  He reports 10/10 pain and has had no improvement since this morning.  Pain is located in the low back and right hip.    Patient's Specialists:  Cardiology-Dr. Quezada/ CANDELARIA Macias, CANDELARIA Stone-last appt 11/2024 for chronic heart faily, CAD, hypertension, hyperlipidemia, and paroxysmal atrial fibrillation. SOA with bicycle exercises and showering but ok with weights. Oxygen saturation about 96% when feeling SOA. Continue ASA, Toprol, Lipitor, Imdur, Losartan, Norvasc. Ordered 6 minute walk test to see if qualifies for oxygen. BMP ordered. Follow up 1 month. Ordered oxygen but has not heard from them.   12/2023 for CAD, cardiomyopathy, atrial fibrillation, hypertension -stable.  Stopped Brilinta 2021 Brillinta.  Pressure goal less than 140/90.  Follow-up in 1 year with echo.  Neurology- Dr Ashley- last appt 8/2023 for history of CVA, Parkinson disease, and peripheral neuropathy.  Not interested in starting medication due to potential side effects.  Discussed exercise much as possible.  EMG/NCS with moderate to severe peripheral neuropathy with significant axonal loss in the motor studies of the right leg and changes that could go along with right S1 radiculopathy with nerve root level involvement versus root level involvement of the peripheral neuropathy.  Normal A1c, TSH, globulin.  MRI lumbar spine with multilevel lumbar spondyloarthropathy.  Be cautious with walking to prevent falls or injury.  Follow-up as needed.  7/2021 for history of CVA, recent stuttering, change in speech, parkinson's disease. They did not want treatment for Parkinson's. Follow up PRN   Previously-  Dr. Saleh- last appointment 1/2020   For seizure-like activity.  No further work-up at that time.  They will consider EEG if he has any further symptoms.    Dr. Naik- last seen 10/2017- for Parkinson's, orthostatic hypotension, and ataxia- he did not want to pursue treatment at that time and was advised to follow-up PRN.   Neurosurgery- Dr Parr- last appt 8/2024 for compression fracture T12/Thoracic and Lumbar xray performed and was doing well. Pain was back for baseline with TLSO brace. Change to corsett brace that was more comfortable. To wear brace when walking. Hold on Kyphoplasty. If worsening pain, call back and consider kyphoplasty. Follow up PRN  CANDELARIA Arthur-last appointment 3/2023 for L2 vertebral body compression fracture.  Continue physical therapy and Tylenol as needed.  Follow-up as needed.  Hematology- Dr Pennington- last appt 7/2023 for macrocytosis and thrombocytopenia- differential MDS and liver disease.  Macrocytosis and thrombocytopenia likely related to liver disease, MDS remains in differential.  With stability over a few years, advised continue monitoring.  With his age and difficulty getting to appointments, they advised he can follow-up with PCP and return to clinic only if significant worsening of his MCV or blood counts.    Prior Dr Richardson- last appt 8/2017 for macrocytosis without anemia and thrombocytopenia. Monthly B12 injections, observation without further evaluation if stable.   Sports Medicine- Dr Garcia-last appointment 2/2022 for bilateral OA knees and knee pain.  Will start approval process for gel series injections.  Completed injections 3/2022.  Has not had follow-up.  Ophthalmology- Beck Castro in [place]- will have left cataract surgery 1/27/2020 and right cataract surgery 2/3/2020.   Pulmonology- Dr De Jesus- last appt 12/2019 for pulmonary fibrosis, pulmonary edema, pneumothorax- follow up in 6 months.     The following portions of the patient's history were reviewed and updated as  appropriate: allergies, current medications, past family history, past medical history, past social history, past surgical history and problem list.    Review of Systems    Objective   Vitals:    12/10/24 1459   BP: (!) 110/38   Pulse: 66   Resp: 20   Temp: 97.1 °F (36.2 °C)   SpO2: 96%     Body mass index is 18.83 kg/m².  BMI is within normal parameters. No other follow-up for BMI required.    Physical Exam   Constitutional: He is oriented to person, place, and time. He appears well-developed. No distress.   HENT:   Head: Normocephalic and atraumatic.   Right Ear: External ear normal.   Left Ear: External ear normal.   Eyes: Conjunctivae are normal.   Neck: Carotid bruit is not present. No tracheal deviation present. No thyroid mass and no thyromegaly present.   Cardiovascular: Normal rate, regular rhythm and normal pulses.   Murmur heard.  Pulmonary/Chest: Effort normal and breath sounds normal.   Abdominal: Normal appearance.   Neurological: He is alert and oriented to person, place, and time. Gait (short, shuffling gait- ambulates with walker) abnormal.   Skin: Skin is warm and dry.   Psychiatric: His behavior is normal. Mood and thought content normal. His speech is delayed.   Nursing note and vitals reviewed.      Assessment & Plan  Diagnoses and all orders for this visit:    1. Cardiomyopathy, unspecified type (Primary)  -     Ambulatory Referral to Home Hospice  -     proBNP    2. Acute systolic (congestive) heart failure  -     Ambulatory Referral to Home Hospice  -     proBNP    3. Acute congestive heart failure, unspecified heart failure type  -     Ambulatory Referral to Home Hospice  -     proBNP    4. Coronary artery disease involving native coronary artery of native heart without angina pectoris  -     Ambulatory Referral to Home Hospice  -     proBNP    5. History of coronary artery stent placement  -     Ambulatory Referral to Home Hospice  -     proBNP    6. History of supraventricular  tachycardia  -     Ambulatory Referral to Home Hospice  -     proBNP    7. Paroxysmal atrial fibrillation  -     Ambulatory Referral to Home Hospice  -     proBNP    8. Precordial pain  -     Ambulatory Referral to Home Hospice  -     proBNP    9. Status post placement of implantable loop recorder  -     Ambulatory Referral to Home Hospice    10. Long term (current) use of antithrombotics/antiplatelets  -     Ambulatory Referral to Home Hospice    11. Acute hypoxic respiratory failure  -     Ambulatory Referral to Home Hospice  -     Ambulatory Referral to Pulmonology    12. Severe protein-calorie malnutrition  -     Ambulatory Referral to Home Hospice    13. Pulmonary fibrosis  -     Ambulatory Referral to Pulmonology    14. Pleural effusion  -     Ambulatory Referral to Pulmonology    15. Dyslipidemia  -     Comprehensive Metabolic Panel    16. Prediabetes  -     Comprehensive Metabolic Panel    17. Vitamin D deficiency  -     Comprehensive Metabolic Panel  -     Vitamin D,25-Hydroxy    18. Vitamin B 12 deficiency  -     CBC & Differential  -     Vitamin B12 & Folate    19. Macrocytic anemia  -     CBC & Differential  -     Iron and TIBC  -     Ferritin  -     Vitamin B12 & Folate  -     TSH  -     T4, free  -     T3, Free    20. Thrombocytopenia  -     Ambulatory Referral to Home Hospice    21. History of CVA (cerebrovascular accident)  -     Ambulatory Referral to Home Hospice    22. Parkinson's disease with dyskinesia, unspecified whether manifestations fluctuate  -     Ambulatory Referral to Home Hospice    23. Diplopia  -     Ambulatory Referral to Home Hospice    24. Hearing loss, unspecified hearing loss type, unspecified laterality  -     Ambulatory Referral to Home Hospice    25. Weakness of both legs  -     Ambulatory Referral to Home Hospice    26. Idiopathic peripheral neuropathy  -     Ambulatory Referral to Home Hospice    27. Closed fracture of multiple ribs of right side, sequela  -     Ambulatory  Referral to Home Hospice    28. Compression fracture of L2 vertebra with routine healing  -     Ambulatory Referral to Home Hospice    29. Closed fracture of second lumbar vertebra, unspecified fracture morphology, initial encounter  -     Ambulatory Referral to Home Hospice    30. Compression fracture of T12 vertebra with routine healing  -     Ambulatory Referral to Home Hospice    31. Osteoarthritis of cervical spine, unspecified spinal osteoarthritis complication status  -     Ambulatory Referral to Home Hospice      Assessment and Plan  Patient will have labs. Call if no results in 1 week. Stability of conditions, plan, follow up, and further recommendations pending labs. If labs and symptoms are stable, follow up in 3 months.     CHF, CAD, cardiomyopathy, atrial fibrillation, SVT- He continues follow-up with cardiology.  He denies any changes or increase in chest pain, shortness of breath, palpitations, dizziness, or weakness. He will continue follow up and treatment as directed by cardiology. He was referred to hospice from the hospital, however, they have not received a call from hospice. I will refer again today.   Pulmonary fibrosis/ interstitial lung disease, History of Pleural effusion and Pneumothorax- Patient continues with SOA. Cardiology ordered oxygen with failure of 6 minute walk test. They have not received oxygen. I will reach out to cardiology about oxygen and they can contact them about oxygen and talk with them at follow up. He also needs to see pulmonology, as some of his SOA and hypoxia could be pulmonary in etiology. I will refer back today.    Hypertension- Blood pressure has been stable. Continue Norvasc 5 mg once daily, Metoprolol  mg daily, and Losartan 50 mg once daily. Continue monitoring BP and pulse and call or return to cardiology if elevated or low consistently.    Hyperlipidemia- Last lipids were stable 10/2024 in the hospital. Continue Lipitor 20 mg at bedtime. I will  recheck at follow up.  Prediabetes- A1C improved from 5.8% 9/2022 to 5.6% 6/2023 and remained 5.6% 10/2024 in the hospital. I will continue to monitor and make further recommendations.   Vitamin D deficiency- Continue Vitamin D 1000IU daily. Dosing recommendations pending labs.   B12 deficiency- Continue B every other day. Await labs for further recommendations.   Anemia, Thrombocytopenia- I will recheck labs and make recommendations. Consideration of referral back to hematology if needed.   Parkinson's disease, Headache and speech disturbance, Diplopia, hearing loss, history of CVA- Patient had an episode 2/2021 with a severe headache then developed stuttering speech. Patient and son denied any other symptoms and reports no recurrence of headache, however, patient lived alone and had not had much interaction with people other than once a week when his son came to take care of groceries and medications. This was concerning for CVA, however, without any other deficits and a cardiac device in place, he was unable to have MRI. He underwent CT head and follow up with neurology. He was evaluated by speech therapy, was given home exercises. His son now moved in with him all the time, and he continued to stutter and talked less. He does have known Parkinson's disease that is untreated.  He is now living with family and is more engaged and is talking more when people give him time to respond. He will need to see neurology in follow up and consider treatment for Parkinson's if they feel this could help gait or speech or help him deteriorate slower. 9-1-1 to ER if he has worsening speech, recurrence of headache, or any other cardiac or neurological symptoms.  I will also refer to hospice since they have not received a call from hospital referral from hospice.  Balance and gait instability, weakness LE, peripheral neuropathy- He avoids any overhead movement, as he loses his balance. He was seen by neurology and diagnosed with  Parkinson's, however, patient and son were hesitant for treatment due to concern for side effects. He should follow up with neurology to consider treatment if they feel this would be beneficial. Continue follow up with cardiology and neurology. Continue exercises at home and PT. I will also refer to hospice since they have not received a call from hospital referral from hospice.    Fall with compression fractures, history of multiple rib fractures- He has had multiple spinal compression fractures. He will see neurosurgery in follow up if he has any worsening pain. Continue corset brace when he is up. I will also refer to hospice since they have not received a call from hospital referral from hospice.    From [facility] 8/2024. He is not up to date on colonoscopy, however, with age and other medical problems, he likely would not be able to have colonoscopy unless he has any symptoms. He should have DEXA with increased fractures. I will refer today. He needs to check with pharmacy to update vaccinations- Shingrix, Prevnar 20, RSV, ensure annual flu shot and Covid 19 booster.     Patient continues to see specialists as noted above.  I have reviewed available records, including consult notes, labs, and imaging/testing.  Patient to continue follow-up with specialists as directed by them. He needs to schedule follow up with pulmonology and I will refer back to neurology.     I spent 45 minutes caring for Dinesh Churchill SrTerrence on this date of service. This time includes time spent by me in the following activities as necessary: preparing for the visit, reviewing tests, specialists records and previous visits, obtaining and/or reviewing a separately obtained history, performing a medically appropriate exam and/or evaluation, counseling and educating the patient, family, caregiver, referring and/or communicating with other healthcare professionals, documenting information in the medical record, independently interpreting results and  communicating that information with the patient, family, caregiver, and developing a medically appropriate treatment plan with consideration of other conditions, medications, and treatments.

## 2024-12-11 LAB
25(OH)D3+25(OH)D2 SERPL-MCNC: 47.6 NG/ML (ref 30–100)
ALBUMIN SERPL-MCNC: 3.7 G/DL (ref 3.6–4.6)
ALP SERPL-CCNC: 61 IU/L (ref 44–121)
ALT SERPL-CCNC: 12 IU/L (ref 0–44)
AST SERPL-CCNC: 20 IU/L (ref 0–40)
BASOPHILS # BLD AUTO: 0 X10E3/UL (ref 0–0.2)
BASOPHILS NFR BLD AUTO: 1 %
BILIRUB SERPL-MCNC: 0.9 MG/DL (ref 0–1.2)
BUN SERPL-MCNC: 25 MG/DL (ref 10–36)
BUN/CREAT SERPL: 33 (ref 10–24)
CALCIUM SERPL-MCNC: 8.6 MG/DL (ref 8.6–10.2)
CHLORIDE SERPL-SCNC: 106 MMOL/L (ref 96–106)
CO2 SERPL-SCNC: 26 MMOL/L (ref 20–29)
CREAT SERPL-MCNC: 0.76 MG/DL (ref 0.76–1.27)
EGFRCR SERPLBLD CKD-EPI 2021: 82 ML/MIN/1.73
EOSINOPHIL # BLD AUTO: 0.1 X10E3/UL (ref 0–0.4)
EOSINOPHIL NFR BLD AUTO: 2 %
ERYTHROCYTE [DISTWIDTH] IN BLOOD BY AUTOMATED COUNT: 14.7 % (ref 11.6–15.4)
FERRITIN SERPL-MCNC: 667 NG/ML (ref 30–400)
FOLATE SERPL-MCNC: 7.5 NG/ML
GLOBULIN SER CALC-MCNC: 2.1 G/DL (ref 1.5–4.5)
GLUCOSE SERPL-MCNC: 102 MG/DL (ref 70–99)
HCT VFR BLD AUTO: 36.4 % (ref 37.5–51)
HGB BLD-MCNC: 11.7 G/DL (ref 13–17.7)
IMM GRANULOCYTES # BLD AUTO: 0 X10E3/UL (ref 0–0.1)
IMM GRANULOCYTES NFR BLD AUTO: 0 %
IRON SATN MFR SERPL: 42 % (ref 15–55)
IRON SERPL-MCNC: 96 UG/DL (ref 38–169)
LYMPHOCYTES # BLD AUTO: 1.2 X10E3/UL (ref 0.7–3.1)
LYMPHOCYTES NFR BLD AUTO: 29 %
MCH RBC QN AUTO: 33.9 PG (ref 26.6–33)
MCHC RBC AUTO-ENTMCNC: 32.1 G/DL (ref 31.5–35.7)
MCV RBC AUTO: 106 FL (ref 79–97)
MONOCYTES # BLD AUTO: 0.4 X10E3/UL (ref 0.1–0.9)
MONOCYTES NFR BLD AUTO: 10 %
NEUTROPHILS # BLD AUTO: 2.4 X10E3/UL (ref 1.4–7)
NEUTROPHILS NFR BLD AUTO: 58 %
NT-PROBNP SERPL-MCNC: 6149 PG/ML (ref 0–486)
PLATELET # BLD AUTO: 112 X10E3/UL (ref 150–450)
POTASSIUM SERPL-SCNC: 4.8 MMOL/L (ref 3.5–5.2)
PROT SERPL-MCNC: 5.8 G/DL (ref 6–8.5)
RBC # BLD AUTO: 3.45 X10E6/UL (ref 4.14–5.8)
SODIUM SERPL-SCNC: 143 MMOL/L (ref 134–144)
T3FREE SERPL-MCNC: 2.3 PG/ML (ref 2–4.4)
T4 FREE SERPL-MCNC: 1.27 NG/DL (ref 0.82–1.77)
TIBC SERPL-MCNC: 229 UG/DL (ref 250–450)
TSH SERPL DL<=0.005 MIU/L-ACNC: 3.04 UIU/ML (ref 0.45–4.5)
UIBC SERPL-MCNC: 133 UG/DL (ref 111–343)
VIT B12 SERPL-MCNC: 1152 PG/ML (ref 232–1245)
WBC # BLD AUTO: 4.1 X10E3/UL (ref 3.4–10.8)

## 2024-12-16 NOTE — PROGRESS NOTES
"Enter Query Response Below      Query Response: Unable to determine              If applicable, please update the problem list.   Patient: Dinesh Churchill Sr.        : 1928  Account: [id number]           Admit Date: 12/3/2024        How to Respond to this query:       a. Click New Note     b. Answer query within the yellow box.                c. Update the Problem List, if applicable.      If you have any questions about this query contact me at: [email or web address]    Dr. Mcqueen:     89-year-old male admitted with acute congestive heart failure.  The ED Provider note physical exam includes \"Mild accessory muscle use\", and the history and physical documentation includes \"use of accessory muscles of breathing noted\". The history and physical, hospitalist progress notes, and discharge summary include the diagnosis of acute hypoxic respiratory failure.  ABG findings from 12/3/24 include pH 7.40, pCO2 45.8, pO2 95.8, and oxygen saturation 97.4 on 3 liters of oxygen per nasal cannula.  The Vital Signs Flowsheet on 12/3/24 include a respiratory rate of 16, and an oxygen saturation of 99% on 3 liters of oxygen per nasal cannula.      After study, was acute hypoxic respiratory failure clinically supported during this admission?    Acute respiratory failure was supported with additional clinical indicators:____________  Acute respiratory failure was not supported  Other- specify_____________  Unable to determine      By submitting this query, we are merely seeking further clarification of documentation to accurately reflect all conditions that you are monitoring, evaluating, treating or that extend the hospitalization or utilize additional resources of care. Please utilize your independent clinical judgment when addressing the question(s) above.     This query and your response, once completed, will be entered into the legal medical record.    Sincerely,  Anahi Vaughan RN,BSN    Clinical Documentation " [facility]

## 2024-12-17 NOTE — PROGRESS NOTES
Enter Query Response Below      Query Response: Heart failure due to hypertension             If applicable, please update the problem list.   Patient: Dinesh Churchill Sr.        : 1928  Account: [id number]           Admit Date: 12/3/2024        How to Respond to this query:       a. Click New Note     b. Answer query within the yellow box.                c. Update the Problem List, if applicable.      If you have any questions about this query contact me at: [email or web address]    Dr. Mcqueen:     89-year-old male admitted on 12/3/24 with acute congestive heart failure. The History and Physical included the diagnosis of hypertension and cardiomyopathy.  The discharge summary includes echocardiogram findings from 10/28/24 of left ventricular ejection fraction appearing to be 41-45%, and moderate to severe mitral valve regurgitation.  He was treated during his hospital stay with IV Lasix, and oral metoprolol.      Please clarify the etiology of the patient’s heart failure:    Heart failure due to hypertension  Heart failure due to cardiomyopathy  Heart failure due to valvular disease  Heart failure due to hypertension, cardiomyopathy and valvular disease  Other- specify__________    By submitting this query, we are merely seeking further clarification of documentation to accurately reflect all conditions that you are monitoring, evaluating, treating or that extend the hospitalization or utilize additional resources of care. Please utilize your independent clinical judgment when addressing the question(s) above.     This query and your response, once completed, will be entered into the legal medical record.    Sincerely,  Anahi Vaughan RN, BSN    Clinical Documentation [facility]

## 2024-12-19 ENCOUNTER — OFFICE VISIT (OUTPATIENT)
Dept: CARDIOLOGY | Facility: CLINIC | Age: 89
End: 2024-12-19
Payer: MEDICARE

## 2024-12-19 VITALS
SYSTOLIC BLOOD PRESSURE: 124 MMHG | DIASTOLIC BLOOD PRESSURE: 47 MMHG | WEIGHT: 138 LBS | HEART RATE: 52 BPM | BODY MASS INDEX: 18.69 KG/M2 | HEIGHT: 72 IN

## 2024-12-19 DIAGNOSIS — I25.10 CORONARY ARTERY DISEASE INVOLVING NATIVE CORONARY ARTERY OF NATIVE HEART WITHOUT ANGINA PECTORIS: ICD-10-CM

## 2024-12-19 DIAGNOSIS — I10 ESSENTIAL HYPERTENSION: ICD-10-CM

## 2024-12-19 DIAGNOSIS — E78.5 HYPERLIPIDEMIA, UNSPECIFIED HYPERLIPIDEMIA TYPE: ICD-10-CM

## 2024-12-19 DIAGNOSIS — I42.9 CARDIOMYOPATHY, UNSPECIFIED TYPE: Primary | ICD-10-CM

## 2024-12-19 PROCEDURE — 99214 OFFICE O/P EST MOD 30 MIN: CPT | Performed by: INTERNAL MEDICINE

## 2024-12-19 NOTE — PROGRESS NOTES
1 mo follow up    Subjective:        Dinesh Churchill Sr. is a 89 y.o. male who here for follow up    CC  1 month follow-up with coronary artery disease congestive heart failure  HPI  89 years old male recently admitted to the hospital with known coronary artery disease cardiomyopathy and hypertension here for the follow-up denies any chest pains or tightness in the chest     Problems Addressed this Visit          Cardiac and Vasculature    Essential hypertension    Cardiomyopathy - Primary    Coronary artery disease involving native coronary artery of native heart without angina pectoris     Other Visit Diagnoses       Hyperlipidemia, unspecified hyperlipidemia type              Diagnoses         Codes Comments    Cardiomyopathy, unspecified type    -  Primary ICD-10-CM: I42.9  ICD-9-CM: 425.4     Coronary artery disease involving native coronary artery of native heart without angina pectoris     ICD-10-CM: I25.10  ICD-9-CM: 414.01     Essential hypertension     ICD-10-CM: I10  ICD-9-CM: 401.9     Hyperlipidemia, unspecified hyperlipidemia type     ICD-10-CM: E78.5  ICD-9-CM: 272.4           .    The following portions of the patient's history were reviewed and updated as appropriate: allergies, current medications, past family history, past medical history, past social history, past surgical history and problem list.    Past Medical History:   Diagnosis Date    Abnormal ECG     Abnormal MRI     Acute congestive heart failure 12/03/2024    Acute frontal sinusitis     Arthritis     Chronic fatigue     Coronary artery disease     Difficulty walking     Dizziness     Head injury     Headache 01/21/2023    Hearing aid worn     left    Hearing loss     Hyperlipidemia     Hypertension     Idiopathic peripheral neuropathy 08/29/2023    Kidney stones     Low back pain     Macrocytosis     Neoplasm of skin     Osteoporosis     Prediabetes     Vitamin D deficiency      reports that he has never smoked. He has never been  "exposed to tobacco smoke. He has never used smokeless tobacco. He reports that he does not drink alcohol and does not use drugs.   Family History   Problem Relation Age of Onset    Arthritis Son     Cancer Sister     Diabetes Son        Review of Systems  Constitutional: No wt loss, fever, fatigue  Gastrointestinal: No nausea, abdominal pain  Behavioral/Psych: No insomnia or anxiety   Cardiovascular no chest pains or tightness in the chest  Objective:       Physical Exam  /47   Pulse 52   Ht 182.9 cm (72.01\")   Wt 62.6 kg (138 lb)   BMI 18.71 kg/m²   General appearance: No acute changes   Neck: Trachea midline; NECK, supple, no thyromegaly or lymphadenopathy   Lungs: Normal size and shape, normal breath sounds, equal distribution of air, no rales and rhonchi   CV: S1-S2 regular, no murmurs, no rub, no gallop   Abdomen: Soft, nontender; no masses , no abnormal abdominal sounds   Extremities: No deformity , normal color , no peripheral edema   Skin: Normal temperature, turgor and texture; no rash, ulcers          Procedures      Echocardiogram:    Results for orders placed during the hospital encounter of 10/28/24    Adult Transthoracic Echo Complete W/ Cont if Necessary Per Protocol    Interpretation Summary    Left ventricular ejection fraction appears to be 41 - 45%.    The left ventricular cavity is moderately dilated.    The following left ventricular wall segments are hypokinetic: apical lateral, mid inferolateral, mid inferior, mid inferoseptal and mid anteroseptal.    Left ventricular diastolic function was indeterminate.    The left atrial cavity is moderate to severely dilated.    There is calcification of the aortic valve.    Moderate to severe mitral valve regurgitation is present.    Estimated right ventricular systolic pressure from tricuspid regurgitation is normal (<35 mmHg).    Mild dilation of the aortic root is present. Mild dilation of the sinuses of Valsalva is present.          Current " Outpatient Medications:     acetaminophen (TYLENOL) 500 MG tablet, Take 1 tablet by mouth 4 (Four) Times a Day., Disp: , Rfl:     amLODIPine (NORVASC) 5 MG tablet, TAKE 1 TABLET BY MOUTH DAILY, Disp: 90 tablet, Rfl: 3    aspirin 81 MG chewable tablet, Chew 1 tablet Daily., Disp: , Rfl:     atorvastatin (LIPITOR) 20 MG tablet, TAKE 1 TABLET BY MOUTH EVERY NIGHT, Disp: 90 tablet, Rfl: 3    Cholecalciferol (VITAMIN D-3) 1000 UNITS capsule, Take 1,000 Units by mouth Daily., Disp: , Rfl:     Cyanocobalamin (B-12) 1000 MCG lozenge, PLACE 1 LOZENGE UNDER THE TOUNGE EVERY OTHER DAY (Patient taking differently: Place 1 tablet under the tongue Every Other Day.), Disp: 45 lozenge, Rfl: 1    furosemide (LASIX) 40 MG tablet, Take 1 tablet by mouth Daily., Disp: 30 tablet, Rfl: 0    isosorbide mononitrate (IMDUR) 60 MG 24 hr tablet, Take 1 tablet by mouth Daily., Disp: , Rfl:     losartan (COZAAR) 50 MG tablet, TAKE 1 TABLET BY MOUTH DAILY, Disp: 90 tablet, Rfl: 3    metoprolol succinate XL (TOPROL-XL) 100 MG 24 hr tablet, TAKE 1 TABLET BY MOUTH DAILY, Disp: 90 tablet, Rfl: 3   Assessment:                Plan:          ICD-10-CM ICD-9-CM   1. Cardiomyopathy, unspecified type  I42.9 425.4   2. Coronary artery disease involving native coronary artery of native heart without angina pectoris  I25.10 414.01   3. Essential hypertension  I10 401.9   4. Hyperlipidemia, unspecified hyperlipidemia type  E78.5 272.4     1. Cardiomyopathy, unspecified type  Compensated    2. Coronary artery disease involving native coronary artery of native heart without angina pectoris  No angina pectoris    3. Essential hypertension  Continue current treatment    4. Hyperlipidemia, unspecified hyperlipidemia type  Continue current treatment      6 MONTHS    EXTRA LASIX 20 MG, S/L PRN    Pros and cons of this new medication / change medication has been explained to  the patient    Possible side effects has been explained    Associated need of the blood   Work has been explained    Need for the compliance of the medication has been explained      SEE IN6 MONTHS  COUNSELING:    Dinesh Churchill Sr.Counseling was given to patient for the following topics: diagnostic results, risk factor reductions, impressions, risks and benefits of treatment options and importance of treatment compliance .       SMOKING COUNSELING:        Dictated using Dragon dictation

## 2024-12-23 ENCOUNTER — TELEPHONE (OUTPATIENT)
Dept: ENDOCRINOLOGY | Age: 89
End: 2024-12-23
Payer: MEDICARE

## 2024-12-23 ENCOUNTER — TELEPHONE (OUTPATIENT)
Dept: FAMILY MEDICINE CLINIC | Facility: CLINIC | Age: 89
End: 2024-12-23
Payer: MEDICARE

## 2024-12-23 NOTE — TELEPHONE ENCOUNTER
Caller: JUSTIN ONEILL    Relationship: Emergency Contact    Best call back number: [phone]    What is the best time to reach you: ANYTIME    Who are you requesting to speak with (clinical staff, provider,  specific staff member): PROVIDER    What was the call regarding: JUSTIN CALLED WANTING TO CANCEL PT UPCOMING APPT BECAUSE OF PT NOT GETTING HIS INJECTION. JUSTIN WAS WONDERING IF PT NEEDED TO RESCHEDULE INJECTION APPT. PLEASE ADVISE.

## 2024-12-26 NOTE — TELEPHONE ENCOUNTER
Called and spoke with Sylwia, she stated that they would like to be seen sooner to talk more about the injection. Are you okay with moving the appt up?

## 2024-12-27 ENCOUNTER — TELEPHONE (OUTPATIENT)
Dept: ENDOCRINOLOGY | Age: 89
End: 2024-12-27
Payer: MEDICARE

## 2024-12-30 ENCOUNTER — APPOINTMENT (OUTPATIENT)
Dept: GENERAL RADIOLOGY | Facility: HOSPITAL | Age: 89
End: 2024-12-30
Payer: MEDICARE

## 2024-12-30 ENCOUNTER — APPOINTMENT (OUTPATIENT)
Dept: CT IMAGING | Facility: HOSPITAL | Age: 89
End: 2024-12-30
Payer: MEDICARE

## 2024-12-30 ENCOUNTER — HOSPITAL ENCOUNTER (OUTPATIENT)
Facility: HOSPITAL | Age: 89
Discharge: HOME | End: 2024-12-30
Attending: EMERGENCY MEDICINE | Admitting: EMERGENCY MEDICINE
Payer: MEDICARE

## 2024-12-30 ENCOUNTER — TELEPHONE (OUTPATIENT)
Dept: FAMILY MEDICINE CLINIC | Facility: CLINIC | Age: 89
End: 2024-12-30
Payer: MEDICARE

## 2024-12-30 VITALS
RESPIRATION RATE: 18 BRPM | SYSTOLIC BLOOD PRESSURE: 158 MMHG | HEART RATE: 83 BPM | TEMPERATURE: 97.7 F | WEIGHT: 138.01 LBS | HEIGHT: 72 IN | DIASTOLIC BLOOD PRESSURE: 57 MMHG | OXYGEN SATURATION: 98 % | BODY MASS INDEX: 18.69 KG/M2

## 2024-12-30 DIAGNOSIS — Z86.73 HISTORY OF CVA (CEREBROVASCULAR ACCIDENT): ICD-10-CM

## 2024-12-30 DIAGNOSIS — J90 PLEURAL EFFUSION, BILATERAL: ICD-10-CM

## 2024-12-30 DIAGNOSIS — R41.82 ALTERED MENTAL STATUS, UNSPECIFIED ALTERED MENTAL STATUS TYPE: Primary | ICD-10-CM

## 2024-12-30 DIAGNOSIS — J96.11 CHRONIC HYPOXEMIC RESPIRATORY FAILURE: ICD-10-CM

## 2024-12-30 DIAGNOSIS — I25.10 CORONARY ARTERY DISEASE INVOLVING NATIVE CORONARY ARTERY OF NATIVE HEART WITHOUT ANGINA PECTORIS: ICD-10-CM

## 2024-12-30 LAB
ALBUMIN SERPL-MCNC: 3.9 G/DL (ref 3.5–5.2)
ALBUMIN/GLOB SERPL: 1.5 G/DL
ALP SERPL-CCNC: 64 U/L (ref 39–117)
ALT SERPL W P-5'-P-CCNC: 10 U/L (ref 1–41)
ANION GAP SERPL CALCULATED.3IONS-SCNC: 8.4 MMOL/L (ref 5–15)
AST SERPL-CCNC: 12 U/L (ref 1–40)
BASOPHILS # BLD AUTO: 0.02 10*3/MM3 (ref 0–0.2)
BASOPHILS NFR BLD AUTO: 0.4 % (ref 0–1.5)
BILIRUB SERPL-MCNC: 1.3 MG/DL (ref 0–1.2)
BUN SERPL-MCNC: 26 MG/DL (ref 8–23)
BUN/CREAT SERPL: 26.8 (ref 7–25)
CALCIUM SPEC-SCNC: 8.7 MG/DL (ref 8.2–9.6)
CHLORIDE SERPL-SCNC: 107 MMOL/L (ref 98–107)
CO2 SERPL-SCNC: 28.6 MMOL/L (ref 22–29)
CREAT SERPL-MCNC: 0.97 MG/DL (ref 0.76–1.27)
D-LACTATE SERPL-SCNC: 1.2 MMOL/L (ref 0.5–2)
D-LACTATE SERPL-SCNC: 2.1 MMOL/L (ref 0.5–2)
DEPRECATED RDW RBC AUTO: 71.7 FL (ref 37–54)
EGFRCR SERPLBLD CKD-EPI 2021: 71.4 ML/MIN/1.73
EOSINOPHIL # BLD AUTO: 0.02 10*3/MM3 (ref 0–0.4)
EOSINOPHIL NFR BLD AUTO: 0.4 % (ref 0.3–6.2)
ERYTHROCYTE [DISTWIDTH] IN BLOOD BY AUTOMATED COUNT: 18 % (ref 12.3–15.4)
ETHANOL BLD-MCNC: <10 MG/DL (ref 0–10)
ETHANOL UR QL: <0.01 %
FLUAV SUBTYP SPEC NAA+PROBE: NOT DETECTED
FLUBV RNA ISLT QL NAA+PROBE: NOT DETECTED
GEN 5 1HR TROPONIN T REFLEX: 59 NG/L
GLOBULIN UR ELPH-MCNC: 2.6 GM/DL
GLUCOSE SERPL-MCNC: 116 MG/DL (ref 65–99)
HCT VFR BLD AUTO: 38.7 % (ref 37.5–51)
HGB BLD-MCNC: 12.1 G/DL (ref 13–17.7)
IMM GRANULOCYTES # BLD AUTO: 0.01 10*3/MM3 (ref 0–0.05)
IMM GRANULOCYTES NFR BLD AUTO: 0.2 % (ref 0–0.5)
LYMPHOCYTES # BLD AUTO: 1.02 10*3/MM3 (ref 0.7–3.1)
LYMPHOCYTES NFR BLD AUTO: 22.3 % (ref 19.6–45.3)
MCH RBC QN AUTO: 34.1 PG (ref 26.6–33)
MCHC RBC AUTO-ENTMCNC: 31.3 G/DL (ref 31.5–35.7)
MCV RBC AUTO: 109 FL (ref 79–97)
MONOCYTES # BLD AUTO: 0.41 10*3/MM3 (ref 0.1–0.9)
MONOCYTES NFR BLD AUTO: 9 % (ref 5–12)
NEUTROPHILS NFR BLD AUTO: 3.1 10*3/MM3 (ref 1.7–7)
NEUTROPHILS NFR BLD AUTO: 67.7 % (ref 42.7–76)
NT-PROBNP SERPL-MCNC: ABNORMAL PG/ML (ref 0–1800)
PLATELET # BLD AUTO: 136 10*3/MM3 (ref 140–450)
PMV BLD AUTO: 12.4 FL (ref 6–12)
POTASSIUM SERPL-SCNC: 3.9 MMOL/L (ref 3.5–5.2)
PROT SERPL-MCNC: 6.5 G/DL (ref 6–8.5)
QT INTERVAL: 464 MS
QTC INTERVAL: 490 MS
RBC # BLD AUTO: 3.55 10*6/MM3 (ref 4.14–5.8)
SARS-COV-2 RNA RESP QL NAA+PROBE: NOT DETECTED
SODIUM SERPL-SCNC: 144 MMOL/L (ref 136–145)
TROPONIN T % DELTA: -3 %
TROPONIN T NUMERIC DELTA: -2 NG/L
TROPONIN T SERPL HS-MCNC: 61 NG/L
WBC NRBC COR # BLD AUTO: 4.58 10*3/MM3 (ref 3.4–10.8)

## 2024-12-30 PROCEDURE — 25010000002 FUROSEMIDE PER 20 MG: Performed by: NURSE PRACTITIONER

## 2024-12-30 PROCEDURE — 71045 X-RAY EXAM CHEST 1 VIEW: CPT

## 2024-12-30 PROCEDURE — 83605 ASSAY OF LACTIC ACID: CPT | Performed by: NURSE PRACTITIONER

## 2024-12-30 PROCEDURE — 25810000003 SODIUM CHLORIDE 0.9 % SOLUTION: Performed by: NURSE PRACTITIONER

## 2024-12-30 PROCEDURE — 25010000002 CEFTRIAXONE PER 250 MG: Performed by: NURSE PRACTITIONER

## 2024-12-30 PROCEDURE — G0463 HOSPITAL OUTPT CLINIC VISIT: HCPCS | Performed by: NURSE PRACTITIONER

## 2024-12-30 PROCEDURE — 93005 ELECTROCARDIOGRAM TRACING: CPT | Performed by: NURSE PRACTITIONER

## 2024-12-30 PROCEDURE — 70450 CT HEAD/BRAIN W/O DYE: CPT

## 2024-12-30 PROCEDURE — 71250 CT THORAX DX C-: CPT

## 2024-12-30 PROCEDURE — 87636 SARSCOV2 & INF A&B AMP PRB: CPT | Performed by: NURSE PRACTITIONER

## 2024-12-30 PROCEDURE — 85025 COMPLETE CBC W/AUTO DIFF WBC: CPT | Performed by: NURSE PRACTITIONER

## 2024-12-30 PROCEDURE — 84484 ASSAY OF TROPONIN QUANT: CPT | Performed by: NURSE PRACTITIONER

## 2024-12-30 PROCEDURE — 82077 ASSAY SPEC XCP UR&BREATH IA: CPT | Performed by: NURSE PRACTITIONER

## 2024-12-30 PROCEDURE — 80053 COMPREHEN METABOLIC PANEL: CPT | Performed by: NURSE PRACTITIONER

## 2024-12-30 PROCEDURE — 83880 ASSAY OF NATRIURETIC PEPTIDE: CPT | Performed by: NURSE PRACTITIONER

## 2024-12-30 RX ORDER — AZITHROMYCIN 250 MG/1
500 TABLET, FILM COATED ORAL ONCE
Status: COMPLETED | OUTPATIENT
Start: 2024-12-30 | End: 2024-12-30

## 2024-12-30 RX ORDER — SODIUM CHLORIDE 0.9 % (FLUSH) 0.9 %
10 SYRINGE (ML) INJECTION AS NEEDED
Status: DISCONTINUED | OUTPATIENT
Start: 2024-12-30 | End: 2024-12-30 | Stop reason: HOSPADM

## 2024-12-30 RX ORDER — FUROSEMIDE 10 MG/ML
40 INJECTION INTRAMUSCULAR; INTRAVENOUS ONCE
Status: COMPLETED | OUTPATIENT
Start: 2024-12-30 | End: 2024-12-30

## 2024-12-30 RX ADMIN — CEFTRIAXONE SODIUM 1000 MG: 1 INJECTION, POWDER, FOR SOLUTION INTRAMUSCULAR; INTRAVENOUS at 17:18

## 2024-12-30 RX ADMIN — Medication 10 ML: at 18:06

## 2024-12-30 RX ADMIN — SODIUM CHLORIDE 250 ML: 9 INJECTION, SOLUTION INTRAVENOUS at 17:14

## 2024-12-30 RX ADMIN — AZITHROMYCIN DIHYDRATE 500 MG: 250 TABLET ORAL at 17:14

## 2024-12-30 RX ADMIN — FUROSEMIDE 40 MG: 10 INJECTION, SOLUTION INTRAMUSCULAR; INTRAVENOUS at 17:55

## 2024-12-30 NOTE — TELEPHONE ENCOUNTER
PTS FAMILY CALLED MULTIPLE TIME THIS MORNING 12/30/24 ABOUT CONCERNS OF PT HAVING A UTI AND STATED THAT HE HAS HAD THESE SYMPTOMS SINCE FRIDAY. I ADVISED THAT THEY TAKE HIM TO THE ER OR URGENT CARE TO BE SEEN

## 2024-12-30 NOTE — TELEPHONE ENCOUNTER
FROM MAYO BRIONES  I agree. they should not wait until tomorrow. He needs to go to ER today- I would not recommend urgent care with age and risk. Did they say they will take him?    THEY CALLED BACK ONCE AGAIN AND I ADVISED BUT THEY DO NOT WANT TO TAKE HIM TO THE ER BUT HAVE CALLED SOME URGENT CARES TO SEE ABOUT GETTING HIM IN    FROM [facility].   please let them know I would recommend [facility] if they are going to take him to urgent care- that way they can at least be transitioned to ER if they think he is too sick for urgent care and prevents them having to take him 2 places.     FROM MAYO BRIONES.   thank you. THey will be highly upset if he is sick and I send them straight to the ER from the office. Nothing we can do about it other than telling them our recommendations. I appreciate it.     I JUST CALLED AND THEY ARE AT [facility] AND I TOLD THE YOUR RECOMMENDATIONS     FROM MAYO BRIONES.     thank you. they will let them know if they need to go to ER I guess. I appreciate it.

## 2024-12-30 NOTE — [facility] PROVIDER NOTE
EMERGENCY DEPARTMENT ENCOUNTER    Room Number:  04/04  Date seen:  12/30/2024  Time seen: 15:02 EST  PCP: Phyllis Loving PA  Historian: patient, son    Discussed/obtained information from independent historians: son contributed to history    HPI:  Chief complaint:altered mental status  A complete HPI/ROS/PMH/PSH/SH/FH are unobtainable due to: AMS  Context:Dinesh Churchill Sr. is a 89 y.o. male with history of SVT hypertension, coronary artery disease, Parkinson's disease stroke, proximal atrial fib pneumothorax pneumonia compression spine fractures who presents to the ED with c/o acute altered mental status that started on Friday.  Son states pt was just sitting in his chair talking to people that aren't there and he hasn't done this before. The son states he doesn't talk much at baseline.  The patient is not currently anticoagulated.  He denies any falls, abdominal pain or breathing difficulty.      External (non-ED) record review: Patient had a recent cardiology visit December 19 of this year with Dr. Pratt in follow up for known CAD, cardiomyopathy and HTN.  Pt was advised he can take extra lasix as needed and sublingual nitro prn. I also reviewed recent PCP OV dated 12/10/24.  Pt has pulmonary fibrosis, ILD.  Her note also mentions the patient's Parkinson's disease, headache, speech disturbance.  Son reports the patient stutters and does not talk much.      Pt had UA PTA at urgent care:   Latest Reference Range & Units 12/30/24 13:25   Color, UA  Dark Yellow   Appearance, UA  Slightly Cloudy !   Specific Gravity, UA 1.005 - 1.030  1.020   pH, UA 5.0 - 8.0  6.0   Glucose mg/dL Negative   Ketones, UA  Negative   Blood, UA  Negative   Nitrite, UA  Negative   Leukocytes, UA  Negative   Protein, UA mg/dL Negative   Bilirubin, UA  Negative   Urobilinogen, UA  0.2 E.U./dL   !: Data is abnormal    Chronic or social conditions impacting care:    ALLERGIES  Patient has no known allergies.    PAST MEDICAL  HISTORY  Active Ambulatory Problems     Diagnosis Date Noted    Abnormal magnetic resonance imaging study 03/18/2016    Arthritis 03/18/2016    Osteoarthritis of cervical spine 03/18/2016    Diplopia 03/18/2016    Hearing loss 03/18/2016    Neoplasm of uncertain behavior of skin 03/18/2016    Prediabetes 03/18/2016    Vitamin D deficiency 03/18/2016    Chronic fatigue 10/27/2016    History of supraventricular tachycardia 04/07/2017    Essential hypertension 04/07/2017    B12 deficiency 04/19/2017    Abnormal cardiac function test 05/08/2017    Cardiomyopathy 05/08/2017    Coronary artery disease involving native coronary artery of native heart without angina pectoris 05/31/2017    History of coronary artery stent placement 05/31/2017    History of renal calculi 05/31/2017    Dyslipidemia 05/31/2017    Macrocytic anemia 08/09/2017    Precordial pain 08/16/2017    Parkinson's disease 10/25/2017    History of CVA (cerebrovascular accident) 10/25/2017    Paroxysmal atrial fibrillation 11/29/2017    Otalgia, left 12/29/2017    Chest pain 08/06/2019    Abnormal stress test 08/06/2019    Spontaneous pneumothorax 08/18/2019    Chest pain 08/18/2019    Acute systolic (congestive) heart failure 08/19/2019    Thrombocytopenia 08/20/2019    Pneumonia of left lower lobe due to infectious organism 09/06/2019    Syncope and collapse 11/06/2019    Status post placement of implantable loop recorder 11/22/2019    Long term (current) use of antithrombotics/antiplatelets 03/13/2020    Stuttering 04/09/2021    Fall 01/19/2022    Closed fracture of multiple ribs of right side 01/19/2022    Chest wall pain 01/19/2022    Closed fracture of phalanx of toe of left foot 01/19/2022    Headache 01/21/2023    Sudden onset of severe headache 01/21/2023    Fall in home, initial encounter 01/27/2023    Compression fracture of L2 vertebra with routine healing 01/28/2023    Weakness of both legs 05/08/2023    Idiopathic peripheral neuropathy  08/29/2023    L2 vertebral fracture 06/12/2024    Compression fracture of T12 vertebra with routine healing 06/12/2024    Sacral fracture, closed 06/12/2024    Senile osteoporosis 09/03/2024    Acute hypoxic respiratory failure 10/28/2024    Acute congestive heart failure 12/03/2024    Elevated troponin 12/03/2024    Severe protein-calorie malnutrition 12/04/2024     Resolved Ambulatory Problems     Diagnosis Date Noted    Maxillary sinusitis 03/18/2016    Dizziness 10/27/2016    Acute frontal sinusitis 10/27/2016    Precordial pain 04/07/2017    SOB (shortness of breath) 04/07/2017    Acute rhinitis 12/29/2017     Past Medical History:   Diagnosis Date    Abnormal ECG     Abnormal MRI     Coronary artery disease     Difficulty walking     Head injury     Hearing aid worn     Hyperlipidemia     Hypertension     Kidney stones     Low back pain     Macrocytosis     Neoplasm of skin     Osteoporosis        PAST SURGICAL HISTORY  Past Surgical History:   Procedure Laterality Date    CARDIAC CATHETERIZATION N/A 05/22/2017    Procedure: Left Heart Cath;  Surgeon: Maninder Quezada MD;  Location: [facility] CATH INVASIVE LOC[facility];  Service:     CARDIAC CATHETERIZATION N/A 05/23/2017    Procedure: Stent BMS coronary;  Surgeon: Maninder Quezada MD;  Location: [facility] CATH INVASIVE LOCATION;  Service:     CARDIAC CATHETERIZATION N/A 08/08/2019    Procedure: Left Heart Cath;  Surgeon: Maninder Quezada MD;  Location: [facility] CATH INVASIVE LOC[facility];  Service: Cardiology    CARDIAC CATHETERIZATION N/A 08/08/2019    Procedure: Left ventriculography;  Surgeon: Maninder Quezada MD;  Location: [facility] CATH INVASIVE LOCATION;  Service: Cardiology    CARDIAC CATHETERIZATION N/A 08/08/2019    Procedure: Coronary angiography;  Surgeon: Maninder Quezada MD;  Location: [facility] CATH INVASIVE LOC[facility];  Service: Cardiology    CARDIAC CATHETERIZATION N/A 08/20/2019    Procedure: CORONARY ANGIOGRAPHY;  Surgeon: Damien  MD Maninder;  Location:  [facility] CATH INVASIVE LOCATION;  Service: Cardiovascular    CARDIAC CATHETERIZATION N/A 08/20/2019    Procedure: Stent BMS coronary;  Surgeon: Maninder Quezada MD;  Location:  [facility] CATH INVASIVE LOCATION;  Service: Cardiovascular    CARDIAC ELECTROPHYSIOLOGY PROCEDURE N/A 11/08/2019    Procedure: Loop insertion;  Surgeon: Maninder Quezada MD;  Location:  [facility] CATH INVASIVE LOCATION;  Service: Cardiovascular    HERNIA REPAIR      x 2    INGUINAL HERNIA REPAIR      UT RT/LT HEART CATHETERS N/A 05/23/2017    Procedure: Percutaneous Coronary Intervention;  Surgeon: Maninder Quezada MD;  Location:  [facility] CATH INVASIVE LOCATION;  Service: Cardiovascular       FAMILY HISTORY  Family History   Problem Relation Age of Onset    Arthritis Son     Cancer Sister     Diabetes Son        SOCIAL HISTORY  Social History     Socioeconomic History    Marital status:    Tobacco Use    Smoking status: Never     Passive exposure: Never    Smokeless tobacco: Never   Vaping Use    Vaping status: Never Used   Substance and Sexual Activity    Alcohol use: No     Comment: quit 30 years ago     Drug use: No    Sexual activity: Not Currently     Partners: Male       REVIEW OF SYSTEMS  Review of Systems   Unable to perform ROS: Mental status change   History of strokes, stuttering speech at baseline.     PHYSICAL EXAM    I have reviewed the triage vital signs and nursing notes.  Vitals:    12/30/24 1900   BP: 158/57   Pulse: 83   Resp: 18   Temp:    SpO2:      Physical Exam    GENERAL: not distressed, frail, elderly.  Pt does not ambulate at baseline.   HENT: nares patent, mm moist  EYES: no scleral icterus, PERRL, EOMI  NECK: no ROM limitations  CV: irregularly irregular, variable rate but always less than 100  RESPIRATORY: normal effort, diminished  ABDOMEN: soft, no tenderness  : deferred  MUSCULOSKELETAL: no deformity  NEURO: alert, moves all extremities, follows commands  SKIN: warm,  dry    LAB RESULTS  Recent Results (from the past 24 hours)   POC Urinalysis Dipstick, Multipro (Automated Dipstick)    Collection Time: 12/30/24  1:25 PM    Specimen: Urine   Result Value Ref Range    Color Dark Yellow     Clarity, UA Slightly Cloudy (A)     Glucose, UA Negative mg/dL    Bilirubin Negative     Ketones, UA Negative     Specific Gravity  1.020 1.005 - 1.030    Blood, UA Negative     pH, Urine 6.0 5.0 - 8.0    Protein, POC Negative mg/dL    Urobilinogen, UA 0.2 E.U./dL     Nitrite, UA Negative     Leukocytes Negative    ECG 12 Lead Altered Mental Status    Collection Time: 12/30/24  3:32 PM   Result Value Ref Range    QT Interval 464 ms    QTC Interval 490 ms   Lactic Acid, Plasma    Collection Time: 12/30/24  3:49 PM    Specimen: Blood   Result Value Ref Range    Lactate 2.1 (C) 0.5 - 2.0 mmol/L   CBC Auto Differential    Collection Time: 12/30/24  3:49 PM    Specimen: Blood   Result Value Ref Range    WBC 4.58 3.40 - 10.80 10*3/mm3    RBC 3.55 (L) 4.14 - 5.80 10*6/mm3    Hemoglobin 12.1 (L) 13.0 - 17.7 g/dL    Hematocrit 38.7 37.5 - 51.0 %    .0 (H) 79.0 - 97.0 fL    MCH 34.1 (H) 26.6 - 33.0 pg    MCHC 31.3 (L) 31.5 - 35.7 g/dL    RDW 18.0 (H) 12.3 - 15.4 %    RDW-SD 71.7 (H) 37.0 - 54.0 fl    MPV 12.4 (H) 6.0 - 12.0 fL    Platelets 136 (L) 140 - 450 10*3/mm3    Neutrophil % 67.7 42.7 - 76.0 %    Lymphocyte % 22.3 19.6 - 45.3 %    Monocyte % 9.0 5.0 - 12.0 %    Eosinophil % 0.4 0.3 - 6.2 %    Basophil % 0.4 0.0 - 1.5 %    Immature Grans % 0.2 0.0 - 0.5 %    Neutrophils, Absolute 3.10 1.70 - 7.00 10*3/mm3    Lymphocytes, Absolute 1.02 0.70 - 3.10 10*3/mm3    Monocytes, Absolute 0.41 0.10 - 0.90 10*3/mm3    Eosinophils, Absolute 0.02 0.00 - 0.40 10*3/mm3    Basophils, Absolute 0.02 0.00 - 0.20 10*3/mm3    Immature Grans, Absolute 0.01 0.00 - 0.05 10*3/mm3   COVID-19 and FLU A/B PCR, 1 HR TAT - Swab, Nasopharynx    Collection Time: 12/30/24  3:50 PM    Specimen: Nasopharynx; Swab   Result Value  Ref Range    COVID19 Not Detected Not Detected - Ref. Range    Influenza A PCR Not Detected Not Detected    Influenza B PCR Not Detected Not Detected   Comprehensive Metabolic Panel    Collection Time: 12/30/24  5:11 PM    Specimen: Blood   Result Value Ref Range    Glucose 116 (H) 65 - 99 mg/dL    BUN 26 (H) 8 - 23 mg/dL    Creatinine 0.97 0.76 - 1.27 mg/dL    Sodium 144 136 - 145 mmol/L    Potassium 3.9 3.5 - 5.2 mmol/L    Chloride 107 98 - 107 mmol/L    CO2 28.6 22.0 - 29.0 mmol/L    Calcium 8.7 8.2 - 9.6 mg/dL    Total Protein 6.5 6.0 - 8.5 g/dL    Albumin 3.9 3.5 - 5.2 g/dL    ALT (SGPT) 10 1 - 41 U/L    AST (SGOT) 12 1 - 40 U/L    Alkaline Phosphatase 64 39 - 117 U/L    Total Bilirubin 1.3 (H) 0.0 - 1.2 mg/dL    Globulin 2.6 gm/dL    A/G Ratio 1.5 g/dL    BUN/Creatinine Ratio 26.8 (H) 7.0 - 25.0    Anion Gap 8.4 5.0 - 15.0 mmol/L    eGFR 71.4 >60.0 mL/min/1.73   High Sensitivity Troponin T    Collection Time: 12/30/24  5:11 PM    Specimen: Blood   Result Value Ref Range    HS Troponin T 61 (C) <22 ng/L   BNP    Collection Time: 12/30/24  5:11 PM    Specimen: Blood   Result Value Ref Range    proBNP 18,619.0 (H) 0.0 - 1,800.0 pg/mL   Ethanol    Collection Time: 12/30/24  5:11 PM    Specimen: Blood   Result Value Ref Range    Ethanol <10 0 - 10 mg/dL    Ethanol % <0.010 %   STAT Lactic Acid, Reflex    Collection Time: 12/30/24  6:05 PM    Specimen: Blood   Result Value Ref Range    Lactate 1.2 0.5 - 2.0 mmol/L   High Sensitivity Troponin T 1Hr    Collection Time: 12/30/24  6:05 PM    Specimen: Blood   Result Value Ref Range    HS Troponin T 59 (C) <22 ng/L    Troponin T Numeric Delta -2 ng/L    Troponin T % Delta -3 Abnormal if >/= 20% %       Ordered the above labs and independently interpreted results.  My findings will be discussed in the ED course or medical decision making section below    RADIOLOGY RESULTS  CT Chest Without Contrast Diagnostic    Result Date: 12/30/2024  CT CHEST WITHOUT CONTRAST  HISTORY:  Altered mental status  TECHNIQUE: Radiation dose reduction techniques were utilized, including automated exposure control and exposure modulation based on body size. 3 mm images were obtained through the chest without the administration of IV contrast.  COMPARISON: 1/19/2022 and 8/18/2019   FINDINGS: Exam is limited by motion.  Multiple healed bilateral rib fractures. Chronic appearing T12 compression fracture with approximately 50% vertebral body height loss. Partially visualized L2 compression fracture.  A 1.9 cm subcarinal lymph node with tiny focus of internal calcification (series 3/image 52) previously 1.6 cm in 2022 and 2 cm in 2019, both with remeasurement. Remaining mediastinal and hilar lymph nodes are subcentimeter. Left atrial and ventricular enlargement. Severe coronary artery calcifications. Small volume pericardial fluid. Dilated main pulmonary artery (35 mm). Dilated ascending thoracic aorta (47 mm) previously 42 mm with remeasurement. Mildly patulous esophagus.  Moderate to large right and small left pleural effusions. Trachea and main bronchi remain patent. Bilateral lower lobe relaxation atelectasis. Right upper and lower lobe predominant confluent perihilar groundglass opacities with intermixed mild interlobular septal thickening. New left lower lobe 1 cm nodular consolidation versus solid nodule (series 7/image 75). Few calcified granulomas.        1. Moderate to large right and small left pleural effusions. 2. Right upper and lower lobe predominant confluent perihilar groundglass opacities with intermixed mild interlobular septal thickening. Favor pulmonary edema over infectious/inflammatory etiology. 3. New left lower lobe 1 cm nodular consolidation versus solid nodule. Recommend follow-up chest CT in 2 to 3 months to evaluate for stability or clearance. 4. Aneurysmal ascending thoracic aorta (47 mm) previously 42 mm in 2022 with remeasurement. 5. A 1.9 cm subcarinal lymph node has been  enlarged since 2019 and favored reactive/inflammatory. 6. Left atrial and ventricular enlargement. Severe coronary artery calcifications. 7. Dilated main pulmonary artery (35 mm)  This report was finalized on 12/30/2024 5:20 PM by Dr. Flip Katz M.D on Workstation: [id number]      XR Chest 1 View    Result Date: 12/30/2024  XR CHEST 1 VW-  HISTORY: Altered mental status  COMPARISON: Chest radiograph 12/3/2024  FINDINGS:  A single view of the chest was obtained. Limited examination due to patient rotation/positioning. There is an implanted cardiac loop recording device. The cardiac silhouette is enlarged. The aorta is tortuous. There is calcific aortic atherosclerosis. There are layering bilateral pleural effusions, which may be slightly improved on the left and slightly progressed on the right versus redistributed due to changes in patient positioning between examinations. Bilateral hazy airspace opacities, right greater than left, are slightly progressed on the right. Findings can be seen with pulmonary edema and/or multifocal pneumonia in the appropriate clinical setting. There is likely also a component of atelectasis. There is an approximately 5 cm masslike opacity projecting over the lateral mid to lower right lung. Recommend further evaluation with CT chest. There is degenerative disc disease.  This report was finalized on 12/30/2024 4:22 PM by Dr. Lucy Ramsay M.D on Workstation: [id number]      CT Head Without Contrast    Result Date: 12/30/2024  CT HEAD WO CONTRAST-  HISTORY:  ams  COMPARISON: CT head 1/21/2023 and MRI brain 1/22/2023  FINDINGS: A remote infarct involving the right cerebellar hemisphere posterolaterally is appreciated, present previously. Also unchanged is a lacunar infarct involving the corona radiata on the right. There is no evidence of acute infarction or of intracranial hemorrhage. Moderate small vessel ischemic disease is noted. Further evaluation could be performed with a MRI  examination of the brain as indicated.        Radiation dose reduction techniques were utilized, including automated exposure modulation based on body size.  This report was finalized on 12/30/2024 3:43 PM by Dr. Eric Guevara M.D on Workstation: [email or web address]        Ordered the above noted radiological studies.  Independently interpreted by me.  My findings will be discussed in the medical decision section below.     PROGRESS, DATA ANALYSIS, CONSULTS AND MEDICAL DECISION MAKING    Please note that this section constitutes my independent interpretation of clinical data including lab results, radiology, EKG's.  This constitutes my independent professional opinion regarding differential diagnosis and management of this patient.  It may include any factors such as history from outside sources, review of external records, social determinants of health, management of medications, response to those treatments, and discussions with other providers.    ED Course as of 12/30/24 1939   Mon Dec 30, 2024   1621 EKG          EKG time: 1532  Rhythm/Rate: 67, atrial flutter with predominant 4-1 AV block  P waves and WI: Not applicable  QRS, axis: LBBB  ST and T waves:, No acute ST-T wave abnormality    Interpreted Contemporaneously by me, independently viewed  Compared with prior dated 12/3/2024, similar   [EW]   1641 Lactic 2.1.  rocephin and azithromycin ordered for suspicious pneumonia.  I have ordered CT chest.  [EW]   1645 Pt's oxygen saturations 88% on RA.  I put him on oxygen 2 liters per minute which is he is on at home as needed.  [EW]   1745 I had a lengthy discussion with the patient's grandson.  The patient lives with the grandson and his wife and they have an  coming out to do POA papers as soon as the holidays are over.  We had some shared decision making regarding admission, discharge, palliative care consult.  The patient's behaviors with him talking and reminiscing and telling old stories talking to people  that are now  is not really bothersome to the patient or the grandson.  The grandson states he just wanted to make sure he did not have any kind of infection causing this problem.  The pleural collections are known and the patient is on Lasix for this at home.  The grandson states he prefers just to take him home and they have hospice coming out for consult.  His primary care provider is active in this and has been working with the family to get the hospice consult.  I have ordered an additional dose of Lasix due to his BNP being 18,000 today.Grandson in agreement with this plan.  [EW]      ED Course User Index  [EW] Lucy Reina APRN     Orders placed during this visit:  Orders Placed This Encounter   Procedures    COVID-19 and FLU A/B PCR, 1 HR TAT - Swab, Nasopharynx    XR Chest 1 View    CT Head Without Contrast    CT Chest Without Contrast Diagnostic    Comprehensive Metabolic Panel    High Sensitivity Troponin T    Lactic Acid, Plasma    BNP    Ethanol    CBC Auto Differential    STAT Lactic Acid, Reflex    High Sensitivity Troponin T 1Hr    ECG 12 Lead Altered Mental Status    Blood Draw With IV Start    Insert peripheral IV    CBC & Differential    ED Acknowledgement Form Needed;            Medical Decision Making    Dinesh Churchill is a 89-year-old male who presents today with altered mental status for the past several days per the grandson who he lives with.  Grandson reports he has been talking to  family members and caring on full on conversations.  He has not been agitated or bothered by these conversations.  These are just a different change and the patient has not done that before.  He has a complicated past medical history with some chronic lung disease, chronic coronary disease.  He was seen at urgent care thought to have had a UTI but the urinalysis was negative and he was sent here for further workup.  CT scan of the brain negative for any acute stroke or bleeds but the patient  has had multiple old strokes which were redemonstrated.  Chest x-ray was of poor quality but significant effusions and this was followed up with a CT scan so I can help determine whether or not this was infectious versus pleural effusions and pleural effusions.  Patient did initially have an elevated lactic acid but this improved.  His troponin was elevated but became a little bit less.  Patient always has a elevated troponin.  Most significantly is his BNP which was 18,000 today and I did give him a dose of extra Lasix.  The grandson states they have as needed Lasix at home and he will start giving it.  We had some significant shared decision making between the grandson and myself as far as what to do with this patient versus admission or going home.  He states his grandfather wants to be at home and I think that this is completely reasonable.  His primary care provider has put in a hospice consult and I suspect that will get done this week.  I did mention palliative care unit but he states his grandfather wants to be at home where he is most comfortable.  He does not ambulate at baseline and they have a caregiver that comes in and helps frequently.  He did have a little bit of hypoxia but I put him on his usual 2 L nasal cannula and he has been having great oxygenation saturation since then.  I did discuss return to ER precautions.  He is not having any chest pain and actually did start conversing a little bit more normally.    DIAGNOSIS  Final diagnoses:   Altered mental status, unspecified altered mental status type   Chronic hypoxemic respiratory failure   Coronary artery disease involving native coronary artery of native heart without angina pectoris   History of CVA (cerebrovascular accident)   Pleural effusion, bilateral          Medication List        Changed      B-12 1000 MCG lozenge  PLACE 1 LOZENGE UNDER THE TOUNGE EVERY OTHER DAY  What changed: See the new instructions.              FOLLOW-UP  Inder  RAFA Ferguson  [address] [facility] [id number]  [phone]    Schedule an appointment as soon as possible for a visit in 3 days          Latest Documented Vital Signs:  As of 19:39 EST  BP- 158/57 HR- 83 Temp- 97.7 °F (36.5 °C) O2 sat- 98%    Appropriate PPE utilized throughout this patient encounter to include mask, if indicated, per current protocol. Hand hygiene was performed before donning PPE and after removal when leaving the room.    Please note that portions of this were completed with a voice recognition program.     Note Disclaimer: At [facility], we believe that sharing information builds trust and better relationships. You are receiving this note because you are receiving care at [facility] or recently visited. It is possible you will see health information before a provider has talked with you about it. This kind of information can be easy to misunderstand. To help you fully understand what it means for your health, we urge you to discuss this note with your provider.

## 2024-12-31 NOTE — DISCHARGE INSTRUCTIONS
Discharge to home    Touch base with Phyllis Loving's office regarding [facility] consult    It is normal sometimes for people at the end of their life to talk to family members that are no longer with us.    Return Precautions    Although you are being discharged from the ED today, I encourage you to return for worsening symptoms.  Things can, and do, change such that treatment at home with medication may not be adequate.      Specifically, return for any of the following:    Chest pain, shortness of breath, pain or nausea and vomiting not controlled by medications provided.    Please make a follow up with your Primary Care Provider for a blood pressure recheck.     
axillary

## (undated) DEVICE — MINI TREK CORONARY DILATATION CATHETER 2.0 MM X 15 MM / RAPID-EXCHANGE: Brand: MINI TREK

## (undated) DEVICE — LOU LOOP RECORDER PACK: Brand: MEDLINE INDUSTRIES, INC.

## (undated) DEVICE — CATH F5 INF AR MOD 100CM: Brand: INFINITI

## (undated) DEVICE — Device

## (undated) DEVICE — HI-TORQUE WHISPER MS GUIDE WIRE .014 STRAIGHT TIP 3.0 CM X 190 CM: Brand: HI-TORQUE WHISPER

## (undated) DEVICE — DEV INDEFLATOR

## (undated) DEVICE — GLIDESHEATH BASIC HYDROPHILIC COATED INTRODUCER SHEATH: Brand: GLIDESHEATH

## (undated) DEVICE — CATH DIAG IMPULSE FL3.5 5F 100CM

## (undated) DEVICE — CATH VENT MIV RADL PIG ST TIP 4F 110CM

## (undated) DEVICE — 6F .070 XB 3.5 100CM: Brand: VISTA BRITE TIP

## (undated) DEVICE — GW EMR FIX EXCHG J STD .035 3MM 260CM

## (undated) DEVICE — KT MANIFLD CARDIAC

## (undated) DEVICE — CATH VENT MIV RADL PIG ST TIP 5F 110CM

## (undated) DEVICE — PK CATH CARD 40

## (undated) DEVICE — GLIDESHEATH SLENDER STAINLESS STEEL KIT: Brand: GLIDESHEATH SLENDER

## (undated) DEVICE — CATH DIAG IMPULSE FR4 5F 100CM

## (undated) DEVICE — INTRO SHEATH ART/FEM ENGAGE .035 6F12CM

## (undated) DEVICE — TREK CORONARY DILATATION CATHETER 3.0 MM X 8 MM / RAPID-EXCHANGE: Brand: TREK

## (undated) DEVICE — CATH DIAG IMPULSE IM 5F 100CM

## (undated) DEVICE — CATH DIAG IMPULSE AL1 5F 100CM

## (undated) DEVICE — HI-TORQUE BALANCE MIDDLEWEIGHT GUIDE WIRE .014 STRAIGHT TIP 3.0 CM X 190 CM: Brand: HI-TORQUE BALANCE MIDDLEWEIGHT

## (undated) DEVICE — GUIDE CATHETER: Brand: MACH1™

## (undated) DEVICE — 6F .070 JR 4 100CM: Brand: CORDIS